# Patient Record
Sex: FEMALE | Race: BLACK OR AFRICAN AMERICAN | NOT HISPANIC OR LATINO | URBAN - METROPOLITAN AREA
[De-identification: names, ages, dates, MRNs, and addresses within clinical notes are randomized per-mention and may not be internally consistent; named-entity substitution may affect disease eponyms.]

---

## 2017-01-05 ENCOUNTER — COMMUNICATION - HEALTHEAST (OUTPATIENT)
Dept: FAMILY MEDICINE | Facility: CLINIC | Age: 67
End: 2017-01-05

## 2017-01-06 ENCOUNTER — RECORDS - HEALTHEAST (OUTPATIENT)
Dept: ADMINISTRATIVE | Facility: OTHER | Age: 67
End: 2017-01-06

## 2017-01-16 ENCOUNTER — AMBULATORY - HEALTHEAST (OUTPATIENT)
Dept: NURSING | Facility: CLINIC | Age: 67
End: 2017-01-16

## 2017-01-16 DIAGNOSIS — I63.9 CVA (CEREBROVASCULAR ACCIDENT) (H): ICD-10-CM

## 2017-01-19 ENCOUNTER — COMMUNICATION - HEALTHEAST (OUTPATIENT)
Dept: FAMILY MEDICINE | Facility: CLINIC | Age: 67
End: 2017-01-19

## 2017-01-19 DIAGNOSIS — K59.00 CONSTIPATION: ICD-10-CM

## 2017-01-25 ENCOUNTER — OFFICE VISIT - HEALTHEAST (OUTPATIENT)
Dept: FAMILY MEDICINE | Facility: CLINIC | Age: 67
End: 2017-01-25

## 2017-01-25 ENCOUNTER — AMBULATORY - HEALTHEAST (OUTPATIENT)
Dept: EDUCATION SERVICES | Facility: CLINIC | Age: 67
End: 2017-01-25

## 2017-01-25 ENCOUNTER — RECORDS - HEALTHEAST (OUTPATIENT)
Dept: MAMMOGRAPHY | Facility: CLINIC | Age: 67
End: 2017-01-25

## 2017-01-25 ENCOUNTER — COMMUNICATION - HEALTHEAST (OUTPATIENT)
Dept: FAMILY MEDICINE | Facility: CLINIC | Age: 67
End: 2017-01-25

## 2017-01-25 DIAGNOSIS — E83.42 HYPOMAGNESEMIA: ICD-10-CM

## 2017-01-25 DIAGNOSIS — E08.65 DIABETES MELLITUS DUE TO UNDERLYING CONDITION WITH HYPERGLYCEMIA (H): ICD-10-CM

## 2017-01-25 DIAGNOSIS — I48.0 PAF (PAROXYSMAL ATRIAL FIBRILLATION) (H): ICD-10-CM

## 2017-01-25 DIAGNOSIS — Z12.31 ENCOUNTER FOR SCREENING MAMMOGRAM FOR MALIGNANT NEOPLASM OF BREAST: ICD-10-CM

## 2017-01-25 DIAGNOSIS — R13.10 DYSPHAGIA: ICD-10-CM

## 2017-01-25 DIAGNOSIS — R05.9 COUGH: ICD-10-CM

## 2017-01-25 DIAGNOSIS — E11.59 TYPE 2 DIABETES MELLITUS WITH OTHER CIRCULATORY COMPLICATION, UNSPECIFIED LONG TERM INSULIN USE STATUS: ICD-10-CM

## 2017-01-25 DIAGNOSIS — L60.2 ONYCHOGRYPOSIS: ICD-10-CM

## 2017-01-25 DIAGNOSIS — Z00.00 HEALTHCARE MAINTENANCE: ICD-10-CM

## 2017-01-25 DIAGNOSIS — I67.2 CEREBRAL ATHEROSCLEROSIS: ICD-10-CM

## 2017-01-25 DIAGNOSIS — M54.32 SCIATICA OF LEFT SIDE: ICD-10-CM

## 2017-01-25 ASSESSMENT — MIFFLIN-ST. JEOR: SCORE: 1289.72

## 2017-02-02 ENCOUNTER — COMMUNICATION - HEALTHEAST (OUTPATIENT)
Dept: FAMILY MEDICINE | Facility: CLINIC | Age: 67
End: 2017-02-02

## 2017-02-02 DIAGNOSIS — I63.9 CVA (CEREBROVASCULAR ACCIDENT) (H): ICD-10-CM

## 2017-02-08 ENCOUNTER — COMMUNICATION - HEALTHEAST (OUTPATIENT)
Dept: FAMILY MEDICINE | Facility: CLINIC | Age: 67
End: 2017-02-08

## 2017-02-09 ENCOUNTER — COMMUNICATION - HEALTHEAST (OUTPATIENT)
Dept: FAMILY MEDICINE | Facility: CLINIC | Age: 67
End: 2017-02-09

## 2017-02-16 ENCOUNTER — COMMUNICATION - HEALTHEAST (OUTPATIENT)
Dept: FAMILY MEDICINE | Facility: CLINIC | Age: 67
End: 2017-02-16

## 2017-02-16 DIAGNOSIS — E11.59 TYPE 2 DIABETES MELLITUS WITH CIRCULATORY DISORDER (H): ICD-10-CM

## 2017-02-16 DIAGNOSIS — E11.59 TYPE 2 DIABETES MELLITUS WITH OTHER CIRCULATORY COMPLICATION, UNSPECIFIED LONG TERM INSULIN USE STATUS: ICD-10-CM

## 2017-02-23 ENCOUNTER — COMMUNICATION - HEALTHEAST (OUTPATIENT)
Dept: FAMILY MEDICINE | Facility: CLINIC | Age: 67
End: 2017-02-23

## 2017-02-27 ENCOUNTER — COMMUNICATION - HEALTHEAST (OUTPATIENT)
Dept: FAMILY MEDICINE | Facility: CLINIC | Age: 67
End: 2017-02-27

## 2017-03-02 ENCOUNTER — COMMUNICATION - HEALTHEAST (OUTPATIENT)
Dept: FAMILY MEDICINE | Facility: CLINIC | Age: 67
End: 2017-03-02

## 2017-03-02 DIAGNOSIS — K59.00 CONSTIPATION: ICD-10-CM

## 2017-03-09 ENCOUNTER — RECORDS - HEALTHEAST (OUTPATIENT)
Dept: ADMINISTRATIVE | Facility: OTHER | Age: 67
End: 2017-03-09

## 2017-03-15 ENCOUNTER — OFFICE VISIT - HEALTHEAST (OUTPATIENT)
Dept: NURSING | Facility: CLINIC | Age: 67
End: 2017-03-15

## 2017-03-15 DIAGNOSIS — I48.0 PAF (PAROXYSMAL ATRIAL FIBRILLATION) (H): ICD-10-CM

## 2017-03-15 DIAGNOSIS — I10 ESSENTIAL HYPERTENSION: ICD-10-CM

## 2017-03-15 DIAGNOSIS — Z00.00 HEALTH CARE MAINTENANCE: ICD-10-CM

## 2017-03-16 ENCOUNTER — COMMUNICATION - HEALTHEAST (OUTPATIENT)
Dept: FAMILY MEDICINE | Facility: CLINIC | Age: 67
End: 2017-03-16

## 2017-03-16 DIAGNOSIS — I63.9 CVA (CEREBROVASCULAR ACCIDENT) (H): ICD-10-CM

## 2017-03-22 ENCOUNTER — COMMUNICATION - HEALTHEAST (OUTPATIENT)
Dept: SCHEDULING | Facility: CLINIC | Age: 67
End: 2017-03-22

## 2017-03-23 ENCOUNTER — RECORDS - HEALTHEAST (OUTPATIENT)
Dept: ADMINISTRATIVE | Facility: OTHER | Age: 67
End: 2017-03-23

## 2017-03-24 ENCOUNTER — RECORDS - HEALTHEAST (OUTPATIENT)
Dept: ADMINISTRATIVE | Facility: OTHER | Age: 67
End: 2017-03-24

## 2017-03-27 ENCOUNTER — COMMUNICATION - HEALTHEAST (OUTPATIENT)
Dept: FAMILY MEDICINE | Facility: CLINIC | Age: 67
End: 2017-03-27

## 2017-03-27 ENCOUNTER — RECORDS - HEALTHEAST (OUTPATIENT)
Dept: ADMINISTRATIVE | Facility: OTHER | Age: 67
End: 2017-03-27

## 2017-03-27 DIAGNOSIS — I63.9 CVA (CEREBROVASCULAR ACCIDENT) (H): ICD-10-CM

## 2017-03-30 ENCOUNTER — COMMUNICATION - HEALTHEAST (OUTPATIENT)
Dept: FAMILY MEDICINE | Facility: CLINIC | Age: 67
End: 2017-03-30

## 2017-03-31 ENCOUNTER — COMMUNICATION - HEALTHEAST (OUTPATIENT)
Dept: FAMILY MEDICINE | Facility: CLINIC | Age: 67
End: 2017-03-31

## 2017-03-31 DIAGNOSIS — I63.9 CVA (CEREBROVASCULAR ACCIDENT) (H): ICD-10-CM

## 2017-04-03 ENCOUNTER — RECORDS - HEALTHEAST (OUTPATIENT)
Dept: ADMINISTRATIVE | Facility: OTHER | Age: 67
End: 2017-04-03

## 2017-04-06 ENCOUNTER — COMMUNICATION - HEALTHEAST (OUTPATIENT)
Dept: FAMILY MEDICINE | Facility: CLINIC | Age: 67
End: 2017-04-06

## 2017-04-06 DIAGNOSIS — I63.9 CVA (CEREBROVASCULAR ACCIDENT) (H): ICD-10-CM

## 2017-04-10 ENCOUNTER — RECORDS - HEALTHEAST (OUTPATIENT)
Dept: ADMINISTRATIVE | Facility: OTHER | Age: 67
End: 2017-04-10

## 2017-04-12 ENCOUNTER — OFFICE VISIT - HEALTHEAST (OUTPATIENT)
Dept: FAMILY MEDICINE | Facility: CLINIC | Age: 67
End: 2017-04-12

## 2017-04-12 ENCOUNTER — COMMUNICATION - HEALTHEAST (OUTPATIENT)
Dept: FAMILY MEDICINE | Facility: CLINIC | Age: 67
End: 2017-04-12

## 2017-04-12 ENCOUNTER — COMMUNICATION - HEALTHEAST (OUTPATIENT)
Dept: NURSING | Facility: CLINIC | Age: 67
End: 2017-04-12

## 2017-04-12 DIAGNOSIS — I67.2 CEREBRAL ATHEROSCLEROSIS: ICD-10-CM

## 2017-04-12 DIAGNOSIS — I73.9 PERIPHERAL VASCULAR DISEASE, UNSPECIFIED (H): ICD-10-CM

## 2017-04-12 DIAGNOSIS — E11.59 TYPE 2 DIABETES MELLITUS WITH OTHER CIRCULATORY COMPLICATION, UNSPECIFIED LONG TERM INSULIN USE STATUS: ICD-10-CM

## 2017-04-12 DIAGNOSIS — M25.532 WRIST PAIN, LEFT: ICD-10-CM

## 2017-04-12 LAB — HBA1C MFR BLD: 6.4 % (ref 3.5–6)

## 2017-04-12 ASSESSMENT — MIFFLIN-ST. JEOR: SCORE: 1276.12

## 2017-04-13 ENCOUNTER — AMBULATORY - HEALTHEAST (OUTPATIENT)
Dept: FAMILY MEDICINE | Facility: CLINIC | Age: 67
End: 2017-04-13

## 2017-04-13 DIAGNOSIS — M25.539 WRIST PAIN: ICD-10-CM

## 2017-04-20 ENCOUNTER — COMMUNICATION - HEALTHEAST (OUTPATIENT)
Dept: FAMILY MEDICINE | Facility: CLINIC | Age: 67
End: 2017-04-20

## 2017-04-20 ENCOUNTER — RECORDS - HEALTHEAST (OUTPATIENT)
Dept: ADMINISTRATIVE | Facility: OTHER | Age: 67
End: 2017-04-20

## 2017-04-20 ENCOUNTER — COMMUNICATION - HEALTHEAST (OUTPATIENT)
Dept: INTENSIVE CARE | Facility: CLINIC | Age: 67
End: 2017-04-20

## 2017-04-20 DIAGNOSIS — M19.90 OSTEOARTHRITIS: ICD-10-CM

## 2017-04-20 DIAGNOSIS — I63.9 CVA (CEREBROVASCULAR ACCIDENT) (H): ICD-10-CM

## 2017-04-25 ENCOUNTER — RECORDS - HEALTHEAST (OUTPATIENT)
Dept: ADMINISTRATIVE | Facility: OTHER | Age: 67
End: 2017-04-25

## 2017-04-26 ENCOUNTER — RECORDS - HEALTHEAST (OUTPATIENT)
Dept: ADMINISTRATIVE | Facility: OTHER | Age: 67
End: 2017-04-26

## 2017-04-27 ENCOUNTER — COMMUNICATION - HEALTHEAST (OUTPATIENT)
Dept: FAMILY MEDICINE | Facility: CLINIC | Age: 67
End: 2017-04-27

## 2017-04-27 DIAGNOSIS — F32.89 DEPRESSIVE DISORDER, NOT ELSEWHERE CLASSIFIED: ICD-10-CM

## 2017-05-01 ENCOUNTER — COMMUNICATION - HEALTHEAST (OUTPATIENT)
Dept: FAMILY MEDICINE | Facility: CLINIC | Age: 67
End: 2017-05-01

## 2017-05-03 ENCOUNTER — COMMUNICATION - HEALTHEAST (OUTPATIENT)
Dept: FAMILY MEDICINE | Facility: CLINIC | Age: 67
End: 2017-05-03

## 2017-05-04 ENCOUNTER — RECORDS - HEALTHEAST (OUTPATIENT)
Dept: ADMINISTRATIVE | Facility: OTHER | Age: 67
End: 2017-05-04

## 2017-05-04 ENCOUNTER — COMMUNICATION - HEALTHEAST (OUTPATIENT)
Dept: FAMILY MEDICINE | Facility: CLINIC | Age: 67
End: 2017-05-04

## 2017-05-04 DIAGNOSIS — I63.9 CVA (CEREBROVASCULAR ACCIDENT) (H): ICD-10-CM

## 2017-05-17 ENCOUNTER — COMMUNICATION - HEALTHEAST (OUTPATIENT)
Dept: SCHEDULING | Facility: CLINIC | Age: 67
End: 2017-05-17

## 2017-05-17 ENCOUNTER — COMMUNICATION - HEALTHEAST (OUTPATIENT)
Dept: FAMILY MEDICINE | Facility: CLINIC | Age: 67
End: 2017-05-17

## 2017-05-18 ENCOUNTER — COMMUNICATION - HEALTHEAST (OUTPATIENT)
Dept: FAMILY MEDICINE | Facility: CLINIC | Age: 67
End: 2017-05-18

## 2017-05-18 ENCOUNTER — OFFICE VISIT - HEALTHEAST (OUTPATIENT)
Dept: FAMILY MEDICINE | Facility: CLINIC | Age: 67
End: 2017-05-18

## 2017-05-18 ENCOUNTER — RECORDS - HEALTHEAST (OUTPATIENT)
Dept: GENERAL RADIOLOGY | Facility: CLINIC | Age: 67
End: 2017-05-18

## 2017-05-18 DIAGNOSIS — J06.9 VIRAL URI WITH COUGH: ICD-10-CM

## 2017-05-18 DIAGNOSIS — I67.2 CEREBRAL ATHEROSCLEROSIS: ICD-10-CM

## 2017-05-18 DIAGNOSIS — R04.2 HEMOPTYSIS: ICD-10-CM

## 2017-05-18 DIAGNOSIS — I63.9 CVA (CEREBROVASCULAR ACCIDENT) (H): ICD-10-CM

## 2017-05-18 DIAGNOSIS — I95.9 HYPOTENSION: ICD-10-CM

## 2017-05-18 ASSESSMENT — MIFFLIN-ST. JEOR: SCORE: 1280.65

## 2017-05-19 ENCOUNTER — RECORDS - HEALTHEAST (OUTPATIENT)
Dept: ADMINISTRATIVE | Facility: OTHER | Age: 67
End: 2017-05-19

## 2017-05-19 ENCOUNTER — COMMUNICATION - HEALTHEAST (OUTPATIENT)
Dept: FAMILY MEDICINE | Facility: CLINIC | Age: 67
End: 2017-05-19

## 2017-05-24 ENCOUNTER — OFFICE VISIT - HEALTHEAST (OUTPATIENT)
Dept: EDUCATION SERVICES | Facility: CLINIC | Age: 67
End: 2017-05-24

## 2017-05-24 ENCOUNTER — OFFICE VISIT - HEALTHEAST (OUTPATIENT)
Dept: FAMILY MEDICINE | Facility: CLINIC | Age: 67
End: 2017-05-24

## 2017-05-24 DIAGNOSIS — Z71.89 ADVANCED DIRECTIVES, COUNSELING/DISCUSSION: ICD-10-CM

## 2017-05-24 DIAGNOSIS — E08.65 DIABETES MELLITUS DUE TO UNDERLYING CONDITION WITH HYPERGLYCEMIA (H): ICD-10-CM

## 2017-05-24 DIAGNOSIS — G25.81 RLS (RESTLESS LEGS SYNDROME): ICD-10-CM

## 2017-05-24 DIAGNOSIS — L60.0 ONYCHOCRYPTOSIS: ICD-10-CM

## 2017-05-24 DIAGNOSIS — I10 ESSENTIAL HYPERTENSION: ICD-10-CM

## 2017-05-24 ASSESSMENT — MIFFLIN-ST. JEOR: SCORE: 1276.12

## 2017-05-25 ENCOUNTER — RECORDS - HEALTHEAST (OUTPATIENT)
Dept: ADMINISTRATIVE | Facility: OTHER | Age: 67
End: 2017-05-25

## 2017-06-01 ENCOUNTER — RECORDS - HEALTHEAST (OUTPATIENT)
Dept: ADMINISTRATIVE | Facility: OTHER | Age: 67
End: 2017-06-01

## 2017-06-02 ENCOUNTER — COMMUNICATION - HEALTHEAST (OUTPATIENT)
Dept: FAMILY MEDICINE | Facility: CLINIC | Age: 67
End: 2017-06-02

## 2017-06-07 ENCOUNTER — COMMUNICATION - HEALTHEAST (OUTPATIENT)
Dept: FAMILY MEDICINE | Facility: CLINIC | Age: 67
End: 2017-06-07

## 2017-06-07 DIAGNOSIS — E83.42 HYPOMAGNESEMIA: ICD-10-CM

## 2017-06-22 ENCOUNTER — COMMUNICATION - HEALTHEAST (OUTPATIENT)
Dept: FAMILY MEDICINE | Facility: CLINIC | Age: 67
End: 2017-06-22

## 2017-06-28 ENCOUNTER — COMMUNICATION - HEALTHEAST (OUTPATIENT)
Dept: FAMILY MEDICINE | Facility: CLINIC | Age: 67
End: 2017-06-28

## 2017-06-28 DIAGNOSIS — I63.9 CVA (CEREBROVASCULAR ACCIDENT) (H): ICD-10-CM

## 2017-07-10 ENCOUNTER — RECORDS - HEALTHEAST (OUTPATIENT)
Dept: ADMINISTRATIVE | Facility: OTHER | Age: 67
End: 2017-07-10

## 2017-07-11 ENCOUNTER — COMMUNICATION - HEALTHEAST (OUTPATIENT)
Dept: FAMILY MEDICINE | Facility: CLINIC | Age: 67
End: 2017-07-11

## 2017-07-11 DIAGNOSIS — I63.9 CEREBRAL INFARCTION (H): ICD-10-CM

## 2017-07-11 DIAGNOSIS — I63.9 CVA (CEREBROVASCULAR ACCIDENT) (H): ICD-10-CM

## 2017-07-11 DIAGNOSIS — I48.0 PAF (PAROXYSMAL ATRIAL FIBRILLATION) (H): ICD-10-CM

## 2017-08-02 ENCOUNTER — COMMUNICATION - HEALTHEAST (OUTPATIENT)
Dept: FAMILY MEDICINE | Facility: CLINIC | Age: 67
End: 2017-08-02

## 2017-08-02 DIAGNOSIS — I63.9 CVA (CEREBROVASCULAR ACCIDENT) (H): ICD-10-CM

## 2017-08-07 ENCOUNTER — COMMUNICATION - HEALTHEAST (OUTPATIENT)
Dept: FAMILY MEDICINE | Facility: CLINIC | Age: 67
End: 2017-08-07

## 2017-08-09 ENCOUNTER — COMMUNICATION - HEALTHEAST (OUTPATIENT)
Dept: MEDSURG UNIT | Facility: CLINIC | Age: 67
End: 2017-08-09

## 2017-08-09 ENCOUNTER — RECORDS - HEALTHEAST (OUTPATIENT)
Dept: ADMINISTRATIVE | Facility: OTHER | Age: 67
End: 2017-08-09

## 2017-08-09 DIAGNOSIS — Z79.01 ADEQUATE ANTICOAGULATION ON ANTICOAGULANT THERAPY: ICD-10-CM

## 2017-08-21 ENCOUNTER — COMMUNICATION - HEALTHEAST (OUTPATIENT)
Dept: FAMILY MEDICINE | Facility: CLINIC | Age: 67
End: 2017-08-21

## 2017-08-22 ENCOUNTER — COMMUNICATION - HEALTHEAST (OUTPATIENT)
Dept: INTENSIVE CARE | Facility: CLINIC | Age: 67
End: 2017-08-22

## 2017-08-22 DIAGNOSIS — I25.10 CORONARY ATHEROSCLEROSIS: ICD-10-CM

## 2017-08-22 DIAGNOSIS — E78.5 HYPERLIPIDEMIA: ICD-10-CM

## 2017-08-22 DIAGNOSIS — K59.00 CONSTIPATION: ICD-10-CM

## 2017-08-22 DIAGNOSIS — Z00.00 ROUTINE GENERAL MEDICAL EXAMINATION AT A HEALTH CARE FACILITY: ICD-10-CM

## 2017-08-22 DIAGNOSIS — J30.9 ALLERGIC RHINITIS: ICD-10-CM

## 2017-09-06 ENCOUNTER — COMMUNICATION - HEALTHEAST (OUTPATIENT)
Dept: FAMILY MEDICINE | Facility: CLINIC | Age: 67
End: 2017-09-06

## 2017-09-06 DIAGNOSIS — I63.9 CVA (CEREBROVASCULAR ACCIDENT) (H): ICD-10-CM

## 2017-09-14 ENCOUNTER — RECORDS - HEALTHEAST (OUTPATIENT)
Dept: ADMINISTRATIVE | Facility: OTHER | Age: 67
End: 2017-09-14

## 2017-09-20 ENCOUNTER — AMBULATORY - HEALTHEAST (OUTPATIENT)
Dept: EDUCATION SERVICES | Facility: CLINIC | Age: 67
End: 2017-09-20

## 2017-09-20 ENCOUNTER — COMMUNICATION - HEALTHEAST (OUTPATIENT)
Dept: NURSING | Facility: CLINIC | Age: 67
End: 2017-09-20

## 2017-09-20 ENCOUNTER — OFFICE VISIT - HEALTHEAST (OUTPATIENT)
Dept: FAMILY MEDICINE | Facility: CLINIC | Age: 67
End: 2017-09-20

## 2017-09-20 ENCOUNTER — RECORDS - HEALTHEAST (OUTPATIENT)
Dept: GENERAL RADIOLOGY | Facility: CLINIC | Age: 67
End: 2017-09-20

## 2017-09-20 DIAGNOSIS — I20.89 ATYPICAL ANGINA (H): ICD-10-CM

## 2017-09-20 DIAGNOSIS — I10 ESSENTIAL HYPERTENSION: ICD-10-CM

## 2017-09-20 DIAGNOSIS — Z00.00 HEALTH CARE MAINTENANCE: ICD-10-CM

## 2017-09-20 DIAGNOSIS — I20.89 OTHER FORMS OF ANGINA PECTORIS (H): ICD-10-CM

## 2017-09-20 DIAGNOSIS — I63.9 CVA (CEREBROVASCULAR ACCIDENT) (H): ICD-10-CM

## 2017-09-20 DIAGNOSIS — K43.2 INCISIONAL HERNIA: ICD-10-CM

## 2017-09-20 DIAGNOSIS — E11.59 TYPE 2 DIABETES MELLITUS WITH OTHER CIRCULATORY COMPLICATION, UNSPECIFIED LONG TERM INSULIN USE STATUS: ICD-10-CM

## 2017-09-20 DIAGNOSIS — Z71.89 ADVANCED DIRECTIVES, COUNSELING/DISCUSSION: ICD-10-CM

## 2017-09-20 LAB
ALT SERPL W P-5'-P-CCNC: 13 U/L (ref 0–45)
CHOLEST SERPL-MCNC: 165 MG/DL
FASTING STATUS PATIENT QL REPORTED: YES
HBA1C MFR BLD: 6.2 % (ref 3.5–6)
HDLC SERPL-MCNC: 56 MG/DL
LDLC SERPL CALC-MCNC: 85 MG/DL
TRIGL SERPL-MCNC: 121 MG/DL

## 2017-09-20 ASSESSMENT — MIFFLIN-ST. JEOR: SCORE: 1298.8

## 2017-09-22 LAB
ATRIAL RATE - MUSE: 63 BPM
DIASTOLIC BLOOD PRESSURE - MUSE: NORMAL MMHG
INTERPRETATION ECG - MUSE: NORMAL
P AXIS - MUSE: NORMAL DEGREES
PR INTERVAL - MUSE: NORMAL MS
QRS DURATION - MUSE: 90 MS
QT - MUSE: 452 MS
QTC - MUSE: 462 MS
R AXIS - MUSE: -15 DEGREES
SYSTOLIC BLOOD PRESSURE - MUSE: NORMAL MMHG
T AXIS - MUSE: 40 DEGREES
VENTRICULAR RATE- MUSE: 63 BPM

## 2017-09-27 ENCOUNTER — HOSPITAL ENCOUNTER (OUTPATIENT)
Dept: NUCLEAR MEDICINE | Facility: CLINIC | Age: 67
Discharge: HOME | End: 2017-09-27
Attending: FAMILY MEDICINE

## 2017-09-27 ENCOUNTER — HOSPITAL ENCOUNTER (OUTPATIENT)
Dept: CARDIOLOGY | Facility: CLINIC | Age: 67
Discharge: HOME | End: 2017-09-27
Attending: FAMILY MEDICINE

## 2017-09-27 DIAGNOSIS — I20.89 ATYPICAL ANGINA (H): ICD-10-CM

## 2017-09-27 LAB
CV STRESS CURRENT BP HE: NORMAL
CV STRESS CURRENT HR HE: 65
CV STRESS CURRENT HR HE: 67
CV STRESS CURRENT HR HE: 68
CV STRESS CURRENT HR HE: 70
CV STRESS CURRENT HR HE: 72
CV STRESS CURRENT HR HE: 74
CV STRESS CURRENT HR HE: 79
CV STRESS CURRENT HR HE: 82
CV STRESS CURRENT HR HE: 84
CV STRESS CURRENT HR HE: 89
CV STRESS CURRENT HR HE: 93
CV STRESS CURRENT HR HE: 94
CV STRESS CURRENT HR HE: 94
CV STRESS CURRENT HR HE: 95
CV STRESS DEVIATION TIME HE: NORMAL
CV STRESS ECHO PERCENT HR HE: NORMAL
CV STRESS EXERCISE STAGE HE: NORMAL
CV STRESS FINAL RESTING BP HE: NORMAL
CV STRESS FINAL RESTING HR HE: 65
CV STRESS MAX HR HE: 97
CV STRESS MAX TREADMILL GRADE HE: 0
CV STRESS MAX TREADMILL SPEED HE: 0
CV STRESS PEAK DIA BP HE: NORMAL
CV STRESS PEAK SYS BP HE: NORMAL
CV STRESS PHASE HE: NORMAL
CV STRESS PROTOCOL HE: NORMAL
CV STRESS RESTING PT POSITION HE: NORMAL
CV STRESS ST DEVIATION AMOUNT HE: NORMAL
CV STRESS ST DEVIATION ELEVATION HE: NORMAL
CV STRESS ST EVELATION AMOUNT HE: NORMAL
CV STRESS TEST TYPE HE: NORMAL
CV STRESS TOTAL STAGE TIME MIN 1 HE: NORMAL
NUC STRESS EJECTION FRACTION: 59 %
STRESS ECHO BASELINE BP: NORMAL
STRESS ECHO BASELINE HR: 67
STRESS ECHO CALCULATED PERCENT HR: 63 %
STRESS ECHO LAST STRESS BP: NORMAL
STRESS ECHO LAST STRESS HR: 94

## 2017-10-02 ENCOUNTER — RECORDS - HEALTHEAST (OUTPATIENT)
Dept: ADMINISTRATIVE | Facility: OTHER | Age: 67
End: 2017-10-02

## 2017-10-04 ENCOUNTER — COMMUNICATION - HEALTHEAST (OUTPATIENT)
Dept: FAMILY MEDICINE | Facility: CLINIC | Age: 67
End: 2017-10-04

## 2017-10-04 ENCOUNTER — RECORDS - HEALTHEAST (OUTPATIENT)
Dept: ADMINISTRATIVE | Facility: OTHER | Age: 67
End: 2017-10-04

## 2017-10-04 DIAGNOSIS — I63.9 CVA (CEREBROVASCULAR ACCIDENT) (H): ICD-10-CM

## 2017-10-18 ENCOUNTER — COMMUNICATION - HEALTHEAST (OUTPATIENT)
Dept: FAMILY MEDICINE | Facility: CLINIC | Age: 67
End: 2017-10-18

## 2017-10-18 DIAGNOSIS — I63.9 CVA (CEREBROVASCULAR ACCIDENT) (H): ICD-10-CM

## 2017-10-18 DIAGNOSIS — H04.123 DRY EYES, BILATERAL: ICD-10-CM

## 2017-11-01 ENCOUNTER — COMMUNICATION - HEALTHEAST (OUTPATIENT)
Dept: FAMILY MEDICINE | Facility: CLINIC | Age: 67
End: 2017-11-01

## 2017-11-01 DIAGNOSIS — I63.9 CVA (CEREBROVASCULAR ACCIDENT) (H): ICD-10-CM

## 2017-11-03 ENCOUNTER — COMMUNICATION - HEALTHEAST (OUTPATIENT)
Dept: FAMILY MEDICINE | Facility: CLINIC | Age: 67
End: 2017-11-03

## 2017-11-06 ENCOUNTER — RECORDS - HEALTHEAST (OUTPATIENT)
Dept: ADMINISTRATIVE | Facility: OTHER | Age: 67
End: 2017-11-06

## 2017-11-22 ENCOUNTER — COMMUNICATION - HEALTHEAST (OUTPATIENT)
Dept: FAMILY MEDICINE | Facility: CLINIC | Age: 67
End: 2017-11-22

## 2017-11-29 ENCOUNTER — COMMUNICATION - HEALTHEAST (OUTPATIENT)
Dept: FAMILY MEDICINE | Facility: CLINIC | Age: 67
End: 2017-11-29

## 2017-11-29 DIAGNOSIS — I63.9 CVA (CEREBROVASCULAR ACCIDENT) (H): ICD-10-CM

## 2017-12-07 ENCOUNTER — RECORDS - HEALTHEAST (OUTPATIENT)
Dept: ADMINISTRATIVE | Facility: OTHER | Age: 67
End: 2017-12-07

## 2017-12-13 ENCOUNTER — COMMUNICATION - HEALTHEAST (OUTPATIENT)
Dept: FAMILY MEDICINE | Facility: CLINIC | Age: 67
End: 2017-12-13

## 2017-12-13 ENCOUNTER — RECORDS - HEALTHEAST (OUTPATIENT)
Dept: ADMINISTRATIVE | Facility: OTHER | Age: 67
End: 2017-12-13

## 2017-12-13 DIAGNOSIS — I63.9 CVA (CEREBROVASCULAR ACCIDENT) (H): ICD-10-CM

## 2017-12-20 ENCOUNTER — COMMUNICATION - HEALTHEAST (OUTPATIENT)
Dept: INTENSIVE CARE | Facility: CLINIC | Age: 67
End: 2017-12-20

## 2017-12-20 DIAGNOSIS — Z00.00 ROUTINE GENERAL MEDICAL EXAMINATION AT A HEALTH CARE FACILITY: ICD-10-CM

## 2017-12-21 ENCOUNTER — COMMUNICATION - HEALTHEAST (OUTPATIENT)
Dept: INTENSIVE CARE | Facility: CLINIC | Age: 67
End: 2017-12-21

## 2017-12-21 ENCOUNTER — COMMUNICATION - HEALTHEAST (OUTPATIENT)
Dept: FAMILY MEDICINE | Facility: CLINIC | Age: 67
End: 2017-12-21

## 2017-12-21 DIAGNOSIS — I25.10 CORONARY ATHEROSCLEROSIS: ICD-10-CM

## 2017-12-22 ENCOUNTER — RECORDS - HEALTHEAST (OUTPATIENT)
Dept: ADMINISTRATIVE | Facility: OTHER | Age: 67
End: 2017-12-22

## 2017-12-27 ENCOUNTER — COMMUNICATION - HEALTHEAST (OUTPATIENT)
Dept: FAMILY MEDICINE | Facility: CLINIC | Age: 67
End: 2017-12-27

## 2017-12-27 DIAGNOSIS — I63.9 CVA (CEREBROVASCULAR ACCIDENT) (H): ICD-10-CM

## 2018-01-04 ENCOUNTER — COMMUNICATION - HEALTHEAST (OUTPATIENT)
Dept: INTENSIVE CARE | Facility: CLINIC | Age: 68
End: 2018-01-04

## 2018-01-04 DIAGNOSIS — J30.9 ALLERGIC RHINITIS: ICD-10-CM

## 2018-01-05 ENCOUNTER — RECORDS - HEALTHEAST (OUTPATIENT)
Dept: ADMINISTRATIVE | Facility: OTHER | Age: 68
End: 2018-01-05

## 2018-01-10 ENCOUNTER — COMMUNICATION - HEALTHEAST (OUTPATIENT)
Dept: NURSING | Facility: CLINIC | Age: 68
End: 2018-01-10

## 2018-01-10 ENCOUNTER — OFFICE VISIT - HEALTHEAST (OUTPATIENT)
Dept: FAMILY MEDICINE | Facility: CLINIC | Age: 68
End: 2018-01-10

## 2018-01-10 DIAGNOSIS — K21.9 GERD (GASTROESOPHAGEAL REFLUX DISEASE): ICD-10-CM

## 2018-01-10 DIAGNOSIS — E11.59 TYPE 2 DIABETES MELLITUS WITH OTHER CIRCULATORY COMPLICATION, UNSPECIFIED LONG TERM INSULIN USE STATUS: ICD-10-CM

## 2018-01-10 DIAGNOSIS — I73.9 PAD (PERIPHERAL ARTERY DISEASE) (H): ICD-10-CM

## 2018-01-10 DIAGNOSIS — I67.2 CEREBRAL ATHEROSCLEROSIS: ICD-10-CM

## 2018-01-10 DIAGNOSIS — F32.9 MAJOR DEPRESSIVE DISORDER: ICD-10-CM

## 2018-01-10 DIAGNOSIS — Z00.00 HEALTHCARE MAINTENANCE: ICD-10-CM

## 2018-01-10 DIAGNOSIS — E83.42 HYPOMAGNESEMIA: ICD-10-CM

## 2018-01-10 DIAGNOSIS — I25.10 CORONARY ATHEROSCLEROSIS: ICD-10-CM

## 2018-01-10 LAB
CREAT UR-MCNC: 122.7 MG/DL
HBA1C MFR BLD: 6.5 % (ref 3.5–6)
INR PPP: 2.7 (ref 0.9–1.1)
MAGNESIUM SERPL-MCNC: 1.3 MG/DL (ref 1.8–2.6)
MICROALBUMIN UR-MCNC: 1.92 MG/DL (ref 0–1.99)
MICROALBUMIN/CREAT UR: 15.6 MG/G
VIT B12 SERPL-MCNC: 1065 PG/ML (ref 213–816)

## 2018-01-10 ASSESSMENT — MIFFLIN-ST. JEOR: SCORE: 1321.48

## 2018-01-11 ENCOUNTER — RECORDS - HEALTHEAST (OUTPATIENT)
Dept: ADMINISTRATIVE | Facility: OTHER | Age: 68
End: 2018-01-11

## 2018-01-11 LAB — 25(OH)D3 SERPL-MCNC: 48 NG/ML (ref 30–80)

## 2018-01-15 ENCOUNTER — COMMUNICATION - HEALTHEAST (OUTPATIENT)
Dept: FAMILY MEDICINE | Facility: CLINIC | Age: 68
End: 2018-01-15

## 2018-01-17 ENCOUNTER — COMMUNICATION - HEALTHEAST (OUTPATIENT)
Dept: MEDSURG UNIT | Facility: CLINIC | Age: 68
End: 2018-01-17

## 2018-01-17 ENCOUNTER — COMMUNICATION - HEALTHEAST (OUTPATIENT)
Dept: FAMILY MEDICINE | Facility: CLINIC | Age: 68
End: 2018-01-17

## 2018-01-17 ENCOUNTER — COMMUNICATION - HEALTHEAST (OUTPATIENT)
Dept: INTENSIVE CARE | Facility: CLINIC | Age: 68
End: 2018-01-17

## 2018-01-17 DIAGNOSIS — K21.9 GERD (GASTROESOPHAGEAL REFLUX DISEASE): ICD-10-CM

## 2018-01-17 DIAGNOSIS — Z79.01 ADEQUATE ANTICOAGULATION ON ANTICOAGULANT THERAPY: ICD-10-CM

## 2018-01-22 ENCOUNTER — COMMUNICATION - HEALTHEAST (OUTPATIENT)
Dept: FAMILY MEDICINE | Facility: CLINIC | Age: 68
End: 2018-01-22

## 2018-01-22 ENCOUNTER — AMBULATORY - HEALTHEAST (OUTPATIENT)
Dept: FAMILY MEDICINE | Facility: CLINIC | Age: 68
End: 2018-01-22

## 2018-01-22 DIAGNOSIS — E83.42 HYPOMAGNESEMIA: ICD-10-CM

## 2018-01-24 ENCOUNTER — COMMUNICATION - HEALTHEAST (OUTPATIENT)
Dept: FAMILY MEDICINE | Facility: CLINIC | Age: 68
End: 2018-01-24

## 2018-01-24 ENCOUNTER — RECORDS - HEALTHEAST (OUTPATIENT)
Dept: ADMINISTRATIVE | Facility: OTHER | Age: 68
End: 2018-01-24

## 2018-01-24 DIAGNOSIS — E11.9 DIABETES (H): ICD-10-CM

## 2018-01-25 ENCOUNTER — RECORDS - HEALTHEAST (OUTPATIENT)
Dept: ADMINISTRATIVE | Facility: OTHER | Age: 68
End: 2018-01-25

## 2018-01-25 ENCOUNTER — RECORDS - HEALTHEAST (OUTPATIENT)
Dept: BONE DENSITY | Facility: CLINIC | Age: 68
End: 2018-01-25

## 2018-01-25 DIAGNOSIS — Z00.00 ENCOUNTER FOR GENERAL ADULT MEDICAL EXAMINATION WITHOUT ABNORMAL FINDINGS: ICD-10-CM

## 2018-01-31 ENCOUNTER — COMMUNICATION - HEALTHEAST (OUTPATIENT)
Dept: FAMILY MEDICINE | Facility: CLINIC | Age: 68
End: 2018-01-31

## 2018-01-31 DIAGNOSIS — M54.9 BACK PAIN: ICD-10-CM

## 2018-02-02 ENCOUNTER — COMMUNICATION - HEALTHEAST (OUTPATIENT)
Dept: FAMILY MEDICINE | Facility: CLINIC | Age: 68
End: 2018-02-02

## 2018-02-06 ENCOUNTER — COMMUNICATION - HEALTHEAST (OUTPATIENT)
Dept: FAMILY MEDICINE | Facility: CLINIC | Age: 68
End: 2018-02-06

## 2018-02-06 DIAGNOSIS — Z00.00 HEALTH CARE MAINTENANCE: ICD-10-CM

## 2018-02-06 DIAGNOSIS — M19.90 OSTEOARTHRITIS: ICD-10-CM

## 2018-02-07 ENCOUNTER — COMMUNICATION - HEALTHEAST (OUTPATIENT)
Dept: FAMILY MEDICINE | Facility: CLINIC | Age: 68
End: 2018-02-07

## 2018-02-07 DIAGNOSIS — I63.9 CVA (CEREBROVASCULAR ACCIDENT) (H): ICD-10-CM

## 2018-02-07 DIAGNOSIS — Z00.00 HEALTH CARE MAINTENANCE: ICD-10-CM

## 2018-02-07 LAB — INR PPP: 3 (ref 0.9–1.1)

## 2018-02-08 ENCOUNTER — AMBULATORY - HEALTHEAST (OUTPATIENT)
Dept: FAMILY MEDICINE | Facility: CLINIC | Age: 68
End: 2018-02-08

## 2018-02-08 ENCOUNTER — RECORDS - HEALTHEAST (OUTPATIENT)
Dept: ADMINISTRATIVE | Facility: OTHER | Age: 68
End: 2018-02-08

## 2018-02-08 DIAGNOSIS — I63.9 CVA (CEREBROVASCULAR ACCIDENT) (H): ICD-10-CM

## 2018-02-08 DIAGNOSIS — R26.9 GAIT DISTURBANCE, POST-STROKE: ICD-10-CM

## 2018-02-08 DIAGNOSIS — I69.398 GAIT DISTURBANCE, POST-STROKE: ICD-10-CM

## 2018-02-08 DIAGNOSIS — M48.061 SPINAL STENOSIS, LUMBAR REGION, WITHOUT NEUROGENIC CLAUDICATION: ICD-10-CM

## 2018-02-20 ENCOUNTER — RECORDS - HEALTHEAST (OUTPATIENT)
Dept: ADMINISTRATIVE | Facility: OTHER | Age: 68
End: 2018-02-20

## 2018-03-01 ENCOUNTER — COMMUNICATION - HEALTHEAST (OUTPATIENT)
Dept: FAMILY MEDICINE | Facility: CLINIC | Age: 68
End: 2018-03-01

## 2018-03-02 ENCOUNTER — COMMUNICATION - HEALTHEAST (OUTPATIENT)
Dept: NURSING | Facility: CLINIC | Age: 68
End: 2018-03-02

## 2018-03-02 DIAGNOSIS — I67.9 CEREBROVASCULAR DISEASE: ICD-10-CM

## 2018-03-07 ENCOUNTER — COMMUNICATION - HEALTHEAST (OUTPATIENT)
Dept: FAMILY MEDICINE | Facility: CLINIC | Age: 68
End: 2018-03-07

## 2018-03-07 DIAGNOSIS — I67.9 CEREBROVASCULAR DISEASE: ICD-10-CM

## 2018-03-07 LAB — INR PPP: 2.3 (ref 0.9–1.1)

## 2018-03-08 ENCOUNTER — AMBULATORY - HEALTHEAST (OUTPATIENT)
Dept: FAMILY MEDICINE | Facility: CLINIC | Age: 68
End: 2018-03-08

## 2018-03-08 ENCOUNTER — HOSPITAL ENCOUNTER (OUTPATIENT)
Dept: OCCUPATIONAL THERAPY | Age: 68
Setting detail: THERAPIES SERIES
Discharge: STILL ADMITTED OR ADMINISTRATIVE | End: 2018-03-08

## 2018-03-08 DIAGNOSIS — R41.89 COGNITIVE IMPAIRMENT: ICD-10-CM

## 2018-03-08 DIAGNOSIS — R53.1 WEAKNESS: ICD-10-CM

## 2018-03-08 DIAGNOSIS — R27.9 LACK OF COORDINATION: ICD-10-CM

## 2018-03-08 DIAGNOSIS — R26.9 GAIT DISTURBANCE: ICD-10-CM

## 2018-03-12 ENCOUNTER — COMMUNICATION - HEALTHEAST (OUTPATIENT)
Dept: FAMILY MEDICINE | Facility: CLINIC | Age: 68
End: 2018-03-12

## 2018-03-12 DIAGNOSIS — K59.00 CONSTIPATION: ICD-10-CM

## 2018-03-14 ENCOUNTER — COMMUNICATION - HEALTHEAST (OUTPATIENT)
Dept: FAMILY MEDICINE | Facility: CLINIC | Age: 68
End: 2018-03-14

## 2018-03-14 ENCOUNTER — HOSPITAL ENCOUNTER (OUTPATIENT)
Dept: PHYSICAL THERAPY | Age: 68
Setting detail: THERAPIES SERIES
Discharge: STILL ADMITTED OR ADMINISTRATIVE | End: 2018-03-14
Attending: FAMILY MEDICINE

## 2018-03-14 DIAGNOSIS — I67.9 CEREBROVASCULAR DISEASE: ICD-10-CM

## 2018-03-14 DIAGNOSIS — R53.81 PHYSICAL DECONDITIONING: ICD-10-CM

## 2018-03-14 DIAGNOSIS — R06.09 DYSPNEA ON EXERTION: ICD-10-CM

## 2018-03-14 DIAGNOSIS — I10 HTN (HYPERTENSION): ICD-10-CM

## 2018-03-14 LAB — INR PPP: 2.9 (ref 0.9–1.1)

## 2018-03-15 ENCOUNTER — RECORDS - HEALTHEAST (OUTPATIENT)
Dept: ADMINISTRATIVE | Facility: OTHER | Age: 68
End: 2018-03-15

## 2018-03-15 ENCOUNTER — OFFICE VISIT - HEALTHEAST (OUTPATIENT)
Dept: EDUCATION SERVICES | Facility: CLINIC | Age: 68
End: 2018-03-15

## 2018-03-15 ENCOUNTER — OFFICE VISIT - HEALTHEAST (OUTPATIENT)
Dept: FAMILY MEDICINE | Facility: CLINIC | Age: 68
End: 2018-03-15

## 2018-03-15 DIAGNOSIS — E08.65 DIABETES MELLITUS DUE TO UNDERLYING CONDITION WITH HYPERGLYCEMIA (H): ICD-10-CM

## 2018-03-15 DIAGNOSIS — K43.9 VENTRAL HERNIA WITHOUT OBSTRUCTION OR GANGRENE: ICD-10-CM

## 2018-03-15 DIAGNOSIS — G25.81 RLS (RESTLESS LEGS SYNDROME): ICD-10-CM

## 2018-03-15 DIAGNOSIS — Z09 HOSPITAL DISCHARGE FOLLOW-UP: ICD-10-CM

## 2018-03-15 DIAGNOSIS — I67.2 CEREBRAL ATHEROSCLEROSIS: ICD-10-CM

## 2018-03-15 DIAGNOSIS — I25.10 CORONARY ATHEROSCLEROSIS: ICD-10-CM

## 2018-03-15 DIAGNOSIS — I10 ESSENTIAL HYPERTENSION: ICD-10-CM

## 2018-03-21 ENCOUNTER — COMMUNICATION - HEALTHEAST (OUTPATIENT)
Dept: FAMILY MEDICINE | Facility: CLINIC | Age: 68
End: 2018-03-21

## 2018-03-21 DIAGNOSIS — I67.9 CEREBROVASCULAR DISEASE: ICD-10-CM

## 2018-03-21 LAB — INR PPP: 2.6 (ref 0.9–1.1)

## 2018-03-22 ENCOUNTER — HOSPITAL ENCOUNTER (OUTPATIENT)
Dept: CARDIOLOGY | Facility: CLINIC | Age: 68
Discharge: HOME | End: 2018-03-22
Attending: INTERNAL MEDICINE

## 2018-03-22 ENCOUNTER — OFFICE VISIT - HEALTHEAST (OUTPATIENT)
Dept: CARDIOLOGY | Facility: CLINIC | Age: 68
End: 2018-03-22

## 2018-03-22 DIAGNOSIS — I70.1 ATHEROSCLEROSIS OF RENAL ARTERY (H): ICD-10-CM

## 2018-03-22 DIAGNOSIS — I25.10 CORONARY ARTERY DISEASE INVOLVING NATIVE CORONARY ARTERY OF NATIVE HEART WITHOUT ANGINA PECTORIS: ICD-10-CM

## 2018-03-22 DIAGNOSIS — I48.0 PAF (PAROXYSMAL ATRIAL FIBRILLATION) (H): ICD-10-CM

## 2018-03-22 DIAGNOSIS — E78.5 DYSLIPIDEMIA: ICD-10-CM

## 2018-03-22 DIAGNOSIS — I10 ESSENTIAL HYPERTENSION: ICD-10-CM

## 2018-03-22 DIAGNOSIS — I73.9 PAD (PERIPHERAL ARTERY DISEASE) (H): ICD-10-CM

## 2018-03-22 DIAGNOSIS — I67.9 CEREBROVASCULAR DISEASE: ICD-10-CM

## 2018-03-22 LAB
ATRIAL RATE - MUSE: 63 BPM
DIASTOLIC BLOOD PRESSURE - MUSE: NORMAL MMHG
INTERPRETATION ECG - MUSE: NORMAL
P AXIS - MUSE: NORMAL DEGREES
PR INTERVAL - MUSE: 200 MS
QRS DURATION - MUSE: 90 MS
QT - MUSE: 442 MS
QTC - MUSE: 452 MS
R AXIS - MUSE: -17 DEGREES
SYSTOLIC BLOOD PRESSURE - MUSE: NORMAL MMHG
T AXIS - MUSE: 30 DEGREES
VENTRICULAR RATE- MUSE: 63 BPM

## 2018-03-22 ASSESSMENT — MIFFLIN-ST. JEOR: SCORE: 1303.33

## 2018-03-23 ENCOUNTER — COMMUNICATION - HEALTHEAST (OUTPATIENT)
Dept: FAMILY MEDICINE | Facility: CLINIC | Age: 68
End: 2018-03-23

## 2018-03-26 ENCOUNTER — RECORDS - HEALTHEAST (OUTPATIENT)
Dept: ADMINISTRATIVE | Facility: OTHER | Age: 68
End: 2018-03-26

## 2018-03-27 ENCOUNTER — COMMUNICATION - HEALTHEAST (OUTPATIENT)
Dept: MEDSURG UNIT | Facility: CLINIC | Age: 68
End: 2018-03-27

## 2018-03-27 DIAGNOSIS — G25.81 RLS (RESTLESS LEGS SYNDROME): ICD-10-CM

## 2018-03-28 ENCOUNTER — OFFICE VISIT - HEALTHEAST (OUTPATIENT)
Dept: SURGERY | Facility: CLINIC | Age: 68
End: 2018-03-28

## 2018-03-28 ENCOUNTER — RECORDS - HEALTHEAST (OUTPATIENT)
Dept: ADMINISTRATIVE | Facility: OTHER | Age: 68
End: 2018-03-28

## 2018-03-28 ENCOUNTER — COMMUNICATION - HEALTHEAST (OUTPATIENT)
Dept: FAMILY MEDICINE | Facility: CLINIC | Age: 68
End: 2018-03-28

## 2018-03-28 DIAGNOSIS — K43.0 INCISIONAL HERNIA WITH OBSTRUCTION BUT NO GANGRENE: ICD-10-CM

## 2018-03-28 ASSESSMENT — MIFFLIN-ST. JEOR: SCORE: 1317.39

## 2018-04-04 ENCOUNTER — COMMUNICATION - HEALTHEAST (OUTPATIENT)
Dept: FAMILY MEDICINE | Facility: CLINIC | Age: 68
End: 2018-04-04

## 2018-04-04 DIAGNOSIS — I67.9 CEREBROVASCULAR DISEASE: ICD-10-CM

## 2018-04-04 LAB — INR PPP: 3.9 (ref 0.9–1.1)

## 2018-04-09 ENCOUNTER — AMBULATORY - HEALTHEAST (OUTPATIENT)
Dept: CARDIOLOGY | Facility: CLINIC | Age: 68
End: 2018-04-09

## 2018-04-09 ENCOUNTER — COMMUNICATION - HEALTHEAST (OUTPATIENT)
Dept: CARDIOLOGY | Facility: CLINIC | Age: 68
End: 2018-04-09

## 2018-04-09 DIAGNOSIS — I25.10 CAD (CORONARY ARTERY DISEASE): ICD-10-CM

## 2018-04-11 ENCOUNTER — COMMUNICATION - HEALTHEAST (OUTPATIENT)
Dept: FAMILY MEDICINE | Facility: CLINIC | Age: 68
End: 2018-04-11

## 2018-04-11 ENCOUNTER — RECORDS - HEALTHEAST (OUTPATIENT)
Dept: ADMINISTRATIVE | Facility: OTHER | Age: 68
End: 2018-04-11

## 2018-04-11 DIAGNOSIS — Z79.01 ADEQUATE ANTICOAGULATION ON ANTICOAGULANT THERAPY: ICD-10-CM

## 2018-04-11 DIAGNOSIS — I67.9 CEREBROVASCULAR DISEASE: ICD-10-CM

## 2018-04-11 LAB — INR PPP: 2.8 (ref 0.9–1.1)

## 2018-04-12 ENCOUNTER — COMMUNICATION - HEALTHEAST (OUTPATIENT)
Dept: FAMILY MEDICINE | Facility: CLINIC | Age: 68
End: 2018-04-12

## 2018-04-13 ENCOUNTER — COMMUNICATION - HEALTHEAST (OUTPATIENT)
Dept: CARDIOLOGY | Facility: CLINIC | Age: 68
End: 2018-04-13

## 2018-04-18 ENCOUNTER — COMMUNICATION - HEALTHEAST (OUTPATIENT)
Dept: FAMILY MEDICINE | Facility: CLINIC | Age: 68
End: 2018-04-18

## 2018-04-24 ENCOUNTER — HOSPITAL ENCOUNTER (OUTPATIENT)
Dept: NUCLEAR MEDICINE | Facility: CLINIC | Age: 68
Discharge: HOME | End: 2018-04-24
Attending: INTERNAL MEDICINE

## 2018-04-24 ENCOUNTER — HOSPITAL ENCOUNTER (OUTPATIENT)
Dept: CARDIOLOGY | Facility: CLINIC | Age: 68
Discharge: HOME | End: 2018-04-24
Attending: INTERNAL MEDICINE

## 2018-04-24 DIAGNOSIS — I25.10 CAD (CORONARY ARTERY DISEASE): ICD-10-CM

## 2018-04-24 LAB
CV STRESS CURRENT BP HE: NORMAL
CV STRESS CURRENT HR HE: 63
CV STRESS CURRENT HR HE: 66
CV STRESS CURRENT HR HE: 70
CV STRESS CURRENT HR HE: 72
CV STRESS CURRENT HR HE: 74
CV STRESS CURRENT HR HE: 74
CV STRESS CURRENT HR HE: 75
CV STRESS CURRENT HR HE: 75
CV STRESS CURRENT HR HE: 77
CV STRESS CURRENT HR HE: 78
CV STRESS CURRENT HR HE: 79
CV STRESS CURRENT HR HE: 79
CV STRESS CURRENT HR HE: 81
CV STRESS CURRENT HR HE: 81
CV STRESS CURRENT HR HE: 82
CV STRESS DEVIATION TIME HE: NORMAL
CV STRESS ECHO PERCENT HR HE: NORMAL
CV STRESS EXERCISE STAGE HE: NORMAL
CV STRESS FINAL RESTING BP HE: NORMAL
CV STRESS FINAL RESTING HR HE: 77
CV STRESS MAX HR HE: 86
CV STRESS MAX TREADMILL GRADE HE: 0
CV STRESS MAX TREADMILL SPEED HE: 0
CV STRESS PEAK DIA BP HE: NORMAL
CV STRESS PEAK SYS BP HE: NORMAL
CV STRESS PHASE HE: NORMAL
CV STRESS PROTOCOL HE: NORMAL
CV STRESS RESTING PT POSITION HE: NORMAL
CV STRESS ST DEVIATION AMOUNT HE: NORMAL
CV STRESS ST DEVIATION ELEVATION HE: NORMAL
CV STRESS ST EVELATION AMOUNT HE: NORMAL
CV STRESS TEST TYPE HE: NORMAL
CV STRESS TOTAL STAGE TIME MIN 1 HE: NORMAL
NUC STRESS EJECTION FRACTION: 73 %
STRESS ECHO BASELINE BP: NORMAL
STRESS ECHO BASELINE HR: 66
STRESS ECHO CALCULATED PERCENT HR: 56 %
STRESS ECHO LAST STRESS BP: NORMAL
STRESS ECHO LAST STRESS HR: 79
STRESS ECHO POST ESTIMATED WORKLOAD: 1
STRESS ECHO POST EXERCISE DUR MIN: 4
STRESS ECHO POST EXERCISE DUR SEC: 0

## 2018-04-25 ENCOUNTER — RECORDS - HEALTHEAST (OUTPATIENT)
Dept: ADMINISTRATIVE | Facility: OTHER | Age: 68
End: 2018-04-25

## 2018-04-25 ENCOUNTER — COMMUNICATION - HEALTHEAST (OUTPATIENT)
Dept: FAMILY MEDICINE | Facility: CLINIC | Age: 68
End: 2018-04-25

## 2018-04-25 ENCOUNTER — COMMUNICATION - HEALTHEAST (OUTPATIENT)
Dept: CARDIOLOGY | Facility: CLINIC | Age: 68
End: 2018-04-25

## 2018-04-25 DIAGNOSIS — I67.9 CEREBROVASCULAR DISEASE: ICD-10-CM

## 2018-04-25 LAB — INR PPP: 3.8 (ref 0.9–1.1)

## 2018-05-02 ENCOUNTER — RECORDS - HEALTHEAST (OUTPATIENT)
Dept: ADMINISTRATIVE | Facility: OTHER | Age: 68
End: 2018-05-02

## 2018-05-09 ENCOUNTER — COMMUNICATION - HEALTHEAST (OUTPATIENT)
Dept: FAMILY MEDICINE | Facility: CLINIC | Age: 68
End: 2018-05-09

## 2018-05-09 ENCOUNTER — COMMUNICATION - HEALTHEAST (OUTPATIENT)
Dept: SURGERY | Facility: CLINIC | Age: 68
End: 2018-05-09

## 2018-05-09 DIAGNOSIS — I67.9 CEREBROVASCULAR DISEASE: ICD-10-CM

## 2018-05-09 LAB — INR PPP: 2 (ref 0.9–1.1)

## 2018-05-15 ENCOUNTER — COMMUNICATION - HEALTHEAST (OUTPATIENT)
Dept: INTENSIVE CARE | Facility: CLINIC | Age: 68
End: 2018-05-15

## 2018-05-15 DIAGNOSIS — I25.10 CORONARY ATHEROSCLEROSIS: ICD-10-CM

## 2018-05-16 ENCOUNTER — COMMUNICATION - HEALTHEAST (OUTPATIENT)
Dept: FAMILY MEDICINE | Facility: CLINIC | Age: 68
End: 2018-05-16

## 2018-05-16 ENCOUNTER — RECORDS - HEALTHEAST (OUTPATIENT)
Dept: ADMINISTRATIVE | Facility: OTHER | Age: 68
End: 2018-05-16

## 2018-05-16 ENCOUNTER — COMMUNICATION - HEALTHEAST (OUTPATIENT)
Dept: SURGERY | Facility: CLINIC | Age: 68
End: 2018-05-16

## 2018-05-16 DIAGNOSIS — I67.9 CEREBROVASCULAR DISEASE: ICD-10-CM

## 2018-05-16 LAB — INR PPP: 2.1 (ref 0.9–1.1)

## 2018-05-18 ENCOUNTER — COMMUNICATION - HEALTHEAST (OUTPATIENT)
Dept: FAMILY MEDICINE | Facility: CLINIC | Age: 68
End: 2018-05-18

## 2018-05-18 ENCOUNTER — OFFICE VISIT - HEALTHEAST (OUTPATIENT)
Dept: SURGERY | Facility: CLINIC | Age: 68
End: 2018-05-18

## 2018-05-18 DIAGNOSIS — K43.2 INCISIONAL HERNIA, WITHOUT OBSTRUCTION OR GANGRENE: ICD-10-CM

## 2018-05-18 ASSESSMENT — MIFFLIN-ST. JEOR: SCORE: 1315.01

## 2018-05-21 ENCOUNTER — COMMUNICATION - HEALTHEAST (OUTPATIENT)
Dept: ENDOCRINOLOGY | Facility: CLINIC | Age: 68
End: 2018-05-21

## 2018-05-23 ENCOUNTER — COMMUNICATION - HEALTHEAST (OUTPATIENT)
Dept: FAMILY MEDICINE | Facility: CLINIC | Age: 68
End: 2018-05-23

## 2018-05-23 ENCOUNTER — RECORDS - HEALTHEAST (OUTPATIENT)
Dept: ADMINISTRATIVE | Facility: OTHER | Age: 68
End: 2018-05-23

## 2018-05-23 DIAGNOSIS — Z79.01 ADEQUATE ANTICOAGULATION ON ANTICOAGULANT THERAPY: ICD-10-CM

## 2018-05-23 DIAGNOSIS — I67.9 CEREBROVASCULAR DISEASE: ICD-10-CM

## 2018-05-23 LAB — INR PPP: 2.7 (ref 0.9–1.1)

## 2018-05-29 ENCOUNTER — OFFICE VISIT - HEALTHEAST (OUTPATIENT)
Dept: FAMILY MEDICINE | Facility: CLINIC | Age: 68
End: 2018-05-29

## 2018-05-29 ENCOUNTER — COMMUNICATION - HEALTHEAST (OUTPATIENT)
Dept: ANTICOAGULATION | Facility: CLINIC | Age: 68
End: 2018-05-29

## 2018-05-29 ENCOUNTER — RECORDS - HEALTHEAST (OUTPATIENT)
Dept: ADMINISTRATIVE | Facility: OTHER | Age: 68
End: 2018-05-29

## 2018-05-29 DIAGNOSIS — Z01.810 PREOP CARDIOVASCULAR EXAM: ICD-10-CM

## 2018-05-29 DIAGNOSIS — I67.9 CEREBROVASCULAR DISEASE: ICD-10-CM

## 2018-05-29 LAB
ANION GAP SERPL CALCULATED.3IONS-SCNC: 13 MMOL/L (ref 5–18)
BUN SERPL-MCNC: 15 MG/DL (ref 8–22)
CALCIUM SERPL-MCNC: 10.1 MG/DL (ref 8.5–10.5)
CHLORIDE BLD-SCNC: 102 MMOL/L (ref 98–107)
CO2 SERPL-SCNC: 24 MMOL/L (ref 22–31)
CREAT SERPL-MCNC: 0.74 MG/DL (ref 0.6–1.1)
ERYTHROCYTE [DISTWIDTH] IN BLOOD BY AUTOMATED COUNT: 15.4 % (ref 11–14.5)
GFR SERPL CREATININE-BSD FRML MDRD: >60 ML/MIN/1.73M2
GLUCOSE BLD-MCNC: 116 MG/DL (ref 70–125)
HCT VFR BLD AUTO: 35.8 % (ref 35–47)
HGB BLD-MCNC: 11.1 G/DL (ref 12–16)
INR PPP: 2.4 (ref 0.9–1.1)
MAGNESIUM SERPL-MCNC: 1.7 MG/DL (ref 1.8–2.6)
MCH RBC QN AUTO: 25.6 PG (ref 27–34)
MCHC RBC AUTO-ENTMCNC: 30.9 G/DL (ref 32–36)
MCV RBC AUTO: 83 FL (ref 80–100)
PLATELET # BLD AUTO: 193 THOU/UL (ref 140–440)
PMV BLD AUTO: 9.3 FL (ref 7–10)
POTASSIUM BLD-SCNC: 4.3 MMOL/L (ref 3.5–5)
RBC # BLD AUTO: 4.32 MILL/UL (ref 3.8–5.4)
SODIUM SERPL-SCNC: 139 MMOL/L (ref 136–145)
WBC: 5 THOU/UL (ref 4–11)

## 2018-05-30 ENCOUNTER — COMMUNICATION - HEALTHEAST (OUTPATIENT)
Dept: FAMILY MEDICINE | Facility: CLINIC | Age: 68
End: 2018-05-30

## 2018-05-31 ENCOUNTER — RECORDS - HEALTHEAST (OUTPATIENT)
Dept: ADMINISTRATIVE | Facility: OTHER | Age: 68
End: 2018-05-31

## 2018-06-06 ENCOUNTER — COMMUNICATION - HEALTHEAST (OUTPATIENT)
Dept: ANTICOAGULATION | Facility: CLINIC | Age: 68
End: 2018-06-06

## 2018-06-06 DIAGNOSIS — I63.9 STROKE (H): ICD-10-CM

## 2018-06-06 DIAGNOSIS — I48.91 ATRIAL FIBRILLATION (H): ICD-10-CM

## 2018-06-13 ENCOUNTER — COMMUNICATION - HEALTHEAST (OUTPATIENT)
Dept: FAMILY MEDICINE | Facility: CLINIC | Age: 68
End: 2018-06-13

## 2018-06-13 ENCOUNTER — COMMUNICATION - HEALTHEAST (OUTPATIENT)
Dept: INTENSIVE CARE | Facility: CLINIC | Age: 68
End: 2018-06-13

## 2018-06-13 DIAGNOSIS — E78.5 HYPERLIPIDEMIA: ICD-10-CM

## 2018-06-13 DIAGNOSIS — I67.9 CEREBROVASCULAR DISEASE: ICD-10-CM

## 2018-06-13 LAB — INR PPP: 2.9 (ref 0.9–1.1)

## 2018-06-14 ENCOUNTER — RECORDS - HEALTHEAST (OUTPATIENT)
Dept: ADMINISTRATIVE | Facility: OTHER | Age: 68
End: 2018-06-14

## 2018-06-20 ENCOUNTER — AMBULATORY - HEALTHEAST (OUTPATIENT)
Dept: SURGERY | Facility: CLINIC | Age: 68
End: 2018-06-20

## 2018-06-21 ENCOUNTER — ANESTHESIA - HEALTHEAST (OUTPATIENT)
Dept: SURGERY | Facility: HOSPITAL | Age: 68
End: 2018-06-21

## 2018-06-21 ASSESSMENT — MIFFLIN-ST. JEOR
SCORE: 1316.94
SCORE: 1316.94

## 2018-06-22 ENCOUNTER — SURGERY - HEALTHEAST (OUTPATIENT)
Dept: SURGERY | Facility: HOSPITAL | Age: 68
End: 2018-06-22

## 2018-06-22 ASSESSMENT — MIFFLIN-ST. JEOR
SCORE: 1351.41
SCORE: 1351.41

## 2018-06-24 ASSESSMENT — MIFFLIN-ST. JEOR
SCORE: 1366.84
SCORE: 1366.84

## 2018-06-25 ASSESSMENT — MIFFLIN-ST. JEOR
SCORE: 1406.3
SCORE: 1406.3

## 2018-06-26 ENCOUNTER — RECORDS - HEALTHEAST (OUTPATIENT)
Dept: ADMINISTRATIVE | Facility: OTHER | Age: 68
End: 2018-06-26

## 2018-06-26 ASSESSMENT — MIFFLIN-ST. JEOR
SCORE: 1417.64
SCORE: 1417.64

## 2018-06-27 ASSESSMENT — MIFFLIN-ST. JEOR
SCORE: 1417.64
SCORE: 1417.64

## 2018-06-28 ENCOUNTER — RECORDS - HEALTHEAST (OUTPATIENT)
Dept: ADMINISTRATIVE | Facility: OTHER | Age: 68
End: 2018-06-28

## 2018-06-28 ASSESSMENT — MIFFLIN-ST. JEOR
SCORE: 1375.91
SCORE: 1375.91

## 2018-06-29 ENCOUNTER — ANESTHESIA - HEALTHEAST (OUTPATIENT)
Dept: SURGERY | Facility: HOSPITAL | Age: 68
End: 2018-06-29

## 2018-06-29 ENCOUNTER — SURGERY - HEALTHEAST (OUTPATIENT)
Dept: SURGERY | Facility: HOSPITAL | Age: 68
End: 2018-06-29

## 2018-06-29 ASSESSMENT — MIFFLIN-ST. JEOR
SCORE: 1355.95
SCORE: 1355.95

## 2018-06-30 ASSESSMENT — MIFFLIN-ST. JEOR
SCORE: 1371.37
SCORE: 1371.37

## 2018-07-01 ASSESSMENT — MIFFLIN-ST. JEOR
SCORE: 1375.91
SCORE: 1375.91

## 2018-07-02 ASSESSMENT — MIFFLIN-ST. JEOR
SCORE: 1381.8
SCORE: 1381.8

## 2018-07-03 ASSESSMENT — MIFFLIN-ST. JEOR
SCORE: 1414.92
SCORE: 1414.92

## 2018-07-05 ENCOUNTER — COMMUNICATION - HEALTHEAST (OUTPATIENT)
Dept: ANTICOAGULATION | Facility: CLINIC | Age: 68
End: 2018-07-05

## 2018-07-05 ENCOUNTER — OFFICE VISIT - HEALTHEAST (OUTPATIENT)
Dept: GERIATRICS | Facility: CLINIC | Age: 68
End: 2018-07-05

## 2018-07-05 ENCOUNTER — RECORDS - HEALTHEAST (OUTPATIENT)
Dept: LAB | Facility: CLINIC | Age: 68
End: 2018-07-05

## 2018-07-05 DIAGNOSIS — Z87.19 S/P REPAIR OF VENTRAL HERNIA: ICD-10-CM

## 2018-07-05 DIAGNOSIS — I48.0 PAROXYSMAL ATRIAL FIBRILLATION (H): ICD-10-CM

## 2018-07-05 DIAGNOSIS — I67.9 CEREBROVASCULAR DISEASE: ICD-10-CM

## 2018-07-05 DIAGNOSIS — Z98.890 S/P REPAIR OF VENTRAL HERNIA: ICD-10-CM

## 2018-07-05 DIAGNOSIS — I10 ESSENTIAL HYPERTENSION: ICD-10-CM

## 2018-07-05 DIAGNOSIS — I73.9 PAD (PERIPHERAL ARTERY DISEASE) (H): ICD-10-CM

## 2018-07-05 DIAGNOSIS — I25.10 CORONARY ARTERY DISEASE INVOLVING NATIVE CORONARY ARTERY OF NATIVE HEART WITHOUT ANGINA PECTORIS: ICD-10-CM

## 2018-07-05 DIAGNOSIS — D62 ANEMIA DUE TO BLOOD LOSS, ACUTE: ICD-10-CM

## 2018-07-05 DIAGNOSIS — I70.1 ATHEROSCLEROSIS OF RENAL ARTERY (H): ICD-10-CM

## 2018-07-05 DIAGNOSIS — E11.8 TYPE 2 DIABETES MELLITUS WITH COMPLICATION, WITHOUT LONG-TERM CURRENT USE OF INSULIN (H): ICD-10-CM

## 2018-07-05 LAB — INR PPP: 1.25 (ref 0.9–1.1)

## 2018-07-09 ENCOUNTER — OFFICE VISIT - HEALTHEAST (OUTPATIENT)
Dept: GERIATRICS | Facility: CLINIC | Age: 68
End: 2018-07-09

## 2018-07-09 ENCOUNTER — RECORDS - HEALTHEAST (OUTPATIENT)
Dept: LAB | Facility: CLINIC | Age: 68
End: 2018-07-09

## 2018-07-09 DIAGNOSIS — Z87.19 HISTORY OF INCISIONAL HERNIA REPAIR: ICD-10-CM

## 2018-07-09 DIAGNOSIS — I10 ESSENTIAL HYPERTENSION: ICD-10-CM

## 2018-07-09 DIAGNOSIS — D62 ANEMIA DUE TO BLOOD LOSS, ACUTE: ICD-10-CM

## 2018-07-09 DIAGNOSIS — E83.42 HYPOMAGNESEMIA: ICD-10-CM

## 2018-07-09 DIAGNOSIS — E83.39 HYPOPHOSPHATEMIA: ICD-10-CM

## 2018-07-09 DIAGNOSIS — Z98.890 HISTORY OF INCISIONAL HERNIA REPAIR: ICD-10-CM

## 2018-07-09 DIAGNOSIS — E11.59 TYPE 2 DIABETES MELLITUS WITH OTHER CIRCULATORY COMPLICATION, UNSPECIFIED LONG TERM INSULIN USE STATUS: ICD-10-CM

## 2018-07-09 DIAGNOSIS — I48.0 PAROXYSMAL ATRIAL FIBRILLATION (H): ICD-10-CM

## 2018-07-10 LAB — INR PPP: 3.36 (ref 0.9–1.1)

## 2018-07-13 ENCOUNTER — OFFICE VISIT - HEALTHEAST (OUTPATIENT)
Dept: SURGERY | Facility: CLINIC | Age: 68
End: 2018-07-13

## 2018-07-13 DIAGNOSIS — Z48.89 POSTOPERATIVE VISIT: ICD-10-CM

## 2018-07-13 ASSESSMENT — MIFFLIN-ST. JEOR: SCORE: 1412.2

## 2018-07-16 ENCOUNTER — OFFICE VISIT - HEALTHEAST (OUTPATIENT)
Dept: GERIATRICS | Facility: CLINIC | Age: 68
End: 2018-07-16

## 2018-07-16 DIAGNOSIS — I48.19 PERSISTENT ATRIAL FIBRILLATION (H): ICD-10-CM

## 2018-07-16 DIAGNOSIS — D64.9 ANEMIA: ICD-10-CM

## 2018-07-16 DIAGNOSIS — I63.9 CEREBRAL INFARCTION (H): ICD-10-CM

## 2018-07-16 DIAGNOSIS — F32.89 OTHER DEPRESSION: ICD-10-CM

## 2018-07-16 DIAGNOSIS — I25.10 CORONARY ARTERY DISEASE INVOLVING NATIVE CORONARY ARTERY OF NATIVE HEART WITHOUT ANGINA PECTORIS: ICD-10-CM

## 2018-07-16 DIAGNOSIS — I73.9 PAD (PERIPHERAL ARTERY DISEASE) (H): ICD-10-CM

## 2018-07-16 DIAGNOSIS — Z87.19 S/P REPAIR OF RECURRENT VENTRAL HERNIA: ICD-10-CM

## 2018-07-16 DIAGNOSIS — I10 ESSENTIAL HYPERTENSION: ICD-10-CM

## 2018-07-16 DIAGNOSIS — D62 ANEMIA DUE TO BLOOD LOSS, ACUTE: ICD-10-CM

## 2018-07-16 DIAGNOSIS — Z98.890 S/P REPAIR OF RECURRENT VENTRAL HERNIA: ICD-10-CM

## 2018-07-17 ENCOUNTER — RECORDS - HEALTHEAST (OUTPATIENT)
Dept: LAB | Facility: CLINIC | Age: 68
End: 2018-07-17

## 2018-07-17 ENCOUNTER — RECORDS - HEALTHEAST (OUTPATIENT)
Dept: ADMINISTRATIVE | Facility: OTHER | Age: 68
End: 2018-07-17

## 2018-07-17 ENCOUNTER — OFFICE VISIT - HEALTHEAST (OUTPATIENT)
Dept: GERIATRICS | Facility: CLINIC | Age: 68
End: 2018-07-17

## 2018-07-17 DIAGNOSIS — Z51.81 ANTICOAGULATION MANAGEMENT ENCOUNTER: ICD-10-CM

## 2018-07-17 DIAGNOSIS — I48.19 PERSISTENT ATRIAL FIBRILLATION (H): ICD-10-CM

## 2018-07-17 DIAGNOSIS — Z79.01 ANTICOAGULATION MANAGEMENT ENCOUNTER: ICD-10-CM

## 2018-07-17 LAB — INR PPP: 3.81 (ref 0.9–1.1)

## 2018-07-19 ENCOUNTER — AMBULATORY - HEALTHEAST (OUTPATIENT)
Dept: GERIATRICS | Facility: CLINIC | Age: 68
End: 2018-07-19

## 2018-07-19 ENCOUNTER — COMMUNICATION - HEALTHEAST (OUTPATIENT)
Dept: GERIATRICS | Facility: CLINIC | Age: 68
End: 2018-07-19

## 2018-07-19 ENCOUNTER — COMMUNICATION - HEALTHEAST (OUTPATIENT)
Dept: FAMILY MEDICINE | Facility: CLINIC | Age: 68
End: 2018-07-19

## 2018-07-19 DIAGNOSIS — I67.9 CEREBROVASCULAR DISEASE: ICD-10-CM

## 2018-07-19 LAB — INR PPP: 3.81 (ref 0.9–1.1)

## 2018-07-23 ENCOUNTER — RECORDS - HEALTHEAST (OUTPATIENT)
Dept: ADMINISTRATIVE | Facility: OTHER | Age: 68
End: 2018-07-23

## 2018-07-26 ENCOUNTER — RECORDS - HEALTHEAST (OUTPATIENT)
Dept: ADMINISTRATIVE | Facility: OTHER | Age: 68
End: 2018-07-26

## 2018-07-26 ENCOUNTER — COMMUNICATION - HEALTHEAST (OUTPATIENT)
Dept: FAMILY MEDICINE | Facility: CLINIC | Age: 68
End: 2018-07-26

## 2018-07-26 DIAGNOSIS — I67.9 CEREBROVASCULAR DISEASE: ICD-10-CM

## 2018-07-26 LAB — INR PPP: 2.4 (ref 0.9–1.1)

## 2018-07-31 ENCOUNTER — OFFICE VISIT - HEALTHEAST (OUTPATIENT)
Dept: FAMILY MEDICINE | Facility: CLINIC | Age: 68
End: 2018-07-31

## 2018-07-31 ENCOUNTER — COMMUNICATION - HEALTHEAST (OUTPATIENT)
Dept: ANTICOAGULATION | Facility: CLINIC | Age: 68
End: 2018-07-31

## 2018-07-31 ENCOUNTER — RECORDS - HEALTHEAST (OUTPATIENT)
Dept: GENERAL RADIOLOGY | Facility: CLINIC | Age: 68
End: 2018-07-31

## 2018-07-31 DIAGNOSIS — E83.52 HYPERCALCEMIA: ICD-10-CM

## 2018-07-31 DIAGNOSIS — R60.0 BILATERAL LOWER EXTREMITY EDEMA: ICD-10-CM

## 2018-07-31 DIAGNOSIS — R06.02 SOB (SHORTNESS OF BREATH): ICD-10-CM

## 2018-07-31 DIAGNOSIS — Z09 HOSPITAL DISCHARGE FOLLOW-UP: ICD-10-CM

## 2018-07-31 DIAGNOSIS — E11.59 TYPE 2 DIABETES MELLITUS WITH OTHER CIRCULATORY COMPLICATION, UNSPECIFIED LONG TERM INSULIN USE STATUS: ICD-10-CM

## 2018-07-31 DIAGNOSIS — K43.9 VENTRAL HERNIA WITHOUT OBSTRUCTION OR GANGRENE: ICD-10-CM

## 2018-07-31 DIAGNOSIS — I10 ESSENTIAL HYPERTENSION: ICD-10-CM

## 2018-07-31 DIAGNOSIS — Z87.19 HISTORY OF SMALL BOWEL OBSTRUCTION: ICD-10-CM

## 2018-07-31 DIAGNOSIS — I67.9 CEREBROVASCULAR DISEASE: ICD-10-CM

## 2018-07-31 DIAGNOSIS — R06.02 SHORTNESS OF BREATH: ICD-10-CM

## 2018-07-31 DIAGNOSIS — E83.42 HYPOMAGNESEMIA: ICD-10-CM

## 2018-07-31 DIAGNOSIS — I48.0 PAROXYSMAL ATRIAL FIBRILLATION (H): ICD-10-CM

## 2018-07-31 DIAGNOSIS — I50.31 ACUTE DIASTOLIC CONGESTIVE HEART FAILURE (H): ICD-10-CM

## 2018-07-31 LAB
ANION GAP SERPL CALCULATED.3IONS-SCNC: 8 MMOL/L (ref 5–18)
ATRIAL RATE - MUSE: 67 BPM
BUN SERPL-MCNC: 11 MG/DL (ref 8–22)
CALCIUM SERPL-MCNC: 9.6 MG/DL (ref 8.5–10.5)
CHLORIDE BLD-SCNC: 105 MMOL/L (ref 98–107)
CO2 SERPL-SCNC: 27 MMOL/L (ref 22–31)
CREAT SERPL-MCNC: 0.8 MG/DL (ref 0.6–1.1)
DIASTOLIC BLOOD PRESSURE - MUSE: NORMAL MMHG
ERYTHROCYTE [DISTWIDTH] IN BLOOD BY AUTOMATED COUNT: 17.9 % (ref 11–14.5)
GFR SERPL CREATININE-BSD FRML MDRD: >60 ML/MIN/1.73M2
GLUCOSE BLD-MCNC: 115 MG/DL (ref 70–125)
HBA1C MFR BLD: 5.7 % (ref 3.5–6)
HCT VFR BLD AUTO: 27.6 % (ref 35–47)
HGB BLD-MCNC: 8.6 G/DL (ref 12–16)
INR PPP: 2 (ref 0.9–1.1)
INTERPRETATION ECG - MUSE: NORMAL
MAGNESIUM SERPL-MCNC: 1.6 MG/DL (ref 1.8–2.6)
MCH RBC QN AUTO: 25.5 PG (ref 27–34)
MCHC RBC AUTO-ENTMCNC: 31.3 G/DL (ref 32–36)
MCV RBC AUTO: 82 FL (ref 80–100)
P AXIS - MUSE: 2 DEGREES
PLATELET # BLD AUTO: 268 THOU/UL (ref 140–440)
PMV BLD AUTO: 8.7 FL (ref 7–10)
POTASSIUM BLD-SCNC: 4 MMOL/L (ref 3.5–5)
PR INTERVAL - MUSE: 164 MS
QRS DURATION - MUSE: 80 MS
QT - MUSE: 458 MS
QTC - MUSE: 483 MS
R AXIS - MUSE: -31 DEGREES
RBC # BLD AUTO: 3.39 MILL/UL (ref 3.8–5.4)
SODIUM SERPL-SCNC: 140 MMOL/L (ref 136–145)
SYSTOLIC BLOOD PRESSURE - MUSE: NORMAL MMHG
T AXIS - MUSE: 26 DEGREES
VENTRICULAR RATE- MUSE: 67 BPM
WBC: 5.2 THOU/UL (ref 4–11)

## 2018-08-01 ENCOUNTER — COMMUNICATION - HEALTHEAST (OUTPATIENT)
Dept: FAMILY MEDICINE | Facility: CLINIC | Age: 68
End: 2018-08-01

## 2018-08-01 ENCOUNTER — RECORDS - HEALTHEAST (OUTPATIENT)
Dept: ADMINISTRATIVE | Facility: OTHER | Age: 68
End: 2018-08-01

## 2018-08-01 DIAGNOSIS — K43.2 INCISIONAL HERNIA, WITHOUT OBSTRUCTION OR GANGRENE: ICD-10-CM

## 2018-08-01 LAB — BNP SERPL-MCNC: 772 PG/ML (ref 0–114)

## 2018-08-03 ENCOUNTER — COMMUNICATION - HEALTHEAST (OUTPATIENT)
Dept: FAMILY MEDICINE | Facility: CLINIC | Age: 68
End: 2018-08-03

## 2018-08-03 ENCOUNTER — HOSPITAL ENCOUNTER (OUTPATIENT)
Dept: CARDIOLOGY | Facility: CLINIC | Age: 68
Discharge: HOME | End: 2018-08-03
Attending: FAMILY MEDICINE

## 2018-08-03 DIAGNOSIS — R06.02 SOB (SHORTNESS OF BREATH): ICD-10-CM

## 2018-08-03 LAB
AORTIC ROOT: 3.5 CM
AR DECEL SLOPE: 1550 MM/S2
AR PEAK VELOCITY: 429 CM/S
AV REGURGITANT PEAK GRADIENT: 73.6 MMHG
AV REGURGITATION PRESSURE HALF TIME: 812 MS
BSA FOR ECHO PROCEDURE: 1.91 M2
CV ECHO HEIGHT: 62 IN
CV ECHO WEIGHT: 185 LBS
DOP CALC LVOT AREA: 3.8 CM2
DOP CALC LVOT DIAMETER: 2.2 CM
DOP CALC LVOT PEAK VEL: 67.2 CM/S
DOP CALC LVOT STROKE VOLUME: 43.7 CM3
DOP CALCLVOT PEAK VEL VTI: 11.5 CM
ECHO EJECTION FRACTION ESTIMATED: 55 %
FRACTIONAL SHORTENING: 10.4 % (ref 28–44)
INTERVENTRICULAR SEPTUM IN END DIASTOLE: 1.63 CM (ref 0.6–0.9)
IVS/PW RATIO: 1.5
LA AREA 1: 27.1 CM2
LA AREA 2: 26.1 CM2
LEFT ATRIUM LENGTH: 6.6 CM
LEFT ATRIUM SIZE: 3.7 CM
LEFT ATRIUM TO AORTIC ROOT RATIO: 1.06 NO UNITS
LEFT ATRIUM VOLUME INDEX: 47.7 ML/M2
LEFT ATRIUM VOLUME: 91.1 ML
LEFT VENTRICLE CARDIAC INDEX: 3 L/MIN/M2
LEFT VENTRICLE CARDIAC OUTPUT: 5.7 L/MIN
LEFT VENTRICLE HEART RATE: 130 BPM
LEFT VENTRICLE MASS INDEX: 111.7 G/M2
LEFT VENTRICULAR INTERNAL DIMENSION IN DIASTOLE: 4.15 CM (ref 3.8–5.2)
LEFT VENTRICULAR INTERNAL DIMENSION IN SYSTOLE: 3.72 CM (ref 2.2–3.5)
LEFT VENTRICULAR MASS: 213.3 G
LEFT VENTRICULAR OUTFLOW TRACT MEAN GRADIENT: 1 MMHG
LEFT VENTRICULAR OUTFLOW TRACT MEAN VELOCITY: 37.7 CM/S
LEFT VENTRICULAR OUTFLOW TRACT PEAK GRADIENT: 2 MMHG
LEFT VENTRICULAR POSTERIOR WALL IN END DIASTOLE: 1.11 CM (ref 0.6–0.9)
LV STROKE VOLUME INDEX: 22.9 ML/M2
MITRAL VALVE E/A RATIO: 1.9
MV AVERAGE E/E' RATIO: 20.7 CM/S
MV DECELERATION TIME: 169 MS
MV E'TISSUE VEL-LAT: 8.92 CM/S
MV E'TISSUE VEL-MED: 6.42 CM/S
MV LATERAL E/E' RATIO: 17.8
MV MEDIAL E/E' RATIO: 24.8
MV PEAK A VELOCITY: 83.2 CM/S
MV PEAK E VELOCITY: 159 CM/S
NUC REST DIASTOLIC VOLUME INDEX: 2960 LBS
NUC REST SYSTOLIC VOLUME INDEX: 62 IN
TRICUSPID REGURGITATION PEAK PRESSURE GRADIENT: 53 MMHG
TRICUSPID VALVE ANULAR PLANE SYSTOLIC EXCURSION: 1.5 CM
TRICUSPID VALVE PEAK REGURGITANT VELOCITY: 364 CM/S

## 2018-08-03 ASSESSMENT — MIFFLIN-ST. JEOR: SCORE: 1312.4

## 2018-08-07 ENCOUNTER — COMMUNICATION - HEALTHEAST (OUTPATIENT)
Dept: ANTICOAGULATION | Facility: CLINIC | Age: 68
End: 2018-08-07

## 2018-08-07 ENCOUNTER — OFFICE VISIT - HEALTHEAST (OUTPATIENT)
Dept: FAMILY MEDICINE | Facility: CLINIC | Age: 68
End: 2018-08-07

## 2018-08-07 ENCOUNTER — RECORDS - HEALTHEAST (OUTPATIENT)
Dept: ADMINISTRATIVE | Facility: OTHER | Age: 68
End: 2018-08-07

## 2018-08-07 DIAGNOSIS — D62 ANEMIA DUE TO BLOOD LOSS, ACUTE: ICD-10-CM

## 2018-08-07 DIAGNOSIS — I48.19 PERSISTENT ATRIAL FIBRILLATION (H): ICD-10-CM

## 2018-08-07 DIAGNOSIS — I50.31 ACUTE DIASTOLIC CONGESTIVE HEART FAILURE (H): ICD-10-CM

## 2018-08-07 DIAGNOSIS — R60.0 BILATERAL LOWER EXTREMITY EDEMA: ICD-10-CM

## 2018-08-07 DIAGNOSIS — R60.0 LOWER EXTREMITY EDEMA: ICD-10-CM

## 2018-08-07 DIAGNOSIS — I10 ESSENTIAL HYPERTENSION: ICD-10-CM

## 2018-08-07 DIAGNOSIS — I67.9 CEREBROVASCULAR DISEASE: ICD-10-CM

## 2018-08-07 LAB
ANION GAP SERPL CALCULATED.3IONS-SCNC: 8 MMOL/L (ref 5–18)
BUN SERPL-MCNC: 9 MG/DL (ref 8–22)
CALCIUM SERPL-MCNC: 10 MG/DL (ref 8.5–10.5)
CHLORIDE BLD-SCNC: 102 MMOL/L (ref 98–107)
CO2 SERPL-SCNC: 31 MMOL/L (ref 22–31)
CREAT SERPL-MCNC: 0.72 MG/DL (ref 0.6–1.1)
GFR SERPL CREATININE-BSD FRML MDRD: >60 ML/MIN/1.73M2
GLUCOSE BLD-MCNC: 99 MG/DL (ref 70–125)
INR PPP: 2.1 (ref 0.9–1.1)
MAGNESIUM SERPL-MCNC: 1.7 MG/DL (ref 1.8–2.6)
POTASSIUM BLD-SCNC: 3.7 MMOL/L (ref 3.5–5)
SODIUM SERPL-SCNC: 141 MMOL/L (ref 136–145)

## 2018-08-10 ENCOUNTER — OFFICE VISIT - HEALTHEAST (OUTPATIENT)
Dept: SURGERY | Facility: CLINIC | Age: 68
End: 2018-08-10

## 2018-08-10 ENCOUNTER — COMMUNICATION - HEALTHEAST (OUTPATIENT)
Dept: FAMILY MEDICINE | Facility: CLINIC | Age: 68
End: 2018-08-10

## 2018-08-10 DIAGNOSIS — Z48.89 POSTOPERATIVE VISIT: ICD-10-CM

## 2018-08-10 ASSESSMENT — MIFFLIN-ST. JEOR: SCORE: 1326.01

## 2018-08-13 ENCOUNTER — COMMUNICATION - HEALTHEAST (OUTPATIENT)
Dept: SCHEDULING | Facility: CLINIC | Age: 68
End: 2018-08-13

## 2018-08-14 ENCOUNTER — COMMUNICATION - HEALTHEAST (OUTPATIENT)
Dept: SCHEDULING | Facility: CLINIC | Age: 68
End: 2018-08-14

## 2018-08-14 ENCOUNTER — RECORDS - HEALTHEAST (OUTPATIENT)
Dept: ADMINISTRATIVE | Facility: OTHER | Age: 68
End: 2018-08-14

## 2018-08-15 ENCOUNTER — OFFICE VISIT - HEALTHEAST (OUTPATIENT)
Dept: CARDIOLOGY | Facility: CLINIC | Age: 68
End: 2018-08-15

## 2018-08-15 DIAGNOSIS — I73.9 PAD (PERIPHERAL ARTERY DISEASE) (H): ICD-10-CM

## 2018-08-15 DIAGNOSIS — I48.19 PERSISTENT ATRIAL FIBRILLATION (H): ICD-10-CM

## 2018-08-15 DIAGNOSIS — I70.1 ATHEROSCLEROSIS OF RENAL ARTERY (H): ICD-10-CM

## 2018-08-15 DIAGNOSIS — I25.10 CORONARY ARTERY DISEASE INVOLVING NATIVE CORONARY ARTERY OF NATIVE HEART WITHOUT ANGINA PECTORIS: ICD-10-CM

## 2018-08-15 DIAGNOSIS — I10 ESSENTIAL HYPERTENSION: ICD-10-CM

## 2018-08-15 DIAGNOSIS — E78.5 DYSLIPIDEMIA: ICD-10-CM

## 2018-08-15 LAB
ANION GAP SERPL CALCULATED.3IONS-SCNC: 9 MMOL/L (ref 5–18)
BNP SERPL-MCNC: 544 PG/ML (ref 0–114)
BUN SERPL-MCNC: 15 MG/DL (ref 8–22)
CALCIUM SERPL-MCNC: 10.1 MG/DL (ref 8.5–10.5)
CHLORIDE BLD-SCNC: 102 MMOL/L (ref 98–107)
CO2 SERPL-SCNC: 28 MMOL/L (ref 22–31)
CREAT SERPL-MCNC: 0.8 MG/DL (ref 0.6–1.1)
ERYTHROCYTE [DISTWIDTH] IN BLOOD BY AUTOMATED COUNT: 19.4 % (ref 11–14.5)
GFR SERPL CREATININE-BSD FRML MDRD: >60 ML/MIN/1.73M2
GLUCOSE BLD-MCNC: 118 MG/DL (ref 70–125)
HCT VFR BLD AUTO: 32.9 % (ref 35–47)
HGB BLD-MCNC: 10 G/DL (ref 12–16)
MCH RBC QN AUTO: 26.6 PG (ref 27–34)
MCHC RBC AUTO-ENTMCNC: 30.4 G/DL (ref 32–36)
MCV RBC AUTO: 88 FL (ref 80–100)
PLATELET # BLD AUTO: 231 THOU/UL (ref 140–440)
PMV BLD AUTO: 12 FL (ref 8.5–12.5)
POTASSIUM BLD-SCNC: 3.5 MMOL/L (ref 3.5–5)
RBC # BLD AUTO: 3.76 MILL/UL (ref 3.8–5.4)
SODIUM SERPL-SCNC: 139 MMOL/L (ref 136–145)
WBC: 7.4 THOU/UL (ref 4–11)

## 2018-08-15 ASSESSMENT — MIFFLIN-ST. JEOR: SCORE: 1330.55

## 2018-08-20 ENCOUNTER — COMMUNICATION - HEALTHEAST (OUTPATIENT)
Dept: ANTICOAGULATION | Facility: CLINIC | Age: 68
End: 2018-08-20

## 2018-08-20 DIAGNOSIS — I67.9 CEREBROVASCULAR DISEASE: ICD-10-CM

## 2018-08-21 ENCOUNTER — OFFICE VISIT - HEALTHEAST (OUTPATIENT)
Dept: FAMILY MEDICINE | Facility: CLINIC | Age: 68
End: 2018-08-21

## 2018-08-21 DIAGNOSIS — F41.9 ANXIETY: ICD-10-CM

## 2018-08-21 DIAGNOSIS — I50.31 ACUTE DIASTOLIC CONGESTIVE HEART FAILURE (H): ICD-10-CM

## 2018-08-21 DIAGNOSIS — G25.81 RLS (RESTLESS LEGS SYNDROME): ICD-10-CM

## 2018-08-21 DIAGNOSIS — I48.19 PERSISTENT ATRIAL FIBRILLATION (H): ICD-10-CM

## 2018-08-21 DIAGNOSIS — R10.84 ABDOMINAL PAIN, GENERALIZED: ICD-10-CM

## 2018-08-22 ENCOUNTER — COMMUNICATION - HEALTHEAST (OUTPATIENT)
Dept: FAMILY MEDICINE | Facility: CLINIC | Age: 68
End: 2018-08-22

## 2018-08-22 DIAGNOSIS — I50.9 CHF (CONGESTIVE HEART FAILURE) (H): ICD-10-CM

## 2018-08-24 ENCOUNTER — OFFICE VISIT - HEALTHEAST (OUTPATIENT)
Dept: CARDIOLOGY | Facility: CLINIC | Age: 68
End: 2018-08-24

## 2018-08-24 ENCOUNTER — AMBULATORY - HEALTHEAST (OUTPATIENT)
Dept: CARDIOLOGY | Facility: CLINIC | Age: 68
End: 2018-08-24

## 2018-08-24 DIAGNOSIS — R06.02 SOB (SHORTNESS OF BREATH): ICD-10-CM

## 2018-08-24 DIAGNOSIS — R60.0 LOWER EXTREMITY EDEMA: ICD-10-CM

## 2018-08-24 DIAGNOSIS — I50.31 ACUTE DIASTOLIC CONGESTIVE HEART FAILURE (H): ICD-10-CM

## 2018-08-24 DIAGNOSIS — I48.19 PERSISTENT ATRIAL FIBRILLATION (H): ICD-10-CM

## 2018-08-24 DIAGNOSIS — E87.6 HYPOKALEMIA: ICD-10-CM

## 2018-08-24 LAB
ANION GAP SERPL CALCULATED.3IONS-SCNC: 11 MMOL/L (ref 5–18)
BUN SERPL-MCNC: 11 MG/DL (ref 8–22)
CALCIUM SERPL-MCNC: 10.2 MG/DL (ref 8.5–10.5)
CHLORIDE BLD-SCNC: 101 MMOL/L (ref 98–107)
CO2 SERPL-SCNC: 29 MMOL/L (ref 22–31)
CREAT SERPL-MCNC: 0.75 MG/DL (ref 0.6–1.1)
GFR SERPL CREATININE-BSD FRML MDRD: >60 ML/MIN/1.73M2
GLUCOSE BLD-MCNC: 124 MG/DL (ref 70–125)
POTASSIUM BLD-SCNC: 3.3 MMOL/L (ref 3.5–5)
SODIUM SERPL-SCNC: 141 MMOL/L (ref 136–145)

## 2018-08-24 ASSESSMENT — MIFFLIN-ST. JEOR: SCORE: 1330.55

## 2018-08-25 ENCOUNTER — RECORDS - HEALTHEAST (OUTPATIENT)
Dept: ADMINISTRATIVE | Facility: OTHER | Age: 68
End: 2018-08-25

## 2018-08-29 ENCOUNTER — COMMUNICATION - HEALTHEAST (OUTPATIENT)
Dept: FAMILY MEDICINE | Facility: CLINIC | Age: 68
End: 2018-08-29

## 2018-08-31 ENCOUNTER — RECORDS - HEALTHEAST (OUTPATIENT)
Dept: ADMINISTRATIVE | Facility: OTHER | Age: 68
End: 2018-08-31

## 2018-09-04 ENCOUNTER — RECORDS - HEALTHEAST (OUTPATIENT)
Dept: ADMINISTRATIVE | Facility: OTHER | Age: 68
End: 2018-09-04

## 2018-09-05 ENCOUNTER — OFFICE VISIT - HEALTHEAST (OUTPATIENT)
Dept: FAMILY MEDICINE | Facility: CLINIC | Age: 68
End: 2018-09-05

## 2018-09-05 ENCOUNTER — RECORDS - HEALTHEAST (OUTPATIENT)
Dept: ADMINISTRATIVE | Facility: OTHER | Age: 68
End: 2018-09-05

## 2018-09-05 ENCOUNTER — COMMUNICATION - HEALTHEAST (OUTPATIENT)
Dept: FAMILY MEDICINE | Facility: CLINIC | Age: 68
End: 2018-09-05

## 2018-09-05 ENCOUNTER — AMBULATORY - HEALTHEAST (OUTPATIENT)
Dept: EDUCATION SERVICES | Facility: CLINIC | Age: 68
End: 2018-09-05

## 2018-09-05 ENCOUNTER — COMMUNICATION - HEALTHEAST (OUTPATIENT)
Dept: ANTICOAGULATION | Facility: CLINIC | Age: 68
End: 2018-09-05

## 2018-09-05 DIAGNOSIS — E08.65 DIABETES MELLITUS DUE TO UNDERLYING CONDITION WITH HYPERGLYCEMIA (H): ICD-10-CM

## 2018-09-05 DIAGNOSIS — I48.19 PERSISTENT ATRIAL FIBRILLATION (H): ICD-10-CM

## 2018-09-05 DIAGNOSIS — G25.81 RLS (RESTLESS LEGS SYNDROME): ICD-10-CM

## 2018-09-05 DIAGNOSIS — J06.9 URI WITH COUGH AND CONGESTION: ICD-10-CM

## 2018-09-05 DIAGNOSIS — I67.9 CEREBROVASCULAR DISEASE: ICD-10-CM

## 2018-09-05 DIAGNOSIS — I50.31 ACUTE DIASTOLIC CONGESTIVE HEART FAILURE (H): ICD-10-CM

## 2018-09-05 DIAGNOSIS — D62 ANEMIA DUE TO BLOOD LOSS, ACUTE: ICD-10-CM

## 2018-09-05 DIAGNOSIS — K21.9 GERD (GASTROESOPHAGEAL REFLUX DISEASE): ICD-10-CM

## 2018-09-05 DIAGNOSIS — Z00.00 ROUTINE GENERAL MEDICAL EXAMINATION AT A HEALTH CARE FACILITY: ICD-10-CM

## 2018-09-05 LAB
ANION GAP SERPL CALCULATED.3IONS-SCNC: 12 MMOL/L (ref 5–18)
BNP SERPL-MCNC: 736 PG/ML (ref 0–114)
BUN SERPL-MCNC: 7 MG/DL (ref 8–22)
CALCIUM SERPL-MCNC: 10.6 MG/DL (ref 8.5–10.5)
CHLORIDE BLD-SCNC: 104 MMOL/L (ref 98–107)
CO2 SERPL-SCNC: 24 MMOL/L (ref 22–31)
CREAT SERPL-MCNC: 0.75 MG/DL (ref 0.6–1.1)
GFR SERPL CREATININE-BSD FRML MDRD: >60 ML/MIN/1.73M2
GLUCOSE BLD-MCNC: 97 MG/DL (ref 70–125)
HGB BLD-MCNC: 12 G/DL (ref 12–16)
INR PPP: 2.3 (ref 0.9–1.1)
POTASSIUM BLD-SCNC: 4.6 MMOL/L (ref 3.5–5)
SODIUM SERPL-SCNC: 140 MMOL/L (ref 136–145)

## 2018-09-06 ENCOUNTER — COMMUNICATION - HEALTHEAST (OUTPATIENT)
Dept: FAMILY MEDICINE | Facility: CLINIC | Age: 68
End: 2018-09-06

## 2018-09-07 ENCOUNTER — COMMUNICATION - HEALTHEAST (OUTPATIENT)
Dept: FAMILY MEDICINE | Facility: CLINIC | Age: 68
End: 2018-09-07

## 2018-09-07 ENCOUNTER — HOSPITAL ENCOUNTER (OUTPATIENT)
Dept: CARDIOLOGY | Facility: CLINIC | Age: 68
Discharge: HOME | End: 2018-09-07

## 2018-09-07 DIAGNOSIS — I67.9 CEREBROVASCULAR DISEASE: ICD-10-CM

## 2018-09-07 DIAGNOSIS — I48.19 PERSISTENT ATRIAL FIBRILLATION (H): ICD-10-CM

## 2018-09-10 ENCOUNTER — COMMUNICATION - HEALTHEAST (OUTPATIENT)
Dept: FAMILY MEDICINE | Facility: CLINIC | Age: 68
End: 2018-09-10

## 2018-09-11 ENCOUNTER — RECORDS - HEALTHEAST (OUTPATIENT)
Dept: ADMINISTRATIVE | Facility: OTHER | Age: 68
End: 2018-09-11

## 2018-09-12 ENCOUNTER — COMMUNICATION - HEALTHEAST (OUTPATIENT)
Dept: FAMILY MEDICINE | Facility: CLINIC | Age: 68
End: 2018-09-12

## 2018-09-12 ENCOUNTER — RECORDS - HEALTHEAST (OUTPATIENT)
Dept: ADMINISTRATIVE | Facility: OTHER | Age: 68
End: 2018-09-12

## 2018-09-12 ENCOUNTER — AMBULATORY - HEALTHEAST (OUTPATIENT)
Dept: FAMILY MEDICINE | Facility: CLINIC | Age: 68
End: 2018-09-12

## 2018-09-12 DIAGNOSIS — I67.9 CEREBROVASCULAR DISEASE: ICD-10-CM

## 2018-09-12 LAB — INR PPP: 2.2 (ref 0.9–1.1)

## 2018-09-18 ENCOUNTER — OFFICE VISIT - HEALTHEAST (OUTPATIENT)
Dept: CARDIOLOGY | Facility: CLINIC | Age: 68
End: 2018-09-18

## 2018-09-18 DIAGNOSIS — I48.0 PAROXYSMAL ATRIAL FIBRILLATION (H): ICD-10-CM

## 2018-09-18 DIAGNOSIS — I50.30 (HFPEF) HEART FAILURE WITH PRESERVED EJECTION FRACTION (H): ICD-10-CM

## 2018-09-18 LAB
ANION GAP SERPL CALCULATED.3IONS-SCNC: 7 MMOL/L (ref 5–18)
BUN SERPL-MCNC: 14 MG/DL (ref 8–22)
CALCIUM SERPL-MCNC: 10.9 MG/DL (ref 8.5–10.5)
CHLORIDE BLD-SCNC: 104 MMOL/L (ref 98–107)
CO2 SERPL-SCNC: 28 MMOL/L (ref 22–31)
CREAT SERPL-MCNC: 0.72 MG/DL (ref 0.6–1.1)
GFR SERPL CREATININE-BSD FRML MDRD: >60 ML/MIN/1.73M2
GLUCOSE BLD-MCNC: 103 MG/DL (ref 70–125)
POTASSIUM BLD-SCNC: 4 MMOL/L (ref 3.5–5)
SODIUM SERPL-SCNC: 139 MMOL/L (ref 136–145)

## 2018-09-18 ASSESSMENT — MIFFLIN-ST. JEOR: SCORE: 1263.87

## 2018-09-19 ENCOUNTER — COMMUNICATION - HEALTHEAST (OUTPATIENT)
Dept: FAMILY MEDICINE | Facility: CLINIC | Age: 68
End: 2018-09-19

## 2018-09-19 DIAGNOSIS — I67.9 CEREBROVASCULAR DISEASE: ICD-10-CM

## 2018-09-19 LAB — INR PPP: 2 (ref 0.9–1.1)

## 2018-09-20 ENCOUNTER — OFFICE VISIT - HEALTHEAST (OUTPATIENT)
Dept: FAMILY MEDICINE | Facility: CLINIC | Age: 68
End: 2018-09-20

## 2018-09-20 DIAGNOSIS — I48.0 PAROXYSMAL ATRIAL FIBRILLATION (H): ICD-10-CM

## 2018-09-20 DIAGNOSIS — Z00.00 HEALTHCARE MAINTENANCE: ICD-10-CM

## 2018-09-20 DIAGNOSIS — I50.30 (HFPEF) HEART FAILURE WITH PRESERVED EJECTION FRACTION (H): ICD-10-CM

## 2018-09-20 DIAGNOSIS — G25.81 RLS (RESTLESS LEGS SYNDROME): ICD-10-CM

## 2018-09-21 ENCOUNTER — RECORDS - HEALTHEAST (OUTPATIENT)
Dept: ADMINISTRATIVE | Facility: OTHER | Age: 68
End: 2018-09-21

## 2018-09-26 ENCOUNTER — RECORDS - HEALTHEAST (OUTPATIENT)
Dept: ADMINISTRATIVE | Facility: OTHER | Age: 68
End: 2018-09-26

## 2018-10-02 ENCOUNTER — RECORDS - HEALTHEAST (OUTPATIENT)
Dept: ADMINISTRATIVE | Facility: OTHER | Age: 68
End: 2018-10-02

## 2018-10-03 ENCOUNTER — HOSPITAL ENCOUNTER (INPATIENT)
Dept: CARDIOLOGY | Facility: CLINIC | Age: 68
Discharge: NURSING, REHAB OR LONG-TERM CARE | End: 2018-10-25
Attending: INTERNAL MEDICINE | Admitting: HOSPITALIST
Payer: COMMERCIAL

## 2018-10-03 DIAGNOSIS — E83.52 HYPERCALCEMIA: ICD-10-CM

## 2018-10-03 DIAGNOSIS — E87.5 HYPERKALEMIA: ICD-10-CM

## 2018-10-03 DIAGNOSIS — I67.9 CEREBROVASCULAR DISEASE: ICD-10-CM

## 2018-10-03 DIAGNOSIS — K59.01 SLOW TRANSIT CONSTIPATION: ICD-10-CM

## 2018-10-03 DIAGNOSIS — I50.30 (HFPEF) HEART FAILURE WITH PRESERVED EJECTION FRACTION (H): ICD-10-CM

## 2018-10-03 DIAGNOSIS — R10.13 ABDOMINAL PAIN, EPIGASTRIC: ICD-10-CM

## 2018-10-03 DIAGNOSIS — K83.1 BILIARY OBSTRUCTION (H): ICD-10-CM

## 2018-10-03 DIAGNOSIS — Z79.01 ANTICOAGULATED ON COUMADIN: ICD-10-CM

## 2018-10-03 DIAGNOSIS — K81.9 CHOLECYSTITIS: ICD-10-CM

## 2018-10-03 DIAGNOSIS — E78.5 DYSLIPIDEMIA: ICD-10-CM

## 2018-10-03 DIAGNOSIS — I48.92 ATRIAL FLUTTER (H): ICD-10-CM

## 2018-10-03 DIAGNOSIS — R00.0 TACHYCARDIA: ICD-10-CM

## 2018-10-03 LAB
ALBUMIN SERPL-MCNC: 3.3 G/DL (ref 3.5–5)
ALP SERPL-CCNC: 113 U/L (ref 45–120)
ALT SERPL W P-5'-P-CCNC: 75 U/L (ref 0–45)
ANION GAP SERPL CALCULATED.3IONS-SCNC: 10 MMOL/L (ref 5–18)
AST SERPL W P-5'-P-CCNC: 179 U/L (ref 0–40)
ATRIAL RATE - MUSE: 250 BPM
BASOPHILS # BLD AUTO: 0 THOU/UL (ref 0–0.2)
BASOPHILS NFR BLD AUTO: 1 % (ref 0–2)
BILIRUB SERPL-MCNC: 0.6 MG/DL (ref 0–1)
BNP SERPL-MCNC: 546 PG/ML (ref 0–114)
BUN SERPL-MCNC: 22 MG/DL (ref 8–22)
CALCIUM SERPL-MCNC: 11.1 MG/DL (ref 8.5–10.5)
CHLORIDE BLD-SCNC: 104 MMOL/L (ref 98–107)
CO2 SERPL-SCNC: 22 MMOL/L (ref 22–31)
CREAT SERPL-MCNC: 0.89 MG/DL (ref 0.6–1.1)
DIASTOLIC BLOOD PRESSURE - MUSE: 83 MMHG
EOSINOPHIL # BLD AUTO: 0.1 THOU/UL (ref 0–0.4)
EOSINOPHIL NFR BLD AUTO: 1 % (ref 0–6)
ERYTHROCYTE [DISTWIDTH] IN BLOOD BY AUTOMATED COUNT: 17.6 % (ref 11–14.5)
GFR SERPL CREATININE-BSD FRML MDRD: >60 ML/MIN/1.73M2
GLUCOSE BLD-MCNC: 115 MG/DL (ref 70–125)
GLUCOSE BLDC GLUCOMTR-MCNC: 303 MG/DL
HCT VFR BLD AUTO: 39.9 % (ref 35–47)
HGB BLD-MCNC: 12.5 G/DL (ref 12–16)
INR PPP: 2.09 (ref 0.9–1.1)
INTERPRETATION ECG - MUSE: NORMAL
LACTATE SERPL-SCNC: 1.4 MMOL/L (ref 0.5–2.2)
LYMPHOCYTES # BLD AUTO: 1.9 THOU/UL (ref 0.8–4.4)
LYMPHOCYTES NFR BLD AUTO: 29 % (ref 20–40)
MAGNESIUM SERPL-MCNC: 2.2 MG/DL (ref 1.8–2.6)
MCH RBC QN AUTO: 27.1 PG (ref 27–34)
MCHC RBC AUTO-ENTMCNC: 31.3 G/DL (ref 32–36)
MCV RBC AUTO: 87 FL (ref 80–100)
MONOCYTES # BLD AUTO: 0.8 THOU/UL (ref 0–0.9)
MONOCYTES NFR BLD AUTO: 12 % (ref 2–10)
NEUTROPHILS # BLD AUTO: 3.6 THOU/UL (ref 2–7.7)
NEUTROPHILS NFR BLD AUTO: 57 % (ref 50–70)
P AXIS - MUSE: 199 DEGREES
PLATELET # BLD AUTO: 126 THOU/UL (ref 140–440)
PMV BLD AUTO: 12.3 FL (ref 8.5–12.5)
POTASSIUM BLD-SCNC: 5.5 MMOL/L (ref 3.5–5)
PR INTERVAL - MUSE: NORMAL MS
PROT SERPL-MCNC: 7.7 G/DL (ref 6–8)
QRS DURATION - MUSE: 86 MS
QT - MUSE: 284 MS
QTC - MUSE: 411 MS
R AXIS - MUSE: -37 DEGREES
RBC # BLD AUTO: 4.61 MILL/UL (ref 3.8–5.4)
SODIUM SERPL-SCNC: 136 MMOL/L (ref 136–145)
SYSTOLIC BLOOD PRESSURE - MUSE: 117 MMHG
T AXIS - MUSE: 111 DEGREES
TROPONIN I SERPL-MCNC: 0.01 NG/ML (ref 0–0.29)
VENTRICULAR RATE- MUSE: 126 BPM
WBC: 6.4 THOU/UL (ref 4–11)

## 2018-10-03 ASSESSMENT — MIFFLIN-ST. JEOR
SCORE: 1285.64
SCORE: 1284.29
SCORE: 1285.64
SCORE: 1284.29
SCORE: 1285.64
SCORE: 1284.29

## 2018-10-04 LAB
ALBUMIN SERPL-MCNC: 2.9 G/DL (ref 3.5–5)
ALP SERPL-CCNC: 96 U/L (ref 45–120)
ALT SERPL W P-5'-P-CCNC: 93 U/L (ref 0–45)
ANION GAP SERPL CALCULATED.3IONS-SCNC: 8 MMOL/L (ref 5–18)
ANION GAP SERPL CALCULATED.3IONS-SCNC: 9 MMOL/L (ref 5–18)
AST SERPL W P-5'-P-CCNC: 195 U/L (ref 0–40)
BILIRUB SERPL-MCNC: 0.5 MG/DL (ref 0–1)
BUN SERPL-MCNC: 17 MG/DL (ref 8–22)
BUN SERPL-MCNC: 17 MG/DL (ref 8–22)
C REACTIVE PROTEIN LHE: 3.7 MG/DL (ref 0–0.8)
CALCIUM SERPL-MCNC: 10.6 MG/DL (ref 8.5–10.5)
CALCIUM SERPL-MCNC: 10.7 MG/DL (ref 8.5–10.5)
CHLORIDE BLD-SCNC: 106 MMOL/L (ref 98–107)
CHLORIDE BLD-SCNC: 106 MMOL/L (ref 98–107)
CO2 SERPL-SCNC: 23 MMOL/L (ref 22–31)
CO2 SERPL-SCNC: 23 MMOL/L (ref 22–31)
CREAT SERPL-MCNC: 0.75 MG/DL (ref 0.6–1.1)
CREAT SERPL-MCNC: 0.75 MG/DL (ref 0.6–1.1)
ERYTHROCYTE [DISTWIDTH] IN BLOOD BY AUTOMATED COUNT: 17.6 % (ref 11–14.5)
GFR SERPL CREATININE-BSD FRML MDRD: >60 ML/MIN/1.73M2
GFR SERPL CREATININE-BSD FRML MDRD: >60 ML/MIN/1.73M2
GLUCOSE BLD-MCNC: 70 MG/DL (ref 70–125)
GLUCOSE BLD-MCNC: 71 MG/DL (ref 70–125)
GLUCOSE BLDC GLUCOMTR-MCNC: 68 MG/DL
GLUCOSE BLDC GLUCOMTR-MCNC: 84 MG/DL
HCT VFR BLD AUTO: 37.7 % (ref 35–47)
HGB BLD-MCNC: 11.5 G/DL (ref 12–16)
INR PPP: 2.2 (ref 0.9–1.1)
MCH RBC QN AUTO: 27.3 PG (ref 27–34)
MCHC RBC AUTO-ENTMCNC: 30.5 G/DL (ref 32–36)
MCV RBC AUTO: 90 FL (ref 80–100)
PLATELET # BLD AUTO: 94 THOU/UL (ref 140–440)
POTASSIUM BLD-SCNC: 4 MMOL/L (ref 3.5–5)
POTASSIUM BLD-SCNC: 4 MMOL/L (ref 3.5–5)
PROT SERPL-MCNC: 6.3 G/DL (ref 6–8)
RBC # BLD AUTO: 4.21 MILL/UL (ref 3.8–5.4)
SODIUM SERPL-SCNC: 137 MMOL/L (ref 136–145)
SODIUM SERPL-SCNC: 138 MMOL/L (ref 136–145)
WBC: 6 THOU/UL (ref 4–11)

## 2018-10-04 ASSESSMENT — MIFFLIN-ST. JEOR
SCORE: 1282.92

## 2018-10-05 ENCOUNTER — SURGERY - HEALTHEAST (OUTPATIENT)
Dept: SURGERY | Facility: CLINIC | Age: 68
End: 2018-10-05

## 2018-10-05 ENCOUNTER — ANESTHESIA - HEALTHEAST (OUTPATIENT)
Dept: SURGERY | Facility: CLINIC | Age: 68
End: 2018-10-05

## 2018-10-05 LAB
ABO/RH(D): NORMAL
ALBUMIN SERPL-MCNC: 3 G/DL (ref 3.5–5)
ALP SERPL-CCNC: 111 U/L (ref 45–120)
ALT SERPL W P-5'-P-CCNC: 115 U/L (ref 0–45)
ANION GAP SERPL CALCULATED.3IONS-SCNC: 9 MMOL/L (ref 5–18)
AST SERPL W P-5'-P-CCNC: 193 U/L (ref 0–40)
BASOPHILS # BLD AUTO: 0 THOU/UL (ref 0–0.2)
BASOPHILS NFR BLD AUTO: 0 % (ref 0–2)
BILIRUB DIRECT SERPL-MCNC: 0.4 MG/DL
BILIRUB SERPL-MCNC: 0.7 MG/DL (ref 0–1)
BUN SERPL-MCNC: 18 MG/DL (ref 8–22)
C REACTIVE PROTEIN LHE: 6.8 MG/DL (ref 0–0.8)
CALCIUM SERPL-MCNC: 10.5 MG/DL (ref 8.5–10.5)
CHLORIDE BLD-SCNC: 100 MMOL/L (ref 98–107)
CO2 SERPL-SCNC: 26 MMOL/L (ref 22–31)
COMPONENT (HISTORICAL CONVERSION): NORMAL
CREAT SERPL-MCNC: 1.39 MG/DL (ref 0.6–1.1)
EOSINOPHIL # BLD AUTO: 0.1 THOU/UL (ref 0–0.4)
EOSINOPHIL NFR BLD AUTO: 1 % (ref 0–6)
ERYTHROCYTE [DISTWIDTH] IN BLOOD BY AUTOMATED COUNT: 17.8 % (ref 11–14.5)
GFR SERPL CREATININE-BSD FRML MDRD: 38 ML/MIN/1.73M2
GLUCOSE BLD-MCNC: 144 MG/DL (ref 70–125)
GLUCOSE BLDC GLUCOMTR-MCNC: 157 MG/DL
HCT VFR BLD AUTO: 39.5 % (ref 35–47)
HGB BLD-MCNC: 12.1 G/DL (ref 12–16)
INR PPP: 2.84 (ref 0.9–1.1)
LYMPHOCYTES # BLD AUTO: 1.3 THOU/UL (ref 0.8–4.4)
LYMPHOCYTES NFR BLD AUTO: 17 % (ref 20–40)
MAGNESIUM SERPL-MCNC: 1.6 MG/DL (ref 1.8–2.6)
MCH RBC QN AUTO: 26.9 PG (ref 27–34)
MCHC RBC AUTO-ENTMCNC: 30.6 G/DL (ref 32–36)
MCV RBC AUTO: 88 FL (ref 80–100)
MONOCYTES # BLD AUTO: 1 THOU/UL (ref 0–0.9)
MONOCYTES NFR BLD AUTO: 14 % (ref 2–10)
NEUTROPHILS # BLD AUTO: 5.2 THOU/UL (ref 2–7.7)
NEUTROPHILS NFR BLD AUTO: 69 % (ref 50–70)
PHOSPHATE SERPL-MCNC: 4.2 MG/DL (ref 2.5–4.5)
PLATELET # BLD AUTO: 85 THOU/UL (ref 140–440)
PMV BLD AUTO: ABNORMAL FL (ref 8.5–12.5)
POTASSIUM BLD-SCNC: 3.8 MMOL/L (ref 3.5–5)
PROT SERPL-MCNC: 6.9 G/DL (ref 6–8)
RBC # BLD AUTO: 4.49 MILL/UL (ref 3.8–5.4)
SODIUM SERPL-SCNC: 135 MMOL/L (ref 136–145)
STATUS (HISTORICAL CONVERSION): NORMAL
WBC: 7.6 THOU/UL (ref 4–11)

## 2018-10-05 ASSESSMENT — MIFFLIN-ST. JEOR
SCORE: 1278.84

## 2018-10-06 LAB
ALBUMIN SERPL-MCNC: 3.1 G/DL (ref 3.5–5)
ALP SERPL-CCNC: 197 U/L (ref 45–120)
ALT SERPL W P-5'-P-CCNC: 139 U/L (ref 0–45)
AST SERPL W P-5'-P-CCNC: 307 U/L (ref 0–40)
BILIRUB DIRECT SERPL-MCNC: 1.3 MG/DL
BILIRUB SERPL-MCNC: 2 MG/DL (ref 0–1)
BLD PROD TYP BPU: NORMAL
BLOOD EXPIRATION DATE: NORMAL
BLOOD TYPE: 7300
CODING SYSTEM: NORMAL
COMPONENT (HISTORICAL CONVERSION): NORMAL
CREAT SERPL-MCNC: 1.12 MG/DL (ref 0.6–1.1)
ERYTHROCYTE [DISTWIDTH] IN BLOOD BY AUTOMATED COUNT: 17.5 % (ref 11–14.5)
GFR SERPL CREATININE-BSD FRML MDRD: 48 ML/MIN/1.73M2
HCT VFR BLD AUTO: 36.1 % (ref 35–47)
HGB BLD-MCNC: 11.3 G/DL (ref 12–16)
INR PPP: 1.42 (ref 0.9–1.1)
ISSUE DATE AND TIME: NORMAL
MAGNESIUM SERPL-MCNC: 1.3 MG/DL (ref 1.8–2.6)
MCH RBC QN AUTO: 27.5 PG (ref 27–34)
MCHC RBC AUTO-ENTMCNC: 31.3 G/DL (ref 32–36)
MCV RBC AUTO: 88 FL (ref 80–100)
PHOSPHATE SERPL-MCNC: 3 MG/DL (ref 2.5–4.5)
PLATELET # BLD AUTO: 74 THOU/UL (ref 140–440)
POTASSIUM BLD-SCNC: 3 MMOL/L (ref 3.5–5)
PROT SERPL-MCNC: 6.8 G/DL (ref 6–8)
RBC # BLD AUTO: 4.11 MILL/UL (ref 3.8–5.4)
STATUS (HISTORICAL CONVERSION): NORMAL
UNIT ABO/RH (HISTORICAL CONVERSION): NORMAL
UNIT NUMBER: NORMAL
WBC: 6 THOU/UL (ref 4–11)

## 2018-10-06 ASSESSMENT — MIFFLIN-ST. JEOR
SCORE: 1282.92

## 2018-10-07 ENCOUNTER — SURGERY - HEALTHEAST (OUTPATIENT)
Dept: SURGERY | Facility: CLINIC | Age: 68
End: 2018-10-07

## 2018-10-07 ENCOUNTER — ANESTHESIA - HEALTHEAST (OUTPATIENT)
Dept: SURGERY | Facility: CLINIC | Age: 68
End: 2018-10-07

## 2018-10-07 LAB
ALBUMIN SERPL-MCNC: 3.1 G/DL (ref 3.5–5)
ALP SERPL-CCNC: 295 U/L (ref 45–120)
ALT SERPL W P-5'-P-CCNC: 115 U/L (ref 0–45)
ANION GAP SERPL CALCULATED.3IONS-SCNC: 14 MMOL/L (ref 5–18)
AST SERPL W P-5'-P-CCNC: 190 U/L (ref 0–40)
BASOPHILS # BLD AUTO: 0 THOU/UL (ref 0–0.2)
BASOPHILS NFR BLD AUTO: 0 % (ref 0–2)
BILIRUB DIRECT SERPL-MCNC: 1 MG/DL
BILIRUB SERPL-MCNC: 1.7 MG/DL (ref 0–1)
BUN SERPL-MCNC: 11 MG/DL (ref 8–22)
CALCIUM SERPL-MCNC: 10.7 MG/DL (ref 8.5–10.5)
CHLORIDE BLD-SCNC: 101 MMOL/L (ref 98–107)
CO2 SERPL-SCNC: 27 MMOL/L (ref 22–31)
CREAT SERPL-MCNC: 0.78 MG/DL (ref 0.6–1.1)
EOSINOPHIL # BLD AUTO: 0.2 THOU/UL (ref 0–0.4)
EOSINOPHIL NFR BLD AUTO: 4 % (ref 0–6)
ERYTHROCYTE [DISTWIDTH] IN BLOOD BY AUTOMATED COUNT: 17.5 % (ref 11–14.5)
ERYTHROCYTE [DISTWIDTH] IN BLOOD BY AUTOMATED COUNT: 17.6 % (ref 11–14.5)
GFR SERPL CREATININE-BSD FRML MDRD: >60 ML/MIN/1.73M2
GLUCOSE BLD-MCNC: 97 MG/DL (ref 70–125)
GLUCOSE BLDC GLUCOMTR-MCNC: 127 MG/DL
GLUCOSE BLDC GLUCOMTR-MCNC: 162 MG/DL
GLUCOSE BLDC GLUCOMTR-MCNC: 251 MG/DL
GLUCOSE BLDC GLUCOMTR-MCNC: 56 MG/DL
GLUCOSE BLDC GLUCOMTR-MCNC: 73 MG/DL
HCT VFR BLD AUTO: 39.1 % (ref 35–47)
HCT VFR BLD AUTO: 39.2 % (ref 35–47)
HGB BLD-MCNC: 12 G/DL (ref 12–16)
HGB BLD-MCNC: 12 G/DL (ref 12–16)
INR PPP: 1.44 (ref 0.9–1.1)
LYMPHOCYTES # BLD AUTO: 1 THOU/UL (ref 0.8–4.4)
LYMPHOCYTES NFR BLD AUTO: 16 % (ref 20–40)
MAGNESIUM SERPL-MCNC: 1.4 MG/DL (ref 1.8–2.6)
MAGNESIUM SERPL-MCNC: 1.5 MG/DL (ref 1.8–2.6)
MCH RBC QN AUTO: 26.9 PG (ref 27–34)
MCH RBC QN AUTO: 27 PG (ref 27–34)
MCHC RBC AUTO-ENTMCNC: 30.6 G/DL (ref 32–36)
MCHC RBC AUTO-ENTMCNC: 30.7 G/DL (ref 32–36)
MCV RBC AUTO: 88 FL (ref 80–100)
MCV RBC AUTO: 88 FL (ref 80–100)
MONOCYTES # BLD AUTO: 0.6 THOU/UL (ref 0–0.9)
MONOCYTES NFR BLD AUTO: 11 % (ref 2–10)
NEUTROPHILS # BLD AUTO: 4.1 THOU/UL (ref 2–7.7)
NEUTROPHILS NFR BLD AUTO: 70 % (ref 50–70)
PLATELET # BLD AUTO: 85 THOU/UL (ref 140–440)
PLATELET # BLD AUTO: 87 THOU/UL (ref 140–440)
PMV BLD AUTO: 12.1 FL (ref 8.5–12.5)
POTASSIUM BLD-SCNC: 2.9 MMOL/L (ref 3.5–5)
POTASSIUM BLD-SCNC: 3.2 MMOL/L (ref 3.5–5)
PROT SERPL-MCNC: 7.4 G/DL (ref 6–8)
RBC # BLD AUTO: 4.45 MILL/UL (ref 3.8–5.4)
RBC # BLD AUTO: 4.46 MILL/UL (ref 3.8–5.4)
SODIUM SERPL-SCNC: 142 MMOL/L (ref 136–145)
WBC: 5.9 THOU/UL (ref 4–11)
WBC: 6.3 THOU/UL (ref 4–11)

## 2018-10-07 ASSESSMENT — MIFFLIN-ST. JEOR
SCORE: 1276.57

## 2018-10-08 LAB
ALBUMIN SERPL-MCNC: 3 G/DL (ref 3.5–5)
ALP SERPL-CCNC: 267 U/L (ref 45–120)
ALT SERPL W P-5'-P-CCNC: 93 U/L (ref 0–45)
ANION GAP SERPL CALCULATED.3IONS-SCNC: 9 MMOL/L (ref 5–18)
AST SERPL W P-5'-P-CCNC: 118 U/L (ref 0–40)
BILIRUB DIRECT SERPL-MCNC: 0.8 MG/DL
BILIRUB SERPL-MCNC: 1.3 MG/DL (ref 0–1)
BUN SERPL-MCNC: 16 MG/DL (ref 8–22)
CALCIUM SERPL-MCNC: 10.9 MG/DL (ref 8.5–10.5)
CHLORIDE BLD-SCNC: 102 MMOL/L (ref 98–107)
CO2 SERPL-SCNC: 27 MMOL/L (ref 22–31)
CREAT SERPL-MCNC: 1.19 MG/DL (ref 0.6–1.1)
GFR SERPL CREATININE-BSD FRML MDRD: 45 ML/MIN/1.73M2
GLUCOSE BLD-MCNC: 228 MG/DL (ref 70–125)
GLUCOSE BLDC GLUCOMTR-MCNC: 128 MG/DL
GLUCOSE BLDC GLUCOMTR-MCNC: 195 MG/DL
GLUCOSE BLDC GLUCOMTR-MCNC: 259 MG/DL
MAGNESIUM SERPL-MCNC: 2.2 MG/DL (ref 1.8–2.6)
POTASSIUM BLD-SCNC: 4.9 MMOL/L (ref 3.5–5)
PROT SERPL-MCNC: 7.5 G/DL (ref 6–8)
SODIUM SERPL-SCNC: 138 MMOL/L (ref 136–145)

## 2018-10-08 ASSESSMENT — MIFFLIN-ST. JEOR
SCORE: 1271.13

## 2018-10-09 ENCOUNTER — RECORDS - HEALTHEAST (OUTPATIENT)
Dept: ADMINISTRATIVE | Facility: OTHER | Age: 68
End: 2018-10-09

## 2018-10-09 LAB
ALBUMIN SERPL-MCNC: 2.9 G/DL (ref 3.5–5)
ALP SERPL-CCNC: 223 U/L (ref 45–120)
ALT SERPL W P-5'-P-CCNC: 74 U/L (ref 0–45)
ANION GAP SERPL CALCULATED.3IONS-SCNC: 8 MMOL/L (ref 5–18)
AST SERPL W P-5'-P-CCNC: 85 U/L (ref 0–40)
BASOPHILS # BLD AUTO: 0 THOU/UL (ref 0–0.2)
BASOPHILS NFR BLD AUTO: 1 % (ref 0–2)
BILIRUB SERPL-MCNC: 1 MG/DL (ref 0–1)
BUN SERPL-MCNC: 18 MG/DL (ref 8–22)
CALCIUM SERPL-MCNC: 11 MG/DL (ref 8.5–10.5)
CHLORIDE BLD-SCNC: 97 MMOL/L (ref 98–107)
CO2 SERPL-SCNC: 31 MMOL/L (ref 22–31)
CREAT SERPL-MCNC: 1.03 MG/DL (ref 0.6–1.1)
EOSINOPHIL # BLD AUTO: 0.1 THOU/UL (ref 0–0.4)
EOSINOPHIL NFR BLD AUTO: 1 % (ref 0–6)
ERYTHROCYTE [DISTWIDTH] IN BLOOD BY AUTOMATED COUNT: 17.2 % (ref 11–14.5)
GFR SERPL CREATININE-BSD FRML MDRD: 53 ML/MIN/1.73M2
GLUCOSE BLD-MCNC: 146 MG/DL (ref 70–125)
GLUCOSE BLDC GLUCOMTR-MCNC: 129 MG/DL
GLUCOSE BLDC GLUCOMTR-MCNC: 155 MG/DL
GLUCOSE BLDC GLUCOMTR-MCNC: 160 MG/DL
GLUCOSE BLDC GLUCOMTR-MCNC: 190 MG/DL
HCT VFR BLD AUTO: 37.4 % (ref 35–47)
HGB BLD-MCNC: 11.8 G/DL (ref 12–16)
INR PPP: 1.43 (ref 0.9–1.1)
LAB AP CHARGES (HE HISTORICAL CONVERSION): NORMAL
LYMPHOCYTES # BLD AUTO: 1.4 THOU/UL (ref 0.8–4.4)
LYMPHOCYTES NFR BLD AUTO: 21 % (ref 20–40)
MAGNESIUM SERPL-MCNC: 2 MG/DL (ref 1.8–2.6)
MCH RBC QN AUTO: 27.3 PG (ref 27–34)
MCHC RBC AUTO-ENTMCNC: 31.6 G/DL (ref 32–36)
MCV RBC AUTO: 86 FL (ref 80–100)
MONOCYTES # BLD AUTO: 0.8 THOU/UL (ref 0–0.9)
MONOCYTES NFR BLD AUTO: 12 % (ref 2–10)
NEUTROPHILS # BLD AUTO: 4.3 THOU/UL (ref 2–7.7)
NEUTROPHILS NFR BLD AUTO: 65 % (ref 50–70)
PATH REPORT.COMMENTS IMP SPEC: NORMAL
PATH REPORT.FINAL DX SPEC: NORMAL
PATH REPORT.GROSS SPEC: NORMAL
PATH REPORT.MICROSCOPIC SPEC OTHER STN: NORMAL
PATH REPORT.RELEVANT HX SPEC: NORMAL
PLATELET # BLD AUTO: 98 THOU/UL (ref 140–440)
PMV BLD AUTO: ABNORMAL FL (ref 8.5–12.5)
POTASSIUM BLD-SCNC: 4.4 MMOL/L (ref 3.5–5)
PROT SERPL-MCNC: 7.3 G/DL (ref 6–8)
RBC # BLD AUTO: 4.33 MILL/UL (ref 3.8–5.4)
RESULT FLAG (HE HISTORICAL CONVERSION): NORMAL
SODIUM SERPL-SCNC: 136 MMOL/L (ref 136–145)
WBC: 6.6 THOU/UL (ref 4–11)

## 2018-10-09 ASSESSMENT — MIFFLIN-ST. JEOR
SCORE: 1291.09

## 2018-10-10 LAB
GLUCOSE BLDC GLUCOMTR-MCNC: 162 MG/DL
GLUCOSE BLDC GLUCOMTR-MCNC: 93 MG/DL
INR PPP: 1.29 (ref 0.9–1.1)
MAGNESIUM SERPL-MCNC: 1.5 MG/DL (ref 1.8–2.6)
POTASSIUM BLD-SCNC: 3.7 MMOL/L (ref 3.5–5)

## 2018-10-10 ASSESSMENT — MIFFLIN-ST. JEOR
SCORE: 1274.7

## 2018-10-11 LAB
GLUCOSE BLDC GLUCOMTR-MCNC: 146 MG/DL
GLUCOSE BLDC GLUCOMTR-MCNC: 99 MG/DL
INR PPP: 1.31 (ref 0.9–1.1)
MAGNESIUM SERPL-MCNC: 1.8 MG/DL (ref 1.8–2.6)
MAGNESIUM SERPL-MCNC: 2.1 MG/DL (ref 1.8–2.6)
POTASSIUM BLD-SCNC: 4.1 MMOL/L (ref 3.5–5)

## 2018-10-11 ASSESSMENT — MIFFLIN-ST. JEOR
SCORE: 1267.05

## 2018-10-12 LAB
ATRIAL RATE - MUSE: 159 BPM
DIASTOLIC BLOOD PRESSURE - MUSE: NORMAL MMHG
GLUCOSE BLDC GLUCOMTR-MCNC: 164 MG/DL
INR PPP: 1.44 (ref 0.9–1.1)
INTERPRETATION ECG - MUSE: NORMAL
MAGNESIUM SERPL-MCNC: 1.7 MG/DL (ref 1.8–2.6)
P AXIS - MUSE: NORMAL DEGREES
POTASSIUM BLD-SCNC: 4.3 MMOL/L (ref 3.5–5)
PR INTERVAL - MUSE: NORMAL MS
QRS DURATION - MUSE: 94 MS
QT - MUSE: 308 MS
QTC - MUSE: 489 MS
R AXIS - MUSE: -41 DEGREES
SYSTOLIC BLOOD PRESSURE - MUSE: NORMAL MMHG
T AXIS - MUSE: 129 DEGREES
VENTRICULAR RATE- MUSE: 152 BPM

## 2018-10-13 LAB
CREAT SERPL-MCNC: 0.89 MG/DL (ref 0.6–1.1)
GFR SERPL CREATININE-BSD FRML MDRD: >60 ML/MIN/1.73M2
INR PPP: 1.64 (ref 0.9–1.1)
POTASSIUM BLD-SCNC: 4.2 MMOL/L (ref 3.5–5)

## 2018-10-13 ASSESSMENT — MIFFLIN-ST. JEOR
SCORE: 1247.54

## 2018-10-14 LAB
ALBUMIN SERPL-MCNC: 2.7 G/DL (ref 3.5–5)
ALP SERPL-CCNC: 187 U/L (ref 45–120)
ALT SERPL W P-5'-P-CCNC: 34 U/L (ref 0–45)
ANION GAP SERPL CALCULATED.3IONS-SCNC: 9 MMOL/L (ref 5–18)
AST SERPL W P-5'-P-CCNC: 47 U/L (ref 0–40)
BASOPHILS # BLD AUTO: 0.1 THOU/UL (ref 0–0.2)
BASOPHILS NFR BLD AUTO: 1 % (ref 0–2)
BILIRUB SERPL-MCNC: 0.7 MG/DL (ref 0–1)
BNP SERPL-MCNC: 1140 PG/ML (ref 0–114)
BUN SERPL-MCNC: 23 MG/DL (ref 8–22)
CALCIUM SERPL-MCNC: 11.4 MG/DL (ref 8.5–10.5)
CHLORIDE BLD-SCNC: 98 MMOL/L (ref 98–107)
CO2 SERPL-SCNC: 30 MMOL/L (ref 22–31)
CREAT SERPL-MCNC: 0.94 MG/DL (ref 0.6–1.1)
EOSINOPHIL # BLD AUTO: 0.4 THOU/UL (ref 0–0.4)
EOSINOPHIL NFR BLD AUTO: 4 % (ref 0–6)
ERYTHROCYTE [DISTWIDTH] IN BLOOD BY AUTOMATED COUNT: 17.9 % (ref 11–14.5)
GFR SERPL CREATININE-BSD FRML MDRD: 59 ML/MIN/1.73M2
GLUCOSE BLD-MCNC: 125 MG/DL (ref 70–125)
GLUCOSE BLDC GLUCOMTR-MCNC: 118 MG/DL
HCT VFR BLD AUTO: 43.7 % (ref 35–47)
HGB BLD-MCNC: 13.7 G/DL (ref 12–16)
INR PPP: 1.95 (ref 0.9–1.1)
LYMPHOCYTES # BLD AUTO: 2.7 THOU/UL (ref 0.8–4.4)
LYMPHOCYTES NFR BLD AUTO: 30 % (ref 20–40)
MAGNESIUM SERPL-MCNC: 1.9 MG/DL (ref 1.8–2.6)
MCH RBC QN AUTO: 26.9 PG (ref 27–34)
MCHC RBC AUTO-ENTMCNC: 31.4 G/DL (ref 32–36)
MCV RBC AUTO: 86 FL (ref 80–100)
MONOCYTES # BLD AUTO: 1 THOU/UL (ref 0–0.9)
MONOCYTES NFR BLD AUTO: 10 % (ref 2–10)
NEUTROPHILS # BLD AUTO: 5.1 THOU/UL (ref 2–7.7)
NEUTROPHILS NFR BLD AUTO: 55 % (ref 50–70)
PLATELET # BLD AUTO: 193 THOU/UL (ref 140–440)
PMV BLD AUTO: 12.6 FL (ref 8.5–12.5)
POTASSIUM BLD-SCNC: 3.5 MMOL/L (ref 3.5–5)
POTASSIUM BLD-SCNC: 3.6 MMOL/L (ref 3.5–5)
POTASSIUM BLD-SCNC: 5.2 MMOL/L (ref 3.5–5)
PROT SERPL-MCNC: 6.8 G/DL (ref 6–8)
RBC # BLD AUTO: 5.1 MILL/UL (ref 3.8–5.4)
SODIUM SERPL-SCNC: 137 MMOL/L (ref 136–145)
WBC: 9.2 THOU/UL (ref 4–11)

## 2018-10-14 ASSESSMENT — MIFFLIN-ST. JEOR
SCORE: 1237.57

## 2018-10-15 LAB
ALBUMIN SERPL-MCNC: 2.8 G/DL (ref 3.5–5)
ALP SERPL-CCNC: 174 U/L (ref 45–120)
ALT SERPL W P-5'-P-CCNC: 39 U/L (ref 0–45)
ANION GAP SERPL CALCULATED.3IONS-SCNC: 10 MMOL/L (ref 5–18)
AST SERPL W P-5'-P-CCNC: 59 U/L (ref 0–40)
BILIRUB DIRECT SERPL-MCNC: 0.3 MG/DL
BILIRUB SERPL-MCNC: 0.7 MG/DL (ref 0–1)
BUN SERPL-MCNC: 24 MG/DL (ref 8–22)
CALCIUM SERPL-MCNC: 11.3 MG/DL (ref 8.5–10.5)
CHLORIDE BLD-SCNC: 98 MMOL/L (ref 98–107)
CO2 SERPL-SCNC: 28 MMOL/L (ref 22–31)
CREAT SERPL-MCNC: 0.97 MG/DL (ref 0.6–1.1)
GFR SERPL CREATININE-BSD FRML MDRD: 57 ML/MIN/1.73M2
GLUCOSE BLD-MCNC: 110 MG/DL (ref 70–125)
INR PPP: 2.34 (ref 0.9–1.1)
MAGNESIUM SERPL-MCNC: 1.8 MG/DL (ref 1.8–2.6)
POTASSIUM BLD-SCNC: 4.3 MMOL/L (ref 3.5–5)
POTASSIUM BLD-SCNC: 4.3 MMOL/L (ref 3.5–5)
PROT SERPL-MCNC: 7 G/DL (ref 6–8)
SODIUM SERPL-SCNC: 136 MMOL/L (ref 136–145)

## 2018-10-15 ASSESSMENT — MIFFLIN-ST. JEOR
SCORE: 1235.3

## 2018-10-16 LAB
ALBUMIN SERPL-MCNC: 2.9 G/DL (ref 3.5–5)
ALP SERPL-CCNC: 190 U/L (ref 45–120)
ALT SERPL W P-5'-P-CCNC: 39 U/L (ref 0–45)
AR DECEL SLOPE: 2040 MM/S2
AR PEAK VELOCITY: 380 CM/S
AST SERPL W P-5'-P-CCNC: 57 U/L (ref 0–40)
ATRIAL RATE - MUSE: 123 BPM
AV REGURGITANT PEAK GRADIENT: 57.8 MMHG
AV REGURGITATION PRESSURE HALF TIME: 555 MS
BILIRUB DIRECT SERPL-MCNC: 0.4 MG/DL
BILIRUB SERPL-MCNC: 0.7 MG/DL (ref 0–1)
BSA FOR ECHO PROCEDURE: 1.88 M2
CV BLOOD PRESSURE: NORMAL MMHG
CV ECHO HEIGHT: 63 IN
CV ECHO WEIGHT: 166 LBS
DIASTOLIC BLOOD PRESSURE - MUSE: NORMAL MMHG
EJECTION FRACTION: 50 % (ref 55–75)
INR PPP: 2.39 (ref 0.9–1.1)
INTERPRETATION ECG - MUSE: NORMAL
LEFT VENTRICLE DIASTOLIC VOLUME INDEX: 25.5 CM3/M2 (ref 34–74)
LEFT VENTRICLE DIASTOLIC VOLUME: 48 CM3 (ref 46–106)
LEFT VENTRICLE HEART RATE: 128 BPM
LEFT VENTRICLE SYSTOLIC VOLUME INDEX: 12.8 CM3/M2 (ref 11–31)
LEFT VENTRICLE SYSTOLIC VOLUME: 24 CM3 (ref 14–42)
MAGNESIUM SERPL-MCNC: 2.1 MG/DL (ref 1.8–2.6)
MITRAL REGURGITANT VELOCITY TIME INTEGRAL: 88.5 CM
MR FLOW: 20 CM3
MR MEAN GRADIENT: 38 MMHG
MR MEAN VELOCITY: 280 CM/S
MR PEAK GRADIENT: 67.9 MMHG
MR PISA EROA: 0.2 CM2
MR PISA RADIUS: 0.7 CM
MR PISA VN NYQUIST: 30.8 CM/S
MV REGURGITANT VOLUME: 20.4 CC
NUC REST DIASTOLIC VOLUME INDEX: 2657.6 LBS
NUC REST SYSTOLIC VOLUME INDEX: 63 IN
P AXIS - MUSE: NORMAL DEGREES
PISA MR PEAK VEL: 412 CM/S
POTASSIUM BLD-SCNC: 4.6 MMOL/L (ref 3.5–5)
PR INTERVAL - MUSE: 168 MS
PROT SERPL-MCNC: 7.6 G/DL (ref 6–8)
QRS DURATION - MUSE: 90 MS
QT - MUSE: 338 MS
QTC - MUSE: 483 MS
R AXIS - MUSE: -38 DEGREES
SYSTOLIC BLOOD PRESSURE - MUSE: NORMAL MMHG
T AXIS - MUSE: 124 DEGREES
TRICUSPID REGURGITATION PEAK PRESSURE GRADIENT: 32.7 MMHG
TRICUSPID VALVE ANULAR PLANE SYSTOLIC EXCURSION: 0.8 CM
TRICUSPID VALVE PEAK REGURGITANT VELOCITY: 286 CM/S
VENTRICULAR RATE- MUSE: 123 BPM

## 2018-10-16 ASSESSMENT — MIFFLIN-ST. JEOR
SCORE: 1242.55

## 2018-10-17 LAB
INR PPP: 2.65 (ref 0.9–1.1)
POTASSIUM BLD-SCNC: 4.6 MMOL/L (ref 3.5–5)

## 2018-10-17 ASSESSMENT — MIFFLIN-ST. JEOR
SCORE: 1239.38

## 2018-10-18 ENCOUNTER — ANESTHESIA - HEALTHEAST (OUTPATIENT)
Dept: CARDIOLOGY | Facility: CLINIC | Age: 68
End: 2018-10-18

## 2018-10-18 LAB
ALBUMIN SERPL-MCNC: 3 G/DL (ref 3.5–5)
ALP SERPL-CCNC: 303 U/L (ref 45–120)
ALT SERPL W P-5'-P-CCNC: 270 U/L (ref 0–45)
ANION GAP SERPL CALCULATED.3IONS-SCNC: 11 MMOL/L (ref 5–18)
ASCENDING AORTA: 2.7 CM
AST SERPL W P-5'-P-CCNC: 773 U/L (ref 0–40)
ATRIAL RATE - MUSE: 115 BPM
ATRIAL RATE - MUSE: 119 BPM
BILIRUB SERPL-MCNC: 0.9 MG/DL (ref 0–1)
BSA FOR ECHO PROCEDURE: 1.88 M2
BUN SERPL-MCNC: 41 MG/DL (ref 8–22)
CALCIUM SERPL-MCNC: 12.3 MG/DL (ref 8.5–10.5)
CALCIUM, IONIZED MEASURED: 1.52 MMOL/L (ref 1.11–1.3)
CHLORIDE BLD-SCNC: 99 MMOL/L (ref 98–107)
CO2 SERPL-SCNC: 21 MMOL/L (ref 22–31)
CREAT SERPL-MCNC: 1.42 MG/DL (ref 0.6–1.1)
CV BLOOD PRESSURE: NORMAL MMHG
CV ECHO HEIGHT: 63 IN
CV ECHO WEIGHT: 154 LBS
DIASTOLIC BLOOD PRESSURE - MUSE: NORMAL MMHG
DIASTOLIC BLOOD PRESSURE - MUSE: NORMAL MMHG
ECHO EJECTION FRACTION ESTIMATED: 55 %
ERYTHROCYTE [DISTWIDTH] IN BLOOD BY AUTOMATED COUNT: 17.7 % (ref 11–14.5)
GFR SERPL CREATININE-BSD FRML MDRD: 37 ML/MIN/1.73M2
GLUCOSE BLD-MCNC: 121 MG/DL (ref 70–125)
GLUCOSE BLDC GLUCOMTR-MCNC: 117 MG/DL
GLUCOSE BLDC GLUCOMTR-MCNC: 176 MG/DL
HCT VFR BLD AUTO: 42.9 % (ref 35–47)
HGB BLD-MCNC: 13.2 G/DL (ref 12–16)
INR PPP: 3.88 (ref 0.9–1.1)
INTERPRETATION ECG - MUSE: NORMAL
INTERPRETATION ECG - MUSE: NORMAL
ION CA PH 7.4: 1.48 MMOL/L (ref 1.11–1.3)
LEFT VENTRICLE HEART RATE: 118 BPM
MCH RBC QN AUTO: 26.6 PG (ref 27–34)
MCHC RBC AUTO-ENTMCNC: 30.8 G/DL (ref 32–36)
MCV RBC AUTO: 87 FL (ref 80–100)
MITRAL REGURGITANT VELOCITY TIME INTEGRAL: 128 CM
MR FLOW: 17 CM3
MR MEAN GRADIENT: 51 MMHG
MR MEAN VELOCITY: 333 CM/S
MR PEAK GRADIENT: 80.3 MMHG
MR PISA EROA: 0.1 CM2
MR PISA RADIUS: 0.5 CM
MR PISA VN NYQUIST: 38.5 CM/S
MV REGURGITANT VOLUME: 17.3 CC
NUC REST DIASTOLIC VOLUME INDEX: 2470.4 LBS
NUC REST SYSTOLIC VOLUME INDEX: 63 IN
P AXIS - MUSE: NORMAL DEGREES
P AXIS - MUSE: NORMAL DEGREES
PH: 7.33 (ref 7.35–7.45)
PISA MR PEAK VEL: 448 CM/S
PLATELET # BLD AUTO: 213 THOU/UL (ref 140–440)
PMV BLD AUTO: 12.1 FL (ref 8.5–12.5)
POTASSIUM BLD-SCNC: 5.3 MMOL/L (ref 3.5–5)
PR INTERVAL - MUSE: NORMAL MS
PR INTERVAL - MUSE: NORMAL MS
PROT SERPL-MCNC: 8.1 G/DL (ref 6–8)
PTH-INTACT SERPL-MCNC: 190 PG/ML (ref 10–86)
QRS DURATION - MUSE: 96 MS
QRS DURATION - MUSE: 96 MS
QT - MUSE: 344 MS
QT - MUSE: 366 MS
QTC - MUSE: 474 MS
QTC - MUSE: 499 MS
R AXIS - MUSE: -18 DEGREES
R AXIS - MUSE: -20 DEGREES
RBC # BLD AUTO: 4.96 MILL/UL (ref 3.8–5.4)
SODIUM SERPL-SCNC: 131 MMOL/L (ref 136–145)
SYSTOLIC BLOOD PRESSURE - MUSE: NORMAL MMHG
SYSTOLIC BLOOD PRESSURE - MUSE: NORMAL MMHG
T AXIS - MUSE: 134 DEGREES
T AXIS - MUSE: 142 DEGREES
T4 FREE SERPL-MCNC: 1.1 NG/DL (ref 0.7–1.8)
TRICUSPID REGURGITATION PEAK PRESSURE GRADIENT: 18 MMHG
TRICUSPID VALVE PEAK REGURGITANT VELOCITY: 212 CM/S
TSH SERPL DL<=0.005 MIU/L-ACNC: 7.57 UIU/ML (ref 0.3–5)
VENTRICULAR RATE- MUSE: 112 BPM
VENTRICULAR RATE- MUSE: 114 BPM
WBC: 11.6 THOU/UL (ref 4–11)

## 2018-10-18 ASSESSMENT — MIFFLIN-ST. JEOR
SCORE: 1189.48

## 2018-10-19 LAB
ALBUMIN SERPL-MCNC: 2.8 G/DL (ref 3.5–5)
ALP SERPL-CCNC: 298 U/L (ref 45–120)
ALT SERPL W P-5'-P-CCNC: 317 U/L (ref 0–45)
ANION GAP SERPL CALCULATED.3IONS-SCNC: 11 MMOL/L (ref 5–18)
AST SERPL W P-5'-P-CCNC: 749 U/L (ref 0–40)
ATRIAL RATE - MUSE: 75 BPM
BILIRUB SERPL-MCNC: 1 MG/DL (ref 0–1)
BUN SERPL-MCNC: 40 MG/DL (ref 8–22)
CALCIUM SERPL-MCNC: 11.8 MG/DL (ref 8.5–10.5)
CHLORIDE BLD-SCNC: 103 MMOL/L (ref 98–107)
CO2 SERPL-SCNC: 22 MMOL/L (ref 22–31)
CREAT SERPL-MCNC: 1.14 MG/DL (ref 0.6–1.1)
DIASTOLIC BLOOD PRESSURE - MUSE: NORMAL MMHG
GFR SERPL CREATININE-BSD FRML MDRD: 47 ML/MIN/1.73M2
GLUCOSE BLD-MCNC: 84 MG/DL (ref 70–125)
INR PPP: 3.76 (ref 0.9–1.1)
INTERPRETATION ECG - MUSE: NORMAL
P AXIS - MUSE: 84 DEGREES
POTASSIUM BLD-SCNC: 4.9 MMOL/L (ref 3.5–5)
PR INTERVAL - MUSE: 190 MS
PROT SERPL-MCNC: 7.2 G/DL (ref 6–8)
QRS DURATION - MUSE: 90 MS
QT - MUSE: 398 MS
QTC - MUSE: 444 MS
R AXIS - MUSE: -26 DEGREES
SODIUM SERPL-SCNC: 136 MMOL/L (ref 136–145)
SYSTOLIC BLOOD PRESSURE - MUSE: NORMAL MMHG
T AXIS - MUSE: 113 DEGREES
VENTRICULAR RATE- MUSE: 75 BPM

## 2018-10-19 ASSESSMENT — MIFFLIN-ST. JEOR
SCORE: 1247.09

## 2018-10-20 LAB
ALBUMIN SERPL-MCNC: 2.8 G/DL (ref 3.5–5)
ALBUMIN SERPL-MCNC: 2.8 G/DL (ref 3.5–5)
ALP SERPL-CCNC: 342 U/L (ref 45–120)
ALT SERPL W P-5'-P-CCNC: 334 U/L (ref 0–45)
ANION GAP SERPL CALCULATED.3IONS-SCNC: 10 MMOL/L (ref 5–18)
ANION GAP SERPL CALCULATED.3IONS-SCNC: 10 MMOL/L (ref 5–18)
APAP SERPL-MCNC: <3 UG/ML (ref 10–20)
AST SERPL W P-5'-P-CCNC: 614 U/L (ref 0–40)
BILIRUB SERPL-MCNC: 1.2 MG/DL (ref 0–1)
BUN SERPL-MCNC: 38 MG/DL (ref 8–22)
BUN SERPL-MCNC: 38 MG/DL (ref 8–22)
CALCIUM SERPL-MCNC: 11.7 MG/DL (ref 8.5–10.5)
CALCIUM SERPL-MCNC: 11.7 MG/DL (ref 8.5–10.5)
CHLORIDE BLD-SCNC: 103 MMOL/L (ref 98–107)
CHLORIDE BLD-SCNC: 103 MMOL/L (ref 98–107)
CO2 SERPL-SCNC: 22 MMOL/L (ref 22–31)
CO2 SERPL-SCNC: 22 MMOL/L (ref 22–31)
CREAT SERPL-MCNC: 1.17 MG/DL (ref 0.6–1.1)
CREAT SERPL-MCNC: 1.17 MG/DL (ref 0.6–1.1)
GFR SERPL CREATININE-BSD FRML MDRD: 46 ML/MIN/1.73M2
GFR SERPL CREATININE-BSD FRML MDRD: 46 ML/MIN/1.73M2
GLUCOSE BLD-MCNC: 96 MG/DL (ref 70–125)
GLUCOSE BLD-MCNC: 96 MG/DL (ref 70–125)
INR PPP: 5.08 (ref 0.9–1.1)
PHOSPHATE SERPL-MCNC: 2.5 MG/DL (ref 2.5–4.5)
POTASSIUM BLD-SCNC: 4.2 MMOL/L (ref 3.5–5)
POTASSIUM BLD-SCNC: 4.2 MMOL/L (ref 3.5–5)
PROT SERPL-MCNC: 7.2 G/DL (ref 6–8)
SODIUM SERPL-SCNC: 135 MMOL/L (ref 136–145)
SODIUM SERPL-SCNC: 135 MMOL/L (ref 136–145)

## 2018-10-20 ASSESSMENT — MIFFLIN-ST. JEOR
SCORE: 1244.82
SCORE: 1244.82
SCORE: 1262.51
SCORE: 1244.82
SCORE: 1262.51
SCORE: 1262.51

## 2018-10-21 LAB
ALBUMIN SERPL-MCNC: 2.8 G/DL (ref 3.5–5)
ALBUMIN SERPL-MCNC: 3 G/DL (ref 3.5–5)
ALP SERPL-CCNC: 368 U/L (ref 45–120)
ALP SERPL-CCNC: 404 U/L (ref 45–120)
ALT SERPL W P-5'-P-CCNC: 364 U/L (ref 0–45)
ALT SERPL W P-5'-P-CCNC: 416 U/L (ref 0–45)
ANION GAP SERPL CALCULATED.3IONS-SCNC: 11 MMOL/L (ref 5–18)
ANION GAP SERPL CALCULATED.3IONS-SCNC: 21 MMOL/L (ref 5–18)
AST SERPL W P-5'-P-CCNC: 615 U/L (ref 0–40)
AST SERPL W P-5'-P-CCNC: 706 U/L (ref 0–40)
BILIRUB SERPL-MCNC: 1.2 MG/DL (ref 0–1)
BILIRUB SERPL-MCNC: 1.7 MG/DL (ref 0–1)
BUN SERPL-MCNC: 39 MG/DL (ref 8–22)
BUN SERPL-MCNC: 44 MG/DL (ref 8–22)
CALCIUM SERPL-MCNC: 12 MG/DL (ref 8.5–10.5)
CALCIUM SERPL-MCNC: 12.6 MG/DL (ref 8.5–10.5)
CHLORIDE BLD-SCNC: 100 MMOL/L (ref 98–107)
CHLORIDE BLD-SCNC: 101 MMOL/L (ref 98–107)
CO2 SERPL-SCNC: 13 MMOL/L (ref 22–31)
CO2 SERPL-SCNC: 20 MMOL/L (ref 22–31)
CREAT SERPL-MCNC: 1.15 MG/DL (ref 0.6–1.1)
CREAT SERPL-MCNC: 1.6 MG/DL (ref 0.6–1.1)
GFR SERPL CREATININE-BSD FRML MDRD: 32 ML/MIN/1.73M2
GFR SERPL CREATININE-BSD FRML MDRD: 47 ML/MIN/1.73M2
GLUCOSE BLD-MCNC: 108 MG/DL (ref 70–125)
GLUCOSE BLD-MCNC: 123 MG/DL (ref 70–125)
GLUCOSE BLDC GLUCOMTR-MCNC: 135 MG/DL
GLUCOSE BLDC GLUCOMTR-MCNC: 166 MG/DL
INR PPP: 4.45 (ref 0.9–1.1)
INR PPP: 6.16 (ref 0.9–1.1)
MAGNESIUM SERPL-MCNC: 2.2 MG/DL (ref 1.8–2.6)
POTASSIUM BLD-SCNC: 4 MMOL/L (ref 3.5–5)
POTASSIUM BLD-SCNC: 5.1 MMOL/L (ref 3.5–5)
PROT SERPL-MCNC: 7.1 G/DL (ref 6–8)
PROT SERPL-MCNC: 7.8 G/DL (ref 6–8)
SODIUM SERPL-SCNC: 132 MMOL/L (ref 136–145)
SODIUM SERPL-SCNC: 134 MMOL/L (ref 136–145)

## 2018-10-21 ASSESSMENT — MIFFLIN-ST. JEOR
SCORE: 1261.61

## 2018-10-22 LAB
ALBUMIN SERPL-MCNC: 2.7 G/DL (ref 3.5–5)
ALBUMIN SERPL-MCNC: 2.9 G/DL (ref 3.5–5)
ALP SERPL-CCNC: 340 U/L (ref 45–120)
ALP SERPL-CCNC: 355 U/L (ref 45–120)
ALT SERPL W P-5'-P-CCNC: 778 U/L (ref 0–45)
ALT SERPL W P-5'-P-CCNC: 783 U/L (ref 0–45)
ANION GAP SERPL CALCULATED.3IONS-SCNC: 12 MMOL/L (ref 5–18)
ANION GAP SERPL CALCULATED.3IONS-SCNC: 12 MMOL/L (ref 5–18)
AORTIC ROOT: 3.3 CM
AORTIC VALVE MEAN VELOCITY: 82.4 CM/S
AR DECEL SLOPE: 2080 MM/S2
AR PEAK VELOCITY: 404 CM/S
ASCENDING AORTA: 2.9 CM
AST SERPL W P-5'-P-CCNC: 2138 U/L (ref 0–40)
AST SERPL W P-5'-P-CCNC: 2443 U/L (ref 0–40)
ATRIAL RATE - MUSE: 43 BPM
ATRIAL RATE - MUSE: 46 BPM
AV DIMENSIONLESS INDEX VTI: 0.4
AV MEAN GRADIENT: 3 MMHG
AV PEAK GRADIENT: 7.5 MMHG
AV REGURGITANT PEAK GRADIENT: 65.3 MMHG
AV REGURGITATION PRESSURE HALF TIME: 568 MS
AV VALVE AREA: 1.4 CM2
AV VELOCITY RATIO: 0.4
BILIRUB SERPL-MCNC: 1.8 MG/DL (ref 0–1)
BILIRUB SERPL-MCNC: 2.3 MG/DL (ref 0–1)
BNP SERPL-MCNC: 2613 PG/ML (ref 0–114)
BSA FOR ECHO PROCEDURE: 1.88 M2
BUN SERPL-MCNC: 47 MG/DL (ref 8–22)
BUN SERPL-MCNC: 49 MG/DL (ref 8–22)
CALCIUM SERPL-MCNC: 11.7 MG/DL (ref 8.5–10.5)
CALCIUM SERPL-MCNC: 11.9 MG/DL (ref 8.5–10.5)
CHLORIDE BLD-SCNC: 98 MMOL/L (ref 98–107)
CHLORIDE BLD-SCNC: 98 MMOL/L (ref 98–107)
CO2 SERPL-SCNC: 23 MMOL/L (ref 22–31)
CO2 SERPL-SCNC: 26 MMOL/L (ref 22–31)
CREAT SERPL-MCNC: 1.37 MG/DL (ref 0.6–1.1)
CREAT SERPL-MCNC: 1.62 MG/DL (ref 0.6–1.1)
CV BLOOD PRESSURE: NORMAL MMHG
CV ECHO HEIGHT: 63 IN
CV ECHO WEIGHT: 171 LBS
D DIMER PPP FEU-MCNC: 11.71 FEU UG/ML
DIASTOLIC BLOOD PRESSURE - MUSE: NORMAL MMHG
DIASTOLIC BLOOD PRESSURE - MUSE: NORMAL MMHG
DOP CALC AO PEAK VEL: 137 CM/S
DOP CALC AO VTI: 27.8 CM
DOP CALC LVOT AREA: 3.8 CM2
DOP CALC LVOT DIAMETER: 2.2 CM
DOP CALC LVOT PEAK VEL: 56.2 CM/S
DOP CALC LVOT STROKE VOLUME: 38.8 CM3
DOP CALCLVOT PEAK VEL VTI: 10.2 CM
EJECTION FRACTION: 62 % (ref 55–75)
ERYTHROCYTE [DISTWIDTH] IN BLOOD BY AUTOMATED COUNT: 18 % (ref 11–14.5)
FRACTIONAL SHORTENING: 28.7 % (ref 28–44)
GFR SERPL CREATININE-BSD FRML MDRD: 32 ML/MIN/1.73M2
GFR SERPL CREATININE-BSD FRML MDRD: 38 ML/MIN/1.73M2
GLUCOSE BLD-MCNC: 108 MG/DL (ref 70–125)
GLUCOSE BLD-MCNC: 133 MG/DL (ref 70–125)
HCT VFR BLD AUTO: 36 % (ref 35–47)
HGB BLD-MCNC: 11.4 G/DL (ref 12–16)
INR PPP: 2.51 (ref 0.9–1.1)
INR PPP: 3.31 (ref 0.9–1.1)
INTERPRETATION ECG - MUSE: NORMAL
INTERPRETATION ECG - MUSE: NORMAL
INTERVENTRICULAR SEPTUM IN END DIASTOLE: 1.01 CM (ref 0.6–0.9)
IVS/PW RATIO: 1
LA AREA 1: 25.2 CM2
LA AREA 2: 26 CM2
LACTATE SERPL-SCNC: 2 MMOL/L (ref 0.5–2.2)
LEFT ATRIUM LENGTH: 6.8 CM
LEFT ATRIUM SIZE: 3.9 CM
LEFT ATRIUM VOLUME INDEX: 43.6 ML/M2
LEFT ATRIUM VOLUME: 81.9 ML
LEFT VENTRICLE CARDIAC INDEX: 1.2 L/MIN/M2
LEFT VENTRICLE CARDIAC OUTPUT: 2.3 L/MIN
LEFT VENTRICLE DIASTOLIC VOLUME INDEX: 42.6 CM3/M2 (ref 34–74)
LEFT VENTRICLE DIASTOLIC VOLUME: 80.1 CM3 (ref 46–106)
LEFT VENTRICLE HEART RATE: 60 BPM
LEFT VENTRICLE MASS INDEX: 94 G/M2
LEFT VENTRICLE SYSTOLIC VOLUME INDEX: 16.1 CM3/M2 (ref 11–31)
LEFT VENTRICLE SYSTOLIC VOLUME: 30.2 CM3 (ref 14–42)
LEFT VENTRICULAR INTERNAL DIMENSION IN DIASTOLE: 4.85 CM (ref 3.8–5.2)
LEFT VENTRICULAR INTERNAL DIMENSION IN SYSTOLE: 3.46 CM (ref 2.2–3.5)
LEFT VENTRICULAR MASS: 176.6 G
LEFT VENTRICULAR OUTFLOW TRACT MEAN GRADIENT: 1 MMHG
LEFT VENTRICULAR OUTFLOW TRACT MEAN VELOCITY: 34.9 CM/S
LEFT VENTRICULAR OUTFLOW TRACT PEAK GRADIENT: 1 MMHG
LEFT VENTRICULAR POSTERIOR WALL IN END DIASTOLE: 1.02 CM (ref 0.6–0.9)
LV STROKE VOLUME INDEX: 20.6 ML/M2
MCH RBC QN AUTO: 26.9 PG (ref 27–34)
MCHC RBC AUTO-ENTMCNC: 31.7 G/DL (ref 32–36)
MCV RBC AUTO: 85 FL (ref 80–100)
MITRAL REGURGITANT VELOCITY TIME INTEGRAL: 126 CM
MITRAL VALVE E/A RATIO: 2
MR FLOW: 27 CM3
MR MEAN GRADIENT: 41 MMHG
MR MEAN VELOCITY: 288 CM/S
MR PEAK GRADIENT: 75.7 MMHG
MR PISA EROA: 0.2 CM2
MR PISA RADIUS: 0.7 CM
MR PISA VN NYQUIST: 30.8 CM/S
MV DECELERATION TIME: 229 MS
MV PEAK A VELOCITY: 56.3 CM/S
MV PEAK E VELOCITY: 114 CM/S
MV REGURGITANT VOLUME: 27.5 CC
NUC REST DIASTOLIC VOLUME INDEX: 2734.4 LBS
NUC REST SYSTOLIC VOLUME INDEX: 63 IN
P AXIS - MUSE: 62 DEGREES
P AXIS - MUSE: 87 DEGREES
PATH ICD:: NORMAL
PISA MR PEAK VEL: 435 CM/S
PLATELET # BLD AUTO: 104 THOU/UL (ref 140–440)
POTASSIUM BLD-SCNC: 3.7 MMOL/L (ref 3.5–5)
POTASSIUM BLD-SCNC: 4.1 MMOL/L (ref 3.5–5)
PR INTERVAL - MUSE: 198 MS
PR INTERVAL - MUSE: 206 MS
PR MAX PG: 8 MMHG
PR PEAK VELOCITY: 144 CM/S
PROT PATTERN SERPL ELPH-IMP: NORMAL
PROT SERPL-MCNC: 6.9 G/DL (ref 6–8)
PROT SERPL-MCNC: 7.2 G/DL (ref 6–8)
QRS DURATION - MUSE: 94 MS
QRS DURATION - MUSE: 94 MS
QT - MUSE: 484 MS
QT - MUSE: 488 MS
QTC - MUSE: 412 MS
QTC - MUSE: 423 MS
R AXIS - MUSE: -19 DEGREES
R AXIS - MUSE: -30 DEGREES
RBC # BLD AUTO: 4.24 MILL/UL (ref 3.8–5.4)
REVIEWING PATHOLOGIST: NORMAL
SODIUM SERPL-SCNC: 133 MMOL/L (ref 136–145)
SODIUM SERPL-SCNC: 136 MMOL/L (ref 136–145)
SYSTOLIC BLOOD PRESSURE - MUSE: NORMAL MMHG
SYSTOLIC BLOOD PRESSURE - MUSE: NORMAL MMHG
T AXIS - MUSE: 102 DEGREES
T AXIS - MUSE: 97 DEGREES
TOTAL PROTEIN RANDOM URINE MG/DL: 62 MG/DL
TRICUSPID REGURGITATION PEAK PRESSURE GRADIENT: 50.7 MMHG
TRICUSPID VALVE ANULAR PLANE SYSTOLIC EXCURSION: 2.1 CM
TRICUSPID VALVE PEAK REGURGITANT VELOCITY: 356 CM/S
TSH SERPL DL<=0.005 MIU/L-ACNC: 8.31 UIU/ML (ref 0.3–5)
VENTRICULAR RATE- MUSE: 43 BPM
VENTRICULAR RATE- MUSE: 46 BPM
WBC: 11.7 THOU/UL (ref 4–11)

## 2018-10-22 ASSESSMENT — MIFFLIN-ST. JEOR
SCORE: 1264.33

## 2018-10-23 LAB
ALBUMIN PERCENT: 45.1 % (ref 51–67)
ALBUMIN SERPL ELPH-MCNC: 2.9 G/DL (ref 3.2–4.7)
ALBUMIN SERPL-MCNC: 2.6 G/DL (ref 3.5–5)
ALBUMIN SERPL-MCNC: 2.6 G/DL (ref 3.5–5)
ALP SERPL-CCNC: 333 U/L (ref 45–120)
ALPHA 1 PERCENT: 4.7 % (ref 2–4)
ALPHA 2 PERCENT: 12 % (ref 5–13)
ALPHA1 GLOB SERPL ELPH-MCNC: 0.3 G/DL (ref 0.1–0.3)
ALPHA2 GLOB SERPL ELPH-MCNC: 0.8 G/DL (ref 0.4–0.9)
ALT SERPL W P-5'-P-CCNC: 646 U/L (ref 0–45)
ANION GAP SERPL CALCULATED.3IONS-SCNC: 10 MMOL/L (ref 5–18)
ANION GAP SERPL CALCULATED.3IONS-SCNC: 10 MMOL/L (ref 5–18)
AST SERPL W P-5'-P-CCNC: 1404 U/L (ref 0–40)
B-GLOBULIN SERPL ELPH-MCNC: 0.9 G/DL (ref 0.7–1.2)
BETA PERCENT: 13.8 % (ref 10–17)
BILIRUB SERPL-MCNC: 1.9 MG/DL (ref 0–1)
BNP SERPL-MCNC: 2481 PG/ML (ref 0–114)
BUN SERPL-MCNC: 46 MG/DL (ref 8–22)
BUN SERPL-MCNC: 46 MG/DL (ref 8–22)
CALCIUM SERPL-MCNC: 11.7 MG/DL (ref 8.5–10.5)
CALCIUM SERPL-MCNC: 11.7 MG/DL (ref 8.5–10.5)
CALCIUM UR-MCNC: <2 MG/DL
CALCIUM/CREAT.RATIO - HISTORICAL: NORMAL
CHLORIDE BLD-SCNC: 99 MMOL/L (ref 98–107)
CHLORIDE BLD-SCNC: 99 MMOL/L (ref 98–107)
CO2 SERPL-SCNC: 29 MMOL/L (ref 22–31)
CO2 SERPL-SCNC: 29 MMOL/L (ref 22–31)
CREAT SERPL-MCNC: 1.19 MG/DL (ref 0.6–1.1)
CREAT SERPL-MCNC: 1.19 MG/DL (ref 0.6–1.1)
CREAT UR-MCNC: 64.7 MG/DL
ERYTHROCYTE [DISTWIDTH] IN BLOOD BY AUTOMATED COUNT: 18 % (ref 11–14.5)
GAMMA GLOB SERPL ELPH-MCNC: 1.6 G/DL (ref 0.6–1.4)
GAMMA GLOBULIN PERCENT: 24.4 % (ref 9–20)
GFR SERPL CREATININE-BSD FRML MDRD: 45 ML/MIN/1.73M2
GFR SERPL CREATININE-BSD FRML MDRD: 45 ML/MIN/1.73M2
GLUCOSE BLD-MCNC: 109 MG/DL (ref 70–125)
GLUCOSE BLD-MCNC: 109 MG/DL (ref 70–125)
HBA1C MFR BLD: 7.3 % (ref 4.2–6.1)
HCT VFR BLD AUTO: 34.1 % (ref 35–47)
HGB BLD-MCNC: 10.6 G/DL (ref 12–16)
INR PPP: 2.1 (ref 0.9–1.1)
LACTATE SERPL-SCNC: 1.5 MMOL/L (ref 0.5–2.2)
MAGNESIUM SERPL-MCNC: 2.1 MG/DL (ref 1.8–2.6)
MCH RBC QN AUTO: 26.6 PG (ref 27–34)
MCHC RBC AUTO-ENTMCNC: 31.1 G/DL (ref 32–36)
MCV RBC AUTO: 86 FL (ref 80–100)
PATH ICD:: ABNORMAL
PHOSPHATE SERPL-MCNC: 3.1 MG/DL (ref 2.5–4.5)
PLATELET # BLD AUTO: 91 THOU/UL (ref 140–440)
POTASSIUM BLD-SCNC: 3.6 MMOL/L (ref 3.5–5)
POTASSIUM BLD-SCNC: 3.6 MMOL/L (ref 3.5–5)
PROT PATTERN SERPL ELPH-IMP: ABNORMAL
PROT SERPL-MCNC: 6.5 G/DL (ref 6–8)
PROT SERPL-MCNC: 6.7 G/DL (ref 6–8)
RBC # BLD AUTO: 3.99 MILL/UL (ref 3.8–5.4)
REVIEWING PATHOLOGIST: ABNORMAL
SODIUM SERPL-SCNC: 138 MMOL/L (ref 136–145)
SODIUM SERPL-SCNC: 138 MMOL/L (ref 136–145)
WBC: 9.6 THOU/UL (ref 4–11)

## 2018-10-23 ASSESSMENT — MIFFLIN-ST. JEOR
SCORE: 1277.48

## 2018-10-24 LAB
1,25(OH)2D SERPL-MCNC: 39.3 PG/ML (ref 19.9–79.3)
ALBUMIN SERPL-MCNC: 2.7 G/DL (ref 3.5–5)
ALBUMIN SERPL-MCNC: 2.7 G/DL (ref 3.5–5)
ALP SERPL-CCNC: 371 U/L (ref 45–120)
ALT SERPL W P-5'-P-CCNC: 525 U/L (ref 0–45)
ANION GAP SERPL CALCULATED.3IONS-SCNC: 10 MMOL/L (ref 5–18)
ANION GAP SERPL CALCULATED.3IONS-SCNC: 10 MMOL/L (ref 5–18)
AST SERPL W P-5'-P-CCNC: 763 U/L (ref 0–40)
BILIRUB SERPL-MCNC: 2.3 MG/DL (ref 0–1)
BUN SERPL-MCNC: 40 MG/DL (ref 8–22)
BUN SERPL-MCNC: 40 MG/DL (ref 8–22)
CALCIUM SERPL-MCNC: 12.2 MG/DL (ref 8.5–10.5)
CALCIUM SERPL-MCNC: 12.2 MG/DL (ref 8.5–10.5)
CHLORIDE BLD-SCNC: 101 MMOL/L (ref 98–107)
CHLORIDE BLD-SCNC: 101 MMOL/L (ref 98–107)
CO2 SERPL-SCNC: 28 MMOL/L (ref 22–31)
CO2 SERPL-SCNC: 28 MMOL/L (ref 22–31)
CREAT SERPL-MCNC: 0.91 MG/DL (ref 0.6–1.1)
CREAT SERPL-MCNC: 0.91 MG/DL (ref 0.6–1.1)
ERYTHROCYTE [DISTWIDTH] IN BLOOD BY AUTOMATED COUNT: 18.3 % (ref 11–14.5)
GFR SERPL CREATININE-BSD FRML MDRD: >60 ML/MIN/1.73M2
GFR SERPL CREATININE-BSD FRML MDRD: >60 ML/MIN/1.73M2
GLUCOSE BLD-MCNC: 77 MG/DL (ref 70–125)
GLUCOSE BLD-MCNC: 77 MG/DL (ref 70–125)
HCT VFR BLD AUTO: 35.5 % (ref 35–47)
HGB BLD-MCNC: 11.1 G/DL (ref 12–16)
INR PPP: 1.77 (ref 0.9–1.1)
MAGNESIUM SERPL-MCNC: 1.9 MG/DL (ref 1.8–2.6)
MCH RBC QN AUTO: 26.9 PG (ref 27–34)
MCHC RBC AUTO-ENTMCNC: 31.3 G/DL (ref 32–36)
MCV RBC AUTO: 86 FL (ref 80–100)
PATH ICD:: NORMAL
PHOSPHATE SERPL-MCNC: 2.7 MG/DL (ref 2.5–4.5)
PLATELET # BLD AUTO: 95 THOU/UL (ref 140–440)
POTASSIUM BLD-SCNC: 3.7 MMOL/L (ref 3.5–5)
POTASSIUM BLD-SCNC: 3.7 MMOL/L (ref 3.5–5)
PROT PATTERN SERPL IFE-IMP: NORMAL
PROT SERPL-MCNC: 6.9 G/DL (ref 6–8)
RBC # BLD AUTO: 4.12 MILL/UL (ref 3.8–5.4)
REVIEWING PATHOLOGIST: NORMAL
SODIUM SERPL-SCNC: 139 MMOL/L (ref 136–145)
SODIUM SERPL-SCNC: 139 MMOL/L (ref 136–145)
WBC: 8.9 THOU/UL (ref 4–11)

## 2018-10-24 ASSESSMENT — MIFFLIN-ST. JEOR
SCORE: 1261.15

## 2018-10-25 ENCOUNTER — COMMUNICATION - HEALTHEAST (OUTPATIENT)
Dept: ADMINISTRATIVE | Facility: CLINIC | Age: 68
End: 2018-10-25

## 2018-10-25 LAB
ALBUMIN SERPL-MCNC: 2.7 G/DL (ref 3.5–5)
ALBUMIN SERPL-MCNC: 2.7 G/DL (ref 3.5–5)
ALP SERPL-CCNC: 382 U/L (ref 45–120)
ALT SERPL W P-5'-P-CCNC: 400 U/L (ref 0–45)
ANION GAP SERPL CALCULATED.3IONS-SCNC: 10 MMOL/L (ref 5–18)
ANION GAP SERPL CALCULATED.3IONS-SCNC: 10 MMOL/L (ref 5–18)
AST SERPL W P-5'-P-CCNC: 443 U/L (ref 0–40)
BILIRUB SERPL-MCNC: 2.4 MG/DL (ref 0–1)
BUN SERPL-MCNC: 33 MG/DL (ref 8–22)
BUN SERPL-MCNC: 33 MG/DL (ref 8–22)
CALCIUM SERPL-MCNC: 12.3 MG/DL (ref 8.5–10.5)
CALCIUM SERPL-MCNC: 12.3 MG/DL (ref 8.5–10.5)
CHLORIDE BLD-SCNC: 101 MMOL/L (ref 98–107)
CHLORIDE BLD-SCNC: 101 MMOL/L (ref 98–107)
CO2 SERPL-SCNC: 25 MMOL/L (ref 22–31)
CO2 SERPL-SCNC: 25 MMOL/L (ref 22–31)
CREAT SERPL-MCNC: 0.92 MG/DL (ref 0.6–1.1)
CREAT SERPL-MCNC: 0.92 MG/DL (ref 0.6–1.1)
GFR SERPL CREATININE-BSD FRML MDRD: >60 ML/MIN/1.73M2
GFR SERPL CREATININE-BSD FRML MDRD: >60 ML/MIN/1.73M2
GLUCOSE BLD-MCNC: 104 MG/DL (ref 70–125)
GLUCOSE BLD-MCNC: 104 MG/DL (ref 70–125)
GLUCOSE BLDC GLUCOMTR-MCNC: 113 MG/DL
INR PPP: 2.02 (ref 0.9–1.1)
MAGNESIUM SERPL-MCNC: 1.8 MG/DL (ref 1.8–2.6)
PHOSPHATE SERPL-MCNC: 2.1 MG/DL (ref 2.5–4.5)
POTASSIUM BLD-SCNC: 3.8 MMOL/L (ref 3.5–5)
POTASSIUM BLD-SCNC: 3.8 MMOL/L (ref 3.5–5)
PROT SERPL-MCNC: 7 G/DL (ref 6–8)
SODIUM SERPL-SCNC: 136 MMOL/L (ref 136–145)
SODIUM SERPL-SCNC: 136 MMOL/L (ref 136–145)

## 2018-10-25 ASSESSMENT — MIFFLIN-ST. JEOR
SCORE: 1265.69

## 2018-10-26 ENCOUNTER — COMMUNICATION - HEALTHEAST (OUTPATIENT)
Dept: ANTICOAGULATION | Facility: CLINIC | Age: 68
End: 2018-10-26

## 2018-10-26 ENCOUNTER — RECORDS - HEALTHEAST (OUTPATIENT)
Dept: LAB | Facility: CLINIC | Age: 68
End: 2018-10-26

## 2018-10-26 DIAGNOSIS — I67.9 CEREBROVASCULAR DISEASE: ICD-10-CM

## 2018-10-26 LAB — INR PPP: 2.13 (ref 0.9–1.1)

## 2018-10-29 ENCOUNTER — RECORDS - HEALTHEAST (OUTPATIENT)
Dept: LAB | Facility: CLINIC | Age: 68
End: 2018-10-29

## 2018-10-29 ENCOUNTER — OFFICE VISIT - HEALTHEAST (OUTPATIENT)
Dept: GERIATRICS | Facility: CLINIC | Age: 68
End: 2018-10-29

## 2018-10-29 DIAGNOSIS — I10 ESSENTIAL HYPERTENSION: ICD-10-CM

## 2018-10-29 DIAGNOSIS — I48.0 PAROXYSMAL A-FIB (H): ICD-10-CM

## 2018-10-29 DIAGNOSIS — R60.0 LOWER EXTREMITY EDEMA: ICD-10-CM

## 2018-10-29 DIAGNOSIS — Z90.49 S/P LAPAROSCOPIC CHOLECYSTECTOMY: ICD-10-CM

## 2018-10-29 DIAGNOSIS — K81.9 ACALCULOUS CHOLECYSTITIS: ICD-10-CM

## 2018-10-29 DIAGNOSIS — I50.30 (HFPEF) HEART FAILURE WITH PRESERVED EJECTION FRACTION (H): ICD-10-CM

## 2018-10-29 DIAGNOSIS — R74.01 TRANSAMINITIS: ICD-10-CM

## 2018-10-29 DIAGNOSIS — I25.10 CORONARY ARTERY DISEASE INVOLVING NATIVE CORONARY ARTERY OF NATIVE HEART WITHOUT ANGINA PECTORIS: ICD-10-CM

## 2018-10-29 DIAGNOSIS — F32.A DEPRESSION: ICD-10-CM

## 2018-10-29 DIAGNOSIS — N17.9 ACUTE RENAL FAILURE (ARF) (H): ICD-10-CM

## 2018-10-29 LAB
ALBUMIN SERPL-MCNC: 2.4 G/DL (ref 3.5–5)
ALP SERPL-CCNC: 275 U/L (ref 45–120)
ALT SERPL W P-5'-P-CCNC: 151 U/L (ref 0–45)
ANION GAP SERPL CALCULATED.3IONS-SCNC: 9 MMOL/L (ref 5–18)
AST SERPL W P-5'-P-CCNC: 63 U/L (ref 0–40)
BILIRUB SERPL-MCNC: 1.9 MG/DL (ref 0–1)
BUN SERPL-MCNC: 14 MG/DL (ref 8–22)
CALCIUM SERPL-MCNC: 11 MG/DL (ref 8.5–10.5)
CHLORIDE BLD-SCNC: 104 MMOL/L (ref 98–107)
CO2 SERPL-SCNC: 25 MMOL/L (ref 22–31)
CREAT SERPL-MCNC: 0.73 MG/DL (ref 0.6–1.1)
GFR SERPL CREATININE-BSD FRML MDRD: >60 ML/MIN/1.73M2
GLUCOSE BLD-MCNC: 80 MG/DL (ref 70–125)
INR PPP: 2.88 (ref 0.9–1.1)
POTASSIUM BLD-SCNC: 4.5 MMOL/L (ref 3.5–5)
PROT SERPL-MCNC: 6.6 G/DL (ref 6–8)
SODIUM SERPL-SCNC: 138 MMOL/L (ref 136–145)

## 2018-11-01 ENCOUNTER — OFFICE VISIT - HEALTHEAST (OUTPATIENT)
Dept: GERIATRICS | Facility: CLINIC | Age: 68
End: 2018-11-01

## 2018-11-01 DIAGNOSIS — R10.84 ABDOMINAL PAIN, GENERALIZED: ICD-10-CM

## 2018-11-01 DIAGNOSIS — R53.81 PHYSICAL DECONDITIONING: ICD-10-CM

## 2018-11-01 DIAGNOSIS — Z90.49 S/P CHOLECYSTECTOMY: ICD-10-CM

## 2018-11-02 ENCOUNTER — RECORDS - HEALTHEAST (OUTPATIENT)
Dept: LAB | Facility: CLINIC | Age: 68
End: 2018-11-02

## 2018-11-02 LAB — MAGNESIUM SERPL-MCNC: 1.5 MG/DL (ref 1.8–2.6)

## 2018-11-05 ENCOUNTER — OFFICE VISIT - HEALTHEAST (OUTPATIENT)
Dept: GERIATRICS | Facility: CLINIC | Age: 68
End: 2018-11-05

## 2018-11-05 DIAGNOSIS — Z90.49 S/P CHOLECYSTECTOMY: ICD-10-CM

## 2018-11-05 DIAGNOSIS — I50.30 (HFPEF) HEART FAILURE WITH PRESERVED EJECTION FRACTION (H): ICD-10-CM

## 2018-11-05 DIAGNOSIS — R60.0 LOWER EXTREMITY EDEMA: ICD-10-CM

## 2018-11-05 DIAGNOSIS — F32.A DEPRESSION: ICD-10-CM

## 2018-11-05 DIAGNOSIS — I48.19 PERSISTENT ATRIAL FIBRILLATION (H): ICD-10-CM

## 2018-11-05 DIAGNOSIS — I10 ESSENTIAL HYPERTENSION: ICD-10-CM

## 2018-11-05 DIAGNOSIS — R74.01 TRANSAMINITIS: ICD-10-CM

## 2018-11-05 DIAGNOSIS — I25.10 CORONARY ARTERY DISEASE INVOLVING NATIVE CORONARY ARTERY OF NATIVE HEART WITHOUT ANGINA PECTORIS: ICD-10-CM

## 2018-11-05 DIAGNOSIS — K81.9 CHOLECYSTITIS: ICD-10-CM

## 2018-11-05 LAB
ANION GAP SERPL CALCULATED.3IONS-SCNC: 8 MMOL/L (ref 5–18)
BUN SERPL-MCNC: 9 MG/DL (ref 8–22)
CALCIUM SERPL-MCNC: 10.7 MG/DL (ref 8.5–10.5)
CHLORIDE BLD-SCNC: 104 MMOL/L (ref 98–107)
CO2 SERPL-SCNC: 23 MMOL/L (ref 22–31)
CREAT SERPL-MCNC: 0.65 MG/DL (ref 0.6–1.1)
ERYTHROCYTE [DISTWIDTH] IN BLOOD BY AUTOMATED COUNT: 20.2 % (ref 11–14.5)
GFR SERPL CREATININE-BSD FRML MDRD: >60 ML/MIN/1.73M2
GLUCOSE BLD-MCNC: 125 MG/DL (ref 70–125)
HCT VFR BLD AUTO: 34.7 % (ref 35–47)
HGB BLD-MCNC: 11 G/DL (ref 12–16)
INR PPP: 3.32 (ref 0.9–1.1)
MCH RBC QN AUTO: 26.9 PG (ref 27–34)
MCHC RBC AUTO-ENTMCNC: 31.7 G/DL (ref 32–36)
MCV RBC AUTO: 85 FL (ref 80–100)
PLATELET # BLD AUTO: 122 THOU/UL (ref 140–440)
POTASSIUM BLD-SCNC: 4.1 MMOL/L (ref 3.5–5)
RBC # BLD AUTO: 4.09 MILL/UL (ref 3.8–5.4)
SODIUM SERPL-SCNC: 135 MMOL/L (ref 136–145)
WBC: 8.7 THOU/UL (ref 4–11)

## 2018-11-08 ENCOUNTER — OFFICE VISIT - HEALTHEAST (OUTPATIENT)
Dept: GERIATRICS | Facility: CLINIC | Age: 68
End: 2018-11-08

## 2018-11-08 DIAGNOSIS — R53.81 PHYSICAL DECONDITIONING: ICD-10-CM

## 2018-11-08 DIAGNOSIS — Z90.49 S/P CHOLECYSTECTOMY: ICD-10-CM

## 2018-11-08 DIAGNOSIS — J18.9 PNEUMONIA OF LOWER LOBE DUE TO INFECTIOUS ORGANISM, UNSPECIFIED LATERALITY: ICD-10-CM

## 2018-11-09 ENCOUNTER — RECORDS - HEALTHEAST (OUTPATIENT)
Dept: LAB | Facility: CLINIC | Age: 68
End: 2018-11-09

## 2018-11-09 LAB — INR PPP: 1.83 (ref 0.9–1.1)

## 2018-11-12 ENCOUNTER — OFFICE VISIT - HEALTHEAST (OUTPATIENT)
Dept: GERIATRICS | Facility: CLINIC | Age: 68
End: 2018-11-12

## 2018-11-12 ENCOUNTER — RECORDS - HEALTHEAST (OUTPATIENT)
Dept: LAB | Facility: CLINIC | Age: 68
End: 2018-11-12

## 2018-11-12 DIAGNOSIS — F32.0 CURRENT MILD EPISODE OF MAJOR DEPRESSIVE DISORDER, UNSPECIFIED WHETHER RECURRENT (H): ICD-10-CM

## 2018-11-12 DIAGNOSIS — I48.19 PERSISTENT ATRIAL FIBRILLATION (H): ICD-10-CM

## 2018-11-12 DIAGNOSIS — I25.10 CORONARY ARTERY DISEASE INVOLVING NATIVE CORONARY ARTERY OF NATIVE HEART WITHOUT ANGINA PECTORIS: ICD-10-CM

## 2018-11-12 DIAGNOSIS — R74.01 TRANSAMINITIS: ICD-10-CM

## 2018-11-12 DIAGNOSIS — I10 ESSENTIAL HYPERTENSION: ICD-10-CM

## 2018-11-12 DIAGNOSIS — R60.0 LOWER EXTREMITY EDEMA: ICD-10-CM

## 2018-11-12 DIAGNOSIS — K81.9 ACALCULOUS CHOLECYSTITIS: ICD-10-CM

## 2018-11-12 DIAGNOSIS — Z90.49 S/P CHOLECYSTECTOMY: ICD-10-CM

## 2018-11-13 ENCOUNTER — OFFICE VISIT - HEALTHEAST (OUTPATIENT)
Dept: GERIATRICS | Facility: CLINIC | Age: 68
End: 2018-11-13

## 2018-11-13 DIAGNOSIS — D17.1 LIPOMA OF BACK: ICD-10-CM

## 2018-11-13 LAB — INR PPP: 1.54 (ref 0.9–1.1)

## 2018-11-15 ENCOUNTER — RECORDS - HEALTHEAST (OUTPATIENT)
Dept: LAB | Facility: CLINIC | Age: 68
End: 2018-11-15

## 2018-11-15 ENCOUNTER — RECORDS - HEALTHEAST (OUTPATIENT)
Dept: ADMINISTRATIVE | Facility: OTHER | Age: 68
End: 2018-11-15

## 2018-11-15 ENCOUNTER — OFFICE VISIT - HEALTHEAST (OUTPATIENT)
Dept: GERIATRICS | Facility: CLINIC | Age: 68
End: 2018-11-15

## 2018-11-15 DIAGNOSIS — Z79.01 ANTICOAGULATION MANAGEMENT ENCOUNTER: ICD-10-CM

## 2018-11-15 DIAGNOSIS — Z90.49 S/P CHOLECYSTECTOMY: ICD-10-CM

## 2018-11-15 DIAGNOSIS — Z79.01 ANTICOAGULANT LONG-TERM USE: ICD-10-CM

## 2018-11-15 DIAGNOSIS — I48.19 PERSISTENT ATRIAL FIBRILLATION (H): ICD-10-CM

## 2018-11-15 DIAGNOSIS — R53.81 PHYSICAL DECONDITIONING: ICD-10-CM

## 2018-11-15 DIAGNOSIS — K81.9 CHOLECYSTITIS: ICD-10-CM

## 2018-11-15 DIAGNOSIS — Z51.81 ANTICOAGULATION MANAGEMENT ENCOUNTER: ICD-10-CM

## 2018-11-15 LAB — INR PPP: 1.55 (ref 0.9–1.1)

## 2018-11-19 ENCOUNTER — RECORDS - HEALTHEAST (OUTPATIENT)
Dept: LAB | Facility: CLINIC | Age: 68
End: 2018-11-19

## 2018-11-19 ENCOUNTER — OFFICE VISIT - HEALTHEAST (OUTPATIENT)
Dept: GERIATRICS | Facility: CLINIC | Age: 68
End: 2018-11-19

## 2018-11-19 DIAGNOSIS — R60.0 LOWER EXTREMITY EDEMA: ICD-10-CM

## 2018-11-19 DIAGNOSIS — K81.9 ACALCULOUS CHOLECYSTITIS: ICD-10-CM

## 2018-11-19 DIAGNOSIS — I48.19 PERSISTENT ATRIAL FIBRILLATION (H): ICD-10-CM

## 2018-11-19 DIAGNOSIS — F32.A DEPRESSION, UNSPECIFIED DEPRESSION TYPE: ICD-10-CM

## 2018-11-19 DIAGNOSIS — I50.30 (HFPEF) HEART FAILURE WITH PRESERVED EJECTION FRACTION (H): ICD-10-CM

## 2018-11-19 DIAGNOSIS — E11.59 TYPE 2 DIABETES MELLITUS WITH OTHER CIRCULATORY COMPLICATION, UNSPECIFIED WHETHER LONG TERM INSULIN USE (H): ICD-10-CM

## 2018-11-19 DIAGNOSIS — I10 ESSENTIAL HYPERTENSION: ICD-10-CM

## 2018-11-20 ENCOUNTER — RECORDS - HEALTHEAST (OUTPATIENT)
Dept: ADMINISTRATIVE | Facility: OTHER | Age: 68
End: 2018-11-20

## 2018-11-20 LAB — INR PPP: 1.59 (ref 0.9–1.1)

## 2018-11-23 ENCOUNTER — RECORDS - HEALTHEAST (OUTPATIENT)
Dept: LAB | Facility: CLINIC | Age: 68
End: 2018-11-23

## 2018-11-23 ENCOUNTER — OFFICE VISIT - HEALTHEAST (OUTPATIENT)
Dept: GERIATRICS | Facility: CLINIC | Age: 68
End: 2018-11-23

## 2018-11-23 DIAGNOSIS — R53.81 PHYSICAL DECONDITIONING: ICD-10-CM

## 2018-11-23 DIAGNOSIS — I50.30 (HFPEF) HEART FAILURE WITH PRESERVED EJECTION FRACTION (H): ICD-10-CM

## 2018-11-23 DIAGNOSIS — I48.0 PAROXYSMAL A-FIB (H): ICD-10-CM

## 2018-11-23 DIAGNOSIS — Z90.49 S/P CHOLECYSTECTOMY: ICD-10-CM

## 2018-11-23 DIAGNOSIS — I10 ESSENTIAL HYPERTENSION: ICD-10-CM

## 2018-11-23 LAB — INR PPP: 1.38 (ref 0.9–1.1)

## 2018-11-26 ENCOUNTER — OFFICE VISIT - HEALTHEAST (OUTPATIENT)
Dept: GERIATRICS | Facility: CLINIC | Age: 68
End: 2018-11-26

## 2018-11-26 ENCOUNTER — RECORDS - HEALTHEAST (OUTPATIENT)
Dept: LAB | Facility: CLINIC | Age: 68
End: 2018-11-26

## 2018-11-26 DIAGNOSIS — I48.19 PERSISTENT ATRIAL FIBRILLATION (H): ICD-10-CM

## 2018-11-26 DIAGNOSIS — R60.0 LOWER EXTREMITY EDEMA: ICD-10-CM

## 2018-11-26 DIAGNOSIS — K81.9 ACALCULOUS CHOLECYSTITIS: ICD-10-CM

## 2018-11-26 DIAGNOSIS — I50.30 (HFPEF) HEART FAILURE WITH PRESERVED EJECTION FRACTION (H): ICD-10-CM

## 2018-11-26 DIAGNOSIS — I10 ESSENTIAL HYPERTENSION: ICD-10-CM

## 2018-11-26 DIAGNOSIS — F32.A DEPRESSION, UNSPECIFIED DEPRESSION TYPE: ICD-10-CM

## 2018-11-26 LAB — INR PPP: 1.5 (ref 0.9–1.1)

## 2018-11-30 ENCOUNTER — RECORDS - HEALTHEAST (OUTPATIENT)
Dept: LAB | Facility: CLINIC | Age: 68
End: 2018-11-30

## 2018-12-03 ENCOUNTER — COMMUNICATION - HEALTHEAST (OUTPATIENT)
Dept: GERIATRICS | Facility: CLINIC | Age: 68
End: 2018-12-03

## 2018-12-03 ENCOUNTER — OFFICE VISIT - HEALTHEAST (OUTPATIENT)
Dept: GERIATRICS | Facility: CLINIC | Age: 68
End: 2018-12-03

## 2018-12-03 DIAGNOSIS — Z90.49 HX LAPAROSCOPIC CHOLECYSTECTOMY: ICD-10-CM

## 2018-12-03 DIAGNOSIS — I10 ESSENTIAL HYPERTENSION: ICD-10-CM

## 2018-12-03 DIAGNOSIS — R60.0 LOWER EXTREMITY EDEMA: ICD-10-CM

## 2018-12-03 DIAGNOSIS — F32.89 OTHER DEPRESSION: ICD-10-CM

## 2018-12-03 DIAGNOSIS — I48.0 PAROXYSMAL A-FIB (H): ICD-10-CM

## 2018-12-03 DIAGNOSIS — I25.10 CORONARY ARTERY DISEASE INVOLVING NATIVE CORONARY ARTERY OF NATIVE HEART WITHOUT ANGINA PECTORIS: ICD-10-CM

## 2018-12-03 DIAGNOSIS — I50.30 (HFPEF) HEART FAILURE WITH PRESERVED EJECTION FRACTION (H): ICD-10-CM

## 2018-12-03 LAB — INR PPP: 3.45 (ref 0.9–1.1)

## 2018-12-05 ENCOUNTER — RECORDS - HEALTHEAST (OUTPATIENT)
Dept: LAB | Facility: CLINIC | Age: 68
End: 2018-12-05

## 2018-12-06 LAB — INR PPP: 2.48 (ref 0.9–1.1)

## 2018-12-11 ENCOUNTER — RECORDS - HEALTHEAST (OUTPATIENT)
Dept: LAB | Facility: CLINIC | Age: 68
End: 2018-12-11

## 2018-12-11 LAB — INR PPP: 3.33 (ref 0.9–1.1)

## 2018-12-17 ENCOUNTER — RECORDS - HEALTHEAST (OUTPATIENT)
Dept: LAB | Facility: CLINIC | Age: 68
End: 2018-12-17

## 2018-12-18 ENCOUNTER — OFFICE VISIT - HEALTHEAST (OUTPATIENT)
Dept: GERIATRICS | Facility: CLINIC | Age: 68
End: 2018-12-18

## 2018-12-18 DIAGNOSIS — Z79.01 ANTICOAGULATION MANAGEMENT ENCOUNTER: ICD-10-CM

## 2018-12-18 DIAGNOSIS — Z51.81 ANTICOAGULATION MANAGEMENT ENCOUNTER: ICD-10-CM

## 2018-12-18 DIAGNOSIS — J06.9 VIRAL UPPER RESPIRATORY TRACT INFECTION: ICD-10-CM

## 2018-12-18 LAB — INR PPP: 2.29 (ref 0.9–1.1)

## 2018-12-27 ENCOUNTER — OFFICE VISIT - HEALTHEAST (OUTPATIENT)
Dept: GERIATRICS | Facility: CLINIC | Age: 68
End: 2018-12-27

## 2018-12-27 DIAGNOSIS — Z79.01 ANTICOAGULATION MANAGEMENT ENCOUNTER: ICD-10-CM

## 2018-12-27 DIAGNOSIS — K56.609 SBO (SMALL BOWEL OBSTRUCTION) (H): ICD-10-CM

## 2018-12-27 DIAGNOSIS — Z51.81 ANTICOAGULATION MANAGEMENT ENCOUNTER: ICD-10-CM

## 2018-12-27 DIAGNOSIS — R53.81 PHYSICAL DECONDITIONING: ICD-10-CM

## 2018-12-27 DIAGNOSIS — I10 ESSENTIAL HYPERTENSION: ICD-10-CM

## 2018-12-27 DIAGNOSIS — I50.30 (HFPEF) HEART FAILURE WITH PRESERVED EJECTION FRACTION (H): ICD-10-CM

## 2018-12-27 DIAGNOSIS — Z90.49 S/P CHOLECYSTECTOMY: ICD-10-CM

## 2018-12-31 ENCOUNTER — AMBULATORY - HEALTHEAST (OUTPATIENT)
Dept: GERIATRICS | Facility: CLINIC | Age: 68
End: 2018-12-31

## 2019-01-01 ENCOUNTER — AMBULATORY - HEALTHEAST (OUTPATIENT)
Dept: OTHER | Facility: CLINIC | Age: 69
End: 2019-01-01

## 2019-01-01 ENCOUNTER — RECORDS - HEALTHEAST (OUTPATIENT)
Dept: ADMINISTRATIVE | Facility: OTHER | Age: 69
End: 2019-01-01

## 2019-01-01 ENCOUNTER — COMMUNICATION - HEALTHEAST (OUTPATIENT)
Dept: FAMILY MEDICINE | Facility: CLINIC | Age: 69
End: 2019-01-01

## 2019-01-01 ENCOUNTER — AMBULATORY - HEALTHEAST (OUTPATIENT)
Dept: FAMILY MEDICINE | Facility: CLINIC | Age: 69
End: 2019-01-01

## 2019-01-01 ENCOUNTER — RECORDS - HEALTHEAST (OUTPATIENT)
Dept: LAB | Facility: CLINIC | Age: 69
End: 2019-01-01

## 2019-01-01 ENCOUNTER — OFFICE VISIT - HEALTHEAST (OUTPATIENT)
Dept: FAMILY MEDICINE | Facility: CLINIC | Age: 69
End: 2019-01-01

## 2019-01-01 ENCOUNTER — COMMUNICATION - HEALTHEAST (OUTPATIENT)
Dept: ANTICOAGULATION | Facility: CLINIC | Age: 69
End: 2019-01-01

## 2019-01-01 ENCOUNTER — COMMUNICATION - HEALTHEAST (OUTPATIENT)
Dept: SCHEDULING | Facility: CLINIC | Age: 69
End: 2019-01-01

## 2019-01-01 ENCOUNTER — DOCUMENTATION ONLY (OUTPATIENT)
Dept: OTHER | Facility: CLINIC | Age: 69
End: 2019-01-01

## 2019-01-01 DIAGNOSIS — I67.9 CEREBROVASCULAR DISEASE: ICD-10-CM

## 2019-01-01 DIAGNOSIS — I48.19 PERSISTENT ATRIAL FIBRILLATION (H): ICD-10-CM

## 2019-01-01 DIAGNOSIS — Z00.00 HEALTHCARE MAINTENANCE: ICD-10-CM

## 2019-01-01 DIAGNOSIS — I25.10 CORONARY ARTERY DISEASE INVOLVING NATIVE CORONARY ARTERY OF NATIVE HEART WITHOUT ANGINA PECTORIS: ICD-10-CM

## 2019-01-01 DIAGNOSIS — H04.123 DRY EYES: ICD-10-CM

## 2019-01-01 DIAGNOSIS — I50.32 CHRONIC HEART FAILURE WITH PRESERVED EJECTION FRACTION (H): ICD-10-CM

## 2019-01-01 DIAGNOSIS — R63.4 WEIGHT LOSS, NON-INTENTIONAL: ICD-10-CM

## 2019-01-01 DIAGNOSIS — E83.52 HYPERCALCEMIA: ICD-10-CM

## 2019-01-01 DIAGNOSIS — N30.00 ACUTE CYSTITIS WITHOUT HEMATURIA: ICD-10-CM

## 2019-01-01 DIAGNOSIS — I73.9 PAD (PERIPHERAL ARTERY DISEASE) (H): ICD-10-CM

## 2019-01-01 DIAGNOSIS — Z71.89 ADVANCED DIRECTIVES, COUNSELING/DISCUSSION: ICD-10-CM

## 2019-01-01 DIAGNOSIS — E11.59 TYPE 2 DIABETES MELLITUS WITH OTHER CIRCULATORY COMPLICATION, UNSPECIFIED WHETHER LONG TERM INSULIN USE (H): ICD-10-CM

## 2019-01-01 DIAGNOSIS — E21.3 HYPERPARATHYROIDISM (H): ICD-10-CM

## 2019-01-01 LAB
25(OH)D3 SERPL-MCNC: 52.9 NG/ML (ref 30–80)
ALBUMIN SERPL-MCNC: 3.8 G/DL (ref 3.5–5)
ALBUMIN UR-MCNC: NEGATIVE MG/DL
ALP SERPL-CCNC: 75 U/L (ref 45–120)
ALT SERPL W P-5'-P-CCNC: 13 U/L (ref 0–45)
ANION GAP SERPL CALCULATED.3IONS-SCNC: 9 MMOL/L (ref 5–18)
APPEARANCE UR: CLEAR
AST SERPL W P-5'-P-CCNC: 23 U/L (ref 0–40)
BACTERIA #/AREA URNS HPF: ABNORMAL HPF
BACTERIA SPEC CULT: ABNORMAL
BILIRUB SERPL-MCNC: 0.5 MG/DL (ref 0–1)
BILIRUB UR QL STRIP: NEGATIVE
BUN SERPL-MCNC: 23 MG/DL (ref 8–22)
CALCIUM SERPL-MCNC: 10.9 MG/DL (ref 8.5–10.5)
CHLORIDE BLD-SCNC: 102 MMOL/L (ref 98–107)
CO2 SERPL-SCNC: 28 MMOL/L (ref 22–31)
COLOR UR AUTO: YELLOW
CREAT SERPL-MCNC: 0.81 MG/DL (ref 0.6–1.1)
GFR SERPL CREATININE-BSD FRML MDRD: >60 ML/MIN/1.73M2
GLUCOSE BLD-MCNC: 108 MG/DL (ref 70–125)
GLUCOSE UR STRIP-MCNC: NEGATIVE MG/DL
HBA1C MFR BLD: 5.5 % (ref 3.5–6)
HGB UR QL STRIP: NEGATIVE
INR PPP: 1.8 (ref 0.9–1.1)
INR PPP: 1.84 (ref 0.9–1.1)
INR PPP: 1.84 (ref 0.9–1.1)
INR PPP: 1.88 (ref 0.9–1.1)
INR PPP: 1.98 (ref 0.9–1.1)
INR PPP: 2.04 (ref 0.9–1.1)
INR PPP: 2.1 (ref 0.9–1.1)
INR PPP: 2.12 (ref 0.9–1.1)
INR PPP: 2.21 (ref 0.9–1.1)
INR PPP: 2.35 (ref 0.9–1.1)
INR PPP: 2.44 (ref 0.9–1.1)
INR PPP: 2.62 (ref 0.9–1.1)
KETONES UR STRIP-MCNC: NEGATIVE MG/DL
LEUKOCYTE ESTERASE UR QL STRIP: ABNORMAL
NITRATE UR QL: NEGATIVE
PH UR STRIP: 7 [PH] (ref 5–8)
POTASSIUM BLD-SCNC: 5 MMOL/L (ref 3.5–5)
PROT SERPL-MCNC: 7.7 G/DL (ref 6–8)
RBC #/AREA URNS AUTO: ABNORMAL HPF
SODIUM SERPL-SCNC: 139 MMOL/L (ref 136–145)
SP GR UR STRIP: 1.01 (ref 1–1.03)
SQUAMOUS #/AREA URNS AUTO: ABNORMAL LPF
UROBILINOGEN UR STRIP-ACNC: ABNORMAL
WBC #/AREA URNS AUTO: ABNORMAL HPF

## 2019-01-01 ASSESSMENT — MIFFLIN-ST. JEOR: SCORE: 1180.86

## 2019-01-02 ENCOUNTER — COMMUNICATION - HEALTHEAST (OUTPATIENT)
Dept: FAMILY MEDICINE | Facility: CLINIC | Age: 69
End: 2019-01-02

## 2019-01-04 ENCOUNTER — COMMUNICATION - HEALTHEAST (OUTPATIENT)
Dept: ADMINISTRATIVE | Facility: CLINIC | Age: 69
End: 2019-01-04

## 2019-01-08 ENCOUNTER — RECORDS - HEALTHEAST (OUTPATIENT)
Dept: ADMINISTRATIVE | Facility: OTHER | Age: 69
End: 2019-01-08

## 2019-01-09 ENCOUNTER — COMMUNICATION - HEALTHEAST (OUTPATIENT)
Dept: FAMILY MEDICINE | Facility: CLINIC | Age: 69
End: 2019-01-09

## 2019-01-09 DIAGNOSIS — I67.9 CEREBROVASCULAR DISEASE: ICD-10-CM

## 2019-01-09 LAB — INR PPP: 2.5 (ref 0.9–1.1)

## 2019-01-10 ENCOUNTER — COMMUNICATION - HEALTHEAST (OUTPATIENT)
Dept: FAMILY MEDICINE | Facility: CLINIC | Age: 69
End: 2019-01-10

## 2019-01-10 DIAGNOSIS — I48.19 PERSISTENT ATRIAL FIBRILLATION (H): ICD-10-CM

## 2019-01-11 ENCOUNTER — OFFICE VISIT - HEALTHEAST (OUTPATIENT)
Dept: FAMILY MEDICINE | Facility: CLINIC | Age: 69
End: 2019-01-11

## 2019-01-11 ENCOUNTER — COMMUNICATION - HEALTHEAST (OUTPATIENT)
Dept: ANTICOAGULATION | Facility: CLINIC | Age: 69
End: 2019-01-11

## 2019-01-11 ENCOUNTER — RECORDS - HEALTHEAST (OUTPATIENT)
Dept: ADMINISTRATIVE | Facility: OTHER | Age: 69
End: 2019-01-11

## 2019-01-11 DIAGNOSIS — G25.81 RLS (RESTLESS LEGS SYNDROME): ICD-10-CM

## 2019-01-11 DIAGNOSIS — K81.9 CHOLECYSTITIS: ICD-10-CM

## 2019-01-11 DIAGNOSIS — K21.9 GERD (GASTROESOPHAGEAL REFLUX DISEASE): ICD-10-CM

## 2019-01-11 DIAGNOSIS — Z09 HOSPITAL DISCHARGE FOLLOW-UP: ICD-10-CM

## 2019-01-11 DIAGNOSIS — R60.0 LOWER EXTREMITY EDEMA: ICD-10-CM

## 2019-01-11 DIAGNOSIS — R10.84 ABDOMINAL PAIN, GENERALIZED: ICD-10-CM

## 2019-01-11 DIAGNOSIS — L60.2 ONYCHOGRYPHOSIS: ICD-10-CM

## 2019-01-11 DIAGNOSIS — I48.0 PAROXYSMAL A-FIB (H): ICD-10-CM

## 2019-01-11 DIAGNOSIS — I67.9 CEREBROVASCULAR DISEASE: ICD-10-CM

## 2019-01-11 DIAGNOSIS — E83.52 HYPERCALCEMIA: ICD-10-CM

## 2019-01-11 DIAGNOSIS — E11.59 TYPE 2 DIABETES MELLITUS WITH OTHER CIRCULATORY COMPLICATION, UNSPECIFIED WHETHER LONG TERM INSULIN USE (H): ICD-10-CM

## 2019-01-11 DIAGNOSIS — Z90.49 S/P CHOLECYSTECTOMY: ICD-10-CM

## 2019-01-11 DIAGNOSIS — I50.30 (HFPEF) HEART FAILURE WITH PRESERVED EJECTION FRACTION (H): ICD-10-CM

## 2019-01-11 DIAGNOSIS — I25.10 CORONARY ARTERY DISEASE INVOLVING NATIVE CORONARY ARTERY OF NATIVE HEART WITHOUT ANGINA PECTORIS: ICD-10-CM

## 2019-01-11 DIAGNOSIS — E87.6 HYPOKALEMIA: ICD-10-CM

## 2019-01-11 DIAGNOSIS — K59.01 SLOW TRANSIT CONSTIPATION: ICD-10-CM

## 2019-01-11 DIAGNOSIS — F32.89 OTHER DEPRESSION: ICD-10-CM

## 2019-01-11 LAB
ALBUMIN SERPL-MCNC: 3.4 G/DL (ref 3.5–5)
ALP SERPL-CCNC: 68 U/L (ref 45–120)
ALT SERPL W P-5'-P-CCNC: 9 U/L (ref 0–45)
ANION GAP SERPL CALCULATED.3IONS-SCNC: 11 MMOL/L (ref 5–18)
AST SERPL W P-5'-P-CCNC: 18 U/L (ref 0–40)
BILIRUB SERPL-MCNC: 0.5 MG/DL (ref 0–1)
BUN SERPL-MCNC: 14 MG/DL (ref 8–22)
CALCIUM SERPL-MCNC: 10.4 MG/DL (ref 8.5–10.5)
CALCIUM, IONIZED MEASURED: 1.28 MMOL/L (ref 1.11–1.3)
CHLORIDE BLD-SCNC: 104 MMOL/L (ref 98–107)
CO2 SERPL-SCNC: 22 MMOL/L (ref 22–31)
CREAT SERPL-MCNC: 0.83 MG/DL (ref 0.6–1.1)
ERYTHROCYTE [DISTWIDTH] IN BLOOD BY AUTOMATED COUNT: 16.8 % (ref 11–14.5)
GFR SERPL CREATININE-BSD FRML MDRD: >60 ML/MIN/1.73M2
GLUCOSE BLD-MCNC: 108 MG/DL (ref 70–125)
HBA1C MFR BLD: 5.8 % (ref 3.5–6)
HCT VFR BLD AUTO: 37.1 % (ref 35–47)
HGB BLD-MCNC: 11.7 G/DL (ref 12–16)
INR PPP: 2.4 (ref 0.9–1.1)
ION CA PH 7.4: 1.26 MMOL/L (ref 1.11–1.3)
MCH RBC QN AUTO: 27.9 PG (ref 27–34)
MCHC RBC AUTO-ENTMCNC: 31.5 G/DL (ref 32–36)
MCV RBC AUTO: 88 FL (ref 80–100)
PH: 7.37 (ref 7.35–7.45)
PLATELET # BLD AUTO: 178 THOU/UL (ref 140–440)
PMV BLD AUTO: 9.7 FL (ref 7–10)
POTASSIUM BLD-SCNC: 4.3 MMOL/L (ref 3.5–5)
PROT SERPL-MCNC: 7.1 G/DL (ref 6–8)
PTH-INTACT SERPL-MCNC: 112 PG/ML (ref 10–86)
RBC # BLD AUTO: 4.2 MILL/UL (ref 3.8–5.4)
SODIUM SERPL-SCNC: 137 MMOL/L (ref 136–145)
WBC: 5.5 THOU/UL (ref 4–11)

## 2019-01-12 ENCOUNTER — COMMUNICATION - HEALTHEAST (OUTPATIENT)
Dept: SCHEDULING | Facility: CLINIC | Age: 69
End: 2019-01-12

## 2019-01-15 ENCOUNTER — COMMUNICATION - HEALTHEAST (OUTPATIENT)
Dept: FAMILY MEDICINE | Facility: CLINIC | Age: 69
End: 2019-01-15

## 2019-01-15 DIAGNOSIS — E21.3 HYPERPARATHYROIDISM (H): ICD-10-CM

## 2019-01-16 ENCOUNTER — COMMUNICATION - HEALTHEAST (OUTPATIENT)
Dept: FAMILY MEDICINE | Facility: CLINIC | Age: 69
End: 2019-01-16

## 2019-01-16 ENCOUNTER — RECORDS - HEALTHEAST (OUTPATIENT)
Dept: ADMINISTRATIVE | Facility: OTHER | Age: 69
End: 2019-01-16

## 2019-01-16 DIAGNOSIS — I67.9 CEREBROVASCULAR DISEASE: ICD-10-CM

## 2019-01-25 ENCOUNTER — COMMUNICATION - HEALTHEAST (OUTPATIENT)
Dept: ANTICOAGULATION | Facility: CLINIC | Age: 69
End: 2019-01-25

## 2019-01-25 ENCOUNTER — OFFICE VISIT - HEALTHEAST (OUTPATIENT)
Dept: FAMILY MEDICINE | Facility: CLINIC | Age: 69
End: 2019-01-25

## 2019-01-25 ENCOUNTER — AMBULATORY - HEALTHEAST (OUTPATIENT)
Dept: FAMILY MEDICINE | Facility: CLINIC | Age: 69
End: 2019-01-25

## 2019-01-25 DIAGNOSIS — I48.0 PAROXYSMAL A-FIB (H): ICD-10-CM

## 2019-01-25 DIAGNOSIS — R60.0 LOWER EXTREMITY EDEMA: ICD-10-CM

## 2019-01-25 DIAGNOSIS — K81.9 CHOLECYSTITIS: ICD-10-CM

## 2019-01-25 DIAGNOSIS — I48.20 CHRONIC ATRIAL FIBRILLATION (H): ICD-10-CM

## 2019-01-25 DIAGNOSIS — I50.30 (HFPEF) HEART FAILURE WITH PRESERVED EJECTION FRACTION (H): ICD-10-CM

## 2019-01-25 DIAGNOSIS — I48.19 PERSISTENT ATRIAL FIBRILLATION (H): ICD-10-CM

## 2019-01-25 DIAGNOSIS — I67.9 CEREBROVASCULAR DISEASE: ICD-10-CM

## 2019-01-25 DIAGNOSIS — E11.59 TYPE 2 DIABETES MELLITUS WITH OTHER CIRCULATORY COMPLICATION, UNSPECIFIED WHETHER LONG TERM INSULIN USE (H): ICD-10-CM

## 2019-01-25 LAB
DIGOXIN LEVEL LHE- HISTORICAL: 1.3 NG/ML (ref 0.5–2)
INR PPP: 1.7 (ref 0.9–1.1)

## 2019-01-29 ENCOUNTER — COMMUNICATION - HEALTHEAST (OUTPATIENT)
Dept: FAMILY MEDICINE | Facility: CLINIC | Age: 69
End: 2019-01-29

## 2019-01-30 ENCOUNTER — COMMUNICATION - HEALTHEAST (OUTPATIENT)
Dept: FAMILY MEDICINE | Facility: CLINIC | Age: 69
End: 2019-01-30

## 2019-01-30 ENCOUNTER — AMBULATORY - HEALTHEAST (OUTPATIENT)
Dept: FAMILY MEDICINE | Facility: CLINIC | Age: 69
End: 2019-01-30

## 2019-01-31 ENCOUNTER — COMMUNICATION - HEALTHEAST (OUTPATIENT)
Dept: FAMILY MEDICINE | Facility: CLINIC | Age: 69
End: 2019-01-31

## 2019-02-08 ENCOUNTER — COMMUNICATION - HEALTHEAST (OUTPATIENT)
Dept: FAMILY MEDICINE | Facility: CLINIC | Age: 69
End: 2019-02-08

## 2019-02-08 ENCOUNTER — OFFICE VISIT - HEALTHEAST (OUTPATIENT)
Dept: EDUCATION SERVICES | Facility: CLINIC | Age: 69
End: 2019-02-08

## 2019-02-08 ENCOUNTER — OFFICE VISIT - HEALTHEAST (OUTPATIENT)
Dept: FAMILY MEDICINE | Facility: CLINIC | Age: 69
End: 2019-02-08

## 2019-02-08 ENCOUNTER — COMMUNICATION - HEALTHEAST (OUTPATIENT)
Dept: ANTICOAGULATION | Facility: CLINIC | Age: 69
End: 2019-02-08

## 2019-02-08 ENCOUNTER — RECORDS - HEALTHEAST (OUTPATIENT)
Dept: ANTICOAGULATION | Facility: CLINIC | Age: 69
End: 2019-02-08

## 2019-02-08 ENCOUNTER — COMMUNICATION - HEALTHEAST (OUTPATIENT)
Dept: EDUCATION SERVICES | Facility: CLINIC | Age: 69
End: 2019-02-08

## 2019-02-08 ENCOUNTER — RECORDS - HEALTHEAST (OUTPATIENT)
Dept: ADMINISTRATIVE | Facility: OTHER | Age: 69
End: 2019-02-08

## 2019-02-08 DIAGNOSIS — I67.9 CEREBROVASCULAR DISEASE: ICD-10-CM

## 2019-02-08 DIAGNOSIS — H53.8 FLASHING LIGHTS SEEN: ICD-10-CM

## 2019-02-08 DIAGNOSIS — E11.59 TYPE 2 DIABETES MELLITUS WITH OTHER CIRCULATORY COMPLICATION, UNSPECIFIED WHETHER LONG TERM INSULIN USE (H): ICD-10-CM

## 2019-02-08 DIAGNOSIS — I50.30 (HFPEF) HEART FAILURE WITH PRESERVED EJECTION FRACTION (H): ICD-10-CM

## 2019-02-08 DIAGNOSIS — I48.0 PAROXYSMAL A-FIB (H): ICD-10-CM

## 2019-02-08 DIAGNOSIS — K81.9 CHOLECYSTITIS: ICD-10-CM

## 2019-02-08 DIAGNOSIS — E11.59 TYPE 2 DIABETES MELLITUS WITH CIRCULATORY DISORDER (H): ICD-10-CM

## 2019-02-08 LAB
CREAT UR-MCNC: 279.1 MG/DL
INR PPP: 1.9 (ref 0.9–1.1)
MICROALBUMIN UR-MCNC: 13.5 MG/DL (ref 0–1.99)
MICROALBUMIN/CREAT UR: 48.4 MG/G

## 2019-02-09 ENCOUNTER — RECORDS - HEALTHEAST (OUTPATIENT)
Dept: ADMINISTRATIVE | Facility: OTHER | Age: 69
End: 2019-02-09

## 2019-02-11 ENCOUNTER — RECORDS - HEALTHEAST (OUTPATIENT)
Dept: ADMINISTRATIVE | Facility: OTHER | Age: 69
End: 2019-02-11

## 2019-02-12 ENCOUNTER — AMBULATORY - HEALTHEAST (OUTPATIENT)
Dept: OTHER | Facility: CLINIC | Age: 69
End: 2019-02-12

## 2019-02-12 ENCOUNTER — COMMUNICATION - HEALTHEAST (OUTPATIENT)
Dept: FAMILY MEDICINE | Facility: CLINIC | Age: 69
End: 2019-02-12

## 2019-02-13 ENCOUNTER — COMMUNICATION - HEALTHEAST (OUTPATIENT)
Dept: FAMILY MEDICINE | Facility: CLINIC | Age: 69
End: 2019-02-13

## 2019-02-13 ENCOUNTER — AMBULATORY - HEALTHEAST (OUTPATIENT)
Dept: FAMILY MEDICINE | Facility: CLINIC | Age: 69
End: 2019-02-13

## 2019-02-13 DIAGNOSIS — I67.9 CEREBROVASCULAR DISEASE: ICD-10-CM

## 2019-02-13 LAB — INR PPP: 2.3 (ref 0.9–1.1)

## 2019-02-14 ENCOUNTER — COMMUNICATION - HEALTHEAST (OUTPATIENT)
Dept: FAMILY MEDICINE | Facility: CLINIC | Age: 69
End: 2019-02-14

## 2019-02-15 ENCOUNTER — RECORDS - HEALTHEAST (OUTPATIENT)
Dept: ADMINISTRATIVE | Facility: OTHER | Age: 69
End: 2019-02-15

## 2019-02-27 ENCOUNTER — COMMUNICATION - HEALTHEAST (OUTPATIENT)
Dept: FAMILY MEDICINE | Facility: CLINIC | Age: 69
End: 2019-02-27

## 2019-02-27 DIAGNOSIS — I67.9 CEREBROVASCULAR DISEASE: ICD-10-CM

## 2019-02-27 LAB — INR PPP: 4.1 (ref 0.9–1.1)

## 2019-03-05 ENCOUNTER — RECORDS - HEALTHEAST (OUTPATIENT)
Dept: ADMINISTRATIVE | Facility: OTHER | Age: 69
End: 2019-03-05

## 2019-03-06 ENCOUNTER — RECORDS - HEALTHEAST (OUTPATIENT)
Dept: ADMINISTRATIVE | Facility: OTHER | Age: 69
End: 2019-03-06

## 2019-03-06 ENCOUNTER — COMMUNICATION - HEALTHEAST (OUTPATIENT)
Dept: FAMILY MEDICINE | Facility: CLINIC | Age: 69
End: 2019-03-06

## 2019-03-06 DIAGNOSIS — I67.9 CEREBROVASCULAR DISEASE: ICD-10-CM

## 2019-03-06 DIAGNOSIS — K81.9 CHOLECYSTITIS: ICD-10-CM

## 2019-03-06 LAB — INR PPP: 3.6 (ref 0.9–1.1)

## 2019-03-13 ENCOUNTER — COMMUNICATION - HEALTHEAST (OUTPATIENT)
Dept: FAMILY MEDICINE | Facility: CLINIC | Age: 69
End: 2019-03-13

## 2019-03-13 DIAGNOSIS — K81.9 CHOLECYSTITIS: ICD-10-CM

## 2019-03-13 DIAGNOSIS — I67.9 CEREBROVASCULAR DISEASE: ICD-10-CM

## 2019-03-13 LAB — INR PPP: 3 (ref 0.9–1.1)

## 2019-03-19 ENCOUNTER — RECORDS - HEALTHEAST (OUTPATIENT)
Dept: ADMINISTRATIVE | Facility: OTHER | Age: 69
End: 2019-03-19

## 2019-03-20 ENCOUNTER — COMMUNICATION - HEALTHEAST (OUTPATIENT)
Dept: FAMILY MEDICINE | Facility: CLINIC | Age: 69
End: 2019-03-20

## 2019-03-20 DIAGNOSIS — I67.9 CEREBROVASCULAR DISEASE: ICD-10-CM

## 2019-03-20 DIAGNOSIS — K81.9 CHOLECYSTITIS: ICD-10-CM

## 2019-03-20 DIAGNOSIS — E83.42 HYPOMAGNESEMIA: ICD-10-CM

## 2019-03-20 LAB — INR PPP: 3.5 (ref 0.9–1.1)

## 2019-03-26 ENCOUNTER — OFFICE VISIT - HEALTHEAST (OUTPATIENT)
Dept: CARDIOLOGY | Facility: CLINIC | Age: 69
End: 2019-03-26

## 2019-03-26 ENCOUNTER — RECORDS - HEALTHEAST (OUTPATIENT)
Dept: ADMINISTRATIVE | Facility: OTHER | Age: 69
End: 2019-03-26

## 2019-03-26 DIAGNOSIS — I25.10 CORONARY ARTERY DISEASE INVOLVING NATIVE CORONARY ARTERY OF NATIVE HEART WITHOUT ANGINA PECTORIS: ICD-10-CM

## 2019-03-26 DIAGNOSIS — E87.6 HYPOKALEMIA: ICD-10-CM

## 2019-03-26 DIAGNOSIS — I73.9 PAD (PERIPHERAL ARTERY DISEASE) (H): ICD-10-CM

## 2019-03-26 DIAGNOSIS — I48.19 PERSISTENT ATRIAL FIBRILLATION (H): ICD-10-CM

## 2019-03-26 DIAGNOSIS — I67.9 CEREBROVASCULAR DISEASE: ICD-10-CM

## 2019-03-26 DIAGNOSIS — I70.1 ATHEROSCLEROSIS OF RENAL ARTERY (H): ICD-10-CM

## 2019-03-26 DIAGNOSIS — I50.30 (HFPEF) HEART FAILURE WITH PRESERVED EJECTION FRACTION (H): ICD-10-CM

## 2019-03-26 DIAGNOSIS — I10 ESSENTIAL HYPERTENSION: ICD-10-CM

## 2019-03-26 DIAGNOSIS — E78.5 DYSLIPIDEMIA: ICD-10-CM

## 2019-03-26 LAB
ALBUMIN SERPL-MCNC: 3.3 G/DL (ref 3.5–5)
ALP SERPL-CCNC: 127 U/L (ref 45–120)
ALT SERPL W P-5'-P-CCNC: 21 U/L (ref 0–45)
ANION GAP SERPL CALCULATED.3IONS-SCNC: 11 MMOL/L (ref 5–18)
AST SERPL W P-5'-P-CCNC: 38 U/L (ref 0–40)
BILIRUB SERPL-MCNC: 1.1 MG/DL (ref 0–1)
BNP SERPL-MCNC: 624 PG/ML (ref 0–114)
BUN SERPL-MCNC: 11 MG/DL (ref 8–22)
CALCIUM SERPL-MCNC: 9.9 MG/DL (ref 8.5–10.5)
CHLORIDE BLD-SCNC: 106 MMOL/L (ref 98–107)
CHOLEST SERPL-MCNC: 86 MG/DL
CO2 SERPL-SCNC: 24 MMOL/L (ref 22–31)
CREAT SERPL-MCNC: 0.72 MG/DL (ref 0.6–1.1)
FASTING STATUS PATIENT QL REPORTED: ABNORMAL
GFR SERPL CREATININE-BSD FRML MDRD: >60 ML/MIN/1.73M2
GLUCOSE BLD-MCNC: 99 MG/DL (ref 70–125)
HDLC SERPL-MCNC: 35 MG/DL
LDLC SERPL CALC-MCNC: 32 MG/DL
POTASSIUM BLD-SCNC: 4.1 MMOL/L (ref 3.5–5)
PROT SERPL-MCNC: 7.5 G/DL (ref 6–8)
SODIUM SERPL-SCNC: 141 MMOL/L (ref 136–145)
TRIGL SERPL-MCNC: 93 MG/DL

## 2019-03-26 ASSESSMENT — MIFFLIN-ST. JEOR: SCORE: 1228.49

## 2019-03-27 ENCOUNTER — COMMUNICATION - HEALTHEAST (OUTPATIENT)
Dept: FAMILY MEDICINE | Facility: CLINIC | Age: 69
End: 2019-03-27

## 2019-03-27 ENCOUNTER — AMBULATORY - HEALTHEAST (OUTPATIENT)
Dept: CARDIOLOGY | Facility: CLINIC | Age: 69
End: 2019-03-27

## 2019-03-27 DIAGNOSIS — K81.9 CHOLECYSTITIS: ICD-10-CM

## 2019-03-27 DIAGNOSIS — I67.9 CEREBROVASCULAR DISEASE: ICD-10-CM

## 2019-03-27 DIAGNOSIS — R06.02 SOB (SHORTNESS OF BREATH): ICD-10-CM

## 2019-03-27 LAB
ATRIAL RATE - MUSE: NORMAL BPM
DIASTOLIC BLOOD PRESSURE - MUSE: NORMAL MMHG
INR PPP: 2.4 (ref 0.9–1.1)
INTERPRETATION ECG - MUSE: NORMAL
P AXIS - MUSE: NORMAL DEGREES
PR INTERVAL - MUSE: NORMAL MS
QRS DURATION - MUSE: 84 MS
QT - MUSE: 376 MS
QTC - MUSE: 384 MS
R AXIS - MUSE: -39 DEGREES
SYSTOLIC BLOOD PRESSURE - MUSE: NORMAL MMHG
T AXIS - MUSE: 175 DEGREES
VENTRICULAR RATE- MUSE: 63 BPM

## 2019-03-29 ENCOUNTER — RECORDS - HEALTHEAST (OUTPATIENT)
Dept: ADMINISTRATIVE | Facility: OTHER | Age: 69
End: 2019-03-29

## 2019-04-02 ENCOUNTER — HOSPITAL ENCOUNTER (OUTPATIENT)
Dept: CARDIOLOGY | Facility: CLINIC | Age: 69
Discharge: HOME | End: 2019-04-02
Attending: INTERNAL MEDICINE

## 2019-04-02 DIAGNOSIS — I48.19 PERSISTENT ATRIAL FIBRILLATION (H): ICD-10-CM

## 2019-04-03 ENCOUNTER — COMMUNICATION - HEALTHEAST (OUTPATIENT)
Dept: CARDIOLOGY | Facility: CLINIC | Age: 69
End: 2019-04-03

## 2019-04-03 ENCOUNTER — RECORDS - HEALTHEAST (OUTPATIENT)
Dept: ADMINISTRATIVE | Facility: OTHER | Age: 69
End: 2019-04-03

## 2019-04-03 ENCOUNTER — COMMUNICATION - HEALTHEAST (OUTPATIENT)
Dept: FAMILY MEDICINE | Facility: CLINIC | Age: 69
End: 2019-04-03

## 2019-04-03 DIAGNOSIS — I67.9 CEREBROVASCULAR DISEASE: ICD-10-CM

## 2019-04-03 DIAGNOSIS — K81.9 CHOLECYSTITIS: ICD-10-CM

## 2019-04-03 LAB — INR PPP: 3.2 (ref 0.9–1.1)

## 2019-04-10 ENCOUNTER — RECORDS - HEALTHEAST (OUTPATIENT)
Dept: ADMINISTRATIVE | Facility: OTHER | Age: 69
End: 2019-04-10

## 2019-04-10 ENCOUNTER — COMMUNICATION - HEALTHEAST (OUTPATIENT)
Dept: FAMILY MEDICINE | Facility: CLINIC | Age: 69
End: 2019-04-10

## 2019-04-10 DIAGNOSIS — I67.9 CEREBROVASCULAR DISEASE: ICD-10-CM

## 2019-04-10 LAB — INR PPP: 1.9 (ref 0.9–1.1)

## 2019-04-11 ENCOUNTER — RECORDS - HEALTHEAST (OUTPATIENT)
Dept: ADMINISTRATIVE | Facility: OTHER | Age: 69
End: 2019-04-11

## 2019-04-17 ENCOUNTER — COMMUNICATION - HEALTHEAST (OUTPATIENT)
Dept: FAMILY MEDICINE | Facility: CLINIC | Age: 69
End: 2019-04-17

## 2019-04-17 DIAGNOSIS — I67.9 CEREBROVASCULAR DISEASE: ICD-10-CM

## 2019-04-17 LAB — INR PPP: 1.7 (ref 0.9–1.1)

## 2019-04-23 ENCOUNTER — OFFICE VISIT - HEALTHEAST (OUTPATIENT)
Dept: FAMILY MEDICINE | Facility: CLINIC | Age: 69
End: 2019-04-23

## 2019-04-23 ENCOUNTER — COMMUNICATION - HEALTHEAST (OUTPATIENT)
Dept: ANTICOAGULATION | Facility: CLINIC | Age: 69
End: 2019-04-23

## 2019-04-23 ENCOUNTER — COMMUNICATION - HEALTHEAST (OUTPATIENT)
Dept: FAMILY MEDICINE | Facility: CLINIC | Age: 69
End: 2019-04-23

## 2019-04-23 DIAGNOSIS — K81.9 CHOLECYSTITIS: ICD-10-CM

## 2019-04-23 DIAGNOSIS — G25.81 RLS (RESTLESS LEGS SYNDROME): ICD-10-CM

## 2019-04-23 DIAGNOSIS — I48.19 PERSISTENT ATRIAL FIBRILLATION (H): ICD-10-CM

## 2019-04-23 DIAGNOSIS — I67.9 CEREBROVASCULAR DISEASE: ICD-10-CM

## 2019-04-23 DIAGNOSIS — I50.30 (HFPEF) HEART FAILURE WITH PRESERVED EJECTION FRACTION (H): ICD-10-CM

## 2019-04-23 DIAGNOSIS — E11.59 TYPE 2 DIABETES MELLITUS WITH OTHER CIRCULATORY COMPLICATION, UNSPECIFIED WHETHER LONG TERM INSULIN USE (H): ICD-10-CM

## 2019-04-23 LAB
ANION GAP SERPL CALCULATED.3IONS-SCNC: 11 MMOL/L (ref 5–18)
BUN SERPL-MCNC: 9 MG/DL (ref 8–22)
CALCIUM SERPL-MCNC: 10.1 MG/DL (ref 8.5–10.5)
CHLORIDE BLD-SCNC: 106 MMOL/L (ref 98–107)
CO2 SERPL-SCNC: 23 MMOL/L (ref 22–31)
CREAT SERPL-MCNC: 0.73 MG/DL (ref 0.6–1.1)
GFR SERPL CREATININE-BSD FRML MDRD: >60 ML/MIN/1.73M2
GLUCOSE BLD-MCNC: 100 MG/DL (ref 70–125)
INR PPP: 2.3 (ref 0.9–1.1)
POTASSIUM BLD-SCNC: 4.9 MMOL/L (ref 3.5–5)
SODIUM SERPL-SCNC: 140 MMOL/L (ref 136–145)

## 2019-05-01 ENCOUNTER — COMMUNICATION - HEALTHEAST (OUTPATIENT)
Dept: FAMILY MEDICINE | Facility: CLINIC | Age: 69
End: 2019-05-01

## 2019-05-01 ENCOUNTER — RECORDS - HEALTHEAST (OUTPATIENT)
Dept: ADMINISTRATIVE | Facility: OTHER | Age: 69
End: 2019-05-01

## 2019-05-01 DIAGNOSIS — I67.9 CEREBROVASCULAR DISEASE: ICD-10-CM

## 2019-05-01 LAB — INR PPP: 4.2 (ref 0.9–1.1)

## 2019-05-07 ENCOUNTER — RECORDS - HEALTHEAST (OUTPATIENT)
Dept: ADMINISTRATIVE | Facility: OTHER | Age: 69
End: 2019-05-07

## 2019-05-08 ENCOUNTER — COMMUNICATION - HEALTHEAST (OUTPATIENT)
Dept: FAMILY MEDICINE | Facility: CLINIC | Age: 69
End: 2019-05-08

## 2019-05-08 ENCOUNTER — COMMUNICATION - HEALTHEAST (OUTPATIENT)
Dept: ANTICOAGULATION | Facility: CLINIC | Age: 69
End: 2019-05-08

## 2019-05-08 ENCOUNTER — RECORDS - HEALTHEAST (OUTPATIENT)
Dept: ADMINISTRATIVE | Facility: OTHER | Age: 69
End: 2019-05-08

## 2019-05-08 DIAGNOSIS — I63.9 STROKE (H): ICD-10-CM

## 2019-05-08 DIAGNOSIS — K81.9 CHOLECYSTITIS: ICD-10-CM

## 2019-05-08 DIAGNOSIS — I48.91 ATRIAL FIBRILLATION (H): ICD-10-CM

## 2019-05-08 DIAGNOSIS — I67.9 CEREBROVASCULAR DISEASE: ICD-10-CM

## 2019-05-08 LAB — INR PPP: 2.9 (ref 0.9–1.1)

## 2019-05-15 ENCOUNTER — COMMUNICATION - HEALTHEAST (OUTPATIENT)
Dept: FAMILY MEDICINE | Facility: CLINIC | Age: 69
End: 2019-05-15

## 2019-05-15 DIAGNOSIS — K81.9 CHOLECYSTITIS: ICD-10-CM

## 2019-05-15 DIAGNOSIS — I63.9 CEREBRAL INFARCTION, UNSPECIFIED MECHANISM (H): ICD-10-CM

## 2019-05-15 DIAGNOSIS — I67.9 CEREBROVASCULAR DISEASE: ICD-10-CM

## 2019-05-15 LAB — INR PPP: 3.9 (ref 0.9–1.1)

## 2019-05-22 ENCOUNTER — COMMUNICATION - HEALTHEAST (OUTPATIENT)
Dept: FAMILY MEDICINE | Facility: CLINIC | Age: 69
End: 2019-05-22

## 2019-05-22 DIAGNOSIS — I67.9 CEREBROVASCULAR DISEASE: ICD-10-CM

## 2019-05-22 LAB — INR PPP: 2.7 (ref 0.9–1.1)

## 2019-05-23 ENCOUNTER — RECORDS - HEALTHEAST (OUTPATIENT)
Dept: ADMINISTRATIVE | Facility: OTHER | Age: 69
End: 2019-05-23

## 2019-05-29 ENCOUNTER — COMMUNICATION - HEALTHEAST (OUTPATIENT)
Dept: FAMILY MEDICINE | Facility: CLINIC | Age: 69
End: 2019-05-29

## 2019-05-29 ENCOUNTER — RECORDS - HEALTHEAST (OUTPATIENT)
Dept: ADMINISTRATIVE | Facility: OTHER | Age: 69
End: 2019-05-29

## 2019-05-29 DIAGNOSIS — I67.9 CEREBROVASCULAR DISEASE: ICD-10-CM

## 2019-05-29 LAB — INR PPP: 3.1 (ref 0.9–1.1)

## 2019-06-05 ENCOUNTER — COMMUNICATION - HEALTHEAST (OUTPATIENT)
Dept: FAMILY MEDICINE | Facility: CLINIC | Age: 69
End: 2019-06-05

## 2019-06-05 DIAGNOSIS — I63.9 CEREBRAL INFARCTION, UNSPECIFIED MECHANISM (H): ICD-10-CM

## 2019-06-05 DIAGNOSIS — I67.9 CEREBROVASCULAR DISEASE: ICD-10-CM

## 2019-06-05 LAB — INR PPP: 3.3 (ref 0.9–1.1)

## 2019-06-07 ENCOUNTER — RECORDS - HEALTHEAST (OUTPATIENT)
Dept: ADMINISTRATIVE | Facility: OTHER | Age: 69
End: 2019-06-07

## 2019-06-12 ENCOUNTER — COMMUNICATION - HEALTHEAST (OUTPATIENT)
Dept: ANTICOAGULATION | Facility: CLINIC | Age: 69
End: 2019-06-12

## 2019-06-12 ENCOUNTER — COMMUNICATION - HEALTHEAST (OUTPATIENT)
Dept: FAMILY MEDICINE | Facility: CLINIC | Age: 69
End: 2019-06-12

## 2019-06-12 DIAGNOSIS — I63.9 CEREBRAL INFARCTION, UNSPECIFIED MECHANISM (H): ICD-10-CM

## 2019-06-12 DIAGNOSIS — I67.9 CEREBROVASCULAR DISEASE: ICD-10-CM

## 2019-06-12 LAB — INR PPP: 2.3 (ref 0.9–1.1)

## 2019-06-13 ENCOUNTER — RECORDS - HEALTHEAST (OUTPATIENT)
Dept: ADMINISTRATIVE | Facility: OTHER | Age: 69
End: 2019-06-13

## 2019-06-14 ENCOUNTER — RECORDS - HEALTHEAST (OUTPATIENT)
Dept: GENERAL RADIOLOGY | Facility: CLINIC | Age: 69
End: 2019-06-14

## 2019-06-14 ENCOUNTER — OFFICE VISIT - HEALTHEAST (OUTPATIENT)
Dept: FAMILY MEDICINE | Facility: CLINIC | Age: 69
End: 2019-06-14

## 2019-06-14 DIAGNOSIS — M62.81 MUSCLE WEAKNESS (GENERALIZED): ICD-10-CM

## 2019-06-14 DIAGNOSIS — R63.4 ABNORMAL WEIGHT LOSS: ICD-10-CM

## 2019-06-14 DIAGNOSIS — R63.4 WEIGHT LOSS: ICD-10-CM

## 2019-06-14 DIAGNOSIS — R19.7 DIARRHEA, UNSPECIFIED TYPE: ICD-10-CM

## 2019-06-14 LAB
ALBUMIN SERPL-MCNC: 3.2 G/DL (ref 3.5–5)
ALBUMIN UR-MCNC: ABNORMAL MG/DL
ALP SERPL-CCNC: 190 U/L (ref 45–120)
ALT SERPL W P-5'-P-CCNC: 31 U/L (ref 0–45)
ANION GAP SERPL CALCULATED.3IONS-SCNC: 12 MMOL/L (ref 5–18)
APPEARANCE UR: CLEAR
AST SERPL W P-5'-P-CCNC: 61 U/L (ref 0–40)
ATRIAL RATE - MUSE: 241 BPM
BACTERIA #/AREA URNS HPF: ABNORMAL HPF
BASOPHILS # BLD AUTO: 0.1 THOU/UL (ref 0–0.2)
BASOPHILS NFR BLD AUTO: 1 % (ref 0–2)
BILIRUB SERPL-MCNC: 1.7 MG/DL (ref 0–1)
BILIRUB UR QL STRIP: ABNORMAL
BUN SERPL-MCNC: 12 MG/DL (ref 8–22)
CALCIUM SERPL-MCNC: 10.2 MG/DL (ref 8.5–10.5)
CAOX CRY #/AREA URNS HPF: PRESENT /[HPF]
CHLORIDE BLD-SCNC: 107 MMOL/L (ref 98–107)
CO2 SERPL-SCNC: 24 MMOL/L (ref 22–31)
COLOR UR AUTO: ABNORMAL
CREAT SERPL-MCNC: 0.71 MG/DL (ref 0.6–1.1)
DIASTOLIC BLOOD PRESSURE - MUSE: NORMAL MMHG
DIGOXIN LEVEL LHE- HISTORICAL: 1.3 NG/ML (ref 0.5–2)
EOSINOPHIL # BLD AUTO: 0.2 THOU/UL (ref 0–0.4)
EOSINOPHIL NFR BLD AUTO: 3 % (ref 0–6)
ERYTHROCYTE [DISTWIDTH] IN BLOOD BY AUTOMATED COUNT: 21.4 % (ref 11–14.5)
ERYTHROCYTE [SEDIMENTATION RATE] IN BLOOD BY WESTERGREN METHOD: 4 MM/HR (ref 0–20)
GFR SERPL CREATININE-BSD FRML MDRD: >60 ML/MIN/1.73M2
GLUCOSE BLD-MCNC: 89 MG/DL (ref 70–125)
GLUCOSE UR STRIP-MCNC: NEGATIVE MG/DL
HCT VFR BLD AUTO: 43.6 % (ref 35–47)
HGB BLD-MCNC: 13.5 G/DL (ref 12–16)
HGB UR QL STRIP: ABNORMAL
INTERPRETATION ECG - MUSE: NORMAL
KETONES UR STRIP-MCNC: NEGATIVE MG/DL
LEUKOCYTE ESTERASE UR QL STRIP: NEGATIVE
LYMPHOCYTES # BLD AUTO: 1.1 THOU/UL (ref 0.8–4.4)
LYMPHOCYTES NFR BLD AUTO: 23 % (ref 20–40)
MCH RBC QN AUTO: 27.6 PG (ref 27–34)
MCHC RBC AUTO-ENTMCNC: 31 G/DL (ref 32–36)
MCV RBC AUTO: 89 FL (ref 80–100)
MONOCYTES # BLD AUTO: 0.6 THOU/UL (ref 0–0.9)
MONOCYTES NFR BLD AUTO: 13 % (ref 2–10)
NEUTROPHILS # BLD AUTO: 2.8 THOU/UL (ref 2–7.7)
NEUTROPHILS NFR BLD AUTO: 59 % (ref 50–70)
NITRATE UR QL: NEGATIVE
OVALOCYTES: ABNORMAL
P AXIS - MUSE: NORMAL DEGREES
PH UR STRIP: 5.5 [PH] (ref 5–8)
PLAT MORPH BLD: ABNORMAL
PLATELET # BLD AUTO: 102 THOU/UL (ref 140–440)
PMV BLD AUTO: ABNORMAL FL (ref 8.5–12.5)
POTASSIUM BLD-SCNC: 4.9 MMOL/L (ref 3.5–5)
PR INTERVAL - MUSE: NORMAL MS
PROT SERPL-MCNC: 7 G/DL (ref 6–8)
QRS DURATION - MUSE: 80 MS
QT - MUSE: 380 MS
QTC - MUSE: 388 MS
R AXIS - MUSE: -36 DEGREES
RBC # BLD AUTO: 4.89 MILL/UL (ref 3.8–5.4)
RBC #/AREA URNS AUTO: ABNORMAL HPF
SODIUM SERPL-SCNC: 143 MMOL/L (ref 136–145)
SP GR UR STRIP: >=1.03 (ref 1–1.03)
SQUAMOUS #/AREA URNS AUTO: ABNORMAL LPF
SYSTOLIC BLOOD PRESSURE - MUSE: NORMAL MMHG
T AXIS - MUSE: 161 DEGREES
TARGETS BLD QL SMEAR: ABNORMAL
UROBILINOGEN UR STRIP-ACNC: ABNORMAL
VENTRICULAR RATE- MUSE: 63 BPM
WBC #/AREA URNS AUTO: ABNORMAL HPF
WBC: 4.8 THOU/UL (ref 4–11)

## 2019-06-18 ENCOUNTER — COMMUNICATION - HEALTHEAST (OUTPATIENT)
Dept: FAMILY MEDICINE | Facility: CLINIC | Age: 69
End: 2019-06-18

## 2019-06-18 ENCOUNTER — COMMUNICATION - HEALTHEAST (OUTPATIENT)
Dept: ANTICOAGULATION | Facility: CLINIC | Age: 69
End: 2019-06-18

## 2019-06-18 DIAGNOSIS — I67.9 CEREBROVASCULAR DISEASE: ICD-10-CM

## 2019-06-19 ENCOUNTER — COMMUNICATION - HEALTHEAST (OUTPATIENT)
Dept: CARE COORDINATION | Facility: CLINIC | Age: 69
End: 2019-06-19

## 2019-06-19 ENCOUNTER — RECORDS - HEALTHEAST (OUTPATIENT)
Dept: ADMINISTRATIVE | Facility: OTHER | Age: 69
End: 2019-06-19

## 2019-06-19 ENCOUNTER — COMMUNICATION - HEALTHEAST (OUTPATIENT)
Dept: FAMILY MEDICINE | Facility: CLINIC | Age: 69
End: 2019-06-19

## 2019-06-20 ENCOUNTER — COMMUNICATION - HEALTHEAST (OUTPATIENT)
Dept: PHARMACY | Facility: CLINIC | Age: 69
End: 2019-06-20

## 2019-06-21 ENCOUNTER — COMMUNICATION - HEALTHEAST (OUTPATIENT)
Dept: FAMILY MEDICINE | Facility: CLINIC | Age: 69
End: 2019-06-21

## 2019-06-26 ENCOUNTER — RECORDS - HEALTHEAST (OUTPATIENT)
Dept: ADMINISTRATIVE | Facility: OTHER | Age: 69
End: 2019-06-26

## 2019-06-26 ENCOUNTER — COMMUNICATION - HEALTHEAST (OUTPATIENT)
Dept: FAMILY MEDICINE | Facility: CLINIC | Age: 69
End: 2019-06-26

## 2019-06-26 DIAGNOSIS — I63.9 CEREBRAL INFARCTION, UNSPECIFIED MECHANISM (H): ICD-10-CM

## 2019-06-26 DIAGNOSIS — I67.9 CEREBROVASCULAR DISEASE: ICD-10-CM

## 2019-06-26 LAB — INR PPP: 1.7 (ref 0.9–1.1)

## 2019-07-02 ENCOUNTER — RECORDS - HEALTHEAST (OUTPATIENT)
Dept: ADMINISTRATIVE | Facility: OTHER | Age: 69
End: 2019-07-02

## 2019-07-03 ENCOUNTER — RECORDS - HEALTHEAST (OUTPATIENT)
Dept: ADMINISTRATIVE | Facility: OTHER | Age: 69
End: 2019-07-03

## 2019-07-05 ENCOUNTER — OFFICE VISIT - HEALTHEAST (OUTPATIENT)
Dept: GERIATRICS | Facility: CLINIC | Age: 69
End: 2019-07-05

## 2019-07-05 ENCOUNTER — RECORDS - HEALTHEAST (OUTPATIENT)
Dept: LAB | Facility: CLINIC | Age: 69
End: 2019-07-05

## 2019-07-05 ENCOUNTER — COMMUNICATION - HEALTHEAST (OUTPATIENT)
Dept: GERIATRICS | Facility: CLINIC | Age: 69
End: 2019-07-05

## 2019-07-05 ENCOUNTER — COMMUNICATION - HEALTHEAST (OUTPATIENT)
Dept: ANTICOAGULATION | Facility: CLINIC | Age: 69
End: 2019-07-05

## 2019-07-05 DIAGNOSIS — I67.9 CEREBROVASCULAR DISEASE: ICD-10-CM

## 2019-07-05 DIAGNOSIS — I48.19 PERSISTENT ATRIAL FIBRILLATION (H): ICD-10-CM

## 2019-07-05 DIAGNOSIS — R10.84 ABDOMINAL PAIN, GENERALIZED: ICD-10-CM

## 2019-07-05 DIAGNOSIS — I10 ESSENTIAL HYPERTENSION: ICD-10-CM

## 2019-07-05 DIAGNOSIS — R53.81 PHYSICAL DECONDITIONING: ICD-10-CM

## 2019-07-05 LAB — INR PPP: 1.36 (ref 0.9–1.1)

## 2019-07-08 ENCOUNTER — OFFICE VISIT - HEALTHEAST (OUTPATIENT)
Dept: GERIATRICS | Facility: CLINIC | Age: 69
End: 2019-07-08

## 2019-07-08 ENCOUNTER — RECORDS - HEALTHEAST (OUTPATIENT)
Dept: LAB | Facility: CLINIC | Age: 69
End: 2019-07-08

## 2019-07-08 DIAGNOSIS — G89.29 CHRONIC ABDOMINAL PAIN: ICD-10-CM

## 2019-07-08 DIAGNOSIS — R10.9 CHRONIC ABDOMINAL PAIN: ICD-10-CM

## 2019-07-08 DIAGNOSIS — R63.4 WEIGHT LOSS: ICD-10-CM

## 2019-07-08 DIAGNOSIS — I50.32 CHRONIC HEART FAILURE WITH PRESERVED EJECTION FRACTION (H): ICD-10-CM

## 2019-07-08 DIAGNOSIS — E83.52 HYPERCALCEMIA: ICD-10-CM

## 2019-07-08 DIAGNOSIS — R74.01 TRANSAMINITIS: ICD-10-CM

## 2019-07-08 DIAGNOSIS — I10 ESSENTIAL HYPERTENSION: ICD-10-CM

## 2019-07-08 DIAGNOSIS — E11.59 TYPE 2 DIABETES MELLITUS WITH OTHER CIRCULATORY COMPLICATION, UNSPECIFIED WHETHER LONG TERM INSULIN USE (H): ICD-10-CM

## 2019-07-08 DIAGNOSIS — K55.1 INTESTINAL ANGINA (H): ICD-10-CM

## 2019-07-08 DIAGNOSIS — I25.10 CORONARY ARTERY DISEASE INVOLVING NATIVE CORONARY ARTERY OF NATIVE HEART WITHOUT ANGINA PECTORIS: ICD-10-CM

## 2019-07-08 DIAGNOSIS — I67.9 CEREBROVASCULAR DISEASE: ICD-10-CM

## 2019-07-08 DIAGNOSIS — F32.89 OTHER DEPRESSION: ICD-10-CM

## 2019-07-08 LAB — INR PPP: 1.33 (ref 0.9–1.1)

## 2019-07-11 ENCOUNTER — RECORDS - HEALTHEAST (OUTPATIENT)
Dept: LAB | Facility: CLINIC | Age: 69
End: 2019-07-11

## 2019-07-12 ENCOUNTER — OFFICE VISIT - HEALTHEAST (OUTPATIENT)
Dept: GERIATRICS | Facility: CLINIC | Age: 69
End: 2019-07-12

## 2019-07-12 DIAGNOSIS — G89.29 CHRONIC ABDOMINAL PAIN: ICD-10-CM

## 2019-07-12 DIAGNOSIS — I10 ESSENTIAL HYPERTENSION: ICD-10-CM

## 2019-07-12 DIAGNOSIS — I95.9 HYPOTENSION: ICD-10-CM

## 2019-07-12 DIAGNOSIS — I50.32 CHRONIC HEART FAILURE WITH PRESERVED EJECTION FRACTION (H): ICD-10-CM

## 2019-07-12 DIAGNOSIS — I48.19 PERSISTENT ATRIAL FIBRILLATION (H): ICD-10-CM

## 2019-07-12 DIAGNOSIS — R10.9 CHRONIC ABDOMINAL PAIN: ICD-10-CM

## 2019-07-12 DIAGNOSIS — R53.81 PHYSICAL DECONDITIONING: ICD-10-CM

## 2019-07-12 LAB — INR PPP: 2.33 (ref 0.9–1.1)

## 2019-07-15 ENCOUNTER — RECORDS - HEALTHEAST (OUTPATIENT)
Dept: LAB | Facility: CLINIC | Age: 69
End: 2019-07-15

## 2019-07-15 ENCOUNTER — OFFICE VISIT - HEALTHEAST (OUTPATIENT)
Dept: GERIATRICS | Facility: CLINIC | Age: 69
End: 2019-07-15

## 2019-07-15 ENCOUNTER — RECORDS - HEALTHEAST (OUTPATIENT)
Dept: ANTICOAGULATION | Facility: CLINIC | Age: 69
End: 2019-07-15

## 2019-07-15 DIAGNOSIS — E21.3 HYPERPARATHYROIDISM (H): ICD-10-CM

## 2019-07-15 DIAGNOSIS — I67.9 CEREBROVASCULAR DISEASE: ICD-10-CM

## 2019-07-15 DIAGNOSIS — R10.9 CHRONIC ABDOMINAL PAIN: ICD-10-CM

## 2019-07-15 DIAGNOSIS — I50.32 CHRONIC HEART FAILURE WITH PRESERVED EJECTION FRACTION (H): ICD-10-CM

## 2019-07-15 DIAGNOSIS — K55.1 INTESTINAL ANGINA (H): ICD-10-CM

## 2019-07-15 DIAGNOSIS — G89.29 CHRONIC ABDOMINAL PAIN: ICD-10-CM

## 2019-07-15 DIAGNOSIS — I10 ESSENTIAL HYPERTENSION: ICD-10-CM

## 2019-07-15 DIAGNOSIS — I48.19 PERSISTENT ATRIAL FIBRILLATION (H): ICD-10-CM

## 2019-07-15 DIAGNOSIS — I73.9 PAD (PERIPHERAL ARTERY DISEASE) (H): ICD-10-CM

## 2019-07-15 DIAGNOSIS — F32.89 OTHER DEPRESSION: ICD-10-CM

## 2019-07-15 DIAGNOSIS — I25.10 CORONARY ARTERY DISEASE INVOLVING NATIVE CORONARY ARTERY OF NATIVE HEART WITHOUT ANGINA PECTORIS: ICD-10-CM

## 2019-07-15 LAB — INR PPP: 2.86 (ref 0.9–1.1)

## 2019-07-17 ENCOUNTER — OFFICE VISIT - HEALTHEAST (OUTPATIENT)
Dept: GERIATRICS | Facility: CLINIC | Age: 69
End: 2019-07-17

## 2019-07-17 DIAGNOSIS — R10.9 CHRONIC ABDOMINAL PAIN: ICD-10-CM

## 2019-07-17 DIAGNOSIS — I50.32 CHRONIC HEART FAILURE WITH PRESERVED EJECTION FRACTION (H): ICD-10-CM

## 2019-07-17 DIAGNOSIS — I10 ESSENTIAL HYPERTENSION: ICD-10-CM

## 2019-07-17 DIAGNOSIS — I50.30 (HFPEF) HEART FAILURE WITH PRESERVED EJECTION FRACTION (H): ICD-10-CM

## 2019-07-17 DIAGNOSIS — R53.81 PHYSICAL DECONDITIONING: ICD-10-CM

## 2019-07-17 DIAGNOSIS — G89.29 CHRONIC ABDOMINAL PAIN: ICD-10-CM

## 2019-07-17 DIAGNOSIS — I48.19 PERSISTENT ATRIAL FIBRILLATION (H): ICD-10-CM

## 2019-07-18 ENCOUNTER — COMMUNICATION - HEALTHEAST (OUTPATIENT)
Dept: FAMILY MEDICINE | Facility: CLINIC | Age: 69
End: 2019-07-18

## 2019-07-22 ENCOUNTER — COMMUNICATION - HEALTHEAST (OUTPATIENT)
Dept: GERIATRICS | Facility: CLINIC | Age: 69
End: 2019-07-22

## 2019-07-22 ENCOUNTER — AMBULATORY - HEALTHEAST (OUTPATIENT)
Dept: GERIATRICS | Facility: CLINIC | Age: 69
End: 2019-07-22

## 2019-07-22 ENCOUNTER — COMMUNICATION - HEALTHEAST (OUTPATIENT)
Dept: FAMILY MEDICINE | Facility: CLINIC | Age: 69
End: 2019-07-22

## 2019-07-22 DIAGNOSIS — E43 SEVERE PROTEIN-CALORIE MALNUTRITION (H): ICD-10-CM

## 2019-07-22 DIAGNOSIS — R63.4 WEIGHT LOSS, NON-INTENTIONAL: ICD-10-CM

## 2019-07-24 ENCOUNTER — COMMUNICATION - HEALTHEAST (OUTPATIENT)
Dept: FAMILY MEDICINE | Facility: CLINIC | Age: 69
End: 2019-07-24

## 2019-07-24 DIAGNOSIS — I67.9 CEREBROVASCULAR DISEASE: ICD-10-CM

## 2019-07-24 LAB — INR PPP: 4.9 (ref 0.9–1.1)

## 2019-07-26 ENCOUNTER — RECORDS - HEALTHEAST (OUTPATIENT)
Dept: ADMINISTRATIVE | Facility: OTHER | Age: 69
End: 2019-07-26

## 2019-07-30 ENCOUNTER — COMMUNICATION - HEALTHEAST (OUTPATIENT)
Dept: FAMILY MEDICINE | Facility: CLINIC | Age: 69
End: 2019-07-30

## 2019-07-30 ENCOUNTER — OFFICE VISIT - HEALTHEAST (OUTPATIENT)
Dept: FAMILY MEDICINE | Facility: CLINIC | Age: 69
End: 2019-07-30

## 2019-07-30 ENCOUNTER — RECORDS - HEALTHEAST (OUTPATIENT)
Dept: ADMINISTRATIVE | Facility: OTHER | Age: 69
End: 2019-07-30

## 2019-07-30 ENCOUNTER — COMMUNICATION - HEALTHEAST (OUTPATIENT)
Dept: ANTICOAGULATION | Facility: CLINIC | Age: 69
End: 2019-07-30

## 2019-07-30 DIAGNOSIS — I50.32 CHRONIC DIASTOLIC CONGESTIVE HEART FAILURE (H): ICD-10-CM

## 2019-07-30 DIAGNOSIS — R10.9 CHRONIC ABDOMINAL PAIN: ICD-10-CM

## 2019-07-30 DIAGNOSIS — E43 SEVERE PROTEIN-CALORIE MALNUTRITION (H): ICD-10-CM

## 2019-07-30 DIAGNOSIS — I10 ESSENTIAL HYPERTENSION: ICD-10-CM

## 2019-07-30 DIAGNOSIS — I25.10 CORONARY ARTERY DISEASE INVOLVING NATIVE CORONARY ARTERY OF NATIVE HEART WITHOUT ANGINA PECTORIS: ICD-10-CM

## 2019-07-30 DIAGNOSIS — I67.9 CEREBROVASCULAR DISEASE: ICD-10-CM

## 2019-07-30 DIAGNOSIS — I48.19 PERSISTENT ATRIAL FIBRILLATION (H): ICD-10-CM

## 2019-07-30 DIAGNOSIS — I70.1 ATHEROSCLEROSIS OF RENAL ARTERY (H): ICD-10-CM

## 2019-07-30 DIAGNOSIS — Z09 HOSPITAL DISCHARGE FOLLOW-UP: ICD-10-CM

## 2019-07-30 DIAGNOSIS — G89.29 CHRONIC ABDOMINAL PAIN: ICD-10-CM

## 2019-07-30 DIAGNOSIS — L60.2 HYPERTROPHY OF NAIL: ICD-10-CM

## 2019-07-30 DIAGNOSIS — I50.32 CHRONIC HEART FAILURE WITH PRESERVED EJECTION FRACTION (H): ICD-10-CM

## 2019-07-30 LAB
ALBUMIN SERPL-MCNC: 4 G/DL (ref 3.5–5)
ALP SERPL-CCNC: 145 U/L (ref 45–120)
ALT SERPL W P-5'-P-CCNC: 20 U/L (ref 0–45)
ANION GAP SERPL CALCULATED.3IONS-SCNC: 17 MMOL/L (ref 5–18)
AST SERPL W P-5'-P-CCNC: ABNORMAL U/L (ref 0–40)
BILIRUB SERPL-MCNC: 1.2 MG/DL (ref 0–1)
BUN SERPL-MCNC: 24 MG/DL (ref 8–22)
CALCIUM SERPL-MCNC: 12 MG/DL (ref 8.5–10.5)
CHLORIDE BLD-SCNC: 101 MMOL/L (ref 98–107)
CO2 SERPL-SCNC: 18 MMOL/L (ref 22–31)
CREAT SERPL-MCNC: 0.89 MG/DL (ref 0.6–1.1)
ERYTHROCYTE [DISTWIDTH] IN BLOOD BY AUTOMATED COUNT: 18 % (ref 11–14.5)
GFR SERPL CREATININE-BSD FRML MDRD: >60 ML/MIN/1.73M2
GLUCOSE BLD-MCNC: 100 MG/DL (ref 70–125)
HCT VFR BLD AUTO: 49 % (ref 35–47)
HGB BLD-MCNC: 16 G/DL (ref 12–16)
INR PPP: 1.3 (ref 0.9–1.1)
MCH RBC QN AUTO: 29.3 PG (ref 27–34)
MCHC RBC AUTO-ENTMCNC: 32.6 G/DL (ref 32–36)
MCV RBC AUTO: 90 FL (ref 80–100)
PLATELET # BLD AUTO: 170 THOU/UL (ref 140–440)
PMV BLD AUTO: 10.4 FL (ref 7–10)
POTASSIUM BLD-SCNC: ABNORMAL MMOL/L (ref 3.5–5)
PREALB SERPL-MCNC: 25.6 MG/DL (ref 19–38)
PROT SERPL-MCNC: ABNORMAL G/DL (ref 6–8)
RBC # BLD AUTO: 5.45 MILL/UL (ref 3.8–5.4)
SODIUM SERPL-SCNC: 136 MMOL/L (ref 136–145)
WBC: 7.3 THOU/UL (ref 4–11)

## 2019-07-31 ENCOUNTER — COMMUNICATION - HEALTHEAST (OUTPATIENT)
Dept: FAMILY MEDICINE | Facility: CLINIC | Age: 69
End: 2019-07-31

## 2019-07-31 ENCOUNTER — RECORDS - HEALTHEAST (OUTPATIENT)
Dept: ADMINISTRATIVE | Facility: OTHER | Age: 69
End: 2019-07-31

## 2019-07-31 DIAGNOSIS — I67.9 CEREBROVASCULAR DISEASE: ICD-10-CM

## 2019-07-31 DIAGNOSIS — I25.10 CORONARY ARTERY DISEASE INVOLVING NATIVE CORONARY ARTERY OF NATIVE HEART WITHOUT ANGINA PECTORIS: ICD-10-CM

## 2019-07-31 DIAGNOSIS — E55.9 VITAMIN D DEFICIENCY: ICD-10-CM

## 2019-07-31 LAB — INR PPP: 1.3 (ref 0.9–1.1)

## 2019-08-01 ENCOUNTER — RECORDS - HEALTHEAST (OUTPATIENT)
Dept: ADMINISTRATIVE | Facility: OTHER | Age: 69
End: 2019-08-01

## 2019-08-01 ENCOUNTER — COMMUNICATION - HEALTHEAST (OUTPATIENT)
Dept: ANTICOAGULATION | Facility: CLINIC | Age: 69
End: 2019-08-01

## 2019-08-01 ENCOUNTER — AMBULATORY - HEALTHEAST (OUTPATIENT)
Dept: FAMILY MEDICINE | Facility: CLINIC | Age: 69
End: 2019-08-01

## 2019-08-01 DIAGNOSIS — E83.52 HYPERCALCEMIA: ICD-10-CM

## 2019-08-01 DIAGNOSIS — E21.3 HYPERPARATHYROIDISM (H): ICD-10-CM

## 2019-08-02 ENCOUNTER — COMMUNICATION - HEALTHEAST (OUTPATIENT)
Dept: FAMILY MEDICINE | Facility: CLINIC | Age: 69
End: 2019-08-02

## 2019-08-06 ENCOUNTER — AMBULATORY - HEALTHEAST (OUTPATIENT)
Dept: LAB | Facility: CLINIC | Age: 69
End: 2019-08-06

## 2019-08-06 ENCOUNTER — COMMUNICATION - HEALTHEAST (OUTPATIENT)
Dept: SCHEDULING | Facility: CLINIC | Age: 69
End: 2019-08-06

## 2019-08-06 DIAGNOSIS — E21.3 HYPERPARATHYROIDISM (H): ICD-10-CM

## 2019-08-06 DIAGNOSIS — E83.52 HYPERCALCEMIA: ICD-10-CM

## 2019-08-06 LAB
ANION GAP SERPL CALCULATED.3IONS-SCNC: 13 MMOL/L (ref 5–18)
BUN SERPL-MCNC: 19 MG/DL (ref 8–22)
CALCIUM SERPL-MCNC: 12.1 MG/DL (ref 8.5–10.5)
CALCIUM, IONIZED MEASURED: 1.47 MMOL/L (ref 1.11–1.3)
CHLORIDE BLD-SCNC: 102 MMOL/L (ref 98–107)
CO2 SERPL-SCNC: 23 MMOL/L (ref 22–31)
CREAT SERPL-MCNC: 0.68 MG/DL (ref 0.6–1.1)
GFR SERPL CREATININE-BSD FRML MDRD: >60 ML/MIN/1.73M2
GLUCOSE BLD-MCNC: 104 MG/DL (ref 70–125)
ION CA PH 7.4: 1.39 MMOL/L (ref 1.11–1.3)
PH: 7.26 (ref 7.35–7.45)
POTASSIUM BLD-SCNC: 5.5 MMOL/L (ref 3.5–5)
SODIUM SERPL-SCNC: 138 MMOL/L (ref 136–145)

## 2019-08-07 ENCOUNTER — COMMUNICATION - HEALTHEAST (OUTPATIENT)
Dept: FAMILY MEDICINE | Facility: CLINIC | Age: 69
End: 2019-08-07

## 2019-08-07 ENCOUNTER — TELEPHONE (OUTPATIENT)
Dept: ENDOCRINOLOGY | Facility: CLINIC | Age: 69
End: 2019-08-07

## 2019-08-07 ENCOUNTER — RECORDS - HEALTHEAST (OUTPATIENT)
Dept: ADMINISTRATIVE | Facility: OTHER | Age: 69
End: 2019-08-07

## 2019-08-07 ENCOUNTER — AMBULATORY - HEALTHEAST (OUTPATIENT)
Dept: FAMILY MEDICINE | Facility: CLINIC | Age: 69
End: 2019-08-07

## 2019-08-07 DIAGNOSIS — I25.10 CORONARY ARTERY DISEASE INVOLVING NATIVE CORONARY ARTERY OF NATIVE HEART WITHOUT ANGINA PECTORIS: ICD-10-CM

## 2019-08-07 DIAGNOSIS — E83.52 HYPERCALCEMIA: ICD-10-CM

## 2019-08-07 DIAGNOSIS — E21.3 HYPERPARATHYROIDISM (H): ICD-10-CM

## 2019-08-07 DIAGNOSIS — I67.9 CEREBROVASCULAR DISEASE: ICD-10-CM

## 2019-08-07 NOTE — TELEPHONE ENCOUNTER
To schedulers :Agree with Dr Rowland-  please schedule with consult service (or open GATO) within this week.    Michelle Peñaloza MD  Endocrine triage

## 2019-08-07 NOTE — TELEPHONE ENCOUNTER
----- Message from Elizabet Stevens MD sent at 8/7/2019 12:56 PM CDT -----  Regarding: Appointment for hypercalcemia  Hi,  I got a page from Dr.Thomas Hernandez about this patient for hypercalcemia.    Based on his information, (couldn't access her labs),  (Ref 10-86), Ca 12.0(Ref 8.5-10.5) on 6/22/19, it appears she has primary hyperparathyroidism. No other information at this time except this is ongoing for 4 months with one hospitalization few months ago.    I discussed with . Please schedule an appointment for her with consult service.    The family doctor  name is Charlette # [phone], their general  office phone # [phone]

## 2019-08-07 NOTE — TELEPHONE ENCOUNTER
Health Call Center    Phone Message    May a detailed message be left on voicemail: yes    Reason for Call: Other: Charlette \states she just faxed to the clinic fax the labs and referral. The referring MD wants the pt to be seen with in 10-14 days. Please call Charlette after making an appt with the pt. Thanks. THIS NEEDS A NURSE REVIEW    Action Taken: Message routed to:  [facility]  med diab

## 2019-08-08 ENCOUNTER — TELEPHONE (OUTPATIENT)
Dept: ENDOCRINOLOGY | Facility: CLINIC | Age: 69
End: 2019-08-08

## 2019-08-08 ENCOUNTER — COMMUNICATION - HEALTHEAST (OUTPATIENT)
Dept: FAMILY MEDICINE | Facility: CLINIC | Age: 69
End: 2019-08-08

## 2019-08-08 NOTE — TELEPHONE ENCOUNTER
[facility]    Phone Message    May a detailed message be left on voicemail: yes    Reason for Call: Other: Charlette from [facility] from [facility] calling to schedule pt for hyperparathyroidism. Unable to reach someone to help manually schedule. Please call pt back to help schedule. And Please follow up with Charlette if there are any issues.       Action Taken: Message routed to:  [facility] afshin

## 2019-08-13 ENCOUNTER — AMBULATORY - HEALTHEAST (OUTPATIENT)
Dept: FAMILY MEDICINE | Facility: CLINIC | Age: 69
End: 2019-08-13

## 2019-08-13 ENCOUNTER — COMMUNICATION - HEALTHEAST (OUTPATIENT)
Dept: FAMILY MEDICINE | Facility: CLINIC | Age: 69
End: 2019-08-13

## 2019-08-14 ENCOUNTER — COMMUNICATION - HEALTHEAST (OUTPATIENT)
Dept: FAMILY MEDICINE | Facility: CLINIC | Age: 69
End: 2019-08-14

## 2019-08-14 ENCOUNTER — RECORDS - HEALTHEAST (OUTPATIENT)
Dept: ADMINISTRATIVE | Facility: OTHER | Age: 69
End: 2019-08-14

## 2019-08-14 DIAGNOSIS — R06.02 SOB (SHORTNESS OF BREATH): ICD-10-CM

## 2019-08-14 DIAGNOSIS — F32.89 OTHER DEPRESSION: ICD-10-CM

## 2019-08-14 DIAGNOSIS — I67.9 CEREBROVASCULAR DISEASE: ICD-10-CM

## 2019-08-14 LAB — INR PPP: 1.3 (ref 0.9–1.1)

## 2019-08-15 ENCOUNTER — RECORDS - HEALTHEAST (OUTPATIENT)
Dept: ADMINISTRATIVE | Facility: OTHER | Age: 69
End: 2019-08-15

## 2019-08-16 ENCOUNTER — OFFICE VISIT (OUTPATIENT)
Dept: ENDOCRINOLOGY | Facility: CLINIC | Age: 69
End: 2019-08-16
Payer: COMMERCIAL

## 2019-08-16 VITALS
SYSTOLIC BLOOD PRESSURE: 126 MMHG | DIASTOLIC BLOOD PRESSURE: 68 MMHG | BODY MASS INDEX: 24.81 KG/M2 | WEIGHT: 131.4 LBS | HEART RATE: 61 BPM | HEIGHT: 61 IN

## 2019-08-16 DIAGNOSIS — E21.3 HYPERPARATHYROIDISM (H): ICD-10-CM

## 2019-08-16 DIAGNOSIS — E83.52 HYPERCALCEMIA: Primary | ICD-10-CM

## 2019-08-16 PROBLEM — G89.29 CHRONIC ABDOMINAL PAIN: Status: ACTIVE | Noted: 2019-06-27

## 2019-08-16 PROBLEM — F41.9 ANXIETY: Status: ACTIVE | Noted: 2018-08-21

## 2019-08-16 PROBLEM — F32.A DEPRESSION: Status: ACTIVE | Noted: 2019-08-16

## 2019-08-16 PROBLEM — I50.30 (HFPEF) HEART FAILURE WITH PRESERVED EJECTION FRACTION (H): Status: ACTIVE | Noted: 2018-09-18

## 2019-08-16 PROBLEM — R10.9 CHRONIC ABDOMINAL PAIN: Status: ACTIVE | Noted: 2019-06-27

## 2019-08-16 PROBLEM — Z79.01 ANTICOAGULANT LONG-TERM USE: Status: ACTIVE | Noted: 2019-08-16

## 2019-08-16 PROBLEM — K81.9 ACALCULOUS CHOLECYSTITIS: Status: ACTIVE | Noted: 2018-10-03

## 2019-08-16 PROBLEM — M48.00 SPINAL STENOSIS: Status: ACTIVE | Noted: 2019-08-16

## 2019-08-16 PROBLEM — I73.9 PAD (PERIPHERAL ARTERY DISEASE) (H): Status: ACTIVE | Noted: 2019-08-16

## 2019-08-16 PROBLEM — I25.10 CORONARY ARTERY DISEASE INVOLVING NATIVE CORONARY ARTERY OF NATIVE HEART WITHOUT ANGINA PECTORIS: Status: ACTIVE | Noted: 2018-02-26

## 2019-08-16 PROBLEM — I67.9 CEREBROVASCULAR DISEASE: Status: ACTIVE | Noted: 2017-01-16

## 2019-08-16 PROBLEM — E43 SEVERE PROTEIN-CALORIE MALNUTRITION (H): Status: ACTIVE | Noted: 2019-07-03

## 2019-08-16 PROBLEM — K43.2 INCISIONAL HERNIA, WITHOUT OBSTRUCTION OR GANGRENE: Status: ACTIVE | Noted: 2017-09-20

## 2019-08-16 PROBLEM — K43.9 VENTRAL HERNIA WITHOUT OBSTRUCTION OR GANGRENE: Status: ACTIVE | Noted: 2018-03-15

## 2019-08-16 PROBLEM — I48.19 PERSISTENT ATRIAL FIBRILLATION (H): Status: ACTIVE | Noted: 2019-08-16

## 2019-08-16 PROBLEM — R63.4 WEIGHT LOSS, NON-INTENTIONAL: Status: ACTIVE | Noted: 2019-07-01

## 2019-08-16 PROBLEM — E11.59 TYPE 2 DIABETES MELLITUS WITH CIRCULATORY DISORDER (H): Status: ACTIVE | Noted: 2019-08-16

## 2019-08-16 RX ORDER — SERTRALINE HYDROCHLORIDE 100 MG/1
TABLET, FILM COATED ORAL
COMMUNITY
Start: 2019-08-14

## 2019-08-16 RX ORDER — POTASSIUM CHLORIDE 1500 MG/1
20 TABLET, EXTENDED RELEASE ORAL
COMMUNITY
Start: 2019-06-17

## 2019-08-16 RX ORDER — NITROGLYCERIN 0.4 MG/1
0.4 TABLET SUBLINGUAL
COMMUNITY
Start: 2016-11-17

## 2019-08-16 RX ORDER — ASPIRIN 81 MG/1
81 TABLET ORAL DAILY
COMMUNITY

## 2019-08-16 RX ORDER — LISINOPRIL 10 MG/1
TABLET ORAL
COMMUNITY
Start: 2019-01-10

## 2019-08-16 RX ORDER — ATORVASTATIN CALCIUM 80 MG/1
80 TABLET, FILM COATED ORAL DAILY
COMMUNITY

## 2019-08-16 RX ORDER — TRAZODONE HYDROCHLORIDE 50 MG/1
50 TABLET, FILM COATED ORAL
COMMUNITY
Start: 2019-01-11

## 2019-08-16 RX ORDER — WARFARIN SODIUM 4 MG/1
TABLET ORAL
COMMUNITY
Start: 2019-08-14

## 2019-08-16 RX ORDER — SPIRONOLACTONE 25 MG/1
TABLET ORAL
COMMUNITY
Start: 2019-08-14

## 2019-08-16 RX ORDER — FERROUS SULFATE 325(65) MG
TABLET ORAL
COMMUNITY
Start: 2019-01-23

## 2019-08-16 RX ORDER — OXYCODONE HYDROCHLORIDE 10 MG/1
10 TABLET ORAL
COMMUNITY
Start: 2019-07-03

## 2019-08-16 RX ORDER — METOPROLOL TARTRATE 25 MG/1
TABLET, FILM COATED ORAL
COMMUNITY
Start: 2019-08-07

## 2019-08-16 RX ORDER — DIGOXIN 125 UG/1
TABLET ORAL
COMMUNITY
Start: 2019-01-11 | End: 2019-08-16

## 2019-08-16 RX ORDER — CHOLECALCIFEROL (VITAMIN D3) 50 MCG
TABLET ORAL
COMMUNITY
Start: 2019-08-02 | End: 2019-09-18

## 2019-08-16 RX ORDER — DIPHENOXYLATE HYDROCHLORIDE AND ATROPINE SULFATE 2.5; .025 MG/1; MG/1
1 TABLET ORAL
COMMUNITY
Start: 2018-02-07

## 2019-08-16 RX ORDER — FUROSEMIDE 20 MG
20 TABLET ORAL
COMMUNITY

## 2019-08-16 RX ORDER — GUAIFENESIN 400 MG/1
400 TABLET ORAL
COMMUNITY

## 2019-08-16 RX ORDER — HYDROCHLOROTHIAZIDE 25 MG/1
TABLET ORAL
COMMUNITY
Start: 2019-01-10

## 2019-08-16 RX ORDER — LORATADINE 10 MG/1
10 TABLET ORAL
COMMUNITY

## 2019-08-16 SDOH — HEALTH STABILITY: MENTAL HEALTH: HOW OFTEN DO YOU HAVE A DRINK CONTAINING ALCOHOL?: MONTHLY OR LESS

## 2019-08-16 ASSESSMENT — MIFFLIN-ST. JEOR: SCORE: 1065.02

## 2019-08-16 NOTE — LETTER
Date:August 20, 2019      Patient was self referred, no letter generated. Do not send.        [facility] Health Information       Initial Anesthesia Post-op Note    Patient: Allen De Jesus  Procedure(s) Performed: RIGHT ANKLE ARTHROSCOPY WITH EXTENSIVE SYNOVECTOMY; PARTIAL EXCISION TIBIA; MEDIAL ANKLE ARTHROTOMY WITH LOOSE BODY EXCISION; BROSTRUM ANKLE STABILIZATION WITH INTERNAL BRACE; SYNDESMOTIC STABILIZATION  Anesthesia type: General    Vital Last Value   Temperature 36.7 °C (98 °F) (01/18/17 1333)   Pulse 76 (01/18/17 1333)   Respiratory Rate 14 (01/18/17 1333)   Non-Invasive   Blood Pressure 131/62 (01/18/17 1333)   Arterial  Blood Pressure     Pulse Oximetry 95 % (01/18/17 1333)     Last 24 I/O:   Intake/Output Summary (Last 24 hours) at 01/18/17 1334  Last data filed at 01/18/17 1319   Gross per 24 hour   Intake             1700 ml   Output               20 ml   Net             1680 ml       PATIENT LOCATION: PACU Phase 1  POST-OP VITAL SIGNS: stable  LEVEL OF CONSCIOUSNESS: lethargic  RESPIRATORY STATUS: spontaneous ventilation, oral airway and face mask  CARDIOVASCULAR: blood pressure returned to baseline  HYDRATION: euvolemic    PAIN MANAGEMENT: well controlled  NAUSEA: None  AIRWAY PATENCY: patent  POST-OP ASSESSMENT: no complications and patient tolerated procedure well with no complications  COMPLICATIONS: none  HANDOFF:  Handoff to receiving nurse was performed and questions were answered

## 2019-08-16 NOTE — PATIENT INSTRUCTIONS
- Decide a day to collect 24 hour urine test. Once a day is chosen, stop taking water pill - lasix and hydrochlorothiazide for 3 days prior to the chosen day.  - On the chosen day - Discard the first urine sample of the morning, then start collecting urine in the urine container from next time onwards until next day morning for total of 24 hours.  - Give the urine container to nearby [facility] and have blood drawn that same time.    - Resume taking you water pill as you take normally - lasix and hydrochlorothiazide    - Stop taking vitamin D 2000 units.    - Return to clinic in 1-2 months

## 2019-08-16 NOTE — LETTER
"8/16/2019       RE: Gardenia Muller  [address]  [place] [address]     Dear Colleague,    Thank you for referring your patient, Gardenia Muller, to the [facility] ENDOCRINOLOGY at [facility]. Please see a copy of my visit note below.      Endocrinology Clinic New Consult      Gardenia Muller MRN:[id number] YOB: 1950  Primary care provider: No primary care provider on file.   Fellow: Guru Hilda MD  Attending Physician: MD Jessica    Reason for Endocrine consult:  \"Hypercalcemia referred by PCP Jerson Hernandez MD\"         Assessment and Plan:     Gardenia Muller is a 69 year old female with PMHx of DM/ CHF/ hypercalcemia for 3 years/atrial fibrillation on coumadin/abdominal surgeries in past/ventral hernia is here for hypercalcemia follow-up.      1. Hypercalcemia:    It appears she has high normal calcium from dec 2009 labs, in 2012 and on 10/18/2018 - has calcium 12.3 with inappropriately elevated . This lab is concerning for primary hyperparathyroidism. She had random urine creatinine ratio but she also takes hydrochlorothiazide and lasix that can interfere with this study. Though, familial hypocalciuric hypercalcemia is less likely cause, we will rule that out. She had DEXA 1/2018 didn't show osteoporosis. Otherwise, she has no renal disease/indication and age > 50 years.  She meets one criteria for now for surgery but she is not interested in having surgery considering her prolonged recovery with abdominal surgeries and complications.    Plan:    -Maintain moderate vitamin D intake (400 to 800 international units daily) and maintain a moderate calcium intake (1000mg/day), otherwise their deficiency will stimulate PTH production. Encourage diet rich foods in calcium and vitamin D.  - Check Calcium, phosphorus, albumin, creatinine, PTH and 24 hour urine for creatinine and calcium level.  - Sestamibi scan for parathyroid gland " assessment.  - Stop taking vitamin D 2000 units, encouraged dietary measures. Based on lab results, will decide to start or not.     Lab instruction is provided to patient as mentioned below:    -- Decide a day to collect 24 hour urine test. Once a day is chosen, stop taking water pill - lasix and hydrochlorothiazide for 3 days prior to the chosen day.  - On the chosen day - Discard the first urine sample of the morning, then start collecting urine in the urine container from next time onwards until next day morning for total of 24 hours.  - Give the urine container to nearby [facility] and have blood drawn that same time.  - Resume taking your water pill as you take normally - lasix and hydrochlorothiazide after the urine and lab test is done    Return to clinic in 1-2 months    Patient is seen and staffed with MD Guru Hilda Lopez MD  PGY 4 - Endocrinology Fellow  Pager: [phone]    Physician Attestation   I, Erin Gonzalez, saw this patient with the fellow and agree with the fellow's findings and plan of care as documented in the resident s note.      I personally reviewed vital signs, medications and labs.    Key findings:   - hypercalemia: long standing but worsening. Likely primary hyperparathyroidism, Now serum calcium > 11.5. Surgery is indicated. Start by collecting 24 hr urine calcium and sestamibi scan. Will discuss results in clinic prior to further recs since patient feels overwhelmed and unable to make a decision today.       Erin Gonzalez  Date of Service (when I saw the patient): Aug 16, 2019      HPI:    Gardenia Muller is a 69 year old female with PMHx of DM/ CHF/ hypercalcemia for 3 years/atrial fibrillation on coumadin is here for hypercalcemia follow-up.    Hypercalcemia ROS:    Symptom review  Weakness: Generalized weakness, no proximal muscle weakness, take shower, comb hair, get up from chair  Thirst: all the time  Polyuria: urinate all the time but takes lasix and  HCTZ  Bone pain: denies specific bony pain, years ago had back pain, not now.  Concentration: didn't change over last 5 years  BM: Regular BM, soft - diarrhea, no constipation  GI symptoms?: Nausea 1 month ago, not now but don't know why it got better. Vomiting - 4 months ago, had cholecystectomy 2 months ago but still has nausea  Hydration status: Thirsty drinks diet pop 32 ounces, 20 ounce drink tea, Glucerna twice a day, water 20 ounces a day.    Calcium supplements/calcium carbonate for GI symptoms?: Denies tums  Takes Vitamin D 3 - 2000units for atleast 2 years, occ sun exposure  Doesn't OTC calcium medications.  Doesn't drink Milk except occ, doesn't eat broccoli, some cheese, some fish. Takes hydrochlorothiazide for 2 years atleast.    Prior hx of hypercalcemia?: She knows about it 3 years, not seen endocrinology before  Prior treatment?: nothing she can remember, hospitalized for UTI, told about calcium, got IVF    Hx renal stones?: Denies h/o, no kidney procedures, however has urine analysis show calcium crystals.  Hx malignancy?: Denies cancer h/o  Family hx?: Cancer in grand mom  bone cancer, Uncle had throat cancer, denies sarcoidosis, niece had kidney stones. Brother and sister has DM    H/o falls but no recent fractures, lost 1 cm height, left finger fracture from fall last year.    Outside Labs:  8/6/19 - Ionized calcium 1.39, Calcium 12.1 no albumin, GFR > 60  7/30/19 - Calcium 12.0,  Albumin 4.0   7/1/19-calcium 10.4, albumin 2.7  6/27/19 - Calcium 12.0, Albumin 3.4,   (10-86), vit D 73.7(30 - 80 ng/mL)  1/11/2019 - Calcium 10.4, albumin 3.4,    10/18/2018 - Calcium 12.3, ionized calcium 7.33, albumin 3.0, , TSH 7.57, Free T4 1.1, Oct 19 2018 - 1,25 dihydroxy vitamin D 39.3 (19.9 - 79.3 pg/mL)  3/4/2016 - Calcium 10.6, Albumin 3.8  4/15/2015 - Calcium 10.6 (8.5 - 10.5 mg/dL),  Albumin 3.8  6/13/2012 - Calcium 10.9 (8.5 - 10.5 mg/dL),  no albumin  12/16/2009 - Calcium 10.4 (8.5 -  "10.5 mg/dL), no albumin    10/22/18  Protein Urine Random 62 mg/dL   ELP Comment Unremarkable protein electrophoresis.        SPEP: 10/23/2018    A polyclonal increase in immunoglobulins suggests chronic inflammation. A small monoclonal component may be masked, so immunofixation is suggested if clinically indicated.    The albumin fraction is decreased, and this may represent any combination of protein-calorie malnutrition, bulk protein loss, or accelerated protein breakdown.    Component Name Value Ref Range   Immunofixation Electrophoresis, Serum Unremarkable immunofixation electrophoresis.         Calcium/Creatinine Ratio, UrineResulted: 10/23/2018 4:03 PM  [facility]  Component Name Value Ref Range   Calcium, Urine <2.0 mg/dL   Creatinine, Urine 64.7 mg/dL   Calcium/Creat.Ratio     Comment: \"Unable to calculate: Calcium and/or Creatinine value below detectable level\"     Specimen Collected on   Urine 10/23/2018 3:00 PM         Endocrine review of system:    -Denies any known thyroid issues  -Denies menstrual irregularities - Menopause 50years, occ hot flashes  - weight changes - Loss 179 pounds 4 months ago, mzs773.4 lbs, thinks water loss, prior edema in legs  -Occasional muscle cramps, denies constipation    ROS:  All 12 systems were reviewed and negative except as mentioned in HPI    Relevant recent PMH:    Reviewed her PCP notes from 7/30/2019 visit as below:  \"Patient is here for follow-up from 2 hospitalizations at [facility]. First 6/14/2019 through 6/17/2019, the second 6/27 through 7/3/2019. The latter hospitalization was for chronic abdominal pain, felt to be multifactorial, consideration given to bowel ischemia, chronic congestive heart failure, diastolic, extensive vascular disease, social isolation with tenuous independent living status, chronic atrial fibrillation on warfarin, severe protein calorie malnutrition.  Patient also has type 2 diabetes, hypercalcemia, anxiety, " "transaminitis.  Review of her labs on day of admission showed calcium to be elevated at 10.9, potassium 4.7, sodium 136, BUN 11, creatinine 0.73. Platelets were slightly low at 109,000,  CT of abdomen and pelvis show mild to moderate atherosclerosis of superior mesenteric artery, no evidence of severe SMA narrowing. Severe narrowing of the origin of the left main renal artery due to plaque, widely patent right renal artery stent was noted. She had H. pylori antigen done in which was in process    In the end, no reversible process was found for weight loss and failure to thrive and abdominal pain. Visceral angiogram had been considered but after the review of the CT scan this was felt to be not indicated.  Nutrition saw patient and recommended boost glucose control with breakfast and dinner plus thiamine    , No changes in medication except timing of furosemide 40 daily  Patient was to continue taking warfarin.    Reviewed previous hospitalization, this was from the clinic for failure to thrive. Felt to be due to diastolic heart failure exacerbation\"      Past Medical/Surgical History:  Past Medical History:   Diagnosis Date     (HFpEF) heart failure with preserved ejection fraction (H) 9/18/2018 2/8/19, instructed patient to take furosemide when morning weight 167 or above  Last Assessment & Plan:  Formatting of this note might be different from the original. Diastolic Congestive Heart Failure is not well compensated,-patient is probably somewhat over diuresed-lightheaded with standing, weight down, Wt Readings from Last 3 Encounters:  07/30/19 132 lb (59.9 kg)  07/17/19 140 lb 9.6 oz (63     Acalculous cholecystitis 10/3/2018     Chronic abdominal pain 6/27/2019    Probably multifactorial.  Consider bowel ischemia but no tight lesion seen on mesenteric system via CTA, July 2019 so formal angiogram not done     Coronary artery disease involving native coronary artery of native heart without angina pectoris " 2/26/2018     Depression 8/16/2019    Created by [employer] OTR   Last Assessment & Plan:  Gurgling following hospitalization, sertraline 100, trazodone.  Encouraged patient to get back in  where she has a social outlet and is challenged.  Reluctant to do so but agreeable.     Hypercalcemia 8/16/2019    Created by [employer] Annotation: Jun 5 2013  7:58Jerson Turk: most likely due  to hydrochlorothiazide Normal parathyroid hormone 9/2008  Last Assessment & Plan:  Recently normal parathyroid hormone, possibly related to secondary hyperparathyroidism/chronic kidney disease, check ionized calcium and PTH     Hyperparathyroidism (H) 1/15/2019    Primary-based on the fact that it is occurring with normal renal function Pt meets no criteria for consideration of parathyroidectomy which are 1) symptoms caused by hypercalcemia 2) low bone mass 3) nephrolithiasis 4) age younger than 50 5) on feasibility of long-term follow-up  Plan fallow calcium and creat     Incisional hernia, without obstruction or gangrene 9/20/2017     PAD (peripheral artery disease) (H) 8/16/2019    Created by [employer] OTR   Last Assessment & Plan:  No current symptoms See cerebrovascular disease for discussion of secondary prevention Unable to walk for fitness     Persistent atrial fibrillation (H) 8/16/2019    Last Assessment & Plan:  Rate controlled, anticoagulation on warfarin.  Check INR     Severe protein-calorie malnutrition (H) 7/3/2019    Malnutrition: Patient meets diagnostic criteria for severe protein calorie malnutrition in the context of chronic illness as evidenced by  unintentional weight loss and poor nutrient intake     Spinal stenosis 8/16/2019    Created by [employer] Annotation: Aug 20 2010 10:36Jerson Turk: see MRI 5/12/10.   Severe l4-5 stenosis and severe L5-S1  l foraminal stenosis- due, in paryt,  to lipomatosis     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) 8/16/2019     Created by Conversion   Last Assessment & Plan:  Formatting of this note might be different from the original. Complications of Diabetes: nephropathy, cardiovascular disease, cerebrovascular disease and peripheral vascular disease Assessment of blood sugar control: Based on blood sugars, looks excellent, no need for A1c Lab Results  Component Value Date   HGBA1C 5.8 01/11/2019   Diabetes medicatio     Ventral hernia without obstruction or gangrene 3/15/2018    Last Assessment & Plan:  Recent repair complicated by small bowel obstruction     Weight loss, non-intentional 7/1/2019     Past Surgical History:   Procedure Laterality Date     CHOLECYSTECTOMY      2019       Allergies:  Allergies   Allergen Reactions     Penicillins Swelling       PTA Meds:  Prior to Admission medications    Not on File        Current Medications:   Current Outpatient Medications   Medication     aspirin 81 MG EC tablet     atorvastatin (LIPITOR) 80 MG tablet     ferrous sulfate (FEROSUL) 325 (65 Fe) MG tablet     furosemide (LASIX) 20 MG tablet     guaiFENesin 400 MG TABS     hydrochlorothiazide (HYDRODIURIL) 25 MG tablet     lisinopril (PRINIVIL/ZESTRIL) 10 MG tablet     loratadine (CLARITIN) 10 MG tablet     MAGNESIUM PO     metoprolol tartrate (LOPRESSOR) 25 MG tablet     Multiple Vitamin (MULTI-VITAMINS) TABS     nitroGLYcerin (NITROSTAT) 0.4 MG sublingual tablet     omeprazole (PRILOSEC) 20 MG DR capsule     oxyCODONE IR (ROXICODONE) 10 MG tablet     potassium chloride ER (K-DUR/KLOR-CON M) 20 MEQ CR tablet     sertraline (ZOLOFT) 100 MG tablet     spironolactone (ALDACTONE) 25 MG tablet     traZODone (DESYREL) 50 MG tablet     vitamin D3 (CHOLECALCIFEROL) 2000 units (50 mcg) tablet     warfarin (COUMADIN) 4 MG tablet     No current facility-administered medications for this visit.        Family History:  No family history on file.    Social History:  Social History     Tobacco Use     Smoking status: Never Smoker     Smokeless  "tobacco: Current User   Substance Use Topics     Alcohol use: Yes     Frequency: Monthly or less         Physical Examination:  Vital signs:      BP: 126/68 Pulse: 61           Height: 156 cm (5' 1.42\") Weight: 59.6 kg (131 lb 6.4 oz)  Estimated body mass index is 24.49 kg/m  as calculated from the following:    Height as of this encounter: 1.56 m (5' 1.42\").    Weight as of this encounter: 59.6 kg (131 lb 6.4 oz).      Previous Weights:   Wt Readings from Last 2 Encounters:   08/16/19 59.6 kg (131 lb 6.4 oz)                    BMI:  Body mass index is 24.49 kg/m .  Gen: No acute distress, comfortable appearing, conversant  HEENT: No noted icterus, no palor, mucosa moist    Eyes - EOMI, no erythema, no discharge, no lid lag, no stare  Neck/thyroid: No visible enlargement,  not tender to touch, on palpation  no thyroid swelling  CV: S1S2 present, No thrill or heave  Pulm: Bilateral air entry is present, No wheeze or rhonchi.  ABDOMEN: Positive bowel sounds, soft, non-tender, no skin purple stretch marks, surgical scar marks present.  Ext: No LE edema  Skin: No  hyperpigmentation in elbows, mucosa, palate, palmar creases  Neuro: awake, alert, moves arms and legs, muscle bulk appears thin, DTRs 2/4, no tremor of the outstretched hand  Psych: Mood and affect congruent, rarely irritable      CBC,CMP,INR,LIPIDS:    No results found for this or any previous visit.    7/2/2019 CT soft tissue neck with contrast  Result Impression   CONCLUSION:   1.  Beam hardening and streak artifacts related to dense intravenous contrast degrade evaluation of the thoracic inlet, particularly on the right. There is a subcentimeter nodular structure just posterior to the right thyroid lobe is favored to represent   a small lymph node, but is smaller than the finding on recent ultrasound. The findings described on the previous ultrasound may not be well visualized on the current CT due to artifact. Parathyroid scintigraphy could be considered " as an additional means   to assess for parathyroid adenoma.  2.  Otherwise, no neck mass or pathologic cervical lymph nodes.  3.  Calcified atherosclerotic plaque at the right greater than left carotid bifurcations. Suggestion of a high-grade stenosis measuring greater than 70% by NASCET criteria involving the proximal right ICA segment. This could be confirmed with carotid   ultrasound.     Result Impression   CONCLUSION:   1.  Beam hardening and streak artifacts related to dense intravenous contrast degrade evaluation of the thoracic inlet, particularly on the right. There is a subcentimeter nodular structure just posterior to the right thyroid lobe is favored to represent   a small lymph node, but is smaller than the finding on recent ultrasound. The findings described on the previous ultrasound may not be well visualized on the current CT due to artifact. Parathyroid scintigraphy could be considered as an additional means   to assess for parathyroid adenoma.  2.  Otherwise, no neck mass or pathologic cervical lymph nodes.  3.  Calcified atherosclerotic plaque at the right greater than left carotid bifurcations. Suggestion of a high-grade stenosis measuring greater than 70% by NASCET criteria involving the proximal right ICA segment. This could be confirmed with carotid   ultrasound.     Result Narrative   EXAM: US PARATHYROID  LOCATION: [facility]  DATE/TIME: 7/1/2019 10:21 AM    INDICATION: elevated Calcium  COMPARISON: None.    TECHNIQUE: Routine.   Other Result Information   Interface, Rad Results In - 07/01/2019 10:45 AM CDT  EXAM: US PARATHYROID  LOCATION: [facility]  DATE/TIME: 7/1/2019 10:21 AM    INDICATION: elevated Calcium  COMPARISON: None.    TECHNIQUE: Routine.    IMPRESSION:   FINDINGS AND CONCLUSION: There is a 1.8 x 0.8 x 0.8 cm hypoechoic focus in the right neck just caudal to the right thyroid lobe. This has a slightly hyperechoic central region and internal vascularity.  This is favored to represent a lymph node though a   parathyroid adenoma is possible. Additional imaging may be of benefit.    NOTE: ABNORMAL REPORT    THE DICTATION ABOVE DESCRIBES AN ABNORMALITY FOR WHICH FOLLOW-UP IS NEEDED.       RE: Gardenia Muller  YOB: 1950        Dear Jerson Hernandez,    Patient Profile:  67 y.o. female, postmenopausal, is here for the first bone density test.   History of fractures - None. Family history of osteoporosis - None.    Family history of hip fracture: None. Smoking history - Past. Osteoporosis   treatment past -  No. Osteoporosis treatment current - No.  Chronic   medical problems - Diabetes Mellitus and Height loss. High risk   medications -  Blood thinner (Coumadin or Heparin);  Yes, Currently.    Assessment:    1. The spine bone density is best assessed using L1-L2 with T-score of   2.6.  2. Femoral bone densities show normal bone density with a left total hip   T- score of -0.3.  And right total hip T-score of 0.1.  3.  Bone density also was checked in the wrist as an alternate site since   the spine measurement was influenced by multilevel degenerative change.    In the left 33% radius, normal bone density is seen with T score -0.3.    67 y.o. female with NORMAL BONE DENSITY and LOW predicted fracture risk   for age based on measured bone density.      Recommendations:  Ensure adequate calcium, vitamin D intake, and regular weightbearing   exercise with a recheck in approximately 5 years to monitor for ongoing   stability.      Again, thank you for allowing me to participate in the care of your patient.      Sincerely,    Erin Gonzalez MD

## 2019-08-20 ENCOUNTER — OFFICE VISIT - HEALTHEAST (OUTPATIENT)
Dept: FAMILY MEDICINE | Facility: CLINIC | Age: 69
End: 2019-08-20

## 2019-08-20 DIAGNOSIS — E21.3 HYPERPARATHYROIDISM (H): ICD-10-CM

## 2019-08-20 DIAGNOSIS — I73.9 PAD (PERIPHERAL ARTERY DISEASE) (H): ICD-10-CM

## 2019-08-20 DIAGNOSIS — R30.0 DYSURIA: ICD-10-CM

## 2019-08-20 DIAGNOSIS — I50.32 CHRONIC HEART FAILURE WITH PRESERVED EJECTION FRACTION (H): ICD-10-CM

## 2019-08-20 DIAGNOSIS — L60.2 ONYCHOGRYPHOSIS: ICD-10-CM

## 2019-08-20 DIAGNOSIS — F32.89 OTHER DEPRESSION: ICD-10-CM

## 2019-08-20 DIAGNOSIS — R63.4 WEIGHT LOSS, NON-INTENTIONAL: ICD-10-CM

## 2019-08-20 LAB
ALBUMIN UR-MCNC: ABNORMAL MG/DL
APPEARANCE UR: ABNORMAL
BACTERIA #/AREA URNS HPF: ABNORMAL HPF
BILIRUB UR QL STRIP: NEGATIVE
COLOR UR AUTO: YELLOW
GLUCOSE UR STRIP-MCNC: NEGATIVE MG/DL
HGB UR QL STRIP: ABNORMAL
KETONES UR STRIP-MCNC: ABNORMAL MG/DL
LEUKOCYTE ESTERASE UR QL STRIP: ABNORMAL
MUCOUS THREADS #/AREA URNS LPF: ABNORMAL LPF
NITRATE UR QL: NEGATIVE
PH UR STRIP: 5.5 [PH] (ref 5–8)
RBC #/AREA URNS AUTO: ABNORMAL HPF
SP GR UR STRIP: 1.02 (ref 1–1.03)
SQUAMOUS #/AREA URNS AUTO: ABNORMAL LPF
UROBILINOGEN UR STRIP-ACNC: ABNORMAL
WBC #/AREA URNS AUTO: ABNORMAL HPF

## 2019-08-21 ENCOUNTER — RECORDS - HEALTHEAST (OUTPATIENT)
Dept: ADMINISTRATIVE | Facility: OTHER | Age: 69
End: 2019-08-21

## 2019-08-21 ENCOUNTER — AMBULATORY - HEALTHEAST (OUTPATIENT)
Dept: FAMILY MEDICINE | Facility: CLINIC | Age: 69
End: 2019-08-21

## 2019-08-21 ENCOUNTER — COMMUNICATION - HEALTHEAST (OUTPATIENT)
Dept: FAMILY MEDICINE | Facility: CLINIC | Age: 69
End: 2019-08-21

## 2019-08-21 DIAGNOSIS — N30.90 CYSTITIS: ICD-10-CM

## 2019-08-21 DIAGNOSIS — I67.9 CEREBROVASCULAR DISEASE: ICD-10-CM

## 2019-08-21 LAB — INR PPP: 1.4 (ref 0.9–1.1)

## 2019-08-23 DIAGNOSIS — E21.3 HYPERPARATHYROIDISM (H): ICD-10-CM

## 2019-08-23 DIAGNOSIS — E83.52 HYPERCALCEMIA: ICD-10-CM

## 2019-08-23 LAB
ALBUMIN SERPL-MCNC: 3.6 G/DL (ref 3.4–5)
BACTERIA SPEC CULT: ABNORMAL
BACTERIA SPEC CULT: ABNORMAL
CALCIUM 24H UR-MRATE: 0.04 G/24 H (ref 0.1–0.3)
CALCIUM SERPL-MCNC: 10.2 MG/DL (ref 8.5–10.1)
CALCIUM UR-MCNC: 14.5 MG/DL
CALCIUM/CREAT UR: 0.17 G/G CR
COLLECT DURATION TIME UR: 24 H
CREAT 24H UR-MRATE: 0.25 G/(24.H) (ref 0.8–1.8)
CREAT SERPL-MCNC: 0.47 MG/DL (ref 0.52–1.04)
CREAT UR-MCNC: 83 MG/DL
GFR SERPL CREATININE-BSD FRML MDRD: >90 ML/MIN/{1.73_M2}
PHOSPHATE SERPL-MCNC: 2.6 MG/DL (ref 2.5–4.5)
PTH-INTACT SERPL-MCNC: 49 PG/ML (ref 18–80)
SPECIMEN VOL UR: 294 ML

## 2019-08-23 PROCEDURE — 83970 ASSAY OF PARATHORMONE: CPT | Performed by: INTERNAL MEDICINE

## 2019-08-28 ENCOUNTER — COMMUNICATION - HEALTHEAST (OUTPATIENT)
Dept: FAMILY MEDICINE | Facility: CLINIC | Age: 69
End: 2019-08-28

## 2019-08-28 DIAGNOSIS — I67.9 CEREBROVASCULAR DISEASE: ICD-10-CM

## 2019-08-28 LAB — INR PPP: 1.4 (ref 0.9–1.1)

## 2019-08-29 ENCOUNTER — RECORDS - HEALTHEAST (OUTPATIENT)
Dept: ADMINISTRATIVE | Facility: OTHER | Age: 69
End: 2019-08-29

## 2019-08-30 ENCOUNTER — RECORDS - HEALTHEAST (OUTPATIENT)
Dept: ADMINISTRATIVE | Facility: OTHER | Age: 69
End: 2019-08-30

## 2019-09-04 ENCOUNTER — COMMUNICATION - HEALTHEAST (OUTPATIENT)
Dept: FAMILY MEDICINE | Facility: CLINIC | Age: 69
End: 2019-09-04

## 2019-09-04 ENCOUNTER — COMMUNICATION - HEALTHEAST (OUTPATIENT)
Dept: INTENSIVE CARE | Facility: CLINIC | Age: 69
End: 2019-09-04

## 2019-09-04 DIAGNOSIS — I25.10 CORONARY ATHEROSCLEROSIS: ICD-10-CM

## 2019-09-04 DIAGNOSIS — I67.9 CEREBROVASCULAR DISEASE: ICD-10-CM

## 2019-09-04 DIAGNOSIS — F32.89 OTHER DEPRESSION: ICD-10-CM

## 2019-09-04 DIAGNOSIS — K21.9 GERD (GASTROESOPHAGEAL REFLUX DISEASE): ICD-10-CM

## 2019-09-04 DIAGNOSIS — J30.9 ALLERGIC RHINITIS: ICD-10-CM

## 2019-09-04 LAB — INR PPP: 2.6 (ref 0.9–1.1)

## 2019-09-10 ENCOUNTER — RECORDS - HEALTHEAST (OUTPATIENT)
Dept: ADMINISTRATIVE | Facility: OTHER | Age: 69
End: 2019-09-10

## 2019-09-11 ENCOUNTER — COMMUNICATION - HEALTHEAST (OUTPATIENT)
Dept: FAMILY MEDICINE | Facility: CLINIC | Age: 69
End: 2019-09-11

## 2019-09-11 DIAGNOSIS — M19.90 OSTEOARTHRITIS: ICD-10-CM

## 2019-09-11 DIAGNOSIS — I67.9 CEREBROVASCULAR DISEASE: ICD-10-CM

## 2019-09-11 DIAGNOSIS — E87.6 HYPOKALEMIA: ICD-10-CM

## 2019-09-11 LAB — INR PPP: 1.9 (ref 0.9–1.1)

## 2019-09-18 ENCOUNTER — OFFICE VISIT (OUTPATIENT)
Dept: ENDOCRINOLOGY | Facility: CLINIC | Age: 69
End: 2019-09-18
Payer: COMMERCIAL

## 2019-09-18 ENCOUNTER — COMMUNICATION - HEALTHEAST (OUTPATIENT)
Dept: FAMILY MEDICINE | Facility: CLINIC | Age: 69
End: 2019-09-18

## 2019-09-18 ENCOUNTER — COMMUNICATION - HEALTHEAST (OUTPATIENT)
Dept: SCHEDULING | Facility: CLINIC | Age: 69
End: 2019-09-18

## 2019-09-18 ENCOUNTER — AMBULATORY - HEALTHEAST (OUTPATIENT)
Dept: FAMILY MEDICINE | Facility: CLINIC | Age: 69
End: 2019-09-18

## 2019-09-18 ENCOUNTER — RECORDS - HEALTHEAST (OUTPATIENT)
Dept: ADMINISTRATIVE | Facility: OTHER | Age: 69
End: 2019-09-18

## 2019-09-18 VITALS
SYSTOLIC BLOOD PRESSURE: 128 MMHG | BODY MASS INDEX: 24.73 KG/M2 | HEART RATE: 59 BPM | WEIGHT: 131 LBS | DIASTOLIC BLOOD PRESSURE: 78 MMHG | HEIGHT: 61 IN

## 2019-09-18 DIAGNOSIS — I10 ESSENTIAL HYPERTENSION: ICD-10-CM

## 2019-09-18 DIAGNOSIS — E83.52 HYPERCALCEMIA: Primary | ICD-10-CM

## 2019-09-18 DIAGNOSIS — E21.3 HYPERPARATHYROIDISM (H): ICD-10-CM

## 2019-09-18 DIAGNOSIS — I67.9 CEREBROVASCULAR DISEASE: ICD-10-CM

## 2019-09-18 LAB — INR PPP: 2.4 (ref 0.9–1.1)

## 2019-09-18 ASSESSMENT — MIFFLIN-ST. JEOR: SCORE: 1063.25

## 2019-09-18 ASSESSMENT — PAIN SCALES - GENERAL: PAINLEVEL: EXTREME PAIN (8)

## 2019-09-18 NOTE — LETTER
9/18/2019       RE: Gardenia Muller  [address]  [place] [address]     Dear Colleague,    Thank you for referring your patient, Gardenia Muller, to the [facility] ENDOCRINOLOGY at [facility]. Please see a copy of my visit note below.    Endocrinology Clinic Visit 9/18/2019    NAME:  Gardenia Muller  PCP:  Jerson Hernandez  MRN:  [id number]  Reason for Consult:  hypercalcemia  Requesting Provider:  Referred Self    Chief Complaint     Follow up on hypercalcemia.     History of Present Illness     Gardenia Muller is a 69 year old female who is seen in clinic for follow up on hypercalcemia.     Briefly, she has had hypercalcemia for the past 7 years per lab review. Serum calcium has fluctuated from 10.4 to as high as 12.1. SPEP negative. Vit D 1,25 has been normal.  Has been on lasix and hydrochlorothiazide. DXA Jan 2018: normal. No history of kidney stones. Normal kidney function. Most recent vitamin D level 73.7 in June 2019. At last visit, we had her stop her vitamin D    07/2019 parathyroid US  There is a 1.8 x 0.8 x 0.8 cm hypoechoic focus in the right neck just caudal to the right thyroid lobe. This has a slightly hyperechoic central region and internal vascularity. This is favored to represent a lymph node though a parathyroid adenoma is possible.    Workup done since last visit:   - 24 hr urine calcium: 40 mg/24 hrs (she thinks she held hydrochlorothiazide and lasix, but she can't be sure). Only 300 mL of urine during the collection.  - serum calcium: 10.2 mg/dL  - PTH 49    No family history of hypercalcemia. Only notes polyuria as it related to lasix use. Has some fatigue, which is not new. No constipation or abdominal pain. Falls occasionally due to dizziness, but no broken bones. 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)     Weight loss, non-intentional     Hyperparathyroidism (H)     Incisional hernia, without obstruction or gangrene     Ventral hernia without obstruction or gangrene        Medications     Current Outpatient Medications   Medication     aspirin 81 MG EC tablet     atorvastatin (LIPITOR) 80 MG tablet     cholecalciferol (VITAMIN D3) 1000 units (25 mcg) capsule     ferrous sulfate (FEROSUL) 325 (65 Fe) MG tablet     furosemide (LASIX) 20 MG tablet     guaiFENesin 400 MG TABS     hydrochlorothiazide (HYDRODIURIL) 25 MG tablet     lisinopril (PRINIVIL/ZESTRIL) 10 MG tablet     loratadine (CLARITIN) 10 MG tablet     MAGNESIUM PO     metoprolol tartrate (LOPRESSOR) 25 MG tablet     Multiple Vitamin (MULTI-VITAMINS) TABS     nitroGLYcerin (NITROSTAT) 0.4 MG sublingual tablet     omeprazole (PRILOSEC) 20 MG DR capsule     oxyCODONE IR (ROXICODONE) 10 MG tablet     potassium chloride ER (K-DUR/KLOR-CON M) 20 MEQ CR tablet     sertraline (ZOLOFT) 100 MG tablet     spironolactone (ALDACTONE) 25 MG tablet     traZODone (DESYREL) 50 MG tablet     warfarin (COUMADIN) 4 MG tablet     No current facility-administered medications for this visit.         Allergies     Allergies   Allergen Reactions     Penicillins Swelling       Medical / Surgical History     Past Medical History:   Diagnosis Date     (HFpEF) heart failure with preserved ejection fraction (H) 9/18/2018 2/8/19, instructed patient to take furosemide when morning weight 167 or above  Last Assessment & Plan:  Formatting of this note might  be different from the original. Diastolic Congestive Heart Failure is not well compensated,-patient is probably somewhat over diuresed-lightheaded with standing, weight down, Wt Readings from Last 3 Encounters:  07/30/19 132 lb (59.9 kg)  07/17/19 140 lb 9.6 oz (63     Acalculous cholecystitis 10/3/2018     Chronic abdominal pain 6/27/2019    Probably multifactorial.  Consider bowel ischemia but no tight lesion seen on mesenteric system via CTA, July 2019 so formal angiogram not done     Coronary artery disease involving native coronary artery of native heart without angina pectoris 2/26/2018     Depression 8/16/2019    Created by Conversion   Last Assessment & Plan:  Gurgling following hospitalization, sertraline 100, trazodone.  Encouraged patient to get back in  where she has a social outlet and is challenged.  Reluctant to do so but agreeable.     Hypercalcemia 8/16/2019    Created by [facility] Annotation: Jun 5 2013  7:58AM - Jerson Hernandez: most likely due  to hydrochlorothiazide Normal parathyroid hormone 9/2008  Last Assessment & Plan:  Recently normal parathyroid hormone, possibly related to secondary hyperparathyroidism/chronic kidney disease, check ionized calcium and PTH     Hyperparathyroidism (H) 1/15/2019    Primary-based on the fact that it is occurring with normal renal function Pt meets no criteria for consideration of parathyroidectomy which are 1) symptoms caused by hypercalcemia 2) low bone mass 3) nephrolithiasis 4) age younger than 50 5) on feasibility of long-term follow-up  Plan fallow calcium and creat     Incisional hernia, without obstruction or gangrene 9/20/2017     PAD (peripheral artery disease) (H) 8/16/2019    Created by Conversion   Last Assessment & Plan:  No current symptoms See cerebrovascular disease for discussion of secondary prevention Unable to walk for fitness     Persistent atrial fibrillation (H) 8/16/2019    Last Assessment & Plan:  Rate controlled,  anticoagulation on warfarin.  Check INR     Severe protein-calorie malnutrition (H) 7/3/2019    Malnutrition: Patient meets diagnostic criteria for severe protein calorie malnutrition in the context of chronic illness as evidenced by  unintentional weight loss and poor nutrient intake     Spinal stenosis 8/16/2019    Created by Conversion [facility] Annotation: Aug 20 2010 10:36ASHLI - Jerson Hernandez: see MRI 5/12/10.   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 Complications of Diabetes: nephropathy, cardiovascular disease, cerebrovascular disease and peripheral vascular disease Assessment of blood sugar control: Based on blood sugars, looks excellent, no need for A1c Lab Results  Component Value Date   HGBA1C 5.8 01/11/2019   Diabetes medicatio     Ventral hernia without obstruction or gangrene 3/15/2018    Last Assessment & Plan:  Recent repair complicated by small bowel obstruction     Weight loss, non-intentional 7/1/2019     Past Surgical History:   Procedure Laterality Date     CHOLECYSTECTOMY      2019       Social History     Social History     Socioeconomic History     Marital status: Single     Spouse name: Not on file     Number of children: Not on file     Years of education: Not on file     Highest education level: Not on file   Occupational History     Not on file   Social Needs     Financial resource strain: Not on file     Food insecurity:     Worry: Not on file     Inability: Not on file     Transportation needs:     Medical: Not on file     Non-medical: Not on file   Tobacco Use     Smoking status: Never Smoker     Smokeless tobacco: Current User   Substance and Sexual Activity     Alcohol use: Yes     Frequency: Monthly or less     Drug use: Never     Sexual activity:  "Not on file   Lifestyle     Physical activity:     Days per week: Not on file     Minutes per session: Not on file     Stress: Not on file   Relationships     Social connections:     Talks on phone: Not on file     Gets together: Not on file     Attends Yazidi service: Not on file     Active member of club or organization: Not on file     Attends meetings of clubs or organizations: Not on file     Relationship status: Not on file     Intimate partner violence:     Fear of current or ex partner: Not on file     Emotionally abused: Not on file     Physically abused: Not on file     Forced sexual activity: Not on file   Other Topics Concern     Not on file   Social History Narrative     Not on file       Family History     See HPI    ROS     12 point ROS negative, unless noted above    Physical Exam   /78   Pulse 59   Ht 1.56 m (5' 1.42\")   Wt 59.4 kg (131 lb)   BMI 24.41 kg/m        Constitutional: healthy, alert, no distress and cooperative  Head: Normocephalic. No masses, lesions, tenderness or abnormalities  Cardiovascular: regular rate and rhythm, no tachycardia  Respiratory: Lungs clear, no increased work of breathing  Gastrointestinal: Abdomen soft, non-tender, non-distended  Musculoskeletal: lumbar paraspinal muscle tenderness, normal muscle tone  Neurologic: Normal speech, oriented  Skin: warm, well-perfused  Extremities: no edema, compression stockings in place  Psychiatric: calm, normal affect      Labs/Imaging     Pertinent Labs were reviewed and updated in [facility] and reviewed.  Radiology Results were  reviewed and updated in EPIC and reviewed.    Creatinine   Date Value Ref Range Status   08/23/2019 0.47 (L) 0.52 - 1.04 mg/dL Final     I personally reviewed the patient's outside records from [facility] EMR. Summary of pertinent findings in HPI.    Impression / Plan     1. Primary hyperparathyroidism  Suspect mildly elevated calcium and normal to elevated PTH are due to primary hyperparathyroidism, but " it is not clearcut. Her 24-hour urine results are not very helpful as she had a small volume and it isn't clear if she was taking her hydrochlorothiazide and lasix at the time. Currently, she has no surgical indications, so will hold-off on parathyroid scan. Suspect that her hydrochlorothiazide and borderline elevated vitamin D level could explain her more significantly elevated calcium levels. If, in the future, she develops a surgical indication and the picture remains unclear, another 24-hour urine collection to help r/o FHH would be reasonable.   - Restart vitamin D at 1000 units daily (50% less than previous dose)  - RTC in 6 months with labs    Test and/or medications prescribed today:  Orders Placed This Encounter   Procedures     Basic metabolic panel     Parathyroid Hormone Intact     Phosphorus     25 Hydroxyvitamin D2 and D3     Patient seen and discussed with Dr. Gonzalez,    Norberto Dennis MD  [id number], Endocrinology Fellow    Physician Attestation   I, Erin Gonzalez MD, saw this patient with the fellow and agree with the fellow's findings and plan of care as documented in the resident s note.      I personally reviewed vital signs, medications, labs and imaging.      Erin Gonzalez  Date of Service (when I saw the patient): Sep 18, 2019        Again, thank you for allowing me to participate in the care of your patient.      Sincerely,    Erin Gonzalez MD

## 2019-09-18 NOTE — LETTER
9/18/2019    RE: Gardenia Muller  [address]  [place] [address]     Endocrinology Clinic Visit 9/18/2019    NAME:  Gardenia Muller  PCP:  Jerson Hernandez  MRN:  [id number]  Reason for Consult:  hypercalcemia  Requesting Provider:  Referred Self    Chief Complaint     Follow up on hypercalcemia.     History of Present Illness     Gardenia Muller is a 69 year old female who is seen in clinic for follow up on hypercalcemia.     Briefly, she has had hypercalcemia for the past 7 years per lab review. Serum calcium has fluctuated from 10.4 to as high as 12.1. SPEP negative. Vit D 1,25 has been normal.  Has been on lasix and hydrochlorothiazide. DXA Jan 2018: normal. No history of kidney stones. Normal kidney function. Most recent vitamin D level 73.7 in June 2019. At last visit, we had her stop her vitamin D    07/2019 parathyroid US  There is a 1.8 x 0.8 x 0.8 cm hypoechoic focus in the right neck just caudal to the right thyroid lobe. This has a slightly hyperechoic central region and internal vascularity. This is favored to represent a lymph node though a parathyroid adenoma is possible.    Workup done since last visit:   - 24 hr urine calcium: 40 mg/24 hrs (she thinks she held hydrochlorothiazide and lasix, but she can't be sure). Only 300 mL of urine during the collection.  - serum calcium: 10.2 mg/dL  - PTH 49    No family history of hypercalcemia. Only notes polyuria as it related to lasix use. Has some fatigue, which is not new. No constipation or abdominal pain. Falls occasionally due to dizziness, but no broken bones. 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)     Weight loss, non-intentional     Hyperparathyroidism (H)     Incisional hernia, without obstruction or gangrene     Ventral hernia without obstruction or gangrene        Medications     Current Outpatient Medications   Medication     aspirin 81 MG EC tablet     atorvastatin (LIPITOR) 80 MG tablet     cholecalciferol (VITAMIN D3) 1000 units (25 mcg) capsule     ferrous sulfate (FEROSUL) 325 (65 Fe) MG tablet     furosemide (LASIX) 20 MG tablet     guaiFENesin 400 MG TABS     hydrochlorothiazide (HYDRODIURIL) 25 MG tablet     lisinopril (PRINIVIL/ZESTRIL) 10 MG tablet     loratadine (CLARITIN) 10 MG tablet     MAGNESIUM PO     metoprolol tartrate (LOPRESSOR) 25 MG tablet     Multiple Vitamin (MULTI-VITAMINS) TABS     nitroGLYcerin (NITROSTAT) 0.4 MG sublingual tablet     omeprazole (PRILOSEC) 20 MG DR capsule     oxyCODONE IR (ROXICODONE) 10 MG tablet     potassium chloride ER (K-DUR/KLOR-CON M) 20 MEQ CR tablet     sertraline (ZOLOFT) 100 MG tablet     spironolactone (ALDACTONE) 25 MG tablet     traZODone (DESYREL) 50 MG tablet     warfarin (COUMADIN) 4 MG tablet     No current facility-administered medications for this visit.         Allergies     Allergies   Allergen Reactions     Penicillins Swelling       Medical / Surgical History     Past Medical History:   Diagnosis Date     (HFpEF) heart failure with preserved ejection fraction (H) 9/18/2018 2/8/19, instructed patient to take furosemide when morning weight 167 or above  Last Assessment & Plan:  Formatting of this note might be different from the original. Diastolic Congestive Heart Failure is not well compensated,-patient is probably somewhat over diuresed-lightheaded with standing, weight down, Wt Readings from Last 3  Encounters:  07/30/19 132 lb (59.9 kg)  07/17/19 140 lb 9.6 oz (63     Acalculous cholecystitis 10/3/2018     Chronic abdominal pain 6/27/2019    Probably multifactorial.  Consider bowel ischemia but no tight lesion seen on mesenteric system via CTA, July 2019 so formal angiogram not done     Coronary artery disease involving native coronary artery of native heart without angina pectoris 2/26/2018     Depression 8/16/2019    Created by Conversion   Last Assessment & Plan:  Gurgling following hospitalization, sertraline 100, trazodone.  Encouraged patient to get back in  where she has a social outlet and is challenged.  Reluctant to do so but agreeable.     Hypercalcemia 8/16/2019    Created by Conversion [facility] Annotation: Jun 5 2013  7:58ASHLI - Jerson Hernandez: most likely due  to hydrochlorothiazide Normal parathyroid hormone 9/2008  Last Assessment & Plan:  Recently normal parathyroid hormone, possibly related to secondary hyperparathyroidism/chronic kidney disease, check ionized calcium and PTH     Hyperparathyroidism (H) 1/15/2019    Primary-based on the fact that it is occurring with normal renal function Pt meets no criteria for consideration of parathyroidectomy which are 1) symptoms caused by hypercalcemia 2) low bone mass 3) nephrolithiasis 4) age younger than 50 5) on feasibility of long-term follow-up  Plan fallow calcium and creat     Incisional hernia, without obstruction or gangrene 9/20/2017     PAD (peripheral artery disease) (H) 8/16/2019    Created by Conversion   Last Assessment & Plan:  No current symptoms See cerebrovascular disease for discussion of secondary prevention Unable to walk for fitness     Persistent atrial fibrillation (H) 8/16/2019    Last Assessment & Plan:  Rate controlled, anticoagulation on warfarin.  Check INR     Severe protein-calorie malnutrition (H) 7/3/2019    Malnutrition: Patient meets diagnostic criteria for severe protein calorie malnutrition in the context  of chronic illness as evidenced by  unintentional weight loss and poor nutrient intake     Spinal stenosis 8/16/2019    Created by Conversion [facility] Annotation: Aug 20 2010 10:36AM - Jerson Hernandez: see MRI 5/12/10.   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 Complications of Diabetes: nephropathy, cardiovascular disease, cerebrovascular disease and peripheral vascular disease Assessment of blood sugar control: Based on blood sugars, looks excellent, no need for A1c Lab Results  Component Value Date   HGBA1C 5.8 01/11/2019   Diabetes medicatio     Ventral hernia without obstruction or gangrene 3/15/2018    Last Assessment & Plan:  Recent repair complicated by small bowel obstruction     Weight loss, non-intentional 7/1/2019     Past Surgical History:   Procedure Laterality Date     CHOLECYSTECTOMY      2019       Social History     Social History     Socioeconomic History     Marital status: Single     Spouse name: Not on file     Number of children: Not on file     Years of education: Not on file     Highest education level: Not on file   Occupational History     Not on file   Social Needs     Financial resource strain: Not on file     Food insecurity:     Worry: Not on file     Inability: Not on file     Transportation needs:     Medical: Not on file     Non-medical: Not on file   Tobacco Use     Smoking status: Never Smoker     Smokeless tobacco: Current User   Substance and Sexual Activity     Alcohol use: Yes     Frequency: Monthly or less     Drug use: Never     Sexual activity: Not on file   Lifestyle     Physical activity:     Days per week: Not on file     Minutes per session: Not on file     Stress: Not on file   Relationships     Social connections:     Talks on phone:  "Not on file     Gets together: Not on file     Attends Restoration service: Not on file     Active member of club or organization: Not on file     Attends meetings of clubs or organizations: Not on file     Relationship status: Not on file     Intimate partner violence:     Fear of current or ex partner: Not on file     Emotionally abused: Not on file     Physically abused: Not on file     Forced sexual activity: Not on file   Other Topics Concern     Not on file   Social History Narrative     Not on file       Family History     See HPI    ROS     12 point ROS negative, unless noted above    Physical Exam   /78   Pulse 59   Ht 1.56 m (5' 1.42\")   Wt 59.4 kg (131 lb)   BMI 24.41 kg/m        Constitutional: healthy, alert, no distress and cooperative  Head: Normocephalic. No masses, lesions, tenderness or abnormalities  Cardiovascular: regular rate and rhythm, no tachycardia  Respiratory: Lungs clear, no increased work of breathing  Gastrointestinal: Abdomen soft, non-tender, non-distended  Musculoskeletal: lumbar paraspinal muscle tenderness, normal muscle tone  Neurologic: Normal speech, oriented  Skin: warm, well-perfused  Extremities: no edema, compression stockings in place  Psychiatric: calm, normal affect      Labs/Imaging     Pertinent Labs were reviewed and updated in [facility] and reviewed.  Radiology Results were  reviewed and updated in EPIC and reviewed.    Creatinine   Date Value Ref Range Status   08/23/2019 0.47 (L) 0.52 - 1.04 mg/dL Final     I personally reviewed the patient's outside records from [facility] EMR. Summary of pertinent findings in HPI.    Impression / Plan     1. Primary hyperparathyroidism  Suspect mildly elevated calcium and normal to elevated PTH are due to primary hyperparathyroidism, but it is not clearcut. Her 24-hour urine results are not very helpful as she had a small volume and it isn't clear if she was taking her hydrochlorothiazide and lasix at the time. Currently, she has no " surgical indications, so will hold-off on parathyroid scan. Suspect that her hydrochlorothiazide and borderline elevated vitamin D level could explain her more significantly elevated calcium levels. If, in the future, she develops a surgical indication and the picture remains unclear, another 24-hour urine collection to help r/o FHH would be reasonable.   - Restart vitamin D at 1000 units daily (50% less than previous dose)  - RTC in 6 months with labs    Test and/or medications prescribed today:  Orders Placed This Encounter   Procedures     Basic metabolic panel     Parathyroid Hormone Intact     Phosphorus     25 Hydroxyvitamin D2 and D3     Patient seen and discussed with Dr. Gonzalez,    Norberto Dennis MD  [id number], Endocrinology Fellow    Physician Attestation   I, Erin Gonzalez MD, saw this patient with the fellow and agree with the fellow's findings and plan of care as documented in the resident s note.      I personally reviewed vital signs, medications, labs and imaging.      Erin Gonzalez  Date of Service (when I saw the patient): Sep 18, 2019

## 2019-09-18 NOTE — PROGRESS NOTES
Endocrinology Clinic Visit 9/18/2019    NAME:  Gardenia Muller  PCP:  Jerson Hernandez  MRN:  [id number]  Reason for Consult:  hypercalcemia  Requesting Provider:  Referred Self    Chief Complaint     Follow up on hypercalcemia.     History of Present Illness     Gardenia Muller is a 69 year old female who is seen in clinic for follow up on hypercalcemia.     Briefly, she has had hypercalcemia for the past 7 years per lab review. Serum calcium has fluctuated from 10.4 to as high as 12.1. SPEP negative. Vit D 1,25 has been normal.  Has been on lasix and hydrochlorothiazide. DXA Jan 2018: normal. No history of kidney stones. Normal kidney function. Most recent vitamin D level 73.7 in June 2019. At last visit, we had her stop her vitamin D    07/2019 parathyroid US  There is a 1.8 x 0.8 x 0.8 cm hypoechoic focus in the right neck just caudal to the right thyroid lobe. This has a slightly hyperechoic central region and internal vascularity. This is favored to represent a lymph node though a parathyroid adenoma is possible.    Workup done since last visit:   - 24 hr urine calcium: 40 mg/24 hrs (she thinks she held hydrochlorothiazide and lasix, but she can't be sure). Only 300 mL of urine during the collection.  - serum calcium: 10.2 mg/dL  - PTH 49    No family history of hypercalcemia. Only notes polyuria as it related to lasix use. Has some fatigue, which is not new. No constipation or abdominal pain. Falls occasionally due to dizziness, but no broken bones. 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)     Weight loss, non-intentional     Hyperparathyroidism (H)     Incisional hernia, without obstruction or gangrene     Ventral hernia without obstruction or gangrene        Medications     Current Outpatient Medications   Medication     aspirin 81 MG EC tablet     atorvastatin (LIPITOR) 80 MG tablet     cholecalciferol (VITAMIN D3) 1000 units (25 mcg) capsule     ferrous sulfate (FEROSUL) 325 (65 Fe) MG tablet     furosemide (LASIX) 20 MG tablet     guaiFENesin 400 MG TABS     hydrochlorothiazide (HYDRODIURIL) 25 MG tablet     lisinopril (PRINIVIL/ZESTRIL) 10 MG tablet     loratadine (CLARITIN) 10 MG tablet     MAGNESIUM PO     metoprolol tartrate (LOPRESSOR) 25 MG tablet     Multiple Vitamin (MULTI-VITAMINS) TABS     nitroGLYcerin (NITROSTAT) 0.4 MG sublingual tablet     omeprazole (PRILOSEC) 20 MG DR capsule     oxyCODONE IR (ROXICODONE) 10 MG tablet     potassium chloride ER (K-DUR/KLOR-CON M) 20 MEQ CR tablet     sertraline (ZOLOFT) 100 MG tablet     spironolactone (ALDACTONE) 25 MG tablet     traZODone (DESYREL) 50 MG tablet     warfarin (COUMADIN) 4 MG tablet     No current facility-administered medications for this visit.         Allergies     Allergies   Allergen Reactions     Penicillins Swelling       Medical / Surgical History     Past Medical History:   Diagnosis Date     (HFpEF) heart failure with preserved ejection fraction (H) 9/18/2018 2/8/19, instructed patient to take furosemide when morning weight 167 or above  Last Assessment & Plan:  Formatting of this note might be different from the original. Diastolic Congestive Heart Failure is not well compensated,-patient is probably somewhat over diuresed-lightheaded with standing, weight down, Wt Readings from Last 3 Encounters:  07/30/19 132 lb (59.9 kg)  07/17/19 140 lb 9.6 oz (63     Acalculous cholecystitis  10/3/2018     Chronic abdominal pain 6/27/2019    Probably multifactorial.  Consider bowel ischemia but no tight lesion seen on mesenteric system via CTA, July 2019 so formal angiogram not done     Coronary artery disease involving native coronary artery of native heart without angina pectoris 2/26/2018     Depression 8/16/2019    Created by Conversion   Last Assessment & Plan:  Gurgling following hospitalization, sertraline 100, trazodone.  Encouraged patient to get back in  where she has a social outlet and is challenged.  Reluctant to do so but agreeable.     Hypercalcemia 8/16/2019    Created by [facility] Annotation: Jun 5 2013  7:58AM - Jerson Hernandez: most likely due  to hydrochlorothiazide Normal parathyroid hormone 9/2008  Last Assessment & Plan:  Recently normal parathyroid hormone, possibly related to secondary hyperparathyroidism/chronic kidney disease, check ionized calcium and PTH     Hyperparathyroidism (H) 1/15/2019    Primary-based on the fact that it is occurring with normal renal function Pt meets no criteria for consideration of parathyroidectomy which are 1) symptoms caused by hypercalcemia 2) low bone mass 3) nephrolithiasis 4) age younger than 50 5) on feasibility of long-term follow-up  Plan fallow calcium and creat     Incisional hernia, without obstruction or gangrene 9/20/2017     PAD (peripheral artery disease) (H) 8/16/2019    Created by Conversion   Last Assessment & Plan:  No current symptoms See cerebrovascular disease for discussion of secondary prevention Unable to walk for fitness     Persistent atrial fibrillation (H) 8/16/2019    Last Assessment & Plan:  Rate controlled, anticoagulation on warfarin.  Check INR     Severe protein-calorie malnutrition (H) 7/3/2019    Malnutrition: Patient meets diagnostic criteria for severe protein calorie malnutrition in the context of chronic illness as evidenced by  unintentional weight loss and poor nutrient intake      Spinal stenosis 8/16/2019    Created by Conversion [facility] Annotation: Aug 20 2010 10:36AM - Jerson Hernandez: see MRI 5/12/10.   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 Complications of Diabetes: nephropathy, cardiovascular disease, cerebrovascular disease and peripheral vascular disease Assessment of blood sugar control: Based on blood sugars, looks excellent, no need for A1c Lab Results  Component Value Date   HGBA1C 5.8 01/11/2019   Diabetes medicatio     Ventral hernia without obstruction or gangrene 3/15/2018    Last Assessment & Plan:  Recent repair complicated by small bowel obstruction     Weight loss, non-intentional 7/1/2019     Past Surgical History:   Procedure Laterality Date     CHOLECYSTECTOMY      2019       Social History     Social History     Socioeconomic History     Marital status: Single     Spouse name: Not on file     Number of children: Not on file     Years of education: Not on file     Highest education level: Not on file   Occupational History     Not on file   Social Needs     Financial resource strain: Not on file     Food insecurity:     Worry: Not on file     Inability: Not on file     Transportation needs:     Medical: Not on file     Non-medical: Not on file   Tobacco Use     Smoking status: Never Smoker     Smokeless tobacco: Current User   Substance and Sexual Activity     Alcohol use: Yes     Frequency: Monthly or less     Drug use: Never     Sexual activity: Not on file   Lifestyle     Physical activity:     Days per week: Not on file     Minutes per session: Not on file     Stress: Not on file   Relationships     Social connections:     Talks on phone: Not on file     Gets together: Not on file     Attends Bahai service: Not on file      "Active member of club or organization: Not on file     Attends meetings of clubs or organizations: Not on file     Relationship status: Not on file     Intimate partner violence:     Fear of current or ex partner: Not on file     Emotionally abused: Not on file     Physically abused: Not on file     Forced sexual activity: Not on file   Other Topics Concern     Not on file   Social History Narrative     Not on file       Family History     See HPI    ROS     12 point ROS negative, unless noted above    Physical Exam   /78   Pulse 59   Ht 1.56 m (5' 1.42\")   Wt 59.4 kg (131 lb)   BMI 24.41 kg/m       Constitutional: healthy, alert, no distress and cooperative  Head: Normocephalic. No masses, lesions, tenderness or abnormalities  Cardiovascular: regular rate and rhythm, no tachycardia  Respiratory: Lungs clear, no increased work of breathing  Gastrointestinal: Abdomen soft, non-tender, non-distended  Musculoskeletal: lumbar paraspinal muscle tenderness, normal muscle tone  Neurologic: Normal speech, oriented  Skin: warm, well-perfused  Extremities: no edema, compression stockings in place  Psychiatric: calm, normal affect      Labs/Imaging     Pertinent Labs were reviewed and updated in [facility] and reviewed.  Radiology Results were  reviewed and updated in EPIC and reviewed.    Creatinine   Date Value Ref Range Status   08/23/2019 0.47 (L) 0.52 - 1.04 mg/dL Final     I personally reviewed the patient's outside records from [facility] EMR. Summary of pertinent findings in HPI.    Impression / Plan     1. Primary hyperparathyroidism  Suspect mildly elevated calcium and normal to elevated PTH are due to primary hyperparathyroidism, but it is not clearcut. Her 24-hour urine results are not very helpful as she had a small volume and it isn't clear if she was taking her hydrochlorothiazide and lasix at the time. Currently, she has no surgical indications, so will hold-off on parathyroid scan. Suspect that her " hydrochlorothiazide and borderline elevated vitamin D level could explain her more significantly elevated calcium levels. If, in the future, she develops a surgical indication and the picture remains unclear, another 24-hour urine collection to help r/o [facility] would be reasonable.   - Restart vitamin D at 1000 units daily (50% less than previous dose)  - RTC in 6 months with labs    Test and/or medications prescribed today:  Orders Placed This Encounter   Procedures     Basic metabolic panel     Parathyroid Hormone Intact     Phosphorus     25 Hydroxyvitamin D2 and D3     Patient seen and discussed with Dr. Gonzalez,    Norberto Dennis MD  [id number], Endocrinology Fellow    Physician Attestation   I, Erin Gonzalez MD, saw this patient with the fellow and agree with the fellow's findings and plan of care as documented in the resident s note.      I personally reviewed vital signs, medications, labs and imaging.      Erin Gonzalez  Date of Service (when I saw the patient): Sep 18, 2019

## 2020-01-01 ENCOUNTER — RECORDS - HEALTHEAST (OUTPATIENT)
Dept: LAB | Facility: CLINIC | Age: 70
End: 2020-01-01

## 2020-01-01 ENCOUNTER — RECORDS - HEALTHEAST (OUTPATIENT)
Dept: ADMINISTRATIVE | Facility: OTHER | Age: 70
End: 2020-01-01

## 2020-01-01 ENCOUNTER — RECORDS - HEALTHEAST (OUTPATIENT)
Dept: MAMMOGRAPHY | Facility: CLINIC | Age: 70
End: 2020-01-01

## 2020-01-01 ENCOUNTER — COMMUNICATION - HEALTHEAST (OUTPATIENT)
Dept: FAMILY MEDICINE | Facility: CLINIC | Age: 70
End: 2020-01-01

## 2020-01-01 ENCOUNTER — COMMUNICATION - HEALTHEAST (OUTPATIENT)
Dept: ANTICOAGULATION | Facility: CLINIC | Age: 70
End: 2020-01-01

## 2020-01-01 ENCOUNTER — AMBULATORY - HEALTHEAST (OUTPATIENT)
Dept: CARDIOLOGY | Facility: CLINIC | Age: 70
End: 2020-01-01

## 2020-01-01 ENCOUNTER — COMMUNICATION - HEALTHEAST (OUTPATIENT)
Dept: SCHEDULING | Facility: CLINIC | Age: 70
End: 2020-01-01

## 2020-01-01 ENCOUNTER — OFFICE VISIT - HEALTHEAST (OUTPATIENT)
Dept: GERIATRICS | Facility: CLINIC | Age: 70
End: 2020-01-01

## 2020-01-01 ENCOUNTER — AMBULATORY - HEALTHEAST (OUTPATIENT)
Dept: GERIATRICS | Facility: CLINIC | Age: 70
End: 2020-01-01

## 2020-01-01 ENCOUNTER — COMMUNICATION - HEALTHEAST (OUTPATIENT)
Dept: GERIATRICS | Facility: CLINIC | Age: 70
End: 2020-01-01

## 2020-01-01 ENCOUNTER — SURGERY - HEALTHEAST (OUTPATIENT)
Dept: CARDIOLOGY | Facility: CLINIC | Age: 70
End: 2020-01-01

## 2020-01-01 ENCOUNTER — HOME CARE/HOSPICE - HEALTHEAST (OUTPATIENT)
Dept: HOME HEALTH SERVICES | Facility: HOME HEALTH | Age: 70
End: 2020-01-01

## 2020-01-01 ENCOUNTER — OFFICE VISIT - HEALTHEAST (OUTPATIENT)
Dept: FAMILY MEDICINE | Facility: CLINIC | Age: 70
End: 2020-01-01

## 2020-01-01 ENCOUNTER — HOSPITAL ENCOUNTER (INPATIENT)
Dept: SURGERY | Facility: CLINIC | Age: 70
End: 2020-09-19
Attending: INTERNAL MEDICINE | Admitting: INTERNAL MEDICINE
Payer: COMMERCIAL

## 2020-01-01 ENCOUNTER — DOCUMENTATION ONLY (OUTPATIENT)
Dept: CARE COORDINATION | Facility: CLINIC | Age: 70
End: 2020-01-01

## 2020-01-01 ENCOUNTER — COMMUNICATION - HEALTHEAST (OUTPATIENT)
Dept: CARDIOLOGY | Facility: CLINIC | Age: 70
End: 2020-01-01

## 2020-01-01 DIAGNOSIS — R10.31 RLQ ABDOMINAL PAIN: ICD-10-CM

## 2020-01-01 DIAGNOSIS — I48.19 PERSISTENT ATRIAL FIBRILLATION (H): ICD-10-CM

## 2020-01-01 DIAGNOSIS — I50.43 ACUTE ON CHRONIC COMBINED SYSTOLIC (CONGESTIVE) AND DIASTOLIC (CONGESTIVE) HEART FAILURE (H): ICD-10-CM

## 2020-01-01 DIAGNOSIS — R94.31 T WAVE INVERSION IN EKG: ICD-10-CM

## 2020-01-01 DIAGNOSIS — R79.1 SUPRATHERAPEUTIC INR: ICD-10-CM

## 2020-01-01 DIAGNOSIS — I67.9 CEREBROVASCULAR DISEASE: ICD-10-CM

## 2020-01-01 DIAGNOSIS — B37.9 CANDIDA INFECTION: ICD-10-CM

## 2020-01-01 DIAGNOSIS — Z79.01 WARFARIN ANTICOAGULATION: ICD-10-CM

## 2020-01-01 DIAGNOSIS — L89.811: ICD-10-CM

## 2020-01-01 DIAGNOSIS — K86.89 PANCREATIC DUCT DILATED: ICD-10-CM

## 2020-01-01 DIAGNOSIS — Z12.31 ENCOUNTER FOR SCREENING MAMMOGRAM FOR MALIGNANT NEOPLASM OF BREAST: ICD-10-CM

## 2020-01-01 DIAGNOSIS — I49.8 BRADYARRHYTHMIA: ICD-10-CM

## 2020-01-01 DIAGNOSIS — E11.59 TYPE 2 DIABETES MELLITUS WITH OTHER CIRCULATORY COMPLICATION, UNSPECIFIED WHETHER LONG TERM INSULIN USE (H): ICD-10-CM

## 2020-01-01 DIAGNOSIS — K57.92 DIVERTICULITIS: ICD-10-CM

## 2020-01-01 DIAGNOSIS — T14.8XXA OPEN WOUND: ICD-10-CM

## 2020-01-01 DIAGNOSIS — F32.A DEPRESSION, UNSPECIFIED DEPRESSION TYPE: ICD-10-CM

## 2020-01-01 DIAGNOSIS — R11.2 NAUSEA AND VOMITING, INTRACTABILITY OF VOMITING NOT SPECIFIED, UNSPECIFIED VOMITING TYPE: ICD-10-CM

## 2020-01-01 DIAGNOSIS — Z00.00 ENCOUNTER FOR GENERAL ADULT MEDICAL EXAMINATION WITHOUT ABNORMAL FINDINGS: ICD-10-CM

## 2020-01-01 DIAGNOSIS — R60.9 EDEMA, UNSPECIFIED TYPE: ICD-10-CM

## 2020-01-01 DIAGNOSIS — I10 ESSENTIAL HYPERTENSION: ICD-10-CM

## 2020-01-01 DIAGNOSIS — Z95.5 S/P CORONARY ARTERY STENT PLACEMENT: ICD-10-CM

## 2020-01-01 DIAGNOSIS — R10.31 ABDOMINAL PAIN, RIGHT LOWER QUADRANT: ICD-10-CM

## 2020-01-01 DIAGNOSIS — R57.9 SHOCK CIRCULATORY (H): ICD-10-CM

## 2020-01-01 DIAGNOSIS — K72.00 ACUTE LIVER FAILURE WITHOUT HEPATIC COMA: ICD-10-CM

## 2020-01-01 DIAGNOSIS — I70.1 ATHEROSCLEROSIS OF RENAL ARTERY (H): ICD-10-CM

## 2020-01-01 DIAGNOSIS — E11.59 TYPE 2 DIABETES MELLITUS WITH OTHER CIRCULATORY COMPLICATION, WITHOUT LONG-TERM CURRENT USE OF INSULIN (H): ICD-10-CM

## 2020-01-01 DIAGNOSIS — T46.0X1A POISONING BY DIGOXIN, ACCIDENTAL OR UNINTENTIONAL, INITIAL ENCOUNTER: ICD-10-CM

## 2020-01-01 DIAGNOSIS — K57.20 DIVERTICULITIS OF LARGE INTESTINE WITH PERFORATION WITHOUT BLEEDING: ICD-10-CM

## 2020-01-01 DIAGNOSIS — Z12.31 VISIT FOR SCREENING MAMMOGRAM: ICD-10-CM

## 2020-01-01 DIAGNOSIS — I50.32 CHRONIC HEART FAILURE WITH PRESERVED EJECTION FRACTION (H): ICD-10-CM

## 2020-01-01 DIAGNOSIS — K63.2 ENTEROCUTANEOUS FISTULA: ICD-10-CM

## 2020-01-01 DIAGNOSIS — I73.9 PAD (PERIPHERAL ARTERY DISEASE) (H): ICD-10-CM

## 2020-01-01 DIAGNOSIS — J96.01 ACUTE RESPIRATORY FAILURE WITH HYPOXIA AND HYPERCAPNIA (H): ICD-10-CM

## 2020-01-01 DIAGNOSIS — E66.811 CLASS 1 OBESITY WITH SERIOUS COMORBIDITY AND BODY MASS INDEX (BMI) OF 33.0 TO 33.9 IN ADULT, UNSPECIFIED OBESITY TYPE: ICD-10-CM

## 2020-01-01 DIAGNOSIS — Z87.19 HX SBO: ICD-10-CM

## 2020-01-01 DIAGNOSIS — M48.061 SPINAL STENOSIS OF LUMBAR REGION, UNSPECIFIED WHETHER NEUROGENIC CLAUDICATION PRESENT: ICD-10-CM

## 2020-01-01 DIAGNOSIS — K57.20 DIVERTICULITIS OF LARGE INTESTINE WITH PERFORATION AND ABSCESS WITHOUT BLEEDING: ICD-10-CM

## 2020-01-01 DIAGNOSIS — R11.0 NAUSEA: ICD-10-CM

## 2020-01-01 DIAGNOSIS — L30.4 INTERTRIGO: ICD-10-CM

## 2020-01-01 DIAGNOSIS — Z00.00 HEALTHCARE MAINTENANCE: ICD-10-CM

## 2020-01-01 DIAGNOSIS — E21.3 HYPERPARATHYROIDISM (H): ICD-10-CM

## 2020-01-01 DIAGNOSIS — E83.52 HYPERCALCEMIA: ICD-10-CM

## 2020-01-01 DIAGNOSIS — I25.10 CORONARY ARTERY DISEASE INVOLVING NATIVE CORONARY ARTERY OF NATIVE HEART WITHOUT ANGINA PECTORIS: ICD-10-CM

## 2020-01-01 DIAGNOSIS — J96.02 ACUTE RESPIRATORY FAILURE WITH HYPOXIA AND HYPERCAPNIA (H): ICD-10-CM

## 2020-01-01 DIAGNOSIS — M65.30 TRIGGER FINGER, ACQUIRED: ICD-10-CM

## 2020-01-01 LAB
25(OH)D3 SERPL-MCNC: 46.5 NG/ML (ref 30–80)
ABO/RH(D): NORMAL
AEROBIC BLOOD CULTURE BOTTLE: NO GROWTH
AEROBIC BLOOD CULTURE BOTTLE: NO GROWTH
ALBUMIN SERPL-MCNC: 1.3 G/DL (ref 3.5–5)
ALBUMIN SERPL-MCNC: 1.3 G/DL (ref 3.5–5)
ALBUMIN SERPL-MCNC: 1.4 G/DL (ref 3.5–5)
ALBUMIN SERPL-MCNC: 1.4 G/DL (ref 3.5–5)
ALBUMIN SERPL-MCNC: 1.6 G/DL (ref 3.5–5)
ALBUMIN SERPL-MCNC: 1.7 G/DL (ref 3.5–5)
ALBUMIN SERPL-MCNC: 1.8 G/DL (ref 3.5–5)
ALBUMIN SERPL-MCNC: 1.8 G/DL (ref 3.5–5)
ALBUMIN SERPL-MCNC: 1.9 G/DL (ref 3.5–5)
ALBUMIN SERPL-MCNC: 1.9 G/DL (ref 3.5–5)
ALBUMIN SERPL-MCNC: 2.7 G/DL (ref 3.5–5)
ALBUMIN SERPL-MCNC: 2.9 G/DL (ref 3.5–5)
ALBUMIN SERPL-MCNC: 3 G/DL (ref 3.5–5)
ALBUMIN SERPL-MCNC: 3 G/DL (ref 3.5–5)
ALBUMIN UR-MCNC: ABNORMAL MG/DL
ALP SERPL-CCNC: 101 U/L (ref 45–120)
ALP SERPL-CCNC: 109 U/L (ref 45–120)
ALP SERPL-CCNC: 130 U/L (ref 45–120)
ALP SERPL-CCNC: 135 U/L (ref 45–120)
ALP SERPL-CCNC: 143 U/L (ref 45–120)
ALP SERPL-CCNC: 144 U/L (ref 45–120)
ALP SERPL-CCNC: 147 U/L (ref 45–120)
ALP SERPL-CCNC: 147 U/L (ref 45–120)
ALP SERPL-CCNC: 152 U/L (ref 45–120)
ALP SERPL-CCNC: 181 U/L (ref 45–120)
ALT SERPL W P-5'-P-CCNC: 101 U/L (ref 0–45)
ALT SERPL W P-5'-P-CCNC: 116 U/L (ref 0–45)
ALT SERPL W P-5'-P-CCNC: 119 U/L (ref 0–45)
ALT SERPL W P-5'-P-CCNC: 166 U/L (ref 0–45)
ALT SERPL W P-5'-P-CCNC: 226 U/L (ref 0–45)
ALT SERPL W P-5'-P-CCNC: 24 U/L (ref 0–45)
ALT SERPL W P-5'-P-CCNC: 27 U/L (ref 0–45)
ALT SERPL W P-5'-P-CCNC: 32 U/L (ref 0–45)
ALT SERPL W P-5'-P-CCNC: 33 U/L (ref 0–45)
ALT SERPL W P-5'-P-CCNC: 53 U/L (ref 0–45)
AMORPH CRY #/AREA URNS HPF: ABNORMAL /[HPF]
ANAEROBIC BLOOD CULTURE BOTTLE: NO GROWTH
ANAEROBIC BLOOD CULTURE BOTTLE: NORMAL
ANION GAP SERPL CALCULATED.3IONS-SCNC: 10 MMOL/L (ref 5–18)
ANION GAP SERPL CALCULATED.3IONS-SCNC: 11 MMOL/L (ref 5–18)
ANION GAP SERPL CALCULATED.3IONS-SCNC: 12 MMOL/L (ref 5–18)
ANION GAP SERPL CALCULATED.3IONS-SCNC: 13 MMOL/L (ref 5–18)
ANION GAP SERPL CALCULATED.3IONS-SCNC: 14 MMOL/L (ref 5–18)
ANION GAP SERPL CALCULATED.3IONS-SCNC: 15 MMOL/L (ref 5–18)
ANION GAP SERPL CALCULATED.3IONS-SCNC: 19 MMOL/L (ref 5–18)
ANION GAP SERPL CALCULATED.3IONS-SCNC: 6 MMOL/L (ref 5–18)
ANION GAP SERPL CALCULATED.3IONS-SCNC: 7 MMOL/L (ref 5–18)
ANION GAP SERPL CALCULATED.3IONS-SCNC: 8 MMOL/L (ref 5–18)
ANION GAP SERPL CALCULATED.3IONS-SCNC: 9 MMOL/L (ref 5–18)
ANTIBODY SCREEN: NEGATIVE
AORTIC ROOT: 3.3 CM
AORTIC VALVE MEAN VELOCITY: 161 CM/S
APPEARANCE UR: ABNORMAL
APPEARANCE UR: CLEAR
AR DECEL SLOPE: 2000 MM/S2
AR PEAK VELOCITY: 367 CM/S
ASCENDING AORTA: 3 CM
AST SERPL W P-5'-P-CCNC: 1413 U/L (ref 0–40)
AST SERPL W P-5'-P-CCNC: 1543 U/L (ref 0–40)
AST SERPL W P-5'-P-CCNC: 247 U/L (ref 0–40)
AST SERPL W P-5'-P-CCNC: 25 U/L (ref 0–40)
AST SERPL W P-5'-P-CCNC: 26 U/L (ref 0–40)
AST SERPL W P-5'-P-CCNC: 2613 U/L (ref 0–40)
AST SERPL W P-5'-P-CCNC: 37 U/L (ref 0–40)
AST SERPL W P-5'-P-CCNC: 39 U/L (ref 0–40)
AST SERPL W P-5'-P-CCNC: 43 U/L (ref 0–40)
AST SERPL W P-5'-P-CCNC: 54 U/L (ref 0–40)
ATRIAL RATE - MUSE: 17 BPM
ATRIAL RATE - MUSE: 214 BPM
ATRIAL RATE - MUSE: 77 BPM
ATRIAL RATE - MUSE: 83 BPM
ATRIAL RATE - MUSE: 84 BPM
AV DIMENSIONLESS INDEX VTI: 0.3
AV MEAN GRADIENT: 13 MMHG
AV PEAK GRADIENT: 27.2 MMHG
AV REGURGITANT PEAK GRADIENT: 53.9 MMHG
AV REGURGITATION PRESSURE HALF TIME: 540 MS
AV VALVE AREA: 1 CM2
AV VELOCITY RATIO: 0.3
BACTERIA #/AREA URNS HPF: ABNORMAL HPF
BACTERIA SPEC CULT: ABNORMAL
BACTERIA SPEC CULT: ABNORMAL
BACTERIA SPEC CULT: NO GROWTH
BACTERIA SPEC CULT: NORMAL
BACTERIA SPEC CULT: NORMAL
BASE EXCESS BLDA CALC-SCNC: -10.7 MMOL/L
BASE EXCESS BLDA CALC-SCNC: -2.4 MMOL/L
BASE EXCESS BLDA CALC-SCNC: -3.2 MMOL/L
BASE EXCESS BLDA CALC-SCNC: -8.7 MMOL/L
BASE EXCESS BLDV CALC-SCNC: -10.8 MMOL/L
BASO STIPL BLD QL SMEAR: ABNORMAL
BASOPHILS # BLD AUTO: 0 THOU/UL (ref 0–0.2)
BASOPHILS # BLD AUTO: 0.1 THOU/UL (ref 0–0.2)
BASOPHILS # BLD AUTO: 0.2 THOU/UL (ref 0–0.2)
BASOPHILS # BLD AUTO: 0.2 THOU/UL (ref 0–0.2)
BASOPHILS NFR BLD AUTO: 0 % (ref 0–2)
BASOPHILS NFR BLD AUTO: 1 % (ref 0–2)
BASOPHILS NFR BLD AUTO: 2 % (ref 0–2)
BASOPHILS NFR BLD AUTO: 2 % (ref 0–2)
BILIRUB DIRECT SERPL-MCNC: 0.8 MG/DL
BILIRUB DIRECT SERPL-MCNC: 0.9 MG/DL
BILIRUB DIRECT SERPL-MCNC: 1.3 MG/DL
BILIRUB DIRECT SERPL-MCNC: 1.3 MG/DL
BILIRUB DIRECT SERPL-MCNC: 1.4 MG/DL
BILIRUB DIRECT SERPL-MCNC: 1.5 MG/DL
BILIRUB SERPL-MCNC: 1.2 MG/DL (ref 0–1)
BILIRUB SERPL-MCNC: 1.2 MG/DL (ref 0–1)
BILIRUB SERPL-MCNC: 1.3 MG/DL (ref 0–1)
BILIRUB SERPL-MCNC: 1.4 MG/DL (ref 0–1)
BILIRUB SERPL-MCNC: 1.4 MG/DL (ref 0–1)
BILIRUB SERPL-MCNC: 1.9 MG/DL (ref 0–1)
BILIRUB SERPL-MCNC: 1.9 MG/DL (ref 0–1)
BILIRUB SERPL-MCNC: 2 MG/DL (ref 0–1)
BILIRUB SERPL-MCNC: 2 MG/DL (ref 0–1)
BILIRUB SERPL-MCNC: 2.2 MG/DL (ref 0–1)
BILIRUB UR QL STRIP: ABNORMAL
BILIRUB UR QL STRIP: NEGATIVE
BLD PROD TYP BPU: NORMAL
BLOOD TYPE: 7300
BNP SERPL-MCNC: 863 PG/ML (ref 0–120)
BNP SERPL-MCNC: 981 PG/ML (ref 0–120)
BSA FOR ECHO PROCEDURE: 2.04 M2
BUN SERPL-MCNC: 10 MG/DL (ref 8–28)
BUN SERPL-MCNC: 10 MG/DL (ref 8–28)
BUN SERPL-MCNC: 13 MG/DL (ref 8–28)
BUN SERPL-MCNC: 14 MG/DL (ref 8–28)
BUN SERPL-MCNC: 14 MG/DL (ref 8–28)
BUN SERPL-MCNC: 15 MG/DL (ref 8–28)
BUN SERPL-MCNC: 16 MG/DL (ref 8–28)
BUN SERPL-MCNC: 17 MG/DL (ref 8–28)
BUN SERPL-MCNC: 17 MG/DL (ref 8–28)
BUN SERPL-MCNC: 18 MG/DL (ref 8–28)
BUN SERPL-MCNC: 18 MG/DL (ref 8–28)
BUN SERPL-MCNC: 20 MG/DL (ref 8–28)
BUN SERPL-MCNC: 20 MG/DL (ref 8–28)
BUN SERPL-MCNC: 22 MG/DL (ref 8–28)
BUN SERPL-MCNC: 26 MG/DL (ref 8–28)
BUN SERPL-MCNC: 26 MG/DL (ref 8–28)
BUN SERPL-MCNC: 27 MG/DL (ref 8–28)
BUN SERPL-MCNC: 31 MG/DL (ref 8–28)
BUN SERPL-MCNC: 31 MG/DL (ref 8–28)
BUN SERPL-MCNC: 33 MG/DL (ref 8–28)
BUN SERPL-MCNC: 34 MG/DL (ref 8–28)
BUN SERPL-MCNC: 35 MG/DL (ref 8–28)
BUN SERPL-MCNC: 39 MG/DL (ref 8–28)
BUN SERPL-MCNC: 40 MG/DL (ref 8–28)
BUN SERPL-MCNC: 42 MG/DL (ref 8–28)
BUN SERPL-MCNC: 43 MG/DL (ref 8–28)
BUN SERPL-MCNC: 47 MG/DL (ref 8–28)
BUN SERPL-MCNC: 48 MG/DL (ref 8–28)
BUN SERPL-MCNC: 52 MG/DL (ref 8–28)
BUN SERPL-MCNC: 53 MG/DL (ref 8–28)
BUN SERPL-MCNC: 54 MG/DL (ref 8–28)
BUN SERPL-MCNC: 8 MG/DL (ref 8–28)
BUN SERPL-MCNC: 9 MG/DL (ref 8–28)
BUN SERPL-MCNC: 9 MG/DL (ref 8–28)
C DIFF TOX B STL QL: NEGATIVE
CALCIUM SERPL-MCNC: 10 MG/DL (ref 8.5–10.5)
CALCIUM SERPL-MCNC: 10.2 MG/DL (ref 8.5–10.5)
CALCIUM SERPL-MCNC: 10.3 MG/DL (ref 8.5–10.5)
CALCIUM SERPL-MCNC: 10.4 MG/DL (ref 8.5–10.5)
CALCIUM SERPL-MCNC: 10.4 MG/DL (ref 8.5–10.5)
CALCIUM SERPL-MCNC: 10.6 MG/DL (ref 8.5–10.5)
CALCIUM SERPL-MCNC: 10.6 MG/DL (ref 8.5–10.5)
CALCIUM SERPL-MCNC: 10.8 MG/DL (ref 8.5–10.5)
CALCIUM SERPL-MCNC: 10.9 MG/DL (ref 8.5–10.5)
CALCIUM SERPL-MCNC: 6.8 MG/DL (ref 8.5–10.5)
CALCIUM SERPL-MCNC: 6.9 MG/DL (ref 8.5–10.5)
CALCIUM SERPL-MCNC: 7.1 MG/DL (ref 8.5–10.5)
CALCIUM SERPL-MCNC: 7.1 MG/DL (ref 8.5–10.5)
CALCIUM SERPL-MCNC: 7.2 MG/DL (ref 8.5–10.5)
CALCIUM SERPL-MCNC: 7.2 MG/DL (ref 8.5–10.5)
CALCIUM SERPL-MCNC: 7.4 MG/DL (ref 8.5–10.5)
CALCIUM SERPL-MCNC: 7.4 MG/DL (ref 8.5–10.5)
CALCIUM SERPL-MCNC: 7.6 MG/DL (ref 8.5–10.5)
CALCIUM SERPL-MCNC: 7.9 MG/DL (ref 8.5–10.5)
CALCIUM SERPL-MCNC: 8.1 MG/DL (ref 8.5–10.5)
CALCIUM SERPL-MCNC: 8.4 MG/DL (ref 8.5–10.5)
CALCIUM SERPL-MCNC: 8.5 MG/DL (ref 8.5–10.5)
CALCIUM SERPL-MCNC: 8.6 MG/DL (ref 8.5–10.5)
CALCIUM SERPL-MCNC: 8.7 MG/DL (ref 8.5–10.5)
CALCIUM SERPL-MCNC: 8.8 MG/DL (ref 8.5–10.5)
CALCIUM SERPL-MCNC: 8.8 MG/DL (ref 8.5–10.5)
CALCIUM SERPL-MCNC: 8.9 MG/DL (ref 8.5–10.5)
CALCIUM SERPL-MCNC: 9 MG/DL (ref 8.5–10.5)
CALCIUM SERPL-MCNC: 9.1 MG/DL (ref 8.5–10.5)
CALCIUM SERPL-MCNC: 9.1 MG/DL (ref 8.5–10.5)
CALCIUM SERPL-MCNC: 9.2 MG/DL (ref 8.5–10.5)
CALCIUM SERPL-MCNC: 9.3 MG/DL (ref 8.5–10.5)
CALCIUM SERPL-MCNC: 9.7 MG/DL (ref 8.5–10.5)
CALCIUM SERPL-MCNC: 9.9 MG/DL (ref 8.5–10.5)
CALCIUM, IONIZED MEASURED: 1.03 MMOL/L (ref 1.11–1.3)
CHLORIDE BLD-SCNC: 100 MMOL/L (ref 98–107)
CHLORIDE BLD-SCNC: 101 MMOL/L (ref 98–107)
CHLORIDE BLD-SCNC: 102 MMOL/L (ref 98–107)
CHLORIDE BLD-SCNC: 102 MMOL/L (ref 98–107)
CHLORIDE BLD-SCNC: 103 MMOL/L (ref 98–107)
CHLORIDE BLD-SCNC: 104 MMOL/L (ref 98–107)
CHLORIDE BLD-SCNC: 105 MMOL/L (ref 98–107)
CHLORIDE BLD-SCNC: 106 MMOL/L (ref 98–107)
CHLORIDE BLD-SCNC: 107 MMOL/L (ref 98–107)
CHLORIDE BLD-SCNC: 108 MMOL/L (ref 98–107)
CHLORIDE BLD-SCNC: 109 MMOL/L (ref 98–107)
CHLORIDE BLD-SCNC: 110 MMOL/L (ref 98–107)
CK SERPL-CCNC: 163 U/L (ref 30–190)
CK SERPL-CCNC: 238 U/L (ref 30–190)
CK SERPL-CCNC: 268 U/L (ref 30–190)
CK SERPL-CCNC: 27 U/L (ref 30–190)
CK SERPL-CCNC: 469 U/L (ref 30–190)
CK SERPL-CCNC: 608 U/L (ref 30–190)
CO2 SERPL-SCNC: 10 MMOL/L (ref 22–31)
CO2 SERPL-SCNC: 15 MMOL/L (ref 22–31)
CO2 SERPL-SCNC: 15 MMOL/L (ref 22–31)
CO2 SERPL-SCNC: 16 MMOL/L (ref 22–31)
CO2 SERPL-SCNC: 17 MMOL/L (ref 22–31)
CO2 SERPL-SCNC: 19 MMOL/L (ref 22–31)
CO2 SERPL-SCNC: 20 MMOL/L (ref 22–31)
CO2 SERPL-SCNC: 21 MMOL/L (ref 22–31)
CO2 SERPL-SCNC: 22 MMOL/L (ref 22–31)
CO2 SERPL-SCNC: 23 MMOL/L (ref 22–31)
CO2 SERPL-SCNC: 25 MMOL/L (ref 22–31)
CO2 SERPL-SCNC: 25 MMOL/L (ref 22–31)
CO2 SERPL-SCNC: 26 MMOL/L (ref 22–31)
CO2 SERPL-SCNC: 26 MMOL/L (ref 22–31)
CO2 SERPL-SCNC: 27 MMOL/L (ref 22–31)
CO2 SERPL-SCNC: 27 MMOL/L (ref 22–31)
CO2 SERPL-SCNC: 28 MMOL/L (ref 22–31)
CO2 SERPL-SCNC: 28 MMOL/L (ref 22–31)
CO2 SERPL-SCNC: 29 MMOL/L (ref 22–31)
CO2 SERPL-SCNC: 30 MMOL/L (ref 22–31)
CO2 SERPL-SCNC: 30 MMOL/L (ref 22–31)
CODING SYSTEM: NORMAL
COHGB MFR BLD: 100 % (ref 95–96)
COHGB MFR BLD: 92 % (ref 95–96)
COHGB MFR BLD: 95.6 % (ref 95–96)
COHGB MFR BLD: 98.5 % (ref 95–96)
COLOR UR AUTO: ABNORMAL
COLOR UR AUTO: YELLOW
COLOR UR AUTO: YELLOW
COMPONENT (HISTORICAL CONVERSION): NORMAL
CREAT SERPL-MCNC: 0.51 MG/DL (ref 0.6–1.1)
CREAT SERPL-MCNC: 0.52 MG/DL (ref 0.6–1.1)
CREAT SERPL-MCNC: 0.53 MG/DL (ref 0.6–1.1)
CREAT SERPL-MCNC: 0.53 MG/DL (ref 0.6–1.1)
CREAT SERPL-MCNC: 0.54 MG/DL (ref 0.6–1.1)
CREAT SERPL-MCNC: 0.54 MG/DL (ref 0.6–1.1)
CREAT SERPL-MCNC: 0.56 MG/DL (ref 0.6–1.1)
CREAT SERPL-MCNC: 0.58 MG/DL (ref 0.6–1.1)
CREAT SERPL-MCNC: 0.58 MG/DL (ref 0.6–1.1)
CREAT SERPL-MCNC: 0.6 MG/DL (ref 0.6–1.1)
CREAT SERPL-MCNC: 0.61 MG/DL (ref 0.6–1.1)
CREAT SERPL-MCNC: 0.62 MG/DL (ref 0.6–1.1)
CREAT SERPL-MCNC: 0.67 MG/DL (ref 0.6–1.1)
CREAT SERPL-MCNC: 0.81 MG/DL (ref 0.6–1.1)
CREAT SERPL-MCNC: 0.82 MG/DL (ref 0.6–1.1)
CREAT SERPL-MCNC: 0.84 MG/DL (ref 0.6–1.1)
CREAT SERPL-MCNC: 0.86 MG/DL (ref 0.6–1.1)
CREAT SERPL-MCNC: 0.94 MG/DL (ref 0.6–1.1)
CREAT SERPL-MCNC: 0.95 MG/DL (ref 0.6–1.1)
CREAT SERPL-MCNC: 1.08 MG/DL (ref 0.6–1.1)
CREAT SERPL-MCNC: 1.14 MG/DL (ref 0.6–1.1)
CREAT SERPL-MCNC: 1.33 MG/DL (ref 0.6–1.1)
CREAT SERPL-MCNC: 1.41 MG/DL (ref 0.6–1.1)
CREAT SERPL-MCNC: 1.93 MG/DL (ref 0.6–1.1)
CREAT SERPL-MCNC: 2.48 MG/DL (ref 0.6–1.1)
CREAT SERPL-MCNC: 2.49 MG/DL (ref 0.6–1.1)
CREAT SERPL-MCNC: 2.6 MG/DL (ref 0.6–1.1)
CREAT SERPL-MCNC: 2.65 MG/DL (ref 0.6–1.1)
CREAT SERPL-MCNC: 2.86 MG/DL (ref 0.6–1.1)
CREAT SERPL-MCNC: 2.88 MG/DL (ref 0.6–1.1)
CREAT SERPL-MCNC: 2.89 MG/DL (ref 0.6–1.1)
CREAT SERPL-MCNC: 2.92 MG/DL (ref 0.6–1.1)
CREAT SERPL-MCNC: 3.03 MG/DL (ref 0.6–1.1)
CREAT SERPL-MCNC: 3.08 MG/DL (ref 0.6–1.1)
CREAT SERPL-MCNC: 3.13 MG/DL (ref 0.6–1.1)
CREAT SERPL-MCNC: 3.61 MG/DL (ref 0.6–1.1)
CREAT SERPL-MCNC: 4.1 MG/DL (ref 0.6–1.1)
CREAT SERPL-MCNC: 4.49 MG/DL (ref 0.6–1.1)
CREAT SERPL-MCNC: 4.52 MG/DL (ref 0.6–1.1)
CREAT UR-MCNC: 131.8 MG/DL
CREAT UR-MCNC: 157.9 MG/DL
CROSSMATCH: NORMAL
CV BLOOD PRESSURE: ABNORMAL MMHG
CV ECHO HEIGHT: 62 IN
CV ECHO WEIGHT: 212 LBS
DIASTOLIC BLOOD PRESSURE - MUSE: NORMAL
DIGOXIN LEVEL LHE- HISTORICAL: 10.7 NG/ML (ref 0.5–2)
DIGOXIN LEVEL LHE- HISTORICAL: 2.5 NG/ML (ref 0.5–2)
DIGOXIN LEVEL LHE- HISTORICAL: 6.9 NG/ML (ref 0.5–2)
DOP CALC AO PEAK VEL: 261 CM/S
DOP CALC AO VTI: 42.2 CM
DOP CALC LVOT AREA: 3.8 CM2
DOP CALC LVOT DIAMETER: 2.2 CM
DOP CALC LVOT PEAK VEL: 72.7 CM/S
DOP CALC LVOT STROKE VOLUME: 42.9 CM3
DOP CALC MV VTI: 34.2 CM
DOP CALCLVOT PEAK VEL VTI: 11.3 CM
ECHO EJECTION FRACTION ESTIMATED: 45 %
EJECTION FRACTION: 52 % (ref 55–75)
EOSINOPHIL # BLD AUTO: 0.1 THOU/UL (ref 0–0.4)
EOSINOPHIL # BLD AUTO: 0.1 THOU/UL (ref 0–0.4)
EOSINOPHIL # BLD AUTO: 0.3 THOU/UL (ref 0–0.4)
EOSINOPHIL # BLD AUTO: 0.4 THOU/UL (ref 0–0.4)
EOSINOPHIL # BLD AUTO: 0.5 THOU/UL (ref 0–0.4)
EOSINOPHIL # BLD AUTO: 0.7 THOU/UL (ref 0–0.4)
EOSINOPHIL COUNT (ABSOLUTE): 0 THOU/UL (ref 0–0.4)
EOSINOPHIL COUNT (ABSOLUTE): 0.1 THOU/UL (ref 0–0.4)
EOSINOPHIL COUNT (ABSOLUTE): 0.2 THOU/UL (ref 0–0.4)
EOSINOPHIL COUNT (ABSOLUTE): 0.2 THOU/UL (ref 0–0.4)
EOSINOPHIL COUNT (ABSOLUTE): 0.3 THOU/UL (ref 0–0.4)
EOSINOPHIL COUNT (ABSOLUTE): 0.4 THOU/UL (ref 0–0.4)
EOSINOPHIL COUNT (ABSOLUTE): 0.5 THOU/UL (ref 0–0.4)
EOSINOPHIL COUNT (ABSOLUTE): 0.7 THOU/UL (ref 0–0.4)
EOSINOPHIL COUNT (ABSOLUTE): 0.7 THOU/UL (ref 0–0.4)
EOSINOPHIL COUNT (ABSOLUTE): 0.9 THOU/UL (ref 0–0.4)
EOSINOPHIL COUNT (ABSOLUTE): 1.1 THOU/UL (ref 0–0.4)
EOSINOPHIL NFR BLD AUTO: 0 % (ref 0–6)
EOSINOPHIL NFR BLD AUTO: 1 % (ref 0–6)
EOSINOPHIL NFR BLD AUTO: 2 % (ref 0–6)
EOSINOPHIL NFR BLD AUTO: 3 % (ref 0–6)
EOSINOPHIL NFR BLD AUTO: 3 % (ref 0–6)
EOSINOPHIL NFR BLD AUTO: 4 % (ref 0–6)
EOSINOPHIL NFR BLD AUTO: 5 % (ref 0–6)
EOSINOPHIL NFR BLD AUTO: 5 % (ref 0–6)
EOSINOPHIL NFR BLD AUTO: 6 % (ref 0–6)
EOSINOPHIL NFR BLD AUTO: 7 % (ref 0–6)
EOSINOPHIL NFR BLD AUTO: 8 % (ref 0–6)
EOSINOPHIL NFR BLD AUTO: 9 % (ref 0–6)
ERYTHROCYTE [DISTWIDTH] IN BLOOD BY AUTOMATED COUNT: 16.8 % (ref 11–14.5)
ERYTHROCYTE [DISTWIDTH] IN BLOOD BY AUTOMATED COUNT: 16.8 % (ref 11–14.5)
ERYTHROCYTE [DISTWIDTH] IN BLOOD BY AUTOMATED COUNT: 16.9 % (ref 11–14.5)
ERYTHROCYTE [DISTWIDTH] IN BLOOD BY AUTOMATED COUNT: 16.9 % (ref 11–14.5)
ERYTHROCYTE [DISTWIDTH] IN BLOOD BY AUTOMATED COUNT: 17.1 % (ref 11–14.5)
ERYTHROCYTE [DISTWIDTH] IN BLOOD BY AUTOMATED COUNT: 17.1 % (ref 11–14.5)
ERYTHROCYTE [DISTWIDTH] IN BLOOD BY AUTOMATED COUNT: 17.2 % (ref 11–14.5)
ERYTHROCYTE [DISTWIDTH] IN BLOOD BY AUTOMATED COUNT: 17.3 % (ref 11–14.5)
ERYTHROCYTE [DISTWIDTH] IN BLOOD BY AUTOMATED COUNT: 17.3 % (ref 11–14.5)
ERYTHROCYTE [DISTWIDTH] IN BLOOD BY AUTOMATED COUNT: 17.4 % (ref 11–14.5)
ERYTHROCYTE [DISTWIDTH] IN BLOOD BY AUTOMATED COUNT: 17.7 % (ref 11–14.5)
ERYTHROCYTE [DISTWIDTH] IN BLOOD BY AUTOMATED COUNT: 18 % (ref 11–14.5)
ERYTHROCYTE [DISTWIDTH] IN BLOOD BY AUTOMATED COUNT: 18.1 % (ref 11–14.5)
ERYTHROCYTE [DISTWIDTH] IN BLOOD BY AUTOMATED COUNT: 18.5 % (ref 11–14.5)
ERYTHROCYTE [DISTWIDTH] IN BLOOD BY AUTOMATED COUNT: 19.1 % (ref 11–14.5)
ERYTHROCYTE [DISTWIDTH] IN BLOOD BY AUTOMATED COUNT: 19.2 % (ref 11–14.5)
ERYTHROCYTE [DISTWIDTH] IN BLOOD BY AUTOMATED COUNT: 19.4 % (ref 11–14.5)
ERYTHROCYTE [DISTWIDTH] IN BLOOD BY AUTOMATED COUNT: 20.5 % (ref 11–14.5)
ERYTHROCYTE [DISTWIDTH] IN BLOOD BY AUTOMATED COUNT: 20.6 % (ref 11–14.5)
ERYTHROCYTE [DISTWIDTH] IN BLOOD BY AUTOMATED COUNT: 20.8 % (ref 11–14.5)
ERYTHROCYTE [DISTWIDTH] IN BLOOD BY AUTOMATED COUNT: 22.7 % (ref 11–14.5)
ERYTHROCYTE [DISTWIDTH] IN BLOOD BY AUTOMATED COUNT: 24 % (ref 11–14.5)
ERYTHROCYTE [DISTWIDTH] IN BLOOD BY AUTOMATED COUNT: 24.1 % (ref 11–14.5)
ERYTHROCYTE [DISTWIDTH] IN BLOOD BY AUTOMATED COUNT: 24.3 % (ref 11–14.5)
ERYTHROCYTE [DISTWIDTH] IN BLOOD BY AUTOMATED COUNT: 24.5 % (ref 11–14.5)
ERYTHROCYTE [DISTWIDTH] IN BLOOD BY AUTOMATED COUNT: 24.5 % (ref 11–14.5)
ERYTHROCYTE [DISTWIDTH] IN BLOOD BY AUTOMATED COUNT: 25.2 % (ref 11–14.5)
ERYTHROCYTE [DISTWIDTH] IN BLOOD BY AUTOMATED COUNT: 25.2 % (ref 11–14.5)
ERYTHROCYTE [DISTWIDTH] IN BLOOD BY AUTOMATED COUNT: 25.8 % (ref 11–14.5)
ERYTHROCYTE [DISTWIDTH] IN BLOOD BY AUTOMATED COUNT: 25.8 % (ref 11–14.5)
ERYTHROCYTE [DISTWIDTH] IN BLOOD BY AUTOMATED COUNT: 25.9 % (ref 11–14.5)
ERYTHROCYTE [DISTWIDTH] IN BLOOD BY AUTOMATED COUNT: 26.2 % (ref 11–14.5)
ERYTHROCYTE [DISTWIDTH] IN BLOOD BY AUTOMATED COUNT: 26.3 % (ref 11–14.5)
FASTING STATUS PATIENT QL REPORTED: NO
FASTING STATUS PATIENT QL REPORTED: YES
FLOW: 6 LPM
FLOW: 8 LPM
FRACTIONAL SHORTENING: 24.4 % (ref 28–44)
GFR SERPL CREATININE-BSD FRML MDRD: 10 ML/MIN/1.73M2
GFR SERPL CREATININE-BSD FRML MDRD: 10 ML/MIN/1.73M2
GFR SERPL CREATININE-BSD FRML MDRD: 11 ML/MIN/1.73M2
GFR SERPL CREATININE-BSD FRML MDRD: 12 ML/MIN/1.73M2
GFR SERPL CREATININE-BSD FRML MDRD: 15 ML/MIN/1.73M2
GFR SERPL CREATININE-BSD FRML MDRD: 16 ML/MIN/1.73M2
GFR SERPL CREATININE-BSD FRML MDRD: 18 ML/MIN/1.73M2
GFR SERPL CREATININE-BSD FRML MDRD: 18 ML/MIN/1.73M2
GFR SERPL CREATININE-BSD FRML MDRD: 19 ML/MIN/1.73M2
GFR SERPL CREATININE-BSD FRML MDRD: 19 ML/MIN/1.73M2
GFR SERPL CREATININE-BSD FRML MDRD: 26 ML/MIN/1.73M2
GFR SERPL CREATININE-BSD FRML MDRD: 37 ML/MIN/1.73M2
GFR SERPL CREATININE-BSD FRML MDRD: 39 ML/MIN/1.73M2
GFR SERPL CREATININE-BSD FRML MDRD: 47 ML/MIN/1.73M2
GFR SERPL CREATININE-BSD FRML MDRD: 50 ML/MIN/1.73M2
GFR SERPL CREATININE-BSD FRML MDRD: 58 ML/MIN/1.73M2
GFR SERPL CREATININE-BSD FRML MDRD: 59 ML/MIN/1.73M2
GFR SERPL CREATININE-BSD FRML MDRD: >60 ML/MIN/1.73M2
GLUCOSE BLD-MCNC: 104 MG/DL (ref 70–125)
GLUCOSE BLD-MCNC: 106 MG/DL (ref 70–125)
GLUCOSE BLD-MCNC: 107 MG/DL (ref 70–125)
GLUCOSE BLD-MCNC: 107 MG/DL (ref 70–125)
GLUCOSE BLD-MCNC: 110 MG/DL (ref 70–125)
GLUCOSE BLD-MCNC: 111 MG/DL (ref 70–125)
GLUCOSE BLD-MCNC: 111 MG/DL (ref 70–125)
GLUCOSE BLD-MCNC: 113 MG/DL (ref 70–125)
GLUCOSE BLD-MCNC: 120 MG/DL (ref 70–125)
GLUCOSE BLD-MCNC: 121 MG/DL (ref 70–125)
GLUCOSE BLD-MCNC: 121 MG/DL (ref 70–125)
GLUCOSE BLD-MCNC: 124 MG/DL (ref 70–125)
GLUCOSE BLD-MCNC: 124 MG/DL (ref 70–125)
GLUCOSE BLD-MCNC: 128 MG/DL (ref 70–125)
GLUCOSE BLD-MCNC: 130 MG/DL (ref 70–125)
GLUCOSE BLD-MCNC: 135 MG/DL (ref 70–125)
GLUCOSE BLD-MCNC: 140 MG/DL (ref 70–125)
GLUCOSE BLD-MCNC: 142 MG/DL (ref 70–125)
GLUCOSE BLD-MCNC: 148 MG/DL (ref 70–125)
GLUCOSE BLD-MCNC: 149 MG/DL (ref 70–125)
GLUCOSE BLD-MCNC: 164 MG/DL (ref 70–125)
GLUCOSE BLD-MCNC: 166 MG/DL (ref 70–125)
GLUCOSE BLD-MCNC: 166 MG/DL (ref 70–125)
GLUCOSE BLD-MCNC: 170 MG/DL (ref 70–125)
GLUCOSE BLD-MCNC: 181 MG/DL (ref 70–125)
GLUCOSE BLD-MCNC: 202 MG/DL (ref 70–125)
GLUCOSE BLD-MCNC: 207 MG/DL (ref 70–125)
GLUCOSE BLD-MCNC: 233 MG/DL (ref 70–125)
GLUCOSE BLD-MCNC: 63 MG/DL (ref 70–125)
GLUCOSE BLD-MCNC: 75 MG/DL (ref 70–125)
GLUCOSE BLD-MCNC: 75 MG/DL (ref 70–125)
GLUCOSE BLD-MCNC: 77 MG/DL (ref 70–125)
GLUCOSE BLD-MCNC: 80 MG/DL (ref 70–125)
GLUCOSE BLD-MCNC: 81 MG/DL (ref 70–125)
GLUCOSE BLD-MCNC: 82 MG/DL (ref 70–125)
GLUCOSE BLD-MCNC: 93 MG/DL (ref 70–125)
GLUCOSE BLD-MCNC: 94 MG/DL (ref 70–125)
GLUCOSE BLD-MCNC: 95 MG/DL (ref 70–125)
GLUCOSE BLD-MCNC: 99 MG/DL (ref 70–125)
GLUCOSE BLDC GLUCOMTR-MCNC: 100 MG/DL (ref 70–139)
GLUCOSE BLDC GLUCOMTR-MCNC: 101 MG/DL (ref 70–139)
GLUCOSE BLDC GLUCOMTR-MCNC: 103 MG/DL (ref 70–139)
GLUCOSE BLDC GLUCOMTR-MCNC: 104 MG/DL (ref 70–139)
GLUCOSE BLDC GLUCOMTR-MCNC: 104 MG/DL (ref 70–139)
GLUCOSE BLDC GLUCOMTR-MCNC: 106 MG/DL (ref 70–139)
GLUCOSE BLDC GLUCOMTR-MCNC: 106 MG/DL (ref 70–139)
GLUCOSE BLDC GLUCOMTR-MCNC: 107 MG/DL (ref 70–139)
GLUCOSE BLDC GLUCOMTR-MCNC: 109 MG/DL (ref 70–139)
GLUCOSE BLDC GLUCOMTR-MCNC: 109 MG/DL (ref 70–139)
GLUCOSE BLDC GLUCOMTR-MCNC: 110 MG/DL (ref 70–139)
GLUCOSE BLDC GLUCOMTR-MCNC: 111 MG/DL (ref 70–139)
GLUCOSE BLDC GLUCOMTR-MCNC: 113 MG/DL (ref 70–139)
GLUCOSE BLDC GLUCOMTR-MCNC: 114 MG/DL (ref 70–139)
GLUCOSE BLDC GLUCOMTR-MCNC: 115 MG/DL (ref 70–139)
GLUCOSE BLDC GLUCOMTR-MCNC: 116 MG/DL (ref 70–139)
GLUCOSE BLDC GLUCOMTR-MCNC: 117 MG/DL (ref 70–139)
GLUCOSE BLDC GLUCOMTR-MCNC: 120 MG/DL (ref 70–139)
GLUCOSE BLDC GLUCOMTR-MCNC: 121 MG/DL (ref 70–139)
GLUCOSE BLDC GLUCOMTR-MCNC: 123 MG/DL (ref 70–139)
GLUCOSE BLDC GLUCOMTR-MCNC: 124 MG/DL (ref 70–139)
GLUCOSE BLDC GLUCOMTR-MCNC: 125 MG/DL (ref 70–139)
GLUCOSE BLDC GLUCOMTR-MCNC: 125 MG/DL (ref 70–139)
GLUCOSE BLDC GLUCOMTR-MCNC: 126 MG/DL (ref 70–139)
GLUCOSE BLDC GLUCOMTR-MCNC: 126 MG/DL (ref 70–139)
GLUCOSE BLDC GLUCOMTR-MCNC: 127 MG/DL (ref 70–139)
GLUCOSE BLDC GLUCOMTR-MCNC: 129 MG/DL (ref 70–139)
GLUCOSE BLDC GLUCOMTR-MCNC: 130 MG/DL (ref 70–139)
GLUCOSE BLDC GLUCOMTR-MCNC: 131 MG/DL (ref 70–139)
GLUCOSE BLDC GLUCOMTR-MCNC: 132 MG/DL (ref 70–139)
GLUCOSE BLDC GLUCOMTR-MCNC: 134 MG/DL (ref 70–139)
GLUCOSE BLDC GLUCOMTR-MCNC: 136 MG/DL (ref 70–139)
GLUCOSE BLDC GLUCOMTR-MCNC: 137 MG/DL (ref 70–139)
GLUCOSE BLDC GLUCOMTR-MCNC: 137 MG/DL (ref 70–139)
GLUCOSE BLDC GLUCOMTR-MCNC: 138 MG/DL (ref 70–139)
GLUCOSE BLDC GLUCOMTR-MCNC: 139 MG/DL (ref 70–139)
GLUCOSE BLDC GLUCOMTR-MCNC: 139 MG/DL (ref 70–139)
GLUCOSE BLDC GLUCOMTR-MCNC: 140 MG/DL (ref 70–139)
GLUCOSE BLDC GLUCOMTR-MCNC: 141 MG/DL (ref 70–139)
GLUCOSE BLDC GLUCOMTR-MCNC: 141 MG/DL (ref 70–139)
GLUCOSE BLDC GLUCOMTR-MCNC: 142 MG/DL (ref 70–139)
GLUCOSE BLDC GLUCOMTR-MCNC: 142 MG/DL (ref 70–139)
GLUCOSE BLDC GLUCOMTR-MCNC: 143 MG/DL (ref 70–139)
GLUCOSE BLDC GLUCOMTR-MCNC: 143 MG/DL (ref 70–139)
GLUCOSE BLDC GLUCOMTR-MCNC: 144 MG/DL (ref 70–139)
GLUCOSE BLDC GLUCOMTR-MCNC: 144 MG/DL (ref 70–139)
GLUCOSE BLDC GLUCOMTR-MCNC: 145 MG/DL (ref 70–139)
GLUCOSE BLDC GLUCOMTR-MCNC: 146 MG/DL (ref 70–139)
GLUCOSE BLDC GLUCOMTR-MCNC: 146 MG/DL (ref 70–139)
GLUCOSE BLDC GLUCOMTR-MCNC: 147 MG/DL (ref 70–139)
GLUCOSE BLDC GLUCOMTR-MCNC: 147 MG/DL (ref 70–139)
GLUCOSE BLDC GLUCOMTR-MCNC: 148 MG/DL (ref 70–139)
GLUCOSE BLDC GLUCOMTR-MCNC: 148 MG/DL (ref 70–139)
GLUCOSE BLDC GLUCOMTR-MCNC: 149 MG/DL (ref 70–139)
GLUCOSE BLDC GLUCOMTR-MCNC: 150 MG/DL (ref 70–139)
GLUCOSE BLDC GLUCOMTR-MCNC: 151 MG/DL (ref 70–139)
GLUCOSE BLDC GLUCOMTR-MCNC: 154 MG/DL (ref 70–139)
GLUCOSE BLDC GLUCOMTR-MCNC: 155 MG/DL (ref 70–139)
GLUCOSE BLDC GLUCOMTR-MCNC: 157 MG/DL (ref 70–139)
GLUCOSE BLDC GLUCOMTR-MCNC: 158 MG/DL (ref 70–139)
GLUCOSE BLDC GLUCOMTR-MCNC: 170 MG/DL (ref 70–139)
GLUCOSE BLDC GLUCOMTR-MCNC: 176 MG/DL (ref 70–139)
GLUCOSE BLDC GLUCOMTR-MCNC: 180 MG/DL (ref 70–139)
GLUCOSE BLDC GLUCOMTR-MCNC: 185 MG/DL (ref 70–139)
GLUCOSE BLDC GLUCOMTR-MCNC: 186 MG/DL (ref 70–139)
GLUCOSE BLDC GLUCOMTR-MCNC: 188 MG/DL (ref 70–139)
GLUCOSE BLDC GLUCOMTR-MCNC: 191 MG/DL (ref 70–139)
GLUCOSE BLDC GLUCOMTR-MCNC: 198 MG/DL (ref 70–139)
GLUCOSE BLDC GLUCOMTR-MCNC: 199 MG/DL (ref 70–139)
GLUCOSE BLDC GLUCOMTR-MCNC: 271 MG/DL (ref 70–139)
GLUCOSE BLDC GLUCOMTR-MCNC: 64 MG/DL (ref 70–139)
GLUCOSE BLDC GLUCOMTR-MCNC: 67 MG/DL (ref 70–139)
GLUCOSE BLDC GLUCOMTR-MCNC: 67 MG/DL (ref 70–139)
GLUCOSE BLDC GLUCOMTR-MCNC: 72 MG/DL (ref 70–139)
GLUCOSE BLDC GLUCOMTR-MCNC: 72 MG/DL (ref 70–139)
GLUCOSE BLDC GLUCOMTR-MCNC: 74 MG/DL (ref 70–139)
GLUCOSE BLDC GLUCOMTR-MCNC: 75 MG/DL (ref 70–139)
GLUCOSE BLDC GLUCOMTR-MCNC: 75 MG/DL (ref 70–139)
GLUCOSE BLDC GLUCOMTR-MCNC: 77 MG/DL (ref 70–139)
GLUCOSE BLDC GLUCOMTR-MCNC: 78 MG/DL (ref 70–139)
GLUCOSE BLDC GLUCOMTR-MCNC: 81 MG/DL (ref 70–139)
GLUCOSE BLDC GLUCOMTR-MCNC: 83 MG/DL (ref 70–139)
GLUCOSE BLDC GLUCOMTR-MCNC: 85 MG/DL (ref 70–139)
GLUCOSE BLDC GLUCOMTR-MCNC: 85 MG/DL (ref 70–139)
GLUCOSE BLDC GLUCOMTR-MCNC: 87 MG/DL (ref 70–139)
GLUCOSE BLDC GLUCOMTR-MCNC: 87 MG/DL (ref 70–139)
GLUCOSE BLDC GLUCOMTR-MCNC: 90 MG/DL (ref 70–139)
GLUCOSE BLDC GLUCOMTR-MCNC: 91 MG/DL (ref 70–139)
GLUCOSE BLDC GLUCOMTR-MCNC: 91 MG/DL (ref 70–139)
GLUCOSE BLDC GLUCOMTR-MCNC: 92 MG/DL (ref 70–139)
GLUCOSE BLDC GLUCOMTR-MCNC: 93 MG/DL (ref 70–139)
GLUCOSE BLDC GLUCOMTR-MCNC: 94 MG/DL (ref 70–139)
GLUCOSE BLDC GLUCOMTR-MCNC: 95 MG/DL (ref 70–139)
GLUCOSE BLDC GLUCOMTR-MCNC: 96 MG/DL (ref 70–139)
GLUCOSE BLDC GLUCOMTR-MCNC: 97 MG/DL (ref 70–139)
GLUCOSE BLDC GLUCOMTR-MCNC: 98 MG/DL (ref 70–139)
GLUCOSE BLDC GLUCOMTR-MCNC: 99 MG/DL (ref 70–139)
GLUCOSE BLDC GLUCOMTR-MCNC: 99 MG/DL (ref 70–139)
GLUCOSE UR STRIP-MCNC: NEGATIVE MG/DL
GRAM STAIN RESULT: ABNORMAL
GRAM STAIN RESULT: ABNORMAL
GRAM STAIN RESULT: NORMAL
GRAM STAIN RESULT: NORMAL
HCO3, ARTERIAL CALC - HISTORICAL: 16.3 MMOL/L (ref 23–29)
HCO3, ARTERIAL CALC - HISTORICAL: 17.8 MMOL/L (ref 23–29)
HCO3, ARTERIAL CALC - HISTORICAL: 21.9 MMOL/L (ref 23–29)
HCO3, ARTERIAL CALC - HISTORICAL: 22.8 MMOL/L (ref 23–29)
HCO3, VENOUS, CALC - HISTORICAL: 15.7 MMOL/L (ref 24–30)
HCT VFR BLD AUTO: 20.9 % (ref 35–47)
HCT VFR BLD AUTO: 23.7 % (ref 35–47)
HCT VFR BLD AUTO: 24.5 % (ref 35–47)
HCT VFR BLD AUTO: 24.5 % (ref 35–47)
HCT VFR BLD AUTO: 24.6 % (ref 35–47)
HCT VFR BLD AUTO: 24.7 % (ref 35–47)
HCT VFR BLD AUTO: 24.8 % (ref 35–47)
HCT VFR BLD AUTO: 24.9 % (ref 35–47)
HCT VFR BLD AUTO: 25 % (ref 35–47)
HCT VFR BLD AUTO: 25.2 % (ref 35–47)
HCT VFR BLD AUTO: 25.2 % (ref 35–47)
HCT VFR BLD AUTO: 25.4 % (ref 35–47)
HCT VFR BLD AUTO: 25.4 % (ref 35–47)
HCT VFR BLD AUTO: 25.5 % (ref 35–47)
HCT VFR BLD AUTO: 25.5 % (ref 35–47)
HCT VFR BLD AUTO: 25.7 % (ref 35–47)
HCT VFR BLD AUTO: 25.9 % (ref 35–47)
HCT VFR BLD AUTO: 26 % (ref 35–47)
HCT VFR BLD AUTO: 26.1 % (ref 35–47)
HCT VFR BLD AUTO: 27 % (ref 35–47)
HCT VFR BLD AUTO: 27 % (ref 35–47)
HCT VFR BLD AUTO: 27.1 % (ref 35–47)
HCT VFR BLD AUTO: 28.3 % (ref 35–47)
HCT VFR BLD AUTO: 28.5 % (ref 35–47)
HCT VFR BLD AUTO: 29.7 % (ref 35–47)
HCT VFR BLD AUTO: 30.4 % (ref 35–47)
HCT VFR BLD AUTO: 33.3 % (ref 35–47)
HCT VFR BLD AUTO: 36.5 % (ref 35–47)
HCT VFR BLD AUTO: 37.3 % (ref 35–47)
HCT VFR BLD AUTO: 42.4 % (ref 35–47)
HGB BLD-MCNC: 11.1 G/DL (ref 12–16)
HGB BLD-MCNC: 11.2 G/DL (ref 12–16)
HGB BLD-MCNC: 12.6 G/DL (ref 12–16)
HGB BLD-MCNC: 6.1 G/DL (ref 12–16)
HGB BLD-MCNC: 6.6 G/DL (ref 12–16)
HGB BLD-MCNC: 7.4 G/DL (ref 12–16)
HGB BLD-MCNC: 7.4 G/DL (ref 12–16)
HGB BLD-MCNC: 7.5 G/DL (ref 12–16)
HGB BLD-MCNC: 7.5 G/DL (ref 12–16)
HGB BLD-MCNC: 7.6 G/DL (ref 12–16)
HGB BLD-MCNC: 7.6 G/DL (ref 12–16)
HGB BLD-MCNC: 7.7 G/DL (ref 12–16)
HGB BLD-MCNC: 7.8 G/DL (ref 12–16)
HGB BLD-MCNC: 7.8 G/DL (ref 12–16)
HGB BLD-MCNC: 7.9 G/DL (ref 12–16)
HGB BLD-MCNC: 7.9 G/DL (ref 12–16)
HGB BLD-MCNC: 8.1 G/DL (ref 12–16)
HGB BLD-MCNC: 8.1 G/DL (ref 12–16)
HGB BLD-MCNC: 8.2 G/DL (ref 12–16)
HGB BLD-MCNC: 8.2 G/DL (ref 12–16)
HGB BLD-MCNC: 8.3 G/DL (ref 12–16)
HGB BLD-MCNC: 8.3 G/DL (ref 12–16)
HGB BLD-MCNC: 8.4 G/DL (ref 12–16)
HGB BLD-MCNC: 8.6 G/DL (ref 12–16)
HGB BLD-MCNC: 8.7 G/DL (ref 12–16)
HGB BLD-MCNC: 8.9 G/DL (ref 12–16)
HGB BLD-MCNC: 9 G/DL (ref 12–16)
HGB BLD-MCNC: 9.1 G/DL (ref 12–16)
HGB BLD-MCNC: 9.1 G/DL (ref 12–16)
HGB BLD-MCNC: 9.3 G/DL (ref 12–16)
HGB BLD-MCNC: 9.8 G/DL (ref 12–16)
HGB UR QL STRIP: ABNORMAL
HGB UR QL STRIP: NEGATIVE
HYALINE CASTS #/AREA URNS LPF: ABNORMAL LPF
HYALINE CASTS #/AREA URNS LPF: ABNORMAL LPF
IMM GRANULOCYTES # BLD: 0.2 THOU/UL
IMM GRANULOCYTES # BLD: 0.2 THOU/UL
IMM GRANULOCYTES # BLD: 0.3 THOU/UL
IMM GRANULOCYTES # BLD: 0.3 THOU/UL
IMM GRANULOCYTES NFR BLD: 1 %
IMM GRANULOCYTES NFR BLD: 1 %
IMM GRANULOCYTES NFR BLD: 2 %
IMM GRANULOCYTES NFR BLD: 3 %
IMMATURE GRANULOCYTE % - MAN (DIFF): 0 %
IMMATURE GRANULOCYTE % - MAN (DIFF): 1 %
IMMATURE GRANULOCYTE % - MAN (DIFF): 2 %
IMMATURE GRANULOCYTE % - MAN (DIFF): 2 %
IMMATURE GRANULOCYTE ABSOLUTE - MAN (DIFF): 0 THOU/UL
IMMATURE GRANULOCYTE ABSOLUTE - MAN (DIFF): 0.1 THOU/UL
IMMATURE GRANULOCYTE ABSOLUTE - MAN (DIFF): 0.1 THOU/UL
IMMATURE GRANULOCYTE ABSOLUTE - MAN (DIFF): 0.2 THOU/UL
INR PPP: 1.59 (ref 0.9–1.1)
INR PPP: 1.75 (ref 0.9–1.1)
INR PPP: 1.79 (ref 0.9–1.1)
INR PPP: 1.86 (ref 0.9–1.1)
INR PPP: 1.89 (ref 0.9–1.1)
INR PPP: 1.9 (ref 0.9–1.1)
INR PPP: 1.92 (ref 0.9–1.1)
INR PPP: 1.94 (ref 0.9–1.1)
INR PPP: 1.97 (ref 0.9–1.1)
INR PPP: 2.07 (ref 0.9–1.1)
INR PPP: 2.09 (ref 0.9–1.1)
INR PPP: 2.14 (ref 0.9–1.1)
INR PPP: 2.14 (ref 0.9–1.1)
INR PPP: 2.16 (ref 0.9–1.1)
INR PPP: 2.17 (ref 0.9–1.1)
INR PPP: 2.21 (ref 0.9–1.1)
INR PPP: 2.29 (ref 0.9–1.1)
INR PPP: 2.3 (ref 0.9–1.1)
INR PPP: 2.36 (ref 0.9–1.1)
INR PPP: 2.4 (ref 0.9–1.1)
INR PPP: 2.45 (ref 0.9–1.1)
INR PPP: 2.49 (ref 0.9–1.1)
INR PPP: 2.5 (ref 0.9–1.1)
INR PPP: 2.52 (ref 0.9–1.1)
INR PPP: 2.57 (ref 0.9–1.1)
INR PPP: 2.65 (ref 0.9–1.1)
INR PPP: 2.7 (ref 0.9–1.1)
INR PPP: 2.7 (ref 0.9–1.1)
INR PPP: 2.71 (ref 0.9–1.1)
INR PPP: 2.83 (ref 0.9–1.1)
INR PPP: 2.85 (ref 0.9–1.1)
INR PPP: 2.86 (ref 0.9–1.1)
INR PPP: 3.01 (ref 0.9–1.1)
INR PPP: 3.07 (ref 0.9–1.1)
INR PPP: 3.62 (ref 0.9–1.1)
INR PPP: 4.23 (ref 0.9–1.1)
INR PPP: 4.81 (ref 0.9–1.1)
INR PPP: 4.97 (ref 0.9–1.1)
INR PPP: 5.06 (ref 0.9–1.1)
INR PPP: 5.21 (ref 0.9–1.1)
INR PPP: 5.25 (ref 0.9–1.1)
INR PPP: 5.59 (ref 0.9–1.1)
INR PPP: >13.5 (ref 0.9–1.1)
INTERPRETATION ECG - MUSE: NORMAL
INTERVENTRICULAR SEPTUM IN END DIASTOLE: 0.88 CM (ref 0.6–0.9)
ION CA PH 7.4: 1.03 MMOL/L (ref 1.11–1.3)
ISSUE DATE AND TIME: NORMAL
IVS/PW RATIO: 0.8
KETONES UR STRIP-MCNC: ABNORMAL MG/DL
LA AREA 1: 27.8 CM2
LA AREA 2: 22.8 CM2
LAB AP CHARGES (HE HISTORICAL CONVERSION): NORMAL
LACTATE SERPL-SCNC: 0.8 MMOL/L (ref 0.7–2)
LACTATE SERPL-SCNC: 1.6 MMOL/L (ref 0.7–2)
LACTATE SERPL-SCNC: 1.6 MMOL/L (ref 0.7–2)
LACTATE SERPL-SCNC: 1.7 MMOL/L (ref 0.7–2)
LACTATE SERPL-SCNC: 1.7 MMOL/L (ref 0.7–2)
LACTATE SERPL-SCNC: 1.9 MMOL/L (ref 0.7–2)
LACTATE SERPL-SCNC: 1.9 MMOL/L (ref 0.7–2)
LACTATE SERPL-SCNC: 2 MMOL/L (ref 0.7–2)
LACTATE SERPL-SCNC: 2 MMOL/L (ref 0.7–2)
LACTATE SERPL-SCNC: 2.1 MMOL/L (ref 0.7–2)
LACTATE SERPL-SCNC: 2.4 MMOL/L (ref 0.7–2)
LACTATE SERPL-SCNC: 2.5 MMOL/L (ref 0.7–2)
LACTATE SERPL-SCNC: 2.6 MMOL/L (ref 0.7–2)
LACTATE SERPL-SCNC: 2.6 MMOL/L (ref 0.7–2)
LACTATE SERPL-SCNC: 2.7 MMOL/L (ref 0.7–2)
LACTATE SERPL-SCNC: 2.7 MMOL/L (ref 0.7–2)
LACTATE SERPL-SCNC: 3.6 MMOL/L (ref 0.7–2)
LACTATE SERPL-SCNC: 4.3 MMOL/L (ref 0.7–2)
LDH SERPL L TO P-CCNC: 1915 U/L (ref 125–220)
LEFT ATRIUM LENGTH: 6.3 CM
LEFT ATRIUM SIZE: 4.4 CM
LEFT ATRIUM TO AORTIC ROOT RATIO: 1.33 NO UNITS
LEFT ATRIUM VOLUME INDEX: 41.9 ML/M2
LEFT ATRIUM VOLUME: 85.5 ML
LEFT VENTRICLE CARDIAC INDEX: 1.4 L/MIN/M2
LEFT VENTRICLE CARDIAC OUTPUT: 2.9 L/MIN
LEFT VENTRICLE DIASTOLIC VOLUME INDEX: 50 CM3/M2 (ref 29–61)
LEFT VENTRICLE DIASTOLIC VOLUME: 102 CM3 (ref 46–106)
LEFT VENTRICLE HEART RATE: 67 BPM
LEFT VENTRICLE MASS INDEX: 68.1 G/M2
LEFT VENTRICLE SYSTOLIC VOLUME INDEX: 24.1 CM3/M2 (ref 8–24)
LEFT VENTRICLE SYSTOLIC VOLUME: 49.1 CM3 (ref 14–42)
LEFT VENTRICULAR INTERNAL DIMENSION IN DIASTOLE: 4.34 CM (ref 3.8–5.2)
LEFT VENTRICULAR INTERNAL DIMENSION IN SYSTOLE: 3.28 CM (ref 2.2–3.5)
LEFT VENTRICULAR MASS: 138.9 G
LEFT VENTRICULAR OUTFLOW TRACT MEAN GRADIENT: 1 MMHG
LEFT VENTRICULAR OUTFLOW TRACT MEAN VELOCITY: 49.6 CM/S
LEFT VENTRICULAR OUTFLOW TRACT PEAK GRADIENT: 2 MMHG
LEFT VENTRICULAR POSTERIOR WALL IN END DIASTOLE: 1.06 CM (ref 0.6–0.9)
LEUKOCYTE ESTERASE UR QL STRIP: ABNORMAL
LEUKOCYTE ESTERASE UR QL STRIP: NEGATIVE
LIPASE SERPL-CCNC: 26 U/L (ref 0–52)
LV STROKE VOLUME INDEX: 21 ML/M2
LYMPHOCYTES # BLD AUTO: 0.4 THOU/UL (ref 0.8–4.4)
LYMPHOCYTES # BLD AUTO: 0.6 THOU/UL (ref 0.8–4.4)
LYMPHOCYTES # BLD AUTO: 0.7 THOU/UL (ref 0.8–4.4)
LYMPHOCYTES # BLD AUTO: 0.8 THOU/UL (ref 0.8–4.4)
LYMPHOCYTES # BLD AUTO: 0.8 THOU/UL (ref 0.8–4.4)
LYMPHOCYTES # BLD AUTO: 0.9 THOU/UL (ref 0.8–4.4)
LYMPHOCYTES # BLD AUTO: 1.2 THOU/UL (ref 0.8–4.4)
LYMPHOCYTES # BLD AUTO: 1.3 THOU/UL (ref 0.8–4.4)
LYMPHOCYTES # BLD AUTO: 1.4 THOU/UL (ref 0.8–4.4)
LYMPHOCYTES # BLD AUTO: 1.5 THOU/UL (ref 0.8–4.4)
LYMPHOCYTES # BLD AUTO: 1.6 THOU/UL (ref 0.8–4.4)
LYMPHOCYTES # BLD AUTO: 1.7 THOU/UL (ref 0.8–4.4)
LYMPHOCYTES # BLD AUTO: 1.7 THOU/UL (ref 0.8–4.4)
LYMPHOCYTES # BLD AUTO: 1.8 THOU/UL (ref 0.8–4.4)
LYMPHOCYTES # BLD AUTO: 2.2 THOU/UL (ref 0.8–4.4)
LYMPHOCYTES NFR BLD AUTO: 10 % (ref 20–40)
LYMPHOCYTES NFR BLD AUTO: 12 % (ref 20–40)
LYMPHOCYTES NFR BLD AUTO: 13 % (ref 20–40)
LYMPHOCYTES NFR BLD AUTO: 13 % (ref 20–40)
LYMPHOCYTES NFR BLD AUTO: 14 % (ref 20–40)
LYMPHOCYTES NFR BLD AUTO: 15 % (ref 20–40)
LYMPHOCYTES NFR BLD AUTO: 15 % (ref 20–40)
LYMPHOCYTES NFR BLD AUTO: 16 % (ref 20–40)
LYMPHOCYTES NFR BLD AUTO: 21 % (ref 20–40)
LYMPHOCYTES NFR BLD AUTO: 3 % (ref 20–40)
LYMPHOCYTES NFR BLD AUTO: 5 % (ref 20–40)
LYMPHOCYTES NFR BLD AUTO: 6 % (ref 20–40)
LYMPHOCYTES NFR BLD AUTO: 8 % (ref 20–40)
LYMPHOCYTES NFR BLD AUTO: 8 % (ref 20–40)
LYMPHOCYTES NFR BLD AUTO: 9 % (ref 20–40)
MAGNESIUM SERPL-MCNC: 1.3 MG/DL (ref 1.8–2.6)
MAGNESIUM SERPL-MCNC: 1.3 MG/DL (ref 1.8–2.6)
MAGNESIUM SERPL-MCNC: 1.5 MG/DL (ref 1.8–2.6)
MAGNESIUM SERPL-MCNC: 1.6 MG/DL (ref 1.8–2.6)
MAGNESIUM SERPL-MCNC: 1.7 MG/DL (ref 1.8–2.6)
MAGNESIUM SERPL-MCNC: 1.8 MG/DL (ref 1.8–2.6)
MAGNESIUM SERPL-MCNC: 1.8 MG/DL (ref 1.8–2.6)
MAGNESIUM SERPL-MCNC: 1.9 MG/DL (ref 1.8–2.6)
MAGNESIUM SERPL-MCNC: 2 MG/DL (ref 1.8–2.6)
MAGNESIUM SERPL-MCNC: 2 MG/DL (ref 1.8–2.6)
MAGNESIUM SERPL-MCNC: 2.3 MG/DL (ref 1.8–2.6)
MAGNESIUM SERPL-MCNC: 2.3 MG/DL (ref 1.8–2.6)
MAGNESIUM SERPL-MCNC: 2.4 MG/DL (ref 1.8–2.6)
MAGNESIUM SERPL-MCNC: 2.5 MG/DL (ref 1.8–2.6)
MAGNESIUM SERPL-MCNC: <0.8 MG/DL (ref 1.8–2.6)
MANUAL NRBC PER 100 CELLS: 14
MANUAL NRBC PER 100 CELLS: 15
MANUAL NRBC PER 100 CELLS: 19
MANUAL NRBC PER 100 CELLS: 2
MANUAL NRBC PER 100 CELLS: 3
MANUAL NRBC PER 100 CELLS: 3
MANUAL NRBC PER 100 CELLS: 5
MANUAL NRBC PER 100 CELLS: 6
MANUAL NRBC PER 100 CELLS: 6
MANUAL NRBC PER 100 CELLS: 7
MANUAL NRBC PER 100 CELLS: 7
MANUAL NRBC PER 100 CELLS: 8
MANUAL NRBC PER 100 CELLS: 8
MANUAL NRBC PER 100 CELLS: 9
MCH RBC QN AUTO: 28.7 PG (ref 27–34)
MCH RBC QN AUTO: 29.2 PG (ref 27–34)
MCH RBC QN AUTO: 29.3 PG (ref 27–34)
MCH RBC QN AUTO: 29.4 PG (ref 27–34)
MCH RBC QN AUTO: 29.5 PG (ref 27–34)
MCH RBC QN AUTO: 29.5 PG (ref 27–34)
MCH RBC QN AUTO: 29.7 PG (ref 27–34)
MCH RBC QN AUTO: 29.8 PG (ref 27–34)
MCH RBC QN AUTO: 29.9 PG (ref 27–34)
MCH RBC QN AUTO: 30 PG (ref 27–34)
MCH RBC QN AUTO: 30 PG (ref 27–34)
MCH RBC QN AUTO: 30.1 PG (ref 27–34)
MCH RBC QN AUTO: 30.2 PG (ref 27–34)
MCH RBC QN AUTO: 30.2 PG (ref 27–34)
MCH RBC QN AUTO: 30.3 PG (ref 27–34)
MCH RBC QN AUTO: 30.4 PG (ref 27–34)
MCH RBC QN AUTO: 30.5 PG (ref 27–34)
MCH RBC QN AUTO: 30.6 PG (ref 27–34)
MCH RBC QN AUTO: 30.6 PG (ref 27–34)
MCH RBC QN AUTO: 30.7 PG (ref 27–34)
MCH RBC QN AUTO: 30.8 PG (ref 27–34)
MCH RBC QN AUTO: 30.8 PG (ref 27–34)
MCH RBC QN AUTO: 31.4 PG (ref 27–34)
MCH RBC QN AUTO: 31.5 PG (ref 27–34)
MCH RBC QN AUTO: 31.8 PG (ref 27–34)
MCH RBC QN AUTO: 31.9 PG (ref 27–34)
MCHC RBC AUTO-ENTMCNC: 29.4 G/DL (ref 32–36)
MCHC RBC AUTO-ENTMCNC: 29.6 G/DL (ref 32–36)
MCHC RBC AUTO-ENTMCNC: 29.7 G/DL (ref 32–36)
MCHC RBC AUTO-ENTMCNC: 29.7 G/DL (ref 32–36)
MCHC RBC AUTO-ENTMCNC: 29.8 G/DL (ref 32–36)
MCHC RBC AUTO-ENTMCNC: 29.8 G/DL (ref 32–36)
MCHC RBC AUTO-ENTMCNC: 29.9 G/DL (ref 32–36)
MCHC RBC AUTO-ENTMCNC: 29.9 G/DL (ref 32–36)
MCHC RBC AUTO-ENTMCNC: 30 G/DL (ref 32–36)
MCHC RBC AUTO-ENTMCNC: 30.2 G/DL (ref 32–36)
MCHC RBC AUTO-ENTMCNC: 30.4 G/DL (ref 32–36)
MCHC RBC AUTO-ENTMCNC: 30.5 G/DL (ref 32–36)
MCHC RBC AUTO-ENTMCNC: 30.5 G/DL (ref 32–36)
MCHC RBC AUTO-ENTMCNC: 30.6 G/DL (ref 32–36)
MCHC RBC AUTO-ENTMCNC: 30.7 G/DL (ref 32–36)
MCHC RBC AUTO-ENTMCNC: 31 G/DL (ref 32–36)
MCHC RBC AUTO-ENTMCNC: 31.1 G/DL (ref 32–36)
MCHC RBC AUTO-ENTMCNC: 31.3 G/DL (ref 32–36)
MCHC RBC AUTO-ENTMCNC: 31.4 G/DL (ref 32–36)
MCHC RBC AUTO-ENTMCNC: 31.6 G/DL (ref 32–36)
MCHC RBC AUTO-ENTMCNC: 31.9 G/DL (ref 32–36)
MCHC RBC AUTO-ENTMCNC: 31.9 G/DL (ref 32–36)
MCHC RBC AUTO-ENTMCNC: 32 G/DL (ref 32–36)
MCHC RBC AUTO-ENTMCNC: 32.1 G/DL (ref 32–36)
MCHC RBC AUTO-ENTMCNC: 32.2 G/DL (ref 32–36)
MCHC RBC AUTO-ENTMCNC: 32.7 G/DL (ref 32–36)
MCHC RBC AUTO-ENTMCNC: 33 G/DL (ref 32–36)
MCHC RBC AUTO-ENTMCNC: 33 G/DL (ref 32–36)
MCHC RBC AUTO-ENTMCNC: 33.1 G/DL (ref 32–36)
MCHC RBC AUTO-ENTMCNC: 33.2 G/DL (ref 32–36)
MCHC RBC AUTO-ENTMCNC: 33.6 G/DL (ref 32–36)
MCV RBC AUTO: 100 FL (ref 80–100)
MCV RBC AUTO: 102 FL (ref 80–100)
MCV RBC AUTO: 104 FL (ref 80–100)
MCV RBC AUTO: 105 FL (ref 80–100)
MCV RBC AUTO: 105 FL (ref 80–100)
MCV RBC AUTO: 107 FL (ref 80–100)
MCV RBC AUTO: 90 FL (ref 80–100)
MCV RBC AUTO: 91 FL (ref 80–100)
MCV RBC AUTO: 92 FL (ref 80–100)
MCV RBC AUTO: 92 FL (ref 80–100)
MCV RBC AUTO: 93 FL (ref 80–100)
MCV RBC AUTO: 94 FL (ref 80–100)
MCV RBC AUTO: 95 FL (ref 80–100)
MCV RBC AUTO: 96 FL (ref 80–100)
MCV RBC AUTO: 97 FL (ref 80–100)
MCV RBC AUTO: 98 FL (ref 80–100)
MCV RBC AUTO: 98 FL (ref 80–100)
MCV RBC AUTO: 99 FL (ref 80–100)
METAMYELOCYTES (ABSOLUTE): 0.1 THOU/UL
METAMYELOCYTES (ABSOLUTE): 0.2 THOU/UL
METAMYELOCYTES NFR BLD MANUAL: 1 %
MICROALBUMIN UR-MCNC: 4.09 MG/DL (ref 0–1.99)
MICROALBUMIN/CREAT UR: 31 MG/G
MISCELLANEOUS TEST DEPT. - HE HISTORICAL: NORMAL
MISCELLANEOUS TEST DEPT. - HE HISTORICAL: NORMAL
MITRAL VALVE DECELERATION SLOPE: 5890 MM/S2
MITRAL VALVE MEAN INFLOW VELOCITY: 70 CM/S
MITRAL VALVE PEAK VELOCITY: 171 CM/S
MITRAL VALVE PRESSURE HALF-TIME: 64 MS
MONOCYTES # BLD AUTO: 0.2 THOU/UL (ref 0–0.9)
MONOCYTES # BLD AUTO: 0.4 THOU/UL (ref 0–0.9)
MONOCYTES # BLD AUTO: 0.4 THOU/UL (ref 0–0.9)
MONOCYTES # BLD AUTO: 0.7 THOU/UL (ref 0–0.9)
MONOCYTES # BLD AUTO: 0.8 THOU/UL (ref 0–0.9)
MONOCYTES # BLD AUTO: 0.8 THOU/UL (ref 0–0.9)
MONOCYTES # BLD AUTO: 0.9 THOU/UL (ref 0–0.9)
MONOCYTES # BLD AUTO: 1 THOU/UL (ref 0–0.9)
MONOCYTES # BLD AUTO: 1.1 THOU/UL (ref 0–0.9)
MONOCYTES # BLD AUTO: 1.2 THOU/UL (ref 0–0.9)
MONOCYTES # BLD AUTO: 1.2 THOU/UL (ref 0–0.9)
MONOCYTES # BLD AUTO: 1.3 THOU/UL (ref 0–0.9)
MONOCYTES # BLD AUTO: 1.7 THOU/UL (ref 0–0.9)
MONOCYTES # BLD AUTO: 1.9 THOU/UL (ref 0–0.9)
MONOCYTES NFR BLD AUTO: 10 % (ref 2–10)
MONOCYTES NFR BLD AUTO: 10 % (ref 2–10)
MONOCYTES NFR BLD AUTO: 12 % (ref 2–10)
MONOCYTES NFR BLD AUTO: 13 % (ref 2–10)
MONOCYTES NFR BLD AUTO: 13 % (ref 2–10)
MONOCYTES NFR BLD AUTO: 2 % (ref 2–10)
MONOCYTES NFR BLD AUTO: 4 % (ref 2–10)
MONOCYTES NFR BLD AUTO: 5 % (ref 2–10)
MONOCYTES NFR BLD AUTO: 6 % (ref 2–10)
MONOCYTES NFR BLD AUTO: 6 % (ref 2–10)
MONOCYTES NFR BLD AUTO: 7 % (ref 2–10)
MONOCYTES NFR BLD AUTO: 8 % (ref 2–10)
MONOCYTES NFR BLD AUTO: 9 % (ref 2–10)
MONOCYTES NFR BLD AUTO: 9 % (ref 2–10)
MUCOUS THREADS #/AREA URNS LPF: ABNORMAL LPF
MV AREA VTI: 1.26 CM2
MV AVERAGE E/E' RATIO: 18.1 CM/S
MV DECELERATION TIME: 218 MS
MV E'TISSUE VEL-LAT: 8.76 CM/S
MV E'TISSUE VEL-MED: 5.52 CM/S
MV LATERAL E/E' RATIO: 14.7
MV MEAN GRADIENT: 3 MMHG
MV MEDIAL E/E' RATIO: 23.4
MV PEAK E VELOCITY: 129 CM/S
MV PEAK GRADIENT: 11.7 MMHG
MV VALVE AREA BY CONTINUITY EQUATION: 1.3 CM2
MV VALVE AREA PRESSURE 1/2 METHOD: 3.4 CM2
MYELOCYTES (ABSOLUTE): 0.1 THOU/UL
MYELOCYTES (ABSOLUTE): 0.2 THOU/UL
MYELOCYTES NFR BLD MANUAL: 1 %
MYELOCYTES NFR BLD MANUAL: 2 %
NEUTROPHILS # BLD AUTO: 10.7 THOU/UL (ref 2–7.7)
NEUTROPHILS # BLD AUTO: 10.7 THOU/UL (ref 2–7.7)
NEUTROPHILS # BLD AUTO: 7.4 THOU/UL (ref 2–7.7)
NEUTROPHILS # BLD AUTO: 8.3 THOU/UL (ref 2–7.7)
NEUTROPHILS # BLD AUTO: 8.3 THOU/UL (ref 2–7.7)
NEUTROPHILS # BLD AUTO: 8.6 THOU/UL (ref 2–7.7)
NEUTROPHILS NFR BLD AUTO: 68 % (ref 50–70)
NEUTROPHILS NFR BLD AUTO: 69 % (ref 50–70)
NEUTROPHILS NFR BLD AUTO: 73 % (ref 50–70)
NEUTROPHILS NFR BLD AUTO: 74 % (ref 50–70)
NEUTROPHILS NFR BLD AUTO: 74 % (ref 50–70)
NEUTROPHILS NFR BLD AUTO: 79 % (ref 50–70)
NITRATE UR QL: NEGATIVE
NUC REST DIASTOLIC VOLUME INDEX: 3392 LBS
NUC REST SYSTOLIC VOLUME INDEX: 62 IN
O2/TOTAL GAS SETTING VFR VENT: 100 %
O2/TOTAL GAS SETTING VFR VENT: 40 %
OVALOCYTES: ABNORMAL
OXYHEMOGLOBIN (VBG) - HISTORICAL: 46.5 % (ref 70–75)
OXYHEMOGLOBIN - HISTORICAL: 90.7 % (ref 95–96)
OXYHEMOGLOBIN - HISTORICAL: 94.4 % (ref 95–96)
OXYHEMOGLOBIN - HISTORICAL: 97.6 % (ref 95–96)
OXYHEMOGLOBIN - HISTORICAL: 98.3 % (ref 95–96)
OXYHGB MFR BLDV: 47.3 % (ref 70–75)
P AXIS - MUSE: 3 DEGREES
P AXIS - MUSE: NORMAL
PATH REPORT.COMMENTS IMP SPEC: NORMAL
PATH REPORT.COMMENTS IMP SPEC: NORMAL
PATH REPORT.FINAL DX SPEC: NORMAL
PATH REPORT.MICROSCOPIC SPEC OTHER STN: ABNORMAL
PATH REPORT.MICROSCOPIC SPEC OTHER STN: NORMAL
PATH REPORT.RELEVANT HX SPEC: NORMAL
PCO2 BLD: 33 MM HG (ref 35–45)
PCO2 BLD: 38 MM HG (ref 35–45)
PCO2 BLD: 41 MM HG (ref 35–45)
PCO2 BLD: 42 MM HG (ref 35–45)
PCO2 BLDV: 42 MM HG (ref 35–50)
PEEP: 5 CM H2O
PEEP: 6 CM H2O
PERFORMING LAB: NORMAL
PERFORMING LAB: NORMAL
PH BLD: 7.22 [PH] (ref 7.37–7.44)
PH BLD: 7.26 [PH] (ref 7.37–7.44)
PH BLD: 7.37 [PH] (ref 7.37–7.44)
PH BLD: 7.43 [PH] (ref 7.37–7.44)
PH BLDV: 7.22 [PH] (ref 7.35–7.45)
PH UR STRIP: 5 [PH] (ref 4.5–8)
PH UR STRIP: 5.5 [PH] (ref 4.5–8)
PH: 7.41 (ref 7.35–7.45)
PHOSPHATE SERPL-MCNC: 1.7 MG/DL (ref 2.5–4.5)
PHOSPHATE SERPL-MCNC: 1.8 MG/DL (ref 2.5–4.5)
PHOSPHATE SERPL-MCNC: 1.9 MG/DL (ref 2.5–4.5)
PHOSPHATE SERPL-MCNC: 2.1 MG/DL (ref 2.5–4.5)
PHOSPHATE SERPL-MCNC: 2.1 MG/DL (ref 2.5–4.5)
PHOSPHATE SERPL-MCNC: 2.2 MG/DL (ref 2.5–4.5)
PHOSPHATE SERPL-MCNC: 2.4 MG/DL (ref 2.5–4.5)
PHOSPHATE SERPL-MCNC: 2.5 MG/DL (ref 2.5–4.5)
PHOSPHATE SERPL-MCNC: 2.6 MG/DL (ref 2.5–4.5)
PHOSPHATE SERPL-MCNC: 2.7 MG/DL (ref 2.5–4.5)
PHOSPHATE SERPL-MCNC: 2.7 MG/DL (ref 2.5–4.5)
PHOSPHATE SERPL-MCNC: 2.8 MG/DL (ref 2.5–4.5)
PHOSPHATE SERPL-MCNC: 2.8 MG/DL (ref 2.5–4.5)
PHOSPHATE SERPL-MCNC: 3.1 MG/DL (ref 2.5–4.5)
PHOSPHATE SERPL-MCNC: 3.2 MG/DL (ref 2.5–4.5)
PHOSPHATE SERPL-MCNC: 3.2 MG/DL (ref 2.5–4.5)
PHOSPHATE SERPL-MCNC: 4.2 MG/DL (ref 2.5–4.5)
PHOSPHATE SERPL-MCNC: 5.3 MG/DL (ref 2.5–4.5)
PLAT MORPH BLD: ABNORMAL
PLAT MORPH BLD: NORMAL
PLATELET # BLD AUTO: 103 THOU/UL (ref 140–440)
PLATELET # BLD AUTO: 106 THOU/UL (ref 140–440)
PLATELET # BLD AUTO: 109 THOU/UL (ref 140–440)
PLATELET # BLD AUTO: 111 THOU/UL (ref 140–440)
PLATELET # BLD AUTO: 112 THOU/UL (ref 140–440)
PLATELET # BLD AUTO: 115 THOU/UL (ref 140–440)
PLATELET # BLD AUTO: 116 THOU/UL (ref 140–440)
PLATELET # BLD AUTO: 116 THOU/UL (ref 140–440)
PLATELET # BLD AUTO: 122 THOU/UL (ref 140–440)
PLATELET # BLD AUTO: 124 THOU/UL (ref 140–440)
PLATELET # BLD AUTO: 128 THOU/UL (ref 140–440)
PLATELET # BLD AUTO: 128 THOU/UL (ref 140–440)
PLATELET # BLD AUTO: 131 THOU/UL (ref 140–440)
PLATELET # BLD AUTO: 131 THOU/UL (ref 140–440)
PLATELET # BLD AUTO: 135 THOU/UL (ref 140–440)
PLATELET # BLD AUTO: 135 THOU/UL (ref 140–440)
PLATELET # BLD AUTO: 136 THOU/UL (ref 140–440)
PLATELET # BLD AUTO: 136 THOU/UL (ref 140–440)
PLATELET # BLD AUTO: 137 THOU/UL (ref 140–440)
PLATELET # BLD AUTO: 137 THOU/UL (ref 140–440)
PLATELET # BLD AUTO: 145 THOU/UL (ref 140–440)
PLATELET # BLD AUTO: 154 THOU/UL (ref 140–440)
PLATELET # BLD AUTO: 155 THOU/UL (ref 140–440)
PLATELET # BLD AUTO: 155 THOU/UL (ref 140–440)
PLATELET # BLD AUTO: 158 THOU/UL (ref 140–440)
PLATELET # BLD AUTO: 159 THOU/UL (ref 140–440)
PLATELET # BLD AUTO: 161 THOU/UL (ref 140–440)
PLATELET # BLD AUTO: 168 THOU/UL (ref 140–440)
PLATELET # BLD AUTO: 193 THOU/UL (ref 140–440)
PLATELET # BLD AUTO: 81 THOU/UL (ref 140–440)
PLATELET # BLD AUTO: 95 THOU/UL (ref 140–440)
PMV BLD AUTO: 11.2 FL (ref 8.5–12.5)
PMV BLD AUTO: 11.4 FL (ref 8.5–12.5)
PMV BLD AUTO: 11.9 FL (ref 8.5–12.5)
PMV BLD AUTO: 12.1 FL (ref 8.5–12.5)
PMV BLD AUTO: 12.3 FL (ref 8.5–12.5)
PMV BLD AUTO: 12.4 FL (ref 8.5–12.5)
PMV BLD AUTO: 12.5 FL (ref 8.5–12.5)
PMV BLD AUTO: 12.5 FL (ref 8.5–12.5)
PMV BLD AUTO: 12.6 FL (ref 8.5–12.5)
PMV BLD AUTO: 12.7 FL (ref 8.5–12.5)
PMV BLD AUTO: 12.9 FL (ref 8.5–12.5)
PMV BLD AUTO: 12.9 FL (ref 8.5–12.5)
PMV BLD AUTO: 13.1 FL (ref 8.5–12.5)
PMV BLD AUTO: 13.1 FL (ref 8.5–12.5)
PMV BLD AUTO: 13.2 FL (ref 8.5–12.5)
PMV BLD AUTO: 13.4 FL (ref 8.5–12.5)
PMV BLD AUTO: 13.7 FL (ref 8.5–12.5)
PMV BLD AUTO: 13.8 FL (ref 8.5–12.5)
PMV BLD AUTO: 13.9 FL (ref 8.5–12.5)
PMV BLD AUTO: 13.9 FL (ref 8.5–12.5)
PO2 BLD: 134 MM HG (ref 75–85)
PO2 BLD: 326 MM HG (ref 75–85)
PO2 BLD: 76 MM HG (ref 75–85)
PO2 BLD: 78 MM HG (ref 75–85)
PO2 BLDV: 33 MM HG (ref 25–47)
POLYCHROMASIA BLD QL SMEAR: ABNORMAL
POTASSIUM BLD-SCNC: 2.8 MMOL/L (ref 3.5–5)
POTASSIUM BLD-SCNC: 3 MMOL/L (ref 3.5–5)
POTASSIUM BLD-SCNC: 3.2 MMOL/L (ref 3.5–5)
POTASSIUM BLD-SCNC: 3.3 MMOL/L (ref 3.5–5)
POTASSIUM BLD-SCNC: 3.4 MMOL/L (ref 3.5–5)
POTASSIUM BLD-SCNC: 3.5 MMOL/L (ref 3.5–5)
POTASSIUM BLD-SCNC: 3.6 MMOL/L (ref 3.5–5)
POTASSIUM BLD-SCNC: 3.7 MMOL/L (ref 3.5–5)
POTASSIUM BLD-SCNC: 3.8 MMOL/L (ref 3.5–5)
POTASSIUM BLD-SCNC: 3.9 MMOL/L (ref 3.5–5)
POTASSIUM BLD-SCNC: 4 MMOL/L (ref 3.5–5)
POTASSIUM BLD-SCNC: 4.1 MMOL/L (ref 3.5–5)
POTASSIUM BLD-SCNC: 4.2 MMOL/L (ref 3.5–5)
POTASSIUM BLD-SCNC: 4.2 MMOL/L (ref 3.5–5)
POTASSIUM BLD-SCNC: 4.3 MMOL/L (ref 3.5–5)
POTASSIUM BLD-SCNC: 4.4 MMOL/L (ref 3.5–5)
POTASSIUM BLD-SCNC: 4.5 MMOL/L (ref 3.5–5)
POTASSIUM BLD-SCNC: 4.6 MMOL/L (ref 3.5–5)
POTASSIUM BLD-SCNC: 4.6 MMOL/L (ref 3.5–5)
POTASSIUM BLD-SCNC: 4.7 MMOL/L (ref 3.5–5)
POTASSIUM BLD-SCNC: 5.2 MMOL/L (ref 3.5–5)
POTASSIUM BLD-SCNC: 5.3 MMOL/L (ref 3.5–5)
POTASSIUM BLD-SCNC: 5.4 MMOL/L (ref 3.5–5)
POTASSIUM BLD-SCNC: 5.4 MMOL/L (ref 3.5–5)
POTASSIUM BLD-SCNC: 5.8 MMOL/L (ref 3.5–5)
PR INTERVAL - MUSE: 266 MS
PR INTERVAL - MUSE: NORMAL
PROT SERPL-MCNC: 5.6 G/DL (ref 6–8)
PROT SERPL-MCNC: 6 G/DL (ref 6–8)
PROT SERPL-MCNC: 6.3 G/DL (ref 6–8)
PROT SERPL-MCNC: 6.3 G/DL (ref 6–8)
PROT SERPL-MCNC: 6.6 G/DL (ref 6–8)
PROT SERPL-MCNC: 6.6 G/DL (ref 6–8)
PROT SERPL-MCNC: 7.3 G/DL (ref 6–8)
PROT SERPL-MCNC: 7.5 G/DL (ref 6–8)
PROT SERPL-MCNC: 7.9 G/DL (ref 6–8)
PROT SERPL-MCNC: 8.2 G/DL (ref 6–8)
PROTEIN, RANDOM URINE - HISTORICAL: 209 MG/DL
PROTEIN/CREAT RATIO, RANDOM UR: 1.32
PSV (LAB BLOOD GAS): 14 CM H2O
PTH-INTACT SERPL-MCNC: 266 PG/ML (ref 10–86)
QRS DURATION - MUSE: 100 MS
QRS DURATION - MUSE: 76 MS
QRS DURATION - MUSE: 90 MS
QT - MUSE: 324 MS
QT - MUSE: 350 MS
QT - MUSE: 354 MS
QT - MUSE: 402 MS
QT - MUSE: 424 MS
QTC - MUSE: 323 MS
QTC - MUSE: 342 MS
QTC - MUSE: 382 MS
QTC - MUSE: 384 MS
QTC - MUSE: 413 MS
R AXIS - MUSE: -22 DEGREES
R AXIS - MUSE: -26 DEGREES
R AXIS - MUSE: -27 DEGREES
R AXIS - MUSE: -29 DEGREES
R AXIS - MUSE: 7 DEGREES
RATE: 14 RR/MIN
RATE: 20 RR/MIN
RBC # BLD AUTO: 2.3 MILL/UL (ref 3.8–5.4)
RBC # BLD AUTO: 2.35 MILL/UL (ref 3.8–5.4)
RBC # BLD AUTO: 2.4 MILL/UL (ref 3.8–5.4)
RBC # BLD AUTO: 2.4 MILL/UL (ref 3.8–5.4)
RBC # BLD AUTO: 2.45 MILL/UL (ref 3.8–5.4)
RBC # BLD AUTO: 2.45 MILL/UL (ref 3.8–5.4)
RBC # BLD AUTO: 2.46 MILL/UL (ref 3.8–5.4)
RBC # BLD AUTO: 2.48 MILL/UL (ref 3.8–5.4)
RBC # BLD AUTO: 2.48 MILL/UL (ref 3.8–5.4)
RBC # BLD AUTO: 2.53 MILL/UL (ref 3.8–5.4)
RBC # BLD AUTO: 2.53 MILL/UL (ref 3.8–5.4)
RBC # BLD AUTO: 2.54 MILL/UL (ref 3.8–5.4)
RBC # BLD AUTO: 2.61 MILL/UL (ref 3.8–5.4)
RBC # BLD AUTO: 2.63 MILL/UL (ref 3.8–5.4)
RBC # BLD AUTO: 2.65 MILL/UL (ref 3.8–5.4)
RBC # BLD AUTO: 2.66 MILL/UL (ref 3.8–5.4)
RBC # BLD AUTO: 2.67 MILL/UL (ref 3.8–5.4)
RBC # BLD AUTO: 2.71 MILL/UL (ref 3.8–5.4)
RBC # BLD AUTO: 2.74 MILL/UL (ref 3.8–5.4)
RBC # BLD AUTO: 2.74 MILL/UL (ref 3.8–5.4)
RBC # BLD AUTO: 2.76 MILL/UL (ref 3.8–5.4)
RBC # BLD AUTO: 2.85 MILL/UL (ref 3.8–5.4)
RBC # BLD AUTO: 2.88 MILL/UL (ref 3.8–5.4)
RBC # BLD AUTO: 2.95 MILL/UL (ref 3.8–5.4)
RBC # BLD AUTO: 2.98 MILL/UL (ref 3.8–5.4)
RBC # BLD AUTO: 2.98 MILL/UL (ref 3.8–5.4)
RBC # BLD AUTO: 3.03 MILL/UL (ref 3.8–5.4)
RBC # BLD AUTO: 3.09 MILL/UL (ref 3.8–5.4)
RBC # BLD AUTO: 3.12 MILL/UL (ref 3.8–5.4)
RBC # BLD AUTO: 3.33 MILL/UL (ref 3.8–5.4)
RBC # BLD AUTO: 3.7 MILL/UL (ref 3.8–5.4)
RBC # BLD AUTO: 3.77 MILL/UL (ref 3.8–5.4)
RBC # BLD AUTO: 4.27 MILL/UL (ref 3.8–5.4)
RBC #/AREA URNS AUTO: ABNORMAL HPF
RIBOTYPE 027/NAP1/BI: NORMAL
SARS-COV-2 PCR COMMENT: NORMAL
SARS-COV-2 RNA SPEC QL NAA+PROBE: NEGATIVE
SARS-COV-2 VIRUS SPECIMEN SOURCE: NORMAL
SCHISTOCYTES: ABNORMAL
SCHISTOCYTES: ABNORMAL
SODIUM SERPL-SCNC: 133 MMOL/L (ref 136–145)
SODIUM SERPL-SCNC: 135 MMOL/L (ref 136–145)
SODIUM SERPL-SCNC: 136 MMOL/L (ref 136–145)
SODIUM SERPL-SCNC: 136 MMOL/L (ref 136–145)
SODIUM SERPL-SCNC: 137 MMOL/L (ref 136–145)
SODIUM SERPL-SCNC: 138 MMOL/L (ref 136–145)
SODIUM SERPL-SCNC: 139 MMOL/L (ref 136–145)
SODIUM SERPL-SCNC: 140 MMOL/L (ref 136–145)
SODIUM SERPL-SCNC: 142 MMOL/L (ref 136–145)
SODIUM SERPL-SCNC: 142 MMOL/L (ref 136–145)
SODIUM SERPL-SCNC: 143 MMOL/L (ref 136–145)
SODIUM SERPL-SCNC: 144 MMOL/L (ref 136–145)
SODIUM SERPL-SCNC: 144 MMOL/L (ref 136–145)
SODIUM SERPL-SCNC: 145 MMOL/L (ref 136–145)
SODIUM SERPL-SCNC: 145 MMOL/L (ref 136–145)
SODIUM SERPL-SCNC: 146 MMOL/L (ref 136–145)
SP GR UR STRIP: 1.01 (ref 1–1.03)
SP GR UR STRIP: 1.02 (ref 1–1.03)
SP GR UR STRIP: 1.03 (ref 1–1.03)
SPECIMEN STATUS: NORMAL
SPECIMEN STATUS: NORMAL
SQUAMOUS #/AREA URNS AUTO: ABNORMAL LPF
STATUS (HISTORICAL CONVERSION): NORMAL
SYSTOLIC BLOOD PRESSURE - MUSE: NORMAL
T AXIS - MUSE: 132 DEGREES
T AXIS - MUSE: 141 DEGREES
T AXIS - MUSE: 143 DEGREES
T AXIS - MUSE: 150 DEGREES
T AXIS - MUSE: 204 DEGREES
TARGETS BLD QL SMEAR: ABNORMAL
TEAR DROP: ABNORMAL
TEMPERATURE: 37 DEGREES C
TEST NAME: NORMAL
TEST NAME: NORMAL
TOTAL NEUTROPHILS-ABS(DIFF): 10.1 THOU/UL (ref 2–7.7)
TOTAL NEUTROPHILS-ABS(DIFF): 10.8 THOU/UL (ref 2–7.7)
TOTAL NEUTROPHILS-ABS(DIFF): 11 THOU/UL (ref 2–7.7)
TOTAL NEUTROPHILS-ABS(DIFF): 12.4 THOU/UL (ref 2–7.7)
TOTAL NEUTROPHILS-ABS(DIFF): 12.8 THOU/UL (ref 2–7.7)
TOTAL NEUTROPHILS-ABS(DIFF): 14.5 THOU/UL (ref 2–7.7)
TOTAL NEUTROPHILS-ABS(DIFF): 15.3 THOU/UL (ref 2–7.7)
TOTAL NEUTROPHILS-ABS(DIFF): 16.1 THOU/UL (ref 2–7.7)
TOTAL NEUTROPHILS-ABS(DIFF): 6.7 THOU/UL (ref 2–7.7)
TOTAL NEUTROPHILS-ABS(DIFF): 6.8 THOU/UL (ref 2–7.7)
TOTAL NEUTROPHILS-ABS(DIFF): 7.2 THOU/UL (ref 2–7.7)
TOTAL NEUTROPHILS-ABS(DIFF): 9.3 THOU/UL (ref 2–7.7)
TOTAL NEUTROPHILS-ABS(DIFF): 9.3 THOU/UL (ref 2–7.7)
TOTAL NEUTROPHILS-ABS(DIFF): 9.4 THOU/UL (ref 2–7.7)
TOTAL NEUTROPHILS-ABS(DIFF): 9.9 THOU/UL (ref 2–7.7)
TOTAL NEUTROPHILS-REL(DIFF): 64 % (ref 50–70)
TOTAL NEUTROPHILS-REL(DIFF): 65 % (ref 50–70)
TOTAL NEUTROPHILS-REL(DIFF): 71 % (ref 50–70)
TOTAL NEUTROPHILS-REL(DIFF): 74 % (ref 50–70)
TOTAL NEUTROPHILS-REL(DIFF): 76 % (ref 50–70)
TOTAL NEUTROPHILS-REL(DIFF): 79 % (ref 50–70)
TOTAL NEUTROPHILS-REL(DIFF): 81 % (ref 50–70)
TOTAL NEUTROPHILS-REL(DIFF): 82 % (ref 50–70)
TOTAL NEUTROPHILS-REL(DIFF): 84 % (ref 50–70)
TOTAL NEUTROPHILS-REL(DIFF): 84 % (ref 50–70)
TOTAL NEUTROPHILS-REL(DIFF): 87 % (ref 50–70)
TOTAL NEUTROPHILS-REL(DIFF): 88 % (ref 50–70)
TOTAL NEUTROPHILS-REL(DIFF): 90 % (ref 50–70)
TRANS CELLS #/AREA URNS HPF: ABNORMAL LPF
TRICUSPID REGURGITATION PEAK PRESSURE GRADIENT: 33.2 MMHG
TRICUSPID VALVE ANULAR PLANE SYSTOLIC EXCURSION: 1.7 CM
TRICUSPID VALVE PEAK REGURGITANT VELOCITY: 288 CM/S
TRIGL SERPL-MCNC: 140 MG/DL
TRIGL SERPL-MCNC: 164 MG/DL
TROPONIN I SERPL-MCNC: 0.07 NG/ML (ref 0–0.29)
TROPONIN I SERPL-MCNC: 0.4 NG/ML (ref 0–0.29)
UNIT ABO/RH (HISTORICAL CONVERSION): NORMAL
UNIT NUMBER: NORMAL
URATE SERPL-MCNC: 10.9 MG/DL (ref 2–7.5)
UROBILINOGEN UR STRIP-ACNC: ABNORMAL
VANCOMYCIN SERPL-MCNC: 25.6 UG/ML
VANCOMYCIN SERPL-MCNC: 27 UG/ML
VANCOMYCIN SERPL-MCNC: 30.6 UG/ML
VANCOMYCIN SERPL-MCNC: 38.6 UG/ML
VANCOMYCIN SERPL-MCNC: 43.1 UG/ML
VENTILATION MODE: ABNORMAL
VENTILATOR TIDAL VOLUME: 475 ML
VENTRICULAR RATE- MUSE: 35 BPM
VENTRICULAR RATE- MUSE: 55 BPM
VENTRICULAR RATE- MUSE: 67 BPM
VENTRICULAR RATE- MUSE: 70 BPM
VENTRICULAR RATE- MUSE: 84 BPM
WBC #/AREA URNS AUTO: ABNORMAL HPF
WBC: 10.2 THOU/UL (ref 4–11)
WBC: 10.5 THOU/UL (ref 4–11)
WBC: 10.5 THOU/UL (ref 4–11)
WBC: 10.7 THOU/UL (ref 4–11)
WBC: 11.4 THOU/UL (ref 4–11)
WBC: 11.4 THOU/UL (ref 4–11)
WBC: 11.6 THOU/UL (ref 4–11)
WBC: 11.9 THOU/UL (ref 4–11)
WBC: 12.2 THOU/UL (ref 4–11)
WBC: 12.2 THOU/UL (ref 4–11)
WBC: 12.4 THOU/UL (ref 4–11)
WBC: 12.5 THOU/UL (ref 4–11)
WBC: 12.8 THOU/UL (ref 4–11)
WBC: 12.8 THOU/UL (ref 4–11)
WBC: 13 THOU/UL (ref 4–11)
WBC: 13.1 THOU/UL (ref 4–11)
WBC: 13.1 THOU/UL (ref 4–11)
WBC: 13.3 THOU/UL (ref 4–11)
WBC: 13.4 THOU/UL (ref 4–11)
WBC: 13.8 THOU/UL (ref 4–11)
WBC: 13.8 THOU/UL (ref 4–11)
WBC: 14.1 THOU/UL (ref 4–11)
WBC: 14.5 THOU/UL (ref 4–11)
WBC: 14.6 THOU/UL (ref 4–11)
WBC: 15.6 THOU/UL (ref 4–11)
WBC: 15.9 THOU/UL (ref 4–11)
WBC: 17 THOU/UL (ref 4–11)
WBC: 17.1 THOU/UL (ref 4–11)
WBC: 17.4 THOU/UL (ref 4–11)
WBC: 17.4 THOU/UL (ref 4–11)
WBC: 18.3 THOU/UL (ref 4–11)
WBC: 19.3 THOU/UL (ref 4–11)
WBC: 8.2 THOU/UL (ref 4–11)
YEAST #/AREA URNS HPF: ABNORMAL HPF

## 2020-01-01 ASSESSMENT — MIFFLIN-ST. JEOR
SCORE: 1393.6
SCORE: 1363.66
SCORE: 1305.15
SCORE: 1434.88
SCORE: 1443.49
SCORE: 1358.9
SCORE: 1407.21
SCORE: 1328.73
SCORE: 1413.56
SCORE: 1371.37
SCORE: 1303.79
SCORE: 1428.53
SCORE: 1472.98
SCORE: 1304.69
SCORE: 1474.79
SCORE: 1349.25
SCORE: 1360.94
SCORE: 1362.75
SCORE: 1378.18
SCORE: 1292.25
SCORE: 1340.53
SCORE: 1374.55
SCORE: 1384.53
SCORE: 1364.11
SCORE: 1335.54
SCORE: 1348.25
SCORE: 1305.15
SCORE: 1402.67
SCORE: 1390.25
SCORE: 1397.23
SCORE: 1360.94
SCORE: 1392.24
SCORE: 1446.22
SCORE: 1384.53
SCORE: 1316.49
SCORE: 1378.18
SCORE: 1369.56
SCORE: 1413.56
SCORE: 1427.62
SCORE: 1367.29

## 2020-01-01 ASSESSMENT — PATIENT HEALTH QUESTIONNAIRE - PHQ9: SUM OF ALL RESPONSES TO PHQ QUESTIONS 1-9: 11

## 2020-09-21 ENCOUNTER — AMBULATORY - HEALTHEAST (OUTPATIENT)
Dept: ANTICOAGULATION | Facility: CLINIC | Age: 70
End: 2020-09-21

## 2020-09-21 DIAGNOSIS — I67.9 CEREBROVASCULAR DISEASE: ICD-10-CM

## 2020-09-24 ENCOUNTER — DOCUMENTATION ONLY (OUTPATIENT)
Dept: OTHER | Facility: CLINIC | Age: 70
End: 2020-09-24

## 2021-05-26 ASSESSMENT — PATIENT HEALTH QUESTIONNAIRE - PHQ9: SUM OF ALL RESPONSES TO PHQ QUESTIONS 1-9: 11

## 2021-05-26 NOTE — TELEPHONE ENCOUNTER
RN cannot approve Refill Request    RN can NOT refill this medication med is not covered by policy/route to provider. Last office visit: 2/8/2019 Jerson Hernandez MD Last Physical: 5/29/2018 Last MTM visit: Visit date not found Last visit same specialty: 2/8/2019 Jerson Hernandez MD.  Next visit within 3 mo: Visit date not found  Next physical within 3 mo: Visit date not found      Joan Patel, [facility] Triage/Med Refill 3/22/2019    Requested Prescriptions   Pending Prescriptions Disp Refills     magnesium gluconate (MAGONATE) 27 mg magnesium (500 mg) Tab tablet [Pharmacy Med Name: MAGNESIUM GLUCONATE 500MG TABS] 90 tablet 11     Sig: TAKE 1 TABLET BY MOUTH THREE TIMES DAILY **NON-COVERED MED** *1 TOTAL FILL*    There is no refill protocol information for this order

## 2021-05-27 NOTE — TELEPHONE ENCOUNTER
INR result is 1.9  INR   Date Value Ref Range Status   04/03/2019 3.20 (!) 0.9 - 1.1 Final       Will the patient be seen, or did they already see, MD or CNP today? No    Most Recent Warfarin dose day/week  Sunday Monday Tuesday Wednesday Thursday Friday Saturday   2 2 2 2 2 2 2     Sunday Monday Tuesday Wednesday Thursday Friday Saturday                Has the patient missed any doses of Coumadin, Warfarin, Jantoven in the past 7 days? No    Has the patients medications changed since the last visit? No    Has the patient experienced any bleeding recently? No    Has the patient experienced any injuries or illness recently? No    Has the patient experienced any 'new' shortness of breath, severe headaches, or changes in vision recently? No    Has the patient had any changes in their diet, or alcohol consumption? No    Is the patient here today to prepare for any type of upcoming surgery, procedure, or for a cardioversion procedure? No    What phone number can we reach the patient at today? home phone listed in demographics.

## 2021-05-27 NOTE — TELEPHONE ENCOUNTER
ANTICOAGULATION  MANAGEMENT- Home Care/Care Facility Result    Assessment     Today's INR result of 1.9 is Subtherapeutic (goal INR of 2.0-3.0)        Warfarin taken as previously instructed    No new diet changes affecting INR    Has not started Torsemide yet. Under review as patient is currently on several diuretics     Continues to tolerate warfarin with no reported s/s of bleeding or thromboembolism     Previous INR was Supratherapeutic    Plan:     Spoke with Alice discussed INR result and instructed:     Warfarin Dosing Instructions: Change warfarin dose to 3 mg daily on Wed; and 2 mg daily rest of week  (7 % change)    Next INR to be drawn: one week    Education provided: target INR goal and significance of current INR result    Alice verbalizes understanding and agrees to warfarin dosing plan.   ?   Bia Crane RN    Subjective/Objective:      Gardenia Muller, a 68 y.o. female is established on warfarin.     Home care/care facility RN's report of [place] INR, recent warfarin dosing, diet changes, medication changes, and symptoms is documented below.    Additional findings: none    Anticoagulation Episode Summary     Current INR goal:   2.0-3.0   TTR:   68.0 % (2.1 y)   Next INR check:   4/17/2019   INR from last check:   1.90! (4/10/2019)   Weekly max warfarin dose:      Target end date:   Indefinite   INR check location:      Preferred lab:      Send INR reminders to:   ANTICOAGULATION POOL A (WBY,WBE,MID,RSC)    Indications    Cerebrovascular disease [I67.9]           Comments:            Anticoagulation Care Providers     Provider Role Specialty Phone number    Jerson Hernandez MD Referring Family Medicine [phone]

## 2021-05-27 NOTE — TELEPHONE ENCOUNTER
Discussed with EMG. Given current medication list from Homecare nurse. EMG will review and make medication recommendations. BENITORn

## 2021-05-27 NOTE — TELEPHONE ENCOUNTER
INR result is 3.2  INR   Date Value Ref Range Status   03/27/2019 2.40 (!) 0.9 - 1.1 Final       Will the patient be seen, or did they already see, MD or CNP today? No    Most Recent Warfarin dose day/week  Sunday Monday Tuesday Wednesday Thursday Friday Saturday      2 mg 2 mg 2 mg 2 mg     Sunday Monday Tuesday Wednesday Thursday Friday Saturday   4 mg 2 mg 2 mg           Has the patient missed any doses of Coumadin, Warfarin, Jantoven in the past 7 days? No    Has the patients medications changed since the last visit? No    Has the patient experienced any bleeding recently? No    Has the patient experienced any injuries or illness recently? No    Has the patient experienced any 'new' shortness of breath, severe headaches, or changes in vision recently? No    Has the patient had any changes in their diet, or alcohol consumption? No    Is the patient here today to prepare for any type of upcoming surgery, procedure, or for a cardioversion procedure? No    What phone number can we reach the patient at today? [phone] Alice.

## 2021-05-27 NOTE — PROGRESS NOTES
----- Message -----  From: Gisel Choe MD  Sent: 3/26/2019   2:40 PM  To: Erwin Jacome RN    Labs look better. Let's start her on spironolactone 25mg daily to see if that helps with her nighttime shortness of breath in bed. F/U as planned. EG

## 2021-05-27 NOTE — TELEPHONE ENCOUNTER
Called and spoke with new nurse in charge of [facility]. Recommendations reviewed. Given writer information if any questions or concerns to call. Medication list from HC agency reviewed. Completed entry into Epic system. Will have scanned into media. Shanda OLIVER

## 2021-05-27 NOTE — TELEPHONE ENCOUNTER
ANTICOAGULATION  MANAGEMENT- Home Care/Care Facility Result    Assessment     Today's INR result of 3.2 is Supratherapeutic (goal INR of 2.0-3.0)        Warfarin taken as previously instructed    No new diet changes affecting INR    Potential interaction between Torsemide (has not started yet) and warfarin which may affect subsequent INRs    Continues to tolerate warfarin with no reported s/s of bleeding or thromboembolism     Previous INR was Therapeutic    Plan:     Spoke with Alice discussed INR result and instructed:     Warfarin Dosing Instructions: Change warfarin dose to 2 mg daily  (12 % change due to supra-therapeutic INR and patient will be starting Torsemide)    Next INR to be drawn: one week    Education provided: potential interaction between warfarin and Torsemide    Alice verbalizes understanding and agrees to warfarin dosing plan.   ?   Bia Crane RN    Subjective/Objective:      Gardenia Muller, a 68 y.o. female is established on warfarin.     Home care/care facility RN's report of [place] INR, recent warfarin dosing, diet changes, medication changes, and symptoms is documented below.    Additional findings: none    Anticoagulation Episode Summary     Current INR goal:   2.0-3.0   TTR:   67.9 % (2.1 y)   Next INR check:   4/10/2019   INR from last check:   3.20! (4/3/2019)   Weekly max warfarin dose:      Target end date:   Indefinite   INR check location:      Preferred lab:      Send INR reminders to:   ANTICOAGULATION POOL A (WBY,WBE,MID,RSC)    Indications    Cerebrovascular disease [I67.9]           Comments:            Anticoagulation Care Providers     Provider Role Specialty Phone number    Jerson Hernandez MD Referring Family Medicine [phone]

## 2021-05-27 NOTE — TELEPHONE ENCOUNTER
Called and spoke with [facility] nurse for the patient whom reports that she fills her medication box every Wednesday.    Medication discrepancies discovered through conversation. Nurse reports that she received a new RX for Aldactone and questioned where it came from. Discussion of medications with addition of this medication pt would be on 3 different diuretics.    Nurse informs writer that patient is on furosemide (per Dr. Lugo) with PRN order for 40 mg once a day if weight >163lbs. Informed HC nurse that this was not on her current medication list in Epic. Actually, in OV with EMG pt reported that torsemide was the medication.     HC nurse questions medication changes as of recent. Medication changes have not been implemented yet.    Requested nurse send a current medication list as current medications vs what patient reported in OV with EMG is incongruent. HC nurse reports that the patient is a poor historian.    Plan in place for HC nurse to fax current medication list as it was prior to OV with EMG for clarity.   Informed nurse that EMG out of office til 4/8, and will get medications clarified, and approach EMG upon return. BENITO,RN

## 2021-05-27 NOTE — TELEPHONE ENCOUNTER
ANTICOAGULATION  MANAGEMENT- Home Care/Care Facility Result    Assessment     Today's INR result of 1.7 is Subtherapeutic (goal INR of 2.0-3.0)        Warfarin taken as previously instructed    No new diet changes affecting INR    Interaction between Aldactone and warfarin may be affecting INR . Aldactone started last week. Patient will not be starting Torsemide.     Continues to tolerate warfarin with no reported s/s of bleeding or thromboembolism     Previous INR was Subtherapeutic    Plan:     Spoke with Alice discussed INR result and instructed:     Warfarin Dosing Instructions: Change warfarin dose to 3 mg daily on Wed/Sat; and 2 mg daily rest of week  (7 % change)    Next INR to be drawn: one week    Education provided: potential interaction between warfarin and Aldactone    Alice verbalizes understanding and agrees to warfarin dosing plan.   ?   Bia Crane RN    Subjective/Objective:      Gardenia Muller, a 68 y.o. female is established on warfarin.     Home care/care facility RN's report of [place] INR, recent warfarin dosing, diet changes, medication changes, and symptoms is documented below.    Additional findings: none    Anticoagulation Episode Summary     Current INR goal:   2.0-3.0   TTR:   67.4 % (2.1 y)   Next INR check:   4/24/2019   INR from last check:   1.70! (4/17/2019)   Weekly max warfarin dose:      Target end date:   Indefinite   INR check location:      Preferred lab:      Send INR reminders to:   ANTICOAGULATION POOL A (WBY,WBE,MID,RSC)    Indications    Cerebrovascular disease [I67.9]           Comments:            Anticoagulation Care Providers     Provider Role Specialty Phone number    Jerson Hernandez MD Referring Family Medicine [phone]

## 2021-05-27 NOTE — TELEPHONE ENCOUNTER
INR result is 1.7  INR   Date Value Ref Range Status   04/10/2019 1.90 (!) 0.9 - 1.1 Final       Will the patient be seen, or did they already see, MD or CNP today? No    Most Recent Warfarin dose day/week  Sunday Monday Tuesday Wednesday Thursday Friday Saturday      3 2 2 2     Sunday Monday Tuesday Wednesday Thursday Friday Saturday   2 2 2           Has the patient missed any doses of Coumadin, Warfarin, Jantoven in the past 7 days? No    Has the patients medications changed since the last visit? Yes spironolactone (ALDACTONE) 25 MG tablet    Has the patient experienced any bleeding recently? No    Has the patient experienced any injuries or illness recently? No    Has the patient experienced any 'new' shortness of breath, severe headaches, or changes in vision recently? No    Has the patient had any changes in their diet, or alcohol consumption? No    Is the patient here today to prepare for any type of upcoming surgery, procedure, or for a cardioversion procedure? No    What phone number can we reach the patient at today? [facility] [phone].

## 2021-05-27 NOTE — PROGRESS NOTES
Thank you for asking the [facility] team to see Gardenia Muller.      Assessment/Plan:   HFpEF - Euvolemic, check labs, only on metoprolol.     Atrial fibrillation - on metoprolol 25mg two times a day, diltiazem 180mg, and coumadin. Not a NOAC candidate due to her diffuse valvular disease ?  EKG today and holter monitor to look for rate control at home.    CAD - rosuvastatin, check LDL and LFTs    Moderate MR and TR, mild AI.    F/U 6 months. Will call with results    ADDENDUM:  Gardenia is not on torsemide or diltiazem at home. She is taking furosemide 40mg PO qAM, as well as aspirin 81mg, coumadin, Toprol XL 12.5mg two times a day, digoxin 0.125mg daily, and lipitor 80mg. Will start aldactone 25mg daily as planned.      Current History:   Gardenia Muller is a 68 y.o. with diffuse vascular disease, obesity, diabetes, hypertension, hyperlipidemia and renal insufficiency. Her last coronary angiogram in 2006 showed coronary calcification with non-obstructive left coronary disease and a chronically occluded right coronary that was well collateralized. She saw Dr. August in 2012 for evaluation for a cryptogenic stroke and is on coumadin for atrial fibrillation. Gardenia uses a walker due to gait instability from her strokes. She notes that she will get heart burn and need to throw up when walking too much. This comes and goes depending on the state of her hernia. Gardenia was admitted October 2018 with cholecystitis. During that complex hospitalization she had atrial fibrillation with RVR and acute HFpEF. Gardenia was on amiodarone and underwent NIVIA/cardioversion and had acute liver injury with INR 6.2. She was switched to sotalol and metoprolol and had severe bradycardia.     Gardenia returns for f/u today. She is feeling well and hasn't been hospitalized since the fall. She does note some shortness of breath laying in bed at night. Her medication list is quite different from the one in the computer. She is taking  torsemide 40mg daily as needed for weights over 170 lbs. She is also on diltiazem 180mg daily and toprol XL 25mg two times a day (tartrate listed in Epic). There is no chest pain, dizziness, or exertional dyspnea. She is fatigued but attributes that to her relative inactivity.    Past Medical History:     Past Medical History:   Diagnosis Date     Acute diastolic heart failure (H) 11/2015     Atrial fibrillation (H)      Cerebral vascular accident (H)      Coronary artery disease 2006    100% RCA with collateral filling per Dr. Elizabeth's angio report     Diabetes mellitus type 2 in obese (H)      Fibrocystic breast      GERD (gastroesophageal reflux disease)      History of anesthesia complications     slow to wake     Hypertension      Peripheral vascular disease (H)      PONV (postoperative nausea and vomiting)      Stroke (H)      Urinary incontinence        Past Surgical History:     Past Surgical History:   Procedure Laterality Date     CARDIAC CATHETERIZATION       CARDIOVERSION  10/18/2018     CORONARY STENT PLACEMENT  1995    stent     ERCP N/A 10/5/2018    Procedure: ENDOSCOPIC RETROGRADE CHOLANGIOPANCREATOGRAPHY, SPHINCTEROTOMY;  Surgeon: Anson Stokes MD;  Location: [facility];  Service:      EXPLORATORY LAPAROTOMY N/A 6/29/2018    Procedure: LAPAROTOMY, CLOSURE OF PERITONEAL RENT;  Surgeon: Jones Harding DO;  Location: [facility];  Service:      LAPAROSCOPIC CHOLECYSTECTOMY N/A 10/7/2018    Procedure: CHOLECYSTECTOMY, LAPAROSCOPIC;  Surgeon: Felicia So MD;  Location: [facility];  Service:      [facility]  6/29/2018          [facility]  10/21/2018          VENTRAL HERNIA REPAIR N/A 11/12/2015    Procedure: STRANGULATED VENTRAL HERNIA REPAIR ;  Surgeon: Ernesto Bailey MD;  Location: [facility];  Service:        Family History:     Family History   Problem Relation Age of Onset     Cancer Paternal Grandmother      Cancer Paternal Uncle      No Medical Problems Mother       No Medical Problems Father      Heart attack Sister      Chronic Kidney Disease Sister      No Medical Problems Daughter      No Medical Problems Maternal Grandmother      No Medical Problems Maternal Grandfather      No Medical Problems Paternal Grandfather      No Medical Problems Maternal Aunt      No Medical Problems Paternal Aunt      Diabetes Brother      BRCA 1/2 Neg Hx      Breast cancer Neg Hx      Colon cancer Neg Hx      Endometrial cancer Neg Hx      Ovarian cancer Neg Hx        Social History:    reports that she has quit smoking. She smoked 0.25 packs per day. She has quit using smokeless tobacco. She reports that she does not drink alcohol or use drugs.    Meds:     Current Outpatient Medications   Medication Sig     aspirin 81 MG EC tablet Take 81 mg by mouth daily.      blood glucose meter (GLUCOMETER) Please Dispense kit per pharmacy discretion and patient's insurance Test blood sugar.     blood glucose test strips Use 1 each As Directed as needed. Dispense brand per patient's insurance at pharmacy discretion.     carboxymethylcellulose (REFRESH PLUS) 0.5 % Dpet ophthalmic dropperette Administer 2 drops to both eyes every hour as needed (dry eyes).      lancets 33 gauge Misc Test twice daily Dispense brand per patient's insurance at pharmacy discretion.     loratadine (CLARITIN) 10 mg tablet Take 10 mg by mouth daily with lunch. Noon     magnesium gluconate (MAGONATE) 27 mg magnesium (500 mg) Tab tablet TAKE 1 TABLET BY MOUTH THREE TIMES DAILY **NON-COVERED MED** *1 TOTAL FILL*     magnesium oxide (MAG-OX) 400 mg tablet Take 1,200 mg by mouth daily.      metoprolol tartrate (LOPRESSOR) 25 MG tablet TAKE 1 TABLET BY MOUTH TWICE DAILY (8AM & 8PM)     multivitamin (TAB-A-JULIET) per tablet Take 1 tablet by mouth daily. (Patient taking differently: Take 1 tablet by mouth daily. )     omeprazole (PRILOSEC) 20 MG capsule TAKE 1 CAPSULE BY MOUTH TWICE DAILY (8AM & 8PM)     potassium chloride  "(K-DUR,KLOR-CON) 20 MEQ tablet Take 1 tablet (20 mEq total) by mouth daily.     pramipexole (MIRAPEX) 0.25 MG tablet Take 1 tablet (0.25 mg total) by mouth at bedtime.     rosuvastatin (CRESTOR) 20 MG tablet Take 2 tablets (40 mg total) by mouth at bedtime.     senna-docusate (PERICOLACE) 8.6-50 mg tablet Take 1 tablet by mouth 2 (two) times a day.     sertraline (ZOLOFT) 100 MG tablet Take 1 tablet (100 mg total) by mouth daily.     traZODone (DESYREL) 50 MG tablet Take 1 tablet (50 mg total) by mouth at bedtime.     VITAMIN D3 2,000 unit capsule TAKE 1 CAPSULE BY MOUTH ONCE DAILY AT 8AM (DO NOT BRAND CHANGE - PATIENT ONLY WANT CAPSULES)     warfarin (COUMADIN/JANTOVEN) 2 MG tablet Use as directed     warfarin (COUMADIN/JANTOVEN) 4 MG tablet Take 1 tablet by mouth on Sun/Tues/Thurs     atorvastatin (LIPITOR) 80 MG tablet Take 1 tablet (80 mg total) by mouth at bedtime.     azithromycin (ZITHROMAX Z-NÉSTOR) 250 MG tablet 2 tabs by mouth day 1 followed by 1 by mouth daily until finished.     digoxin (LANOXIN) 125 mcg tablet Take 1 tablet (125 mcg total) by mouth daily.     nitroglycerin (NITROSTAT) 0.4 MG SL tablet Place 1 tablet (0.4 mg total) under the tongue every 5 (five) minutes as needed for chest pain (for up to 3 doses and call 911 if persists). (Patient taking differently: Place 0.4 mg under the tongue every 5 (five) minutes as needed for chest pain (for up to 3 doses and call 911 if persists). )     polyethylene glycol (GLYCOLAX) 17 gram/dose powder Take 17 g by mouth daily as needed.      warfarin (COUMADIN/JANTOVEN) 2 MG tablet Take 2-4mg daily as directed       Allergies:   Penicillins    Review of Systems:   Review of Systems:                                               Objective:      Physical Exam  @LASTENCWT:3@  5' 3\" (1.6 m)  @BMI:3@  /72 (Patient Site: Left Arm, Patient Position: Sitting, Cuff Size: Adult Regular)   Pulse 66   Resp 16   Ht 5' 3\" (1.6 m)   Wt 163 lb (73.9 kg)   LMP " 01/25/1999   BMI 28.87 kg/m      General Appearance:   Alert, cooperative and in no acute distress.   HEENT:  No scleral icterus; the mucous membranes were pink and moist.   Neck: JVP flat. No thyromegaly. No HJR   Chest: The spine was straight. The chest was symmetric.   Lungs:   Respirations unlabored; the lungs are clear to auscultation.   Cardiovascular:   S1 and S2 normal and with systolic and diastolic murmurs. No clicks or rubs. No carotid bruits noted. Right DP, PT, and radial pulses 2+. Left DP, PT, and radial pulses 2+.   Abdomen:  No organomegaly, masses, bruits, or tenderness. Bowels sounds are present   Extremities: No cyanosis, clubbing, or edema.   Skin: No xanthelasma.   Neurologic: Mood and affect are appropriate.         Lab Review   Lab Results   Component Value Date     01/11/2019     (L) 11/05/2018     10/29/2018    K 4.3 01/11/2019    K 4.1 11/05/2018    K 4.5 10/29/2018     01/11/2019     11/05/2018     10/29/2018    CO2 22 01/11/2019    CO2 23 11/05/2018    CO2 25 10/29/2018    BUN 14 01/11/2019    BUN 9 11/05/2018    BUN 14 10/29/2018    CREATININE 0.83 01/11/2019    CREATININE 0.65 11/05/2018    CREATININE 0.73 10/29/2018    CALCIUM 10.4 01/11/2019    CALCIUM 10.7 (H) 11/05/2018    CALCIUM 11.0 (H) 10/29/2018     Lab Results   Component Value Date    WBC 5.5 01/11/2019    WBC 8.7 11/05/2018    WBC 8.9 10/24/2018    HGB 11.7 (L) 01/11/2019    HGB 11.0 (L) 11/05/2018    HGB 11.1 (L) 10/24/2018    HCT 37.1 01/11/2019    HCT 34.7 (L) 11/05/2018    HCT 35.5 10/24/2018    MCV 88 01/11/2019    MCV 85 11/05/2018    MCV 86 10/24/2018     01/11/2019     (L) 11/05/2018    PLT 95 (L) 10/24/2018     Lab Results   Component Value Date    CHOL 165 09/20/2017    CHOL 151 06/11/2014    CHOL 166 06/05/2013    TRIG 116 06/30/2018    TRIG 121 09/20/2017    TRIG 117 06/11/2014    HDL 56 09/20/2017    HDL 54 06/11/2014    HDL 51 06/05/2013    LDLDIRECT 94  04/15/2015     Lab Results   Component Value Date    BNP 2,481 (H) 10/23/2018    BNP 2,613 (H) 10/22/2018    BNP 1,140 (H) 10/14/2018         Gisel Choe M.D.

## 2021-05-27 NOTE — PATIENT INSTRUCTIONS - HE
- Check EKG and blood today    - Wear monitor at home to see what your heart rates are doing    - See me in 4-6 months

## 2021-05-27 NOTE — TELEPHONE ENCOUNTER
"  ----- Message -----  From: Gisel Choe MD  Sent: 4/10/2019   1:22 PM  To: Erwin Jacome RN    She should stay on the meds that she is on. Let's still plan on adding aldactone. It appears that she is only on furosemide as a diuretic currently so I am not sure where the \"three diuretics\" came from.     I think I updated her med list but please verify.     EG    "

## 2021-05-28 NOTE — TELEPHONE ENCOUNTER
ANTICOAGULATION  MANAGEMENT- Home Care/Care Facility Result    Assessment     Today's INR result of 4.2 is Supratherapeutic (goal INR of 2.0-3.0)        Warfarin taken as previously instructed    No new diet changes affecting INR    No new medication/supplements affecting INR    Continues to tolerate warfarin with no reported s/s of bleeding or thromboembolism     Previous INR was Therapeutic    Plan:     Spoke with home care nurse Alice discussed INR result and instructed:     Warfarin Dosing Instructions: Hold today then change warfarin dose to 2 mg daily (12.5 % change)    (ACN called this order to [facility] pharmacy)    Next INR to be drawn: 1 week.     Education provided: importance of taking warfarin as instructed    Alice verbalizes understanding and agrees to warfarin dosing plan.   ?   Williams Hawthorne RN    Subjective/Objective:      Gardenia Muller, a 68 y.o. female is established on warfarin.     Home care/care facility RN's report of Gardenia INR, recent warfarin dosing, diet changes, medication changes, and symptoms is documented below.    Additional findings: verbally confirmed home dose with Alice and updated on anticoagulation calendar    Anticoagulation Episode Summary     Current INR goal:   2.0-3.0   TTR:   67.0 % (2.2 y)   Next INR check:   5/8/2019   INR from last check:   4.20! (5/1/2019)   Weekly max warfarin dose:      Target end date:   Indefinite   INR check location:      Preferred lab:      Send INR reminders to:   ANTICOAGULATION POOL A (WBY,WBE,MID,RSC)    Indications    Cerebrovascular disease [I67.9]           Comments:            Anticoagulation Care Providers     Provider Role Specialty Phone number    Jerson Hernandez MD Referring Family Medicine [phone]

## 2021-05-28 NOTE — TELEPHONE ENCOUNTER
INR result is 2.9  INR   Date Value Ref Range Status   05/01/2019 4.20 (!) 0.9 - 1.1 Final       Will the patient be seen, or did they already see, MD or CNP today? No    Most Recent Warfarin dose day/week  Sunday Monday Tuesday Wednesday Thursday Friday Saturday       2 2 2     Sunday Monday Tuesday Wednesday Thursday Friday Saturday   2 2 2           Has the patient missed any doses of Coumadin, Warfarin, Jantoven in the past 7 days? No    Has the patients medications changed since the last visit? No    Has the patient experienced any bleeding recently? No    Has the patient experienced any injuries or illness recently? No    Has the patient experienced any 'new' shortness of breath, severe headaches, or changes in vision recently? No    Has the patient had any changes in their diet, or alcohol consumption? No    Is the patient here today to prepare for any type of upcoming surgery, procedure, or for a cardioversion procedure? No    What phone number can we reach the patient at today? [phone].

## 2021-05-28 NOTE — TELEPHONE ENCOUNTER
ANTICOAGULATION  MANAGEMENT- Home Care/Care Facility Result    Assessment     Today's INR result of 2.9 is Therapeutic (goal INR of 2.0-3.0)        Warfarin taken as previously instructed    No new diet changes affecting INR    No new medication/supplements affecting INR    Continues to tolerate warfarin with no reported s/s of bleeding or thromboembolism     Previous INR was Supratherapeutic    Plan:     Spoke with Julian discussed INR result and instructed:     Warfarin Dosing Instructions: Continue current warfarin dose 2 mg daily  Next INR to be drawn: one week    Education provided: importance of therapeutic range    Julian verbalizes understanding and agrees to warfarin dosing plan.   ?   Bia Crane RN    Subjective/Objective:      Gardenia Muller, a 68 y.o. female is established on warfarin.     Home care/care facility RN's report of Gardenia INR, recent warfarin dosing, diet changes, medication changes, and symptoms is documented below.    Additional findings: template incorrect; verbally confirmed home dose with julian (patient held last week Wednesday as instructed and updated on anticoagulation calendar    Anticoagulation Episode Summary     Current INR goal:   2.0-3.0   TTR:   66.4 % (2.2 y)   Next INR check:   5/15/2019   INR from last check:   2.90 (5/8/2019)   Weekly max warfarin dose:      Target end date:   Indefinite   INR check location:      Preferred lab:      Send INR reminders to:   ANTICOAGULATION POOL A (WBY,WBE,MID,RSC)    Indications    Cerebrovascular disease [I67.9]           Comments:            Anticoagulation Care Providers     Provider Role Specialty Phone number    Jerson Hernandez MD Referring Family Medicine [phone]

## 2021-05-28 NOTE — PROGRESS NOTES
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  No, not currently because of hypotension in the last months  Low dose ASA? (indicated for most men> 50, most women > 60 if any other card RF yes  Up to date with yearly dilated retina eval?  No but patient indicates this is coming up soon, she states that she has not had any more flashes of light    Plan: No change  Follow-up: 3 months           (HFpEF) heart failure with preserved ejection fraction (H)     Diastolic Congestive Heart Failure iswell compensated  Wt Readings from Last 3 Encounters:   04/23/19 167 lb (75.8 kg)   03/26/19 163 lb (73.9 kg)   02/08/19 169 lb (76.7 kg)     BP Readings from Last 3 Encounters:   04/23/19 132/68   03/26/19 120/72   02/08/19 128/52       Meds:  Antihypertensives: Metoprolol XL 12.5,  Diuretic?  Yes/spironolactone 25 mg just started by cardiology, furosemide 40 mg p.o. daily  Nitrites/other? No  Patient also takes digoxin 0.125, the purpose of this was to slow her heart rate/atrial fibrillation without it lowering her blood pressure  Changes/Recommendations: Given new addition of spironolactone, will check basic metabolic profile  Follow-up: 3 months           Relevant Orders    Basic Metabolic Panel    Persistent atrial fibrillation (H)     Rate controlled, on warfarin, check INR         Relevant Orders    INR  (Completed)       Medium    RLS (restless legs syndrome)     Major problem today, patient started the visit by indicating that she did not want to take her pramipexole anymore    Then stated that she has not been sleeping for the last 2 nights, she states she needs to stand up and move around because of her legs all the time    She was on Mirapex previously, this was not effective.  Of note, both medications were at low dose    Discussed options including Lyrica.  Patient really does not want to take anything else at this point.  Asked think should probably be Lyrica should problems persist             Subjective  CC:  Chief Complaint   Patient presents with     Follow-up     stomache ache     HPI:  Abdominal Pain  Narrative has pain in surgical site- when sitting down      Afib 0/125 dig + metoprolol 25 bid  2/8/19    D CHF- my assessment  Antihypertensives: Apparently off of lisinopril 10 mg, metoprolol 25 twice daily  Diuretic?  Some confusion about this.  Last visit, was on 40 mg.  Now, per home nurse, not taking furosemide.  Patient thinks she might be taking something like that.  Nitrites/other? No     Changes/Recommendations: I recommend that she stop her lisinopril officially as her blood pressures are somewhat low and her microalbumin's have been normal and she has had normal systolic function.  We are going to recheck her microalbumin again today.  Furosemide should be dosed based on weight.  She has been well above 170 in the past when she has had exacerbation of CHF.  Recent home weights have been consistently 164 and 165.  Will write that she should take her furosemide when her weight is above 170 and not take it on other days.  40 mg daily dose  Follow-up: 2 months  Be seeing cardiology around this time as well    Reviewed phone note from Dr. Hinojosa, started spironolactone to help with shortness of breath  DMII  Diabetes complications:  nephropathy, cardiovascular disease, cerebrovascular disease  and peripheral vascular disease  Lab Results   Component Value Date    HGBA1C 5.8 2019     Diabetes medications reviewed- compliance: Diet controlled only    Additional diabetes objectives reviewed   Statin medication (>40 or ^CVD Risk) Yes: Atorvastatin 80  Blood pressure goal (JNC 8 <140/90 all ages)-Yes  BP Readings from Last 3 Encounters:   19 120/72   19 128/52   19 106/54     Lab Results   Component Value Date    MICROALBUR 13.50 (H) 2019     Ace/ARB if indicated-No, recently stopped lisinopril  Low dose ASA? (indicated for most men> 50, most women > 60 if any other card RF Yes  Yearly dilated retina evaluation -No: The patient indicates it is coming soon    Blood Sugar Control  Testing blood sugars/ frequency? twice daily    AM   115  Noon    PM  120  HS    Hypoglycemia?  No      Wt Readings from Last 3 Encounters:   19 163 lb (73.9 kg)   19 169 lb (76.7 kg)   19 170 lb (77.1 kg)       Insomnia  Narrative:  Cant fall asleep at night  ------------------  Problem falling asleep or waking up after having fallen asleep? Falling asleep  Duration of problem? 1 month  's point in the discussion it became clear that it was restless legs that were likely the problem.  Patient states that she cannot sleep because she needs to stand up and walk a little bit over and over throughout the night.  Denies any big increase in depression or anxiety although family member, her niece just  at 50 shortly after she got   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)     Advanced directives, counseling/discussion     SBO (small bowel obstruction) (H)     Cystitis     Hypomagnesemia     Healthcare maintenance      Essential hypertension     Dysarthria     Cerebral infarction (H)     Anemia     Cerebrovascular disease     Benign adenomatous polyp of large intestine     RLS (restless legs syndrome)     Incisional hernia, without obstruction or gangrene     Abdominal pain, generalized     Diverticular disease of large intestine     Dysphagia     Coronary artery disease involving native coronary artery of native heart without angina pectoris     Dyslipidemia     Ventral hernia without obstruction or gangrene     Anticoagulation management encounter     S/P cholecystectomy     SOB (shortness of breath)     Lower extremity edema     Anxiety     Hypokalemia     (HFpEF) heart failure with preserved ejection fraction (H)     Tachycardia     Anticoagulant long-term use     Biliary obstruction     Abdominal pain, right upper quadrant     Persistent atrial fibrillation (H)     Abdominal pain, left upper quadrant     Sinus bradycardia     MAY (acute kidney injury) (H)     Transaminitis     Physical deconditioning     Pneumonia     Lipoma of back     Acalculous cholecystitis     Upper respiratory infection     Onychogryphosis     Hyperparathyroidism (H)     Flashing lights seen     Microalbuminuria     Current medications reviewed as follows:  Current Outpatient Medications on File Prior to Visit   Medication Sig     aspirin 81 MG EC tablet Take 81 mg by mouth daily.      atorvastatin (LIPITOR) 80 MG tablet Take 80 mg by mouth at bedtime.     blood glucose meter (GLUCOMETER) Please Dispense kit per pharmacy discretion and patient's insurance Test blood sugar.     blood glucose test strips Use 1 each As Directed as needed. Dispense brand per patient's insurance at pharmacy discretion.     carboxymethylcellulose (REFRESH PLUS) 0.5 % Dpet ophthalmic dropperette Administer 2 drops to both eyes every hour as needed (dry eyes).      digoxin (LANOXIN) 125 mcg tablet Take 125 mcg by mouth daily.     furosemide (LASIX) 40 MG tablet Take 1 tablet  (40 mg total) by mouth daily.     lancets 33 gauge Misc Test twice daily Dispense brand per patient's insurance at pharmacy discretion.     loratadine (CLARITIN) 10 mg tablet Take 10 mg by mouth daily with lunch. Noon     metoprolol succinate (TOPROL-XL) 25 MG Take 0.5 tablets (12.5 mg total) by mouth 2 (two) times a day.     multivitamin (TAB-A-JULIET) per tablet Take 1 tablet by mouth daily. (Patient taking differently: Take 1 tablet by mouth daily. )     omeprazole (PRILOSEC) 20 MG capsule TAKE 1 CAPSULE BY MOUTH TWICE DAILY (8AM & 8PM)     polyethylene glycol (GLYCOLAX) 17 gram/dose powder Take 17 g by mouth daily as needed.      potassium chloride (K-DUR,KLOR-CON) 20 MEQ tablet Take 1 tablet (20 mEq total) by mouth daily.     senna-docusate (PERICOLACE) 8.6-50 mg tablet Take 1 tablet by mouth 2 (two) times a day.     sertraline (ZOLOFT) 100 MG tablet Take 1 tablet (100 mg total) by mouth daily.     sodium chloride (OCEAN) 0.65 % nasal spray Apply 1 spray into each nostril as needed for congestion.     spironolactone (ALDACTONE) 25 MG tablet Take 1 tablet (25 mg total) by mouth daily.     traZODone (DESYREL) 50 MG tablet Take 1 tablet (50 mg total) by mouth at bedtime.     VITAMIN D3 2,000 unit capsule TAKE 1 CAPSULE BY MOUTH ONCE DAILY AT 8AM (DO NOT BRAND CHANGE - PATIENT ONLY WANT CAPSULES)     warfarin (COUMADIN/JANTOVEN) 2 MG tablet Use as directed     warfarin (COUMADIN/JANTOVEN) 2 MG tablet Take 2-4mg daily as directed     warfarin (COUMADIN/JANTOVEN) 4 MG tablet Take 1 tablet by mouth on Sun/Tues/Thurs     guaiFENesin (HUMIBID 3) 400 mg Tab Take 400 mg by mouth every 4 (four) hours as needed (chest congestion, every 4 hours while awake PRN).     magnesium gluconate (MAGONATE) 27.5 mg magne- sium (500 mg) tablet Take 500 mg by mouth daily.     nitroglycerin (NITROSTAT) 0.4 MG SL tablet Place 1 tablet (0.4 mg total) under the tongue every 5 (five) minutes as needed for chest pain (for up to 3 doses and call  911 if persists). (Patient taking differently: Place 0.4 mg under the tongue every 5 (five) minutes as needed for chest pain (for up to 3 doses and call 911 if persists). )     [DISCONTINUED] pramipexole (MIRAPEX) 0.25 MG tablet Take 1 tablet (0.25 mg total) by mouth at bedtime.     No current facility-administered medications on file prior to visit.      Social History     Tobacco Use   Smoking Status Former Smoker     Packs/day: 0.25   Smokeless Tobacco Former User   Tobacco Comment    e-cig a few times/day     Social History     Social History Narrative     Not on file     Patient Care Team:  Jerson Hernandez MD as PCP - General (Family Medicine)  Jones Harding DO as Physician (General Surgery)  [facility]  [place]  Full 10 system review including constitutional, respiratory, cardiac, gi, urinary, rheumatologic, neurologic, reproductive, dermatologic psychiatric is  performed (via questionnaire) and is negative except chills, weight change, sleep difficulties, shortness of breath, swelling of hands and feet        Objective  Physical Exam  Vitals:    04/23/19 1048   BP: 132/68   Patient Site: Right Arm   Patient Position: Sitting   Cuff Size: Adult Regular   Pulse: 66   Resp: 16   Weight: 167 lb (75.8 kg)     Body mass index is 29.58 kg/m .  Gen- alert, oriented x3  No acute distress  Chest- Normal inspiration and expiration.    Clear to ascultation.    No chest wall deformity or scar.  CV- Heart regular rate and rhythm  normal tones   no murmurs   No gallops or rubs.  Ext- warm and dry  Teds hose, no pitting edema  Skin-  no visualized rash    Diagnostics:   Results for orders placed or performed in visit on 04/23/19   INR   Result Value Ref Range    INR 2.30 (H) 0.90 - 1.10           Please note: Voice recognition software was used in this dictation.  It may therefore contain typographical errors.

## 2021-05-28 NOTE — TELEPHONE ENCOUNTER
INR result is 4.2  INR   Date Value Ref Range Status   04/23/2019 2.30 (H) 0.90 - 1.10 Final       Will the patient be seen, or did they already see, MD or CNP today? No    Most Recent Warfarin dose day/week  Sunday Monday Tuesday Wednesday Thursday Friday Saturday      3 2 2 3     Sunday Monday Tuesday Wednesday Thursday Friday Saturday   2 2 2           Has the patient missed any doses of Coumadin, Warfarin, Jantoven in the past 7 days? No    Has the patients medications changed since the last visit? No    Has the patient experienced any bleeding recently? No    Has the patient experienced any injuries or illness recently? No    Has the patient experienced any 'new' shortness of breath, severe headaches, or changes in vision recently? No    Has the patient had any changes in their diet, or alcohol consumption? No    Is the patient here today to prepare for any type of upcoming surgery, procedure, or for a cardioversion procedure? No    What phone number can we reach the patient at today? [phone] Home care nurse Alice .

## 2021-05-28 NOTE — TELEPHONE ENCOUNTER
ANTICOAGULATION  MANAGEMENT    Assessment     Today's INR result of 2.3 is Therapeutic (goal INR of 2.0-3.0)        Warfarin taken as previously instructed    No new diet changes affecting INR    No new medication/supplements affecting INR    Continues to tolerate warfarin with no reported s/s of bleeding or thromboembolism     Previous INR was Subtherapeutic    Plan:     Spoke with Gardenia regarding INR result and instructed:   (and left detailed message for home care nurse)    Warfarin Dosing Instructions:  Continue current warfarin dose 3 mg daily on Wed/Sat; and 2 mg daily rest of week  (0 % change)    Instructed patient to follow up no later than: 5/1    Education provided: importance of therapeutic range    Gardenia verbalizes understanding and agrees to warfarin dosing plan.    Instructed to call the [facility] for any changes, questions or concerns. (#[phone])   ?   Bia Crane RN    Subjective/Objective:      Gardenia Muller, a 68 y.o. female is on warfarin.     Gardenia reports:     Home warfarin dose: as updated on anticoagulation calendar per template     Missed doses: No     Medication changes:  No     S/S of bleeding or thromboembolism:  No     New Injury or illness:  No     Changes in diet or alcohol consumption:  No     Upcoming surgery, procedure or cardioversion:  No    Anticoagulation Episode Summary     Current INR goal:   2.0-3.0   TTR:   67.3 % (2.1 y)   Next INR check:   5/1/2019   INR from last check:   2.30 (4/23/2019)   Weekly max warfarin dose:      Target end date:   Indefinite   INR check location:      Preferred lab:      Send INR reminders to:   ANTICOAGULATION POOL A (WBY,WBE,MID,RSC)    Indications    Cerebrovascular disease [I67.9]           Comments:            Anticoagulation Care Providers     Provider Role Specialty Phone number    Jerson Hernandez MD Referring Family Medicine [phone]

## 2021-05-28 NOTE — TELEPHONE ENCOUNTER
INR result is 3.9 today finger stick  INR   Date Value Ref Range Status   05/08/2019 2.90 (!) 0.9 - 1.1 Final       Will the patient be seen, or did they already see, MD or CNP today? No    Most Recent Warfarin dose day/week  Sunday Monday Tuesday Wednesday Thursday Friday Saturday   2 2 2 2 2 2 2     Sunday Monday Tuesday Wednesday Thursday Friday Saturday    2 2           Has the patient missed any doses of Coumadin, Warfarin, Jantoven in the past 7 days? No    Has the patients medications changed since the last visit? No    Has the patient experienced any bleeding recently? No    Has the patient experienced any injuries or illness recently? No    Has the patient experienced any 'new' shortness of breath, severe headaches, or changes in vision recently? No    Has the patient had any changes in their diet, or alcohol consumption? No    Is the patient here today to prepare for any type of upcoming surgery, procedure, or for a cardioversion procedure? No    What phone number can we reach the patient at today? Transferring over

## 2021-05-28 NOTE — TELEPHONE ENCOUNTER
Provider Review: Anticoagulation Annual Referral Renewal    ACM Renewal Decision:  Renew ACM warfarin management      INR Range:   Change INR goal range to 2.0-3.0   Anticipated Duration of Therapy (from today):  Long-term anticoagulation      Jerson Hernandez MD  12:38 PM

## 2021-05-28 NOTE — TELEPHONE ENCOUNTER
Patient Returning Call  Reason for call:  Calling ACN back   Information relayed to patient:  no  Patient has additional questions:  Yes  If YES, what are your questions/concerns:  Dosing. Alice will not be available until 3:30PM as she will be with another client  Okay to leave a detailed message?: Yes

## 2021-05-28 NOTE — TELEPHONE ENCOUNTER
"Anticoagulation Annual Referral Renewal Review    Gardenia Muller's chart reviewed for annual renewal of referral to anticoagulation monitoring.        Criteria for anticoagulation nurse and/or pharmacist renewal met   Warfarin indication: Atrial Fibrillation and Stroke Yes, per indication   Current with INR monitoring/compliant Yes Yes   Date of last office visit 4/23/19 Yes, had office visit within last year   Time in Therapeutic Range (TTR) 43.65 % No, TTR < 60 %       Gardenia Muller did NOT meet all criteria for anticoagulation management program initiated renewal and requires provider review. Using dot phrase, \".acmrenewalprovider\", please advise if Gardenia's anticoagulation management referral should be renewed or if patient should be seen in office to review anticoagulation therapy      Bia Crane RN  11:34 AM      "

## 2021-05-28 NOTE — TELEPHONE ENCOUNTER
Who is calling:  Alonzo from [facility]  Reason for Call:  Adalberto is calling for clarification on refill request received for warfarin 2 mg  Date of last appointment with primary care: 4-23-19  Okay to leave a detailed message: No

## 2021-05-28 NOTE — TELEPHONE ENCOUNTER
ANTICOAGULATION  MANAGEMENT- Home Care/Care Facility Result    Assessment     Today's INR result of 3.9 is Supratherapeutic (goal INR of 2.0-3.0)        Warfarin taken as previously instructed    No new diet changes affecting INR- pt confirmed she will not eat more greens than what she's been eating.     No new medication/supplements affecting INR    Continues to tolerate warfarin with no reported s/s of bleeding or thromboembolism     Previous INR was Therapeutic    Plan:     Spoke with home care nurse Alice discussed INR result and instructed:     Warfarin Dosing Instructions: Change warfarin dose to 1 mg daily on Wed; and 2 mg daily rest of week  (7 % change)    Next INR to be drawn: 1 week.     Education provided: importance of taking warfarin as instructed    Alice verbalizes understanding and agrees to warfarin dosing plan.   ?   Williams Hawthorne RN    Subjective/Objective:      Gardenia Muller, a 68 y.o. female is established on warfarin.     Home care/care facility RN's report of Gardenia INR, recent warfarin dosing, diet changes, medication changes, and symptoms is documented below.    Additional findings: verbally confirmed home dose with Alice and updated on anticoagulation calendar    Anticoagulation Episode Summary     Current INR goal:   2.0-3.0   TTR:   66.0 % (2.2 y)   Next INR check:   5/22/2019   INR from last check:   3.90! (5/15/2019)   Weekly max warfarin dose:      Target end date:   Indefinite   INR check location:      Preferred lab:      Send INR reminders to:   ANTICOAGULATION POOL A (WBY,WBE,MID,RSC)    Indications    Cerebrovascular disease [I67.9]           Comments:            Anticoagulation Care Providers     Provider Role Specialty Phone number    Jerson Hernandez MD Referring Family Medicine [phone]

## 2021-05-29 ENCOUNTER — RECORDS - HEALTHEAST (OUTPATIENT)
Dept: ADMINISTRATIVE | Facility: CLINIC | Age: 71
End: 2021-05-29

## 2021-05-29 NOTE — TELEPHONE ENCOUNTER
Home care nurse reporting the following symptoms for patient:  Gardenia has lost 5lbs in the last two weeks. Complaints of decreased appetite and increased fatigue. Gardenia is not taking her water pill due to weight loss. Also reports she has had diarrhea x 1 week. Denies dizziness. Routing to PCP. Please contact patient with any further questions or advice.

## 2021-05-29 NOTE — TELEPHONE ENCOUNTER
ANTICOAGULATION  MANAGEMENT- Home Care/Care Facility Result    Assessment     Today's INR result of 3.1 is Supratherapeutic (goal INR of 2.0-3.0)        Warfarin taken as previously instructed    No new diet changes affecting INR    No new medication/supplements affecting INR    Continues to tolerate warfarin with no reported s/s of bleeding or thromboembolism     Previous INR was Therapeutic    Plan:     Spoke with Alice discussed INR result and instructed:     Warfarin Dosing Instructions: Continue current warfarin dose 1 mg daily on Wed; and 2 mg daily rest of week  (0 % change)    Next INR to be drawn: one week    Education provided: importance of therapeutic range    Alice verbalizes understanding and agrees to warfarin dosing plan.   ?   Bia Crane RN    Subjective/Objective:      Gardenia Muller, a 68 y.o. female is established on warfarin.     Home care/care facility RN's report of Gardenia INR, recent warfarin dosing, diet changes, medication changes, and symptoms is documented below.    Additional findings: none    Anticoagulation Episode Summary     Current INR goal:   2.0-3.0   TTR:   65.7 % (2.2 y)   Next INR check:   6/5/2019   INR from last check:   3.10! (5/29/2019)   Weekly max warfarin dose:      Target end date:   Indefinite   INR check location:      Preferred lab:      Send INR reminders to:   [facility]    Indications    Cerebrovascular disease [I67.9]           Comments:            Anticoagulation Care Providers     Provider Role Specialty Phone number    Jerson Hernandez MD Referring Family Medicine [phone]

## 2021-05-29 NOTE — TELEPHONE ENCOUNTER
INR result is 2.3  INR   Date Value Ref Range Status   06/05/2019 3.30 (!) 0.9 - 1.1 Final       Will the patient be seen, or did they already see, MD or CNP today? No    Most Recent Warfarin dose day/week  Sunday Monday Tuesday Wednesday Thursday Friday Saturday   2 2 2         Sunday Monday Tuesday Wednesday Thursday Friday Saturday      1 2 2 1       Has the patient missed any doses of Coumadin, Warfarin, Jantoven in the past 7 days? No    Has the patients medications changed since the last visit? No    Has the patient experienced any bleeding recently? No    Has the patient experienced any injuries or illness recently? No    Has the patient experienced any 'new' shortness of breath, severe headaches, or changes in vision recently? No    Has the patient had any changes in their diet, or alcohol consumption? Yes, patient is losing weight rapidly and has not taken water pill recently     Is the patient here today to prepare for any type of upcoming surgery, procedure, or for a cardioversion procedure? No    What phone number can we reach the patient at today? home phone listed in demographics.

## 2021-05-29 NOTE — TELEPHONE ENCOUNTER
Who is calling:  Patient  Reason for Call: Patient reports that when she was in hospital 6/14/19 the MD advised her to start drinking Ensure or Boost daily, PLEASE send  RX to [facility] pharmacy today  Date of last appointment with primary care:    Has the patient been recently seen:  Yes  Okay to leave a detailed message: Yes

## 2021-05-29 NOTE — TELEPHONE ENCOUNTER
Called and got the reach of Alice the Nurse. Inform her of message. Per Nurse she will talk with the on call nurse this week about patient calling for an update on the INR check. Per Alice she is going out of town and she reschedule her to Next Wednesday 6/26/2019 with another nurse. The raúl Mcdonough, is already aware of the appointment set up.

## 2021-05-29 NOTE — PROGRESS NOTES
Assessment/ Plan  1. Muscle weakness (generalized)  2. Weight loss  20 pounds over less than 2 months   3. Diarrhea, unspecified type  3 above problems in patient with history of MAY, heart failure on digoxin  Urine with some suggestions of hemolytic process, though normal hemoglobin  Vital signs stable      Patient lives independently    I think she should be hospitalized for further evaluation of this problem given multiple medical problems -there is a good chance she has worsening renal insufficiency, I am concerned about dig toxicity-though electrocardiogram is unchanged    Body mass index is 26.04 kg/m .    Subjective  CC:  Chief Complaint   Patient presents with     Fatigue     Weight Loss     HPI:  68-year-old -American female with complex medical history that includes coronary artery disease, cerebrovascular disease and renal artery stenosis.  Chronic kidney disease, diastolic congestive heart failure and diet-controlled diabetes as well as neck atrial fibrillation.  She lives alone  And presents to our clinic with a 1 to 2-month history of weight loss when she is about 20 pounds) has been associated with diarrhea for the last 2 weeks.    She comes into clinic by [facility] today, no sudden change recently.  She feels weak but has no other acute symptoms.  He has a history of chronic shortness of breath which is unchanged.    Severe general weakness  Severe diarrhea for a few weeks  Diarrhea  Duration/ Onset: 2 weeks  Frequency: 2 times  Description:Volume of stools/ Stool consistency: small about  Blood or mucous in stool? no  Abdominal pain/ cramps? achu  Constitutional symptoms? chilly  Recent travel? no  Recent antibiotic use? no      Abnormal Weight loss  Wt Readings from Last 3 Encounters:   06/14/19 147 lb (66.7 kg)   04/23/19 167 lb (75.8 kg)   03/26/19 163 lb (73.9 kg)       Narrative: gradual , began 2 weeks ago  Duration: 1-2   Degree of weight loss? 20+ lbs  Impression that food intake  is adequate? yes does not have much of an appetite  If no is there a reason? na  appetite decreased ? yes  taste and smell? no  dry mouth? no  difficulty chewing or swallowing? no  medications that may be playing a role? no  Drugs/ alcohol/ tobacco?  No  Access to enough food?  Yes      Diarrhea or cause/vomiting? yes to diarrhea, twice a day, no vomiting abdominal pain? yes  Night sweats?  No    Patient Active Problem List   Diagnosis     Spinal stenosis     PAD (peripheral artery disease) (H)     Dermatosis Papulosa Nigra     Depression     Hypercalcemia     Right Renal Artery Stenosis     Osteoarthritis     Abnormality Of Walk     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)     Advanced directives, counseling/discussion     SBO (small bowel obstruction) (H)     Cystitis     Hypomagnesemia     Healthcare maintenance     Essential hypertension     Dysarthria     Cerebral infarction (H)     Anemia     Cerebrovascular disease     Benign adenomatous polyp of large intestine     RLS (restless legs syndrome)     Incisional hernia, without obstruction or gangrene     Abdominal pain, generalized     Diverticular disease of large intestine     Dysphagia     Coronary artery disease involving native coronary artery of native heart without angina pectoris     Dyslipidemia     Ventral hernia without obstruction or gangrene     Anticoagulation management encounter     S/P cholecystectomy     SOB (shortness of breath)     Lower extremity edema     Anxiety     Hypokalemia     (HFpEF) heart failure with preserved ejection fraction (H)     Tachycardia     Anticoagulant long-term use     Biliary obstruction     Abdominal pain, right upper quadrant     Persistent atrial fibrillation (H)     Abdominal pain, left upper quadrant     Sinus bradycardia     MAY (acute kidney injury) (H)     Transaminitis     Physical deconditioning     Pneumonia     Lipoma of back     Acalculous cholecystitis     Upper respiratory infection      Onychogryphosis     Hyperparathyroidism (H)     Flashing lights seen     Microalbuminuria     Current medications reviewed as follows:  Current Outpatient Medications on File Prior to Visit   Medication Sig     aspirin 81 MG EC tablet Take 81 mg by mouth daily.      atorvastatin (LIPITOR) 80 MG tablet Take 80 mg by mouth at bedtime.     blood glucose meter (GLUCOMETER) Please Dispense kit per pharmacy discretion and patient's insurance Test blood sugar.     blood glucose test strips Use 1 each As Directed as needed. Dispense brand per patient's insurance at pharmacy discretion.     carboxymethylcellulose (REFRESH PLUS) 0.5 % Dpet ophthalmic dropperette Administer 2 drops to both eyes every hour as needed (dry eyes).      digoxin (LANOXIN) 125 mcg tablet Take 125 mcg by mouth daily.     furosemide (LASIX) 40 MG tablet Take 1 tablet (40 mg total) by mouth daily.     guaiFENesin (HUMIBID 3) 400 mg Tab Take 400 mg by mouth every 4 (four) hours as needed (chest congestion, every 4 hours while awake PRN).     lancets 33 gauge Misc Test twice daily Dispense brand per patient's insurance at pharmacy discretion.     loratadine (CLARITIN) 10 mg tablet Take 10 mg by mouth daily with lunch. Noon     magnesium gluconate (MAGONATE) 27.5 mg magne- sium (500 mg) tablet Take 500 mg by mouth daily.     metoprolol succinate (TOPROL-XL) 25 MG Take 0.5 tablets (12.5 mg total) by mouth 2 (two) times a day.     multivitamin (TAB-A-JULIET) per tablet Take 1 tablet by mouth daily. (Patient taking differently: Take 1 tablet by mouth daily. )     nitroglycerin (NITROSTAT) 0.4 MG SL tablet Place 1 tablet (0.4 mg total) under the tongue every 5 (five) minutes as needed for chest pain (for up to 3 doses and call 911 if persists). (Patient taking differently: Place 0.4 mg under the tongue every 5 (five) minutes as needed for chest pain (for up to 3 doses and call 911 if persists). )     omeprazole (PRILOSEC) 20 MG capsule TAKE 1 CAPSULE BY MOUTH  TWICE DAILY (8AM & 8PM)     polyethylene glycol (GLYCOLAX) 17 gram/dose powder Take 17 g by mouth daily as needed.      potassium chloride (K-DUR,KLOR-CON) 20 MEQ tablet Take 1 tablet (20 mEq total) by mouth daily.     senna-docusate (PERICOLACE) 8.6-50 mg tablet Take 1 tablet by mouth 2 (two) times a day.     sertraline (ZOLOFT) 100 MG tablet Take 1 tablet (100 mg total) by mouth daily.     sodium chloride (OCEAN) 0.65 % nasal spray Apply 1 spray into each nostril as needed for congestion.     spironolactone (ALDACTONE) 25 MG tablet Take 1 tablet (25 mg total) by mouth daily.     traZODone (DESYREL) 50 MG tablet Take 1 tablet (50 mg total) by mouth at bedtime.     VITAMIN D3 2,000 unit capsule TAKE 1 CAPSULE BY MOUTH ONCE DAILY AT 8AM (DO NOT BRAND CHANGE - PATIENT ONLY WANT CAPSULES)     warfarin (COUMADIN/JANTOVEN) 2 MG tablet Take 1 mg on Wed/SAt; 2 mg all other days of the week.     warfarin (COUMADIN/JANTOVEN) 4 MG tablet Take 1 tablet by mouth on Sun/Tues/Thurs     No current facility-administered medications on file prior to visit.      Social History     Tobacco Use   Smoking Status Former Smoker     Packs/day: 0.25   Smokeless Tobacco Former User   Tobacco Comment    e-cig a few times/day     Social History     Social History Narrative     Not on file     Patient Care Team:  Jerson Hernandez MD as PCP - General (Family Medicine)  Jones Harding DO as Physician (General Surgery)  [facility]  Full 10 system review including constitutional, respiratory, cardiac, gi, urinary, rheumatologic, neurologic, reproductive, dermatologic psychiatric is  performed (via questionnaire) and is negative except weight loss, sleep difficulties, ringing in ears, difficulty swallowing, shortness of breath, cough, wheezing, weakness, mood swings        Objective  Physical Exam  Vitals:    06/14/19 1022   BP: 136/80   Patient Site: Right Arm   Patient Position: Sitting   Cuff Size: Adult  Large   Pulse: 63   Resp: 18   SpO2: 98%   Weight: 147 lb (66.7 kg)     Gen- alert, oriented, cognition unchanged  No acute distress  HEENT- normal cephalic, atraumatic.   Chest- Normal inspiration and expiration.    Clear to ascultation.    No chest wall deformity or scar.  No reproducible chest wall tenderness  CV- Heart regular rate and rhythm  normal tones, no murmurs   No gallops or rubs.  Abdomen is slightly tender diffusely on palpation, very minimal rebound tenderness present  Ext-no cyanosis   No edema  Skin- warm and dry,   no visualized rash    Diagnostics  Results for orders placed or performed in visit on 06/14/19   Urinalysis-UC if Indicated   Result Value Ref Range    Color, UA [place] (!) Colorless, Yellow, Straw, Light Yellow    Clarity, UA Clear Clear    Glucose, UA Negative Negative    Bilirubin, UA Moderate (!) Negative    Ketones, UA Negative Negative    Specific Gravity, UA >=1.030 1.005 - 1.030    Blood, UA Moderate (!) Negative    pH, UA 5.5 5.0 - 8.0    Protein, UA >=300 mg/dL (!) Negative mg/dL    Urobilinogen, UA >=8.0 E.U./dL (!) 0.2 E.U./dL, 1.0 E.U./dL    Nitrite, UA Negative Negative    Leukocytes, UA Negative Negative    Bacteria, UA Few (!) None Seen hpf    RBC, UA 3-5 (!) None Seen, 0-2 hpf    WBC, UA 0-5 None Seen, 0-5 hpf    Squam Epithel, UA 25-50 (!) None Seen, 0-5 lpf    Ca Oxalate Anastasiya, UA Present (!) None Seen   Electrocardiogram Perform - Clinic   Result Value Ref Range    SYSTOLIC BLOOD PRESSURE  mmHg    DIASTOLIC BLOOD PRESSURE  mmHg    VENTRICULAR RATE 63 BPM    ATRIAL RATE 241 BPM    P-R INTERVAL  ms    QRS DURATION 80 ms    Q-T INTERVAL 380 ms    QTC CALCULATION (BEZET) 388 ms    P Axis  degrees    R AXIS -36 degrees    T AXIS 161 degrees    MUSE DIAGNOSIS       Junctional rhythm  Left axis deviation  Septal infarct (cited on or before 26-MAR-2019)  Abnormal ECG  When compared with ECG of 26-MAR-2019 11:35,  Junctional rhythm has replaced Atrial fibrillation        Reviewed EKG as above, this appears to be atrial fib/flutter rather than junctional rhythm though I otherwise agree  X-ray personally reviewed and shows cardiomegaly but is grossly unchanged compared to previous x-ray    Conference of metabolic profile, dig level pending  Please note: Voice recognition software was used in this dictation.  It may therefore contain typographical errors.    This serves as a face-to-face visit for home health nursing  A portion of today's face to face visit was dedicated assessing pts need.  Medical diagnoses that justify above service-congestive heart failure, vascular disease, atrial fibrillation type 2 diabetes  Brief narrative explaining to justify placing order: This is a face-to-face for the home health nursing that followed admission that followed this hospitalization.  Patient has complex medical problems, generalized weakness, limited understanding.  Has lost weight due to cardiac cachexia.

## 2021-05-29 NOTE — TELEPHONE ENCOUNTER
INR result is 2.7   INR   Date Value Ref Range Status   05/15/2019 3.90 (!) 0.9 - 1.1 Final       Will the patient be seen, or did they already see, MD or CNP today? No    Most Recent Warfarin dose day/week  Sunday Monday Tuesday Wednesday Thursday Friday Saturday      1 2 2 2     Sunday Monday Tuesday Wednesday Thursday Friday Saturday   2 2 2           Has the patient missed any doses of Coumadin, Warfarin, Jantoven in the past 7 days? No    Has the patients medications changed since the last visit? No    Has the patient experienced any bleeding recently? No    Has the patient experienced any injuries or illness recently? No    Has the patient experienced any 'new' shortness of breath, severe headaches, or changes in vision recently? No    Has the patient had any changes in their diet, or alcohol consumption? No    Is the patient here today to prepare for any type of upcoming surgery, procedure, or for a cardioversion procedure? No    What phone number can we reach the patient at today? home phone listed in demographics.

## 2021-05-29 NOTE — TELEPHONE ENCOUNTER
Who is calling:  Rozina  Reason for Call:  Rozina states she is returning a phone call regarding patient's INR. Rozina says she does know where the patient is at this time because the patient's niece contacted her. Rozina states due to patient being in the hospital the nurse did not go to her home for her INR draw but does have a nurse coming out Wednesday 6-26-19 for her INR draw. Rozina states the hospital should have done the INR draw while she was there.  Date of last appointment with primary care: 6-14-19  Okay to leave a detailed message: Yes

## 2021-05-29 NOTE — TELEPHONE ENCOUNTER
Who is calling:  Alice  Reason for Call:  Not able to get INR today -- see below  Date of last appointment with primary care:   Okay to leave a detailed message: Yes    Alice is calling to notify that an INR was not done today because patient could not be reached by phone or knocking on the door.

## 2021-05-29 NOTE — TELEPHONE ENCOUNTER
Patient Returning Call  Reason for call: Pharmacy called back.  Information relayed to patient:  Informed of message listed below and that the provider is out of the office until this Wednesday.  Pharmacy states she will fax over another order for Boost for provider to sign and fax back.  Patient has additional questions:  Yes  If YES, what are your questions/concerns:  As above.  Okay to leave a detailed message?: Yes

## 2021-05-29 NOTE — TELEPHONE ENCOUNTER
Who is calling:  Patient  Reason for Call: Needs her INR drawn - ASAP , Pt was supposed to have this drawn 6/19/19, no body came to her house.  Patient  Recently discharged from hospital & is currently staying at her nieces house until Sunday 6/23/19.     [address]        Tanika(niece) #  [phone]  [facility]  [address]    Date of last appointment with primary care:    Has the patient been recently seen:  Yes  Okay to leave a detailed message -   [phone]

## 2021-05-29 NOTE — TELEPHONE ENCOUNTER
ANTICOAGULATION  MANAGEMENT- Home Care/Care Facility Result    Assessment     Today's INR result of 2.3 is Therapeutic (goal INR of 2.0-3.0)        Warfarin taken as previously instructed    Decreased appetite and 1 week of diarrhea (sending message to PCP)    No new medication/supplements affecting INR    Continues to tolerate warfarin with no reported s/s of bleeding or thromboembolism     Previous INR was Supratherapeutic    Plan:     Spoke with Alice discussed INR result and instructed:     Warfarin Dosing Instructions: Continue current warfarin dose 1 mg daily on Wed/Sat; and 2 mg daily rest of week  (0 % change)    Next INR to be drawn: one week    Education provided: importance of therapeutic range    Alice verbalizes understanding and agrees to warfarin dosing plan.   ?   Bia Crane RN    Subjective/Objective:      Gardenia Muller, a 68 y.o. female is established on warfarin.     Home care/care facility RN's report of [place] INR, recent warfarin dosing, diet changes, medication changes, and symptoms is documented below.    Additional findings: none    Anticoagulation Episode Summary     Current INR goal:   2.0-3.0   TTR:   65.2 % (2.3 y)   Next INR check:   6/19/2019   INR from last check:   2.30 (6/12/2019)   Weekly max warfarin dose:      Target end date:   Indefinite   INR check location:      Preferred lab:      Send INR reminders to:   [facility]    Indications    Cerebrovascular disease [I67.9]           Comments:            Anticoagulation Care Providers     Provider Role Specialty Phone number    Jerson Hernandez MD Referring Family Medicine [phone]

## 2021-05-29 NOTE — TELEPHONE ENCOUNTER
ANTICOAGULATION  MANAGEMENT- Home Care/Care Facility Result    Assessment     Today's INR result of 3.3 is Supratherapeutic (goal INR of 2.0-3.0)        Warfarin taken as previously instructed    No new diet changes affecting INR    No new medication/supplements affecting INR    Continues to tolerate warfarin with no reported s/s of bleeding or thromboembolism     Previous INR was Supratherapeutic    Plan:     Spoke with Nurse Rozina discussed INR result and instructed:     Warfarin Dosing Instructions: Change warfarin dose to 1 mg daily on Wed/Sat; and 2 mg daily rest of week  (7.7 % change)    Next INR to be drawn: one week    Education provided: importance of therapeutic range    Rozina verbalizes understanding and agrees to warfarin dosing plan.   ?   Bia Crane RN    Subjective/Objective:      Gardenia Muller, a 68 y.o. female is established on warfarin.     Home care/care facility RN's report of [place] INR, recent warfarin dosing, diet changes, medication changes, and symptoms is documented below.    Additional findings: none    Anticoagulation Episode Summary     Current INR goal:   2.0-3.0   TTR:   65.1 % (2.3 y)   Next INR check:   6/12/2019   INR from last check:   3.30! (6/5/2019)   Weekly max warfarin dose:      Target end date:   Indefinite   INR check location:      Preferred lab:      Send INR reminders to:   [facility]    Indications    Cerebrovascular disease [I67.9]           Comments:            Anticoagulation Care Providers     Provider Role Specialty Phone number    Jerson Hernandez MD Referring Family Medicine [phone]

## 2021-05-29 NOTE — TELEPHONE ENCOUNTER
ANTICOAGULATION  MANAGEMENT- Home Care/Care Facility Result    Assessment     Today's INR result of 2.7 is Therapeutic (goal INR of 2.0-3.0)        Warfarin taken as previously instructed    No new diet changes affecting INR    No new medication/supplements affecting INR    Continues to tolerate warfarin with no reported s/s of bleeding or thromboembolism     Previous INR was Supratherapeutic    Plan:     Spoke with Alice FRIEDMAN discussed INR result and instructed:     Warfarin Dosing Instructions: Continue current warfarin dose 1 mg daily on Wed; and 2 mg daily rest of week      Next INR to be drawn: 1 week    Education provided: target INR goal and significance of current INR result    Alice FRIEDMAN verbalizes understanding and agrees to warfarin dosing plan.   ?   Janny Cox RN    Subjective/Objective:      Gardenia Muller, a 68 y.o. female is established on warfarin.     Home care/care facility RN's report of [place] INR, recent warfarin dosing, diet changes, medication changes, and symptoms is documented below.    Additional findings: none    Anticoagulation Episode Summary     Current INR goal:   2.0-3.0   TTR:   65.6 % (2.2 y)   Next INR check:   5/29/2019   INR from last check:   2.70 (5/22/2019)   Weekly max warfarin dose:      Target end date:   Indefinite   INR check location:      Preferred lab:      Send INR reminders to:   ANTICOAGULATION POOL A (WBY,WBE,MID,RSC)    Indications    Cerebrovascular disease [I67.9]           Comments:            Anticoagulation Care Providers     Provider Role Specialty Phone number    Jerson Hernandez MD Referring Family Medicine [phone]

## 2021-05-29 NOTE — TELEPHONE ENCOUNTER
INR result is 3.1  INR   Date Value Ref Range Status   05/22/2019 2.70 (!) 0.9 - 1.1 Final       Will the patient be seen, or did they already see, MD or CNP today? No    Most Recent Warfarin dose day/week  Sunday Monday Tuesday Wednesday Thursday Friday Saturday      1 2 2 2     Sunday Monday Tuesday Wednesday Thursday Friday Saturday   2 2            Has the patient missed any doses of Coumadin, Warfarin, Jantoven in the past 7 days? No    Has the patients medications changed since the last visit? No    Has the patient experienced any bleeding recently? No    Has the patient experienced any injuries or illness recently? No    Has the patient experienced any 'new' shortness of breath, severe headaches, or changes in vision recently? No    Has the patient had any changes in their diet, or alcohol consumption? No    Is the patient here today to prepare for any type of upcoming surgery, procedure, or for a cardioversion procedure? No    What phone number can we reach the patient at today? Alice [phone]

## 2021-05-29 NOTE — TELEPHONE ENCOUNTER
Orders being requested: Vanilla, Strawberry, and Chocolate Boost  Reason service is needed/diagnosis: Weight Loss  When are orders needed by: As Able  Where to send Orders: Please submit orders to [facility].  Okay to leave detailed message?  No

## 2021-05-29 NOTE — TELEPHONE ENCOUNTER
Received [facility]TM referral from transition     Patient was not reachable after several attempts, will route to MTM Pharmacist/Provider as an FYI. Left [facility]TM scheduling information on patients voicemail.    Thank you for the referral,    Marry Stapleton, MTM Coordinator

## 2021-05-29 NOTE — PROGRESS NOTES
Hospital discharge follow up call to pt, no answer.  Left VM message reminding pt of appt on 7/2. RN will attempt call back at another time.

## 2021-05-29 NOTE — TELEPHONE ENCOUNTER
INR result is 3.3  INR   Date Value Ref Range Status   05/29/2019 3.10 (!) 0.9 - 1.1 Final       Will the patient be seen, or did they already see, MD or CNP today? No    Most Recent Warfarin dose day/week  Sunday Monday Tuesday Wednesday Thursday Friday Saturday      1 2 2 2     Sunday Monday Tuesday Wednesday Thursday Friday Saturday   2 2 2           Has the patient missed any doses of Coumadin, Warfarin, Jantoven in the past 7 days? No    Has the patients medications changed since the last visit? No    Has the patient experienced any bleeding recently? No    Has the patient experienced any injuries or illness recently? No    Has the patient experienced any 'new' shortness of breath, severe headaches, or changes in vision recently? No    Has the patient had any changes in their diet, or alcohol consumption? No    Is the patient here today to prepare for any type of upcoming surgery, procedure, or for a cardioversion procedure? No    What phone number can we reach the patient at today? [phone] [facility] nurse .

## 2021-05-29 NOTE — TELEPHONE ENCOUNTER
Can we check, was this recommended by somebody like a nutritionist?  Or is this the patient's request.    We need to be careful about her congestive heart failure history of his diabetes.

## 2021-05-29 NOTE — TELEPHONE ENCOUNTER
Called # listed for home care RN Alice below, there is no identifying outgoing message. Left a detailed VM that if this is the correct number for Alice to please contact PCP for resumption of care orders post hospital including INR checks.    Attempted to reach patient/Tanika at number below. The person who answered the phone states this is the wrong number, no Tanika there.    FYI on status.

## 2021-05-29 NOTE — TELEPHONE ENCOUNTER
Who is calling:  Patient  Reason for Call: Needs her INR drawn - ASAP   Patient is at her [place] until Sunday 6/23/19 -   [facility]  [address]       Tanika([facility]) #  [phone]  [facility]  [address]  Date of last appointment with primary care:    Has the patient been recently seen:  Yes  Okay to leave a detailed message: Yes [phone]

## 2021-05-29 NOTE — TELEPHONE ENCOUNTER
ANTICOAGULATION  MANAGEMENT: Discharge Continuity of Care Review    Hospital admission on  6/14-6/17 for weakness, general decline, acute on chronic heart failure, abdominal pain.    Discharge disposition: Home with home care, hospital recommended TCU or placement but patient refused    INR Results:       Recent labs: (last 7 days)     06/12/19 06/14/19  1846 06/15/19  0701 06/16/19  0732 06/17/19  0654   INR 2.30* 2.33* 2.31* 2.16* 1.78*       Warfarin inpatient management: More warfarin administered than maintenance regimen    Warfarin discharge instructions: increase dose to: 2mg daily      Medication Changes Affecting Anticoagulation: No    Additional Factors Affecting Anticoagulation: Yes: patient was recommended to take a nutritional supplement such as Boost or Ensure    Plan     Agree with discharge plan for follow up on within 1 week     Anticoagulation calendar updated    Janny Cox RN

## 2021-05-30 ENCOUNTER — RECORDS - HEALTHEAST (OUTPATIENT)
Dept: ADMINISTRATIVE | Facility: CLINIC | Age: 71
End: 2021-05-30

## 2021-05-30 VITALS — WEIGHT: 181 LBS | BODY MASS INDEX: 33.11 KG/M2

## 2021-05-30 VITALS — WEIGHT: 178 LBS | BODY MASS INDEX: 32.76 KG/M2 | HEIGHT: 62 IN

## 2021-05-30 VITALS — WEIGHT: 177 LBS | HEIGHT: 62 IN | BODY MASS INDEX: 32.57 KG/M2

## 2021-05-30 VITALS — WEIGHT: 180 LBS | HEIGHT: 62 IN | BODY MASS INDEX: 33.13 KG/M2

## 2021-05-30 VITALS — BODY MASS INDEX: 32.57 KG/M2 | HEIGHT: 62 IN | WEIGHT: 177 LBS

## 2021-05-30 VITALS — WEIGHT: 177 LBS | BODY MASS INDEX: 32.37 KG/M2

## 2021-05-30 NOTE — PATIENT INSTRUCTIONS - HE
Current home wt is 130#  Taking furosemide 40 mg twice- at 8 am and 4 pm    2 things to change  1) when taking pm dose, take at 1 pm rather than 4  2) take only am dose (daily) when wt is 132 or less.

## 2021-05-30 NOTE — TELEPHONE ENCOUNTER
INR result is 4.9  INR   Date Value Ref Range Status   07/15/2019 2.86 (H) 0.90 - 1.10 Final       Will the patient be seen, or did they already see, MD or CNP today? No    Most Recent Warfarin dose day/week  Sunday Monday Tuesday Wednesday Thursday Friday Saturday   4.5 4.5 4.5 5 5 5 5   Days not specified in notes  Sunday Monday Tuesday Wednesday Thursday Friday Saturday                Has the patient missed any doses of Coumadin, Warfarin, Jantoven in the past 7 days? No    Has the patients medications changed since the last visit? No    Has the patient experienced any bleeding recently? No    Has the patient experienced any injuries or illness recently? No    Has the patient experienced any 'new' shortness of breath, severe headaches, or changes in vision recently? No    Has the patient had any changes in their diet, or alcohol consumption? No    Is the patient here today to prepare for any type of upcoming surgery, procedure, or for a cardioversion procedure? No    What phone number can we reach the patient at today? home phone listed in demographics.

## 2021-05-30 NOTE — TELEPHONE ENCOUNTER
[facility] Nurse Triage Telephone Note      Provider: Monik Cruz MD  Facility: [facility]    Facility Type: TCU    Caller: Jimbo(dietician)  Call Back Number:  [phone]    Allergies: Penicillins    Reason for call: Dietician calling to clarify patient's diet.  Currently on a CHAUNCEY, diabetic diet.  Facility doesn't do a CHAUNCEY diet.  They can either do a 2g sodium diet or a regular diet.       Verbal Order/Direction given by Provider: Change to diabetic, 2g Na diet.      Provider giving order: Monik Cruz MD    Verbal order given to: Clarissa Russell RN

## 2021-05-30 NOTE — TELEPHONE ENCOUNTER
RN cannot approve Refill Request    RN can NOT refill this medication overdue for office visits and/or labs.    Jimbo Cox, [facility] Triage/Med Refill 6/27/2019    Requested Prescriptions   Pending Prescriptions Disp Refills     warfarin (COUMADIN/JANTOVEN) 1 MG tablet [Pharmacy Med Name: WARFARIN SODIUM 1MG TABS] 8 tablet 5     Sig: TAKE 1 TABLET BY MOUTH ONCE DAILY ON WEDNESDAY & SATURDAY **OR AS DIRECTED BY INR CLINIC*       Warfarin Refill Protocol  Failed - 6/26/2019  1:37 PM        Failed -  Route to appropriate pool/provider     Last Anticoagulation Summary:   Anticoagulation Episode Summary     Current INR goal:   2.0-3.0   TTR:   65.2 % (2.3 y)   Next INR check:   7/3/2019   INR from last check:   1.70! (6/26/2019)   Weekly max warfarin dose:      Target end date:   Indefinite   INR check location:      Preferred lab:      Send INR reminders to:   [facility]    Indications    Cerebrovascular disease [I67.9]           Comments:            Anticoagulation Care Providers     Provider Role Specialty Phone number    Jerson Hernandez MD Referring Family Medicine [phone]                Passed - Provider visit in last year     Last office visit with prescriber/PCP: 6/14/2019 Jerson Hernandez MD OR same dept: 6/14/2019 Jerson Hernandez MD OR same specialty: 6/14/2019 Jerson Hernandez MD  Last physical: 5/29/2018 Last MTM visit: Visit date not found    Next appt within 3 mo: Visit date not found Next physical within 3 mo: Visit date not found  Prescriber OR PCP: Jerson Hernandez MD  Last diagnosis associated with med order: 1. Cerebral infarction, unspecified mechanism (H)  - warfarin (COUMADIN/JANTOVEN) 2 MG tablet [Pharmacy Med Name: WARFARIN SODIUM 2MG TABS]; TAKE 1 TABLET BY MOUTH ONCE DAILY ON MONDAY, TUESDAY, THURSDAY, FRIDAY & SUNDAY **OR AS DIRECTED BY INR CLINIC*  Dispense: 22 tablet; Refill: 5    If protocol passes may refill for 6 months if within 3  months of last provider visit (or a total of 9 months).          warfarin (COUMADIN/JANTOVEN) 2 MG tablet [Pharmacy Med Name: WARFARIN SODIUM 2MG TABS] 22 tablet 5     Sig: TAKE 1 TABLET BY MOUTH ONCE DAILY ON MONDAY, TUESDAY, THURSDAY, FRIDAY & SUNDAY **OR AS DIRECTED BY INR CLINIC*       Warfarin Refill Protocol  Failed - 6/26/2019  1:37 PM        Failed -  Route to appropriate pool/provider     Last Anticoagulation Summary:   Anticoagulation Episode Summary     Current INR goal:   2.0-3.0   TTR:   65.2 % (2.3 y)   Next INR check:   7/3/2019   INR from last check:   1.70! (6/26/2019)   Weekly max warfarin dose:      Target end date:   Indefinite   INR check location:      Preferred lab:      Send INR reminders to:   [facility]    Indications    Cerebrovascular disease [I67.9]           Comments:            Anticoagulation Care Providers     Provider Role Specialty Phone number    Jerson Hernandez MD Referring Family Medicine [phone]                Passed - Provider visit in last year     Last office visit with prescriber/PCP: 6/14/2019 Jerson Hernandez MD OR same dept: 6/14/2019 Jerson Hernandez MD OR same specialty: 6/14/2019 Jerson Hernandez MD  Last physical: 5/29/2018 Last MTM visit: Visit date not found    Next appt within 3 mo: Visit date not found Next physical within 3 mo: Visit date not found  Prescriber OR PCP: Jerson Hernandez MD  Last diagnosis associated with med order: 1. Cerebral infarction, unspecified mechanism (H)  - warfarin (COUMADIN/JANTOVEN) 2 MG tablet [Pharmacy Med Name: WARFARIN SODIUM 2MG TABS]; TAKE 1 TABLET BY MOUTH ONCE DAILY ON MONDAY, TUESDAY, THURSDAY, FRIDAY & SUNDAY **OR AS DIRECTED BY INR CLINIC*  Dispense: 22 tablet; Refill: 5    If protocol passes may refill for 6 months if within 3 months of last provider visit (or a total of 9 months).

## 2021-05-30 NOTE — PROGRESS NOTES
[facility]      Facility:    [facility] [[id number]]    Code Status: FULL CODE   [facility] 10/3  through 10/25/18  [facility]   6/14 through 617/2019  [facility] 6/27 through 7/3/2019      Chief Complaint/Reason for Visit:  Chief Complaint   Patient presents with     Review Of Multiple Medical Conditions     fall with superior pubic ramus fracture       HPI:   Gardenia is a 69 y.o. female with MI, peripheral arterial disease, right renal artery stenosis, diabetes type 2, essential hypertension, past stroke, HFpEF, atrial flutter, atrial fibrillation (converted), cholecystitis status post laparoscopic cholecystectomy, ventral hernia with incisional hernia repair, previous transaminitis..      She had a recent hospitalization in June 2019 for CHF, CT of the abdomen at that time was stable.    Gardeina  had a few months of chronic abdominal pain and weight loss.  He states she chronically has 1-2 loose stools a day.  Couple days prior to admission, she was having vomiting, and a worsening of her right and mid abdominal pain.  Also complained it was hard to take care of herself at home.    She came to the emergency department: Urinalysis looked suspicious, urine culture was no growth.      Ultrasound of the abdomen was unremarkable  CT showed Mitchell, stable common bile duct dilatation without stones, pancreatic atrophy.  MRCP shows Mitchell, stable CBD, mild hepatic congestion.  HBV   And  HBC studies were negative.    Gastroenterology ( Dr Mueller) was consulted: as in the past they felt her chronic abdominal pain might be mild abdominal ischemia.  She also felt her transaminitis was likely due to right-sided heart failure with hepatic congestion.  MRCP showed hepatic congestion.    Dr. Norberto Elias recommended angiogram of the pelvis:   IMPRESSION:CTA abd/pelvis (7/3/19)  CONCLUSION:  1.  Mild-to-moderate atherosclerotic narrowing of the SMA secondary to calcified  "and noncalcified plaque. No evidence of severe SMA narrowing.  2.  Severe narrowing at the origin of left main renal artery due to calcified plaque. A widely patent right renal artery stent is noted.  3.  Additional chronic findings as described above.      [facility] and help with placement concerns.    UPDATE:  Nurse on duty states her weight is up: did not know if pt symptomatic, did not listen to breath sounds for crackles    Over the past few days, she has had occasional heart rates in the 110s, we had decreased her metoprolol from 12.5 mg twice daily down to 6.25 mg twice daily order written on 7/ 7, that was because some of her heart rates were too low.  Blood pressures have held steady.  The hospital stopped digoxin therapy      PT weekly update: She is moderately independent in bed, transfers, ambulation.  Her tug =18 seconds  She is disappointed that she does not To stay at [facility] until she moves to new assisted living apartment.  Probable discharge in a few days time    There was a care conference with her ,  will be assisting with looking for different living situation, although again she gets quite a bit of support in her current location.  She just says the tendons in the building have become \"rough\" since it ceased being exclusively a senior apartment.    Past Medical History:  Past Medical History:   Diagnosis Date     Acute diastolic heart failure (H) 11/2015     Acute on chronic diastolic heart failure (H) 6/15/2019     Advanced directives, counseling/discussion 8/5/2015    Patient completed 2011.  Discussed today, 5/24/17, and wants to change healthcare agent from niece to daughter.  Discussed that she will need to complete new form to indicate this.     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  Recommend 5 " "year follow-up.  Single tubular adenoma     Biliary obstruction 10/3/2018    Added automatically from request for surgery [id number]     Cerebral vascular accident (H)      Coronary artery disease 2006    100% RCA with collateral filling per Dr. Elizabeth's angio report     Diabetes mellitus type 2 in obese (H)      Fibrocystic breast      GERD (gastroesophageal reflux disease)      History of anesthesia complications     slow to wake     Hypertension      Peripheral vascular disease (H)      Pneumonia 11/11/2018     PONV (postoperative nausea and vomiting)      S/P cholecystectomy 6/22/2018     SBO (small bowel obstruction) (H) 11/12/2015     Stroke (H)      Transaminitis 10/22/2018     Upper respiratory infection 12/20/2018     Urinary incontinence            Surgical History:  Past Surgical History:   Procedure Laterality Date     CARDIAC CATHETERIZATION       CARDIOVERSION  10/18/2018     CORONARY STENT PLACEMENT  1995    stent     ERCP N/A 10/5/2018    Procedure: ENDOSCOPIC RETROGRADE CHOLANGIOPANCREATOGRAPHY, SPHINCTEROTOMY;  Surgeon: Anson Stokes MD;  Location: [facility];  Service:      EXPLORATORY LAPAROTOMY N/A 6/29/2018    Procedure: LAPAROTOMY, CLOSURE OF PERITONEAL RENT;  Surgeon: Jones Harding DO;  Location: [facility] OR;  Service:      LAPAROSCOPIC CHOLECYSTECTOMY N/A 10/7/2018    Procedure: CHOLECYSTECTOMY, LAPAROSCOPIC;  Surgeon: Felicia So MD;  Location: [facility] OR;  Service:      VENTRAL HERNIA REPAIR N/A 11/12/2015    Procedure: STRANGULATED VENTRAL HERNIA REPAIR ;  Surgeon: Ernesto Bailey MD;  Location: [facility] OR;  Service:             Review of Systems   She denies any change in breath sounds  She is worried about occasional heart rates into the 100 teens this past couple days (she \"thought\" she could feel them)    Blood pressure 113/71, pulse (!) 113, temperature 96.6  F (35.9  C), resp. rate 16, SpO2 94 %, not currently breastfeeding.          Physical " Exam     Constitutional:  No distress. Alert overweight Black-American female .   Cardiovascular:Mosty regular rhythm and normal heart sounds.   No murmur heard.  Near holosystolic murmur at the mitral/apex area   Pulmonary/Chest: Breath sounds clear ,no rales.   Abdominal: Soft. Bowel sounds are normal,no tenderness.   Surgical wound well-healed (midline from umbilical hernia repair)   Musculoskeletal: Normal range of motion. She exhibits no edema or tenderness.   1+ pretibial edema (unchanged)   Neurological: She is alert and oriented to person, place, and time. Coordination normal.   Skin: Skin is warm and dry. No erythema.   Psychiatric: She has a normal mood .Demonstrates passivity    Allergies   Allergen Reactions     Penicillins Swelling       Medication List:  Current Outpatient Medications   Medication Sig     ARTHRITIS PAIN RELIEF, ACETAM, 650 mg CR tablet Take 650 mg by mouth 2 (two) times a day.            aspirin 81 MG EC tablet Take 81 mg by mouth daily.      atorvastatin (LIPITOR) 80 MG tablet Take 80 mg by mouth at bedtime.     blood glucose meter (GLUCOMETER) Please Dispense kit per pharmacy discretion and patient's insurance Test blood sugar.     blood glucose test strips Use 1 each As Directed as needed. Dispense brand per patient's insurance at pharmacy discretion.     carboxymethylcellulose (REFRESH PLUS) 0.5 % Dpet ophthalmic dropperette Administer 2 drops to both eyes every hour as needed (dry eyes).      food supplemt, lactose-reduced (ENSURE ACTIVE CLEAR) Liqd Take twice daily or as directed for calorie supplement     furosemide (LASIX) 40 MG tablet Take 1 tablet (40 mg total) by mouth daily.     guaiFENesin (HUMIBID 3) 400 mg Tab Take 400 mg by mouth every 4 (four) hours as needed (chest congestion, every 4 hours while awake PRN).     lancets 33 gauge Misc Test twice daily Dispense brand per patient's insurance at pharmacy discretion.     loratadine (CLARITIN) 10 mg tablet Take 10 mg by  mouth daily with lunch. Noon     magnesium gluconate (MAGONATE) 27.5 mg magne- sium (500 mg) tablet Take 500 mg by mouth 3 (three) times a day.            metoprolol tartrate (LOPRESSOR) 25 MG tablet Take 6.25 mg by mouth 2 (two) times a day.            multivitamin (TAB-A-JULIET) per tablet Take 1 tablet by mouth daily. (Patient taking differently: Take 1 tablet by mouth daily. )     nitroglycerin (NITROSTAT) 0.4 MG SL tablet Place 1 tablet (0.4 mg total) under the tongue every 5 (five) minutes as needed for chest pain (for up to 3 doses and call 911 if persists). (Patient taking differently: Place 0.4 mg under the tongue every 5 (five) minutes as needed for chest pain (for up to 3 doses and call 911 if persists). )     omeprazole (PRILOSEC) 20 MG capsule TAKE 1 CAPSULE BY MOUTH TWICE DAILY (8AM & 8PM)     oxyCODONE (ROXICODONE) 10 mg immediate release tablet Take 1 tablet (10 mg total) by mouth every 6 (six) hours as needed.     polyethylene glycol (GLYCOLAX) 17 gram/dose powder Take 17 g by mouth daily as needed.      potassium chloride (K-DUR,KLOR-CON) 20 MEQ tablet Take 1 tablet (20 mEq total) by mouth 2 (two) times a day.     senna-docusate (PERICOLACE) 8.6-50 mg tablet Take 1 tablet by mouth 2 (two) times a day.     sertraline (ZOLOFT) 100 MG tablet Take 1 tablet (100 mg total) by mouth daily.     sodium chloride (OCEAN) 0.65 % nasal spray Apply 1 spray into each nostril as needed for congestion.     spironolactone (ALDACTONE) 25 MG tablet Take 1 tablet (25 mg total) by mouth daily.     traZODone (DESYREL) 50 MG tablet Take 1 tablet (50 mg total) by mouth at bedtime.     VITAMIN D3 2,000 unit capsule TAKE 1 CAPSULE BY MOUTH ONCE DAILY AT 8AM (DO NOT BRAND CHANGE - PATIENT ONLY WANT CAPSULES)     warfarin (COUMADIN/JANTOVEN) 2 MG tablet Take 2 mg by mouth daily.       Labs:    Ref Range & Units 7/15/19 1035    INR 0.90 - 1.10 2.86High           Lab Units 06/28/19  0734 06/27/19  1342   LN-WHITE BLOOD CELL COUNT  thou/uL 5.3 8.0   LN-HEMOGLOBIN g/dL 13.8 15.2   LN-HEMATOCRIT % 44.3 47.6*   LN-PLATELET COUNT thou/uL 139* 165         Lab Units 06/28/19  0734 06/27/19  1342   LN-SODIUM mmol/L 139 139   LN-POTASSIUM mmol/L 4.1 5.5*   LN-CHLORIDE mmol/L 108* 104   LN-CO2 mmol/L 27 27   LN-BLOOD UREA NITROGEN mg/dL 13 17   LN-CREATININE mg/dL 0.63 0.73   LN-CALCIUM mg/dL 10.5 12.0*   LN-ALBUMIN g/dL 2.7* 3.4*   LN-PROTEIN TOTAL g/dL 6.6 8.2*   LN-BILIRUBIN TOTAL mg/dL 1.2* 1.5*   LN-ALKALINE PHOSPHATASE U/L 140* 182*   LN-ALT (SGPT) U/L 70* 89*   LN-AST (SGOT) U/L 133* 200*            Lipase   Date Value Ref Range Status   06/27/2019 14 0 - 52 U/L Final   06/29/2016 17 0 - 52 U/L Final   03/04/2016 29 0 - 52 U/L Final     Hepatitis C antibody = negative    IMAGING:  EXAM: MR MRCP W WO CONTRAST  LOCATION: [facility]  DATE/TIME: 6/27/2019 8:42 PM     CONCLUSION:  1.  Moderate dilatation of extrahepatic bile ducts, especially the common hepatic duct which measures 13 mm. No intraductal stone or obstructing mass evident. Patient does have a small duodenal diverticulum at the level the ampulla which could be playing   a role.  2.  There is minimal dilatation of the main pancreatic duct.  3.  Trace volume ascites. Mild cardiomegaly.        EXAM: CT ABDOMEN PELVIS WO ORAL W IV CONTRAST  DATE/TIME: 6/27/2019     INDICATION: Abdominal pain.  COMPARISON: Ultrasound earlier today, CT 06/14/2019.   CONCLUSION:   1.  Hepatic steatosis.  2.  Enlarged heart with fibroatelectasis and possible interstitial edema. Correlate with chest x-ray.  3.  Diverticulosis without diverticulitis.  4.  Prominent common hepatic duct unchanged.  5.  Presumed uterine fibroids.  6.  Diffuse thickening of urinary bladder accentuated from incomplete distention.  7.  Remainder stable.    EXAM: US ABDOMEN LIMITED  LOCATION: [facility]  DATE/TIME: 6/27/2019 4:50 PM  CONCLUSION:  1.  Prominent common hepatic duct however this is unchanged from  recent CT. Differential would include reservoir effect however given abnormal liver function tests distal obstructing lesion cannot be excluded.  2.  Minimally heterogeneous hepatic steatosis.        ICD-10-CM    1. Chronic abdominal pain  R10.9     G89.29    2. 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 Persistent atrial fibrillation (H) I48.1  rate less controlled, will resume metoprolol 12.5 mg twice daily.  If she has low pressures, low pulse rates,  may have to add back digoxin   4. Chronic heart failure with preserved ejection fraction (H) I50.32  weight up, suspect is not accurate his exam has not changed, there are no symptoms   5. PAD (peripheral artery disease) (H) I73.9  body wide arterial disease   6. Cerebrovascular disease I67.9    7. Essential hypertension I10  follow blood pressures with increase in metoprolol   8. Coronary artery disease involving native coronary artery of native heart without angina pectoris I25.10  No complaints of cardiac angina   9. Other depression F32.89    10. Hyperparathyroidism (H) E21.3                    Electronically signed by: Monik Cruz MD

## 2021-05-30 NOTE — TELEPHONE ENCOUNTER
"Called and left Alsion a message to call the clinic back#1. Dr. Hernandez needs to know what form he needs to complete. Please have Giovanna fax forms to [phone] so Dr. Hernandez can complete them for her Assisted living.  \"Okay to relay message\"  "

## 2021-05-30 NOTE — PROGRESS NOTES
"[facility]    Facility:   [facility] [[id number]]   Code Status: FULL CODE  PCP: Jerson Hernandez MD   Phone: [phone]   Fax: [phone]      CHIEF COMPLAINT/REASON FOR VISIT:  Chief Complaint   Patient presents with     Discharge Summary     TCU stay following two recent hospitilizations for chronic abdominal pain/ malnutrition/ FTT       HISTORY COURSE:  Gardenia is a 69 y.o. 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     Gardenia has had two recent hospital stays at [facility] due to failure to thrive and chronic abdominal pain. One was from 6/14/-6/17/19 where she was admitted from clinic for concerns of weakness and weight loss, although was found to be in acute on chronic diastolic heart failure requiring IV diuretics. Abdominal CT at this time was stable, and consideration for diagnosis of \"intestinal angina\" was given due to her history of vascular disease and extensive history of work-up for abdominal pain without evidential cause. Placed on oxycodone for abdominal pain control. Pt declined recommended discharge to TCU following this " "hospital stay and was subsequently discharged home alone. Pt seems to be socially isolated. Closest family (daughter) lives in [place]. Pt reports she has neighbors and friends that stop by to check on her.     Most recent hospitalization was from 6/27/19-7/3/19 and was admitted for abdominal pain. Cause of abdominal pain not clear and is multifactorial. Hospital course was uncomplicated but management of patient's care plan is overall challenging with factors including weight loss, malnutrition, comorbidities including HF, persistent a fib, and fluctuations in blood pressure, DMII, and depression with anxiety, and social isolation. Pt was then [facility].      Today pt is being evaluated for appropriateness to discharge [facility] to home. Overall, Gardenia reports feeling anxious regarding discharge home but states that she is looking forward to being in her own environment. States that she is looking into assisted living options, but it is not feasible to move directly into one now after TCU stay. She states her daughter lives in [place] and she has some family in [place] which makes it hard for her to organize a move into a facility. Pt reports that she has been in contact with neighbors and friends who will be stopping by to bring her meals and check-in on her while she is home. She will also have home care. Pt reports having emesis x2 last night about an hour after taking her pills. Pt reports it was a very small amount and was yellow in color. Pt is wanting a nutritional supplement since she is eating less food. She does have a physician order for Ensure. Discussed where she could purchase these after she discharges. Pt also reports feeling like her heart is \"pounding.\" Denies chest pain/ pressure, palpitations, shortness of breath, diaphoresis, dizziness, vision changes or confusion. Pt's metoprolol was increased this week by Dr. Torrez from 6.25 mg two times a day to 12.5mg two times a " day. Unclear if pt's emesis has caused her to not receive pills proper. The pt does not think so. Nursing staff reports Gardenia has been vocalizing her anxiety about returning home but otherwise no concerns from nursing staff.          Past Medical History:   Diagnosis Date     Acute diastolic heart failure (H) 11/2015     Acute on chronic diastolic heart failure (H) 6/15/2019     Advanced directives, counseling/discussion 8/5/2015     Patient completed 2011.  Discussed today, 5/24/17, and wants to change healthcare agent from niece to daughter.  Discussed that she will need to complete new form to indicate this.     MAY (acute kidney injury) (H) 10/22/2018     Atrial fibrillation (H)       Benign adenomatous polyp of large intestine 3/30/2017     Colonoscopy March 2017.  Recommend 5 year follow-up.  Single tubular adenoma     Biliary obstruction 10/3/2018     Added automatically from request for surgery [id number]     Cerebral vascular accident (H)       Coronary artery disease 2006     100% RCA with collateral filling per Dr. Elizabeth's angio report     Diabetes mellitus type 2 in obese (H)       Fibrocystic breast       GERD (gastroesophageal reflux disease)       History of anesthesia complications       slow to wake     Hypertension       Peripheral vascular disease (H)       Pneumonia 11/11/2018     PONV (postoperative nausea and vomiting)       S/P cholecystectomy 6/22/2018     SBO (small bowel obstruction) (H) 11/12/2015     Stroke (H)       Transaminitis 10/22/2018     Upper respiratory infection 12/20/2018     Urinary incontinence                     Family History   Problem Relation Age of Onset     Cancer Paternal Grandmother       Cancer Paternal Uncle       No Medical Problems Mother       No Medical Problems Father       Heart attack Sister       Chronic Kidney Disease Sister       No Medical Problems Daughter       No Medical Problems Maternal Grandmother       No Medical Problems Maternal Grandfather    "    No Medical Problems Paternal Grandfather       No Medical Problems Maternal Aunt       No Medical Problems Paternal Aunt       Diabetes Brother       BRCA 1/2 Neg Hx       Breast cancer Neg Hx       Colon cancer Neg Hx       Endometrial cancer Neg Hx       Ovarian cancer Neg Hx        Social History            Socioeconomic History     Marital status: Legally        Spouse name: None     Number of children: 1     Years of education: None     Highest education level: None   Occupational History     None   Social Needs     Financial resource strain: None     Food insecurity:       Worry: None       Inability: None     Transportation needs:       Medical: None       Non-medical: None   Tobacco Use     Smoking status: Former Smoker       Packs/day: 0.25     Smokeless tobacco: Former User     Tobacco comment: e-cig a few times/day   Substance and Sexual Activity     Alcohol use: No     Drug use: No     Sexual activity: None   Lifestyle     Physical activity:       Days per week: None       Minutes per session: None     Stress: None   Relationships     Social connections:       Talks on phone: None       Gets together: None       Attends Mu-ism service: None       Active member of club or organization: None       Attends meetings of clubs or organizations: None       Relationship status: None     Intimate partner violence:       Fear of current or ex partner: None       Emotionally abused: None       Physically abused: None       Forced sexual activity: None   Other Topics Concern     None   Social History Narrative     Single/, lives alone; daughter in [place];     Gets help from neighbors and extended family     Former smoker.no alcohol problems      Review of Systems   PER HPI  Constitutional: denies fever, chills   HEENT: denies vision changes, cold-like symptoms  Respiratory: denies cough, shortness of breath   Cardiovascular: reports a \"pounding heart\" denies chest pain/pressure, " palpitations, feels like edema is well controlled- states that she is able to apply compression stockings on own   GI: denies changes in bowel habits, does have a hx of fluctuating between diarrhea and constipation, reports emesis x2 last night, denies nausea currently but reports intermittent nausea, reports poor appetite, chronic abdominal pain     : denies dysuria, increased frequency   MSK: reports feeling fatigued/ generalized weakness but denies acute changes   Skin: denies concerns for suspicious lesions or sounds   Neuro: denies headache, gait disturbance   Psych: reports hx anxiety and depression, acutely worsened with anticipation with discharge     Physical Exam   Vitals:    07/17/19 1037   BP: 101/73   Pulse: (!) 126   Resp: 20   Temp: 97.4  F (36.3  C)   SpO2: 99%   General appearance: alert, appears stated age and cooperative  HEENT: Head is normocephalic with normal hair distribution. No evidence of trauma. Ears: Without lesions or deformity. No acute purulent discharge. Eyes: Conjunctivae pink with no scleral icterus or erythema. Nose: Normal mucosa and septum. Oropharnyx: mmm, no lesions present.  Lungs: clear to auscultation bilaterally, respirations without effort  Heart: tachycardic, apical pulse >100, regular rhythm, S1, S2 normal, no murmur, click, rub or gallop  Abdomen: soft, diffusely mildly tender; bowel sounds normal; no masses,  no organomegaly  Extremities: lower extremities covered with compression wraps, UTV skin underneath, edema is lower extremities difficult to assess since covered, appears mild, non-pitting   Pulses: 2+ and symmetric  Skin: Skin color, texture, turgor normal. No rashes or lesions  Neurologic: Grossly normal, A&Ox4, long- and short-term memory grossly intact   Psych: conversational, appears anxious but consolable     MEDICATION LIST:  Current Outpatient Medications   Medication Sig     furosemide (LASIX) 20 MG tablet Take 20 mg by mouth daily.     ARTHRITIS PAIN  RELIEF, ACETAM, 650 mg CR tablet Take 650 mg by mouth 2 (two) times a day.            aspirin 81 MG EC tablet Take 81 mg by mouth daily.      atorvastatin (LIPITOR) 80 MG tablet Take 80 mg by mouth at bedtime.     blood glucose meter (GLUCOMETER) Please Dispense kit per pharmacy discretion and patient's insurance Test blood sugar.     blood glucose test strips Use 1 each As Directed as needed. Dispense brand per patient's insurance at pharmacy discretion.     carboxymethylcellulose (REFRESH PLUS) 0.5 % Dpet ophthalmic dropperette Administer 2 drops to both eyes every hour as needed (dry eyes).      food supplemt, lactose-reduced (ENSURE ACTIVE CLEAR) Liqd Take twice daily or as directed for calorie supplement     guaiFENesin (HUMIBID 3) 400 mg Tab Take 400 mg by mouth every 4 (four) hours as needed (chest congestion, every 4 hours while awake PRN).     lancets 33 gauge Misc Test twice daily Dispense brand per patient's insurance at pharmacy discretion.     loratadine (CLARITIN) 10 mg tablet Take 10 mg by mouth daily with lunch. Noon     magnesium gluconate (MAGONATE) 27.5 mg magne- sium (500 mg) tablet Take 500 mg by mouth 3 (three) times a day.            metoprolol tartrate (LOPRESSOR) 25 MG tablet Take 25 mg by mouth 2 (two) times a day.           multivitamin (TAB-A-JULIET) per tablet Take 1 tablet by mouth daily. (Patient taking differently: Take 1 tablet by mouth daily. )     nitroglycerin (NITROSTAT) 0.4 MG SL tablet Place 1 tablet (0.4 mg total) under the tongue every 5 (five) minutes as needed for chest pain (for up to 3 doses and call 911 if persists). (Patient taking differently: Place 0.4 mg under the tongue every 5 (five) minutes as needed for chest pain (for up to 3 doses and call 911 if persists). )     omeprazole (PRILOSEC) 20 MG capsule TAKE 1 CAPSULE BY MOUTH TWICE DAILY (8AM & 8PM)     oxyCODONE (ROXICODONE) 10 mg immediate release tablet Take 1 tablet (10 mg total) by mouth every 6 (six) hours as  needed.     polyethylene glycol (GLYCOLAX) 17 gram/dose powder Take 17 g by mouth daily as needed.      potassium chloride (K-DUR,KLOR-CON) 20 MEQ tablet Take 1 tablet (20 mEq total) by mouth 2 (two) times a day.     senna-docusate (PERICOLACE) 8.6-50 mg tablet Take 1 tablet by mouth 2 (two) times a day.     sertraline (ZOLOFT) 100 MG tablet Take 1 tablet (100 mg total) by mouth daily.     sodium chloride (OCEAN) 0.65 % nasal spray Apply 1 spray into each nostril as needed for congestion.     spironolactone (ALDACTONE) 25 MG tablet Take 1 tablet (25 mg total) by mouth daily.     traZODone (DESYREL) 50 MG tablet Take 1 tablet (50 mg total) by mouth at bedtime.     VITAMIN D3 2,000 unit capsule TAKE 1 CAPSULE BY MOUTH ONCE DAILY AT 8AM (DO NOT BRAND CHANGE - PATIENT ONLY WANT CAPSULES)     warfarin (COUMADIN/JANTOVEN) 2 MG tablet Take 7 mg by mouth daily.             DISCHARGE DIAGNOSIS:    ICD-10-CM    1. Chronic abdominal pain R10.9     G89.29    2. Chronic heart failure with preserved ejection fraction (H) I50.32    3. Physical deconditioning R53.81    4. Persistent atrial fibrillation (H) I48.1    5. Essential hypertension I10    6. (HFpEF) heart failure with preserved ejection fraction (H) I50.30      PLAN:    Physical Deconditioning  -Continue PT/OT and other therapies as per care plan.  -Encouraged good nutrition and movement habits.   -Discussed care plan and expected course of stay.   -Continue to follow-up per routine schedule or sooner if needed.      Abdominal Pain  -Low sodium diet.   -Continue to work with GI as needed.   -Oxycodone 10 mg by mouth every 6 hours as need for pain.  -Omeprazole 20 mg by mouth daily.      Hypertension/Atrial Fibrillation  -ASA 81 mg by mouth daily.   -Metoprolol 25 mg by mouth two times a day.   -Spironolactone 25 mg by mouth daily.   -Atorvastatin 80 mg by mouth daily.  -Coumadin 7 mg by mouth daily.   -Encouraged cardiac diet, low sodium diet, exercise and stress reduction  techniques.   -Emphasized importance of blood pressure control.   -Continue current medications as prescribed.   -Follow up per routine schedule or sooner with any complaints or concerns.     HFpEF  --mild pitting edema in lower extremities stable for patient  --lasix 20mg daily due to hypotension  --metoprolol 25mg two times a day  --spironolactone 25mg daily    Nausea/Vomiting  -Resolved.      Otherwise continue current care plan for all other chronic medical conditions, as they are stable. Encouraged patient to engage in healthy lifestyle behaviors such as engaging in social activities, exercising (PT/OT), eating well, and following care plan. Follow up for routine check-up, or sooner if needed. Will continue to monitor patient and work with nursing staff collaboratively to work toward positive patient outcomes.    MEDICAL EQUIPMENT NEEDS:  None     DISCHARGE PLAN/FACE TO FACE:  I certify that services are/were furnished while this patient was under the care of a physician and that a physician or an allowed non-physician practitioner (NPP), had a face-to-face encounter that meets the physician face-to-face encounter requirements. The encounter was in whole, or in part, related to the primary reason for home health. The patient is confined to his/her home and needs intermittent skilled nursing, physical therapy, speech-language pathology, or the continued need for occupational therapy. A plan of care has been established by a physician and is periodically reviewed by a physician.  Date of Face-to-Face Encounter: 7/17/19    I certify that, based on my findings, the following services are medically necessary home health services: lab draws, RN nurse visits     My clinical findings support the need for the above skilled services because: pt is a socially and physically complex patient that requires frequent monitoring for safe ongoing management of care      This patient is homebound because: she is a frail elderly  gentleman with multiple comorbidities and recent hospitalizations making it unsafe for her to go out into the community for services.    The patient is, or has been, under my care and I have initiated the establishment of the plan of care. This patient will be followed by a physician who will periodically review the plan of care. Schedule follow up visit with primary care provider within 7 days to reestablish care.    Total unit/floor time of 35 minutes time consisted of the following: time spent with patient, examination of patient, reviewing the record including pertinent labs and completing documentation. More than 50% of this time was spent in coordination of care time with nursing staff and other healthcare providers, this time was spent on discussion/counseling regarding discharge planning, discharge follow-up and discharge cares.    Electronically signed by: Arleth Barton CNP

## 2021-05-30 NOTE — TELEPHONE ENCOUNTER
Who is calling:  [facility] RN  Reason for Call:  Nurse asking if Dr. Hernandez will follow patient for home care orders via phone or fax.  Date of last appointment with primary care: none  Okay to leave a detailed message: Yes

## 2021-05-30 NOTE — PROGRESS NOTES
Assessment/ Plan  Problem List Items Addressed This Visit        High    Persistent atrial fibrillation (H) (Chronic)     Rate controlled, anticoagulation on warfarin.  Check INR         Relevant Orders    INR (Completed)    Right Renal Artery Stenosis     Stented side looks good, significant stenosis now seen on the left side         Essential hypertension     Off of antihypertensives except diuretic         (HFpEF) heart failure with preserved ejection fraction (H)     Diastolic Congestive Heart Failure is not well compensated,-patient is probably somewhat over diuresed-lightheaded with standing, weight down,  Wt Readings from Last 3 Encounters:   07/30/19 132 lb (59.9 kg)   07/17/19 140 lb 9.6 oz (63.8 kg)   07/12/19 138 lb 3.2 oz (62.7 kg)     BP Readings from Last 3 Encounters:   07/30/19 118/54   07/17/19 101/73   07/15/19 113/71       Meds:  Antihypertensives: None currently  Diuretic?  Furosemide/40 twice daily, takes at 8 AM and 4 PM, complains of urinating a lot overnight  Nitrites/other? No    Changes/Recommendations: Trial of backing off on diuretic to daily.  Patient monitor his weight daily.  Today's weight is 130 on home scale.    Recommended taking 1 dose of furosemide in the morning, 40 mg, when weight is 132 and less  2 doses, one at 8 AM, 1 at 1 PM when weight is 132 or above  Check BMP  Follow-up 1 month                Unprioritized    Coronary artery disease involving native coronary artery of native heart without angina pectoris (Chronic)    Chronic abdominal pain (Chronic)    Severe protein-calorie malnutrition (H) (Chronic)    Relevant Medications    nut.tx.gluc.intol,lac-free,soy (GLUCERNA SHAKE) Liqd    Other Relevant Orders    Comprehensive Metabolic Panel    Prealbumin    RESOLVED: Chronic diastolic congestive heart failure (H) (Chronic)    Relevant Orders    Comprehensive Metabolic Panel    HM2(CBC w/o Differential) (Completed)    Hypertrophy of nail    Relevant Orders    Ambulatory  referral to Podiatry      Other Visit Diagnoses     Hospital discharge follow-up    -  Primary        Patient Instructions   Current home wt is 130#  Taking furosemide 40 mg twice- at 8 am and 4 pm    2 things to change  1) when taking pm dose, take at 1 pm rather than 4  2) take only am dose (daily) when wt is 132 or less.      Subjective  CC:  Chief Complaint   Patient presents with     Hospital Visit Follow Up     HPI:  Patient is here for follow-up from 2 hospitalizations at [facility].  First 6/14/2019 through 6/17/2019, the second 6/27 through 7/3/2019.  The latter hospitalization was for chronic abdominal pain, felt to be multifactorial, consideration given to bowel ischemia, chronic congestive heart failure, diastolic, extensive vascular disease, social isolation with tenuous independent living status, chronic atrial fibrillation on warfarin, severe protein calorie malnutrition.  Patient also has type 2 diabetes, hypercalcemia, anxiety, transaminitis.  Review of her labs on day of admission showed calcium to be elevated at 10.9, potassium 4.7, sodium 136, BUN 11, creatinine 0.73.  Platelets were slightly low at 109,000,  CT of abdomen and pelvis show mild to moderate atherosclerosis of superior mesenteric artery, no evidence of severe SMA narrowing.  Severe narrowing of the origin of the left main renal artery due to plaque, widely patent right renal artery stent was noted.  She had H. pylori antigen done in which was in process    In the end, no reversible process was found for weight loss and failure to thrive and abdominal pain.  Visceral angiogram had been considered but after the review of the CT scan this was felt to be not indicated.  Nutrition saw patient and recommended boost glucose control with breakfast and dinner plus thiamine      , No changes in medication except timing of furosemide 40 daily  Patient was to continue taking warfarin.    Reviewed previous hospitalization, this was from the  clinic for failure to thrive.  Felt to be due to diastolic heart failure exacerbation.    Last echo 10/21/2018    Summary     1. Normal left ventricular size and systolic performance with a visually estimated ejection fraction of 55-60%.   2. There is abnormal septal motion c/w right ventricular overload; there is diastolic flattening of the septum with a more normal configuration at end systole.  3. There is mild aortic insufficiency.   4. There is moderate mitral insufficiency.   5. There is moderate tricuspid insufficiency.   6. There is mild right ventricular enlargement with mildly reduced right ventricular systolic performance.  7. There is moderate biatrial enlargement.  8. Right ventricular systolic pressure relative to right atrial pressure is moderately increased.  The pulmonary artery pressure is estimated to be 50-55 mmHg plus right atrial pressure. In addition, the IVC appears dilated with decreased phasic variation in caval diameter consistent with elevated right atrial pressure.      When compared to the prior real-time surface echocardiogram dated 16 October 2018 the degree of mitral insufficiency appears perhaps slightly less on the current examination.  Otherwise, the findings are fairly similar on both studies., there has been little appreciable interval change.      Transitional care was recommended at time of discharge but patient refused.    Wt Readings from Last 3 Encounters:   07/30/19 132 lb (59.9 kg)   07/17/19 140 lb 9.6 oz (63.8 kg)   07/12/19 138 lb 3.2 oz (62.7 kg)       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)     Cystitis     Essential hypertension     Cerebral infarction (H)     Anemia     Cerebrovascular disease     RLS  (restless legs syndrome)     Incisional hernia, without obstruction or gangrene     Diverticular disease of large intestine     Coronary artery disease involving native coronary artery of native heart without angina pectoris     Dyslipidemia     Ventral hernia without obstruction or gangrene     Anxiety     (HFpEF) heart failure with preserved ejection fraction (H)     Anticoagulant long-term use     Persistent atrial fibrillation (H)     Transaminitis     Physical deconditioning     Lipoma of back     Acalculous cholecystitis     Onychogryphosis     Hyperparathyroidism (H)     Microalbuminuria     Intestinal angina (H)     Chronic abdominal pain     Weight loss, non-intentional     Warfarin anticoagulation     Severe protein-calorie malnutrition (H)     Hypertrophy of nail     Current medications reviewed as follows:  Current Outpatient Medications on File Prior to Visit   Medication Sig     ARTHRITIS PAIN RELIEF, ACETAM, 650 mg CR tablet Take 650 mg by mouth 2 (two) times a day.            aspirin 81 MG EC tablet Take 81 mg by mouth daily.      atorvastatin (LIPITOR) 80 MG tablet Take 80 mg by mouth at bedtime.     blood glucose meter (GLUCOMETER) Please Dispense kit per pharmacy discretion and patient's insurance Test blood sugar.     blood glucose test strips Use 1 each As Directed as needed. Dispense brand per patient's insurance at pharmacy discretion.     carboxymethylcellulose (REFRESH PLUS) 0.5 % Dpet ophthalmic dropperette Administer 2 drops to both eyes every hour as needed (dry eyes).      food supplemt, lactose-reduced 0.06 gram- 1.5 kcal/mL Liqd Take 1 Can by mouth 2 (two) times a day.     furosemide (LASIX) 20 MG tablet Take 20 mg by mouth daily.     guaiFENesin (HUMIBID 3) 400 mg Tab Take 400 mg by mouth every 4 (four) hours as needed (chest congestion, every 4 hours while awake PRN).     lancets 33 gauge Misc Test twice daily Dispense brand per patient's insurance at pharmacy discretion.      loratadine (CLARITIN) 10 mg tablet Take 10 mg by mouth daily with lunch. Noon     magnesium gluconate (MAGONATE) 27.5 mg magne- sium (500 mg) tablet Take 500 mg by mouth 3 (three) times a day.            metoprolol tartrate (LOPRESSOR) 25 MG tablet Take 25 mg by mouth 2 (two) times a day.           multivitamin (TAB-A-JULIET) per tablet Take 1 tablet by mouth daily. (Patient taking differently: Take 1 tablet by mouth daily. )     nitroglycerin (NITROSTAT) 0.4 MG SL tablet Place 1 tablet (0.4 mg total) under the tongue every 5 (five) minutes as needed for chest pain (for up to 3 doses and call 911 if persists). (Patient taking differently: Place 0.4 mg under the tongue every 5 (five) minutes as needed for chest pain (for up to 3 doses and call 911 if persists). )     omeprazole (PRILOSEC) 20 MG capsule TAKE 1 CAPSULE BY MOUTH TWICE DAILY (8AM & 8PM)     oxyCODONE (ROXICODONE) 10 mg immediate release tablet Take 1 tablet (10 mg total) by mouth every 6 (six) hours as needed.     polyethylene glycol (GLYCOLAX) 17 gram/dose powder Take 17 g by mouth daily as needed.      potassium chloride (K-DUR,KLOR-CON) 20 MEQ tablet Take 1 tablet (20 mEq total) by mouth 2 (two) times a day.     senna-docusate (PERICOLACE) 8.6-50 mg tablet Take 1 tablet by mouth 2 (two) times a day.     sertraline (ZOLOFT) 100 MG tablet Take 1 tablet (100 mg total) by mouth daily.     sodium chloride (OCEAN) 0.65 % nasal spray Apply 1 spray into each nostril as needed for congestion.     spironolactone (ALDACTONE) 25 MG tablet Take 1 tablet (25 mg total) by mouth daily.     traZODone (DESYREL) 50 MG tablet Take 1 tablet (50 mg total) by mouth at bedtime.     VITAMIN D3 2,000 unit capsule TAKE 1 CAPSULE BY MOUTH ONCE DAILY AT 8AM (DO NOT BRAND CHANGE - PATIENT ONLY WANT CAPSULES)     warfarin (COUMADIN/JANTOVEN) 2 MG tablet Take 4.5 mg by mouth daily.            No current facility-administered medications on file prior to visit.      Social History      Tobacco Use   Smoking Status Former Smoker     Packs/day: 0.25   Smokeless Tobacco Former User   Tobacco Comment    e-cig a few times/day     Social History     Social History Narrative    Single/, lives alone; daughter in [place];    Gets help from neighbors and extended family    Former smoker.no alcohol problems     Patient Care Team:  Jerson Hernandez MD as PCP - General (Family Medicine)  Jones Harding DO as Physician (General Surgery)  [facility]  [place]  Full 10 system review including constitutional, respiratory, cardiac, gi, urinary, rheumatologic, neurologic, reproductive, dermatologic psychiatric is  performed  and is otherwise negative         Objective  Physical Exam  Vitals:    07/30/19 1108   BP: 118/54   Patient Site: Left Arm   Patient Position: Sitting   Cuff Size: Adult Large   Pulse: 70   Resp: 16   Weight: 132 lb (59.9 kg)     Body mass index is 23.38 kg/m .  Lost a great deal of weight, sunken cheekbones, moist mucous membranes.  Chest is clear, cardiovascular exam normal, trace lower extremity edema    Diagnostics:   Results for orders placed or performed in visit on 07/30/19   HM2(CBC w/o Differential)   Result Value Ref Range    WBC 7.3 4.0 - 11.0 thou/uL    RBC 5.45 (H) 3.80 - 5.40 mill/uL    Hemoglobin 16.0 12.0 - 16.0 g/dL    Hematocrit 49.0 (H) 35.0 - 47.0 %    MCV 90 80 - 100 fL    MCH 29.3 27.0 - 34.0 pg    MCHC 32.6 32.0 - 36.0 g/dL    RDW 18.0 (H) 11.0 - 14.5 %    Platelets 170 140 - 440 thou/uL    MPV 10.4 (H) 7.0 - 10.0 fL   INR   Result Value Ref Range    INR 1.30 (H) 0.90 - 1.10           Please note: Voice recognition software was used in this dictation.  It may therefore contain typographical errors.

## 2021-05-30 NOTE — TELEPHONE ENCOUNTER
Called and spoke to pt letting her know her order for boost was successfully faxed to her pharmacy. Fax to [phone]. Pt has no further questions at this time.

## 2021-05-30 NOTE — TELEPHONE ENCOUNTER
INR result is 1.7  INR   Date Value Ref Range Status   06/17/2019 1.78 (H) 0.90 - 1.10 Final       Will the patient be seen, or did they already see, MD or CNP today? No    Most Recent Warfarin dose day/week  Sunday Monday Tuesday Wednesday Thursday Friday Saturday    2 2 1 2 2 1     Sunday Monday Tuesday Wednesday Thursday Friday Saturday   2 2 2           Has the patient missed any doses of Coumadin, Warfarin, Jantoven in the past 7 days? No    Has the patients medications changed since the last visit? Yes, Lasix starting on 6/26/19 40 mg twice a day and potassium on 6/17 increased to 20mg twice a day.    Has the patient experienced any bleeding recently? No    Has the patient experienced any injuries or illness recently? No    Has the patient experienced any 'new' shortness of breath, severe headaches, or changes in vision recently? No    Has the patient had any changes in their diet, or alcohol consumption? No    Is the patient here today to prepare for any type of upcoming surgery, procedure, or for a cardioversion procedure? No    What phone number can we reach the patient at today? [place] [facility] care nurse [phone].

## 2021-05-30 NOTE — PROGRESS NOTES
[facility]    Facility:   [facility] [[id number]]   Code Status: FULL CODE and POLST AVAILABLE      CHIEF COMPLAINT/REASON FOR VISIT:  Chief Complaint   Patient presents with     Review Of Multiple Medical Conditions     abdominal pain, HTN, Hypotension, tobacco cessation       HISTORY:      HPI: Gardenia is a 69 y.o. 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 She also has no past medical history of Family history of malignant hyperthermia, History of transfusion, Malignant hyperthermia due to anesthesia, Sleep apnea, or Sleep apnea, obstructive. Gardenia was recently admitted to the hospital for abdominal discomfort. She was admitted to [facility] from 6/27/2019 to 7/3/2019. The discharging provider summarized the hospitalization in previous notes.     Patient patient is being evaluated for a review of multiple medical conditions. She is overall feeling well and feels like she is making good progress with her rehabilitation. She does think a lot about where she is going to end up after being discharged from TCU. She hopes that she ends up in a nice assisted living  "facility. She is eating well but would like to try a different nutritional supplement. She does note some dizziness with position changes recently. Blood pressure have been noted to be low. She denies fever, chills, chest pain, shortness of breath, cough, nausea, diarrhea, constipation, urinary frequency, dysuria, headache. She had also requested from nursing staff that she be prescribed nicorette gum. She no longer wants this when asked. We did discuss smoking cessation at length:     Counseling given: Not Answered  Comment: e-cig a few times/day  This patient continues to smoke e-cig \"a few times\" per day after several failed attempts at quitting. Approximately 5 minutes were spent counseling the patient in cessation techniques. Discussed alternatives for smoking cessation including nicorette gum, nicoderm, zyban and chantix. She understands continuing to smoke could lead to stroke and death. The benefits of stopping were also presented to her. The patient has verbalized his desire to quit. She has set his own goal to quit smoking.     Past Medical History:   Diagnosis Date     Acute diastolic heart failure (H) 11/2015     Acute on chronic diastolic heart failure (H) 6/15/2019     Advanced directives, counseling/discussion 8/5/2015    Patient completed 2011.  Discussed today, 5/24/17, and wants to change healthcare agent from niece to daughter.  Discussed that she will need to complete new form to indicate this.     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  Recommend 5 year follow-up.  Single tubular adenoma     Biliary obstruction 10/3/2018    Added automatically from request for surgery [id number]     Cerebral vascular accident (H)      Coronary artery disease 2006    100% RCA with collateral filling per Dr. Elizabeth's angio report     Diabetes mellitus type 2 in obese (H)      Fibrocystic breast      " GERD (gastroesophageal reflux disease)      History of anesthesia complications     slow to wake     Hypertension      Peripheral vascular disease (H)      Pneumonia 11/11/2018     PONV (postoperative nausea and vomiting)      S/P cholecystectomy 6/22/2018     SBO (small bowel obstruction) (H) 11/12/2015     Stroke (H)      Transaminitis 10/22/2018     Upper respiratory infection 12/20/2018     Urinary incontinence              Family History   Problem Relation Age of Onset     Cancer Paternal Grandmother      Cancer Paternal Uncle      No Medical Problems Mother      No Medical Problems Father      Heart attack Sister      Chronic Kidney Disease Sister      No Medical Problems Daughter      No Medical Problems Maternal Grandmother      No Medical Problems Maternal Grandfather      No Medical Problems Paternal Grandfather      No Medical Problems Maternal Aunt      No Medical Problems Paternal Aunt      Diabetes Brother      BRCA 1/2 Neg Hx      Breast cancer Neg Hx      Colon cancer Neg Hx      Endometrial cancer Neg Hx      Ovarian cancer Neg Hx      Social History     Socioeconomic History     Marital status: Legally      Spouse name: None     Number of children: 1     Years of education: None     Highest education level: None   Occupational History     None   Social Needs     Financial resource strain: None     Food insecurity:     Worry: None     Inability: None     Transportation needs:     Medical: None     Non-medical: None   Tobacco Use     Smoking status: Former Smoker     Packs/day: 0.25     Smokeless tobacco: Former User     Tobacco comment: e-cig a few times/day   Substance and Sexual Activity     Alcohol use: No     Drug use: No     Sexual activity: None   Lifestyle     Physical activity:     Days per week: None     Minutes per session: None     Stress: None   Relationships     Social connections:     Talks on phone: None     Gets together: None     Attends Sabianism service: None     Active  member of club or organization: None     Attends meetings of clubs or organizations: None     Relationship status: None     Intimate partner violence:     Fear of current or ex partner: None     Emotionally abused: None     Physically abused: None     Forced sexual activity: None   Other Topics Concern     None   Social History Narrative    Single/, lives alone; daughter in [place];    Gets help from neighbors and extended family    Former smoker.no alcohol problems       REVIEW OF SYSTEM:  Pertinent items are noted in HPI.    PHYSICAL EXAM:   BP (!) 86/66 (Patient Position: Standing)   Pulse 83   Wt 138 lb 3.2 oz (62.7 kg)   LMP 01/25/1999   BMI 24.48 kg/m    General appearance: alert, appears stated age and cooperative  HEENT: Head is normocephalic with normal hair distribution. No evidence of trauma. Ears: Without lesions or deformity. No acute purulent discharge. Eyes: Conjunctivae pink with no scleral icterus or erythema. Nose: Normal mucosa and septum. Oropharnyx: mmm, no lesions present.  Lungs: clear to auscultation bilaterally, respirations without effort  Heart: regular rate and irregular rhythm, S1, S2 normal, no murmur, click, rub or gallop  Abdomen: soft, non-tender; bowel sounds normal; no masses,  no organomegaly  Extremities: extremities normal, atraumatic, no cyanosis, +1 edema in lower extremities  Pulses: 2+ and symmetric  Skin: Skin color, texture, turgor normal. No rashes or lesions  Neurologic: Grossly normal   Psych: interacts well with caregivers, exhibits logical thought processes and connections, pleasant      LABS:   None today.     ASSESSMENT:      ICD-10-CM    1. Chronic abdominal pain R10.9     G89.29    2. Hypotension I95.9    3. Chronic heart failure with preserved ejection fraction (H) I50.32    4. Physical deconditioning R53.81    5. Persistent atrial fibrillation (H) I48.1    6. Essential hypertension I10        PLAN:    Physical Deconditioning  -Continue PT/OT and  other therapies as per care plan.  -Encouraged good nutrition and movement habits.   -Discussed care plan and expected course of stay.   -Continue to follow-up per routine schedule or sooner if needed.     Abdominal Pain  -Low sodium diet.   -Continue to work with GI as needed.   -Oxycodone 10 mg by mouth every 6 hours as need for pain.  -Omeprazole 20 mg by mouth daily.     Hypertension/Atrial Fibrillation  -ASA 81 mg by mouth daily.   -Metoprolol 6.25 mg by mouth two times a day.   -Spironolactone 25 mg by mouth daily.   -Atorvastatin 80 mg by mouth daily.  -Coumadin 7 mg by mouth daily.   -Encouraged cardiac diet, low sodium diet, exercise and stress reduction techniques.   -Emphasized importance of blood pressure control.   -Continue current medications as prescribed.   -Follow up per routine schedule or sooner with any complaints or concerns.    HFpEF  --mild pitting edema in lower extremities stable for patient  --decreasing lasix dose from 40mg daily to 20mg daily due to hypotension  --metoprolol 6.25mg two times a day  --spironolactone 25mg daily    Hypotension  --Patient encouraged fluid intake  --metoprolol dosing has already been decreased  --lasix dose will be reduced to 20mg daily  --encouraged changing positions slowly     Otherwise continue current care plan for all other chronic medical conditions, as they are stable. Encouraged patient to engage in healthy lifestyle behaviors such as engaging in social activities, exercising (PT/OT), eating well, and following care plan. Follow up for routine check-up, or sooner if needed. Will continue to monitor patient and work with nursing staff collaboratively to work toward positive patient outcomes.    Electronically signed by: Arleth Barton CNP

## 2021-05-30 NOTE — TELEPHONE ENCOUNTER
ANTICOAGULATION  MANAGEMENT: Discharge Continuity of Care Review    Hospital admission on  6/27-7/3 for abdominal pain/N/V/malnutrition.    Discharge disposition: TCU    INR Results:       Recent labs: (last 7 days)     06/29/19  0749 06/30/19  0803 07/01/19  0733 07/02/19  0649 07/03/19  0752 07/05/19  0809   INR 1.73* 1.58* 1.59* 1.87* 1.78* 1.36*       Warfarin inpatient management: More warfarin administered than maintenance regimen    Warfarin discharge instructions: home regimen continued     Medication Changes Affecting Anticoagulation: No    Additional Factors Affecting Anticoagulation: Yes: nutritional status/poor PO intake    Plan     No adjustment to anticoagulation plan needed    ACM will resume monitoring upon discharge from TCU. Unclear at writer this time if patient will be returning to HE or transferring to long term care    Anticoagulation calendar updated    Janny Cox RN

## 2021-05-30 NOTE — TELEPHONE ENCOUNTER
ANTICOAGULATION  MANAGEMENT- Home Care/Care Facility Result    Assessment     Today's INR result of 1.7 is Subtherapeutic (goal INR of 2.0-3.0)        Less warfarin taken than instructed which may be affecting INR. Patient did not increase dose as instructed from hospital discharge.    No new diet changes affecting INR    Interaction between Potassium and Lasix and warfarin may be affecting INR    Continues to tolerate warfarin with no reported s/s of bleeding or thromboembolism     Previous INR was Subtherapeutic    Plan:     Spoke with Geneva discussed INR result and instructed:     Warfarin Dosing Instructions: Change warfarin dose to previously instructed dose of 2 mg daily   Next INR to be drawn: one week    Education provided: target INR goal and significance of current INR result    Geneva verbalizes understanding and agrees to warfarin dosing plan.   ?   Bia Crane RN    Subjective/Objective:      Gardenia Muller, a 68 y.o. female is established on warfarin.     Home care/care facility RN's report of Gardenia INR, recent warfarin dosing, diet changes, medication changes, and symptoms is documented below.    Additional findings: verbally confirmed home dose with Geneva and updated on anticoagulation calendar    Anticoagulation Episode Summary     Current INR goal:   2.0-3.0   TTR:   65.2 % (2.3 y)   Next INR check:   7/3/2019   INR from last check:   1.70! (6/26/2019)   Weekly max warfarin dose:      Target end date:   Indefinite   INR check location:      Preferred lab:      Send INR reminders to:   [facility]    Indications    Cerebrovascular disease [I67.9]           Comments:            Anticoagulation Care Providers     Provider Role Specialty Phone number    Jerson Hernandez MD Referring Family Medicine [phone]

## 2021-05-30 NOTE — PROGRESS NOTES
"[facility]    Facility:   [facility] [[id number]]   Code Status: FULL CODE and POLST AVAILABLE      CHIEF COMPLAINT/REASON FOR VISIT:  Chief Complaint   Patient presents with     Review Of Multiple Medical Conditions     physical deconditioning, abdominal pain, HTN, atrial fibrillation       HISTORY:      HPI: Gardenia is a 68 y.o. 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 She also has no past medical history of Family history of malignant hyperthermia, History of transfusion, Malignant hyperthermia due to anesthesia, Sleep apnea, or Sleep apnea, obstructive. Gardenia was recently admitted to the hospital for abdominal discomfort. She was admitted to [facility] from 6/27/2019 to 7/3/2019. The discharging provider summarized the hospitalization as follow:     \"See H&P and consultant notes. End stage vascular type problems with weight loss and failure to thrive and nutrition issues. Chronic abdominal pain multifactorial no evident reversible cause.  She agrees to SNF/TCU and should do better in snf type of setting especially in regard to nutrition  meds at discharge " "essentially the same  Visceral angiogram had been considered but after review of CT this was not felt indicated.\"    Today Gardenia is being evaluated for an intake into the TCU. She states she is doing much better since     Advanced Care Planning  Spoke with Gardenia regarding code status and advanced care planning. Discussed that she would like to have full resuscitation efforts. she would also like to have treatment if she were to fall ill. she would like full medical treatment for all medical issues and family would decide for her if she was unable to decide.       Past Medical History:   Diagnosis Date     Acute diastolic heart failure (H) 11/2015     Acute on chronic diastolic heart failure (H) 6/15/2019     Advanced directives, counseling/discussion 8/5/2015    Patient completed 2011.  Discussed today, 5/24/17, and wants to change healthcare agent from niece to daughter.  Discussed that she will need to complete new form to indicate this.     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  Recommend 5 year follow-up.  Single tubular adenoma     Biliary obstruction 10/3/2018    Added automatically from request for surgery [id number]     Cerebral vascular accident (H)      Coronary artery disease 2006    100% RCA with collateral filling per Dr. Elizabeth's angio report     Diabetes mellitus type 2 in obese (H)      Fibrocystic breast      GERD (gastroesophageal reflux disease)      History of anesthesia complications     slow to wake     Hypertension      Peripheral vascular disease (H)      Pneumonia 11/11/2018     PONV (postoperative nausea and vomiting)      S/P cholecystectomy 6/22/2018     SBO (small bowel obstruction) (H) 11/12/2015     Stroke (H)      Transaminitis 10/22/2018     Upper respiratory infection 12/20/2018     Urinary incontinence              Family History   Problem Relation Age of Onset     " Cancer Paternal Grandmother      Cancer Paternal Uncle      No Medical Problems Mother      No Medical Problems Father      Heart attack Sister      Chronic Kidney Disease Sister      No Medical Problems Daughter      No Medical Problems Maternal Grandmother      No Medical Problems Maternal Grandfather      No Medical Problems Paternal Grandfather      No Medical Problems Maternal Aunt      No Medical Problems Paternal Aunt      Diabetes Brother      BRCA 1/2 Neg Hx      Breast cancer Neg Hx      Colon cancer Neg Hx      Endometrial cancer Neg Hx      Ovarian cancer Neg Hx      Social History     Socioeconomic History     Marital status: Legally      Spouse name: None     Number of children: 1     Years of education: None     Highest education level: None   Occupational History     None   Social Needs     Financial resource strain: None     Food insecurity:     Worry: None     Inability: None     Transportation needs:     Medical: None     Non-medical: None   Tobacco Use     Smoking status: Former Smoker     Packs/day: 0.25     Smokeless tobacco: Former User     Tobacco comment: e-cig a few times/day   Substance and Sexual Activity     Alcohol use: No     Drug use: No     Sexual activity: None   Lifestyle     Physical activity:     Days per week: None     Minutes per session: None     Stress: None   Relationships     Social connections:     Talks on phone: None     Gets together: None     Attends Tenriism service: None     Active member of club or organization: None     Attends meetings of clubs or organizations: None     Relationship status: None     Intimate partner violence:     Fear of current or ex partner: None     Emotionally abused: None     Physically abused: None     Forced sexual activity: None   Other Topics Concern     None   Social History Narrative    Single/, lives alone; daughter in [place];    Gets help from neighbors and extended family    Former smoker.no alcohol problems        REVIEW OF SYSTEM:  Pertinent items are noted in HPI.    PHYSICAL EXAM:   /49   Pulse 82   Temp 97  F (36.1  C)   Resp 16   Wt 137 lb 12.8 oz (62.5 kg)   LMP 01/25/1999   SpO2 96%   BMI 24.41 kg/m    General appearance: alert, appears stated age and cooperative  HEENT: Head is normocephalic with normal hair distribution. No evidence of trauma. Ears: Without lesions or deformity. No acute purulent discharge. Eyes: Conjunctivae pink with no scleral icterus or erythema. Nose: Normal mucosa and septum. Oropharnyx: mmm, no lesions present.  Lungs: clear to auscultation bilaterally, respirations without effort  Heart: regular rate and irregular rhythm, S1, S2 normal, no murmur, click, rub or gallop  Abdomen: soft, non-tender; bowel sounds normal; no masses,  no organomegaly  Extremities: extremities normal, atraumatic, no cyanosis or edema  Pulses: 2+ and symmetric  Skin: Skin color, texture, turgor normal. No rashes or lesions  Neurologic: Grossly normal   Psych: interacts well with caregivers, exhibits logical thought processes and connections, pleasant      LABS:   None today.     ASSESSMENT:      ICD-10-CM    1. Physical deconditioning R53.81    2. Abdominal pain, generalized R10.84    3. Essential hypertension I10    4. Persistent atrial fibrillation (H) I48.1        PLAN:    Physical Deconditioning  -Continue PT/OT and other therapies as per care plan.  -Encouraged good nutrition and movement habits.   -Discussed care plan and expected course of stay.   -Continue to follow-up per routine schedule or sooner if needed.     Abdominal Pain  -Low sodium diet.   -Continue to work with GI as needed.   -Oxycodone 10 mg by mouth every 6 hours as need for pain.  -Omeprazole 20 mg by mouth daily.     Hypertension/Atrial Fibrillation  -ASA 81 mg by mouth daily.   -Metoprolol 6.25 mg by mouth two times a day.   -Spironolactone 25 mg by mouth daily.   -Atorvastatin 80 mg by mouth daily.  -Coumadin 7 mg by mouth  daily.   -Encouraged cardiac diet, low sodium diet, exercise and stress reduction techniques.   -Emphasized importance of blood pressure control.   -Continue current medications as prescribed.   -Follow up per routine schedule or sooner with any complaints or concerns.    Admission history and physical per MD in the next 30 days. At this time continue current care plan for all chronic medical conditions, as they are stable. Encouraged patient to engage in PT/OT for strengthening and conditioning. Encouraged patient to work closely with nursing staff to ensure any medical complaints are quickly addressed. Follow up this week or sooner if needed. Will continue to monitor patient and work with nursing staff collaboratively to work toward positive patient outcomes.    Total unit/floor time of 35 minutes time consisted of the following: time spent with patient, examination of patient, reviewing the record including pertinent labs and completing documentation. More than 50% of this time was spent in coordination of care time with nursing staff and other healthcare providers, this time was spent on discussion/counseling on current care plan including medical management of chronic health problems and acute health problems, education pertaining to plan, and discussion of the goals of care pertaining to the current outlined plan with nursing staff and patient. An additional 16 minutes of time was spent discussing code status, wishes for end of life care and reviewing POLST from 1300 to 1316. POLST signed and left with nursing staff.     Electronically signed by: Arleth Barton CNP

## 2021-05-30 NOTE — TELEPHONE ENCOUNTER
Orders being requested: Boost  Plus   1 case per month - vanilla,  Black cherry 2     Reason service is needed/diagnosis:   Pt received Ensure- she does not like it,(too much sugar) requesting Boost plus, please send asap an advise patient  When are orders needed by:  Today   Where to send Orders: [facility] [employer] - [place], [place] - [address]  [phone]  Okay to leave detailed message?  Yes

## 2021-05-30 NOTE — PROGRESS NOTES
[facility] Patient Outreach:     Discharge Date::  7/18/19      Reason for TCU stay (discharge diagnosis)::  Abdominal pain, failure to thrive      Are you feeling better, the same or worse since your discharge?:  Patient is feeling the same (fell once since being home.  said she didn't hit her head.  Patient is looking at going to [facility] assisted [facility].  )          As part of your discharge plan, did they discuss home care with you?: Yes        Have your seen them yet, or are they scheduled to visit?: Yes                Do you have any follow up visits scheduled with your PCP or Specialist?:  No          I'm glad to hear you're doing well and we want you to continue to do well. Your PCP would like to see you for a follow-up visit. Can we help set that up for your today?: Yes            (RN) Patient transferred to [facility]? **If immediate concers (e.g. patient is feeling worse and/or not taking new medictations), send in basket message to PCP with quick summary of concern.: Yes

## 2021-05-30 NOTE — PROGRESS NOTES
[facility]      Facility:    [facility] [[id number]]    Code Status: FULL CODE   [facility] 10/3  through 10/25/18  [facility]   6/14 through 617/2019  [facility] 6/27 through 7/3/2019      Chief Complaint/Reason for Visit:  Chief Complaint   Patient presents with     H & P     chronic upper abdominal pain       HPI:   Gardneia is a 68 y.o. female with MI, peripheral arterial disease, right renal artery stenosis, diabetes type 2, essential hypertension, past stroke, HFpEF, atrial flutter, atrial fibrillation (converted), cholecystitis status post laparoscopic cholecystectomy, ventral hernia with incisional hernia repair, previous transaminitis..      She had a recent hospitalization in June 2019 for CHF, CT of the abdomen at that time was stable.    Gardenia  had a few months of chronic abdominal pain and weight loss.  He states she chronically has 1-2 loose stools a day.  Couple days prior to admission, she was having vomiting, and a worsening of her right and mid abdominal pain.  Also complained it was hard to take care of herself at home.    She came to the emergency department: Urinalysis looked suspicious, urine culture was no growth.      Ultrasound of the abdomen was unremarkable  CT showed Mitchell, stable common bile duct dilatation without stones, pancreatic atrophy.  MRCP shows Mitchell, stable CBD, mild hepatic congestion.  HBV   And  HBC studies were negative.    Gastroenterology ( Dr Mueller) was consulted: as in the past they felt her chronic abdominal pain might be mild abdominal ischemia.  She also felt her transaminitis was likely due to right-sided heart failure with hepatic congestion.  MRCP showed hepatic congestion.    Dr. Norberto Elias recommended angiogram of the pelvis:   IMPRESSION:CTA abd/pelvis (7/3/19)  CONCLUSION:  1.  Mild-to-moderate atherosclerotic narrowing of the SMA secondary to calcified and noncalcified plaque. No evidence of  severe SMA narrowing.  2.  Severe narrowing at the origin of left main renal artery due to calcified plaque. A widely patent right renal artery stent is noted.  3.  Additional chronic findings as described above.      She was discharged to [facility] for therapy and help with placement concerns      Past Medical History:  Past Medical History:   Diagnosis Date     Acute diastolic heart failure (H) 11/2015     Acute on chronic diastolic heart failure (H) 6/15/2019     Advanced directives, counseling/discussion 8/5/2015    Patient completed 2011.  Discussed today, 5/24/17, and wants to change healthcare agent from niece to daughter.  Discussed that she will need to complete new form to indicate this.     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  Recommend 5 year follow-up.  Single tubular adenoma     Biliary obstruction 10/3/2018    Added automatically from request for surgery [id number]     Cerebral vascular accident (H)      Coronary artery disease 2006    100% RCA with collateral filling per Dr. Elizabeth's angio report     Diabetes mellitus type 2 in obese (H)      Fibrocystic breast      GERD (gastroesophageal reflux disease)      History of anesthesia complications     slow to wake     Hypertension      Peripheral vascular disease (H)      Pneumonia 11/11/2018     PONV (postoperative nausea and vomiting)      S/P cholecystectomy 6/22/2018     SBO (small bowel obstruction) (H) 11/12/2015     Stroke (H)      Transaminitis 10/22/2018     Upper respiratory infection 12/20/2018     Urinary incontinence            Surgical History:  Past Surgical History:   Procedure Laterality Date     CARDIAC CATHETERIZATION       CARDIOVERSION  10/18/2018     CORONARY STENT PLACEMENT  1995    stent     ERCP N/A 10/5/2018    Procedure: ENDOSCOPIC RETROGRADE CHOLANGIOPANCREATOGRAPHY, SPHINCTEROTOMY;  Surgeon: Anson  FILIPE Stokes MD;  Location: [facility] Main OR;  Service:      EXPLORATORY LAPAROTOMY N/A 6/29/2018    Procedure: LAPAROTOMY, CLOSURE OF PERITONEAL RENT;  Surgeon: Jones Harding DO;  Location: [facility] Main OR;  Service:      LAPAROSCOPIC CHOLECYSTECTOMY N/A 10/7/2018    Procedure: CHOLECYSTECTOMY, LAPAROSCOPIC;  Surgeon: Felicia So MD;  Location: [facility] OR;  Service:      [facility]  6/29/2018          [facility]  10/21/2018          VENTRAL HERNIA REPAIR N/A 11/12/2015    Procedure: STRANGULATED VENTRAL HERNIA REPAIR ;  Surgeon: Ernesto Bailey MD;  Location: [facility];  Service:        Family History:   Family History   Problem Relation Age of Onset     Cancer Paternal Grandmother      Cancer Paternal Uncle      No Medical Problems Mother      No Medical Problems Father      Heart attack Sister      Chronic Kidney Disease Sister      No Medical Problems Daughter      No Medical Problems Maternal Grandmother      No Medical Problems Maternal Grandfather      No Medical Problems Paternal Grandfather      No Medical Problems Maternal Aunt      No Medical Problems Paternal Aunt      Diabetes Brother      BRCA 1/2 Neg Hx      Breast cancer Neg Hx      Colon cancer Neg Hx      Endometrial cancer Neg Hx      Ovarian cancer Neg Hx        Social History:    Social History     Socioeconomic History     Marital status: Legally      Spouse name: Not on file     Number of children: 1     Years of education: Not on file     Highest education level: Not on file   Occupational History     Not on file   Social Needs     Financial resource strain: Not on file     Food insecurity:     Worry: Not on file     Inability: Not on file     Transportation needs:     Medical: Not on file     Non-medical: Not on file   Tobacco Use     Smoking status: Former Smoker     Packs/day: 0.25     Smokeless tobacco: Former User     Tobacco comment: e-cig a few times/day   Substance and Sexual Activity     Alcohol use: No      Drug use: No     Sexual activity: Not on file   Lifestyle     Physical activity:     Days per week: Not on file     Minutes per session: Not on file     Stress: Not on file   Relationships     Social connections:     Talks on phone: Not on file     Gets together: Not on file     Attends [employer] service: Not on file     Active member of club or organization: Not on file     Attends meetings of clubs or organizations: Not on file     Relationship status: Not on file     Intimate partner violence:     Fear of current or ex partner: Not on file     Emotionally abused: Not on file     Physically abused: Not on file     Forced sexual activity: Not on file   Other Topics Concern     Not on file   Social History Narrative    Single/, lives alone; daughter in [place];    Gets help from neighbors and extended family    Former smoker.no alcohol problems          Review of Systems   She lives in a senior building and has many services  She has a , gets laundry and cleaning services, weekly visit by nurse, shower given weekly, meals are provided, gets a daily check from the staff.  She insists she needs an intermediate for someone is a available around the clock  She states over a year ago she was 175 pounds, currently about 128 pounds.  She likes Boost, does not like Glucerna  She continues to rely on a niece who lives here, her daughter lives in [place], her son lives in [place]  Remainder of the comprehensive review of systems is negative    Blood pressure 104/84, pulse 100, temperature 97.9  F (36.6  C), resp. rate 18,  SpO2 96 %        Physical Exam   Constitutional: She is oriented to person, place, and time. No distress.   Alert overweight Black-American female   HENT:   Nose: Nose normal.   Mouth/Throat: Oropharynx is clear and moist.   Missing some of her teeth, so she has partial dentures that she does not like because they do not fit well   Eyes: Conjunctivae and EOM are normal.   Cardiovascular: Regular  rhythm and normal heart sounds.   No murmur heard.  Near holosystolic murmur at the mitral/apex area   Pulmonary/Chest: Breath sounds normal. She has no wheezes. She has no rales.   Abdominal: Soft. Bowel sounds are normal. She exhibits no distension. There is no tenderness. There is no rebound.   Surgical wound well-healed (midline from umbilical hernia repair)   Musculoskeletal: Normal range of motion. She exhibits no edema or tenderness.   1+ pretibial edema  Symmetric extremity moves     Lymphadenopathy:     She has no cervical adenopathy.   Neurological: She is alert and oriented to person, place, and time. Coordination normal.   Skin: Skin is warm and dry. No erythema.   Psychiatric: She has a normal mood and affect. Her behavior is normal.       Medication List:  Current Outpatient Medications   Medication Sig     ARTHRITIS PAIN RELIEF, ACETAM, 650 mg CR tablet Take 650 mg by mouth 2 (two) times a day.            aspirin 81 MG EC tablet Take 81 mg by mouth daily.      atorvastatin (LIPITOR) 80 MG tablet Take 80 mg by mouth at bedtime.     blood glucose meter (GLUCOMETER) Please Dispense kit per pharmacy discretion and patient's insurance Test blood sugar.     blood glucose test strips Use 1 each As Directed as needed. Dispense brand per patient's insurance at pharmacy discretion.     carboxymethylcellulose (REFRESH PLUS) 0.5 % Dpet ophthalmic dropperette Administer 2 drops to both eyes every hour as needed (dry eyes).      food supplemt, lactose-reduced (ENSURE ACTIVE CLEAR) Liqd Take twice daily or as directed for calorie supplement     furosemide (LASIX) 40 MG tablet Take 1 tablet (40 mg total) by mouth daily.     guaiFENesin (HUMIBID 3) 400 mg Tab Take 400 mg by mouth every 4 (four) hours as needed (chest congestion, every 4 hours while awake PRN).     lancets 33 gauge Misc Test twice daily Dispense brand per patient's insurance at pharmacy discretion.     loratadine (CLARITIN) 10 mg tablet Take 10 mg by  mouth daily with lunch. Noon     magnesium gluconate (MAGONATE) 27.5 mg magne- sium (500 mg) tablet Take 500 mg by mouth 3 (three) times a day.            metoprolol tartrate (LOPRESSOR) 25 MG tablet Take 12.5 mg by mouth 2 (two) times a day.     multivitamin (TAB-A-JULIET) per tablet Take 1 tablet by mouth daily. (Patient taking differently: Take 1 tablet by mouth daily. )     nitroglycerin (NITROSTAT) 0.4 MG SL tablet Place 1 tablet (0.4 mg total) under the tongue every 5 (five) minutes as needed for chest pain (for up to 3 doses and call 911 if persists). (Patient taking differently: Place 0.4 mg under the tongue every 5 (five) minutes as needed for chest pain (for up to 3 doses and call 911 if persists). )     omeprazole (PRILOSEC) 20 MG capsule TAKE 1 CAPSULE BY MOUTH TWICE DAILY (8AM & 8PM)     oxyCODONE (ROXICODONE) 10 mg immediate release tablet Take 1 tablet (10 mg total) by mouth every 6 (six) hours as needed.     polyethylene glycol (GLYCOLAX) 17 gram/dose powder Take 17 g by mouth daily as needed.      potassium chloride (K-DUR,KLOR-CON) 20 MEQ tablet Take 1 tablet (20 mEq total) by mouth 2 (two) times a day.     senna-docusate (PERICOLACE) 8.6-50 mg tablet Take 1 tablet by mouth 2 (two) times a day.     sertraline (ZOLOFT) 100 MG tablet Take 1 tablet (100 mg total) by mouth daily.     sodium chloride (OCEAN) 0.65 % nasal spray Apply 1 spray into each nostril as needed for congestion.     spironolactone (ALDACTONE) 25 MG tablet Take 1 tablet (25 mg total) by mouth daily.     traZODone (DESYREL) 50 MG tablet Take 1 tablet (50 mg total) by mouth at bedtime.     VITAMIN D3 2,000 unit capsule TAKE 1 CAPSULE BY MOUTH ONCE DAILY AT 8AM (DO NOT BRAND CHANGE - PATIENT ONLY WANT CAPSULES)     warfarin (COUMADIN/JANTOVEN) 2 MG tablet Take 2 mg by mouth daily.       Labs:  Lab Units 06/28/19  0734 06/27/19  1342   LN-WHITE BLOOD CELL COUNT thou/uL 5.3 8.0   LN-HEMOGLOBIN g/dL 13.8 15.2   LN-HEMATOCRIT % 44.3 47.6*    LN-PLATELET COUNT thou/uL 139* 165         Lab Units 06/28/19  0734 06/27/19  1342   LN-SODIUM mmol/L 139 139   LN-POTASSIUM mmol/L 4.1 5.5*   LN-CHLORIDE mmol/L 108* 104   LN-CO2 mmol/L 27 27   LN-BLOOD UREA NITROGEN mg/dL 13 17   LN-CREATININE mg/dL 0.63 0.73   LN-CALCIUM mg/dL 10.5 12.0*   LN-ALBUMIN g/dL 2.7* 3.4*   LN-PROTEIN TOTAL g/dL 6.6 8.2*   LN-BILIRUBIN TOTAL mg/dL 1.2* 1.5*   LN-ALKALINE PHOSPHATASE U/L 140* 182*   LN-ALT (SGPT) U/L 70* 89*   LN-AST (SGOT) U/L 133* 200*            Lipase   Date Value Ref Range Status   06/27/2019 14 0 - 52 U/L Final   06/29/2016 17 0 - 52 U/L Final   03/04/2016 29 0 - 52 U/L Final     Hepatitis C antibody = negative    IMAGING:  EXAM: MR MRCP W WO CONTRAST  LOCATION: [facility]  DATE/TIME: 6/27/2019 8:42 PM     CONCLUSION:  1.  Moderate dilatation of extrahepatic bile ducts, especially the common hepatic duct which measures 13 mm. No intraductal stone or obstructing mass evident. Patient does have a small duodenal diverticulum at the level the ampulla which could be playing   a role.  2.  There is minimal dilatation of the main pancreatic duct.  3.  Trace volume ascites. Mild cardiomegaly.        EXAM: CT ABDOMEN PELVIS WO ORAL W IV CONTRAST  DATE/TIME: 6/27/2019     INDICATION: Abdominal pain.  COMPARISON: Ultrasound earlier today, CT 06/14/2019.   CONCLUSION:   1.  Hepatic steatosis.  2.  Enlarged heart with fibroatelectasis and possible interstitial edema. Correlate with chest x-ray.  3.  Diverticulosis without diverticulitis.  4.  Prominent common hepatic duct unchanged.  5.  Presumed uterine fibroids.  6.  Diffuse thickening of urinary bladder accentuated from incomplete distention.  7.  Remainder stable.    EXAM: US ABDOMEN LIMITED  LOCATION: [facility]  DATE/TIME: 6/27/2019 4:50 PM  CONCLUSION:  1.  Prominent common hepatic duct however this is unchanged from recent CT. Differential would include reservoir effect however given abnormal  liver function tests distal obstructing lesion cannot be excluded.  2.  Minimally heterogeneous hepatic steatosis.      ASSESSMENT / PLAN:      ICD-10-CM    1. Chronic abdominal pain R10.9  I explained to her that the thought is she has obstructed vessels, like she had to her kidney, to her heart arteries, to her brain.  She has a seems type of obstruction to blood flow and will have mild chronic pain.    G89.29    2. Weight loss R63.4  recommend small frequent snacks and meals in addition to supplement   3. 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 Cerebrovascular disease I67.9    5. Type 2 diabetes mellitus with other circulatory complication, unspecified whether long term insulin use (H) E11.59 Has lost a lot of weight; not on medication   6. Chronic heart failure with preserved ejection fraction (H) I50.32  spironolactone, Lasix, Toprol   7. Coronary artery disease involving native coronary artery of native heart without angina pectoris I25.10 No angina but PRN nitro available.on beta-blocker, statin, ASA   8. Essential hypertension I10    9. Transaminitis R74.0 Possibly chronic hepatic congeston   10. Hypercalcemia E83.52    11. Other depression F32.89  sertraline, trazodone         Electronically signed by: Monik Cruz MD

## 2021-05-30 NOTE — TELEPHONE ENCOUNTER
Anticoagulation Management    Unable to reach Gardenia today.    Today's INR result of 1.3 is Subtherapeutic (goal INR of 2.0-3.0). Done at an OV, patient's home care should have orders to check INR tomorrow when they see her    Follow up required to confirm home dose    Left message to take 4mg warfarin tonight, ACN will follow up with patient and/or home care tomorrow.    OV note from today indicates that patient may be starting a Boost/Ensure supplement    ACN to follow up    Janny Cox RN

## 2021-05-30 NOTE — TELEPHONE ENCOUNTER
ANTICOAGULATION  MANAGEMENT- Home Care/Care Facility Result    Assessment     Today's INR result of 4.9 is Supratherapeutic (goal INR of 2.0-3.0)        Warfarin taken as previously instructed by TCU which is a significant amount more that what she was taking prior to being hospitalized    Pt was discharged home on 7/18    No new diet changes affecting INR    No new medication/supplements affecting INR    Continues to tolerate warfarin with no reported s/s of bleeding or thromboembolism     Previous INR was Therapeutic    Plan:     Spoke with julian home care nurse, discussed INR result and instructed:     Warfarin Dosing Instructions: Hold warfarin today and tomorrow then continue current warfarin dose 2 mg daily     Next INR to be drawn: 1 week    Education provided: target INR goal and significance of current INR result, importance of following up for INR monitoring at instructed interval and importance of taking warfarin as instructed    Julian verbalizes understanding and agrees to warfarin dosing plan.   ?   Flower Cee RN    Subjective/Objective:      Gardenia Muller, a 69 y.o. female is established on warfarin.     Home care/care facility RN's report of Gardenia INR, recent warfarin dosing, diet changes, medication changes, and symptoms is documented below.    Additional findings: verbally confirmed home dose with Julian and updated on anticoagulation calendar    Anticoagulation Episode Summary     Current INR goal:   2.0-3.0   TTR:   64.2 % (2.3 y)   Next INR check:   7/31/2019   INR from last check:   4.90! (7/24/2019)   Weekly max warfarin dose:      Target end date:   Indefinite   INR check location:      Preferred lab:      Send INR reminders to:   [facility]    Indications    Cerebrovascular disease [I67.9]           Comments:            Anticoagulation Care Providers     Provider Role Specialty Phone number    Jerson Hernandez MD Referring Family Medicine [phone]

## 2021-05-31 ENCOUNTER — RECORDS - HEALTHEAST (OUTPATIENT)
Dept: ADMINISTRATIVE | Facility: CLINIC | Age: 71
End: 2021-05-31

## 2021-05-31 VITALS — HEIGHT: 62 IN | WEIGHT: 182 LBS | BODY MASS INDEX: 33.49 KG/M2

## 2021-05-31 VITALS — WEIGHT: 187 LBS | HEIGHT: 62 IN | BODY MASS INDEX: 34.41 KG/M2

## 2021-05-31 VITALS — WEIGHT: 186 LBS | BODY MASS INDEX: 34.02 KG/M2

## 2021-05-31 NOTE — TELEPHONE ENCOUNTER
Refill Approved    Rx renewed per Medication Renewal Policy. Medication was last renewed on 1/11/19.3/27/19    Rosangela Herron, [facility] Triage/Med Refill 8/14/2019     Requested Prescriptions   Pending Prescriptions Disp Refills     spironolactone (ALDACTONE) 25 MG tablet [Pharmacy Med Name: SPIRONOLACTONE 25MG TABS] 30 tablet 11     Sig: TAKE 1 TABLET BY MOUTH ONCE DAILY. *1 TOTAL FILL*       Diuretics/Combination Diuretics Refill Protocol  Passed - 8/14/2019 12:04 PM        Passed - Visit with PCP or prescribing provider visit in past 12 months     Last office visit with prescriber/PCP: 7/30/2019 Jerson Hernandez MD OR same dept: 7/30/2019 Jerson Hernandez MD OR same specialty: 7/30/2019 Jerson Hernandez MD  Last physical: 5/29/2018 Last MTM visit: Visit date not found   Next visit within 3 mo: Visit date not found  Next physical within 3 mo: Visit date not found  Prescriber OR PCP: Jerson Hernandez MD  Last diagnosis associated with med order: 1. SOB (shortness of breath)  - spironolactone (ALDACTONE) 25 MG tablet [Pharmacy Med Name: SPIRONOLACTONE 25MG TABS]; TAKE 1 TABLET BY MOUTH ONCE DAILY. *1 TOTAL FILL*  Dispense: 30 tablet; Refill: 11    If protocol passes may refill for 12 months if within 3 months of last provider visit (or a total of 15 months).             Passed - Serum Potassium in past 12 months      Lab Results   Component Value Date    Potassium 5.5 (H) 08/06/2019             Passed - Serum Sodium in past 12 months      Lab Results   Component Value Date    Sodium 138 08/06/2019             Passed - Blood pressure on file in past 12 months     BP Readings from Last 1 Encounters:   07/30/19 118/54             Passed - Serum Creatinine in past 12 months      Creatinine   Date Value Ref Range Status   08/06/2019 0.68 0.60 - 1.10 mg/dL Final             sertraline (ZOLOFT) 100 MG tablet [Pharmacy Med Name: SERTRALINE HCL 100MG TABS] 30 tablet 11     Sig: TAKE 1 TABLET BY MOUTH  ONCE DAILY. *1 TOTAL FILL*       SSRI Refill Protocol  Passed - 8/14/2019 12:04 PM        Passed - PCP or prescribing provider visit in last year     Last office visit with prescriber/PCP: 7/30/2019 Jerson Hernandez MD OR same dept: 7/30/2019 Jerson Hernandez MD OR same specialty: 7/30/2019 Jerson Hernandez MD  Last physical: 5/29/2018 Last MTM visit: Visit date not found   Next visit within 3 mo: Visit date not found  Next physical within 3 mo: Visit date not found  Prescriber OR PCP: Jerson Hernandez MD  Last diagnosis associated with med order: 1. SOB (shortness of breath)  - spironolactone (ALDACTONE) 25 MG tablet [Pharmacy Med Name: SPIRONOLACTONE 25MG TABS]; TAKE 1 TABLET BY MOUTH ONCE DAILY. *1 TOTAL FILL*  Dispense: 30 tablet; Refill: 11    If protocol passes may refill for 12 months if within 3 months of last provider visit (or a total of 15 months).

## 2021-05-31 NOTE — TELEPHONE ENCOUNTER
INR result is 1.4  INR   Date Value Ref Range Status   08/14/2019 1.30 (!) 0.9 - 1.1 Final       Will the patient be seen, or did they already see, MD or CNP today? No    Most Recent Warfarin dose day/week  Sunday Monday Tuesday Wednesday Thursday Friday Saturday      4 mg 4 mg 2 mg 4 mg     Sunday Monday Tuesday Wednesday Thursday Friday Saturday   4 mg 2 mg 4 mg           Has the patient missed any doses of Coumadin, Warfarin, Jantoven in the past 7 days? No    Has the patients medications changed since the last visit? Yes Furosemide 20 mg 1/X a day instead of 2/X a day reduced mg strength start 8/16    Has the patient experienced any bleeding recently? No    Has the patient experienced any injuries or illness recently? No    Has the patient experienced any 'new' shortness of breath, severe headaches, or changes in vision recently? No    Has the patient had any changes in their diet, or alcohol consumption? No    Is the patient here today to prepare for any type of upcoming surgery, procedure, or for a cardioversion procedure? No    What phone number can we reach the patient at today? [phone] julian.

## 2021-05-31 NOTE — TELEPHONE ENCOUNTER
Called and spoke to Antonia letting her know I faxed over a letter for Gardenia to fax number provider below.

## 2021-05-31 NOTE — TELEPHONE ENCOUNTER
Who is calling:  Home Care Nurse Fernando Jenkins      Reason for Call:  Patient was placed on Medication that nurse states will effect patients INR mirtazapine (REMERON) 15 MG tablet  Was recently prescribed -  Does PCP want her INR above 1.4 and then start this med or keep as prescribed?  Please advise , OKTLM     Date of last appointment with primary care:    08/20/19    Okay to leave a detailed message: Yes

## 2021-05-31 NOTE — TELEPHONE ENCOUNTER
ANTICOAGULATION  MANAGEMENT- Home Care/Care Facility Result    Assessment     Today's INR result of 1.4 is Subtherapeutic (goal INR of 2.0-3.0)        Warfarin taken as previously instructed    No new diet changes affecting INR     Per chart review: new prescription : mirtazapine -per nurse the patient did not want to start taking the medication.    Potential interaction between mirtazapine and warfarin which may affect subsequent INRs    Continues to tolerate warfarin with no reported s/s of bleeding or thromboembolism     Previous INR was Subtherapeutic dose increased by 20% on 8/14/19    Plan:     Spoke with Alice with [facility] discussed INR result and instructed:     Warfarin Dosing Instructions: have the patient take 6 mg booster dose today then continue current warfarin dose    2 mg every Mon, Fri; 4 mg all other days      (0 % change)    Next INR to be drawn: one week.    Education provided: importance of therapeutic range, target INR goal and significance of current INR result and potential interaction between warfarin and mirtazapine    Alice verbalizes understanding and agrees to warfarin dosing plan.   ?   Martha Oakley RN    Subjective/Objective:      Gardenia Muller, a 69 y.o. female is established on warfarin.     Home care/care facility RN's report of Gardenia INR, recent warfarin dosing, diet changes, medication changes, and symptoms is documented below.    Additional findings: see above    Anticoagulation Episode Summary     Current INR goal:   2.0-3.0   TTR:   62.4 % (2.4 y)   Next INR check:   8/28/2019   INR from last check:   1.40! (8/21/2019)   Weekly max warfarin dose:      Target end date:   Indefinite   INR check location:      Preferred lab:      Send INR reminders to:   [facility]    Indications    Cerebrovascular disease [I67.9]           Comments:            Anticoagulation Care Providers     Provider Role Specialty Phone number    Jerson Hernandez MD  [facility] Family Medicine [phone]

## 2021-05-31 NOTE — TELEPHONE ENCOUNTER
Patient called back. Information given that her calcium was high on her lab work today. She will wait for a call back from Dr PETERSON Hernandez tomorrow for instructions.   She requested an earlier appt. (scheduled on 8/23). Call transferred to .    Erlinda Hu RN Care Connection Triage Nurse

## 2021-05-31 NOTE — TELEPHONE ENCOUNTER
"Called Rozina at [phone] EX:2014 and left a message to call clinic #2 Rozina's voicemail stated she will be out of office until August 8th. Will try again once she is back in office.  \"Okay to relay message\"    "

## 2021-05-31 NOTE — TELEPHONE ENCOUNTER
ANTICOAGULATION  MANAGEMENT- Home Care/Care Facility Result    Assessment     Today's INR result of 1.3 is Subtherapeutic (goal INR of 2.0-3.0)        Warfarin taken as previously instructed    No new diet changes affecting INR    No new medication/supplements affecting INR    Continues to tolerate warfarin with no reported s/s of bleeding or thromboembolism     Previous INR was Subtherapeutic    Plan:     Spoke with Alice (home care nurse) discussed INR result and instructed:     Warfarin Dosing Instructions: Change warfarin dose to 2 mg daily on Mon/Fri; and 4 mg daily rest of week  (20 % change)    Next INR to be drawn: one week    Education provided: importance of therapeutic range and target INR goal and significance of current INR result    Alice verbalizes understanding and agrees to warfarin dosing plan.   ?   Bia Crane RN    Subjective/Objective:      Gardenia Muller, a 69 y.o. female is established on warfarin.     Home care/care facility RN's report of Garden[place] INR, recent warfarin dosing, diet changes, medication changes, and symptoms is documented below.    Additional findings: No biopsy on 8/16. It will be a regular office visit per home care nurse    Anticoagulation Episode Summary     Current INR goal:   2.0-3.0   TTR:   62.9 % (2.4 y)   Next INR check:   8/21/2019   INR from last check:   1.30! (8/14/2019)   Weekly max warfarin dose:      Target end date:   Indefinite   INR check location:      Preferred lab:      Send INR reminders to:   [facility]    Indications    Cerebrovascular disease [I67.9]           Comments:            Anticoagulation Care Providers     Provider Role Specialty Phone number    Jerson Hernandez MD Referring Family Medicine [phone]

## 2021-05-31 NOTE — TELEPHONE ENCOUNTER
I do not really understand how this question is being asked.  My drug program does not list a major interaction between these 2 medications.  I think it is okay to use them together, but we should make sure she gets an INR about 1 week after she starts the mirtazapine.  I am not sure what the 1.4  Means-but I am not sure I need to understand that

## 2021-05-31 NOTE — TELEPHONE ENCOUNTER
Who is calling:  Azra  Reason for Call:  Returning a call to Meño or Jp. States she left a voice message stating that Azra had to fill out a assisted living form?  Azra is not sure what Meño mean't .  Date of last appointment with primary care: 07/30/2019  Okay to leave a detailed message: Yes. Please call her back with more information regarding assisted living form.

## 2021-05-31 NOTE — TELEPHONE ENCOUNTER
Medication Question or Clarification  Who is calling: Other: Alice from [facility]  What medication are you calling about? (include dose and sig)    Disp Refills Start End   metoprolol tartrate (LOPRESSOR) 25 MG tablet 60 tablet 11 8/2/2019    Sig: TAKE 1 TABLET BY MOUTH TWICE DAILY. *1 TOTAL FILL*     Who prescribed the medication?: Estrella Agarwal MD  What is your question/concern?: Alice is asking if patient can remain on 12.5 mg of Metoprolol instead of increasing to 25 mg. Last week it was increased but after patient's medications were set-up for 2 weeks with 12.5 mg. Patient's current blood pressures on 12.5 mg  106-119 systolic and 56-67 diastolic in am and 105-132 systolic and 54-71 diastolic in the evening. If ok to keep at 12.5 mg, please send a new prescription to the pharmacy.  Pharmacy: [facility]  Okay to leave a detailed message?: Yes [phone]  Site CMT - Please call the pharmacy to obtain any additional needed information.    Order Providers   Prescribing Provider Encounter Provider   Estrella Agarwal MD Kleven, Thomas Lowell, MD   Outpatient Medication Detail    Disp Refills Start End    metoprolol tartrate (LOPRESSOR) 25 MG tablet 60 tablet 11 8/2/2019     Sig: TAKE 1 TABLET BY MOUTH TWICE DAILY. *1 TOTAL FILL*    Sent to pharmacy as: metoprolol tartrate (LOPRESSOR) 25 MG tablet    Notes to Pharmacy: URGENT! OUT OF REFILLS    E-Prescribing Status: Receipt confirmed by pharmacy (8/2/2019  2:47 PM CDT)    Associated Diagnoses   Coronary artery disease involving native coronary artery of native heart without angina pectoris  - Primary       Pharmacy   [facility] - [place], [place] - [address]

## 2021-05-31 NOTE — TELEPHONE ENCOUNTER
INR result is 1.3  INR   Date Value Ref Range Status   07/30/2019 1.30 (H) 0.90 - 1.10 Final       Will the patient be seen, or did they already see, MD or CNP today? No    Most Recent Warfarin dose day/week  Sunday Monday Tuesday Wednesday Thursday Friday Saturday      hold hold 2 2     Sunday Monday Tuesday Wednesday Thursday Friday Saturday   2 2 2           Has the patient missed any doses of Coumadin, Warfarin, Jantoven in the past 7 days? No    Has the patients medications changed since the last visit? No    Has the patient experienced any bleeding recently? No    Has the patient experienced any injuries or illness recently? No    Has the patient experienced any 'new' shortness of breath, severe headaches, or changes in vision recently? No    Has the patient had any changes in their diet, or alcohol consumption? No    Is the patient here today to prepare for any type of upcoming surgery, procedure, or for a cardioversion procedure? No    What phone number can we reach the patient at today? Alice [phone]

## 2021-05-31 NOTE — TELEPHONE ENCOUNTER
Looked in the lab and there was no urine done. Patient stated she has been having burning when urinating for 1 week. She left a sample yesterday. She express that she doesn't want to keep on coming back in clinic. She has an appointment 8/16/2019 with Dr. Hernandez.     Please advise.

## 2021-05-31 NOTE — TELEPHONE ENCOUNTER
Called and spoke to the pt. Pt understood. I let the pt know that no urine test was done. Pt states she will wait until Friday because she does not want to come back into the clinic.

## 2021-05-31 NOTE — TELEPHONE ENCOUNTER
Test Results  Who is calling?:  Patient   Who ordered the test:  Unknown/writer is unable to see an order  Type of test: Other: ua/uc  Date of test:  8/6/19  Where was the test performed:  [facility]  What are your questions/concerns?:  Patient requesting results, please advise, patient reports she informed the lab yesterday she left a urine sample  Okay to leave a detailed message?:  Yes

## 2021-05-31 NOTE — TELEPHONE ENCOUNTER
I tried to call Gardenia to see if she could come in but got no answer.    I think this should be done on her before Wednesday, when she gets her next INR.    If they could really do it that would be great.  If not, I guess we need to arrange to have her come in.  Either way, I put in future orders for what I want.

## 2021-05-31 NOTE — TELEPHONE ENCOUNTER
Called and spoke to Azra. She states the only form she would need filled out is a update on her address which was completed by here and sent to her . No further action needed at this time. Azra will touch base with Gardenia

## 2021-05-31 NOTE — TELEPHONE ENCOUNTER
INR result is 1.3  INR   Date Value Ref Range Status   07/31/2019 1.30 (!) 0.9 - 1.1 Final       Will the patient be seen, or did they already see, MD or CNP today? No    Most Recent Warfarin dose day/week  Sunday Monday Tuesday Wednesday Thursday Friday Saturday    4 2 4 2 2 4     Sunday Monday Tuesday Wednesday Thursday Friday Saturday   2             Has the patient missed any doses of Coumadin, Warfarin, Jantoven in the past 7 days? No    Has the patients medications changed since the last visit? No    Has the patient experienced any bleeding recently? No    Has the patient experienced any injuries or illness recently? No    Has the patient experienced any 'new' shortness of breath, severe headaches, or changes in vision recently? No    Has the patient had any changes in their diet, or alcohol consumption? No    Is the patient here today to prepare for any type of upcoming surgery, procedure, or for a cardioversion procedure? No, but does have a neck biopsy on 8/16/19    What phone number can we reach the patient at today? home phone listed in demographics.

## 2021-05-31 NOTE — TELEPHONE ENCOUNTER
Please let her know that I am trying to talk with a specialist about what to do with her elevated calcium before I make a plan.  To her about this today

## 2021-05-31 NOTE — TELEPHONE ENCOUNTER
Reviewed test results      This patient needs strategy for management of her hyperparathyroidism/hypercalcemia    Needs to see endocrinology.  I spoke with the [facility] on-call endocrinology fellow who felt that she probably does not need urgent hospitalization and management of her chronic hypercalcemia, that she should be seen and in a reasonable time.  He thought they could get her in within 10 to 14 days and help to get her a more urgent appointment.    I spoke with the patient who is in agreement with this.  I spoke with her  to make sure this did not follow through the cracks.    Discussed the question as to whether this could be leading to her abdominal pain and failure to thrive.  Endocrine fellow did not have a strong opinion on this.

## 2021-05-31 NOTE — TELEPHONE ENCOUNTER
ANTICOAGULATION  MANAGEMENT- Home Care/Care Facility Result    Assessment     Today's INR result of 1.3 is Subtherapeutic (goal INR of 2.0-3.0)        Warfarin taken as previously instructed    No new diet changes affecting INR    No new medication/supplements affecting INR    Continues to tolerate warfarin with no reported s/s of bleeding or thromboembolism     Previous INR was Subtherapeutic    Plan:     Spoke with nurse Alice discussed INR result and instructed:     Warfarin Dosing Instructions: Change warfarin dose to 4 mg daily on mon/wed/sat; and 2 mg daily rest of week  (11 % change)    Next INR to be drawn: one week    Alice verbalizes understanding and agrees to warfarin dosing plan.   ?   Joe Vasquez RN    Subjective/Objective:      Gardenia Muller, a 69 y.o. female is established on warfarin.     Home care/care facility RN's report of Gardenia INR, recent warfarin dosing, diet changes, medication changes, and symptoms is documented below.    Additional findings: none    Anticoagulation Episode Summary     Current INR goal:   2.0-3.0   TTR:   63.9 % (2.4 y)   Next INR check:   8/14/2019   INR from last check:      Weekly max warfarin dose:      Target end date:   Indefinite   INR check location:      Preferred lab:      Send INR reminders to:   [facility]    Indications    Cerebrovascular disease [I67.9]           Comments:            Anticoagulation Care Providers     Provider Role Specialty Phone number    Jerson Hernandez MD Referring Family Medicine [phone]

## 2021-05-31 NOTE — TELEPHONE ENCOUNTER
Who is calling:  Antonia Isaacs - patient's   Reason for Call:  Caller stated she was contacted by the patient yesterday and was told that Jerson Hernandez MD will be faxing over a copy of a prescription.  Caller stated she has not yet received anything and stated the patient was not very clear on what exactly would be sent.    Caller also stated she received a voicemail from the clinic stating they will be faxing a form to her but the callers name was not left and there was no contact information was left other than the main clinic number.    Caller stated she can be reached by phone at [phone]  Extension: [phone]    Fax: [phone]  Date of last appointment with primary care: 7/30/2019  Okay to leave a detailed message: Yes

## 2021-05-31 NOTE — TELEPHONE ENCOUNTER
Called and spoke to pt regarding results. Pt understood and has no questions at this time.    ----- Message from Jerson Hernandez MD sent at 8/21/2019 10:38 AM CDT -----  Please let Gardenia know that I have called in a 7-day prescription for a bladder infection.

## 2021-05-31 NOTE — PROGRESS NOTES
"Assessment/ Plan    Problem List Items Addressed This Visit        High    (HFpEF) heart failure with preserved ejection fraction (H)     Seems somewhat dehydrated, cut back on furosemide to 20 mg daily, regardless of weight.         Relevant Medications    furosemide (LASIX) 20 MG tablet    atorvastatin (LIPITOR) 80 MG tablet    Hyperparathyroidism (H)       Medium    PAD (peripheral artery disease) (H)     Reduce dose of atorvastatin from 80-40 based on patient's recent weight loss and attempt to reduce medications overall with         Relevant Medications    atorvastatin (LIPITOR) 80 MG tablet    Depression     Self-reported depression worsening related to underlying physical worsening of condition, weight loss, frailty.    Poor sleep at night, poor appetite.  Add mirtazapine to sertraline.  Follow-up 1 month         Relevant Medications    mirtazapine (REMERON) 15 MG tablet       Unprioritized    Weight loss, non-intentional (Chronic)     Etiology uncertain  -Has had reasonable work-up for metastatic cancer with imaging of chest abdomen and head CT  Fairly extensive testing of serology  -This could be related to \"cardiac cachexia-likely CHF  -Possible hypercalcemia though low level increase in calcium would make this less likely.  Of asked her to see the [facility] for assessment of this possibility.  -Depression clearly a problem but I do not think the primary  of her weight loss-mirtazapine added today  Patient is taking a Glucerna shake on a daily basis.         Onychogryphosis     Refer to podiatry for toenail         Relevant Orders    Ambulatory referral to Podiatry    Dysuria - Primary     Last 2 or 3 days, patient concerned about UTI.  Will check UA         Relevant Orders    Urinalysis-UC if Indicated (Completed)    Culture, Urine (Completed)          Subjective  CC:  Chief Complaint   Patient presents with     Paperwork     HPI:      Gardenia is here for follow-up.  Continuing to do " poorly.  Poor appetite  Asking to cut back on diuretic from 40-20 since she feels dried out.  Recently was seen at the [facility] endocrinology clinic for hypercalcemia and hyperparathyroidism.  I reviewed their note today.    She is working on moving into an assisted living facility.  She states she has fallen a couple of times.  Feels very weak.    Denies much in the way of pain.      We reviewed her medications today one by one, cut back on her atorvastatin and Lasix, we were unable to discontinue any other medications but cleaned up her medication list    She states that she has an appointment to have a home health nurse draw her INR tomorrow.  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)     Cystitis     Essential hypertension     Cerebral infarction (H)     Anemia     Cerebrovascular disease     RLS (restless legs syndrome)     Incisional hernia, without obstruction or gangrene     Diverticular disease of large intestine     Coronary artery disease involving native coronary artery of native heart without angina pectoris     Dyslipidemia     Ventral hernia without obstruction or gangrene     Anxiety     (HFpEF) heart failure with preserved ejection fraction (H)     Anticoagulant long-term use     Persistent atrial fibrillation (H)     Transaminitis     Physical deconditioning     Lipoma of back     Acalculous cholecystitis     Onychogryphosis     Hyperparathyroidism (H)     Microalbuminuria     Intestinal angina (H)     Chronic abdominal pain     Weight loss, non-intentional     Warfarin anticoagulation     Severe protein-calorie malnutrition (H)     Hypertrophy of nail     Dysuria     Current medications reviewed as follows:  Current Outpatient  Medications on File Prior to Visit   Medication Sig     ARTHRITIS PAIN RELIEF, ACETAM, 650 mg CR tablet Take 650 mg by mouth 2 (two) times a day.            aspirin 81 MG EC tablet Take 81 mg by mouth daily.      blood glucose meter (GLUCOMETER) Please Dispense kit per pharmacy discretion and patient's insurance Test blood sugar.     blood glucose test strips Use 1 each As Directed as needed. Dispense brand per patient's insurance at pharmacy discretion.     carboxymethylcellulose (REFRESH PLUS) 0.5 % Dpet ophthalmic dropperette Administer 2 drops to both eyes every hour as needed (dry eyes).      cholecalciferol, vitamin D3, 2,000 unit Tab TAKE 1 TABLET BY MOUTH ONCE DAILY. *1 TOTAL FILL*     food supplemt, lactose-reduced 0.06 gram- 1.5 kcal/mL Liqd Take 1 Can by mouth 2 (two) times a day.     lancets 33 gauge Misc Test twice daily Dispense brand per patient's insurance at pharmacy discretion.     loratadine (CLARITIN) 10 mg tablet Take 10 mg by mouth daily with lunch. Noon     metoprolol tartrate (LOPRESSOR) 25 MG tablet TAKE 1/2 TABLET BY MOUTH TWICE DAILY.     multivitamin (TAB-A-JULIET) per tablet Take 1 tablet by mouth daily. (Patient taking differently: Take 1 tablet by mouth daily. )     nitroglycerin (NITROSTAT) 0.4 MG SL tablet Place 1 tablet (0.4 mg total) under the tongue every 5 (five) minutes as needed for chest pain (for up to 3 doses and call 911 if persists). (Patient taking differently: Place 0.4 mg under the tongue every 5 (five) minutes as needed for chest pain (for up to 3 doses and call 911 if persists). )     nut.tx.gluc.intol,lac-free,soy (GLUCERNA SHAKE) Liqd Vanilla 180 kcal 1 bottle two times a day     polyethylene glycol (GLYCOLAX) 17 gram/dose powder Take 17 g by mouth daily as needed.      potassium chloride (K-DUR,KLOR-CON) 20 MEQ tablet Take 1 tablet (20 mEq total) by mouth 2 (two) times a day.     senna-docusate (PERICOLACE) 8.6-50 mg tablet Take 1 tablet by mouth 2 (two) times a day.      sertraline (ZOLOFT) 100 MG tablet TAKE 1 TABLET BY MOUTH ONCE DAILY. *1 TOTAL FILL*     spironolactone (ALDACTONE) 25 MG tablet TAKE 1 TABLET BY MOUTH ONCE DAILY. *1 TOTAL FILL*     traZODone (DESYREL) 50 MG tablet Take 1 tablet (50 mg total) by mouth at bedtime.     warfarin (COUMADIN/JANTOVEN) 2 MG tablet Take 1-2 tablets (2-4mg) daily by mouth as instructed     [DISCONTINUED] atorvastatin (LIPITOR) 80 MG tablet Take 80 mg by mouth at bedtime.     [DISCONTINUED] furosemide (LASIX) 20 MG tablet Take 20 mg by mouth daily.     [DISCONTINUED] guaiFENesin (HUMIBID 3) 400 mg Tab Take 400 mg by mouth every 4 (four) hours as needed (chest congestion, every 4 hours while awake PRN).     [DISCONTINUED] magnesium gluconate (MAGONATE) 27.5 mg magne- sium (500 mg) tablet Take 500 mg by mouth 3 (three) times a day.            [DISCONTINUED] omeprazole (PRILOSEC) 20 MG capsule TAKE 1 CAPSULE BY MOUTH TWICE DAILY (8AM & 8PM)     [DISCONTINUED] oxyCODONE (ROXICODONE) 10 mg immediate release tablet Take 1 tablet (10 mg total) by mouth every 6 (six) hours as needed.     [DISCONTINUED] sodium chloride (OCEAN) 0.65 % nasal spray Apply 1 spray into each nostril as needed for congestion.     [DISCONTINUED] VITAMIN D3 2,000 unit capsule TAKE 1 CAPSULE BY MOUTH ONCE DAILY AT 8AM (DO NOT BRAND CHANGE - PATIENT ONLY WANT CAPSULES)     No current facility-administered medications on file prior to visit.      Social History     Tobacco Use   Smoking Status Former Smoker     Packs/day: 0.25   Smokeless Tobacco Former User   Tobacco Comment    e-cig a few times/day     Social History     Social History Narrative    Single/, lives alone; daughter in [place];    Gets help from neighbors and extended family    Former smoker.no alcohol problems     Patient Care Team:  Jerson Hernandez MD as PCP - General (Family Medicine)  Jones Harding DO as Physician (General Surgery)  [facility]  Darts  Coni  ROS  Full 10  system review including constitutional, respiratory, cardiac, gi, urinary, rheumatologic, neurologic, reproductive, dermatologic psychiatric is  performed  and is otherwise negative         Objective  Physical Exam  Vitals:    08/20/19 1112   BP: 115/60   Patient Site: Left Arm   Patient Position: Sitting   Cuff Size: Adult Large   Pulse: 84   Resp: 16   Weight: 130 lb (59 kg)     Body mass index is 23.03 kg/m .  Gen- alert, oriented/ appropriately responsive, flat affect  HEENT- normal cephalic, atraumatic.   Chest- Normal inspiration and expiration.    Clear to ascultation.    No chest wall deformity or scar.  CV- Heart regular rate and rhythm  normal tones, no murmurs   No gallops or rubs.  Ext- appear well perfused, no edema  Skin- warm and dry,   no visualized rash  Wt Readings from Last 3 Encounters:   08/20/19 130 lb (59 kg)   07/30/19 132 lb (59.9 kg)   07/17/19 140 lb 9.6 oz (63.8 kg)       Diagnostics:   None      Please note: Voice recognition software was used in this dictation.  It may therefore contain typographical errors.

## 2021-05-31 NOTE — TELEPHONE ENCOUNTER
ANTICOAGULATION  MANAGEMENT- Home Care/Care Facility Result    Assessment     Today's INR result of 1.4 is Subtherapeutic (goal INR of 2.0-3.0)        Warfarin taken as previously instructed    No new diet changes affecting INR    No new medication/supplements affecting INR    Continues to tolerate warfarin with no reported s/s of bleeding or thromboembolism     Previous INR was Subtherapeutic     [place] [facility] [phone]    Plan:     Spoke with nurse Alice discussed INR result and instructed:     Warfarin Dosing Instructions: 6 mg tonight to boost then change warfarin dose to 4 mg daily   (16 % change)    Next INR to be drawn: one week with home care      Alice verbalizes understanding and agrees to warfarin dosing plan.   ?   Joe Vasquez RN    Subjective/Objective:      Gardenia Muller, a 69 y.o. female is established on warfarin.     Home care/care facility RN's report of [place] INR, recent warfarin dosing, diet changes, medication changes, and symptoms is documented below.    Additional findings: none    Anticoagulation Episode Summary     Current INR goal:   2.0-3.0   TTR:   61.9 % (2.4 y)   Next INR check:   8/28/2019   INR from last check:   1.40! (8/28/2019)   Weekly max warfarin dose:      Target end date:   Indefinite   INR check location:      Preferred lab:      Send INR reminders to:   [facility]    Indications    Cerebrovascular disease [I67.9]           Comments:            Anticoagulation Care Providers     Provider Role Specialty Phone number    Jerson Hernandez MD Referring Family Medicine [phone]

## 2021-05-31 NOTE — TELEPHONE ENCOUNTER
Verbal orders received from PCP: patient to have an ionized calcium done with her next INR on 8/7 (or sooner).     Associated Diagnoses     Hyperparathyroidism (H) [E21.3]  - Primary       Hypercalcemia [E83.52]         Called and spoke to [facility] Care RN Alice ([phone]). She states that to her knowledge home care does not provide phlebotomy services, only fingerstick INR.    Called and left a message for Saima at the main office of [facility] ([phone]). Requested a call back to PCP to verify if they are able to do this lab.

## 2021-05-31 NOTE — TELEPHONE ENCOUNTER
RN Triage:     calling with critical value.  Calcium:  12.1  Triage nurse called patient.  Unable to reach at 2 phone numbers.  On call physician paged.  Dr. Sanchez returned page.  No action needs to be taken at this time per physician.    Bia Jackson, RN [facility]

## 2021-05-31 NOTE — TELEPHONE ENCOUNTER
ANTICOAGULATION  MANAGEMENT- Home Care/Care Facility Result    Assessment     Today's INR result of 1.3 is Subtherapeutic (goal INR of 2.0-3.0)        Warfarin recently held as instructed which may be affecting INR     Patient back home from [facility] last week, was discharged on a higher dose than her previous maintenance which may have led to the high INR last week    No new diet changes affecting INR    No new medication/supplements affecting INR    Continues to tolerate warfarin with no reported s/s of bleeding or thromboembolism     Previous INR was Subtherapeutic yesterday at [facility].    Plan:     Spoke with IDALIA Jenkins discussed INR result and instructed:     Warfarin Dosing Instructions: Boost dose of warfarin today Wed 7/31 of 4mg then change warfarin dose to 4 mg daily on Sat; and 2 mg daily rest of week. This is closer to patient's maintenance dose prior to her hospitalization    Next INR to be drawn: 1 week, 8/7    Education provided: target INR goal and significance of current INR result    Alice verbalizes understanding and agrees to warfarin dosing plan.     New orders phoned to [facility] pharmacist  ?   Janny Cox RN    Subjective/Objective:      Gardenia Muller, a 69 y.o. female is established on warfarin.     Home care/care facility RN's report of [place] INR, recent warfarin dosing, diet changes, medication changes, and symptoms is documented below.    Additional findings: none    Anticoagulation Episode Summary     Current INR goal:   2.0-3.0   TTR:   63.9 % (2.4 y)   Next INR check:   8/7/2019   INR from last check:   1.30! (7/31/2019)   Weekly max warfarin dose:      Target end date:   Indefinite   INR check location:      Preferred lab:      Send INR reminders to:   [facility]    Indications    Cerebrovascular disease [I67.9]           Comments:            Anticoagulation Care Providers     Provider Role Specialty Phone number    Jerson Hernandez MD Referring Family Medicine  [phone]

## 2021-05-31 NOTE — TELEPHONE ENCOUNTER
Duplicate message. Called and spoke to Giovanna. Giovanna states  Gardenia is looking for a form to update her address. Giovanna has already addressed this with Gardenia and sent the update to the proper facilities. No further action is needed.

## 2021-05-31 NOTE — TELEPHONE ENCOUNTER
Name of form/paperwork: Other:  Special diet order for patient's facility.  Have you been seen for this request: N/A  Do we have the form: needs to be drafted  When is form needed by: ASAP  How would you like the form returned: Fax  Fax Number: Attn: Nicky Patient only has ph [phone].  Patient Notified form requests are processed in 3-5 business days: No  (If patient needs form sooner, please note that in this message.)  Okay to leave a detailed message? No

## 2021-05-31 NOTE — TELEPHONE ENCOUNTER
RN cannot approve Refill Request    RN can NOT refill this medication Protocol failed and NO refill given and historical medication requested.     Rosangela Herron, [facility] Triage/Med Refill 7/31/2019    Requested Prescriptions   Pending Prescriptions Disp Refills     metoprolol tartrate (LOPRESSOR) 25 MG tablet [Pharmacy Med Name: METOPROLOL TARTRATE 25MG TABS] 60 tablet 11     Sig: TAKE 1 TABLET BY MOUTH TWICE DAILY. *1 TOTAL FILL*       Beta-Blockers Refill Protocol Passed - 7/31/2019 12:07 PM        Passed - PCP or prescribing provider visit in past 12 months or next 3 months     Last office visit with prescriber/PCP: 7/30/2019 Jerson Hernandez MD OR same dept: 7/30/2019 Jerson Hernandez MD OR same specialty: 7/30/2019 Jerson Hernandez MD  Last physical: 5/29/2018 Last MTM visit: Visit date not found   Next visit within 3 mo: Visit date not found  Next physical within 3 mo: Visit date not found  Prescriber OR PCP: Jerson Hernandez MD  Last diagnosis associated with med order: There are no diagnoses linked to this encounter.  If protocol passes may refill for 12 months if within 3 months of last provider visit (or a total of 15 months).             Passed - Blood pressure filed in past 12 months     BP Readings from Last 1 Encounters:   07/30/19 118/54             cholecalciferol, vitamin D3, 2,000 unit Tab [Pharmacy Med Name: VITAMIN D3 2000IU TABS] 30 each 11     Sig: TAKE 1 TABLET BY MOUTH ONCE DAILY. *1 TOTAL FILL*       There is no refill protocol information for this order

## 2021-05-31 NOTE — TELEPHONE ENCOUNTER
INR result is 1.2  INR   Date Value Ref Range Status   07/31/2019 1.30 (!) 0.9 - 1.1 Final       Will the patient be seen, or did they already see, MD or CNP today? No    Most Recent Warfarin dose day/week  Sunday Monday Tuesday Wednesday Thursday Friday Saturday      4 2 2 4     Sunday Monday Tuesday Wednesday Thursday Friday Saturday   2 2 2           Has the patient missed any doses of Coumadin, Warfarin, Jantoven in the past 7 days? No    Has the patients medications changed since the last visit? No    Has the patient experienced any bleeding recently? No    Has the patient experienced any injuries or illness recently? No    Has the patient experienced any 'new' shortness of breath, severe headaches, or changes in vision recently? No    Has the patient had any changes in their diet, or alcohol consumption? No    Is the patient here today to prepare for any type of upcoming surgery, procedure, or for a cardioversion procedure? No    What phone number can we reach the patient at today? Alice [phone]

## 2021-05-31 NOTE — TELEPHONE ENCOUNTER
INR result is 1.4  INR   Date Value Ref Range Status   08/21/2019 1.40 (!) 0.9 - 1.1 Final       Will the patient be seen, or did they already see, MD or CNP today? No    Most Recent Warfarin dose day/week  Sunday Monday Tuesday Wednesday Thursday Friday Saturday      6 4 2 4     Sunday Monday Tuesday Wednesday Thursday Friday Saturday   4 2 4           Has the patient missed any doses of Coumadin, Warfarin, Jantoven in the past 7 days? No    Has the patients medications changed since the last visit? Yes started an antibiotic for UTI    Has the patient experienced any bleeding recently? No    Has the patient experienced any injuries or illness recently? No    Has the patient experienced any 'new' shortness of breath, severe headaches, or changes in vision recently? No    Has the patient had any changes in their diet, or alcohol consumption? No    Is the patient here today to prepare for any type of upcoming surgery, procedure, or for a cardioversion procedure? No    What phone number can we reach the patient at today? Please call Adele back with dosing.

## 2021-05-31 NOTE — TELEPHONE ENCOUNTER
What is the special diet being requested?  Since I don't know this patient, I'm unclear what the issue is.

## 2021-06-01 ENCOUNTER — RECORDS - HEALTHEAST (OUTPATIENT)
Dept: ADMINISTRATIVE | Facility: CLINIC | Age: 71
End: 2021-06-01

## 2021-06-01 VITALS — HEIGHT: 62 IN | BODY MASS INDEX: 34.6 KG/M2 | WEIGHT: 188 LBS

## 2021-06-01 VITALS — WEIGHT: 189 LBS | BODY MASS INDEX: 34.78 KG/M2 | HEIGHT: 62 IN

## 2021-06-01 VITALS — BODY MASS INDEX: 38.09 KG/M2 | WEIGHT: 207 LBS | HEIGHT: 62 IN

## 2021-06-01 VITALS — WEIGHT: 185.5 LBS | BODY MASS INDEX: 33.93 KG/M2

## 2021-06-01 VITALS — WEIGHT: 172.25 LBS | BODY MASS INDEX: 31.5 KG/M2

## 2021-06-01 VITALS — BODY MASS INDEX: 34.24 KG/M2 | HEIGHT: 62 IN | WEIGHT: 186.1 LBS

## 2021-06-01 VITALS
BODY MASS INDEX: 38.2 KG/M2 | HEIGHT: 62 IN | BODY MASS INDEX: 38.2 KG/M2 | HEIGHT: 62 IN | WEIGHT: 207.6 LBS | WEIGHT: 207.6 LBS

## 2021-06-01 VITALS — WEIGHT: 184.7 LBS | HEIGHT: 62 IN | BODY MASS INDEX: 33.99 KG/M2

## 2021-06-01 VITALS — BODY MASS INDEX: 34.78 KG/M2 | WEIGHT: 189 LBS | HEIGHT: 62 IN

## 2021-06-01 VITALS — HEIGHT: 62 IN | BODY MASS INDEX: 33.68 KG/M2 | WEIGHT: 183 LBS

## 2021-06-01 VITALS — WEIGHT: 185 LBS | BODY MASS INDEX: 33.84 KG/M2

## 2021-06-01 VITALS — WEIGHT: 174.3 LBS | HEIGHT: 62 IN | BODY MASS INDEX: 32.07 KG/M2

## 2021-06-01 VITALS — WEIGHT: 188.75 LBS | BODY MASS INDEX: 34.52 KG/M2

## 2021-06-01 VITALS — HEIGHT: 62 IN | WEIGHT: 185 LBS | BODY MASS INDEX: 34.04 KG/M2

## 2021-06-01 VITALS — BODY MASS INDEX: 35.89 KG/M2 | WEIGHT: 196.2 LBS

## 2021-06-01 VITALS — BODY MASS INDEX: 33.75 KG/M2 | WEIGHT: 186 LBS

## 2021-06-01 VITALS — WEIGHT: 189.75 LBS | BODY MASS INDEX: 34.71 KG/M2

## 2021-06-01 NOTE — TELEPHONE ENCOUNTER
ANTICOAGULATION  MANAGEMENT- Home Care/Care Facility Result    Assessment     Today's INR result of 1.9 is Subtherapeutic (goal INR of 2.0-3.0)        Warfarin taken as previously instructed    No new diet changes affecting INR    No new medication/supplements affecting INR    Continues to tolerate warfarin with no reported s/s of bleeding or thromboembolism     Previous INR was Therapeutic    Plan:     Spoke with Alice, home care nurse discussed INR result and instructed:     Warfarin Dosing Instructions: Change warfarin dose to 6 mg daily on Wed; and 4 mg daily rest of week  (7.1 % change)    Next INR to be drawn: 1 week    Education provided: target INR goal and significance of current INR result    Alice verbalizes understanding and agrees to warfarin dosing plan.   ?   Flower Cee RN    Subjective/Objective:      Gardenia Muller, a 69 y.o. female is established on warfarin.     Home care/care facility RN's report of [place] INR, recent warfarin dosing, diet changes, medication changes, and symptoms is documented below.    Additional findings: none    Anticoagulation Episode Summary     Current INR goal:   2.0-3.0   TTR:   41.8 %   Next INR check:   9/18/2019   INR from last check:   1.90! (9/11/2019)   Weekly max warfarin dose:      Target end date:   Indefinite   INR check location:      Preferred lab:      Send INR reminders to:   [facility]    Indications    Cerebrovascular disease [I67.9]           Comments:            Anticoagulation Care Providers     Provider Role Specialty Phone number    Jerson Hernandez MD Referring Family Medicine [phone]

## 2021-06-01 NOTE — TELEPHONE ENCOUNTER
ANTICOAGULATION  MANAGEMENT- Home Care/Care Facility Result    Assessment     Today's INR result of 2.6 is Therapeutic (goal INR of 2.0-3.0)        Warfarin taken as previously instructed    No new diet changes affecting INR    No new medication/supplements affecting INR    Continues to tolerate warfarin with no reported s/s of bleeding or thromboembolism     Previous INR was Subtherapeutic    Plan:     Spoke with Alice nurse discussed INR result and instructed: Order faxed to [facility].     Warfarin Dosing Instructions: Continue current warfarin dose 4 mg daily. (0 % change)    Next INR to be drawn: 1-2 weeks.     Education provided: importance of therapeutic range, importance of following up for INR monitoring at instructed interval and importance of taking warfarin as instructed    Alice verbalizes understanding and agrees to warfarin dosing plan.   ?   Danna Chew RN    Subjective/Objective:      G[place]ia Muller, a 69 y.o. female is established on warfarin.     Home care/care facility RN's report of [place] INR, recent warfarin dosing, diet changes, medication changes, and symptoms is documented below.    Additional findings: verbally confirmed home dose with Alice and updated on anticoagulation calendar    Anticoagulation Episode Summary     Current INR goal:   2.0-3.0   TTR:   42.1 %   Next INR check:   9/18/2019   INR from last check:   2.60 (9/4/2019)   Weekly max warfarin dose:      Target end date:   Indefinite   INR check location:      Preferred lab:      Send INR reminders to:   [facility]    Indications    Cerebrovascular disease [I67.9]           Comments:            Anticoagulation Care Providers     Provider Role Specialty Phone number    Jerson Hernandez MD Referring Family Medicine [phone]

## 2021-06-01 NOTE — TELEPHONE ENCOUNTER
ANTICOAGULATION  MANAGEMENT- Home Care/Care Facility Result    Assessment     Today's INR result of 2.4 is Therapeutic (goal INR of 2.0-3.0)        Warfarin taken as previously instructed    No new diet changes affecting INR    No new medication/supplements affecting INR    Continues to tolerate warfarin with no reported s/s of bleeding or thromboembolism     Previous INR was Subtherapeutic    Plan:     Spoke with Alice discussed INR result and instructed:     Warfarin Dosing Instructions: Continue current warfarin dose 6 mg daily on Wed; and 4 mg daily rest of week  (0 % change)    Next INR to be drawn: one week (next week will be home care's last day. Patient moving into assisted living on 9/30)    Education provided: importance of therapeutic range    Alice verbalizes understanding and agrees to warfarin dosing plan.   ?   Bia Crane RN    Subjective/Objective:      G[place]ia Muller, a 69 y.o. female is established on warfarin.     Home care/care facility RN's report of [place] INR, recent warfarin dosing, diet changes, medication changes, and symptoms is documented below.    Additional findings: none    Anticoagulation Episode Summary     Current INR goal:   2.0-3.0   TTR:   41.4 %   Next INR check:   9/25/2019   INR from last check:   2.40 (9/18/2019)   Weekly max warfarin dose:      Target end date:   Indefinite   INR check location:      Preferred lab:      Send INR reminders to:   [facility]    Indications    Cerebrovascular disease [I67.9]           Comments:            Anticoagulation Care Providers     Provider Role Specialty Phone number    Jerson Hernandez MD Referring Family Medicine [phone]

## 2021-06-01 NOTE — TELEPHONE ENCOUNTER
INR result is 2.4  INR   Date Value Ref Range Status   09/11/2019 1.90 (!) 0.9 - 1.1 Final       Will the patient be seen, or did they already see, MD or CNP today? No    Most Recent Warfarin dose day/week  Sunday Monday Tuesday Wednesday Thursday Friday Saturday      6 4 4 4     Sunday Monday Tuesday Wednesday Thursday Friday Saturday   4 4 4           Has the patient missed any doses of Coumadin, Warfarin, Jantoven in the past 7 days? No    Has the patients medications changed since the last visit? No    Has the patient experienced any bleeding recently? No    Has the patient experienced any injuries or illness recently? No    Has the patient experienced any 'new' shortness of breath, severe headaches, or changes in vision recently? No    Has the patient had any changes in their diet, or alcohol consumption? No    Is the patient here today to prepare for any type of upcoming surgery, procedure, or for a cardioversion procedure? No    What phone number can we reach the patient at today? Please contact IDALIA Jenkins with [facility] at [phone]

## 2021-06-01 NOTE — TELEPHONE ENCOUNTER
RN cannot approve Refill Request    RN can NOT refill this medication med is not covered by policy/route to provider       . Last office visit: 8/20/2019 Jerson Hernandez MD Last Physical: 5/29/2018 Last MTM visit: Visit date not found Last visit same specialty: Visit date not found.  Next visit within 3 mo: Visit date not found  Next physical within 3 mo: Visit date not found      Rosangela Herron, [facility] [facility] Triage/Med Refill 9/4/2019    Requested Prescriptions   Pending Prescriptions Disp Refills     aspirin 81 MG EC tablet [Pharmacy Med Name: ASPIRIN LOW TAB 81MG EC] 30 tablet 11     Sig: TAKE 1 TABLET BY MOUTH ONCE DAILY. *1 TOTAL FILL*       There is no refill protocol information for this order        traZODone (DESYREL) 50 MG tablet [Pharmacy Med Name: TRAZODONE 50MG TABS] 30 tablet 11     Sig: TAKE 1 TABLET BY MOUTH AT BEDTIME. *1 TOTAL FILL*       There is no refill protocol information for this order        loratadine (CLARITIN) 10 mg tablet [Pharmacy Med Name: LORATADINE 10MG TAB] 30 tablet 11     Sig: TAKE 1 TABLET BY MOUTH ONCE DAILY. *1 TOTAL FILL*       There is no refill protocol information for this order        omeprazole (PRILOSEC) 20 MG capsule [Pharmacy Med Name: OMEPRAZOLE 20MG DR CAPS] 60 capsule 11     Sig: TAKE 1 CAPSULE BY MOUTH TWICE DAILY. *1 TOTAL FILL*       There is no refill protocol information for this order

## 2021-06-01 NOTE — TELEPHONE ENCOUNTER
INR result is 2.6  INR   Date Value Ref Range Status   08/28/2019 1.40 (!) 0.9 - 1.1 Final       Will the patient be seen, or did they already see, MD or CNP today? No    Most Recent Warfarin dose day/week  Sunday Monday Tuesday Wednesday Thursday Friday Saturday      6 4 4 4     Sunday Monday Tuesday Wednesday Thursday Friday Saturday   4 4 4           Has the patient missed any doses of Coumadin, Warfarin, Jantoven in the past 7 days? No    Has the patients medications changed since the last visit? No    Has the patient experienced any bleeding recently? No    Has the patient experienced any injuries or illness recently? No    Has the patient experienced any 'new' shortness of breath, severe headaches, or changes in vision recently? No    Has the patient had any changes in their diet, or alcohol consumption? No    Is the patient here today to prepare for any type of upcoming surgery, procedure, or for a cardioversion procedure? No    What phone number can we reach the patient at today? Please call Alice back with dosing.

## 2021-06-01 NOTE — TELEPHONE ENCOUNTER
RN cannot approve Refill Request    RN can NOT refill this medication med is not covered by policy/route to provider. Last office visit: 8/20/2019 Jerson Hernandez MD Last Physical: 5/29/2018 Last MTM visit: Visit date not found Last visit same specialty: 8/20/2019 Jerson Hernandez MD.  Next visit within 3 mo: Visit date not found  Next physical within 3 mo: Visit date not found      Cony Cid, [facility] Triage/Med Refill 9/12/2019    Requested Prescriptions   Pending Prescriptions Disp Refills     potassium chloride (K-DUR,KLOR-CON) 20 MEQ tablet [Pharmacy Med Name: POTASSIUM Cl 20MEQ MICRO ER TB] 60 tablet 11     Sig: TAKE 1 TABLET BY MOUTH TWICE DAILY. *1 TOTAL FILL*       Potassium Supplements Refill Protocol Passed - 9/11/2019 12:34 PM        Passed - PCP or prescribing provider visit in past 12 months       Last office visit with prescriber/PCP: 8/20/2019 Jerson Hernandez MD OR same dept: 8/20/2019 Jerson Hernandez MD OR same specialty: 8/20/2019 Jerson Hernandez MD  Last physical: 5/29/2018 Last MTM visit: Visit date not found   Next visit within 3 mo: Visit date not found  Next physical within 3 mo: Visit date not found  Prescriber OR PCP: Jerson Hernandez MD  Last diagnosis associated with med order: 1. Hypokalemia  - potassium chloride (K-DUR,KLOR-CON) 20 MEQ tablet [Pharmacy Med Name: POTASSIUM Cl 20MEQ MICRO ER TB]; TAKE 1 TABLET BY MOUTH TWICE DAILY. *1 TOTAL FILL*  Dispense: 60 tablet; Refill: 11    2. Osteoarthritis  - ARTHRITIS PAIN RELIEF, ACETAM, 650 mg CR tablet [Pharmacy Med Name: ACETAMINOPHEN 650MG XR TAB]; TAKE 1 TABLET BY MOUTH TWICE DAILY (8AM & 8PM) DO NOT EXCEED 3000MG IN 24 HOURS.  *1 TOTAL FILL*  Dispense: 60 tablet; Refill: 11    If protocol passes may refill for 12 months if within 3 months of last provider visit (or a total of 15 months).             Passed - Potassium level in last 12 months     Lab Results   Component Value Date    Potassium  5.5 (H) 08/06/2019             ARTHRITIS PAIN RELIEF, ACETAM, 650 mg CR tablet [Pharmacy Med Name: ACETAMINOPHEN 650MG XR TAB] 60 tablet 11     Sig: TAKE 1 TABLET BY MOUTH TWICE DAILY (8AM & 8PM) DO NOT EXCEED 3000MG IN 24 HOURS.  *1 TOTAL FILL*       There is no refill protocol information for this order

## 2021-06-01 NOTE — TELEPHONE ENCOUNTER
ANTICOAGULATION  MANAGEMENT- Home Care/Care Facility Result    Assessment     Today's INR result of 2.1 is Therapeutic (goal INR of 2.0-3.0)        Warfarin taken as previously instructed    No new diet changes affecting INR    No new medication/supplements affecting INR    Continues to tolerate warfarin with no reported s/s of bleeding or thromboembolism     Previous INR was Therapeutic    Plan:     Spoke with home care nurse Alice discussed INR result and instructed:     Warfarin Dosing Instructions: Continue current warfarin dose 6 mg daily on Wed; and 4 mg daily rest of week  (0 % change)    Next INR to be drawn: 1 week (pt is moving to Assisted Living on 9/30)    Education provided: importance of taking warfarin as instructed    Alice verbalizes understanding and agrees to warfarin dosing plan.   ?   Williams Hawthorne RN    Subjective/Objective:      G[place]ia Muller, a 69 y.o. female is established on warfarin.     Home care/care facility RN's report of [place] INR, recent warfarin dosing, diet changes, medication changes, and symptoms is documented below.    Additional findings: verbally confirmed home dose with Alice and updated on anticoagulation calendar    Anticoagulation Episode Summary     Current INR goal:   2.0-3.0   TTR:   41.4 %   Next INR check:   10/2/2019   INR from last check:   2.10 (9/25/2019)   Weekly max warfarin dose:      Target end date:   Indefinite   INR check location:      Preferred lab:      Send INR reminders to:   [facility]    Indications    Cerebrovascular disease [I67.9]           Comments:            Anticoagulation Care Providers     Provider Role Specialty Phone number    Jerson Hernandez MD Referring Family Medicine [phone]

## 2021-06-01 NOTE — TELEPHONE ENCOUNTER
Medication Request  Medication name: Magnesium   Pharmacy Name and Location: [facility]  Reason for request: Out of medication  When did you use medication last?:  Yesterday at noon  Patient offered appointment:  n/a  Okay to leave a detailed message: yes  [phone]    Caller stated that the patient is taking magnesium 500 mg sig 2 tablets daily.

## 2021-06-01 NOTE — TELEPHONE ENCOUNTER
INR result is  2.1 today  INR   Date Value Ref Range Status   09/18/2019 2.40 (!) 0.9 - 1.1 Final       Will the patient be seen, or did they already see, MD or CNP today? No    Most Recent Warfarin dose day/week  Sunday Monday Tuesday Wednesday Thursday Friday Saturday   4 4 4 6 4 4 4     Sunday Monday Tuesday Wednesday Thursday Friday Saturday   4 4 4           Has the patient missed any doses of Coumadin, Warfarin, Jantoven in the past 7 days? No    Has the patients medications changed since the last visit? No    Has the patient experienced any bleeding recently? No    Has the patient experienced any injuries or illness recently? No    Has the patient experienced any 'new' shortness of breath, severe headaches, or changes in vision recently? No    Has the patient had any changes in their diet, or alcohol consumption? No    Is the patient here today to prepare for any type of upcoming surgery, procedure, or for a cardioversion procedure? No    What phone number can we reach the patient at today? Transferring over

## 2021-06-01 NOTE — TELEPHONE ENCOUNTER
Refill Approved    Rx renewed per Medication Renewal Policy. Medication was last renewed on 8/20/19.    Rosangela Herron, [facility] Triage/Med Refill 9/26/2019     Requested Prescriptions   Pending Prescriptions Disp Refills     atorvastatin (LIPITOR) 40 MG tablet [Pharmacy Med Name: ATORVASTATIN 40MG TABS] 30 tablet 10     Sig: TAKE 1 TABLET BY MOUTH AT BEDTIME *1 TOTAL FILL*       Statins Refill Protocol (Hmg CoA Reductase Inhibitors) Passed - 9/25/2019 12:30 PM        Passed - PCP or prescribing provider visit in past 12 months      Last office visit with prescriber/PCP: 8/20/2019 Jerson Hernandez MD OR same dept: 8/20/2019 Jerson Hernandez MD OR same specialty: 8/20/2019 Jerson Hernandez MD  Last physical: 5/29/2018 Last MTM visit: Visit date not found   Next visit within 3 mo: Visit date not found  Next physical within 3 mo: Visit date not found  Prescriber OR PCP: Jerson Hernandez MD  Last diagnosis associated with med order: 1. PAD (peripheral artery disease) (H)  - atorvastatin (LIPITOR) 40 MG tablet [Pharmacy Med Name: ATORVASTATIN 40MG TABS]; TAKE 1 TABLET BY MOUTH AT BEDTIME *1 TOTAL FILL*  Dispense: 30 tablet; Refill: 10    If protocol passes may refill for 12 months if within 3 months of last provider visit (or a total of 15 months).             DAILY VITAMIN FORMULA per tablet [Pharmacy Med Name: DAILY VITAMIN TABS] 30 tablet 10     Sig: TAKE 1 TABLET BY MOUTH ONCE DAILY. *1 TOTAL FILL*       There is no refill protocol information for this order

## 2021-06-01 NOTE — TELEPHONE ENCOUNTER
INR result is  1.9  INR   Date Value Ref Range Status   09/04/2019 2.60 (!) 0.9 - 1.1 Final       Will the patient be seen, or did they already see, MD or CNP today? No    Most Recent Warfarin dose day/week  Sunday Monday Tuesday Wednesday Thursday Friday Saturday      4 4 4 4     Sunday Monday Tuesday Wednesday Thursday Friday Saturday   4 4 4           Has the patient missed any doses of Coumadin, Warfarin, Jantoven in the past 7 days? No    Has the patients medications changed since the last visit? No    Has the patient experienced any bleeding recently? No    Has the patient experienced any injuries or illness recently? No    Has the patient experienced any 'new' shortness of breath, severe headaches, or changes in vision recently? No    Has the patient had any changes in their diet, or alcohol consumption? No    Is the patient here today to prepare for any type of upcoming surgery, procedure, or for a cardioversion procedure? No    What phone number can we reach the patient at today? RN, [phone]

## 2021-06-01 NOTE — TELEPHONE ENCOUNTER
Name of form/paperwork: Other:  [facility] is saying they have a patient that arrived on 9/27/19 with no medication or lab orders.  Writer did share it takes 3-5 business days to get forms back.  The issue is the patiennt is due for INR yesterday and the have no orders. The nurse is going to fax a new request now to fax # [phone] as it is more up to date then the request sent on 9/27/19.  Please advise.   Have you been seen for this request: N/A  Do we have the form: Please check fax  When is form needed by: STAT  How would you like the form returned: fax  Fax Number: [phone]  Patient Notified form requests are processed in 3-5 business days: Yes  (If patient needs form sooner, please note that in this message.)  Okay to leave a detailed message? Yes  Chloé [phone]

## 2021-06-01 NOTE — TELEPHONE ENCOUNTER
FYI - Status Update  Who is Calling: Hyacinth From [facility]   Update: Caller states they are not able to draw INT today for patient they are going to draw INR tomorrow .   Okay to leave a detailed message?:  No

## 2021-06-02 ENCOUNTER — RECORDS - HEALTHEAST (OUTPATIENT)
Dept: ADMINISTRATIVE | Facility: CLINIC | Age: 71
End: 2021-06-02

## 2021-06-02 VITALS — WEIGHT: 169 LBS | BODY MASS INDEX: 29.94 KG/M2

## 2021-06-02 VITALS — WEIGHT: 171.2 LBS | BODY MASS INDEX: 30.33 KG/M2

## 2021-06-02 VITALS — WEIGHT: 163.8 LBS | BODY MASS INDEX: 29.02 KG/M2

## 2021-06-02 VITALS — WEIGHT: 168.6 LBS | BODY MASS INDEX: 29.87 KG/M2

## 2021-06-02 VITALS
BODY MASS INDEX: 30.33 KG/M2 | WEIGHT: 171.2 LBS | HEIGHT: 63 IN | HEIGHT: 63 IN | WEIGHT: 171.2 LBS | HEIGHT: 63 IN | BODY MASS INDEX: 30.33 KG/M2 | BODY MASS INDEX: 30.33 KG/M2 | WEIGHT: 171.2 LBS

## 2021-06-02 VITALS — WEIGHT: 154.6 LBS | BODY MASS INDEX: 27.39 KG/M2

## 2021-06-02 VITALS — WEIGHT: 163 LBS | BODY MASS INDEX: 28.88 KG/M2 | HEIGHT: 63 IN

## 2021-06-02 VITALS — BODY MASS INDEX: 28.73 KG/M2 | WEIGHT: 162.2 LBS

## 2021-06-02 VITALS — BODY MASS INDEX: 27.49 KG/M2 | WEIGHT: 155.2 LBS

## 2021-06-02 VITALS — BODY MASS INDEX: 27.42 KG/M2 | WEIGHT: 154.8 LBS

## 2021-06-02 VITALS — BODY MASS INDEX: 29.85 KG/M2 | WEIGHT: 168.5 LBS

## 2021-06-02 VITALS — WEIGHT: 170 LBS | BODY MASS INDEX: 30.11 KG/M2

## 2021-06-02 VITALS — BODY MASS INDEX: 29.58 KG/M2 | WEIGHT: 167 LBS

## 2021-06-02 NOTE — TELEPHONE ENCOUNTER
FYI - Status Update  Who is Calling: [facility]   Update: Patient moved to new Assisted Living [place] patient is refusing Physical Therapy and Occupational Therapy .  Okay to leave a detailed message?:  No

## 2021-06-02 NOTE — TELEPHONE ENCOUNTER
Patient Returning Call  Reason for call:  Hyacinth returning call to check on the status of this request.  Information relayed to patient:  Pending providers review and approval.  Patient has additional questions:  No  If YES, what are your questions/concerns:  none  Okay to leave a detailed message?: Yes

## 2021-06-02 NOTE — TELEPHONE ENCOUNTER
"Called and left message for pt care manager Ila to called clinic back#1 Ila number is [phone]. Please let Ila know we need to know the quantity in order to send orders for depends and pads.  \"Okay to relay message\"  "

## 2021-06-02 NOTE — TELEPHONE ENCOUNTER
Called and spoke to Hyacinth. Patient is now in assisted living, they administer all meds and check INR.     Next INR has been ordered for Mon 10/7/19.    Verbal orders given for patient to continue current warfarin dose of    6 mg every Wed; 4 mg all other days     until next INR.     Orders faxed to facility.    Janny Cox RN per [facility] program protocol for Jerson Hernandez MD

## 2021-06-02 NOTE — TELEPHONE ENCOUNTER
ANTICOAGULATION  MANAGEMENT- Home Care/Care Facility Result    Assessment     Today's INR result of 2.21 is Therapeutic (goal INR of 2.0-3.0)        Warfarin taken as previously instructed    No new diet changes affecting INR    No new medication/supplements affecting INR    Continues to tolerate warfarin with no reported s/s of bleeding or thromboembolism     Previous INR was Subtherapeutic    Plan:     Spoke with IDALIA Rai discussed INR result and instructed:     Warfarin Dosing Instructions: Continue current warfarin dose 6 mg daily on Wed; and 4 mg daily rest of week      Next INR to be drawn: 2 weeks, 11/12/19    Education provided: target INR goal and significance of current INR result    Fredi verbalizes understanding and agrees to warfarin dosing plan.     Encounter/orders faxed to facility  ?   Janny Cox RN per [facility] protocol for Jerson Hernandez MD     Subjective/Objective:      Gardenia Muller, a 69 y.o. female is established on warfarin.     Home care/care facility RN's report of Gardenia INR, recent warfarin dosing, diet changes, medication changes, and symptoms is documented below.    Additional findings: none    Anticoagulation Episode Summary     Current INR goal:   2.0-3.0   TTR:   43.2 %   Next INR check:   11/12/2019   INR from last check:   2.21 (10/29/2019)   Weekly max warfarin dose:      Target end date:   Indefinite   INR check location:      Preferred lab:      Send INR reminders to:   [facility]    Indications    Cerebrovascular disease [I67.9]           Comments:            Anticoagulation Care Providers     Provider Role Specialty Phone number    Jerson Hernandez MD Referring Family Medicine [phone]

## 2021-06-02 NOTE — TELEPHONE ENCOUNTER
FYI - Status Update  Who is Calling: Ila  from [facility]  Update: Caller stated to dis-regard this order. Caller stated that she was able to order without an order.   Okay to leave a detailed message?:  Yes   [phone] ext. [phone]

## 2021-06-02 NOTE — TELEPHONE ENCOUNTER
ANTICOAGULATION  MANAGEMENT    Assessment   10/22/19 INR result of 1.84 is Subtherapeutic (goal INR of 2.0-3.0)   INR result was not called in to [facility] on the day it was drawn.  Aarti stated that she called [facility] first today and the call was transferred to [facility].         Warfarin taken as previously instructed    No new diet changes affecting INR    No new medication/supplements affecting INR    Continues to tolerate warfarin with no reported s/s of bleeding or thromboembolism     Previous INR was Therapeutic x3    Discussed the importance of calling [facility] @ [phone] to report INR on the day it is done for dosing.     Faxed encounter to [facility] at     Plan:     Spoke with Aarti EDWARD at [facility] regarding INR result and instructed:     Warfarin Dosing Instructions:  Continue current warfarin dose    6 mg every Wed; 4 mg all other days     (0 % change)    Instructed patient to follow up no later than: 10/29/19    Education provided: importance of therapeutic range and Importance of calling [facility] at  to report INR result for dosing on the same day it was done    Aarti verbalizes understanding and agrees to warfarin dosing plan.    Instructed to call the [facility] for any changes, questions or concerns. (#[phone])   ?   Martha Oakley RN    Subjective/Objective:      Gardenia Muller, a 69 y.o. female is on warfarin.     Aarti Fangise reports:     Home warfarin dose: verbally confirmed home dose with Aarti and updated on anticoagulation calendar     Missed doses: No     Medication changes:  No     S/S of bleeding or thromboembolism:  No     New Injury or illness:  No     Changes in diet or alcohol consumption:  No     Upcoming surgery, procedure or cardioversion:  No    Anticoagulation Episode Summary     Current INR goal:   2.0-3.0   TTR:   43.3 %   Next INR check:   10/29/2019   INR from last check:   1.84! (10/22/2019)    Weekly max warfarin dose:      Target end date:   Indefinite   INR check location:      Preferred lab:      Send INR reminders to:   [facility]    Indications    Cerebrovascular disease [I67.9]           Comments:            Anticoagulation Care Providers     Provider Role Specialty Phone number    Jerson Hernandez MD Referring Family Medicine [phone]

## 2021-06-02 NOTE — TELEPHONE ENCOUNTER
"Attempted # 2 phone would ring and go to a beeping sound. Will try again later.   \" Okay to relay message\"    Unable to locate Ila's phone number.   "

## 2021-06-02 NOTE — TELEPHONE ENCOUNTER
Orders being requested are to revise medication list below. See message below and please advise. Clinical assistant can not discontinue medications.

## 2021-06-02 NOTE — TELEPHONE ENCOUNTER
"Pended order Need to know the qty in order to send orders. Attempted to call patient received a busy jossue tone.  \" Okay to relay message\"  Please ask patient how many times does she have to change the depends and pads.   "

## 2021-06-02 NOTE — TELEPHONE ENCOUNTER
Orders being requested: Ann-Marie Plus Abserbent Depends Size Large   Reason service is needed/diagnosis: Incontinence   When are orders needed by: As soon as possible .  Where to send Orders: Fax: [phone] [facility]    Okay to leave detailed message?  No            Orders being requested: Pads for bed   Reason service is needed/diagnosis: Incontinence   When are orders needed by: As soon as possible .    Where to send Orders: Fax: [phone] [facility]   Okay to leave detailed message?  No

## 2021-06-02 NOTE — TELEPHONE ENCOUNTER
Who is calling:  Hyacinth  Reason for Call:  They need to know the dosing for this patient. There are 2 messages that were sent to primary care provider and there has been no response. Please see the notes from those calls, as this is what Hyacinth is calling back in regards to.     Date of last appointment with primary care: n/a  Okay to leave a detailed message: Yes- phone will be answered by someone

## 2021-06-02 NOTE — TELEPHONE ENCOUNTER
Orders being requested: Revised medication list  Reason service is needed/diagnosis:     Patient's current medication list has duplicate aspirin 81 mg, duplicate Tylenol 650 mg CR, please remove the duplicate historical medications.     There are also duplicate nutritional supplement shakes. Patient takes Glucerna please remove the below supplement.     Please add Refresh Plus eye drops and Glycolax to active medication list as PRN medications.    In addition please have Dr. Hernandez sign each page of patient's revised medication list and fax to [phone]  When are orders needed by: Today, as soon as possible  Where to send Orders: Fax: [phone]  Okay to leave detailed message?  Yes, Tracie from [facility],[phone].    Order Providers   Prescribing Provider Encounter Provider   Tierra Vasquez DO Kleven, Thomas Lowell, MD   Outpatient Medication Detail    Disp Refills Start End    food supplemt, lactose-reduced 0.06 gram- 1.5 kcal/mL Liqd 30 Bottle 12 7/22/2019     Sig - Route: Take 1 Can by mouth 2 (two) times a day. - Oral    Sent to pharmacy as: food supplemt, lactose-reduced 0.06 gram- 1.5 kcal/mL Liqd    E-Prescribing Status: Receipt confirmed by pharmacy (7/22/2019  4:36 PM CDT)    Associated Diagnoses   Weight loss, non-intentional  - Primary       Severe protein-calorie malnutrition (H)       Pharmacy   [facility] - [place], [place] - [address]

## 2021-06-02 NOTE — TELEPHONE ENCOUNTER
INR result is 2.21  INR   Date Value Ref Range Status   10/29/2019 2.21 (H) 0.90 - 1.10 Final       Will the patient be seen, or did they already see, MD or CNP today? No    Most Recent Warfarin dose day/week  Sunday Monday Tuesday Wednesday Thursday Friday Saturday     4 mg 6 mg 4 mg 4 mg 4 mg     Sunday Monday Tuesday Wednesday Thursday Friday Saturday   4 mg 4 mg            Has the patient missed any doses of Coumadin, Warfarin, Jantoven in the past 7 days? No    Has the patients medications changed since the last visit? No    Has the patient experienced any bleeding recently? No    Has the patient experienced any injuries or illness recently? No    Has the patient experienced any 'new' shortness of breath, severe headaches, or changes in vision recently? No    Has the patient had any changes in their diet, or alcohol consumption? No    Is the patient here today to prepare for any type of upcoming surgery, procedure, or for a cardioversion procedure? No    What phone number can we reach the patient at today? [phone] Fredi.

## 2021-06-02 NOTE — TELEPHONE ENCOUNTER
ANTICOAGULATION  MANAGEMENT-facility result    Assessment     Today's INR result of 2.35 is Therapeutic (goal INR of 2.0-3.0)        Warfarin taken as previously instructed    No new diet changes affecting INR    No new medication/supplements affecting INR    Continues to tolerate warfarin with no reported s/s of bleeding or thromboembolism     Previous INR was Therapeutic     Patient now at assisted living as of early October    Plan:     Spoke with Steph at [facility] nursing office regarding INR result and instructed:     Warfarin Dosing Instructions:  Continue current warfarin dose 6 mg daily on Wed; and 4 mg daily rest of week      Instructed patient to follow up no later than: 2 weeks, 10/22/19    Education provided: target INR goal and significance of current INR result    Steph verbalizes understanding and agrees to warfarin dosing plan.    Instructed to call the [facility] for any changes, questions or concerns. (#[phone])     Orders/encounter faxed to assisted living  ?   Janny Cox RN per [facility] protocol for Jerson Hernandez MD     Subjective/Objective:      Gardenia Muller, a 69 y.o. female is on warfarin.     Gadrenia reports:     Home warfarin dose: verbally confirmed home dose with Steph and updated on anticoagulation calendar     Missed doses: No     Medication changes:  No     S/S of bleeding or thromboembolism:  No     New Injury or illness:  No     Changes in diet or alcohol consumption:  No     Upcoming surgery, procedure or cardioversion:  No    Anticoagulation Episode Summary     Current INR goal:   2.0-3.0   TTR:   42.1 %   Next INR check:   10/22/2019   INR from last check:   2.35 (10/7/2019)   Weekly max warfarin dose:      Target end date:   Indefinite   INR check location:      Preferred lab:      Send INR reminders to:   [facility]    Indications    Cerebrovascular disease [I67.9]           Comments:            Anticoagulation Care Providers     Provider  Role Specialty Phone number    Jerson Hernandez MD Referring Family Medicine [phone]

## 2021-06-02 NOTE — TELEPHONE ENCOUNTER
Orders being requested: Walker with 4 wheels, a seat, brakes and carrying bag under seat. Preferably red in color.   Reason service is needed/diagnosis: The one she has has broken wheels.  When are orders needed by: soon  Where to send Orders: Fax: [facility]  Antonia Isaacs at [phone]  Okay to leave detailed message?  Yes

## 2021-06-03 ENCOUNTER — RECORDS - HEALTHEAST (OUTPATIENT)
Dept: ADMINISTRATIVE | Facility: CLINIC | Age: 71
End: 2021-06-03

## 2021-06-03 VITALS — BODY MASS INDEX: 24.48 KG/M2 | WEIGHT: 138.2 LBS

## 2021-06-03 VITALS — BODY MASS INDEX: 23.38 KG/M2 | WEIGHT: 132 LBS

## 2021-06-03 VITALS — BODY MASS INDEX: 24.91 KG/M2 | WEIGHT: 140.6 LBS

## 2021-06-03 VITALS
RESPIRATION RATE: 18 BRPM | HEIGHT: 62 IN | BODY MASS INDEX: 28.71 KG/M2 | DIASTOLIC BLOOD PRESSURE: 54 MMHG | WEIGHT: 156 LBS | TEMPERATURE: 97.2 F | SYSTOLIC BLOOD PRESSURE: 114 MMHG | HEART RATE: 92 BPM

## 2021-06-03 VITALS — WEIGHT: 130 LBS | BODY MASS INDEX: 24.23 KG/M2

## 2021-06-03 VITALS — BODY MASS INDEX: 26.04 KG/M2 | WEIGHT: 147 LBS

## 2021-06-03 VITALS — BODY MASS INDEX: 24.41 KG/M2 | WEIGHT: 137.8 LBS

## 2021-06-03 NOTE — TELEPHONE ENCOUNTER
Who is calling:  Assisted living RN  Reason for Call:  Calling for dosing instructions for INR drawn 11/26/19 - Please see encounter from 11/27/19 today.  Date of last appointment with primary care: na  Okay to leave a detailed message: Yes

## 2021-06-03 NOTE — TELEPHONE ENCOUNTER
INR result today 1.84  Coumadin is ordered 4 mg every day except Wed. 6 mg every Wed.    Routing to [facility] INR nurse  Paige Mcclelland RN  [facility] Triage Nurse  11:34 AM  11/12/2019

## 2021-06-03 NOTE — TELEPHONE ENCOUNTER
ANTICOAGULATION  MANAGEMENT    Assessment     Today's INR result of 1.98 is Subtherapeutic (goal INR of 2.0-3.0)        Warfarin taken as previously instructed    No new diet changes affecting INR    No new medication/supplements affecting INR    Continues to tolerate warfarin with no reported s/s of bleeding or thromboembolism     Previous INR was Subtherapeutic    Plan:     Spoke with Driss  (nurse) regarding INR result and instructed:     Warfarin Dosing Instructions:  Continue current warfarin dose 6 mg daily on Tues/Fri; and 4 mg daily rest of week  (0 % change)    Instructed patient to follow up no later than: two weeks    Education provided: importance of therapeutic range    Driss verbalizes understanding and agrees to warfarin dosing plan.  Faxing orders to [phone].  Instructed to call the [facility] for any changes, questions or concerns. (#[phone])   ?   Bia Crane RN    Subjective/Objective:      Gardenia Muller, a 69 y.o. female is on warfarin.     Gardenia reports:     Home warfarin dose: as updated on anticoagulation calendar per template     Missed doses: No     Medication changes:  No     S/S of bleeding or thromboembolism:  No     New Injury or illness:  No     Changes in diet or alcohol consumption:  No     Upcoming surgery, procedure or cardioversion:  No    Anticoagulation Episode Summary     Current INR goal:   2.0-3.0   TTR:   44.1 % (11.4 mo)   Next INR check:   12/10/2019   INR from last check:   1.98! (11/26/2019)   Weekly max warfarin dose:      Target end date:   Indefinite   INR check location:      Preferred lab:      Send INR reminders to:   [facility]    Indications    Cerebrovascular disease [I67.9]           Comments:            Anticoagulation Care Providers     Provider Role Specialty Phone number    Jerson Hernandez MD Referring Family Medicine [phone]

## 2021-06-03 NOTE — TELEPHONE ENCOUNTER
ANTICOAGULATION  MANAGEMENT- Home Care    Assessment     Today's INR result of 1.84 is Subtherapeutic (goal INR of 2.0-3.0)        Warfarin taken as previously instructed    No new diet changes affecting INR    No new medication/supplements affecting INR    Continues to tolerate warfarin with no reported s/s of bleeding or thromboembolism     Previous INR was Therapeutic     Faxed this encounter to [facility] [phone]    Plan:     Spoke with nurse Hyacinth regarding INR result and instructed:     Warfarin Dosing Instructions:  Change warfarin dose to 6 mg daily on Tues, Fri; and 4 mg daily rest of week  (6.7 % change)    Instructed patient to follow up no later than: 2 weeks.     Education provided: importance of taking warfarin as instructed    Hyacinth verbalizes understanding and agrees to warfarin dosing plan.    Instructed to call the [facility] for any changes, questions or concerns. (#[phone])   ?   Williams Hawthorne RN    Subjective/Objective:      Gardenia Muller, a 69 y.o. female is on warfarin.     Gardenia reports:     Home warfarin dose: verbally confirmed home dose with Hyacinth and updated on anticoagulation calendar     Missed doses: No     Medication changes:  No     S/S of bleeding or thromboembolism:  No     New Injury or illness:  No     Changes in diet or alcohol consumption:  No     Upcoming surgery, procedure or cardioversion:  No    Anticoagulation Episode Summary     Current INR goal:   2.0-3.0   TTR:   44.0 %   Next INR check:   11/26/2019   INR from last check:   1.84! (11/12/2019)   Weekly max warfarin dose:      Target end date:   Indefinite   INR check location:      Preferred lab:      Send INR reminders to:   [facility]    Indications    Cerebrovascular disease [I67.9]           Comments:            Anticoagulation Care Providers     Provider Role Specialty Phone number    Jerson Hernandez MD Referring Family Medicine [phone]

## 2021-06-03 NOTE — TELEPHONE ENCOUNTER
Received a faxed INR result for Gardenia Muller     From [facility] [place] [facility] [place]    INR result dated 11/26/2019 is 1.98

## 2021-06-04 VITALS
DIASTOLIC BLOOD PRESSURE: 50 MMHG | BODY MASS INDEX: 32.79 KG/M2 | SYSTOLIC BLOOD PRESSURE: 132 MMHG | RESPIRATION RATE: 18 BRPM | TEMPERATURE: 98.6 F | WEIGHT: 179.3 LBS | OXYGEN SATURATION: 94 % | HEART RATE: 64 BPM

## 2021-06-04 VITALS — HEIGHT: 62 IN | WEIGHT: 205.9 LBS | BODY MASS INDEX: 37.89 KG/M2

## 2021-06-04 VITALS
DIASTOLIC BLOOD PRESSURE: 58 MMHG | SYSTOLIC BLOOD PRESSURE: 135 MMHG | HEART RATE: 73 BPM | TEMPERATURE: 97 F | OXYGEN SATURATION: 96 % | BODY MASS INDEX: 32.74 KG/M2 | WEIGHT: 179 LBS

## 2021-06-04 VITALS — HEIGHT: 62 IN | BODY MASS INDEX: 33.75 KG/M2 | WEIGHT: 183.4 LBS

## 2021-06-04 VITALS
WEIGHT: 198 LBS | HEIGHT: 62 IN | BODY MASS INDEX: 36.44 KG/M2 | DIASTOLIC BLOOD PRESSURE: 77 MMHG | SYSTOLIC BLOOD PRESSURE: 128 MMHG | HEART RATE: 70 BPM | RESPIRATION RATE: 18 BRPM | TEMPERATURE: 96.7 F

## 2021-06-04 VITALS
RESPIRATION RATE: 20 BRPM | DIASTOLIC BLOOD PRESSURE: 58 MMHG | HEART RATE: 66 BPM | WEIGHT: 180.5 LBS | SYSTOLIC BLOOD PRESSURE: 110 MMHG | BODY MASS INDEX: 33.01 KG/M2

## 2021-06-04 VITALS
TEMPERATURE: 98.1 F | HEART RATE: 70 BPM | SYSTOLIC BLOOD PRESSURE: 137 MMHG | OXYGEN SATURATION: 90 % | DIASTOLIC BLOOD PRESSURE: 49 MMHG | WEIGHT: 179 LBS | BODY MASS INDEX: 32.74 KG/M2

## 2021-06-04 NOTE — TELEPHONE ENCOUNTER
Called and left message for pt to return call.# 1  Duplicate task completing      ----- Message from Jerson Hernandez MD sent at 12/6/2019  3:37 PM CST -----  I changed my mind and decided to do 3 days possible interaction with Coumadin

## 2021-06-04 NOTE — TELEPHONE ENCOUNTER
Yes. Patient is on warfarin.  We are aware.  Anticoagulation team to monitor INR while on antibiotic.

## 2021-06-04 NOTE — TELEPHONE ENCOUNTER
ANTICOAGULATION  MANAGEMENT    Assessment     Today's INR result of 2.12 is Therapeutic (goal INR of 2.0-3.0)        More warfarin taken than instructed which may be affecting INR. Patient was given 6mg yesterday instead of 4mg.    INR accidentally drawn today instead of 12/12    No new diet changes affecting INR    Interaction between Bactrim and warfarin may be affecting INR. Started 12/8 (to take for one week)    Continues to tolerate warfarin with no reported s/s of bleeding or thromboembolism     Previous INR was Subtherapeutic    Plan:     Spoke with Hyacinth (nurse) regarding INR result and instructed:     Warfarin Dosing Instructions:  Continue current warfarin dose 6 mg daily on Mon/Wed/Fri; and 4 mg daily rest of week  (0 % change)    Instructed patient to follow up no later than: 12/13 due to higher dose given and on antibiotic    Education provided: potential interaction between warfarin and Septra DS    Hyacinth verbalizes understanding and agrees to warfarin dosing plan.    Instructed to call the [facility] for any changes, questions or concerns. (#[phone])   ?   Bia Crane RN    Subjective/Objective:      Gardeniajennifer Muller, a 69 y.o. female is on warfarin.     Gardenia reports:     Home warfarin dose: verbally confirmed home dose with Hyacinth and updated on anticoagulation calendar     Missed doses: No     Medication changes:  Yes: Septra DS x 1 week     S/S of bleeding or thromboembolism:  No     New Injury or illness:  No     Changes in diet or alcohol consumption:  No     Upcoming surgery, procedure or cardioversion:  No    Anticoagulation Episode Summary     Current INR goal:   2.0-3.0   TTR:   41.9 % (11.4 mo)   Next INR check:   12/13/2019   INR from last check:   2.12 (12/11/2019)   Weekly max warfarin dose:      Target end date:   Indefinite   INR check location:      Preferred lab:      Send INR reminders to:   [facility]    Indications    Cerebrovascular disease [I67.9]            Comments:            Anticoagulation Care Providers     Provider Role Specialty Phone number    Jerson Hernandez MD Referring Family Medicine [phone]

## 2021-06-04 NOTE — TELEPHONE ENCOUNTER
ANTICOAGULATION  MANAGEMENT    Assessment     12/12's INR result of 2.62 is Therapeutic (goal INR of 2.0-3.0)        Warfarin taken as previously instructed    No new diet changes affecting INR    Interaction between Bactrim and warfarin may be affecting INR- took last dose this morning.    Continues to tolerate warfarin with no reported s/s of bleeding or thromboembolism     Previous INR was Therapeutic     Faxed this encounter to [facility] [phone].    Plan:     Spoke with nurse Nuvia regarding INR result and instructed:     Warfarin Dosing Instructions:  Continue current warfarin dose    6 mg every Mon, Wed, Fri; 4 mg all other days         Instructed patient to follow up no later than: Mon 12/16.     Education provided: importance of taking warfarin as instructed    Nuvia verbalizes understanding and agrees to warfarin dosing plan.    Instructed to call the [facility] for any changes, questions or concerns. (#[phone])   ?   Williams Hawthorne RN    Subjective/Objective:      Gardenia Muller, a 69 y.o. female is on warfarin.     Gardenia reports:     Home warfarin dose: verbally confirmed home dose with Nuvia and updated on anticoagulation calendar     Missed doses: No     Medication changes:  No     S/S of bleeding or thromboembolism:  No     New Injury or illness:  No     Changes in diet or alcohol consumption:  No     Upcoming surgery, procedure or cardioversion:  No    Anticoagulation Episode Summary     Current INR goal:   2.0-3.0   TTR:   42.1 % (11.4 mo)   Next INR check:   12/16/2019   INR from last check:   2.62 (12/12/2019)   Weekly max warfarin dose:      Target end date:   Indefinite   INR check location:      Preferred lab:      Send INR reminders to:   [facility]    Indications    Cerebrovascular disease [I67.9]           Comments:            Anticoagulation Care Providers     Provider Role Specialty Phone number    Jerson Hernandez MD Referring Family Medicine  [phone]

## 2021-06-04 NOTE — TELEPHONE ENCOUNTER
Medication Question or Clarification  Who is calling: Pharmacy     What medication are you calling about? (include dose and sig)     1.sulfamethoxazole-trimethoprim (SEPTRA DS) 800-160 mg per tablet- Has an interaction w/ warfarin (COUMADIN/JANTOVEN) 2 MG tablet. Is patient still taking warfarin ? Please call Pharmacy w/ Authorization to release.   Thank You     Who prescribed the medication?:  MD Ilir.    What is your question/concern?:   Drug interaction concerns     Pharmacy:   [facility] - [place], [place] - [address]    Okay to leave a detailed message?: Yes      Site CMT - Please call the pharmacy to obtain any additional needed information.

## 2021-06-04 NOTE — TELEPHONE ENCOUNTER
Please call the patient to tell her that she DOES have a urinary tract infection.  Jerson Hernandez M.D. had sent in 3 days worth of antibiotics for her.  I would like her to take an additional 4 more days.  She should be taking the antibiotics for a total of 7 days.  I already sent the prescription for her.

## 2021-06-04 NOTE — TELEPHONE ENCOUNTER
VIRI   Called and spoke with Gardenia Muller , Message was given, Gardenia Muller  Mention that she is on coumadin she thought there was an interaction between the two is it safe to take another 4 days?

## 2021-06-04 NOTE — TELEPHONE ENCOUNTER
Yes it is okay to take the antibiotics still.  We just need to keep a closer eye on your INR which looks like they are drawing it tomorrow.

## 2021-06-04 NOTE — TELEPHONE ENCOUNTER
Called and spoke with Gardenia Muller , Message was given, Gardenia Muller  understood, no further questions.  She already  rx and taking it.

## 2021-06-04 NOTE — TELEPHONE ENCOUNTER
"Called and left message for pt to return call.# 1  \" Okay to relay message\"       Per Dr. Hernandez- Please let Gardenia know that I am in a call in an antibiotic.  I called in a 3 day antibiotic. There could be possible interaction with Coumadin.    "

## 2021-06-04 NOTE — TELEPHONE ENCOUNTER
Who is calling:  Nuvia  Reason for Call:  Patient on antibiotics, takes coumadin, they abril blood on the 10th and also yesterday and wondering if they need to draw it today as the person is there at the home to draw the blood now if needed  Date of last appointment with primary care: 12-06-19  Okay to leave a detailed message: Yes

## 2021-06-04 NOTE — TELEPHONE ENCOUNTER
ANTICOAGULATION  MANAGEMENT- Home Care/Care Facility Result    Assessment     Today's INR result of 2.04 is Therapeutic (goal INR of 2.0-3.0)        Warfarin taken as previously instructed    No new diet changes affecting INR    No new medication/supplements affecting INR    Continues to tolerate warfarin with no reported s/s of bleeding or thromboembolism     Previous INR was Therapeutic     Faxed this encounter to [facility] [phone].    Plan:     Spoke with nurse Steph discussed INR result and instructed:     Warfarin Dosing Instructions: Continue current warfarin dose    6 mg every Mon, Wed, Fri; 4 mg all other days         Next INR to be drawn: 2 weeks (01/02/20).     Education provided: importance of taking warfarin as instructed    Steph verbalizes understanding and agrees to warfarin dosing plan.   ?   Williams Hawthorne RN    Subjective/Objective:      Gardenia Muller, a 69 y.o. female is established on warfarin.     Home care/care facility RN's report of Gardenia INR, recent warfarin dosing, diet changes, medication changes, and symptoms is documented below.    Additional findings: verbally confirmed home dose with Steph and updated on anticoagulation calendar    Anticoagulation Episode Summary     Current INR goal:   2.0-3.0   TTR:   42.5 % (11.4 mo)   Next INR check:   1/2/2020   INR from last check:   2.04 (12/18/2019)   Weekly max warfarin dose:      Target end date:   Indefinite   INR check location:      Preferred lab:      Send INR reminders to:   [facility]    Indications    Cerebrovascular disease [I67.9]           Comments:            Anticoagulation Care Providers     Provider Role Specialty Phone number    Jerson Hernandez MD Referring Family Medicine [phone]

## 2021-06-04 NOTE — TELEPHONE ENCOUNTER
Called and spoke with Gardenia Muller , Message was given, Gardenia Muller  understood, no further questions.

## 2021-06-04 NOTE — TELEPHONE ENCOUNTER
ANTICOAGULATION  MANAGEMENT    Assessment     Today's INR result of 1.94 is Subtherapeutic (goal INR of 2.0-3.0)        Warfarin taken as previously instructed    No new diet changes affecting INR    No new medication/supplements affecting INR    Continues to tolerate warfarin with no reported s/s of bleeding or thromboembolism     Previous INR was Therapeutic    Plan:     Spoke with Efraín (nurse at the [facility]) regarding INR result and instructed:     Warfarin Dosing Instructions:  one time booster dose of 6mg tonight then continue current warfarin dose 6 mg daily on Mon/WEd/fri; and 4 mg daily rest of week  (0 % change)    Instructed patient to follow up no later than: two weeks    Education provided: importance of therapeutic range    Efraín verbalizes understanding and agrees to warfarin dosing plan.    Instructed to call the [facility] for any changes, questions or concerns. (#[phone])   ?   Bia Crane RN    Subjective/Objective:      Gardenia Muller, a 69 y.o. female is on warfarin.     Gardenia reports:     Home warfarin dose: verbally confirmed home dose with Efraín and updated on anticoagulation calendar     Missed doses: No     Medication changes:  No     S/S of bleeding or thromboembolism:  No     New Injury or illness:  No     Changes in diet or alcohol consumption:  No     Upcoming surgery, procedure or cardioversion:  No      Anticoagulation Episode Summary     Current INR goal:   2.0-3.0   TTR:   39.9 % (11.4 mo)   Next INR check:   1/16/2020   INR from last check:   1.94! (1/2/2020)   Weekly max warfarin dose:      Target end date:   Indefinite   INR check location:      Preferred lab:      Send INR reminders to:   [facility]    Indications    Cerebrovascular disease [I67.9]           Comments:            Anticoagulation Care Providers     Provider Role Specialty Phone number    Jerson Hernandez MD Referring Family Medicine [phone]

## 2021-06-04 NOTE — TELEPHONE ENCOUNTER
INR result is      2.04  INR   Date Value Ref Range Status   12/18/2019 2.04 (H) 0.90 - 1.10 Final       Will the patient be seen, or did they already see, MD or CNP today? No    Most Recent Warfarin dose day/week  Sunday Monday Tuesday Wednesday Thursday Friday Saturday      6 4 6 4     Sunday Monday Tuesday Wednesday Thursday Friday Saturday   4 6 4           Has the patient missed any doses of Coumadin, Warfarin, Jantoven in the past 7 days? No    Has the patients medications changed since the last visit? No    Has the patient experienced any bleeding recently? No    Has the patient experienced any injuries or illness recently? No    Has the patient experienced any 'new' shortness of breath, severe headaches, or changes in vision recently? No    Has the patient had any changes in their diet, or alcohol consumption? No    Is the patient here today to prepare for any type of upcoming surgery, procedure, or for a cardioversion procedure? No    What phone number can we reach the patient at today? home phone listed in demographics.

## 2021-06-04 NOTE — TELEPHONE ENCOUNTER
Received a faxed INR result for Gardenia Muller     From [facility] - for [facility]    INR result dated 12/12/2019 is 2.62

## 2021-06-04 NOTE — TELEPHONE ENCOUNTER
ANTICOAGULATION  MANAGEMENT    Assessment     Today's INR result of 1.88 is Subtherapeutic (goal INR of 2.0-3.0)        Warfarin taken as previously instructed    No new diet changes affecting INR    Potential interaction between Bactrim and warfarin which may affect subsequent INRs. Bactrim dates clarified, patient did not start until Sun 12/8 (2 days ago). ACN was previously under the impression that Bactrim was started on Fri 12/6, that's why INR was ordered today.     Continues to tolerate warfarin with no reported s/s of bleeding or thromboembolism     Previous INR was Subtherapeutic    Plan:     Spoke with Steph EDWARD at facility regarding INR result and instructed:     Warfarin Dosing Instructions:  Continue current warfarin dose    6 mg every Mon, Wed, Fri; 4 mg all other days         Instructed patient to follow up no later than: Thur 12/12 d/t potential interaction with Bactrim. Patient will be on day #5 on Thur    Education provided: importance of therapeutic range, target INR goal and significance of current INR result and potential interaction between warfarin and Bactrim    Steph verbalizes understanding and agrees to warfarin dosing plan.    Instructed to call the [facility] for any changes, questions or concerns. (#[phone])     Orders/encounter faxed to facility  ?   Janny Cox RN per [facility] protocol for Jerson Hernandez MD     Subjective/Objective:      Gardenia Muller, a 69 y.o. female is on warfarin.     Gardenia reports:     Home warfarin dose: verbally confirmed home dose with Steph and updated on anticoagulation calendar     Missed doses: No     Medication changes:  Yes: currently on bactrim for a total of 7 days, 12/8-12/15     S/S of bleeding or thromboembolism:  No     New Injury or illness:  Yes: UTI     Changes in diet or alcohol consumption:  No     Upcoming surgery, procedure or cardioversion:  No    Anticoagulation Episode Summary     Current INR goal:   2.0-3.0    TTR:   41.5 % (11.4 mo)   Next INR check:   12/12/2019   INR from last check:   1.88! (12/10/2019)   Weekly max warfarin dose:      Target end date:   Indefinite   INR check location:      Preferred lab:      Send INR reminders to:   [facility]    Indications    Cerebrovascular disease [I67.9]           Comments:            Anticoagulation Care Providers     Provider Role Specialty Phone number    Jerson Hernandez MD Referring Family Medicine [phone]

## 2021-06-04 NOTE — TELEPHONE ENCOUNTER
ANTICOAGULATION  MANAGEMENT    Assessment     Today's INR result of 1.8 is Subtherapeutic (goal INR of 2.0-3.0)       Warfarin taken as previously instructed    No new diet changes affecting INR    No new medication/supplements affecting INR    Continues to tolerate warfarin with no reported s/s of bleeding or thromboembolism     Previous INR was very slightly Subtherapeutic. Today's INR was done as POC and usually facility does a venous draw    Plan:     Spoke with Tiara FRIEDMAN at patient's facility regarding INR result and instructed:     Warfarin Dosing Instructions:  Change warfarin dose to    6 mg every Mon, Wed, Fri; 4 mg all other days       (6.3 % change)    Instructed patient to follow up no later than: Wed 12/18/19    Education provided: target INR goal and significance of current INR result    Tiara verbalizes understanding and agrees to warfarin dosing plan.    Instructed to call the [facility] for any changes, questions or concerns. (#[phone])     Orders/encounter faxed to facility  ?   Janny Cox RN per [facility] protocol for Jerson Hernandez MD     Subjective/Objective:      Gardenia Muller, a 69 y.o. female is on warfarin.     Gardenia reports:     Home warfarin dose: verbally confirmed home dose with Tiara and updated on anticoagulation calendar     Missed doses: No     Medication changes:  No     S/S of bleeding or thromboembolism:  No     New Injury or illness:  No     Changes in diet or alcohol consumption:  No     Upcoming surgery, procedure or cardioversion:  No    Anticoagulation Episode Summary     Current INR goal:   2.0-3.0   TTR:   42.4 % (11.4 mo)   Next INR check:   12/18/2019   INR from last check:   1.80! (12/6/2019)   Weekly max warfarin dose:      Target end date:   Indefinite   INR check location:      Preferred lab:      Send INR reminders to:   [facility]    Indications    Cerebrovascular disease [I67.9]           Comments:            Anticoagulation Care  Providers     Provider Role Specialty Phone number    Jerson Hernandez MD Referring Family Medicine [phone]

## 2021-06-04 NOTE — TELEPHONE ENCOUNTER
ANTICOAGULATION  MANAGEMENT - BPA Interacting Medication Review    Interacting medication(s): Sulfamethoxazole-trimethoprim (Bactrim) with warfarin.    Duration: 7 days, 12/7 to 12/14    New medication?:  Yes, interaction per [employer], may increase INR and risk of bleeding.    Plan:    Spoke with Tiara FRIEDMAN at [facility] regarding potential interaction.     Warfarin instructions: continue current warfarin dose    Follow up INR recommended in:  tomorrow          Janny Cox RN

## 2021-06-05 NOTE — TELEPHONE ENCOUNTER
"Called and left message for pt to return call.# 1 Please ask pt is she is having a trouble with hearing loss per Dr. Hernandez. We got a request for Audiology.  \" Okay to relay message\"      "

## 2021-06-05 NOTE — TELEPHONE ENCOUNTER
Received a faxed INR result for Gardenia Muller     From [facility] lab - for [place] at [place]    INR result dated 1/15/2020 is 2.21

## 2021-06-05 NOTE — TELEPHONE ENCOUNTER
ANTICOAGULATION  MANAGEMENT    Assessment     Today's INR result of 2.16 is Therapeutic (goal INR of 2.0-3.0)        Warfarin taken as previously instructed    No new diet changes affecting INR    No new medication/supplements affecting INR    Continues to tolerate warfarin with no reported s/s of bleeding or thromboembolism     Previous INR was Therapeutic    Plan:     Spoke with Steph EDWARD regarding INR result and instructed:     Warfarin Dosing Instructions:  Continue current warfarin dose    6 mg every Mon, Wed, Fri; 4 mg all other days      (0 % change)    Instructed patient to follow up no later than: 2 weeks    Education provided: importance of therapeutic range and target INR goal and significance of current INR result    Steph EDWARD verbalizes understanding and agrees to warfarin dosing plan.    Instructed to call the [facility] for any changes, questions or concerns. (#[phone])   ?   Martha Oakley RN    Subjective/Objective:      Gardenia Muller, a 69 y.o. female is on warfarin.     Gardenia reports:     Home warfarin dose: verbally confirmed home dose with Steph EDWARD and updated on anticoagulation calendar     Missed doses: No     Medication changes:  No     S/S of bleeding or thromboembolism:  No     New Injury or illness:  No     Changes in diet or alcohol consumption:  No     Upcoming surgery, procedure or cardioversion:  No    Anticoagulation Episode Summary     Current INR goal:   2.0-3.0   TTR:   41.8 % (11.4 mo)   Next INR check:   2/12/2020   INR from last check:   2.16 (1/29/2020)   Weekly max warfarin dose:      Target end date:   Indefinite   INR check location:      Preferred lab:      Send INR reminders to:   [facility]    Indications    Cerebrovascular disease [I67.9]           Comments:            Anticoagulation Care Providers     Provider Role Specialty Phone number    Jerson Hernandez MD Referring Family Medicine [phone]

## 2021-06-05 NOTE — TELEPHONE ENCOUNTER
Received a faxed INR result for Gardenia GATITO Muller  From [facility]  INR result dated 1/29/2020 is 2.16

## 2021-06-05 NOTE — TELEPHONE ENCOUNTER
ANTICOAGULATION  MANAGEMENT    Assessment     Today's INR result of 2.21 is Therapeutic (goal INR of 2.0-3.0)        Warfarin taken as previously instructed    No new diet changes affecting INR    No new medication/supplements affecting INR    Continues to tolerate warfarin with no reported s/s of bleeding or thromboembolism     Previous INR was Therapeutic    Plan:     Spoke with Hyacinth FRIEDMAN at the [facility] regarding INR result and instructed:     Warfarin Dosing Instructions:  Continue current warfarin dose    6 mg every Mon, Wed, Fri; 4 mg all other days     (0 % change)    Instructed patient to follow up no later than: 2 weeks.    Education provided: importance of therapeutic range and target INR goal and significance of current INR result    Hyacinth FRIEDMAN verbalizes understanding and agrees to warfarin dosing plan.    Instructed to call the [facility] for any changes, questions or concerns. (#[phone])   ?   Martha Oakley RN    Subjective/Objective:      Gardenia Muller, a 69 y.o. female is on warfarin.     Hyacinth FRIEDMAN for Gardenia reports:     Home warfarin dose: verbally confirmed home dose with Hyacinth FRIEDMAN and updated on anticoagulation calendar     Missed doses: No     Medication changes:  No     S/S of bleeding or thromboembolism:  No     New Injury or illness:  No     Changes in diet or alcohol consumption:  No     Upcoming surgery, procedure or cardioversion:  No    Anticoagulation Episode Summary     Current INR goal:   2.0-3.0   TTR:   39.0 % (11.4 mo)   Next INR check:   1/29/2020   INR from last check:   2.21 (1/15/2020)   Weekly max warfarin dose:      Target end date:   Indefinite   INR check location:      Preferred lab:      Send INR reminders to:   [facility]    Indications    Cerebrovascular disease [I67.9]           Comments:            Anticoagulation Care Providers     Provider Role Specialty Phone number    Jerson Hernandez MD Referring Family Medicine [phone]

## 2021-06-05 NOTE — TELEPHONE ENCOUNTER
Patient Returning Call  Reason for call:  Returning call  Information relayed to patient:    Relayed below information to patient.  Patient has additional questions:  Yes  If YES, what are your questions/concerns:    Patient states she does not have any hearing loss that she knows of.  She is wanting to have a hearing test to make sure she doesn't have hearing loss.  Okay to leave a detailed message?: Yes      Patient has appointment with Provider  On 2/7/2020.

## 2021-06-06 NOTE — TELEPHONE ENCOUNTER
ANTICOAGULATION  MANAGEMENT- Home Care/Care Facility Result    Assessment     Today's INR result of 2.29 is Therapeutic (goal INR of 2.0-3.0)        Warfarin taken as previously instructed    No new diet changes affecting INR    No new medication/supplements affecting INR    Continues to tolerate warfarin with no reported s/s of bleeding or thromboembolism     Previous INR was Subtherapeutic    Plan:     Spoke with  Driss RN at the [facility] discussed INR result and instructed:     Warfarin Dosing Instructions: Continue current warfarin dose    6 mg every Mon, Wed, Fri; 4 mg all other days      (0 % change)    Next INR to be drawn: 2 weeks    Education provided: importance of therapeutic range and target INR goal and significance of current INR result    Driss verbalizes understanding and agrees to warfarin dosing plan.   ?   Martha Oakley RN    Subjective/Objective:      Gardenia Muller, a 69 y.o. female is established on warfarin.     Home care/care facility RN's report of Gardenia INR, recent warfarin dosing, diet changes, medication changes, and symptoms is documented below.    Additional findings: none    Anticoagulation Episode Summary     Current INR goal:   2.0-3.0   TTR:   45.2 % (11.4 mo)   Next INR check:   3/11/2020   INR from last check:   2.29 (2/26/2020)   Weekly max warfarin dose:      Target end date:   Indefinite   INR check location:      Preferred lab:      Send INR reminders to:   [facility]    Indications    Cerebrovascular disease [I67.9]           Comments:            Anticoagulation Care Providers     Provider Role Specialty Phone number    Jerson Hernandez MD Referring Family Medicine [phone]

## 2021-06-06 NOTE — TELEPHONE ENCOUNTER
INR result is  2.45  INR   Date Value Ref Range Status   03/11/2020 2.45 (H) 0.90 - 1.10 Final       Will the patient be seen, or did they already see, MD or CNP today? No    Most Recent Warfarin dose day/week  Sunday Monday Tuesday Wednesday Thursday Friday Saturday     4 6 4 6 4     Sunday Monday Tuesday Wednesday Thursday Friday Saturday   4 6            Has the patient missed any doses of Coumadin, Warfarin, Jantoven in the past 7 days? No    Has the patients medications changed since the last visit? No    Has the patient experienced any bleeding recently? No    Has the patient experienced any injuries or illness recently? No    Has the patient experienced any 'new' shortness of breath, severe headaches, or changes in vision recently? No    Has the patient had any changes in their diet, or alcohol consumption? No    Is the patient here today to prepare for any type of upcoming surgery, procedure, or for a cardioversion procedure? No    What phone number can we reach the patient at today? Jo-Ann FRIEDMAN [facility] [phone]

## 2021-06-06 NOTE — TELEPHONE ENCOUNTER
ANTICOAGULATION  MANAGEMENT    Assessment     Today's INR result of 1.92 is Subtherapeutic (goal INR of 2.0-3.0)        Warfarin taken as previously instructed    No new diet changes affecting INR    Interaction between stopping mirtazapine on 2/8 and warfarin may be affecting INR    Continues to tolerate warfarin with no reported s/s of bleeding or thromboembolism     Previous INR was Therapeutic x 2    Plan:     Spoke with Jo-Ann FRIEDMAN at the [facility] regarding INR result and instructed:     Warfarin Dosing Instructions:  Continue current warfarin dose    6 mg every Mon, Wed, Fri; 4 mg all other days        (0 % change)    Instructed patient to follow up no later than: 2 weeks      Education provided: importance of therapeutic range and target INR goal and significance of current INR result    Jo-Ann RN verbalizes understanding and agrees to warfarin dosing plan.    Instructed to call the [facility] for any changes, questions or concerns. (#[phone])   ?   Per [facility] Protocol Martha Oakley RN/ Jerson Hernandez MD      Subjective/Objective:      Gardenia Muller, a 69 y.o. female is on warfarin.     Jo-Ann dos santos Gardenia reports:     Home warfarin dose: verbally confirmed home dose with Jo-Ann and updated on anticoagulation calendar     Missed doses: No     Medication changes:  2/8 mirtazapine d/verona     S/S of bleeding or thromboembolism:  No     New Injury or illness:  No     Changes in diet or alcohol consumption:  No     Upcoming surgery, procedure or cardioversion:  No    Anticoagulation Episode Summary     Current INR goal:   2.0-3.0   TTR:   43.8 % (11.4 mo)   Next INR check:   2/26/2020   INR from last check:   1.92! (2/12/2020)   Weekly max warfarin dose:      Target end date:   Indefinite   INR check location:      Preferred lab:      Send INR reminders to:   [facility]    Indications    Cerebrovascular disease [I67.9]           Comments:            Anticoagulation Care Providers      Provider Role Specialty Phone number    Jerson Hernandez MD Referring Family Medicine [phone]

## 2021-06-06 NOTE — TELEPHONE ENCOUNTER
Received  faxed INR result for Gardenia Muller  From [facility]  INR result dated 2/12/2020 is 1.92

## 2021-06-06 NOTE — TELEPHONE ENCOUNTER
INR result is     2.29  INR   Date Value Ref Range Status   02/26/2020 2.29 (H) 0.90 - 1.10 Final       Will the patient be seen, or did they already see, MD or CNP today? No    Most Recent Warfarin dose day/week  Sunday Monday Tuesday Wednesday Thursday Friday Saturday      6 4 6 4     Sunday Monday Tuesday Wednesday Thursday Friday Saturday   4 6 4           Has the patient missed any doses of Coumadin, Warfarin, Jantoven in the past 7 days? No    Has the patients medications changed since the last visit? No    Has the patient experienced any bleeding recently? No    Has the patient experienced any injuries or illness recently? No    Has the patient experienced any 'new' shortness of breath, severe headaches, or changes in vision recently? No    Has the patient had any changes in their diet, or alcohol consumption? No    Is the patient here today to prepare for any type of upcoming surgery, procedure, or for a cardioversion procedure? No    What phone number can we reach the patient at today? home phone listed in demographics.

## 2021-06-07 NOTE — TELEPHONE ENCOUNTER
"Anticoagulation Annual Referral Renewal Review    Gardenia Muller's chart reviewed for annual renewal of referral to anticoagulation monitoring.        Criteria for anticoagulation nurse and/or pharmacist renewal met   Warfarin indication: Atrial Fibrillation and cerebral infarct Yes, per indication   Current with INR monitoring/compliant Yes Yes   Date of last office visit 2/7/20 Yes, had office visit within last year   Time in Therapeutic Range (TTR) 53 % No, TTR < 60 %       Gardenia Muller did NOT meet all criteria for anticoagulation management program initiated renewal and requires provider review. Using dot phrase, \".acmrenewalprovider\", please advise if Gardenia's anticoagulation management referral should be renewed or if patient should be seen in office to review anticoagulation therapy      Janny Cox RN  12:45 PM      "

## 2021-06-07 NOTE — TELEPHONE ENCOUNTER
INR result is 2.36  INR   Date Value Ref Range Status   04/08/2020 2.36 (H) 0.90 - 1.10 Final       Will the patient be seen, or did they already see, MD or CNP today? No    Most Recent Warfarin dose day/week  Sunday Monday Tuesday Wednesday Thursday Friday Saturday      6 4 6 4     Sunday Monday Tuesday Wednesday Thursday Friday Saturday   4 6 4           Has the patient missed any doses of Coumadin, Warfarin, Jantoven in the past 7 days? No    Has the patients medications changed since the last visit? No    Has the patient experienced any bleeding recently? No    Has the patient experienced any injuries or illness recently? No    Has the patient experienced any 'new' shortness of breath, severe headaches, or changes in vision recently? No    Has the patient had any changes in their diet, or alcohol consumption? No    Is the patient here today to prepare for any type of upcoming surgery, procedure, or for a cardioversion procedure? No    What phone number can we reach the patient at today? Driss [phone]

## 2021-06-07 NOTE — TELEPHONE ENCOUNTER
I do not know this patient, she has multiple diagnoses.    She should make a virtual visit to discuss this.    Please contact the ILS worker and let her know that Dr. Hernandez will be back on Tuesday if she wants to wait till then otherwise patient will need to virtual visit to review her medical problems.  Looks like she has peripheral artery disease also has had some heart failure issues etc.    Additionally Dr. Hernandez would need to know what the compression (how many millimeters of mercury etc.) is that they want etc.    Above the knee, below the knee?

## 2021-06-07 NOTE — TELEPHONE ENCOUNTER
ANTICOAGULATION  MANAGEMENT- Home Care/Care Facility Result    Assessment     Today's INR result of 2.36 is Therapeutic (goal INR of 2.0-3.0)        Warfarin taken as previously instructed    No new diet changes affecting INR    No new medication/supplements affecting INR    Continues to tolerate warfarin with no reported s/s of bleeding or thromboembolism     Previous INR was Therapeutic    Plan:     Spoke with Driss nurse at the [facility] discussed INR result and instructed:     Warfarin Dosing Instructions: Continue current warfarin dose 6 mg daily on Mondays, Wednesdays and Fridays; and 4 mg daily rest of week  (0 % change)    Next INR to be drawn: one month.    Education provided: importance of therapeutic range, importance of following up for INR monitoring at instructed interval and importance of taking warfarin as instructed    Driss verbalizes understanding and agrees to warfarin dosing plan.   ?   Danna Chew RN    Subjective/Objective:      Gardenia Muller, a 69 y.o. female is established on warfarin.     Home care/care facility RN's report of Gardenia INR, recent warfarin dosing, diet changes, medication changes, and symptoms is documented below.    Additional findings: verbally confirmed home dose with Driss and updated on anticoagulation calendar    Anticoagulation Episode Summary     Current INR goal:   2.0-3.0   TTR:   53.3 % (11.4 mo)   Next INR check:   5/6/2020   INR from last check:   2.36 (4/8/2020)   Weekly max warfarin dose:      Target end date:   Indefinite   INR check location:      Preferred lab:      Send INR reminders to:   [facility]    Indications    Cerebrovascular disease [I67.9]           Comments:            Anticoagulation Care Providers     Provider Role Specialty Phone number    Jerson Hernandez MD Referring Family Medicine [phone]

## 2021-06-07 NOTE — TELEPHONE ENCOUNTER
My strategy on unexpected wild animals(and the like) is to picture the worst case scenario that has any reasonable probability: In this case, that you will continue to be surprised by a furry animal crossing the sidewalk when you dont expect it...I will rest well and not worry about you :)

## 2021-06-07 NOTE — TELEPHONE ENCOUNTER
INR result is 2.57  INR   Date Value Ref Range Status   03/25/2020 2.57 (H) 0.90 - 1.10 Final       Will the patient be seen, or did they already see, MD or CNP today? No    Most Recent Warfarin dose day/week  Sunday Monday Tuesday Wednesday Thursday Friday Saturday      6 mg 4 mg 6 mg 4 mg     Sunday Monday Tuesday Wednesday Thursday Friday Saturday   4 mg 6 mg 4 mg           Has the patient missed any doses of Coumadin, Warfarin, Jantoven in the past 7 days? No    Has the patients medications changed since the last visit? No    Has the patient experienced any bleeding recently? No    Has the patient experienced any injuries or illness recently? No    Has the patient experienced any 'new' shortness of breath, severe headaches, or changes in vision recently? No    Has the patient had any changes in their diet, or alcohol consumption? No    Is the patient here today to prepare for any type of upcoming surgery, procedure, or for a cardioversion procedure? No    What phone number can we reach the patient at today? [phone] Bruce

## 2021-06-07 NOTE — TELEPHONE ENCOUNTER
Medication Request  Medication name: compression stockings   [facility]ed [facility]  Reason for request: to help with circulation   When did you use medication last?:  Today - stocking are not tight  Patient offered appointment:  N/A - electronic request  Okay to leave a detailed message: yes

## 2021-06-07 NOTE — TELEPHONE ENCOUNTER
FYI - Status Update  Who is Calling: CAMILO [employer]ub[employer] worker  Update: Caller stated to fax the compression stockings Rx to the patient's [facility] Worker, Rozina Isaacs at [phone].    Caller stated the [facility] will be calling with the measurements to the clinic soon. Caller stated she spoke to them today.  Okay to leave a detailed message?:  No

## 2021-06-07 NOTE — TELEPHONE ENCOUNTER
Patient Returning Call  Reason for call:  Compression socks   Information relayed to patient:  See NaziaRN's message  Patient has additional questions:  Yes  If YES, what are your questions/concerns:  Caller states the patient currently has compression stockings. There is not a size on the stocking.The current pair the patient has is from the patient's hospital stay in 2018.   The nurses will measure the patient's legs for the knee high compression stockings. The caller will call back with the measurements of the patient's legs. Caller is requesting 2 pairs, if possible.    Okay to leave a detailed message?: Yes

## 2021-06-07 NOTE — TELEPHONE ENCOUNTER
Who is calling:  Patient  Reason for Call:  Patient stated she would like Jerson Hernandez MD to call her to make sure she is going everything correctly to prevent herself from getting the Corona Virus. Patient stated she is covering her mouth, is washing her hands, and is staying indoors.    Patient stated she is otherwise doing well.    Date of last appointment with primary care: 2/7/20  Okay to leave a detailed message: Yes [phone]

## 2021-06-07 NOTE — TELEPHONE ENCOUNTER
Provider Review: Anticoagulation Annual Referral Renewal     ACM Renewal Decision:  Renew ACM warfarin management     INR Range:   Continue management at current INR goal   Anticipated Duration of Therapy (from today):  Long-term anticoagulation         Jerson Hernandez MD   2:01 PM

## 2021-06-07 NOTE — TELEPHONE ENCOUNTER
Returned call with the phone number provided and received voicemail of a person with another name. Did leave generic voicemail to give a call back. If original caller returns phone call please ask if patient currently is using compression stockings and if yes, if they know what strength of compression. Also if knee high or thigh high. Thank you.  Shanti Motta

## 2021-06-08 NOTE — PROGRESS NOTES
[facility] Anticoagulation Management Program    Anticoagulation referral placed for warfarin management and standing INR entered.    Indication:  Multiple CVA  INR goal:   2.0-3.0  Duration:  indefinite/long-term      Bia Crane RN

## 2021-06-08 NOTE — TELEPHONE ENCOUNTER
"Called and left a message for the [facility] at [phone] to call clinic back#2 We still have not received measurements for stockings yet.  Please get measurements verbally or have them faxed to [phone].  \" Okay to relay message\"      "

## 2021-06-08 NOTE — TELEPHONE ENCOUNTER
"Called and left Marlena message letting her know we have not received any measurements from the [facility]. Called and left message for the [facility] to call back. Please have nurse send measurements to fax number [phone].  \" Okay to relay message\"        "

## 2021-06-08 NOTE — TELEPHONE ENCOUNTER
Approved.  I put in an official PT/OT order in [facility], I'm not sure if that was needed or not.

## 2021-06-08 NOTE — TELEPHONE ENCOUNTER
Called and left a detail message for Marlena informing her we have been trying to contact [facility] and they are not responding to our calls. We will be on the look out still, but we will not contact them anymore since we have attempted 3 times.     CARINA WONG

## 2021-06-08 NOTE — PROGRESS NOTES
Assessment: Follow up with Gardenia today, comes in with log book, bg noted and all WNL, fasting and PC.    Fasting  mg/dl  -160 mg/dl    No longer receiving mow, prefers to shop for herself and make meals. Reports eating less potato chips and sweets, was not eating large amounts but has noticed she is eating less overall.  Wt down to 180.1# today, last visit 186# in 10.05777. States she is eating but clearly not as much as in the past.  Breakfast, cereal and toast or on occasion eggs, turkey sausage with toast. Lunch might be a sandwich or cheese/peanut butter with crackers and small piece of fruit.  Dinner is nothing special per Gardenia most nights a sandwich. Encouraged purchase of healthy choice frozen meals and canned soup to provide more variety at meals. Continues to eat fruit as desired with meals or between.    Stays active, has some kind of activity Monday - Friday, weekends she tends to stay in appt and watch movies or will go out for the weekend to stay with her nieces. Applauded Gardenia on her wt loss, she is very happy about wt loss, has been working hard on this.    Overall doing well with current dm mgmt.     Plan: Follow  Up 5.2017    Subjective and Objective:      Gardenia Muller is referred by  for Diabetes Education.     Lab Results   Component Value Date    HGBA1C 6.3 (H) 10/26/2016         Current diabetes medications:  none    Goals       monitoring            1. Maintain A1c < 7.0.  3.16.16  MET            Follow up:   CDE (certified diabetic educator)      Education:     Monitoring   Meter (per above goals): Competent  Monitoring: Competent  BG goals: Competent    Nutrition Management  Nutrition Management: Assessed, Discussed and Competent  Weight: Assessed and Discussed  Portions/Balance: Discussed and Competent  Carb ID/Count: Competent  Heart Healthy Fats: Discussed and Competent  Menu Planning: Assessed and Discussed  Dining Out: Not addressed  Physical Activity:  Assessed and Discussed  Diabetes Disease Process: Competent    Acute Complications: Prevent, Detect, Treat:  Hypoglycemia: Assessed  Hyperglycemia: Assessed and Discussed  Sick Days: Competent    Chronic Complications  Foot Care:Discussed  Skin Care: Discussed  Eye: Assessed, Discussed and Competent  ABC: Assessed and Discussed  Teeth:Discussed and Competent  Goal Setting and Problem Solving: Assessed  Barriers: Assessed  Psychosocial Adjustments: Assessed and Discussed      Time spent with the patient: 45 min for diabetes education and counseling.   Previous Education: yes  Visit Type:MNT  Hours Remaining: DSMT 2 and MNT 1.25      Lolis Young  1/25/2017

## 2021-06-08 NOTE — TELEPHONE ENCOUNTER
ANTICOAGULATION  MANAGEMENT- Home Care/Care Facility Result    Assessment     Today's INR result of 2.71 is Therapeutic (goal INR of 2.0-3.0)        Warfarin taken as previously instructed    No new diet changes affecting INR    No new medication/supplements affecting INR    Continues to tolerate warfarin with no reported s/s of bleeding or thromboembolism     Previous INR was Therapeutic    Plan:     Spoke with Chloé FRIEDMAN with [facility] discussed INR result and instructed:     Warfarin Dosing Instructions: Continue current warfarin dose    6 mg every Mon, Wed, Fri; 4 mg all other days      (0 % change)    Next INR to be drawn: 4 weeks.    Education provided: none    Chloé verbalizes understanding and agrees to warfarin dosing plan.   ?   Martha Oakley RN    Subjective/Objective:      Gardenia Muller, a 69 y.o. female is established on warfarin.     Home care/care facility RN's report of Gardenia INR, recent warfarin dosing, diet changes, medication changes, and symptoms is documented below.    Additional findings: none    Anticoagulation Episode Summary     Current INR goal:   2.0-3.0   TTR:   65.3 % (11.4 mo)   Next INR check:   7/1/2020   INR from last check:   2.71 (6/3/2020)   Weekly max warfarin dose:      Target end date:   Indefinite   INR check location:      Preferred lab:      Send INR reminders to:   [facility]    Indications    Cerebrovascular disease [I67.9]           Comments:            Anticoagulation Care Providers     Provider Role Specialty Phone number    Jerson Hernandez MD Referring Family Medicine [phone]

## 2021-06-08 NOTE — TELEPHONE ENCOUNTER
Received a faxed INR result for Gardenia Muller     From [facility] [place] @ [place]    INR result dated 6/3/2020 is 2.71

## 2021-06-08 NOTE — TELEPHONE ENCOUNTER
INR result is 2.71  INR   Date Value Ref Range Status   06/03/2020 2.71 (H) 0.90 - 1.10 Final       Will the patient be seen, or did they already see, MD or CNP today? No    Most Recent Warfarin dose day/week  Sunday Monday Tuesday Wednesday Thursday Friday Saturday      6 4 6 4     Sunday Monday Tuesday Wednesday Thursday Friday Saturday   4 6 4           Has the patient missed any doses of Coumadin, Warfarin, Jantoven in the past 7 days? No    Has the patients medications changed since the last visit? No    Has the patient experienced any bleeding recently? No    Has the patient experienced any injuries or illness recently? No    Has the patient experienced any 'new' shortness of breath, severe headaches, or changes in vision recently? No    Has the patient had any changes in their diet, or alcohol consumption? Unknown    Is the patient here today to prepare for any type of upcoming surgery, procedure, or for a cardioversion procedure? No    What phone number can we reach the patient at today? home phone listed in demographics.

## 2021-06-08 NOTE — TELEPHONE ENCOUNTER
ANTICOAGULATION  MANAGEMENT- Home Care/Care Facility Result    Assessment     Today's INR result of 2.7 is Therapeutic (goal INR of 2.0-3.0)        Warfarin taken as previously instructed    No new diet changes affecting INR    No new medication/supplements affecting INR    Continues to tolerate warfarin with no reported s/s of bleeding or thromboembolism     Previous INR was Therapeutic    Plan:     Left a detailed message for nursing staff at [facility] discussed INR result and instructed:     Warfarin Dosing Instructions: Continue current warfarin dose 6 mg daily on Mondays, Wednesdays and Fridays; and 4 mg daily rest of week  (0 % change)    Next INR to be drawn: one month 6/3/20.    Education provided:   ?   Danna Chew RN    Subjective/Objective:      Gardenia Muller, a 69 y.o. female is established on warfarin.     Home care/care facility RN's report of Gardenia INR, recent warfarin dosing, diet changes, medication changes, and symptoms is documented below.    Additional findings: none    Anticoagulation Episode Summary     Current INR goal:   2.0-3.0   TTR:   59.6 % (11.4 mo)   Next INR check:   6/3/2020   INR from last check:   2.70 (5/6/2020)   Weekly max warfarin dose:      Target end date:   Indefinite   INR check location:      Preferred lab:      Send INR reminders to:   [facility]    Indications    Cerebrovascular disease [I67.9]           Comments:            Anticoagulation Care Providers     Provider Role Specialty Phone number    Jerson Hernandez MD Referring Family Medicine [phone]

## 2021-06-08 NOTE — TELEPHONE ENCOUNTER
Who is calling:  [facility]   Reason for Call:  Caller is checking on the status of stocking. Caller is wondering if Jerson Hernandez MD received the measurements yet.   Date of last appointment with primary care:   Okay to leave a detailed message: Yes  [phone]

## 2021-06-08 NOTE — TELEPHONE ENCOUNTER
Orders being requested:   PT/OT- [facility]    Reason service is needed/diagnosis:   Fitting for compression socks  Diag: Edema    When are orders needed by: ASAP    Where to send Orders: [facility]    Okay to leave detailed message?  Yes    Please send orders for treatment to [place].

## 2021-06-08 NOTE — PROGRESS NOTES
Assessment/ Plan  1. Dysphagia  Intermittent, solids only, associated with long-term heartburn symptoms.  Patient reports previous endoscopy.  I don't recall this and none on able to find it on old records.  She's been on omeprazole 20 mg twice a day for about 1 year  She should have an upper endoscopy.  - Ambulatory referral for Upper GI Endoscopy  - Basic Metabolic Panel    2. Healthcare maintenance  Will need to hold Coumadin for below test  - Ambulatory referral for Colonoscopy    3. Cerebral atherosclerosis  Patient was started on warfarin mostly for recurrent stroke, breakthrough on Plavix/aspirin.  Given high vascular risk, previous multiple strokes, will bridge.  4. PAF (paroxysmal atrial fibrillation)  This of course would be indication for warfarin as well but, by my recollection, this was very limited and remote    5. Cough  Postviral, mild, improving, follow    6. Sciatica of left side  Intermittent, mild, without red flags.  Recurrence of previous problem that spontaneously resolved.  Will monitor    7. Hypomagnesemia  August, low magnesium, recheck  - Magnesium    8. Type 2 diabetes mellitus with other circulatory complication, unspecified long term insulin use status  Not really discussed today, see note from diabetes educator    9. Onychogryposis  I declined to cut her nails today.  She will ask her home health nurse but I offered her an appointment in the future if they will not do this for her  10.  Abdominal wall hernia/ incisional hernia.  Near site for surgery for incarcerated umbilical hernia.  Somewhat painful.  Will refer to surgeon for their opinion  Body mass index is 32.92 kg/(m^2).    Subjective  CC:  Chief Complaint   Patient presents with     Nail Problem     both toe nail needs triming, little pain      HPI:  Problem: dysphagia- food not going down well + bad indigestion  Narrative-  Taking 20 mg omep  Twice daily  ___________________________  Notes  Duration/ Timing/ context-about  year.  Happens at most meals, not with first swallow.  Sometimes needs to gag herself to induce vomiting  Location- chest    Anything make it better?-belching makes it beter- or needs to vomit  Anything make it worse?-only happens with things that might get stuck- bread, toast, crackers  Taking omeprazole bid    Cough    -----------------------------------------  Duration:  1 week  Associated URI symptoms?  Some sneezing, no rhinitis, sore throat  Productive?  no  SOB?  no  Severity   Mild- mod  Context, Other associated symptoms:   paroxysmal    Pain in left leg    --------------------  Duration: 1 month, had similar symptoms before  Onset: gradually  Severity: moderate- previously severe , not as bad this time  Quality: feels like going to sleep  Location/ Distrobution: outside but, post thigh, lat calf  Factors that worsen: no  Things that help :topical pain reliever      hypomagnesemia  .Problem: bulge in abdomen  Narrative-  History of incerated umbilical hernia surg 2015.  Now new abdominal mass  ___________________________  Notes  Duration/ Timing/ context-began a few months after last surgery, not  Location- RU periumbilical      PFSH:  Current medications reviewed as follows:  Current Outpatient Prescriptions on File Prior to Visit   Medication Sig     ASCORBIC ACID WITH MARICRUZ HIPS 500 MG tablet TAKE 2 TABLETS (1000MG) BY MOUTH ONCE DAILY.     aspirin 81 MG EC tablet TAKE 1 TABLET BY MOUTH ONCE DAILY     blood glucose test (ONETOUCH ULTRA TEST) strips TEST TWICE DAILY     blood-glucose meter kit 2 (two) times a day. Test blood sugar. OneTouch Ultra 2 w/Device Kit     CALCIUM 600 600 mg (1,500 mg) tablet TAKE 2 TABLETS (1,200MG) BY MOUTH ONCE DAILY     cholecalciferol, vitamin D3, 2,000 unit Tab TAKE 1 TABLET BY MOUTH ONCE DAILY. (Patient taking differently: TAKE 1 TABLET BY MOUTH TWICE DAILY.)     CRESTOR 40 mg tablet TAKE 1 TABLET BY MOUTH AT BEDTIME      mg capsule TAKE 1 CAPSULE BY MOUTH TWICE  DAILY AT 6AM AND 4PM.     FIBER, CALCIUM POLYCARBOPHIL, 625 mg tablet TAKE 2 TABLETS (1250MG) BY MOUTH ONCE DAILY.     gabapentin (NEURONTIN) 100 MG capsule TAKE 1 CAPSULE BY MOUTH AT BEDTIME     hydrochlorothiazide (HYDRODIURIL) 25 MG tablet TAKE 1 TABLET BY MOUTH ONCE DAILY.     lancets 33 gauge Misc Use 1 each As Directed 2 (two) times a day. OneTouch Lancets. Test twice daily.     lidocaine (LMX5) 5 % Crea Apply 1 application topically every 12 (twelve) hours as needed (pain).     lisinopril (PRINIVIL,ZESTRIL) 10 MG tablet Take 1 tablet (10 mg total) by mouth daily.     loratadine (CLARITIN) 10 mg tablet Take 1 tablet (10 mg total) by mouth daily.     magnesium oxide (MAG-OX) 400 mg tablet TAKE 1 TABLET BY MOUTH TWICE DAILY.     MAPAP ARTHRITIS PAIN 650 mg CR tablet TAKE 1 TABLET BY MOUTH THREE TIMES DAILY     metoprolol tartrate (LOPRESSOR) 25 MG tablet TAKE 1 TABLET BY MOUTH TWICE DAILY.     multivitamin (TAB-A-JULIET) per tablet Take 1 tablet by mouth daily.     nitroglycerin (NITROSTAT) 0.4 MG SL tablet Place 1 tablet (0.4 mg total) under the tongue every 5 (five) minutes as needed for chest pain (for up to 3 doses and call 911 if persists).     omeprazole (PRILOSEC) 20 MG capsule 20 mg 2 (two) times a day.      omeprazole (PRILOSEC) 20 MG capsule TAKE 1 CAPSULE BY MOUTH TWICE DAILY.     ONETOUCH ULTRA TEST strips TEST TWICE DAILY.     polyethylene glycol 3350 Powd Take 17 g by mouth daily as needed (constipation).      polyvinyl alcohol (ARTIFICIAL TEARS, POLYVIN ALC,) 1.4 % ophthalmic solution Administer 1 drop to both eyes 4 (four) times a day. As directed.     REFRESH OPTIVE 0.5-0.9 % Drop INSTILL TWO DROPS IN BOTH EYES TWICE DAILY AS NEEDED     sertraline (ZOLOFT) 100 MG tablet TAKE 1 TABLET BY MOUTH ONCE DAILY.     traZODone (DESYREL) 50 MG tablet TAKE 1 TABLET BY MOUTH AT BEDTIME.     warfarin (COUMADIN) 2 MG tablet TAKE 2 TABLETS (4MG) BY MOUTH ONCE DAILY, Hold coumadin today 6/30 and resume from  "tomorrow 7/1/16, check INR in 2-3 days     No current facility-administered medications on file prior to visit.      Patient Active Problem List   Diagnosis     Lumbar Canal Stenosis     Peripheral Vascular Disease     Patent Foramen Ovale     Dermatosis Papulosa Nigra     Acute myocardial infarction     Coronary Arteriosclerosis     Depression     Hypercalcemia     Hyperlipidemia     Cerebral atherosclerosis     Right Renal Artery Stenosis     Osteoarthritis     Abnormality Of Walk     Type 2 diabetes mellitus with circulatory disorder     Advanced directives, counseling/discussion     SBO (small bowel obstruction)     PAF (paroxysmal atrial fibrillation)     CHF (congestive heart failure)     Cystitis     Hypomagnesemia     Health care maintenance     Tachycardia     Hypertension     Dysarthria     Cerebral infarction     Anemia     CVA (cerebrovascular accident)     History   Smoking Status     Former Smoker   Smokeless Tobacco     Never Used     Social History     Social History Narrative     Patient Care Team:  Jerson Hernandez MD as PCP - General (Family Medicine)  ROS  Full 10 system review including constitutional, respiratory, cardiac, gi, urinary, rheumatologic, neurologic, reproductive, dermatologic psychiatric is  performed (via questionnaire) and is negative except ear pain, ringing in ears, cough, back pain, joint pain, muscle cramps, leg      Objective  Physical Exam  Vitals:    01/25/17 1040   BP: 120/52   Patient Site: Right Arm   Patient Position: Sitting   Cuff Size: Adult Large   Pulse: 64   Resp: 16   Temp: 97.1  F (36.2  C)   TempSrc: Oral   Weight: 180 lb (81.6 kg)   Height: 5' 2\" (1.575 m)     Head- normocephalic atraumatic.  Normal conjunctiva, pupils equal.  No lid abnormalities apparent.  Normal auricles.  Ear canals appear normal.  TMs well visualized and both are normal.  Oral cavity without abnormality, no ulcers.  Acceptable dentition.  Pharynx is normal in appearance without " erythema or exudate.  Neck examined and is normal without lymphadenopathy.    Chest is clear to auscultation.  Abdominal exam with an 8 x 3 cm bulge along the left side of her midline abdominal scar, left upper quadrant.  Straight leg raise is negative.  Some discomfort along upper buttock  Toenails are thickened and curled especially great toe  Diagnostics  Results for orders placed or performed in visit on 01/16/17   INR   Result Value Ref Range    INR 2.10 (!) 0.9 - 1.1    INR is not to do yet.      Please note: Voice recognition software was used in this dictation.  It may therefore contain typographical errors.

## 2021-06-08 NOTE — TELEPHONE ENCOUNTER
I looked in Right fax and was not able to locate the fax. Dr. Hernandez and his assistant is not in office today. Will check in with them tomorrow when they return to clinic.

## 2021-06-08 NOTE — TELEPHONE ENCOUNTER
INR result is 2.70  INR   Date Value Ref Range Status   05/06/2020 2.70 (H) 0.90 - 1.10 Final       Will the patient be seen, or did they already see, MD or CNP today? No    Most Recent Warfarin dose day/week  Sunday Monday Tuesday Wednesday Thursday Friday Saturday      6 4 6 4     Sunday Monday Tuesday Wednesday Thursday Friday Saturday   4 6 4           Has the patient missed any doses of Coumadin, Warfarin, Jantoven in the past 7 days? No    Has the patients medications changed since the last visit? No    Has the patient experienced any bleeding recently? No    Has the patient experienced any injuries or illness recently? No    Has the patient experienced any 'new' shortness of breath, severe headaches, or changes in vision recently? No    Has the patient had any changes in their diet, or alcohol consumption? No    Is the patient here today to prepare for any type of upcoming surgery, procedure, or for a cardioversion procedure? No    What phone number can we reach the patient at today? home phone listed in demographics.

## 2021-06-09 ENCOUNTER — RECORDS - HEALTHEAST (OUTPATIENT)
Dept: ADMINISTRATIVE | Facility: CLINIC | Age: 71
End: 2021-06-09

## 2021-06-09 NOTE — TELEPHONE ENCOUNTER
Can you do the work of figuring out what size is needed based on those orders- not sure where to find info- I prefer knee high Teds- I can sign

## 2021-06-09 NOTE — TELEPHONE ENCOUNTER
INR result is 3.07  INR   Date Value Ref Range Status   07/01/2020 3.07 (H) 0.90 - 1.10 Final       Will the patient be seen, or did they already see, MD or CNP today? No    Most Recent Warfarin dose day/week  Sunday Monday Tuesday Wednesday Thursday Friday Saturday      6 mg 4 mg 6 mg 4 mg     Sunday Monday Tuesday Wednesday Thursday Friday Saturday   4 mg 6 mg 4 mg           Has the patient missed any doses of Coumadin, Warfarin, Jantoven in the past 7 days? No    Has the patients medications changed since the last visit? No    Has the patient experienced any bleeding recently? No    Has the patient experienced any injuries or illness recently? No    Has the patient experienced any 'new' shortness of breath, severe headaches, or changes in vision recently? No    Has the patient had any changes in their diet, or alcohol consumption? No    Is the patient here today to prepare for any type of upcoming surgery, procedure, or for a cardioversion procedure? No    What phone number can we reach the patient at today? [phone] Cyndi

## 2021-06-09 NOTE — TELEPHONE ENCOUNTER
Measurements are: Upper Thigh- 23in, Calf width-19in, Full leg to heel-26in, and back of knee to base of heel-16in

## 2021-06-09 NOTE — TELEPHONE ENCOUNTER
"Clinic Action Needed: FYI    Reason for Call: Jo-Ann FRIEDMAN calling from [place] at [facility] reporting critical INR value of 13.5.     States patient was gone to a family gathering over the weekend and had been drinking \"alcohol\". Reports patient had a hangover when she came back to the facility. [facility] patient missed Monday dose.    Jo-Ann FRIEDMAN called 911 for patient due to being worried about patient having bleeding.     Routed to: Jerson Hernandez Care Team [place].    Loy Brown RN  [facility] Nurse Advisors               "

## 2021-06-09 NOTE — TELEPHONE ENCOUNTER
[facility]  Reason for Call:  Patient missed all of her medications on 7/14/20.  She is having her INR drawn tomorrow.    Date of last appointment with primary care: 7/2/20  Okay to leave a detailed message: Yes

## 2021-06-09 NOTE — TELEPHONE ENCOUNTER
Spoke with Jo-Ann, reports patient was out with family missed a dose. Instructed to continue same warfarin dose, follow up one week sooner than previously planned. New INR recheck date 7/22/2020, no changes to warfarin dose.     Mae Heath RN

## 2021-06-09 NOTE — PROGRESS NOTES
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  A.m. blood sugars are roughly ranging from 1 10-1 36, p.m. blood sugars between 111 and 140, blood pressures have been excellent, pulse normal.           Relevant Orders    Microalbumin, Random Urine       Medium    Hypercalcemia - Primary (Chronic)     Elevated parathyroid hormone, due to see endocrinology, referral  Will hold on labs for now           Relevant Orders    Ambulatory referral to Endocrinology       Unprioritized    Trigger finger, acquired     Trigger finger injection  4 Finger   both Hands  Discussed risks and benefits of procedure with patient.  Discussed possibility of infection, damage to nerve, tendon, artery, infection.  Discussed alternative treatment-referral to hand surgeon for management, possible tendon release surgery.  Patient requested injection/agreed to risks.    Skin prepped with antiseptic, alcohol  Using a 27-gauge 1-1/4 needle, injected 1/2 cc of Depo-Medrol 40 and 1 cc of lidocaine without epinephrine into the region, care not to inject tendon directly.  Patient tolerated the procedure well.  There were no complications.    Above procedure first done with right back then left             Relevant Medications    methylPREDNISolone acetate injection 20 mg (DEPO-MEDROL) (Completed)    methylPREDNISolone acetate injection 20 mg (DEPO-MEDROL) (Completed)    lidocaine 10 mg/mL (1 %) injection 1 mL    lidocaine 10 mg/mL (1 %) injection 1 mL (Completed)          Pt presented for:  Chief Complaint   Patient presents with     right finger issues       Patient here for follow-up and complaining of stiffness,  popping and pain in fourth finger of both hands for the last 3 or 4 months.  Gradually getting worse.  She is never had an injection or surgery for this before.  Wondering if there is a brace she can wear.  Brings in numbers for her diabetes, pulse and blood pressure.  These will be scanned in the chart but they look good.    Brings in the menu from her [employer] kitchen.  Indicates that she is eating better.  In the past and pretty much only junk food but now is including vegetables.  Fortunately she is gaining some weight with this but she feels better.    Trying to exercise regularly, having some low back pain I have encouraged her to push through.    Exam today was done, consistent with mild trigger finger both fourth fingers.  Injected as above.

## 2021-06-09 NOTE — TELEPHONE ENCOUNTER
Who is calling:  Jo-Ann  Reason for Call:  Patient was out of the facility since Friday, returned on 7/14. Patient missed her 6mg dose on Monday. She is scheduled for her next INR on 7/29. Nurse would like to know if she needs to adjust her dosing and if she should have her INR drawn sooner, the facility does lab draws on Wednesdays.  Date of last appointment with primary care: 7/2/2020  Okay to leave a detailed message: Yes

## 2021-06-09 NOTE — TELEPHONE ENCOUNTER
Please advise on the following. Patient will be in clinic on 7/2 to see .    Patient's ILS worker would like to know if they would be allowed to accompany the patient to their appointment.

## 2021-06-09 NOTE — TELEPHONE ENCOUNTER
Who is calling:  Nurse at the [facility]  Reason for Call:  Patient had a critical INR- the facility did not get the critical results until late yesterday, but did speak to triage and the patient has now been hospitalized- this is an FYI call and no need to call back unless further questions or concerns  Date of last appointment with primary care: n/a  Okay to leave a detailed message: Yes

## 2021-06-09 NOTE — PROGRESS NOTES
Medication Therapy Management Follow Up Visit       ASSESSMENT AND PLAN    Colonoscopy Bowel Prep: Per [facility]  1. Discontinue fiber supplement 1 week prior to procedure  2. Take bisacodyl 4 mg at noon the day prior to procedure  3. Mix gatorade + Miralax - drink 1 glass every 15 minutes starting at 6 PM the night before procedure. Drink until gone  4. Drink Magnesium liquid 4 hours prior to procedure day of   *No food the day prior to procedure. Clear liquid only*    Anticoagulation Prior to Colonoscopy:   1. Stop taking warfarin 5 days prior to colonoscopy (take last dose on Friday 3/17)  2. Start taking enoxaparin BID. She will start taking this on Saturday 3/18. We discussed how to administer and possible adverse effects  3. On the evening after procedure, start taking warfarin in the same dose as previous PLUS restart enoxaparin injections  4. Recheck INR 2-3 days post-procedure - to be set up by anticoagulation team  5. Restart aspirin the day following procedure    *Patient was given a detailed calendar containing instructions for both bowel and anticoagulation instructions. She was able to verbally teach back the plan with no mistakes*    Hypertension: controlled. Continue current therapy      FOLLOW-UP PLAN  Gardenia was advised to follow up in six months.        SUBJECTIVE AND OBJECTIVE  Gardenia Muller is a 66 y.o. female who was referred by Jerson Hernandez MD for MTM services.  Gardenia's chief concern today is enoxaparin teaching for upcoming procedure.     Patient has a colonoscopy scheduled on 3/23.  She is very concerned and confused about colonoscopy prep instructions and how to manage her anticoagulation during this time.  She has never used enoxaparin before, and does not believe she is ever completed a colonoscopy prep before.  Patient appropriately stopped taking aspirin on Tuesday (10 day prior to procedure).  She is on warfarin for atrial fibrillation and recurrent stroke.   Combination of aspirin and warfarin has been recommended by neurology. Warfarin in managed by the anticoagulation team. INRs have remained stable.     Patient extensively checks her blood pressure every day.  Brought in her record today.  Blood pressures generally between /60-80.      Gardenia misses 0 doses of medications per week. She does use a pill box at home - set up by a nurse that visits her senior independent living facility.    We reviewed Gardenia's medication list with them, discussing reason for use, directions for use, and potential side effects of each medication.  Indication, safety, efficacy, and convenience was assessed for all reviewed medications.    Current Outpatient Prescriptions   Medication Sig Dispense Refill     ASCORBIC ACID WITH MARICRUZ HIPS 500 MG tablet TAKE 2 TABLETS (1000MG) BY MOUTH ONCE DAILY. 200 tablet 3     aspirin 81 MG EC tablet TAKE 1 TABLET BY MOUTH ONCE DAILY 120 tablet 2     blood glucose meter (GLUCOMETER) Please Dispense kit per pharmacy discretion and patient's insurance Test blood sugar. 1 each 0     blood glucose test strips Dispense brand per patient's insurance at pharmacy discretion.  Patient to test twice daily 100 each 5     blood glucose test strips Test twice a day.   Dispense brand per patient's insurance at pharmacy discretion. 100 each 4     blood glucose test strips Use 1 each As Directed as needed. Dispense brand per patient's insurance at pharmacy discretion. 100 each 11     blood-glucose meter Misc Use as directed 1 each 0     blood-glucose meter Misc Use 1 Device As Directed once for 1 dose. 1 each 0     CALCIUM 600 600 mg (1,500 mg) tablet TAKE 2 TABLETS (1,200MG) BY MOUTH ONCE DAILY 60 tablet 10     cholecalciferol, vitamin D3, 2,000 unit Tab TAKE 1 TABLET BY MOUTH ONCE DAILY. (Patient taking differently: TAKE 1 TABLET BY MOUTH TWICE DAILY.) 100 each 3     CRESTOR 40 mg tablet TAKE 1 TABLET BY MOUTH AT BEDTIME 30 tablet 10      mg capsule TAKE 1  CAPSULE BY MOUTH TWICE DAILY AT 6AM AND 4PM. 100 capsule 10     enoxaparin (LOVENOX) 80 mg/0.8 mL syringe Inject 0.8 mL (80 mg total) under the skin every 12 (twelve) hours. 20 Syringe 2     FIBER, CALCIUM POLYCARBOPHIL, 625 mg tablet TAKE 2 TABLETS (1250MG) BY MOUTH ONCE DAILY 180 tablet 3     gabapentin (NEURONTIN) 100 MG capsule TAKE 1 CAPSULE BY MOUTH AT BEDTIME 90 capsule 3     generic lancets Use 1 each As Directed as needed. Dispense brand per patient's insurance at pharmacy discretion.       hydrochlorothiazide (HYDRODIURIL) 25 MG tablet TAKE 1 TABLET BY MOUTH ONCE DAILY. 90 tablet 3     lancets 33 gauge Misc Test twice daily Dispense brand per patient's insurance at pharmacy discretion. 100 each 11     lidocaine (LMX5) 5 % Crea Apply 1 application topically every 12 (twelve) hours as needed (pain).  0     lisinopril (PRINIVIL,ZESTRIL) 10 MG tablet Take 1 tablet (10 mg total) by mouth daily. 30 tablet 11     loratadine (CLARITIN) 10 mg tablet Take 1 tablet (10 mg total) by mouth daily. 30 tablet 11     magnesium oxide (MAG-OX) 400 mg tablet TAKE 1 TABLET BY MOUTH TWICE DAILY. 180 tablet 5     MAPAP ARTHRITIS PAIN 650 mg CR tablet TAKE 1 TABLET BY MOUTH THREE TIMES DAILY 300 tablet 3     metoprolol tartrate (LOPRESSOR) 25 MG tablet TAKE 1 TABLET BY MOUTH TWICE DAILY. 60 tablet 11     multivitamin (TAB-A-JULIET) per tablet Take 1 tablet by mouth daily. 100 tablet 11     nitroglycerin (NITROSTAT) 0.4 MG SL tablet Place 1 tablet (0.4 mg total) under the tongue every 5 (five) minutes as needed for chest pain (for up to 3 doses and call 911 if persists). 90 tablet 0     omeprazole (PRILOSEC) 20 MG capsule 20 mg 2 (two) times a day.        omeprazole (PRILOSEC) 20 MG capsule TAKE 1 CAPSULE BY MOUTH TWICE DAILY. 60 capsule 11     polyethylene glycol 3350 Powd Take 17 g by mouth daily as needed (constipation).        polyvinyl alcohol (ARTIFICIAL TEARS, POLYVIN ALC,) 1.4 % ophthalmic solution Administer 1 drop to both  eyes 4 (four) times a day. As directed.       REFRESH OPTIVE 0.5-0.9 % Drop INSTILL TWO DROPS IN BOTH EYES TWICE DAILY AS NEEDED 15 mL 11     sertraline (ZOLOFT) 100 MG tablet TAKE 1 TABLET BY MOUTH ONCE DAILY. 90 tablet 0     traZODone (DESYREL) 50 MG tablet TAKE 1 TABLET BY MOUTH AT BEDTIME. 30 tablet 11     warfarin (COUMADIN) 2 MG tablet TAKE 2 TABLETS (4MG) BY MOUTH ONCE DAILY, Hold coumadin today 6/30 and resume from tomorrow 7/1/16, check INR in 2-3 days 60 tablet 11     No current facility-administered medications for this visit.        Gardenia was provided with a printed AVS, and this care plan was communicated via EMR with her primary care provider, Jerson Hernandez MD, and is the authorizing prescriber for this visit.  Direct supervision was available by either the patient's PCP or another available physician when needed.    Time spent: 60 minutes    Rylee TempletonD  MTM Pharmacist at [facility]

## 2021-06-09 NOTE — TELEPHONE ENCOUNTER
ANTICOAGULATION  MANAGEMENT- Home Care/Care Facility Result    Assessment     Today's INR result of 3.07 is Therapeutic (goal INR of 2.0-3.0)        Warfarin taken as previously instructed    No new diet changes affecting INR    No new medication/supplements affecting INR    Continues to tolerate warfarin with no reported s/s of bleeding or thromboembolism     Previous INR was Therapeutic     Faxed to [phone] [place]    Plan:     Spoke with nurse Jo-Ann discussed INR result and instructed:     Warfarin Dosing Instructions: Continue current warfarin dose 6 mg daily on mon/wed/fri; and 4 mg daily rest of week  (0 % change)    Next INR to be drawn: 4 weeks, 7/29      Jo-Ann verbalizes understanding and agrees to warfarin dosing plan.   ?   Joe Vasquez RN    Subjective/Objective:      Gardenia Muller, a 69 y.o. female is established on warfarin.     Home care/care facility RN's report of [place] INR, recent warfarin dosing, diet changes, medication changes, and symptoms is documented below.    Additional findings: none    Anticoagulation Episode Summary     Current INR goal:   2.0-3.0   TTR:   66.9 % (1 y)   Next INR check:   7/29/2020   INR from last check:   3.07! (7/1/2020)   Weekly max warfarin dose:      Target end date:   Indefinite   INR check location:      Preferred lab:      Send IN[facility] reminders to:   [facility]    Indications    Cerebrovascular disease [I67.9]           Comments:            Anticoagulation Care Providers     Provider Role Specialty Phone number    Jerson Hernandez MD Referring Family Medicine [phone]

## 2021-06-09 NOTE — TELEPHONE ENCOUNTER
Central PA team  [phone]  Pool: HE PA MED ([id number])          PA has been initiated.       PA form completed and faxed insurance via Cover My Meds     Key:  [id number] - PA Case ID: [id number]     Medication:  Omeprazole 20MG dr capsules    Insurance:  [employer]         Response will be received via fax and may take up to 5-10 business days depending on plan

## 2021-06-09 NOTE — TELEPHONE ENCOUNTER
Orders being requested: Delvin disla. The measurements are scanned in media tab on 6/7/2020.  Reason service is needed/diagnosis: swelling and circulation  When are orders needed by: soon.  Where to send Orders: [facility]  fax # [phone]  Okay to leave detailed message?  Yes

## 2021-06-09 NOTE — TELEPHONE ENCOUNTER
Received a faxed INR result for Gardenia Muller  From [facility] lab - [place] @ [place]  INR result dated 7/1/2020 is 3.07

## 2021-06-09 NOTE — TELEPHONE ENCOUNTER
Called and left detailed message letting Marlena know it is okay she comes to [place] appointment.

## 2021-06-09 NOTE — TELEPHONE ENCOUNTER
Upcoming Appointment Question  When is the appointment: 7/2/20  What is your appointment for?: Right ring finger joint pain  Who is your appointment scheduled with?: PCP only  What is your question/concern?: Patient's ILS worker would like to know if they would be allowed to accompany the patient to their appointment.  Okay to leave a detailed message?: Yes

## 2021-06-10 NOTE — PLAN OF CARE
Problem: Potential for Compromised Skin Integrity  Goal: Nutritional status is improving  8/20/2020 1235 by Lavern Eden RN  Outcome: Not Progressing  No complaints of nausea but patient does not have an appetite. Patient did not eat breakfast . Will continue to monitor.

## 2021-06-10 NOTE — ED PROVIDER NOTES
Emergency Department Encounter   NAME: Gardenia Muller ; AGE: 70 y.o. female ; YOB: 1950 ; MRN: [id number] ; EVALUATION DATE & TIME: 8/11/2020 10:16 AM ; PCP: Jerson Hernandez MD   ED PROVIDER: Ofelia Shook PA-C    Chief Complaint   Patient presents with     Abdominal Pain       Medical Decision Making & Final Diagnosis     1. Diverticulitis of large intestine with perforation without bleeding    2. Abdominal pain, right lower quadrant    3. Nausea    4. T wave inversion in EKG       Gardenia Muller is a 70 y.o. female with a relevant PMH of chronic abdominal pain, hypertension, diabetes type II, obesity, acute kidney failure, and anxiety, who presents to the ED for evaluation of abdominal pain.  The patient presents to the emergency department for 2 weeks of ongoing right-sided abdominal pain along with nausea and new vomiting over the past several days, prompting her return here to the ED.  Patient has a history of hernia repairs x4 along with small bowel obstructions, and states that her symptoms feel similar today.  Here in the ED, Gardenia is resting comfortably in the bed and is in no acute distress.  She is afebrile and vitally stable.  She does have generalized discomfort with palpation of her abdomen which is worse over her right mid and lower abdomen without rebound, guarding, or evidence of peritonitis.  Remainder of exam is unremarkable.  We discussed plan at this time for CT, UA, blood work, and EKG to further assess.  Morphine and Zofran ordered for symptomatic relief.  UA shows no evidence of infection - there is a few bacteria however contaminated with squamous epithelial cells.  Hepatic panel mild elevation in total bilirubin at 1.3 and alk phos to 135, however per chart review this is baseline for patient.  On CT there was evidence of biliary and pancreatic duct dilatation, however no discrete pancreatic masses and lipase within normal limits.  This is likely an incidental  finding, however can consider MRCP or EUS during admission. CXR shows no evidence of consolidation or infiltrate concerning for pneumonia.  EKG shows atrial fibrillation with premature ventricular complexes.  Rate controlled with a heart rate of 67 bpm.  New T wave inversions in lateral leads, V4 through V6.  She is not having any active chest pain, has chronic shortness of breath which has improved since her recent hospital admission for CHF exacerbation.  Troponin obtained and negative.  No concern at this time for ACS, and patient will be continued to monitor during admission.  CT shows acute diverticulitis in the sigmoid colon along with a small contained perforation.  No evidence of abscess.  This is the likely source of her pain and nausea today.  She is afebrile and vitally stable, lactate wnl, and only mild elevation in WBC to 11.4 - she does not meet sepsis criteria and remained hemodynamically stable throughout ED visit. Patient has a penicillin allergy, and hence Ciprofloxacin and Flagyl ordered for coverage of diverticulitis. Discussed the case with Dr. Garrett, general surgery, who reccomends keeping patient NPO, holding Coumadin, and continued monitoring - no planned surgical intervention at this time unless status worsens. Discussed the case with hospitalist, who accepts the patient for med/tele admission. Patient is comfortable with plan for admission and symptoms have improved with Morphine and zofran. Patient is a diabetic and has not had any oral intake today. BS came back at 72 - as she will remain NPO, 1/2 amp of D50 ordered along with 50 cc/hr maintenance fluids. Patient confirmed full code status, and was admitted in stable condition. I have staffed the patient with Dr. Howe, ED MD, who has evaluated the patient and agrees with all aspects of today's care.     ED Course   10:28 AM I met and introduced myself to the patient. I gathered initial history and performed my physical exam. We  discussed plan for initial workup. Due to contagious pathogen concern full protective equipment was utilized through the duration of the interaction including N95  mask, face shield, hair cover, and gloves.   12:30 PM  I staffed the patient with Dr. Norberto Howe MD.  12:32 PM I paged for general surgery as well as the admitting physician.  12:32 PM I rechecked on the patient who informed me that her pain and nausea have improved.  12:39 PM I spoke with Dr. Bach in surgery.   12:41 PM I spoke with the hospitalist, Dr. Pink, about admission of patient.    MEDICATIONS GIVEN IN THE EMERGENCY:   Medications   naloxone injection 0.2-0.4 mg (NARCAN) (has no administration in time range)     Or   naloxone injection 0.2-0.4 mg (NARCAN) (has no administration in time range)   ciprofloxacin IVPB 400 mg (CIPRO) (has no administration in time range)   metroNIDAZOLE IVPB 500 mg (FLAGYL) (has no administration in time range)   morphine injection 4 mg (has no administration in time range)   morphine injection 4 mg (4 mg Intravenous Given 8/11/20 1115)   ondansetron injection 4 mg (ZOFRAN) (4 mg Intravenous Given 8/11/20 1115)   iohexoL 350 mg iodine/mL injection 75 mL (OMNIPAQUE) (75 mL Intravenous Given 8/11/20 1134)      NEW PRESCRIPTIONS STARTED AT TODAY'S ER VISIT:  Current Discharge Medication List      CONTINUE these medications which have NOT CHANGED    Details   atorvastatin (LIPITOR) 80 MG tablet Take 1 tablet (80 mg total) by mouth at bedtime. Start only after your liver function test is normal  Qty: 30 tablet, Refills: 11    Associated Diagnoses: PAD (peripheral artery disease) (H); Depression, unspecified depression type; Type 2 diabetes mellitus with other circulatory complication, unspecified whether long term insulin use (H)      cholecalciferol, vitamin D3, 2,000 unit Tab TAKE 1 TABLET BY MOUTH ONCE DAILY. *1 TOTAL FILL*  Qty: 30 each, Refills: 11    Comments: URGENT! OUT OF REFILLS  Associated Diagnoses:  Vitamin D deficiency      digoxin (LANOXIN) 250 mcg (0.25 mg) tablet Take 1 tablet (250 mcg total) by mouth daily with supper.  Qty: 30 tablet, Refills: 0    Associated Diagnoses: Persistent atrial fibrillation (H)      metoprolol succinate (TOPROL-XL) 25 MG Take 0.5 tablets (12.5 mg total) by mouth at bedtime.  Qty: 30 tablet, Refills: 0    Associated Diagnoses: Essential hypertension      ARTHRITIS PAIN RELIEF, ACETAM, 650 mg CR tablet Take 650 mg by mouth 2 (two) times a day.             aspirin 81 mg chewable tablet Chew 1 tablet (81 mg total) daily. Stop after 3 months  Qty: 90 tablet, Refills: 0    Associated Diagnoses: PAD (peripheral artery disease) (H); Coronary artery disease involving native coronary artery of native heart without angina pectoris      blood glucose meter (GLUCOMETER) Please Dispense kit per pharmacy discretion and patient's insurance Test blood sugar.  Qty: 1 each, Refills: 0      blood glucose test strips Use 1 each As Directed as needed. Dispense brand per patient's insurance at pharmacy discretion.  Qty: 50 strip, Refills: 11    Associated Diagnoses: Diabetes mellitus due to underlying condition with hyperglycemia (H)      carboxymethylcellulose (REFRESH PLUS) 0.5 % Dpet ophthalmic dropperette Administer 2 drops to both eyes every hour as needed (dry eyes).  Refills: 0    Associated Diagnoses: Dry eyes      furosemide (LASIX) 20 MG tablet Take 1 tablet (20 mg total) by mouth daily.  Qty: 30 tablet, Refills: 0    Associated Diagnoses: Chronic heart failure with preserved ejection fraction (H)      lancets 33 gauge Misc Test twice daily Dispense brand per patient's insurance at pharmacy discretion.  Qty: 100 each, Refills: 11    Associated Diagnoses: Type 2 diabetes mellitus with other circulatory complication, unspecified long term insulin use status      lisinopriL (PRINIVIL,ZESTRIL) 5 MG tablet Take 1 tablet (5 mg total) by mouth daily.  Qty: 30 tablet, Refills: 0    Associated  Diagnoses: Essential hypertension      loratadine (CLARITIN) 10 mg tablet TAKE 1 TABLET BY MOUTH ONCE DAILY. *1 TOTAL FILL*  Qty: 30 tablet, Refills: 11    Comments: URGENT!  Associated Diagnoses: Allergic rhinitis      magnesium gluconate (MAGONATE) 27 mg magnesium (500 mg) Tab tablet Take 1 tablet (27 mg total) by mouth 2 (two) times a day.  Qty: 60 tablet, Refills: 11    Associated Diagnoses: Essential hypertension      metFORMIN (GLUCOPHAGE) 500 MG tablet Take 1 tablet (500 mg total) by mouth 2 (two) times a day with meals.  Qty: 60 tablet, Refills: 0    Associated Diagnoses: Type 2 diabetes mellitus with other circulatory complication, unspecified whether long term insulin use (H)      multivitamin therapeutic tablet Take 1 tablet by mouth daily.      nitroglycerin (NITROSTAT) 0.4 MG SL tablet Place 1 tablet (0.4 mg total) under the tongue every 5 (five) minutes as needed for chest pain (for up to 3 doses and call 911 if persists).  Qty: 90 tablet, Refills: 0      omeprazole (PRILOSEC) 20 MG capsule TAKE 1 CAPSULE BY MOUTH TWICE DAILY. *1 TOTAL FILL*  Qty: 60 capsule, Refills: 11    Comments: URGENT  Associated Diagnoses: GERD (gastroesophageal reflux disease)      ondansetron (ZOFRAN-ODT) 4 MG disintegrating tablet Take 4 mg by mouth every 4 (four) hours as needed for nausea.      polyethylene glycol (GLYCOLAX) 17 gram/dose powder Take 17 g by mouth daily as needed.       potassium chloride (K-DUR,KLOR-CON) 20 MEQ tablet TAKE 1 TABLET BY MOUTH TWICE DAILY. *1 TOTAL FILL*  Qty: 60 tablet, Refills: 11    Comments: URGENT PT ONLY HAS A WEEK SUPPLY REMAINING  Associated Diagnoses: Hypokalemia      senna-docusate (PERICOLACE) 8.6-50 mg tablet Take 1 tablet by mouth 2 (two) times a day.  Refills: 0    Associated Diagnoses: Slow transit constipation      sertraline (ZOLOFT) 100 MG tablet TAKE 1 TABLET BY MOUTH ONCE DAILY. *1 TOTAL FILL*  Qty: 30 tablet, Refills: 11    Comments: will be out very soon  Associated  Diagnoses: Other depression      spironolactone (ALDACTONE) 25 MG tablet TAKE 1 TABLET BY MOUTH ONCE DAILY. *1 TOTAL FILL*  Qty: 30 tablet, Refills: 11    Comments: urgent almost out  Associated Diagnoses: SOB (shortness of breath)      ticagrelor (BRILINTA) 90 mg Tab Take 1 tablet (90 mg total) by mouth 2 (two) times a day. Indefinitely but at least 12 months  Qty: 60 tablet, Refills: 11    Associated Diagnoses: PAD (peripheral artery disease) (H); Coronary artery disease involving native coronary artery of native heart without angina pectoris      traZODone (DESYREL) 50 MG tablet TAKE 1 TABLET BY MOUTH AT BEDTIME. *1 TOTAL FILL*  Qty: 30 tablet, Refills: 11    Comments: URGENT!  Associated Diagnoses: Other depression      warfarin sodium (WARFARIN ORAL) Take by mouth. 8/10/20 INR 2.83  Take 1.5mg daily.  Next INR 8/12/20.            =================================================================   History   Patient information was obtained from: Patient   Use of Intrepreter: N/A  Gardenia Muller is a 70 y.o. female with a relevant PMH of chronic abdominal pain, hypertension, diabetes type II, obesity, acute kidney failure, and anxiety, who presents to the ED for evaluation of abdominal pain.     Patient presents with 2 week history of abdominal pain that has been constant since onset. Patient comes from TCU after recent admission to [facility]. She says that during her hospital visit, she began having abdominal pain. Patient says she pain is not getting better or worse and it is exacerbated with coughing which makes her pain an 8/10. Patient has had multiple emesis episodes with associated nausea in the last 2 weeks and most recently had 3 episodes today. Patient's last bowel movement was on 8/9 (2 days ago) which she describes as diarrhea though notes the diarrhea is chronic. Patient endorses chills and a cough that have persisted since onset. She also states that she has mild shortness of breath that has  been getting better throughout the last 2 weeks. Patient has not eaten or drank anything today because she feels as though she would not be able to retain any food or fluid. She took a nausea pill prior to coming to the ED with relief. Denies melena, dark stool, fever, or any additional symptoms at this time.  ______________________________________________________________________  Past Medical History:   Diagnosis Date     Acute diastolic heart failure (H) 11/2015     Acute on chronic diastolic heart failure (H) 6/15/2019     Advanced directives, counseling/discussion 8/5/2015     MAY (acute kidney injury) (H) 10/22/2018     Atrial fibrillation (H)      Benign adenomatous polyp of large intestine 3/30/2017     Biliary obstruction 10/3/2018     Cerebral vascular accident (H)      Coronary artery disease 2006     Diabetes mellitus type 2 in obese (H)      Fibrocystic breast      GERD (gastroesophageal reflux disease)      History of anesthesia complications      Hypertension      Peripheral vascular disease (H)      Pneumonia 11/11/2018     PONV (postoperative nausea and vomiting)      S/P cholecystectomy 6/22/2018     SBO (small bowel obstruction) (H) 11/12/2015     Stroke (H)      Transaminitis 10/22/2018     Upper respiratory infection 12/20/2018     Urinary incontinence        Past Surgical History:   Procedure Laterality Date     CARDIAC CATHETERIZATION       CARDIOVERSION  10/18/2018     CORONARY STENT PLACEMENT  1995    stent     CV CORONARY ANGIOGRAM N/A 7/27/2020    Procedure: Coronary Angiogram;  Surgeon: Luis Monge MD;  Location: [facility] Cath Lab;  Service: Cardiology     CV RIGHT HEART CATH SHUNT RUN  7/27/2020    Procedure: Right Heart Catheterization Shunt Run;  Surgeon: Luis Monge MD;  Location: [facility] Cath Lab;  Service: Cardiology     ERCP N/A 10/5/2018    Procedure: ENDOSCOPIC RETROGRADE CHOLANGIOPANCREATOGRAPHY, SPHINCTEROTOMY;  Surgeon: Anson Stokes MD;  Location: [facility] Main OR;  Service:      EXPLORATORY LAPAROTOMY N/A 6/29/2018    Procedure: LAPAROTOMY, CLOSURE OF PERITONEAL RENT;  Surgeon: Jones Harding DO;  Location: [facility] Main OR;  Service:      LAPAROSCOPIC CHOLECYSTECTOMY N/A 10/7/2018    Procedure: CHOLECYSTECTOMY, LAPAROSCOPIC;  Surgeon: Felicia So MD;  Location: [facility] Main OR;  Service:      PICC  6/29/2018          PIC  10/21/2018          VENTRAL HERNIA REPAIR N/A 11/12/2015    Procedure: STRANGULATED VENTRAL HERNIA REPAIR ;  Surgeon: Ernesto Bailey MD;  Location: [facility] Main OR;  Service:        Family History   Problem Relation Age of Onset     Cancer Paternal Grandmother      Cancer Paternal Uncle      No Medical Problems Mother      No Medical Problems Father      Heart attack Sister      Chronic Kidney Disease Sister      No Medical Problems Daughter      No Medical Problems Maternal Grandmother      No Medical Problems Maternal Grandfather      No Medical Problems Paternal Grandfather      No Medical Problems Maternal Aunt      No Medical Problems Paternal Aunt      Diabetes Brother      BRCA 1/2 Neg Hx      Breast cancer Neg Hx      Colon cancer Neg Hx      Endometrial cancer Neg Hx      Ovarian cancer Neg Hx        Social History     Tobacco Use     Smoking status: Former Smoker     Packs/day: 0.25     Smokeless tobacco: Former User     Tobacco comment: e-cig a few times/day   Substance Use Topics     Alcohol use: No     Drug use: No       REVIEW OF SYSTEMS:    Review of Systems   Constitutional: Positive for chills. Negative for fever.   Respiratory: Positive for cough and shortness of breath (Mild).    Cardiovascular: Negative for chest pain and leg swelling.   Gastrointestinal: Positive for abdominal pain, diarrhea, nausea and vomiting. Negative for blood in stool.   Genitourinary: Negative.  Negative for dysuria, flank pain and hematuria.   Musculoskeletal: Negative for back pain.   Skin: Negative for rash.   Neurological:  Negative for dizziness.   All other systems reviewed and are negative.        Physical Exam   BP 99/51   Pulse (!) 59   Temp 97.6  F (36.4  C) (Oral)   Resp 18   LMP 01/25/1999   SpO2 99%     Physical Exam   Constitutional: She appears well-developed and well-nourished. No distress.   HENT:   Head: Normocephalic.   Wearing mask.   Eyes: Pupils are equal, round, and reactive to light. Conjunctivae are normal.   Cardiovascular: Normal rate, regular rhythm, normal heart sounds and intact distal pulses.   No murmur heard.  Pulmonary/Chest: Effort normal and breath sounds normal. No respiratory distress. She has no wheezes. She has no rales.   Abdominal: Soft. Normal appearance and bowel sounds are normal. She exhibits no distension. There is abdominal tenderness (generalized, worse over right mid abdomen). There is CVA tenderness (right sided). There is no rigidity, no rebound and no guarding.   Small umbilical hernia that is soft and easily reducible.   Neurological: She is alert.   Skin: Skin is warm and dry. She is not diaphoretic.   Nursing note and vitals reviewed.       Lab Work (Reviewed and Interpreted):   Labs Reviewed   URINALYSIS,MACRO REFLEX MICRO, UC IF INDICATED - Abnormal; Notable for the following components:       Result Value    Ketones, UA Trace (*)     Protein, UA Trace (*)     Bacteria, UA Few (*)     Squam Epithel, UA 5-10 (*)     Mucus, UA Few (*)     Hyaline Casts, UA 5-10 (*)     All other components within normal limits    Narrative:     UC not indicated   BASIC METABOLIC PANEL - Abnormal; Notable for the following components:    Sodium 135 (*)     GFR MDRD Non Af Amer 58 (*)     All other components within normal limits    Narrative:     Fasting Glucose reference range is 70-99 mg/dL per  American Diabetes Association (ADA) guidelines.   HEPATIC PROFILE - Abnormal; Notable for the following components:    Bilirubin, Total 1.3 (*)     Bilirubin, Direct 0.8 (*)     Protein, Total 8.2 (*)      Albumin 3.0 (*)     Alkaline Phosphatase 135 (*)     All other components within normal limits   HM1 (CBC WITH DIFF) - Abnormal; Notable for the following components:    WBC 11.4 (*)     MCHC 29.7 (*)     RDW 16.9 (*)     Neutrophils % 73 (*)     Lymphocytes % 15 (*)     Neutrophils Absolute 8.3 (*)     Monocytes Absolute 1.1 (*)     All other components within normal limits   INR - Abnormal; Notable for the following components:    INR 2.65 (*)     All other components within normal limits    Narrative:     INR Therapeutic Ranges:  Mech. Valve 2.5-3.5  Post Surg.  2.0-3.0  DVT/PE      2.0-3.0   LIPASE - Normal   TROPONIN I - Normal   LACTIC ACID - Normal   POCT GLUCOSE   HM1(CBC WITH DIFFERENTIAL)    Narrative:     The following orders were created for panel order HM1(CBC and Differential).  Procedure                               Abnormality         Status                     ---------                               -----------         ------                     HM1 (CBC with Diff)[[id number]]          Abnormal            Final result                 Please view results for these tests on the individual orders.   RAINBOW DRAW    Narrative:     The following orders were created for panel order [place] Draw.  Procedure                               Abnormality         Status                     ---------                               -----------         ------                     Light Blue Top[[id number]]                                   In process                 Light Green Top[[id number]]                                  In process                 Red Top[[id number]]                                          In process                 Lavender Top[[id number]]                                     In process                   Please view results for these tests on the individual orders.   LIGHT BLUE TOP(CITRATE 2.7ML)   GREEN TOP(LI HEP 4ML)   RED TOP(PLAIN 4ML)   LAVENDER TOP(EDTA 4ML)       Imaging (Reviewed and  Interpreted):   Xr Chest 2 Views    Result Date: 8/11/2020  EXAM: XR CHEST 2 VIEWS LOCATION: [facility] DATE/TIME: 8/11/2020 11:50 AM INDICATION: Cough. COMPARISON: None.     Negative chest. Lungs are clear. Coronary stenting.     Ct Abdomen Pelvis Without Oral With Iv Contrast    Result Date: 8/11/2020  EXAM: CT ABDOMEN PELVIS WO ORAL W IV CONTRAST LOCATION: [facility] DATE/TIME: 8/11/2020 11:41 AM INDICATION: right sided abdominal pain, nausea and vomiting COMPARISON: 7/22/2020 TECHNIQUE: CT scan of the abdomen and pelvis was performed following injection of IV contrast. Multiplanar reformats were obtained. Dose reduction techniques were used. CONTRAST: Iohexol (Omni) 75mL FINDINGS: LOWER CHEST: Minimal interstitial edema. The heart is mildly enlarged. There is coronary artery disease. HEPATOBILIARY: Cholecystectomy. There is intrahepatic and extrahepatic bile duct dilatation. There is pancreatic duct dilatation. PANCREAS: No discrete pancreatic masses identified. SPLEEN: Normal. ADRENAL GLANDS: Normal. KIDNEYS/BLADDER: Normal. BOWEL: There is colonic diverticulosis. There is acute diverticulosis involving the distal sigmoid colon with a small contained perforation. No drainable abscess at this point. LYMPH NODES: Normal. VASCULATURE: Atherosclerotic disease. Right renal artery stent. PELVIC ORGANS: Fibroid uterus. MUSCULOSKELETAL: Degenerative changes.     1.  Acute diverticulitis with a small contained perforation. No drainable abscess at this point. 2.  Biliary and pancreatic duct dilatation. No discrete pancreatic masses identified. Consider MRCP, ERCP, EUS.      EKG (Reviewed and Interpreted):   Ventricular Rate: 67 bpm  QRS Duration: 76 ms  QT/QTc: 324/342 ms  Interpretation: Atrial fibrillation with premature ventricular or aberrantly conducted complexes. Non-STEMI    EKG results reviewed and interpreted by Dr. Norberto Howe ED MD.     Luis M VASQUEZ am serving as a scribe to  document services personally performed by Ofelia Shook PA-C, based on my observation and the provider's statements to me. I, Ofelia Shook PA-C attest that Luis M Lyle is acting in a scribe capacity, has observed my performance of the services and has documented them in accordance with my direction.     Ofelia Shook PA-C   Emergency Medicine   [facility] EMERGENCY DEPARTMENT  [address] [place] [id number]  Dept: [phone]  Loc: [phone]        Ofelia Shook PA-C  08/11/20 1342

## 2021-06-10 NOTE — PROGRESS NOTES
Infectious Disease Follow up Note:    Service: Infectious Disease   Note: Follow Up Note  Date: 8/27/2020    Chief Complaint: Follow up for acute diverticulitis with perforation    ASSESSMENT:     Gardenia Muller is a 70 y.o. old female with acute diverticulitis with perforation.   C. dubliniensis and gram positive cocci in chaing -abdominal infection-- active issue    Fever--active issue- curve improving    Digoxin levels elevated-- active issue. Transferred to ICU on 8/26/2020    History of CVA, obesity, chronic abdominal pain.  Acute diverticulitis with perforation admitted on 8/11/2020.  On IV Cipro and Flagyl.  Cultures from abscess drainage on 8/17/2020 with Candida albicans.  CT scan on 8/20/2020 with near complete resolution of the abscess.    Abscessogram reveals no residual abscess pocket. Fistula to the colon. Switched drain to gravity drainage bag from SHAMIKA suction bulb. No flushes are needed. Follow-up abscessogram in one week.       New leukocytosis.  No clear source.  C. difficile negative on 8/21/2020.  Feeling short of breath.  Chest x-ray ordered. HAP is a possibility.  Leukocytosis could be from untreated Candida infection.  Fluconazole added on 8/21/2020.  WBC better, at 17,000 on 8/22/2020.  Abdomen is better.  New bilateral interstitial infiltrate.  COVID-19 PCR negative on 8/22/2020.  Most likely pulmonary edema.  Penicillin allergy, reaction swelling.     Recommendations:     Follow culture results  Continue Meropenem and fluconazole  Repeat blood culture  Stopped Vancomycin and started Daptomycin on 8/26  Monitor white blood cell count..  Monitor respiratory status and abdomen.  Hold statin  Cardiology and ICU team following    Drain management per IR and Surgery  Discussed with Pharm D  Monitor CBC,CMP  Discussed with patient's nurse       Please see previous note by Dr. Christopher Vela MD  [facility]  [phone]          ______________________________________________________________________  Notes / labs / vitals reviewed.    SUBJECTIVE / INTERVAL HISTORY:   Wakes up, slightly better mentation    Sleeping, wakes up easily  Previous note:    Intermittent abdominal pain  Tolerating abx  Needs help with sitting up in bed      : Abdomen is better.  Continue to have respiratory distress.  Now on a lot of oxygen.  COVID-19 negative.    ROS: A complete 12 point ROS is negative except as listed above.    PMHx/FHx/SHx: Reviewed. Interval changes noted.    OBJECTIVE:  Vitals:    20 2300   BP:    Pulse: 75   Resp: (!) 36   Temp:    SpO2: 96%       Temp (24hrs), Av.8  F (36.6  C), Min:97  F (36.1  C), Max:98.5  F (36.9  C)    Exam on 2020   GEN: Sleepy  EYES:  Anicteric, RAYMUNDO, EOM intact.  HEAD, EARS, NOSE, MOUTH, AND THROAT:  Oropharynx without thrush or ulcers  NECK:  Supple.   RESPIRATORY:  Normal breathing pattern. Supplemental oxygen  CARDIOVASCULAR:  Arm swelling  Previous exam: S1 S2 No murmur, click, gallop or rub. No dependent edema. No excess JVD.  ABDOMEN:  Soft, tenderness, SHAMIKA drain with serous drainage  MUSCULOSKELETAL:  Joints without effusion or acute inflammation. No muscle atrophy.Dedconditioned  LYMPHATIC:  No cervical or supraclavicular adenopathy  SKIN/HAIR/NAILS:  No rashes. No stigmata of infective endocarditis.  NEUROLOGIC:  Gross neurological exam non-focal.  PSYCHIATRIC:  A&Ox3, appropriate, mentation normal.    Antibiotics:  IV vancomycin --> Daptomycin  IV meropenem  IV fluconazole    Pertinent labs:    Results from last 7 days   Lab Units 20  0406 20  0526 20  0555  20  1839 20  0542   LN-WHITE BLOOD CELL COUNT thou/uL 18.3* 15.6* 15.9*   < > 17.0* 17.4*   LN-HEMOGLOBIN g/dL 9.1* 7.6* 7.9*   < > 8.1* 8.3*   LN-HEMATOCRIT % 28.3* 23.7* 24.5*   < > 24.8* 24.7*   LN-PLATELET COUNT thou/uL 111* 106* 109*   < > 115* 115*   LN-MONOCYTES -  REL (DIFF) % 6  --   --   --  4 8    < > = values in this interval not displayed.       Results from last 7 days   Lab Units 08/27/20 2011 08/27/20  1719 08/27/20  1207 08/27/20  0753 08/27/20  0405  08/26/20  1805  08/24/20  1638   LN-SODIUM mmol/L  --   --  139 139 140   < > 138   < >  --    LN-POTASSIUM mmol/L 3.7 3.8 3.8 4.2 4.6   < > 5.8*   < >  --    LN-CHLORIDE mmol/L  --   --  107 108* 108*   < > 109*   < >  --    LN-CO2 mmol/L  --   --  20* 20* 17*   < > 10*   < >  --    LN-BLOOD UREA NITROGEN mg/dL  --   --  43* 40* 39*   < > 33*   < >  --    LN-CREATININE mg/dL  --   --  2.92* 2.88* 2.86*   < > 2.60*   < > 1.08   LN-CALCIUM mg/dL  --   --  8.7 8.6 9.0   < > 9.7   < >  --    LN-ALBUMIN g/dL  --   --   --   --  1.9*  --  1.9*  --  1.8*   LN-PROTEIN TOTAL g/dL  --   --   --   --  6.6  --  6.6  --  5.6*   LN-BILIRUBIN TOTAL mg/dL  --   --   --   --  2.0*  --  1.9*  --  1.2*   LN-ALKALINE PHOSPHATASE U/L  --   --   --   --  147*  --  143*  --  109   LN-ALT (SGPT) U/L  --   --   --   --  166*  --  116*  --  32   LN-AST (SGOT) U/L  --   --   --   --  2,613*  --  1,543*  --  25    < > = values in this interval not displayed.       MICROBIOLOGY DATA:    Cultures reviewed  Culture:  C. dubliniensis     IMAGING/RADIOLOGY:  Reviewed  XR Chest 1 View Portable (Order [id number])   Imaging         Study Result     EXAM: XR CHEST 1 VIEW PORTABLE  LOCATION: [facility]  DATE/TIME: 8/21/2020 7:34 PM     INDICATION: r/o pna- shortness of breath  COMPARISON: 08/11/2020     IMPRESSION:   New bilateral interstitial infiltrates could represent edema or pneumonia including COVID. Enlarged cardiac silhouette. No pleural effusion. No definite pulmonary vascular congestion.     Abscessogram reveals no residual abscess pocket. Fistula to the colon. Switched drain to gravity drainage bag from SHAMIKA suction bulb. No flushes are needed. Follow-up abscessogram in one week.

## 2021-06-10 NOTE — CONSULTS
Consultation - Nela Camacho PA-C    Gardenia Muller,  1950, MRN [id number]    [facility]  Abdominal pain, right lower quadrant [R10.31]  Nausea [R11.0]  Pancreatic duct dilated [K86.89]  T wave inversion in EKG [R94.31]  Diverticulitis of large intestine with perforation without bleeding [K57.20]    PCP: Jerson Hernandez MD, [phone]   Code status:  Full Code       Extended Emergency Contact Information  Primary Emergency Contact: Errol Mullerelle  Address: [address] [place]  Home Phone: [phone]  Relation: Child  Secondary Emergency Contact: Tanika Aguilar  Address: [address], [place] [id number] [place] [address] of [place]  Mobile Phone: [phone]  Relation: Grandchild       Assessment and Plan   Principal Problem:    Diverticulitis    Acute perforated diverticulitis with small abscess-IV antibiotics.  Patient has significant medical history which puts her at very high risk for surgery.  Keep n.p.o. but okay for oral medications with sip of water and ice chips.  We will continue to follow.  Thank you for consulting us.     Chief Complaint Diverticulitis       HPI   We have been requested by Dr. Shook to evaluate Gardenia Muller, who is a 70 y.o. year old female, for diverticulitis with abscess.  Patient is a 70-year-old female with significant history of atrial fibrillation on Coumadin, chronic diastolic congestive heart failure, type 2 diabetes with vascular disease, history of CVA, chronic abdominal pain with atherosclerotic narrowing of SMA, recent hospitalization for therapeutic INR of 13 findings of combination of liver failure and warfarin ultimately diagnosed with liver failure from congestive heart failure and underwent right and left heart cath on 2020, surgical strangulated ventral hernia repair in 2015, robotic tar in 2018 and cholecystectomy in 2018 presented to the emergency room with chief  complaint of right lower quadrant pain for the past 2 weeks.  Pain has been constant.  Is associated nausea and vomiting.  Has had a low appetite.  Pain worsened when she try to take something for a stool softener.  She is feeling chilled without fevers.  Had some loose stools her last bowel movement was Sunday 3 days ago.  Denies ever having diverticulitis in the past.  She states that she has had colonoscopies in the past and believes she is due when she is 75.       Medical History  Active Ambulatory (Non-Hospital) Problems    Diagnosis     Acute kidney failure, unspecified (H)     Acute liver failure without hepatic coma     Hematochezia     Hyperkalemia     Acute on chronic combined systolic (congestive) and diastolic (congestive) heart failure (H)     Ischemic cardiomyopathy     Trigger finger, acquired     Class 1 obesity with serious comorbidity and body mass index (BMI) of 33.0 to 33.9 in adult, unspecified obesity type     Dysuria     Hypertrophy of nail     Severe protein-calorie malnutrition (H)     Weight loss, non-intentional     Warfarin anticoagulation     Chronic abdominal pain     Intestinal angina (H)     Microalbuminuria     Hyperparathyroidism (H)     Onychogryphosis     Lipoma of back     Physical deconditioning     MAY (acute kidney injury) (H)     Transaminitis     Persistent atrial fibrillation (H)     Anticoagulant long-term use     Acalculous cholecystitis     (HFpEF) heart failure with preserved ejection fraction (H)     Anxiety     Ventral hernia without obstruction or gangrene     Coronary artery disease involving native coronary artery of native heart without angina pectoris     Dyslipidemia     Incisional hernia, without obstruction or gangrene     RLS (restless legs syndrome)     Diverticular disease of large intestine     Cerebrovascular disease     Healthcare maintenance     Cystitis     Acute diastolic heart failure (H)     Advanced directives, counseling/discussion     Type 2  diabetes mellitus with circulatory disorder (H)     Spinal stenosis     PAD (peripheral artery disease) (H)     Dermatosis Papulosa Nigra     Depression     Hypercalcemia     Right Renal Artery Stenosis     Osteoarthritis     Abnormality Of Walk     Essential hypertension     Cerebral infarction (H)     Anemia     Past Medical History:   Diagnosis Date     Acute diastolic heart failure (H) 11/2015     Acute on chronic diastolic heart failure (H) 6/15/2019     Advanced directives, counseling/discussion 8/5/2015     MAY (acute kidney injury) (H) 10/22/2018     Atrial fibrillation (H)      Benign adenomatous polyp of large intestine 3/30/2017     Biliary obstruction 10/3/2018     Cerebral vascular accident (H)      Coronary artery disease 2006     Diabetes mellitus type 2 in obese (H)      Fibrocystic breast      GERD (gastroesophageal reflux disease)      History of anesthesia complications      Hypertension      Peripheral vascular disease (H)      Pneumonia 11/11/2018     PONV (postoperative nausea and vomiting)      S/P cholecystectomy 6/22/2018     SBO (small bowel obstruction) (H) 11/12/2015     Stroke (H)      Transaminitis 10/22/2018     Upper respiratory infection 12/20/2018     Urinary incontinence     Surgical History  She  has a past surgical history that includes Ventral hernia repair (N/A, 11/12/2015); Coronary stent placement (1995); Cardiac catheterization; PICC (6/29/2018); Exploratory laparotomy (N/A, 6/29/2018); ERCP (N/A, 10/5/2018); Laparoscopic cholecystectomy (N/A, 10/7/2018); Cardioversion (10/18/2018); PICC (10/21/2018); Coronary Angiogram (N/A, 7/27/2020); and Right Heart Catheterization [place] (7/27/2020).   Social History  Reviewed, and she  reports that she has quit smoking. She smoked 0.25 packs per day. She has quit using smokeless tobacco. She reports that she does not drink alcohol or use drugs.   Allergies  Allergies   Allergen Reactions     Penicillins Swelling    Family  History  Reviewed, and family history includes Cancer in her paternal grandmother and paternal uncle; Chronic Kidney Disease in her sister; Diabetes in her brother; Heart attack in her sister; No Medical Problems in her daughter, father, maternal aunt, maternal grandfather, maternal grandmother, mother, paternal aunt, and paternal grandfather.   Psychosocial Needs  Social History     Social History Narrative    Single/, lives alone; daughter in [place];    Gets help from neighbors and extended family    Former smoker.no alcohol problems     Additional psychosocial needs reviewed per nursing assessment.     Prior to Admission Medications   Medications Prior to Admission   Medication Sig Dispense Refill Last Dose     ARTHRITIS PAIN RELIEF, ACETAM, 650 mg CR tablet Take 650 mg by mouth 2 (two) times a day.          8/10/2020 at 2000     aspirin 81 mg chewable tablet Chew 1 tablet (81 mg total) daily. Stop after 3 months 90 tablet 0 8/10/2020 at 0800     atorvastatin (LIPITOR) 80 MG tablet Take 1 tablet (80 mg total) by mouth at bedtime. Start only after your liver function test is normal 30 tablet 11 8/10/2020 at 2000     carboxymethylcellulose (REFRESH PLUS) 0.5 % Dpet ophthalmic dropperette Administer 2 drops to both eyes every hour as needed (dry eyes).  0      cholecalciferol, vitamin D3, 2,000 unit Tab TAKE 1 TABLET BY MOUTH ONCE DAILY. *1 TOTAL FILL* 30 each 11 8/10/2020 at 0800     digoxin (LANOXIN) 250 mcg (0.25 mg) tablet Take 1 tablet (250 mcg total) by mouth daily with supper. 30 tablet 0 8/10/2020 at 1700     furosemide (LASIX) 20 MG tablet Take 1 tablet (20 mg total) by mouth daily. 30 tablet 0 8/10/2020 at 0800     lisinopriL (PRINIVIL,ZESTRIL) 5 MG tablet Take 1 tablet (5 mg total) by mouth daily. 30 tablet 0 8/10/2020 at 0800     loratadine (CLARITIN) 10 mg tablet TAKE 1 TABLET BY MOUTH ONCE DAILY. *1 TOTAL FILL* 30 tablet 11 8/10/2020 at 0800     magnesium gluconate (MAGONATE) 27 mg magnesium  (500 mg) Tab tablet Take 1 tablet (27 mg total) by mouth 2 (two) times a day. 60 tablet 11 8/10/2020 at 2000     metFORMIN (GLUCOPHAGE) 500 MG tablet Take 1 tablet (500 mg total) by mouth 2 (two) times a day with meals. 60 tablet 0 8/10/2020 at am     metoprolol succinate (TOPROL-XL) 25 MG Take 0.5 tablets (12.5 mg total) by mouth at bedtime. 30 tablet 0 8/10/2020 at 2000     multivitamin therapeutic tablet Take 1 tablet by mouth daily.   8/10/2020 at 0800     nitroglycerin (NITROSTAT) 0.4 MG SL tablet Place 1 tablet (0.4 mg total) under the tongue every 5 (five) minutes as needed for chest pain (for up to 3 doses and call 911 if persists). (Patient taking differently: Place 0.4 mg under the tongue every 5 (five) minutes as needed for chest pain (for up to 3 doses and call 911 if persists). ) 90 tablet 0      omeprazole (PRILOSEC) 20 MG capsule TAKE 1 CAPSULE BY MOUTH TWICE DAILY. *1 TOTAL FILL* 60 capsule 11 8/10/2020 at 2000     ondansetron (ZOFRAN-ODT) 4 MG disintegrating tablet Take 4 mg by mouth every 4 (four) hours as needed for nausea.   8/11/2020 at 0330     polyethylene glycol (GLYCOLAX) 17 gram/dose powder Take 17 g by mouth daily as needed.         senna-docusate (PERICOLACE) 8.6-50 mg tablet Take 1 tablet by mouth 2 (two) times a day.  0 8/10/2020 at 2000     sertraline (ZOLOFT) 100 MG tablet TAKE 1 TABLET BY MOUTH ONCE DAILY. *1 TOTAL FILL* 30 tablet 11 8/10/2020 at 0800     spironolactone (ALDACTONE) 25 MG tablet TAKE 1 TABLET BY MOUTH ONCE DAILY. *1 TOTAL FILL* 30 tablet 11 8/10/2020 at 0800     ticagrelor (BRILINTA) 90 mg Tab Take 1 tablet (90 mg total) by mouth 2 (two) times a day. Indefinitely but at least 12 months 60 tablet 11 8/10/2020 at 2000     traZODone (DESYREL) 50 MG tablet TAKE 1 TABLET BY MOUTH AT BEDTIME. *1 TOTAL FILL* 30 tablet 11 8/10/2020 at 2000     warfarin sodium (WARFARIN ORAL) Take by mouth See Admin Instructions. 8/1-2: 4 mg daily; 8/3-5: 3 mg daily; 8/6-9: held; 8/10-11: 1.5  "mg daily; INR recheck 8/12   8/10/2020 at 2000 (1.5 mg)     blood glucose meter (GLUCOMETER) Please Dispense kit per pharmacy discretion and patient's insurance Test blood sugar. 1 each 0      blood glucose test strips Use 1 each As Directed as needed. Dispense brand per patient's insurance at pharmacy discretion. 50 strip 11      lancets 33 gauge Misc Test twice daily Dispense brand per patient's insurance at pharmacy discretion. 100 each 11           Review of Systems:  A 12 point comprehensive review of systems was negative except as noted. Physical Exam:  Temp:  [97.6  F (36.4  C)-98.6  F (37  C)] 98.1  F (36.7  C)  Heart Rate:  [59-68] 66  Resp:  [18-20] 20  BP: ()/(50-60) 112/53    /53 (Patient Position: Lying)   Pulse 66   Temp 98.1  F (36.7  C) (Oral)   Resp 20   Ht 5' 2\" (1.575 m)   Wt 195 lb 4 oz (88.6 kg)   LMP 01/25/1999   SpO2 98%   BMI 35.71 kg/m    General appearance: alert, appears stated age and cooperative  Neck: no adenopathy, no carotid bruit, no JVD, supple, symmetrical, trachea midline and thyroid not enlarged, symmetric, no tenderness/mass/nodules  Lungs: clear to auscultation bilaterally  Heart: regular rate and rhythm, S1, S2 normal, no murmur, click, rub or gallop  Abdomen: Soft tenderness right lower and left lower quadrant.  Guarding and rebound localized peritoneal signs present.  Extremities: extremities normal, atraumatic, no cyanosis or edema  Pulses: 2+ and symmetric  Skin: Skin color, texture, turgor normal. No rashes or lesions  Lymph nodes: Cervical, supraclavicular, and axillary nodes normal.  Neurologic: Grossly normal       Pertinent Labs  Lab Results: personally reviewed.   Lab Results   Component Value Date     (L) 08/11/2020    K 4.6 08/11/2020     08/11/2020    CO2 22 08/11/2020    BUN 22 08/11/2020    CREATININE 0.95 08/11/2020    CALCIUM 10.3 08/11/2020     Lab Results   Component Value Date    WBC 11.4 (H) 08/11/2020    HGB 12.6 08/11/2020 "    HCT 42.4 08/11/2020    MCV 99 08/11/2020     08/11/2020        Pertinent Radiology  Radiology Results: personally reviewed images and report  Xr Chest 2 Views    Result Date: 8/11/2020  EXAM: XR CHEST 2 VIEWS LOCATION: [facility] DATE/TIME: 8/11/2020 11:50 AM INDICATION: Cough. COMPARISON: None.     Negative chest. Lungs are clear. Coronary stenting.     Ct Abdomen Pelvis Without Oral With Iv Contrast    Result Date: 8/11/2020  EXAM: CT ABDOMEN PELVIS WO ORAL W IV CONTRAST LOCATION: [facility] DATE/TIME: 8/11/2020 11:41 AM INDICATION: right sided abdominal pain, nausea and vomiting COMPARISON: 7/22/2020 TECHNIQUE: CT scan of the abdomen and pelvis was performed following injection of IV contrast. Multiplanar reformats were obtained. Dose reduction techniques were used. CONTRAST: Iohexol (Omni) 75mL FINDINGS: LOWER CHEST: Minimal interstitial edema. The heart is mildly enlarged. There is coronary artery disease. HEPATOBILIARY: Cholecystectomy. There is intrahepatic and extrahepatic bile duct dilatation. There is pancreatic duct dilatation. PANCREAS: No discrete pancreatic masses identified. SPLEEN: Normal. ADRENAL GLANDS: Normal. KIDNEYS/BLADDER: Normal. BOWEL: There is colonic diverticulosis. There is acute diverticulosis involving the distal sigmoid colon with a small contained perforation. No drainable abscess at this point. LYMPH NODES: Normal. VASCULATURE: Atherosclerotic disease. Right renal artery stent. PELVIC ORGANS: Fibroid uterus. MUSCULOSKELETAL: Degenerative changes.     1.  Acute diverticulitis with a small contained perforation. No drainable abscess at this point. 2.  Biliary and pancreatic duct dilatation. No discrete pancreatic masses identified. Consider MRCP, ERCP, EUS.      EKG Results: personally reviewed.        Nela Camacho PA-C  [facility]  [address] [id number] [phone]  pager [phone]

## 2021-06-10 NOTE — TELEPHONE ENCOUNTER
----- Message from Rula Jorgensen CNP sent at 8/13/2020  9:15 AM CDT -----  Regarding: RE: pt in hospital  Yes, please let  to know to reschedule.    Thanks   ----- Message -----  From: Rosangela Farias RN  Sent: 8/13/2020   9:00 AM CDT  To: Rula Jorgensen CNP  Subject: pt in hospital                                   FYI your Friday 8:30 am appointment is in the hospital. Cancel?

## 2021-06-10 NOTE — PLAN OF CARE
Problem: Pain  Goal: Patient's pain/discomfort is manageable  8/27/2020 0106 by Nayla Ponce RN  Outcome: Progressing  8/27/2020 0105 by Nayla Ponce RN  Outcome: Progressing  Note: Not complaining of any pain     Problem: Safety  Goal: Patient will be injury free during hospitalization  8/27/2020 0106 by Nayla Ponce RN  Outcome: Progressing  8/27/2020 0105 by Nayla Ponce RN  Outcome: Progressing     Problem: Daily Care  Goal: Daily care needs are met  8/27/2020 0106 by Nayla Ponce RN  Outcome: Progressing  8/27/2020 0105 by Nayla Ponce RN  Outcome: Progressing     Problem: Psychosocial Needs  Goal: Demonstrates ability to cope with hospitalization/illness  8/27/2020 0106 by Nayla Ponce RN  Outcome: Progressing  8/27/2020 0105 by Nayla Ponce RN  Outcome: Progressing  Goal: Collaborate with patient/family/caregiver to identify patient specific goals for this hospitalization  8/27/2020 0106 by Nayla Ponce RN  Outcome: Progressing  8/27/2020 0105 by Nayla Ponce RN  Outcome: Progressing     Problem: Discharge Barriers  Goal: Patient's discharge needs are met  8/27/2020 0106 by Nayla Ponce RN  Outcome: Progressing  8/27/2020 0105 by Nayla Ponce RN  Outcome: Progressing     Problem: Knowledge Deficit  Goal: Patient/family/caregiver demonstrates understanding of disease process, treatment plan, medications, and discharge instructions  8/27/2020 0106 by Nayla Ponce RN  Outcome: Progressing  8/27/2020 0105 by Nayla Ponce RN  Outcome: Progressing     Problem: Potential for Falls  Goal: Patient will remain free of falls  8/27/2020 0106 by Nayla Ponce RN  Outcome: Progressing  8/27/2020 0105 by Nayla Ponce RN  Outcome: Progressing     Problem: Potential for Compromised Skin Integrity  Goal: Skin integrity is maintained or improved  8/27/2020 0106 by Nayla Ponce RN  Outcome: Progressing  8/27/2020 0105 by Nayla Ponce  RN  Outcome: Progressing  Goal: Nutritional status is improving  8/27/2020 0106 by Nayla Ponce RN  Outcome: Progressing  8/27/2020 0105 by Nayla Ponce RN  Outcome: Progressing     Problem: Urinary Incontinence  Goal: Perineal skin integrity is maintained or improved  8/27/2020 0106 by Nayla Ponce RN  Outcome: Progressing  8/27/2020 0105 by Nayla Ponce RN  Outcome: Progressing     Problem: Glucose Imbalance  Goal: Achieve optimal glucose control  8/27/2020 0106 by Nayla Ponce RN  Outcome: Progressing  8/27/2020 0105 by Nayla Ponce RN  Outcome: Progressing     Problem: Potential for transmission of organism by Contact, Enteric, Droplet and/or Airborne routes  Goal: Prevent transmission of organisms  8/27/2020 0106 by Nayla Ponce RN  Outcome: Progressing  8/27/2020 0105 by Nayla Ponce RN  Outcome: Progressing     Problem: Potential for hemodynamic instability  Goal: Cardiac output is adequate  Outcome: Not Progressing  Note: Heart rate bradycardic. On dopamine, titrate as able

## 2021-06-10 NOTE — PROGRESS NOTES
[facility] Daily Progress Note    Assessment/Plan:  Gardenia Muller is a 70 year  old female with history of HTN, T2DM, chronic atrial fibrillation on warfarin, diastolic CHF, coronary artery disease status post stenting (7/27/2020), peripheral artery disease, calculus cholecystitis status post cholecystectomy, depression, who came to [facility] on 8/11/2020 for abdominal pain and vomiting. CT scan revealed acute diverticulitis with perforation. on 8/14 showed increased size of abscess from 2x2cm to 4x3cm, though pt reports clinical improvement of her symptoms.. General surgery consulted - no current plans for operating room so interventional radiology was consulted for possible drainage which was done on 8/17/2020 by placing CT scan guided drain placement in the left pelvic diverticular abscess repeat CTAP      In the ED, she was hypoglycemic also and she was started on D5 normal saline. Hospitalization complicated by MAY, Nephrology consulted - Cr improving; no plans for dialysis at this time.      Assessment:  Acute perforated diverticulitis with diverticular abscess  Secondary peritonitis  On ciprofloxacin and metronidazole IV  WBC count not improving    Candida bacteremia  In a patient with diabetes mellitus  We will consult infectious diseases regarding management    Acute kidney injury  Creatinine has improved    Acute anemia  Because of the blood loss is not known  Patient will be transfused with 1 unit of blood with hemoglobin of 6.1    Atrial fibrillation  Patient has tachycardia with hypotension -received 250 mL of normal saline earlier with with blood pressure improved  Patient was restarted on digoxin and her home medications metoprolol 12.5 mg daily  Cardiology consult was appreciated    Type 2 diabetes mellitus  On sliding scale insulin    Supratherapeutic INR  Is now better with INR decreasing to 1.5  We will discuss warfarin protocol with pharmacy as INR to be therapeutic with the drain is  in    Coronary artery disease status post stent placement 7/27/2020     Congestive heart failure with preserved LV function    Right foot pain    Acute hypokalemia    Acute hypomagnesemia    Acute anemia    GERD   Depression          Plan:  Restart home medications including digoxin  Transfuse 1 unit of blood  Check CBC in the morning  Consult infectious diseases regarding candidemia      Code status:Full Code        Subjective:  Patient states that she has  generalized weakness -denies any bleeding anywhere  She is unable to tell me the color of her stool    Review of Systems:   Denies any significant abdominal pain    Current Medications Reviewed via EHR List    Objective:  Vital signs in last 24 hours:  Temp:  [98.2  F (36.8  C)-99.1  F (37.3  C)] 98.4  F (36.9  C)  Heart Rate:  [] 69  Resp:  [16-18] 18  BP: ()/(33-75) 113/52  SpO2:  [90 %-95 %] 95 %    Intake/Output last 3 shifts:  I/O last 3 completed shifts:  In: -   Out: 665 [Urine:600; Drains:65]      Physical Exam:  EYES: EOM+    NECK: no JVD  HEART : S1 S2 irregular    LUNGS: Clear to auscultation without Crepitations or Wheezing   ABDOMEN:  Soft, No tenderness, Bowel sounds are positive  CNS  Alert and Oriented x 3 moving all 4 limbs            Imaging:  Results for orders placed or performed during the hospital encounter of 08/11/20   XR Chest 2 Views    Impression    Negative chest. Lungs are clear. Coronary stenting.       CT Abdomen Pelvis Without Oral With IV Contrast    Impression    1.  Acute diverticulitis with a small contained perforation. No drainable abscess at this point.  2.  Biliary and pancreatic duct dilatation. No discrete pancreatic masses identified. Consider MRCP, ERCP, EUS.   CT Abdomen Pelvis Without Oral Without IV Contrast    Impression    1.  Again seen is sigmoid colon diverticulitis with a small contained perforation. A small fluid collection adjacent to the sigmoid colon has increased in size to 4 cm x 3 cm  (previously 2 cm x 2 cm). Percutaneous drainage would be difficult though may   be possible.  2.  Biliary and pancreatic ductal dilatation is not as well-seen as on the comparison study. See prior study for details.   CT Abscess Drain Pelvic    Impression    1. Stable diverticular abscess.  2. Successful CT-guided drain placement into left pelvic diverticular abscess.    Reference CPT Codes: 87800, 45464   XR Foot Right 2 VWS    Impression    Bones are demineralized. No definitive evidence of an acute fracture. No cortical destructive changes to suggest osteomyelitis. Scattered degenerative changes.   CT Abdomen Pelvis With Oral With IV Contrast    Impression    1.  Interval percutaneous drain placement into the diverticular abscess with near complete collapse of the fluid cavity. Drain abuts the adjacent sigmoid colon. No evidence of post drain placement complication.  2.  No new intra-abdominal abscess or other findings to correlate with ongoing leukocytosis.  3.  Stable appearance of the pancreas with previously seen main pancreatic ductal dilatation not well visualized on this study.     *Note: Due to a large number of results and/or encounters for the requested time period, some results have not been displayed. A complete set of results can be found in Results Review.         Lab Results:  Personally Reviewed.   Fingerstick Blood Glucose:   Recent Labs     08/18/20  1807 08/18/20  2112 08/19/20  0841 08/19/20  1154 08/19/20  1657 08/19/20  2035 08/20/20  0857 08/20/20  1245   POCGLUFGR 131 101 85 125 126 147* 140* 132       Recent Results (from the past 24 hour(s))   POCT Glucose    Collection Time: 08/19/20  4:57 PM    Specimen: Blood   Result Value Ref Range    Glucose 126 70 - 139 mg/dL   Urinalysis    Collection Time: 08/19/20  8:08 PM   Result Value Ref Range    Color, UA [place] (!) Colorless, Yellow, Straw, Light Yellow    Clarity, UA Cloudy (!) Clear    Glucose, UA Negative Negative    Bilirubin, UA  Negative Negative    Ketones, UA Trace (!) Negative    Specific Gravity, UA 1.015 1.001 - 1.030    Blood, UA Negative Negative    pH, UA 5.5 4.5 - 8.0    Protein, UA 30 mg/dL (!) Negative mg/dL    Urobilinogen, UA <2.0 E.U./dL <2.0 E.U./dL, 2.0 E.U./dL    Nitrite, UA Negative Negative    Leukocytes, UA Large (!) Negative    Bacteria, UA Moderate (!) None Seen hpf    RBC, UA 0-2 None Seen, 0-2 hpf    WBC, UA 10-25 (!) None Seen, 0-5 hpf    Squam Epithel, UA 25-50 (!) None Seen, 0-5 lpf    Yeast, UA Moderate (!) None Seen hpf   POCT Glucose    Collection Time: 08/19/20  8:35 PM    Specimen: Blood   Result Value Ref Range    Glucose 147 (H) 70 - 139 mg/dL   HM2(CBC W/O DIFF)    Collection Time: 08/20/20  5:44 AM   Result Value Ref Range    WBC 17.1 (H) 4.0 - 11.0 thou/uL    RBC 2.30 (L) 3.80 - 5.40 mill/uL    Hemoglobin 6.6 (LL) 12.0 - 16.0 g/dL    Hematocrit 20.9 (L) 35.0 - 47.0 %    MCV 91 80 - 100 fL    MCH 28.7 27.0 - 34.0 pg    MCHC 31.6 (L) 32.0 - 36.0 g/dL    RDW 17.7 (H) 11.0 - 14.5 %    Platelets 131 (L) 140 - 440 thou/uL    MPV 12.1 8.5 - 12.5 fL   Phosphorus Level > 2.4 no replacement required    Collection Time: 08/20/20  5:44 AM   Result Value Ref Range    Phosphorus 1.7 (L) 2.5 - 4.5 mg/dL   Magnesium    Collection Time: 08/20/20  5:44 AM   Result Value Ref Range    Magnesium 1.8 1.8 - 2.6 mg/dL   Basic Metabolic Panel    Collection Time: 08/20/20  5:44 AM   Result Value Ref Range    Sodium 133 (L) 136 - 145 mmol/L    Potassium 3.3 (L) 3.5 - 5.0 mmol/L    Chloride 102 98 - 107 mmol/L    CO2 21 (L) 22 - 31 mmol/L    Anion Gap, Calculation 10 5 - 18 mmol/L    Glucose 166 (H) 70 - 125 mg/dL    Calcium 6.8 (L) 8.5 - 10.5 mg/dL    BUN 9 8 - 28 mg/dL    Creatinine 1.14 (H) 0.60 - 1.10 mg/dL    GFR MDRD Af Amer 57 (L) >60 mL/min/1.73m2    GFR MDRD Non Af Amer 47 (L) >60 mL/min/1.73m2   POCT Glucose    Collection Time: 08/20/20  8:57 AM    Specimen: Blood   Result Value Ref Range    Glucose 140 (H) 70 - 139 mg/dL    Type and Screen    Collection Time: 08/20/20  9:56 AM   Result Value Ref Range    ABORh B POS     Antibody Screen Negative Negative   Hemoglobin    Collection Time: 08/20/20  9:56 AM   Result Value Ref Range    Hemoglobin 6.1 (LL) 12.0 - 16.0 g/dL   Crossmatch    Collection Time: 08/20/20 12:33 PM   Result Value Ref Range    Crossmatch Compatible     Unit Type B Pos     Unit Number [id number]     Status Issued     Component Red Blood Cells     PRODUCT CODE [id number]     Issue Date and Time [id number]     Blood Type 7300     CODING SYSTEM [id number]    POCT Glucose    Collection Time: 08/20/20 12:45 PM    Specimen: Blood   Result Value Ref Range    Glucose 132 70 - 139 mg/dL           Advanced Care Planning  Discharge plan: Unknown at this time      Michael Abreu  Date: 8/20/2020  Time: 4:09 PM  Hospitalist Service  Part of this chart was created using a dictation software. Typographic errors, word substitutions, and Grammatical errors may unintentionally occur.

## 2021-06-10 NOTE — PROGRESS NOTES
[id number]    Anxious and can be quite demanding of staff. Tolerating fluids. Output to dias amount WDL still looking darker savita. Dressings intact, Assisted with repositioning.

## 2021-06-10 NOTE — PLAN OF CARE
Problem: Potential for hemodynamic instability  Goal: Cardiac output is adequate  Outcome: Progressing   Dopamine gtt continued between 6-8.  Goal SBP greater than 100.  Urine output minimal, dias placed for accurate monitoring.

## 2021-06-10 NOTE — PLAN OF CARE
Problem: Pain  Goal: Patient's pain/discomfort is manageable  Outcome: Progressing   Patient complained of abdominal pain this morning, IV Dialudid given. Patient has not had any further complaints of pain, will continue to monitor.    Problem: Daily Care  Goal: Daily care needs are met  Outcome: Progressing   Patient was seen by Nephrology today, no dialysis indicated at this time. Will start clear liquids. No complaints of nausea. Patient worked with therapy and had been in chair most of the afternoon. Vitals are stable.

## 2021-06-10 NOTE — PROGRESS NOTES
Pt had episode of coughing tonight where she produced moderate amounts of thick, pink sputum.  Pt did recall eating red jello earlier in evening. Her lungs are diminished with some crackles in bases.  O2 sats 90-92% RA.  She does have shortness of breath on exertion.  Dr. Pink made pt NPO for tonight, in case pt aspirating the clear liquids.  Note left for Hospitalist to assess in am.

## 2021-06-10 NOTE — PLAN OF CARE
Care Plan  Care Management      Care Management Goals of the Day: Progression of care    Care Progression Reviewed With: Charge RN, MD, HUC, RNCM, CMSW    Barriers to Discharge: IV meds, ID following    Family Care Conference: na    Discharge Disposition: pending - need new referral sent to [facility] TCU    Expected Discharge Date: tbd    Transportation: [facility]      Care Coordination Narrative: Bed hold at [facility] ends on 8/29. CM to f/u with family/pt regarding private pay coverage or releasing the bed. New referral will be needed if bed hold expires. Covid neg on 8/11, 8/22 and 8/25.    2:05 PM - Confirmed via palliative that family has given up bed hold. Called [facility] to check-in. [facility] was updated that family had given up bed yesterday and came to collect belongings. CM to send a new referral once pt is closer to d/c.    CM following    Rachele, MSW  8/28/2020                Abbreviation  Code:  [facility] Wheelchair ([facility] WC), [facility] Stretcher ([facility] STR), Patient (pt), Transitional Care Unit (TCU), Skilled Nursing Facility (SNF), Physical Therapy (PT), Occupational Therapy (OT), Speech Therapy (ST [place]), Respiratory Therapy (RT)

## 2021-06-10 NOTE — PROGRESS NOTES
Progress Note    Brief summary:   70 year  old female with history of HTN, T2DM, chronic atrial fibrillation on warfarin, diastolic CHF, coronary artery disease status post stenting (7/27/2020), peripheral artery disease, calculus cholecystitis status post cholecystectomy, depression, who came to [facility] on 8/11/2020 for abdominal pain and vomiting. CT scan revealed acute diverticulitis with perforation. on 8/14 showed increased size of abscess from 2x2cm to 4x3cm, though pt reports clinical improvement of her symptoms.. General surgery consulted - no current plans for operating room so interventional radiology was consulted for possible drainage which was done on 8/17/2020 by placing CT scan guided drain placement in the left pelvic diverticular abscess, repeat CTAP (8/20) - near complete collapse of fluid cavity.   Patient developed new leukocytosis.  Chest x-ray showed possible hospital-acquired pneumonia.  Due to concern of leukocytosis being from untreated Candida infection (at least improved Ashley dubliniensis), so she was started on  Fluconazole. WBC slowly improving.    Assessment/Plan  Principal Problem:    Diverticulitis  Active Problems:    Diverticulitis of large intestine with perforation and abscess without bleeding    Atrial fibrillation with rapid ventricular response (H)    Obstructive sleep apnea syndrome      1. Acute perforated diverticulitis with pericolonic abscess: Initially started on Cipro and Flagyl.  Status post CT-guided drain placement on 8/17, culture growing Candida.  CT abdomen on 8/20/2020 with near complete resolution of the abscess.  Patient is now on meropenem, vancomycin and fluconazole.  ID following  2. Hospital-acquired pneumonia: On meropenem and vancomycin.  Covid negative on 8/22/2020.  Given bilateral interstitial infiltrates, will recheck again today.  Continue isolation precautions  3. Leukocytosis: Likely due to above, slowly improving  4. Acute on  chronic diastolic CHF: weight is up by 25 lbs since admission. Given slightly low BP, will do lasix iv 40 daily for now. Daily weights , intake/outputs  5. Acute hypoxic resp failure: Likely from heart failure as well as pneumonia.  Wean off as able  6. Acute anemia: Hemoglobin dropped to 6.1 on 8/20.  Status post 1 unit.  Hemoglobin slowly trending down.  Will transfuse if hemoglobin goes below 7  7. MAY: Resolved  8. Atrial fibrillation: Had RVR on 819.  Resolved with resumption of digoxin and metoprolol. Warfarin held for supratherpeutic INR. Will resume and monitor  9. Type 2 diabetes mellitus:  10. CAD: Status post coronary angiogram and PCI to LAD on 7/27/2020    Subjective  Patient seen and examined  Complained of shortness of breath   No chest pain      Objective    Vital signs in last 24 hours  Temp:  [97.3  F (36.3  C)-98.4  F (36.9  C)] 98.4  F (36.9  C)  Heart Rate:  [50-72] 50  Resp:  [16-18] 18  BP: ()/(37-59) 129/49  Weight:   220 lb 12.8 oz (100.2 kg)    Intake/Output last 3 shifts  I/O last 3 completed shifts:  In: 980 [P.O.:480; I.V.:500]  Out: 5 [Drains:5]  Intake/Output this shift:  No intake/output data recorded.    Physical Exam   General: awake, alert, not in distress  Eyes: no pallor  Chest: Clear to auscultation bilaterally  Heart: RRR, normal S1 and S2  Abdomen: soft, non tender  Neuro: grossly normal      Meds    aspirin  81 mg Oral DAILY     digoxin  250 mcg Oral Daily with supper     fluconazole (DIFLUCAN) IV  400 mg Intravenous Q24H     furosemide  20 mg Oral DAILY     insulin aspart (NovoLOG) injection   Subcutaneous TID with meals     lisinopriL  5 mg Oral DAILY     magnesium chloride  128 mg Oral BID     magnesium gluconate  27 mg Oral BID     meropenem  1 g Intravenous Q8H     metoprolol succinate  12.5 mg Oral DAILY     omeprazole  20 mg Oral BID AC     sertraline  100 mg Oral DAILY     sodium chloride bacteriostatic  1-5 mL Intradermal Once     sodium chloride  10 mL  Intravenous Q8H FIXED TIMES     sodium chloride  10-30 mL Intravenous Q8H FIXED TIMES     spironolactone  25 mg Oral DAILY     ticagrelor  90 mg Oral BID     vancomycin intermittent dosing   Other Med Consult or Protocol       Pertinent Labs   Lab Results: personally reviewed.   not applicable    Results from last 7 days   Lab Units 08/24/20  1638 08/24/20  0555 08/23/20  0754 08/22/20  1839   LN-SODIUM mmol/L  --  136 139 138   LN-POTASSIUM mmol/L  --  3.6 3.7 3.0*   LN-CHLORIDE mmol/L  --  107 108* 109*   LN-CO2 mmol/L  --  21* 20* 21*   LN-BLOOD UREA NITROGEN mg/dL  --  14 10 9   LN-CREATININE mg/dL 1.08 0.94 0.86 0.84   LN-CALCIUM mg/dL  --  8.1* 7.9* 7.6*         Results from last 7 days   Lab Units 08/25/20  0526 08/24/20  0555 08/23/20  0754   LN-WHITE BLOOD CELL COUNT thou/uL 15.6* 15.9* 17.4*   LN-HEMOGLOBIN g/dL 7.6* 7.9* 8.3*   LN-HEMATOCRIT % 23.7* 24.5* 25.9*   LN-PLATELET COUNT thou/uL 106* 109* 112*         Pertinent Radiology   Radiology Results: Personally reviewed impression/s    Ct Abdomen Pelvis Without Oral Without Iv Contrast    Result Date: 8/14/2020  EXAM: CT ABDOMEN PELVIS WO ORAL WO IV CONTRAST LOCATION: [facility] DATE/TIME: 8/14/2020 10:26 AM INDICATION: Abdominal infection suspected perforated diverticulitis, assess for abscess COMPARISON: 8/11/2020 TECHNIQUE: CT scan of the abdomen and pelvis was performed without oral or IV contrast. Multiplanar reformats were obtained. Dose reduction techniques were used. CONTRAST: None. FINDINGS: LOWER CHEST: Trace pleural fluid. Minimal scarring or edema at the lung bases. The heart is enlarged. There is coronary artery calcification. HEPATOBILIARY: Cholecystectomy. There is extrahepatic bile duct dilatation. PANCREAS: Pancreatic duct dilatation is not as well-seen on this study as on the comparison study. SPLEEN: Normal. ADRENAL GLANDS: Normal. KIDNEY/BLADDER: Normal kidneys and ureters. There is wall thickening of the nondistended  urinary bladder. A Bardales catheter is present. BOWEL: There is colonic diverticulosis. There is stranding adjacent to the sigmoid colon in the region of diverticulitis. There is a complex 4 cm x 3 cm collection to the left of the sigmoid colon in this region (previously this was 2 cm x 2 cm). Minimal  extraluminal air is noted. LYMPH NODES: Normal. VASCULATURE: Atherosclerotic disease. PELVIC ORGANS: Fibroid uterus. MUSCULOSKELETAL: Normal.     1.  Again seen is sigmoid colon diverticulitis with a small contained perforation. A small fluid collection adjacent to the sigmoid colon has increased in size to 4 cm x 3 cm (previously 2 cm x 2 cm). Percutaneous drainage would be difficult though may be possible. 2.  Biliary and pancreatic ductal dilatation is not as well-seen as on the comparison study. See prior study for details.    Xr Chest 1 View Portable    Result Date: 8/21/2020  EXAM: XR CHEST 1 VIEW PORTABLE LOCATION: [facility] DATE/TIME: 8/21/2020 7:34 PM INDICATION: r/o pna- shortness of breath COMPARISON: 08/11/2020     New bilateral interstitial infiltrates could represent edema or pneumonia including COVID. Enlarged cardiac silhouette. No pleural effusion. No definite pulmonary vascular congestion.    Xr Chest 2 Views    Result Date: 8/11/2020  EXAM: XR CHEST 2 VIEWS LOCATION: [facility] DATE/TIME: 8/11/2020 11:50 AM INDICATION: Cough. COMPARISON: None.     Negative chest. Lungs are clear. Coronary stenting.     Xr Foot Right 2 Vws    Result Date: 8/16/2020  EXAM: XR FOOT RIGHT 2 VWS LOCATION: [facility] DATE/TIME: 8/16/2020 12:51 PM INDICATION: Acute right foot pain COMPARISON: None.     Bones are demineralized. No definitive evidence of an acute fracture. No cortical destructive changes to suggest osteomyelitis. Scattered degenerative changes.    Xr Chest 1 View For Picc Placement Portable    Result Date: 8/23/2020  EXAM: XR CHEST 1 VIEW FOR PICC LINE PLACEMENT PORTABLE  LOCATION: [facility] DATE/TIME: 8/23/2020 6:28 PM INDICATION: verify catheter placement COMPARISON: 08/21/2020     Right upper extremity PICC line terminates approximately 3 cm above the expected cavoatrial junction. No change in the chest otherwise. Extensive abnormal opacity throughout both lungs persists. Mild cardiomegaly. No pneumothorax or large pleural effusion.    Ct Abdomen Pelvis Without Oral With Iv Contrast    Result Date: 8/11/2020  EXAM: CT ABDOMEN PELVIS WO ORAL W IV CONTRAST LOCATION: [facility] DATE/TIME: 8/11/2020 11:41 AM INDICATION: right sided abdominal pain, nausea and vomiting COMPARISON: 7/22/2020 TECHNIQUE: CT scan of the abdomen and pelvis was performed following injection of IV contrast. Multiplanar reformats were obtained. Dose reduction techniques were used. CONTRAST: Iohexol (Omni) 75mL FINDINGS: LOWER CHEST: Minimal interstitial edema. The heart is mildly enlarged. There is coronary artery disease. HEPATOBILIARY: Cholecystectomy. There is intrahepatic and extrahepatic bile duct dilatation. There is pancreatic duct dilatation. PANCREAS: No discrete pancreatic masses identified. SPLEEN: Normal. ADRENAL GLANDS: Normal. KIDNEYS/BLADDER: Normal. BOWEL: There is colonic diverticulosis. There is acute diverticulosis involving the distal sigmoid colon with a small contained perforation. No drainable abscess at this point. LYMPH NODES: Normal. VASCULATURE: Atherosclerotic disease. Right renal artery stent. PELVIC ORGANS: Fibroid uterus. MUSCULOSKELETAL: Degenerative changes.     1.  Acute diverticulitis with a small contained perforation. No drainable abscess at this point. 2.  Biliary and pancreatic duct dilatation. No discrete pancreatic masses identified. Consider MRCP, ERCP, EUS.    Ct Abdomen Pelvis With Oral With Iv Contrast    Result Date: 8/20/2020  EXAM: CT ABDOMEN PELVIS W ORAL W IV CONTRAST LOCATION: [facility] DATE/TIME: 8/20/2020 3:43 PM INDICATION:  Abdominal infection suspected ongoing pain and increasing leukocytosis COMPARISON: CT from 8/14/2020 TECHNIQUE: CT scan of the abdomen and pelvis was performed following injection of IV contrast. Multiplanar reformats were obtained. Dose reduction techniques were used. CONTRAST: Iohexol (Omni) 75mL FINDINGS: LOWER CHEST: Dependent atelectasis and interstitial edema within the lung bases without consolidation. Cardiomegaly and mitral annular calcifications. HEPATOBILIARY: Gallbladder surgically absent. Mild ectasia of the extrahepatic biliary tree. PANCREAS: Unchanged appearance. SPLEEN: Normal. ADRENAL GLANDS: Normal. KIDNEYS/BLADDER: Normal. BOWEL: Interval placement of a left anterior abdominal approach percutaneous drain into the perisigmoid abscess. The drain is well positioned with near complete collapse of the previously seen abscess collection. There is persistent mild adjacent stranding. The bowel is nonobstructed. No new intra-abdominal abscess identified. No pneumoperitoneum. LYMPH NODES: Normal. VASCULATURE: Atherosclerotic calcification throughout the arterial tree. No evidence of vascular complication following drain placement. PELVIC ORGANS: Fibroid uterus. No ascites. Bardales catheter is within a decompressed urinary bladder. Subcutaneous scattered calcifications in the subcutaneous flank regions compatible with prior injection sites. MUSCULOSKELETAL: No new or acute osseous findings. Degenerative changes at the hips and lumbar spine are stable.     1.  Interval percutaneous drain placement into the diverticular abscess with near complete collapse of the fluid cavity. Drain abuts the adjacent sigmoid colon. No evidence of post drain placement complication. 2.  No new intra-abdominal abscess or other findings to correlate with ongoing leukocytosis. 3.  Stable appearance of the pancreas with previously seen main pancreatic ductal dilatation not well visualized on this study.    Poc Us 3cg Picc Placement  "Guidance    Result Date: 8/23/2020  Exam was performed as guidance for PICC line insertion.  Click \"PACS images\" hyperlink below to view any stored images.  For specific procedure details, view procedure note authored by PICC/Vascular Access Nurse.    Poc Us 3cg Picc Placement Guidance    Result Date: 8/19/2020  Exam was performed as guidance for PICC line insertion.  Click \"PACS images\" hyperlink below to view any stored images.  For specific procedure details, view procedure note authored by PICC/Vascular Access Nurse.    Ct Abscess Drain Pelvic    Result Date: 8/17/2020  EXAM: 1. CT OF ABDOMEN AND PELVIS WITHOUT CONTRAST 2. PERCUTANEOUS DRAIN PLACEMENT LEFT PELVIS LOCATION: [facility] DATE/TIME: 8/17/2020 9:59 AM INDICATION: diverticulitis TECHNIQUE: Dose reduction techniques were used. FINDINGS: The limited visualized portions of the lung bases are clear. Evaluation of the solid visceral organs is suboptimal given the lack of intravenous contrast. Within these limitations no focal hepatic lesion is seen. The patient is post cholecystectomy. There is no intra or extrahepatic biliary ductal dilatation. The stomach and duodenum are normal. The spleen size is normal. The kidneys enhance symmetrically and there is no renal collecting system dilatation. Note again is made of a diverticular abscess, small in size. This is unchanged from the prior examination. Fibroid uterus is noted. PROCEDURE: Informed consent obtained. Site marked. Prior images reviewed. Required items made available. Patient identity confirmed verbally and with arm band. Patient reevaluated immediately before administering sedation. Universal protocol was followed. Time out performed. The site was prepped and draped in sterile fashion. 10 mL of 1% lidocaine was infused into the local soft tissues. Using standard technique and under direct CT guidance, a 10 [place] drainage catheter was inserted into the fluid collection.  SPECIMEN: 10 mL " of purulent fluid was aspirated and sent to lab for cultures and Gram stain. BLOOD LOSS: Minimal. The patient tolerated the procedure well. No immediate complications. RADIOLOGIC SUPERVISION AND INTERPRETATION: CT GUIDANCE: Images demonstrate the needle and subsequent catheter to be in good position. SEDATION: Versed 1 mg. Fentanyl 50 mcg. The procedure was performed with administration intravenous conscious sedation with appropriate preoperative, intraoperative, and postoperative evaluation. 15 minutes of supervised face to face conscious sedation time was provided by a radiology nurse under my direct supervision.     1. Stable diverticular abscess. 2. Successful CT-guided drain placement into left pelvic diverticular abscess. Reference CPT Codes: 96278, 14422          Advanced Care Planning:  Discharge Planning discussed with patient  Anticipated LOS: multiple days  Barrier to discharge:iv abx  Disposition:TBD  Discussed care with patient for >35 minutes with greater than 50% of time spent in counseling and coordination of care.      Rekha Cesar MD  Hospitalist  [phone]    This note was dictated using voice recognition software. Any grammatical or context distortions are unintentional and inherent to the software.

## 2021-06-10 NOTE — PROGRESS NOTES
"Pharmacy Consult: Vancomycin Dosing    Pharmacist consulted to dose vancomycin for Gardenia Muller, a 70 y.o. female.    Ordering provider: Tyler Lan MD     Indication for vancomycin therapy: Hospital Acquired Pneumonia    Goal Trough Range:  15-20 mcg/mL based on indication    Other current antimicrobials              vancomycin 1,500 mg in sodium chloride 0.9% 500 mL (VANCOCIN)  Every 12 hours          meropenem 1 g in NaCl 0.9 % 50 mL (MINI-BAG Plus) (MERREM)  Every 8 hours                   Subjective/Objective:    Patient was admitted for Diverticulitis on 8/11/2020    Height: 5' 2\" (1.575 m)    Actual Body Weight (ABW): 89.1 kg (196 lb 6.4 oz)    Ideal body weight: 50.1 kg (110 lb 7.2 oz)  Adjusted ideal body weight: 65.7 kg (144 lb 13.3 oz)    BMI: Body mass index is 35.92 kg/m .    Allergies   Allergen Reactions     Penicillins Swelling       Patient Active Problem List   Diagnosis     Spinal stenosis     PAD (peripheral artery disease) (H)     Dermatosis Papulosa Nigra     Depression     Hypercalcemia     Right Renal Artery Stenosis     Osteoarthritis     Abnormality Of Walk     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)     Advanced directives, counseling/discussion     Cystitis     Healthcare maintenance     Essential hypertension     Cerebral infarction (H)     Anemia     Cerebrovascular disease     RLS (restless legs syndrome)     Incisional hernia, without obstruction or gangrene     Diverticular disease of large intestine     Coronary artery disease involving native coronary artery of native heart without angina pectoris     Dyslipidemia     Ventral hernia without obstruction or gangrene     Anxiety     (HFpEF) heart failure with preserved ejection fraction (H)     Anticoagulant long-term use     Persistent atrial fibrillation (H)     MAY (acute kidney injury) (H)     Transaminitis     Physical deconditioning     Lipoma of back     Acalculous cholecystitis     " Onychogryphosis     Hyperparathyroidism (H)     Microalbuminuria     Intestinal angina (H)     Chronic abdominal pain     Weight loss, non-intentional     Warfarin anticoagulation     Severe protein-calorie malnutrition (H)     Hypertrophy of nail     Dysuria     Class 1 obesity with serious comorbidity and body mass index (BMI) of 33.0 to 33.9 in adult, unspecified obesity type     Trigger finger, acquired     Acute liver failure without hepatic coma     Hematochezia     Hyperkalemia     Acute on chronic combined systolic (congestive) and diastolic (congestive) heart failure (H)     Ischemic cardiomyopathy     Acute kidney failure, unspecified (H)     Acute diastolic heart failure (H)     Diverticulitis     Supratherapeutic INR     Diverticulitis of large intestine with perforation and abscess without bleeding     Atrial fibrillation with rapid ventricular response (H)     Obstructive sleep apnea syndrome    Past Medical History:   Diagnosis Date     Acute diastolic heart failure (H) 11/2015     Acute on chronic diastolic heart failure (H) 6/15/2019     Advanced directives, counseling/discussion 8/5/2015    Patient completed 2011.  Discussed today, 5/24/17, and wants to change healthcare agent from niece to daughter.  Discussed that she will need to complete new form to indicate this.     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  Recommend 5 year follow-up.  Single tubular adenoma     Biliary obstruction 10/3/2018    Added automatically from request for surgery [id number]     Cerebral vascular accident (H)      Coronary artery disease 2006    100% RCA with collateral filling per Dr. Elizabeth's angio report     Diabetes mellitus type 2 in obese (H)      Fibrocystic breast      GERD (gastroesophageal reflux disease)      History of anesthesia complications     slow to wake     Hypertension      Obstructive  sleep apnea syndrome      Peripheral vascular disease (H)      Pneumonia 11/11/2018     PONV (postoperative nausea and vomiting)      S/P cholecystectomy 6/22/2018     SBO (small bowel obstruction) (H) 11/12/2015     Stroke (H)      Transaminitis 10/22/2018     Upper respiratory infection 12/20/2018     Urinary incontinence         Temp Readings from Current Encounter:     08/23/20 1546 08/23/20 2120 08/24/20 0015   Temp: 98.2  F (36.8  C) 97.7  F (36.5  C) 97.6  F (36.4  C)     Net Intake/Output (last 24 hours):  I/O last 3 completed shifts:  In: 2421 [P.O.:1000; IV Piggyback:1416]  Out: 130 [Urine:100; Drains:30]    Recent Labs     08/21/20  0447 08/22/20  0542 08/22/20  1839 08/23/20  0103 08/23/20  0754 08/23/20  1154   WBC 19.3* 17.4* 17.0*  --  17.4*  --    LACTICACID  --   --   --  2.1*  --  2.0   BUN 8  --  9  --  10  --    CREATININE 0.95  --  0.84  --  0.86  --      Estimated Creatinine Clearance: 63.1 mL/min (by C-G formula based on SCr of 0.86 mg/dL).    Recent Labs     08/21/20  1637   CULTURE No Growth       Results for orders placed or performed during the hospital encounter of 08/11/20   Culture, Urine    Collection Time: 08/21/20  4:37 PM    Specimen: Urine, Catheter   Result Value Status    Culture No Growth Final   Urine culture    Collection Time: 08/19/20  8:08 PM    Specimen: Urine, Clean Catch   Result Value Status    Culture No Growth Final   Aerobic Culture with Gram Stain    Collection Time: 08/17/20 10:02 AM    Specimen: Fluid, Pelvic; Body Fluid   Result Value Status    Culture 2+ Candida dubliniensis (!) Final   Fungal culture    Collection Time: 08/17/20 10:02 AM    Specimen: Pelvis, Left; Body Fluid   Result Value Status    Culture 1+ Candida albicans (!) Preliminary   Anaerobic culture    Collection Time: 08/17/20 10:02 AM    Specimen: Pelvis, Left; Tissue   Result Value Status    Culture No anaerobic organisms isolated Final       Recent labs: (last 7 days)     08/23/20  4675  08/24/20  0145   VANCOMYCIN 43.1* 38.6*       Vancomycin administrations: (last 120 hours)     Date/Time Action Medication Dose Rate    08/23/20 1351 New Bag    vancomycin 1,500 mg in sodium chloride 0.9% 500 mL (VANCOCIN) 1,500 mg 176.7 mL/hr    08/23/20 0204 New Bag    vancomycin 1,500 mg in sodium chloride 0.9% 500 mL (VANCOCIN) 1,500 mg 176.7 mL/hr    08/22/20 1400 New Bag    vancomycin 1,500 mg in sodium chloride 0.9% 500 mL (VANCOCIN) 1,500 mg 176.7 mL/hr    08/22/20 0218 New Bag    vancomycin 1,750 mg in sodium chloride 0.9% 500 mL (VANCOCIN) 1,750 mg 178.3 mL/hr          Assessment/Plan:    Pharmacist consulted to dose vancomycin for Hospital Acquired Pneumonia, goal trough range 15-20 mcg/mL.  1. Hold vancomycin therapy due to high trough level result obtained.  2. Vancomycin trough level of 38.6 mcg/mL was above the goal trough range. This trough level was drawn at steady state.  3. Pharmacist will plan to re-check a vancomycin trough level today 08/24/2020 at 1330 to decide further dosing adjustments as needed..  4. Pharmacist will continue to follow.    Thank you for the consult.  Chucho Corral, Gilles 8/24/2020 3:21 AM

## 2021-06-10 NOTE — PROGRESS NOTES
Infectious Disease Follow up Note:    Service: Infectious Disease   Note: Follow Up Note  Date: 8/26/2020    Chief Complaint: Follow up for acute diverticulitis with perforation    ASSESSMENT:     Gardenia Muller is a 70 y.o. old female with acute diverticulitis with perforation.   C. dubliniensis -abdominal infection-- active issue    Fever--active issue    History of CVA, obesity, chronic abdominal pain.  Acute diverticulitis with perforation admitted on 8/11/2020.  On IV Cipro and Flagyl.  Cultures from abscess drainage on 8/17/2020 with Candida albicans.  CT scan on 8/20/2020 with near complete resolution of the abscess.    Abscessogram reveals no residual abscess pocket. Fistula to the colon. Switched drain to gravity drainage bag from SHAMIKA suction bulb. No flushes are needed. Follow-up abscessogram in one week.       New leukocytosis.  No clear source.  C. difficile negative on 8/21/2020.  Feeling short of breath.  Chest x-ray ordered. HAP is a possibility.  Leukocytosis could be from untreated Candida infection.  Fluconazole added on 8/21/2020.  WBC better, at 17,000 on 8/22/2020.  Abdomen is better.  New bilateral interstitial infiltrate.  COVID-19 PCR negative on 8/22/2020.  Most likely pulmonary edema.  Penicillin allergy, reaction swelling.     Recommendations:       Continue Meropenem and fluconazole  Repeat blood culture  Stop Vancomycin and start Daptomycin  Monitor white blood cell count..  Monitor respiratory status and abdomen.  Hold statin  Drain management per IR and Surgery  Discussed with Pharm D  Monitor CBC,CMP    Patient new to me. Please see previous note by Dr. Christopher Vela MD  [facility]  [phone]    Total Time Spent 36 minutes with >50% of the time spent in evaluation and management, and discussed with Pharm D.       ______________________________________________________________________  Notes / labs / vitals reviewed.    SUBJECTIVE / INTERVAL  HISTORY:     Sleeping, wakes up easily  Previous note:    Intermittent abdominal pain  Tolerating abx  Needs help with sitting up in bed      : Abdomen is better.  Continue to have respiratory distress.  Now on a lot of oxygen.  COVID-19 negative.    ROS: A complete 12 point ROS is negative except as listed above.    PMHx/FHx/SHx: Reviewed. Interval changes noted.    OBJECTIVE:  Vitals:    20 1642   BP:    Pulse: (!) 45   Resp:    Temp:    SpO2:        Temp (24hrs), Av.4  F (36.9  C), Min:97.3  F (36.3  C), Max:100.6  F (38.1  C)    Exam on 2020   GEN: Sleepy  EYES:  Anicteric, RAYMUNDO, EOM intact.  HEAD, EARS, NOSE, MOUTH, AND THROAT:  Oropharynx without thrush or ulcers  NECK:  Supple.   RESPIRATORY:  Normal breathing pattern. Supplemental oxygen  CARDIOVASCULAR:  Arm swelling  Previous exam: S1 S2 No murmur, click, gallop or rub. No dependent edema. No excess JVD.  ABDOMEN:  Soft, tenderness, SHAMIKA drain with serous drainage  MUSCULOSKELETAL:  Joints without effusion or acute inflammation. No muscle atrophy.Dedconditioned  LYMPHATIC:  No cervical or supraclavicular adenopathy  SKIN/HAIR/NAILS:  No rashes. No stigmata of infective endocarditis.  NEUROLOGIC:  Gross neurological exam non-focal.  PSYCHIATRIC:  A&Ox3, appropriate, mentation normal.    Antibiotics:  IV vancomycin --> Daptomycin  IV meropenem  IV fluconazole    Pertinent labs:    Results from last 7 days   Lab Units 20  0526 20  0555 20  0754 20  1839 20  0542   LN-WHITE BLOOD CELL COUNT thou/uL 15.6* 15.9* 17.4* 17.0* 17.4*   LN-HEMOGLOBIN g/dL 7.6* 7.9* 8.3* 8.1* 8.3*   LN-HEMATOCRIT % 23.7* 24.5* 25.9* 24.8* 24.7*   LN-PLATELET COUNT thou/uL 106* 109* 112* 115* 115*   LN-MONOCYTES - REL (DIFF) %  --   --   --  4 8       Results from last 7 days   Lab Units 20  0534 20  1638 20  0555 20  0754   LN-SODIUM mmol/L 137  --  136 139   LN-POTASSIUM mmol/L 5.3*  --  3.6 3.7   LN-CHLORIDE  mmol/L 108*  --  107 108*   LN-CO2 mmol/L 17*  --  21* 20*   LN-BLOOD UREA NITROGEN mg/dL 26  --  14 10   LN-CREATININE mg/dL 1.93* 1.08 0.94 0.86   LN-CALCIUM mg/dL 9.2  --  8.1* 7.9*   LN-ALBUMIN g/dL  --  1.8*  --   --    LN-PROTEIN TOTAL g/dL  --  5.6*  --   --    LN-BILIRUBIN TOTAL mg/dL  --  1.2*  --   --    LN-ALKALINE PHOSPHATASE U/L  --  109  --   --    LN-ALT (SGPT) U/L  --  32  --   --    LN-AST (SGOT) U/L  --  25  --   --        MICROBIOLOGY DATA:    Cultures reviewed  Culture:  C. dubliniensis     IMAGING/RADIOLOGY:  Reviewed  XR Chest 1 View Portable (Order [id number])   Imaging         Study Result     EXAM: XR CHEST 1 VIEW PORTABLE  LOCATION: [facility]  DATE/TIME: 8/21/2020 7:34 PM     INDICATION: r/o pna- shortness of breath  COMPARISON: 08/11/2020     IMPRESSION:   New bilateral interstitial infiltrates could represent edema or pneumonia including COVID. Enlarged cardiac silhouette. No pleural effusion. No definite pulmonary vascular congestion.     Abscessogram reveals no residual abscess pocket. Fistula to the colon. Switched drain to gravity drainage bag from SHAMIKA suction bulb. No flushes are needed. Follow-up abscessogram in one week.

## 2021-06-10 NOTE — PROGRESS NOTES
General Surgery Progress Note    Subjective:   Pt states her pain has almost completely resolved. Denies fever, chills, nausea, emesis. Feels thirsty.    Vitals:    08/15/20 2027 08/15/20 2348 08/16/20 0336 08/16/20 0722   BP:  132/60 132/54 120/57   Patient Position:  Sitting Sitting Semi-nicole   Pulse:  97 92 91   Resp:  18 18 16   Temp:  99.2  F (37.3  C) 99  F (37.2  C) 98.7  F (37.1  C)   TempSrc:  Oral Oral Oral   SpO2: 97% 93% 93% 95%   Weight:       Height:           08/15 0701 - 08/16 0700  In: 940 [P.O.:340; I.V.:600]  Out: 475 [Urine:475]    Physical Exam:  General: NAD, pleasant  ABD: obese, soft, well healed midline scar, mild tenderness to deep palpation LLQ no rebound or guarding    Results from last 7 days   Lab Units 08/16/20  0602   LN-WHITE BLOOD CELL COUNT thou/uL 13.8*   LN-HEMOGLOBIN g/dL 8.4*   LN-HEMATOCRIT % 27.0*   LN-PLATELET COUNT thou/uL 124*       Results from last 7 days   Lab Units 08/16/20  0602  08/11/20  1026   LN-SODIUM mmol/L 136   < > 135*   LN-POTASSIUM mmol/L 3.6   < > 4.6   LN-CHLORIDE mmol/L 104   < > 103   LN-CO2 mmol/L 22   < > 22   LN-BLOOD UREA NITROGEN mg/dL 34*   < > 22   LN-CREATININE mg/dL 4.10*   < > 0.95   LN-CALCIUM mg/dL 7.1*   < > 10.3   LN-ALBUMIN g/dL  --   --  3.0*   LN-PROTEIN TOTAL g/dL  --   --  8.2*   LN-BILIRUBIN TOTAL mg/dL  --   --  1.3*   LN-ALKALINE PHOSPHATASE U/L  --   --  135*   LN-ALT (SGPT) U/L  --   --  33   LN-AST (SGOT) U/L  --   --  37    < > = values in this interval not displayed.       Assessment:   70y F with perforated diverticulitis with abscess.    Plan:   -Okay for clear liquid diet, NPO at midnight  -IR planning for percutaneous drainage tomorrow  -Continue IV abx    Miya Arambula MD  General Surgery  [facility]  [phone]

## 2021-06-10 NOTE — PROGRESS NOTES
Speech Language/Pathology  Bedside Swallow Evaluation    Problem:  Patient Active Problem List   Diagnosis     Spinal stenosis     PAD (peripheral artery disease) (H)     Dermatosis Papulosa Nigra     Depression     Hypercalcemia     Right Renal Artery Stenosis     Osteoarthritis     Abnormality Of Walk     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)     Advanced directives, counseling/discussion     Cystitis     Healthcare maintenance     Essential hypertension     Cerebral infarction (H)     Anemia     Cerebrovascular disease     RLS (restless legs syndrome)     Incisional hernia, without obstruction or gangrene     Diverticular disease of large intestine     Coronary artery disease involving native coronary artery of native heart without angina pectoris     Dyslipidemia     Ventral hernia without obstruction or gangrene     Anxiety     (HFpEF) heart failure with preserved ejection fraction (H)     Anticoagulant long-term use     Persistent atrial fibrillation (H)     MAY (acute kidney injury) (H)     Transaminitis     Physical deconditioning     Lipoma of back     Acalculous cholecystitis     Onychogryphosis     Hyperparathyroidism (H)     Microalbuminuria     Intestinal angina (H)     Chronic abdominal pain     Weight loss, non-intentional     Warfarin anticoagulation     Severe protein-calorie malnutrition (H)     Hypertrophy of nail     Dysuria     Class 1 obesity with serious comorbidity and body mass index (BMI) of 33.0 to 33.9 in adult, unspecified obesity type     Trigger finger, acquired     Acute liver failure without hepatic coma     Hematochezia     Hyperkalemia     Acute on chronic combined systolic (congestive) and diastolic (congestive) heart failure (H)     Ischemic cardiomyopathy     Acute kidney failure, unspecified (H)     Acute diastolic heart failure (H)     Diverticulitis     Supratherapeutic INR     Diverticulitis of large intestine with perforation and abscess without bleeding  "      Onset date: 8/11/20  Reason for evaluation: To assess for risk of aspiration and determine safest/least restrictive diet recommendations.  Pertinent History: As listed above.  Patient with a medical history of \"HTN, T2DM, chronic atrial fibrillation, diastolic CHF, coronary artery disease status post stenting (7/2020), peripheral artery disease, malnutrition chronic abdominal pain, calculus cholecystitis status post cholecystectomy, depression\" admitted with diverticulitis with perforation.  Per EMR, patient noted to cough up thick pink sputum after eating jello on 8/15/20 in the PM, accompanied by crackles in the lung bases.  Per RN, no crackles in lungs this AM.  Clear lungs noted per chest x-ray dated 8/11/20.  Patient denied difficulty with swallowing; however, noted at times she will become short of breath when eating or walking.  Current Diet: NPO  Baseline Diet: Regular/Thin    Assessment:    Patient presents with mild signs and symptoms of aspiration with solid.  Patient again observed to cough up thick pinkish phlegm post intake (I.e., cracker and large consecutive drinks of water).      Patient demonstrated no oral dysphagia.    Suspect mild pharyngeal dysphagia due to presence of clinical signs and symptoms of aspiration and/or prior history of dysphagia.    A Video Swallow Study is recommended at this time due to management of s/s aspiration with implementation of safe swallowing strategies.    Rehab potential is good based on prior level of function, evaluation results, recent progress and motivation and cooperation.    Recommendations/Plan:    Recommended diet of clear liquids, no straws to reduce bolus size.  Clear liquids recommended per surgery, with NPO at midnight for drain placement in IR.    Recommend patient to take oral medications via small single sips of liquids.    Strategies: Fully upright for all intake, small single sips, eat only when alert/awake    Nursing to d/c PO with further " s/s aspiration and notify ST.    Recommend oral motor exercises? no    Speech therapy 5 times per week to monitor diet tolerance and advancement as appropriate per medical care team.    Referrals: N/A    Subjective:    Patient presents as cooperative during this session.  Patient mildly confused intermittently (e.g., insisting that windows could be opened).  An  was not applicable    Patient was given puree, thin and regular solid. Patient is able to self feed/drink.    Objective:    Dentition/Oral hygiene: Patient is missing several teeth, primarily upper teeth.  Patient noted she will eat anything despite missing dentition.    Oral motor function was grossly intact.     Bolus prep and oral control was not impaired. Mastication was slow but functional and the patient used rotary chewing.    Anterior-Posterior transit was not impaired.    Oral stasis did not occur with all textures trialed.    Puree: Patient trialed 2 ounces and presented with no s/s of aspiration. Initiation of swallow appeared timely.    Thin:Patient trialed 6 ounces by straw and cup and initially presented with no s/s aspiration with single or consecutive drinks.  No s/s aspiration with thin liquids/pill administered per RN.  Patient noted to have mild cough after solid intake and coughed up thick pink phlegm with a large consecutive drink post cracker (suspect pink r/t jello from previous night still noted red coloring on tongue).  Patient with no further s/s aspiration with single sips of liquids by cup.  Initiation of swallow appeared timely.     Regular Solid Patient trialed 1 saltine cracker and presented with delayed cough.  Patient noted she was coughing as she becomes short of breath when eating/coordintaing respiration. Initiation of swallow appeared timely.    Hyolaryngeal movement observed via palpation at bedside.    20 dysphagia minutes     Sole Lorenz MS, CCC-SLP

## 2021-06-10 NOTE — ED TRIAGE NOTES
Pt arrived via EMS with complaint of abdominal pain x 2 weeks.  Pt came from TCU after recent admission to [facility]  LB Sunday 8/9 described as diarrhea.  Pt vomited x 2 today.  No blood noted.

## 2021-06-10 NOTE — PLAN OF CARE
Problem: Discharge Barriers  Goal: Patient's discharge needs are met  Outcome: Progressing   Hgb 6.1 today, will transfuse 1 unit PRBC. WBC elevated, abdominal CT ordered. Patient received 500 cc NS bolus per surgery prior to CT and will receive an additional bolus after CT Scan. Patient very lethargic today, has been afebrile. BP soft, pulse is wnl today.

## 2021-06-10 NOTE — PROGRESS NOTES
Pharmacy Consult: Warfarin Management    Pharmacy consulted to dose warfarin for Gardenia Muller, a 70 y.o. female    Ordering provider: Dr. Cesar    Reason for warfarin therapy: Atrial Fibrillation  Goal INR Range: 2-3    Subjective  Medication lists (home and hospital) were reviewed.    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)     Advanced directives, counseling/discussion     Cystitis     Healthcare maintenance     Essential hypertension     Cerebral infarction (H)     Anemia     Cerebrovascular disease     RLS (restless legs syndrome)     Incisional hernia, without obstruction or gangrene     Diverticular disease of large intestine     Coronary artery disease involving native coronary artery of native heart without angina pectoris     Dyslipidemia     Ventral hernia without obstruction or gangrene     Anxiety     (HFpEF) heart failure with preserved ejection fraction (H)     Anticoagulant long-term use     Persistent atrial fibrillation (H)     MAY (acute kidney injury) (H)     Transaminitis     Physical deconditioning     Lipoma of back     Acalculous cholecystitis     Onychogryphosis     Hyperparathyroidism (H)     Microalbuminuria     Intestinal angina (H)     Chronic abdominal pain     Weight loss, non-intentional     Warfarin anticoagulation     Severe protein-calorie malnutrition (H)     Hypertrophy of nail     Dysuria     Class  1 obesity with serious comorbidity and body mass index (BMI) of 33.0 to 33.9 in adult, unspecified obesity type     Trigger finger, acquired     Acute liver failure without hepatic coma     Hematochezia     Hyperkalemia     Acute on chronic combined systolic (congestive) and diastolic (congestive) heart failure (H)     Ischemic cardiomyopathy     Acute kidney failure, unspecified (H)     Acute diastolic heart failure (H)     Diverticulitis     Supratherapeutic INR     Diverticulitis of large intestine with perforation and abscess without bleeding     Atrial fibrillation with rapid ventricular response (H)     Obstructive sleep apnea syndrome    Past Medical History:   Diagnosis Date     Acute diastolic heart failure (H) 11/2015     Acute on chronic diastolic heart failure (H) 6/15/2019     Advanced directives, counseling/discussion 8/5/2015    Patient completed 2011.  Discussed today, 5/24/17, and wants to change healthcare agent from niece to daughter.  Discussed that she will need to complete new form to indicate this.     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  Recommend 5 year follow-up.  Single tubular adenoma     Biliary obstruction 10/3/2018    Added automatically from request for surgery [id number]     Cerebral vascular accident (H)      Coronary artery disease 2006    100% RCA with collateral filling per Dr. Elizabeth's angio report     Diabetes mellitus type 2 in obese (H)      Fibrocystic breast      GERD (gastroesophageal reflux disease)      History of anesthesia complications     slow to wake     Hypertension      Obstructive sleep apnea syndrome      Peripheral vascular disease (H)      Pneumonia 11/11/2018     PONV (postoperative nausea and vomiting)      S/P cholecystectomy 6/22/2018     SBO (small bowel obstruction) (H) 11/12/2015     Stroke (H)      Transaminitis 10/22/2018     Upper  respiratory infection 12/20/2018     Urinary incontinence         Social History     Tobacco Use     Smoking status: Former Smoker     Packs/day: 0.25     Smokeless tobacco: Former User     Tobacco comment: e-cig a few times/day   Substance Use Topics     Alcohol use: No     Drug use: No       Objective   Labs:  Last 3 days:    Recent Labs     08/24/20  0555 08/24/20  1638 08/25/20  0526 08/26/20  0534   CREATININE 0.94 1.08  --  1.93*   HGB 7.9*  --  7.6*  --    HCT 24.5*  --  23.7*  --    *  --  106*  --    BILITOT  --  1.2*  --   --    AST  --  25  --   --    ALT  --  32  --   --    ALKPHOS  --  109  --   --      Last 7 days:   Recent labs: (last 7 days)     08/24/20  1937 08/25/20  1507 08/26/20  0534   INR 2.14* 2.30* 2.86*       Warfarin Dosing History:    Date INR Warfarin Dose Comment   7/31/20 1.77 3mg Facility dosing   8/1/20 1.75 3 mg Facility dosing   8/3/20 2.7 3 mg Facility dosing   8/6/20 5.59 hold Facility dosing   8/7/20 5.25 Hold x 3 days Facility dosing   8/10/20  1.5 mg Facility dosing    8/14/20 4.97 hold Vit K 5 mg IV x 1   8/24/20 2.14 hold    8/25/20 2.3 0.5 mg Pharmacy consulted to dose    8/26/20 2.86 hold            Assessment  The patient is continuing warfarin for the indication of Atrial Fibrillation with a goal INR of 2-3. INR today is therapeutic. Large increase in INR today despite low dose given yesterday & previous doses being held. Patient's low albumin and exacerbation of heart failure is likely contributing to recent increased responsiveness to warfarin and elevated INR. Plan to hold today's dose of warfarin.    Plan  1. Hold warfarin today.  2. Check INR daily or as appropriate.  3. Continue to follow the patient's INR, PLT, and HGB as available.  4. Monitor for potential drug/disease interactions.    Thank you for the consult,  Karlos Guzman, PharmD 8/26/2020 1:23 PM

## 2021-06-10 NOTE — PROGRESS NOTES
General Surgery Progress Note    HD 1   Subjective:   Pt is feeling ongoing nausea.  She has no appetite.  Pain is about the same.  She is not vomiting but just feels like she wants to.  She is afebrile and her blood pressures have been soft.  No tachycardia.  White count not done today.  Last INR was 2.65.  Lactic acid was 0.8.      Vitals:    08/12/20 0000 08/12/20 0454 08/12/20 0715 08/12/20 1100   BP: (!) 72/41 (!) 84/33 94/52 (!) 85/47   Patient Position: Lying Semi-nicole Semi-nicole Semi-nicole   Pulse: 60 65 73 67   Resp: 18 16 16 16   Temp: 98.1  F (36.7  C) 98.7  F (37.1  C) 98.2  F (36.8  C) 98.4  F (36.9  C)   TempSrc: Oral Oral Oral Oral   SpO2: 91% 97% 95%    Weight:       Height:           Physical Exam:  Lungs:  CTA  CV:       RRR  Ab:       Soft, + BS, tenderness right lower and left lower quadrant with some guarding and localized peritoneal signs present.  Similar to yesterday.    Results from last 7 days   Lab Units 08/11/20  1026   LN-WHITE BLOOD CELL COUNT thou/uL 11.4*   LN-HEMOGLOBIN g/dL 12.6   LN-HEMATOCRIT % 42.4   LN-PLATELET COUNT thou/uL 193       Results from last 7 days   Lab Units 08/11/20  1026   LN-SODIUM mmol/L 135*   LN-POTASSIUM mmol/L 4.6   LN-CHLORIDE mmol/L 103   LN-CO2 mmol/L 22   LN-BLOOD UREA NITROGEN mg/dL 22   LN-CREATININE mg/dL 0.95   LN-CALCIUM mg/dL 10.3   LN-ALBUMIN g/dL 3.0*   LN-PROTEIN TOTAL g/dL 8.2*   LN-BILIRUBIN TOTAL mg/dL 1.3*   LN-ALKALINE PHOSPHATASE U/L 135*   LN-ALT (SGPT) U/L 33   LN-AST (SGOT) U/L 37       Assessment: 70-year-old female with perforated diverticulitis with contained small abscess with multiple medical issues and high risk for for stent thrombosis as she had a stent placed on 7/27/2020 and currently back on baby aspirin.  History of atrial fibrillation and Coumadin currently on hold    Plan: We will keep n.p.o. at this time as she still has ongoing nausea.  Okay ice chips and sips of water with medications.  When she feels a little  better with less nausea than she can start a clear liquid diet.  Continue IV antibiotic treatment.  I would continue to hold the Coumadin.  Discussed with Dr. Salomon about Brilinta.  Okay with baby aspirin.  Currently no plans for surgery.  We will continue to follow patient.  I did add on blood work today.  As I like to recheck her CBC and will go ahead and recheck her INR and BMP at the same time.  Patient extremely high risk for surgery and should avoid.  If we need to do surgery will need cardiology input.  Discussed with Dr. Salomon and okay to restart Brilinta and will hold off on Coumadin for now.  Nela Camacho PA-C [phone]    [facility]  [address] [place] [id number]

## 2021-06-10 NOTE — PROGRESS NOTES
[facility] Daily Progress Note    Assessment/Plan:  Gardenia Muller is a 70 year  old female with history of HTN, T2DM, chronic atrial fibrillation on warfarin, diastolic CHF, coronary artery disease status post stenting (7/27/2020), peripheral artery disease, calculus cholecystitis status post cholecystectomy, depression, who came to [facility] on 8/11/2020 for abdominal pain and vomiting. CT scan revealed acute diverticulitis with perforation. on 8/14 showed increased size of abscess from 2x2cm to 4x3cm, though pt reports clinical improvement of her symptoms.. General surgery consulted - no current plans for operating room so interventional radiology was consulted for possible drainage which was done on 8/17/2020 by placing CT scan guided drain placement in the left pelvic diverticular abscess repeat CTAP.  Patient grew Candida albicans in the blood and was started on fluconazole on 8/21/2020     In the ED, she was hypoglycemic also and she was started on D5 normal saline. Hospitalization complicated by MAY, Nephrology consulted - Cr improving; no plans for dialysis at this time.      Assessment:  Acute perforated diverticulitis with diverticular abscess  Secondary peritonitis  On ciprofloxacin and metronidazole IV initially which was changed to Meropenem and Vancomycin 8/22/2020  WBC count is improving     Probable aspiration pneumonia/HCAP  With significant Hypoxia requiring 8 liters   Started the patient on meropenem and vancomycin 8/22/2020    Candida bacteremia  In a patient with diabetes mellitus  On IV Fluconazole that was started on 8/21/2020    Acute kidney injury  Creatinine has improved    Acute anemia  Because of the blood loss is not known  Patient will be transfused with 1 unit of blood with hemoglobin of 6.1    Atrial fibrillation  Patient has tachycardia with hypotension -received 250 mL of normal saline earlier with with blood pressure improved  Patient was restarted on digoxin and her home  medications metoprolol 12.5 mg daily  Cardiology consult was appreciated    Type 2 diabetes mellitus  On sliding scale insulin    Atrial Fibrillation   Supratherapeutic INR  Is now better with INR decreasing to 1.5  Will discuss anticoagulation with cardiology on Brillianta and Aspirin        Coronary artery disease status post stent placement 7/27/2020     Congestive heart failure with preserved LV function    Right foot pain    Acute hypokalemia    Acute hypomagnesemia    Acute anemia    GERD   Depression          Plan:  Oxygen requirements if anything is better           Code status:Full Code        Subjective:  Cough is better nevertheless still present she has no chest pain     Review of Systems:   Denies any significant abdominal pain nausea or Vomiting     Current Medications Reviewed via EHR List    Objective:  Vital signs in last 24 hours:  Temp:  [97.3  F (36.3  C)-99.4  F (37.4  C)] 97.5  F (36.4  C)  Heart Rate:  [60-72] 72  Resp:  [15-24] 18  BP: ()/(37-61) 98/43  SpO2:  [93 %-100 %] 93 %    Intake/Output last 3 shifts:  I/O last 3 completed shifts:  In: 1094 [P.O.:380; IV Piggyback:714]  Out: 227.5 [Urine:200; Drains:27.5]      Physical Exam:  EYES: EOM+    NECK: no JVD  HEART : S1 S2 irregular    LUNGS: Clear to auscultation without Crepitations or Wheezing   ABDOMEN:  Soft, No tenderness, Bowel sounds are positive  CNS  Alert and Oriented x 3 moving all 4 limbs            Imaging:  Results for orders placed or performed during the hospital encounter of 08/11/20   XR Chest 2 Views    Impression    Negative chest. Lungs are clear. Coronary stenting.       CT Abdomen Pelvis Without Oral With IV Contrast    Impression    1.  Acute diverticulitis with a small contained perforation. No drainable abscess at this point.  2.  Biliary and pancreatic duct dilatation. No discrete pancreatic masses identified. Consider MRCP, ERCP, EUS.   CT Abdomen Pelvis Without Oral Without IV Contrast    Impression    1.   Again seen is sigmoid colon diverticulitis with a small contained perforation. A small fluid collection adjacent to the sigmoid colon has increased in size to 4 cm x 3 cm (previously 2 cm x 2 cm). Percutaneous drainage would be difficult though may   be possible.  2.  Biliary and pancreatic ductal dilatation is not as well-seen as on the comparison study. See prior study for details.   CT Abscess Drain Pelvic    Impression    1. Stable diverticular abscess.  2. Successful CT-guided drain placement into left pelvic diverticular abscess.    Reference CPT Codes: 48968, 45115   XR Foot Right 2 VWS    Impression    Bones are demineralized. No definitive evidence of an acute fracture. No cortical destructive changes to suggest osteomyelitis. Scattered degenerative changes.   CT Abdomen Pelvis With Oral With IV Contrast    Impression    1.  Interval percutaneous drain placement into the diverticular abscess with near complete collapse of the fluid cavity. Drain abuts the adjacent sigmoid colon. No evidence of post drain placement complication.  2.  No new intra-abdominal abscess or other findings to correlate with ongoing leukocytosis.  3.  Stable appearance of the pancreas with previously seen main pancreatic ductal dilatation not well visualized on this study.   XR Chest 1 View Portable    Impression    New bilateral interstitial infiltrates could represent edema or pneumonia including COVID. Enlarged cardiac silhouette. No pleural effusion. No definite pulmonary vascular congestion.   XR Chest 1 View for PICC Placement Portable    Impression    Right upper extremity PICC line terminates approximately 3 cm above the expected cavoatrial junction. No change in the chest otherwise. Extensive abnormal opacity throughout both lungs persists. Mild cardiomegaly. No pneumothorax or large   pleural effusion.     *Note: Due to a large number of results and/or encounters for the requested time period, some results have not been  displayed. A complete set of results can be found in Results Review.     Chest x-ray done on 8/21/2020 showed:  New bilateral interstitial infiltrates could represent edema or pneumonia including COVID. Enlarged cardiac silhouette. No pleural effusion. No definite pulmonary vascular congestion.      Lab Results:  Personally Reviewed.   Fingerstick Blood Glucose:   Recent Labs     08/22/20 2028 08/23/20  0759 08/23/20  1158 08/23/20  1644 08/23/20  2134 08/24/20  0833 08/24/20  1209 08/24/20  1733   POCGLUFGR 137 143* 138 157* 91 120 148* 141*       Recent Results (from the past 24 hour(s))   POCT Glucose    Collection Time: 08/23/20  9:34 PM    Specimen: Blood   Result Value Ref Range    Glucose 91 70 - 139 mg/dL   Vancomycin (Vancocin )    Collection Time: 08/24/20  1:45 AM   Result Value Ref Range    Vancomycin 38.6 (HH) <=25.0 ug/mL   Phosphorus Level > 2.4 no replacement required    Collection Time: 08/24/20  5:55 AM   Result Value Ref Range    Phosphorus 3.2 2.5 - 4.5 mg/dL   Basic Metabolic Panel    Collection Time: 08/24/20  5:55 AM   Result Value Ref Range    Sodium 136 136 - 145 mmol/L    Potassium 3.6 3.5 - 5.0 mmol/L    Chloride 107 98 - 107 mmol/L    CO2 21 (L) 22 - 31 mmol/L    Anion Gap, Calculation 8 5 - 18 mmol/L    Glucose 111 70 - 125 mg/dL    Calcium 8.1 (L) 8.5 - 10.5 mg/dL    BUN 14 8 - 28 mg/dL    Creatinine 0.94 0.60 - 1.10 mg/dL    GFR MDRD Af Amer >60 >60 mL/min/1.73m2    GFR MDRD Non Af Amer 59 (L) >60 mL/min/1.73m2   HM2(CBC W/O DIFF)    Collection Time: 08/24/20  5:55 AM   Result Value Ref Range    WBC 15.9 (H) 4.0 - 11.0 thou/uL    RBC 2.61 (L) 3.80 - 5.40 mill/uL    Hemoglobin 7.9 (L) 12.0 - 16.0 g/dL    Hematocrit 24.5 (L) 35.0 - 47.0 %    MCV 94 80 - 100 fL    MCH 30.3 27.0 - 34.0 pg    MCHC 32.2 32.0 - 36.0 g/dL    RDW 19.2 (H) 11.0 - 14.5 %    Platelets 109 (L) 140 - 440 thou/uL    MPV 12.4 8.5 - 12.5 fL   POCT Glucose    Collection Time: 08/24/20  8:33 AM    Specimen: Blood    Result Value Ref Range    Glucose 120 70 - 139 mg/dL   POCT Glucose    Collection Time: 08/24/20 12:09 PM    Specimen: Blood   Result Value Ref Range    Glucose 148 (H) 70 - 139 mg/dL   Vancomycin (Vancocin )    Collection Time: 08/24/20  4:38 PM   Result Value Ref Range    Vancomycin 30.6 (HH) <=25.0 ug/mL   Lactic Acid    Collection Time: 08/24/20  4:38 PM   Result Value Ref Range    Lactic Acid 1.9 0.7 - 2.0 mmol/L   Hepatic Profile    Collection Time: 08/24/20  4:38 PM   Result Value Ref Range    Bilirubin, Total 1.2 (H) 0.0 - 1.0 mg/dL    Bilirubin, Direct 0.9 (H) <=0.5 mg/dL    Protein, Total 5.6 (L) 6.0 - 8.0 g/dL    Albumin 1.8 (L) 3.5 - 5.0 g/dL    Alkaline Phosphatase 109 45 - 120 U/L    AST 25 0 - 40 U/L    ALT 32 0 - 45 U/L   Creatinine    Collection Time: 08/24/20  4:38 PM   Result Value Ref Range    Creatinine 1.08 0.60 - 1.10 mg/dL    GFR MDRD Af Amer >60 >60 mL/min/1.73m2    GFR MDRD Non Af Amer 50 (L) >60 mL/min/1.73m2   POCT Glucose    Collection Time: 08/24/20  5:33 PM    Specimen: Blood   Result Value Ref Range    Glucose 141 (H) 70 - 139 mg/dL           Advanced Care Planning  Discharge plan: Unknown at this time      Michael Abreu  Date: 8/24/2020  Time: 4:09 PM  Hospitalist Service  Part of this chart was created using a dictation software. Typographic errors, word substitutions, and Grammatical errors may unintentionally occur.

## 2021-06-10 NOTE — PROGRESS NOTES
[facility]    Facility:  [facility] [[id number]]    Code Status:  FULL CODE    Chief Complaint   Patient presents with     H & P   :                    Patient Active Problem List   Diagnosis     Spinal stenosis     PAD (peripheral artery disease) (H)     Dermatosis Papulosa Nigra     Depression     Hypercalcemia     Right Renal Artery Stenosis     Osteoarthritis     Abnormality Of Walk     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)     Advanced directives, counseling/discussion     Cystitis     Healthcare maintenance     Essential hypertension     Cerebral infarction (H)     Anemia     Cerebrovascular disease     RLS (restless legs syndrome)     Incisional hernia, without obstruction or gangrene     Diverticular disease of large intestine     Coronary artery disease involving native coronary artery of native heart without angina pectoris     Dyslipidemia     Ventral hernia without obstruction or gangrene     Anxiety     (HFpEF) heart failure with preserved ejection fraction (H)     Anticoagulant long-term use     Persistent atrial fibrillation (H)     MAY (acute kidney injury) (H)     Transaminitis     Physical deconditioning     Lipoma of back     Acalculous cholecystitis     Onychogryphosis     Hyperparathyroidism (H)     Microalbuminuria     Intestinal angina (H)     Chronic abdominal pain     Weight loss, non-intentional     Warfarin anticoagulation     Severe protein-calorie malnutrition (H)     Hypertrophy of nail     Dysuria     Class 1 obesity with serious comorbidity and body mass index (BMI) of 33.0 to 33.9 in adult, unspecified obesity type     Trigger finger, acquired     Acute liver failure without hepatic coma     Hematochezia     Hyperkalemia     Acute on chronic combined systolic (congestive) and diastolic (congestive) heart failure (H)     Ischemic cardiomyopathy     Acute kidney failure, unspecified (H)       History:  Gardenia Muller  is a  70 year old female with history of CVA, hyperlipidemia, coronary artery disease (now status post 3 ZURDO to LAD this admission July 2020), cardiomyopathy/chronic systolic heart failure, depression, renal artery stenosis right-sided with hypertension, chronic abdominal pain, atrial fibrillation seen for admission to TCU on 8/5/2020    Hospital Course: Patient was admitted from July 22 through July 31 presenting to the emergency room from her assisted living facility who found her routine INR check to be elevated at 13.    Patient was found to be in hepatic failure/elevated LFTs although the cause was not immediately clear.  Her hypercoagulability was reversed with vitamin K and fresh frozen plasma.  An echocardiogram revealed severe right ventricular dysfunction with high IVC pressure severe tricuspid regurgitation, moderate mitral vegetation and reduced LV function with EF 40%.  It was hypothesized that her liver failure was secondary to heart failure.  That appears to be monitored by her improvement with diuresis treatment.  She did have negative hepatitis serologies as well.    Heart failure was treated with Lasix drip and chlorthalidone however the chlorthalidone had to be discontinued due to hypercalcemia.  Investigation of the heart failure included left and right heart cath which revealed severe native coronary artery disease.  She initially had 2 drug-eluting stents placed to the LAD but there was some dissection requiring an additional ZURDO to the ostial LAD. Results show severe native CAD with RCA  and mLAD that supplies L-R collaterals. She received PCI to LAD with ZURDO x2 with subsequent ZURDO x1 to ostial LAD secondary to coronary dissection.  Cardiology recommended the following anticoagulation plan for the patient:  Per cardiology recommendation: Patient should be on ASA 81mg daily indefinitely, ticagrelor 90mg twice daily for at least 12 months. If has to be on warfarin, would do triple therapy for at  "least 3 months, then can stop ASA. After 1 year, would switch ticagrelor for clopidogrel and continue clopidogrel/warfarin indefinitely.   ....    Her situation is complicated by onset of atrial fibrillation with RVR which was treated with digoxin.  She also had hyperkalemia which improved with Kayexalate and Lasix treatment.  Again no difficulty electrolyte continuation of the chlorthalidone.    Subjective/ROS:    -augmented by discussion with facility staff involved in direct care      Patient reports that she is doing well considering.  She says that she is \"just tired\".  She admits to some generalized weakness.  She has no chest pain orthopnea PND and reports that her peripheral edema is much better than it was.  She has a long history of wearing compression stockings.  She reports no headaches change in vision speaking swallowing or hearing.  She says that her speech has been slow from previous CVA.  She has chronic abdominal pain but says that is not bothering her right now.  She has no nausea or vomiting melena bright red blood per rectum.  She does tend to loose stools chronically she does tend toward constipation and has MiraLAX in place.  She says she is feeling optimistic and not depressed.  She says that her sleep has been greatly helped by trazodone and she is thankful for that.  She says her appetite is still not where it should be.  She says she is got good control of her bladder.  Remainder 13 system ROS negative    Past Medical History:   Diagnosis Date     Acute diastolic heart failure (H) 11/2015     Acute on chronic diastolic heart failure (H) 6/15/2019     Advanced directives, counseling/discussion 8/5/2015    Patient completed 2011.  Discussed today, 5/24/17, and wants to change healthcare agent from niece to daughter.  Discussed that she will need to complete new form to indicate this.     MAY (acute kidney injury) (H) 10/22/2018     Atrial fibrillation (H)      Benign adenomatous polyp of " large intestine 3/30/2017    Colonoscopy March 2017.  Recommend 5 year follow-up.  Single tubular adenoma     Biliary obstruction 10/3/2018    Added automatically from request for surgery [id number]     Cerebral vascular accident (H)      Coronary artery disease 2006    100% RCA with collateral filling per Dr. Elizabeth's angio report     Diabetes mellitus type 2 in obese (H)      Fibrocystic breast      GERD (gastroesophageal reflux disease)      History of anesthesia complications     slow to wake     Hypertension      Peripheral vascular disease (H)      Pneumonia 11/11/2018     PONV (postoperative nausea and vomiting)      S/P cholecystectomy 6/22/2018     SBO (small bowel obstruction) (H) 11/12/2015     Stroke (H)      Transaminitis 10/22/2018     Upper respiratory infection 12/20/2018     Urinary incontinence      Past Surgical History:   Procedure Laterality Date     CARDIAC CATHETERIZATION       CARDIOVERSION  10/18/2018     CORONARY STENT PLACEMENT  1995    stent     CV CORONARY ANGIOGRAM N/A 7/27/2020    Procedure: Coronary Angiogram;  Surgeon: Luis Monge MD;  Location: [facility] Cath Lab;  Service: Cardiology     CV RIGHT HEART CATH SHUNT RUN  7/27/2020    Procedure: Right Heart Catheterization Shunt Run;  Surgeon: Luis Monge MD;  Location: [facility] Cath Lab;  Service: Cardiology     ERCP N/A 10/5/2018    Procedure: ENDOSCOPIC RETROGRADE CHOLANGIOPANCREATOGRAPHY, SPHINCTEROTOMY;  Surgeon: Anson Stokes MD;  Location: [facility] OR;  Service:      EXPLORATORY LAPAROTOMY N/A 6/29/2018    Procedure: LAPAROTOMY, CLOSURE OF PERITONEAL RENT;  Surgeon: Jones Harding DO;  Location: [facility] OR;  Service:      LAPAROSCOPIC CHOLECYSTECTOMY N/A 10/7/2018    Procedure: CHOLECYSTECTOMY, LAPAROSCOPIC;  Surgeon: Felicia So MD;  Location: [facility] OR;  Service:      PICC  6/29/2018          PICC  10/21/2018          VENTRAL HERNIA REPAIR N/A 11/12/2015    Procedure:  STRANGULATED VENTRAL HERNIA REPAIR ;  Surgeon: Ernesto Bailey MD;  Location: [facility];  Service:           Family History   Problem Relation Age of Onset     Cancer Paternal Grandmother      Cancer Paternal Uncle      No Medical Problems Mother      No Medical Problems Father      Heart attack Sister      Chronic Kidney Disease Sister      No Medical Problems Daughter      No Medical Problems Maternal Grandmother      No Medical Problems Maternal Grandfather      No Medical Problems Paternal Grandfather      No Medical Problems Maternal Aunt      No Medical Problems Paternal Aunt      Diabetes Brother      BRCA 1/2 Neg Hx      Breast cancer Neg Hx      Colon cancer Neg Hx      Endometrial cancer Neg Hx      Ovarian cancer Neg Hx    :       Social History     Socioeconomic History     Marital status: Legally      Spouse name: Not on file     Number of children: 1     Years of education: Not on file     Highest education level: Not on file   Occupational History     Not on file   Social Needs     Financial resource strain: Not on file     Food insecurity     Worry: Not on file     Inability: Not on file     Transportation needs     Medical: Not on file     Non-medical: Not on file   Tobacco Use     Smoking status: Former Smoker     Packs/day: 0.25     Smokeless tobacco: Former User     Tobacco comment: e-cig a few times/day   Substance and Sexual Activity     Alcohol use: No     Drug use: No     Sexual activity: Not on file   Lifestyle     Physical activity     Days per week: Not on file     Minutes per session: Not on file     Stress: Not on file   Relationships     Social connections     Talks on phone: Not on file     Gets together: Not on file     Attends [employer] service: Not on file     Active member of club or organization: Not on file     Attends meetings of clubs or organizations: Not on file     Relationship status: Not on file     Intimate partner violence     Fear of current or ex  partner: Not on file     Emotionally abused: Not on file     Physically abused: Not on file     Forced sexual activity: Not on file   Other Topics Concern     Not on file   Social History Narrative    Single/, lives alone; daughter in [place];    Gets help from neighbors and extended family    Former smoker.no alcohol problems   :    Social history update: Patient reports that she lives in a senior apartment in [place].  She says she has some cleaning help and that someone comes in to set up her medications and she gets meals 3 times a day 7 days a week.  She also gets help with bathing.  She asks me to call her niece Liz Goldman at [phone] and that test was accomplished today.  She also says that her nephew Bridger is very helpful to her.  Her daughter lives in [place].    Current Outpatient Medications on File Prior to Visit   Medication Sig Dispense Refill     ARTHRITIS PAIN RELIEF, ACETAM, 650 mg CR tablet Take 650 mg by mouth 2 (two) times a day.              aspirin 81 mg chewable tablet Chew 1 tablet (81 mg total) daily. Stop after 3 months 90 tablet 0     atorvastatin (LIPITOR) 80 MG tablet Take 1 tablet (80 mg total) by mouth at bedtime. Start only after your liver function test is normal 30 tablet 11     blood glucose meter (GLUCOMETER) Please Dispense kit per pharmacy discretion and patient's insurance Test blood sugar. 1 each 0     blood glucose test strips Use 1 each As Directed as needed. Dispense brand per patient's insurance at pharmacy discretion. 50 strip 11     carboxymethylcellulose (REFRESH PLUS) 0.5 % Dpet ophthalmic dropperette Administer 2 drops to both eyes every hour as needed (dry eyes).  0     cholecalciferol, vitamin D3, 2,000 unit Tab TAKE 1 TABLET BY MOUTH ONCE DAILY. *1 TOTAL FILL* 30 each 11     digoxin (LANOXIN) 250 mcg (0.25 mg) tablet Take 1 tablet (250 mcg total) by mouth daily with supper. 30 tablet 0     furosemide (LASIX) 20 MG tablet Take 1 tablet (20 mg total)  by mouth daily. 30 tablet 0     lancets 33 gauge Misc Test twice daily Dispense brand per patient's insurance at pharmacy discretion. 100 each 11     lisinopriL (PRINIVIL,ZESTRIL) 5 MG tablet Take 1 tablet (5 mg total) by mouth daily. 30 tablet 0     loratadine (CLARITIN) 10 mg tablet TAKE 1 TABLET BY MOUTH ONCE DAILY. *1 TOTAL FILL* 30 tablet 11     magnesium gluconate (MAGONATE) 27 mg magnesium (500 mg) Tab tablet Take 1 tablet (27 mg total) by mouth 2 (two) times a day. 60 tablet 11     metFORMIN (GLUCOPHAGE) 500 MG tablet Take 1 tablet (500 mg total) by mouth 2 (two) times a day with meals. 60 tablet 0     metoprolol succinate (TOPROL-XL) 25 MG Take 0.5 tablets (12.5 mg total) by mouth at bedtime. 30 tablet 0     multivitamin therapeutic tablet Take 1 tablet by mouth daily.       nitroglycerin (NITROSTAT) 0.4 MG SL tablet Place 1 tablet (0.4 mg total) under the tongue every 5 (five) minutes as needed for chest pain (for up to 3 doses and call 911 if persists). (Patient taking differently: Place 0.4 mg under the tongue every 5 (five) minutes as needed for chest pain (for up to 3 doses and call 911 if persists). ) 90 tablet 0     omeprazole (PRILOSEC) 20 MG capsule TAKE 1 CAPSULE BY MOUTH TWICE DAILY. *1 TOTAL FILL* 60 capsule 11     polyethylene glycol (GLYCOLAX) 17 gram/dose powder Take 17 g by mouth daily as needed.        potassium chloride (K-DUR,KLOR-CON) 20 MEQ tablet TAKE 1 TABLET BY MOUTH TWICE DAILY. *1 TOTAL FILL* 60 tablet 11     senna-docusate (PERICOLACE) 8.6-50 mg tablet Take 1 tablet by mouth 2 (two) times a day. (Patient taking differently: Take 1 tablet by mouth 2 (two) times a day as needed for constipation. )  0     sertraline (ZOLOFT) 100 MG tablet TAKE 1 TABLET BY MOUTH ONCE DAILY. *1 TOTAL FILL* 30 tablet 11     spironolactone (ALDACTONE) 25 MG tablet TAKE 1 TABLET BY MOUTH ONCE DAILY. *1 TOTAL FILL* 30 tablet 11     ticagrelor (BRILINTA) 90 mg Tab Take 1 tablet (90 mg total) by mouth 2  (two) times a day. Indefinitely but at least 12 months 60 tablet 11     traZODone (DESYREL) 50 MG tablet TAKE 1 TABLET BY MOUTH AT BEDTIME. *1 TOTAL FILL* 30 tablet 11     warfarin ANTICOAGULANT (COUMADIN/JANTOVEN) 2 MG tablet Take 1.5 tablets (3 mg total) by mouth daily. Adjust dose per INR results. 30 tablet 0     No current facility-administered medications on file prior to visit.    :      ALLERGIES:  Penicillins    Vitals:  LMP 01/25/1999  except as noted below    Vital signs: Reviewed per facility EMR vitals including as follows:                Current Vitals     BP: 120/44 mmHg  8/5/2020 17:02  Temp:96.3  F  8/5/2020 17:02    Pulse: 50 bpm  8/5/2020 17:02  Weight: 179.2 Lbs  8/5/2020 12:01  Resp: 16 Breaths/min  8/5/2020 17:02  BS: 139 mg/dL  8/5/2020 20:28  O2: 98 %  8/5/2020 17:02  Pain: 4  8/5/2020 22:05  There is no height or weight on file to calculate BMI.    Physical exam:    General:  Alert  oriented x3 appears pleasant, conversant with some speech hesitancy of slowness at times which she says is not new.    HEENT:  NC/AT, sclera clear, EOMI , gaze is conjugate  Neck:  no mass, adenopathy, thyromegaly  Chest:  khyphosis:   tenderness/deformity:     Heart:  rhythm: Irregularly irregular rate: 80s m/g/r: Soft systolic murmur right sternal border Pmi:  palpable in precordium   Lungs: She is moving air easily without rales rhonchi wheezing.  She does decreased breath sounds bases bilaterally  Abd:  nontender, nondistended, bowel sounds audible and wnls, no mass, no organomegaly   Ext:  perfusion/pulses/capillary refill           Edema just trace ankle edema at this time  Skin: Multiple bruises from IVs and blood pokes etc. on her arms            Warm, dry, pink, intact   Neuro:  as above                 Due to the 2020 Covid 19 pandemic, except as noted above, the patient was visually observed at a 6 foot plus distance.  An observational exam was performed in an effort to keep patient safe from Covid 19  and other communicable diseases.   Labs:  Lab Results   Component Value Date    WBC 8.2 08/04/2020    HGB 11.2 (L) 08/04/2020    HCT 36.5 08/04/2020    MCV 99 08/04/2020     08/04/2020     Results for orders placed or performed in visit on 08/04/20   Basic Metabolic Panel   Result Value Ref Range    Sodium 137 136 - 145 mmol/L    Potassium 5.2 (H) 3.5 - 5.0 mmol/L    Chloride 108 (H) 98 - 107 mmol/L    CO2 22 22 - 31 mmol/L    Anion Gap, Calculation 7 5 - 18 mmol/L    Glucose 77 70 - 125 mg/dL    Calcium 10.6 (H) 8.5 - 10.5 mg/dL    BUN 33 (H) 8 - 28 mg/dL    Creatinine 0.95 0.60 - 1.10 mg/dL    GFR MDRD Af Amer >60 >60 mL/min/1.73m2    GFR MDRD Non Af Amer 58 (L) >60 mL/min/1.73m2         Lab Results   Component Value Date    TSH 8.31 (H) 10/22/2018     Lab Results   Component Value Date    HGBA1C 6.5 (H) 07/23/2020     [unfilled]  Lab Results   Component Value Date    KXWKLVLO03 1,065 (H) 01/10/2018     Lab Results   Component Value Date     (H) 07/22/2020     [unfilled]  Most Recent EKG     Units 07/29/20  0459   VENTRATE BPM 69   ATRIALRATE    QRSDURATION ms 92   QTINTERVAL ms 350   QTCCALC ms 375   P [place] degrees 76   RAXIS degrees -16   TAXIS degrees -19   MUSEDX  Atrial flutter with 4:1 A-V conduction  Septal infarct (cited on or before 26-MAR-2019)  Abnormal ECG  When compared with ECG of 28-JUL-2020 08:04,  Atrial flutter has replaced Atrial fibrillation  Nonspecific T wave abnormality no longer evident in Lateral leads  QT has shortened  Confirmed by CRISTIANO PAGAN, GUS LOC: ([id number]) on 7/29/2020 2:09:36 PM             CT abdomen and pelvis:     FINDINGS:   LOWER CHEST: moderate right atrial enlargement.    HEPATOBILIARY: Prior cholecystectomy. Mild hepatomegaly. Mildly heterogeneous enhancement suggesting passive congestion.   PANCREAS: Normal.   SPLEEN: Normal.   ADRENAL GLANDS: Normal.   KIDNEYS/BLADDER: Mild circumferential urinary bladder wall thickening. No calcification. No  hydronephrosis or hydroureter. No renal mass or stone.   BOWEL: Moderate distal colonic diverticulosis. No evidence for acute diverticulitis. Small volume ascites. No free air. No evidence for bowel obstruction.   LYMPH NODES: Normal.   VASCULATURE: Unremarkable.   PELVIC ORGANS: Multiple partially calcified uterine masses compatible with uterine fibroids.   MUSCULOSKELETAL: Severe multilevel degenerative change.     IMPRESSION:   1.  Mild cystitis suggested please correlate clinically.  2.  Otherwise no acute abnormality in the abdomen or pelvis. No evidence for bowel obstruction.  3.  Enlarged heterogeneous liver suggesting passive hepatic congestion. Given right atrial enlargement, IVC and hepatic vein distention, correlate clinically for right heart failure.     Echocardiogram:    When compared to the previous study dated 7/23/2020, no significant change. Limited study done and tricuspid regurgitation not well assessed on this study.    Left ventricle ejection fraction is normal. The calculated left ventricular ejection fraction is 56%.    Normal left ventricular size and systolic function.    Left atrial volume is moderately increased.    Aortic Valve: The aortic valve is sclerotic without reduced excursion. Mild aortic regurgitation.    Mitral Valve: There is severe valve leaflet thickening. Mild mitral regurgitation.    Assessment/Plan:      ICD-10-CM    1. Acute liver failure without hepatic coma  K72.00    2. Type 2 diabetes mellitus with other circulatory complication, without long-term current use of insulin (H)  E11.59    3. Coronary artery disease involving native coronary artery of native heart without angina pectoris  I25.10    4. Persistent atrial fibrillation (H)  I48.19    5. PAD (peripheral artery disease) (H)  I73.9    6. Right Renal Artery Stenosis  I70.1    7. Chronic heart failure with preserved ejection fraction (H)  I50.32    8. Spinal stenosis of lumbar region, unspecified whether  neurogenic claudication present  M48.061        Hepatic failure due to CHF/passive congestion   Improving now.  AST 43  albumin 2.9 bilirubin 1.4 INR 2.7.  Likely recheck LFTs in a month.  Follow INR closely    Coronary artery disease  Ischemic cardiomyopathy   Anticoagulation plan outlined by cardiology  -Per cardiology recommendation: Patient should be on ASA 81mg daily indefinitely, ticagrelor 90mg twice daily for at least 12 months. If has to be on warfarin, would do triple therapy for at least 3 months, then can stop ASA. After 1 year, would switch ticagrelor for clopidogrel and continue clopidogrel/warfarin indefinitely.   -Atorvastatin LDL goal less than 70  -Blood pressure control  - Cardiology follow-up  :    Atrial fibrillation with RVR   Digoxin and metoprolol as current.  Warfarin for anticoagulation, INR 2.7 on August 3.  Cardiology follow-up.    Hypertension  History of right renal stenosis  Peripheral artery disease   Spironolactone, lisinopril, metoprolol.  She is also on Lasix.  Secondary prevention for PAD will be same as for coronary artery disease.    Chronic systolic heart failure  Ischemic cardiomyopathy EF 40%   Acute component appears resolved.  Continue Lasix 20 mg daily.  Continue lisinopril and spinal lactone and metoprolol  -Follow-up with cardiology    DM 2   Metformin 500 twice daily    Lab Results   Component Value Date    HGBA1C 6.5 (H) 07/23/2020         Hypokalemia   35.2 on August 4.  She is on both lisinopril and spironolactone.  Ms. Torres has made arrangements to recheck her potassium this Friday.  It would be nice to continue these medications for the above medical problems, but she may not be able to tolerate current doses for both at once?  Time will tell.  I added a full BMP for Friday to make sure her creatinine is still good.    CVA, history of  Generalized weakness   Patient reports some speech slowness.  Unclear how much weakness might be chronic?  PT, OT, social  services involved    Multiple other chronic medical problems reviewed without change in therapy today             Case discussed with:  Primary CNP   Facility staff   Family yuki Goldman      Time:  62 minutes with > 50% in counseling and coordination of care as noted above         Abhishek Pearson MD

## 2021-06-10 NOTE — PLAN OF CARE
Problem: Speech Therapy  Goal: SLP Goals  Description: Initial Goals entered on 8/16/2020 by Sole Lorenz, BI and to be met by 8/23/20  Frequency: 5x per week  Patient Stated Goal(s): To be well taken care of.  1. Patient will tolerate diet of clear liquids, no straws, without s/s aspiration or coughing up thick phlegm per SLP audit.- MET  2. Once cleared by medical care team for advanced solids, patient will consume snack portion of regular solids with timely and efficient mastication and oral clearance and without s/s aspiration prior to solid upgrade.- met    Outcome: Completed    Discharge: Patient was seen for swallowing. See daily notes for details. Current diet: Full liquids. Okay to advance to regular textures per MD discretion. NO ST at discharge.

## 2021-06-10 NOTE — PLAN OF CARE
Pt received prn oxycodone for abdominal pain with effective result. Pt refused getting out of bed and also refused eating dinner and insulin held. Pt turn and reposition and received IV antibiotic and drain patent.

## 2021-06-10 NOTE — PLAN OF CARE
HR 70-80 for most of the evening. SR with 1degree AVB and frequent PVC. Bigeminy, trigeminy, quadrigeminy noted. A few episodes of non-sustained vtach 7-10 beat runs. SBP goal >100 maintained with dopamine infusion, needed to increase overnight. Afebrile.   New open skin area noted on sacrum, foam dressing to site. Turning Q2H  Remained on 3L NC overnight, noting tachypnea RR 30-40s for most of the evening.  Still NPO, BG elevated and no SS insulin ordered.   Monitoring K level. Replacement overnight per MD.   INR remains elevated. Awaiting free digoxin level.   Minimal UOP overnight. Creatinine continues to rise.  Patient had minimal pain complaint overnight. Only slight discomfort at drain insertion site. Warm blanket to site.     Problem: Pain  Goal: Patient's pain/discomfort is manageable  Outcome: Progressing     Problem: Safety  Goal: Patient will be injury free during hospitalization  Outcome: Progressing     Problem: Daily Care  Goal: Daily care needs are met  Outcome: Progressing     Problem: Potential for Falls  Goal: Patient will remain free of falls  Outcome: Progressing     Problem: Potential for Compromised Skin Integrity  Goal: Skin integrity is maintained or improved  Outcome: Progressing  Goal: Nutritional status is improving  Outcome: Progressing     Problem: Glucose Imbalance  Goal: Achieve optimal glucose control  Outcome: Progressing

## 2021-06-10 NOTE — SIGNIFICANT EVENT
Pt lethargic this am, refused medications at this time. Denies pain or nausea- dry frequent cough- CXR showed bilateral infiltrates- Tabby PAC from surgery wanted Covid retested as symptomatic- Updated Charge  RN and supervisor fro transfer to PUI floor. Family updated per Care management.

## 2021-06-10 NOTE — PLAN OF CARE
Problem: Pain  Goal: Patient's pain/discomfort is manageable  Outcome: Progressing   No c/o pain overnight.  Problem: Potential for Compromised Skin Integrity  Goal: Skin integrity is maintained or improved  Outcome: Progressing   No new skin issues noted.  Problem: Urinary Incontinence  Goal: Perineal skin integrity is maintained or improved  Outcome: Not Progressing   Pt started on NS 75/h, little to no urine output noted. Bladder scan volume at 0500 was 120mL  NS stopped at 0630.    Pt NPO at midnight except for ice chips.    Po 3.1; protocol D/c'd    Large weight discrepancy noted using the bed scale.  Attempted to obtain a standing weight but Pt refused.

## 2021-06-10 NOTE — PROGRESS NOTES
Atrial fibrillation rate controlled with resumption of home regimen.  Still with fatigue, daytime sleepiness, snoring.  Strongly suspect untreated obstructive sleep apnea.  She is now more agreeable to outpatient sleep medicine evaluation.  Would schedule at hospital discharge.  Please call if we can be of further assistance.  Zafar Torres MD

## 2021-06-10 NOTE — PROGRESS NOTES
Speech Language/Pathology  Speech Therapy Daily Progress Note    Patient presents as lethargic and cooperative during this session.  An  was not applicable.    Objective  Dysphagia: Dysphagia targeted in session as it relates to swallow function and patient tolerating oral diet.  Patient has been NPO this AM for procedure; however, cleared to resume clear liquids this afternoon.    PO Trials:  -Thin: Patient consumed 2oz thin liquids with use of small single sip given initial verbal cue.  No s/s aspiration or coughing up phlegm noted with intake on this date.  Patient declined further trials at this time.    Assessment  Patient appears to be tolerating clear/thin liquids, no straws per limited trials at bedside.    Plan/Recommendations  Speech therapy to follow up x1 more time when cleared for solid textures to monitor diet advancement, due to s/s aspiration noted with thin liquids post solid intake on BSS.    The ST Care Plan has been reviewed and current plan remains appropriate.    15 dysphagia minutes     Sole Lorenz MS, CCC-SLP

## 2021-06-10 NOTE — PLAN OF CARE
Problem: Pain  Goal: Patient's pain/discomfort is manageable  Outcome: Progressing     Problem: Safety  Goal: Patient will be injury free during hospitalization  Outcome: Progressing     Problem: Daily Care  Goal: Daily care needs are met  Outcome: Progressing  Pt was alert and oriented x2 most of the time. Denies pain. Had loose stool. Incontinent bladder and bowel. Pt needs help for feeding. T&rep q2hr. Bladder scanned 93 cc. SHAMIKA drain was patent with zero output. No flushes are  needed per IR note. Creatinine level was 1.93 and potassium was 5.3. notified Rekha. No new order was obtained.  On 3 L of O2 sating 92%. Red lumina was blocked. Administered alteplase. Now both lumina are flushed without difficulties.  Pt weight 220.4  Lb today . shortness of breath with activities.    vancomycin  trough will be check on 08/28.

## 2021-06-10 NOTE — PROGRESS NOTES
Over 25 minutes spent greater than 50% of this counseling regarding following issues:  1. Essential hypertension  Well-controlled today.  Continue Toprol XL 25 and hydrochlorothiazide alone    2. Onychocryptosis  Trimmed toenails, discussed option of treatment of fungus but only one toenail that appears to be infected, her great toe, I believe is on the left side.    3. RLS (restless legs syndrome)  Declined treatment at this point.  Offered for future consideration.  See below    Patient Instructions   Your blood pressure is good- I do not want it any lower.  Keep watching it- if the 5 minute rechecks in the evening are averaging above 140, let me know, we might consider restarting it.    Stop calcium  Stop vitamin C.  Continue multivitamin.  Stop gabapentin    There is medication available for restless legs- they can sometimes have some side effects and I want you on as few of medications as possible.  However, if that is a big problem, we should try the medication.  You let me know.        Gardenia is here for follow-up from last visit.  She was seen 5/18 with fatigue and hypertension.  Blood pressure was in the 80s systolic.  At that time, we cut back on her metoprolol from 25 mg twice daily to Toprol-XL 25 daily and stop lisinopril.  She has been checking blood pressures diligently since then.  These are somewhat higher in the evening then in the morning.  When she first checks in the evening after she has been up and about, they are around 150 systolic but after resting for less than 5 minutes, go down into the 120s and 30s.  Blood pressures are reviewed today and are, without exception, excellent.    Her other concern is that she can feel her heart pounding when she is walking around during the day.  She denies any chest pain or increased shortness of breath.  She denies any irregularity in the pulse.  Does not think she has atrial fibrillation when this happens that she has had this before.    Sounds like  mostly she is nervous that she stopped taking a number of medications and feels like she should have something at nighttime to help.    Is on quite a number of pills and reviewed these with her today.  I think she can stop her calcium.  Is an adequate supply of milk and eats a balanced diet.  Vitamin C can be stopped as well.  I think she takes this as an antioxidant.    Patient has been on low-dose gabapentin due to some discomfort in her feet and the sensation of discomfort when she lays down at night which is relieved by moving around.  Discussed other options for treatment of restless leg syndrome and she does not wish to pursue this currently for fear of polypharmacy.    Questions about living will today.  She confused her will beneficiaries with a living well.  Turns out however that she does want to switch her living will her agent from her niece to her daughter.    Finally, she has thickened curled toenails.  Possible an onychomycosis in the left great toenail.  These were all trimmed down today.  Discussed option of treating and again decided against    I recommend that she be seen back in 3 months.  Discussed

## 2021-06-10 NOTE — PROGRESS NOTES
"Pharmacy Consult: Vancomycin Dosing    Pharmacist consulted to dose vancomycin for Gardenia Muller, a 70 y.o. female.    Ordering provider: Dr. Lan    Indication for vancomycin therapy: Hospital Acquired Pneumonia    Goal Trough Range:  15-20 mcg/mL based on indication    Other current antimicrobials              meropenem 1 g in NaCl 0.9 % 50 mL (MINI-BAG Plus) (MERREM)  Every 8 hours                   Subjective/Objective:    Patient was admitted for Diverticulitis on 8/11/2020    Height: 5' 2\" (1.575 m)    Actual Body Weight (ABW): 100 kg (220 lb 6.4 oz)    Ideal body weight: 50.1 kg (110 lb 7.2 oz)  Adjusted ideal body weight: 70 kg (154 lb 6.9 oz)    BMI: Body mass index is 40.31 kg/m .    Allergies   Allergen Reactions     Penicillins Swelling       Patient Active Problem List   Diagnosis     Spinal stenosis     PAD (peripheral artery disease) (H)     Dermatosis Papulosa Nigra     Depression     Hypercalcemia     Right Renal Artery Stenosis     Osteoarthritis     Abnormality Of Walk     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)     Advanced directives, counseling/discussion     Cystitis     Healthcare maintenance     Essential hypertension     Cerebral infarction (H)     Anemia     Cerebrovascular disease     RLS (restless legs syndrome)     Incisional hernia, without obstruction or gangrene     Diverticular disease of large intestine     Coronary artery disease involving native coronary artery of native heart without angina pectoris     Dyslipidemia     Ventral hernia without obstruction or gangrene     Anxiety     (HFpEF) heart failure with preserved ejection fraction (H)     Anticoagulant long-term use     Persistent atrial fibrillation (H)     MAY (acute kidney injury) (H)     Transaminitis     Physical deconditioning     Lipoma of back     Acalculous cholecystitis     Onychogryphosis     Hyperparathyroidism (H)     Microalbuminuria     Intestinal angina (H)     Chronic abdominal pain "     Weight loss, non-intentional     Warfarin anticoagulation     Severe protein-calorie malnutrition (H)     Hypertrophy of nail     Dysuria     Class 1 obesity with serious comorbidity and body mass index (BMI) of 33.0 to 33.9 in adult, unspecified obesity type     Trigger finger, acquired     Acute liver failure without hepatic coma     Hematochezia     Hyperkalemia     Acute on chronic combined systolic (congestive) and diastolic (congestive) heart failure (H)     Ischemic cardiomyopathy     Acute kidney failure, unspecified (H)     Acute diastolic heart failure (H)     Diverticulitis     Supratherapeutic INR     Diverticulitis of large intestine with perforation and abscess without bleeding     Atrial fibrillation with rapid ventricular response (H)     Obstructive sleep apnea syndrome    Past Medical History:   Diagnosis Date     Acute diastolic heart failure (H) 11/2015     Acute on chronic diastolic heart failure (H) 6/15/2019     Advanced directives, counseling/discussion 8/5/2015    Patient completed 2011.  Discussed today, 5/24/17, and wants to change healthcare agent from niece to daughter.  Discussed that she will need to complete new form to indicate this.     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  Recommend 5 year follow-up.  Single tubular adenoma     Biliary obstruction 10/3/2018    Added automatically from request for surgery [id number]     Cerebral vascular accident (H)      Coronary artery disease 2006    100% RCA with collateral filling per Dr. Elizabeth's angio report     Diabetes mellitus type 2 in obese (H)      Fibrocystic breast      GERD (gastroesophageal reflux disease)      History of anesthesia complications     slow to wake     Hypertension      Obstructive sleep apnea syndrome      Peripheral vascular disease (H)      Pneumonia 11/11/2018     PONV (postoperative nausea and  vomiting)      S/P cholecystectomy 6/22/2018     SBO (small bowel obstruction) (H) 11/12/2015     Stroke (H)      Transaminitis 10/22/2018     Upper respiratory infection 12/20/2018     Urinary incontinence         Temp Readings from Current Encounter:     08/25/20 1908 08/26/20 0004 08/26/20 0927   Temp: 97.3  F (36.3  C) 98.1  F (36.7  C) (!) 100.6  F (38.1  C)     Net Intake/Output (last 24 hours):  I/O last 3 completed shifts:  In: 30 [I.V.:30]  Out: 0     Recent Labs     08/24/20  0555 08/24/20  1638 08/25/20  0526 08/26/20  0534   WBC 15.9*  --  15.6*  --    LACTICACID  --  1.9  --   --    BUN 14  --   --  26   CREATININE 0.94 1.08  --  1.93*     Estimated Creatinine Clearance: 30 mL/min (A) (by C-G formula based on SCr of 1.93 mg/dL (H)).    No results for input(s): CULTURE in the last 72 hours.    Results for orders placed or performed during the hospital encounter of 08/11/20   Culture, Urine    Collection Time: 08/21/20  4:37 PM    Specimen: Urine, Catheter   Result Value Status    Culture No Growth Final   Urine culture    Collection Time: 08/19/20  8:08 PM    Specimen: Urine, Clean Catch   Result Value Status    Culture No Growth Final   Aerobic Culture with Gram Stain    Collection Time: 08/17/20 10:02 AM    Specimen: Fluid, Pelvic; Body Fluid   Result Value Status    Culture 2+ Candida dubliniensis (!) Final   Fungal culture    Collection Time: 08/17/20 10:02 AM    Specimen: Pelvis, Left; Body Fluid   Result Value Status    Culture 1+ Candida albicans (!) Preliminary   Anaerobic culture    Collection Time: 08/17/20 10:02 AM    Specimen: Pelvis, Left; Tissue   Result Value Status    Culture No anaerobic organisms isolated Final       Recent labs: (last 7 days)     08/23/20  0754 08/24/20  0145 08/24/20  1638 08/25/20  0526 08/26/20  0534   VANCOMYCIN 43.1* 38.6* 30.6* 27.0* 25.6*       Vancomycin administrations: (last 120 hours)     Date/Time Action Medication Dose Rate    08/23/20 9773 New Bag     vancomycin 1,500 mg in sodium chloride 0.9% 500 mL (VANCOCIN) 1,500 mg 176.7 mL/hr    08/23/20 0204 New Bag    vancomycin 1,500 mg in sodium chloride 0.9% 500 mL (VANCOCIN) 1,500 mg 176.7 mL/hr    08/22/20 1400 New Bag    vancomycin 1,500 mg in sodium chloride 0.9% 500 mL (VANCOCIN) 1,500 mg 176.7 mL/hr    08/22/20 0218 New Bag    vancomycin 1,750 mg in sodium chloride 0.9% 500 mL (VANCOCIN) 1,750 mg 178.3 mL/hr          Assessment/Plan:    Pharmacist consulted to dose vancomycin for Hospital Acquired Pneumonia, goal trough range 15-20 mcg/mL. Patients vancomycin level remains high despite not receiving any doses of vancomycin since 8/23 ~1400. Vanco level has only come down ~2 points since yesterdays AM draw, will wait to draw labs until Friday now given trend in Scr and vanco levels.  1. Continue vancomycin intermittent dosing.   2. Vancomycin trough level of 25.6 mcg/mL was above the goal trough range. This trough level was drawn at steady state.  3. Pharmacist will plan to re-check a vancomycin trough level 8/28 with AM labs.  4. Pharmacist will continue to follow.    Thank you for the consult.  Celine Katz, PharmD 8/26/2020 1:49 PM

## 2021-06-10 NOTE — PLAN OF CARE
Problem: Pain  Goal: Patient's pain/discomfort is manageable  Outcome: Progressing   Pt denies having any pain during shift.     Problem: Daily Care  Goal: Daily care needs are met  Outcome: Progressing   Pt is alert, but orientation fluctuates. Pt is on a clear liquid diet. Pt tele is afib. Pt dias had minimal dark urine output. Pt has NS running at 125 mL/hr. Pt received PRN zofran for nausea. Pt received IV cipro/flagyl this shift. Pt BS were 103 & 114. Pt had a large bowel movement during shift. Pt VSS.

## 2021-06-10 NOTE — PROGRESS NOTES
General Surgery Progress Note      Subjective:   Pt is feeling bit more pain at site of new drain. Pain rated 7. No nausea.Afebrile. Tachycardia. WBC stable. IR percutaneous drain placed yesterday. Kidney function improving. Drain total 20 cc. Has had bm.     Vitals:    08/18/20 0033 08/18/20 0409 08/18/20 0711 08/18/20 1201   BP: 95/44 98/47 101/51 119/53   Patient Position: Lying Lying Lying Semi-nicole   Pulse: (!) 101 92 71 (!) 109   Resp: 18 18 18 18   Temp: 98.2  F (36.8  C) 98.2  F (36.8  C) 98.7  F (37.1  C) 97.9  F (36.6  C)   TempSrc: Oral Oral Oral Oral   SpO2:  98% 100% 97%   Weight:       Height:           Physical Exam:  Lungs:  CTA  CV:       RRR  Ab:       Soft, + BS, tender llq. No rebound. SHAMIKA turbid serosanguineous     Results from last 7 days   Lab Units 08/18/20  0659   LN-WHITE BLOOD CELL COUNT thou/uL 13.8*   LN-HEMOGLOBIN g/dL 7.7*   LN-HEMATOCRIT % 24.5*   LN-PLATELET COUNT thou/uL 135*       Results from last 7 days   Lab Units 08/18/20  0659 08/17/20  0520   LN-SODIUM mmol/L 139 139   LN-POTASSIUM mmol/L 3.3* 3.3*   LN-CHLORIDE mmol/L 106 107   LN-CO2 mmol/L 23 22   LN-BLOOD UREA NITROGEN mg/dL 18 31*   LN-CREATININE mg/dL 1.33* 3.03*   LN-CALCIUM mg/dL 7.2* 6.9*   LN-ALBUMIN g/dL  --  1.8*       Assessment:  Perforated diverticulitis with abscess POD 1 percutaneous drain placement with IR.     Plan: Continue IV abx           Diet adv as tolerated to low fiber diet.            Continue supportive cares               Nela Camacho PA-C [phone]  e  [facility]  [address] [address]

## 2021-06-10 NOTE — PROGRESS NOTES
[facility] Daily Progress Note    Assessment/Plan:  Gardenia Muller is a 70 year  old female with history of HTN, T2DM, chronic atrial fibrillation on warfarin, diastolic CHF, coronary artery disease status post stenting (7/27/2020), peripheral artery disease, calculus cholecystitis status post cholecystectomy, depression, who came to [facility] on 8/11/2020 for abdominal pain and vomiting. CT scan revealed acute diverticulitis with perforation. on 8/14 showed increased size of abscess from 2x2cm to 4x3cm, though pt reports clinical improvement of her symptoms.. General surgery consulted - no current plans for operating room so interventional radiology was consulted for possible drainage which was done on 8/17/2020 by placing CT scan guided drain placement in the left pelvic diverticular abscess repeat CTAP      In the ED, she was hypoglycemic also and she was started on D5 normal saline. Hospitalization complicated by MAY, Nephrology consulted - Cr improving; no plans for dialysis at this time.      Assessment:  Acute perforated diverticulitis with diverticular abscess  Secondary peritonitis  On ciprofloxacin and metronidazole IV  WBC count not improving    Candida bacteremia  In a patient with diabetes mellitus  We will consult infectious diseases regarding management    Acute kidney injury  Creatinine has improved    Acute anemia  Because of the blood loss is not known  Patient will be transfused with 1 unit of blood with hemoglobin of 6.1    Atrial fibrillation  Patient has tachycardia with hypotension -received 250 mL of normal saline earlier with with blood pressure improved  Patient was restarted on digoxin and her home medications metoprolol 12.5 mg daily  Cardiology consult was appreciated    Type 2 diabetes mellitus  On sliding scale insulin    Supratherapeutic INR  Is now better with INR decreasing to 1.5  We will discuss warfarin protocol with pharmacy as INR to be therapeutic with the drain is  in    Coronary artery disease status post stent placement 7/27/2020     Congestive heart failure with preserved LV function    Right foot pain    Acute hypokalemia    Acute hypomagnesemia    Acute anemia    GERD   Depression          Plan:  Econazole was added by infectious diseases -consult is appreciated    Monitor chest x-ray results if infiltrate is present vancomycin and meropenem in place of Cipro plus Flagyl will be started as per ID recommendations      Code status:Full Code        Subjective:  States that she is somewhat better as compared to yesterday  Denies shortness of breath but looks breathless    Review of Systems:   Denies any significant abdominal pain    Current Medications Reviewed via EHR List    Objective:  Vital signs in last 24 hours:  Temp:  [97.7  F (36.5  C)-98.4  F (36.9  C)] 97.7  F (36.5  C)  Heart Rate:  [67-74] 67  Resp:  [16-20] 18  BP: ()/(42-64) 113/42  SpO2:  [93 %-96 %] 96 %    Intake/Output last 3 shifts:  I/O last 3 completed shifts:  In: -   Out: 625 [Urine:600; Drains:25]      Physical Exam:  EYES: EOM+    NECK: no JVD  HEART : S1 S2 irregular    LUNGS: Clear to auscultation without Crepitations or Wheezing   ABDOMEN:  Soft, No tenderness, Bowel sounds are positive  CNS  Alert and Oriented x 3 moving all 4 limbs            Imaging:  Results for orders placed or performed during the hospital encounter of 08/11/20   XR Chest 2 Views    Impression    Negative chest. Lungs are clear. Coronary stenting.       CT Abdomen Pelvis Without Oral With IV Contrast    Impression    1.  Acute diverticulitis with a small contained perforation. No drainable abscess at this point.  2.  Biliary and pancreatic duct dilatation. No discrete pancreatic masses identified. Consider MRCP, ERCP, EUS.   CT Abdomen Pelvis Without Oral Without IV Contrast    Impression    1.  Again seen is sigmoid colon diverticulitis with a small contained perforation. A small fluid collection adjacent to the  sigmoid colon has increased in size to 4 cm x 3 cm (previously 2 cm x 2 cm). Percutaneous drainage would be difficult though may   be possible.  2.  Biliary and pancreatic ductal dilatation is not as well-seen as on the comparison study. See prior study for details.   CT Abscess Drain Pelvic    Impression    1. Stable diverticular abscess.  2. Successful CT-guided drain placement into left pelvic diverticular abscess.    Reference CPT Codes: 14629, 63477   XR Foot Right 2 VWS    Impression    Bones are demineralized. No definitive evidence of an acute fracture. No cortical destructive changes to suggest osteomyelitis. Scattered degenerative changes.   CT Abdomen Pelvis With Oral With IV Contrast    Impression    1.  Interval percutaneous drain placement into the diverticular abscess with near complete collapse of the fluid cavity. Drain abuts the adjacent sigmoid colon. No evidence of post drain placement complication.  2.  No new intra-abdominal abscess or other findings to correlate with ongoing leukocytosis.  3.  Stable appearance of the pancreas with previously seen main pancreatic ductal dilatation not well visualized on this study.     *Note: Due to a large number of results and/or encounters for the requested time period, some results have not been displayed. A complete set of results can be found in Results Review.         Lab Results:  Personally Reviewed.   Fingerstick Blood Glucose:   Recent Labs     08/19/20  1657 08/19/20  2035 08/20/20  0857 08/20/20  1245 08/20/20  1732 08/20/20  2051 08/21/20  0850   POCGLUFGR 126 147* 140* 132 115 137 110       Recent Results (from the past 24 hour(s))   POCT Glucose    Collection Time: 08/20/20  5:32 PM    Specimen: Blood   Result Value Ref Range    Glucose 115 70 - 139 mg/dL   POCT Glucose    Collection Time: 08/20/20  8:51 PM    Specimen: Blood   Result Value Ref Range    Glucose 137 70 - 139 mg/dL   Crossmatch    Collection Time: 08/21/20 12:06 AM   Result Value  Ref Range    Crossmatch Compatible     Unit Type B Pos     Unit Number [id number]     Status Transfused     Component Red Blood Cells     PRODUCT CODE [id number]     Issue Date and Time [id number]     Blood Type 7300     CODING SYSTEM [id number]    Magnesium    Collection Time: 08/21/20  4:47 AM   Result Value Ref Range    Magnesium 1.5 (L) 1.8 - 2.6 mg/dL   Basic Metabolic Panel    Collection Time: 08/21/20  4:47 AM   Result Value Ref Range    Sodium 137 136 - 145 mmol/L    Potassium 3.4 (L) 3.5 - 5.0 mmol/L    Chloride 105 98 - 107 mmol/L    CO2 21 (L) 22 - 31 mmol/L    Anion Gap, Calculation 11 5 - 18 mmol/L    Glucose 113 70 - 125 mg/dL    Calcium 7.4 (L) 8.5 - 10.5 mg/dL    BUN 8 8 - 28 mg/dL    Creatinine 0.95 0.60 - 1.10 mg/dL    GFR MDRD Af Amer >60 >60 mL/min/1.73m2    GFR MDRD Non Af Amer 58 (L) >60 mL/min/1.73m2   HM2(CBC W/O DIFF)    Collection Time: 08/21/20  4:47 AM   Result Value Ref Range    WBC 19.3 (H) 4.0 - 11.0 thou/uL    RBC 2.85 (L) 3.80 - 5.40 mill/uL    Hemoglobin 8.6 (L) 12.0 - 16.0 g/dL    Hematocrit 26.1 (L) 35.0 - 47.0 %    MCV 92 80 - 100 fL    MCH 30.2 27.0 - 34.0 pg    MCHC 33.0 32.0 - 36.0 g/dL    RDW 17.3 (H) 11.0 - 14.5 %    Platelets 137 (L) 140 - 440 thou/uL    MPV 12.1 8.5 - 12.5 fL   Phosphorus    Collection Time: 08/21/20  4:47 AM   Result Value Ref Range    Phosphorus 2.5 2.5 - 4.5 mg/dL   POCT Glucose    Collection Time: 08/21/20  8:50 AM    Specimen: Blood   Result Value Ref Range    Glucose 110 70 - 139 mg/dL   C. Diff Toxin By PCR    Collection Time: 08/21/20 11:12 AM    Specimen: Stool   Result Value Ref Range    C.Difficile Toxigenic by PCR Negative Negative    Ribotype 027/NAP1/B1 Presumptive Negative Presumptive Negative           Advanced Care Planning  Discharge plan: Unknown at this time      Michael Abreu  Date: 8/21/2020  Time: 4:09 PM  Hospitalist Service  Part of this chart was created using a dictation software. Typographic errors, word substitutions, and  Grammatical errors may unintentionally occur.

## 2021-06-10 NOTE — PLAN OF CARE
Pt is going to have her abscess drain re-evaluated tomorrow in IR with a goal of possibly removing it, assuming the abscess has dissolved sufficiently.  Pt did participate in some therapy activity today. Pure-wick in use.

## 2021-06-10 NOTE — PLAN OF CARE
Problem: Pain  Goal: Patient's pain/discomfort is manageable  Outcome: Progressing     Problem: Potential for Compromised Skin Integrity  Goal: Skin integrity is maintained or improved  Outcome: Progressing     Problem: Safety  Goal: Patient will be injury free during hospitalization  Outcome: Progressing     Patient alert and oriented: able to make needs known. PRN zofran administred for c/o nausea: no vomiting noted as PRN was effective. Denies abdominal pain. PureWick in place for incontinence with effective results: no skin concerns with use of purewick. Continues on telemetry monitoring- noted A-FIB. PIV line maintained with D5% with NS 0.9% at 50mL/hour. No concerns. Will continue to monitor.

## 2021-06-10 NOTE — PLAN OF CARE
"Care Plan  Care Management         Care Management Goals of the Day: Progression of care and discharge planning     Care Progression Reviewed With: Charge Nurse, Medical Doctor, Health Unit Coordinator, Nurse Care Manager      Barriers to Discharge: GI status, cultures pending     Discharge Disposition: TCU     Expected Discharge Date: 8/21/20 pending     Transportation: [facility]        Care Coordination Narrative:  Surgery following for acute perforated diverticulitis with small abscess, 8/17 percutaneous drain placed, currently on IV Flagyl and Cipro. Nephrology following. This is readmit from hospitalization 7/22/20-7/31/20.     Assessment History:  Patient has been at [facility] since last hospitalization. Prior to that she has been living at [facility] [phone]. She received medication assist and ADL assist. She uses a rolling walker and has a scooter. Daughter-Aria is main contact. She lives in [place]. Nephew-Bridger lives in [facility].   The [place] who states per DON patient can pay her rent when she returns and \"not to worry.\"      Met with patient today 8/19. Patient states if she can walk she prefers to go home; if not, she will return to [facility] where she has a 18 day bed hold.   "

## 2021-06-10 NOTE — PROGRESS NOTES
[place] Daily Progress Note      Date of Service: 8/17/2020     Brief History:   Ms Muller is a 70 y.o. old female with history of HTN, T2DM, chronic atrial fibrillation on warfarin, diastolic CHF, coronary artery disease status post stenting (7/27/2020), peripheral artery disease, calculus cholecystitis status post cholecystectomy, depression, who was on 8/11 for abdominal pain and vomiting. CT scan revealed acute diverticulitis with perforation. General surgery consulted - no current plans for OR; IR consulted for possible drainage, planning on 8/17. Repeat CTAP on 8/14 showed increased size of abscess from 2x2cm to 4x3cm, though pt reports clinical improvement of her symptoms.    In the ED, she was hypoglycemic also and she was started on D5 normal saline. Hospitalization complicated by MAY, Nephrology consulted - Cr improving; no plans for dialysis at this time.    Assessment & Plan:  #Acute perforated diverticulitis with a small abscess: Repeat CT on 8/14 showed increase in size of abscess from 2x2cm to 4x3cm. Pt is currently on IV Cipro and Flagyl. Surgery consulted - no plans for OR; planning for percutaneous drainage today. Pt had a fever of 100.4 on 8/14 am and is having low-grade fevers in ~99. Fevers could be due to abscess (?progression)  -Plan: Surgery recs appreciated. Repeat CTAP this morning and planning for percutaneous drainage on 8/17 by IR. Continue IV abx.    #Abdominal pain due to diverticulitis and perforation/peritonitis, improving: prn hydromorphone. Monitor for any respiratory depression; avoid if sedated.    #Acute kidney injury, improving: Suspect multifactorial from sepsis, contrast from CT scans, n/v, hypotension.  -Plan: Nephrology recommendations appreciated. Avoid nephrotoxins, monitor I/O's.    #History of atrial fibrillation: Rate-controlled. Prn metoprolol. Holding digoxin, metoprolol, and warfarin.     #Type 2 diabetes mellitus: A1c 6.5 from 7/23/2020. LDSSI. Holding home  "metformin.    #Supratherapeutic INR, resolved: INR was 4, now s/p 5mg IV vitK. INR <2 now with possible procedure.   -Plan: Holding home warfarin. May restart after drainage as per IR.    #History of coronary artery disease s/p recent angiogram and stent placement on 7/27/2020: ASA 81, statin. Ticagrelor on hold due to procedure - will need to restart afterwards. Holding metoprolol, Lasix, lisinopril, and spironolactone due borderline blood pressure and MAY.    #HFrEF: Daily weights, I/O's. Holding home ACEi, spironolactone, furosemide.    #Right foot pain: this has been going for several days, but appeared to be getting worse yesterday. XR does not show acute fracture. Uric acid elevated, but this could be in the setting of her MAY. Pt denies history of neuropathy.  -Plan: Pain control. Given active infection, holding off on steroids/NSAIDs/colchicine for possible gout which pt is in agreement with. Continue to monitor.    Chronic conditions:  #GERD: PPI.  #History of depression: sertraline.    Subjective:     Interval History: Pt laying in bed. Denies chest pain, shortness of breath, or arm pain. Reports improvement of her abdominal pain and reports regular bowel movements. She reports right foot pain that is tender to palpation and is asking that it is wrapped. No numnbess, paresthesias.    Chart reviewed, events noted. Pt seen and examined.     Objective     Vital signs in last 24 hours:  Temp:  [98.7  F (37.1  C)-99.8  F (37.7  C)] 99.2  F (37.3  C)  Heart Rate:  [82-96] 87  Resp:  [16-18] 18  BP: (107-132)/(55-60) 107/55  Weight:   196 lb 4.8 oz (89 kg)  Weight change:   Body mass index is 35.9 kg/m .    Intake/Output last 3 shifts:  I/O last 3 completed shifts:  In: 365 [P.O.:240; IV Piggyback:125]  Out: 1100 [Urine:1100]  Intake/Output this shift:  No intake/output data recorded.      Physical Exam:  /55 (Patient Position: Lying)   Pulse 87   Temp 99.2  F (37.3  C) (Oral)   Resp 18   Ht 5' 2\" " (1.575 m)   Wt 196 lb 4.8 oz (89 kg)   LMP 01/25/1999   SpO2 100%   BMI 35.90 kg/m        O2 Device: None (Room air) O2 Flow Rate (L/min): 1 L/min    General: Alert, awake. NAD.  HENT: Normocephalic, oral mucosa moist.  Eyes: Tracking. Subconjunctival hemorrhage present.  Respiratory: Not in distress not using accessory muscles of respiration, no wheezing. On room air.  Heart: Irregularly irregular. Peripheral pulses intact.  Abdomen: Soft, nontender.  Extremities: LE edema. Tenderness to palpation of right foot.  Neurology: Alert awake, moving extremities grossly    Imaging: Reviewed   Ct Abdomen Pelvis Without Oral Without Iv Contrast    Result Date: 8/14/2020  EXAM: CT ABDOMEN PELVIS WO ORAL WO IV CONTRAST LOCATION: [facility] DATE/TIME: 8/14/2020 10:26 AM INDICATION: Abdominal infection suspected perforated diverticulitis, assess for abscess COMPARISON: 8/11/2020 TECHNIQUE: CT scan of the abdomen and pelvis was performed without oral or IV contrast. Multiplanar reformats were obtained. Dose reduction techniques were used. CONTRAST: None. FINDINGS: LOWER CHEST: Trace pleural fluid. Minimal scarring or edema at the lung bases. The heart is enlarged. There is coronary artery calcification. HEPATOBILIARY: Cholecystectomy. There is extrahepatic bile duct dilatation. PANCREAS: Pancreatic duct dilatation is not as well-seen on this study as on the comparison study. SPLEEN: Normal. ADRENAL GLANDS: Normal. KIDNEY/BLADDER: Normal kidneys and ureters. There is wall thickening of the nondistended urinary bladder. A Bardales catheter is present. BOWEL: There is colonic diverticulosis. There is stranding adjacent to the sigmoid colon in the region of diverticulitis. There is a complex 4 cm x 3 cm collection to the left of the sigmoid colon in this region (previously this was 2 cm x 2 cm). Minimal  extraluminal air is noted. LYMPH NODES: Normal. VASCULATURE: Atherosclerotic disease. PELVIC ORGANS: Fibroid uterus.  MUSCULOSKELETAL: Normal.     1.  Again seen is sigmoid colon diverticulitis with a small contained perforation. A small fluid collection adjacent to the sigmoid colon has increased in size to 4 cm x 3 cm (previously 2 cm x 2 cm). Percutaneous drainage would be difficult though may be possible. 2.  Biliary and pancreatic ductal dilatation is not as well-seen as on the comparison study. See prior study for details.    Xr Chest 1 View Portable    Result Date: 7/22/2020  EXAM: XR CHEST 1 VIEW PORTABLE LOCATION: [facility] DATE/TIME: 7/22/2020 9:56 PM INDICATION: Increasing shortness of breath and orthopnea and patient with history of heart failure COMPARISON: CT 07/02/2019     Mild, irregular. No evidence for CHF or pneumonia. No pleural effusion or pneumothorax.    Xr Chest 2 Views    Result Date: 8/11/2020  EXAM: XR CHEST 2 VIEWS LOCATION: [facility] DATE/TIME: 8/11/2020 11:50 AM INDICATION: Cough. COMPARISON: None.     Negative chest. Lungs are clear. Coronary stenting.     Xr Foot Right 2 Vws    Result Date: 8/16/2020  EXAM: XR FOOT RIGHT 2 VWS LOCATION: [facility] DATE/TIME: 8/16/2020 12:51 PM INDICATION: Acute right foot pain COMPARISON: None.     Bones are demineralized. No definitive evidence of an acute fracture. No cortical destructive changes to suggest osteomyelitis. Scattered degenerative changes.    Ct Abdomen Pelvis Without Oral With Iv Contrast    Result Date: 8/11/2020  EXAM: CT ABDOMEN PELVIS WO ORAL W IV CONTRAST LOCATION: [facility] DATE/TIME: 8/11/2020 11:41 AM INDICATION: right sided abdominal pain, nausea and vomiting COMPARISON: 7/22/2020 TECHNIQUE: CT scan of the abdomen and pelvis was performed following injection of IV contrast. Multiplanar reformats were obtained. Dose reduction techniques were used. CONTRAST: Iohexol (Omni) 75mL FINDINGS: LOWER CHEST: Minimal interstitial edema. The heart is mildly enlarged. There is coronary artery disease.  HEPATOBILIARY: Cholecystectomy. There is intrahepatic and extrahepatic bile duct dilatation. There is pancreatic duct dilatation. PANCREAS: No discrete pancreatic masses identified. SPLEEN: Normal. ADRENAL GLANDS: Normal. KIDNEYS/BLADDER: Normal. BOWEL: There is colonic diverticulosis. There is acute diverticulosis involving the distal sigmoid colon with a small contained perforation. No drainable abscess at this point. LYMPH NODES: Normal. VASCULATURE: Atherosclerotic disease. Right renal artery stent. PELVIC ORGANS: Fibroid uterus. MUSCULOSKELETAL: Degenerative changes.     1.  Acute diverticulitis with a small contained perforation. No drainable abscess at this point. 2.  Biliary and pancreatic duct dilatation. No discrete pancreatic masses identified. Consider MRCP, ERCP, EUS.    Ct Abdomen Pelvis Without Oral With Iv Contrast    Result Date: 7/23/2020  EXAM: CT ABDOMEN PELVIS WO ORAL W IV CONTRAST LOCATION: [facility] DATE/TIME: 7/22/2020 11:58 PM INDICATION: Bowel obstruction high-grade suspected Nausea, anorexia, abdominal distention in elderly patient. COMPARISON: 06/26/2019. TECHNIQUE: CT scan of the abdomen and pelvis was performed following injection of IV contrast. Multiplanar reformats were obtained. Dose reduction techniques were used. CONTRAST: Iohexol (Omni) 75mL FINDINGS: LOWER CHEST: moderate right atrial enlargement. HEPATOBILIARY: Prior cholecystectomy. Mild hepatomegaly. Mildly heterogeneous enhancement suggesting passive congestion. PANCREAS: Normal. SPLEEN: Normal. ADRENAL GLANDS: Normal. KIDNEYS/BLADDER: Mild circumferential urinary bladder wall thickening. No calcification. No hydronephrosis or hydroureter. No renal mass or stone. BOWEL: Moderate distal colonic diverticulosis. No evidence for acute diverticulitis. Small volume ascites. No free air. No evidence for bowel obstruction. LYMPH NODES: Normal. VASCULATURE: Unremarkable. PELVIC ORGANS: Multiple partially calcified uterine  masses compatible with uterine fibroids. MUSCULOSKELETAL: Severe multilevel degenerative change.     1.  Mild cystitis suggested please correlate clinically. 2.  Otherwise no acute abnormality in the abdomen or pelvis. No evidence for bowel obstruction. 3.  Enlarged heterogeneous liver suggesting passive hepatic congestion. Given right atrial enlargement, IVC and hepatic vein distention, correlate clinically for right heart failure.       Lab Results:  personally reviewed.     Recent Results (from the past 24 hour(s))   POCT Glucose    Collection Time: 08/16/20 11:56 AM    Specimen: Blood   Result Value Ref Range    Glucose 148 (H) 70 - 139 mg/dL   POCT Glucose    Collection Time: 08/16/20  4:24 PM    Specimen: Blood   Result Value Ref Range    Glucose 145 (H) 70 - 139 mg/dL   POCT Glucose    Collection Time: 08/16/20  8:23 PM    Specimen: Blood   Result Value Ref Range    Glucose 103 70 - 139 mg/dL   HM2(CBC W/O DIFF)    Collection Time: 08/17/20  5:20 AM   Result Value Ref Range    WBC 12.8 (H) 4.0 - 11.0 thou/uL    RBC 2.66 (L) 3.80 - 5.40 mill/uL    Hemoglobin 7.9 (L) 12.0 - 16.0 g/dL    Hematocrit 24.8 (L) 35.0 - 47.0 %    MCV 93 80 - 100 fL    MCH 29.7 27.0 - 34.0 pg    MCHC 31.9 (L) 32.0 - 36.0 g/dL    RDW 17.3 (H) 11.0 - 14.5 %    Platelets 122 (L) 140 - 440 thou/uL    MPV 12.6 (H) 8.5 - 12.5 fL   Renal Function Profile    Collection Time: 08/17/20  5:20 AM   Result Value Ref Range    Albumin 1.8 (L) 3.5 - 5.0 g/dL    Calcium 6.9 (L) 8.5 - 10.5 mg/dL    Phosphorus 1.7 (L) 2.5 - 4.5 mg/dL    Glucose 75 70 - 125 mg/dL    BUN 31 (H) 8 - 28 mg/dL    Creatinine 3.03 (H) 0.60 - 1.10 mg/dL    Sodium 139 136 - 145 mmol/L    Potassium 3.3 (L) 3.5 - 5.0 mmol/L    Chloride 107 98 - 107 mmol/L    CO2 22 22 - 31 mmol/L    Anion Gap, Calculation 10 5 - 18 mmol/L    GFR MDRD Af Amer 18 (L) >60 mL/min/1.73m2    GFR MDRD Non Af Amer 15 (L) >60 mL/min/1.73m2   INR    Collection Time: 08/17/20  5:20 AM   Result Value Ref Range     INR 1.59 (H) 0.90 - 1.10   POCT Glucose    Collection Time: 08/17/20  8:42 AM    Specimen: Blood   Result Value Ref Range    Glucose 91 70 - 139 mg/dL       Advance Care Planning:   Barriers to discharge: Acute diverticulitis with perforation, MAY   Discharge day: To be decided  Disposition: TBD

## 2021-06-10 NOTE — PLAN OF CARE
Problem: Pain  Goal: Patient's pain/discomfort is manageable  Outcome: Progressing   Oxy given x1 for pain  Problem: Daily Care  Goal: Daily care needs are met  Outcome: Progressing     Problem: Psychosocial Needs  Goal: Demonstrates ability to cope with hospitalization/illness  Outcome: Progressing  Goal: Collaborate with patient/family/caregiver to identify patient specific goals for this hospitalization  Outcome: Progressing     Problem: Discharge Barriers  Goal: Patient's discharge needs are met  Outcome: Progressing     Problem: Knowledge Deficit  Goal: Patient/family/caregiver demonstrates understanding of disease process, treatment plan, medications, and discharge instructions  Outcome: Progressing     Problem: Potential for Falls  Goal: Patient will remain free of falls  Outcome: Progressing   No falls or attempts at getting up without assistance  Problem: Urinary Incontinence  Goal: Perineal skin integrity is maintained or improved  Outcome: Progressing   Purewick in place  Problem: Glucose Imbalance  Goal: Achieve optimal glucose control  Outcome: Progressing       Pt. A&O x3, calm and cooperative. Drowsy most of shift-expressed she feels very tired. + coughing, cough syrup given with some effect. Incontinent of urine, x1 BM in bedpan. Needs encouragement to ambulate/reposition.

## 2021-06-10 NOTE — H&P
Admission History and Physical   Gardenia GATITO Muller, 1950, [id number]      [facility]  No admission diagnoses are documented for this encounter.    PCP: Jerson Hernandez MD, [unfilled], [phone]          Extended Emergency Contact Information  Primary Emergency Contact: Aria Muller  Address: [address] [facility] [place]  Home Phone: [phone]  Relation: Child  Secondary Emergency Contact: Tanika Aguilar  Address: [address] [place], [place] [id number] [place] [address] of [facility]  Mobile Phone: [phone]  Relation: Grandchild       Assessment and Plan   Active Problems:  #Acute diverticulitis with perforation: ED physician has contacted surgery recommended holding Coumadin and keeping n.p.o. we will continue IV antibiotics at this time    #History of atrial fibrillation patient: Patient on digoxin metoprolol will change to the IV as she is n.p.o.  Coumadin on hold      #History of alcohol abuse    #History of chronic diastolic congestive heart failure: Not in exacerbation    #Type 2 diabetes mellitus with vascular disease: Currently on metformin which we will hold continue sliding scale insulin blood sugar running low will rather use D5 normal saline for now    #hypoglycemia : Probably from infection and not eating well will use D5 normal saline we will check blood sugar every 2 hourly for 3 times if blood sugar stable will change the frequency of blood sugar check every 6    #History of chronic abdominal pain atherosclerotic narrowing of SMA    #History of depression    #History of CVA      #History of coronary artery disease: Currently n.p.o. per surgery recommendation patient has diverticulitis and perforation will get a cardiology recommendation as she is also complaining of shortness of breath at this time, resume aspirin Brilinta once okay from surgery   History of peripheral arterial disease    DVT prophylaxis : SCDS for now patient was  on Coumadin patient may need a surgical intervention at this time surgery recommending n.p.o.     Low threshold to transfer to ICU    Chief Complaint: Abdominal pain      HPI:    Gardenia Muller is a 70 y.o. old female  Patient has history of chronic atrial fibrillation, diastolic CHF, hypertension, coronary artery disease status post stenting in 1995, CVA, peripheral artery disease, type 2 diabetes, malnutrition, chronic abdominal pain from intestinal angina, acalculous cholecystitis status post laparoscopic cholecystectomy in 2018, depression.  Patient presented to ED via EMS, patient vomited x2 today and had a diarrhea last Sunday    Patient has been having right-sided abdominal pain for last 2 weeks she was also having some shortness of breath no cough runny nose fever no chest pain no lightheaded dizziness passing out spell normal urinary habit    In the ED patient was having low blood sugar she was given D50 neurosurgery recommended keeping n.p.o.           Medical History  Active Ambulatory (Non-Hospital) Problems    Diagnosis     Acute kidney failure, unspecified (H)     Acute liver failure without hepatic coma     Hematochezia     Hyperkalemia     Acute on chronic combined systolic (congestive) and diastolic (congestive) heart failure (H)     Ischemic cardiomyopathy     Trigger finger, acquired     Class 1 obesity with serious comorbidity and body mass index (BMI) of 33.0 to 33.9 in adult, unspecified obesity type     Dysuria     Hypertrophy of nail     Severe protein-calorie malnutrition (H)     Weight loss, non-intentional     Warfarin anticoagulation     Chronic abdominal pain     Intestinal angina (H)     Microalbuminuria     Hyperparathyroidism (H)     Onychogryphosis     Lipoma of back     Physical deconditioning     MAY (acute kidney injury) (H)     Transaminitis     Persistent atrial fibrillation (H)     Anticoagulant long-term use     Acalculous cholecystitis     (HFpEF) heart failure with  preserved ejection fraction (H)     Anxiety     Ventral hernia without obstruction or gangrene     Coronary artery disease involving native coronary artery of native heart without angina pectoris     Dyslipidemia     Incisional hernia, without obstruction or gangrene     RLS (restless legs syndrome)     Diverticular disease of large intestine     Cerebrovascular disease     Healthcare maintenance     Cystitis     Acute diastolic heart failure (H)     Advanced directives, counseling/discussion     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)     Spinal stenosis     PAD (peripheral artery disease) (H)     Dermatosis Papulosa Nigra     Depression     Hypercalcemia     Right Renal Artery Stenosis     Osteoarthritis     Abnormality Of Walk     Essential hypertension     Cerebral infarction (H)     Anemia     Past Medical History:   Diagnosis Date     Acute diastolic heart failure (H) 11/2015     Acute on chronic diastolic heart failure (H) 6/15/2019     Advanced directives, counseling/discussion 8/5/2015     MAY (acute kidney injury) (H) 10/22/2018     Atrial fibrillation (H)      Benign adenomatous polyp of large intestine 3/30/2017     Biliary obstruction 10/3/2018     Cerebral vascular accident (H)      Coronary artery disease 2006     Diabetes mellitus type 2 in obese (H)      Fibrocystic breast      GERD (gastroesophageal reflux disease)      History of anesthesia complications      Hypertension      Peripheral vascular disease (H)      Pneumonia 11/11/2018     PONV (postoperative nausea and vomiting)      S/P cholecystectomy 6/22/2018     SBO (small bowel obstruction) (H) 11/12/2015     Stroke (H)      Transaminitis 10/22/2018     Upper respiratory infection 12/20/2018     Urinary incontinence         Surgical History    She  has a past surgical history that includes Ventral hernia repair (N/A, 11/12/2015); Coronary stent placement (1995); Cardiac catheterization; PICC (6/29/2018); Exploratory laparotomy  (N/A, 6/29/2018); ERCP (N/A, 10/5/2018); Laparoscopic cholecystectomy (N/A, 10/7/2018); Cardioversion (10/18/2018); PICC (10/21/2018); Coronary Angiogram (N/A, 7/27/2020); and Right Heart Catheterization [place] (7/27/2020).     Social History   .  she  reports that she has quit smoking. She smoked 0.25 packs per day. She has quit using smokeless tobacco. She reports that she does not drink alcohol or use drugs.       Allergies  Allergies   Allergen Reactions     Penicillins Swelling    Family History  family history includes Cancer in her paternal grandmother and paternal uncle; Chronic Kidney Disease in her sister; Diabetes in her brother; Heart attack in her sister; No Medical Problems in her daughter, father, maternal aunt, maternal grandfather, maternal grandmother, mother, paternal aunt, and paternal grandfather.          Prior to Admission Medications   (Not in a hospital admission)         Review of Systems:  A 12 point comprehensive review of systems was negative except as noted in HPI. Physical Exam:  Temp:  [97.6  F (36.4  C)-98.6  F (37  C)] 97.6  F (36.4  C)  Heart Rate:  [59-68] 59  Resp:  [18] 18  BP: ()/(50-60) 99/51    General: awake , discomfort present   HEENT: Oral Mucosa moist, no pallor, no cyanosis   Cardiac: Normal S1,S2 no tachycardia ,   Lungs: Bilateral breath sounds present, no wheeze , no rhonchi   Abdomen: Soft, diffuse tenderness present  MSK: No obvious joint swellings , moving grossly   Extremities:  pedal edema trace , no cyanosis   Neuro: Alert and oriented ×3, no focal deficits.  Skin : no rash , no abrasion      Pertinent Labs  Lab Results: Personally reviewed.    Results from last 7 days   Lab Units 08/11/20  1026 08/07/20  0600   LN-SODIUM mmol/L 135* 137   LN-POTASSIUM mmol/L 4.6 4.2   LN-CHLORIDE mmol/L 103 108*   LN-CO2 mmol/L 22 21*   LN-BLOOD UREA NITROGEN mg/dL 22 17   LN-CREATININE mg/dL 0.95 0.67   LN-CALCIUM mg/dL 10.3 10.6*   LN-ALBUMIN g/dL 3.0* 2.7*    LN-PROTEIN TOTAL g/dL 8.2* 7.3   LN-BILIRUBIN TOTAL mg/dL 1.3* 1.2*   LN-ALKALINE PHOSPHATASE U/L 135* 130*   LN-ALT (SGPT) U/L 33 53*   LN-AST (SGOT) U/L 37 26       Results from last 7 days   Lab Units 08/11/20  1026   LN-WHITE BLOOD CELL COUNT thou/uL 11.4*   LN-HEMOGLOBIN g/dL 12.6   LN-HEMATOCRIT % 42.4   LN-PLATELET COUNT thou/uL 193        Results from last 7 days   Lab Units 08/11/20  1026 08/10/20  0615 08/07/20  0615   LN-INR  2.65* 2.83* 5.25*                 Results from last 7 days   Lab Units 08/11/20  1026   LN-TROPONIN I ng/mL 0.07           Xr Chest 1 View Portable    Result Date: 7/22/2020  EXAM: XR CHEST 1 VIEW PORTABLE LOCATION: [facility] DATE/TIME: 7/22/2020 9:56 PM INDICATION: Increasing shortness of breath and orthopnea and patient with history of heart failure COMPARISON: CT 07/02/2019     Mild, irregular. No evidence for CHF or pneumonia. No pleural effusion or pneumothorax.    Xr Chest 2 Views    Result Date: 8/11/2020  EXAM: XR CHEST 2 VIEWS LOCATION: [facility] DATE/TIME: 8/11/2020 11:50 AM INDICATION: Cough. COMPARISON: None.     Negative chest. Lungs are clear. Coronary stenting.     Ct Abdomen Pelvis Without Oral With Iv Contrast    Result Date: 8/11/2020  EXAM: CT ABDOMEN PELVIS WO ORAL W IV CONTRAST LOCATION: [facility] DATE/TIME: 8/11/2020 11:41 AM INDICATION: right sided abdominal pain, nausea and vomiting COMPARISON: 7/22/2020 TECHNIQUE: CT scan of the abdomen and pelvis was performed following injection of IV contrast. Multiplanar reformats were obtained. Dose reduction techniques were used. CONTRAST: Iohexol (Omni) 75mL FINDINGS: LOWER CHEST: Minimal interstitial edema. The heart is mildly enlarged. There is coronary artery disease. HEPATOBILIARY: Cholecystectomy. There is intrahepatic and extrahepatic bile duct dilatation. There is pancreatic duct dilatation. PANCREAS: No discrete pancreatic masses identified. SPLEEN: Normal. ADRENAL GLANDS:  Normal. KIDNEYS/BLADDER: Normal. BOWEL: There is colonic diverticulosis. There is acute diverticulosis involving the distal sigmoid colon with a small contained perforation. No drainable abscess at this point. LYMPH NODES: Normal. VASCULATURE: Atherosclerotic disease. Right renal artery stent. PELVIC ORGANS: Fibroid uterus. MUSCULOSKELETAL: Degenerative changes.     1.  Acute diverticulitis with a small contained perforation. No drainable abscess at this point. 2.  Biliary and pancreatic duct dilatation. No discrete pancreatic masses identified. Consider MRCP, ERCP, EUS.    Ct Abdomen Pelvis Without Oral With Iv Contrast    Result Date: 7/23/2020  EXAM: CT ABDOMEN PELVIS WO ORAL W IV CONTRAST LOCATION: [facility] DATE/TIME: 7/22/2020 11:58 PM INDICATION: Bowel obstruction high-grade suspected Nausea, anorexia, abdominal distention in elderly patient. COMPARISON: 06/26/2019. TECHNIQUE: CT scan of the abdomen and pelvis was performed following injection of IV contrast. Multiplanar reformats were obtained. Dose reduction techniques were used. CONTRAST: Iohexol (Omni) 75mL FINDINGS: LOWER CHEST: moderate right atrial enlargement. HEPATOBILIARY: Prior cholecystectomy. Mild hepatomegaly. Mildly heterogeneous enhancement suggesting passive congestion. PANCREAS: Normal. SPLEEN: Normal. ADRENAL GLANDS: Normal. KIDNEYS/BLADDER: Mild circumferential urinary bladder wall thickening. No calcification. No hydronephrosis or hydroureter. No renal mass or stone. BOWEL: Moderate distal colonic diverticulosis. No evidence for acute diverticulitis. Small volume ascites. No free air. No evidence for bowel obstruction. LYMPH NODES: Normal. VASCULATURE: Unremarkable. PELVIC ORGANS: Multiple partially calcified uterine masses compatible with uterine fibroids. MUSCULOSKELETAL: Severe multilevel degenerative change.     1.  Mild cystitis suggested please correlate clinically. 2.  Otherwise no acute abnormality in the abdomen or  pelvis. No evidence for bowel obstruction. 3.  Enlarged heterogeneous liver suggesting passive hepatic congestion. Given right atrial enlargement, IVC and hepatic vein distention, correlate clinically for right heart failure.        Pertinent Radiology  Radiology Results: Impressions reviewed.  EKG Results:                 This note was dictated using voice recognition software. Any grammatical or context distortions are unintentional and inherent to the software.

## 2021-06-10 NOTE — PROGRESS NOTES
"Pharmacy Consult: Vancomycin Dosing    Pharmacist consulted to dose vancomycin for Gardenia Muller, a 70 y.o. female.    Ordering provider: Tyler Lan MD     Indication for vancomycin therapy: Hospital Acquired Pneumonia    Goal Trough Range:  15-20 mcg/mL based on indication    Other current antimicrobials              vancomycin 1,500 mg in sodium chloride 0.9% 500 mL (VANCOCIN)  Every 12 hours          vancomycin 1,750 mg in sodium chloride 0.9% 500 mL (VANCOCIN)  Once          meropenem 1 g in NaCl 0.9 % 50 mL (MINI-BAG Plus) (MERREM)  Every 8 hours                   Subjective/Objective:    Patient was admitted for Diverticulitis on 8/11/2020    Height: 5' 2\" (1.575 m)    Actual Body Weight (ABW): 88.8 kg (195 lb 11.2 oz)    Ideal body weight: 50.1 kg (110 lb 7.2 oz)  Adjusted ideal body weight: 65.6 kg (144 lb 8.8 oz)    BMI: Body mass index is 35.79 kg/m .    Allergies   Allergen Reactions     Penicillins Swelling       Patient Active Problem List   Diagnosis     Spinal stenosis     PAD (peripheral artery disease) (H)     Dermatosis Papulosa Nigra     Depression     Hypercalcemia     Right Renal Artery Stenosis     Osteoarthritis     Abnormality Of Walk     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)     Advanced directives, counseling/discussion     Cystitis     Healthcare maintenance     Essential hypertension     Cerebral infarction (H)     Anemia     Cerebrovascular disease     RLS (restless legs syndrome)     Incisional hernia, without obstruction or gangrene     Diverticular disease of large intestine     Coronary artery disease involving native coronary artery of native heart without angina pectoris     Dyslipidemia     Ventral hernia without obstruction or gangrene     Anxiety     (HFpEF) heart failure with preserved ejection fraction (H)     Anticoagulant long-term use     Persistent atrial fibrillation (H)     MAY (acute kidney injury) (H)     Transaminitis     Physical " deconditioning     Lipoma of back     Acalculous cholecystitis     Onychogryphosis     Hyperparathyroidism (H)     Microalbuminuria     Intestinal angina (H)     Chronic abdominal pain     Weight loss, non-intentional     Warfarin anticoagulation     Severe protein-calorie malnutrition (H)     Hypertrophy of nail     Dysuria     Class 1 obesity with serious comorbidity and body mass index (BMI) of 33.0 to 33.9 in adult, unspecified obesity type     Trigger finger, acquired     Acute liver failure without hepatic coma     Hematochezia     Hyperkalemia     Acute on chronic combined systolic (congestive) and diastolic (congestive) heart failure (H)     Ischemic cardiomyopathy     Acute kidney failure, unspecified (H)     Acute diastolic heart failure (H)     Diverticulitis     Supratherapeutic INR     Diverticulitis of large intestine with perforation and abscess without bleeding     Atrial fibrillation with rapid ventricular response (H)     Obstructive sleep apnea syndrome    Past Medical History:   Diagnosis Date     Acute diastolic heart failure (H) 11/2015     Acute on chronic diastolic heart failure (H) 6/15/2019     Advanced directives, counseling/discussion 8/5/2015    Patient completed 2011.  Discussed today, 5/24/17, and wants to change healthcare agent from niece to daughter.  Discussed that she will need to complete new form to indicate this.     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  Recommend 5 year follow-up.  Single tubular adenoma     Biliary obstruction 10/3/2018    Added automatically from request for surgery [id number]     Cerebral vascular accident (H)      Coronary artery disease 2006    100% RCA with collateral filling per Dr. Elizabeth's angio report     Diabetes mellitus type 2 in obese (H)      Fibrocystic breast      GERD (gastroesophageal reflux disease)      History of anesthesia  complications     slow to wake     Hypertension      Obstructive sleep apnea syndrome      Peripheral vascular disease (H)      Pneumonia 11/11/2018     PONV (postoperative nausea and vomiting)      S/P cholecystectomy 6/22/2018     SBO (small bowel obstruction) (H) 11/12/2015     Stroke (H)      Transaminitis 10/22/2018     Upper respiratory infection 12/20/2018     Urinary incontinence         Temp Readings from Current Encounter:     08/21/20 0306 08/21/20 0720 08/21/20 1515   Temp: 98.1  F (36.7  C) 97.7  F (36.5  C) 98.2  F (36.8  C)     Net Intake/Output (last 24 hours):  I/O last 3 completed shifts:  In: -   Out: 51 [Urine:50; Drains:1]    Recent Labs     08/19/20  0054 08/19/20  0720 08/20/20  0544 08/21/20  0447   WBC  --  14.5* 17.1* 19.3*   NEUTROABS  --  10.7*  --   --    LACTICACID 1.6  --   --   --    BUN  --  10 9 8   CREATININE  --  0.82 1.14* 0.95     Estimated Creatinine Clearance: 57.1 mL/min (by C-G formula based on SCr of 0.95 mg/dL).    Recent Labs     08/19/20  2008   CULTURE No Growth       Results for orders placed or performed during the hospital encounter of 08/11/20   Urine culture    Collection Time: 08/19/20  8:08 PM    Specimen: Urine, Clean Catch   Result Value Status    Culture No Growth Final   Aerobic Culture with Gram Stain    Collection Time: 08/17/20 10:02 AM    Specimen: Fluid, Pelvic; Body Fluid   Result Value Status    Culture 1+ Yeast (!) Preliminary   Fungal culture    Collection Time: 08/17/20 10:02 AM    Specimen: Pelvis, Left; Body Fluid   Result Value Status    Culture 1+ Candida albicans (!) Preliminary   Anaerobic culture    Collection Time: 08/17/20 10:02 AM    Specimen: Pelvis, Left; Tissue   Result Value Status    Culture No anaerobic organisms isolated Final       No results for input(s): VANCOMYCIN in the last 168 hours.    Vancomycin administrations: (last 120 hours)     None          Assessment/Plan:    Pharmacist consulted to dose vancomycin for Hospital  Acquired Pneumonia, goal trough range 15-20 mcg/mL.  1. Initiate vancomycin 1750 mg IV once, followed by vancomycin 1500 mg IV every 12 hours (16.9 - 19.7 mg/kg actual body weight).  2. No vancomycin level available for assessment.  3. Pharmacist will plan to check a vancomycin trough level when clinically appropriate..  4. Pharmacist will continue to follow.    Thank you for the consult.  Chucho Corral, PharmD 8/21/2020 11:55 PM

## 2021-06-10 NOTE — PRE-PROCEDURE
Procedure Name: ARTERIAL LINE INSERTION  Date/Time: 8/26/2020 10:43 PM    Verbal consent obtained?: Yes  Written consent obtained?: No  Emergent situation  Risks and benefits: Risks, benefits and alternatives were discussed  Consent given by: patient  ASA Class: Class 4- Severe systemic disease, acute unstable problems  Mallampati: Grade 4- soft palate obscured by base of tongue  Patient states understanding of procedure being performed: Yes  Patient's understanding of procedure matches consent: Yes  Procedure consent matches procedure scheduled: Yes  Appropriately NPO: yes  Lungs: other (comment)  Lung exam comment: DIMINISHED BREATH SOUNDS BILATERAL LUNG COLEMAN  Heart: other (comment)  Heart exam comment: BRADYCARDIC  History & Physical reviewed: History and physical reviewed and no updates needed  Statement of review: I have reviewed the lab findings, diagnostic data, medications, and the plan for sedation

## 2021-06-10 NOTE — PROGRESS NOTES
[facility] Daily Progress Note    Assessment/Plan:  Gardenia Muller is a 70 year  old female with history of HTN, T2DM, chronic atrial fibrillation on warfarin, diastolic CHF, coronary artery disease status post stenting (7/27/2020), peripheral artery disease, calculus cholecystitis status post cholecystectomy, depression, who came to [facility] on 8/11/2020 for abdominal pain and vomiting. CT scan revealed acute diverticulitis with perforation. on 8/14 showed increased size of abscess from 2x2cm to 4x3cm, though pt reports clinical improvement of her symptoms.. General surgery consulted - no current plans for operating room so interventional radiology was consulted for possible drainage which was done on 8/17/2020 by placing CT scan guided drain placement in the left pelvic diverticular abscess repeat CTAP.  Patient grew Candida albicans in the blood and was started on fluconazole on 8/21/2020     In the ED, she was hypoglycemic also and she was started on D5 normal saline. Hospitalization complicated by MAY, Nephrology consulted - Cr improving; no plans for dialysis at this time.      Assessment:  Acute perforated diverticulitis with diverticular abscess  Secondary peritonitis  On ciprofloxacin and metronidazole IV  WBC count not improving    Probable aspiration pneumonia/HCAP  Started the patient on meropenem and vancomycin 8/22/2020    Candida bacteremia  In a patient with diabetes mellitus  We will consult infectious diseases regarding management    Acute kidney injury  Creatinine has improved    Acute anemia  Because of the blood loss is not known  Patient will be transfused with 1 unit of blood with hemoglobin of 6.1    Atrial fibrillation  Patient has tachycardia with hypotension -received 250 mL of normal saline earlier with with blood pressure improved  Patient was restarted on digoxin and her home medications metoprolol 12.5 mg daily  Cardiology consult was appreciated    Type 2 diabetes mellitus  On  sliding scale insulin    Supratherapeutic INR  Is now better with INR decreasing to 1.5  We will discuss warfarin protocol with pharmacy as INR to be therapeutic with the drain is in    Coronary artery disease status post stent placement 7/27/2020     Congestive heart failure with preserved LV function    Right foot pain    Acute hypokalemia    Acute hypomagnesemia    Acute anemia    GERD   Depression          Plan:  Meropenem and vancomycin was started after infiltrates were seen which were new on chest x-ray last night  COVID test is ordered  Patient is being transferred to 3500 floor for PUI evaluation          Code status:Full Code        Subjective:  Cough is better - earlier she wanted cough syrup to decrease coughing     Review of Systems:   Denies any significant abdominal pain    Current Medications Reviewed via EHR List    Objective:  Vital signs in last 24 hours:  Temp:  [97.5  F (36.4  C)-98.3  F (36.8  C)] 97.5  F (36.4  C)  Heart Rate:  [63-78] 65  Resp:  [18] 18  BP: (113-131)/(42-82) 131/82  SpO2:  [96 %-100 %] 100 %    Intake/Output last 3 shifts:  I/O last 3 completed shifts:  In: -   Out: 51 [Urine:50; Drains:1]      Physical Exam:  EYES: EOM+    NECK: no JVD  HEART : S1 S2 irregular    LUNGS: Clear to auscultation without Crepitations or Wheezing   ABDOMEN:  Soft, No tenderness, Bowel sounds are positive  CNS  Alert and Oriented x 3 moving all 4 limbs            Imaging:  Results for orders placed or performed during the hospital encounter of 08/11/20   XR Chest 2 Views    Impression    Negative chest. Lungs are clear. Coronary stenting.       CT Abdomen Pelvis Without Oral With IV Contrast    Impression    1.  Acute diverticulitis with a small contained perforation. No drainable abscess at this point.  2.  Biliary and pancreatic duct dilatation. No discrete pancreatic masses identified. Consider MRCP, ERCP, EUS.   CT Abdomen Pelvis Without Oral Without IV Contrast    Impression    1.  Again seen  is sigmoid colon diverticulitis with a small contained perforation. A small fluid collection adjacent to the sigmoid colon has increased in size to 4 cm x 3 cm (previously 2 cm x 2 cm). Percutaneous drainage would be difficult though may   be possible.  2.  Biliary and pancreatic ductal dilatation is not as well-seen as on the comparison study. See prior study for details.   CT Abscess Drain Pelvic    Impression    1. Stable diverticular abscess.  2. Successful CT-guided drain placement into left pelvic diverticular abscess.    Reference CPT Codes: 67662, 89474   XR Foot Right 2 VWS    Impression    Bones are demineralized. No definitive evidence of an acute fracture. No cortical destructive changes to suggest osteomyelitis. Scattered degenerative changes.   CT Abdomen Pelvis With Oral With IV Contrast    Impression    1.  Interval percutaneous drain placement into the diverticular abscess with near complete collapse of the fluid cavity. Drain abuts the adjacent sigmoid colon. No evidence of post drain placement complication.  2.  No new intra-abdominal abscess or other findings to correlate with ongoing leukocytosis.  3.  Stable appearance of the pancreas with previously seen main pancreatic ductal dilatation not well visualized on this study.   XR Chest 1 View Portable    Impression    New bilateral interstitial infiltrates could represent edema or pneumonia including COVID. Enlarged cardiac silhouette. No pleural effusion. No definite pulmonary vascular congestion.     *Note: Due to a large number of results and/or encounters for the requested time period, some results have not been displayed. A complete set of results can be found in Results Review.     Chest x-ray done on 8/21/2020 showed:  New bilateral interstitial infiltrates could represent edema or pneumonia including COVID. Enlarged cardiac silhouette. No pleural effusion. No definite pulmonary vascular congestion.      Lab Results:  Personally Reviewed.    Fingerstick Blood Glucose:   Recent Labs     08/20/20  1245 08/20/20  1732 08/20/20  2051 08/21/20  0850 08/21/20  1221 08/21/20  1728 08/21/20  2049 08/22/20  0758   POCGLUFGR 132 115 137 110 151* 142* 138 100       Recent Results (from the past 24 hour(s))   C. Diff Toxin By PCR    Collection Time: 08/21/20 11:12 AM    Specimen: Stool   Result Value Ref Range    C.Difficile Toxigenic by PCR Negative Negative    Ribotype 027/NAP1/B1 Presumptive Negative Presumptive Negative   POCT Glucose    Collection Time: 08/21/20 12:21 PM    Specimen: Blood   Result Value Ref Range    Glucose 151 (H) 70 - 139 mg/dL   Urinalysis-UC if Indicated    Collection Time: 08/21/20  4:37 PM   Result Value Ref Range    Color, UA Yellow Colorless, Yellow, Straw, Light Yellow    Clarity, UA Cloudy (!) Clear    Glucose, UA Negative Negative    Bilirubin, UA Negative Negative    Ketones, UA Trace (!) Negative    Specific Gravity, UA 1.024 1.001 - 1.030    Blood, UA Negative Negative    pH, UA 5.5 4.5 - 8.0    Protein, UA 50 mg/dL (!) Negative mg/dL    Urobilinogen, UA <2.0 E.U./dL <2.0 E.U./dL, 2.0 E.U./dL    Nitrite, UA Negative Negative    Leukocytes, UA Large (!) Negative    Bacteria, UA Few (!) None Seen hpf    RBC, UA 0-2 None Seen, 0-2 hpf    WBC, UA 25-50 (!) None Seen, 0-5 hpf    Squam Epithel, UA  (!) None Seen, 0-5 lpf    Trans Epithel, UA 5-10 (!) None Seen lpf   POCT Glucose    Collection Time: 08/21/20  5:28 PM    Specimen: Blood   Result Value Ref Range    Glucose 142 (H) 70 - 139 mg/dL   POCT Glucose    Collection Time: 08/21/20  8:49 PM    Specimen: Blood   Result Value Ref Range    Glucose 138 70 - 139 mg/dL   Phosphorus Level > 2.4 no replacement required    Collection Time: 08/22/20  5:42 AM   Result Value Ref Range    Phosphorus 1.9 (L) 2.5 - 4.5 mg/dL   HM1 (CBC with Diff)    Collection Time: 08/22/20  5:42 AM   Result Value Ref Range    WBC 17.4 (H) 4.0 - 11.0 thou/uL    RBC 2.74 (L) 3.80 - 5.40 mill/uL     Hemoglobin 8.3 (L) 12.0 - 16.0 g/dL    Hematocrit 24.7 (L) 35.0 - 47.0 %    MCV 90 80 - 100 fL    MCH 30.3 27.0 - 34.0 pg    MCHC 33.6 32.0 - 36.0 g/dL    RDW 18.1 (H) 11.0 - 14.5 %    Platelets 115 (L) 140 - 440 thou/uL    MPV 11.4 8.5 - 12.5 fL   Manual Differential    Collection Time: 08/22/20  5:42 AM   Result Value Ref Range    Total Neutrophils % 84 (H) 50 - 70 %    Lymphocytes % 8 (L) 20 - 40 %    Monocytes % 8 2 - 10 %    Eosinophils %  1 0 - 6 %    Basophils % 0 0 - 2 %    Immature Granulocyte % - Manual 0 <=0 %    Total Neutrophils Absolute 14.5 (H) 2.0 - 7.7 thou/ul    Lymphocytes Absolute 1.4 0.8 - 4.4 thou/uL    Monocytes Absolute 1.3 (H) 0.0 - 0.9 thou/uL    Eosinophils Absolute 0.2 0.0 - 0.4 thou/uL    Basophils Absolute 0.0 0.0 - 0.2 thou/uL    Immature Granulocyte Absolute - Manual 0.0 <=0.0 thou/uL    Manual nRBC per 100 Cells 3 (H) 0-<1    Platelet Estimate Decreased (!) Normal    Polychromasia 1+ (!) Negative    Target Cells 1+ (!) Negative   POCT Glucose    Collection Time: 08/22/20  7:58 AM    Specimen: Blood   Result Value Ref Range    Glucose 100 70 - 139 mg/dL           Advanced Care Planning  Discharge plan: Unknown at this time      Michael Abreu  Date: 8/22/2020  Time: 4:09 PM  Hospitalist Service  Part of this chart was created using a dictation software. Typographic errors, word substitutions, and Grammatical errors may unintentionally occur.

## 2021-06-10 NOTE — PLAN OF CARE
Problem: Pain  Goal: Patient's pain/discomfort is manageable  Outcome: Progressing     Problem: Safety  Goal: Patient will be injury free during hospitalization  Outcome: Progressing     Problem: Daily Care  Goal: Daily care needs are met  Outcome: Progressing     Pt is alert/orientated x 4. Low Bps at the start of shift along with feeling lightheaded but have now come back up after 1 dose of Albumin. Last BP check = 109/55. Will continue to monitor. >90% on RA. Tele = Afib. Pt c/o feeling anxious, gave PRN Vistaril with effectiveness. Pt has been sleeping throughout the night. Bardales catheter in place, patent and draining dark savita colored urine. SHAMIKA flushed and draining serosanguinous drainage.  IV ATBs infused. Pt had a critical hgb = 6.6 and became nauseas this AM. Dr. Thor peraza. Waiting for response. PRN Zofran given. Will continue to monitor.

## 2021-06-10 NOTE — PROGRESS NOTES
"Pharmacy Consult: Vancomycin Dosing    Pharmacist consulted to dose vancomycin for Gardenia Muller, a 70 y.o. female.    Ordering provider: Dr. Lan    Indication for vancomycin therapy: Hospital Acquired Pneumonia    Goal Trough Range:  15-20 mcg/mL based on indication    Other current antimicrobials              meropenem 1 g in NaCl 0.9 % 50 mL (MINI-BAG Plus) (MERREM)  Every 8 hours                   Subjective/Objective:    Patient was admitted for Diverticulitis on 8/11/2020    Height: 5' 2\" (1.575 m)    Actual Body Weight (ABW): 100.2 kg (220 lb 12.8 oz)    Ideal body weight: 50.1 kg (110 lb 7.2 oz)  Adjusted ideal body weight: 70.1 kg (154 lb 9.4 oz)    BMI: Body mass index is 40.38 kg/m .    Allergies   Allergen Reactions     Penicillins Swelling       Patient Active Problem List   Diagnosis     Spinal stenosis     PAD (peripheral artery disease) (H)     Dermatosis Papulosa Nigra     Depression     Hypercalcemia     Right Renal Artery Stenosis     Osteoarthritis     Abnormality Of Walk     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)     Advanced directives, counseling/discussion     Cystitis     Healthcare maintenance     Essential hypertension     Cerebral infarction (H)     Anemia     Cerebrovascular disease     RLS (restless legs syndrome)     Incisional hernia, without obstruction or gangrene     Diverticular disease of large intestine     Coronary artery disease involving native coronary artery of native heart without angina pectoris     Dyslipidemia     Ventral hernia without obstruction or gangrene     Anxiety     (HFpEF) heart failure with preserved ejection fraction (H)     Anticoagulant long-term use     Persistent atrial fibrillation (H)     MAY (acute kidney injury) (H)     Transaminitis     Physical deconditioning     Lipoma of back     Acalculous cholecystitis     Onychogryphosis     Hyperparathyroidism (H)     Microalbuminuria     Intestinal angina (H)     Chronic abdominal " pain     Weight loss, non-intentional     Warfarin anticoagulation     Severe protein-calorie malnutrition (H)     Hypertrophy of nail     Dysuria     Class 1 obesity with serious comorbidity and body mass index (BMI) of 33.0 to 33.9 in adult, unspecified obesity type     Trigger finger, acquired     Acute liver failure without hepatic coma     Hematochezia     Hyperkalemia     Acute on chronic combined systolic (congestive) and diastolic (congestive) heart failure (H)     Ischemic cardiomyopathy     Acute kidney failure, unspecified (H)     Acute diastolic heart failure (H)     Diverticulitis     Supratherapeutic INR     Diverticulitis of large intestine with perforation and abscess without bleeding     Atrial fibrillation with rapid ventricular response (H)     Obstructive sleep apnea syndrome    Past Medical History:   Diagnosis Date     Acute diastolic heart failure (H) 11/2015     Acute on chronic diastolic heart failure (H) 6/15/2019     Advanced directives, counseling/discussion 8/5/2015    Patient completed 2011.  Discussed today, 5/24/17, and wants to change healthcare agent from niece to daughter.  Discussed that she will need to complete new form to indicate this.     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  Recommend 5 year follow-up.  Single tubular adenoma     Biliary obstruction 10/3/2018    Added automatically from request for surgery [id number]     Cerebral vascular accident (H)      Coronary artery disease 2006    100% RCA with collateral filling per Dr. Elizabeth's angio report     Diabetes mellitus type 2 in obese (H)      Fibrocystic breast      GERD (gastroesophageal reflux disease)      History of anesthesia complications     slow to wake     Hypertension      Obstructive sleep apnea syndrome      Peripheral vascular disease (H)      Pneumonia 11/11/2018     PONV (postoperative nausea  and vomiting)      S/P cholecystectomy 6/22/2018     SBO (small bowel obstruction) (H) 11/12/2015     Stroke (H)      Transaminitis 10/22/2018     Upper respiratory infection 12/20/2018     Urinary incontinence         Temp Readings from Current Encounter:     08/24/20 1710 08/24/20 1911 08/25/20 0000   Temp: 97.5  F (36.4  C) 97.8  F (36.6  C) 97.7  F (36.5  C)     Net Intake/Output (last 24 hours):  I/O last 3 completed shifts:  In: 980 [P.O.:480; I.V.:500]  Out: 5 [Drains:5]    Recent Labs     08/22/20  1839 08/23/20  0103 08/23/20  0754 08/23/20  1154 08/24/20  0555 08/24/20  1638 08/25/20  0526   WBC 17.0*  --  17.4*  --  15.9*  --  15.6*   LACTICACID  --  2.1*  --  2.0  --  1.9  --    BUN 9  --  10  --  14  --   --    CREATININE 0.84  --  0.86  --  0.94 1.08  --      Estimated Creatinine Clearance: 53.6 mL/min (by C-G formula based on SCr of 1.08 mg/dL).    No results for input(s): CULTURE in the last 72 hours.    Results for orders placed or performed during the hospital encounter of 08/11/20   Culture, Urine    Collection Time: 08/21/20  4:37 PM    Specimen: Urine, Catheter   Result Value Status    Culture No Growth Final   Urine culture    Collection Time: 08/19/20  8:08 PM    Specimen: Urine, Clean Catch   Result Value Status    Culture No Growth Final   Aerobic Culture with Gram Stain    Collection Time: 08/17/20 10:02 AM    Specimen: Fluid, Pelvic; Body Fluid   Result Value Status    Culture 2+ Candida dubliniensis (!) Final   Fungal culture    Collection Time: 08/17/20 10:02 AM    Specimen: Pelvis, Left; Body Fluid   Result Value Status    Culture 1+ Candida albicans (!) Preliminary   Anaerobic culture    Collection Time: 08/17/20 10:02 AM    Specimen: Pelvis, Left; Tissue   Result Value Status    Culture No anaerobic organisms isolated Final       Recent labs: (last 7 days)     08/23/20  0754 08/24/20  0145 08/24/20  1638 08/25/20  0526   VANCOMYCIN 43.1* 38.6* 30.6* 27.0*       Vancomycin  administrations: (last 120 hours)     Date/Time Action Medication Dose Rate    08/23/20 1351 New Bag    vancomycin 1,500 mg in sodium chloride 0.9% 500 mL (VANCOCIN) 1,500 mg 176.7 mL/hr    08/23/20 0204 New Bag    vancomycin 1,500 mg in sodium chloride 0.9% 500 mL (VANCOCIN) 1,500 mg 176.7 mL/hr    08/22/20 1400 New Bag    vancomycin 1,500 mg in sodium chloride 0.9% 500 mL (VANCOCIN) 1,500 mg 176.7 mL/hr    08/22/20 0218 New Bag    vancomycin 1,750 mg in sodium chloride 0.9% 500 mL (VANCOCIN) 1,750 mg 178.3 mL/hr          Assessment/Plan:    Pharmacist consulted to dose vancomycin for Hospital Acquired Pneumonia, goal trough range 15-20 mcg/mL. Patients vancomycin level remains high despite not receiving any doses of vancomycin since 8/23 ~1400.   1. Continue vancomycin intermittent dosing.   2. Vancomycin trough level of 27 mcg/mL was above the goal trough range. This trough level was drawn at steady state.  3. Pharmacist will plan to re-check a vancomycin trough level 8/26 with AM labs.  4. Pharmacist will continue to follow.    Thank you for the consult.  Celine Katz, PharmD 8/25/2020 7:25 AM

## 2021-06-10 NOTE — TELEPHONE ENCOUNTER
Incoming fax from [facility]    INR of 2.7 today  (Unsure if [facility] is managing warfarin at this time)

## 2021-06-10 NOTE — PLAN OF CARE
"Care Plan  Care Management         Care Management Goals of the Day: Progression of care and discharge planning     Care Progression Reviewed With: Charge Nurse, Medical Doctor, Health Unit Coordinator, Nurse Care Manager      Barriers to Discharge: GI status, cultures pending     Discharge Disposition: TCU     Expected Discharge Date: 8/24/20 pending     Transportation: [employer]        Care Coordination Narrative:  Surgery following for acute perforated diverticulitis with small abscess, 8/17 percutaneous drain placed, currently on IV Flagyl and Cipro. Nephrology following. This is readmit from hospitalization 7/22/20-7/31/20. WBC elevated, Hgb drop. Planning CT scan per surgery note 8/20.      Assessment History:  Patient has been at [facility] since last hospitalization. Prior to that she has been living at [facility] [phone]. She received medication assist and ADL assist. She uses a rolling walker and has a scooter. Daughter-Aria is main contact. She lives in [place]. Nephew-Bridger lives in [facility].   [facility] [place] who states per DON patient can pay her rent when she returns and \"not to worry.\"     Spoke with daughter-Aria. Provided update. Continued plan is for patient to discharge back to [facility].        "

## 2021-06-10 NOTE — PROGRESS NOTES
General Surgery Progress Note  HD11    Subjective:   Pt is feeling tired.  She does not have any energy and has not been mobile and has had poor appetite and not eating much.  Patient continued to have increase in leukocytosis and was worked up negative C. difficile.  Chest x-ray shows bilateral infiltrates.  Last COVID testing done on the 10th which was negative.  No repeat testing done.  She did develop a cough and tells me her cough seems a little better today.  CT of the abdomen on the 20th showed no other abscesses and drain in place with collapsed abscess wall.  Vital signs are stable.  Today white count is trending downward.        Vitals:    08/21/20 1658 08/22/20 0049 08/22/20 0623 08/22/20 0727   BP:  129/53  131/82   Patient Position:  Semi-nicole  Lying   Pulse: 78 63  65   Resp:  18  18   Temp:  98.3  F (36.8  C)  97.5  F (36.4  C)   TempSrc:  Oral  Oral   SpO2:  96%  100%   Weight:   196 lb 6.4 oz (89.1 kg)    Height:           Physical Exam:  Patient resting in bed alert and looks the same and actively coughing  Lungs: Bilateral lower rhonchi  CV:       RRR  Ab:       Soft, + BS, tenderness left lower quadrant without rebound or peritoneal signs present.  SHAMIKA scant output clear dark brown appearing.    Results from last 7 days   Lab Units 08/22/20  0542   LN-WHITE BLOOD CELL COUNT thou/uL 17.4*   LN-HEMOGLOBIN g/dL 8.3*   LN-HEMATOCRIT % 24.7*   LN-PLATELET COUNT thou/uL 115*       Results from last 7 days   Lab Units 08/21/20  0447  08/17/20  0520   LN-SODIUM mmol/L 137   < > 139   LN-POTASSIUM mmol/L 3.4*   < > 3.3*   LN-CHLORIDE mmol/L 105   < > 107   LN-CO2 mmol/L 21*   < > 22   LN-BLOOD UREA NITROGEN mg/dL 8   < > 31*   LN-CREATININE mg/dL 0.95   < > 3.03*   LN-CALCIUM mg/dL 7.4*   < > 6.9*   LN-ALBUMIN g/dL  --   --  1.8*    < > = values in this interval not displayed.       Assessment: 70-year-old female Acute diverticulitis with perforation and abscess status post IR percutaneous drain  placement                        MAY resolved                        bilateral pneumonia                        Multiple comorbidities including atrial fibrillation, type 2 diabetes, CAD status post stent placement to 7/27/2020, CHF and morbid obesity                      Leukocytosis improving    Plan: Appreciate ID input on antibiotics and antifungals.           SHAMIKA to remain and plan for abscessogram on Monday.           Continue supportive cares.           I believe needs of repeat COVID testing and she is symptomatic.  I went ahead and ordered this test today.    Nela Camacho PA-C [phone]    [facility]  [address] [place] [phone]

## 2021-06-10 NOTE — PROGRESS NOTES
Clinical Nutrition Therapy Follow Up Note      Current Nutrition Prescription:   Diet: diabetic 2gm Na, low fiber.  Will be NPO after MN   Diet Supplements: vanilla Boost GC with lunch  IV dextrose or Fluids:     S: spoke with pt via the phone.  She is a little difficult to understand and may not be completely clear.  After asking several times she said she did like the Boost GC but didn't want it more than once a day.  She mentioned she's ordering ginger ale.  When I asked about her appetite or if she had any nausea she didn't answer intelligibly.     Current Nutrition Intake:  Poor intake with meals recorded since I saw her last on . Took none of breakfast on .  Did eat 75% of breakfast  but refused dinner.  Only took ginger ale for breakfast and lunch on .     Intake not meeting needs based on available data.    Anthropometrics:  Admission weight: 195#  Weight: 203 lb 11.2 oz (92.4 kg) no source--wt jumped up > 7# in 2 days.   Edema: nonpitting BUE and BLE  Low UO recorded with low oral fluids.    GI Status/Output:   Last BM: large loose stool  per nurse  SHAMIKA drain output over last 24 hr: 37.5 ml    Skin/Wound:  Noted abscess drain per nursing    Medications:  Medications reviewed.    Labs:  Labs reviewed  WBC 15.9  FSB, 148    Malnutrition: Patient meets diagnostic criteria for moderate protein calorie malnutrition in the context of acute illness as evidenced by  poor nutrient intake and fluid accumulation    Nutrition Risk Level: stable-high risk with prolonged poor intake.    Nutrition DX: inadequate intake r/t acute illness evidenced by NPO/CL x 6 days  New dx: Moderate malnutrition in the context of acute illness evidenced by intake meeting < 75% of estimated needs for > 7 days and mild edema noted.     Goals:    Meet nutrition needs-not progressing  Tolerate diet advancement-not progressing  Electrolytes WNL--met except Ca low but likely due to low albumin     Plan:    Although  pt said she didn't want Boost GC more than once I think she does need it and may still drink the second one.  I will increase it to 2x/day.    Monitoring/Evaluation:  Per goals, check intake of Boost GC, follow results of testing tomorrow.

## 2021-06-10 NOTE — PROCEDURES
"Midline Insertion Procedure Note  Pt. Name: Gardenia Muller  MRN:        [id number]    Procedure: Insertion of a  dual Lumen  4 fr  BARD PROVENA (non-valved) Power MIDLINE catheter.  Lot number [id number].     Indications: Vascular access    Procedure Details   Patient identified with 2 identifiers and \"Time Out\" conducted.  Contraindications reviewed: yes.     Central line insertion bundle followed: hand hygeine performed prior to procedure, site cleansed with cholraprep, hat, mask, sterile gloves,sterile gown worn, patient draped with maximum barrier head to toe drape, sterile field maintained.    1 ml 1% Lidocaine administered sq to the insertion site. A 4 Fr catheter was inserted into the basilic vein of the left arm with ultrasound guidance. 1 attempt(s) required to access vein.   Catheter threaded without difficulty. Good blood return noted.    Modified Seldinger Technique used for insertion.    Catheter secured with Statlock, biopatch and Tegaderm dressing applied.    Findings:  Total catheter length  15 cm, with 0 cm exposed. Mid upper arm circumference is 40 cm. Catheter was flushed with 20 cc NS. Patient  tolerated procedure well.    Tip placement verified by ultrasound placement and good blood return.  Tip placement approximately in the axillary region.    Patient's primary RN notified catheter is a MIDLINE catheter and is ready for use.    Comments:      A midline catheter is a form of peripheral venous access. Not recommended for the infusion of vesicants (Vancomycin, Vasopressors, TPN, etc.)    Parul Nance RN,BSN,[facility]  [phone]      "

## 2021-06-10 NOTE — PROGRESS NOTES
Infectious Disease Follow up Note:    Service: Infectious Disease   Note: Follow Up Note  Date: 8/24/2020    Chief Complaint: Follow up for acute diverticulitis with perforation    ASSESSMENT:  Gardenia Muller is a 70 y.o. old female with acute diverticulitis with perforation.   C. dubliniensis -abdominal infection-- active issue    History of CVA, obesity, chronic abdominal pain.  Acute diverticulitis with perforation admitted on 8/11/2020.  On IV Cipro and Flagyl.  Cultures from abscess drainage on 8/17/2020 with Candida albicans.  CT scan on 8/20/2020 with near complete resolution of the abscess.  New leukocytosis.  No clear source.  C. difficile negative on 8/21/2020.  Feeling short of breath.  Chest x-ray ordered. HAP is a possibility.  Leukocytosis could be from untreated Candida infection.  Fluconazole added on 8/21/2020.  WBC better, at 17,000 on 8/22/2020.  Abdomen is better.  New bilateral interstitial infiltrate.  COVID-19 PCR negative on 8/22/2020.  Most likely pulmonary edema.  Penicillin allergy, reaction swelling.     Recommendations:   Continue Meropenem and IV Vanco and fluconazole   Increase Fluconazole dose  Monitor white blood cell count..  Monitor respiratory status and abdomen.  Hold statin for now    Patient new to me. Please see previous note by Dr. White    Discussed with the patient, nursing staff.    ID will follow.    Carrol Vela MD  [facility]  [phone]    Total Time Spent >35 minutes with >50% of the time spent in chart review, evaluation. Patient new to me    ______________________________________________________________________  Notes / labs / vitals reviewed.    SUBJECTIVE / INTERVAL HISTORY:   Intermittent abdominal pain  Tolerating abx  Needs help with sitting up in bed      8/23: Abdomen is better.  Continue to have respiratory distress.  Now on a lot of oxygen.  COVID-19 negative.    ROS: A complete 12 point ROS is negative except as listed  above.    PMHx/FHx/SHx: Reviewed. Interval changes noted.    OBJECTIVE:  Vitals:    20 1638   BP: 100/51   Pulse: 70   Resp: 16   Temp: 97.3  F (36.3  C)   SpO2: 94%       Temp (24hrs), Av.8  F (36.6  C), Min:97.3  F (36.3  C), Max:99.4  F (37.4  C)    GEN: NAD  EYES:  Anicteric, RAYMUNDO, EOM intact.  HEAD, EARS, NOSE, MOUTH, AND THROAT:  Oropharynx without thrush or ulcers  NECK:  Supple.   RESPIRATORY:  Normal breathing pattern. Supplemental oxygen  CARDIOVASCULAR:  Arm swelling  Previous exam: S1 S2 No murmur, click, gallop or rub. No dependent edema. No excess JVD.  ABDOMEN:  Soft, tenderness, SHAMIKA drain with serous drainage  MUSCULOSKELETAL:  Joints without effusion or acute inflammation. No muscle atrophy.Dedconditioned  LYMPHATIC:  No cervical or supraclavicular adenopathy  SKIN/HAIR/NAILS:  No rashes. No stigmata of infective endocarditis.  NEUROLOGIC:  Gross neurological exam non-focal.  PSYCHIATRIC:  A&Ox3, appropriate, mentation normal.    Antibiotics:  IV vancomycin  IV meropenem  IV fluconazole    Pertinent labs:    Results from last 7 days   Lab Units 20  0555 20  0754 20  1839 20  0542  20  0720   LN-WHITE BLOOD CELL COUNT thou/uL 15.9* 17.4* 17.0* 17.4*   < > 14.5*   LN-HEMOGLOBIN g/dL 7.9* 8.3* 8.1* 8.3*   < > 8.2*   LN-HEMATOCRIT % 24.5* 25.9* 24.8* 24.7*   < > 25.5*   LN-PLATELET COUNT thou/uL 109* 112* 115* 115*   < > 136*   LN-NEUTROPHILS RELATIVE PERCENT %  --   --   --   --   --  74*   LN-MONOCYTES RELATIVE PERCENT %  --   --   --   --   --  13*   LN-MONOCYTES - REL (DIFF) %  --   --  4 8  --   --     < > = values in this interval not displayed.       Results from last 7 days   Lab Units 20  0555 20  0754 20  1839   LN-SODIUM mmol/L 136 139 138   LN-POTASSIUM mmol/L 3.6 3.7 3.0*   LN-CHLORIDE mmol/L 107 108* 109*   LN-CO2 mmol/L 21* 20* 21*   LN-BLOOD UREA NITROGEN mg/dL 14 10 9   LN-CREATININE mg/dL 0.94 0.86 0.84   LN-CALCIUM mg/dL 8.1*  7.9* 7.6*       MICROBIOLOGY DATA:    Cultures reviewed  Culture:  C. dubliniensis     IMAGING/RADIOLOGY:  Reviewed  XR Chest 1 View Portable (Order [id number])   Imaging         Study Result     EXAM: XR CHEST 1 VIEW PORTABLE  LOCATION: [facility]  DATE/TIME: 8/21/2020 7:34 PM     INDICATION: r/o pna- shortness of breath  COMPARISON: 08/11/2020     IMPRESSION:   New bilateral interstitial infiltrates could represent edema or pneumonia including COVID. Enlarged cardiac silhouette. No pleural effusion. No definite pulmonary vascular congestion.

## 2021-06-10 NOTE — PROGRESS NOTES
Progress Note    Brief summary:   70 year  old female with history of HTN, T2DM, chronic atrial fibrillation on warfarin, diastolic CHF, coronary artery disease status post stenting (7/27/2020), peripheral artery disease, calculus cholecystitis status post cholecystectomy, depression, who came to [facility] on 8/11/2020 for abdominal pain and vomiting. CT scan revealed acute diverticulitis with perforation. on 8/14 showed increased size of abscess from 2x2cm to 4x3cm, though pt reports clinical improvement of her symptoms.. General surgery consulted - no current plans for operating room so interventional radiology was consulted for possible drainage which was done on 8/17/2020 by placing CT scan guided drain placement in the left pelvic diverticular abscess, repeat CTAP (8/20) - near complete collapse of fluid cavity.   Patient developed new leukocytosis.  Chest x-ray showed possible hospital-acquired pneumonia.  Due to concern of leukocytosis being from untreated Candida infection (at least improved Ashley dubliniensis), so she was started on  Fluconazole. WBC slowly improving.  On 8/25, patient was noted to be shortness of breath and given 25 lbs weight gain since admission, started on iv lasix.   On 8/26, developed acute kidney injury. Started having episodes of bradycardia. Digoxin level elevated. Started on digifab and transferred to ICU. Required Atropine in ICU for HR as low as 20-40s. Started on dopamine.    Assessment/Plan  Principal Problem:    Diverticulitis  Active Problems:    Bradyarrhythmia    Acute kidney injury (H)    Diverticulitis of large intestine with perforation and abscess without bleeding    Atrial fibrillation with rapid ventricular response (H)    Obstructive sleep apnea syndrome    Poisoning by digoxin, accidental or unintentional, initial encounter    Acute respiratory failure with hypoxia and hypercapnia (H)    Acute metabolic encephalopathy    Shock circulatory (H)     Metabolic acidosis      1. Digoxin toxicity: bradyarrhythmia, renal failure, hyperkalemia. EKG showed junctional rhythm. Digoxin level of 6. S/p Digifab. Repeat digoxin level pending. Cardiology on board.  2. MAY:  With metabolic acidosis and hyperkalemia. due to digoxin. Digoxin stopped. Holding diuretics, lisinopril. On bicarb drip. Appreciate nephrology input  3. Acute perforated diverticulitis with pericolonic abscess: Initially started on Cipro and Flagyl.  Status post CT-guided drain placement on 8/17, culture growing Candida.  CT abdomen on 8/20/2020 with near complete resolution of the abscess. 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  Patient is now on meropenem, daptomycin and fluconazole.  vanco switched to dapto yesterday  4. Hospital-acquired pneumonia: On meropenem and vancomycin.  Covid negative X 2.   5. Fever : spiked fever of 100.6 yesterday. Treating for pneumonia. Drug fever?  6. Leukocytosis: Likely due to above  7. Acute on chronic diastolic CHF: weight is up by 25 lbs since admission. Holding lasix due to MAY  8. Acute hypoxic resp failure: Likely from heart failure as well as pneumonia.  Was on BIPAP last night , now on NC  9. Transaminitis: likely from hepatic congestion with heart failure. Similar events during last admission and in 2018.  10. Acute anemia: Hemoglobin dropped to 6.1 on 8/20.  Status post 1 unit.  Hemoglobin slowly trending down.  Will transfuse if hemoglobin goes below 7  11. Supratherapeutic INR: on presentation, improved with Vit K. INR went up again with liver failure. Worsened today likely from reaction of INR reagent with daptomycin  12. Atrial fibrillation: Had RVR on 8/19.  Digoxin held due to toxicity. Metoprolol held due to bradycardia. Warfarin held due to supratherapeutic INR.  13. Type 2 diabetes mellitus: was on SSI but BG consistently low, will stop SSI for now and monitor  14. CAD: Status  post coronary angiogram and PCI to LAD on 7/27/2020    Subjective  Patient seen and examined  Overnight events noted.  Found to have digoxin toxicity and started on digifab.   Patient is lethargic and asking for water.      Objective    Vital signs in last 24 hours  Temp:  [97.4  F (36.3  C)-98.7  F (37.1  C)] 97.5  F (36.4  C)  Heart Rate:  [26-86] 66  Resp:  [22-47] 32  BP: ()/(35-58) 126/43  Arterial Line BP: ()/(30-48) 120/43  FiO2 (%):  [30 %-40 %] 30 %  Weight:   210 lb 9.6 oz (95.5 kg)    Intake/Output last 3 shifts  I/O last 3 completed shifts:  In: 866 [P.O.:220; I.V.:546; IV Piggyback:100]  Out: 0   Intake/Output this shift:  I/O this shift:  In: 449 [I.V.:399; IV Piggyback:50]  Out: 30 [Urine:30]    Physical Exam   General: drowsy but arousable  Eyes: no pallor  Chest: Clear to auscultation bilaterally  Heart: RRR, normal S1 and S2, mild pitting edema  Abdomen: soft, non tender  Neuro: moving all extremities      Meds    aspirin  81 mg Oral DAILY     daptomycin (CUBICIN) IV  6 mg/kg Intravenous Q48H     fluconazole (DIFLUCAN) IV  200 mg Intravenous Q24H     [Held by provider] furosemide  40 mg Intravenous DAILY     [Held by provider] lisinopriL  5 mg Oral DAILY     magnesium chloride  128 mg Oral BID     magnesium gluconate  27 mg Oral BID     meropenem  1 g Intravenous Q12H     [Held by provider] metoprolol succinate  12.5 mg Oral DAILY     omeprazole  20 mg Oral BID AC     [Held by provider] sertraline  100 mg Oral DAILY     sodium chloride bacteriostatic  1-5 mL Intradermal Once     sodium chloride  10 mL Intravenous Q8H FIXED TIMES     sodium chloride  10-30 mL Intravenous Q8H FIXED TIMES     [Held by provider] spironolactone  25 mg Oral DAILY     ticagrelor  90 mg Oral BID     warfarin - daily dose required   Other Med Consult or Protocol       Pertinent Labs   Lab Results: personally reviewed.   not applicable    Results from last 7 days   Lab Units 08/27/20  0750 08/27/20  0225  08/26/20  2346 08/26/20  2221   LN-SODIUM mmol/L 139 140  --  138   LN-POTASSIUM mmol/L 4.2 4.6 5.4* 5.4*   LN-CHLORIDE mmol/L 108* 108*  --  109*   LN-CO2 mmol/L 20* 17*  --  15*   LN-BLOOD UREA NITROGEN mg/dL 40* 39*  --  35*   LN-CREATININE mg/dL 2.88* 2.86*  --  2.65*   LN-CALCIUM mg/dL 8.6 9.0  --  9.1         Results from last 7 days   Lab Units 08/27/20  0406 08/25/20  0526 08/24/20  0555   LN-WHITE BLOOD CELL COUNT thou/uL 18.3* 15.6* 15.9*   LN-HEMOGLOBIN g/dL 9.1* 7.6* 7.9*   LN-HEMATOCRIT % 28.3* 23.7* 24.5*   LN-PLATELET COUNT thou/uL 111* 106* 109*         Pertinent Radiology   Radiology Results: Personally reviewed impression/s    Ct Abdomen Pelvis Without Oral Without Iv Contrast    Result Date: 8/14/2020  EXAM: CT ABDOMEN PELVIS WO ORAL WO IV CONTRAST LOCATION: [facility] DATE/TIME: 8/14/2020 10:26 AM INDICATION: Abdominal infection suspected perforated diverticulitis, assess for abscess COMPARISON: 8/11/2020 TECHNIQUE: CT scan of the abdomen and pelvis was performed without oral or IV contrast. Multiplanar reformats were obtained. Dose reduction techniques were used. CONTRAST: None. FINDINGS: LOWER CHEST: Trace pleural fluid. Minimal scarring or edema at the lung bases. The heart is enlarged. There is coronary artery calcification. HEPATOBILIARY: Cholecystectomy. There is extrahepatic bile duct dilatation. PANCREAS: Pancreatic duct dilatation is not as well-seen on this study as on the comparison study. SPLEEN: Normal. ADRENAL GLANDS: Normal. KIDNEY/BLADDER: Normal kidneys and ureters. There is wall thickening of the nondistended urinary bladder. A Bardales catheter is present. BOWEL: There is colonic diverticulosis. There is stranding adjacent to the sigmoid colon in the region of diverticulitis. There is a complex 4 cm x 3 cm collection to the left of the sigmoid colon in this region (previously this was 2 cm x 2 cm). Minimal  extraluminal air is noted. LYMPH NODES: Normal. VASCULATURE:  Atherosclerotic disease. PELVIC ORGANS: Fibroid uterus. MUSCULOSKELETAL: Normal.     1.  Again seen is sigmoid colon diverticulitis with a small contained perforation. A small fluid collection adjacent to the sigmoid colon has increased in size to 4 cm x 3 cm (previously 2 cm x 2 cm). Percutaneous drainage would be difficult though may be possible. 2.  Biliary and pancreatic ductal dilatation is not as well-seen as on the comparison study. See prior study for details.    Xr Chest 1 View Portable    Result Date: 8/27/2020  EXAM: XR CHEST 1 VIEW PORTABLE LOCATION: [facility] DATE/TIME: 8/27/2020 5:53 AM INDICATION: Respiratory failure with progressive hypoxia. COMPARISON: 08/26/2020     Slight interval improvement in reticular interstitial infiltrates in alveolar infiltrates since prior study. There still remains extensive infiltrates throughout both lungs. Stable cardiac enlargement. Mild pulmonary vascular congestion improved. Atherosclerotic vascular calcification of the aorta. Right PICC with tip in good position low SVC. Degenerative changes in the spine and shoulders    Xr Chest 1 View Portable    Result Date: 8/26/2020  EXAM: XR CHEST 1 VIEW PORTABLE LOCATION: [facility] DATE/TIME: 8/26/2020 4:56 PM INDICATION: sob COMPARISON: 08/23/2020     Right PICC tip projects over the mid SVC. Unchanged bilateral mixed reticular and alveolar opacities. These could represent cardiogenic or noncardiogenic pulmonary edema, pneumonia, and drug reaction. No pleural effusion.    Xr Chest 1 View Portable    Result Date: 8/21/2020  EXAM: XR CHEST 1 VIEW PORTABLE LOCATION: [facility] DATE/TIME: 8/21/2020 7:34 PM INDICATION: r/o pna- shortness of breath COMPARISON: 08/11/2020     New bilateral interstitial infiltrates could represent edema or pneumonia including COVID. Enlarged cardiac silhouette. No pleural effusion. No definite pulmonary vascular congestion.    Xr Chest 2 Views    Result Date:  8/11/2020  EXAM: XR CHEST 2 VIEWS LOCATION: [facility] DATE/TIME: 8/11/2020 11:50 AM INDICATION: Cough. COMPARISON: None.     Negative chest. Lungs are clear. Coronary stenting.     Xr Foot Right 2 Vws    Result Date: 8/16/2020  EXAM: XR FOOT RIGHT 2 VWS LOCATION: [facility] DATE/TIME: 8/16/2020 12:51 PM INDICATION: Acute right foot pain COMPARISON: None.     Bones are demineralized. No definitive evidence of an acute fracture. No cortical destructive changes to suggest osteomyelitis. Scattered degenerative changes.    Xr Chest 1 View For Picc Placement Portable    Result Date: 8/23/2020  EXAM: XR CHEST 1 VIEW FOR PICC LINE PLACEMENT PORTABLE LOCATION: [facility] DATE/TIME: 8/23/2020 6:28 PM INDICATION: verify catheter placement COMPARISON: 08/21/2020     Right upper extremity PICC line terminates approximately 3 cm above the expected cavoatrial junction. No change in the chest otherwise. Extensive abnormal opacity throughout both lungs persists. Mild cardiomegaly. No pneumothorax or large pleural effusion.    Ct Abdomen Pelvis Without Oral With Iv Contrast    Result Date: 8/11/2020  EXAM: CT ABDOMEN PELVIS WO ORAL W IV CONTRAST LOCATION: [facility] DATE/TIME: 8/11/2020 11:41 AM INDICATION: right sided abdominal pain, nausea and vomiting COMPARISON: 7/22/2020 TECHNIQUE: CT scan of the abdomen and pelvis was performed following injection of IV contrast. Multiplanar reformats were obtained. Dose reduction techniques were used. CONTRAST: Iohexol (Omni) 75mL FINDINGS: LOWER CHEST: Minimal interstitial edema. The heart is mildly enlarged. There is coronary artery disease. HEPATOBILIARY: Cholecystectomy. There is intrahepatic and extrahepatic bile duct dilatation. There is pancreatic duct dilatation. PANCREAS: No discrete pancreatic masses identified. SPLEEN: Normal. ADRENAL GLANDS: Normal. KIDNEYS/BLADDER: Normal. BOWEL: There is colonic diverticulosis. There is acute  diverticulosis involving the distal sigmoid colon with a small contained perforation. No drainable abscess at this point. LYMPH NODES: Normal. VASCULATURE: Atherosclerotic disease. Right renal artery stent. PELVIC ORGANS: Fibroid uterus. MUSCULOSKELETAL: Degenerative changes.     1.  Acute diverticulitis with a small contained perforation. No drainable abscess at this point. 2.  Biliary and pancreatic duct dilatation. No discrete pancreatic masses identified. Consider MRCP, ERCP, EUS.    Ct Abdomen Pelvis With Oral With Iv Contrast    Result Date: 8/20/2020  EXAM: CT ABDOMEN PELVIS W ORAL W IV CONTRAST LOCATION: [facility] DATE/TIME: 8/20/2020 3:43 PM INDICATION: Abdominal infection suspected ongoing pain and increasing leukocytosis COMPARISON: CT from 8/14/2020 TECHNIQUE: CT scan of the abdomen and pelvis was performed following injection of IV contrast. Multiplanar reformats were obtained. Dose reduction techniques were used. CONTRAST: Iohexol (Omni) 75mL FINDINGS: LOWER CHEST: Dependent atelectasis and interstitial edema within the lung bases without consolidation. Cardiomegaly and mitral annular calcifications. HEPATOBILIARY: Gallbladder surgically absent. Mild ectasia of the extrahepatic biliary tree. PANCREAS: Unchanged appearance. SPLEEN: Normal. ADRENAL GLANDS: Normal. KIDNEYS/BLADDER: Normal. BOWEL: Interval placement of a left anterior abdominal approach percutaneous drain into the perisigmoid abscess. The drain is well positioned with near complete collapse of the previously seen abscess collection. There is persistent mild adjacent stranding. The bowel is nonobstructed. No new intra-abdominal abscess identified. No pneumoperitoneum. LYMPH NODES: Normal. VASCULATURE: Atherosclerotic calcification throughout the arterial tree. No evidence of vascular complication following drain placement. PELVIC ORGANS: Fibroid uterus. No ascites. Bardales catheter is within a decompressed urinary bladder.  "Subcutaneous scattered calcifications in the subcutaneous flank regions compatible with prior injection sites. MUSCULOSKELETAL: No new or acute osseous findings. Degenerative changes at the hips and lumbar spine are stable.     1.  Interval percutaneous drain placement into the diverticular abscess with near complete collapse of the fluid cavity. Drain abuts the adjacent sigmoid colon. No evidence of post drain placement complication. 2.  No new intra-abdominal abscess or other findings to correlate with ongoing leukocytosis. 3.  Stable appearance of the pancreas with previously seen main pancreatic ductal dilatation not well visualized on this study.    Poc Us 3cg Picc Placement Guidance    Result Date: 8/23/2020  Exam was performed as guidance for PICC line insertion.  Click \"PACS images\" hyperlink below to view any stored images.  For specific procedure details, view procedure note authored by PICC/Vascular Access Nurse.    Poc Us 3cg Picc Placement Guidance    Result Date: 8/19/2020  Exam was performed as guidance for PICC line insertion.  Click \"PACS images\" hyperlink below to view any stored images.  For specific procedure details, view procedure note authored by PICC/Vascular Access Nurse.    Ct Abscess Drain Pelvic    Result Date: 8/17/2020  EXAM: 1. CT OF ABDOMEN AND PELVIS WITHOUT CONTRAST 2. PERCUTANEOUS DRAIN PLACEMENT LEFT PELVIS LOCATION: [facility] DATE/TIME: 8/17/2020 9:59 AM INDICATION: diverticulitis TECHNIQUE: Dose reduction techniques were used. FINDINGS: The limited visualized portions of the lung bases are clear. Evaluation of the solid visceral organs is suboptimal given the lack of intravenous contrast. Within these limitations no focal hepatic lesion is seen. The patient is post cholecystectomy. There is no intra or extrahepatic biliary ductal dilatation. The stomach and duodenum are normal. The spleen size is normal. The kidneys enhance symmetrically and there is no renal " collecting system dilatation. Note again is made of a diverticular abscess, small in size. This is unchanged from the prior examination. Fibroid uterus is noted. PROCEDURE: Informed consent obtained. Site marked. Prior images reviewed. Required items made available. Patient identity confirmed verbally and with arm band. Patient reevaluated immediately before administering sedation. Universal protocol was followed. Time out performed. The site was prepped and draped in sterile fashion. 10 mL of 1% lidocaine was infused into the local soft tissues. Using standard technique and under direct CT guidance, a 10 [place] drainage catheter was inserted into the fluid collection.  SPECIMEN: 10 mL of purulent fluid was aspirated and sent to lab for cultures and Gram stain. BLOOD LOSS: Minimal. The patient tolerated the procedure well. No immediate complications. RADIOLOGIC SUPERVISION AND INTERPRETATION: CT GUIDANCE: Images demonstrate the needle and subsequent catheter to be in good position. SEDATION: Versed 1 mg. Fentanyl 50 mcg. The procedure was performed with administration intravenous conscious sedation with appropriate preoperative, intraoperative, and postoperative evaluation. 15 minutes of supervised face to face conscious sedation time was provided by a radiology nurse under my direct supervision.     1. Stable diverticular abscess. 2. Successful CT-guided drain placement into left pelvic diverticular abscess. Reference CPT Codes: 41331, 74563    Ir Abscessogram Tube Check    Result Date: 8/25/2020  [place] [facility] LOCATION: [facility] DATE: 8/25/2020 PROCEDURE: ABSCESSOGRAM INTERVENTIONAL RADIOLOGIST: Altaf Bateman MD. INDICATION: Diverticular abscess with indwelling drain here for follow-up. FLUOROSCOPIC TIME: 0.8 minutes NUMBER OF IMAGES: 2 CONTRAST: 5 cc of Omni COMPLICATIONS: No immediate complications. PROCEDURE: Contrast injection through the existing drain demonstrates no residual abscess pocket.  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     Abscessogram reveals no residual abscess pocket. Fistula to the colon. Switched drain to gravity drainage bag from SHAMIKA suction bulb. No flushes are needed. Follow-up abscessogram in one week. CPT codes for physician use only: 89238/20942          Advanced Care Planning:  Discharge Planning discussed with patient  Anticipated LOS: multiple days  Barrier to discharge:aute issues  Disposition:TBD  Discussed care with patient for >35 minutes with greater than 50% of time spent in counseling and coordination of care.      Rekha Cesar MD  Hospitalist  [phone]    This note was dictated using voice recognition software. Any grammatical or context distortions are unintentional and inherent to the software.

## 2021-06-10 NOTE — PROGRESS NOTES
General Surgery Progress Note    Subjective:   Pt is feeling good, denies pain, tolerated clears yesterday, passing flatus and bm, no n/v.      Vitals:    08/16/20 2351 08/17/20 0350 08/17/20 0530 08/17/20 0710   BP: 132/56 112/56  107/55   Patient Position: Lying Lying  Lying   Pulse: 89 84  87   Resp: 16 18  18   Temp: 99.7  F (37.6  C) 99  F (37.2  C)  99.2  F (37.3  C)   TempSrc: Oral Oral  Oral   SpO2: 94% 100%  100%   Weight:   196 lb 4.8 oz (89 kg)    Height:           Physical Exam:  Gen: laying in bed, nad  Ab:       Soft, + BS, nondistended, nontender    Results from last 7 days   Lab Units 08/17/20  0520   LN-WHITE BLOOD CELL COUNT thou/uL 12.8*   LN-HEMOGLOBIN g/dL 7.9*   LN-HEMATOCRIT % 24.8*   LN-PLATELET COUNT thou/uL 122*       Results from last 7 days   Lab Units 08/17/20  0520  08/11/20  1026   LN-SODIUM mmol/L 139   < > 135*   LN-POTASSIUM mmol/L 3.3*   < > 4.6   LN-CHLORIDE mmol/L 107   < > 103   LN-CO2 mmol/L 22   < > 22   LN-BLOOD UREA NITROGEN mg/dL 31*   < > 22   LN-CREATININE mg/dL 3.03*   < > 0.95   LN-CALCIUM mg/dL 6.9*   < > 10.3   LN-ALBUMIN g/dL 1.8*  --  3.0*   LN-PROTEIN TOTAL g/dL  --   --  8.2*   LN-BILIRUBIN TOTAL mg/dL  --   --  1.3*   LN-ALKALINE PHOSPHATASE U/L  --   --  135*   LN-ALT (SGPT) U/L  --   --  33   LN-AST (SGOT) U/L  --   --  37    < > = values in this interval not displayed.       Assessment: acute diverticulitis with perforation    Plan:   Possible IR drain today  Continue abx  Once the procedure is done, could start back on clears and ADAT from surgery standpoint since she has tolerated clears so well in the past    Alvin Thomas PA-C  Pager - [phone] [facility] - [phone]   General Surgery

## 2021-06-10 NOTE — TREATMENT PLAN
Call by covering provider regarding elevated digoxin level.   Digoxin leve is life threatening high at >5.   Patient has bradycardiac.  12 lead ECG concerning for junctional rhythm.  K:5.3 today (repeat will be higher).   Acute MAY likely from acute drop in cardiac output.  Recommend: emergent in infusion of Digxon (Magaly) antibody (up to 400 mg over 30 minutes).  Total dig magaly needed likely 5.9 vials at 40 mg each (total:240 mg).  Transfer to ICU.  Do not administer IV calcium for hyperkalemia. Avoid over treatment of severe hyperkalemia given rapid correction of hyperkalemia with Digoxin MAGALY.  Recommendations discussed with covering provider.  Check Magneseium level, continue telemetry, continuous pulse ox, check glucose levels.     Correction: Total Digoxin Magaly needed to correct is likely around 240-280 mg.  90 minutes post infusion if ongoing hyperkalemia and bradyarrhythmia would given an addition 160 mg for a total of 400 mg of DigFab.       Discuss case with Dr. Parikh.  Patient has severe hyperkalemia with is a predictor if high mortality.  240 mg of Dig Magaly has infused.  After 30 minutes post infusion still noting bradyrhythmia.  Patient has metabolic acidosis with tachypnea.  Patient has not require pacing at this time.  Agree with IV atropine.  Will continue to follow closely.

## 2021-06-10 NOTE — PLAN OF CARE
Problem: Pain  Goal: Patient's pain/discomfort is manageable  Outcome: Progressing   Patient rates pain 8-9, prn Oxycodone given which was effective.    Problem: Discharge Barriers  Goal: Patient's discharge needs are met  Outcome: Not Progressing   Patient HR was elevated this morning in th 140's, prn IV Metoprolol given, HR now low 100's. Upon vital recheck BP was 69/41, MD updated and ordered 250cc fluid bolus. Bolus currently infusing, will recheck. Sepsis protocol triggered this morning, Lactic was wnl. WBC 14.5 today, patient has been afebrile, will continue to monitor.

## 2021-06-10 NOTE — PLAN OF CARE
"Care Plan  Care Management         Care Management Goals of the Day: Progression of care and discharge planning     Care Progression Reviewed With: Charge Nurse, Medical Doctor, Health Unit Coordinator, Nurse Care Manager      Barriers to Discharge: GI status     Discharge Disposition: TCU     Expected Discharge Date: 8/19/20 pending     Transportation: [facility]        Care Coordination Narrative:  Surgery following for acute perforated diverticulitis with small abscess, 8/17 percutaneous drain placed, currently on IV Flagyl and Cipro. Nephrology following. This is readmit from hospitalization 7/22/20-7/31/20.     Assessment History:  Patient has been at [facility] since last hospitalization. Prior to that she has been living at [facility] [phone]. She received medication assist and ADL assist. She uses a rolling walker and has a scooter. Daughter-Aria is main contact. She lives in [place]. Nephew-Bridger lives in [facility].   The [facility] [employer] who states per DON patient can pay her rent when she returns and \"not to worry.\"      Patient to return to [facility] where she has a 18 day bed hold.   "

## 2021-06-10 NOTE — PROGRESS NOTES
Nutrition Therapy Brief Note    Diet: diabetic  Supplements: Boost glucose Control with breakfast and dinner.    Oral intake:  Continues poor with not much documentation.  RN also thinks pt is having a hard time swallowing both with food and her pills.  Her last SLP eval was 8/18 and she was allowed regular diet with thin but pt may be weaker now as she hasn't eating well since admission (15 days).      She didn't eat much all day but then had a large green emesis this afternoon.      She is having small loose stools.     Weights--weights over past few days are obviously off as she jumped 17# between 8/24 and 8/25.  Wt is up about 8# since admission if 8/24 weight used and these are all bed weights so are subject to possible error.     Edema: nonpitting BUE, +1 BLE    Noted that staff was concerned about low UO.  She doesn't have a dias and is incontinent so difficult to  true UO.  However her oral fluid intake is poor.    Labs:  BUN and Cr and K+ climbing.    Albumin only 1.8 (3rd spacing?)      Malnutrition: Noted previously--moderate    Nutrition Risk Level: high risk with prolonged poor intake.     Nutrition DX: inadequate intake r/t acute illness evidenced by NPO/CL x 6 days  New dx: Moderate malnutrition in the context of acute illness evidenced by intake meeting < 75% of estimated needs for > 7 days and mild edema noted.      Goals:    Meet nutrition needs-not progressing  Tolerate diet advancement-not progressing with emesis?  Electrolytes WNL--not met with elevated K+     Plan:    Discussed above concerns with RN and she plans to textpage MD.      I will continue the Boost Glucose Control 2x/d for now despite higher K+ as I don't think she is really taking in that much K+ with her poor overall intake.       Monitoring/Evaluation:  Per goals, check intake of Boost GC, follow for possible SLP re-eval.

## 2021-06-10 NOTE — PROGRESS NOTES
Brief IR Note:    CT scan reviewed. Diverticular abscess increased and size and may be amenable to CT guided drain placement although challenging. However, patient's INR on 8/12 was 4.3. Would recommend INR to be below 2.0 before attempting drainage.    Jose Raul Torres M.D.   Vascular and Interventional Radiology   Pager: [phone]   After Hours / Scheduling: [phone]

## 2021-06-10 NOTE — PLAN OF CARE
Problem: Psychosocial Needs  Goal: Demonstrates ability to cope with hospitalization/illness  Outcome: Progressing   Alert, oriented, and pleasant. Speech is sometimes run-together and difficult to understand, but able to make needs known. Anxious at times over whether or not dialysis will be needed but calms easily with explanation and reassurance.   Problem: Potential for Compromised Skin Integrity  Goal: Skin integrity is maintained or improved  Outcome: Progressing   Able to respond meaningfully to pressure-related discomfort. Temp slightly elevated at 99.3. Tachy at times and states her heart feels fast. No shortness of breath. MD updated at very beginning of shift that pt has CHF and kidney failure and IVF running at 125 with only 80 mL output overnight. Writer stopped IVF until MD responded. MD decreased IVF to 75 mL/hr and said to see what the nephrologist thinks when rounding. Nephrology did not round this shift. Will report off to following shift to f/u when able.

## 2021-06-10 NOTE — PLAN OF CARE
Problem: Pain  Goal: Patient's pain/discomfort is manageable  8/15/2020 0517 by Lavern Mireles, RN  Outcome: Progressing    Pt given IV Dilaudid PRN for abdominal discomfort, was able to sleep after this. Will continue to monitor.     Pt continues on IV abx and Sodium Bicarb IVF. NPO incase of procedure today. VSS, will continue to monitor.

## 2021-06-10 NOTE — PROGRESS NOTES
General Surgery Progress Note    Subjective:   Pt is feeling good, had bm, passing flatus, tolerating clears, minimal pain, no n/v.      Vitals:    08/13/20 2100 08/13/20 2353 08/14/20 0434 08/14/20 0648   BP: 129/62 92/53 101/64    Patient Position: Lying Semi-nicole Semi-nicole    Pulse: 100 86 81    Resp: 20 18 18    Temp: 98.7  F (37.1  C) 99.2  F (37.3  C) 100.4  F (38  C)    TempSrc: Oral Oral Oral    SpO2: 94% 96% 96%    Weight:    190 lb 1.6 oz (86.2 kg)   Height:           Physical Exam:  Gen: laying in bed, nad  Ab:       Soft, + BS, nondistended, minimal ttp    Results from last 7 days   Lab Units 08/13/20  0724   LN-WHITE BLOOD CELL COUNT thou/uL 12.8*   LN-HEMOGLOBIN g/dL 9.8*   LN-HEMATOCRIT % 33.3*   LN-PLATELET COUNT thou/uL 135*       Results from last 7 days   Lab Units 08/14/20  0436  08/11/20  1026   LN-SODIUM mmol/L 135*   < > 135*   LN-POTASSIUM mmol/L 4.4   < > 4.6   LN-CHLORIDE mmol/L 110*   < > 103   LN-CO2 mmol/L 15*   < > 22   LN-BLOOD UREA NITROGEN mg/dL 31*   < > 22   LN-CREATININE mg/dL 4.49*   < > 0.95   LN-CALCIUM mg/dL 7.4*   < > 10.3   LN-ALBUMIN g/dL  --   --  3.0*   LN-PROTEIN TOTAL g/dL  --   --  8.2*   LN-BILIRUBIN TOTAL mg/dL  --   --  1.3*   LN-ALKALINE PHOSPHATASE U/L  --   --  135*   LN-ALT (SGPT) U/L  --   --  33   LN-AST (SGOT) U/L  --   --  37    < > = values in this interval not displayed.       Assessment: acute diverticulitis with perforation, progressing    Plan:   Fever and WBC slightly up, will get updated CT today to see if there is anything that needs drainage  Pain control  Continue abx, likely could transition to PO abx soon      Alvin Thomas PA-C  Pager - [phone]  Phone - [phone]   General Surgery

## 2021-06-10 NOTE — PROGRESS NOTES
General Surgery Progress Note    Subjective:   Pt rest, wakes and nods yes or no to questions, denies much abdominal pain.      Vitals:    08/28/20 0800 08/28/20 0801 08/28/20 0815 08/28/20 0830   BP:    112/51   Patient Position:       Pulse: 77  81 85   Resp: (!) 44 24 (!) 50 (!) 33   Temp:       TempSrc:       SpO2: 94% 94% 93% 93%   Weight:       Height:           Physical Exam:  Gen: laying in bed, nad  Ab:       Soft, nondistended, minimal ttp over drain site, drain with scant greenish output    Results from last 7 days   Lab Units 08/28/20  0431   LN-WHITE BLOOD CELL COUNT thou/uL 14.6*   LN-HEMOGLOBIN g/dL 8.9*   LN-HEMATOCRIT % 27.0*   LN-PLATELET COUNT thou/uL 115*       Results from last 7 days   Lab Units 08/28/20  0431   LN-SODIUM mmol/L 138   LN-POTASSIUM mmol/L 3.5  3.5   LN-CHLORIDE mmol/L 106   LN-CO2 mmol/L 20*   LN-BLOOD UREA NITROGEN mg/dL 48*   LN-CREATININE mg/dL 3.13*   LN-CALCIUM mg/dL 8.4*   LN-ALBUMIN g/dL 1.7*   LN-PROTEIN TOTAL g/dL 6.3   LN-BILIRUBIN TOTAL mg/dL 1.9*   LN-ALKALINE PHOSPHATASE U/L 147*   LN-ALT (SGPT) U/L 119*   LN-AST (SGOT) U/L 1,413*       Assessment: acute diverticulitis with abscess  WBC trending down    Plan:   Continue current cares  Continue drain  She is a poor surgical, so hopefully things will continue to slowly resolve with conservative management    Alvin Thomas PA-C  Pager - [phone]  Phone - [phone]   General Surgery

## 2021-06-10 NOTE — PROGRESS NOTES
Pharmacy Note - Admission Medication History   ______________________________________________________________________    Prior To Admission (PTA) med list completed and updated in EMR.       PTA Med List   Medication Sig Last Dose     ARTHRITIS PAIN RELIEF, ACETAM, 650 mg CR tablet Take 650 mg by mouth 2 (two) times a day.        8/10/2020 at 2000     aspirin 81 mg chewable tablet Chew 1 tablet (81 mg total) daily. Stop after 3 months 8/10/2020 at 0800     atorvastatin (LIPITOR) 80 MG tablet Take 1 tablet (80 mg total) by mouth at bedtime. Start only after your liver function test is normal 8/10/2020 at 2000     carboxymethylcellulose (REFRESH PLUS) 0.5 % Dpet ophthalmic dropperette Administer 2 drops to both eyes every hour as needed (dry eyes).      cholecalciferol, vitamin D3, 2,000 unit Tab TAKE 1 TABLET BY MOUTH ONCE DAILY. *1 TOTAL FILL* 8/10/2020 at 0800     digoxin (LANOXIN) 250 mcg (0.25 mg) tablet Take 1 tablet (250 mcg total) by mouth daily with supper. 8/10/2020 at 1700     furosemide (LASIX) 20 MG tablet Take 1 tablet (20 mg total) by mouth daily. 8/10/2020 at 0800     lisinopriL (PRINIVIL,ZESTRIL) 5 MG tablet Take 1 tablet (5 mg total) by mouth daily. 8/10/2020 at 0800     loratadine (CLARITIN) 10 mg tablet TAKE 1 TABLET BY MOUTH ONCE DAILY. *1 TOTAL FILL* 8/10/2020 at 0800     magnesium gluconate (MAGONATE) 27 mg magnesium (500 mg) Tab tablet Take 1 tablet (27 mg total) by mouth 2 (two) times a day. 8/10/2020 at 2000     metFORMIN (GLUCOPHAGE) 500 MG tablet Take 1 tablet (500 mg total) by mouth 2 (two) times a day with meals. 8/10/2020 at am     metoprolol succinate (TOPROL-XL) 25 MG Take 0.5 tablets (12.5 mg total) by mouth at bedtime. 8/10/2020 at 2000     multivitamin therapeutic tablet Take 1 tablet by mouth daily. 8/10/2020 at 0800     nitroglycerin (NITROSTAT) 0.4 MG SL tablet Place 1 tablet (0.4 mg total) under the tongue every 5 (five) minutes as needed for chest pain (for up to 3 doses and  call 911 if persists). (Patient taking differently: Place 0.4 mg under the tongue every 5 (five) minutes as needed for chest pain (for up to 3 doses and call 911 if persists). )      omeprazole (PRILOSEC) 20 MG capsule TAKE 1 CAPSULE BY MOUTH TWICE DAILY. *1 TOTAL FILL* 8/10/2020 at 2000     ondansetron (ZOFRAN-ODT) 4 MG disintegrating tablet Take 4 mg by mouth every 4 (four) hours as needed for nausea. 8/11/2020 at 0330     polyethylene glycol (GLYCOLAX) 17 gram/dose powder Take 17 g by mouth daily as needed.       senna-docusate (PERICOLACE) 8.6-50 mg tablet Take 1 tablet by mouth 2 (two) times a day. 8/10/2020 at 2000     sertraline (ZOLOFT) 100 MG tablet TAKE 1 TABLET BY MOUTH ONCE DAILY. *1 TOTAL FILL* 8/10/2020 at 0800     spironolactone (ALDACTONE) 25 MG tablet TAKE 1 TABLET BY MOUTH ONCE DAILY. *1 TOTAL FILL* 8/10/2020 at 0800     ticagrelor (BRILINTA) 90 mg Tab Take 1 tablet (90 mg total) by mouth 2 (two) times a day. Indefinitely but at least 12 months 8/10/2020 at 2000     traZODone (DESYREL) 50 MG tablet TAKE 1 TABLET BY MOUTH AT BEDTIME. *1 TOTAL FILL* 8/10/2020 at 2000     warfarin sodium (WARFARIN ORAL) Take by mouth See Admin Instructions. 8/1-2: 4 mg daily; 8/3-5: 3 mg daily; 8/6-9: held; 8/10-11: 1.5 mg daily; INR recheck 8/12 8/10/2020 at 2000 (1.5 mg)       Information source(s): Facility (TCU/[facility]/) medication list/MAR    Summary of Changes to PTA Med List  New: --  Discontinued: KCl  Changed: warfarin    Patient was asked about OTC/herbal products specifically.  PTA med list reflects this.    Based on the pharmacist s assessment, the PTA med list information appears reliable    Patient appears adherent: Yes    Allergies were reviewed, assessed, and updated with the patient.      Patient did not bring any medications to the hospital and can't retrieve from home. No multi-dose medications are available for use during hospital stay.     Thank you for the opportunity to participate in the care of  this patient.    Jose Raul Han, PharmD, BCPS  8/11/2020 12:51 PM

## 2021-06-10 NOTE — PLAN OF CARE
Problem: Pain  Goal: Patient's pain/discomfort is manageable  Outcome: Progressing   Pt reports 8/10 cramping abd pain. PRN oxy given, sleeping upon reassessment. Will continue to monitor.  Problem: Daily Care  Goal: Daily care needs are met  Outcome: Progressing   Pt denies nausea, chest pain or shortness of breath. Abscess drain intact, no output at this time. PRN vistaril and trazodone for sleep and anxiety. Bardales patent with savita output. VSS. Will continue to monitor.

## 2021-06-10 NOTE — TELEPHONE ENCOUNTER
Spoke with Monik nursing staff at [facility]. Reports provider Dr. Pearson will be managing patients warfarin therapy while she is at the TCU. Unable to determine length of stay. Updated changes from hospitalization in calendar.      Mae Heath RN

## 2021-06-10 NOTE — PLAN OF CARE
Problem: Pain  Goal: Patient's pain/discomfort is manageable  Outcome: Progressing     Problem: Daily Care  Goal: Daily care needs are met  Outcome: Progressing     Problem: Urinary Incontinence  Goal: Perineal skin integrity is maintained or improved  Outcome: Progressing     Pt had 4-5 loose stools this shift, scheduled miralax held for the am.  Pt reports minimal abdominal pain, denies nausea.  Scant output from abscess drain.  Pt has intermittent cough, per pt she is coughing up phlegm.  Discussed increasing mobility.  Tele afib, rate 70-80bpm.

## 2021-06-10 NOTE — PROGRESS NOTES
General Surgery Progress Note    Subjective:   Pt continues to complain of left lower abdominal pain around the drain site and now some suprapubic pain. Also has some burning with urination. She is tolerating a diet without nausea or emesis but does not have a good appetite. Remains afebrile. Had multiple loose stools yesterday but no melena or BRBPR.     Vitals:    08/21/20 0027 08/21/20 0306 08/21/20 0500 08/21/20 0720   BP:  98/48  120/64   Patient Position:  Semi-nicole  Lying   Pulse:  73  74   Resp:  18  18   Temp:  98.1  F (36.7  C)  97.7  F (36.5  C)   TempSrc:  Oral  Oral   SpO2: 95% 96%  95%   Weight:   195 lb 11.2 oz (88.8 kg)    Height:           08/20 0701 - 08/21 0700  In: -   Out: 625 [Urine:600; Drains:25]    Physical Exam:  General: NAD, sitting up in bed, interactive  ABD: obese, soft, nondistended, mildly tender to palpation LLQ at drain site and suprapubic area, no rebound or guarding, drain with serosanguinous output    Results from last 7 days   Lab Units 08/21/20  0447   LN-WHITE BLOOD CELL COUNT thou/uL 19.3*   LN-HEMOGLOBIN g/dL 8.6*   LN-HEMATOCRIT % 26.1*   LN-PLATELET COUNT thou/uL 137*       Results from last 7 days   Lab Units 08/21/20  0447  08/17/20  0520   LN-SODIUM mmol/L 137   < > 139   LN-POTASSIUM mmol/L 3.4*   < > 3.3*   LN-CHLORIDE mmol/L 105   < > 107   LN-CO2 mmol/L 21*   < > 22   LN-BLOOD UREA NITROGEN mg/dL 8   < > 31*   LN-CREATININE mg/dL 0.95   < > 3.03*   LN-CALCIUM mg/dL 7.4*   < > 6.9*   LN-ALBUMIN g/dL  --   --  1.8*    < > = values in this interval not displayed.       Assessment:   70y F with multiple medical comorbidities and acute sigmoid diverticulitis with abscess s/p perc drain placement 8/17. Her leukocytosis continues to uptrend but her CT scan yesterday showed almost complete collapse of the previously seen abscess and no new abscesses.    Plan:   -UA, CXR and Cdiff ordered to workup persistent leukocytosis  -IR drain to stay in place, continue IV  abx  -Okay for diet from surgery standpoint, no plans for surgery for the diverticulitis at this time    Miya Arambula MD  General Surgery  [facility]  [phone]

## 2021-06-10 NOTE — PROGRESS NOTES
General Surgery Progress Note    Subjective:   Pt is feeling a little better today, crampy pain across lower abdomen improving, passing flatus.      Vitals:    08/13/20 0058 08/13/20 0435 08/13/20 0621 08/13/20 0756   BP:  121/58  120/56   Patient Position:  Semi-nicole  Semi-nicole   Pulse:  72     Resp:  18  18   Temp:  98.2  F (36.8  C)  98.8  F (37.1  C)   TempSrc:  Oral  Oral   SpO2: 96% 96%  96%   Weight:   196 lb 1.6 oz (89 kg)    Height:           Physical Exam:  Gen: laying in bed  Ab:       Soft, nondistended, mild ttp across lower abdoment    Results from last 7 days   Lab Units 08/13/20  0724   LN-WHITE BLOOD CELL COUNT thou/uL 12.8*   LN-HEMOGLOBIN g/dL 9.8*   LN-HEMATOCRIT % 33.3*   LN-PLATELET COUNT thou/uL 135*       Results from last 7 days   Lab Units 08/13/20  0941 08/12/20  1321 08/11/20  1026   LN-SODIUM mmol/L  --  135* 135*   LN-POTASSIUM mmol/L  --  4.5 4.6   LN-CHLORIDE mmol/L  --  107 103   LN-CO2 mmol/L  --  17* 22   LN-BLOOD UREA NITROGEN mg/dL  --  27 22   LN-CREATININE mg/dL 3.61* 2.49* 0.95   LN-CALCIUM mg/dL  --  8.8 10.3   LN-ALBUMIN g/dL  --   --  3.0*   LN-PROTEIN TOTAL g/dL  --   --  8.2*   LN-BILIRUBIN TOTAL mg/dL  --   --  1.3*   LN-ALKALINE PHOSPHATASE U/L  --   --  135*   LN-ALT (SGPT) U/L  --   --  33   LN-AST (SGOT) U/L  --   --  37       Assessment: perforated diverticulitis with small abscess formation, stable    Plan:   WBC slightly improved today and symptoms improving, could start her on a clear liquids and see how she does  Recheck WBC tomorrow  Continue abx  Will hold of on repeat CT for now since things are slowly improving    Alvin Thomas PA-C  Pager - [phone]  Phone - [phone]   General Surgery

## 2021-06-10 NOTE — PROCEDURES
LEFT RADIAL ARTERIAL LINE INSERTION WITH ULTRASOUND GUIDANCE    Date/Time: 8/26/2020 11:15 PM  Performed by: Sharron Parikh MD  Authorized by: Sharron Parikh MD       Universal Protocol    Site marked: Yes    Prior images obtained and reviewed: Yes    Required items: required blood products, implants, devices, and special equipment available    Patient identity confirmed: verbally with patient and provided demographic data    Reevaluation: Patient was reevaluated immediately before administering moderate or deep sedation or anesthesia    Confirmation checklist: patient's identity using two indicators, relevant allergies, procedure was appropriate and matched the consent or emergent situation and correct equipment/implants were available    Time out: Immediately prior to procedure a time out was called to verify the correct patient, procedure, equipment, support staff and site/side marked as required    Universal Protocol: [facility] Universal Protocol was followed    Preparation: Patient was prepped and draped in the usual sterile fashion    Post-procedure   Length of time physician present for 1:1 monitoring during sedation: 15    ARTERIAL LINE INSERTION PROCEDURE NOTE  (NON-OR)    Procedure Date:  8/26/2020   Performing Physician:  Sharron Parikh    Pre-Procedure Diagnosis:     CIRCULATORY SHOCK and RESPIRATORY FAILURE  Post-Procedure Diagnosis:  Same as Pre-Procedure Diagnosis    Procedure:  Arterial line insertion  Left Radial  Indications:  HYPOTENSION, RESPIRATORY FAILURE and HEMODYNAMIC SHOCK    Estimated Blood Loss: Minimal   Complications: none    Procedure Details:   The risks, benefits, complications, treatment options, and expected outcomes were discussed with the patient. The risks and potential complications of their problem and purposed procedure include but are not limited to infection, bleeding, pain,  the need for additional procedures, and nerve and vessel injury. The  patient/alternate (see above) concurred with the proposed plan, giving informed consent.  The site of the procedure was properly noted/marked. The patient was identified as Gardenia Muller with Date of Birth 1950 and the procedure verified as Arterial Line Insertion.  A Time Out was held and the above information confirmed.    An Cash test was done before the procedure.  The Cash test results were normal.  In sterile fashion, the line site was prepped with Chlorhexidine.  Strict sterile conditions were maintained,  Cap, mask, and sterile gloves were worn by all participants.  5 ml of Lidocaine 1% anesthetic were infiltrated into the skin.  The arterial line was placed in the Left Radial artery USING ULTRASOUND GUIDANCE, without difficulty.  The line was sutured in place and an occlusive sterile dressing was applied.  The total number of needle stick attempts was 1.      Condition: stable      Sharron Parikh MD, MPH  Pulmonary & Critical Care Medicine  Pager: [phone]  8/26/2020  11:36 PM

## 2021-06-10 NOTE — PROGRESS NOTES
[facility] Daily Progress Note    Assessment/Plan:  Gardenia Muller is a 70 year  old female with history of HTN, T2DM, chronic atrial fibrillation on warfarin, diastolic CHF, coronary artery disease status post stenting (7/27/2020), peripheral artery disease, calculus cholecystitis status post cholecystectomy, depression, who came to [facility] on 8/11/2020 for abdominal pain and vomiting. CT scan revealed acute diverticulitis with perforation. on 8/14 showed increased size of abscess from 2x2cm to 4x3cm, though pt reports clinical improvement of her symptoms.. General surgery consulted - no current plans for operating room so interventional radiology was consulted for possible drainage which was done on 8/17/2020 by placing CT scan guided drain placement in the left pelvic diverticular abscess repeat CTAP.  Patient grew Candida albicans in the blood and was started on fluconazole on 8/21/2020     In the ED, she was hypoglycemic also and she was started on D5 normal saline. Hospitalization complicated by MAY, Nephrology consulted - Cr improving; no plans for dialysis at this time.      Assessment:  Acute perforated diverticulitis with diverticular abscess  Secondary peritonitis  On ciprofloxacin and metronidazole IV  WBC count not improving    Probable aspiration pneumonia/HCAP  With significant Hypoxia requiring 8 liters   Started the patient on meropenem and vancomycin 8/22/2020    Candida bacteremia  In a patient with diabetes mellitus  On IV Fluconazole     Acute kidney injury  Creatinine has improved    Acute anemia  Because of the blood loss is not known  Patient will be transfused with 1 unit of blood with hemoglobin of 6.1    Atrial fibrillation  Patient has tachycardia with hypotension -received 250 mL of normal saline earlier with with blood pressure improved  Patient was restarted on digoxin and her home medications metoprolol 12.5 mg daily  Cardiology consult was appreciated    Type 2 diabetes  mellitus  On sliding scale insulin    Supratherapeutic INR  Is now better with INR decreasing to 1.5  We will discuss warfarin protocol with pharmacy as INR to be therapeutic with the drain is in    Coronary artery disease status post stent placement 7/27/2020     Congestive heart failure with preserved LV function    Right foot pain    Acute hypokalemia    Acute hypomagnesemia    Acute anemia    GERD   Depression          Plan:  covid test is negative   ABG was done and shows normal Ph  Consult Pulmonology with Aspiration Pneumonia/HCAP          Code status:Full Code        Subjective:  Cough is better -No chest pain Review of Systems:   Denies any significant abdominal pain nausea or Vomiting     Current Medications Reviewed via EHR List    Objective:  Vital signs in last 24 hours:  Temp:  [97.3  F (36.3  C)-98.4  F (36.9  C)] 97.7  F (36.5  C)  Heart Rate:  [61-79] 67  Resp:  [18-22] 18  BP: ()/(45-59) 108/49  SpO2:  [92 %-100 %] 97 %    Intake/Output last 3 shifts:  I/O last 3 completed shifts:  In: 706 [IV Piggyback:706]  Out: -       Physical Exam:  EYES: EOM+    NECK: no JVD  HEART : S1 S2 irregular    LUNGS: Clear to auscultation without Crepitations or Wheezing   ABDOMEN:  Soft, No tenderness, Bowel sounds are positive  CNS  Alert and Oriented x 3 moving all 4 limbs            Imaging:  Results for orders placed or performed during the hospital encounter of 08/11/20   XR Chest 2 Views    Impression    Negative chest. Lungs are clear. Coronary stenting.       CT Abdomen Pelvis Without Oral With IV Contrast    Impression    1.  Acute diverticulitis with a small contained perforation. No drainable abscess at this point.  2.  Biliary and pancreatic duct dilatation. No discrete pancreatic masses identified. Consider MRCP, ERCP, EUS.   CT Abdomen Pelvis Without Oral Without IV Contrast    Impression    1.  Again seen is sigmoid colon diverticulitis with a small contained perforation. A small fluid  collection adjacent to the sigmoid colon has increased in size to 4 cm x 3 cm (previously 2 cm x 2 cm). Percutaneous drainage would be difficult though may   be possible.  2.  Biliary and pancreatic ductal dilatation is not as well-seen as on the comparison study. See prior study for details.   CT Abscess Drain Pelvic    Impression    1. Stable diverticular abscess.  2. Successful CT-guided drain placement into left pelvic diverticular abscess.    Reference CPT Codes: 24994, 39539   XR Foot Right 2 VWS    Impression    Bones are demineralized. No definitive evidence of an acute fracture. No cortical destructive changes to suggest osteomyelitis. Scattered degenerative changes.   CT Abdomen Pelvis With Oral With IV Contrast    Impression    1.  Interval percutaneous drain placement into the diverticular abscess with near complete collapse of the fluid cavity. Drain abuts the adjacent sigmoid colon. No evidence of post drain placement complication.  2.  No new intra-abdominal abscess or other findings to correlate with ongoing leukocytosis.  3.  Stable appearance of the pancreas with previously seen main pancreatic ductal dilatation not well visualized on this study.   XR Chest 1 View Portable    Impression    New bilateral interstitial infiltrates could represent edema or pneumonia including COVID. Enlarged cardiac silhouette. No pleural effusion. No definite pulmonary vascular congestion.     *Note: Due to a large number of results and/or encounters for the requested time period, some results have not been displayed. A complete set of results can be found in Results Review.     Chest x-ray done on 8/21/2020 showed:  New bilateral interstitial infiltrates could represent edema or pneumonia including COVID. Enlarged cardiac silhouette. No pleural effusion. No definite pulmonary vascular congestion.      Lab Results:  Personally Reviewed.   Fingerstick Blood Glucose:   Recent Labs     08/21/20  1221 08/21/20  1728  08/21/20 2049 08/22/20  0758 08/22/20  1136 08/22/20  1643 08/22/20 2028 08/23/20  0759   POCGLUFGR 151* 142* 138 100 121 146* 137 143*       Recent Results (from the past 24 hour(s))   COVID-19 Virus PCR MRF    Collection Time: 08/22/20 10:47 AM    Specimen: Nasopharyngeal Swab; Respiratory   Result Value Ref Range    COVID-19 VIRUS SPECIMEN SOURCE Nasopharyngeal     2019-nCOV       Test received-See reflex to IDDL test SARS CoV2 (COVID-19) Virus RT-PCR   SARS-CoV-2 (COVID-19) RT-PCR-IDDL    Collection Time: 08/22/20 10:47 AM    Specimen: Nasopharyngeal Swab; Respiratory   Result Value Ref Range    SARS-CoV-2 Virus Specimen Source Nasopharyngeal     SARS-CoV-2 PCR Result NEGATIVE     SARS-COV-2 PCR COMMENT       Testing was performed using the [employer] COVID-19 Direct Assay on the [employer]   POCT Glucose    Collection Time: 08/22/20 11:36 AM    Specimen: Blood   Result Value Ref Range    Glucose 121 70 - 139 mg/dL   POCT Glucose    Collection Time: 08/22/20  4:43 PM    Specimen: Blood   Result Value Ref Range    Glucose 146 (H) 70 - 139 mg/dL   Basic Metabolic Panel    Collection Time: 08/22/20  6:39 PM   Result Value Ref Range    Sodium 138 136 - 145 mmol/L    Potassium 3.0 (L) 3.5 - 5.0 mmol/L    Chloride 109 (H) 98 - 107 mmol/L    CO2 21 (L) 22 - 31 mmol/L    Anion Gap, Calculation 8 5 - 18 mmol/L    Glucose 130 (H) 70 - 125 mg/dL    Calcium 7.6 (L) 8.5 - 10.5 mg/dL    BUN 9 8 - 28 mg/dL    Creatinine 0.84 0.60 - 1.10 mg/dL    GFR MDRD Af Amer >60 >60 mL/min/1.73m2    GFR MDRD Non Af Amer >60 >60 mL/min/1.73m2   HM1 (CBC with Diff)    Collection Time: 08/22/20  6:39 PM   Result Value Ref Range    WBC 17.0 (H) 4.0 - 11.0 thou/uL    RBC 2.71 (L) 3.80 - 5.40 mill/uL    Hemoglobin 8.1 (L) 12.0 - 16.0 g/dL    Hematocrit 24.8 (L) 35.0 - 47.0 %    MCV 92 80 - 100 fL    MCH 29.9 27.0 - 34.0 pg    MCHC 32.7 32.0 - 36.0 g/dL    RDW 18.5 (H) 11.0 - 14.5 %    Platelets 115 (L) 140 - 440 thou/uL    MPV 11.9 8.5 - 12.5 fL    Manual Differential    Collection Time: 08/22/20  6:39 PM   Result Value Ref Range    Total Neutrophils % 90 (H) 50 - 70 %    Lymphocytes % 5 (L) 20 - 40 %    Monocytes % 4 2 - 10 %    Eosinophils %  1 0 - 6 %    Basophils % 0 0 - 2 %    Immature Granulocyte % - Manual 0 <=0 %    Total Neutrophils Absolute 15.3 (H) 2.0 - 7.7 thou/ul    Lymphocytes Absolute 0.9 0.8 - 4.4 thou/uL    Monocytes Absolute 0.7 0.0 - 0.9 thou/uL    Eosinophils Absolute 0.2 0.0 - 0.4 thou/uL    Basophils Absolute 0.0 0.0 - 0.2 thou/uL    Immature Granulocyte Absolute - Manual 0.0 <=0.0 thou/uL    Platelet Estimate Decreased (!) Normal    Ovalocytes 1+ (!) Negative    Polychromasia 1+ (!) Negative    Target Cells 1+ (!) Negative   POCT Glucose    Collection Time: 08/22/20  8:28 PM    Specimen: Blood   Result Value Ref Range    Glucose 137 70 - 139 mg/dL   Lactic Acid    Collection Time: 08/23/20  1:03 AM   Result Value Ref Range    Lactic Acid 2.1 (H) 0.7 - 2.0 mmol/L   Phosphorus    Collection Time: 08/23/20  7:54 AM   Result Value Ref Range    Phosphorus 3.1 2.5 - 4.5 mg/dL   POCT Glucose    Collection Time: 08/23/20  7:59 AM    Specimen: Blood   Result Value Ref Range    Glucose 143 (H) 70 - 139 mg/dL           Advanced Care Planning  Discharge plan: Unknown at this time      Michael Abreu  Date: 8/23/2020  Time: 4:09 PM  Hospitalist Service  Part of this chart was created using a dictation software. Typographic errors, word substitutions, and Grammatical errors may unintentionally occur.

## 2021-06-10 NOTE — PLAN OF CARE
Problem: Discharge Barriers  Goal: Patient s discharge needs will be met  Outcome: Progressing  Care Plan-Care Management    Care Management Goals of the Day:   Follow progression of care.     Care Progression to be reviewed with MD and RN CM: Updates to be provided to Charge RN, CMSW, HUC,therapy and other disciplines as indicated.    Barriers to Discharge and plan:  T max 100.6, IV Fluconazole, IV Meropenem, IV Vancomycin. ID following. IV Lasix daily. Maintenance of abscess drain (to straight drainage, no flushes at this time).     Family Care Conference: NA at this time. Daughter Aria is primary family contact.     Discharge Disposition:   TCU    Expected Discharge Date:  To be determined.     Transportation:  Medical.    Care Management/Coordination Narrative:   Patient is a resident of the TCU at [facility] where her bed is on an 18 day bed hold. On 8/25/2020, writer spoke with admissions at [facility] who note that patient is on day 14 of the 18 day bed hold. If the bed hold expires, family would have the option of holding the bed. If they do not hold the bed, a new referral can be made.     Covid negative 8/11, 8/22/2020. Repeat pending (8/25).       Abbreviation  Code:  [facility] Patient (pt), Transitional Care Unit (TCU), Skilled Nursing Facility (SNF), Long Term Care (LTC), Assisted Living (prison or AL), Care Management (CM), Physical Therapy (PT), Occupational Therapy (OT), Speech Therapy (ST), Respiratory Therapy (RT).

## 2021-06-10 NOTE — PLAN OF CARE
Problem: Pain  Goal: Patient's pain/discomfort is manageable  Outcome: Progressing   Pt reports 8/10 right foot pain, PRN oxy x2, pt sleeping upon reassessment. Will continue to monitor.  Problem: Daily Care  Goal: Daily care needs are met  Outcome: Progressing   Pt denies nausea, chest pain or shortness of breath. On RA satting mid 90's. Denies chest pain or shortness of breath. Pt anxious for possible IR drain placement today. PRN vistaril given x2 for this. Pt slept a small amount this shift. Vitally stable. Will continue to monitor.

## 2021-06-10 NOTE — CONSULTS
Palliative Care New Consult  NAME: Gardenia Muller, : 1950,   MRN: [id number] Date: 2020    Palliative Medicine Consult Service:  Consulted by Dr. Robles to assist with symptom management, clarification of goals of care, and development of plan of care.     Palliative Care Assessment and Plan:  Gardenia Muller, 70 y.o., female with a medical history of chronic A Fib (on digoxin and warfarin), diastolic HF, HTN, CAD, CVA, PAD, DM II, chronic malnutrition, chronic abdominal pain, and depression admitted on 2020 with abdominal pain, vomiting, and diarrhea and was found to have acute perforated diverticulitis w/small abscess formation (drain placement ). Unfortunately has had a complicated hospital course and transferred to ICU on  for digoxin toxicity causing hemodynamic instability and bradyarrhythmias.      Physical Symptoms:   Weakness: initially from acute perforated diverticulitis w/small abscess formation requiring drain placement.  Hospital course complicated by worsening renal failure, digoxin toxicity, and acute respiratory failure. Also with worsening underlying co-morbidities contributing as well.   - ICU primary   - Cardiology following   - Digitoxin toxicity management per their recs   - Nephrology following, recs appreciated   - ID following, assisting with abx management   - Surgery following peripherally   - Poor surgical candidate at this time   - Continue non-operative management w/drain for abscess   - Reposition for comfort as able     Shortness of breath: acute respiratory failure with hypoxia and mild hypercapnia due to possible HCAP, also w/concern of pulmonary edema given bradyarrhythmias and oliguric renal failure.     Pain: chronic abdominal pain, worsened by recent surgical intervention  - No acetaminophen at this time due to rising LFT's   - Oxycodone 5 mg q4h PRN pain    Psychosocial Issues: Mood stable at present.      Spiritual Issues: None  "presently    Decision making capacity: Decisional, but would ensure daughterAria, is available for any major medical decision making needs.    - Advance directive on file: Yes      Estimated length of life: defer to primary team for definitive prognosis     Goals of Care: Restorative   - Pt/Family ok with dialysis if necessary      Understanding: Fair - due to fluctuating mental status unsure just how much patient is able to grasp of her current complex situation     Code Status: No CPR- Pre-arrest Intubation OK - discussed at length with Aria (daughter and health care agent)    Consult done with Dr. Salcido   ==============================================================================  HPI  Gardenia Muller, 70 y.o., female with a medical history as noted above admitted on 8/11/2020 for abdominal pain. Currently is quite tired and having some abdominal pain, more so on the left lower side. Speech is quite difficult to understand, so a majority of the history is obtained from extensive chart review, discussion with family and care team.      Goals of care/Advance Directives (discussion): Introduced palliative care to patient and later to daughterAria, over the phone, our role in pt's care, current medical status, and proposed treatment plans. Patient well known to PC service from previous admissions, which at that time was pretty clear that she would not dialysis or prolonged life support if it came down to it. Long discussion, which was difficult due to pt's level of lethargy and mumbled speech, in regards to overall medical status. Patient again expressed wishes against long term dialysis but would be ok with short term dialysis \"if it will save my life\". Also discussed CODE status. Aria is in agreement with her mother's wishes of going on dialysis if necessary. Also in regards to CODE status, Aria is in agreement for DNR, but she would want her mother placed on a ventilator if she went " into respiratory distress.     Psychosocial/Spiritual Aspects of Care: Spiritual   Marital status: not discussed  Children: Yes  Occupation: retired    Current PPS% (100% normal, 0% death): 40%  Baseline PPS% (2 weeks prior to admit): 60%    Current Palliative Medications (If any):   See MAR    Palliative Care ROS:   Pain - Y  Dyspnea - Y   Constipation - N   Anorexia - N  Nausea/Vomiting - N   Diarrhea - N  Insomnia - N  Depression - N    -----------------------------------------------------------------------------------------------------------------  I have reviewed and supplemented the documentation in this patient's medical record listed below regarding past medical history, social history, active medical problems, allergies and medications.     Current Problem List:   Principal Problem:    Diverticulitis  Active Problems:    Bradyarrhythmia    Acute kidney injury (H)    Diverticulitis of large intestine with perforation and abscess without bleeding    Atrial fibrillation with rapid ventricular response (H)    Obstructive sleep apnea syndrome    Poisoning by digoxin, accidental or unintentional, initial encounter    Acute respiratory failure with hypoxia and hypercapnia (H)    Acute metabolic encephalopathy    Shock circulatory (H)    Metabolic acidosis      Medical/Surgical History :  Past Medical History:   Diagnosis Date     Acute diastolic heart failure (H) 11/2015     Acute on chronic diastolic heart failure (H) 6/15/2019     Advanced directives, counseling/discussion 8/5/2015    Patient completed 2011.  Discussed today, 5/24/17, and wants to change healthcare agent from niece to daughter.  Discussed that she will need to complete new form to indicate this.     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  Recommend 5 year follow-up.  Single tubular adenoma     Biliary obstruction 10/3/2018    Added  automatically from request for surgery [id number]     Cerebral vascular accident (H)      Coronary artery disease 2006    100% RCA with collateral filling per Dr. Elizabeth's angio report     Diabetes mellitus type 2 in obese (H)      Fibrocystic breast      GERD (gastroesophageal reflux disease)      History of anesthesia complications     slow to wake     Hypertension      Obstructive sleep apnea syndrome      Peripheral vascular disease (H)      Pneumonia 11/11/2018     PONV (postoperative nausea and vomiting)      S/P cholecystectomy 6/22/2018     SBO (small bowel obstruction) (H) 11/12/2015     Stroke (H)      Transaminitis 10/22/2018     Upper respiratory infection 12/20/2018     Urinary incontinence      Past Surgical History:   Procedure Laterality Date     CARDIAC CATHETERIZATION       CARDIOVERSION  10/18/2018     CORONARY STENT PLACEMENT  1995    stent     CV CORONARY ANGIOGRAM N/A 7/27/2020    Procedure: Coronary Angiogram;  Surgeon: Luis Monge MD;  Location: [facility] Cath Lab;  Service: Cardiology     CV RIGHT HEART CATH SHUNT RUN  7/27/2020    Procedure: Right Heart Catheterization Shunt Run;  Surgeon: Luis Monge MD;  Location: [facility] Cath Lab;  Service: Cardiology     ERCP N/A 10/5/2018    Procedure: ENDOSCOPIC RETROGRADE CHOLANGIOPANCREATOGRAPHY, SPHINCTEROTOMY;  Surgeon: Anson Stokes MD;  Location: [facility] OR;  Service:      EXPLORATORY LAPAROTOMY N/A 6/29/2018    Procedure: LAPAROTOMY, CLOSURE OF PERITONEAL RENT;  Surgeon: Jones Harding DO;  Location: [facility] OR;  Service:      LAPAROSCOPIC CHOLECYSTECTOMY N/A 10/7/2018    Procedure: CHOLECYSTECTOMY, LAPAROSCOPIC;  Surgeon: Felicia So MD;  Location: [facility] OR;  Service:      MIDLINE INSERTION - DOUBLE LUMEN  8/13/2020          MIDLINE INSERTION - DOUBLE LUMEN  8/19/2020          PICC  6/29/2018          PICC  10/21/2018          PICC INSERTION - DOUBLE LUMEN  8/23/2020          VENTRAL HERNIA  REPAIR N/A 2015    Procedure: STRANGULATED VENTRAL HERNIA REPAIR ;  Surgeon: Ernesto Bailey MD;  Location: [facility];  Service:      Relevant Family History  Family History   Problem Relation Age of Onset     Cancer Paternal Grandmother      Cancer Paternal Uncle      No Medical Problems Mother      No Medical Problems Father      Heart attack Sister      Chronic Kidney Disease Sister      No Medical Problems Daughter      No Medical Problems Maternal Grandmother      No Medical Problems Maternal Grandfather      No Medical Problems Paternal Grandfather      No Medical Problems Maternal Aunt      No Medical Problems Paternal Aunt      Diabetes Brother      BRCA 1/2 Neg Hx      Breast cancer Neg Hx      Colon cancer Neg Hx      Endometrial cancer Neg Hx      Ovarian cancer Neg Hx      Family Status   Relation Name Status     PGM bone      Pat Uncle throat      Mother       Father       Sister Terra      Daughter  (Not Specified)     MGM  (Not Specified)     MGF  (Not Specified)     PGF  (Not Specified)     Mat Aunt  (Not Specified)     Pat Aunt  (Not Specified)     Brother Steven      Brother Claude      Neg Hx  (Not Specified)     Social History:    she  reports that she has quit smoking. She smoked 0.25 packs per day. She has quit using smokeless tobacco. She reports that she does not drink alcohol or use drugs.  Medication History:  Medications Prior to Admission   Medication Sig Dispense Refill Last Dose     ARTHRITIS PAIN RELIEF, ACETAM, 650 mg CR tablet Take 650 mg by mouth 2 (two) times a day.          8/10/2020 at 2000     aspirin 81 mg chewable tablet Chew 1 tablet (81 mg total) daily. Stop after 3 months 90 tablet 0 8/10/2020 at 0800     atorvastatin (LIPITOR) 80 MG tablet Take 1 tablet (80 mg total) by mouth at bedtime. Start only after your liver function test is normal 30 tablet 11 8/10/2020 at 2000     carboxymethylcellulose  (REFRESH PLUS) 0.5 % Dpet ophthalmic dropperette Administer 2 drops to both eyes every hour as needed (dry eyes).  0      cholecalciferol, vitamin D3, 2,000 unit Tab TAKE 1 TABLET BY MOUTH ONCE DAILY. *1 TOTAL FILL* 30 each 11 8/10/2020 at 0800     digoxin (LANOXIN) 250 mcg (0.25 mg) tablet Take 1 tablet (250 mcg total) by mouth daily with supper. 30 tablet 0 8/10/2020 at 1700     furosemide (LASIX) 20 MG tablet Take 1 tablet (20 mg total) by mouth daily. 30 tablet 0 8/10/2020 at 0800     lisinopriL (PRINIVIL,ZESTRIL) 5 MG tablet Take 1 tablet (5 mg total) by mouth daily. 30 tablet 0 8/10/2020 at 0800     loratadine (CLARITIN) 10 mg tablet TAKE 1 TABLET BY MOUTH ONCE DAILY. *1 TOTAL FILL* 30 tablet 11 8/10/2020 at 0800     magnesium gluconate (MAGONATE) 27 mg magnesium (500 mg) Tab tablet Take 1 tablet (27 mg total) by mouth 2 (two) times a day. 60 tablet 11 8/10/2020 at 2000     metFORMIN (GLUCOPHAGE) 500 MG tablet Take 1 tablet (500 mg total) by mouth 2 (two) times a day with meals. 60 tablet 0 8/10/2020 at am     metoprolol succinate (TOPROL-XL) 25 MG Take 0.5 tablets (12.5 mg total) by mouth at bedtime. 30 tablet 0 8/10/2020 at 2000     multivitamin therapeutic tablet Take 1 tablet by mouth daily.   8/10/2020 at 0800     nitroglycerin (NITROSTAT) 0.4 MG SL tablet Place 1 tablet (0.4 mg total) under the tongue every 5 (five) minutes as needed for chest pain (for up to 3 doses and call 911 if persists). (Patient taking differently: Place 0.4 mg under the tongue every 5 (five) minutes as needed for chest pain (for up to 3 doses and call 911 if persists). ) 90 tablet 0      omeprazole (PRILOSEC) 20 MG capsule TAKE 1 CAPSULE BY MOUTH TWICE DAILY. *1 TOTAL FILL* 60 capsule 11 8/10/2020 at 2000     ondansetron (ZOFRAN-ODT) 4 MG disintegrating tablet Take 4 mg by mouth every 4 (four) hours as needed for nausea.   8/11/2020 at 0330     polyethylene glycol (GLYCOLAX) 17 gram/dose powder Take 17 g by mouth daily as needed.  "        senna-docusate (PERICOLACE) 8.6-50 mg tablet Take 1 tablet by mouth 2 (two) times a day.  0 8/10/2020 at 2000     sertraline (ZOLOFT) 100 MG tablet TAKE 1 TABLET BY MOUTH ONCE DAILY. *1 TOTAL FILL* 30 tablet 11 8/10/2020 at 0800     spironolactone (ALDACTONE) 25 MG tablet TAKE 1 TABLET BY MOUTH ONCE DAILY. *1 TOTAL FILL* 30 tablet 11 8/10/2020 at 0800     ticagrelor (BRILINTA) 90 mg Tab Take 1 tablet (90 mg total) by mouth 2 (two) times a day. Indefinitely but at least 12 months 60 tablet 11 8/10/2020 at 2000     traZODone (DESYREL) 50 MG tablet TAKE 1 TABLET BY MOUTH AT BEDTIME. *1 TOTAL FILL* 30 tablet 11 8/10/2020 at 2000     warfarin sodium (WARFARIN ORAL) Take by mouth See Admin Instructions. 8/1-2: 4 mg daily; 8/3-5: 3 mg daily; 8/6-9: held; 8/10-11: 1.5 mg daily; INR recheck 8/12   8/10/2020 at 2000 (1.5 mg)     blood glucose meter (GLUCOMETER) Please Dispense kit per pharmacy discretion and patient's insurance Test blood sugar. 1 each 0      blood glucose test strips Use 1 each As Directed as needed. Dispense brand per patient's insurance at pharmacy discretion. 50 strip 11      lancets 33 gauge Misc Test twice daily Dispense brand per patient's insurance at pharmacy discretion. 100 each 11      Allergies:  Allergies   Allergen Reactions     Penicillins Swelling     Emergency Contact Info (Pulled from chart)  Extended Emergency Contact Information  Primary Emergency Contact: Aria Muller  Address: [address] [place], [place]  Home Phone: [phone]  Relation: Child  Secondary Emergency Contact: Tanika Aguilar  Address: [address] [place]  Mobile Phone: [phone]  Relation: Grandchild    PERTINENT PHYSICAL EXAMINATION:  Vital Signs: Blood pressure 112/51, pulse 85, temperature 98.2  F (36.8  C), temperature source Axillary, resp. rate (!) 33, height 5' 2\" (1.575 m), weight 213 lb 14.4 oz (97 kg), last " menstrual period 01/25/1999, SpO2 93 %, not currently breastfeeding.   GENERAL: fatigued, in no acute distress.   SKIN: Warm and dry and without jaundice, ecchymoses, or petechiae.   HEENT: Normocephalic, anicteric sclera, moist mucous membranes    LUNGS: Good inspiratory effort, CTA x 2.   CARDIAC: RRR, normal s1/s2, w/o m/r/g   ABDOMINAL: BS (+), soft, non distended, non tender  : dias in place   EXTREMITIES: No edema or cyanosis, pulses 2+ and symmetrical  NEUROLOGIC: fatigued, oriented x 3  PSYCH: tired, speech garbled/mumbled, thoughts somewhat tangential     All labs/imaging reviewed in Epic     ====================================================  TT: I have personally spent a total of 80 minutes on the unit in review of medical record, consultation with the medical providers and assessment of patient today, with more than 50% of this time spent in counseling, coordination of care, and discussion with patient and family re:diagnostic results, prognosis, symptom management, risks and benefits of management options, and development of plan of care.  ====================================================    Jose Raul Ramirez MD  [facility]  Palliative Medicine  Office: [phone]

## 2021-06-10 NOTE — PROGRESS NOTES
[facility] Daily Progress Note      Date of Service: 8/14/2020      Brief History:     70 y.o. old female  Patient has history of chronic atrial fibrillation, diastolic CHF, hypertension, coronary artery disease status post stenting, peripheral artery disease type 2 diabetes mellitus malnutrition chronic abdominal pain calculus cholecystitis status post cholecystectomy depression admitted to ED for evaluation abdominal pain and vomiting CT scan revealed acute diverticulitis with perforation general surgery was consulted surgery recommended keeping n.p.o. but okay for oral medication with sip of water and ice chips.  In the ED she was hypoglycemic also and she was started on D5 normal saline.  On 8/12/2020 creatinine elevated IV fluid increased to 125 mils per hour with normal saline subsequently on 8/13/2020 creatinine further up nephrology consult.  Patient was continued on IV fluids repeat creatinine further up today per RN overnight urine output was significantly decreased.  Surgery planning to repeat CT scan today      Assessment & Plan:  #Acute perforated diverticulitis with a small abscess currently on IV Cipro and Flagyl surgery following.  Leukocyte count further up today repeat CT scan of the abdomen today    #Abdominal pain due to diverticulitis and perforation/peritonitis  on IV morphine monitor for any respiratory depression avoid if sedated    #History of atrial fibrillation : We are currently holding digoxin and Coumadin continue metoprolol as needed with parameters, as long as blood pressure permits    #Type 2 diabetes mellitus with vascular disease continue monitoring blood sugar     #Acute kidney injury probably: From sepsis fluid sequestration, she did receive CT abdomen with contrast upon admission and she has been having nausea vomiting probably combination of everything and she was however running low with low blood pressure on 8/12/2020 continue IV fluid she may need dialysis nephrology  "following.  I have discussed her kidney function and possible dialysis also called her daughter and updated about her condition    #Supratherapeutic INR: INR warranted INR below 2 for the procedure will give 5 mg of oral vitamin K.  This time INR is 4.97      #History of coronary artery disease: Status post recent angiogram and stent placement on 7/27/2020 aspirin and brillianta  Resumed.  Metoprolol Lasix, lisinopriland  spironolactone on hold due  borderline blood pressure    #History of depression sertraline resumed      Patient is critically ill care discussed with patient daughter on the phone updated her current situation with diverticulitis perforation worsening creatinine kidney failure      Subjective:     Interval History: Denies any worsening symptom feels much better less nausea less belly pain    Chart reviewed, events noted. Pt seen and examined.     Objective     Vital signs in last 24 hours:  Temp:  [98.3  F (36.8  C)-100.4  F (38  C)] 98.3  F (36.8  C)  Heart Rate:  [] 79  Resp:  [16-20] 18  BP: ()/(51-64) 112/51  Weight:   190 lb 1.6 oz (86.2 kg)  Weight change: -6 lb (-2.722 kg)  Body mass index is 34.77 kg/m .    Intake/Output last 3 shifts:  I/O last 3 completed shifts:  In: 2154.6 [P.O.:340; I.V.:1814.6]  Out: 180 [Urine:180]  Intake/Output this shift:  No intake/output data recorded.      Physical Exam:  /51 (Patient Position: Lying)   Pulse 79   Temp 98.3  F (36.8  C) (Oral)   Resp 18   Ht 5' 2\" (1.575 m)   Wt 190 lb 1.6 oz (86.2 kg)   LMP 01/25/1999   SpO2 97%   BMI 34.77 kg/m        O2 Device: None (Room air)        General: Alert, awake. Distress   HEENT: Normocephalic, oral mucosa moist    Respiratory  : not in distress not using accessory muscles of respiration, no wheezing    Abdomen: Soft, nontender today    Neurology: Alert awake moving extremities grossly      Imaging:   reviewed   Xr Chest 1 View Portable    Result Date: 7/22/2020  EXAM: XR CHEST 1 VIEW " PORTABLE LOCATION: [facility] DATE/TIME: 7/22/2020 9:56 PM INDICATION: Increasing shortness of breath and orthopnea and patient with history of heart failure COMPARISON: CT 07/02/2019     Mild, irregular. No evidence for CHF or pneumonia. No pleural effusion or pneumothorax.    Xr Chest 2 Views    Result Date: 8/11/2020  EXAM: XR CHEST 2 VIEWS LOCATION: [facility] DATE/TIME: 8/11/2020 11:50 AM INDICATION: Cough. COMPARISON: None.     Negative chest. Lungs are clear. Coronary stenting.     Ct Abdomen Pelvis Without Oral With Iv Contrast    Result Date: 8/11/2020  EXAM: CT ABDOMEN PELVIS WO ORAL W IV CONTRAST LOCATION: [facility] DATE/TIME: 8/11/2020 11:41 AM INDICATION: right sided abdominal pain, nausea and vomiting COMPARISON: 7/22/2020 TECHNIQUE: CT scan of the abdomen and pelvis was performed following injection of IV contrast. Multiplanar reformats were obtained. Dose reduction techniques were used. CONTRAST: Iohexol (Omni) 75mL FINDINGS: LOWER CHEST: Minimal interstitial edema. The heart is mildly enlarged. There is coronary artery disease. HEPATOBILIARY: Cholecystectomy. There is intrahepatic and extrahepatic bile duct dilatation. There is pancreatic duct dilatation. PANCREAS: No discrete pancreatic masses identified. SPLEEN: Normal. ADRENAL GLANDS: Normal. KIDNEYS/BLADDER: Normal. BOWEL: There is colonic diverticulosis. There is acute diverticulosis involving the distal sigmoid colon with a small contained perforation. No drainable abscess at this point. LYMPH NODES: Normal. VASCULATURE: Atherosclerotic disease. Right renal artery stent. PELVIC ORGANS: Fibroid uterus. MUSCULOSKELETAL: Degenerative changes.     1.  Acute diverticulitis with a small contained perforation. No drainable abscess at this point. 2.  Biliary and pancreatic duct dilatation. No discrete pancreatic masses identified. Consider MRCP, ERCP, EUS.    Ct Abdomen Pelvis Without Oral With Iv Contrast    Result  Date: 7/23/2020  EXAM: CT ABDOMEN PELVIS WO ORAL W IV CONTRAST LOCATION: [facility] DATE/TIME: 7/22/2020 11:58 PM INDICATION: Bowel obstruction high-grade suspected Nausea, anorexia, abdominal distention in elderly patient. COMPARISON: 06/26/2019. TECHNIQUE: CT scan of the abdomen and pelvis was performed following injection of IV contrast. Multiplanar reformats were obtained. Dose reduction techniques were used. CONTRAST: Iohexol (Omni) 75mL FINDINGS: LOWER CHEST: moderate right atrial enlargement. HEPATOBILIARY: Prior cholecystectomy. Mild hepatomegaly. Mildly heterogeneous enhancement suggesting passive congestion. PANCREAS: Normal. SPLEEN: Normal. ADRENAL GLANDS: Normal. KIDNEYS/BLADDER: Mild circumferential urinary bladder wall thickening. No calcification. No hydronephrosis or hydroureter. No renal mass or stone. BOWEL: Moderate distal colonic diverticulosis. No evidence for acute diverticulitis. Small volume ascites. No free air. No evidence for bowel obstruction. LYMPH NODES: Normal. VASCULATURE: Unremarkable. PELVIC ORGANS: Multiple partially calcified uterine masses compatible with uterine fibroids. MUSCULOSKELETAL: Severe multilevel degenerative change.     1.  Mild cystitis suggested please correlate clinically. 2.  Otherwise no acute abnormality in the abdomen or pelvis. No evidence for bowel obstruction. 3.  Enlarged heterogeneous liver suggesting passive hepatic congestion. Given right atrial enlargement, IVC and hepatic vein distention, correlate clinically for right heart failure.       Lab Results:  personally reviewed.     Recent Results (from the past 24 hour(s))   Creatinine    Collection Time: 08/13/20  9:41 AM   Result Value Ref Range    Creatinine 3.61 (H) 0.60 - 1.10 mg/dL    GFR MDRD Af Amer 15 (L) >60 mL/min/1.73m2    GFR MDRD Non Af Amer 12 (L) >60 mL/min/1.73m2   POCT Glucose    Collection Time: 08/13/20 12:22 PM    Specimen: Blood   Result Value Ref Range    Glucose 87 70 -  139 mg/dL   POCT Glucose    Collection Time: 08/13/20  6:10 PM    Specimen: Blood   Result Value Ref Range    Glucose 75 70 - 139 mg/dL   Urinalysis    Collection Time: 08/13/20  6:48 PM   Result Value Ref Range    Color, UA Brown (!) Colorless, Yellow, Straw, Light Yellow    Clarity, UA Turbid (!) Clear    Glucose, UA Negative Negative    Bilirubin, UA Moderate (!) Negative    Ketones, UA Trace (!) Negative    Specific Gravity, UA 1.020 1.001 - 1.030    Blood, UA Large (!) Negative    pH, UA 5.0 4.5 - 8.0    Protein,  mg/dL (!) Negative mg/dL    Urobilinogen, UA <2.0 E.U./dL <2.0 E.U./dL, 2.0 E.U./dL    Nitrite, UA Negative Negative    Leukocytes, UA Small (!) Negative   Protein/Creatinine Ratio, Urine    Collection Time: 08/13/20  6:48 PM   Result Value Ref Range     Protein, Random Urine 209 mg/dL    Creatinine, Urine 157.9 mg/dL    Protein/Creatinine Ratio, Random Urine 1.32    POCT Glucose    Collection Time: 08/13/20 11:59 PM    Specimen: Blood   Result Value Ref Range    Glucose 103 70 - 139 mg/dL   Basic Metabolic Panel    Collection Time: 08/14/20  4:36 AM   Result Value Ref Range    Sodium 135 (L) 136 - 145 mmol/L    Potassium 4.4 3.5 - 5.0 mmol/L    Chloride 110 (H) 98 - 107 mmol/L    CO2 15 (L) 22 - 31 mmol/L    Anion Gap, Calculation 10 5 - 18 mmol/L    Glucose 110 70 - 125 mg/dL    Calcium 7.4 (L) 8.5 - 10.5 mg/dL    BUN 31 (H) 8 - 28 mg/dL    Creatinine 4.49 (H) 0.60 - 1.10 mg/dL    GFR MDRD Af Amer 12 (L) >60 mL/min/1.73m2    GFR MDRD Non Af Amer 10 (L) >60 mL/min/1.73m2   POCT Glucose    Collection Time: 08/14/20  4:39 AM    Specimen: Blood   Result Value Ref Range    Glucose 114 70 - 139 mg/dL   HM1 (CBC with Diff)    Collection Time: 08/14/20  8:00 AM   Result Value Ref Range    WBC 13.4 (H) 4.0 - 11.0 thou/uL    RBC 3.09 (L) 3.80 - 5.40 mill/uL    Hemoglobin 9.1 (L) 12.0 - 16.0 g/dL    Hematocrit 30.4 (L) 35.0 - 47.0 %    MCV 98 80 - 100 fL    MCH 29.4 27.0 - 34.0 pg    MCHC 29.9 (L) 32.0 -  36.0 g/dL    RDW 17.1 (H) 11.0 - 14.5 %    Platelets 136 (L) 140 - 440 thou/uL    MPV 12.6 (H) 8.5 - 12.5 fL    Neutrophils % 79 (H) 50 - 70 %    Lymphocytes % 6 (L) 20 - 40 %    Monocytes % 13 (H) 2 - 10 %    Eosinophils % 1 0 - 6 %    Basophils % 1 0 - 2 %    Neutrophils Absolute 10.7 (H) 2.0 - 7.7 thou/uL    Lymphocytes Absolute 0.8 0.8 - 4.4 thou/uL    Monocytes Absolute 1.7 (H) 0.0 - 0.9 thou/uL    Eosinophils Absolute 0.1 0.0 - 0.4 thou/uL    Basophils Absolute 0.1 0.0 - 0.2 thou/uL       Advance Care Planning:   Barriers to discharge: Acute diverticulitis with perforation MAY Anticipated     discharge day: To be decided  Disposition:   Michael Pink MD  Hospitalist  [phone]

## 2021-06-10 NOTE — PLAN OF CARE
VSS, on 3L O2 via nasal cannula. Able to express needs. PRN Oxycodone for pain. PRN Trazodone. Another COVID test is pending. Drain to remain in place another week. Will continue to monitor.      Problem: Pain  Goal: Patient's pain/discomfort is manageable  Outcome: Progressing     Problem: Daily Care  Goal: Daily care needs are met  Outcome: Progressing     Problem: Psychosocial Needs  Goal: Demonstrates ability to cope with hospitalization/illness  Outcome: Progressing

## 2021-06-10 NOTE — PLAN OF CARE
"Care Plan  Care Management         Care Management Goals of the Day: Progression of care and discharge planning     Care Progression Reviewed With: Charge Nurse, Medical Doctor, Health Unit Coordinator, Nurse Care Manager      Barriers to Discharge: GI status, cultures pending     Discharge Disposition: TCU     Expected Discharge Date: 8/26/20 pending     Transportation: [employer]        Care Coordination Narrative:  Surgery following for acute perforated diverticulitis with small abscess, 8/17 percutaneous drain placed, currently on IV Flagyl and Cipro. Nephrology following. This is readmit from hospitalization 7/22/20-7/31/20. WBC elevated, Hgb drop. COVID retest today 8/22. Transferring to unit 3500.     Assessment History:  Patient has been at [facility] since last hospitalization. Prior to that she has been living at [facility] [phone]. She received medication assist and ADL assist. She uses a rolling walker and has a scooter. Daughter-Aria is main contact. She lives in [place]. Nephew-Bridger lives in [facility].   The [place] who states per DON patient can pay her rent when she returns and \"not to worry.\"      Updated daughter-Aria on status and transfer to unit 3500.     Continued plan is for patient to discharge back to [facility].   "

## 2021-06-10 NOTE — CONSULTS
Consultation - INFECTIOUS DISEASE CONSULTATION  Gardenia Muller,  1950, MRN [id number]      Abdominal pain, right lower quadrant [R10.31]  Nausea [R11.0]  Pancreatic duct dilated [K86.89]  T wave inversion in EKG [R94.31]  Diverticulitis of large intestine with perforation without bleeding [K57.20]    PCP: Jerson Hernandez MD, [phone]   Code status:  Full Code               Chief Complaint:  Acute diverticulitis with perforation     Assessment:  Gardenia Muller is a 70 y.o. old female with acute diverticulitis with perforation.    History of CVA, obesity, chronic abdominal pain.  Acute diverticulitis with perforation admitted on 2020.  On IV Cipro and Flagyl.  Cultures from abscess drainage on 2020 with Candida albicans.  CT scan on 2020 with near complete resolution of the abscess.  New leukocytosis.  No clear source.  C. difficile negative on 2020.  Feeling short of breath.  Chest x-ray ordered. HAP is a possibility.  Leukocytosis could be from untreated Candida infection  Penicillin allergy, reaction swelling.    Recommendations:   Continue Cipro and Flagyl.  Add IV fluconazole 200 mg daily.  I agree with chest x-ray.  If has new infiltrate, IV vancomycin and meropenem will be started in place of Cipro plus Flagyl.  Repeat CBC tomorrow.  Ordered.  Monitor respiratory status and abdomen.  Hold statin for now    Discussed with the patient, nursing staff.    Thank you for letting us be part of the patient care team. We will follow.    DEBBI White MD  [facility]  [phone] [facility]  [phone] answering service on call  Fax [phone]  [facility] paging    HPI:    Gardenia Muller is a 70 y.o. old female. History is provded by the patient, and chart review.    ID is asked to see this patient for leukocytosis.  The patient has a history of CVA, obesity, chronic abdominal pain.  Was admitted on 2020 with 2 weeks of abdominal pain.  CT scan  on admission confirmed a diagnosis of acute diverticulitis with contained perforation.  Has been on IV Cipro and Flagyl.  Status post abscess drainage on 8/17/2020.  Cultures are now positive with Candida albicans.  Today the patient is noted to have a new leukocytosis with WBC 19,000.  Seen today, the patient reports feeling overall better.  Abdominal pain is under control.  Feels short of breath and is having some cough.  Cough is nonproductive.  She is nauseous.  Reports some diarrhea.  C. difficile is negative on 8/21/2020.      Medical History  Active Ambulatory (Non-Hospital) Problems    Diagnosis     Supratherapeutic INR     Acute kidney failure, unspecified (H)     Acute liver failure without hepatic coma     Hematochezia     Hyperkalemia     Acute on chronic combined systolic (congestive) and diastolic (congestive) heart failure (H)     Ischemic cardiomyopathy     Trigger finger, acquired     Class 1 obesity with serious comorbidity and body mass index (BMI) of 33.0 to 33.9 in adult, unspecified obesity type     Dysuria     Hypertrophy of nail     Severe protein-calorie malnutrition (H)     Weight loss, non-intentional     Warfarin anticoagulation     Chronic abdominal pain     Intestinal angina (H)     Microalbuminuria     Hyperparathyroidism (H)     Onychogryphosis     Lipoma of back     Physical deconditioning     MAY (acute kidney injury) (H)     Transaminitis     Persistent atrial fibrillation (H)     Anticoagulant long-term use     Acalculous cholecystitis     (HFpEF) heart failure with preserved ejection fraction (H)     Anxiety     Ventral hernia without obstruction or gangrene     Coronary artery disease involving native coronary artery of native heart without angina pectoris     Dyslipidemia     Incisional hernia, without obstruction or gangrene     RLS (restless legs syndrome)     Diverticular disease of large intestine     Cerebrovascular disease     Healthcare maintenance     Cystitis     Acute  diastolic heart failure (H)     Advanced directives, counseling/discussion     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)     Spinal stenosis     PAD (peripheral artery disease) (H)     Dermatosis Papulosa Nigra     Depression     Hypercalcemia     Right Renal Artery Stenosis     Osteoarthritis     Abnormality Of Walk     Essential hypertension     Cerebral infarction (H)     Anemia     Past Medical History:   Diagnosis Date     Acute diastolic heart failure (H) 11/2015     Acute on chronic diastolic heart failure (H) 6/15/2019     Advanced directives, counseling/discussion 8/5/2015     MAY (acute kidney injury) (H) 10/22/2018     Atrial fibrillation (H)      Benign adenomatous polyp of large intestine 3/30/2017     Biliary obstruction 10/3/2018     Cerebral vascular accident (H)      Coronary artery disease 2006     Diabetes mellitus type 2 in obese (H)      Fibrocystic breast      GERD (gastroesophageal reflux disease)      History of anesthesia complications      Hypertension      Obstructive sleep apnea syndrome      Peripheral vascular disease (H)      Pneumonia 11/11/2018     PONV (postoperative nausea and vomiting)      S/P cholecystectomy 6/22/2018     SBO (small bowel obstruction) (H) 11/12/2015     Stroke (H)      Transaminitis 10/22/2018     Upper respiratory infection 12/20/2018     Urinary incontinence         Surgical History  She  has a past surgical history that includes Ventral hernia repair (N/A, 11/12/2015); Coronary stent placement (1995); Cardiac catheterization; PICC (6/29/2018); Exploratory laparotomy (N/A, 6/29/2018); ERCP (N/A, 10/5/2018); Laparoscopic cholecystectomy (N/A, 10/7/2018); Cardioversion (10/18/2018); PICC (10/21/2018); Coronary Angiogram (N/A, 7/27/2020); Right Heart Catheterization [place] (7/27/2020); PICC Midline Insertion (8/13/2020); and PICC Midline Insertion (8/19/2020).       Social History  Reviewed, and she  reports that she has quit smoking. She smoked  0.25 packs per day. She has quit using smokeless tobacco. She reports that she does not drink alcohol or use drugs.        Family History  Reviewed, and family history includes Cancer in her paternal grandmother and paternal uncle; Chronic Kidney Disease in her sister; Diabetes in her brother; Heart attack in her sister; No Medical Problems in her daughter, father, maternal aunt, maternal grandfather, maternal grandmother, mother, paternal aunt, and paternal grandfather.   Psychosocial Needs  Social History     Social History Narrative    Single/, lives alone; daughter in [place];    Gets help from neighbors and extended family    Former smoker.no alcohol problems     Additional psychosocial needs reviewed per nursing assessment.       Allergies   Allergen Reactions     Penicillins Swelling      Medications Prior to Admission   Medication Sig Dispense Refill Last Dose     ARTHRITIS PAIN RELIEF, ACETAM, 650 mg CR tablet Take 650 mg by mouth 2 (two) times a day.          8/10/2020 at 2000     aspirin 81 mg chewable tablet Chew 1 tablet (81 mg total) daily. Stop after 3 months 90 tablet 0 8/10/2020 at 0800     atorvastatin (LIPITOR) 80 MG tablet Take 1 tablet (80 mg total) by mouth at bedtime. Start only after your liver function test is normal 30 tablet 11 8/10/2020 at 2000     carboxymethylcellulose (REFRESH PLUS) 0.5 % Dpet ophthalmic dropperette Administer 2 drops to both eyes every hour as needed (dry eyes).  0      cholecalciferol, vitamin D3, 2,000 unit Tab TAKE 1 TABLET BY MOUTH ONCE DAILY. *1 TOTAL FILL* 30 each 11 8/10/2020 at 0800     digoxin (LANOXIN) 250 mcg (0.25 mg) tablet Take 1 tablet (250 mcg total) by mouth daily with supper. 30 tablet 0 8/10/2020 at 1700     furosemide (LASIX) 20 MG tablet Take 1 tablet (20 mg total) by mouth daily. 30 tablet 0 8/10/2020 at 0800     lisinopriL (PRINIVIL,ZESTRIL) 5 MG tablet Take 1 tablet (5 mg total) by mouth daily. 30 tablet 0 8/10/2020 at 0800      loratadine (CLARITIN) 10 mg tablet TAKE 1 TABLET BY MOUTH ONCE DAILY. *1 TOTAL FILL* 30 tablet 11 8/10/2020 at 0800     magnesium gluconate (MAGONATE) 27 mg magnesium (500 mg) Tab tablet Take 1 tablet (27 mg total) by mouth 2 (two) times a day. 60 tablet 11 8/10/2020 at 2000     metFORMIN (GLUCOPHAGE) 500 MG tablet Take 1 tablet (500 mg total) by mouth 2 (two) times a day with meals. 60 tablet 0 8/10/2020 at am     metoprolol succinate (TOPROL-XL) 25 MG Take 0.5 tablets (12.5 mg total) by mouth at bedtime. 30 tablet 0 8/10/2020 at 2000     multivitamin therapeutic tablet Take 1 tablet by mouth daily.   8/10/2020 at 0800     nitroglycerin (NITROSTAT) 0.4 MG SL tablet Place 1 tablet (0.4 mg total) under the tongue every 5 (five) minutes as needed for chest pain (for up to 3 doses and call 911 if persists). (Patient taking differently: Place 0.4 mg under the tongue every 5 (five) minutes as needed for chest pain (for up to 3 doses and call 911 if persists). ) 90 tablet 0      omeprazole (PRILOSEC) 20 MG capsule TAKE 1 CAPSULE BY MOUTH TWICE DAILY. *1 TOTAL FILL* 60 capsule 11 8/10/2020 at 2000     ondansetron (ZOFRAN-ODT) 4 MG disintegrating tablet Take 4 mg by mouth every 4 (four) hours as needed for nausea.   8/11/2020 at 0330     polyethylene glycol (GLYCOLAX) 17 gram/dose powder Take 17 g by mouth daily as needed.         senna-docusate (PERICOLACE) 8.6-50 mg tablet Take 1 tablet by mouth 2 (two) times a day.  0 8/10/2020 at 2000     sertraline (ZOLOFT) 100 MG tablet TAKE 1 TABLET BY MOUTH ONCE DAILY. *1 TOTAL FILL* 30 tablet 11 8/10/2020 at 0800     spironolactone (ALDACTONE) 25 MG tablet TAKE 1 TABLET BY MOUTH ONCE DAILY. *1 TOTAL FILL* 30 tablet 11 8/10/2020 at 0800     ticagrelor (BRILINTA) 90 mg Tab Take 1 tablet (90 mg total) by mouth 2 (two) times a day. Indefinitely but at least 12 months 60 tablet 11 8/10/2020 at 2000     traZODone (DESYREL) 50 MG tablet TAKE 1 TABLET BY MOUTH AT BEDTIME. *1 TOTAL FILL* 30  tablet 11 8/10/2020 at 2000     warfarin sodium (WARFARIN ORAL) Take by mouth See Admin Instructions. 8/1-2: 4 mg daily; 8/3-5: 3 mg daily; 8/6-9: held; 8/10-11: 1.5 mg daily; INR recheck 8/12   8/10/2020 at 2000 (1.5 mg)     blood glucose meter (GLUCOMETER) Please Dispense kit per pharmacy discretion and patient's insurance Test blood sugar. 1 each 0      blood glucose test strips Use 1 each As Directed as needed. Dispense brand per patient's insurance at pharmacy discretion. 50 strip 11      lancets 33 gauge Misc Test twice daily Dispense brand per patient's insurance at pharmacy discretion. 100 each 11         Review of Systems:  A 12 point comprehensive review of systems was negative except as noted. Physical Exam:  Temp:  [97.7  F (36.5  C)-98.4  F (36.9  C)] 97.7  F (36.5  C)  Heart Rate:  [67-74] 67  Resp:  [18-20] 18  BP: ()/(42-64) 113/42    General appearance: alert, appears stated age, cooperative and mild distress  Head: Normocephalic, without obvious abnormality, atraumatic  Throat: lips, mucosa, and tongue normal; teeth and gums normal  Lungs: clear to auscultation bilaterally  Heart: regular rate and rhythm, S1, S2 normal, no murmur, click, rub or gallop  Abdomen: soft, non-tender; bowel sounds normal; no masses,  no organomegaly  Extremities: extremities normal, atraumatic, no cyanosis or edema  Skin: Skin color, texture, turgor normal. No rashes or lesions  Neurologic: Grossly normal             Pertinent Labs  personally reviewed.   Results from last 7 days   Lab Units 08/21/20  0447 08/20/20  0956 08/20/20  0544 08/19/20  0720   LN-WHITE BLOOD CELL COUNT thou/uL 19.3*  --  17.1* 14.5*   LN-HEMOGLOBIN g/dL 8.6* 6.1* 6.6* 8.2*   LN-HEMATOCRIT % 26.1*  --  20.9* 25.5*   LN-PLATELET COUNT thou/uL 137*  --  131* 136*   LN-NEUTROPHILS RELATIVE PERCENT %  --   --   --  74*   LN-MONOCYTES RELATIVE PERCENT %  --   --   --  13*       Results from last 7 days   Lab Units 08/21/20  0447 08/20/20  0544  08/19/20  0720  08/17/20  0520   LN-SODIUM mmol/L 137 133* 135*   < > 139   LN-POTASSIUM mmol/L 3.4* 3.3* 3.3*   < > 3.3*   LN-CHLORIDE mmol/L 105 102 103   < > 107   LN-CO2 mmol/L 21* 21* 21*   < > 22   LN-BLOOD UREA NITROGEN mg/dL 8 9 10   < > 31*   LN-CREATININE mg/dL 0.95 1.14* 0.82   < > 3.03*   LN-CALCIUM mg/dL 7.4* 6.8* 7.2*   < > 6.9*   LN-ALBUMIN g/dL  --   --   --   --  1.8*    < > = values in this interval not displayed.       MICROBIOLOGY DATA:  Aerobic Culture with Gram Stain   Order: [id number]   Status:  Preliminary result   Visible to patient:  No (not released) Next appt:  Today at 03:00 PM in Physical Therapy (Grazyna Arambula, PT)   Specimen Information:  Fluid, Pelvic; Body Fluid          Culture  1+ YeastAbnormal                 Gram Stain Result   4+ Polymorphonuclear leukocytes       2+ Gram positive cocci in chains             Resulting Agency: [facility]          Specimen Collected: 08/17/20 10:02  Last Resulted: 08/19/20 10:01           Contains abnormal data  Fungal culture   Order: [id number]   Status:  Preliminary result   Visible to patient:  No (not released) Next appt:  Today at 03:00 PM in Physical Therapy (Grazyna Arambula, PT)   Specimen Information:  Pelvis, Left; Body Fluid          Culture  1+ Candida albicansAbnormal            Resulting Agency: [facility]                Pertinent Radiology  personally reviewed.     CT Abdomen Pelvis With Oral With IV Contrast (Order [id number])   Imaging   Date: 8/20/2020  Department: [facility] [address] MS/Bariatric/Renal  Released By/Authorizing: Nela Camacho PA-C (auto-released)    Study Result     EXAM: CT ABDOMEN PELVIS W ORAL W IV CONTRAST  LOCATION: [facility]  DATE/TIME: 8/20/2020 3:43 PM     INDICATION: Abdominal infection suspected ongoing pain and increasing leukocytosis  COMPARISON: CT from 8/14/2020  TECHNIQUE: CT scan of the abdomen and pelvis was performed following injection of IV contrast. Multiplanar reformats were obtained. Dose  reduction techniques were used.  CONTRAST: Iohexol (Omni) 75mL     FINDINGS:   LOWER CHEST: Dependent atelectasis and interstitial edema within the lung bases without consolidation. Cardiomegaly and mitral annular calcifications.     HEPATOBILIARY: Gallbladder surgically absent. Mild ectasia of the extrahepatic biliary tree.     PANCREAS: Unchanged appearance.     SPLEEN: Normal.     ADRENAL GLANDS: Normal.     KIDNEYS/BLADDER: Normal.     BOWEL: Interval placement of a left anterior abdominal approach percutaneous drain into the perisigmoid abscess. The drain is well positioned with near complete collapse of the previously seen abscess collection. There is persistent mild adjacent   stranding. The bowel is nonobstructed. No new intra-abdominal abscess identified. No pneumoperitoneum.     LYMPH NODES: Normal.     VASCULATURE: Atherosclerotic calcification throughout the arterial tree. No evidence of vascular complication following drain placement.     PELVIC ORGANS: Fibroid uterus. No ascites. Bardales catheter is within a decompressed urinary bladder. Subcutaneous scattered calcifications in the subcutaneous flank regions compatible with prior injection sites.     MUSCULOSKELETAL: No new or acute osseous findings. Degenerative changes at the hips and lumbar spine are stable.     IMPRESSION:   1.  Interval percutaneous drain placement into the diverticular abscess with near complete collapse of the fluid cavity. Drain abuts the adjacent sigmoid colon. No evidence of post drain placement complication.  2.  No new intra-abdominal abscess or other findings to correlate with ongoing leukocytosis.  3.  Stable appearance of the pancreas with previously seen main pancreatic ductal dilatation not well visualized on this study.     CT Abscess Drain Pelvic (Order [id number])   Imaging   Date: 8/14/2020  Department: [facility] [address] MS/Bariatric/Renal  Released By/Authorizing: Hiren Salomon MD (auto-released)     Study Result     EXAM:  1. CT OF ABDOMEN AND PELVIS WITHOUT CONTRAST  2. PERCUTANEOUS DRAIN PLACEMENT LEFT PELVIS        LOCATION: [facility]  DATE/TIME: 8/17/2020 9:59 AM     INDICATION: diverticulitis  TECHNIQUE: Dose reduction techniques were used.     FINDINGS: The limited visualized portions of the lung bases are clear. Evaluation of the solid visceral organs is suboptimal given the lack of intravenous contrast. Within these limitations no focal hepatic lesion is seen. The patient is post   cholecystectomy. There is no intra or extrahepatic biliary ductal dilatation. The stomach and duodenum are normal. The spleen size is normal. The kidneys enhance symmetrically and there is no renal collecting system dilatation.     Note again is made of a diverticular abscess, small in size. This is unchanged from the prior examination. Fibroid uterus is noted.     PROCEDURE: Informed consent obtained. Site marked. Prior images reviewed. Required items made available. Patient identity confirmed verbally and with arm band. Patient reevaluated immediately before administering sedation. Universal protocol was   followed. Time out performed. The site was prepped and draped in sterile fashion. 10 mL of 1% lidocaine was infused into the local soft tissues. Using standard technique and under direct CT guidance, a 10 Slovenian drainage catheter was inserted into the   fluid collection.       SPECIMEN: 10 mL of purulent fluid was aspirated and sent to lab for cultures and Gram stain.     BLOOD LOSS: Minimal.     The patient tolerated the procedure well. No immediate complications.     RADIOLOGIC SUPERVISION AND INTERPRETATION:   CT GUIDANCE: Images demonstrate the needle and subsequent catheter to be in good position.     SEDATION: Versed 1 mg. Fentanyl 50 mcg. The procedure was performed with administration intravenous conscious sedation with appropriate preoperative, intraoperative, and postoperative evaluation.     15  minutes of supervised face to face conscious sedation time was provided by a radiology nurse under my direct supervision.     IMPRESSION:   1. Stable diverticular abscess.  2. Successful CT-guided drain placement into left pelvic diverticular abscess.        CT Abdomen Pelvis Without Oral With IV Contrast (Order [id number])   Imaging   Date: 8/11/2020  Department: [facility] [address] MS/Bariatric/Renal  Released By/Authorizing: Ofelia Shook PA-C (auto-released)    Study Result     EXAM: CT ABDOMEN PELVIS WO ORAL W IV CONTRAST  LOCATION: [facility]  DATE/TIME: 8/11/2020 11:41 AM     INDICATION: right sided abdominal pain, nausea and vomiting  COMPARISON: 7/22/2020   TECHNIQUE: CT scan of the abdomen and pelvis was performed following injection of IV contrast. Multiplanar reformats were obtained. Dose reduction techniques were used.  CONTRAST: Iohexol (Omni) 75mL     FINDINGS:   LOWER CHEST: Minimal interstitial edema. The heart is mildly enlarged. There is coronary artery disease.      HEPATOBILIARY: Cholecystectomy. There is intrahepatic and extrahepatic bile duct dilatation. There is pancreatic duct dilatation.      PANCREAS: No discrete pancreatic masses identified.      SPLEEN: Normal.     ADRENAL GLANDS: Normal.     KIDNEYS/BLADDER: Normal.     BOWEL: There is colonic diverticulosis. There is acute diverticulosis involving the distal sigmoid colon with a small contained perforation. No drainable abscess at this point.      LYMPH NODES: Normal.     VASCULATURE: Atherosclerotic disease. Right renal artery stent.     PELVIC ORGANS: Fibroid uterus.      MUSCULOSKELETAL: Degenerative changes.     IMPRESSION:   1.  Acute diverticulitis with a small contained perforation. No drainable abscess at this point.  2.  Biliary and pancreatic duct dilatation. No discrete pancreatic masses identified. Consider MRCP, ERCP, EUS.

## 2021-06-10 NOTE — PROGRESS NOTES
"Pharmacy Consult: Vancomycin Dosing    Pharmacist consulted to dose vancomycin for Gardenia Muller, a 70 y.o. female.    Ordering provider: Dr. Lan    Indication for vancomycin therapy: Hospital Acquired Pneumonia    Goal Trough Range:  15-20 mcg/mL based on indication    Other current antimicrobials              vancomycin 1,500 mg in sodium chloride 0.9% 500 mL (VANCOCIN)  Every 12 hours          meropenem 1 g in NaCl 0.9 % 50 mL (MINI-BAG Plus) (MERREM)  Every 8 hours                   Subjective/Objective:    Patient was admitted for Diverticulitis on 8/11/2020    Height: 5' 2\" (1.575 m)    Actual Body Weight (ABW): 92.4 kg (203 lb 11.2 oz)    Ideal body weight: 50.1 kg (110 lb 7.2 oz)  Adjusted ideal body weight: 67 kg (147 lb 12 oz)    BMI: Body mass index is 37.26 kg/m .    Allergies   Allergen Reactions     Penicillins Swelling       Patient Active Problem List   Diagnosis     Spinal stenosis     PAD (peripheral artery disease) (H)     Dermatosis Papulosa Nigra     Depression     Hypercalcemia     Right Renal Artery Stenosis     Osteoarthritis     Abnormality Of Walk     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)     Advanced directives, counseling/discussion     Cystitis     Healthcare maintenance     Essential hypertension     Cerebral infarction (H)     Anemia     Cerebrovascular disease     RLS (restless legs syndrome)     Incisional hernia, without obstruction or gangrene     Diverticular disease of large intestine     Coronary artery disease involving native coronary artery of native heart without angina pectoris     Dyslipidemia     Ventral hernia without obstruction or gangrene     Anxiety     (HFpEF) heart failure with preserved ejection fraction (H)     Anticoagulant long-term use     Persistent atrial fibrillation (H)     MAY (acute kidney injury) (H)     Transaminitis     Physical deconditioning     Lipoma of back     Acalculous cholecystitis     Onychogryphosis     " Hyperparathyroidism (H)     Microalbuminuria     Intestinal angina (H)     Chronic abdominal pain     Weight loss, non-intentional     Warfarin anticoagulation     Severe protein-calorie malnutrition (H)     Hypertrophy of nail     Dysuria     Class 1 obesity with serious comorbidity and body mass index (BMI) of 33.0 to 33.9 in adult, unspecified obesity type     Trigger finger, acquired     Acute liver failure without hepatic coma     Hematochezia     Hyperkalemia     Acute on chronic combined systolic (congestive) and diastolic (congestive) heart failure (H)     Ischemic cardiomyopathy     Acute kidney failure, unspecified (H)     Acute diastolic heart failure (H)     Diverticulitis     Supratherapeutic INR     Diverticulitis of large intestine with perforation and abscess without bleeding     Atrial fibrillation with rapid ventricular response (H)     Obstructive sleep apnea syndrome    Past Medical History:   Diagnosis Date     Acute diastolic heart failure (H) 11/2015     Acute on chronic diastolic heart failure (H) 6/15/2019     Advanced directives, counseling/discussion 8/5/2015    Patient completed 2011.  Discussed today, 5/24/17, and wants to change healthcare agent from niece to daughter.  Discussed that she will need to complete new form to indicate this.     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  Recommend 5 year follow-up.  Single tubular adenoma     Biliary obstruction 10/3/2018    Added automatically from request for surgery [id number]     Cerebral vascular accident (H)      Coronary artery disease 2006    100% RCA with collateral filling per Dr. Elizabeth's angio report     Diabetes mellitus type 2 in obese (H)      Fibrocystic breast      GERD (gastroesophageal reflux disease)      History of anesthesia complications     slow to wake     Hypertension      Obstructive sleep apnea syndrome       Peripheral vascular disease (H)      Pneumonia 11/11/2018     PONV (postoperative nausea and vomiting)      S/P cholecystectomy 6/22/2018     SBO (small bowel obstruction) (H) 11/12/2015     Stroke (H)      Transaminitis 10/22/2018     Upper respiratory infection 12/20/2018     Urinary incontinence         Temp Readings from Current Encounter:     08/24/20 1638 08/24/20 1710 08/24/20 1911   Temp: 97.3  F (36.3  C) 97.5  F (36.4  C) 97.8  F (36.6  C)     Net Intake/Output (last 24 hours):  I/O last 3 completed shifts:  In: 719 [P.O.:660; IV Piggyback:59]  Out: 112.5 [Urine:100; Drains:12.5]    Recent Labs     08/22/20  0542 08/22/20  1839 08/23/20  0103 08/23/20  0754 08/23/20  1154 08/24/20  0555 08/24/20  1638   WBC 17.4* 17.0*  --  17.4*  --  15.9*  --    LACTICACID  --   --  2.1*  --  2.0  --  1.9   BUN  --  9  --  10  --  14  --    CREATININE  --  0.84  --  0.86  --  0.94 1.08     Estimated Creatinine Clearance: 51.3 mL/min (by C-G formula based on SCr of 1.08 mg/dL).    No results for input(s): CULTURE in the last 72 hours.    Results for orders placed or performed during the hospital encounter of 08/11/20   Culture, Urine    Collection Time: 08/21/20  4:37 PM    Specimen: Urine, Catheter   Result Value Status    Culture No Growth Final   Urine culture    Collection Time: 08/19/20  8:08 PM    Specimen: Urine, Clean Catch   Result Value Status    Culture No Growth Final   Aerobic Culture with Gram Stain    Collection Time: 08/17/20 10:02 AM    Specimen: Fluid, Pelvic; Body Fluid   Result Value Status    Culture 2+ Candida dubliniensis (!) Final   Fungal culture    Collection Time: 08/17/20 10:02 AM    Specimen: Pelvis, Left; Body Fluid   Result Value Status    Culture 1+ Candida albicans (!) Preliminary   Anaerobic culture    Collection Time: 08/17/20 10:02 AM    Specimen: Pelvis, Left; Tissue   Result Value Status    Culture No anaerobic organisms isolated Final       Recent labs: (last 7 days)      08/23/20  0754 08/24/20  0145 08/24/20  1638   VANCOMYCIN 43.1* 38.6* 30.6*       Vancomycin administrations: (last 120 hours)     Date/Time Action Medication Dose Rate    08/23/20 1351 New Bag    vancomycin 1,500 mg in sodium chloride 0.9% 500 mL (VANCOCIN) 1,500 mg 176.7 mL/hr    08/23/20 0204 New Bag    vancomycin 1,500 mg in sodium chloride 0.9% 500 mL (VANCOCIN) 1,500 mg 176.7 mL/hr    08/22/20 1400 New Bag    vancomycin 1,500 mg in sodium chloride 0.9% 500 mL (VANCOCIN) 1,500 mg 176.7 mL/hr    08/22/20 0218 New Bag    vancomycin 1,750 mg in sodium chloride 0.9% 500 mL (VANCOCIN) 1,750 mg 178.3 mL/hr          Assessment/Plan:    Pharmacist consulted to dose vancomycin for Hospital Acquired Pneumonia, goal trough range 15-20 mcg/mL.  1. Change to vancomycin intermittent dosing.   2. Vancomycin trough level of 30.6 mcg/mL was above the goal trough range. This trough level was drawn at steady state.  3. Pharmacist will plan to re-check a vancomycin trough level 8/25 at 0600.  4. Pharmacist will continue to follow.    Thank you for the consult.  Nadya Craft, PharmD 8/24/2020 11:04 PM

## 2021-06-10 NOTE — PROGRESS NOTES
Progress Note    Brief summary:   70 year  old female with history of HTN, T2DM, chronic atrial fibrillation on warfarin, diastolic CHF, coronary artery disease status post stenting (7/27/2020), peripheral artery disease, calculus cholecystitis status post cholecystectomy, depression, who came to [facility] on 8/11/2020 for abdominal pain and vomiting. CT scan revealed acute diverticulitis with perforation. on 8/14 showed increased size of abscess from 2x2cm to 4x3cm, though pt reports clinical improvement of her symptoms.. General surgery consulted - no current plans for operating room so interventional radiology was consulted for possible drainage which was done on 8/17/2020 by placing CT scan guided drain placement in the left pelvic diverticular abscess, repeat CTAP (8/20) - near complete collapse of fluid cavity.   Patient developed new leukocytosis.  Chest x-ray showed possible hospital-acquired pneumonia.  Due to concern of leukocytosis being from untreated Candida infection (at least improved Ashley dubliniensis), so she was started on  Fluconazole. WBC slowly improving.    Assessment/Plan  Principal Problem:    Diverticulitis  Active Problems:    Diverticulitis of large intestine with perforation and abscess without bleeding    Atrial fibrillation with rapid ventricular response (H)    Obstructive sleep apnea syndrome      1. Acute perforated diverticulitis with pericolonic abscess: Initially started on Cipro and Flagyl.  Status post CT-guided drain placement on 8/17, culture growing Candida.  CT abdomen on 8/20/2020 with near complete resolution of the abscess. 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  Patient is now on meropenem, vancomycin and fluconazole.  ID following  2. Hospital-acquired pneumonia: On meropenem and vancomycin.  Covid negative X 2. Isolation precaution removed today  3. Fever : spiked fever of 100.6  today. Will check UA. Treating for pneumonia. Drug fever?  4. Leukocytosis: Likely due to above, slowly improving  5. Acute on chronic diastolic CHF: weight is up by 25 lbs since admission. Continue iv lasix. Daily weights , intake/outputs  6. Acute hypoxic resp failure: Likely from heart failure as well as pneumonia.  Wean off as able  7. Acute anemia: Hemoglobin dropped to 6.1 on 8/20.  Status post 1 unit.  Hemoglobin slowly trending down.  Will transfuse if hemoglobin goes below 7  8. MAY: likely cardiorenal. Monitor  9. Supratherapeutic INR: presented with supratherpeutic INR. Received Vit K. After holding warfarin for 15 days, INR was above 2 . Will check hepatic panel.  10. Atrial fibrillation: Had RVR on 819.  Resolved with resumption of digoxin and metoprolol.   11. Type 2 diabetes mellitus: was on SSI but BG consistently low, will stop SSI for now and monitor  12. CAD: Status post coronary angiogram and PCI to LAD on 7/27/2020    Subjective  Patient seen and examined  Complained of shortness of breath and cough      Objective    Vital signs in last 24 hours  Temp:  [97.3  F (36.3  C)-100.6  F (38.1  C)] 100.6  F (38.1  C)  Heart Rate:  [48-68] 62  Resp:  [17-22] 17  BP: (108-123)/(50-57) 123/56  Weight:   220 lb 6.4 oz (100 kg)    Intake/Output last 3 shifts  I/O last 3 completed shifts:  In: 30 [I.V.:30]  Out: 0   Intake/Output this shift:  No intake/output data recorded.    Physical Exam   General: awake, alert, not in distress  Eyes: no pallor  Chest: Clear to auscultation bilaterally  Heart: RRR, normal S1 and S2, mild pitting edema  Abdomen: soft, non tender  Neuro: grossly normal      Meds    aspirin  81 mg Oral DAILY     digoxin  250 mcg Oral Daily with supper     fluconazole (DIFLUCAN) IV  400 mg Intravenous Q24H     furosemide  40 mg Intravenous DAILY     [Held by provider] lisinopriL  5 mg Oral DAILY     magnesium chloride  128 mg Oral BID     magnesium gluconate  27 mg Oral BID     meropenem  1 g  Intravenous Q8H     metoprolol succinate  12.5 mg Oral DAILY     omeprazole  20 mg Oral BID AC     sertraline  100 mg Oral DAILY     sodium chloride bacteriostatic  1-5 mL Intradermal Once     sodium chloride  10 mL Intravenous Q8H FIXED TIMES     sodium chloride  10-30 mL Intravenous Q8H FIXED TIMES     spironolactone  25 mg Oral DAILY     ticagrelor  90 mg Oral BID     vancomycin intermittent dosing   Other Med Consult or Protocol     warfarin - daily dose required   Other Med Consult or Protocol     warfarin - NO DOSE TODAY   Other No Dose Today       Pertinent Labs   Lab Results: personally reviewed.   not applicable    Results from last 7 days   Lab Units 08/26/20  0534 08/24/20  1638 08/24/20  0555 08/23/20  0754   LN-SODIUM mmol/L 137  --  136 139   LN-POTASSIUM mmol/L 5.3*  --  3.6 3.7   LN-CHLORIDE mmol/L 108*  --  107 108*   LN-CO2 mmol/L 17*  --  21* 20*   LN-BLOOD UREA NITROGEN mg/dL 26  --  14 10   LN-CREATININE mg/dL 1.93* 1.08 0.94 0.86   LN-CALCIUM mg/dL 9.2  --  8.1* 7.9*         Results from last 7 days   Lab Units 08/25/20  0526 08/24/20  0555 08/23/20  0754   LN-WHITE BLOOD CELL COUNT thou/uL 15.6* 15.9* 17.4*   LN-HEMOGLOBIN g/dL 7.6* 7.9* 8.3*   LN-HEMATOCRIT % 23.7* 24.5* 25.9*   LN-PLATELET COUNT thou/uL 106* 109* 112*         Pertinent Radiology   Radiology Results: Personally reviewed impression/s    Ct Abdomen Pelvis Without Oral Without Iv Contrast    Result Date: 8/14/2020  EXAM: CT ABDOMEN PELVIS WO ORAL WO IV CONTRAST LOCATION: [facility] DATE/TIME: 8/14/2020 10:26 AM INDICATION: Abdominal infection suspected perforated diverticulitis, assess for abscess COMPARISON: 8/11/2020 TECHNIQUE: CT scan of the abdomen and pelvis was performed without oral or IV contrast. Multiplanar reformats were obtained. Dose reduction techniques were used. CONTRAST: None. FINDINGS: LOWER CHEST: Trace pleural fluid. Minimal scarring or edema at the lung bases. The heart is enlarged. There is  coronary artery calcification. HEPATOBILIARY: Cholecystectomy. There is extrahepatic bile duct dilatation. PANCREAS: Pancreatic duct dilatation is not as well-seen on this study as on the comparison study. SPLEEN: Normal. ADRENAL GLANDS: Normal. KIDNEY/BLADDER: Normal kidneys and ureters. There is wall thickening of the nondistended urinary bladder. A Bardales catheter is present. BOWEL: There is colonic diverticulosis. There is stranding adjacent to the sigmoid colon in the region of diverticulitis. There is a complex 4 cm x 3 cm collection to the left of the sigmoid colon in this region (previously this was 2 cm x 2 cm). Minimal  extraluminal air is noted. LYMPH NODES: Normal. VASCULATURE: Atherosclerotic disease. PELVIC ORGANS: Fibroid uterus. MUSCULOSKELETAL: Normal.     1.  Again seen is sigmoid colon diverticulitis with a small contained perforation. A small fluid collection adjacent to the sigmoid colon has increased in size to 4 cm x 3 cm (previously 2 cm x 2 cm). Percutaneous drainage would be difficult though may be possible. 2.  Biliary and pancreatic ductal dilatation is not as well-seen as on the comparison study. See prior study for details.    Xr Chest 1 View Portable    Result Date: 8/21/2020  EXAM: XR CHEST 1 VIEW PORTABLE LOCATION: [facility] DATE/TIME: 8/21/2020 7:34 PM INDICATION: r/o pna- shortness of breath COMPARISON: 08/11/2020     New bilateral interstitial infiltrates could represent edema or pneumonia including COVID. Enlarged cardiac silhouette. No pleural effusion. No definite pulmonary vascular congestion.    Xr Chest 2 Views    Result Date: 8/11/2020  EXAM: XR CHEST 2 VIEWS LOCATION: [facility] DATE/TIME: 8/11/2020 11:50 AM INDICATION: Cough. COMPARISON: None.     Negative chest. Lungs are clear. Coronary stenting.     Xr Foot Right 2 Vws    Result Date: 8/16/2020  EXAM: XR FOOT RIGHT 2 VWS LOCATION: [facility] DATE/TIME: 8/16/2020 12:51 PM INDICATION:  Acute right foot pain COMPARISON: None.     Bones are demineralized. No definitive evidence of an acute fracture. No cortical destructive changes to suggest osteomyelitis. Scattered degenerative changes.    Xr Chest 1 View For Picc Placement Portable    Result Date: 8/23/2020  EXAM: XR CHEST 1 VIEW FOR PICC LINE PLACEMENT PORTABLE LOCATION: [facility] DATE/TIME: 8/23/2020 6:28 PM INDICATION: verify catheter placement COMPARISON: 08/21/2020     Right upper extremity PICC line terminates approximately 3 cm above the expected cavoatrial junction. No change in the chest otherwise. Extensive abnormal opacity throughout both lungs persists. Mild cardiomegaly. No pneumothorax or large pleural effusion.    Ct Abdomen Pelvis Without Oral With Iv Contrast    Result Date: 8/11/2020  EXAM: CT ABDOMEN PELVIS WO ORAL W IV CONTRAST LOCATION: [facility] DATE/TIME: 8/11/2020 11:41 AM INDICATION: right sided abdominal pain, nausea and vomiting COMPARISON: 7/22/2020 TECHNIQUE: CT scan of the abdomen and pelvis was performed following injection of IV contrast. Multiplanar reformats were obtained. Dose reduction techniques were used. CONTRAST: Iohexol (Omni) 75mL FINDINGS: LOWER CHEST: Minimal interstitial edema. The heart is mildly enlarged. There is coronary artery disease. HEPATOBILIARY: Cholecystectomy. There is intrahepatic and extrahepatic bile duct dilatation. There is pancreatic duct dilatation. PANCREAS: No discrete pancreatic masses identified. SPLEEN: Normal. ADRENAL GLANDS: Normal. KIDNEYS/BLADDER: Normal. BOWEL: There is colonic diverticulosis. There is acute diverticulosis involving the distal sigmoid colon with a small contained perforation. No drainable abscess at this point. LYMPH NODES: Normal. VASCULATURE: Atherosclerotic disease. Right renal artery stent. PELVIC ORGANS: Fibroid uterus. MUSCULOSKELETAL: Degenerative changes.     1.  Acute diverticulitis with a small contained perforation. No  drainable abscess at this point. 2.  Biliary and pancreatic duct dilatation. No discrete pancreatic masses identified. Consider MRCP, ERCP, EUS.    Ct Abdomen Pelvis With Oral With Iv Contrast    Result Date: 8/20/2020  EXAM: CT ABDOMEN PELVIS W ORAL W IV CONTRAST LOCATION: [facility] DATE/TIME: 8/20/2020 3:43 PM INDICATION: Abdominal infection suspected ongoing pain and increasing leukocytosis COMPARISON: CT from 8/14/2020 TECHNIQUE: CT scan of the abdomen and pelvis was performed following injection of IV contrast. Multiplanar reformats were obtained. Dose reduction techniques were used. CONTRAST: Iohexol (Omni) 75mL FINDINGS: LOWER CHEST: Dependent atelectasis and interstitial edema within the lung bases without consolidation. Cardiomegaly and mitral annular calcifications. HEPATOBILIARY: Gallbladder surgically absent. Mild ectasia of the extrahepatic biliary tree. PANCREAS: Unchanged appearance. SPLEEN: Normal. ADRENAL GLANDS: Normal. KIDNEYS/BLADDER: Normal. BOWEL: Interval placement of a left anterior abdominal approach percutaneous drain into the perisigmoid abscess. The drain is well positioned with near complete collapse of the previously seen abscess collection. There is persistent mild adjacent stranding. The bowel is nonobstructed. No new intra-abdominal abscess identified. No pneumoperitoneum. LYMPH NODES: Normal. VASCULATURE: Atherosclerotic calcification throughout the arterial tree. No evidence of vascular complication following drain placement. PELVIC ORGANS: Fibroid uterus. No ascites. Bardales catheter is within a decompressed urinary bladder. Subcutaneous scattered calcifications in the subcutaneous flank regions compatible with prior injection sites. MUSCULOSKELETAL: No new or acute osseous findings. Degenerative changes at the hips and lumbar spine are stable.     1.  Interval percutaneous drain placement into the diverticular abscess with near complete collapse of the fluid cavity.  "Drain abuts the adjacent sigmoid colon. No evidence of post drain placement complication. 2.  No new intra-abdominal abscess or other findings to correlate with ongoing leukocytosis. 3.  Stable appearance of the pancreas with previously seen main pancreatic ductal dilatation not well visualized on this study.    Poc Us 3cg Picc Placement Guidance    Result Date: 8/23/2020  Exam was performed as guidance for PICC line insertion.  Click \"PACS images\" hyperlink below to view any stored images.  For specific procedure details, view procedure note authored by PICC/Vascular Access Nurse.    Poc Us 3cg Picc Placement Guidance    Result Date: 8/19/2020  Exam was performed as guidance for PICC line insertion.  Click \"PACS images\" hyperlink below to view any stored images.  For specific procedure details, view procedure note authored by PICC/Vascular Access Nurse.    Ct Abscess Drain Pelvic    Result Date: 8/17/2020  EXAM: 1. CT OF ABDOMEN AND PELVIS WITHOUT CONTRAST 2. PERCUTANEOUS DRAIN PLACEMENT LEFT PELVIS LOCATION: [facility] DATE/TIME: 8/17/2020 9:59 AM INDICATION: diverticulitis TECHNIQUE: Dose reduction techniques were used. FINDINGS: The limited visualized portions of the lung bases are clear. Evaluation of the solid visceral organs is suboptimal given the lack of intravenous contrast. Within these limitations no focal hepatic lesion is seen. The patient is post cholecystectomy. There is no intra or extrahepatic biliary ductal dilatation. The stomach and duodenum are normal. The spleen size is normal. The kidneys enhance symmetrically and there is no renal collecting system dilatation. Note again is made of a diverticular abscess, small in size. This is unchanged from the prior examination. Fibroid uterus is noted. PROCEDURE: Informed consent obtained. Site marked. Prior images reviewed. Required items made available. Patient identity confirmed verbally and with arm band. Patient reevaluated immediately " before administering sedation. Universal protocol was followed. Time out performed. The site was prepped and draped in sterile fashion. 10 mL of 1% lidocaine was infused into the local soft tissues. Using standard technique and under direct CT guidance, a 10 Tajik drainage catheter was inserted into the fluid collection.  SPECIMEN: 10 mL of purulent fluid was aspirated and sent to lab for cultures and Gram stain. BLOOD LOSS: Minimal. The patient tolerated the procedure well. No immediate complications. RADIOLOGIC SUPERVISION AND INTERPRETATION: CT GUIDANCE: Images demonstrate the needle and subsequent catheter to be in good position. SEDATION: Versed 1 mg. Fentanyl 50 mcg. The procedure was performed with administration intravenous conscious sedation with appropriate preoperative, intraoperative, and postoperative evaluation. 15 minutes of supervised face to face conscious sedation time was provided by a radiology nurse under my direct supervision.     1. Stable diverticular abscess. 2. Successful CT-guided drain placement into left pelvic diverticular abscess. Reference CPT Codes: 77962, 22838    Ir Abscessogram Tube Check    Result Date: 8/25/2020  [place] [facility] LOCATION: [facility] DATE: 8/25/2020 PROCEDURE: ABSCESSOGRAM INTERVENTIONAL RADIOLOGIST: Altaf Bateman MD. INDICATION: Diverticular abscess with indwelling drain here for follow-up. FLUOROSCOPIC TIME: 0.8 minutes NUMBER OF IMAGES: 2 CONTRAST: 5 cc of Omni COMPLICATIONS: No immediate complications. PROCEDURE: Contrast injection through the existing drain demonstrates no residual abscess pocket. 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     Abscessogram reveals no residual abscess pocket. Fistula to the colon. Switched drain to gravity drainage bag from SHAMIKA suction bulb. No flushes are needed. Follow-up abscessogram in one week. CPT codes for physician use only: 49264/54456          Advanced Care Planning:  Discharge Planning discussed with  patient  Anticipated LOS: multiple days  Barrier to discharge:iv abx  Disposition:TBD  Discussed care with patient for >35 minutes with greater than 50% of time spent in counseling and coordination of care.      Rekha Cesar MD  Hospitalist  [phone]    This note was dictated using voice recognition software. Any grammatical or context distortions are unintentional and inherent to the software.

## 2021-06-10 NOTE — SIGNIFICANT EVENT
Sepsis Evaluation Progress Note    I was called to see Gardenia Muller due to abnormal vital signs triggering the Sepsis SIRS screening alert. She is known to have an infection.     Physical Exam  Vital Signs:  Temp: 97.3  F (36.3  C) Temp Source: Oral BP: 103/45 Heart Rate: 63 Resp: 22 SpO2: 96 % O2 Device: Nasal cannula O2 Flow Rate (L/min): 8 L/min    Lab:  Lactic Acid   Date Value Ref Range Status   08/23/2020 2.1 (H) 0.7 - 2.0 mmol/L Final     Comment:     Best practice for following serial lactic acid analysis is use of a consistent sample type.       The patient is at baseline mental status.    The rest of their physical exam is significant for wnl. Capillary refill intact.      Assessment and Plan  Gardenia Muller meets SIRS criteria but does NOT have a lactate >2 or other evidence of acute organ damage.  These vital sign, lab and physical exam findings are consistent with SEPSIS.     Sepsis Time-Zero (Time sepsis diagnosis confirmed): 0126  08/23/20        Current antibiotic coverage is appropriate for source of infection.    Disposition: The patient will remain on the current unit. We will continue to monitor this patient closely.    Tyler Lan MD

## 2021-06-10 NOTE — ED PROVIDER NOTES
Emergency Department Staff Physician Note     I had a face to face encounter with this patient seen by the Advanced Practice Provider (ANGELINA).  I have seen, examined, and discussed the patient with the ANGELINA and agree with their assessment and plan of management.    I, Myrtle Vera, am serving as a scribe to document services personally performed by Norberto Howe DO, based on my observations and the provider's statements to me.     Relevant HPI:     Gardenia Muller is a 70 y.o. female with a history of HTN, diastolic heart failure, CAD s/p catheterization and stent placement, atrial fibrillation, type 2 diabetes, GERD, chronic abdominal pain, s/p cholecystectomy who presents to the Emergency Department by EMS for evaluation of abdominal pain.     Patient was recently admitted here 7/22/2020-7/31/2020 (~11 days ago) and reports constant abdominal pain with associated nausea and vomiting since.     I, Norberto Howe DO, attest that Essence magana was acting in a scribe capacity, has observed my performance of the services and has documented them in accordance with my direction.    ED Course:   I received the patient report from the ANGELINA. I agree with  assessment and plan of management, and I will see the patient myself.   I met with the patient to introduce myself, gather additional history, perform my initial exam, and discuss the plan.   Exam:  BP 99/51   Pulse (!) 59   Temp 97.6  F (36.4  C) (Oral)   Resp 18   LMP 01/25/1999   SpO2 99%      Physical Exam  Vitals signs and nursing note reviewed.   Constitutional:       General: She is not in acute distress.     Appearance: She is well-developed. She is not diaphoretic.   HENT:      Head: Normocephalic and atraumatic.      Mouth/Throat:      Pharynx: No oropharyngeal exudate.   Eyes:      Conjunctiva/sclera: Conjunctivae normal.      Pupils: Pupils are equal, round, and reactive to light.   Neck:      Musculoskeletal: Normal range of motion and neck supple.       Trachea: No tracheal deviation.   Cardiovascular:      Rate and Rhythm: Normal rate and regular rhythm.      Heart sounds: Normal heart sounds. No murmur.   Pulmonary:      Effort: Pulmonary effort is normal. No respiratory distress.      Breath sounds: Normal breath sounds. No stridor. No wheezing or rales.   Chest:      Chest wall: No tenderness.   Abdominal:      General: Bowel sounds are normal. There is no distension.      Palpations: Abdomen is soft.      Tenderness: There is abdominal tenderness in the left lower quadrant. There is no guarding or rebound.   Musculoskeletal: Normal range of motion.         General: No tenderness or deformity.   Skin:     General: Skin is warm and dry.      Findings: No erythema or rash.   Neurological:      Mental Status: She is alert and oriented to person, place, and time.      Cranial Nerves: No cranial nerve deficit.      Motor: No abnormal muscle tone.      Coordination: Coordination normal.      Deep Tendon Reflexes: Reflexes normal.   Psychiatric:         Behavior: Behavior normal.              Impression / ED Plan:  Gardenia Muller is a 70 y.o. female presents to the ED for evaluation of abdominal pain.  She is evidence of diverticulitis with perforation.  Patient started on antibiotics will be admitted to the hospital..     Please refer to the Advanced Practice Provider's note for further details and ED course. Agree with history, plan and disposition.     1. Diverticulitis of large intestine with perforation without bleeding    2. Abdominal pain, right lower quadrant    3. Nausea    4. T wave inversion in EKG                I reviewed all general radiology procedures.  I was present for the key portions of this procedure: none    Norberto Howe DO  Staff Physician  [facility]     Norberto Howe DO  08/11/20 2167

## 2021-06-10 NOTE — PLAN OF CARE
Problem: Discharge Barriers  Goal: Patient's discharge needs are met  Outcome: Not Progressing     Problem: Potential for Compromised Skin Integrity  Goal: Nutritional status is improving  Outcome: Not Progressing   Pt continues on IV antibiotics. ID to see regarding increasing WBCs. Chest xray, c-diff, and ua/uc ordered. C diff negative. Pt reports poor appetite. Encouraged intake and supplement as tolerated.

## 2021-06-10 NOTE — PROGRESS NOTES
Pt returned from IR. SHAMIKA drain will be remain in place another week per IR note. C repeat  Covid test  Was collected and sent this shift.

## 2021-06-10 NOTE — PLAN OF CARE
Problem: Pain  Goal: Patient's pain/discomfort is manageable  8/23/2020 2210 by Lavern Moyer RN  Outcome: Progressing  8/23/2020 1939 by Lavern Moyer RN  Outcome: Progressing     Problem: Safety  Goal: Patient will be injury free during hospitalization  Outcome: Progressing     Problem: Daily Care  Goal: Daily care needs are met  Outcome: Progressing     Problem: Psychosocial Needs  Goal: Demonstrates ability to cope with hospitalization/illness  8/23/2020 2210 by Lavern Moyer RN  Outcome: Progressing  8/23/2020 1939 by Lavern Moyer RN  Outcome: Progressing     Problem: Knowledge Deficit  Goal: Patient/family/caregiver demonstrates understanding of disease process, treatment plan, medications, and discharge instructions  8/23/2020 2210 by Lavern Moyer RN  Outcome: Progressing  8/23/2020 1939 by Lavern Moyer RN  Outcome: Progressing     Problem: Glucose Imbalance  Goal: Achieve optimal glucose control  Outcome: Progressing     Problem: Urinary Incontinence  Goal: Perineal skin integrity is maintained or improved  Outcome: Progressing   Gardenia had a good shift. Comfortable for most of it. Still not eating much. Low fluid intake too so that was encouraged. Purwick in place and functioning. Has a new picc for IV abx  which is working well. Turned and repositioned every 2 hours to protect skin, no concerns

## 2021-06-10 NOTE — PLAN OF CARE
Pt cooperated with care and treatment and no behavior concern during the evening shift. Urine output low and pt continue with  ml/hr and pt assessed by nephrology for low output (review note}. Pt alert and oriented x4 but forgetful and able to make needs known.pt denies nausea and vomiting and tolerated clear liquid diet and also denies pain.

## 2021-06-10 NOTE — PLAN OF CARE
Call rec'd from admissions at [facility] with update that the 18 day bed hold will end this Saturday. This CM returned call and ended up leaving a VM saying pt will likely not be ready for DC in two days and for facility to inform CM when they know if family plans to privately pay for bed hold or not.

## 2021-06-10 NOTE — PLAN OF CARE
Patient given PRN oxycodone for abdominal pain.  Abdomen rounded, soft.  Denies nausea this shift.  Abcess drain to bulb suction.  Green drainage.    LS coarse; sats mid-90s 7LPM O2 NC.  Respirations somewhat shallow, non-labored at rest but dyspneic when moving in bed.      Patient alert/oriented, makes needs known, uses call light appropriately.  Applicable interventions for patient safety remain in place.  Will continue to monitor.

## 2021-06-10 NOTE — PLAN OF CARE
Problem: Pain  Goal: Patient's pain/discomfort is manageable  Outcome: Progressing     Problem: Daily Care  Goal: Daily care needs are met  Outcome: Progressing     Problem: Psychosocial Needs  Goal: Demonstrates ability to cope with hospitalization/illness  Outcome: Progressing   Pt., c/o surgical pain 7/10 and 8/10 respectively. Medicated with Oxy X 2 with relief. Pt has a Pure Wick in place with no out put. Bladder scan for 250 ml. MD text paged. Still waiting for a response. Will continue to monitor.

## 2021-06-10 NOTE — PROGRESS NOTES
[place] Daily Progress Note      Date of Service: 8/15/2020     Brief History:     Ms Muller is a 70 y.o. old female with history of HTN, T2DM, chronic atrial fibrillation, diastolic CHF, coronary artery disease status post stenting (7/2020), peripheral artery disease, malnutrition chronic abdominal pain, calculus cholecystitis status post cholecystectomy, depression, admitted to ED for evaluation abdominal pain and vomiting. CT scan revealed acute diverticulitis with perforation. General surgery was consulted, currently following. No plans for OR; IR consulted for possible drainage. Repeat CTAP on 8/14 showed increased size of abscess from 2x2cm to 4x3cm.    In the ED, she was hypoglycemic also and she was started on D5 normal saline. Hospitalization complicated by 8/12/2020, Nephrology consulted.    Assessment & Plan:  #Acute perforated diverticulitis with a small abscess: Repeat CT on 8/14 showed increase in size of abscess from 2x2cm to 4x3cm. Pt is currently on IV Cipro and Flagyl. Spoke with Surgery - no plans for OR, continue supportive care and abx. Spoke with IR - will plan for possible intervention on Monday, will repeat CT scan then and monitor progression of abscess. Pt had a fever of 100.4 on 8/14 am.  -Plan: Tentative plan for possible drainage on Monday by IR. Surgery recs appreciated. Will repeat CTAP for Monday morning.    #Abdominal pain due to diverticulitis and perforation/peritonitis: prn hydromorphone. Monitor for any respiratory depression avoid if sedated.    #Acute kidney injury: Suspect multifactorial from sepsis, contrast from CT scans, n/v, hypotension. Cr is stabilizing in the mid-4's. Spoke with Nephrology today - holding off on dialysis for now.  -Plan: Nephrology recommendations appreciated. Avoid nephrotoxins, monitor I/O's.    #History of atrial fibrillation: Prn metoprolol. Holding digoxin, metoprolol, and warfarin.     #Type 2 diabetes mellitus: with vascular disease, continue  "monitoring blood sugar     #Supratherapeutic INR, resolved: INR was 4, now s/p 5mg IV vitK. INR <2 now with possible procedure.    #History of coronary artery disease s/p recent angiogram and stent placement on 7/27/2020: ASA 81, statin. Holding metoprolol, Lasix, lisinopril, and spironolactone due borderline blood pressure.    Chronic conditions:  #T2DM: hold home metformin.  #GERD: PPI.  #History of depression: sertraline.  #HFrEF: holding home ACEi, spironolactone, furosemide.    Subjective:     Interval History: Pt sitting in chair. She states that she was a little dyspneic with ambulation, but otherwise denies chest pain. Additionally, she reports improvement of her abdominal pain and reports that she is moving her bowels. She reports mild LE edema.    Chart reviewed, events noted. Pt seen and examined.     Objective     Vital signs in last 24 hours:  Temp:  [98.5  F (36.9  C)-98.9  F (37.2  C)] 98.5  F (36.9  C)  Heart Rate:  [78-94] 85  Resp:  [16-20] 18  BP: (104-143)/(52-65) 121/65  Weight:   190 lb 1.6 oz (86.2 kg)  Weight change:   Body mass index is 34.77 kg/m .    Intake/Output last 3 shifts:  I/O last 3 completed shifts:  In: 60 [P.O.:60]  Out: 125 [Urine:125]  Intake/Output this shift:  No intake/output data recorded.      Physical Exam:  /65 (Patient Position: Lying)   Pulse 85   Temp 98.5  F (36.9  C) (Oral)   Resp 18   Ht 5' 2\" (1.575 m)   Wt 190 lb 1.6 oz (86.2 kg)   LMP 01/25/1999   SpO2 98%   BMI 34.77 kg/m        O2 Device: None (Room air)        General: Alert, awake. NAD.  HEENT: Normocephalic, oral mucosa moist.  Eyes: Tracking.  Respiratory: Not in distress not using accessory muscles of respiration, no wheezing. On room air.  Heart: Irregularly irregular.  Abdomen: Soft, nontender today.  Extremities: LE edema.  Neurology: Alert awake moving extremities grossly    Imaging: Reviewed   Ct Abdomen Pelvis Without Oral Without Iv Contrast    Result Date: 8/14/2020  EXAM: CT " ABDOMEN PELVIS WO ORAL WO IV CONTRAST LOCATION: [facility] DATE/TIME: 8/14/2020 10:26 AM INDICATION: Abdominal infection suspected perforated diverticulitis, assess for abscess COMPARISON: 8/11/2020 TECHNIQUE: CT scan of the abdomen and pelvis was performed without oral or IV contrast. Multiplanar reformats were obtained. Dose reduction techniques were used. CONTRAST: None. FINDINGS: LOWER CHEST: Trace pleural fluid. Minimal scarring or edema at the lung bases. The heart is enlarged. There is coronary artery calcification. HEPATOBILIARY: Cholecystectomy. There is extrahepatic bile duct dilatation. PANCREAS: Pancreatic duct dilatation is not as well-seen on this study as on the comparison study. SPLEEN: Normal. ADRENAL GLANDS: Normal. KIDNEY/BLADDER: Normal kidneys and ureters. There is wall thickening of the nondistended urinary bladder. A Bardales catheter is present. BOWEL: There is colonic diverticulosis. There is stranding adjacent to the sigmoid colon in the region of diverticulitis. There is a complex 4 cm x 3 cm collection to the left of the sigmoid colon in this region (previously this was 2 cm x 2 cm). Minimal  extraluminal air is noted. LYMPH NODES: Normal. VASCULATURE: Atherosclerotic disease. PELVIC ORGANS: Fibroid uterus. MUSCULOSKELETAL: Normal.     1.  Again seen is sigmoid colon diverticulitis with a small contained perforation. A small fluid collection adjacent to the sigmoid colon has increased in size to 4 cm x 3 cm (previously 2 cm x 2 cm). Percutaneous drainage would be difficult though may be possible. 2.  Biliary and pancreatic ductal dilatation is not as well-seen as on the comparison study. See prior study for details.    Xr Chest 1 View Portable    Result Date: 7/22/2020  EXAM: XR CHEST 1 VIEW PORTABLE LOCATION: [facility] DATE/TIME: 7/22/2020 9:56 PM INDICATION: Increasing shortness of breath and orthopnea and patient with history of heart failure COMPARISON: CT  07/02/2019     Mild, irregular. No evidence for CHF or pneumonia. No pleural effusion or pneumothorax.    Xr Chest 2 Views    Result Date: 8/11/2020  EXAM: XR CHEST 2 VIEWS LOCATION: [facility] DATE/TIME: 8/11/2020 11:50 AM INDICATION: Cough. COMPARISON: None.     Negative chest. Lungs are clear. Coronary stenting.     Ct Abdomen Pelvis Without Oral With Iv Contrast    Result Date: 8/11/2020  EXAM: CT ABDOMEN PELVIS WO ORAL W IV CONTRAST LOCATION: [facility] DATE/TIME: 8/11/2020 11:41 AM INDICATION: right sided abdominal pain, nausea and vomiting COMPARISON: 7/22/2020 TECHNIQUE: CT scan of the abdomen and pelvis was performed following injection of IV contrast. Multiplanar reformats were obtained. Dose reduction techniques were used. CONTRAST: Iohexol (Omni) 75mL FINDINGS: LOWER CHEST: Minimal interstitial edema. The heart is mildly enlarged. There is coronary artery disease. HEPATOBILIARY: Cholecystectomy. There is intrahepatic and extrahepatic bile duct dilatation. There is pancreatic duct dilatation. PANCREAS: No discrete pancreatic masses identified. SPLEEN: Normal. ADRENAL GLANDS: Normal. KIDNEYS/BLADDER: Normal. BOWEL: There is colonic diverticulosis. There is acute diverticulosis involving the distal sigmoid colon with a small contained perforation. No drainable abscess at this point. LYMPH NODES: Normal. VASCULATURE: Atherosclerotic disease. Right renal artery stent. PELVIC ORGANS: Fibroid uterus. MUSCULOSKELETAL: Degenerative changes.     1.  Acute diverticulitis with a small contained perforation. No drainable abscess at this point. 2.  Biliary and pancreatic duct dilatation. No discrete pancreatic masses identified. Consider MRCP, ERCP, EUS.    Ct Abdomen Pelvis Without Oral With Iv Contrast    Result Date: 7/23/2020  EXAM: CT ABDOMEN PELVIS WO ORAL W IV CONTRAST LOCATION: [facility] DATE/TIME: 7/22/2020 11:58 PM INDICATION: Bowel obstruction high-grade suspected Nausea,  anorexia, abdominal distention in elderly patient. COMPARISON: 06/26/2019. TECHNIQUE: CT scan of the abdomen and pelvis was performed following injection of IV contrast. Multiplanar reformats were obtained. Dose reduction techniques were used. CONTRAST: Iohexol (Omni) 75mL FINDINGS: LOWER CHEST: moderate right atrial enlargement. HEPATOBILIARY: Prior cholecystectomy. Mild hepatomegaly. Mildly heterogeneous enhancement suggesting passive congestion. PANCREAS: Normal. SPLEEN: Normal. ADRENAL GLANDS: Normal. KIDNEYS/BLADDER: Mild circumferential urinary bladder wall thickening. No calcification. No hydronephrosis or hydroureter. No renal mass or stone. BOWEL: Moderate distal colonic diverticulosis. No evidence for acute diverticulitis. Small volume ascites. No free air. No evidence for bowel obstruction. LYMPH NODES: Normal. VASCULATURE: Unremarkable. PELVIC ORGANS: Multiple partially calcified uterine masses compatible with uterine fibroids. MUSCULOSKELETAL: Severe multilevel degenerative change.     1.  Mild cystitis suggested please correlate clinically. 2.  Otherwise no acute abnormality in the abdomen or pelvis. No evidence for bowel obstruction. 3.  Enlarged heterogeneous liver suggesting passive hepatic congestion. Given right atrial enlargement, IVC and hepatic vein distention, correlate clinically for right heart failure.       Lab Results:  personally reviewed.     Recent Results (from the past 24 hour(s))   POCT Glucose    Collection Time: 08/14/20 12:10 PM    Specimen: Blood   Result Value Ref Range    Glucose 96 70 - 139 mg/dL   INR    Collection Time: 08/14/20  1:59 PM   Result Value Ref Range    INR 4.97 (H) 0.90 - 1.10   POCT Glucose    Collection Time: 08/14/20  5:55 PM    Specimen: Blood   Result Value Ref Range    Glucose 107 70 - 139 mg/dL   POCT Glucose    Collection Time: 08/15/20  6:01 AM    Specimen: Blood   Result Value Ref Range    Glucose 109 70 - 139 mg/dL   Basic Metabolic Panel     Collection Time: 08/15/20  6:34 AM   Result Value Ref Range    Sodium 135 (L) 136 - 145 mmol/L    Potassium 4.1 3.5 - 5.0 mmol/L    Chloride 110 (H) 98 - 107 mmol/L    CO2 16 (L) 22 - 31 mmol/L    Anion Gap, Calculation 9 5 - 18 mmol/L    Glucose 106 70 - 125 mg/dL    Calcium 7.1 (L) 8.5 - 10.5 mg/dL    BUN 33 (H) 8 - 28 mg/dL    Creatinine 4.52 (H) 0.60 - 1.10 mg/dL    GFR MDRD Af Amer 12 (L) >60 mL/min/1.73m2    GFR MDRD Non Af Amer 10 (L) >60 mL/min/1.73m2   HM2(CBC W/O DIFF)    Collection Time: 08/15/20  6:34 AM   Result Value Ref Range    WBC 13.1 (H) 4.0 - 11.0 thou/uL    RBC 2.95 (L) 3.80 - 5.40 mill/uL    Hemoglobin 8.7 (L) 12.0 - 16.0 g/dL    Hematocrit 28.5 (L) 35.0 - 47.0 %    MCV 97 80 - 100 fL    MCH 29.5 27.0 - 34.0 pg    MCHC 30.5 (L) 32.0 - 36.0 g/dL    RDW 17.2 (H) 11.0 - 14.5 %    Platelets 131 (L) 140 - 440 thou/uL    MPV 12.1 8.5 - 12.5 fL   INR    Collection Time: 08/15/20  6:34 AM   Result Value Ref Range    INR 1.97 (H) 0.90 - 1.10       Advance Care Planning:   Barriers to discharge: Acute diverticulitis with perforation, MAY   Discharge day: To be decided  Disposition: TBD    I spent >35 minutes reviewing the patient's chart (this is my initial encounter with the patient), examining the patient, speaking with the patient, and ancillary medical personnel regarding patient care, and completing documentation. >50% of total encounter was spent face-to-face counseling and coordination of care.

## 2021-06-10 NOTE — PROGRESS NOTES
General Surgery Progress Note        Subjective:   Pt currently complaining about nausea.  No vomiting.  Has developed incontinent diarrhea.  She is not on any stool softeners.  Overnight had an episode of hypotension which has happened in the past.  Septic protocol was initiated and lactic acid was 2.1.  Patient does not complain about increasing abdominal pain.  She is coughing less but continues to have issues with oxygen desaturation and currently is on 8 L nasal cannula.  COVID testing still pending      Vitals:    08/22/20 2332 08/23/20 0025 08/23/20 0056 08/23/20 0801   BP: 112/53 91/46 103/45 108/49   Patient Position: Semi-nicole  Lying Semi-nicole   Pulse: 79 64 63 67   Resp: 22 20 22 18   Temp: 98.1  F (36.7  C) 97.8  F (36.6  C) 97.3  F (36.3  C) 97.7  F (36.5  C)   TempSrc: Oral Oral Oral Oral   SpO2: 92% 96% 96% 97%   Weight:       Height:           Physical Exam:  Patient in bed and as always looks fatigued her respiratory rate is up and breathing a little bit shorter and faster today.  Ab:       Soft, + BS, increasing tenderness right lower and suprapubic as well as left lower quadrant.  No rebound but has some guarding.  SHAMIKA 10 cc past 24 hours however has turned succus and a green bile liquid stool    Results from last 7 days   Lab Units 08/22/20  1839   LN-WHITE BLOOD CELL COUNT thou/uL 17.0*   LN-HEMOGLOBIN g/dL 8.1*   LN-HEMATOCRIT % 24.8*   LN-PLATELET COUNT thou/uL 115*       Results from last 7 days   Lab Units 08/22/20  1839  08/17/20  0520   LN-SODIUM mmol/L 138   < > 139   LN-POTASSIUM mmol/L 3.0*   < > 3.3*   LN-CHLORIDE mmol/L 109*   < > 107   LN-CO2 mmol/L 21*   < > 22   LN-BLOOD UREA NITROGEN mg/dL 9   < > 31*   LN-CREATININE mg/dL 0.84   < > 3.03*   LN-CALCIUM mg/dL 7.6*   < > 6.9*   LN-ALBUMIN g/dL  --   --  1.8*    < > = values in this interval not displayed.       Assessment:  70-year-old female Acute diverticulitis with perforation and abscess status post IR percutaneous drain  placement-now with new succus and SHAMIKA                        MAY resolved                        bilateral pneumonia-COVID been ruled out                        Multiple comorbidities including atrial fibrillation, type 2 diabetes, CAD status post stent placement to 7/27/2020, CHF and morbid obesity                      Leukocytosis      Plan:  Will discuss case with Dr. So.  Discussed with patient that my concerns that she has a leak and ongoing perforation.  Bowel contents look more like you would see from the small bowel. Best thing is to continue drain to contain perforation and continue conservative tx. Patient does not want surgery and she is at very high risk and surgery would not be in her best interest.  Appreciate ID input on antibiotics and antifungals.           SHAMIKA to remain SHAMIKA is at least containing perforation at this time.           Continue supportive cares.           And to recheck lactic acid this morning as well as CBC and BMP    Reviewed with Dr So- Lactic acid normal. Wbc same. Continue Supportive cares.       Nela Camacho PA-C [phone]    [facility]  [address] [place] [id number]

## 2021-06-10 NOTE — PROGRESS NOTES
Infectious Disease Follow up Note:    Service: Infectious Disease   Note: Follow Up Note  Date: 8/25/2020    Chief Complaint: Follow up for acute diverticulitis with perforation    ASSESSMENT: Chart reviewed  Patient at IR    Gardenia Muller is a 70 y.o. old female with acute diverticulitis with perforation.   C. dubliniensis -abdominal infection-- active issue    History of CVA, obesity, chronic abdominal pain.  Acute diverticulitis with perforation admitted on 8/11/2020.  On IV Cipro and Flagyl.  Cultures from abscess drainage on 8/17/2020 with Candida albicans.  CT scan on 8/20/2020 with near complete resolution of the abscess.  New leukocytosis.  No clear source.  C. difficile negative on 8/21/2020.  Feeling short of breath.  Chest x-ray ordered. HAP is a possibility.  Leukocytosis could be from untreated Candida infection.  Fluconazole added on 8/21/2020.  WBC better, at 17,000 on 8/22/2020.  Abdomen is better.  New bilateral interstitial infiltrate.  COVID-19 PCR negative on 8/22/2020.  Most likely pulmonary edema.  Penicillin allergy, reaction swelling.     Recommendations:       Continue Meropenem and IV Vanco and fluconazole   Increase Fluconazole dose  Monitor white blood cell count..  Monitor respiratory status and abdomen.  Hold statin  Drain management per IR and Surgery    Patient new to me. Please see previous note by Dr. Christopher Vela MD  [facility]  [phone]    Total Time Spent >35 minutes with >50% of the time spent in chart review, evaluation. Patient new to me    ______________________________________________________________________  Notes / labs / vitals reviewed.    SUBJECTIVE / INTERVAL HISTORY:     Patient at IR  Previous note:    Intermittent abdominal pain  Tolerating abx  Needs help with sitting up in bed      8/23: Abdomen is better.  Continue to have respiratory distress.  Now on a lot of oxygen.  COVID-19 negative.    ROS: A complete 12 point  ROS is negative except as listed above.    PMHx/FHx/SHx: Reviewed. Interval changes noted.    OBJECTIVE:  Vitals:    20 0902   BP:    Pulse: (!) 50   Resp:    Temp:    SpO2:        Temp (24hrs), Av  F (36.7  C), Min:97.7  F (36.5  C), Max:98.4  F (36.9  C)    GEN: NAD  EYES:  Anicteric, RAYMUNDO, EOM intact.  HEAD, EARS, NOSE, MOUTH, AND THROAT:  Oropharynx without thrush or ulcers  NECK:  Supple.   RESPIRATORY:  Normal breathing pattern. Supplemental oxygen  CARDIOVASCULAR:  Arm swelling  Previous exam: S1 S2 No murmur, click, gallop or rub. No dependent edema. No excess JVD.  ABDOMEN:  Soft, tenderness, SHAMIKA drain with serous drainage  MUSCULOSKELETAL:  Joints without effusion or acute inflammation. No muscle atrophy.Dedconditioned  LYMPHATIC:  No cervical or supraclavicular adenopathy  SKIN/HAIR/NAILS:  No rashes. No stigmata of infective endocarditis.  NEUROLOGIC:  Gross neurological exam non-focal.  PSYCHIATRIC:  A&Ox3, appropriate, mentation normal.    Antibiotics:  IV vancomycin  IV meropenem  IV fluconazole    Pertinent labs:    Results from last 7 days   Lab Units 20  0526 20  0555 20  0754 20  1839 20  0542  20  0720   LN-WHITE BLOOD CELL COUNT thou/uL 15.6* 15.9* 17.4* 17.0* 17.4*   < > 14.5*   LN-HEMOGLOBIN g/dL 7.6* 7.9* 8.3* 8.1* 8.3*   < > 8.2*   LN-HEMATOCRIT % 23.7* 24.5* 25.9* 24.8* 24.7*   < > 25.5*   LN-PLATELET COUNT thou/uL 106* 109* 112* 115* 115*   < > 136*   LN-NEUTROPHILS RELATIVE PERCENT %  --   --   --   --   --   --  74*   LN-MONOCYTES RELATIVE PERCENT %  --   --   --   --   --   --  13*   LN-MONOCYTES - REL (DIFF) %  --   --   --  4 8  --   --     < > = values in this interval not displayed.       Results from last 7 days   Lab Units 20  1638 20  0555 20  0754 20  1839   LN-SODIUM mmol/L  --  136 139 138   LN-POTASSIUM mmol/L  --  3.6 3.7 3.0*   LN-CHLORIDE mmol/L  --  107 108* 109*   LN-CO2 mmol/L  --  21* 20* 21*    LN-BLOOD UREA NITROGEN mg/dL  --  14 10 9   LN-CREATININE mg/dL 1.08 0.94 0.86 0.84   LN-CALCIUM mg/dL  --  8.1* 7.9* 7.6*   LN-ALBUMIN g/dL 1.8*  --   --   --    LN-PROTEIN TOTAL g/dL 5.6*  --   --   --    LN-BILIRUBIN TOTAL mg/dL 1.2*  --   --   --    LN-ALKALINE PHOSPHATASE U/L 109  --   --   --    LN-ALT (SGPT) U/L 32  --   --   --    LN-AST (SGOT) U/L 25  --   --   --        MICROBIOLOGY DATA:    Cultures reviewed  Culture:  C. dubliniensis     IMAGING/RADIOLOGY:  Reviewed  XR Chest 1 View Portable (Order [id number])   Imaging         Study Result     EXAM: XR CHEST 1 VIEW PORTABLE  LOCATION: [facility]  DATE/TIME: 8/21/2020 7:34 PM     INDICATION: r/o pna- shortness of breath  COMPARISON: 08/11/2020     IMPRESSION:   New bilateral interstitial infiltrates could represent edema or pneumonia including COVID. Enlarged cardiac silhouette. No pleural effusion. No definite pulmonary vascular congestion.

## 2021-06-10 NOTE — PROGRESS NOTES
General Surgery Progress Note    HD 9    Subjective:   Pt is feeling some nausea which has improved with Zofran.  Her pain is about the same.  She is frustrated with not being able to move.  She states that she cannot move due to the bed.  Overnight became hypotensive at 48/31 she did have some nausea and anxiety which has improved.  Morning vitals she still has some hypotension of 91/43 she is no longer tachycardic.  She is afebrile.  She states that she had a formed regular bowel movement this morning without any melena.  White count has jumped up to 17.1 and her hemoglobin is 6.6.  Urine output low at 550  Drain 50 cc / 24 hours    Vitals:    08/20/20 0228 08/20/20 0441 08/20/20 0612 08/20/20 0724   BP: 105/50 97/46 109/55 91/43   Patient Position:  Lying Semi-nicole Semi-nicole   Pulse: 94 79 80 78   Resp: 18 18  18   Temp:  98.5  F (36.9  C)  98.3  F (36.8  C)   TempSrc:  Oral  Oral   SpO2: 94% 95%  95%   Weight:       Height:           Physical Exam:  Lungs:  CTA  CV:       RRR  Ab:       Soft, + BS, generalized tenderness all 4 quadrants without rebound peritoneal signs present.  SHAMIKA is serous.   -output of urine and urine looks dark and savita    Results from last 7 days   Lab Units 08/20/20  0544   LN-WHITE BLOOD CELL COUNT thou/uL 17.1*   LN-HEMOGLOBIN g/dL 6.6*   LN-HEMATOCRIT % 20.9*   LN-PLATELET COUNT thou/uL 131*       Results from last 7 days   Lab Units 08/20/20  0544  08/17/20  0520   LN-SODIUM mmol/L 133*   < > 139   LN-POTASSIUM mmol/L 3.3*   < > 3.3*   LN-CHLORIDE mmol/L 102   < > 107   LN-CO2 mmol/L 21*   < > 22   LN-BLOOD UREA NITROGEN mg/dL 9   < > 31*   LN-CREATININE mg/dL 1.14*   < > 3.03*   LN-CALCIUM mg/dL 6.8*   < > 6.9*   LN-ALBUMIN g/dL  --   --  1.8*    < > = values in this interval not displayed.       Assessment: Acute diverticulitis with perforation and abscess status post IR percutaneous drain placement                  VS MAY resolved with a creatinine today of 1.14                    Multiple cold morbidities with A. fib, type 2 diabetes, CAD status post stent placement 7/27/2020, congestive heart failure and morbid obesity  Plan: Increasing leukocytosis and essentially no improvement of abdominal pain is concerning.  Would like to get a CT with IV and oral contrast.  Reviewed with Dr. Arambula.  We will do a bolus of 500 cc prior to contrast and after.  Nephrology has signed off but I would like input from medicine to make sure that they are aware that we would like to try contrast as this will give us better imaging.               Again there is no surgical plans as she is extremely high risk.  We hope to continue with conservative treatment of IV antibiotics.  CT to look for other abscess formation or worsening conditions that we need to be aware of.              Is also a drop in her hemoglobin which at this point clinically she does not appear to have active bleeding but this is also concerned.  We will recheck her hemoglobin later today.    Nela Camacho PA-C [phone]  [facility]  [address] [place] [id number]

## 2021-06-10 NOTE — PROGRESS NOTES
"  Clinical Nutrition Therapy Note        Subjective:  Pt now in ICU, weak.  Currently NPO.    Pt with poor po since admission per RD notes.  Chart reviewed.        Nutrition Prescription:   Diet: CL  IV dextrose or Fluids:DOPamine, Last Rate: 10 mcg/kg/min (08/28/20 0801)  norepinephrine, Last Rate: Stopped (08/28/20 0900)  sodium bicarbonate IV infusion in D5W, Last Rate: 50 mL/hr at 08/28/20 0403      Nutrition Intake:  Pt NPO/CL for >6 days and inadequate intake when on po diet.    Nutrition needs not met    Anthropometrics:  Height: 5' 2\" (157.5 cm)  Admission weight: 195#  Weight: 213 lb 14.4 oz (97 kg)    Physical Findings:  Edema nonpitting to +1.       GI Status/Output:   GI symptoms per nursing: rounded abdomen  Last BM recorded: 8/26 per chart  Emesis 8/26.    Skin/Wound:   James Scale Score: 14    No wound noted.    Medications:  Medications reviewed.    Labs:  Lab Results   Component Value Date/Time    PREALBUMIN 25.6 07/30/2019 12:17 PM    ALBUMIN 1.7 (L) 08/28/2020 04:31 AM     08/28/2020 04:31 AM    K 3.5 08/28/2020 04:31 AM    K 3.5 08/28/2020 04:31 AM    BUN 48 (H) 08/28/2020 04:31 AM    CREATININE 3.13 (H) 08/28/2020 04:31 AM     (H) 08/28/2020 04:31 AM    PHOS 3.2 08/25/2020 05:26 AM    MG 2.4 08/27/2020 08:11 PM   Labs reviewed    Estimated Nutrition Needs:  Using adjusted weight of 59 kg.    Energy Needs: 9693-2989 kcals daily per 25-30 kcal/kg   Protein Needs: 71-89 g daily, 1.2-1.5 g/kg.  Fluid Needs: 1770 mls daily, 30 mls/kg    Malnutrition: Noted previously    Nutrition Risk Level: high risk    Nutrition DX: inadequate intake r/t acute illness evidenced by NPO/CL x 6 days    Goals:  Meet nutrition needs-not met  Tolerate diet advancement-n/a  Electrolytes WNL-progressing    Plan:  Staff requesting swallow eval as pt did not pass nursing eval.  Pt may benefit from nutrition support.      Monitor:   Diet start, per goals.    See Care Plan for Problems, Goals, and " Interventions.

## 2021-06-10 NOTE — PROGRESS NOTES
Spiritual Care Note    Spiritual Assessment:   made an in person visit with patient this morning. Patient has bi-pap in place but is aware of  presence. No family present.  asked patient not to talk in order to not tire her.     Care Provided:  When asked if she would like prayer for support patient motions she would like prayer. Prayer and words of support offered.     Plan of Care:  Spiritual care will continue to follow as part of patient's care team.    ALPHONSE Valdez, BCC

## 2021-06-10 NOTE — PROGRESS NOTES
Occupational Therapy     08/15/20 1515   Visit Specifics   Eval Type Inital eval   Inital OT Consult  08/15/20   Bed/Tabs/Pad Alarm Applied Not applicable   Treatment Time   ADL Training 10   General   Onset date 08/11/20   Additional Pertinent History diverticulitis   Chart Reviewed Yes   PT/OT Patient/Caregiver Stated Goals none stated   Family/Caregiver Present No   Precautions   Weight Bearing Status wbat   Home Living   ADL Devices Dressing Stick;Reacher;Sock aid   Bathroom Shower/Tub Walk-in shower   Bathroom Toilet Raised   Bathroom Equipment Shower chair;Grab bars in shower;Grab bars around toilet   Mobility Equipment Walker-4 wheeled;Electric scooter   Additional Comments see PT   Prior Status   Independent With Dressing;Toileting   Needs Assistance With Bathing;Medication set up;Transfers;Finances;Cooking;Cleaning   Comments see PT   Activity Tolerance   Endurance Fair   Balance   Standing Balance CGA;Min assist   Bed Mobility   Sit to Supine Min assist   Functional Transfers   Functional Transfers Bed Transfers;Chair Transfers   Bed Transfers Assist of 2;Min assist  (1st attempt with A of 1)   Transfer Cues Safety;Correct hand placement;Step by step instructions   Transfer Deficits Fatigue;Pain;Increased effort;Increased time;Decreased balance;Decreased flexibility;Weakness   Comments 2 attempts needed to t/f from chair to bed   Ambulation   Location To bed;In room   Assistance CGA;Min Assist;Assist of 2   Device Rolling Walker   Verbal Cues LE advancement;Safety;Sequencing   Cognition   Overall Cognitive Status Further cognitive assessment recommended   Behavior    Behavior During Session Cooperative   Assessment   Assessment Decreased ADL status;Decreased funtional mobility   Prognosis Good   Treatment/Interventions ADL training;Functional transfer training   OT Frequency Daily   Goal Formulation Patient   Recommendations   OT Discharge Recommendation TCU   Treatment Suggestions for Next Session  dressing with DME, G/H in sitting, T/F, cof assessment   OT Care Plan REVIEWED DAILY Yes, goals remain appropriate

## 2021-06-10 NOTE — PLAN OF CARE
Problem: Pain  Goal: Patient's pain/discomfort is manageable  8/19/2020 0539 by Lynn Quiroz, RN  Outcome: Progressing  Pt reports 6-8/10 abdominal pain, PRN oxy given with relief. Will continue to monitor.    Problem: Daily Care  Goal: Daily care needs are met  8/19/2020 0539 by Lynn Quiroz, RN  Outcome: Progressing  Pt denies nausea, chest pain or shortness of breath. Abscess drain flushed, small amount of serosang output. BS present. Passing gas. IV abx. [place] patent with savita output. Pt did sleep on and off this shift.VSS, afebrile. Will continue to monitor.

## 2021-06-10 NOTE — PROGRESS NOTES
[facility]    Facility:   [facility] [[id number]]   Code Status: FULL CODE and POLST AVAILABLE      CHIEF COMPLAINT/REASON FOR VISIT:  Chief Complaint   Patient presents with     Review Of Multiple Medical Conditions     abdominal pain, HTN, Hypotension, tobacco cessation       HISTORY:      HPI: Gardenia is a 69 y.o. 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    Gardenia is seen at [facility] for new admission after recent hospitalization from July 22 to July 31.  She was also hospitalized in June from June 27 to July 3 and had been at another TCU prior to her recent hospitalization.  She presented with severely elevated INR and LFTs with an INR of 13 on admit.  Possibly secondary to top of acute liver failure secondary to CHF.  This was reversed with vitamin K and fresh frozen plasma.  Her work-up was negative for hepatitis and a TTE showed right vent dysfunction with an EF of 40%.  During her hospitalization she was diuresed with Lasix and chlorothiazide and also underwent right and left heart cath.  See previous hospital notes for full details.  She developed A. fib  with RVR that was controlled with digoxin.  She had hyperkalemia that improved with diuresis and Kayexalate.  Also hypercalcemia and the chlorthalidone was discontinued.  She will continue on aspirin 81 daily, ticagrelor, and warfarin.    Acute diastolic heart failure and hypertension: Metoprolol supinate 12.5, spironolactone, potassium chloride 20 mEq daily, lisinopril, magnesium gluconate, furosemide 20 mg daily, digoxin.    A. Fib: On warfarin, INR 1.75 today.  Has been on 4 and 3 mg of Coumadin.    Diabetes type 2: On metformin 500 twice daily, would like her blood sugar checked twice daily as well.    CAD with recent stent placement: Continue Brilinta, aspirin 81, anticoagulated.  Atorvastatin on hold until LFTs return to normal.  -Check LFTs Friday.    Today she is seen sitting up in her wheelchair in the nursing home.  She has not seen therapy yet.  She has a brief understanding of her complicated hospital course and is able to tell me that it was caused by high INR.  Her main concern today is making sure that we get her blood sugars checked as the facility has not been doing so.   She is alert and oriented at what seems to be her baseline.  She is denying any concerns with shortness of breath, chest pain, nausea, vomiting, constipation or diarrhea, urinary symptoms.  [facility] at an [facility].       Past Medical History:   Diagnosis Date     Acute diastolic heart failure (H) 11/2015     Acute on chronic diastolic heart failure (H) 6/15/2019     Advanced directives, counseling/discussion 8/5/2015    Patient completed 2011.  Discussed today, 5/24/17, and wants to change healthcare agent from niece to daughter.  Discussed that she will need to complete new form to indicate this.     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  Recommend 5 year follow-up.  Single tubular adenoma     Biliary  obstruction 10/3/2018    Added automatically from request for surgery [id number]     Cerebral vascular accident (H)      Coronary artery disease 2006    100% RCA with collateral filling per Dr. Elizabeth's angio report     Diabetes mellitus type 2 in obese (H)      Fibrocystic breast      GERD (gastroesophageal reflux disease)      History of anesthesia complications     slow to wake     Hypertension      Peripheral vascular disease (H)      Pneumonia 11/11/2018     PONV (postoperative nausea and vomiting)      S/P cholecystectomy 6/22/2018     SBO (small bowel obstruction) (H) 11/12/2015     Stroke (H)      Transaminitis 10/22/2018     Upper respiratory infection 12/20/2018     Urinary incontinence              Family History   Problem Relation Age of Onset     Cancer Paternal Grandmother      Cancer Paternal Uncle      No Medical Problems Mother      No Medical Problems Father      Heart attack Sister      Chronic Kidney Disease Sister      No Medical Problems Daughter      No Medical Problems Maternal Grandmother      No Medical Problems Maternal Grandfather      No Medical Problems Paternal Grandfather      No Medical Problems Maternal Aunt      No Medical Problems Paternal Aunt      Diabetes Brother      BRCA 1/2 Neg Hx      Breast cancer Neg Hx      Colon cancer Neg Hx      Endometrial cancer Neg Hx      Ovarian cancer Neg Hx      Social History     Socioeconomic History     Marital status: Legally      Spouse name: None     Number of children: 1     Years of education: None     Highest education level: None   Occupational History     None   Social Needs     Financial resource strain: None     Food insecurity:     Worry: None     Inability: None     Transportation needs:     Medical: None     Non-medical: None   Tobacco Use     Smoking status: Former Smoker     Packs/day: 0.25     Smokeless tobacco: Former User     Tobacco comment: e-cig a few times/day   Substance and Sexual Activity     Alcohol use: No      Drug use: No     Sexual activity: None   Lifestyle     Physical activity:     Days per week: None     Minutes per session: None     Stress: None   Relationships     Social connections:     Talks on phone: None     Gets together: None     Attends Restorationist service: None     Active member of club or organization: None     Attends meetings of clubs or organizations: None     Relationship status: None     Intimate partner violence:     Fear of current or ex partner: None     Emotionally abused: None     Physically abused: None     Forced sexual activity: None   Other Topics Concern     None   Social History Narrative    Single/, lives alone; daughter in [place];    Gets help from neighbors and extended family    Former smoker.no alcohol problems       REVIEW OF SYSTEM:  Pertinent items are noted in HPI.    PHYSICAL EXAM:   BP (!) 86/66 (Patient Position: Standing)   Pulse 83   Wt 138 lb 3.2 oz (62.7 kg)   LMP 01/25/1999   BMI 24.48 kg/m    General appearance: alert, appears stated age and cooperative  HEENT: Head is normocephalic with normal hair distribution. No evidence of trauma. Ears: Without lesions or deformity. No acute purulent discharge. Eyes: Conjunctivae pink with no scleral icterus or erythema.   Lungs: clear to auscultation bilaterally, respirations without effort  Heart: regular rate and irregular rhythm, S1, S2 normal, no murmur, click, rub or gallop  Abdomen: soft, non-tender; bowel sounds normal; no masses,  no organomegaly  Extremities: extremities normal, atraumatic, no cyanosis, +1 edema in lower extremities  Pulses: 2+ and symmetric  Skin: Skin color, texture, turgor normal. No rashes or lesions  Neurologic: Grossly normal   Psych: interacts well with caregivers, exhibits logical thought processes and connections, pleasant      LABS:   None today.     ASSESSMENT:    1. Acute liver failure without hepatic coma     2. Acute on chronic combined systolic (congestive) and diastolic  (congestive) heart failure (H)     3. Essential hypertension     4. Type 2 diabetes mellitus with other circulatory complication, without long-term current use of insulin (H)         PLAN:    Acute diastolic heart failure and hypertension: Metoprolol supinate 12.5, spironolactone, potassium chloride 20 mEq daily, lisinopril, magnesium gluconate, furosemide 20 mg daily, digoxin.    A. Fib: On warfarin, INR 1.75 today.  Has been on 4 and 3 mg of Coumadin.    Diabetes type 2: On metformin 500 twice daily, would like her blood sugar checked twice daily as well.    CAD with recent stent placement: Continue Brilinta, aspirin 81, anticoagulated.  Atorvastatin on hold until LFTs return to normal.  -Check LFTs Friday.    Advance care planning: Patient elects remain full code.    Greater than 35 minutes with 20 minutes spent face-to-face with patient and nursing spent with obtaining history, education regarding anticoagulation, diabetes monitoring and diet, and plans for TCU as well as laboratory monitoring as well as discharge planning for eventual return to [facility].    Electronically signed by: Arleth Barton CNP

## 2021-06-10 NOTE — PROGRESS NOTES
[facility] Daily Progress Note      Date of Service: 8/13/2020      Brief History:     70 y.o. old female  Patient has history of chronic atrial fibrillation, diastolic CHF, hypertension, coronary artery disease status post stenting, peripheral artery disease type 2 diabetes mellitus malnutrition chronic abdominal pain calculus cholecystitis status post cholecystectomy depression admitted to ED for evaluation abdominal pain and vomiting CT scan revealed acute diverticulitis with perforation general surgery was consulted surgery recommended keeping n.p.o. but okay for oral medication with sip of water and ice chips.  In the ED she was hypoglycemic also and she was started on D5 normal saline.  On 8/12/2020 creatinine elevated IV fluid increased to 125 mils per hour with normal saline subsequently on 8/13/2020 creatinine further up nephrology consult      Assessment & Plan:  #Acute perforated diverticulitis with a small abscess currently on IV Cipro and Flagyl surgery following.  At this time surgery okay to use aspirin and Brilinta    #Abdominal pain due to diverticulitis and perforation/peritonitis IV started on IV morphine monitor for any respiratory depression avoid if sedated    #History of atrial fibrillation patient IV metoprolol at this time will resume oral digoxin and metoprolol-no surgical intervention planned.  Coumadin on hold    #Type 2 diabetes mellitus with vascular disease continue monitoring blood sugar and blood sugar low will change IV fluid with dextrose containing fluid    Acute kidney injury probably: From sepsis fluid sequestration IV fluid today changed to normal saline at the rate 125 mill per hour we will also consult nephrology      #History of coronary artery disease: Status post recent angiogram and stent placement on 7/27/2020 aspirin resumed if no surgical intervention planned will need to resume Brilinta once okay from surgery    #History of depression we will resume home  "medication          Subjective:     Interval History: feels  okay still some nausea and belly pain present    Chart reviewed, events noted. Pt seen and examined.     Objective     Vital signs in last 24 hours:  Temp:  [98.2  F (36.8  C)-98.8  F (37.1  C)] 98.8  F (37.1  C)  Heart Rate:  [63-72] 72  Resp:  [16-20] 18  BP: ()/(48-58) 120/56  Weight:   196 lb 1.6 oz (89 kg)  Weight change: 13.6 oz (0.386 kg)  Body mass index is 35.87 kg/m .    Intake/Output last 3 shifts:  I/O last 3 completed shifts:  In: 1461.7 [I.V.:1461.7]  Out: 5 [Urine:5]  Intake/Output this shift:  No intake/output data recorded.      Physical Exam:  /56 (Patient Position: Semi-nicole)   Pulse 72   Temp 98.8  F (37.1  C) (Oral)   Resp 18   Ht 5' 2\" (1.575 m)   Wt 196 lb 1.6 oz (89 kg)   LMP 01/25/1999   SpO2 96%   BMI 35.87 kg/m        O2 Device: None (Room air)        General: Alert, awake. Distress present  HEENT: Normocephalic, oral mucosa moist    Respiratory  : not in distress not using accessory muscles of respiration     Abdomen: Soft, tenderness better than yesterday but is still diffuse    Neurology: Alert awake moving extremities grossly      Imaging:   reviewed   Xr Chest 1 View Portable    Result Date: 7/22/2020  EXAM: XR CHEST 1 VIEW PORTABLE LOCATION: [facility] DATE/TIME: 7/22/2020 9:56 PM INDICATION: Increasing shortness of breath and orthopnea and patient with history of heart failure COMPARISON: CT 07/02/2019     Mild, irregular. No evidence for CHF or pneumonia. No pleural effusion or pneumothorax.    Xr Chest 2 Views    Result Date: 8/11/2020  EXAM: XR CHEST 2 VIEWS LOCATION: [facility] DATE/TIME: 8/11/2020 11:50 AM INDICATION: Cough. COMPARISON: None.     Negative chest. Lungs are clear. Coronary stenting.     Ct Abdomen Pelvis Without Oral With Iv Contrast    Result Date: 8/11/2020  EXAM: CT ABDOMEN PELVIS WO ORAL W IV CONTRAST LOCATION: [facility] DATE/TIME: 8/11/2020 " 11:41 AM INDICATION: right sided abdominal pain, nausea and vomiting COMPARISON: 7/22/2020 TECHNIQUE: CT scan of the abdomen and pelvis was performed following injection of IV contrast. Multiplanar reformats were obtained. Dose reduction techniques were used. CONTRAST: Iohexol (Omni) 75mL FINDINGS: LOWER CHEST: Minimal interstitial edema. The heart is mildly enlarged. There is coronary artery disease. HEPATOBILIARY: Cholecystectomy. There is intrahepatic and extrahepatic bile duct dilatation. There is pancreatic duct dilatation. PANCREAS: No discrete pancreatic masses identified. SPLEEN: Normal. ADRENAL GLANDS: Normal. KIDNEYS/BLADDER: Normal. BOWEL: There is colonic diverticulosis. There is acute diverticulosis involving the distal sigmoid colon with a small contained perforation. No drainable abscess at this point. LYMPH NODES: Normal. VASCULATURE: Atherosclerotic disease. Right renal artery stent. PELVIC ORGANS: Fibroid uterus. MUSCULOSKELETAL: Degenerative changes.     1.  Acute diverticulitis with a small contained perforation. No drainable abscess at this point. 2.  Biliary and pancreatic duct dilatation. No discrete pancreatic masses identified. Consider MRCP, ERCP, EUS.    Ct Abdomen Pelvis Without Oral With Iv Contrast    Result Date: 7/23/2020  EXAM: CT ABDOMEN PELVIS WO ORAL W IV CONTRAST LOCATION: [facility] DATE/TIME: 7/22/2020 11:58 PM INDICATION: Bowel obstruction high-grade suspected Nausea, anorexia, abdominal distention in elderly patient. COMPARISON: 06/26/2019. TECHNIQUE: CT scan of the abdomen and pelvis was performed following injection of IV contrast. Multiplanar reformats were obtained. Dose reduction techniques were used. CONTRAST: Iohexol (Omni) 75mL FINDINGS: LOWER CHEST: moderate right atrial enlargement. HEPATOBILIARY: Prior cholecystectomy. Mild hepatomegaly. Mildly heterogeneous enhancement suggesting passive congestion. PANCREAS: Normal. SPLEEN: Normal. ADRENAL GLANDS:  Normal. KIDNEYS/BLADDER: Mild circumferential urinary bladder wall thickening. No calcification. No hydronephrosis or hydroureter. No renal mass or stone. BOWEL: Moderate distal colonic diverticulosis. No evidence for acute diverticulitis. Small volume ascites. No free air. No evidence for bowel obstruction. LYMPH NODES: Normal. VASCULATURE: Unremarkable. PELVIC ORGANS: Multiple partially calcified uterine masses compatible with uterine fibroids. MUSCULOSKELETAL: Severe multilevel degenerative change.     1.  Mild cystitis suggested please correlate clinically. 2.  Otherwise no acute abnormality in the abdomen or pelvis. No evidence for bowel obstruction. 3.  Enlarged heterogeneous liver suggesting passive hepatic congestion. Given right atrial enlargement, IVC and hepatic vein distention, correlate clinically for right heart failure.       Lab Results:  personally reviewed.     Recent Results (from the past 24 hour(s))   HM2(CBC W/O DIFF)    Collection Time: 08/12/20  1:21 PM   Result Value Ref Range    WBC 13.0 (H) 4.0 - 11.0 thou/uL    RBC 3.77 (L) 3.80 - 5.40 mill/uL    Hemoglobin 11.1 (L) 12.0 - 16.0 g/dL    Hematocrit 37.3 35.0 - 47.0 %    MCV 99 80 - 100 fL    MCH 29.4 27.0 - 34.0 pg    MCHC 29.8 (L) 32.0 - 36.0 g/dL    RDW 16.8 (H) 11.0 - 14.5 %    Platelets 159 140 - 440 thou/uL    MPV 12.7 (H) 8.5 - 12.5 fL   INR    Collection Time: 08/12/20  1:21 PM   Result Value Ref Range    INR 4.23 (H) 0.90 - 1.10   Basic Metabolic Panel    Collection Time: 08/12/20  1:21 PM   Result Value Ref Range    Sodium 135 (L) 136 - 145 mmol/L    Potassium 4.5 3.5 - 5.0 mmol/L    Chloride 107 98 - 107 mmol/L    CO2 17 (L) 22 - 31 mmol/L    Anion Gap, Calculation 11 5 - 18 mmol/L    Glucose 121 70 - 125 mg/dL    Calcium 8.8 8.5 - 10.5 mg/dL    BUN 27 8 - 28 mg/dL    Creatinine 2.49 (H) 0.60 - 1.10 mg/dL    GFR MDRD Af Amer 23 (L) >60 mL/min/1.73m2    GFR MDRD Non Af Amer 19 (L) >60 mL/min/1.73m2   HM2(CBC W/O DIFF)    Collection  Time: 08/13/20  7:24 AM   Result Value Ref Range    WBC 12.8 (H) 4.0 - 11.0 thou/uL    RBC 3.33 (L) 3.80 - 5.40 mill/uL    Hemoglobin 9.8 (L) 12.0 - 16.0 g/dL    Hematocrit 33.3 (L) 35.0 - 47.0 %     80 - 100 fL    MCH 29.4 27.0 - 34.0 pg    MCHC 29.4 (L) 32.0 - 36.0 g/dL    RDW 16.8 (H) 11.0 - 14.5 %    Platelets 135 (L) 140 - 440 thou/uL    MPV 12.4 8.5 - 12.5 fL   Creatinine    Collection Time: 08/13/20  9:41 AM   Result Value Ref Range    Creatinine 3.61 (H) 0.60 - 1.10 mg/dL    GFR MDRD Af Amer 15 (L) >60 mL/min/1.73m2    GFR MDRD Non Af Amer 12 (L) >60 mL/min/1.73m2       Advance Care Planning:   Barriers to discharge: Acute diverticulitis with perforation  Anticipated discharge day: To be decided  Disposition:   Michael Pink MD  Hospitalist  [phone]

## 2021-06-10 NOTE — ED NOTES
[facility] ED Handoff Report    ED Chief Complaint:    Chief Complaint   Patient presents with     Abdominal Pain       ED Diagnosis:    1. Diverticulitis of large intestine with perforation without bleeding     2. Abdominal pain, right lower quadrant     3. Nausea     4. T wave inversion in EKG          PMH:    Past Medical History:   Diagnosis Date     Acute diastolic heart failure (H) 11/2015     Acute on chronic diastolic heart failure (H) 6/15/2019     Advanced directives, counseling/discussion 8/5/2015    Patient completed 2011.  Discussed today, 5/24/17, and wants to change healthcare agent from niece to daughter.  Discussed that she will need to complete new form to indicate this.     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  Recommend 5 year follow-up.  Single tubular adenoma     Biliary obstruction 10/3/2018    Added automatically from request for surgery [id number]     Cerebral vascular accident (H)      Coronary artery disease 2006    100% RCA with collateral filling per Dr. Elizabeth's angio report     Diabetes mellitus type 2 in obese (H)      Fibrocystic breast      GERD (gastroesophageal reflux disease)      History of anesthesia complications     slow to wake     Hypertension      Peripheral vascular disease (H)      Pneumonia 11/11/2018     PONV (postoperative nausea and vomiting)      S/P cholecystectomy 6/22/2018     SBO (small bowel obstruction) (H) 11/12/2015     Stroke (H)      Transaminitis 10/22/2018     Upper respiratory infection 12/20/2018     Urinary incontinence         Code Status:  Prior     Current Living Situation/Residence: Apartment  Self    Elimination Status:  continent    Activity Level:  Modified independent with Assist of 1    Patients Preferred Language:  English     Needed: Yes    Infection: Diverticulitis      Vital Signs:    Vitals:    08/11/20  1246   BP: 120/57   Pulse:    Resp:    Temp:    SpO2:         Cardiac Rhythm:     Pain Score:  0/10    Is the Patient Confused:  No    Last Food or Drink: yesterday    Focused Assessment:  Pt pain is better with Morphine 4mg she received.     Tests Performed:  Imaging    Abnormal Results:    Abnormal Labs Reviewed   URINALYSIS,MACRO REFLEX MICRO, UC IF INDICATED - Abnormal; Notable for the following components:       Result Value    Ketones, UA Trace (*)     Protein, UA Trace (*)     Bacteria, UA Few (*)     Squam Epithel, UA 5-10 (*)     Mucus, UA Few (*)     Hyaline Casts, UA 5-10 (*)     All other components within normal limits    Narrative:     UC not indicated   BASIC METABOLIC PANEL - Abnormal; Notable for the following components:    Sodium 135 (*)     GFR MDRD Non Af Amer 58 (*)     All other components within normal limits    Narrative:     Fasting Glucose reference range is 70-99 mg/dL per  American Diabetes Association (ADA) guidelines.   HEPATIC PROFILE - Abnormal; Notable for the following components:    Bilirubin, Total 1.3 (*)     Bilirubin, Direct 0.8 (*)     Protein, Total 8.2 (*)     Albumin 3.0 (*)     Alkaline Phosphatase 135 (*)     All other components within normal limits   HM1 (CBC WITH DIFF) - Abnormal; Notable for the following components:    WBC 11.4 (*)     MCHC 29.7 (*)     RDW 16.9 (*)     Neutrophils % 73 (*)     Lymphocytes % 15 (*)     Neutrophils Absolute 8.3 (*)     Monocytes Absolute 1.1 (*)     All other components within normal limits   INR - Abnormal; Notable for the following components:    INR 2.65 (*)     All other components within normal limits    Narrative:     INR Therapeutic Ranges:  Mech. Valve 2.5-3.5  Post Surg.  2.0-3.0  DVT/PE      2.0-3.0       XR Chest 2 Views   Final Result   Negative chest. Lungs are clear. Coronary stenting.            CT Abdomen Pelvis Without Oral With IV Contrast   Final Result   1.  Acute diverticulitis with a small contained perforation.  No drainable abscess at this point.   2.  Biliary and pancreatic duct dilatation. No discrete pancreatic masses identified. Consider MRCP, ERCP, EUS.           Treatments Provided:  Sinus dorcas    Family Dynamics/Concerns: No    ED Medications:    Medications   naloxone injection 0.2-0.4 mg (NARCAN) (has no administration in time range)     Or   naloxone injection 0.2-0.4 mg (NARCAN) (has no administration in time range)   morphine injection 4 mg (has no administration in time range)   sodium chloride 0.9% (has no administration in time range)   morphine injection 4 mg (4 mg Intravenous Given 8/11/20 1115)   ondansetron injection 4 mg (ZOFRAN) (4 mg Intravenous Given 8/11/20 1115)   iohexoL 350 mg iodine/mL injection 75 mL (OMNIPAQUE) (75 mL Intravenous Given 8/11/20 1134)   ciprofloxacin IVPB 400 mg (CIPRO) ( Intravenous Restarted 8/11/20 1331)   metroNIDAZOLE IVPB 500 mg (FLAGYL) ( Intravenous Restarted 8/11/20 1331)   dextrose 50 % (D50W) syringe 25 mL (25 mL Intravenous Given 8/11/20 1332)        Additional Information: Pt lives in assisted living with some assistance with ADLs.  Pt has hx of SBO.  Last BM was yesterday described as diarrhea x1.  Emesis today x3.  BS 72 & 75. See MAR for dextrose 25mL. Pt is pleasant and cooperative.  Came to us from TCU for rehab after a recent hospitalization here at [facility].  18g in [facility]      Martha Barker RN 8/11/2020 1:44 PM

## 2021-06-10 NOTE — TELEPHONE ENCOUNTER
Received a faxed INR result for Gardenia Muller  From [facility]  INR result dated 8/1/2020 is 1.75

## 2021-06-10 NOTE — PLAN OF CARE
Problem: Pain  Goal: Patient's pain/discomfort is manageable  Outcome: Progressing   Patient complained of right foot pain, was unable to bear weight on foot. Patient does have some type of callous on bottom of foot, Mepilex placed to cushion. X-ray done , negative for fracture.    Problem: Daily Care  Goal: Daily care needs are met  Outcome: Progressing   No complaints of abdominal pain or nausea, tolerating clears, no coughing observed with liquids. Patient continues on IV antibiotics, WBC elevated, patient has been afebrile.

## 2021-06-10 NOTE — PROCEDURES
[facility]    Procedure: IR Procedure Note    Date/Time: 8/17/2020 10:27 AM  Performed by: Kolton Mejía MD  Authorized by: Kolton Mejía MD       Universal Protocol    Site marked: Yes    Prior images obtained and reviewed: Yes    Required items: required blood products, implants, devices, and special equipment available    Patient identity confirmed: verbally with patient    Reevaluation: Patient was reevaluated immediately before administering moderate or deep sedation or anesthesia    Confirmation checklist: patient's identity using two indicators, relevant allergies, procedure was appropriate and matched the consent or emergent situation and correct equipment/implants were available    Time out: Immediately prior to procedure a time out was called to verify the correct patient, procedure, equipment, support staff and site/side marked as required    Universal Protocol: Joint Commission Universal Protocol was followed    Preparation: Patient was prepped and draped in the usual sterile fashion    Anesthesia    Local anesthesia used?: Yes    Sedation    Patient sedation: Yes    Vital signs: Vital signs monitored during sedation  Specimens: fluid and/or tissue for gram stain and culture  Complications: None    Post-procedure    Patient tolerance: Patient tolerated the procedure well with no immediate complications   Length of time physician present for 1:1 monitoring during sedation: 15

## 2021-06-10 NOTE — PROCEDURES
IR Procedure Note    Physician: Altaf Bateman MD    Procedure:  Abscessogram    Findings/Plan:  Abscessogram shows no residual abscess. 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  Drain will be to gravity drainage bag.  No flushes are needed.  Follow up in 1 week with abscessogram.

## 2021-06-10 NOTE — PLAN OF CARE
Problem: Pain  Goal: Patient's pain/discomfort is manageable  Outcome: Progressing  Pt had complaints of abdominal pain but her BP was too low to medicate with narcotics. Hot pack given to abdomen. Will continue to monitor and intervene as needed.     Problem: Safety  Goal: Patient will be injury free during hospitalization  Outcome: Progressing  Pt is being turned and repositioned as needed. Low BP's this shift. MD notified and given a dose of albumin. BP's still soft but coming up. Will continue to monitor.

## 2021-06-10 NOTE — PLAN OF CARE
Problem: Pain  Goal: Patient's pain/discomfort is manageable  Outcome: Progressing  Pt c/o left side abdominal pain 7 out of 10. Taking Oxycodone 5mg every 4 hours PRN. Pain med effective. Will continue to monitor.      Problem: Discharge Barriers  Goal: Patient's discharge needs are met  Outcome: Progressing  Diet advanced to full liquids. Pt tolerating. No nausea or vomiting. Pt did not want to get out of bed this shift. Encouraged multiple times. Pt stated she feels weak and will get up tomorrow. Pt needs much encouragement. Small output out of drain.

## 2021-06-10 NOTE — PROGRESS NOTES
"Progress Note    Brief summary:  Per previous provider summary is\"   70 year  old female with history of HTN, T2DM, chronic atrial fibrillation on warfarin, diastolic CHF, coronary artery disease status post stenting (7/27/2020), peripheral artery disease, calculus cholecystitis status post cholecystectomy, depression, who came to [facility] on 8/11/2020 for abdominal pain and vomiting. CT scan revealed acute diverticulitis with perforation. on 8/14 showed increased size of abscess from 2x2cm to 4x3cm, though pt reports clinical improvement of her symptoms.. General surgery consulted - no current plans for operating room so interventional radiology was consulted for possible drainage which was done on 8/17/2020 by placing CT scan guided drain placement in the left pelvic diverticular abscess, repeat CTAP (8/20) - near complete collapse of fluid cavity.   Patient developed new leukocytosis.  Chest x-ray showed possible hospital-acquired pneumonia.  Due to concern of leukocytosis being from untreated Candida infection (at least improved Ashley dubliniensis), so she was started on  Fluconazole. WBC slowly improving.  On 8/25, patient was noted to be shortness of breath and given 25 lbs weight gain since admission, started on iv lasix.   On 8/26, developed acute kidney injury. Started having episodes of bradycardia. Digoxin level elevated. Started on digifab and transferred to ICU. Required Atropine in ICU for HR as low as 20-40s. Started on dopamine\"    Assessment/Plan  Principal Problem:    Diverticulitis  Active Problems:    Bradyarrhythmia    Acute kidney injury (H)    Diverticulitis of large intestine with perforation and abscess without bleeding    Atrial fibrillation with rapid ventricular response (H)    Obstructive sleep apnea syndrome    Poisoning by digoxin, accidental or unintentional, initial encounter    Acute respiratory failure with hypoxia and hypercapnia (H)    Acute metabolic " encephalopathy    Shock circulatory (H)    Metabolic acidosis      1. Digoxin toxicity: bradyarrhythmia, renal failure, hyperkalemia. EKG showed junctional rhythm. Digoxin level of 6. S/p Digifab.  Now in sinus rhythm.  Continue dopamine drip waiting for resolution of dig toxicity.  Low threshold for TV pacing repeat digoxin level pending. Cardiology on board.  2. MAY: Likely due to contrast, dig toxicity, lisinopril with metabolic acidosis and hyperkalemia. due to digoxin. Digoxin stopped. Holding diuretics, lisinopril.  Not making enough urine.  Hemodialysis today.    3. Acute perforated diverticulitis with pericolonic abscess: Initially started on Cipro and Flagyl.  Status post CT-guided drain placement on 8/17, culture growing Candida.  CT abdomen on 8/20/2020 with near complete resolution of the abscess. 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  Patient is now on meropenem, daptomycin and fluconazole.  vanco switched to dapto on 8/26.    4. Hospital-acquired pneumonia: On meropenem and daptomycin.  Covid negative X 2.   5. Fever : No fevers in last 24 hours . Treating for pneumonia. Drug fever?  6. Leukocytosis: Likely due to above  7. Acute on chronic diastolic CHF: weight is up by 25 lbs since admission. Holding lasix due to MAY, hemodialysis today.  8. Acute hypoxic resp failure: Likely from heart failure as well as pneumonia.  3 L nasal cannula saturating well   9. transaminitis: likely from hepatic congestion with heart failure. Similar events during last admission and in 2018.  10. Acute anemia: Hemoglobin dropped to 6.1 on 8/20.  Status post 1 unit.  Hemoglobin slowly trending down.  Will transfuse if hemoglobin goes below 7  11. Supratherapeutic INR: on presentation, improved with Vit K. INR went up again with liver failure. Worsened today likely from reaction of INR reagent with daptomycin  12. Atrial fibrillation: Had RVR on 8/19.   Digoxin held due to toxicity. Metoprolol held due to bradycardia. Warfarin held due to supratherapeutic INR.  13. Type 2 diabetes mellitus: was on SSI but BG consistently low, will stop SSI for now and monitor  14. CAD: Status post coronary angiogram and PCI to LAD on 7/27/2020    Subjective  Patient seen and examined  Overnight events noted.  Patient is alert awake reported feeling weak and tired.      Objective    Vital signs in last 24 hours  Temp:  [97  F (36.1  C)-98.6  F (37  C)] 97.7  F (36.5  C)  Heart Rate:  [54-94] 80  Resp:  [24-53] 44  BP: (104-146)/(31-61) 120/58  Arterial Line BP: ()/(27-47) 115/35  Weight:   213 lb 14.4 oz (97 kg)    Intake/Output last 3 shifts  I/O last 3 completed shifts:  In: 2153.1 [I.V.:1796.1; IV Piggyback:357]  Out: 77 [Urine:77]  Intake/Output this shift:  I/O this shift:  In: 275 [I.V.:225; IV Piggyback:50]  Out: 25 [Urine:25]    Physical Exam   General: drowsy but arousable  Eyes: no pallor  Chest: Clear to auscultation bilaterally  Heart: RRR, normal S1 and S2, mild pitting edema  Abdomen: soft, non tender drain in pelvis.  Neuro: moving all extremities  Bardales catheter      Meds    aspirin  81 mg Oral DAILY     daptomycin (CUBICIN) IV  6 mg/kg Intravenous Q48H     fluconazole (DIFLUCAN) IV  200 mg Intravenous Q24H     [Held by provider] furosemide  40 mg Intravenous DAILY     [Held by provider] lisinopriL  5 mg Oral DAILY     meropenem  1 g Intravenous Q12H     [Held by provider] metoprolol succinate  12.5 mg Oral DAILY     norepinephrine         [Held by provider] omeprazole  20 mg Oral BID AC     pantoprazole  40 mg Intravenous Q12H     [Held by provider] sertraline  100 mg Oral DAILY     sodium chloride bacteriostatic  1-5 mL Intradermal Once     sodium chloride  10 mL Intravenous Q8H FIXED TIMES     sodium chloride  10-30 mL Intravenous Q8H FIXED TIMES     [Held by provider] spironolactone  25 mg Oral DAILY     ticagrelor  90 mg Oral BID       Pertinent Labs   Lab  Results: personally reviewed.   not applicable    Results from last 7 days   Lab Units 08/28/20  0431 08/27/20  2351 08/27/20 2011 08/27/20  1207 08/27/20  0753   LN-SODIUM mmol/L 138  --   --   --  139 139   LN-POTASSIUM mmol/L 3.5  3.5 3.5 3.7   < > 3.8 4.2   LN-CHLORIDE mmol/L 106  --   --   --  107 108*   LN-CO2 mmol/L 20*  --   --   --  20* 20*   LN-BLOOD UREA NITROGEN mg/dL 48*  --   --   --  43* 40*   LN-CREATININE mg/dL 3.13*  --   --   --  2.92* 2.88*   LN-CALCIUM mg/dL 8.4*  --   --   --  8.7 8.6    < > = values in this interval not displayed.         Results from last 7 days   Lab Units 08/28/20  0923 08/28/20 0431 08/27/20  0406   LN-WHITE BLOOD CELL COUNT thou/uL 14.1* 14.6* 18.3*   LN-HEMOGLOBIN g/dL 9.0* 8.9* 9.1*   LN-HEMATOCRIT % 27.1* 27.0* 28.3*   LN-PLATELET COUNT thou/uL 116* 115* 111*         Pertinent Radiology   Radiology Results: Personally reviewed impression/s    Ct Abdomen Pelvis Without Oral Without Iv Contrast    Result Date: 8/14/2020  EXAM: CT ABDOMEN PELVIS WO ORAL WO IV CONTRAST LOCATION: [facility] DATE/TIME: 8/14/2020 10:26 AM INDICATION: Abdominal infection suspected perforated diverticulitis, assess for abscess COMPARISON: 8/11/2020 TECHNIQUE: CT scan of the abdomen and pelvis was performed without oral or IV contrast. Multiplanar reformats were obtained. Dose reduction techniques were used. CONTRAST: None. FINDINGS: LOWER CHEST: Trace pleural fluid. Minimal scarring or edema at the lung bases. The heart is enlarged. There is coronary artery calcification. HEPATOBILIARY: Cholecystectomy. There is extrahepatic bile duct dilatation. PANCREAS: Pancreatic duct dilatation is not as well-seen on this study as on the comparison study. SPLEEN: Normal. ADRENAL GLANDS: Normal. KIDNEY/BLADDER: Normal kidneys and ureters. There is wall thickening of the nondistended urinary bladder. A Bardales catheter is present. BOWEL: There is colonic diverticulosis. There is stranding  adjacent to the sigmoid colon in the region of diverticulitis. There is a complex 4 cm x 3 cm collection to the left of the sigmoid colon in this region (previously this was 2 cm x 2 cm). Minimal  extraluminal air is noted. LYMPH NODES: Normal. VASCULATURE: Atherosclerotic disease. PELVIC ORGANS: Fibroid uterus. MUSCULOSKELETAL: Normal.     1.  Again seen is sigmoid colon diverticulitis with a small contained perforation. A small fluid collection adjacent to the sigmoid colon has increased in size to 4 cm x 3 cm (previously 2 cm x 2 cm). Percutaneous drainage would be difficult though may be possible. 2.  Biliary and pancreatic ductal dilatation is not as well-seen as on the comparison study. See prior study for details.    Xr Chest 1 View Portable    Result Date: 8/27/2020  EXAM: XR CHEST 1 VIEW PORTABLE LOCATION: [facility] DATE/TIME: 8/27/2020 5:53 AM INDICATION: Respiratory failure with progressive hypoxia. COMPARISON: 08/26/2020     Slight interval improvement in reticular interstitial infiltrates in alveolar infiltrates since prior study. There still remains extensive infiltrates throughout both lungs. Stable cardiac enlargement. Mild pulmonary vascular congestion improved. Atherosclerotic vascular calcification of the aorta. Right PICC with tip in good position low SVC. Degenerative changes in the spine and shoulders    Xr Chest 1 View Portable    Result Date: 8/26/2020  EXAM: XR CHEST 1 VIEW PORTABLE LOCATION: [facility] DATE/TIME: 8/26/2020 4:56 PM INDICATION: sob COMPARISON: 08/23/2020     Right PICC tip projects over the mid SVC. Unchanged bilateral mixed reticular and alveolar opacities. These could represent cardiogenic or noncardiogenic pulmonary edema, pneumonia, and drug reaction. No pleural effusion.    Xr Chest 1 View Portable    Result Date: 8/21/2020  EXAM: XR CHEST 1 VIEW PORTABLE LOCATION: [facility] DATE/TIME: 8/21/2020 7:34 PM INDICATION: r/o pna- shortness of  breath COMPARISON: 08/11/2020     New bilateral interstitial infiltrates could represent edema or pneumonia including COVID. Enlarged cardiac silhouette. No pleural effusion. No definite pulmonary vascular congestion.    Xr Chest 2 Views    Result Date: 8/11/2020  EXAM: XR CHEST 2 VIEWS LOCATION: [facility] DATE/TIME: 8/11/2020 11:50 AM INDICATION: Cough. COMPARISON: None.     Negative chest. Lungs are clear. Coronary stenting.     Xr Foot Right 2 Vws    Result Date: 8/16/2020  EXAM: XR FOOT RIGHT 2 VWS LOCATION: [facility] DATE/TIME: 8/16/2020 12:51 PM INDICATION: Acute right foot pain COMPARISON: None.     Bones are demineralized. No definitive evidence of an acute fracture. No cortical destructive changes to suggest osteomyelitis. Scattered degenerative changes.    Xr Chest 1 View For Picc Placement Portable    Result Date: 8/23/2020  EXAM: XR CHEST 1 VIEW FOR PICC LINE PLACEMENT PORTABLE LOCATION: [facility] DATE/TIME: 8/23/2020 6:28 PM INDICATION: verify catheter placement COMPARISON: 08/21/2020     Right upper extremity PICC line terminates approximately 3 cm above the expected cavoatrial junction. No change in the chest otherwise. Extensive abnormal opacity throughout both lungs persists. Mild cardiomegaly. No pneumothorax or large pleural effusion.    Ct Abdomen Pelvis Without Oral With Iv Contrast    Result Date: 8/11/2020  EXAM: CT ABDOMEN PELVIS WO ORAL W IV CONTRAST LOCATION: [facility] DATE/TIME: 8/11/2020 11:41 AM INDICATION: right sided abdominal pain, nausea and vomiting COMPARISON: 7/22/2020 TECHNIQUE: CT scan of the abdomen and pelvis was performed following injection of IV contrast. Multiplanar reformats were obtained. Dose reduction techniques were used. CONTRAST: Iohexol (Omni) 75mL FINDINGS: LOWER CHEST: Minimal interstitial edema. The heart is mildly enlarged. There is coronary artery disease. HEPATOBILIARY: Cholecystectomy. There is intrahepatic and  extrahepatic bile duct dilatation. There is pancreatic duct dilatation. PANCREAS: No discrete pancreatic masses identified. SPLEEN: Normal. ADRENAL GLANDS: Normal. KIDNEYS/BLADDER: Normal. BOWEL: There is colonic diverticulosis. There is acute diverticulosis involving the distal sigmoid colon with a small contained perforation. No drainable abscess at this point. LYMPH NODES: Normal. VASCULATURE: Atherosclerotic disease. Right renal artery stent. PELVIC ORGANS: Fibroid uterus. MUSCULOSKELETAL: Degenerative changes.     1.  Acute diverticulitis with a small contained perforation. No drainable abscess at this point. 2.  Biliary and pancreatic duct dilatation. No discrete pancreatic masses identified. Consider MRCP, ERCP, EUS.    Ct Abdomen Pelvis With Oral With Iv Contrast    Result Date: 8/20/2020  EXAM: CT ABDOMEN PELVIS W ORAL W IV CONTRAST LOCATION: [facility] DATE/TIME: 8/20/2020 3:43 PM INDICATION: Abdominal infection suspected ongoing pain and increasing leukocytosis COMPARISON: CT from 8/14/2020 TECHNIQUE: CT scan of the abdomen and pelvis was performed following injection of IV contrast. Multiplanar reformats were obtained. Dose reduction techniques were used. CONTRAST: Iohexol (Omni) 75mL FINDINGS: LOWER CHEST: Dependent atelectasis and interstitial edema within the lung bases without consolidation. Cardiomegaly and mitral annular calcifications. HEPATOBILIARY: Gallbladder surgically absent. Mild ectasia of the extrahepatic biliary tree. PANCREAS: Unchanged appearance. SPLEEN: Normal. ADRENAL GLANDS: Normal. KIDNEYS/BLADDER: Normal. BOWEL: Interval placement of a left anterior abdominal approach percutaneous drain into the perisigmoid abscess. The drain is well positioned with near complete collapse of the previously seen abscess collection. There is persistent mild adjacent stranding. The bowel is nonobstructed. No new intra-abdominal abscess identified. No pneumoperitoneum. LYMPH NODES: Normal.  "VASCULATURE: Atherosclerotic calcification throughout the arterial tree. No evidence of vascular complication following drain placement. PELVIC ORGANS: Fibroid uterus. No ascites. Bardales catheter is within a decompressed urinary bladder. Subcutaneous scattered calcifications in the subcutaneous flank regions compatible with prior injection sites. MUSCULOSKELETAL: No new or acute osseous findings. Degenerative changes at the hips and lumbar spine are stable.     1.  Interval percutaneous drain placement into the diverticular abscess with near complete collapse of the fluid cavity. Drain abuts the adjacent sigmoid colon. No evidence of post drain placement complication. 2.  No new intra-abdominal abscess or other findings to correlate with ongoing leukocytosis. 3.  Stable appearance of the pancreas with previously seen main pancreatic ductal dilatation not well visualized on this study.    Poc Us 3cg Picc Placement Guidance    Result Date: 8/23/2020  Exam was performed as guidance for PICC line insertion.  Click \"PACS images\" hyperlink below to view any stored images.  For specific procedure details, view procedure note authored by PICC/Vascular Access Nurse.    Poc Us 3cg Picc Placement Guidance    Result Date: 8/19/2020  Exam was performed as guidance for PICC line insertion.  Click \"PACS images\" hyperlink below to view any stored images.  For specific procedure details, view procedure note authored by PICC/Vascular Access Nurse.    Ct Abscess Drain Pelvic    Result Date: 8/17/2020  EXAM: 1. CT OF ABDOMEN AND PELVIS WITHOUT CONTRAST 2. PERCUTANEOUS DRAIN PLACEMENT LEFT PELVIS LOCATION: [facility] DATE/TIME: 8/17/2020 9:59 AM INDICATION: diverticulitis TECHNIQUE: Dose reduction techniques were used. FINDINGS: The limited visualized portions of the lung bases are clear. Evaluation of the solid visceral organs is suboptimal given the lack of intravenous contrast. Within these limitations no focal hepatic " lesion is seen. The patient is post cholecystectomy. There is no intra or extrahepatic biliary ductal dilatation. The stomach and duodenum are normal. The spleen size is normal. The kidneys enhance symmetrically and there is no renal collecting system dilatation. Note again is made of a diverticular abscess, small in size. This is unchanged from the prior examination. Fibroid uterus is noted. PROCEDURE: Informed consent obtained. Site marked. Prior images reviewed. Required items made available. Patient identity confirmed verbally and with arm band. Patient reevaluated immediately before administering sedation. Universal protocol was followed. Time out performed. The site was prepped and draped in sterile fashion. 10 mL of 1% lidocaine was infused into the local soft tissues. Using standard technique and under direct CT guidance, a 10 [place] drainage catheter was inserted into the fluid collection.  SPECIMEN: 10 mL of purulent fluid was aspirated and sent to lab for cultures and Gram stain. BLOOD LOSS: Minimal. The patient tolerated the procedure well. No immediate complications. RADIOLOGIC SUPERVISION AND INTERPRETATION: CT GUIDANCE: Images demonstrate the needle and subsequent catheter to be in good position. SEDATION: Versed 1 mg. Fentanyl 50 mcg. The procedure was performed with administration intravenous conscious sedation with appropriate preoperative, intraoperative, and postoperative evaluation. 15 minutes of supervised face to face conscious sedation time was provided by a radiology nurse under my direct supervision.     1. Stable diverticular abscess. 2. Successful CT-guided drain placement into left pelvic diverticular abscess. Reference CPT Codes: 27716, 57942    Ir Abscessogram Tube Check    Result Date: 8/25/2020  [place] [facility] LOCATION: [facility] DATE: 8/25/2020 PROCEDURE: ABSCESSOGRAM INTERVENTIONAL RADIOLOGIST: Altaf Bateman MD. INDICATION: Diverticular abscess with indwelling drain  here for follow-up. FLUOROSCOPIC TIME: 0.8 minutes NUMBER OF IMAGES: 2 CONTRAST: 5 cc of Omni COMPLICATIONS: No immediate complications. PROCEDURE: Contrast injection through the existing drain demonstrates no residual abscess pocket. 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     Abscessogram reveals no residual abscess pocket. Fistula to the colon. Switched drain to gravity drainage bag from SHAMIKA suction bulb. No flushes are needed. Follow-up abscessogram in one week. CPT codes for physician use only: 69178/08114          Advanced Care Planning:  Discharge Planning discussed with patient  Anticipated LOS: multiple days  Barrier to discharge:aute issues  Disposition:TBD  Discussed care with patient for >35 minutes with greater than 50% of time spent in counseling and coordination of care.      MD Joseline  Hospitalist      This note was dictated using voice recognition software. Any grammatical or context distortions are unintentional and inherent to the software.

## 2021-06-10 NOTE — PLAN OF CARE
Problem: Pain  Goal: Patient's pain/discomfort is manageable  Outcome: Progressing   Pt c/o pain 8/10 to abdomen at 0053, given PRN oxycodone along with PRN cough syrup. Resting quietly with eyes closed upon reassessment. Abcess drain patent. Sepsis protocol triggered at start of shift. Lactic acid 2.1 and reported to MD, see his note. O2 at 8 LPM/NC, pt does desaturate to the 80-85% range with lots of activity like turning.     Phosphorous protocol lab check was due at 0250 and was missed as order not put in. Lab check ordered as soon as error noted, pending lab draw at this time. K+ yesterday was 3.0, writer left sticky note to MD to request a follow up lab.     Incontinent of BM x2 this shift thus far, incontinent of urine x3. Turned q 2 hours.     Sonali Ospina RN

## 2021-06-10 NOTE — PROGRESS NOTES
Infectious Disease Follow up Note:    Service: Infectious Disease   Note: Follow Up Note  Date: 8/22/2020    Chief Complaint: Follow up for acute diverticulitis with perforation    ASSESSMENT:  Gardenia Muller is a 70 y.o. old female with acute diverticulitis with perforation.     History of CVA, obesity, chronic abdominal pain.  Acute diverticulitis with perforation admitted on 8/11/2020.  On IV Cipro and Flagyl.  Cultures from abscess drainage on 8/17/2020 with Candida albicans.  CT scan on 8/20/2020 with near complete resolution of the abscess.  New leukocytosis.  No clear source.  C. difficile negative on 8/21/2020.  Feeling short of breath.  Chest x-ray ordered. HAP is a possibility.  Leukocytosis could be from untreated Candida infection.  Fluconazole added on 8/21/2020.  WBC better, at 17,000 on 8/22/2020.  Abdomen is better.  New bilateral interstitial infiltrate.  COVID-19 PCR in process.  Penicillin allergy, reaction swelling.     Recommendations:   Continue Cipro and Flagyl and IV fluconazole 200 mg daily.  Follow-up pending COVID-19 PCR from 8/22/2020.  Monitor white blood cell count..  Monitor respiratory status and abdomen.  Hold statin for now  Continue enhance airborne isolation for the time being.    Discussed with the patient, nursing staff.    ID will follow.  DEBBI White MD  [facility]   Office Telephone [phone].  Fax [phone]  Direct messaging:[employer] paging  ______________________________________________________________________  Notes / labs / vitals reviewed.    SUBJECTIVE / INTERVAL HISTORY: Feels better today.  Abdomen is less tender.  Chest x-ray yesterday shows bilateral interstitial infiltrates.  Now on 3500 on isolation.  On minimal oxygenation with saturations in the upper 90s.    ROS: A complete 12 point ROS is negative except as listed above.    PMHx/FHx/SHx: Reviewed. Interval changes noted.    OBJECTIVE:  Vitals:    08/22/20 1534   BP:  116/55   Pulse: 61   Resp: 20   Temp: 98.4  F (36.9  C)   SpO2: 100%       Temp (24hrs), Av.9  F (36.6  C), Min:97.5  F (36.4  C), Max:98.4  F (36.9  C)    GEN: NAD  EYES:  Anicteric, RAYMUNDO, EOM intact.  HEAD, EARS, NOSE, MOUTH, AND THROAT:  Oropharynx without thrush or ulcers  NECK:  Supple.   RESPIRATORY:  Normal breathing pattern. Clear to auscultation  CARDIOVASCULAR:  S1 S2 No murmur, click, gallop or rub. No dependent edema. No excess JVD.  ABDOMEN:  Soft, normal bowel sounds, non-tender, no masses, no organomegaly.  MUSCULOSKELETAL:  Joints without effusion or acute inflammation. No muscle atrophy.  LYMPHATIC:  No cervical or supraclavicular adenopathy  SKIN/HAIR/NAILS:  No rashes. No stigmata of infective endocarditis.  NEUROLOGIC:  Gross neurological exam non-focal.  PSYCHIATRIC:  A&Ox3, appropriate, mentation normal.    Antibiotics:  IV Cipro/Flagyl/fluconazole    Pertinent labs:    Results from last 7 days   Lab Units 20  0542 20  0956 20  0544 20  0720   LN-WHITE BLOOD CELL COUNT thou/uL 17.4* 19.3*  --  17.1* 14.5*   LN-HEMOGLOBIN g/dL 8.3* 8.6* 6.1* 6.6* 8.2*   LN-HEMATOCRIT % 24.7* 26.1*  --  20.9* 25.5*   LN-PLATELET COUNT thou/uL 115* 137*  --  131* 136*   LN-NEUTROPHILS RELATIVE PERCENT %  --   --   --   --  74*   LN-MONOCYTES RELATIVE PERCENT %  --   --   --   --  13*   LN-MONOCYTES - REL (DIFF) % 8  --   --   --   --        Results from last 7 days   Lab Units 20  0544 20  0720  20  0520   LN-SODIUM mmol/L 137 133* 135*   < > 139   LN-POTASSIUM mmol/L 3.4* 3.3* 3.3*   < > 3.3*   LN-CHLORIDE mmol/L 105 102 103   < > 107   LN-CO2 mmol/L 21* 21* 21*   < > 22   LN-BLOOD UREA NITROGEN mg/dL 8 9 10   < > 31*   LN-CREATININE mg/dL 0.95 1.14* 0.82   < > 3.03*   LN-CALCIUM mg/dL 7.4* 6.8* 7.2*   < > 6.9*   LN-ALBUMIN g/dL  --   --   --   --  1.8*    < > = values in this interval not displayed.       MICROBIOLOGY DATA:    Cultures  reviewed      IMAGING/RADIOLOGY:  Reviewed  XR Chest 1 View Portable (Order [id number])   Imaging   Date: 8/21/2020  Department: [facility] 3500 Ortho/Spine/MS  Released By: Grazyna Hurtado RN (auto-released)  Authorizing: Michael Abreu MD    Study Result     EXAM: XR CHEST 1 VIEW PORTABLE  LOCATION: [facility]  DATE/TIME: 8/21/2020 7:34 PM     INDICATION: r/o pna- shortness of breath  COMPARISON: 08/11/2020     IMPRESSION:   New bilateral interstitial infiltrates could represent edema or pneumonia including COVID. Enlarged cardiac silhouette. No pleural effusion. No definite pulmonary vascular congestion.

## 2021-06-10 NOTE — PROGRESS NOTES
08/27/20 0000   Monitoring   Resp Rate Observed 31   Tidal (Observed) 495 mL   Minute Ventilation (L/min) 16.2 L/min   PIP Observed (cm H2O) 16 cm H2O   Ti/Ttot 27   Pt Leak 0     Loki Celaya LRT

## 2021-06-10 NOTE — PROGRESS NOTES
General Surgery Progress Note      Subjective:   Pt is feeling good, pain improving, thirsty, passing flatus and bm. IR drain placement at some point      Vitals:    08/14/20 1913 08/14/20 2355 08/15/20 0423 08/15/20 0719   BP: 124/54 123/57 143/59 121/65   Patient Position: Semi-nicole Lying Lying Lying   Pulse: 78 81 91 85   Resp: 16 18 20 18   Temp: 98.9  F (37.2  C) 98.7  F (37.1  C) 98.5  F (36.9  C) 98.5  F (36.9  C)   TempSrc: Oral Oral Oral Oral   SpO2: 93% 97% 97% 98%   Weight:       Height:           Physical Exam:  General: NAD  Ab:       Soft, + BS, not distended, minimal ttp;     Results from last 7 days   Lab Units 08/15/20  0634   LN-WHITE BLOOD CELL COUNT thou/uL 13.1*   LN-HEMOGLOBIN g/dL 8.7*   LN-HEMATOCRIT % 28.5*   LN-PLATELET COUNT thou/uL 131*       Results from last 7 days   Lab Units 08/15/20  0634  08/11/20  1026   LN-SODIUM mmol/L 135*   < > 135*   LN-POTASSIUM mmol/L 4.1   < > 4.6   LN-CHLORIDE mmol/L 110*   < > 103   LN-CO2 mmol/L 16*   < > 22   LN-BLOOD UREA NITROGEN mg/dL 33*   < > 22   LN-CREATININE mg/dL 4.52*   < > 0.95   LN-CALCIUM mg/dL 7.1*   < > 10.3   LN-ALBUMIN g/dL  --   --  3.0*   LN-PROTEIN TOTAL g/dL  --   --  8.2*   LN-BILIRUBIN TOTAL mg/dL  --   --  1.3*   LN-ALKALINE PHOSPHATASE U/L  --   --  135*   LN-ALT (SGPT) U/L  --   --  33   LN-AST (SGOT) U/L  --   --  37    < > = values in this interval not displayed.       Assessment: acute diverticulitis with perforation, progressing    Plan:   IR drain until INR under 2, INR 1.97 today  WBC stable  She is improving clinically, but drain still may be helpful given the increase in size on CT, is in a difficult spot, so may not be amenable  If no procedures planned today, could be on clear liquids from surgery standpoint. Could be made NPO at midnight depending on status of potential drain placement.    Alvin Thomas PA-C  Pager - [phone]  Phone - [phone]   General Surgery

## 2021-06-10 NOTE — SIGNIFICANT EVENT
Digoxin toxicity    I was notified by nursing that the patient's digoxin level checked in the evening ~24 hours after last dose was 6.9ng/ml. patient was seen and provides no new complaints. Appears to be at her baseline mental status. On exam patient is noted to have irregular rhythm with variable heart rate. Lungs are clear. Abdomen soft non-tender. Extremities are warm. On tele patient is noted to have bradycardia at times as low as 36bpm. ekg with undetermined rhythm possibly junctional at 70bpm. Blood pressure stable. Labs significant for nila and hyperkalemia on morning labs.     A&P  Case was discussed with cardiology (Dr De La Garza) and poison control ([phone]). The patient's bradycardia, hyperkalemia and nila are all consistent with digitalis toxicity and reversal with digifab was recommended. Transfer to the ICU for close monitoring was recommended, case was discussed with Dr Parikh who accepts the patient. Will monitor bmp and ekg q4. Will monitor for arrythmias on tele. Will monitor continous pulse ox and blood glucose levels. Digifab ordered. Will avoid correcting hyperkalemia as digifab will cause decrease in serum potassium. BMP and magnesium ordered and in process.      Critical care time: 60 minutes

## 2021-06-10 NOTE — PLAN OF CARE
"Problem: Discharge Barriers  Goal: Patient s discharge needs will be met  Outcome: Progressing  Care Plan-Care Management    Care Management Goals of the Day:   Progression of Care     Care Progression to be reviewed with MD and RN CM: Updates to be provided to Charge RN, CMSW, HUC,therapy and other disciplines as indicated.    Barriers to Discharge and plan:  Medical Management, Nephrology Clearance-monitor creatinine, surgical clearance-possible drain placement, IV antibiotics, IV fluids     Family Care Conference: Not at this time     Discharge Disposition:   TCU    Expected Discharge Date:  8/18/2020 or 8/19/2020 pending     Transportation:  [facility]     Care Management/Coordination Narrative:   Patient admitted from [facility]; has 18 day bed hold, plan to return once medically stable. Prior to last hospitalization patient was living at [facility] #[phone]. Received assistance with medication and ADLs. Uses a rolling walker and a scooter. Daughter Aria is main contact for discharge planning, she lives in [place]. Nephew Bridger lives in [facility]. Per previous care manager note Danna at the [facility] stated per their DON patient can pay her rent when she returns and \"not to worry.\" Care manager to follow.    Filomena SUBRAMANIAN  08/15/20     Abbreviation  Code:  [facility] [place] Wheelchair ([facility] WC), [facility] ([facility] STR), Patient (pt), Transitional Care Unit (TCU), Skilled Nursing Facility (SNF), Long Term Care (LTC), Assisted Living ([facility] or AL), Care Management (CM), Physical Therapy (PT), Occupational Therapy (OT), Speech Therapy (ST), Respiratory Therapy (RT).           "

## 2021-06-10 NOTE — PROCEDURES
"Midline Insertion Procedure Note  Pt. Name: Gardenia Muller  MRN:        [id number]    Procedure: Insertion of a  Duel Lumen  4 fr  BARD PROVENA (non-valved) Power MIDLINE catheter.  Lot number [id number].     Indications: IV meds    Procedure Details   Patient identified with 2 identifiers and \"Time Out\" conducted.  Contraindications reviewed: none right arm swollen from IV and Left basilic vessel had leaking so used left brachial  Vessel and PICC not threading past shoulder so has midline nurse to make Dr aware of.     Central line insertion bundle followed: hand hygeine performed prior to procedure, site cleansed with cholraprep, hat, mask, sterile gloves,sterile gown worn, patient draped with maximum barrier head to toe drape, sterile field maintained.    1 ml 1% Lidocaine administered sq to the insertion site. A 4 Fr catheter was inserted into the brachial vein of the left arm with ultrasound guidance. 1 attempt(s) required to access vein.   Catheter threaded without difficulty. Good blood return noted.    Modified Seldinger Technique used for insertion.    Catheter secured with Statlock, biopatch and Tegaderm dressing applied.    Findings:  Total catheter length  20 cm, with 1 cm exposed. Mid upper arm circumference is 44 cm. Catheter was flushed with 20 cc NS. Patient  tolerated procedure well.    Tip placement verified by ultrasound placement and good blood return.  Tip placement approximately in the axillary region.    Patient's primary RN notified catheter is a MIDLINE catheter and is ready for use.    Comments:  thanks    A midline catheter is a form of peripheral venous access. Not recommended for the infusion of vesicants (Vancomycin, Vasopressors, TPN, etc.)    Sonali Adan RN    [facility]  [phone]        "

## 2021-06-10 NOTE — PLAN OF CARE
"Care Plan  Care Management         Care Management Goals of the Day: Progression of care and discharge planning     Care Progression Reviewed With: Charge Nurse, Medical Doctor, Health Unit Coordinator, Nurse Care Manager      Barriers to Discharge: GI status     Discharge Disposition: TCU     Expected Discharge Date: 8/15/20 pending     Transportation: [facility]        Care Coordination Narrative:  Surgery following for acute perforated diverticulitis with small abscess. NPO, currently on IV Cipro and Flagyl.  This is readmit from hospitalization 7/22/20-7/31/20.     Assessment History:  Patient has been at [facility] since last hospitalization. Prior to that she has been living at [facility] [phone]. She received medication assist and ADL assist. She uses a rolling walker and has a scooter. Daughter-Aria is main contact. She lives in [place]. Nephew-Bridger lives in [facility].   [facility] [place] [facility] who states per DON patient can pay her rent when she returns and \"not to worry.\" Updated Aria 8/12.    Patient to return to [facility] TCU where she has a 18 day bed hold.   "

## 2021-06-10 NOTE — PROGRESS NOTES
Assessment/ Plan  1. Wrist pain, left  Negative x-rays, still somewhat concerning pattern of pain with small amount of snuffbox pain, more pain proximal/dorsal wrist following fall on 4/3.  Repeat x-ray, 9 days later, is negative again today.  Removed custom fabricated splint, replaced with Velcro release thumb spica splint.  Follow-up if not improving in 1-2 weeks.  Consider MRI of wrist to rule out scaphoid fracture at that point.  - XR Wrist Left 3 or More VWS    2. Type 2 diabetes mellitus with other circulatory complication, unspecified long term insulin use status  Diet controlled, strategy following glucoses which have been normal and A1c's.  Excellent A1c today.  Reassured  - Glycosylated Hemoglobin A1c    3. Peripheral Vascular Disease  Hx: Her vascular disease, peripheral vascular disease and coronary artery disease with multiple strokes  Secondary Prevention:   Statin: Yes, Crestor 40  Blood pressure Controlled?  Yes, blood pressure good today  Smoking?  No longer  Antithrombitic/antiplatelet?  On warfarin      4. Cerebral atherosclerosis  On warfarin, per anticoagulation nurses  - INR    Body mass index is 32.37 kg/(m^2).    Subjective  CC:  Chief Complaint   Patient presents with     Follow-up     Emergency room, left wrist pain. Patient fell     HPI:  Patient here for follow-up from emergency room.  Was seen 4/3/17 after she fell out of her wheelchair and landed on her left wrist.  She sustained an injury in the left wrist, had immediate severe pain with some associated swelling.  She was placed in a fiberglass splint which is been on ever since.  She denies any significant pain when the splint is on.  Would like to have the splint removed if possible.  Reviewed x-rays from this emergency room visit on 4/3 and they were negative.    She has long-standing history of type 2 diabetes.  Blood sugars by her report have been normal.  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  Is a history of cerebral atherosclerosis, remote history of coronary artery disease, history of paroxysmal atrial fibrillation and of peripheral vascular disease.  She had a relapse in smoking last year but now reports that she is no longer smoking except an occasional e-cigarette.      PFSH:  Current medications reviewed as follows:  Current Outpatient Prescriptions on File Prior to Visit   Medication Sig     ASCORBIC ACID WITH MARICRUZ HIPS 500 MG tablet TAKE 2 TABLETS (1000MG) BY MOUTH ONCE DAILY.     aspirin 81 MG EC tablet TAKE 1 TABLET BY MOUTH ONCE DAILY     blood glucose meter (GLUCOMETER) Please Dispense kit per pharmacy discretion and patient's insurance Test blood sugar.     blood glucose test strips Dispense brand per patient's insurance at pharmacy discretion.  Patient to test twice daily     blood glucose test strips Test twice a day.   Dispense brand per patient's insurance at pharmacy discretion.     blood glucose test strips Use 1 each As Directed as needed. Dispense brand per patient's insurance at pharmacy discretion.     blood-glucose meter Misc Use as directed     CALCIUM 600 600 mg (1,500 mg) tablet TAKE 2 TABLETS (1,200MG) BY MOUTH ONCE DAILY     cholecalciferol, vitamin D3, 2,000 unit Tab TAKE 1 TABLET BY MOUTH ONCE DAILY. (Patient taking differently: TAKE 1 TABLET BY MOUTH TWICE DAILY.)     CRESTOR 40 mg tablet TAKE 1 TABLET BY MOUTH AT BEDTIME      mg capsule TAKE 1 CAPSULE BY MOUTH TWICE DAILY AT 6AM AND 4PM.     enoxaparin (LOVENOX) 80 mg/0.8 mL syringe Inject 0.8 mL (80 mg total) under the skin every 12 (twelve) hours.     FIBER, CALCIUM POLYCARBOPHIL, 625 mg tablet TAKE 2 TABLETS (1250MG) BY MOUTH ONCE DAILY     gabapentin (NEURONTIN) 100 MG capsule TAKE 1 CAPSULE BY MOUTH AT BEDTIME     generic lancets Use 1 each As Directed as needed. Dispense brand per patient's insurance at pharmacy discretion.     hydrochlorothiazide (HYDRODIURIL) 25 MG tablet TAKE 1 TABLET  BY MOUTH ONCE DAILY.     lancets 33 gauge Misc Test twice daily Dispense brand per patient's insurance at pharmacy discretion.     lidocaine (LMX5) 5 % Crea Apply 1 application topically every 12 (twelve) hours as needed (pain).     lisinopril (PRINIVIL,ZESTRIL) 10 MG tablet Take 1 tablet (10 mg total) by mouth daily.     loratadine (CLARITIN) 10 mg tablet Take 1 tablet (10 mg total) by mouth daily.     magnesium oxide (MAG-OX) 400 mg tablet TAKE 1 TABLET BY MOUTH TWICE DAILY.     MAPAP ARTHRITIS PAIN 650 mg CR tablet TAKE 1 TABLET BY MOUTH THREE TIMES DAILY     metoprolol tartrate (LOPRESSOR) 25 MG tablet TAKE 1 TABLET BY MOUTH TWICE DAILY.     multivitamin (TAB-A-JULIET) per tablet Take 1 tablet by mouth daily.     nitroglycerin (NITROSTAT) 0.4 MG SL tablet Place 1 tablet (0.4 mg total) under the tongue every 5 (five) minutes as needed for chest pain (for up to 3 doses and call 911 if persists).     omeprazole (PRILOSEC) 20 MG capsule 20 mg 2 (two) times a day.      omeprazole (PRILOSEC) 20 MG capsule TAKE 1 CAPSULE BY MOUTH TWICE DAILY.     polyethylene glycol 3350 Powd Take 17 g by mouth daily as needed (constipation).      polyvinyl alcohol (ARTIFICIAL TEARS, POLYVIN ALC,) 1.4 % ophthalmic solution Administer 1 drop to both eyes 4 (four) times a day. As directed.     REFRESH OPTIVE 0.5-0.9 % Drop INSTILL TWO DROPS IN BOTH EYES TWICE DAILY AS NEEDED     sertraline (ZOLOFT) 100 MG tablet TAKE 1 TABLET BY MOUTH ONCE DAILY.     traZODone (DESYREL) 50 MG tablet TAKE 1 TABLET BY MOUTH AT BEDTIME.     warfarin (COUMADIN) 2 MG tablet TAKE 2 TABLETS (4MG) BY MOUTH ONCE DAILY, Hold coumadin today 6/30 and resume from tomorrow 7/1/16, check INR in 2-3 days     blood-glucose meter Misc Use 1 Device As Directed once for 1 dose.     No current facility-administered medications on file prior to visit.      Patient Active Problem List    Diagnosis Date Noted     PAF (paroxysmal atrial fibrillation) 11/12/2015     Priority: High      Overview Note:     Post-op emergent hernia repair 11/2015       CHF (congestive heart failure) 11/12/2015     Priority: High     Overview Note:     Diastolic  Dx with volume overload post-op emergent hernia repair 11/2015-   Echo 2011- hyperdynamic lv- EF= 65%, mild AI,TI,MI and aortic sclerosis without stenosis         Type 2 diabetes mellitus with circulatory disorder      Priority: High     Overview Note:     Created by Conversion         Lumbar Canal Stenosis      Priority: High     Overview Note:     Created by [facility]  [facility] Annotation: Aug 20 2010 10:36AM Jerson Esparza: see MRI 5/12/10.    Severe l4-5 stenosis and severe L5-S1  l foraminal stenosis- due, in paryt,   to lipomatosis         Peripheral Vascular Disease      Priority: High     Overview Note:     Created by Conversion         Patent Foramen Ovale      Priority: High     Overview Note:     Created by [email or web address] [facility] Annotation: Sep  4 2008  4:30PM - Jerson Hernandez: probable- ECHO   7/5/2006no shunt seen on NIVIA following CVA 8/11- report scanned         Acute myocardial infarction      Priority: High     Overview Note:     Created by [email or web address] [facility] Annotation: Oct  8 2007  4:47PM - Jerson Hernandez: Stent RCA    Replacement Utility updated for latest IMO load       Coronary Arteriosclerosis      Priority: High     Overview Note:     Created by [email or web address] [facility] Annotation: Sep 28 2007 11:22AM Jerson Esparza: PCI 1995 -   Details unknownneg adnosine caridolite 2/16/2006- EF 65%Angiogram 2006 showed   chronic RCA occlusion, minimal disease in LAD, LCx, Normal LVEF    Replacement Utility updated for latest IMO load       Hyperlipidemia      Priority: High     Overview Note:     Created by Conversion         Cerebral atherosclerosis      Priority: High     Overview Note:     Created by Conversion         Right Renal Artery Stenosis      Priority: High     Overview Note:     Created by [email or web address] [facility]  Annotation: Oct  8 2007  4:47PM Desean Mary Jerson: stented 1999         Hypertension 07/02/2010     Priority: High     Dysarthria 07/02/2010     Priority: High     Hypomagnesemia      Priority: Medium     Dermatosis Papulosa Nigra      Priority: Medium     Overview Note:     Created by Conversion         Depression      Priority: Medium     Overview Note:     Created by Conversion         Hypercalcemia      Priority: Medium     Overview Note:     Created by Conversion  [facility] Annotation: Jun 5 2013  7:58AM Jerson Esparza: most likely due   to hydrochlorothiazide  Normal parathyroid hormone 9/2008       Osteoarthritis      Priority: Medium     Overview Note:     Created by Conversion    Replacement Utility updated for latest IMO load       Tachycardia 07/02/2010     Priority: Medium     Benign adenomatous polyp of large intestine 03/30/2017     Overview Note:     Colonoscopy March 2017.  Recommend 5 year follow-up.  Single tubular adenoma       CVA (cerebrovascular accident) 01/16/2017     Health care maintenance 03/07/2016     Cystitis 03/05/2016     SBO (small bowel obstruction) 11/12/2015     Advanced directives, counseling/discussion 08/05/2015     Abnormality Of Walk      Overview Note:     Created by Conversion         Cerebral infarction 07/02/2010     Anemia 07/02/2010     History   Smoking Status     Former Smoker   Smokeless Tobacco     Never Used     Social History     Social History Narrative     Patient Care Team:  Jerson Hernandez MD as PCP - General (Family Medicine)  ROS  Full 10 system review including constitutional, respiratory, cardiac, gi, urinary, rheumatologic, neurologic, reproductive, dermatologic psychiatric is  performed (via questionnaire) and is negative except chills, sleeping difficulties, weakness      Objective  Physical Exam  Vitals:    04/12/17 1107   BP: 114/58   Patient Site: Left Arm   Patient Position: Sitting   Cuff Size: Adult Regular   Pulse: 72   Resp: 20   Temp:  "97.6  F (36.4  C)   TempSrc: Oral   Weight: 177 lb (80.3 kg)   Height: 5' 2\" (1.575 m)     Left wrist is examined.  It is somewhat swollen especially in the dorsum.  There is tenderness to the snuffbox but then also across the joint line dorsally.  She is good range of motion with this although some tenderness particularly with extension.  Chest is clear, cardiovascular exam normal.  Personally reviewed x-ray of her wrist today which shows osteoarthritis but no acute fractures.  Diagnostics  Results for orders placed or performed in visit on 04/12/17   Glycosylated Hemoglobin A1c   Result Value Ref Range    Hemoglobin A1c 6.4 (H) 3.5 - 6.0 %   INR   Result Value Ref Range    INR 1.90 (H) 0.90 - 1.10         Please note: Voice recognition software was used in this dictation.  It may therefore contain typographical errors.    "

## 2021-06-10 NOTE — PLAN OF CARE
Problem: Pain  Goal: Patient's pain/discomfort is manageable  Outcome: Progressing   Pt reports 8/10 BLE pain more so in RLE. PRN IV dilaudid given x1 with effect.  Problem: Daily Care  Goal: Daily care needs are met  Outcome: Progressing   Pt NPO dt concern for aspiration, pt very unhappy about this. Gave swabs for mouth. Pt continues to have anxiety over the possible abscess drain placement in IR Monday, pt keeps asking if someone, like a RN, will be there with her. Pt assured that IR staff will be present. Continues on sodium bicarb infusion. Bardales patent. No s/s of bleeding noted. VSS. Will continue to monitor.

## 2021-06-10 NOTE — PROGRESS NOTES
Physical Therapy     08/15/20 1105   Visit Specifics   Eval Type Initial eval   Inital PT Consult 08/15/20   Bed/Tabs/Pad Alarm Applied Yes   General   Onset date 08/11/20   Additional Pertinent History Diverticulitis   Chart Reviewed Yes   PT/OT Patient/Caregiver Stated Goals none stated   Family/Caregiver Present No   Treatment Time   Gait Training 10   Precautions   General Precautions Bed alarm/Tab alarm   Home Living   Type of Home Assisted living   Home Layout Multi-level;Elevator   Mobility Equipment Walker-4 wheeled;Electric scooter   Prior Status   Independent With Functional mobility;Transfers;With device;Toileting   Needs Assistance With Bathing;Dressing;Cooking;Cleaning;Laundry;Shopping;Driving   Prior Device Use None of the above   Indoor Mobility Needed some help   Lives With Alone   Receives Help From Other (Comment)  ([facility] staff)   Device Used Yes   Leisure Interests Card games;Crafts/Arts;Social activities  (bowling)   Cognition   Orientation Level Oriented X4   Following Commands Follows all commands and directions   Behaviors During Session Cooperative   RLE Assessment   RLE Assessment   (general weakness)   LLE Assessment   LLE Assessment   (general weakness)   Bed Mobility   Supine to Sit Min assist;HOB elevated;With rail;Verbal cues   Bed Mobility Comments pt took some time to get to EOB, needing assist at her hips to scoot forward   Transfer    Sit To Stand Mod assist;Min assist;Verbal cues;Tactile cues   Stand To Sit CGA;Verbal cues;Tactile cues   Transfer Comments inc time and effort to perform, attempted 2-3 times to stand, was mostly fearful but with encouragement able to stand and use device for support   Ambulation    Weight Bearing Status WBAT   Distance (ft)  15'   Assistance Minimum assistance;CGA;Minimal verbal cues   Assistive Device Rolling walker   Verbal Cues LE advancement;Device advancement;Sequencing;Safety;Posture;Attention to task   Quality of Gait/Comment slow gait, mild  shakiness though no LOB   Pattern Decreased pace;Step to;Decreased step length;R decreased foot clearance;L decreased foot clearance;R decreased heel strike;L decreased heel strike   Endurance Deficit   Endurance Deficit Yes   Endurance Deficit Description deconditioned   Balance   Sitting Balance Standby   Standing Balance CGA;Supported   Fall Risk   Fall Risk Medium   Other Comments   Comments pt sometimes speaks gibberish/hard to understand, and made sense other times.   Plan   Treatment/Interventions Functional transfer training;Gait/stair training;Strengthening/ROM;Neuromuscular re-ed   PT Frequency Daily   Assessment   Prognosis Good   Problem List Decreased mobility;Decreased strength;Decreased endurance;Impaired balance   Barriers to Discharge Decreased caregiver support   Recommendation   PT Discharge Recommendation TCU   PT Equipment Recommendation Rolling walker / FWW   Treatment Suggestions for Next Session Progress with bed mobility, safe transfers, gait as able   PT Care Plan REVIEWED DAILY Yes, goals remain appropriate

## 2021-06-10 NOTE — PROGRESS NOTES
Speech Language/Pathology  Speech Therapy Daily Progress Note    Patient presents as alert and cooperative during this session.  An  was not applicable.    Objective    Per RN report pt is advanced to full liquids and is okay for minimal trial of regular texture with ST.    PO trials:  -regular: patient had x2 bites and presented with no s/s aspiration, fatigue, or oral stasis.  -thin: pt had x6 ounces and presented with no s/s aspiration.     Assessment    Per RN report patient has been tolerating clear liquid diet with no s/s aspiration. She demonstrated s/s aspiration, oral dysphagia, or fatigue with regular texture and is appropriate for upgrade to regular textures from a swallow standpoint.    Plan/Recommendations  Advance diet to regular/thin per MD discretion.     The ST Care Plan has been reviewed and recommend discontinue ST.    20 dysphagia minutes     Genny Gurrola MA, CCC-SLP

## 2021-06-10 NOTE — PROGRESS NOTES
Pt had one episode of large greenish emeses. Bladder scanned 73 cc. Hr is going back and froth from 43 to 66 bpm. RR 24, 30. Notified Dr Da Silva. Obtained new order of tele, one dose of lasix IV and chest XR. Pt denies pain. Held Digoxin this shift.

## 2021-06-10 NOTE — PLAN OF CARE
Problem: Pain  Goal: Patient's pain/discomfort is manageable  Outcome: Progressing  Pt denies any pain. Will continue to monitor.       Problem: Discharge Barriers  Goal: Patient's discharge needs are met  Outcome: Progressing  Pt had abscess drain placed today in IR. Drain has small tan output. Has critical low Magnesium 0.8, being replaced IV. Taking only small sips of clear liquids. No nausea or vomiting. Pt did not sleep much during NOC shift last night d/t being anxious about drain placement. Pt slept most of shift after drain was placed. Bardales has savita output. Will continue to monitor.

## 2021-06-10 NOTE — PLAN OF CARE
Problem: Daily Care  Goal: Daily care needs are met  8/20/2020 1742 by Lavern Eden RN    Problem: Daily Care  Goal: Daily care needs are met  8/20/2020 1742 by Lavern Eden RN  Outcome: Progressing  Patient had 1 unit PRBC this afternoon. Patient states she feels a bit better. CT done this evening, results pending. Patient had a 500cc bolus prior to CT scan and after per surgery request. BP has been stable. Patient is afebrile, WBC increasing, continues on antibiotics.   Patient has not been eating well, no nausea but decreased appetite, encouraging po intake and supplement.

## 2021-06-10 NOTE — PROCEDURES
"PICC Line Insertion Procedure Note  Pt. Name: Gardenia Muller  MRN:        [id number]    Procedure: Insertion of a  dual Lumen  5 fr  Bard SOLO (valved) Power PICC, Lot number [id number]    Indications: antibiotics    Contraindications : none, pt does have midline in the left arm which is leaking    Procedure Details   Patient identified with 2 identifiers and \"Time Out\" conducted.  .     Central line insertion bundle followed: hand hygeine performed prior to procedure, site cleansed with cholraprep, hat, mask, sterile gloves,sterile gown worn, patient draped with maximum barrier head to toe drape, sterile field maintained.    The vein was assessed and found to be compressible and of adequate size. 2 ml 1% Lidocaine administered sq to the insertion site. A 5 Fr PICC was inserted into the basilic vein of the right arm with ultrasound guidance. 1 attempt(s) required to access vein.   Catheter threaded with and without difficulty. Good blood return noted.    Modified Seldinger Technique used for insertion.    The 8 sharps that are included in the PICC insertion kit were accounted for and disposed of in the sharps container prior to breakdown of the sterile field.    Catheter secured with Statlock, biopatch and Tegaderm dressing applied.    Findings:  Total catheter length  42 cm, with 0 cm exposed. Mid upper arm circumference is 44.5 cm. Catheter was flushed with 30 cc NS. Patient  tolerated procedure well.    Tip placement verified by xray. Xray read by Dr. Sahu . Tip placement in the SVC.    CLABSI prevention brochure left at bedside.    Patient's primary RN notified PICC is ready for use.    Comments:          Flor Eden RN    [facility]  [phone]      "

## 2021-06-10 NOTE — PROGRESS NOTES
[facility] Daily Progress Note      Date of Service: 8/12/2020      Brief History:     70 y.o. old female  Patient has history of chronic atrial fibrillation, diastolic CHF, hypertension, coronary artery disease status post stenting, peripheral artery disease type 2 diabetes mellitus malnutrition chronic abdominal pain calculus cholecystitis status post cholecystectomy depression admitted to ED for evaluation abdominal pain and vomiting CT scan revealed acute diverticulitis with perforation general surgery was consulted surgery recommended keeping n.p.o. but okay for oral medication with sip of water and ice chips.  In the ED she was hypoglycemic also and she was started on D5 normal saline      Assessment & Plan:  #Acute perforated diverticulitis with a small abscess currently on IV Cipro and Flagyl surgery following.  If surgery decided against any surgical intervention and if surgery okay to resume her Brilinta we will resume as recently she had a stent placed on 7/27/2020 is a risk of stent thrombosis baby aspirin has been resumed    #Abdominal pain due to diverticulitis and perforation/peritonitis IV started on IV morphine monitor for any respiratory depression avoid if sedated    #History of atrial fibrillation patient IV metoprolol at this time will resume oral digoxin and metoprolol-no surgical intervention planned.  Coumadin on hold    #Type 2 diabetes mellitus with vascular disease due to low blood sugar she was started on D5 normal saline.  Which I will continue    #Hypoglycemia related with diverticulitis perforation and low oral intake continue D5 normal saline    #History of coronary artery disease: Status post recent angiogram and stent placement on 7/27/2020 aspirin resumed if no surgical intervention planned will need to resume Brilinta once okay from surgery    #History of depression we will resume home medication    Confirmed CODE STATUS with patient she wants full code      Subjective:  "    Interval History: Patient is complaining of abdominal pain and nausea    Chart reviewed, events noted. Pt seen and examined.     Objective     Vital signs in last 24 hours:  Temp:  [97.6  F (36.4  C)-98.7  F (37.1  C)] 98.2  F (36.8  C)  Heart Rate:  [56-73] 73  Resp:  [16-20] 16  BP: ()/(33-60) 94/52  Weight:   195 lb 4 oz (88.6 kg)  Weight change:   Body mass index is 35.71 kg/m .    Intake/Output last 3 shifts:  I/O last 3 completed shifts:  In: -   Out: 140 [Urine:140]  Intake/Output this shift:  No intake/output data recorded.      Physical Exam:  BP 94/52 (Patient Position: Semi-nicole)   Pulse 73   Temp 98.2  F (36.8  C) (Oral)   Resp 16   Ht 5' 2\" (1.575 m)   Wt 195 lb 4 oz (88.6 kg)   LMP 01/25/1999   SpO2 95%   BMI 35.71 kg/m        O2 Device: None (Room air)        General: Alert, awake. Distress present  HEENT: Normocephalic, oral mucosa moist    Respiratory  : not in distress not using accessory muscles of respiration     Abdomen: Soft, diffuse tenderness present    Neurology: Alert awake moving extremities grossly      Imaging:   reviewed   Xr Chest 1 View Portable    Result Date: 7/22/2020  EXAM: XR CHEST 1 VIEW PORTABLE LOCATION: [facility] DATE/TIME: 7/22/2020 9:56 PM INDICATION: Increasing shortness of breath and orthopnea and patient with history of heart failure COMPARISON: CT 07/02/2019     Mild, irregular. No evidence for CHF or pneumonia. No pleural effusion or pneumothorax.    Xr Chest 2 Views    Result Date: 8/11/2020  EXAM: XR CHEST 2 VIEWS LOCATION: [facility] DATE/TIME: 8/11/2020 11:50 AM INDICATION: Cough. COMPARISON: None.     Negative chest. Lungs are clear. Coronary stenting.     Ct Abdomen Pelvis Without Oral With Iv Contrast    Result Date: 8/11/2020  EXAM: CT ABDOMEN PELVIS WO ORAL W IV CONTRAST LOCATION: [facility] DATE/TIME: 8/11/2020 11:41 AM INDICATION: right sided abdominal pain, nausea and vomiting COMPARISON: 7/22/2020 " TECHNIQUE: CT scan of the abdomen and pelvis was performed following injection of IV contrast. Multiplanar reformats were obtained. Dose reduction techniques were used. CONTRAST: Iohexol (Omni) 75mL FINDINGS: LOWER CHEST: Minimal interstitial edema. The heart is mildly enlarged. There is coronary artery disease. HEPATOBILIARY: Cholecystectomy. There is intrahepatic and extrahepatic bile duct dilatation. There is pancreatic duct dilatation. PANCREAS: No discrete pancreatic masses identified. SPLEEN: Normal. ADRENAL GLANDS: Normal. KIDNEYS/BLADDER: Normal. BOWEL: There is colonic diverticulosis. There is acute diverticulosis involving the distal sigmoid colon with a small contained perforation. No drainable abscess at this point. LYMPH NODES: Normal. VASCULATURE: Atherosclerotic disease. Right renal artery stent. PELVIC ORGANS: Fibroid uterus. MUSCULOSKELETAL: Degenerative changes.     1.  Acute diverticulitis with a small contained perforation. No drainable abscess at this point. 2.  Biliary and pancreatic duct dilatation. No discrete pancreatic masses identified. Consider MRCP, ERCP, EUS.    Ct Abdomen Pelvis Without Oral With Iv Contrast    Result Date: 7/23/2020  EXAM: CT ABDOMEN PELVIS WO ORAL W IV CONTRAST LOCATION: [facility] DATE/TIME: 7/22/2020 11:58 PM INDICATION: Bowel obstruction high-grade suspected Nausea, anorexia, abdominal distention in elderly patient. COMPARISON: 06/26/2019. TECHNIQUE: CT scan of the abdomen and pelvis was performed following injection of IV contrast. Multiplanar reformats were obtained. Dose reduction techniques were used. CONTRAST: Iohexol (Omni) 75mL FINDINGS: LOWER CHEST: moderate right atrial enlargement. HEPATOBILIARY: Prior cholecystectomy. Mild hepatomegaly. Mildly heterogeneous enhancement suggesting passive congestion. PANCREAS: Normal. SPLEEN: Normal. ADRENAL GLANDS: Normal. KIDNEYS/BLADDER: Mild circumferential urinary bladder wall thickening. No  calcification. No hydronephrosis or hydroureter. No renal mass or stone. BOWEL: Moderate distal colonic diverticulosis. No evidence for acute diverticulitis. Small volume ascites. No free air. No evidence for bowel obstruction. LYMPH NODES: Normal. VASCULATURE: Unremarkable. PELVIC ORGANS: Multiple partially calcified uterine masses compatible with uterine fibroids. MUSCULOSKELETAL: Severe multilevel degenerative change.     1.  Mild cystitis suggested please correlate clinically. 2.  Otherwise no acute abnormality in the abdomen or pelvis. No evidence for bowel obstruction. 3.  Enlarged heterogeneous liver suggesting passive hepatic congestion. Given right atrial enlargement, IVC and hepatic vein distention, correlate clinically for right heart failure.       Lab Results:  personally reviewed.     Recent Results (from the past 24 hour(s))   Basic Metabolic Panel    Collection Time: 08/11/20 10:26 AM   Result Value Ref Range    Sodium 135 (L) 136 - 145 mmol/L    Potassium 4.6 3.5 - 5.0 mmol/L    Chloride 103 98 - 107 mmol/L    CO2 22 22 - 31 mmol/L    Anion Gap, Calculation 10 5 - 18 mmol/L    Glucose 94 70 - 125 mg/dL    Calcium 10.3 8.5 - 10.5 mg/dL    BUN 22 8 - 28 mg/dL    Creatinine 0.95 0.60 - 1.10 mg/dL    GFR MDRD Af Amer >60 >60 mL/min/1.73m2    GFR MDRD Non Af Amer 58 (L) >60 mL/min/1.73m2   Hepatic Profile    Collection Time: 08/11/20 10:26 AM   Result Value Ref Range    Bilirubin, Total 1.3 (H) 0.0 - 1.0 mg/dL    Bilirubin, Direct 0.8 (H) <=0.5 mg/dL    Protein, Total 8.2 (H) 6.0 - 8.0 g/dL    Albumin 3.0 (L) 3.5 - 5.0 g/dL    Alkaline Phosphatase 135 (H) 45 - 120 U/L    AST 37 0 - 40 U/L    ALT 33 0 - 45 U/L   Lipase    Collection Time: 08/11/20 10:26 AM   Result Value Ref Range    Lipase 26 0 - 52 U/L   Troponin I    Collection Time: 08/11/20 10:26 AM   Result Value Ref Range    Troponin I 0.07 0.00 - 0.29 ng/mL   HM1 (CBC with Diff)    Collection Time: 08/11/20 10:26 AM   Result Value Ref Range    WBC  11.4 (H) 4.0 - 11.0 thou/uL    RBC 4.27 3.80 - 5.40 mill/uL    Hemoglobin 12.6 12.0 - 16.0 g/dL    Hematocrit 42.4 35.0 - 47.0 %    MCV 99 80 - 100 fL    MCH 29.5 27.0 - 34.0 pg    MCHC 29.7 (L) 32.0 - 36.0 g/dL    RDW 16.9 (H) 11.0 - 14.5 %    Platelets 193 140 - 440 thou/uL    MPV 12.5 8.5 - 12.5 fL    Neutrophils % 73 (H) 50 - 70 %    Lymphocytes % 15 (L) 20 - 40 %    Monocytes % 10 2 - 10 %    Eosinophils % 1 0 - 6 %    Basophils % 1 0 - 2 %    Neutrophils Absolute 8.3 (H) 2.0 - 7.7 thou/uL    Lymphocytes Absolute 1.7 0.8 - 4.4 thou/uL    Monocytes Absolute 1.1 (H) 0.0 - 0.9 thou/uL    Eosinophils Absolute 0.1 0.0 - 0.4 thou/uL    Basophils Absolute 0.1 0.0 - 0.2 thou/uL   INR    Collection Time: 08/11/20 10:26 AM   Result Value Ref Range    INR 2.65 (H) 0.90 - 1.10   ECG 12 lead nursing unit performed    Collection Time: 08/11/20 11:16 AM   Result Value Ref Range    SYSTOLIC BLOOD PRESSURE      DIASTOLIC BLOOD PRESSURE      VENTRICULAR RATE 67 BPM    ATRIAL RATE 77 BPM    P-R INTERVAL      QRS DURATION 76 ms    Q-T INTERVAL 324 ms    QTC CALCULATION (BEZET) 342 ms    P Axis      R AXIS 7 degrees    T AXIS 204 degrees    MUSE DIAGNOSIS       Atrial fibrillation with premature ventricular or aberrantly conducted complexes  ST & T wave abnormality, consider inferior ischemia  ST & T wave abnormality, consider anterolateral ischemia  Abnormal ECG  When compared with ECG of 29-JUL-2020 04:59,  Atrial fibrillation has replaced Atrial flutter  Criteria for Septal infarct are no longer Present  ST now depressed in Inferior leads  ST now depressed in Anterior leads  T wave inversion now evident in Lateral leads     Lactic Acid    Collection Time: 08/11/20 12:07 PM   Result Value Ref Range    Lactic Acid 0.8 0.7 - 2.0 mmol/L   Urinalysis-UC if Indicated    Collection Time: 08/11/20 12:26 PM   Result Value Ref Range    Color, UA Yellow Colorless, Yellow, Straw, Light Yellow    Clarity, UA Clear Clear    Glucose, UA  Negative Negative    Bilirubin, UA Negative Negative    Ketones, UA Trace (!) Negative    Specific Gravity, UA 1.029 1.001 - 1.030    Blood, UA Negative Negative    pH, UA 5.5 4.5 - 8.0    Protein, UA Trace (!) Negative mg/dL    Urobilinogen, UA 2.0 E.U./dL <2.0 E.U./dL, 2.0 E.U./dL    Nitrite, UA Negative Negative    Leukocytes, UA Negative Negative    Bacteria, UA Few (!) None Seen hpf    RBC, UA 0-2 None Seen, 0-2 hpf    WBC, UA 0-5 None Seen, 0-5 hpf    Squam Epithel, UA 5-10 (!) None Seen, 0-5 lpf    Mucus, UA Few (!) None Seen lpf    Hyaline Casts, UA 5-10 (!) 0-5, None Seen lpf   POCT Glucose    Collection Time: 08/11/20 12:54 PM    Specimen: Blood   Result Value Ref Range    Glucose 72 70 - 139 mg/dL   COVID-19 Virus PCR MRF    Collection Time: 08/11/20  1:01 PM    Specimen: Nasopharyngeal Swab; Respiratory   Result Value Ref Range    COVID-19 VIRUS SPECIMEN SOURCE Nasopharyngeal     2019-nCOV       Test received-See reflex to IDDL test SARS CoV2 (COVID-19) Virus RT-PCR   SARS-CoV-2 (COVID-19) RT-PCR-IDDL    Collection Time: 08/11/20  1:01 PM    Specimen: Nasopharyngeal Swab; Respiratory   Result Value Ref Range    SARS-CoV-2 Virus Specimen Source Nasopharyngeal     SARS-CoV-2 PCR Result NEGATIVE     SARS-COV-2 PCR COMMENT       Testing was performed using the Xpert Xpress SARS-CoV-2 Assay on the [employer]   POCT Glucose    Collection Time: 08/11/20  1:26 PM    Specimen: Blood   Result Value Ref Range    Glucose 75 70 - 139 mg/dL   POCT Glucose    Collection Time: 08/11/20  2:06 PM    Specimen: Blood   Result Value Ref Range    Glucose 150 (H) 70 - 139 mg/dL   POCT Glucose    Collection Time: 08/11/20  4:22 PM    Specimen: Capillary; Blood   Result Value Ref Range    Glucose 104 70 - 139 mg/dL   POCT Glucose    Collection Time: 08/11/20  6:04 PM    Specimen: Capillary; Blood   Result Value Ref Range    Glucose 85 70 - 139 mg/dL   POCT Glucose    Collection Time: 08/11/20  8:03 PM    Specimen: Capillary; Blood    Result Value Ref Range    Glucose 99 70 - 139 mg/dL       Advance Care Planning:   Barriers to discharge: Acute diverticulitis with perforation  Anticipated discharge day: To be decided  Disposition:   Michael Pink MD  Hospitalist  [phone]

## 2021-06-10 NOTE — PROGRESS NOTES
Assessment/ Plan  1. Hemoptysis  Low volume, normal chest x-ray, stable hemoglobin.  Hypotension but I believe this is unrelated to hemorrhage and more likely related to current overtreatment of hypertension since symptoms and low blood pressures preceded hemoptysis.  I feel the hemoptysis is due to viral bronchitis and cough.  Is now improving.  Will follow.  Warned patient to seek medical attention should things get worse in terms of bleeding, cough or shortness of breath.  Patient is on warfarin but INR in therapeutic range.    - HM2(CBC w/o Differential)  - Basic Metabolic Panel  - XR Chest PA and Lateral; Future  - INR    2. Viral URI with cough  Improving, no treatment.  Discussed sugar-free cough drops.    3. Hypotension  Patient Instructions   Stop lisinopril   Stop metoprolol 25 mg twice daily, start metoprolol xl 25 mg once daily  Get me your blood pressures in 1 week.    Patient has had a full month at least of blood pressures in the 100-110 systolic range.  At times in the upper 80s and 90s.  Will continue to back off on medication.  Did not do well in the past when we stopped hydrochlorothiazide.  Does not wish to stop this.  Has had an MI in the past.  Will continue beta-blocker.  Only other option is lisinopril.  Reviewed echocardiogram which showed normal systolic function, reviewed most recent microalbumin and creatinine which were both normal.    4. Cerebral atherosclerosis  On warfarin and blood pressure control integral to treatment.    Body mass index is 32.56 kg/(m^2).    Subjective  CC:  Chief Complaint   Patient presents with     Cough     bloody , going away     Dizziness     HPI:  Cough  Narrative   -----------------------------------------  Duration: uri since sat, cough bad since tues night- ate something that got stuck- chicken holger- vomited it up.  Later brought up phlegm from chest and it was all blood.  About 1/2 tsp.   - up to 10 times last night.  Now less blood mixed with  saliva.   Associated URI symptoms?  Getting better- ST, runny nose  Productive?  Yellow sputum, then blood, now clear a little bit of blood  SOB?  no  Severity   mild  Context, Other associated symptoms:   Some dizziness now      Dizziness  Narrative: believes that blood pressures are too low when she goes to group  -----------------  Is this presyncope? yes*  Vertigo? Yes-   Duration/ frequency? 2 wks- mirna  Trigger? Non- taking morning med.   Associated symptoms? no  Anything that relieves?sitting down for safety  Comments: cut back on lisinopril  Blood pressure is always      Taking 1/2 of 10 mg lisinopr  PFSH:  Current medications reviewed as follows:  Current Outpatient Prescriptions on File Prior to Visit   Medication Sig     ASCORBIC ACID WITH MARICRUZ HIPS 500 MG tablet TAKE 2 TABLETS (1000MG) BY MOUTH ONCE DAILY.     aspirin 81 MG EC tablet TAKE 1 TABLET BY MOUTH ONCE DAILY     blood glucose meter (GLUCOMETER) Please Dispense kit per pharmacy discretion and patient's insurance Test blood sugar.     blood glucose test strips Dispense brand per patient's insurance at pharmacy discretion.  Patient to test twice daily     blood glucose test strips Test twice a day.   Dispense brand per patient's insurance at pharmacy discretion.     blood glucose test strips Use 1 each As Directed as needed. Dispense brand per patient's insurance at pharmacy discretion.     blood-glucose meter Misc Use as directed     CALCIUM 600 600 mg (1,500 mg) tablet TAKE 2 TABLETS (1,200MG) BY MOUTH ONCE DAILY     cholecalciferol, vitamin D3, 2,000 unit Tab TAKE 1 TABLET BY MOUTH ONCE DAILY. (Patient taking differently: TAKE 1 TABLET BY MOUTH TWICE DAILY.)     CRESTOR 40 mg tablet TAKE 1 TABLET BY MOUTH AT BEDTIME      mg capsule TAKE 1 CAPSULE BY MOUTH TWICE DAILY AT 6AM AND 4PM.     enoxaparin (LOVENOX) 80 mg/0.8 mL syringe Inject 0.8 mL (80 mg total) under the skin every 12 (twelve) hours.     FIBER, CALCIUM POLYCARBOPHIL, 625 mg  tablet TAKE 2 TABLETS (1250MG) BY MOUTH ONCE DAILY     gabapentin (NEURONTIN) 100 MG capsule TAKE 1 CAPSULE BY MOUTH AT BEDTIME     generic lancets Use 1 each As Directed as needed. Dispense brand per patient's insurance at pharmacy discretion.     hydrochlorothiazide (HYDRODIURIL) 25 MG tablet TAKE 1 TABLET BY MOUTH ONCE DAILY.     lancets 33 gauge Misc Test twice daily Dispense brand per patient's insurance at pharmacy discretion.     lidocaine (LMX5) 5 % Crea Apply 1 application topically every 12 (twelve) hours as needed (pain).     loratadine (CLARITIN) 10 mg tablet Take 1 tablet (10 mg total) by mouth daily.     magnesium oxide (MAG-OX) 400 mg tablet TAKE 1 TABLET BY MOUTH TWICE DAILY.     MAPAP ARTHRITIS PAIN 650 mg CR tablet TAKE 1 TABLET BY MOUTH THREE TIMES DAILY.     metoprolol tartrate (LOPRESSOR) 25 MG tablet TAKE 1 TABLET BY MOUTH TWICE DAILY.     multivitamin (TAB-A-JULIET) per tablet Take 1 tablet by mouth daily.     nitroglycerin (NITROSTAT) 0.4 MG SL tablet Place 1 tablet (0.4 mg total) under the tongue every 5 (five) minutes as needed for chest pain (for up to 3 doses and call 911 if persists).     omeprazole (PRILOSEC) 20 MG capsule 20 mg 2 (two) times a day.      omeprazole (PRILOSEC) 20 MG capsule TAKE 1 CAPSULE BY MOUTH TWICE DAILY.     polyethylene glycol 3350 Powd Take 17 g by mouth daily as needed (constipation).      polyvinyl alcohol (ARTIFICIAL TEARS, POLYVIN ALC,) 1.4 % ophthalmic solution Administer 1 drop to both eyes 4 (four) times a day. As directed.     REFRESH OPTIVE 0.5-0.9 % Drop INSTILL TWO DROPS IN BOTH EYES TWICE DAILY AS NEEDED     sertraline (ZOLOFT) 100 MG tablet TAKE 1 TABLET BY MOUTH ONCE DAILY     traZODone (DESYREL) 50 MG tablet TAKE 1 TABLET BY MOUTH AT BEDTIME.     warfarin (COUMADIN) 2 MG tablet TAKE 2 TABLETS (4MG) BY MOUTH ONCE DAILY, Hold coumadin today 6/30 and resume from tomorrow 7/1/16, check INR in 2-3 days     [DISCONTINUED] lisinopril (PRINIVIL,ZESTRIL) 10  MG tablet Take 1 tablet (10 mg total) by mouth daily.     blood-glucose meter Misc Use 1 Device As Directed once for 1 dose.     No current facility-administered medications on file prior to visit.      Patient Active Problem List    Diagnosis Date Noted     PAF (paroxysmal atrial fibrillation) 11/12/2015     Priority: High     Overview Note:     Post-op emergent hernia repair 11/2015       CHF (congestive heart failure) 11/12/2015     Priority: High     Overview Note:     Diastolic  Dx with volume overload post-op emergent hernia repair 11/2015-   Echo 2011- hyperdynamic lv- EF= 65%, mild AI,TI,MI and aortic sclerosis without stenosis         Type 2 diabetes mellitus with circulatory disorder      Priority: High     Overview Note:     Created by Conversion         Lumbar Canal Stenosis      Priority: High     Overview Note:     Created by [facility]  [employer] display [employer] [facility] Annotation: Aug 20 2010 10:36AM Jerson Esparza: see MRI 5/12/10.    Severe l4-5 stenosis and severe L5-S1  l foraminal stenosis- due, in paryt,   to lipomatosis         Peripheral Vascular Disease      Priority: High     Overview Note:     Created by Conversion         Patent Foramen Ovale      Priority: High     Overview Note:     Created by [facility]  [employer] [facility] Annotation: Sep  4 2008  4:30PM - Jerson Hernandez: probable- ECHO   7/5/2006no shunt seen on NIVIA following CVA 8/11- report scanned         Acute myocardial infarction      Priority: High     Overview Note:     Created by [employer] [facility] Annotation: Oct  8 2007  4:47PM Jerson Esparza: Stent RCA    Replacement Utility updated for latest IMO load       Coronary Arteriosclerosis      Priority: High     Overview Note:     Created by [facility] Annotation: Sep 28 2007 11:22AM Jerson Esparza: PCI 1995 -   Details unknownneg adleilasine caridolite 2/16/2006- EF 65%Angiogram 2006 showed   chronic RCA occlusion, minimal disease in LAD, LCx, Normal LVEF    Replacement Utility  updated for latest IMO load       Hyperlipidemia      Priority: High     Overview Note:     Created by Conversion         Cerebral atherosclerosis      Priority: High     Overview Note:     Created by Conversion         Right Renal Artery Stenosis      Priority: High     Overview Note:     Created by Conversion  [facility] Annotation: Oct  8 2007  4:47PM Desean Hernandez Jerson: stented 1999         Hypertension 07/02/2010     Priority: High     Dysarthria 07/02/2010     Priority: High     Hypomagnesemia      Priority: Medium     Dermatosis Papulosa Nigra      Priority: Medium     Overview Note:     Created by Conversion         Depression      Priority: Medium     Overview Note:     Created by Conversion         Hypercalcemia      Priority: Medium     Overview Note:     Created by Conversion  [facility] Annotation: Jun 5 2013  7:58AM - Jerson Hernandez: most likely due   to hydrochlorothiazide  Normal parathyroid hormone 9/2008       Osteoarthritis      Priority: Medium     Overview Note:     Created by Conversion    Replacement Utility updated for latest IMO load       Tachycardia 07/02/2010     Priority: Medium     Benign adenomatous polyp of large intestine 03/30/2017     Overview Note:     Colonoscopy March 2017.  Recommend 5 year follow-up.  Single tubular adenoma       CVA (cerebrovascular accident) 01/16/2017     Health care maintenance 03/07/2016     Cystitis 03/05/2016     SBO (small bowel obstruction) 11/12/2015     Advanced directives, counseling/discussion 08/05/2015     Abnormality Of Walk      Overview Note:     Created by Conversion         Cerebral infarction 07/02/2010     Anemia 07/02/2010     History   Smoking Status     Former Smoker   Smokeless Tobacco     Never Used     Social History     Social History Narrative     Patient Care Team:  Jerson Hernandez MD as PCP - General (Family Medicine)  ROS  Full 10 system review including constitutional, respiratory, cardiac, gi, urinary, rheumatologic,  "neurologic, reproductive, dermatologic psychiatric is  performed (via questionnaire) and is negative except chills, dizziness, fatigue, ringing in ears, sores in mouth, back pain, muscle cramp      Objective  Physical Exam  Vitals:    05/18/17 0826   BP: (!) 88/42   Patient Site: Left Arm   Patient Position: Sitting   Cuff Size: Adult Regular   Pulse: 64   Resp: 20   Temp: 97.6  F (36.4  C)   TempSrc: Oral   Weight: 178 lb (80.7 kg)   Height: 5' 2\" (1.575 m)     Head- normocephalic atraumatic.  Normal conjunctiva, pupils equal.  No lid abnormalities apparent.  Normal auricles.  Ear canals appear normal.  TMs well visualized and both are normal.  Oral cavity without abnormality, no ulcers.  Acceptable dentition.  Pharynx is normal in appearance without erythema or exudate.  Neck examined and is normal without lymphadenopathy.  Lungs are clear to auscultation.  Normal duration of inspiration and expiration, normal breath sounds.  No wheezing, rales or rhonchi.  No accessory muscle use.    Diagnostics  Chest x-ray personally reviewed and appears normal/unchanged.  Compared with chest x-ray 2006 and current x-ray has much better resolution.  Results for orders placed or performed in visit on 05/18/17   HM2(CBC w/o Differential)   Result Value Ref Range    WBC 5.9 4.0 - 11.0 thou/uL    RBC 4.11 3.80 - 5.40 mill/uL    Hemoglobin 11.8 (L) 12.0 - 16.0 g/dL    Hematocrit 36.8 35.0 - 47.0 %    MCV 89 80 - 100 fL    MCH 28.7 27.0 - 34.0 pg    MCHC 32.1 32.0 - 36.0 g/dL    RDW 12.7 11.0 - 14.5 %    Platelets 214 140 - 440 thou/uL    MPV 8.5 7.0 - 10.0 fL   INR   Result Value Ref Range    INR 2.40 (H) 0.90 - 1.10         Please note: Voice recognition software was used in this dictation.  It may therefore contain typographical errors.    "

## 2021-06-10 NOTE — PROGRESS NOTES
Occupational Therapy  SLUMS  Scoring If High School Educated If Less than High School Educated   Normal 27-30 25-30   Mild Neurocognitive Disorder 21-26 20-24   Dementia 1-20 1-19   The SLUMS is a cognitive screening assessment used to identify the presence of cognitive deficits, and/or to identify a change in cognition over time.      High school education: yes/ Yes or No    Patient Score: 16/30    Orientation: 0/3  Short term memory: 4/5  Language: 2/3  Attention: 7/13  Visuospatial/executive function: 2/6      Score Interpretation: Pt demonstrates deficits in the areas noted above. Recommending pt have assistance with All IADL's, to ensure safety upon discharge. Please note that this examination is used to screen individuals to look for the presence of cognitive deficits, and to identify changes in cognition over time. This is not a diagnosis and the examination should be followed by further cognitive assessments if deficits are observed.         8/16/2020 by Tiara Morales, OT

## 2021-06-10 NOTE — PLAN OF CARE
Problem: Discharge Barriers  Goal: Patient s discharge needs will be met  Outcome: Progressing  Care Plan-Care Management    Care Management Goals of the Day:   Follow progression of care.     Care Progression to be reviewed with MD and RN CM: Updates to be provided to Charge RN, CMSW, HUC,therapy and other disciplines as indicated.    Barriers to Discharge and plan:  Oxygen at 8 liters. IV Fluconazole, IV Meropenem, IV Vancomycin. Plan abscessogram.     Family Care Conference: NA at this time. Daughter Aria is primary family contact.     Discharge Disposition:   TCU    Expected Discharge Date:  To be determined.     Transportation:  Medical.    Care Management/Coordination Narrative:   Patient is a resident of the TCU at [facility] where her bed is on an 18 day bed hold.   10:43 AM:  Spoke with admissions at [facility] who note that patient is on day 14 of the 18 day bed hold. If the bed hold expires, family would have the option of holding the bed. If they do not hold the bed, a new referral can be made.       Abbreviation  Code:  [facility] Wheelchair [facility]), Patient (pt), Transitional Care Unit (TCU), Skilled Nursing Facility (SNF), Long Term Care (LTC), Assisted Living ([facility] or AL), Care Management (CM), Physical Therapy (PT), Occupational Therapy (OT), Speech Therapy (ST), Respiratory Therapy (RT).

## 2021-06-10 NOTE — PLAN OF CARE
Problem: Pain  Goal: Patient's pain/discomfort is manageable  Outcome: Progressing     Problem: Daily Care  Goal: Daily care needs are met  Outcome: Progressing     Problem: Potential for Compromised Skin Integrity  Goal: Skin integrity is maintained or improved  Outcome: Progressing     Pt LS diminished/coarse.  Has occasional cough and reports some shortness of breath.  O2 sat > 92% RA.     Pt reports minimal pain, no prns requested.  Minimal output from abscess drain.

## 2021-06-10 NOTE — TELEPHONE ENCOUNTER
[facility] Nurse Triage Anticoagulation Note      Provider: PORTER Sanchez  Facility: [facility]    Facility Type: TCU    Caller: AM nurse  Call Back Number:  [phone]    Reason for call: INR    Today s INR: 2.83  Previous INR: 8/7 5.25(hold x 3 days), 8/6 5.59(hold), 8/3 2.70(3mg daily), 8/1 1.75(3mg daily), 7/31 1.77(3mg)    Diagnosis/Goal: AFIB  Heparin/Lovenox: No   Currently on ABX: No  Other interacting Medications: Other: EC ASA 81mg daily, Brillinta 90mg BID  Missed or refused doses: No    Verbal Order/Direction given by Provider: Warfarin 1.5mg daily.  Next INR 8/12/20.      Provider giving order: PORTER Sanchez    Verbal order given to: Lila Russell RN

## 2021-06-10 NOTE — PLAN OF CARE
Pt denies nor expressed nausea during the evening shift and had been tolerating oral medications and ice chips. Pt denies abdominal pain and active bowel sound in all quadrants.

## 2021-06-10 NOTE — PROGRESS NOTES
Patient transferred to digoxin toxicity; pt continues to be bradycardic and is placed on BIPAP; current settings: ST 14/6 14 40%. ABG results: 7.26 42 134 17.8 98.5 -8.7. pt is able to take the mask off. RT will monitor.    GENO JosephT

## 2021-06-10 NOTE — PLAN OF CARE
"Care Plan  Care Management         Care Management Goals of the Day: Progression of care and discharge planning     Care Progression Reviewed With: Charge Nurse, Medical Doctor, Health Unit Coordinator, Nurse Care Manager      Barriers to Discharge: GI status     Discharge Disposition: TCU     Expected Discharge Date: 8/17/20 pending     Transportation: [facility]        Care Coordination Narrative:  Surgery following for acute perforated diverticulitis with small abscess. NPO, currently on IV Cipro and Flagyl. Nephrology consulted. This is readmit from hospitalization 7/22/20-7/31/20.     Assessment History:  Patient has been at [facility] TCU since last hospitalization. Prior to that she has been living at [facility] [phone]. She received medication assist and ADL assist. She uses a rolling walker and has a scooter. Daughter-Aria is main contact. She lives in [place]. Nephew-Bridger lives in [place].   [facility] states per DON patient can pay her rent when she returns and \"not to worry.\" Updated Aria 8/12.     Patient to return to [facility] TCU where she has a 18 day bed hold.   "

## 2021-06-10 NOTE — TELEPHONE ENCOUNTER
Patient underwent hospital admission 7/22/2020-7/31/2020 related to supratherapeutic INR (above 13) and elevated LFTs. She received vitamin K orally and Phyyonadione 5mg IVPB, with FFP. It was mentioned INR may be elevated due to liver failure/congestion heart failure with the warfarin therapy. She was placed on aspirin 81 daily, brilinta, and metoprolol. Warfarin discharge dosing 3 mg daily, this is a 38.2% dose change. Discharged to TCU, [facility]. Left a detailed message for nursing to call ACM program back, need to know who is managing this patient while at TCU.     Mae Heath RN

## 2021-06-10 NOTE — PROGRESS NOTES
Infectious Disease Follow up Note:    Service: Infectious Disease   Note: Follow Up Note  Date: 2020    Chief Complaint: Follow up for acute diverticulitis with perforation    ASSESSMENT:  Gardenia Muller is a 70 y.o. old female with acute diverticulitis with perforation.     History of CVA, obesity, chronic abdominal pain.  Acute diverticulitis with perforation admitted on 2020.  On IV Cipro and Flagyl.  Cultures from abscess drainage on 2020 with Candida albicans.  CT scan on 2020 with near complete resolution of the abscess.  New leukocytosis.  No clear source.  C. difficile negative on 2020.  Feeling short of breath.  Chest x-ray ordered. HAP is a possibility.  Leukocytosis could be from untreated Candida infection.  Fluconazole added on 2020.  WBC better, at 17,000 on 2020.  Abdomen is better.  New bilateral interstitial infiltrate.  COVID-19 PCR negative on 2020.  Most likely pulmonary edema.  Penicillin allergy, reaction swelling.     Recommendations:   Continue Meropenem and IV Vanco and fluconazole 200 mg daily.  Monitor white blood cell count..  Monitor respiratory status and abdomen.  Hold statin for now      Discussed with the patient, nursing staff.    ID will follow.    DEBBI White MD  [facility]   Office Telephone [phone].  Fax [phone]  Direct messaging:[employer] paging  ______________________________________________________________________  Notes / labs / vitals reviewed.    SUBJECTIVE / INTERVAL HISTORY: Abdomen is better.  Continue to have respiratory distress.  Now on a lot of oxygen.  COVID-19 negative.    ROS: A complete 12 point ROS is negative except as listed above.    PMHx/FHx/SHx: Reviewed. Interval changes noted.    OBJECTIVE:  Vitals:    20 1659   BP:    Pulse: 61   Resp:    Temp:    SpO2:        Temp (24hrs), Av.7  F (36.5  C), Min:97.3  F (36.3  C), Max:98.2  F (36.8  C)    GEN: NAD  EYES:   Anicteric, RAYMUNDO, EOM intact.  HEAD, EARS, NOSE, MOUTH, AND THROAT:  Oropharynx without thrush or ulcers  NECK:  Supple.   RESPIRATORY:  Normal breathing pattern. Clear to auscultation  CARDIOVASCULAR:  S1 S2 No murmur, click, gallop or rub. No dependent edema. No excess JVD.  ABDOMEN:  Soft, normal bowel sounds, non-tender, no masses, no organomegaly.  MUSCULOSKELETAL:  Joints without effusion or acute inflammation. No muscle atrophy.  LYMPHATIC:  No cervical or supraclavicular adenopathy  SKIN/HAIR/NAILS:  No rashes. No stigmata of infective endocarditis.  NEUROLOGIC:  Gross neurological exam non-focal.  PSYCHIATRIC:  A&Ox3, appropriate, mentation normal.    Antibiotics:  IV vancomycin  IV meropenem  IV fluconazole    Pertinent labs:    Results from last 7 days   Lab Units 08/23/20  0754 08/22/20  1839 08/22/20  0542  08/19/20  0720   LN-WHITE BLOOD CELL COUNT thou/uL 17.4* 17.0* 17.4*   < > 14.5*   LN-HEMOGLOBIN g/dL 8.3* 8.1* 8.3*   < > 8.2*   LN-HEMATOCRIT % 25.9* 24.8* 24.7*   < > 25.5*   LN-PLATELET COUNT thou/uL 112* 115* 115*   < > 136*   LN-NEUTROPHILS RELATIVE PERCENT %  --   --   --   --  74*   LN-MONOCYTES RELATIVE PERCENT %  --   --   --   --  13*   LN-MONOCYTES - REL (DIFF) %  --  4 8  --   --     < > = values in this interval not displayed.       Results from last 7 days   Lab Units 08/23/20  0754 08/22/20  1839 08/21/20  0447  08/17/20  0520   LN-SODIUM mmol/L 139 138 137   < > 139   LN-POTASSIUM mmol/L 3.7 3.0* 3.4*   < > 3.3*   LN-CHLORIDE mmol/L 108* 109* 105   < > 107   LN-CO2 mmol/L 20* 21* 21*   < > 22   LN-BLOOD UREA NITROGEN mg/dL 10 9 8   < > 31*   LN-CREATININE mg/dL 0.86 0.84 0.95   < > 3.03*   LN-CALCIUM mg/dL 7.9* 7.6* 7.4*   < > 6.9*   LN-ALBUMIN g/dL  --   --   --   --  1.8*    < > = values in this interval not displayed.       MICROBIOLOGY DATA:    Cultures reviewed      IMAGING/RADIOLOGY:  Reviewed  XR Chest 1 View Portable (Order [id number])   Imaging   Date: 8/21/2020  Department:  [facility] [address] Ortho/Spine/MS  Released By: Grazyna Hurtado RN (auto-released)  Authorizing: Michael Abreu MD    Study Result     EXAM: XR CHEST 1 VIEW PORTABLE  LOCATION: [facility]  DATE/TIME: 8/21/2020 7:34 PM     INDICATION: r/o pna- shortness of breath  COMPARISON: 08/11/2020     IMPRESSION:   New bilateral interstitial infiltrates could represent edema or pneumonia including COVID. Enlarged cardiac silhouette. No pleural effusion. No definite pulmonary vascular congestion.

## 2021-06-10 NOTE — PROGRESS NOTES
"  Clinical Nutrition Therapy Assessment Note      Reason for Assessment:   Gardenia Muller is a 70 y.o. female assessed by the RD for NPO/CL >= 3 days.    Principal Problem:    Diverticulitis  Active Problems:    Diverticulitis of large intestine with perforation and abscess without bleeding  Hx DM, CHF    Subjective:  Pt reports she is feeling better.  Currently on clear liquids.  Pt asking to speak with someone about discharging to home when ready.  Chart reviewed.      Nutrition History:  Information from patient and chart.  Diet prior to admission:  Pt on diabetic diet at TCU.  Recent food/fluid intake: good PTA.   Food allergies or intolerances: nkfa    Nutrition Prescription:   Diet: CL  IV dextrose or Fluids:     Nutrition Intake:  NPO/CL x 6 days.  Limited intake on current diet.    Anthropometrics:  Height: 5' 2\" (157.5 cm)  Admission weight: 195#  Weight: 190 lb 1.6 oz (86.2 kg)  BMI (Calculated): 35.7  BMI indication: 35-39.9 obesity (class 2)  Ideal body weight 110#+-10%  Weight History:    Wt Readings from Last 5 Encounters:   08/14/20 190 lb 1.6 oz (86.2 kg)   08/11/20 179 lb 4.8 oz (81.3 kg)   08/12/20 179 lb (81.2 kg)   08/10/20 179 lb (81.2 kg)   07/31/20 183 lb 6.4 oz (83.2 kg)   156# 12/6/19, 130# 8/20/19    Physical Findings:  Nonpitting edema noted.  No wasting noted       GI Status/Output:   GI symptoms per nursing: rounded abdome  Last BM recorded: 8/15 per chart    Skin/Wound:   James Scale Score: 16    No wound noted.    Medications:  Medications reviewed.    Labs:  Lab Results   Component Value Date/Time    PREALBUMIN 25.6 07/30/2019 12:17 PM    ALBUMIN 3.0 (L) 08/11/2020 10:26 AM     08/16/2020 06:02 AM    K 3.6 08/16/2020 06:02 AM    BUN 34 (H) 08/16/2020 06:02 AM    CREATININE 4.10 (H) 08/16/2020 06:02 AM     (H) 08/16/2020 06:02 AM    PHOS 1.9 (L) 07/28/2020 12:57 PM    MG 1.6 (L) 07/31/2020 06:16 AM   Labs reviewed    Estimated Nutrition Needs:  Using adjusted weight of " 59 kg.    Energy Needs: 2973-6327 kcals daily per 25-30 kcal/kg   Protein Needs: 71-89 g daily, 1.2-1.5 g/kg.  Fluid Needs: 1770 mls daily, 30 mls/kg    Malnutrition: Not noted    Nutrition Risk Level: moderate risk    Nutrition DX: inadequate intake r/t acute illness evidenced by NPO/CL x 6 days    Goals:  Meet nutrition needs  Tolerate diet advancement  Electrolytes WNL    Plan:  Monitor for diet advancement.  Contacted Care Mgr to see pt.      Monitor:   Per goals    See Care Plan for Problems, Goals, and Interventions.

## 2021-06-10 NOTE — PROGRESS NOTES
"Clinical Nutrition Therapy Follow Up Note    S: Gardenia feels she's tolerating her diet but wants something to \"crunch on.\"  I stated I'd advance her to solid foods for dinner but that she would continue with a low fiber intake.     Current Nutrition Prescription:   Diet: full liquid cardiac, ADAT since 8/18  Diet Supplements:   IV dextrose or Fluids:     Current Nutrition Intake:  No intake documented but Gardenia states she was tolerating it since yesterday.     Anthropometrics:  Admission weight: 195#  Weight: 197 lb 9.6 oz (89.6 kg)  Edema: BLE nonpitting    GI Status/Output:   GI symptoms include: Abdominal pain per nurse--3/10 intermittent  Last BM: 8/19 large loose per nurse  Drains: 35 ml over past 24 hr    Skin/Wound:  No wound was noted.    Medications:  Medications reviewed.  Cipro, novolog, slow Mag, flagyl, miralax q day (hold for > 1 BM in 24 hr)    Labs:  Labs reviewed  Na 135  K+ 3.3  Mg 1.5  Phos 95  Hgb 8.2  FSBG: well controlled.    Malnutrition: Not noted    Nutrition Risk Level: moderate risk    NNutrition DX: inadequate intake r/t acute illness evidenced by NPO/CL x 6 days     Goals:    Meet nutrition needs-progressing slowly  Tolerate diet advancement-progressing  Electrolytes WNL--progressing with replacement orders     Plan:    Advance diet to diabetic, low sodium, low fiber.  Trial vanilla Boost GC with lunch.      Monitor:   Per goals  "

## 2021-06-10 NOTE — PROGRESS NOTES
[facility]    Facility:   [facility] [[id number]]   Code Status: FULL CODE and POLST AVAILABLE      CHIEF COMPLAINT/REASON FOR VISIT:  Chief Complaint   Patient presents with     Review Of Multiple Medical Conditions     abdominal pain, HTN, Hypotension, tobacco cessation       HISTORY:      HPI: Gardenia is a 69 y.o. 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    Gardenia is seen at [facility] for new admission after recent hospitalization from July 22 to July 31.  She was also hospitalized in June from June 27 to July 3 and had been at another TCU prior to her recent hospitalization.  She presented with severely elevated INR and LFTs with an INR of 13 on admit.  Possibly secondary to top of acute liver failure secondary to CHF.  This was reversed with vitamin K and fresh frozen plasma.  Her work-up was negative for hepatitis and a TTE showed right vent dysfunction with an EF of 40%.  During her hospitalization she was diuresed with Lasix and chlorothiazide and also underwent right and left heart cath.  See previous hospital notes for full details.  She developed A. fib  with RVR that was controlled with digoxin.  She had hyperkalemia that improved with diuresis and Kayexalate.  Also hypercalcemia and the chlorthalidone was discontinued.  She will continue on aspirin 81 daily, ticagrelor, and warfarin.    Acute diastolic heart failure and hypertension: Metoprolol supinate 12.5, spironolactone, potassium chloride 20 mEq daily, lisinopril, magnesium gluconate, furosemide 20 mg daily, digoxin.    A. Fib: On warfarin, INR 5.9 today.  The significant increase from Wednesday when she was 1.75.  She was on 3 mg of Coumadin for 2 days in a row which is actually a decrease from her hospital dose.  Liver will be checked on Friday as well, however it has been downtrending since she left the hospital.    Diabetes type 2: On metformin 500 twice daily, would like her blood sugar checked twice daily as well.    CAD with recent stent placement: Continue Brilinta, aspirin 81, anticoagulated.  Atorvastatin on hold until LFTs return to normal.  -Check LFTs Friday.    Today she is seen sitting up in her wheelchair in the nursing home.  She has not seen therapy yet.  She has a brief understanding of her complicated hospital course and is able to tell me that it was caused by high INR.  Her main concern today is making sure that we get her blood sugars checked as the facility has not been doing so.   She is alert and oriented at what seems to be her baseline.  She is denying any concerns with shortness of breath, chest pain, nausea, vomiting, constipation or diarrhea, urinary symptoms.  [facility] resides at an [facility].       Past Medical History:   Diagnosis Date     Acute diastolic heart failure (H) 11/2015     Acute on chronic diastolic heart failure (H) 6/15/2019     Advanced directives, counseling/discussion 8/5/2015    Patient completed 2011.  Discussed today, 5/24/17, and wants to change healthcare agent from niece to daughter.  Discussed that she will need to complete new form to indicate this.      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  Recommend 5 year follow-up.  Single tubular adenoma     Biliary obstruction 10/3/2018    Added automatically from request for surgery [id number]     Cerebral vascular accident (H)      Coronary artery disease 2006    100% RCA with collateral filling per Dr. Elizabeth's angio report     Diabetes mellitus type 2 in obese (H)      Fibrocystic breast      GERD (gastroesophageal reflux disease)      History of anesthesia complications     slow to wake     Hypertension      Peripheral vascular disease (H)      Pneumonia 11/11/2018     PONV (postoperative nausea and vomiting)      S/P cholecystectomy 6/22/2018     SBO (small bowel obstruction) (H) 11/12/2015     Stroke (H)      Transaminitis 10/22/2018     Upper respiratory infection 12/20/2018     Urinary incontinence              Family History   Problem Relation Age of Onset     Cancer Paternal Grandmother      Cancer Paternal Uncle      No Medical Problems Mother      No Medical Problems Father      Heart attack Sister      Chronic Kidney Disease Sister      No Medical Problems Daughter      No Medical Problems Maternal Grandmother      No Medical Problems Maternal Grandfather      No Medical Problems Paternal Grandfather      No Medical Problems Maternal Aunt      No Medical Problems Paternal Aunt      Diabetes Brother      BRCA 1/2 Neg Hx      Breast cancer Neg Hx      Colon cancer Neg Hx      Endometrial cancer Neg Hx      Ovarian cancer Neg Hx      Social History     Socioeconomic History     Marital status: Legally      Spouse name: None     Number of children: 1     Years of education: None     Highest education level: None   Occupational History     None   Social Needs     Financial resource strain: None     Food insecurity:     Worry: None     Inability: None     Transportation needs:     Medical:  None     Non-medical: None   Tobacco Use     Smoking status: Former Smoker     Packs/day: 0.25     Smokeless tobacco: Former User     Tobacco comment: e-cig a few times/day   Substance and Sexual Activity     Alcohol use: No     Drug use: No     Sexual activity: None   Lifestyle     Physical activity:     Days per week: None     Minutes per session: None     Stress: None   Relationships     Social connections:     Talks on phone: None     Gets together: None     Attends [employer] service: None     Active member of club or organization: None     Attends meetings of clubs or organizations: None     Relationship status: None     Intimate partner violence:     Fear of current or ex partner: None     Emotionally abused: None     Physically abused: None     Forced sexual activity: None   Other Topics Concern     None   Social History Narrative    Single/, lives alone; daughter in [place];    Gets help from neighbors and extended family    Former smoker.no alcohol problems       REVIEW OF SYSTEM:  Pertinent items are noted in HPI.    PHYSICAL EXAM:   BP (!) 86/66 (Patient Position: Standing)   Pulse 83   Wt 138 lb 3.2 oz (62.7 kg)   LMP 01/25/1999   BMI 24.48 kg/m    General appearance: alert, appears stated age and cooperative  HEENT: Head is normocephalic with normal hair distribution. No evidence of trauma. Ears: Without lesions or deformity. No acute purulent discharge. Eyes: Conjunctivae pink with no scleral icterus or erythema.   Lungs: clear to auscultation bilaterally, respirations without effort  Heart: regular rate and irregular rhythm, S1, S2 normal, no murmur, click, rub or gallop  Abdomen: soft, non-tender; bowel sounds normal; no masses,  no organomegaly  Extremities: extremities normal, atraumatic, no cyanosis, +1 edema in lower extremities  Pulses: 2+ and symmetric  Skin: Skin color, texture, turgor normal. No rashes or lesions  Neurologic: Grossly normal   Psych: interacts well with  caregivers, exhibits logical thought processes and connections, pleasant      LABS:   None today.     ASSESSMENT:    1. Warfarin anticoagulation     2. Supratherapeutic INR     3. Acute liver failure without hepatic coma         PLAN:    Acute diastolic heart failure and hypertension: Metoprolol supinate 12.5, spironolactone, potassium chloride 20 mEq daily, lisinopril, magnesium gluconate, furosemide 20 mg daily, digoxin.    A. Fib: On warfarin, INR 5.9 today, up from 1.75 on Tuesday. She was given 3 mg on Tuesday and Wednesday.   -Hold Coumadin today.    -Recheck INR on Friday.    Diabetes type 2: On metformin 500 twice daily, would like her blood sugar checked twice daily as well.    CAD with recent stent placement: Continue Brilinta, aspirin 81, anticoagulated.  Atorvastatin on hold until LFTs return to normal.  -Check LFTs Friday.    Advance care planning: Patient elects remain full code.    Electronically signed by: Arleth Barton CNP

## 2021-06-10 NOTE — PLAN OF CARE
Problem: Pain  Goal: Patient's pain/discomfort is manageable  Outcome: Progressing     Problem: Safety  Goal: Patient will be injury free during hospitalization  Outcome: Progressing     Problem: Daily Care  Goal: Daily care needs are met  Outcome: Progressing       Patient alert and oriented x3, VSS, LSC, diminished on RA. Complain of abd pain, PRN morphine given. afib on tele. NPO. Able to make needs known. Call light within reach. Will continue to monitor.

## 2021-06-10 NOTE — PROGRESS NOTES
[facility]    Facility:   [facility] [[id number]]   Code Status: FULL CODE      CHIEF COMPLAINT/REASON FOR VISIT:  Chief Complaint   Patient presents with     Problem Visit     nausea with emesis       HISTORY:      HPI: Gardenia is a 70 y.o. female who has a past medical history of Acute diastolic heart failure (H) (11/2015), Acute on chronic diastolic heart failure (H) (6/15/2019), Advanced directives, counseling/discussion (8/5/2015), MAY (acute kidney injury) (H) (10/22/2018), Atrial fibrillation (H), Benign adenomatous polyp of large intestine (3/30/2017), Biliary obstruction (10/3/2018), Cerebral vascular accident (H), Coronary artery disease (2006), Diabetes mellitus type 2 in obese (H), Fibrocystic breast, GERD (gastroesophageal reflux disease), History of anesthesia complications, Hypertension, Peripheral vascular disease (H), Pneumonia (11/11/2018), PONV (postoperative nausea and vomiting), S/P cholecystectomy (6/22/2018), SBO (small bowel obstruction) (H) (11/12/2015), Stroke (H), Transaminitis (10/22/2018), Upper respiratory infection (12/20/2018), and Urinary incontinence.     Gardenia is seen at [facility] for new admission after recent hospitalization from July 22 to July 31.  She was also hospitalized in June from June 27 to July 3 and had been at another TCU prior to her recent hospitalization.  She presented with severely elevated INR and LFTs with an INR of 13 on admit.  Possibly secondary to top of acute liver failure secondary to CHF.  This was reversed with vitamin K and fresh frozen plasma.  Her work-up was negative for hepatitis and a TTE showed right vent dysfunction with an EF of 40%.  During her hospitalization she was diuresed with Lasix and chlorothiazide and also underwent right and left heart cath.  See previous hospital notes for full details.  She developed A. fib with RVR that was controlled with digoxin.  She had hyperkalemia that  improved with diuresis and Kayexalate.  Also hypercalcemia and the chlorthalidone was discontinued.  She will continue on aspirin 81 daily, ticagrelor, and warfarin.     Acute diastolic heart failure and hypertension: Metoprolol supinate 12.5, spironolactone, potassium chloride 20 mEq daily, lisinopril, magnesium gluconate, furosemide 20 mg daily, digoxin.     A. Fib: On warfarin, INR 1.75 today.  Has been on 4 and 3 mg of Coumadin.     Diabetes type 2: On metformin 500 twice daily, would like her blood sugar checked twice daily as well.     CAD with recent stent placement: Continue Brilinta, aspirin 81, anticoagulated.  Atorvastatin on hold until LFTs return to normal.  -Check LFTs Friday.     Today Ms. Muller is being evaluated for nausea with emesis.  She has been having nausea with clear, mucous emesis 2-3 times per day over the past two days.  Gardenia reported that she has had mild nausea since TCU admission last week that has progressively worsened.  She noted that she has only been taking sips of ginger ale and water with inability to tolerate solid foods at this time due to nausea.  Gardenia was started on PRN Zofran yesterday and has utilized a couple doses without any noticeable improvement in her nausea.  She has had three regular bowel movements over the past two days without feeling constipated.  She said that she has a history of bowel obstruction and said that it was a similar presentation.  Gardenia reported RLQ abdominal cramping pain that she said was intermittent and severe that has worsened over the past few days.  The patient denied lightheadedness, dizziness, breathing difficulty, chest pain, palpitations, constipation, urinary symptoms, or numbness or tingling in extremities,. We discussed her options for further evaluation at the facility versus ED transfer and she elected to go to [facility] ED for further evaluation of possible bowel obstruction.  Nursing staff were updated with patient's  wishes and will be transferring her to ED STAT.       Past Medical History:   Diagnosis Date     Acute diastolic heart failure (H) 11/2015     Acute on chronic diastolic heart failure (H) 6/15/2019     Advanced directives, counseling/discussion 8/5/2015    Patient completed 2011.  Discussed today, 5/24/17, and wants to change healthcare agent from niece to daughter.  Discussed that she will need to complete new form to indicate this.     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  Recommend 5 year follow-up.  Single tubular adenoma     Biliary obstruction 10/3/2018    Added automatically from request for surgery [id number]     Cerebral vascular accident (H)      Coronary artery disease 2006    100% RCA with collateral filling per Dr. Elizabeth's angio report     Diabetes mellitus type 2 in obese (H)      Fibrocystic breast      GERD (gastroesophageal reflux disease)      History of anesthesia complications     slow to wake     Hypertension      Peripheral vascular disease (H)      Pneumonia 11/11/2018     PONV (postoperative nausea and vomiting)      S/P cholecystectomy 6/22/2018     SBO (small bowel obstruction) (H) 11/12/2015     Stroke (H)      Transaminitis 10/22/2018     Upper respiratory infection 12/20/2018     Urinary incontinence              Family History   Problem Relation Age of Onset     Cancer Paternal Grandmother      Cancer Paternal Uncle      No Medical Problems Mother      No Medical Problems Father      Heart attack Sister      Chronic Kidney Disease Sister      No Medical Problems Daughter      No Medical Problems Maternal Grandmother      No Medical Problems Maternal Grandfather      No Medical Problems Paternal Grandfather      No Medical Problems Maternal Aunt      No Medical Problems Paternal Aunt      Diabetes Brother      BRCA 1/2 Neg Hx      Breast cancer Neg Hx      Colon cancer Neg  Hx      Endometrial cancer Neg Hx      Ovarian cancer Neg Hx      Social History     Socioeconomic History     Marital status: Legally      Spouse name: Not on file     Number of children: 1     Years of education: Not on file     Highest education level: Not on file   Occupational History     Not on file   Social Needs     Financial resource strain: Not on file     Food insecurity     Worry: Not on file     Inability: Not on file     Transportation needs     Medical: Not on file     Non-medical: Not on file   Tobacco Use     Smoking status: Former Smoker     Packs/day: 0.25     Smokeless tobacco: Former User     Tobacco comment: e-cig a few times/day   Substance and Sexual Activity     Alcohol use: No     Drug use: No     Sexual activity: Not on file   Lifestyle     Physical activity     Days per week: Not on file     Minutes per session: Not on file     Stress: Not on file   Relationships     Social connections     Talks on phone: Not on file     Gets together: Not on file     Attends Adventism service: Not on file     Active member of club or organization: Not on file     Attends meetings of clubs or organizations: Not on file     Relationship status: Not on file     Intimate partner violence     Fear of current or ex partner: Not on file     Emotionally abused: Not on file     Physically abused: Not on file     Forced sexual activity: Not on file   Other Topics Concern     Not on file   Social History Narrative    Single/, lives alone; daughter in [place];    Gets help from neighbors and extended family    Former smoker.no alcohol problems       REVIEW OF SYSTEM:  Pertinent items are noted in HPI.  A 12 point comprehensive review of systems was negative except as noted.    PHYSICAL EXAM:     General Appearance:    Alert, cooperative, no distress, appears stated age   Head:    Normocephalic, without obvious abnormality, atraumatic   Eyes:    PERRL, conjunctiva/corneas clear, both eyes   Ears:     Normal external ear canals, both ears   Nose:   Nares normal, septum midline, mucosa normal, no drainage    or sinus tenderness   Throat:   Lips, mucosa, and tongue normal; teeth and gums normal   Neck:   Supple, symmetrical, trachea midline, no adenopathy;     thyroid:  no enlargement/tenderness/nodules; no carotid    bruit or JVD   Back:     Symmetric, no curvature, ROM normal, no CVA tenderness   Lungs:     Clear to auscultation bilaterally, respirations unlabored   Chest Wall:    No tenderness or deformity    Heart:    Regular rate and rhythm, S1 and S2 normal, no murmur, rub   or gallop   Abdomen:     Soft, distended; tenderness to light palpation in all quadrants; bowel sounds hyperactive active all four quadrants,     no masses, no organomegaly   Extremities:   Extremities normal, atraumatic, no cyanosis or edema   Pulses:   2+ and symmetric all extremities   Skin:   Skin color, texture, turgor normal, no rashes or lesions   Neurologic:   Oriented to person, place, time, and situation; exhibits logical thought processes; generalized weakness; ambulatory with walker           LABS:   Lab Results   Component Value Date    WBC 8.2 08/04/2020    HGB 11.2 (L) 08/04/2020    HCT 36.5 08/04/2020     08/04/2020    CHOL 86 03/26/2019    TRIG 93 03/26/2019    HDL 35 (L) 03/26/2019    LDLDIRECT 94 04/15/2015    ALT 53 (H) 08/07/2020    AST 26 08/07/2020     08/07/2020    K 4.2 08/07/2020     (H) 08/07/2020    CREATININE 0.67 08/07/2020    BUN 17 08/07/2020    CO2 21 (L) 08/07/2020    TSH 8.31 (H) 10/22/2018    INR 2.83 (H) 08/10/2020    HGBA1C 6.5 (H) 07/23/2020    MICROALBUR 4.09 (H) 07/02/2020         ASSESSMENT:      ICD-10-CM    1. RLQ abdominal pain  R10.31    2. Nausea and vomiting, intractability of vomiting not specified, unspecified vomiting type  R11.2    3. Hx SBO  Z87.19    4. Acute liver failure without hepatic coma  K72.00        PLAN:      **Send to ED STAT for nausea with emesis and hx  of bowel obstruction    Otherwise continue current care plan for all other chronic medical conditions, as they are stable. Encouraged patient to engage in healthy lifestyle behaviors such as engaging in social activities, exercising (PT/OT), eating well, and following care plan. Follow up for routine check-up, or sooner if needed. Will continue to monitor patient and work with nursing staff collaboratively to work toward positive patient outcomes.     Electronically signed by: Charlotte Eden CNP

## 2021-06-10 NOTE — PLAN OF CARE
Problem: Pain  Goal: Patient's pain/discomfort is manageable  Outcome: Progressing   Controlled with current regimen.   Problem: Safety  Goal: Patient will be injury free during hospitalization  Outcome: Progressing     Problem: Daily Care  Goal: Daily care needs are met  Outcome: Progressing   Assist of one with cares.   Problem: Psychosocial Needs  Goal: Demonstrates ability to cope with hospitalization/illness  Outcome: Progressing   Turn and reposition. [place] in place.

## 2021-06-10 NOTE — PROGRESS NOTES
Pharmacy Consult: Warfarin Management    Pharmacy consulted to dose warfarin for Gardenia Muller, a 70 y.o. female    Ordering provider: Dr. Cesar    Reason for warfarin therapy: Atrial Fibrillation  Goal INR Range: 2-3    Subjective  Medication lists (home and hospital) were reviewed.    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)     Advanced directives, counseling/discussion     Cystitis     Healthcare maintenance     Essential hypertension     Cerebral infarction (H)     Anemia     Cerebrovascular disease     RLS (restless legs syndrome)     Incisional hernia, without obstruction or gangrene     Diverticular disease of large intestine     Coronary artery disease involving native coronary artery of native heart without angina pectoris     Dyslipidemia     Ventral hernia without obstruction or gangrene     Anxiety     (HFpEF) heart failure with preserved ejection fraction (H)     Anticoagulant long-term use     Persistent atrial fibrillation (H)     MAY (acute kidney injury) (H)     Transaminitis     Physical deconditioning     Lipoma of back     Acalculous cholecystitis     Onychogryphosis     Hyperparathyroidism (H)     Microalbuminuria     Intestinal angina (H)     Chronic abdominal pain     Weight loss, non-intentional     Warfarin anticoagulation     Severe protein-calorie malnutrition (H)     Hypertrophy of nail     Dysuria     Class  1 obesity with serious comorbidity and body mass index (BMI) of 33.0 to 33.9 in adult, unspecified obesity type     Trigger finger, acquired     Acute liver failure without hepatic coma     Hematochezia     Hyperkalemia     Acute on chronic combined systolic (congestive) and diastolic (congestive) heart failure (H)     Ischemic cardiomyopathy     Acute kidney failure, unspecified (H)     Acute diastolic heart failure (H)     Diverticulitis     Supratherapeutic INR     Diverticulitis of large intestine with perforation and abscess without bleeding     Atrial fibrillation with rapid ventricular response (H)     Obstructive sleep apnea syndrome    Past Medical History:   Diagnosis Date     Acute diastolic heart failure (H) 11/2015     Acute on chronic diastolic heart failure (H) 6/15/2019     Advanced directives, counseling/discussion 8/5/2015    Patient completed 2011.  Discussed today, 5/24/17, and wants to change healthcare agent from niece to daughter.  Discussed that she will need to complete new form to indicate this.     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  Recommend 5 year follow-up.  Single tubular adenoma     Biliary obstruction 10/3/2018    Added automatically from request for surgery [id number]     Cerebral vascular accident (H)      Coronary artery disease 2006    100% RCA with collateral filling per Dr. Elizabeth's angio report     Diabetes mellitus type 2 in obese (H)      Fibrocystic breast      GERD (gastroesophageal reflux disease)      History of anesthesia complications     slow to wake     Hypertension      Obstructive sleep apnea syndrome      Peripheral vascular disease (H)      Pneumonia 11/11/2018     PONV (postoperative nausea and vomiting)      S/P cholecystectomy 6/22/2018     SBO (small bowel obstruction) (H) 11/12/2015     Stroke (H)      Transaminitis 10/22/2018     Upper  respiratory infection 12/20/2018     Urinary incontinence         Social History     Tobacco Use     Smoking status: Former Smoker     Packs/day: 0.25     Smokeless tobacco: Former User     Tobacco comment: e-cig a few times/day   Substance Use Topics     Alcohol use: No     Drug use: No       Objective   Labs:  Last 3 days:    Recent Labs     08/22/20  1839 08/23/20  0754 08/24/20  0555 08/24/20  1638 08/25/20  0526   CREATININE 0.84 0.86 0.94 1.08  --    HGB 8.1* 8.3* 7.9*  --  7.6*   HCT 24.8* 25.9* 24.5*  --  23.7*   * 112* 109*  --  106*   BILITOT  --   --   --  1.2*  --    AST  --   --   --  25  --    ALT  --   --   --  32  --    ALKPHOS  --   --   --  109  --      Last 7 days:   Recent labs: (last 7 days)     08/24/20  1937 08/25/20  1507   INR 2.14* 2.30*       Warfarin Dosing History:    Date INR Warfarin Dose Comment   7/31/20 1.77 3mg Facility dosing   8/1/20 1.75 3 mg Facility dosing   8/3/20 2.7 3 mg Facility dosing   8/6/20 5.59 hold Facility dosing   8/7/20 5.25 Hold x 3 days Facility dosing   8/10/20  1.5 mg Facility dosing    8/14/20 4.97 hold Vit K 5 mg IV x 1   8/24/20 2.14 hold    8/25/20 2.3 0.5 mg Pharmacy consulted to dose            Assessment  The patient is continuing warfarin for the indication of Atrial Fibrillation with a goal INR of 2-3. INR today is therapeutic.  Due to labile INRs, will use low-dose warfarin and monitor.      Plan  1. Administer warfarin 0.5 mg PO today.  2. Check INR daily or as appropriate.  3. Continue to follow the patient's INR, PLT, and HGB as available.  4. Monitor for potential drug/disease interactions.    Thank you for the consult,  Nadya Craft, PharmD 8/25/2020 4:17 PM

## 2021-06-10 NOTE — PROGRESS NOTES
General Surgery Progress Note      Subjective:   Pt denies any abdominal pain.  She states that she is uncomfortable her back is more an issue and just needs to sit up.  No nausea or vomiting.  White count is trending downwards.  COVID testing repeated on the 25th which was negative.  Patient did have a fever of 100.6 this morning.  Vital signs otherwise stable.  Drain 0 past 24 hours.  Is having bowel movements.  Abscessogram yesterday shows no residual abscess pocket in a fistula to the colon.      Vitals:    08/25/20 2250 08/26/20 0004 08/26/20 0900 08/26/20 0927   BP: 109/50 108/50  123/56   Patient Position:    Lying   Pulse: 68 (!) 48  62   Resp:  20  17   Temp:  98.1  F (36.7  C)  (!) 100.6  F (38.1  C)   TempSrc:  Oral  Axillary   SpO2: 95% 98%  92%   Weight:   220 lb 6.4 oz (100 kg)    Height:           Physical Exam:    Ab:       Soft, + BS, nontender.  Scant light brown clear drainage in her drain.    Results from last 7 days   Lab Units 08/25/20  0526   LN-WHITE BLOOD CELL COUNT thou/uL 15.6*   LN-HEMOGLOBIN g/dL 7.6*   LN-HEMATOCRIT % 23.7*   LN-PLATELET COUNT thou/uL 106*       Results from last 7 days   Lab Units 08/26/20  0534 08/24/20  1638   LN-SODIUM mmol/L 137  --    LN-POTASSIUM mmol/L 5.3*  --    LN-CHLORIDE mmol/L 108*  --    LN-CO2 mmol/L 17*  --    LN-BLOOD UREA NITROGEN mg/dL 26  --    LN-CREATININE mg/dL 1.93* 1.08   LN-CALCIUM mg/dL 9.2  --    LN-ALBUMIN g/dL  --  1.8*   LN-PROTEIN TOTAL g/dL  --  5.6*   LN-BILIRUBIN TOTAL mg/dL  --  1.2*   LN-ALKALINE PHOSPHATASE U/L  --  109   LN-ALT (SGPT) U/L  --  32   LN-AST (SGOT) U/L  --  25       Assessment: Diverticulitis with abscess 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  Plans to repeat abscessogram in 1 week.            Continue supportive cares.  No plans for surgical intervention.            Appreciate infectious disease antibiotic regiment.            We will continue to follow.    Nela Camacho PA-C  [phone]    [facility]  [address] [address]

## 2021-06-10 NOTE — PROGRESS NOTES
General Surgery Progress Note  Hospital Day # 14    Subjective:   CC:diverticulitis  Status:states she is feeling slightly better, having bm    Vitals:    08/25/20 0000 08/25/20 0627 08/25/20 0831 08/25/20 0902   BP: 119/59  129/49    Patient Position: Lying  Lying    Pulse: 64  61 (!) 50   Resp: 16  18    Temp: 97.7  F (36.5  C)  98.4  F (36.9  C)    TempSrc: Oral  Oral    SpO2: 95%  97%    Weight:  220 lb 12.8 oz (100.2 kg)     Height:           Physical Exam:  General: NAD, pleasant  ABD: soft, hailey drain with scant green material, ttp in llq, no rigidity  EXT:no CCE    Results from last 7 days   Lab Units 08/25/20  0526   LN-WHITE BLOOD CELL COUNT thou/uL 15.6*   LN-HEMOGLOBIN g/dL 7.6*   LN-HEMATOCRIT % 23.7*   LN-PLATELET COUNT thou/uL 106*       Results from last 7 days   Lab Units 08/24/20  1638 08/24/20  0555   LN-SODIUM mmol/L  --  136   LN-POTASSIUM mmol/L  --  3.6   LN-CHLORIDE mmol/L  --  107   LN-CO2 mmol/L  --  21*   LN-BLOOD UREA NITROGEN mg/dL  --  14   LN-CREATININE mg/dL 1.08 0.94   LN-CALCIUM mg/dL  --  8.1*   LN-ALBUMIN g/dL 1.8*  --    LN-PROTEIN TOTAL g/dL 5.6*  --    LN-BILIRUBIN TOTAL mg/dL 1.2*  --    LN-ALKALINE PHOSPHATASE U/L 109  --    LN-ALT (SGPT) U/L 32  --    LN-AST (SGOT) U/L 25  --        Assessment: diverticulitis with abscess    Plan: clinically doing relatively well  Wbc stable but slightly elevated  -clears for now  -abscess check scheduled  -continue abx  -will follow    Howard Harding DO [facility]  [phone]

## 2021-06-10 NOTE — PLAN OF CARE
Problem: Pain  Goal: Patient's pain/discomfort is manageable  Outcome: Progressing     Problem: Daily Care  Goal: Daily care needs are met  Outcome: Progressing     Problem: Potential for Compromised Skin Integrity  Goal: Skin integrity is maintained or improved  Outcome: Progressing     Pt denies pain. Moves self in bed, skin intact. Bed alarm for safety.

## 2021-06-10 NOTE — PRE-PROCEDURE
Procedure Name: Abscess drainage  Date/Time: 8/17/2020 9:00 AM    Verbal consent obtained?: Yes  Written consent obtained?: Yes  Risks and benefits: Risks, benefits and alternatives were discussed  Discharge plan: Appropriate discharge home plan in place for patients who are going home after procedure   Consent given by: patient  Expected level of sedation: moderate  ASA Class: Class 3- Severe systemic disease, definite functional limitations  Mallampati: Grade 2- soft palate, base of uvula, tonsillar pillars, and portion of posterior pharyngeal wall visible  Patient states understanding of procedure being performed: Yes  Patient's understanding of procedure matches consent: Yes  Procedure consent matches procedure scheduled: Yes  Appropriately NPO: yes  Lungs: lungs clear with good breath sounds bilaterally  Heart: normal heart sounds and rate  History & Physical reviewed: History and physical reviewed and no updates needed  Statement of review: I have reviewed the lab findings, diagnostic data, medications, and the plan for sedation

## 2021-06-10 NOTE — PLAN OF CARE
Pt received prn zofran for nausea with effective result and pt also received prn morphine for abdominal pain and pt sleeping after reassessment. Low urine output and pt scanned for 79 ml and on continue IV fluids.

## 2021-06-10 NOTE — PROGRESS NOTES
"General Surgery Progress Note    Subjective:   Pt states her abdominal pain remains the same and is mild. She did not eat much yesterday because she \"didn't feel like chewing\". She denies fever, chills, nausea, emesis. Drain output 35cc/24hr.    Vitals:    08/19/20 0501 08/19/20 0622 08/19/20 0743 08/19/20 0746   BP: 99/56  (!) 89/53 92/54   Patient Position: Lying  Lying    Pulse: (!) 102  (!) 112    Resp: 18 20    Temp: 98.2  F (36.8  C)  98.4  F (36.9  C)    TempSrc: Oral  Oral    SpO2: 97%  96%    Weight:  197 lb 9.6 oz (89.6 kg)     Height:           08/18 0701 - 08/19 0700  In: 415   Out: 2035 [Urine:2000; Drains:35]    Physical Exam:  General: NAD, pleasant, resting in bed  ABD: obese, soft, nondistended, mildly tender around drain site, no rebound or guarding, drain with serous output    Results from last 7 days   Lab Units 08/19/20  0720   LN-WHITE BLOOD CELL COUNT thou/uL 14.5*   LN-HEMOGLOBIN g/dL 8.2*   LN-HEMATOCRIT % 25.5*   LN-PLATELET COUNT thou/uL 136*       Results from last 7 days   Lab Units 08/19/20  0720  08/17/20  0520   LN-SODIUM mmol/L 135*   < > 139   LN-POTASSIUM mmol/L 3.3*   < > 3.3*   LN-CHLORIDE mmol/L 103   < > 107   LN-CO2 mmol/L 21*   < > 22   LN-BLOOD UREA NITROGEN mg/dL 10   < > 31*   LN-CREATININE mg/dL 0.82   < > 3.03*   LN-CALCIUM mg/dL 7.2*   < > 6.9*   LN-ALBUMIN g/dL  --   --  1.8*    < > = values in this interval not displayed.       Assessment:   70y F with multiple medical comorbidities and acute sigmoid diverticulitis with abscess s/p perc drain placement 8/17. She still has leukocytosis and now some persistent tachycardia this morning. However, she has been afebrile, her abdominal exam is benign and she does not report worsening pain.    Plan:   Continue IV abx, drain cultures pending  Okay for diet from surgery standpoint  May need repeat CT scan if leukocytosis continues to uptrend    Miya Arambula MD  General Surgery  [facility]  [phone]    "

## 2021-06-10 NOTE — PROGRESS NOTES
General Surgery Progress Note  Hospital Day # 13    Subjective:   CC: Diverticulitis status post drain placement  Status: Still with left lower quadrant abdominal pain otherwise no new complaints    Vitals:    08/24/20 0143 08/24/20 0650 08/24/20 0827 08/24/20 1202   BP: 105/44  92/42 91/42   Patient Position:   Lying Lying   Pulse:   61 69   Resp:   15 17   Temp:   97.4  F (36.3  C) 99.4  F (37.4  C)   TempSrc:   Oral Axillary   SpO2:   95% 94%   Weight:  203 lb 11.2 oz (92.4 kg)     Height:           Physical Exam:  General: NAD, pleasant  ABD: SHAMIKA drain with scant feculent material, abdomen soft, tender to palpation left lower quadrant  EXT:no CCE    Results from last 7 days   Lab Units 08/24/20  0555   LN-WHITE BLOOD CELL COUNT thou/uL 15.9*   LN-HEMOGLOBIN g/dL 7.9*   LN-HEMATOCRIT % 24.5*   LN-PLATELET COUNT thou/uL 109*       Results from last 7 days   Lab Units 08/24/20  0555   LN-SODIUM mmol/L 136   LN-POTASSIUM mmol/L 3.6   LN-CHLORIDE mmol/L 107   LN-CO2 mmol/L 21*   LN-BLOOD UREA NITROGEN mg/dL 14   LN-CREATININE mg/dL 0.94   LN-CALCIUM mg/dL 8.1*       Assessment: Perforated diverticulitis status post drain placement    Plan: Relatively stable.  -White blood cell count slowly decreasing  -IV antibiotics per infectious disease  -SHAMIKA drain to remain in place for now  -Continue diet as tolerated    Howard Harding [facility]  [phone]

## 2021-06-10 NOTE — PROGRESS NOTES
Events of the last 24 hours noted.  Very tired.  Denies abdominal pain at this time.  T-max 100.8, now afebrile.    Physical exam:  Currently on BiPAP.  Able to nod and shake her head to questions.  Denies abdominal pain  Abdomen is soft.  Seemingly nontender.  Drain is in place with some minimal greenish discharge.    Laboratories:  Lab Results   Component Value Date    WBC 18.3 (H) 08/27/2020    HGB 9.1 (L) 08/27/2020    HCT 28.3 (L) 08/27/2020    MCV 93 08/27/2020     (L) 08/27/2020     Lab Results   Component Value Date    CREATININE 2.86 (H) 08/27/2020    BUN 39 (H) 08/27/2020     08/27/2020    K 4.6 08/27/2020     (H) 08/27/2020    CO2 17 (L) 08/27/2020     Lab Results   Component Value Date     (H) 08/27/2020     Lab Results   Component Value Date     (H) 08/27/2020    AST 2,613 (H) 08/27/2020    ALKPHOS 147 (H) 08/27/2020    BILITOT 2.0 (H) 08/27/2020     Impression: Unfortunate woman with diverticulitis with diverticular abscess.  That has been drained.  Her white count is still somewhat elevated and she had a low-grade temp yesterday.  Unfortunately her other medical problems make her a very poor candidate for surgery.  I do not know that she would survive an operation in her current state.  Her last CT scan actually quite good.  The abscess looked drained.  She does have a fistula but that an of itself should not be causing a problem as long as it is adequately draining.  We will continue to follow peripherally with you.  Overall prognosis is very guarded and hopefully if the abscess will continue to resolve with nonoperative therapy.

## 2021-06-10 NOTE — PROGRESS NOTES
[facility] Daily Progress Note      Date of Service: 8/16/2020     Brief History:   Ms Muller is a 70 y.o. old female with history of HTN, T2DM, chronic atrial fibrillation, diastolic CHF, coronary artery disease status post stenting (7/27/2020), peripheral artery disease, calculus cholecystitis status post cholecystectomy, depression, admitted to ED for evaluation abdominal pain and vomiting. CT scan revealed acute diverticulitis with perforation. General surgery consulted - no current plans for OR; IR consulted for possible drainage, tentative on 8/17. Repeat CTAP on 8/14 showed increased size of abscess from 2x2cm to 4x3cm, though pt reports clinical improvement of her symptoms.    In the ED, she was hypoglycemic also and she was started on D5 normal saline. Hospitalization complicated by MAY, Nephrology consulted - Cr stable, trending down; no plans for dialysis at this time.    Assessment & Plan:  #Acute perforated diverticulitis with a small abscess: Repeat CT on 8/14 showed increase in size of abscess from 2x2cm to 4x3cm. Pt is currently on IV Cipro and Flagyl. Surgery consulted - no plans for OR; planning for percutaneous drainage tomorrow. Pt had a fever of 100.4 on 8/14 am and is having low-grade fevers in ~99. Fevers could be due to abscess (?progression) vs possible aspiration episode yesterday.  -Plan: Surgery recs appreciated. Repeat CTAP for Monday morning and planning for percutaneous drainage on 8/17 by IR. Continue IV abx.    #Abdominal pain due to diverticulitis and perforation/peritonitis: prn hydromorphone. Monitor for any respiratory depression avoid if sedated.    #Acute kidney injury, improving: Suspect multifactorial from sepsis, contrast from CT scans, n/v, hypotension. Cr is trending down.  -Plan: Nephrology recommendations appreciated. Avoid nephrotoxins, monitor I/O's. Will discontinue fluids as pt is reporting shortness of breath and orthopnea.    #History of atrial fibrillation: Prn  "metoprolol. Holding digoxin, metoprolol, and warfarin.     #Type 2 diabetes mellitus: A1c 6.5 from 7/23/2020. LDSSI. Holding home metformin.    #Supratherapeutic INR, resolved: INR was 4, now s/p 5mg IV vitK. INR <2 now with possible procedure.   -Plan: Holding home warfarin. May restart after drainage tomorrow as per IR.    #History of coronary artery disease s/p recent angiogram and stent placement on 7/27/2020: ASA 81, statin. Ticagrelor on hold due to procedure tomorrow - will need to restart afterwards. Holding metoprolol, Lasix, lisinopril, and spironolactone due borderline blood pressure and MAY.    #HFrEF: Daily weights, I/O's. Holding home ACEi, spironolactone, furosemide.    Chronic conditions:  #GERD: PPI.  #History of depression: sertraline.    Subjective:     Interval History: Pt sitting in bed. She states that her abdominal pain is improving. She was noted to have a choking episodes with jello yesterday with concern for possible aspiration. She denies visual changes, focal weakness, chest pain. She does report shortness of breath that has been going on over the last 2-3 weeks, although she cannot give more detail regarding when it started. It seems to also be worse when she is laying flat.    Chart reviewed, events noted. Pt seen and examined.     Objective     Vital signs in last 24 hours:  Temp:  [98.1  F (36.7  C)-99.5  F (37.5  C)] 98.7  F (37.1  C)  Heart Rate:  [70-97] 91  Resp:  [16-18] 16  BP: (108-132)/(54-68) 120/57  Weight:   190 lb 1.6 oz (86.2 kg)  Weight change:   Body mass index is 34.77 kg/m .    Intake/Output last 3 shifts:  I/O last 3 completed shifts:  In: 940 [P.O.:340; I.V.:600]  Out: 475 [Urine:475]  Intake/Output this shift:  No intake/output data recorded.      Physical Exam:  /57 (Patient Position: Semi-nicole)   Pulse 91   Temp 98.7  F (37.1  C) (Oral)   Resp 16   Ht 5' 2\" (1.575 m)   Wt 190 lb 1.6 oz (86.2 kg)   LMP 01/25/1999   SpO2 95%   BMI 34.77 kg/m        " O2 Device: None (Room air)      General: Alert, awake. NAD.  HENT: Normocephalic, oral mucosa moist.  Eyes: Tracking. Subconjunctival hemorrhage present.  Respiratory: Not in distress not using accessory muscles of respiration, no wheezing. On room air.  Heart: Irregularly irregular.  Abdomen: Soft, nontender.  Extremities: LE edema.  Neurology: Alert awake, moving extremities grossly    Imaging: Reviewed   Ct Abdomen Pelvis Without Oral Without Iv Contrast    Result Date: 8/14/2020  EXAM: CT ABDOMEN PELVIS WO ORAL WO IV CONTRAST LOCATION: [facility] DATE/TIME: 8/14/2020 10:26 AM INDICATION: Abdominal infection suspected perforated diverticulitis, assess for abscess COMPARISON: 8/11/2020 TECHNIQUE: CT scan of the abdomen and pelvis was performed without oral or IV contrast. Multiplanar reformats were obtained. Dose reduction techniques were used. CONTRAST: None. FINDINGS: LOWER CHEST: Trace pleural fluid. Minimal scarring or edema at the lung bases. The heart is enlarged. There is coronary artery calcification. HEPATOBILIARY: Cholecystectomy. There is extrahepatic bile duct dilatation. PANCREAS: Pancreatic duct dilatation is not as well-seen on this study as on the comparison study. SPLEEN: Normal. ADRENAL GLANDS: Normal. KIDNEY/BLADDER: Normal kidneys and ureters. There is wall thickening of the nondistended urinary bladder. A Bardales catheter is present. BOWEL: There is colonic diverticulosis. There is stranding adjacent to the sigmoid colon in the region of diverticulitis. There is a complex 4 cm x 3 cm collection to the left of the sigmoid colon in this region (previously this was 2 cm x 2 cm). Minimal  extraluminal air is noted. LYMPH NODES: Normal. VASCULATURE: Atherosclerotic disease. PELVIC ORGANS: Fibroid uterus. MUSCULOSKELETAL: Normal.     1.  Again seen is sigmoid colon diverticulitis with a small contained perforation. A small fluid collection adjacent to the sigmoid colon has increased in size  to 4 cm x 3 cm (previously 2 cm x 2 cm). Percutaneous drainage would be difficult though may be possible. 2.  Biliary and pancreatic ductal dilatation is not as well-seen as on the comparison study. See prior study for details.    Xr Chest 1 View Portable    Result Date: 7/22/2020  EXAM: XR CHEST 1 VIEW PORTABLE LOCATION: [facility] DATE/TIME: 7/22/2020 9:56 PM INDICATION: Increasing shortness of breath and orthopnea and patient with history of heart failure COMPARISON: CT 07/02/2019     Mild, irregular. No evidence for CHF or pneumonia. No pleural effusion or pneumothorax.    Xr Chest 2 Views    Result Date: 8/11/2020  EXAM: XR CHEST 2 VIEWS LOCATION: [facility] DATE/TIME: 8/11/2020 11:50 AM INDICATION: Cough. COMPARISON: None.     Negative chest. Lungs are clear. Coronary stenting.     Ct Abdomen Pelvis Without Oral With Iv Contrast    Result Date: 8/11/2020  EXAM: CT ABDOMEN PELVIS WO ORAL W IV CONTRAST LOCATION: [facility] DATE/TIME: 8/11/2020 11:41 AM INDICATION: right sided abdominal pain, nausea and vomiting COMPARISON: 7/22/2020 TECHNIQUE: CT scan of the abdomen and pelvis was performed following injection of IV contrast. Multiplanar reformats were obtained. Dose reduction techniques were used. CONTRAST: Iohexol (Omni) 75mL FINDINGS: LOWER CHEST: Minimal interstitial edema. The heart is mildly enlarged. There is coronary artery disease. HEPATOBILIARY: Cholecystectomy. There is intrahepatic and extrahepatic bile duct dilatation. There is pancreatic duct dilatation. PANCREAS: No discrete pancreatic masses identified. SPLEEN: Normal. ADRENAL GLANDS: Normal. KIDNEYS/BLADDER: Normal. BOWEL: There is colonic diverticulosis. There is acute diverticulosis involving the distal sigmoid colon with a small contained perforation. No drainable abscess at this point. LYMPH NODES: Normal. VASCULATURE: Atherosclerotic disease. Right renal artery stent. PELVIC ORGANS: Fibroid uterus.  MUSCULOSKELETAL: Degenerative changes.     1.  Acute diverticulitis with a small contained perforation. No drainable abscess at this point. 2.  Biliary and pancreatic duct dilatation. No discrete pancreatic masses identified. Consider MRCP, ERCP, EUS.    Ct Abdomen Pelvis Without Oral With Iv Contrast    Result Date: 7/23/2020  EXAM: CT ABDOMEN PELVIS WO ORAL W IV CONTRAST LOCATION: [facility] DATE/TIME: 7/22/2020 11:58 PM INDICATION: Bowel obstruction high-grade suspected Nausea, anorexia, abdominal distention in elderly patient. COMPARISON: 06/26/2019. TECHNIQUE: CT scan of the abdomen and pelvis was performed following injection of IV contrast. Multiplanar reformats were obtained. Dose reduction techniques were used. CONTRAST: Iohexol (Omni) 75mL FINDINGS: LOWER CHEST: moderate right atrial enlargement. HEPATOBILIARY: Prior cholecystectomy. Mild hepatomegaly. Mildly heterogeneous enhancement suggesting passive congestion. PANCREAS: Normal. SPLEEN: Normal. ADRENAL GLANDS: Normal. KIDNEYS/BLADDER: Mild circumferential urinary bladder wall thickening. No calcification. No hydronephrosis or hydroureter. No renal mass or stone. BOWEL: Moderate distal colonic diverticulosis. No evidence for acute diverticulitis. Small volume ascites. No free air. No evidence for bowel obstruction. LYMPH NODES: Normal. VASCULATURE: Unremarkable. PELVIC ORGANS: Multiple partially calcified uterine masses compatible with uterine fibroids. MUSCULOSKELETAL: Severe multilevel degenerative change.     1.  Mild cystitis suggested please correlate clinically. 2.  Otherwise no acute abnormality in the abdomen or pelvis. No evidence for bowel obstruction. 3.  Enlarged heterogeneous liver suggesting passive hepatic congestion. Given right atrial enlargement, IVC and hepatic vein distention, correlate clinically for right heart failure.       Lab Results:  personally reviewed.     Recent Results (from the past 24 hour(s))   POCT Glucose     Collection Time: 08/15/20 12:16 PM    Specimen: Blood   Result Value Ref Range    Glucose 144 (H) 70 - 139 mg/dL   POCT Glucose    Collection Time: 08/15/20  5:50 PM    Specimen: Blood   Result Value Ref Range    Glucose 271 (H) 70 - 139 mg/dL   POCT Glucose    Collection Time: 08/15/20 11:54 PM    Specimen: Blood   Result Value Ref Range    Glucose 186 (H) 70 - 139 mg/dL   POCT Glucose    Collection Time: 08/16/20  5:50 AM    Specimen: Blood   Result Value Ref Range    Glucose 141 (H) 70 - 139 mg/dL   Basic Metabolic Panel    Collection Time: 08/16/20  6:02 AM   Result Value Ref Range    Sodium 136 136 - 145 mmol/L    Potassium 3.6 3.5 - 5.0 mmol/L    Chloride 104 98 - 107 mmol/L    CO2 22 22 - 31 mmol/L    Anion Gap, Calculation 10 5 - 18 mmol/L    Glucose 148 (H) 70 - 125 mg/dL    Calcium 7.1 (L) 8.5 - 10.5 mg/dL    BUN 34 (H) 8 - 28 mg/dL    Creatinine 4.10 (H) 0.60 - 1.10 mg/dL    GFR MDRD Af Amer 13 (L) >60 mL/min/1.73m2    GFR MDRD Non Af Amer 11 (L) >60 mL/min/1.73m2   HM2(CBC W/O DIFF)    Collection Time: 08/16/20  6:02 AM   Result Value Ref Range    WBC 13.8 (H) 4.0 - 11.0 thou/uL    RBC 2.88 (L) 3.80 - 5.40 mill/uL    Hemoglobin 8.4 (L) 12.0 - 16.0 g/dL    Hematocrit 27.0 (L) 35.0 - 47.0 %    MCV 94 80 - 100 fL    MCH 29.2 27.0 - 34.0 pg    MCHC 31.1 (L) 32.0 - 36.0 g/dL    RDW 17.1 (H) 11.0 - 14.5 %    Platelets 124 (L) 140 - 440 thou/uL    MPV 12.6 (H) 8.5 - 12.5 fL       Advance Care Planning:   Barriers to discharge: Acute diverticulitis with perforation, MAY   Discharge day: To be decided  Disposition: TBD

## 2021-06-10 NOTE — PLAN OF CARE
"Care Plan  Care Management       Care Management Goals of the Day: Assessment and initiate discharge planning    Care Progression Reviewed With: Charge Nurse, Medical Doctor, Health Unit Coordinator, Nurse Care Manager     Barriers to Discharge: GI status    Discharge Disposition: TCU    Expected Discharge Date: 8/15/20 pending    Transportation: [facility]      Care Coordination Narrative:  Surgery following for acute perforated diverticulitis with small abscess. NPO, currently on IV Cipro and Flagyl.  This is readmit from hospitalization 7/22/20-7/31/20.    Assessment History:  Patient has been at [facility] since last hospitalization. Prior to that she has been living at [facility] [place] operated by [facility] [phone]. She received medication assist and ADL assist. She uses a rolling walker and has a scooter. Daughter-Aria is main contact. She lives in [place]. Nephew-Bridger lives in [facility].     Anticipating return to [facility]. Patient agrees. She has 18 day bed hold. Patient was concerned about paying her rent. CM contacted [facility] [place] who states per DON patient can pay her rent when she returns and \"not to worry.\" Updated Aria.        "

## 2021-06-10 NOTE — SIGNIFICANT EVENT
HR 26, patient alert/oriented but tired.  Associated hypotension, again asymptomatic.  Dr. Parikh ordered 0.5mg atropine x1 and is currently assessing the patient.  280mg Digoxin immune jacki is almost done infusing.      Will continue to monitor closely.     Yessica Kenyon RN 8/26/2020 10:10 PM

## 2021-06-10 NOTE — PLAN OF CARE
"  Problem: Pain  Goal: Patient's pain/discomfort is manageable  Outcome: Progressing  Abdominal pain is less severe today compared to yesterday. Morphine 4mg IV given x1 this shift. Will continue to monitor.      Problem: Discharge Barriers  Goal: Patient's discharge needs are met  Outcome: Progressing  Pt advanced to clear liquid diet. Tolerating sips of water as of now. No nausea or vomiting. Continues on IV Cipro and IV Flagyl. Bardales catheter placed to monitor output due to increased Creatinine level. Nephrology consulted. Urine is savita in color. NS IV fluids started at 125ml/hr. Midline picc placed. Activity encouraged, but pt does not want to get out of bed today. She stated she will get up tomorrow because she feels \"too weak\". PT/OT eval and tx order requested from MD. Will continue to monitor.         "

## 2021-06-10 NOTE — PLAN OF CARE
"Care Plan  Care Management         Care Management Goals of the Day: Progression of care and discharge planning     Care Progression Reviewed With: Charge Nurse, Medical Doctor, Health Unit Coordinator, Nurse Care Manager      Barriers to Discharge: GI status     Discharge Disposition: TCU     Expected Discharge Date: 8/19/20 pending     Transportation: [facility]        Care Coordination Narrative:  Surgery following for acute perforated diverticulitis with small abscess. NPO, currently on IV Flagyl and Cipro. Nephrology following. This is readmit from hospitalization 7/22/20-7/31/20. Planning repeat CTAP, percutaneous drain.     Assessment History:  Patient has been at [facility] since last hospitalization. Prior to that she has been living at [facility] [phone]. She received medication assist and ADL assist. She uses a rolling walker and has a scooter. Daughter-Aria is main contact. She lives in [place]. Nephew-Bridger lives in [facility].   [facility] [place] who states per DON patient can pay her rent when she returns and \"not to worry.\"      Patient to return to [facility]CU where she has a 18 day bed hold.   "

## 2021-06-10 NOTE — PLAN OF CARE
Pt remains dyspneic with activity of repositioning in bed but sats remained > 90%. Did not want any food, just ginger ale. Had good discussion with surgical rounder regarding plan of care recommendations.  Pt's nephew was informed he can visit after md lifts the isolation precautions (- covid)  Problem: Pain  Goal: Patient's pain/discomfort is manageable  Outcome: Progressing     Problem: Safety  Goal: Patient will be injury free during hospitalization  Outcome: Progressing     Problem: Daily Care  Goal: Daily care needs are met  Outcome: Progressing     Problem: Psychosocial Needs  Goal: Demonstrates ability to cope with hospitalization/illness  Outcome: Progressing  Goal: Collaborate with patient/family/caregiver to identify patient specific goals for this hospitalization  Outcome: Progressing     Problem: Discharge Barriers  Goal: Patient's discharge needs are met  Outcome: Progressing     Problem: Knowledge Deficit  Goal: Patient/family/caregiver demonstrates understanding of disease process, treatment plan, medications, and discharge instructions  Outcome: Progressing     Problem: Potential for Falls  Goal: Patient will remain free of falls  Outcome: Progressing     Problem: Potential for Compromised Skin Integrity  Goal: Skin integrity is maintained or improved  Outcome: Progressing  Goal: Nutritional status is improving  Outcome: Progressing     Problem: Urinary Incontinence  Goal: Perineal skin integrity is maintained or improved  Outcome: Progressing     Problem: Glucose Imbalance  Goal: Achieve optimal glucose control  Outcome: Progressing     Problem: Potential for transmission of organism by Contact, Enteric, Droplet and/or Airborne routes  Goal: Prevent transmission of organisms  Outcome: Progressing

## 2021-06-10 NOTE — TELEPHONE ENCOUNTER
[facility] Nurse Triage Telephone Note      Provider: PORTER Sanchez  Facility: [facility]    Facility Type: TCU    Caller: Isabel   Call Back Number:  [phone]    Allergies: Penicillins    Reason for call: Pt has had emesis for the last 2 days and if refusing to eat anything thinking that she is going to have another emesis.   Vitals: BP:  124/48  P:: 64   SPO2: 95% R/A Temp.:  96.8   BS+, last BM was 8/9 and the pt states that it was loose and the charting states a formed medium.   No abd pain or distention.         Verbal Order/Direction given by Provider: Zofran 4mg q4h prn.   Push fluids    Provider giving order: PORTER Sanchez    Verbal order given to: Isabel Howell RN

## 2021-06-10 NOTE — PROGRESS NOTES
Respiratory Care Note:    Pt remains on 3 lpm NC, SpO2 94%. HR 66, RR 20-30, and BBS diminished. BiPAP (ST, 14/6, 14, 30%) was on until ~1145. Continue to monitor closely.      Lilia Hunt, LRT

## 2021-06-10 NOTE — PLAN OF CARE
Problem: Pain  Goal: Patient's pain/discomfort is manageable  Outcome: Progressing    Pt currently denies pain or discomfort. Will continue to monitor.     Problem: Daily Care  Goal: Daily care needs are met  Outcome: Progressing    Pt given scheduled Vitamin K this shift, next INR check tomorrow AM. Started on Sodium Bicarb IVF. Continues to be NPO. Surgery and Nephrology following. VSS, will continue to monitor.

## 2021-06-10 NOTE — PLAN OF CARE
"Pt alert, but disoriented to time and forgetful.  Abdomen rounded, soft, and nontender.  Denies abdominal pain or nausea this evening, but did c/o anxiety and back pain.  She asked for \"something to help me relax and sleep.\"  Hospitalist ordered trazodone.  Also gave prn dilaudid for pain, with relief.    "

## 2021-06-10 NOTE — PLAN OF CARE
"Care Plan  Care Management         Care Management Goals of the Day: Progression of care and discharge planning     Care Progression Reviewed With: Charge Nurse, Medical Doctor, Health Unit Coordinator, Nurse Care Manager      Barriers to Discharge: GI status, cultures pending     Discharge Disposition: TCU     Expected Discharge Date: 8/23/20 pending     Transportation: [facility]        Care Coordination Narrative:  Surgery following for acute perforated diverticulitis with small abscess, 8/17 percutaneous drain placed, currently on IV Flagyl and Cipro. Nephrology following. This is readmit from hospitalization 7/22/20-7/31/20. WBC elevated, Hgb drop. Planning CT scan per surgery note 8/20.      Assessment History:  Patient has been at [facility] since last hospitalization. Prior to that she has been living at [facility] [phone]. She received medication assist and ADL assist. She uses a rolling walker and has a scooter. Daughter-Aria is main contact. She lives in [place]. Nephew-Bridger lives in [facility].   The [place] who states per DON patient can pay her rent when she returns and \"not to worry.\"      Check in call and request for return call voice message left with daughterAria today 8/20.  Anticipating patient will need to return to [facility] where she has a 18 day bed hold.  "

## 2021-06-10 NOTE — PROGRESS NOTES
respiratory distress not noted; BIPAP is on standby; RN tried to place pt on BIPAP but pt declined. Pt is currently on 3 lpm oxygen and is sating mid 90s. RT will monitor.    KAYLA Joseph

## 2021-06-10 NOTE — CONSULTS
PULMONARY / CRITICAL CARE CONSULTATION NOTE      Consultation - Pulmonary/Critical Care Medicine  Gardenia Muller,  1950, MRN [id number]  Date / Time of Admission:  2020 10:16 AM    Admitting Dx: Abdominal pain, right lower quadrant [R10.31]  Nausea [R11.0]  Pancreatic duct dilated [K86.89]  T wave inversion in EKG [R94.31]  Diverticulitis of large intestine with perforation without bleeding [K57.20]    PCP: Jerson Hernandez MD, [phone]  Consulting physician:  Rekha Cesar MD   Code status:  Full Code       Extended Emergency Contact Information  Primary Emergency Contact: Aria Muller  Address: [address] [facility] [place]  Home Phone: [phone]  Relation: Child  Secondary Emergency Contact: Jackydanikas Tanika  Address: [address]  Mobile Phone: [phone]  Relation: Grandchild       ID:  Gardenia Muller is a 70 y.o. female with coronary artery disease s/p stenting , chronic atrial fibrillation on digoxin and warfarin, diastolic CHF, hypertension, peripheral arterial disease, chronic abdominal pain from intestinal angina, non-insulin dependent diabetes type 2, depression who was admitted for an acute diverticulitis with perforation, subsequent abscess development s/p drain placement , and now transferred to the ICU  for digoxin toxicity causing hemodynamic instability and bradyarrythmias.     ASSESSMENT   1. Acute metabolic encephalopathy.  Patient with metabolic acidosis related to acute kidney injury.  Mild CO2 retention for level of metabolic acidosis.   2. Acute respiratory failure with hypoxia and mild hypercapnia.  CXR with bilateral opacities - thought possibly HCAP.  However, also may have component of pulmonary edema given bradyarrhythmias and transient hypotension.    3. Digoxin toxicity.  Digoxin level during admission checked , elevated at 6.9.  Unclear what  level was at the time of admissions. Unclear if this there is acute toxicity (in setting of acute kidney injury) or truly chronic digoxin toxicity.  Having symptoms now of bradyarrhythmias, hypotension, and progressive renal failure, and nausea.    4. Circulatory shock.  In setting of arrhythmias, digoxin toxicity.  5. Jhonathan/tachy-arrythmias.  Patient with initial bradyarrhythmias - possibly junctional bradycardia - associated with digoxin toxicity.  However, having transient episodes of ventricular tachycardia (non-sustained) which are likely stimulation from dopamine infusion.  Episodes seem to be mildly reduced over time.  DigiFab administered ~ 3955-6092 hour.    6. Coronary artery disease s/p stenting 1995.    7. Diastolic CHF with mild exacerbation.  BNP on 8/25 elevated at 800s. Now with arrhythmias.  CXR fairly unchanged from 8/23 to 8/26.  However, now with escalating O2 needs.    8. Possible healthcare associated pneumonia.    9. Acute diverticulitis s/p perforation and diverticular abscess s/p LLQ drain 8/17.  Cultures growing Candida albicans and Ashley dubliniensis.    10. Abnormal LFTs.  Unclear if this related to early hepatic shock, hepatic congestion, or muscle breakdown given the significant discrepancy between AST and ALT.  11. Hypoalbuminemia.  Serum protein level normal.    12. Acute kidney injury.  Baseline Cr ~ 0.86.   13. Electrolyte abnormalities.  Hyperkalemia, hyperchloremia.  14. Metabolic acidosis.    15. Warfarin-induced coagulopathy.    16. Leukocytosis, unspecified.   17. Normocytic anemia.   18. Thrombocytopenia.      Advance Directives:  Received     PLAN   Systems to Assess:     Pulmonary: Wean supplemental O2 as tolerated; goal O2 sat > 92%.  HOB > 30 degrees to limit aspiration risk.      NIPPV:  Titrate to TV goal 400 mL (8 mL/kg IBW), EPAP 5, RR 16.      ABG evaluation.  Trial NIPPV again if tolerates.     Cardiovascular: Cardiac monitoring.     SBP goal > 100 mmHg, MAP goal >  55 mmHg, HR > 40.     Arterial line placed - very low diastolic pressure.  Goal SBP > 100 mmHg, will follow serial lactic acid levels.    Dopamine gtt for BP and HR goals.  Increased dose likely causing episodes of transient ventricular tachycardia - titrate down as tolerates.     DigiFab 280 mg IV given (7 vials) - discussed with Cardiology and Pharmacy teams, this should be adequate for management.     Monitor K level Q4H.  Notably, K > 5.5 associated with higher mortality.  Not acutely treating - hopefully the DigiFab will continue to bring level down.    Checked bedside U/S - unable to see good LV images for LVEF determination.  IVC ~ 3 cm => 2 cm w/ respiratory variation.      Continue warfarin, aspirin, ,and ticagelor.     Holding Lasix IV, holding spironolactone.     EKG Q6H.      Cardiology consultation appreciated.  If there is clinical worsening or issues with persistent bradyarrythmias, will discuss transvenous pacemaker placement.     Neurological: Neuro checks per ICU protocol.     Pain control: PRNs.     GI/: Make NPO.      Follow-up lactic acid.     GI prophylaxis:  PPI 2x/day.      Renal: Monitor I/O's.  Electrolyte repletion PRN.  Avoid/limit nephrotoxic agents.     IVFs: Initiate low-dose bicarbonate infusion @ 50 mL/hour.  Monitor for volume overload.      Place hold on Lasix IV given worsening renal failure.     Heme/Coag: Monitor H/H.     Warfarin management per Pharmacy - dose held 8/26.  Monitor liver function.      DVT prophylaxis:  SCDs.  Warfarin.     Infectious disease: General precautions. Infectious disease consultation appreciated.      Continue empiric Abx with fluconazole (start 8/21), daptomycin  (start 8/26), and meropenem.      Follow-up cultures.      Endocrine: FSBG Q6H.  Hypoglycemia protocol.  Insulin not indicated.      Musculoskeletal: Bedrest.     Lines: PIV  RUE PICC line, placed 8/23  L radial arterial line, placed 8/26    Activity: Bed Rest    Code Status:  Full code.      Thank you for this interesting consultation.  Please call if any questions or concerns.    The patient and/or the family were educated about the above plan of care and indicated understanding.   Updated patient's daughter, Aria Muller, about plan of care.  All questions answered    This patient is considered critically ill and requires ICU level of care due to acute respiratory failure requiring NIPPV, digoxin toxicity causing hemodynamic instability and high risk of cardiac arrest, circulatory shock due to digoxin toxicity and dorcas/tachyarrythmias on vasopressors.    Total Critical Care time, not including separate billable procedure time:  90 minutes.    Sharron Parikh MD, MPH  Pulmonary & Critical Care Medicine  Pager: [phone]  8/26/2020  10:16 PM     ICU DAILY CHECKLIST   Can patient transfer out of MICU? no    FAST HUG:    Feeding:  Feeding: No.  Patient is receiving NPO    Bardales:No  Analgesia/Sedation:Not Indicated   Thromboembolic prophylaxis: yes; Mode:  SCDs and Coumadin  HOB>30:  Yes  Stress Ulcer Protocol Active: yes; Mode: PPI  Glycemic Control: Any glucose > 180 no; Mode of Insulin Therapy: Not indicated    INTUBATED:  Can patient have daily waking:  not applicable  Can patient have spontaneous breathing trial:  not applicable    Restraints? no    PHYSICAL THERAPY AND MOBILITY:  Can patient have PT and mobility trial: no  Activity: Bed Rest       Chief Complaint Chief Complaint   Patient presents with     Abdominal Pain          HPI   Gardenia Muller is a 70 y.o. female with coronary artery disease s/p stenting 1995, chronic atrial fibrillation on digoxin and warfarin, diastolic CHF, hypertension, peripheral arterial disease, chronic abdominal pain from intestinal angina, non-insulin dependent diabetes type 2, depression who was admitted for an acute diverticulitis with perforation, subsequent abscess development s/p drain placement 8/17, and now transferred to the ICU 8/26 for  digoxin toxicity causing hemodynamic instability and bradyarrythmias.  History is provided by Dr. Lan (Hospitalist), Dr. De La Garza (Cardiologist), the patient, and review of medical records.     In review, the patient was admitted on 8/11 with abdominal pain, found to have acute diverticulitis with perforation.  Warfarin held, treated medically/conservatively with antibiotics.  Repeat imaging 8/14 showed increase in adjacent fluid collection.  Went for CT guided abscess drain placement on 8/17.  Cultures ultimately grew Candida albicans and Ashley dubliniensis.  IR abscessogram tube check 8/25 showed no residual fluid pocket.  Patient had low-grade temperature, CXR performed 8/26 concerning for healthcare associated pneumonia.  Has been followed by ID team, treated with fluconazole (since 8/21), meropenem.  Vancomycin switched to Daptomycin on 8/26.  Outside of this, patient developed Afib with RVR on 8/19.  This resolved with resumption of metoprolol and digoxin.  Unfortunately, also developed acute kidney injury 8/25.      Early tonight, digoxin level was checked ~ 24 hr after last dose, returned elevated at 6.9.  Acute kidney injury also worsening with hyperkalemia  (K 5.8).  Also found to have new onset bradyarrythmias and EKG questionable for junctional rhythm. Case discussed with Cardiology, DigiFab ordered, recommended to hold hyperkalemia acutely with treatment with DigiFab.   Transferred to the ICU for continued care.    Following ICU arrival, patient had variable HRs in the 20s-40s.  SBPs decreasing to high 80s mmHg.  Atropine 0.5 mg IV x 1 given with good response, HRs increased to the 70s.  Transient increase - began to again drop after 10 minutes.  Initiated on dopamine infusion.  Arterial line placed urgently.      Patient reports doing okay, feeling lightheaded/dizzy.  + dyspnea.  No cough.  No chest pain, palpitations, abdominal pain, diarrhea, or constipation.  Initially without nausea, but  after NIPPV placed, felt nausea.  NIPPV mask removed.       Review of Systems   Pertinent items are noted in HPI.  A 12 point comprehensive review of systems was negative except as noted.     Active Problem List   Patient Active Problem List    Diagnosis Date Noted     Poisoning by digoxin, accidental or unintentional, initial encounter      Atrial fibrillation with rapid ventricular response (H)      Obstructive sleep apnea syndrome      Supratherapeutic INR 08/12/2020     Diverticulitis of large intestine with perforation and abscess without bleeding      Diverticulitis 08/11/2020     Acute kidney failure, unspecified (H) 07/24/2020     Acute liver failure without hepatic coma 07/23/2020     Hematochezia 07/23/2020     Hyperkalemia 07/23/2020     Acute on chronic combined systolic (congestive) and diastolic (congestive) heart failure (H) 07/23/2020     Ischemic cardiomyopathy 07/23/2020     Trigger finger, acquired 07/02/2020     Class 1 obesity with serious comorbidity and body mass index (BMI) of 33.0 to 33.9 in adult, unspecified obesity type 02/07/2020     Dysuria 08/20/2019     Hypertrophy of nail 07/30/2019     Severe protein-calorie malnutrition (H) 07/03/2019     Weight loss, non-intentional 07/01/2019     Warfarin anticoagulation 07/01/2019     Chronic abdominal pain 06/27/2019     Intestinal angina (H) 06/16/2019     Microalbuminuria 02/12/2019     Hyperparathyroidism (H) 01/15/2019     Onychogryphosis 01/11/2019     Lipoma of back 11/15/2018     Physical deconditioning 11/04/2018     MAY (acute kidney injury) (H) 10/22/2018     Transaminitis 10/22/2018     Persistent atrial fibrillation (H)      Anticoagulant long-term use      Acalculous cholecystitis 10/03/2018     (HFpEF) heart failure with preserved ejection fraction (H) 09/18/2018     Anxiety 08/21/2018     Ventral hernia without obstruction or gangrene 03/15/2018     Coronary artery disease involving native coronary artery of native heart without  angina pectoris 02/26/2018     Dyslipidemia 02/26/2018     Incisional hernia, without obstruction or gangrene 09/20/2017     RLS (restless legs syndrome) 05/24/2017     Diverticular disease of large intestine 03/23/2017     Cerebrovascular disease 01/16/2017     Healthcare maintenance 03/07/2016     Cystitis 03/05/2016     Acute diastolic heart failure (H) 11/2015     Advanced directives, counseling/discussion 08/05/2015     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)      Spinal stenosis      PAD (peripheral artery disease) (H)      Dermatosis Papulosa Nigra      Depression      Hypercalcemia      Right Renal Artery Stenosis      Osteoarthritis      Abnormality Of Walk      Essential hypertension 07/02/2010     Cerebral infarction (H) 07/02/2010     Anemia 07/02/2010         Medical/Surgical History   Past Medical History:   Diagnosis Date     Acute diastolic heart failure (H) 11/2015     Acute on chronic diastolic heart failure (H) 6/15/2019     Advanced directives, counseling/discussion 8/5/2015    Patient completed 2011.  Discussed today, 5/24/17, and wants to change healthcare agent from niece to daughter.  Discussed that she will need to complete new form to indicate this.     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  Recommend 5 year follow-up.  Single tubular adenoma     Biliary obstruction 10/3/2018    Added automatically from request for surgery [id number]     Cerebral vascular accident (H)      Coronary artery disease 2006    100% RCA with collateral filling per Dr. Elizabeth's angio report     Diabetes mellitus type 2 in obese (H)      Fibrocystic breast      GERD (gastroesophageal reflux disease)      History of anesthesia complications     slow to wake     Hypertension      Obstructive sleep apnea syndrome      Peripheral vascular disease (H)      Pneumonia 11/11/2018     PONV  (postoperative nausea and vomiting)      S/P cholecystectomy 6/22/2018     SBO (small bowel obstruction) (H) 11/12/2015     Stroke (H)      Transaminitis 10/22/2018     Upper respiratory infection 12/20/2018     Urinary incontinence      Past Surgical History:   Procedure Laterality Date     CARDIAC CATHETERIZATION       CARDIOVERSION  10/18/2018     CORONARY STENT PLACEMENT  1995    stent     CV CORONARY ANGIOGRAM N/A 7/27/2020    Procedure: Coronary Angiogram;  Surgeon: Luis Monge MD;  Location: [facility] Cath Lab;  Service: Cardiology     CV RIGHT HEART CATH SHUNT RUN  7/27/2020    Procedure: Right Heart Catheterization Shunt Run;  Surgeon: Luis Monge MD;  Location: [facility] Cath Lab;  Service: Cardiology     ERCP N/A 10/5/2018    Procedure: ENDOSCOPIC RETROGRADE CHOLANGIOPANCREATOGRAPHY, SPHINCTEROTOMY;  Surgeon: Anson Stokes MD;  Location: [facility] OR;  Service:      EXPLORATORY LAPAROTOMY N/A 6/29/2018    Procedure: LAPAROTOMY, CLOSURE OF PERITONEAL RENT;  Surgeon: Jones Harding DO;  Location: [facility] OR;  Service:      LAPAROSCOPIC CHOLECYSTECTOMY N/A 10/7/2018    Procedure: CHOLECYSTECTOMY, LAPAROSCOPIC;  Surgeon: Felicia So MD;  Location: [facility] OR;  Service:      MIDLINE INSERTION - DOUBLE LUMEN  8/13/2020          MIDLINE INSERTION - DOUBLE LUMEN  8/19/2020          PICC  6/29/2018          PICC  10/21/2018          PICC INSERTION - DOUBLE LUMEN  8/23/2020          VENTRAL HERNIA REPAIR N/A 11/12/2015    Procedure: STRANGULATED VENTRAL HERNIA REPAIR ;  Surgeon: Ernesto Bailey MD;  Location: [facility] OR;  Service:          Allergies   Allergies   Allergen Reactions     Penicillins Swelling         Medications:  OUTpatient medications   Prior to Admission medications    Medication Sig Start Date End Date Taking? Authorizing Provider   ARTHRITIS PAIN RELIEF, ACETAM, 650 mg CR tablet Take 650 mg by mouth 2 (two) times a day.        6/26/19   Yes PROVIDER, HISTORICAL   aspirin 81 mg chewable tablet Chew 1 tablet (81 mg total) daily. Stop after 3 months 7/30/20  Yes Rekha Cesar MD   atorvastatin (LIPITOR) 80 MG tablet Take 1 tablet (80 mg total) by mouth at bedtime. Start only after your liver function test is normal 8/3/20  Yes Rekha Cesar MD   carboxymethylcellulose (REFRESH PLUS) 0.5 % Dpet ophthalmic dropperette Administer 2 drops to both eyes every hour as needed (dry eyes). 10/15/19  Yes Jerson Hernandez MD   cholecalciferol, vitamin D3, 2,000 unit Tab TAKE 1 TABLET BY MOUTH ONCE DAILY. *1 TOTAL FILL* 8/2/19  Yes Estrella Agarwal MD   digoxin (LANOXIN) 250 mcg (0.25 mg) tablet Take 1 tablet (250 mcg total) by mouth daily with supper. 7/30/20  Yes Rekha Cesar MD   furosemide (LASIX) 20 MG tablet Take 1 tablet (20 mg total) by mouth daily. 8/20/19  Yes Jerson Hernandez MD   lisinopriL (PRINIVIL,ZESTRIL) 5 MG tablet Take 1 tablet (5 mg total) by mouth daily. 7/30/20  Yes Rekha Cesar MD   loratadine (CLARITIN) 10 mg tablet TAKE 1 TABLET BY MOUTH ONCE DAILY. *1 TOTAL FILL* 9/5/19  Yes Jerson Hernandez MD   magnesium gluconate (MAGONATE) 27 mg magnesium (500 mg) Tab tablet Take 1 tablet (27 mg total) by mouth 2 (two) times a day. 9/18/19  Yes Jerson Hernandez MD   metFORMIN (GLUCOPHAGE) 500 MG tablet Take 1 tablet (500 mg total) by mouth 2 (two) times a day with meals. 7/30/20  Yes Rekha Cesar MD   metoprolol succinate (TOPROL-XL) 25 MG Take 0.5 tablets (12.5 mg total) by mouth at bedtime. 7/30/20  Yes Rekha Cesar MD   multivitamin therapeutic tablet Take 1 tablet by mouth daily.   Yes PROVIDER, HISTORICAL   nitroglycerin (NITROSTAT) 0.4 MG SL tablet Place 1 tablet (0.4 mg total) under the tongue every 5 (five) minutes as needed for chest pain (for up to 3 doses and call 911 if persists).  Patient taking differently: Place 0.4 mg under the tongue every 5 (five)  minutes as needed for chest pain (for up to 3 doses and call 911 if persists).  11/17/16  Yes Jerson Hernandez MD   omeprazole (PRILOSEC) 20 MG capsule TAKE 1 CAPSULE BY MOUTH TWICE DAILY. *1 TOTAL FILL* 9/5/19  Yes Jerson Hernandez MD   ondansetron (ZOFRAN-ODT) 4 MG disintegrating tablet Take 4 mg by mouth every 4 (four) hours as needed for nausea.   Yes PROVIDER, HISTORICAL   polyethylene glycol (GLYCOLAX) 17 gram/dose powder Take 17 g by mouth daily as needed.  7/3/18  Yes PROVIDER, HISTORICAL   senna-docusate (PERICOLACE) 8.6-50 mg tablet Take 1 tablet by mouth 2 (two) times a day. 1/11/19  Yes Jerson Hernandez MD   sertraline (ZOLOFT) 100 MG tablet TAKE 1 TABLET BY MOUTH ONCE DAILY. *1 TOTAL FILL* 8/14/19  Yes Jerson Hernandez MD   spironolactone (ALDACTONE) 25 MG tablet TAKE 1 TABLET BY MOUTH ONCE DAILY. *1 TOTAL FILL* 8/14/19  Yes Jerson Hernandez MD   ticagrelor (BRILINTA) 90 mg Tab Take 1 tablet (90 mg total) by mouth 2 (two) times a day. Indefinitely but at least 12 months 7/27/20  Yes Luis Monge MD   traZODone (DESYREL) 50 MG tablet TAKE 1 TABLET BY MOUTH AT BEDTIME. *1 TOTAL FILL* 9/5/19  Yes Jerson Hernandez MD   warfarin sodium (WARFARIN ORAL) Take by mouth See Admin Instructions. 8/1-2: 4 mg daily; 8/3-5: 3 mg daily; 8/6-9: held; 8/10-11: 1.5 mg daily; INR recheck 8/12   Yes PROVIDER, HISTORICAL   blood glucose meter (GLUCOMETER) Please Dispense kit per pharmacy discretion and patient's insurance Test blood sugar. 2/16/17   Juliet Hazel MD   blood glucose test strips Use 1 each As Directed as needed. Dispense brand per patient's insurance at pharmacy discretion. 9/5/18   Jerson Hernandez MD   lancets 33 gauge Misc Test twice daily Dispense brand per patient's insurance at pharmacy discretion. 2/16/17   Juliet Hazel MD           Medications:  INpatient medications     aspirin  81 mg Oral DAILY     daptomycin (CUBICIN) IV  6 mg/kg Intravenous  Q48H     digoxin immune jacki (DIGIFAB) IVPB  40 mg Intravenous Once     DOPamine         fluconazole (DIFLUCAN) IV  200 mg Intravenous Q24H     furosemide  40 mg Intravenous DAILY     [Held by provider] lisinopriL  5 mg Oral DAILY     magnesium chloride  128 mg Oral BID     magnesium gluconate  27 mg Oral BID     meropenem  1 g Intravenous Q12H     [Held by provider] metoprolol succinate  12.5 mg Oral DAILY     omeprazole  20 mg Oral BID AC     sertraline  100 mg Oral DAILY     sodium chloride bacteriostatic  1-5 mL Intradermal Once     sodium chloride  10 mL Intravenous Q8H FIXED TIMES     sodium chloride  10-30 mL Intravenous Q8H FIXED TIMES     spironolactone  25 mg Oral DAILY     ticagrelor  90 mg Oral BID     warfarin - daily dose required   Other Med Consult or Protocol     warfarin - NO DOSE TODAY   Other No Dose Today           Family History  Social History   Reviewed, and:  Family History   Problem Relation Age of Onset     Cancer Paternal Grandmother      Cancer Paternal Uncle      No Medical Problems Mother      No Medical Problems Father      Heart attack Sister      Chronic Kidney Disease Sister      No Medical Problems Daughter      No Medical Problems Maternal Grandmother      No Medical Problems Maternal Grandfather      No Medical Problems Paternal Grandfather      No Medical Problems Maternal Aunt      No Medical Problems Paternal Aunt      Diabetes Brother      BRCA 1/2 Neg Hx      Breast cancer Neg Hx      Colon cancer Neg Hx      Endometrial cancer Neg Hx      Ovarian cancer Neg Hx      Reviewed, and:  Social History     Socioeconomic History     Marital status: Legally      Spouse name: Not on file     Number of children: 1     Years of education: Not on file     Highest education level: Not on file   Occupational History     Not on file   Social Needs     Financial resource strain: Not on file     Food insecurity     Worry: Not on file     Inability: Not on file     Transportation  "needs     Medical: Not on file     Non-medical: Not on file   Tobacco Use     Smoking status: Former Smoker     Packs/day: 0.25     Smokeless tobacco: Former User     Tobacco comment: e-cig a few times/day   Substance and Sexual Activity     Alcohol use: No     Drug use: No     Sexual activity: Not on file   Lifestyle     Physical activity     Days per week: Not on file     Minutes per session: Not on file     Stress: Not on file   Relationships     Social connections     Talks on phone: Not on file     Gets together: Not on file     Attends Worship service: Not on file     Active member of club or organization: Not on file     Attends meetings of clubs or organizations: Not on file     Relationship status: Not on file     Intimate partner violence     Fear of current or ex partner: Not on file     Emotionally abused: Not on file     Physically abused: Not on file     Forced sexual activity: Not on file   Other Topics Concern     Not on file   Social History Narrative    Single/, lives alone; daughter in [place];    Gets help from neighbors and extended family    Former smoker.no alcohol problems      Psychosocial Needs  Social History     Social History Narrative    Single/, lives alone; daughter in [place];    Gets help from neighbors and extended family    Former smoker.no alcohol problems     Additional psychosocial needs reviewed per nursing assessment.      Physical Exam   VITALS:  BP 92/41   Pulse (!) 33   Temp 97.7  F (36.5  C) (Oral)   Resp (!) 43   Ht 5' 2\" (1.575 m)   Wt 210 lb 6.4 oz (95.4 kg)   LMP 01/25/1999   SpO2 98%   BMI 38.48 kg/m    Temp:  [97.4  F (36.3  C)-100.6  F (38.1  C)] 97.7  F (36.5  C)  Heart Rate:  [26-71] 33  Resp:  [17-47] 43  BP: ()/(35-56) 92/41  SpO2:  [92 %-99 %] 98 %    PHYSICAL EXAM:   GEN: Fatigued, interactive but groggy  HEENT: Normocephalic, atraumatic.  Extraoccular eye movements intact, anicteric sclera. No sinus tenderness to palpation.  " Moist mucous membranes. Mallampati class IV airway.   NECK: Supple.  Obese.    PULM: Tachypneic.  No use of accessory muscles.  Diminished breath sounds throughout.  CVS: Bradyarrythmias. Normal S1, S2.  No rubs, murmurs, or gallops.    ABDOMEN:  Obese abdomen.  Soft. Hypooactive bowel sounds.  Left lower quadrant drain in position, mild tenderness to palpation.    EXTREMITIES/SKIN:  No clubbing, cyanosis, or edema.    NEURO:  Fatigued, sleepy/groggy but interactive.  Oriented to person, place. Cranial nerves 2-12 grossly intact.  Moving all extremities slowly.      I&O:      Intake/Output Summary (Last 24 hours) at 8/26/2020 2216  Last data filed at 8/26/2020 1700  Gross per 24 hour   Intake 220 ml   Output 0 ml   Net 220 ml        Pertinent Labs   Lab Results: personally reviewed.     Serum Glucose range:No results for input(s): POCGLU in the last 72 hours.  ABG:  ABG:  No results for input(s): PHART, UGR9NCH, PO2ART, OXYHB, BEARTCALC, CARBOXYHGB, METHGB, POCPEEP, TEMP, 56463, POCRATE, POCFLOW, PSV in the last 72 hours.    Invalid input(s): PV45BVHQEQV, 02SAT, VENTTIVOL  CBC:  Results from last 7 days   Lab Units 08/25/20  0526 08/24/20  0555 08/23/20  0754 08/22/20  1839 08/22/20  0542   LN-WHITE BLOOD CELL COUNT thou/uL 15.6* 15.9* 17.4* 17.0* 17.4*   LN-HEMOGLOBIN g/dL 7.6* 7.9* 8.3* 8.1* 8.3*   LN-HEMATOCRIT % 23.7* 24.5* 25.9* 24.8* 24.7*   LN-PLATELET COUNT thou/uL 106* 109* 112* 115* 115*   LN-MONOCYTES - REL (DIFF) %  --   --   --  4 8     Chemistry:  Results from last 7 days   Lab Units 08/26/20  1805 08/26/20  0534 08/24/20  1638 08/24/20  0555  08/21/20  0447 08/20/20  0544   LN-SODIUM mmol/L 138 137  --  136   < > 137 133*   LN-POTASSIUM mmol/L 5.8* 5.3*  --  3.6   < > 3.4* 3.3*   LN-CHLORIDE mmol/L 109* 108*  --  107   < > 105 102   LN-CO2 mmol/L 10* 17*  --  21*   < > 21* 21*   LN-BLOOD UREA NITROGEN mg/dL 33* 26  --  14   < > 8 9   LN-CREATININE mg/dL 2.60* 1.93* 1.08 0.94   < > 0.95 1.14*    LN-CALCIUM mg/dL 9.7 9.2  --  8.1*   < > 7.4* 6.8*   LN-MAGNESIUM mg/dL 2.5  --   --   --   --  1.5* 1.8   LN-ALBUMIN g/dL 1.9*  --  1.8*  --   --   --   --    LN-ALT (SGPT) U/L 116*  --  32  --   --   --   --    LN-AST (SGOT) U/L 1,543*  --  25  --   --   --   --    LN-ALKALINE PHOSPHATASE U/L 143*  --  109  --   --   --   --    LN-BILIRUBIN TOTAL mg/dL 1.9*  --  1.2*  --   --   --   --     < > = values in this interval not displayed.     Coags:  Lab Results   Component Value Date    INR 2.86 (H) 08/26/2020    INR 2.30 (H) 08/25/2020    INR 2.14 (H) 08/24/2020     Cardiac Markers:  Results from last 7 days   Lab Units 08/26/20  1805   LN-CREATINE KINASE TOTAL U/L 268*       Microbiology:    Blood cultures x 2, 8/26:  Collected and pending.     Urine culture, 8/26:  Collected and pending.     Left pelvis body fluid, 8/17:      1+ Candida albicans    2 + Ashley dubliniensis     Cardiac/Radiology Studies   Cardiac:    EKG:   personally reviewed.      TTE, 7/27/20:  Summary.    When compared to the previous study dated 7/23/2020, no significant change. Limited study done and tricuspid regurgitation not well assessed on this study.    Left ventricle ejection fraction is normal. The calculated left ventricular ejection fraction is 56%.    Normal left ventricular size and systolic function.    Left atrial volume is moderately increased.    Aortic Valve: The aortic valve is sclerotic without reduced excursion. Mild aortic regurgitation.    Mitral Valve: There is severe valve leaflet thickening. Mild mitral regurgitation.    Radiology:  Personally reviewed image/s and Personally reviewed impression/s    Chest X-Ray, 8/26: Right PICC tip projects over the mid SVC. Unchanged bilateral mixed reticular and alveolar opacities. These could represent cardiogenic or noncardiogenic pulmonary edema, pneumonia, and drug reaction. No pleural effusion     CT abd/pelvis w IV contrast, 8/20:      IMPRESSION:  1.  Interval percutaneous  drain placement into the diverticular abscess with near complete collapse of the fluid cavity. Drain abuts the adjacent sigmoid colon.  No evidence of post drain placement complication.  2.  No new intra-abdominal abscess or other findings to correlate with ongoing leukocytosis. 3.  Stable appearance of the pancreas with previously seen main pancreatic ductal dilatation not well visualized on this study.    Outside reports reviewed: Historical medical records.

## 2021-06-11 NOTE — PROGRESS NOTES
[place] Daily Progress Note      Date of Service: 9/12/2020     Brief History: Gardenia Muller is 70 y.o. female with history of type 2 diabetes mellitus, chronic atrial fibrillation, diastolic congestive heart failure, CAD s/p PCI in 1995, CVA, peripheral vascular disease, presented to the hospital on 8/11/2020 for abdominal pain, and vomiting. CT abdomen revealed diverticulitis with perforation. General surgery and IR were consulted. Follow up image on 08/14 showed increase in size of abscess, and she underwent CT guided drain in the left pelvic diverticular abscess on 08/17. CT abdomen on 08/20 showed near collapse of fluid cavity. The patient developed hospital acquired pneumonia, and candida growing from the abdominal fluid. ID was consulted, and she was placed on broad spectrum antimicrobials. Patient developed acute respiratory distress, and had 25 lb weight gain since admission, and was started on IV lasix. On 08/26 develop MAY and episodes of bradycardia. Digoxin level was found to be subsequently elevated, and the patient was started on DigiFab. Transferred to ICU for closer monitoring. Required atropine in ICU for low heart rate and was started on dopamine. Patient was endotracheally intubated on 08/28 for volume overload and pneumonia. She has failed spontaneous breathing trial. She underwent abscessogram with tube check on 09/09, and as there were no more fluid collection, the drain was removed.  Patient has failed multiple weaning trials. Family care conference was held on 09/11, and as patient's wishes were not to pursue prolonged ventilator support and tracheostomy, plan will be to withdraw support and transition to comfort cares on Sunday, once all family member will be able to come visit.    Assessment/Plan:     Acute respiratory failure with hypoxia  -Secondary to HCAP, fluid overload  -Spontaneous breathing trial per pulmonary. Failing daily weans  -Does not wish to prolong mechanical  ventilation or tracheostomy  -Plan to withdraw support on Sunday, 09/13    Electrolyte imbalance  -Hypo/hypernatremia-improved. Fluid flush with tube  -Hypomagnesemia, replaced  -Hypophosphatemia, replaced  -Hypokalemia, replaced    Perforated diverticulitis  Pericolonic abscess with sigmoid colon fistula  -S/p CT guided drain placement on 08/17  -Abscessogram tube check on 09/01 and on 09/09. Drain removed as there was no fluid collection, and drain output was zero.  -Treated with meropenem, micafungin. Antibiotics stopped on 09/10  -Tube fluid culture grew candida    Digitalis toxicity  Junctional bradycardia, secondary to digoxin toxicity  -Received digifab. Required dopamine for persistently low heart rate  -Now heart rate has stabilized    Acute kidney injury  -Multifactorial in etiology- contrast induced, digoxin toxicity, medication, sepsis, hypotension  -Consented for dialysis, but never received one. Started to make large volume urine output and renal function has improved  -Renal function in normal range now    Acute on chronic diastolic/systolic congestive heart failure  -LVEF of 45%  -Currently on IV lasix  -Monitor I/Os, electrolytes, and renal function.    Type 2 diabetes mellitus with hyperglycemia  -Not on any insulin regimen  -Blood glucose labile per metabolic panel readings  -Last A1C of 6.5    Anemia, normocytic  -Multifactorial in etiology- acute illness, blood loss  -Monitor closely    Acute encephalopathy, metabolic  -Requiring restraints  -Precedex as needed    Coronary artery disease  -Known  to RCA  -Currently on aspirin and brilinta  -Monitor    Other issues:    Afib with RVR on 08/19. Rate controlled at this time. Currently on warfarin    Transaminitis from hepatic congestion with heart failure    Depression/anxiety on zoloft      Subjective:     Chart reviewed, events noted. Pt seen and examined. Opens eyes to verbal and tactile stimuli. No overnight issues. No nausea or  "vomiting.    Objective     Vital signs in last 24 hours:  Temp:  [98.4  F (36.9  C)-100.4  F (38  C)] 98.8  F (37.1  C)  Heart Rate:  [] 93  Resp:  [8-34] 21  BP: ()/(40-56) 100/50  FiO2 (%):  [25 %-100 %] 30 %  Weight:   199 lb 8 oz (90.5 kg)  Weight change:   Body mass index is 36.49 kg/m .    Intake/Output last 3 shifts:  I/O last 3 completed shifts:  In: 2511.5 [I.V.:405.5; NG/GT:2106]  Out: 1285 [Urine:1285]  Intake/Output this shift:  I/O this shift:  In: 206.8 [I.V.:16.8; NG/GT:190]  Out: -       Physical Exam:  /50   Pulse 93   Temp 98.8  F (37.1  C) (Oral)   Resp 21   Ht 5' 2\" (1.575 m)   Wt 199 lb 8 oz (90.5 kg)   LMP 01/25/1999   SpO2 100%   BMI 36.49 kg/m      FiO2 (%): 30 % O2 Device: Ventilator O2 Flow Rate (L/min): 3 L/min    General Appearance: Opens eyes. Not in distress  HEENT: Normocephalic. No scleral icterus. Mucous membranes moist. Endotracheal intubation  Heart: S1 S2 heard. Irregular rate and rhythm.  Lungs: Decreased breath sounds, coarse sounds  Abdomen: Soft, non tender, bowel sounds present.  Extremity: No deformity. No joint swelling. No edema.  Neurologic: Opens eyes. Moving extremities  Skin: No skin rashes      Imaging:  personally reviewed.    Xr Chest 1 View Portable    Result Date: 9/8/2020  EXAM: XR CHEST 1 VIEW PORTABLE LOCATION: [facility] DATE/TIME: 9/8/2020 12:01 PM INDICATION: Respiratory failure COMPARISON: 09/06/2020 and older studies.     Xr Chest 1 View Portable    Result Date: 9/6/2020  EXAM: XR CHEST 1 VIEW PORTABLE LOCATION: [facility] DATE/TIME: 9/6/2020 11:59 AM INDICATION: f/u intubation COMPARISON: 09/03/2020     Ir Abscessogram Tube Check    Result Date: 9/9/2020  [place] RADIOLOGY LOCATION: [facility] DATE: 9/9/2020 PROCEDURE: ABSCESS TUBE CHECK AND REMOVAL INTERVENTIONAL RADIOLOGIST: Karlos Mckeon MD. INDICATION: 70-year-old female with a left pelvic abscess and known fistula to the sigmoid colon. Drain " outputs have been 0 the past several days. CONSENT: The risks, benefits and alternatives of an abscessogram with possible exchange, manipulation or removal were discussed with the patient  in detail. All questions were answered. Informed consent was given to proceed with the procedure. MODERATE SEDATION: None. CONTRAST: 10 mL Omnipaque into the abscess cavity. ANTIBIOTICS: None. ADDITIONAL MEDICATIONS: None. FLUOROSCOPIC TIME: 0.3 minutes. RADIATION DOSE: Air Kerma: 37 mGy. COMPLICATIONS: No immediate complications. STERILE BARRIER TECHNIQUE: Maximum sterile barrier technique was used. Cutaneous antisepsis was performed at the operative site with application of 2% chlorhexidine and large sterile drape. Prior to the procedure, the  and assistant performed hand hygiene and wore hat, mask, sterile gown, and sterile gloves during the entire procedure. PROCEDURE:  A  image was obtained. The pre-existing drainage catheter was injected with a small amount of contrast and multiple images were obtained. Based on the results of the study the drainage catheter was removed. FINDINGS: The  image shows the existing straight drainage catheter tubing. The tubing tip terminates approximately 3-4 cm lateral to the loop of the catheter on the previous study. An injection of contrast initially shows the drainage catheter is occluded. A more forceful injection exhibits peritubal leakage with no residual abscess. No residual fistula is identified.        Lab Results:  personally reviewed.   Lab Results   Component Value Date     09/12/2020    K 4.1 09/12/2020     09/12/2020    CO2 25 09/12/2020    BUN 13 09/12/2020    CREATININE 0.62 09/12/2020    CALCIUM 10.2 09/12/2020     Lab Results   Component Value Date    WBC 10.5 09/12/2020    HGB 7.8 (L) 09/12/2020    HCT 26.0 (L) 09/12/2020     (H) 09/12/2020     (L) 09/12/2020     Results from last 7 days   Lab Units 09/12/20  0542 09/11/20  0457  09/10/20  0450   LN-MAGNESIUM mg/dL 2.0 1.5* 1.9              Advance Care Planning:   Barriers to discharge: Active issues, planning to transition to comfort measures  Anticipated discharge day: To be determined  Disposition: To be determined      Uzair Burdick MD  [facility]   [facility]

## 2021-06-11 NOTE — PROGRESS NOTES
"Palliative Spiritual Care Note    Spiritual Assessment: Pt awake and talking more. Somewhat difficult to understand. Pt shared that her \"babies\" in [place], [place] and [place] have visited which makes her happy. She realizes that she is sick. Expressed desire to leave hospital and go home. Tearful when discussing death. Yi important.    Care Provided: Pastoral care through conversation, both humorous and serious. Pt tracking thoughts, responds appropriately. Shared prayer.    Plan of Care: Will continue following throughout hospitalization.    HARLEY Dhaliwal Div.    "

## 2021-06-11 NOTE — PROGRESS NOTES
Patient continues on mechanical ventilation, sedated, BS slight congestion upper airway, suctioned with pre-oxygenation and lavage 3 ml NS, small amount clear thin secretions. Weaning attempt unsuccessful yesterday, aborted after 10 minutes due to increased RR and RSBI. Will hold weaning at this time and discuss with medical staff. Duoneb tx. Given, tolerated well.     RESPIRATORY CARE NOTE    Vent Mode: VCV  FiO2 (%):  [30 %] 30 %  S RR:  [16] 16  S VT:  [450 mL] 450 mL  PEEP/CPAP (cm H2O):  [5 cm H2O] 5 cm H2O  Minute Ventilation (L/min):  [7.9 L/min-13 L/min] 8 L/min  PIP:  [34 cm H2O-56 cm H2O] 39 cm H2O  MAP (cm H2O):  [9-14] 10     Plan is to monitor vent, wean when indicated and as tolerated. .     Al Kennedy, LRT           08/31/20 0808   Patient Data   Vt (observed, mL) 509 mL   Vt Exp (mL) 503 mL   Minute Ventilation (L/min) 8 L/min   Total Resp Rate  16 BPM   PIP Observed (cm H2O) 39 cm H2O   MAP (cm H2O) 10   Auto/Intrinsic PEEP Observed (cm H2O) 1 cm H2O   Plateau Pressure (cm H2O) 27 cm H2O   Static Compliance (L/cm H2O) 25   Dynamic Compliance (L/cm H2O) 21 L/cm H2O   Airway Resistance 16

## 2021-06-11 NOTE — PROGRESS NOTES
"  Clinical Nutrition Therapy Nutrition Support Note        Subjective:  Pt remains intubated, sedated.  TF infusing at trophic rate.  Chart reviewed.        Nutrition Prescription:   Diet: Trophic TF started 8/30:  Replete no fiber at 15 ml/hr continuous.    Modulars:    Water flush 60 ml q 6 hrs.  IV dextrose or Fluids:dexmedetomidine infusion orderable (PRECEDEX), Last Rate: 0.3 mcg/kg/hr (09/04/20 1149)  fentaNYL citrate (PF), Last Rate: Stopped (09/04/20 1151)  norepinephrine, Last Rate: Stopped (09/04/20 0639)  propofol, Last Rate: Stopped (09/02/20 1030)  sodium chloride 0.9%, Last Rate: Stopped (09/03/20 1320)  vasopressin, Last Rate: Stopped (09/02/20 1510)      Nutrition Intake:  Pt has OG placed 8/28/20  TF provides:  330 calories, 20 g protein, 41 g carb, 514 ml free water.     Nutrition needs not met    Anthropometrics:  Height: 5' 2\" (157.5 cm)  Admission weight: 195#  Weight: 185 lb 14.4 oz (84.3 kg) Doubt 12# loss in 1 day.    197# 9/3.        GI Status/Output:   GI symptoms per nursing: rounded abdomen  Last BM recorded: 9/2  Gastric residuals:  0  Note pt had elevated gastric residuals to 650 ml on 9/2 with TF rate advancement.      Skin/Wound:   James Scale Score: 11    Per WOC note- gluteal cleft denudement noted.    Medications:  warfarin  K and Mg replacement  Medications reviewed.    Labs:  Lab Results   Component Value Date/Time    PREALBUMIN 25.6 07/30/2019 12:17 PM    ALBUMIN 1.3 (L) 09/04/2020 05:32 AM     09/04/2020 05:32 AM    K 3.8 09/04/2020 05:32 AM    BUN 17 09/04/2020 05:32 AM    CREATININE 0.52 (L) 09/04/2020 05:32 AM    GLU 99 09/04/2020 05:32 AM    PHOS 2.4 (L) 09/04/2020 05:32 AM    MG 1.3 (L) 09/04/2020 05:32 AM   fsbg 74-98  Labs reviewed    Estimated Nutrition Needs:  Using adjusted weight of 59 kg.    Energy Needs: 6865-0685 kcals daily per 25-30 kcal/kg   Protein Needs: 71-89 g daily, 1.2-1.5 g/kg.  Fluid Needs: 2049-3895 mls daily, 25-30 mls/kg    Malnutrition: Noted " previously    Nutrition Risk Level: high risk    Nutrition DX: inadequate intake r/t acute illness evidenced by NPO/CL x 6 days    Goals:  Meet nutrition needs-not met  Electrolytes WNL-progressing    Plan:  Pt tolerating trophic TF at this time.  Add protein modulars to help meet protein needs at this time.  No carb prosource 2 pkts/d.  This to increase kcals to 450/d and protein to 50g/d  Monitor TF tolerance and ability to advance TF rate.  May need motility agent if advancing TF rate.    Monitor:  Per goals.    See Care Plan for Problems, Goals, and Interventions.

## 2021-06-11 NOTE — PROGRESS NOTES
PULMONARY / CRITICAL CARE PROGRESS NOTE    Date / Time of Admission:  8/11/2020 10:16 AM  Assessment   Gardenia Muller is a 70 y.o. female with afib on coumadin, HFpEF, T2DM presented w/ perforated diverticulitis causing abscess and later developed MAY and respiratory failure in the setting of digoxin toxicity; now intubated and requiring multiple pressors.     Principal Problem:    Diverticulitis  Active Problems:    Bradyarrhythmia    Acute kidney injury (H)    Diverticulitis of large intestine with perforation and abscess without bleeding    Atrial fibrillation with rapid ventricular response (H)    Obstructive sleep apnea syndrome    Poisoning by digoxin, accidental or unintentional, initial encounter    Acute respiratory failure with hypoxia and hypercapnia (H)    Acute metabolic encephalopathy    Shock circulatory (H)    Metabolic acidosis        Plan     NEUROLOGIC  Sedation requirements:   - Propofol  - Fentanyl       PULMONARY  Vent Mode: VCV  FiO2 (%):  [30 %] 30 %  S RR:  [16] 16  S VT:  [450 mL] 450 mL  PEEP/CPAP (cm H2O):  [5 cm H2O] 5 cm H2O  Minute Ventilation (L/min):  [8 L/min-11.6 L/min] 8.7 L/min  PIP:  [20 cm H2O-41 cm H2O] 37 cm H2O  MAP (cm H2O):  [9-10] 9  1) Intubated for pneumonia and volume overload: Repeat CXR this morning w/ persistent alveolar opacities and small left pleural effusion. Failed SBT yesterday to high RR.  - daily sedation vacation and SBT  - ET tube 1.4 cm above emily on today's CXR; RT to adjust, then repeat CXR         CV  1) Afib with RVR--resolved.  Echo found stable EF 45%, no significant valve pathology  2) Dig Toxicity: Dig level 2.5 8/31  3) Hypotension related in part to sedation  - Levophed for MAP>60  - Vasopressin added for low diastolic  - Diuresis ongoing; IVF bolus prn  - Brillinta restarted today (INR 2.52)        RENAL/FLUID/LYTES  1) MAY on CKD: Currently large UOP 2/2 post-ATN diuresis  - Continue to hold diuretics while pt actively autodiuresing  -  fluid bolus prn if high UOP or increasing pressor needs  - Appreciate renal assistance, may avoid HD  2) Hypokalemia: Initial hyperkalemia improved w/ tx of digoxin toxicity.   - K replacement protocol prn      GI  1) Perforated diverticulitis with abscess: s/p CT guided 10F left pelvic drain placement 8/17/20. CT 8/20/20 demonstrating near resolution of fluid collection however abscessogram on 8/25 with small fistula to adjacent colon.  Output has been 0 for the past 7 days.    - on trickle feeds; titrating up as tolerated  - IR abscessogram with possible removal/reposition/exchange with IV fentanyl as needed on 9/1/2020  - abx as below        HEMATOLOGIC  - INR 2.52 today. Restart Brillinta   1) Normocytic anemia: stable  - transfuse if Hgb < 7 and symptomatic   2) Reactive neutrophilia: no organisms on sputum, urine, blood cx  - recheck CBC once presumed infection resolved  3) Thrombocytopenia: stable      ID  1) PNA: new bilateral pulmonary infiltrates 8/29.   - On abx for intraabdominal abscess  - Sputum cx pending  - Follow cx results; sputum cx no orgs 9/1  - Appreciate ID input  2) Abdominal abscess: Candida and G+ cocci in chains. S/p drain exchange today.  - drain to gravity  - abx per ID micafungin + meropenem       ENDO/SKIN  - SSI         Dispo: Palliative consulted. Possible care conference later this week pending clinical status.       ICU Daily Checklist            Lines/Tubes Drips Prophylaxis   Bardales:Yes   fentaNYL citrate (PF) 50 mcg/hr (09/01/20 0439)     [Held by provider] furosemide Stopped (08/29/20 2040)     norepinephrine 0.02 mcg/kg/min (09/01/20 0104)     propofol 10 mcg/kg/min (09/01/20 0535)     vasopressin 2.4 Units/hr (09/01/20 0158)      Thromboembolic: SCDs    Stress Ulcer: PPI      Can patient transfer out of MICU? no  Diet: NPO for IR today, then resume trickle feeding; advance per RD  Analgesia/Sedation: Yes fentanyl and propofol  HOB > 30: Yes  Glycemic control:  Any glucose >  180? no    Sliding Scale Insulin       INTUBATED?  Can patient have daily waking? yes  SBT: yes  Restraints? yes      PT AND MOBILITY  OK for PT and mobility trial? no    Activity: Bed Rest  Code: DNR; OK to intubate  POA: Aria Muller (daughter) is power of  at         Patient was staffed with Dr. Torres.        Karen Hilliard MD 9/1/2020, 7:00 AM  PGY1  Pager # [phone]      Faculty Supervision of Residents     I have personally examined and assessed this patient. I have reviewed and discussed plan of care with the resident. The documentation outlined by the resident reflects my direct assessment and plan of care.      The patient is critically ill with impairment in organ system and high risk of life threatening deterioration.      Will Torres MD  9/1/2020          Critical Care Time > 45 Minutes    Subjective   Gardenia Muller is a 70 y.o. female with chronic afib on anticoagulation, HFpEF, CAD, CVA, DMII who admitted 8/11 perforated diverticulitis with abscess, now s/p drain placement and on abx. Digoxin toxicity with worsening renal failure in setting of Afib with RVR; now s/p Digibind with improvement in heart rate and renal function. Initially oliguric and hypervolemic, now autodiuresing with ongoing improvement in Cr and urine output. Intubated 8/28 for worsening work of breathing and hypoxia. CT w/ bibasilar infiltrates concerning for pneumonia. Remains afebrile but hypotensive requiring 2 pressors to maintain goal MAP.     Overnight events/last 24 hours:  9/1: No significant overnight events. Remains on levophed and vasopressin. ETT secretions minimal. NPO for IR today. Sedation vacation and SBT today.     Principal Problem:    Diverticulitis  Active Problems:    Bradyarrhythmia    Acute kidney injury (H)    Diverticulitis of large intestine with perforation and abscess without bleeding    Atrial fibrillation with rapid ventricular response (H)    Obstructive sleep apnea  syndrome    Poisoning by digoxin, accidental or unintentional, initial encounter    Acute respiratory failure with hypoxia and hypercapnia (H)    Acute metabolic encephalopathy    Shock circulatory (H)    Metabolic acidosis      Allergies   Penicillins     MEDS  Scheduled:     amino acids-protein hydrolys  1 packet Enteral Tube BID     aspirin  81 mg Enteral Tube DAILY     chlorhexidine  15 mL Topical Q12H     insulin aspart (NovoLOG) injection   Subcutaneous Q4H FIXED TIMES     ipratropium-albuteroL  3 mL Nebulization Q6H - RT     magnesium sulfate IVPB  4 g Intravenous Once     meropenem  1 g Intravenous Q12H     micafungin (MYCAMINE) IV  100 mg Intravenous Q24H     omeprazole  20 mg Oral QAM AC    Or     omeprazole  20 mg Enteral Tube QAM AC    Or     pantoprazole  40 mg Intravenous QAM AC     potassium chloride  20 mEq Intravenous Q1H     [Held by provider] sertraline  100 mg Oral DAILY     sodium chloride bacteriostatic  1-5 mL Intradermal Once     sodium chloride  10 mL Intravenous Q8H FIXED TIMES     sodium chloride  10-30 mL Intravenous Q8H FIXED TIMES     [Held by provider] ticagrelor  90 mg Enteral Tube BID     Continuous Infusions:     fentaNYL citrate (PF) 50 mcg/hr (09/01/20 0439)     [Held by provider] furosemide Stopped (08/29/20 2040)     norepinephrine 0.02 mcg/kg/min (09/01/20 0104)     propofol 10 mcg/kg/min (09/01/20 0535)     vasopressin 2.4 Units/hr (09/01/20 0158)     PRN: alteplase, atropine, bacitracin, bisacodyL, carboxymethylcellulose, codeine-guaiFENesin, dextrose 50 % (D50W), glucagon (human recombinant), hydrOXYzine pamoate, lidocaine (PF), lipase-protease-amylase **AND** sodium bicarbonate, [Held by provider] metoprolol tartrate, naloxone **OR** naloxone, oxyCODONE, polyethylene glycol, sodium chloride 0.9%, sodium chloride bacteriostatic, sodium chloride, sodium chloride, sodium chloride, traZODone    Objective     VITALS    /54   Pulse 65   Temp 98.3  F (36.8  C)   Resp 16   " Ht 5' 2\" (1.575 m)   Wt 198 lb 11.2 oz (90.1 kg)   LMP 01/25/1999   SpO2 99%   BMI 36.34 kg/m      EXAM    GEN: Sedated on vent. Opens eyes briefly to gentle stimulus. NAD.    HEENT: Normocephalic, atraumatic  NECK: Supple. ETT in place.  PULM: Coarse lung sound bilaterally.   CVS: Irregularly irregular. No murmurs.   ABDOMEN: Diminished bowel sounds in all quadrants. Nontender to palpation.   : Bardales in place.     EXTREMITES:  Generalized edema. Dry skin.   NEURO: Intubated, sedated.     I&O    Intake/Output Summary (Last 24 hours) at 9/1/2020 1000  Last data filed at 9/1/2020 0947  Gross per 24 hour   Intake 1391.05 ml   Output 3415 ml   Net -2023.95 ml       LABS  BMP:   Lab Results   Component Value Date    CO2 29 09/01/2020    BUN 42 (H) 09/01/2020    CREATININE 0.81 09/01/2020    CALCIUM 8.6 09/01/2020     Coagulation:   Lab Results   Component Value Date    INR 2.52 (H) 09/01/2020    INR 1.60 (!) 07/27/2020         IMAGING  CXR 9/1/2020 5:55 AM  Endotracheal tube terminates 1.4 cm above the emily. Nasoenteric tube passes below the left hemidiaphragm and the inferior margin of the radiograph. Right PICC tip at the superior cavoatrial junction. Persistent interstitial coarsening and patchy alveolar opacities diffusely in both lungs. There may be a small left pleural effusion. Stable cardiomegaly. Calcified plaque of the aorta. No pneumothorax.    9/1/2020: Abscess tube change  FINDINGS:    1. Initial abscessogram demonstrates an indwelling 10 [place] APDL drain within a contracted left pelvic abscess cavity. Almost immediate opacification of the adjacent distal sigmoid colon. Short, widely apparent fistulous tract to the sigmoid colon.  2. Completion fluoroscopic image demonstrates a new 10 [place] APDL drain, placed approximately 2 cm lateral to the site of the fistula.     IMPRESSION:   1.  Contracted abscess cavity. 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 Drain exchanged for a tailored 10 [place] " drain placed lateral to the fistula site.      8/25/2020:  IR abscessogram:  PROCEDURE:   Contrast injection through the existing drain demonstrates no residual abscess pocket. 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     IMPRESSION:   Abscessogram reveals no residual abscess pocket. Fistula to the colon. Switched drain to gravity drainage bag from SHAMIKA suction bulb. No flushes are needed. Follow-up abscessogram in one week.       8/20/20: CT Abd/pelvis  IMPRESSION:   1.  Interval percutaneous drain placement into the diverticular abscess with near complete collapse of the fluid cavity. Drain abuts the adjacent sigmoid colon. No evidence of post drain placement complication.  2.  No new intra-abdominal abscess or other findings to correlate with ongoing leukocytosis.  3.  Stable appearance of the pancreas with previously seen main pancreatic ductal dilatation not well visualized on this study      Additional comments:  I reviewed the patient's new clinical lab and imaging test results.         PCP: Jerson Hernandez MD

## 2021-06-11 NOTE — PROGRESS NOTES
"  RESPIRATORY CARE NOTE    BP (!) 86/30   Pulse 69   Temp 98.9  F (37.2  C) (Axillary)   Resp (!) 34   Ht 5' 2\" (1.575 m)   Wt 214 lb 8 oz (97.3 kg)   LMP 01/25/1999   SpO2 99%   BMI 39.23 kg/m        Vent Mode: VCV  FiO2 (%):  [30 %-100 %] 40 %  S RR:  [16-20] 16  S VT:  [450 mL-475 mL] 450 mL  PEEP/CPAP (cm H2O):  [5 cm H2O] 5 cm H2O  Minute Ventilation (L/min):  [7.4 L/min-10.5 L/min] 7.8 L/min  PIP:  [38 cm H2O-44 cm H2O] 38 cm H2O  MAP (cm H2O):  [10-12] 10    Pt remained on the above vent setting for the night. BS were diminished/clear bilaterally, pt has a 7.5 ETT at 20 @teeth and was not weaned for the night. Pt was Suctioned small tan secretion, and tolerated well. Rt will continue to monitor.    GENO MendozaT          "

## 2021-06-11 NOTE — PROGRESS NOTES
RT PROGRESS NOTE    DATA:        CURRENT SETTINGS:  VENT SETTINGS   Vent Mode: VCV  FiO2 (%):  [21 %-100 %] 30 %  S RR:  [16] 16  S VT:  [450 mL] 450 mL  PEEP/CPAP (cm H2O):  [5 cm H2O] 5 cm H2O  Minute Ventilation (L/min):  [9.1 L/min-12.5 L/min] 9.8 L/min  PIP:  [25 cm H2O-43 cm H2O] 40 cm H2O  VT SUP:  [15 cm H20] 15 cm H20  MAP (cm H2O):  [8-12] 9        PATIENT PARAMETERS:    PIP 39  Pplat:  27  Pmean:    SBT:   SECRETIONS:  Small to moderate   02 SATS:  98    ETT SIZE 7.5  Secured at  20 cm at teeth    Securing device changed / tube re-taped date     Respiratory Medications: duo neb four times a day      ABG:     NOTE / PLAN:   No changes this shift.

## 2021-06-11 NOTE — CONSULTS
[facility]- Per notes and discussion with Tiara FRIEDMAN CM, patient will reach end of life at the hospital.  Please let hospice know if we can be of service.  Thank you.

## 2021-06-11 NOTE — PROGRESS NOTES
[facility] Palliative Care Social Work Note:    Attended family meeting with dtr Aria, ICU team and Palliative Care team. Medical team updated Aria on pt's current status and discussed possible decisions that will need to be made in the next week. Aria is pt's HCA and gave copy of HCD. She shared that her mom has never been this sick and would not want to be like this. She confirmed her mom would not want a trach/PEG. Aria said several times that she isn't ready to let her mom go, but that she also doesn't want her to suffer.     Aria will be in [place] through Friday then will return to [place] for a few days. She will be back in [place] Wed. She acknowledged she knows she will need to make the hard decision next week and focus on her mom's comfort if she has not improved. Aria would like her brother from [place] to be with her if they need to make that decision. She hopes that her mom will stay alive until she returns. Reassured her medical team is doing all they can for her mom, but that also that there is always a chance when pt's are critically ill that they can die even with aggressive medical intervention. She expressed understanding. Encouraged her to view all her interactions with her mom as complete and to say anything she needs to. Aria went back into the room to tell her mom she loved her and kissed her goodbye. Offered that  could visit with pt and dtr together, and she agreed tomorrow afternoon would be best. Informed  Kyra of dtr's request.  Provided active/empathetic listening, validation of feelings and emotional support.     Collaborated with Rachele WARD, and she agreed to call [facility] per dtr's request to confirm they know pt's situation.      SW will continue to provide psychosocial support to dtr Aria.     Melina Turner, [facility]  Palliative Care   Office # [phone]

## 2021-06-11 NOTE — PROGRESS NOTES
Pharmacy Consult: Warfarin Management    Pharmacy consulted to dose warfarin for Gardenia Muller, a 70 y.o. female    Ordering provider: Karen Hilliard MD     Reason for warfarin therapy: Atrial Fibrillation  Goal INR Range: 2-3    Subjective  Medication lists (home and hospital) were reviewed.    New medications that may increase bleeding risk/INR: none currently    Restarted home medications that may increase bleeding risk/INR: ASA, Ticagrelor, omeprazole, sertraline     Home medications NOT restarted that may increase bleeding risk/INR:  trazodone     Home Warfarin Dosing: multiple dose reductions during August 2020.  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)     Advanced directives, counseling/discussion     Cystitis     Healthcare maintenance     Essential hypertension     Cerebral infarction (H)     Anemia     Cerebrovascular disease     RLS (restless legs syndrome)     Incisional hernia, without obstruction or gangrene     Diverticular disease of large intestine     Coronary artery disease involving native coronary artery of native heart without angina pectoris     Dyslipidemia     Ventral hernia without obstruction or gangrene     Anxiety     (HFpEF) heart failure with preserved ejection fraction (H)     Anticoagulant long-term use     Persistent atrial fibrillation (H)     Bradyarrhythmia     Acute kidney injury (H)     Transaminitis     Physical deconditioning     Lipoma of back     Acalculous cholecystitis     Onychogryphosis     Hyperparathyroidism (H)     Microalbuminuria     Intestinal  angina (H)     Chronic abdominal pain     Weight loss, non-intentional     Warfarin anticoagulation     Severe protein-calorie malnutrition (H)     Hypertrophy of nail     Dysuria     Class 1 obesity with serious comorbidity and body mass index (BMI) of 33.0 to 33.9 in adult, unspecified obesity type     Trigger finger, acquired     Acute liver failure without hepatic coma     Hematochezia     Hyperkalemia     Acute on chronic combined systolic (congestive) and diastolic (congestive) heart failure (H)     Ischemic cardiomyopathy     Acute kidney failure, unspecified (H)     Acute diastolic heart failure (H)     Diverticulitis     Supratherapeutic INR     Diverticulitis of large intestine with perforation and abscess without bleeding     Atrial fibrillation with rapid ventricular response (H)     Obstructive sleep apnea syndrome     Poisoning by digoxin, accidental or unintentional, initial encounter     Acute respiratory failure with hypoxia and hypercapnia (H)     Acute metabolic encephalopathy     Shock circulatory (H)     Metabolic acidosis     Other hypervolemia    Past Medical History:   Diagnosis Date     Acute diastolic heart failure (H) 11/2015     Acute on chronic diastolic heart failure (H) 6/15/2019     Advanced directives, counseling/discussion 8/5/2015    Patient completed 2011.  Discussed today, 5/24/17, and wants to change healthcare agent from niece to daughter.  Discussed that she will need to complete new form to indicate this.     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  Recommend 5 year follow-up.  Single tubular adenoma     Biliary obstruction 10/3/2018    Added automatically from request for surgery [id number]     Cerebral vascular accident (H)      Coronary artery disease 2006    100% RCA with collateral filling per Dr. Elizabeth's angio report     Diabetes mellitus type 2 in obese (H)       Fibrocystic breast      GERD (gastroesophageal reflux disease)      History of anesthesia complications     slow to wake     Hypertension      Obstructive sleep apnea syndrome      Peripheral vascular disease (H)      Pneumonia 11/11/2018     PONV (postoperative nausea and vomiting)      S/P cholecystectomy 6/22/2018     SBO (small bowel obstruction) (H) 11/12/2015     Stroke (H)      Transaminitis 10/22/2018     Upper respiratory infection 12/20/2018     Urinary incontinence         Social History     Tobacco Use     Smoking status: Former Smoker     Packs/day: 0.25     Smokeless tobacco: Former User     Tobacco comment: e-cig a few times/day   Substance Use Topics     Alcohol use: No     Drug use: No       Objective   Labs:  Last 3 days:    Recent Labs     09/08/20  0403 09/09/20  0526 09/10/20  0450 09/10/20  0451   CREATININE 0.54* 0.54* 0.58*  --    HGB 7.4* 7.4*  --  7.7*   HCT 24.9* 25.0*  --  25.5*    155  --  158   BILITOT  --  1.4*  --   --    AST  --  39  --   --    ALT  --  24  --   --    ALKPHOS  --  101  --   --      Last 7 days:   Recent labs: (last 7 days)     09/04/20  0532 09/05/20  0417 09/06/20  0526 09/07/20  0440 09/08/20  0403 09/09/20  0525 09/10/20  0450   INR 1.89* 1.79* 1.90* 2.09* 2.14* 2.50* 2.40*       Warfarin Dosing History:    Date INR Warfarin Dose Comment   9/2/20 2.07 none    9/3/20 1.86 1.5 mg Pharmacist consulted to dose warfarin   9/4/20 1.89 1.5mg    9/5 1.79 2.5mg     9/6 1.90 2.5 mg    9/7/20 2.09 2.5 mg - SQ heparin discontinued   9/8/20 2.14 2.5 mg    9/9/20 2.50 1.5 mg    9/10/20 2.40 2 mg      Assessment  The patient is resuming warfarin for the indication of Atrial Fibrillation with a goal INR of 2-3. INR today is therapeutic.     Multiple dose reductions during August 2020.  Last dosing regimen just before admission was 1.5mg daily.  Patient was admitted with supratherapeutic INR.  INR was reversed for surgery and then remained elevated most likely due to  liver issues post operatively.  INR increased and now within goal range.     Plan  1. Administer warfarin 2 mg via ET today.  2. Check INR daily or as appropriate.  3. Continue to follow the patient's INR, PLT, and HGB as available.  4. Monitor for potential drug/disease interactions.    Thank you for the consult,  Sheri Hanna, PharmD, BCPS 9/10/2020 3:07 PM

## 2021-06-11 NOTE — PROGRESS NOTES
Wound Ostomy  WOC Assessment - Virtual Visit        Allergies:  Penicillins    Diagnosis:   Patient Active Problem List    Diagnosis Date Noted     Difficult ventilator weaning (H)      Other hypervolemia      Acute respiratory failure with hypoxia and hypercapnia (H)      Acute metabolic encephalopathy      Shock circulatory (H)      Metabolic acidosis      Poisoning by digoxin, accidental or unintentional, initial encounter      Atrial fibrillation with rapid ventricular response (H)      Obstructive sleep apnea syndrome      Supratherapeutic INR 08/12/2020     Diverticulitis of large intestine with perforation and abscess without bleeding      Diverticulitis 08/11/2020     Acute kidney failure, unspecified (H) 07/24/2020     Acute liver failure without hepatic coma 07/23/2020     Hematochezia 07/23/2020     Hyperkalemia 07/23/2020     Acute on chronic combined systolic (congestive) and diastolic (congestive) heart failure (H) 07/23/2020     Ischemic cardiomyopathy 07/23/2020     Trigger finger, acquired 07/02/2020     Class 1 obesity with serious comorbidity and body mass index (BMI) of 33.0 to 33.9 in adult, unspecified obesity type 02/07/2020     Dysuria 08/20/2019     Hypertrophy of nail 07/30/2019     Severe protein-calorie malnutrition (H) 07/03/2019     Weight loss, non-intentional 07/01/2019     Warfarin anticoagulation 07/01/2019     Chronic abdominal pain 06/27/2019     Intestinal angina (H) 06/16/2019     Microalbuminuria 02/12/2019     Hyperparathyroidism (H) 01/15/2019     Onychogryphosis 01/11/2019     Lipoma of back 11/15/2018     Physical deconditioning 11/04/2018     Acute kidney injury (H) 10/22/2018     Transaminitis 10/22/2018     Bradyarrhythmia      Persistent atrial fibrillation (H)      Anticoagulant long-term use      Acalculous cholecystitis 10/03/2018     (HFpEF) heart failure with preserved ejection fraction (H) 09/18/2018     Anxiety 08/21/2018     Ventral hernia without obstruction or  "gangrene 03/15/2018     Coronary artery disease involving native coronary artery of native heart without angina pectoris 02/26/2018     Dyslipidemia 02/26/2018     Incisional hernia, without obstruction or gangrene 09/20/2017     RLS (restless legs syndrome) 05/24/2017     Diverticular disease of large intestine 03/23/2017     Cerebrovascular disease 01/16/2017     Healthcare maintenance 03/07/2016     Cystitis 03/05/2016     Acute diastolic heart failure (H) 11/2015     Advanced directives, counseling/discussion 08/05/2015     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 (ICU only) (STACH)    Wound culture obtained: No    Edema:  Yes:  Generalized      9.15.20 Chart reviewed. Patient noted to be on comfort cares at this time. Can continue with wound cares as ordered for patient's comfort. WOC RN will sign off at this time. See below for last WOC assessment.    Full body skin assessment completed except scalp.  Two areas of light purple discoloration noted to forehead consistent with size and shape of pulse oximeter. Stage 1 pressure injury - new. Approximately 0.3 cm x 0.3 cm each with intact periwound skin. No pulse on forehead at this time.  Anatomic Site/Laterality: Gluteal cleft    Reason for ongoing care:   Wound assessment and plan of care    Encounter Type:  Subsequent Encounter Wound Type:   Denudement:  Foreign body present? No    Tissue Damage:   Breakdown of skin    Related trauma: " Intertrigo    Assessment:    Length: 3 cm    Width: 0.5 cm    Tunneling/Undermining: No    Wound Bed: Pink, viable 100% Agranular    Exudate: No    Periwound Skin: Intact    Treatment Plan: Silicone foam     Anatomic Site/Laterality: Buttocks    Reason for ongoing care:   Wound assessment and plan of care    Encounter Type:  Initial Wound Type:   Denudement:  Foreign body present? No    Tissue Damage:   Breakdown of skin    Related trauma: Appears to be shearing injury given ridging and pattern of tissue injury (more apparent on left buttock than right).    Assessment:    L buttock with total affected area measuring approximately 7 cm x 3 cm.   R buttock with total affected area measuring approximately 2 cm x 3 cm.    Tunneling/Undermining: No    Wound Bed: Pink, viable 100% Agranular    Exudate: No    Periwound Skin: Intact    Treatment Plan: Silicone foam     Nursing care provided was coordinated care with RN and repositioned.    Discussed plan of care with nurse and patient as able.    Outcomes and treatment recommendations are to promote skin integrity and promote wound healing.    Actions taken by WOC RN: 5 minutes of education and WOC Discharge recommendations entered.    Planned Follow Up: Later this week or Early next week.    Plan for next visit: Reassess wound(s) and Reassess skin integrity.

## 2021-06-11 NOTE — PROGRESS NOTES
RESPIRATORY CARE    VENT DAY #: 6      ETT SIZE + PLACEMENT: 7.5 secured 19 cm at the teeth      CURRENT VENT SETTINGS:  Mode: VCV  Rate: 16  Tidal volume: 450  PEEP: +5  FiO2: 30%    PATIENT PARAMETERS:  PIP: 36  Pplateau: 24  Pmean: 10  RR: 21  SpO2: 97%    BREATH SOUNDS: Clear and diminished    SECRETIONS: Minimal thin clear/white via inline    RESPIRATORY MEDS: Duoneb Q6    SBT: PS +5/10 x2. Pt failed SBTx2. Increased RR ~40s, RSBI ~160.     ABG: No recent ABG    NOTE/PLAN: Pt continues to be on full mechanical ventilator support. ETT was pulled back 1cm per MD; now securing at 19 cm at the teeth. SBT attempted twice today; each lasting ~1minute. RR 40s, RSBI ~160. Continue to monitor.    Lilia Hunt, GENOT

## 2021-06-11 NOTE — PLAN OF CARE
Problem: Pain  Goal: Patient's pain/discomfort is manageable  Outcome: Progressing     Problem: Safety  Goal: Patient will be injury free during hospitalization  Outcome: Progressing     Problem: Daily Care  Goal: Daily care needs are met  Outcome: Progressing     Problem: Psychosocial Needs  Goal: Demonstrates ability to cope with hospitalization/illness  Outcome: Progressing  Goal: Collaborate with patient/family/caregiver to identify patient specific goals for this hospitalization  Outcome: Progressing     Problem: Discharge Barriers  Goal: Patient's discharge needs are met  Outcome: Progressing     Problem: Knowledge Deficit  Goal: Patient/family/caregiver demonstrates understanding of disease process, treatment plan, medications, and discharge instructions  Outcome: Progressing     Problem: Potential for Falls  Goal: Patient will remain free of falls  Outcome: Progressing     Problem: Potential for Compromised Skin Integrity  Goal: Skin integrity is maintained or improved  Outcome: Progressing  Goal: Nutritional status is improving  Outcome: Progressing     Problem: Urinary Incontinence  Goal: Perineal skin integrity is maintained or improved  Outcome: Progressing     Problem: Glucose Imbalance  Goal: Achieve optimal glucose control  Outcome: Progressing     Problem: Potential for transmission of organism by Contact, Enteric, Droplet and/or Airborne routes  Goal: Prevent transmission of organisms  Outcome: Progressing     Problem: Potential for hemodynamic instability  Goal: Cardiac output is adequate  Outcome: Progressing     Problem: Risk of Injury Due to Unsafe Behavior  Goal: Patient will remain safe while in restraint; physical/psychological needs will be met  Outcome: Progressing  Goal: Alternatives to restraint will be continually assessed with use of least restrictive device and discontinuation as soon as possible  Outcome: Progressing  Goal: Patient/Family will be able to communicate reason for  restraint and steps for restraint application and removal  Outcome: Progressing     Problem: Impaired Gas Exchange  Goal: Demonstrate improved ventilation and adequate oxygenation of tissues as evidenced by absence of respiratory distress  Description: Patient intubated 8/28 @1430.  ABG's collected s/p.  Sating well.    Outcome: Progressing     Problem: Breathing  Goal: Patient will maintain patent airway  Outcome: Progressing  Note: Plan to transition to comfort cares in am     Problem: Risk for Infection  Goal: Identify and demonstrate techniques, lifestyle changes to prevent/reduce risk of infection and promote safe environment  Outcome: Progressing     Problem: Mechanical Ventilation  Goal: Patient will maintain patent airway  Outcome: Progressing  Note: Plan to transition to comfort cares in am  Goal: Oral health is maintained or improved  Outcome: Progressing  Goal: ET tube will be managed safely  Outcome: Progressing  Goal: Ability to express needs and understand communication  Outcome: Progressing  Goal: Mobility/activity is maintained at optimum level for patient  Outcome: Progressing  Goal: Tracheostomy will be managed safely  Outcome: Progressing  Goal: Respiratory status - ventilation  Outcome: Progressing     Problem: Inadequate Gas Exchange  Goal: Patient will achieve/maintain normal respiratory rate/effort  Outcome: Progressing

## 2021-06-11 NOTE — PROGRESS NOTES
PROVIDER RESTRAINT FOR NON-VIOLENT BEHAVIOR FACE TO FACE EVALUATION    Patient's Immediate Situation:  Patient demonstrated the following behaviors: Pulling/tugging at invasive lines or tubes and does not respond to verbal/non-verbal redirection    Patient's Reaction to the intervention:  Does patient understand the reason for restraint/seclusion? No    Medical Condition:  Is there any evidence of compromise of Skin integrity, Respiratory, Cardiovascular, Musculoskeletal, Hydration? No    Behavioral Condition:  In consultation with the RN, is there a need to continue this restraint or seclusion? Yes    Bilateral soft wrist     See Restraint Flowsheet for complete restraint documentation and assessment.    Titus Rodriguez CNP  Pulmonary and Critical Care Medicine  [facility]  Pager [phone]

## 2021-06-11 NOTE — PLAN OF CARE
Care Plan  Care Management      Care Management Goals of the Day: Progression of care    Care Progression Reviewed With: Charge RN, MD, HUC, RNCM, CMSW    Barriers to Discharge: Intubated - weaning trials, Palliative following for GOC,  ID following, IV lasix, tube feeds, Medical Management    Family Care Conference: na    Discharge Disposition: TBD    Expected Discharge Date: TBD    Transportation: TBD      Care Coordination Narrative: Pt resides at AL facility [facility] in [facility] ([phone] & CM Hilario @ [phone]). Daughter is primary contact/HCA, [place]. Daughter is in [place], with granddaughter, with plan to return to [place] early this week.Hospital staff to reassess pt status next week 9/9/20 to determine if pt should be transitioned to comfort care. Plan tbd as cares progress, possibly eol. CM following.    2:05 PM  FABIO Doe    Abbreviation  Code:  [facility] [place] Wheelchair ([facility] WC), [facility] Stretcher ([facility] STR), Patient (pt), Transitional Care Unit (TCU), Skilled Nursing Facility (SNF), Physical Therapy (PT), Occupational Therapy (OT), Speech Therapy (ST TX), Respiratory Therapy (RT)

## 2021-06-11 NOTE — PROGRESS NOTES
General Surgery Progress Note    Subjective:   Pt sedated and intubated.      Vitals:    09/02/20 0630 09/02/20 0645 09/02/20 0700 09/02/20 0732   BP: 111/53 106/48 98/47    Pulse: 79 74 71 72   Resp: (!) 30 17 16    Temp:       TempSrc:       SpO2: 100% 100% 100% 100%   Weight:       Height:           Physical Exam:  Gen: sedated and intubated  Ab:       Soft, not distended, winces with palpation in LLQ, drain in place with minimal output    Results from last 7 days   Lab Units 09/02/20  0618   LN-WHITE BLOOD CELL COUNT thou/uL 13.1*   LN-HEMOGLOBIN g/dL 9.3*   LN-HEMATOCRIT % 29.7*   LN-PLATELET COUNT thou/uL 81*       Results from last 7 days   Lab Units 09/02/20  0618  08/30/20  0517   LN-SODIUM mmol/L 139   < > 138   LN-POTASSIUM mmol/L 4.3   < > 3.6  3.6   LN-CHLORIDE mmol/L 103   < > 105   LN-CO2 mmol/L 25   < > 22   LN-BLOOD UREA NITROGEN mg/dL 33*   < > 53*   LN-CREATININE mg/dL 0.60   < > 2.48*   LN-CALCIUM mg/dL 9.1   < > 8.9   LN-ALBUMIN g/dL  --   --  1.4*   LN-PROTEIN TOTAL g/dL  --   --  6.0   LN-BILIRUBIN TOTAL mg/dL  --   --  2.0*   LN-ALKALINE PHOSPHATASE U/L  --   --  152*   LN-ALT (SGPT) U/L  --   --  27   LN-AST (SGOT) U/L  --   --  247*    < > = values in this interval not displayed.       Assessment: colonic abscess s/p IR drain placement    Plan:   Continue drain, follow up abscessogram in 1 week  Continue current cares  No surgical intervention planned    Alvin Thomas PA-C  Pager - [phone]  Phone - [phone]   General Surgery

## 2021-06-11 NOTE — PROGRESS NOTES
Patient continues on mechanical ventilation, tolerating well. Sedated but arousable. BS rhonchi upper lobes, suctioned with pre-oxygenation and NS lavage 3 ml, moderated amount normal viscosity tan secretions. BS improved after.     RESPIRATORY CARE NOTE    Vent Mode: VCV  FiO2 (%):  [25 %-100 %] 30 %  S RR:  [16] 16  S VT:  [450 mL] 450 mL  PEEP/CPAP (cm H2O):  [5 cm H2O] 5 cm H2O  Minute Ventilation (L/min):  [7.6 L/min-9.7 L/min] 7.6 L/min  PIP:  [34 cm H2O-40 cm H2O] 36 cm H2O  MAP (cm H2O):  [9-15] 12    Plan is to monitor vent,     Al Kennedy, LRT           09/12/20 1765   Patient Data   Vt (observed, mL) 442 mL   Vt Exp (mL) 381 mL   Minute Ventilation (L/min) 7.6 L/min   Total Resp Rate  17 BPM   PIP Observed (cm H2O) 36 cm H2O   MAP (cm H2O) 12   Auto/Intrinsic PEEP Observed (cm H2O) 4 cm H2O   Plateau Pressure (cm H2O) 26 cm H2O   Static Compliance (L/cm H2O) 22   Dynamic Compliance (L/cm H2O) 19 L/cm H2O   Airway Resistance 18

## 2021-06-11 NOTE — PROGRESS NOTES
[facility]  RT PROGRESS NOTE    DATA:    VENT DAY#  1    CURRENT SETTINGS:  VENT SETTINGS   VCV/16/450/+5/50%           PATIENT PARAMETERS:    PIP 41  Pplat:  33  Pmean:  12  SBT: Just intubated  SECRETIONS:  Small white/yellow thick  02 SATS:  98%    ETT SIZE 7.5  Secured at  21 cm at teeth      Respiratory Medications: none     ABG: Arterial Blood Gas result:  pH 7.43; pCO2 33; pO2 326; HCO3 22.8, %O2 Sat 100%.      NOTE / PLAN:   Pt decompensated quickly  While she was on BiPAP and intubated for airway protection. ETT placement is confirmed initially with BBS and color change and later by CXR. ETT pulled back 2 cm per CXR. Now is 20@the teeth.Pt's RR decreased from 20 to 16 per MD and also Ppla was 28-33 and voluume decreased to 450ml, Ppla now 24cm H2O.  Pt tolerated setting well, RT continue follow progress.    Sylvie Manrique, LRT

## 2021-06-11 NOTE — PROGRESS NOTES
Respiratory Care Note    Vent day 8. Pt on the following settings:    Vent Mode: VCV  FiO2 (%):  [30 %-100 %] 30 %  S RR:  [16] 16  S VT:  [450 mL] 450 mL  PEEP/CPAP (cm H2O):  [5 cm H2O] 5 cm H2O  Minute Ventilation (L/min):  [6.9 L/min-9.1 L/min] 7.2 L/min  PIP:  [6 cm H2O-39 cm H2O] 39 cm H2O  NE SUP:  [5 cm H20-12 cm H20] 12 cm H20  MAP (cm H2O):  [5-10] 10    No weaning this evening. Will try again tomorrow morning. Suctioning small amounts of clear/thick inline. Nebs given as scheduled. RT following.       09/04/20 7511   Patient Data   Auto/Intrinsic PEEP Observed (cm H2O) 1 cm H2O   Plateau Pressure (cm H2O) 24 cm H2O   Static Compliance (L/cm H2O) 19   Dynamic Compliance (L/cm H2O) 28 L/cm H2O   Airway Resistance 22       KAYLA Ventura

## 2021-06-11 NOTE — PROGRESS NOTES
PULMONARY / CRITICAL CARE PROGRESS NOTE    Date / Time of Admission:  8/11/2020 10:16 AM  Assessment   Gardenia Muller is a 70 y.o. 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 MAY improved but pt continues to fail SBTs. Off vasopressin, but still requiring levophed to maintain MAP > 55.       Principal Problem:    Diverticulitis  Active Problems:    Bradyarrhythmia    Acute kidney injury (H)    Diverticulitis of large intestine with perforation and abscess without bleeding    Atrial fibrillation with rapid ventricular response (H)    Obstructive sleep apnea syndrome    Poisoning by digoxin, accidental or unintentional, initial encounter    Acute respiratory failure with hypoxia and hypercapnia (H)    Acute metabolic encephalopathy    Shock circulatory (H)    Metabolic acidosis        Plan       PULMONARY  Vent Mode: VCV  FiO2 (%):  [30 %-100 %] 30 %  S RR:  [16] 16  S VT:  [450 mL] 450 mL  PEEP/CPAP (cm H2O):  [5 cm H2O] 5 cm H2O  Minute Ventilation (L/min):  [6.9 L/min-9.1 L/min] 7.4 L/min  PIP:  [6 cm H2O-36 cm H2O] 33 cm H2O  UT SUP:  [5 cm H20-12 cm H20] 12 cm H20  MAP (cm H2O):  [5-9] 9  1) Intubated for pneumonia and volume overload: Repeat CXR this morning w/ diffuse persistent alveolar opacities and interstitial coarsening and small left pleural effusion. Failed SBT yesterday to high RR.  - daily sedation vacation and SBT  - duonebs four times a day   - weaning trials per RT, pulled endotracheal tube 2 cm yesterday  - Spontaneous breathing trial again today.  -Patient has been intubated for approximately 8 days.  Her family will come from [place] again on Wednesday.  Decision will have to be made regarding tracheostomy which they refused  versus terminal extubation.  Ideally would have to wait until repeat abscessogram which is scheduled next week (9/8).  However, if patient passes spontaneous breathing trial, will go ahead and extubate.    CV  1) Afib with RVR--resolved.  Echo found stable EF 45%, no significant valve pathology. On coumadin.  2) Dig toxicity: Improved. Dig level 2.5 8/31. Will continue to hold digoxin unless further recs from Cardiology.   3) Hypotension: related in part to sedation for which patient has been on norepinephrine intermittently  4) Acute on chronic combined systolic and diastolic heart failure and ischemic cardiomyopathy s/p PCI w/ stent 7/24/20. Required dual pressors; now off vasopressin.   - resume warfarin w/ dosing by PharmD  - ASA 81 mg daily  - tigagrelor 90 mg BID restarted 9/1   - Levophed to keep MAP > 55  -On Lasix      RENAL/FLUID/LYTES  1) MAY on CKD: Currently large UOP 2/2 post-ATN diuresis  - resume diuretics when OK per nephrology  - monitor lytes     - nephrology recommending fluid bolus prn if hypotension  - Appreciate renal assistance, may avoid HD  2) Hypokalemia: Initial hyperkalemia improved w/ tx of digoxin toxicity.   - K replacement protocol prn  - monitor urine output, I&O      GI  1) Perforated diverticulitis with abscess: s/p CT guided 10F left pelvic drain placement 8/17/20, exchanged 9/1/2020 No further surgical interventions planned as pt is poor surgical candidate. moderate malnutrition in the context of acute illnes d/t poor intake > 7 days and edema. Abdominal XR not concerning for obstruction.  - will restart trickle feeds. Added reglan for motility.   - abx as below  - per IR, do not flush drain  - will need follow up abscessogram 9/8 per surgery; earlier if changes in clinical status or output        HEME/COAG  1) Normocytic anemia: stable  2) Reactive neutrophilia: no organisms on sputum, urine, blood cx  3) Thrombocytopenia: Currently on dual antiplatelet therapy.   - on  ticagrelor, ASA  - will start warfarin today   - transfuse if Hgb < 7 and symptomatic   - recheck CBC once presumed infection resolved  - daily INR, Plt, Hgb      ID  1) PNA: new bilateral pulmonary infiltrates 8/29.   - On abx for intraabdominal abscess  - Sputum cx pending  - Follow cx results; sputum cx no orgs 9/3  - Appreciate ID input  2) Abdominal abscess: Candida and G+ cocci in chains. S/p drain exchange with IR.   - drain to gravity  - continue micafungin + meropenem per ID until fistula controlled         ENDO/SKIN  - sacral wound cares per nursing  - FS blood glucose per ICU protocol       NEUROLOGIC  Sedation requirements:   - Propofol  - Fentanyl  - We will change to Precedex (cautious with dosing as pt had bradycardia previously although due to digoxin doxicity)    PALLIATIVE  Family meeting held 9/2.   - Family clear they would not pursue Trach/PEG if pt unable to be weaned from ventilator  - Family open to any/all continued aggressive interventions with a restorative focus  - Copy of HCD placed in chart and emailed to [facility]     ICU Daily Checklist     Can patient transfer out of MICU? no         Lines/Tubes Drips Prophylaxis   Bardales:Yes   dexmedetomidine infusion orderable (PRECEDEX) 0.3 mcg/kg/hr (09/04/20 1149)     fentaNYL citrate (PF) Stopped (09/04/20 1151)     norepinephrine Stopped (09/04/20 0639)     propofol Stopped (09/02/20 1030)     sodium chloride 0.9% Stopped (09/03/20 1320)     vasopressin Stopped (09/02/20 1510)    DVT: ASA, ticagrelor and SCDs  Stress Ulcer: PPI     VAP: HOB > 30 degrees; chlorhexidine rinses   Feeding: TUBE FEEDS    Analgesia/Sedation: Yes fentanyl and propofol  Glycemic control: Sliding Scale Insulin. Any glucose > 180? no.       INTUBATED?  Can patient have daily waking? yes  SBT: yes  Restraints? yes      PT AND MOBILITY  OK for PT and mobility trial? no  Activity: Bed Rest    Code: DNR, OK to intubate  POA: Aria Muller (daughter) [phone]  [id number]     The patient is critically ill with impairment in organ system and high risk of life threatening deterioration.    TTS > 40 minutes in critical care.     Will Torres MD  9/4/2020        Subjective   Gardenia Muller is a 70 y.o. female with chronic afib on anticoagulation, HFpEF, CAD s/p stent 7/2020, CVA, DMII who was admitted 8/11 with perforated diverticulitis with abscess, now s/p drain placement and on abx. Digoxin toxicity with worsening renal failure in setting of Afib with RVR; now s/p Digibind with improvement in heart rate and renal function. Initially oliguric and hypervolemic, now autodiuresing with ongoing improvement in Cr and urine output. Intubated 8/28 for worsening work of breathing and hypoxia. CT w/ bibasilar infiltrates concerning for pneumonia. Remains intubated w/ continued pressor requirements and not tolerating SBTs.    Interval events:  No major events. Failed SBT this am. BG have been good. NE held this am. Remains off vasopressin.    Principal Problem:    Diverticulitis  Active Problems:    Bradyarrhythmia    Acute kidney injury (H)    Diverticulitis of large intestine with perforation and abscess without bleeding    Atrial fibrillation with rapid ventricular response (H)    Obstructive sleep apnea syndrome    Poisoning by digoxin, accidental or unintentional, initial encounter    Acute respiratory failure with hypoxia and hypercapnia (H)    Acute metabolic encephalopathy    Shock circulatory (H)    Metabolic acidosis      Allergies   Penicillins     MEDS  Scheduled:     amino acids-protein hydrolys  1 packet Enteral Tube BID     aspirin  81 mg Enteral Tube DAILY     chlorhexidine  15 mL Topical Q12H     insulin aspart (NovoLOG) injection   Subcutaneous Q4H FIXED TIMES     ipratropium-albuteroL  3 mL Nebulization Q6H - RT     meropenem  1 g Intravenous Q8H     micafungin (MYCAMINE) IV  100 mg Intravenous Q24H     nystatin  5 mL Swish & Swallow QID     pantoprazole  40 mg  "Intravenous BID    Or     omeprazole  20 mg Oral BID    Or     omeprazole  20 mg Enteral Tube BID     [Held by provider] sertraline  100 mg Oral DAILY     sodium chloride bacteriostatic  1-5 mL Intradermal Once     sodium chloride  10 mL Intravenous Q8H FIXED TIMES     sodium chloride  10-30 mL Intravenous Q8H FIXED TIMES     ticagrelor  90 mg Enteral Tube BID     warfarin - daily dose required   Other Med Consult or Protocol     warfarin ANTICOAGULANT  1.5 mg Oral Daily 1700     Continuous Infusions:     dexmedetomidine infusion orderable (PRECEDEX) 0.3 mcg/kg/hr (09/04/20 1149)     fentaNYL citrate (PF) Stopped (09/04/20 1151)     norepinephrine Stopped (09/04/20 0639)     propofol Stopped (09/02/20 1030)     sodium chloride 0.9% Stopped (09/03/20 1320)     vasopressin Stopped (09/02/20 1510)     PRN: alteplase, atropine, bacitracin, bisacodyL, carboxymethylcellulose, codeine-guaiFENesin, dextrose 50 % (D50W), glucagon (human recombinant), hydrOXYzine pamoate, lidocaine (PF), lipase-protease-amylase **AND** sodium bicarbonate, metoclopramide, [Held by provider] metoprolol tartrate, naloxone **OR** naloxone, oxyCODONE, polyethylene glycol, sodium chloride 0.9%, sodium chloride bacteriostatic, sodium chloride, sodium chloride, sodium chloride, traZODone    Objective     VITALS    BP 99/46   Pulse 69   Temp 98.3  F (36.8  C) (Oral)   Resp 17   Ht 5' 2\" (1.575 m)   Wt 185 lb 14.4 oz (84.3 kg)   LMP 01/25/1999   SpO2 97%   BMI 34.00 kg/m      EXAM    GEN: Sedated on vent. Does not open eyes to voice. NAD.    HEENT: Normocephalic, atraumatic  NECK: Supple. ETT in place.  PULM: Coarse lung sound bilaterally.   CVS: Irregularly irregular. Systolic murmur at right upper sternal border  ABDOMEN: Diminished but present bowel sounds in all quadrants. Soft, nondistended. Winces to palpation LLQ and RLQ. Drain in place with minimal output.  : Bardales in place.     EXTREMITES:  Generalized edema. Dry " skin.Warm.   NEURO: Intubated, sedated.     I&O    Intake/Output Summary (Last 24 hours) at 9/4/2020 1407  Last data filed at 9/4/2020 1130  Gross per 24 hour   Intake 1722.4 ml   Output 3925 ml   Net -2202.6 ml       LABS  CBC:   Lab Results   Component Value Date    WBC 11.4 (H) 09/04/2020    RBC 2.54 (L) 09/04/2020     BMP:   Lab Results   Component Value Date    CO2 30 09/04/2020    BUN 17 09/04/2020    CREATININE 0.52 (L) 09/04/2020    CALCIUM 9.9 09/04/2020     Coagulation:   Lab Results   Component Value Date    INR 1.89 (H) 09/04/2020    INR 1.60 (!) 07/27/2020         IMAGING    CXR 9/3/2020 10:28 AM  COMPARISON: 9/1/2020  IMPRESSION:   Endotracheal tube tip 1.2 cm from emily. Right PICC tip projects over the cavoatrial junction. Feeding tube tip below the film. Stable heart size. Stable bilateral diffuse pulmonary infiltrates.        Additional comments:  I reviewed the patient's new clinical lab test results.         PCP: Jerson Hernandez MD

## 2021-06-11 NOTE — PROGRESS NOTES
09/08/20 0224   Patient Data   PIP Observed (cm H2O) 32 cm H2O   MAP (cm H2O) 9   Auto/Intrinsic PEEP Observed (cm H2O) 1 cm H2O   Plateau Pressure (cm H2O) 25 cm H2O   Static Compliance (L/cm H2O) 25   Dynamic Compliance (L/cm H2O) 21 L/cm H2O   Airway Resistance 16   SpO2 98 %   Heart Rate 68     ETT # 7.05 and is secured 20 cm at the teeth. Patient continues to be on the vent/fully supported; current settings: VCV 16 450 28% 5. BS coarse; small/tan secretion are suctioned. Vent changes are not made. RT will monitor.    KAYLA Joseph

## 2021-06-11 NOTE — PROGRESS NOTES
Palliative Care Progress Note  NAME: Gardenia Muller, : 1950,   MRN: [id number] Date: 2020    Problem List:  Active Problems   Principal Problem:    Diverticulitis  Active Problems:    Bradyarrhythmia    Acute kidney injury (H)    Diverticulitis of large intestine with perforation and abscess without bleeding    Atrial fibrillation with rapid ventricular response (H)    Obstructive sleep apnea syndrome    Poisoning by digoxin, accidental or unintentional, initial encounter    Acute respiratory failure with hypoxia and hypercapnia (H)    Acute metabolic encephalopathy    Shock circulatory (H)    Metabolic acidosis      Assessment: Gardenia Muller, 70 y.o., female with a medical history of chronic A Fib (on digoxin and warfarin), diastolic HF, HTN, CAD, CVA, PAD, DM II, chronic malnutrition, chronic abdominal pain, and depression admitted on 2020 with abdominal pain, vomiting, and diarrhea and was found to have acute perforated diverticulitis w/small abscess formation (drain placement ). Unfortunately has had a complicated hospital course and transferred to ICU on  for digoxin toxicity causing hemodynamic instability and bradyarrhythmias. Required intubation on , remains intubated in the ICU.     Recommendations:   Weakness: initially from acute perforated diverticulitis w/small abscess formation requiring drain placement.  Hospital course complicated by worsening renal failure, digoxin toxicity, and acute respiratory failure leading to intubation on . Remains intubated w/continued vasopressor requirement.   - ICU primary   - Cardiology, ID, Nephrology, ID and surgery following    - Reposition for comfort as able      Shortness of breath: acute respiratory failure with hypoxia and mild hypercapnia due to possible HCAP, also w/concern of pulmonary edema given bradyarrhythmias and oliguric renal failure. Intubated on .   - Ventilator management per ICU team   - Weaning trials per  "ICU/RT     Decision making capacity: None, Daughter, Aria, is surrogate decision maker.   - Copy of HCD placed in chart and emailed to honoring choices      Goals of Care: Restorative   - Family clear they would not pursue Trach/PEG if pt unable to be weaned from ventilator  - Family open to any/all continued aggressive interventions with a restorative focus     Code Status: No CPR- Pre-arrest Intubation OK  =========================================================  Current Medical Status:  Remains intubated/sedated. Overnight events discussed with ICU team during multi-disciplinary rounds. Consultant notes reviewed.     Family meeting held with pt's daughter, Aria, ICU team and Palliative care team. Medical updates provided and overall hospital course explained and discussed. Went over current situation and potential outcomes in the coming days including what it could look like if patient can't be liberated from the ventilator (Trach/PEG). Aria very clear that she would not want her mother to undergo undue and prolonged suffering and would not move forward with a trache/PEG if it came down to needing it and would opt to focus on comfort at that point moving forward.     Symptom-Focused ROS:  Unable to assess fully as patient is intubated/sedated     Palliative Care Focused Medications:  Fentanyl gtt  Norepinephrine gtt  Vasopressin gtt      PERTINENT PHYSICAL EXAMINATION:  Vital Signs: Blood pressure 105/52, pulse 72, temperature 98.9  F (37.2  C), temperature source Axillary, resp. rate 17, height 5' 2\" (1.575 m), weight 200 lb 14.4 oz (91.1 kg), last menstrual period 01/25/1999, SpO2 96 %, not currently breastfeeding.   GENERAL: intubated/sedated     SKIN: Warm and dry   HEENT: Normocephalic, anicteric sclera, intubated  LUNGS: ventilated BS   CARDIAC: irregularly irregular, normal s1/s2, w/o m/r/g   ABDOMINAL: BS (+), soft, non distended, non tender, drains in place  : dias in place   EXTREMITIES: " generalized edema   NEUROLOGIC: intubated/sedated   PSYCH: intubated/sedated     I have reviewed labs and imaging in [facility]     ----------------------------------------------------  TT: I have personally spent a total of 35 minutes  today on the unit in review of medical record, consultation with the medical providers and assessment of patient today, with more than 50% of this time spent in counseling, coordination of care, and discussion in a family meeting re:diagnostic results, prognosis, symptom management, risks and benefits of management options, and development of plan of care.    Jose Raul Ramirez MD  [facility]  Palliative Medicine   Office: [phone]

## 2021-06-11 NOTE — PROGRESS NOTES
Pharmacy Consult: Warfarin Management    Pharmacy consulted to dose warfarin for Gardenia Muller, a 70 y.o. female    Ordering provider: Karen Hilliard MD     Reason for warfarin therapy: Atrial Fibrillation  Goal INR Range: 2-3    Subjective  Medication lists (home and hospital) were reviewed.    New medications that may increase bleeding risk/INR: heparin subcut    Restarted home medications that may increase bleeding risk/INR: ASA, Ticagrelor, omeprazole     Home medications NOT restarted that may increase bleeding risk/INR: sertraline, trazadone     Home Warfarin Dosing: multiple dose reductions during August 2020.  Last dosing regimen just before admission was 1.5mg daily     Other Anticoagulants: heparin subcut   Patient being bridged: No    Patient Active Problem List   Diagnosis     Spinal stenosis     PAD (peripheral artery disease) (H)     Dermatosis Papulosa Nigra     Depression     Hypercalcemia     Right Renal Artery Stenosis     Osteoarthritis     Abnormality Of Walk     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)     Advanced directives, counseling/discussion     Cystitis     Healthcare maintenance     Essential hypertension     Cerebral infarction (H)     Anemia     Cerebrovascular disease     RLS (restless legs syndrome)     Incisional hernia, without obstruction or gangrene     Diverticular disease of large intestine     Coronary artery disease involving native coronary artery of native heart without angina pectoris     Dyslipidemia     Ventral hernia without obstruction or gangrene     Anxiety     (HFpEF) heart failure with preserved ejection fraction (H)     Anticoagulant long-term use     Persistent atrial fibrillation (H)     Bradyarrhythmia     Acute kidney injury (H)     Transaminitis     Physical deconditioning     Lipoma of back     Acalculous cholecystitis     Onychogryphosis     Hyperparathyroidism (H)     Microalbuminuria     Intestinal angina (H)     Chronic abdominal pain      Weight loss, non-intentional     Warfarin anticoagulation     Severe protein-calorie malnutrition (H)     Hypertrophy of nail     Dysuria     Class 1 obesity with serious comorbidity and body mass index (BMI) of 33.0 to 33.9 in adult, unspecified obesity type     Trigger finger, acquired     Acute liver failure without hepatic coma     Hematochezia     Hyperkalemia     Acute on chronic combined systolic (congestive) and diastolic (congestive) heart failure (H)     Ischemic cardiomyopathy     Acute kidney failure, unspecified (H)     Acute diastolic heart failure (H)     Diverticulitis     Supratherapeutic INR     Diverticulitis of large intestine with perforation and abscess without bleeding     Atrial fibrillation with rapid ventricular response (H)     Obstructive sleep apnea syndrome     Poisoning by digoxin, accidental or unintentional, initial encounter     Acute respiratory failure with hypoxia and hypercapnia (H)     Acute metabolic encephalopathy     Shock circulatory (H)     Metabolic acidosis     Other hypervolemia    Past Medical History:   Diagnosis Date     Acute diastolic heart failure (H) 11/2015     Acute on chronic diastolic heart failure (H) 6/15/2019     Advanced directives, counseling/discussion 8/5/2015    Patient completed 2011.  Discussed today, 5/24/17, and wants to change healthcare agent from niece to daughter.  Discussed that she will need to complete new form to indicate this.     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  Recommend 5 year follow-up.  Single tubular adenoma     Biliary obstruction 10/3/2018    Added automatically from request for surgery [id number]     Cerebral vascular accident (H)      Coronary artery disease 2006    100% RCA with collateral filling per Dr. Elizabeth's angio report     Diabetes mellitus type 2 in obese (H)      Fibrocystic breast      GERD  (gastroesophageal reflux disease)      History of anesthesia complications     slow to wake     Hypertension      Obstructive sleep apnea syndrome      Peripheral vascular disease (H)      Pneumonia 11/11/2018     PONV (postoperative nausea and vomiting)      S/P cholecystectomy 6/22/2018     SBO (small bowel obstruction) (H) 11/12/2015     Stroke (H)      Transaminitis 10/22/2018     Upper respiratory infection 12/20/2018     Urinary incontinence         Social History     Tobacco Use     Smoking status: Former Smoker     Packs/day: 0.25     Smokeless tobacco: Former User     Tobacco comment: e-cig a few times/day   Substance Use Topics     Alcohol use: No     Drug use: No       Objective   Labs:  Last 3 days:    Recent Labs     09/03/20  0439 09/04/20  0532 09/05/20  0417   CREATININE 0.53* 0.52* 0.58*   HGB 8.1* 7.7* 7.7*   HCT 25.4* 24.8* 25.2*   * 116* 128*     Last 7 days:   Recent labs: (last 7 days)     08/30/20  0517 08/31/20  0501 09/01/20  0357 09/02/20  0618 09/03/20  0439 09/04/20  0532 09/05/20  0417   INR 3.62* 3.01* 2.52* 2.07* 1.86* 1.89* 1.79*       Warfarin Dosing History:    Date INR Warfarin Dose Comment   9/2/20 2.07 none    9/3/20 1.86 1.5 mg Pharmacist consulted to dose warfarin   9/4/20 1.89 1.5mg    9/5 1.79 2.5mg                         Assessment  The patient is resuming warfarin for the indication of Atrial Fibrillation with a goal INR of 2-3. INR today is subtherapeutic. Multiple dose reductions during August 2020.  Last dosing regimen just before admission was 1.5mg daily.  Patient was admitted with supratherapeutic INR.  INR was reversed for surgery and then remained elevated most likely due to liver issues post operatively.    INR decreased will increase dose today.    Plan  1. Administer warfarin 2.5 mg mg PO today.  2. Check INR daily or as appropriate.  3. Continue to follow the patient's INR, PLT, and HGB as available.  4. Monitor for potential drug/disease  interactions.    Thank you for the consult,  Ernestine Zapata, GABBY, BCPS, BCCCP 9/5/2020 12:19 PM

## 2021-06-11 NOTE — PROGRESS NOTES
GENERAL SURGERY PROGRESS NOTE:    Patient family has elected for comfort cares.    Surgery will officially sign off at this time and no longer follow peripherally.    Lynn Herbert, YOVANA  [phone] pager  [facility]

## 2021-06-11 NOTE — PROGRESS NOTES
[facility] Daily Progress Note    Assessment/Plan:  Gardenia Muller is a 70-year-old lady with atrial fibrillation, diastolic congestive heart failure, hypertension, coronary artery disease status post PCI 1995, CVA, peripheral vascular disease, T2DM, malnutrition, intestinal angina, acalculous cholecystitis status post laparoscopic cholecystectomy in 2018, depression who came into [facility] on 8/11/2020 for abdominal pain and vomiting.  CT scan of the abdomen revealed diverticulitis with perforation.  On 814 it showed increased size of abscess from 2x2 cm to 4x3 cm.  Though there was clinical improvement in her symptoms.  General surgery was consulted who recommended interventional radiology consultation for possible drainage of the abdominal abscess which was done on 8/17/2020 by placing CT scan guided drain in the left pelvic diverticular abscess. On 8/20/2020 CT scan of the abdomen showed near collapse of the fluid cavity        .  Subsequently patient developed leukocytosis with possible hospital-acquired pneumonia seen on chest x-ray.  She also had Candida infection for which fluconazole was started with improvement of WBC count.  On 825 patient had shortness of breath with 25 pound weight gain and was started on IV Lasix.  On 826 developed acute kidney injury and had episodes of bradycardia.  Digoxin level was elevated at 6.9 on 8/26/2020 and 10.7 on 8/28/2024 which DigiFab was started and patient transferred to ICU.  Required atropine in ICU for heart rate which went down between 20 and 40.  Started on dopamine.  Developed respiratory failure.  There is still fluid around sigmoid colon and rectum.  Abscessogram done on 8/31/2020 showed contracted abscess cavity with persistent fistula to the sigmoid colon with drain exchanged and tailored 10 Polish drain was placed lateral to the fistula site.  There is a plan to do another abscessogram in 1 week's time.     Assessment:  Digoxin toxicity  Acute kidney  injury  Acute perforated diverticulitis with pericolonic abscess  Sigmoid colon fistula   Hospital-acquired pneumonia -on meropenem  Candida bacteremia -on mucous function  Acute exacerbation on chronic diastolic CHF  Acute anemia  Atrial fibrillation  Type 2 diabetes mellitus      Plan:  Management as per pulmonary critical care, nephrology, ID, surgery    Code status:No CPR- Pre-arrest Intubation OK        Subjective:  Patient is sedated and intubated though she is opening eyes to command    Review of Systems:   As above    Current Medications Reviewed via EHR List    Objective:  Vital signs in last 24 hours:  Temp:  [96.6  F (35.9  C)-99  F (37.2  C)] 98.9  F (37.2  C)  Heart Rate:  [] 67  Resp:  [16-53] 16  BP: ()/(40-76) 105/49  SpO2:  [87 %-100 %] 97 %  ETCO2 (mmHg):  [27 mmHg-96.6 mmHg] 39 mmHg  FiO2 (%):  [21 %-100 %] 30 %    Intake/Output last 3 shifts:  I/O last 3 completed shifts:  In: 1555.9 [I.V.:759.2; NG/GT:150; IV Piggyback:646.7]  Out: 1605 [Urine:1605]      Physical Exam:  EYES: EOM able to Betamide  NECK: no JVD  HEART : S1 S2 RRR    LUNGS:  Clear to auscultation without  Crepitations or Wheezing    ABDOMEN:   Soft, No tenderness ?, Bowel sounds are positive  CNS patient is intubated and sedated               Lab Results:  Personally Reviewed.   Fingerstick Blood Glucose:   Recent Labs     08/31/20  2359 09/01/20  0358 09/01/20  0956 09/01/20  1141 09/01/20  1557 09/01/20  1954 09/02/20  0010 09/02/20  0404 09/02/20  0814 09/02/20  1148   POCGLUFGR 142* 155* 124 149* 143* 129 125 113 139 130       Recent Results (from the past 24 hour(s))   POCT Glucose    Collection Time: 09/01/20  3:57 PM    Specimen: Blood   Result Value Ref Range    Glucose 143 (H) 70 - 139 mg/dL   POCT Glucose    Collection Time: 09/01/20  7:54 PM    Specimen: Blood   Result Value Ref Range    Glucose 129 70 - 139 mg/dL   POCT Glucose    Collection Time: 09/02/20 12:10 AM    Specimen: Blood   Result Value Ref  Range    Glucose 125 70 - 139 mg/dL   Potassium    Collection Time: 09/02/20  1:35 AM   Result Value Ref Range    Potassium 3.8 3.5 - 5.0 mmol/L   POCT Glucose    Collection Time: 09/02/20  4:04 AM    Specimen: Blood   Result Value Ref Range    Glucose 113 70 - 139 mg/dL   INR    Collection Time: 09/02/20  6:18 AM   Result Value Ref Range    INR 2.07 (H) 0.90 - 1.10   Basic Metabolic Panel    Collection Time: 09/02/20  6:18 AM   Result Value Ref Range    Sodium 139 136 - 145 mmol/L    Potassium 4.3 3.5 - 5.0 mmol/L    Chloride 103 98 - 107 mmol/L    CO2 25 22 - 31 mmol/L    Anion Gap, Calculation 11 5 - 18 mmol/L    Glucose 121 70 - 125 mg/dL    Calcium 9.1 8.5 - 10.5 mg/dL    BUN 33 (H) 8 - 28 mg/dL    Creatinine 0.60 0.60 - 1.10 mg/dL    GFR MDRD Af Amer >60 >60 mL/min/1.73m2    GFR MDRD Non Af Amer >60 >60 mL/min/1.73m2   Magnesium    Collection Time: 09/02/20  6:18 AM   Result Value Ref Range    Magnesium 1.6 (L) 1.8 - 2.6 mg/dL   HM1 (CBC with Diff)    Collection Time: 09/02/20  6:18 AM   Result Value Ref Range    WBC 13.1 (H) 4.0 - 11.0 thou/uL    RBC 3.12 (L) 3.80 - 5.40 mill/uL    Hemoglobin 9.3 (L) 12.0 - 16.0 g/dL    Hematocrit 29.7 (L) 35.0 - 47.0 %    MCV 95 80 - 100 fL    MCH 29.8 27.0 - 34.0 pg    MCHC 31.3 (L) 32.0 - 36.0 g/dL    RDW 24.3 (H) 11.0 - 14.5 %    Platelets 81 (L) 140 - 440 thou/uL    MPV 13.2 (H) 8.5 - 12.5 fL   Manual Differential    Collection Time: 09/02/20  6:18 AM   Result Value Ref Range    Total Neutrophils % 84 (H) 50 - 70 %    Lymphocytes % 10 (L) 20 - 40 %    Monocytes % 2 2 - 10 %    Eosinophils %  4 0 - 6 %    Basophils % 0 0 - 2 %    Metamyelocytes % 1 <=1 %    Myelocytes % 1 <=1 %    Immature Granulocyte % - Manual 2 (H) <=0 %    Total Neutrophils Absolute 11.0 (H) 2.0 - 7.7 thou/ul    Lymphocytes Absolute 1.2 0.8 - 4.4 thou/uL    Monocytes Absolute 0.2 0.0 - 0.9 thou/uL    Eosinophils Absolute 0.5 (H) 0.0 - 0.4 thou/uL    Basophils Absolute 0.0 0.0 - 0.2 thou/uL     Metamyelocytes Absolute 0.1 <=0.1 thou/uL    Myelocytes Absolute 0.1 <=0.1 thou/uL    Immature Granulocyte Absolute - Manual 0.2 (H) <=0.0 thou/uL    Manual nRBC per 100 Cells 6 (H) 0-<1    Platelet Estimate Decreased (!) Normal    Ovalocytes 1+ (!) Negative    Polychromasia 1+ (!) Negative    Target Cells 2+ (!) Negative    Tear Drop Cells 1+ (!) Negative   POCT Glucose    Collection Time: 09/02/20  8:14 AM    Specimen: Blood   Result Value Ref Range    Glucose 139 70 - 139 mg/dL   POCT Glucose    Collection Time: 09/02/20 11:48 AM    Specimen: Blood   Result Value Ref Range    Glucose 130 70 - 139 mg/dL           Advanced Care Planning  Discharge plan: Unknown at this time      Michael Abreu  Date: 9/2/2020  Time: 3:41 PM  Hospitalist Service  Part of this chart was created using a dictation software. Typographic errors, word substitutions, and Grammatical errors may unintentionally occur.

## 2021-06-11 NOTE — PROGRESS NOTES
Pharmacy Consult: Warfarin Management    Pharmacy consulted to dose warfarin for Gardenia Muller, a 70 y.o. female    Ordering provider: Karen Hilliard MD     Reason for warfarin therapy: Atrial Fibrillation  Goal INR Range: 2-3    Subjective  Medication lists (home and hospital) were reviewed.    New medications that may increase bleeding risk/INR: none currently    Restarted home medications that may increase bleeding risk/INR: ASA, Ticagrelor, omeprazole, sertraline     Home medications NOT restarted that may increase bleeding risk/INR:  trazodone     Home Warfarin Dosing: multiple dose reductions during August 2020.  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)     Advanced directives, counseling/discussion     Cystitis     Healthcare maintenance     Essential hypertension     Cerebral infarction (H)     Anemia     Cerebrovascular disease     RLS (restless legs syndrome)     Incisional hernia, without obstruction or gangrene     Diverticular disease of large intestine     Coronary artery disease involving native coronary artery of native heart without angina pectoris     Dyslipidemia     Ventral hernia without obstruction or gangrene     Anxiety     (HFpEF) heart failure with preserved ejection fraction (H)     Anticoagulant long-term use     Persistent atrial fibrillation (H)     Bradyarrhythmia     Acute kidney injury (H)     Transaminitis     Physical deconditioning     Lipoma of back     Acalculous cholecystitis     Onychogryphosis     Hyperparathyroidism (H)     Microalbuminuria     Intestinal  angina (H)     Chronic abdominal pain     Weight loss, non-intentional     Warfarin anticoagulation     Severe protein-calorie malnutrition (H)     Hypertrophy of nail     Dysuria     Class 1 obesity with serious comorbidity and body mass index (BMI) of 33.0 to 33.9 in adult, unspecified obesity type     Trigger finger, acquired     Acute liver failure without hepatic coma     Hematochezia     Hyperkalemia     Acute on chronic combined systolic (congestive) and diastolic (congestive) heart failure (H)     Ischemic cardiomyopathy     Acute kidney failure, unspecified (H)     Acute diastolic heart failure (H)     Diverticulitis     Supratherapeutic INR     Diverticulitis of large intestine with perforation and abscess without bleeding     Atrial fibrillation with rapid ventricular response (H)     Obstructive sleep apnea syndrome     Poisoning by digoxin, accidental or unintentional, initial encounter     Acute respiratory failure with hypoxia and hypercapnia (H)     Acute metabolic encephalopathy     Shock circulatory (H)     Metabolic acidosis     Other hypervolemia     Difficult ventilator weaning (H)    Past Medical History:   Diagnosis Date     Acute diastolic heart failure (H) 11/2015     Acute on chronic diastolic heart failure (H) 6/15/2019     Advanced directives, counseling/discussion 8/5/2015    Patient completed 2011.  Discussed today, 5/24/17, and wants to change healthcare agent from niece to daughter.  Discussed that she will need to complete new form to indicate this.     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  Recommend 5 year follow-up.  Single tubular adenoma     Biliary obstruction 10/3/2018    Added automatically from request for surgery [id number]     Cerebral vascular accident (H)      Coronary artery disease 2006    100% RCA with collateral filling per Dr. Elizabeth's angio report      Diabetes mellitus type 2 in obese (H)      Fibrocystic breast      GERD (gastroesophageal reflux disease)      History of anesthesia complications     slow to wake     Hypertension      Obstructive sleep apnea syndrome      Peripheral vascular disease (H)      Pneumonia 11/11/2018     PONV (postoperative nausea and vomiting)      S/P cholecystectomy 6/22/2018     SBO (small bowel obstruction) (H) 11/12/2015     Stroke (H)      Transaminitis 10/22/2018     Upper respiratory infection 12/20/2018     Urinary incontinence         Social History     Tobacco Use     Smoking status: Former Smoker     Packs/day: 0.25     Smokeless tobacco: Former User     Tobacco comment: e-cig a few times/day   Substance Use Topics     Alcohol use: No     Drug use: No       Objective   Labs:  Last 3 days:    Recent Labs     09/09/20  0526 09/10/20  0450 09/10/20  0451 09/11/20  0456 09/11/20  0457   CREATININE 0.54* 0.58*  --  0.56*  --    HGB 7.4*  --  7.7*  --  7.8*   HCT 25.0*  --  25.5*  --  25.7*     --  158  --  155   BILITOT 1.4*  --   --   --   --    AST 39  --   --   --   --    ALT 24  --   --   --   --    ALKPHOS 101  --   --   --   --      Last 7 days:   Recent labs: (last 7 days)     09/05/20  0417 09/06/20  0526 09/07/20  0440 09/08/20  0403 09/09/20  0525 09/10/20  0450 09/11/20  0456   INR 1.79* 1.90* 2.09* 2.14* 2.50* 2.40* 2.85*       Warfarin Dosing History:    Date INR Warfarin Dose Comment   9/2/20 2.07 none    9/3/20 1.86 1.5 mg Pharmacist consulted to dose warfarin   9/4/20 1.89 1.5mg    9/5 1.79 2.5mg     9/6 1.90 2.5 mg    9/7/20 2.09 2.5 mg - SQ heparin discontinued   9/8/20 2.14 2.5 mg    9/9/20 2.50 1.5 mg    9/10/20 2.40 2 mg    9/11/20 2.85 1.5 mg            Assessment  The patient is resuming warfarin for the indication of Atrial Fibrillation with a goal INR of 2-3. INR today is therapeutic.     Multiple dose reductions during August 2020.  Last dosing regimen just before admission was 1.5mg daily.   Patient was admitted with supratherapeutic INR.  INR was reversed for surgery and then remained elevated most likely due to liver issues post operatively.  INR increased and now within goal range.     Plan  1. Administer warfarin 1.5 mg via ET today.  2. Check INR daily or as appropriate.  3. Continue to follow the patient's INR, PLT, and HGB as available.  4. Monitor for potential drug/disease interactions.    Thank you for the consult,  Sheri Hanna, PharmD, BCPS 9/11/2020 9:47 AM

## 2021-06-11 NOTE — PLAN OF CARE
"  Problem: Impaired Gas Exchange  Goal: Demonstrate improved ventilation and adequate oxygenation of tissues as evidenced by absence of respiratory distress  Description: Patient intubated 8/28 @1430.  ABG's collected s/p.  Sating well.    Outcome: Progressing     Problem: Breathing  Goal: Patient will maintain patent airway  Outcome: Progressing     Problem: Mechanical Ventilation  Goal: Patient will maintain patent airway  Outcome: Progressing     Patient vent day 3. ET tube is a 7.5 secured 20 @ teeth.    Vent Mode: VCV  FiO2 (%):  [30 %-40 %] 30 %  S RR:  [16] 16  S VT:  [450 mL] 450 mL  PEEP/CPAP (cm H2O):  [5 cm H2O] 5 cm H2O  Minute Ventilation (L/min):  [10.1 L/min-13 L/min] 13 L/min  PIP:  [34 cm H2O-39 cm H2O] 34 cm H2O  MAP (cm H2O):  [9-10] 9    BP (!) 86/30   Pulse 69   Temp 99.3  F (37.4  C) (Axillary)   Resp (!) 31   Ht 5' 2\" (1.575 m)   Wt 212 lb (96.2 kg)   LMP 01/25/1999   SpO2 92%   BMI 38.78 kg/m      Pk 35, Plat 26    Lungs coarse but secretions thin today.    Weaning failed this am but will try again this afternoon.    RT following,    Moustapha Burton, LRT    "

## 2021-06-11 NOTE — PLAN OF CARE
Problem: Pain  Goal: Patient's pain/discomfort is manageable  Outcome: Progressing    Patient scored zero on the adult non-verbal pain scale this shift.  Problem: Psychosocial Needs  Goal: Collaborate with patient/family/caregiver to identify patient specific goals for this hospitalization  Outcome: Progressing      Family discussed plan with MD. Verbalized understanding.

## 2021-06-11 NOTE — PROGRESS NOTES
Clinical Nutrition Therapy Follow Up Note    RD received verbal consult in rounds to advance TF rate.    Current Nutrition Prescription:   Diet: TF, no tray with Replete NO Fiber at 15 mL/hour over 24 hours with 60 mL water flushes q 6 hours. Two packets No Carb ProSource per day also ordered.  IV dextrose or Fluids:dexmedetomidine infusion orderable (PRECEDEX), Last Rate: Stopped (09/05/20 0957)  norepinephrine, Last Rate: Stopped (09/05/20 2017)  propofol, Last Rate: 15 mcg/kg/min (09/07/20 1059)    Current Nutrition Intake:  Pt has OG placed 8/28/20  TF provides:  330 calories, 20 g protein, 41 g carb, 514 ml free water.   Propofol at 7.9 mL/hour provides 208 kcals per day.    Anthropometrics:  Admission weight: 195#  Weight: 193 lb 2 oz (87.6 kg)-bed    GI Status/Output:   BM 9/7-loose  50 mL residuals this morning and hypoactive bowel sounds per RN in rounds.    Skin/Wound:  Per WOC note- gluteal cleft denudement noted.    Medications:  Lasix, lopressor, warfarin    Labs:  Na 146    Estimated Nutrition Needs:  Using adjusted weight of 59 kg.     Energy Needs: 1569-4942 kcals daily per 25-30 kcal/kg   Protein Needs: 71-89 g daily, 1.2-1.5 g/kg.  Fluid Needs: 8716-1267 mls daily, 25-30 mls/kg    Malnutrition: Noted previously    Nutrition Risk Level: high risk    Nutrition dx:  Inadequate intake r/t acute illness evidenced by NPO/CL x 6 days    Goal Status:  Meet nutrition needs-not met  Electrolytes WNL-progressing    Intervention:  Advance TF by 10 mL q 8 hours as tolerated until goal rate of 65 mL/hour is reached. Run TF over 22 hours due to Warfarin. Water flushes should be 190 mL three times a day. Add two packets Benefiber per day to help with loose stools.   At goal rate, modulars + TF + propofol will provide 1670 kcals, 91 g protein, 168 g CHO, 6 g fiber, 71 g fat, 1201 mL fluid (total 1771 mL between formula and flushes).    Will d/c order for No Carb ProSource since protein needs will be met when TF at  goal.    Will order Benefiber 2 packets per day and adjust enteral feeding orders to reflect above changes.    Monitoring/Evaluation:  Per goals    Pham Chapin RD, LD

## 2021-06-11 NOTE — PROGRESS NOTES
"Vent Mode: VCV  FiO2 (%):  [30 %-40 %] 30 %  S RR:  [16] 16  S VT:  [450 mL] 450 mL  PEEP/CPAP (cm H2O):  [5 cm H2O] 5 cm H2O  Minute Ventilation (L/min):  [7.3 L/min-10.1 L/min] 10.1 L/min  PIP:  [38 cm H2O-39 cm H2O] 39 cm H2O  MAP (cm H2O):  [10] 10    Patient remains intubated and ventilated on above settings. Breath sounds were coarse and patient was lavaged with moderate/large amounts of thick tan secretions being suctioned out.     BP (!) 86/30   Pulse 81   Temp 98.5  F (36.9  C) (Axillary)   Resp (!) 29   Ht 5' 2\" (1.575 m)   Wt 214 lb 8 oz (97.3 kg)   LMP 01/25/1999   SpO2 97%   BMI 39.23 kg/m      "

## 2021-06-11 NOTE — PROGRESS NOTES
RT PROGRESS NOTE    DATA:    VENT DAY#  3    CURRENT SETTINGS:  VENT SETTINGS   Vent Mode: VCV  FiO2 (%):  [30 %-40 %] 30 %  S RR:  [16] 16  S VT:  [450 mL] 450 mL  PEEP/CPAP (cm H2O):  [5 cm H2O] 5 cm H2O  Minute Ventilation (L/min):  [7.3 L/min-10.1 L/min] 10.1 L/min  PIP:  [39 cm H2O] 39 cm H2O  MAP (cm H2O):  [10] 10        PATIENT PARAMETERS:      Pplat:  21  Pmean:    SBT:   SECRETIONS:  Small to moderate yellow   02 SATS:  98    ETT SIZE 7.5  Secured at  20 cm at teeth    Securing device changed / tube re-taped date :     Respiratory Medications: none      ABG: none     NOTE / PLAN:    Surgery consult for Abd drain. Possible transport to IR. No wean at this time .

## 2021-06-11 NOTE — PROGRESS NOTES
Attempted to wean patient this am. Patient lasted 6 minutes. RR up to 44. RSBI at 158.       09/11/20 0736   Vent Information   Vent Mode CPAP/PSV   Vent Settings   FiO2 (%) 25 %   PEEP/CPAP (cm H2O) 5 cm H2O   Pressure Support (cm H2O) 15 cm H20   Patient Data   Vt Exp (mL) 314 mL   Minute Ventilation (L/min) 11.9 L/min   Total Resp Rate  42 BPM   PIP Observed (cm H2O) 23 cm H2O   MAP (cm H2O) 10   SpO2 97 %   Heart Rate 84   Alarms   High Resp Rate 40   Low PEEP 3 cm H2O   Insp Pressure High (cm H2O) 50 cm H2O   Insp Pressure Low (cm H2O) 10 cm H2O   MV High (L/min) 16 L/min   MV Low (L/min) 4 L/min   Vt Low (mL) 200 mL   Weaning Assessment    Wean Start Time  0730   Wean End Time 0736   Vent Wean Time Calculation (min) 6 min   Total RSBI 158   RSBI <105 N   $ Weaning Trial Charge Yes

## 2021-06-11 NOTE — PLAN OF CARE
Problem: Pain  Goal: Patient's pain/discomfort is manageable  Outcome: Progressing     Problem: Safety  Goal: Patient will be injury free during hospitalization  Outcome: Progressing     Problem: Daily Care  Goal: Daily care needs are met  Outcome: Progressing  Note: Plan to transition to comfort care tomorrow     Problem: Psychosocial Needs  Goal: Demonstrates ability to cope with hospitalization/illness  Outcome: Progressing  Goal: Collaborate with patient/family/caregiver to identify patient specific goals for this hospitalization  Outcome: Progressing     Problem: Discharge Barriers  Goal: Patient's discharge needs are met  Outcome: Progressing     Problem: Knowledge Deficit  Goal: Patient/family/caregiver demonstrates understanding of disease process, treatment plan, medications, and discharge instructions  Outcome: Progressing     Problem: Potential for Falls  Goal: Patient will remain free of falls  Outcome: Progressing     Problem: Potential for Compromised Skin Integrity  Goal: Skin integrity is maintained or improved  Outcome: Progressing  Goal: Nutritional status is improving  Outcome: Progressing     Problem: Urinary Incontinence  Goal: Perineal skin integrity is maintained or improved  Outcome: Progressing     Problem: Glucose Imbalance  Goal: Achieve optimal glucose control  Outcome: Progressing     Problem: Potential for transmission of organism by Contact, Enteric, Droplet and/or Airborne routes  Goal: Prevent transmission of organisms  Outcome: Progressing     Problem: Potential for hemodynamic instability  Goal: Cardiac output is adequate  Outcome: Progressing     Problem: Risk of Injury Due to Unsafe Behavior  Goal: Patient will remain safe while in restraint; physical/psychological needs will be met  Outcome: Progressing  Goal: Alternatives to restraint will be continually assessed with use of least restrictive device and discontinuation as soon as possible  Outcome: Progressing  Goal:  Patient/Family will be able to communicate reason for restraint and steps for restraint application and removal  Outcome: Progressing     Problem: Impaired Gas Exchange  Goal: Demonstrate improved ventilation and adequate oxygenation of tissues as evidenced by absence of respiratory distress  Description: Patient intubated 8/28 @1430.  ABG's collected s/p.  Sating well.    Outcome: Progressing     Problem: Breathing  Goal: Patient will maintain patent airway  Outcome: Progressing     Problem: Risk for Infection  Goal: Identify and demonstrate techniques, lifestyle changes to prevent/reduce risk of infection and promote safe environment  Outcome: Progressing     Problem: Mechanical Ventilation  Goal: Patient will maintain patent airway  Outcome: Progressing  Goal: Oral health is maintained or improved  Outcome: Progressing  Goal: ET tube will be managed safely  Outcome: Progressing  Goal: Ability to express needs and understand communication  Outcome: Progressing  Goal: Mobility/activity is maintained at optimum level for patient  Outcome: Progressing  Goal: Tracheostomy will be managed safely  Outcome: Progressing  Goal: Respiratory status - ventilation  Outcome: Progressing     Problem: Inadequate Gas Exchange  Goal: Patient will achieve/maintain normal respiratory rate/effort  Outcome: Progressing

## 2021-06-11 NOTE — PROGRESS NOTES
Mode : ST 14/6, 14, 30%  Pt become tachypneic after refusing BiPAP overnight and remain on 3L NC. Pt agree for BiPAP later this afternoon and placed immediately. Pt's breathing patter more distress and accessory muscle use, but after BiPAP it gradually getting better.    RT/RN continue follow progress.    Sylvie Manrique, LRT

## 2021-06-11 NOTE — PROGRESS NOTES
09/03/20 0207   Patient Data   PIP Observed (cm H2O) 38 cm H2O   MAP (cm H2O) 9   Auto/Intrinsic PEEP Observed (cm H2O) 1 cm H2O   Plateau Pressure (cm H2O) 26 cm H2O   Static Compliance (L/cm H2O) 25   Dynamic Compliance (L/cm H2O) 20 L/cm H2O   Airway Resistance 17   SpO2 99 %   Heart Rate 74     Patient remains on the vent/fully supported; changes are noted made. Current settings: VCV 16 450 30% 5. sats high 90s. BS coarse/diminished; nothing is suctioned. ETT 7.5 and is secured 20 cm at the teeth. RT will monitor.    GENO JosephT

## 2021-06-11 NOTE — PROGRESS NOTES
Clinical Nutrition Therapy Follow Up Note    Per Palliative Care note 9/9, there is to be a conference when pt's daughter gets to town about POC.    Current Nutrition Prescription:   Diet: TF, no tray with Replete NO Fiber at 15 mL/hour with 60 mL water flushes q 6 hours. RN reports putting in a new TF order on 9/9 since the kitchen wouldn't send more TF formula up without a new order; she looked at an old note for order info. These TF instructions are not correct, however. Unsure why kitchen did not contact RD about this, and why they needed a new order.  On 9/7, new instructions were added by RD to increase TF to goal rate of 65 mL/hour over 22 hours due to Warfarin. Water flushes should be 190 mL three times a day. Add two packets Benefiber per day to help with loose stools.   IV dextrose or Fluids:dexmedetomidine infusion orderable (PRECEDEX), Last Rate: 0.2 mcg/kg/hr (09/10/20 0542)  norepinephrine, Last Rate: Stopped (09/05/20 2017)    Current Nutrition Intake:  Pt has OG placed 8/28/20.    Correct TF order, including modular, will provide 1462 kcals, 89 g protein, 168 g CHO, 6 g fiber, 1778 mL fluid total.    Anthropometrics:  Admission weight: 195#  Weight: 202 lb 9.6 oz (91.9 kg)-bed    GI Status/Output:   BM 9/9-loose  140-300 mL residuals 9/8, 300-380 mL 9/9, 200 mL so far today per RN.    Skin/Wound:  Per WOC note- gluteal cleft denudement and breakdown of skin on buttocks noted 9/8.    Medications:  Lasix, lopressor, warfarin    Labs:   (9/9)    Estimated Nutrition Needs:  Using adjusted weight of 59 kg.     Energy Needs: 4456-2783 kcals daily per 25-30 kcal/kg   Protein Needs: 71-89 g daily, 1.2-1.5 g/kg.  Fluid Needs: 9135-7976 mls daily, 25-30 mls/kg    Malnutrition: Noted previously    Nutrition Risk Level: high risk    Nutrition dx:  Inadequate intake r/t acute illness evidenced by NPO/CL x 6 days    Goal Status:  Meet nutrition needs-was progressing, now with TF decreased not  met  Electrolytes WNL-progressing    Intervention:  Will enter correct TF orders. Will have rate increased to 45 mL/hour and increased by 10 mL q 4 hours as tolerated until goal is reached.    Will order two additional packets Benefiber per day to help with loose stools. This addition of fiber to regimen listed above (which was entered 9/7) will provide 1494 kcals, 89 g protein, 176 g CHO, 12 g fiber, 1778 mL fluid total.    Will contact kitchen to see why a new TF order had to be added for formula to be sent up.    Talked to RN about looking at order set for most updated orders vs referring to old notes.    Monitoring/Evaluation:  Per goals    Pham Chapin RD, LD

## 2021-06-11 NOTE — PROGRESS NOTES
RT PROGRESS NOTE    DATA:    VENT DAY#  13    CURRENT SETTINGS:  VENT SETTINGS : Vent Mode: CPAP/PSV  FiO2 (%):  [28 %] 28 %  S RR:  [16] 16  S VT:  [450 mL-480 mL] 450 mL  PEEP/CPAP (cm H2O):  [5 cm H2O] 5 cm H2O  Minute Ventilation (L/min):  [7.2 L/min-9.2 L/min] 9.2 L/min  PIP:  [34 cm H2O-40 cm H2O] 40 cm H2O  KY SUP:  [15 cm H20] 15 cm H20  MAP (cm H2O):  [9-10] 10          ETT SIZE 7.5  Secured at  20 cm at teeth    Securing device changed / tube re-taped date     Respiratory Medications: Duo Q6         NOTE / PLAN:    No changes this shift.

## 2021-06-11 NOTE — PROGRESS NOTES
"Progress Note    Brief summary:  Per previous provider summary is\"   70 year  old female with history of HTN, T2DM, chronic atrial fibrillation on warfarin, diastolic CHF, coronary artery disease status post stenting (7/27/2020), peripheral artery disease, calculus cholecystitis status post cholecystectomy, depression, who came to  on 8/11/2020 for abdominal pain and vomiting. CT scan revealed acute diverticulitis with perforation. on 8/14 showed increased size of abscess from 2x2cm to 4x3cm, though pt reports clinical improvement of her symptoms.. General surgery consulted - no current plans for operating room so interventional radiology was consulted for possible drainage which was done on 8/17/2020 by placing CT scan guided drain placement in the left pelvic diverticular abscess, repeat CTAP (8/20) - near complete collapse of fluid cavity.   Patient developed new leukocytosis.  Chest x-ray showed possible hospital-acquired pneumonia.  Due to concern of leukocytosis being from untreated Candida infection (at least improved Ashley dubliniensis), so she was started on  Fluconazole. WBC slowly improving.  On 8/25, patient was noted to be shortness of breath and given 25 lbs weight gain since admission, started on iv lasix.   On 8/26, developed acute kidney injury. Started having episodes of bradycardia. Digoxin level elevated. Started on digifab and transferred to ICU. Required Atropine in ICU for HR as low as 20-40s. Started on dopamine.  Developed respiratory  Failure, unable to tolerate bipap, now intubated, lasix were held on 8/30 due ot high UOP and renal function improving and blood pressure dropping. Repeat CT scan showed improvement in abscess but surgeons are recommending IR assessment of drain as there is still fluid around signup colon and rectum    Assessment/Plan  Principal Problem:    Diverticulitis  Active Problems:    Bradyarrhythmia    Acute kidney injury (H)    Diverticulitis " of large intestine with perforation and abscess without bleeding    Atrial fibrillation with rapid ventricular response (H)    Obstructive sleep apnea syndrome    Poisoning by digoxin, accidental or unintentional, initial encounter    Acute respiratory failure with hypoxia and hypercapnia (H)    Acute metabolic encephalopathy    Shock circulatory (H)    Metabolic acidosis      1. Digoxin toxicity: bradyarrhythmia, renal failure, hyperkalemia. EKG showed junctional rhythm. Digoxin level of 6. S/p Digifab.  Now in sinus rhythm.  Continue dopamine drip waiting for resolution of dig toxicity.  Low threshold for TV pacing repeat digoxin level pending. Cardiology on board.  2. MAY: Likely due to contrast, dig toxicity, lisinopril with metabolic acidosis and hyperkalemia. due to digoxin. Digoxin stopped. Holding diuretics, lisinopril.  Not making enough urine.  Making urine now, Cr impr    3. Acute perforated diverticulitis with pericolonic abscess: Initially started on Cipro and Flagyl.  Status post CT-guided drain placement on 8/17, culture growing Candida.  CT abdomen on 8/20/2020 with near complete resolution of the abscess. 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  Patient is now on meropenem, daptomycin and fluconazole.  vanco switched to dapto on 8/26.    4. Hospital-acquired pneumonia: On meropenem and daptomycin.  Covid negative X 2.   5. Fever : No fevers in last 24 hours . Treating for pneumonia. Drug fever?  6. Leukocytosis: Likely due to above  7. Acute on chronic diastolic CHF: weight is up by 25 lbs since admission. Holding lasix due to MAY, hemodialysis today.  8. Acute hypoxic resp failure: Likely from heart failure as well as pneumonia.  3 L nasal cannula saturating well   9. transaminitis: likely from hepatic congestion with heart failure. Similar events during last admission and in 2018.  10. Acute anemia: Hemoglobin dropped to 6.1 on  8/20.  Status post 1 unit.  Hemoglobin slowly trending down.  Will transfuse if hemoglobin goes below 7  11. Supratherapeutic INR: on presentation, improved with Vit K. INR went up again with liver failure. Worsened today likely from reaction of INR reagent with daptomycin  12. Atrial fibrillation: Had RVR on 8/19.  Digoxin held due to toxicity. Metoprolol held due to bradycardia. Warfarin held due to supratherapeutic INR.  13. Type 2 diabetes mellitus: was on SSI but BG consistently low, will stop SSI for now and monitor  14. CAD: Status post coronary angiogram and PCI to LAD on 7/27/2020    Subjective  Sedated, intubated      Objective    Vital signs in last 24 hours  Temp:  [97.4  F (36.3  C)-99.8  F (37.7  C)] 99.8  F (37.7  C)  Heart Rate:  [65-80] 70  Resp:  [16-48] 16  Arterial Line BP: ()/() 120/35  FiO2 (%):  [30 %] 30 %  Weight:   207 lb 4.8 oz (94 kg)    Intake/Output last 3 shifts  I/O last 3 completed shifts:  In: 2633.4 [I.V.:2001.4; NG/GT:432; IV Piggyback:200]  Out: 5805 [Urine:5805]  Intake/Output this shift:  I/O this shift:  In: 383.4 [I.V.:133.4; NG/GT:150; IV Piggyback:100]  Out: 600 [Urine:600]    Physical Exam   General: drowsy but arousable  Eyes: no pallor  Chest: Clear to auscultation bilaterally  Heart: RRR, normal S1 and S2, mild pitting edema  Abdomen: soft, non tender drain in pelvis.  Neuro: moving all extremities  Bardales catheter      Meds    amino acids-protein hydrolys  1 packet Enteral Tube BID     aspirin  81 mg Enteral Tube DAILY     chlorhexidine  15 mL Topical Q12H     insulin aspart (NovoLOG) injection   Subcutaneous Q4H FIXED TIMES     ipratropium-albuteroL  3 mL Nebulization Q6H - RT     magnesium sulfate IVPB  2 g Intravenous Once     meropenem  1 g Intravenous Q12H     micafungin (MYCAMINE) IV  100 mg Intravenous Q24H     omeprazole  20 mg Oral QAM AC    Or     omeprazole  20 mg Enteral Tube QAM AC    Or     pantoprazole  40 mg Intravenous QAM AC     potassium  chloride liquid/packet  20 mEq Enteral Tube Q4H     [Held by provider] sertraline  100 mg Oral DAILY     sodium chloride bacteriostatic  1-5 mL Intradermal Once     sodium chloride  10 mL Intravenous Q8H FIXED TIMES     sodium chloride  10-30 mL Intravenous Q8H FIXED TIMES     [Held by provider] ticagrelor  90 mg Enteral Tube BID       Pertinent Labs   Lab Results: personally reviewed.   not applicable    Results from last 7 days   Lab Units 08/31/20  0501 08/30/20  1733 08/30/20  0517 08/29/20  1618   LN-SODIUM mmol/L 139  --  138 139   LN-POTASSIUM mmol/L 3.2*  3.2* 3.7 3.6  3.6 3.7  3.7   LN-CHLORIDE mmol/L 101  --  105 107   LN-CO2 mmol/L 26  --  22 19*   LN-BLOOD UREA NITROGEN mg/dL 47*  --  53* 54*   LN-CREATININE mg/dL 1.41*  --  2.48* 2.89*   LN-CALCIUM mg/dL 8.5  --  8.9 8.8         Results from last 7 days   Lab Units 08/28/20  0923 08/28/20  0431 08/27/20  0406   LN-WHITE BLOOD CELL COUNT thou/uL 14.1* 14.6* 18.3*   LN-HEMOGLOBIN g/dL 9.0* 8.9* 9.1*   LN-HEMATOCRIT % 27.1* 27.0* 28.3*   LN-PLATELET COUNT thou/uL 116* 115* 111*         Pertinent Radiology   Radiology Results: Personally reviewed impression/s    Ct Abdomen Pelvis Without Oral Without Iv Contrast    Result Date: 8/29/2020  EXAM: CT ABDOMEN PELVIS WO ORAL WO IV CONTRAST LOCATION: [facility] DATE/TIME: 8/29/2020 1:41 PM INDICATION: Abdominal pain, acute, nonlocalized. Evaluate abdominal abscess. COMPARISON: 08/20/2020. TECHNIQUE: CT scan of the abdomen and pelvis was performed without oral or IV contrast. Multiplanar reformats were obtained. Dose reduction techniques were used. CONTRAST: None. FINDINGS: LOWER CHEST: Increasing bilateral pulmonary infiltrates. HEPATOBILIARY: Cholecystectomy. PANCREAS: Normal. SPLEEN: Normal. ADRENAL GLANDS: Normal. KIDNEY/BLADDER: Normal. BOWEL: There is no residual fluid around the left lower quadrant drain. Sigmoid colonic diverticulosis without diverticulitis. Scant free fluid in the pelvic  cul-de-sac. New NG tube in the stomach. LYMPH NODES: Normal. VASCULATURE: Diffuse atherosclerotic calcification. PELVIC ORGANS: Multiple uterine fibroids. Bardales catheter in the bladder. MUSCULOSKELETAL: Normal.     1.  No significant residual abscess around the left lower quadrant drainage catheter. No new abscess. 2.  Trace free fluid in the pelvis as well as subcutaneous edema is new from previous. 3.  New bilateral pulmonary infiltrates may represent pulmonary edema or pneumonia.    Ct Abdomen Pelvis Without Oral Without Iv Contrast    Result Date: 8/14/2020  EXAM: CT ABDOMEN PELVIS WO ORAL WO IV CONTRAST LOCATION: [facility] DATE/TIME: 8/14/2020 10:26 AM INDICATION: Abdominal infection suspected perforated diverticulitis, assess for abscess COMPARISON: 8/11/2020 TECHNIQUE: CT scan of the abdomen and pelvis was performed without oral or IV contrast. Multiplanar reformats were obtained. Dose reduction techniques were used. CONTRAST: None. FINDINGS: LOWER CHEST: Trace pleural fluid. Minimal scarring or edema at the lung bases. The heart is enlarged. There is coronary artery calcification. HEPATOBILIARY: Cholecystectomy. There is extrahepatic bile duct dilatation. PANCREAS: Pancreatic duct dilatation is not as well-seen on this study as on the comparison study. SPLEEN: Normal. ADRENAL GLANDS: Normal. KIDNEY/BLADDER: Normal kidneys and ureters. There is wall thickening of the nondistended urinary bladder. A Bardales catheter is present. BOWEL: There is colonic diverticulosis. There is stranding adjacent to the sigmoid colon in the region of diverticulitis. There is a complex 4 cm x 3 cm collection to the left of the sigmoid colon in this region (previously this was 2 cm x 2 cm). Minimal  extraluminal air is noted. LYMPH NODES: Normal. VASCULATURE: Atherosclerotic disease. PELVIC ORGANS: Fibroid uterus. MUSCULOSKELETAL: Normal.     1.  Again seen is sigmoid colon diverticulitis with a small contained  perforation. A small fluid collection adjacent to the sigmoid colon has increased in size to 4 cm x 3 cm (previously 2 cm x 2 cm). Percutaneous drainage would be difficult though may be possible. 2.  Biliary and pancreatic ductal dilatation is not as well-seen as on the comparison study. See prior study for details.    Xr Chest 1 View Portable    Result Date: 8/28/2020  EXAM: XR CHEST 1 VIEW PORTABLE LOCATION: [facility] DATE/TIME: 8/28/2020 3:39 PM INDICATION: Postintubation. COMPARISON: 08/27/2020.     ET tube tip at the level of the emily; recommend 1 to 2 cm retraction. New feeding tube coursing into the stomach and off the field-of-view. Stable right PICC. Similar to marginally improved coarse interstitial appearance and opacities bilaterally. No substantial pleural effusion. No pneumothorax. Stable enlarged cardiac silhouette. ET tube tip findings verbally communicated to patient's nurse, Lavern, at 3:45 PM on 08/28/2020. NOTE: ABNORMAL REPORT THE DICTATION ABOVE DESCRIBES AN ABNORMALITY FOR WHICH FOLLOW-UP IS NEEDED.     Xr Chest 1 View Portable    Result Date: 8/27/2020  EXAM: XR CHEST 1 VIEW PORTABLE LOCATION: [facility] DATE/TIME: 8/27/2020 5:53 AM INDICATION: Respiratory failure with progressive hypoxia. COMPARISON: 08/26/2020     Slight interval improvement in reticular interstitial infiltrates in alveolar infiltrates since prior study. There still remains extensive infiltrates throughout both lungs. Stable cardiac enlargement. Mild pulmonary vascular congestion improved. Atherosclerotic vascular calcification of the aorta. Right PICC with tip in good position low SVC. Degenerative changes in the spine and shoulders    Xr Chest 1 View Portable    Result Date: 8/26/2020  EXAM: XR CHEST 1 VIEW PORTABLE LOCATION: [facility] DATE/TIME: 8/26/2020 4:56 PM INDICATION: sob COMPARISON: 08/23/2020     Right PICC tip projects over the mid SVC. Unchanged bilateral mixed reticular and  alveolar opacities. These could represent cardiogenic or noncardiogenic pulmonary edema, pneumonia, and drug reaction. No pleural effusion.    Xr Chest 1 View Portable    Result Date: 8/21/2020  EXAM: XR CHEST 1 VIEW PORTABLE LOCATION: [facility] DATE/TIME: 8/21/2020 7:34 PM INDICATION: r/o pna- shortness of breath COMPARISON: 08/11/2020     New bilateral interstitial infiltrates could represent edema or pneumonia including COVID. Enlarged cardiac silhouette. No pleural effusion. No definite pulmonary vascular congestion.    Xr Chest 2 Views    Result Date: 8/11/2020  EXAM: XR CHEST 2 VIEWS LOCATION: [facility] DATE/TIME: 8/11/2020 11:50 AM INDICATION: Cough. COMPARISON: None.     Negative chest. Lungs are clear. Coronary stenting.     Xr Foot Right 2 Vws    Result Date: 8/16/2020  EXAM: XR FOOT RIGHT 2 VWS LOCATION: [facility] DATE/TIME: 8/16/2020 12:51 PM INDICATION: Acute right foot pain COMPARISON: None.     Bones are demineralized. No definitive evidence of an acute fracture. No cortical destructive changes to suggest osteomyelitis. Scattered degenerative changes.    Xr Chest 1 View For Picc Placement Portable    Result Date: 8/23/2020  EXAM: XR CHEST 1 VIEW FOR PICC LINE PLACEMENT PORTABLE LOCATION: [facility] DATE/TIME: 8/23/2020 6:28 PM INDICATION: verify catheter placement COMPARISON: 08/21/2020     Right upper extremity PICC line terminates approximately 3 cm above the expected cavoatrial junction. No change in the chest otherwise. Extensive abnormal opacity throughout both lungs persists. Mild cardiomegaly. No pneumothorax or large pleural effusion.    Ct Abdomen Pelvis Without Oral With Iv Contrast    Result Date: 8/11/2020  EXAM: CT ABDOMEN PELVIS WO ORAL W IV CONTRAST LOCATION: [facility] DATE/TIME: 8/11/2020 11:41 AM INDICATION: right sided abdominal pain, nausea and vomiting COMPARISON: 7/22/2020 TECHNIQUE: CT scan of the abdomen and pelvis was  performed following injection of IV contrast. Multiplanar reformats were obtained. Dose reduction techniques were used. CONTRAST: Iohexol (Omni) 75mL FINDINGS: LOWER CHEST: Minimal interstitial edema. The heart is mildly enlarged. There is coronary artery disease. HEPATOBILIARY: Cholecystectomy. There is intrahepatic and extrahepatic bile duct dilatation. There is pancreatic duct dilatation. PANCREAS: No discrete pancreatic masses identified. SPLEEN: Normal. ADRENAL GLANDS: Normal. KIDNEYS/BLADDER: Normal. BOWEL: There is colonic diverticulosis. There is acute diverticulosis involving the distal sigmoid colon with a small contained perforation. No drainable abscess at this point. LYMPH NODES: Normal. VASCULATURE: Atherosclerotic disease. Right renal artery stent. PELVIC ORGANS: Fibroid uterus. MUSCULOSKELETAL: Degenerative changes.     1.  Acute diverticulitis with a small contained perforation. No drainable abscess at this point. 2.  Biliary and pancreatic duct dilatation. No discrete pancreatic masses identified. Consider MRCP, ERCP, EUS.    Ct Abdomen Pelvis With Oral With Iv Contrast    Result Date: 8/20/2020  EXAM: CT ABDOMEN PELVIS W ORAL W IV CONTRAST LOCATION: [facility] DATE/TIME: 8/20/2020 3:43 PM INDICATION: Abdominal infection suspected ongoing pain and increasing leukocytosis COMPARISON: CT from 8/14/2020 TECHNIQUE: CT scan of the abdomen and pelvis was performed following injection of IV contrast. Multiplanar reformats were obtained. Dose reduction techniques were used. CONTRAST: Iohexol (Omni) 75mL FINDINGS: LOWER CHEST: Dependent atelectasis and interstitial edema within the lung bases without consolidation. Cardiomegaly and mitral annular calcifications. HEPATOBILIARY: Gallbladder surgically absent. Mild ectasia of the extrahepatic biliary tree. PANCREAS: Unchanged appearance. SPLEEN: Normal. ADRENAL GLANDS: Normal. KIDNEYS/BLADDER: Normal. BOWEL: Interval placement of a left anterior  "abdominal approach percutaneous drain into the perisigmoid abscess. The drain is well positioned with near complete collapse of the previously seen abscess collection. There is persistent mild adjacent stranding. The bowel is nonobstructed. No new intra-abdominal abscess identified. No pneumoperitoneum. LYMPH NODES: Normal. VASCULATURE: Atherosclerotic calcification throughout the arterial tree. No evidence of vascular complication following drain placement. PELVIC ORGANS: Fibroid uterus. No ascites. Bardales catheter is within a decompressed urinary bladder. Subcutaneous scattered calcifications in the subcutaneous flank regions compatible with prior injection sites. MUSCULOSKELETAL: No new or acute osseous findings. Degenerative changes at the hips and lumbar spine are stable.     1.  Interval percutaneous drain placement into the diverticular abscess with near complete collapse of the fluid cavity. Drain abuts the adjacent sigmoid colon. No evidence of post drain placement complication. 2.  No new intra-abdominal abscess or other findings to correlate with ongoing leukocytosis. 3.  Stable appearance of the pancreas with previously seen main pancreatic ductal dilatation not well visualized on this study.    Poc Us 3cg Picc Placement Guidance    Result Date: 8/23/2020  Exam was performed as guidance for PICC line insertion.  Click \"PACS images\" hyperlink below to view any stored images.  For specific procedure details, view procedure note authored by PICC/Vascular Access Nurse.    Poc Us 3cg Picc Placement Guidance    Result Date: 8/19/2020  Exam was performed as guidance for PICC line insertion.  Click \"PACS images\" hyperlink below to view any stored images.  For specific procedure details, view procedure note authored by PICC/Vascular Access Nurse.    Ct Abscess Drain Pelvic    Result Date: 8/17/2020  EXAM: 1. CT OF ABDOMEN AND PELVIS WITHOUT CONTRAST 2. PERCUTANEOUS DRAIN PLACEMENT LEFT PELVIS [facility]: [facility] DATE/TIME: 8/17/2020 9:59 AM INDICATION: diverticulitis TECHNIQUE: Dose reduction techniques were used. FINDINGS: The limited visualized portions of the lung bases are clear. Evaluation of the solid visceral organs is suboptimal given the lack of intravenous contrast. Within these limitations no focal hepatic lesion is seen. The patient is post cholecystectomy. There is no intra or extrahepatic biliary ductal dilatation. The stomach and duodenum are normal. The spleen size is normal. The kidneys enhance symmetrically and there is no renal collecting system dilatation. Note again is made of a diverticular abscess, small in size. This is unchanged from the prior examination. Fibroid uterus is noted. PROCEDURE: Informed consent obtained. Site marked. Prior images reviewed. Required items made available. Patient identity confirmed verbally and with arm band. Patient reevaluated immediately before administering sedation. Universal protocol was followed. Time out performed. The site was prepped and draped in sterile fashion. 10 mL of 1% lidocaine was infused into the local soft tissues. Using standard technique and under direct CT guidance, a 10 Georgian drainage catheter was inserted into the fluid collection.  SPECIMEN: 10 mL of purulent fluid was aspirated and sent to lab for cultures and Gram stain. BLOOD LOSS: Minimal. The patient tolerated the procedure well. No immediate complications. RADIOLOGIC SUPERVISION AND INTERPRETATION: CT GUIDANCE: Images demonstrate the needle and subsequent catheter to be in good position. SEDATION: Versed 1 mg. Fentanyl 50 mcg. The procedure was performed with administration intravenous conscious sedation with appropriate preoperative, intraoperative, and postoperative evaluation. 15 minutes of supervised face to face conscious sedation time was provided by a radiology nurse under my direct supervision.     1. Stable diverticular abscess. 2. Successful CT-guided drain  placement into left pelvic diverticular abscess. Reference CPT Codes: 47956, 36156    Ir Abscessogram Tube Check    Result Date: 8/25/2020  [place] [facility] LOCATION: [facility] DATE: 8/25/2020 PROCEDURE: ABSCESSOGRAM INTERVENTIONAL RADIOLOGIST: Altaf Bateman MD. INDICATION: Diverticular abscess with indwelling drain here for follow-up. FLUOROSCOPIC TIME: 0.8 minutes NUMBER OF IMAGES: 2 CONTRAST: 5 cc of Omni COMPLICATIONS: No immediate complications. PROCEDURE: Contrast injection through the existing drain demonstrates no residual abscess pocket. 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     Abscessogram reveals no residual abscess pocket. Fistula to the colon. Switched drain to gravity drainage bag from SHAMIKA suction bulb. No flushes are needed. Follow-up abscessogram in one week. CPT codes for physician use only: 31355/70842            MD Joseline  Hospitalist  [phone]    This note was dictated using voice recognition software. Any grammatical or context distortions are unintentional and inherent to the software.

## 2021-06-11 NOTE — PROGRESS NOTES
"  Infectious Disease Follow up Note:    Service: Infectious Disease   Note: Follow Up Note  Date: 2020    Chief Complaint: Follow up for acute diverticulitis with perforation    ASSESSMENT:     Gardenia Muller is a 70 y.o. old female with acute diverticulitis with perforation.   C. dubliniensis and gram positive cocci in chains aug 17th time frame -abdominal infection-- active issue         Intubated, respiratory failure-active issue    Recommendations:     IR consulted by Surgery for abdominal abscess drain repositioning/upsize--Drain management per IR and Surgery- gravity bag    Follow culture results--no orgs aug 29th  Abscessogram  ----->IMPRESSION:   1.  Contracted abscess cavity. 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 Drain exchanged for a tailored 10 Irish drain placed lateral to the fistula site.     Continue Meropenem and  Micafungin probably into next week will need some guidance from surgery if/when they think we have a \"controlled\" fistula.  Certainly aren't there yet.   Abscessogram  with drain removed. WBC seems stable.   Note plans for care conference when family arrives. Palliative following.     Kendra Hudson MD  [facility]  [phone]      ______________________________________________________________________  Notes / labs / vitals reviewed.    SUBJECTIVE / INTERVAL HISTORY: afebrile. No acute events  ROS: intubated    PMHx/FHx/SHx: Reviewed. Interval changes noted.    OBJECTIVE:  Vitals:    20 1544   BP:    Pulse:    Resp:    Temp:    SpO2: 100%       Temp (24hrs), Av.4  F (36.9  C), Min:98  F (36.7  C), Max:98.6  F (37  C)    Exam on 2020   GEN: tracking  EYES:  Anicteric, RAYMUNDO, EOM intact.  HEAD, EARS, NOSE, MOUTH, AND THROAT:  Thrush, ETT  NECK:  Supple.   RESPIRATORY: intubated  CARDIOVASCULAR:  Arm swelling  Previous exam: S1 S2 No murmur, click, gallop or rub. No dependent edema. No excess JVD.  ABDOMEN:  Soft, tenderness, gravity " bag minimal output  MUSCULOSKELETAL:  Joints without effusion or acute inflammation. No muscle atrophy.Dedconditioned  LYMPHATIC:  No cervical or supraclavicular adenopathy  SKIN/HAIR/NAILS:  Warm, ecchymosis  NEUROLOGIC:  Somnolent, opens eyes    Antibiotics:  IV vancomycin --> Daptomycin, stopped on 8/28/2020  IV meropenem  IV fluconazole --> Micafungin 8/29    Pertinent labs:  Reviewed    Results from last 7 days   Lab Units 09/09/20  0526 09/08/20  0403 09/07/20  0440 09/05/20  0417   LN-WHITE BLOOD CELL COUNT thou/uL 12.5* 12.2* 12.2*   < > 10.7   LN-HEMOGLOBIN g/dL 7.4* 7.4* 7.6*   < > 7.7*   LN-HEMATOCRIT % 25.0* 24.9* 25.4*   < > 25.2*   LN-PLATELET COUNT thou/uL 155 168 161   < > 128*   LN-NEUTROPHILS RELATIVE PERCENT %  --  68  --   --  69   LN-MONOCYTES RELATIVE PERCENT %  --  10  --   --  12*   LN-MONOCYTES - REL (DIFF) % 7  --  4   < >  --     < > = values in this interval not displayed.       Results from last 7 days   Lab Units 09/09/20  1245 09/09/20  0526 09/08/20  1635 09/08/20  0403 09/07/20  0440  09/05/20  0417 09/04/20  0532   LN-SODIUM mmol/L  --  144  --  145 146*   < > 143 144   LN-POTASSIUM mmol/L 4.3 3.5 4.1 3.5 3.7   < > 4.0 3.8   LN-CHLORIDE mmol/L  --  107  --  109* 110*   < > 107 107   LN-CO2 mmol/L  --  29  --  29 28   < > 30 30   LN-BLOOD UREA NITROGEN mg/dL  --  14  --  16 18   < > 20 17   LN-CREATININE mg/dL  --  0.54*  --  0.54* 0.53*   < > 0.58* 0.52*   LN-CALCIUM mg/dL  --  10.4  --  10.9* 10.8*   < > 10.2 9.9   LN-ALBUMIN g/dL  --  1.6*  --   --   --   --  1.3* 1.3*   LN-PROTEIN TOTAL g/dL  --  6.3  --   --   --   --   --   --    LN-BILIRUBIN TOTAL mg/dL  --  1.4*  --   --   --   --   --   --    LN-ALKALINE PHOSPHATASE U/L  --  101  --   --   --   --   --   --    LN-ALT (SGPT) U/L  --  24  --   --   --   --   --   --    LN-AST (SGOT) U/L  --  39  --   --   --   --   --   --     < > = values in this interval not displayed.       MICROBIOLOGY DATA:    Cultures reviewed  Culture:   VELIA mendez august 17th    IMAGING/RADIOLOGY:  Reviewed  XR Chest 1 View Portable (Order [id number])   Imaging         Study Result     EXAM: XR CHEST 1 VIEW PORTABLE  LOCATION: [facility]  DATE/TIME: 8/21/2020 7:34 PM     INDICATION: r/o pna- shortness of breath  COMPARISON: 08/11/2020     IMPRESSION:   New bilateral interstitial infiltrates could represent edema or pneumonia including COVID. Enlarged cardiac silhouette. No pleural effusion. No definite pulmonary vascular congestion.     Abscessogram reveals no residual abscess pocket. Fistula to the colon. Switched drain to gravity drainage bag from SHAMIKA suction bulb. No flushes are needed. Follow-up abscessogram in one week.

## 2021-06-11 NOTE — PROGRESS NOTES
PULMONARY / CRITICAL CARE PROGRESS NOTE    Date / Time of Admission:  8/11/2020 10:16 AM    Assessment:   Principal Problem:    Diverticulitis  Active Problems:    Bradyarrhythmia    Acute kidney injury (H)    Diverticulitis of large intestine with perforation and abscess without bleeding    Atrial fibrillation with rapid ventricular response (H)    Obstructive sleep apnea syndrome    Poisoning by digoxin, accidental or unintentional, initial encounter    Acute respiratory failure with hypoxia and hypercapnia (H)    Acute metabolic encephalopathy    Shock circulatory (H)    Metabolic acidosis    70yoF with history of DMII, HFpEF, afib on coumadin presented with perforated diverticulitis causing abscess resulting in acute kidney injury and respiratory failure now intubated, not felt to be a good surgical candidate.     Advance Directives:  Full Code      Plan:   Pulmonary:  1) Intubated for pneumonia and volume overload  -ABG stable  -No vent changes or weaning today given worsening abdominal exam    C/V:  1) Afib with RVR--resolved  2) Dig Toxicity  3) Hypotension related in part to sedation  -Levophed for MAP>60  -Diuresis ongoing  -Holding brillinta in setting of INR of 4 and bleeding    Renal:  1) MAY on CKD  -Lasix drip with diuril, urine output picking up  -Appreciate renal assistance, may avoid HD    GI:  1) perforated diverticulitis  -Hold on tube feeds currently but may begin to trickle if no plans for OR.     Neuro:  1) Sedation requirements  -Propofol  -Fentanyl    ID: perforated diverticulitis  Pneumonia seen on abdominal CT  -Appreciate ID  -Micafungin and meropenem  -sputum culture pending      Updated daughter Aria via phone.     ICU DAILY CHECKLIST                           Can patient transfer out of MICU? no    FAST HUG:    Feeding:  Feeding: No.  Patient is receiving NPO    Bardales:Yes  Analgesia/Sedation:Yes fentanyl and propofol  Thromboembolic prophylaxis: yes; Mode:  Coumadin  HOB>30:   Yes  Stress Ulcer Protocol Active: yes; Mode: PPI  Glycemic Control: Any glucose > 180 no; Mode of Insulin Therapy: Sliding Scale Insulin    INTUBATED:  Can patient have daily waking:  no  Can patient have spontaneous breathing trial:  no    Restraints? yes      Critical Care Time greater than: 45 Minutes-this patient is critically ill on mechanical ventilation with hemodynamic insability.         Subjective:   HPI:  Gardenia Muller is a 70 y.o. female with chronic afib on anticoagulation, HFpEF, CAD, CVA, DMII who presented 8/11 with nausea and vomiting. CT found perforated diverticulitis. Drain placed into abscess and antibiotics initiated given her poor candidacy for surgery. Worsening renal failure in setting of Afib with RVR and became digoxin-toxic. Received digibind with improvement in HR. Level still pending (reported level is spurious). Minimal urine output and hypervolemic, lasix drip started with slow improvement in urine output throughout the day.     Principal Problem:    Diverticulitis  Active Problems:    Bradyarrhythmia    Acute kidney injury (H)    Diverticulitis of large intestine with perforation and abscess without bleeding    Atrial fibrillation with rapid ventricular response (H)    Obstructive sleep apnea syndrome    Poisoning by digoxin, accidental or unintentional, initial encounter    Acute respiratory failure with hypoxia and hypercapnia (H)    Acute metabolic encephalopathy    Shock circulatory (H)    Metabolic acidosis      Allergies: Penicillins     MEDS:  Scheduled Meds:    aspirin  81 mg Enteral Tube DAILY     chlorhexidine  15 mL Topical Q12H     chlorothiazide (DIURIL) IVPB  500 mg Intravenous Q12H     insulin aspart (NovoLOG) injection   Subcutaneous Q4H FIXED TIMES     meropenem  1 g Intravenous Q12H     micafungin (MYCAMINE) IV  100 mg Intravenous Q24H     [Held by provider] omeprazole  20 mg Oral BID AC     omeprazole  20 mg Oral QAM AC    Or     omeprazole  20 mg Enteral Tube  "QAM AC    Or     pantoprazole  40 mg Intravenous QAM AC     [Held by provider] sertraline  100 mg Oral DAILY     sodium chloride bacteriostatic  1-5 mL Intradermal Once     sodium chloride  10 mL Intravenous Q8H FIXED TIMES     sodium chloride  10-30 mL Intravenous Q8H FIXED TIMES     [Held by provider] ticagrelor  90 mg Enteral Tube BID     Continuous Infusions:    fentaNYL citrate (PF) 50 mcg/hr (08/29/20 0722)     furosemide 20 mg/hr (08/29/20 1028)     norepinephrine 0.11 mcg/kg/min (08/29/20 1339)     propofol 5 mcg/kg/min (08/29/20 1400)     PRN Meds:.albuterol, alteplase, atropine, bacitracin, carboxymethylcellulose, codeine-guaiFENesin, dextrose 50 % (D50W), glucagon (human recombinant), hydrOXYzine pamoate, lidocaine (PF), [Held by provider] metoprolol tartrate, naloxone **OR** naloxone, oxyCODONE, sodium chloride 0.9%, sodium chloride bacteriostatic, sodium chloride, sodium chloride, sodium chloride, traZODone      Objective:   VITALS:  BP (!) 86/30   Pulse 72   Temp 98.2  F (36.8  C) (Axillary)   Resp 20   Ht 5' 2\" (1.575 m)   Wt 214 lb 8 oz (97.3 kg)   LMP 01/25/1999   SpO2 98%   BMI 39.23 kg/m    EXAM:  General appearance: sedated but easily arousable  Lungs: rhonchourous on left  Heart: irregularly irregular rhythm  Abdomen: soft, non-tender; bowel sounds normal; no masses,  no organomegaly  Extremities: extremities normal, atraumatic, no cyanosis or edema  Skin: Skin color, texture, turgor normal. No rashes or lesions  Neurologic: follows commands but falls back to sleep.     I&O:      Intake/Output Summary (Last 24 hours) at 8/29/2020 1431  Last data filed at 8/29/2020 1400  Gross per 24 hour   Intake 2163.01 ml   Output 1055 ml   Net 1108.01 ml       Data Review:  CXR personally interpreted  EXAM: XR CHEST 1 VIEW PORTABLE  LOCATION: [facility]  DATE/TIME: 8/28/2020 3:39 PM     INDICATION: Postintubation.  COMPARISON: 08/27/2020.     IMPRESSION:      ET tube tip at the level of the " emily; recommend 1 to 2 cm retraction.     New feeding tube coursing into the stomach and off the field-of-view. Stable right PICC.     Similar to marginally improved coarse interstitial appearance and opacities bilaterally. No substantial pleural effusion. No pneumothorax. Stable enlarged cardiac silhouette.        ET tube tip findings verbally communicated to patient's nurse, Lavern, at 3:45 PM on 08/28/2020.     NOTE: ABNORMAL REPORT     THE DICTATION ABOVE DESCRIBES AN ABNORMALITY FOR WHICH FOLLOW-UP IS NEEDED.     Results for orders placed or performed during the hospital encounter of 08/11/20   Basic Metabolic Panel   Result Value Ref Range    Sodium 137 136 - 145 mmol/L    Potassium 3.6 3.5 - 5.0 mmol/L    Chloride 106 98 - 107 mmol/L    CO2 22 22 - 31 mmol/L    Anion Gap, Calculation 9 5 - 18 mmol/L    Glucose 120 70 - 125 mg/dL    Calcium 8.6 8.5 - 10.5 mg/dL    BUN 52 (H) 8 - 28 mg/dL    Creatinine 3.08 (H) 0.60 - 1.10 mg/dL    GFR MDRD Af Amer 18 (L) >60 mL/min/1.73m2    GFR MDRD Non Af Amer 15 (L) >60 mL/min/1.73m2     Lab Results   Component Value Date    WBC 14.1 (H) 08/28/2020    HGB 9.0 (L) 08/28/2020    HCT 27.1 (L) 08/28/2020    MCV 91 08/28/2020     (L) 08/28/2020     ABG:  Recent Labs     08/26/20  2344  08/27/20  0513 08/28/20  1536   PHART 7.22*  --  7.26* 7.43   OXYHB 90.7*  --  97.6* 98.3*   BEARTCALC -10.7  --  -8.7 -2.4   POCPEEP  --    < > 6 5   TEMP 37.0  --  37.0 37.0   POCRATE  --    < > 14 20   POCFLOW 6.0  --   --   --    PSV  --   --  14  --     < > = values in this interval not displayed.         By:  Sandra Lilly MD, 8/29/2020, 2:31 PM    Primary Care Physician:  Jerson Hernandez MD

## 2021-06-11 NOTE — PROGRESS NOTES
"RESPIRATORY CARE NOTE      Vent Mode: VCV  FiO2 (%):  [28 %-39 %] 28 %  S RR:  [16] 16  S VT:  [450 mL] 450 mL  PEEP/CPAP (cm H2O):  [5 cm H2O] 5 cm H2O  Minute Ventilation (L/min):  [7.2 L/min-9.2 L/min] 7.4 L/min  PIP:  [17 cm H2O-42 cm H2O] 31 cm H2O  TX SUP:  [5 cm H20-20 cm H20] 15 cm H20  MAP (cm H2O):  [8-15] 15    /52   Pulse 76   Temp 98.5  F (36.9  C) (Oral)   Resp 15   Ht 5' 2\" (1.575 m)   Wt 193 lb 2 oz (87.6 kg)   LMP 01/25/1999   SpO2 98%   BMI 35.32 kg/m      Vent day #11 7.5 ETT 20cm at the teeth. BS coarse and diminished that clear with suctioning.  Suction small to moderate white secretions. No events over night.  Awaiting family decisions for continued care.  Will continue to assess SBT and liberation. Rt following.    Jerson Mccormack LRT      "

## 2021-06-11 NOTE — PLAN OF CARE
Pt remained hemodynamically stable throughout shift. Levophed continues to infuse to keep MAP <55 and SBP <100. Pt denies any discomfort. Urine output remains adequate. Will continue to monitor. Tube feeding continues to remain off throughout shift.     Problem: Pain  Goal: Patient's pain/discomfort is manageable  Outcome: Progressing     Problem: Safety  Goal: Patient will be injury free during hospitalization  Outcome: Progressing     Problem: Daily Care  Goal: Daily care needs are met  Outcome: Progressing     Problem: Psychosocial Needs  Goal: Demonstrates ability to cope with hospitalization/illness  Outcome: Progressing  Goal: Collaborate with patient/family/caregiver to identify patient specific goals for this hospitalization  Outcome: Progressing     Problem: Discharge Barriers  Goal: Patient's discharge needs are met  Outcome: Progressing     Problem: Knowledge Deficit  Goal: Patient/family/caregiver demonstrates understanding of disease process, treatment plan, medications, and discharge instructions  Outcome: Progressing     Problem: Potential for Falls  Goal: Patient will remain free of falls  Outcome: Progressing     Problem: Potential for Compromised Skin Integrity  Goal: Skin integrity is maintained or improved  Outcome: Progressing  Goal: Nutritional status is improving  Outcome: Progressing     Problem: Urinary Incontinence  Goal: Perineal skin integrity is maintained or improved  Outcome: Progressing     Problem: Glucose Imbalance  Goal: Achieve optimal glucose control  Outcome: Progressing     Problem: Potential for transmission of organism by Contact, Enteric, Droplet and/or Airborne routes  Goal: Prevent transmission of organisms  Outcome: Progressing     Problem: Potential for hemodynamic instability  Goal: Cardiac output is adequate  Outcome: Progressing     Problem: Risk of Injury Due to Unsafe Behavior  Goal: Patient will remain safe while in restraint; physical/psychological needs will be  met  Outcome: Progressing  Goal: Alternatives to restraint will be continually assessed with use of least restrictive device and discontinuation as soon as possible  Outcome: Progressing  Goal: Patient/Family will be able to communicate reason for restraint and steps for restraint application and removal  Outcome: Progressing     Problem: Impaired Gas Exchange  Goal: Demonstrate improved ventilation and adequate oxygenation of tissues as evidenced by absence of respiratory distress  Description: Patient intubated 8/28 @1430.  ABG's collected s/p.  Sating well.    Outcome: Progressing     Problem: Breathing  Goal: Patient will maintain patent airway  Outcome: Progressing     Problem: Risk for Infection  Goal: Identify and demonstrate techniques, lifestyle changes to prevent/reduce risk of infection and promote safe environment  Outcome: Progressing     Problem: Mechanical Ventilation  Goal: Patient will maintain patent airway  Outcome: Progressing  Goal: Oral health is maintained or improved  Outcome: Progressing  Goal: ET tube will be managed safely  Outcome: Progressing  Goal: Ability to express needs and understand communication  Outcome: Progressing  Goal: Mobility/activity is maintained at optimum level for patient  Outcome: Progressing  Goal: Tracheostomy will be managed safely  Outcome: Progressing  Goal: Respiratory status - ventilation  Outcome: Progressing     Problem: Inadequate Gas Exchange  Goal: Patient will achieve/maintain normal respiratory rate/effort  Outcome: Progressing

## 2021-06-11 NOTE — PROGRESS NOTES
[facility] Daily Progress Note    Assessment/Plan:  Gardenia Muller is a 70-year-old lady with atrial fibrillation, diastolic congestive heart failure, hypertension, coronary artery disease status post PCI 1995, CVA, peripheral vascular disease, T2DM, malnutrition, intestinal angina, acalculous cholecystitis status post laparoscopic cholecystectomy in 2018, depression who came into [facility] on 8/11/2020 for abdominal pain and vomiting.  CT scan of the abdomen revealed diverticulitis with perforation.  On 814 it showed increased size of abscess from 2x2 cm to 4x3 cm.  Though there was clinical improvement in her symptoms.  General surgery was consulted who recommended interventional radiology consultation for possible drainage of the abdominal abscess which was done on 8/17/2020 by placing CT scan guided drain in the left pelvic diverticular abscess. On 8/20/2020 CT scan of the abdomen showed near collapse of the fluid cavity        .  Subsequently patient developed leukocytosis with possible hospital-acquired pneumonia seen on chest x-ray.  She also had Candida infection for which fluconazole was started with improvement of WBC count.  On 825 patient had shortness of breath with 25 pound weight gain and was started on IV Lasix.  On 826 developed acute kidney injury and had episodes of bradycardia.  Digoxin level was elevated at 6.9 on 8/26/2020 and 10.7 on 8/28/2024 which DigiFab was started and patient transferred to ICU.  Required atropine in ICU for heart rate which went down between 20 and 40.  Started on dopamine.  Developed respiratory failure.  There is still fluid around sigmoid colon and rectum.  Abscessogram done on 8/31/2020 showed contracted abscess cavity with persistent fistula to the sigmoid colon with drain exchanged and tailored 10 Czech drain was placed lateral to the fistula site.  There is a plan to do another abscessogram in 1 week's time.     Assessment:  Digoxin toxicity  Acute kidney  injury  Acute perforated diverticulitis with pericolonic abscess  Sigmoid colon fistula   Hospital-acquired pneumonia -on meropenem  Candida bacteremia -on mucous function  Acute exacerbation on chronic diastolic CHF  Acute anemia  Atrial fibrillation  Type 2 diabetes mellitus      Plan:  Management as per pulmonary critical care, nephrology, ID, surgery -nothing to add at this time.     Code status:No CPR- Pre-arrest Intubation OK        Subjective:  Patient is intubated and sedated opening eyes to command    Review of Systems:   As above    Current Medications Reviewed via EHR List    Objective:  Vital signs in last 24 hours:  Temp:  [98.6  F (37  C)-99.1  F (37.3  C)] 99  F (37.2  C)  Heart Rate:  [] 72  Resp:  [16-46] 21  BP: ()/(39-60) 107/51  SpO2:  [94 %-100 %] 98 %  FiO2 (%):  [30 %-100 %] 30 %    Intake/Output last 3 shifts:  I/O last 3 completed shifts:  In: 1914.4 [I.V.:1619.4; NG/GT:90; IV Piggyback:205]  Out: 1560 [Urine:1560]      Physical Exam:  EYES: EOM able to Betamide  NECK: no JVD  HEART : S1 S2 RRR    LUNGS:  Clear to auscultation without  Crepitations or Wheezing    ABDOMEN:   Soft, No tenderness ?, Bowel sounds are positive  CNS patient is intubated and sedated               Lab Results:  Personally Reviewed.   Fingerstick Blood Glucose:   Recent Labs     09/02/20  0404 09/02/20  0814 09/02/20  1148 09/02/20  1619 09/02/20  2042 09/02/20  2341 09/03/20  0344 09/03/20  0806 09/03/20  1140 09/03/20  1550   POCGLUFGR 113 139 130 127 99 94 87 81 74 98       Recent Results (from the past 24 hour(s))   POCT Glucose    Collection Time: 09/02/20  4:19 PM    Specimen: Blood   Result Value Ref Range    Glucose 127 70 - 139 mg/dL   POCT Glucose    Collection Time: 09/02/20  8:42 PM    Specimen: Blood   Result Value Ref Range    Glucose 99 70 - 139 mg/dL   Magnesium    Collection Time: 09/02/20 10:53 PM   Result Value Ref Range    Magnesium 2.3 1.8 - 2.6 mg/dL   POCT Glucose    Collection  Time: 09/02/20 11:41 PM    Specimen: Blood   Result Value Ref Range    Glucose 94 70 - 139 mg/dL   POCT Glucose    Collection Time: 09/03/20  3:44 AM    Specimen: Blood   Result Value Ref Range    Glucose 87 70 - 139 mg/dL   Magnesium    Collection Time: 09/03/20  4:39 AM   Result Value Ref Range    Magnesium 2.0 1.8 - 2.6 mg/dL   INR    Collection Time: 09/03/20  4:39 AM   Result Value Ref Range    INR 1.86 (H) 0.90 - 1.10   Renal Function Profile    Collection Time: 09/03/20  4:39 AM   Result Value Ref Range    Albumin 1.4 (L) 3.5 - 5.0 g/dL    Calcium 9.3 8.5 - 10.5 mg/dL    Phosphorus 2.7 2.5 - 4.5 mg/dL    Glucose 82 70 - 125 mg/dL    BUN 26 8 - 28 mg/dL    Creatinine 0.53 (L) 0.60 - 1.10 mg/dL    Sodium 142 136 - 145 mmol/L    Potassium 3.9 3.5 - 5.0 mmol/L    Chloride 107 98 - 107 mmol/L    CO2 27 22 - 31 mmol/L    Anion Gap, Calculation 8 5 - 18 mmol/L    GFR MDRD Af Amer >60 >60 mL/min/1.73m2    GFR MDRD Non Af Amer >60 >60 mL/min/1.73m2   HM1 (CBC with Diff)    Collection Time: 09/03/20  4:39 AM   Result Value Ref Range    WBC 13.3 (H) 4.0 - 11.0 thou/uL    RBC 2.65 (L) 3.80 - 5.40 mill/uL    Hemoglobin 8.1 (L) 12.0 - 16.0 g/dL    Hematocrit 25.4 (L) 35.0 - 47.0 %    MCV 96 80 - 100 fL    MCH 30.6 27.0 - 34.0 pg    MCHC 31.9 (L) 32.0 - 36.0 g/dL    RDW 24.0 (H) 11.0 - 14.5 %    Platelets 103 (L) 140 - 440 thou/uL    MPV 13.2 (H) 8.5 - 12.5 fL   Manual Differential    Collection Time: 09/03/20  4:39 AM   Result Value Ref Range    Total Neutrophils % 76 (H) 50 - 70 %    Lymphocytes % 9 (L) 20 - 40 %    Monocytes % 7 2 - 10 %    Eosinophils %  8 (H) 0 - 6 %    Basophils % 1 0 - 2 %    Immature Granulocyte % - Manual 0 <=0 %    Total Neutrophils Absolute 10.1 (H) 2.0 - 7.7 thou/ul    Lymphocytes Absolute 1.2 0.8 - 4.4 thou/uL    Monocytes Absolute 0.9 0.0 - 0.9 thou/uL    Eosinophils Absolute 1.1 (H) 0.0 - 0.4 thou/uL    Basophils Absolute 0.1 0.0 - 0.2 thou/uL    Immature Granulocyte Absolute - Manual 0.0  <=0.0 thou/uL    Manual nRBC per 100 Cells 8 (H) 0-<1    Platelet Estimate Decreased (!) Normal    Polychromasia 1+ (!) Negative    Target Cells 2+ (!) Negative   POCT Glucose    Collection Time: 09/03/20  8:06 AM    Specimen: Blood   Result Value Ref Range    Glucose 81 70 - 139 mg/dL   POCT Glucose    Collection Time: 09/03/20 11:40 AM    Specimen: Blood   Result Value Ref Range    Glucose 74 70 - 139 mg/dL   POCT Glucose    Collection Time: 09/03/20  3:50 PM    Specimen: Blood   Result Value Ref Range    Glucose 98 70 - 139 mg/dL           Advanced Care Planning  Discharge plan: Unknown at this time      Michael Abreu  Date: 9/3/2020  Time: 3:41 PM  Hospitalist Service  Part of this chart was created using a dictation software. Typographic errors, word substitutions, and Grammatical errors may unintentionally occur.

## 2021-06-11 NOTE — PROGRESS NOTES
Palliative Spiritual Care Note    Spiritual Assessment: Yi is important to pt's well being. Regular prayer is helpful.    Care Provided: Pt continues to be vented and unable to speak. Pt soundly sleeping. Prayed aloud at bedside.    Plan of Care: Will continue to follow and visit as possible.    HARLEY Barriga.

## 2021-06-11 NOTE — PROGRESS NOTES
Respiratory Care Note    Pt began breathing into the mid 30s, , RSBI greater than 105 while on wean. Pt returned back to full support:    Vent Mode: VCV  FiO2 (%):  [30 %-39 %] 30 %  S RR:  [16] 16  S VT:  [450 mL] 450 mL  PEEP/CPAP (cm H2O):  [5 cm H2O] 5 cm H2O  Minute Ventilation (L/min):  [7.1 L/min-9.3 L/min] 9.2 L/min  PIP:  [17 cm H2O-36 cm H2O] 36 cm H2O  UT SUP:  [5 cm H20-20 cm H20] 15 cm H20  MAP (cm H2O):  [8-16] 11      Plan to let pt rest the remainder of the day. She weaned for a total of 201 minutes. Will re-assess tomorrow morning. Nebs given as scheduled. Suctioning small to moderate amount of clear secretions inline. RT following.    William Barton, LRT

## 2021-06-11 NOTE — PROGRESS NOTES
PULMONARY / CRITICAL CARE PROGRESS NOTE    Date / Time of Admission:  8/11/2020 10:16 AM  Assessment   Gardenia Muller is a 70 y.o. female with afib on coumadin, HFpEF, T2DM presented w/ perforated diverticulitis causing abscess and later developed MAY and respiratory failure in the setting of digoxin toxicity; now intubated and on multiple pressors.     Principal Problem:    Diverticulitis  Active Problems:    Bradyarrhythmia    Acute kidney injury (H)    Diverticulitis of large intestine with perforation and abscess without bleeding    Atrial fibrillation with rapid ventricular response (H)    Obstructive sleep apnea syndrome    Poisoning by digoxin, accidental or unintentional, initial encounter    Acute respiratory failure with hypoxia and hypercapnia (H)    Acute metabolic encephalopathy    Shock circulatory (H)    Metabolic acidosis        Plan     NEUROLOGIC  Sedation requirements:   - Propofol  - Fentanyl     PULMONARY  Vent Mode: VCV  FiO2 (%):  [21 %-100 %] 30 %  S RR:  [16] 16  S VT:  [450 mL] 450 mL  PEEP/CPAP (cm H2O):  [5 cm H2O] 5 cm H2O  Minute Ventilation (L/min):  [9.1 L/min-12.5 L/min] 9.8 L/min  PIP:  [25 cm H2O-43 cm H2O] 40 cm H2O  UT SUP:  [15 cm H20] 15 cm H20  MAP (cm H2O):  [8-12] 9  1) Intubated for pneumonia and volume overload: Repeat CXR this morning w/ persistent alveolar opacities and small left pleural effusion. Failed SBT yesterday to high RR.  - daily sedation vacation and SBT  - duonebs four times a day         CV  1) Afib with RVR--resolved.  Echo found stable EF 45%, no significant valve pathology  2) Dig toxicity: Improved. Dig level 2.5 8/31  3) Hypotension related in part to sedation  4) Acute on chronic combined systolic and diastolic heart failure and ischemic cardiomyopathy s/p PCI w/ stent 7/24/20  - Levophed for MAP>60  - Vasopressin added for low diastolic. Will trial off vasopressin.  - Diuresis ongoing; IVF bolus prn  - ASA 81 mg daily  - Brillinta BID restarted 9/1  (INR currently therapeutic; goal 2.0-3.0)          RENAL/FLUID/LYTES  1) MAY on CKD: Currently large UOP 2/2 post-ATN diuresis  - resume diuretics  - monitor lytes   - nephrology recommending fluid bolus prn if hypotension  - Appreciate renal assistance, may avoid HD  2) Hypokalemia: Initial hyperkalemia improved w/ tx of digoxin toxicity.   - K replacement protocol prn        GI  1) Perforated diverticulitis with abscess: s/p CT guided 10F left pelvic drain placement 8/17/20. IR abscessogram with drain exchanged for tailored 10F to gravity on 9/1/2020 No further surgical interventions planned as pt is poor surgical candidate. moderate malnutrition in the context of acute illnes d/t poor intake > 7 days and edema.  - trickle feeds started 8/30; now on hold d/t high OG output; will add reglan and plan to resume per RD. Will also check Abdominal x-ray.  - abx as below  - follow up abscessogram 9/8 per surgery           HEMATOLOGIC  INR 2.07  today. Restarted Brillinta 9/1  1) Normocytic anemia: stable  - transfuse if Hgb < 7 and symptomatic   2) Reactive neutrophilia: no organisms on sputum, urine, blood cx  - recheck CBC once presumed infection resolved  3) Thrombocytopenia: Currently on dual antiplatelet therapy. Could switch to triple per cardiology 7/24 if INR subtherapeutic and pt needs warfarin.        ID  1) PNA: new bilateral pulmonary infiltrates 8/29.   - On abx for intraabdominal abscess  - Sputum cx pending  - Follow cx results; sputum cx no orgs 9/1  - Appreciate ID input  2) Abdominal abscess: Candida and G+ cocci in chains. S/p drain exchange with IR.   - drain to gravity  - abx per ID micafungin + meropenem                    ENDO/SKIN  - SSI   - sacral wound cares per nursing           Dispo: Palliative consulted.   - plan for care conference before pt's daughter goes back to [place]    ICU Daily Checklist            Lines/Tubes Drips Prophylaxis   Bardales:Yes   fentaNYL citrate (PF) 50 mcg/hr (09/01/20  0439)     [Held by provider] furosemide Stopped (08/29/20 2040)     norepinephrine 0.02 mcg/kg/min (09/02/20 0844)     propofol 10 mcg/kg/min (09/01/20 1841)     vasopressin 2.4 Units/hr (09/02/20 0557)      Thromboembolic: SCDs, ASA, ticagrelor    Stress Ulcer: PPI      Can patient transfer out of MICU? no  Diet: TUBE FEEDS    Analgesia/Sedation: Yes fentanyl and propofol  HOB > 30: Yes  Glycemic control:  Any glucose > 180? no    Sliding Scale Insulin       INTUBATED?  Can patient have daily waking? yes  SBT: yes  Restraints? Yes    PROVIDER RESTRAINT FOR NON-VIOLENT BEHAVIOR FACE TO FACE EVALUATION  Patient's Immediate Situation:  Patient demonstrated the following behaviors: Impulsive behavior, poor safety judgment, with other less restrictive interventions/devices ineffective    Patient's Reaction to the intervention:  Does patient understand the reason for restraint/seclusion? Unable to Express    Medical Condition:  Is there any evidence of compromise of Skin integrity, Respiratory, Cardiovascular, Musculoskeletal, Hydration? No    Behavioral Condition:  In consultation with the RN, is there a need to continue this restraint or seclusion? Yes    See Restraint Flowsheet for complete restraint documentation and assessment.      PT AND MOBILITY  OK for PT and mobility trial? no        Activity: Bed Rest    Code: DNR, OK to intubate  POA: Aria Muller (daughter) [phone]       Patient was staffed with Dr. Torres.        Karen Hilliard MD 9/2/2020, 7:09 AM  PGY1  Pager # [phone]      Faculty Supervision of Residents     I have personally examined and assessed this patient. I have reviewed and discussed plan of care with the resident. The documentation outlined by the resident reflects my direct assessment and plan of care.     The patient is critically ill with impairment in organ system and high risk of life threatening deterioration.    Will Torres MD  9/2/2020    Critical Care Time > 45  Minutes        Subjective   Gardenia GATITO Muller is a 70 y.o. female with chronic afib on anticoagulation, HFpEF, CAD s/p stent 7/2020, CVA, DMII who was admitted 8/11 with perforated diverticulitis with abscess, now s/p drain placement and on abx. Digoxin toxicity with worsening renal failure in setting of Afib with RVR; now s/p Digibind with improvement in heart rate and renal function. Initially oliguric and hypervolemic, now autodiuresing with ongoing improvement in Cr and urine output. Intubated 8/28 for worsening work of breathing and hypoxia. CT w/ bibasilar infiltrates concerning for pneumonia. Remains afebrile but hypotensive requiring 2 pressors to maintain goal MAP and not tolerating SBTs.     Overnight events/last 24 hours:  9/2: Drain exchanged with IR yesterday, still no output. High OG output overnight so TF on hold. SBT terminated at 7 minutes due to tachypnea. Restarted ticagrelor yesterday though no loading dose. Plt 81 today, INR 2.07.     Principal Problem:    Diverticulitis  Active Problems:    Bradyarrhythmia    Acute kidney injury (H)    Diverticulitis of large intestine with perforation and abscess without bleeding    Atrial fibrillation with rapid ventricular response (H)    Obstructive sleep apnea syndrome    Poisoning by digoxin, accidental or unintentional, initial encounter    Acute respiratory failure with hypoxia and hypercapnia (H)    Acute metabolic encephalopathy    Shock circulatory (H)    Metabolic acidosis      Allergies   Penicillins     MEDS  Scheduled:     aspirin  81 mg Enteral Tube DAILY     chlorhexidine  15 mL Topical Q12H     insulin aspart (NovoLOG) injection   Subcutaneous Q4H FIXED TIMES     ipratropium-albuteroL  3 mL Nebulization Q6H - RT     magnesium sulfate IVPB  4 g Intravenous Once     meropenem  1 g Intravenous Q8H     micafungin (MYCAMINE) IV  100 mg Intravenous Q24H     nystatin  5 mL Swish & Swallow QID     omeprazole  20 mg Oral QAM AC    Or     omeprazole  20  "mg Enteral Tube QAM AC    Or     pantoprazole  40 mg Intravenous QAM AC     [Held by provider] sertraline  100 mg Oral DAILY     sodium chloride bacteriostatic  1-5 mL Intradermal Once     sodium chloride  10 mL Intravenous Q8H FIXED TIMES     sodium chloride  10-30 mL Intravenous Q8H FIXED TIMES     ticagrelor  90 mg Enteral Tube BID     Continuous Infusions:     fentaNYL citrate (PF) 50 mcg/hr (09/01/20 0439)     [Held by provider] furosemide Stopped (08/29/20 2040)     norepinephrine 0.02 mcg/kg/min (09/01/20 0104)     propofol 10 mcg/kg/min (09/01/20 1841)     vasopressin 2.4 Units/hr (09/02/20 0557)     PRN: alteplase, atropine, bacitracin, bisacodyL, carboxymethylcellulose, codeine-guaiFENesin, dextrose 50 % (D50W), glucagon (human recombinant), hydrOXYzine pamoate, lidocaine (PF), lipase-protease-amylase **AND** sodium bicarbonate, [Held by provider] metoprolol tartrate, naloxone **OR** naloxone, oxyCODONE, polyethylene glycol, sodium chloride 0.9%, sodium chloride bacteriostatic, sodium chloride, sodium chloride, sodium chloride, traZODone    Objective     VITALS    BP 98/47   Pulse 71   Temp 98.1  F (36.7  C)   Resp 16   Ht 5' 2\" (1.575 m)   Wt 200 lb 14.4 oz (91.1 kg)   LMP 01/25/1999   SpO2 100%   BMI 36.75 kg/m      EXAM    GEN: Sedated on vent. Does not open eyes to voice. NAD.    HEENT: Normocephalic, atraumatic  NECK: Supple. ETT in place.  PULM: Coarse lung sound bilaterally.   CVS: Irregularly irregular. Grade III/VI ejection murmur at right upper sternal border  ABDOMEN: Diminished but present bowel sounds in all quadrants. Nontender to palpation.   : Bardales in place.     EXTREMITES:  Generalized edema. Dry skin.Warm.   NEURO: Intubated, sedated.     I&O    Intake/Output Summary (Last 24 hours) at 9/2/2020 0709  Last data filed at 9/2/2020 0600  Gross per 24 hour   Intake 1355.7 ml   Output 1560 ml   Net -204.3 ml       LABS  Results for MAYFIELD, SUSAN L (MRN [id number]) as of 9/2/2020 " 09:53   9/2/2020 06:18   Sodium 139   Potassium 4.3   Chloride 103   CO2 25   Anion Gap, Calculation 11   BUN 33 (H)   Creatinine 0.60   GFR MDRD Af Amer >60   GFR MDRD Non Af Amer >60   Calcium 9.1   Magnesium 1.6 (L)   Glucose 121   INR 2.07 (H)   WBC 13.1 (H)   RBC 3.12 (L)   Hemoglobin 9.3 (L)   Hematocrit 29.7 (L)   MCV 95   MCH 29.8   MCHC 31.3 (L)   RDW 24.3 (H)   Platelets 81 (L)   MPV 13.2 (H)   Total Neutrophils % 84 (H)   Lymphocytes % 10 (L)   Results for SUSAN MAYFIELD (MRN [id number]) as of 9/2/2020 09:53   9/2/2020 06:18   Platelet Estimate Decreased (!)   Ovalocytes 1+ (!)   Polychromasia 1+ (!)   Tear Drop Cells 1+ (!)   Target Cells 2+ (!)         IMAGING  ABSCESS TUBE CHANGE 9/1/2020  FINDINGS:    1. Initial abscessogram demonstrates an indwelling 10 [place]ian APDL drain within a contracted left pelvic abscess cavity. Almost immediate opacification of the adjacent distal sigmoid colon. Short, widely apparent fistulous tract to the sigmoid colon.     2. Completion fluoroscopic image demonstrates a new 10 [place]ian APDL drain, placed approximately 2 cm lateral to the site of the fistula.     IMPRESSION:   1.  Contracted abscess cavity. 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 Drain exchanged for a tailored 10 Colombian drain placed lateral to the fistula site.     PLAN: Gravity drainage. Abscessogram in one week.          PCP: Jerson Hernandez MD

## 2021-06-11 NOTE — PLAN OF CARE
Problem: Breathing  Goal: Patient will maintain patent airway  Outcome: Progressing     Problem: Mechanical Ventilation  Goal: Patient will maintain patent airway  Outcome: Progressing  Goal: Oral health is maintained or improved  Outcome: Progressing  Goal: ET tube will be managed safely  Outcome: Progressing  Goal: Ability to express needs and understand communication  Outcome: Progressing  Goal: Respiratory status - ventilation  Outcome: Progressing    Weaning on hold at this time. See LRT note

## 2021-06-11 NOTE — PROGRESS NOTES
General Surgery Progress Note      Subjective:   Pt sedated and intubated. On 2 pressors. Does open eyes to palpation of stomach but not sound currently. IR abscessogram done today. Drain exchanged and to gravity. New drain placed 2 cm lateral to site of fistula.       Vitals:    09/01/20 0853 09/01/20 0854 09/01/20 0900 09/01/20 0947   BP:   106/52    Patient Position:       Pulse: 62 63 63    Resp: 17 17 17 (!) 35   Temp:       TempSrc:       SpO2:   99%    Weight:       Height:           Physical Exam:    Ab:       Soft, non distended and hypoactive+ BS, The wound/ dressings are clean and dry. Drain intact and no output in bag.     Results from last 7 days   Lab Units 08/28/20  0923   LN-WHITE BLOOD CELL COUNT thou/uL 14.1*   LN-HEMOGLOBIN g/dL 9.0*   LN-HEMATOCRIT % 27.1*   LN-PLATELET COUNT thou/uL 116*       Results from last 7 days   Lab Units 09/01/20  0357  08/30/20  0517   LN-SODIUM mmol/L 140   < > 138   LN-POTASSIUM mmol/L 3.4*  3.4*   < > 3.6  3.6   LN-CHLORIDE mmol/L 100   < > 105   LN-CO2 mmol/L 29   < > 22   LN-BLOOD UREA NITROGEN mg/dL 42*   < > 53*   LN-CREATININE mg/dL 0.81   < > 2.48*   LN-CALCIUM mg/dL 8.6   < > 8.9   LN-ALBUMIN g/dL  --   --  1.4*   LN-PROTEIN TOTAL g/dL  --   --  6.0   LN-BILIRUBIN TOTAL mg/dL  --   --  2.0*   LN-ALKALINE PHOSPHATASE U/L  --   --  152*   LN-ALT (SGPT) U/L  --   --  27   LN-AST (SGOT) U/L  --   --  247*    < > = values in this interval not displayed.       Assessment:  Colonic abscess with drain in place    Plan: continue supportive cares           No surgical intervention planned           Abscessogram in one week planned.     Nela Camacho PA-C [phone]    [facility]  [address] [place] [id number]

## 2021-06-11 NOTE — PLAN OF CARE
Problem: Discharge Barriers  Goal: Patient s discharge needs will be met  Outcome: Progressing  Care Plan-Care Management    Care Management Goals of the Day: follow progression of care    Care Progression to be reviewed with MD and RN CM: Updates to be provided to Charge RN, CMSW, HUC,therapy and other disciplines as indicated.    Barriers to Discharge: ICU medical care and management, intubated with weaning trials planned, palliative care following     Family Care Conference: offer when daughter arrives from [place], coordinate with Pall[facility]    Discharge Disposition: TBD    Expected Discharge Date: TBD    Transportation: TBD    Care Management/Coordination Narrative: Pt from [facility], bed hold has , if TCU appropriate at [place] send referral to [facility]. Per AM rounds, daughter is on her way from [place] to visit. CM and palliative team to follow with family and assist as needed.     Abbreviation  Code:  [facility]chair ([facility] WC), [facility] ([facility] STR), Patient (pt), Transitional Care Unit (TCU), Skilled Nursing Facility (SNF), Long Term Care (LTC), Assisted Living (penitentiary or AL), Care Management (CM), Physical Therapy (PT), Occupational Therapy (OT), Speech Therapy (ST), Respiratory Therapy (RT) Cardiac Rehab (CR)

## 2021-06-11 NOTE — PROGRESS NOTES
HOSPICE - writer spent time offering support to pt daughter Aria, niece and nephew. At time of writer visit, pt was struggling with breathing and comfort. , apnea and diaphoretic.   Family left pt room and went outside for a break. During family absence, pt . Family very distraught when returning to room, nurse did speak to family prior them entering the room. Writer was able to provided emotional support to family as they expressed their grief and sadness in loss of loved one.   No  home chosen at time of death and family will call nursing station once that has been determined.   Dr Abreu offered condolences to family. Family refused organ donation.   May you have peace on your journey [place].   Valeria [facility] Ref Spec.

## 2021-06-11 NOTE — PROGRESS NOTES
Palliative Spiritual Care Note    Spiritual Assessment: Pt will be extubated later today following family visits.    Care Provided: Visited 1-1 with pt while waiting for family. Pt appears alert. Son and friend arrived, had some private moments alone with pt. They went downstairs and 2 more relatives from [place] came in. Dtr Aria declined to come up for prayer. Writer offered prayers of blessing and Godspeed.    Plan of Care: Writer will hold pt and family in prayer from afar as pt makes her celestial discharge.    HARLEY Barriga Div.

## 2021-06-11 NOTE — PROGRESS NOTES
"Patient remains on full vent support today. Today is vent day 15. ET tube is a 7.5 secured at 21 @ teeth. Nebs with albuterol/atrovent given today every 6 hours per MD order. Lungs slightly coarse that cleared with suction.  PK 36, Plat 28.    Vent Mode: VCV  FiO2 (%):  [25 %] 25 %  S RR:  [16] 16  S VT:  [450 mL] 450 mL  PEEP/CPAP (cm H2O):  [5 cm H2O] 5 cm H2O  Minute Ventilation (L/min):  [7.8 L/min-11.9 L/min] 9.4 L/min  PIP:  [23 cm H2O-48 cm H2O] 48 cm H2O  HI SUP:  [15 cm H20] 15 cm H20  MAP (cm H2O):  [8-13] 10    BP 90/52   Pulse 97   Temp 98.5  F (36.9  C) (Oral)   Resp 21   Ht 5' 2\" (1.575 m)   Wt 199 lb 8 oz (90.5 kg)   LMP 01/25/1999   SpO2 94%   BMI 36.49 kg/m      Weaned times 2 attempts today. Both under 10 minutes long. Both ended because of rapid shallow breathing. RSBI > 150, RR > 40.    RT following,    Moustapha Burton, LRT     "

## 2021-06-11 NOTE — PLAN OF CARE
Problem: Mechanical Ventilation  Goal: Patient will maintain patent airway  Outcome: Not Applicable this Shift     Problem: Mechanical Ventilation  Goal: ET tube will be managed safely  Outcome: Not Applicable this Shift     Loki Celaya, LRT

## 2021-06-11 NOTE — PLAN OF CARE
Problem: Discharge Barriers  Goal: Patient s discharge needs will be met  Outcome: Progressing  Care Plan-Care Management    Care Management Goals of the Day:   Progression of Care     Care Progression to be reviewed with MD and RN CM: Updates to be provided to Charge RN, CMSW, HUC,therapy and other disciplines as indicated.    Barriers to Discharge and plan:  Extubate at noon, transition to comfort cares     Family Care Conference: last held 9/11, Palliative made aware of plan for extubation today 9/14    Discharge Disposition:   TBD    Expected Discharge Date:  TBD    Transportation:  TBD    Care Management/Coordination Narrative:   Plan to extubate today 9/14 at noon and transition to comfort measures; had planned to be done on 9/13. Palliative updated family on plan. Will assist as needed.    Filomena Rojo RN  09/14/20     Abbreviation  Code:  [facility] Wheelchair ([facility] WC), [facility] ([facility] STR), Patient (pt), Transitional Care Unit (TCU), Skilled Nursing Facility (SNF), Long Term Care (LTC), Assisted Living (residential or AL), Care Management (CM), Physical Therapy (PT), Occupational Therapy (OT), Speech Therapy (ST), Respiratory Therapy (RT).

## 2021-06-11 NOTE — PLAN OF CARE
Problem: Potential for Compromised Skin Integrity  Goal: Skin integrity is maintained or improved  Outcome: Progressing  Note: Patient turned Q2, mepilex in place.  Cleaned as needed  Goal: Nutritional status is improving  Outcome: Progressing  Note: Patient remains NPO.  OG tube placed today.  Nutrition consulting     Problem: Glucose Imbalance  Goal: Achieve optimal glucose control  Outcome: Progressing  Note: BS ACHS, SS ordered.     Problem: Potential for hemodynamic instability  Goal: Cardiac output is adequate  Note: Patient urine output continues to be low.  [id number]:  54mL. MD aware.  180mg IV lasix given and Lasix gtt started per MD.       Problem: Risk of Injury Due to Unsafe Behavior  Goal: Alternatives to restraint will be continually assessed with use of least restrictive device and discontinuation as soon as possible  Note: Patient with bilat soft wrist restraints.  Documented Q2, pt tolerating well.

## 2021-06-11 NOTE — PROGRESS NOTES
[facility] Daily Progress Note    Assessment/Plan:  Gardenia Muller is a 70-year-old lady with atrial fibrillation, diastolic congestive heart failure, hypertension, coronary artery disease status post PCI 1995, CVA, peripheral vascular disease, T2DM, malnutrition, intestinal angina, acalculous cholecystitis status post laparoscopic cholecystectomy in 2018, depression who came into [facility] on 8/11/2020 for abdominal pain and vomiting.  CT scan of the abdomen revealed diverticulitis with perforation.  On 814 it showed increased size of abscess from 2x2 cm to 4x3 cm.  Though there was clinical improvement in her symptoms.  General surgery was consulted who recommended interventional radiology consultation for possible drainage of the abdominal abscess which was done on 8/17/2020 by placing CT scan guided drain in the left pelvic diverticular abscess. On 8/20/2020 CT scan of the abdomen showed near collapse of the fluid cavity        .  Subsequently patient developed leukocytosis with possible hospital-acquired pneumonia seen on chest x-ray.  She also had Candida infection for which fluconazole was started with improvement of WBC count.  On 825 patient had shortness of breath with 25 pound weight gain and was started on IV Lasix.  On 826 developed acute kidney injury and had episodes of bradycardia.  Digoxin level was elevated at 6.9 on 8/26/2020 and 10.7 on 8/28/2024 which DigiFab was started and patient transferred to ICU.  Required atropine in ICU for heart rate which went down between 20 and 40.  Started on dopamine.  Developed respiratory failure.  There is still fluid around sigmoid colon and rectum.  Abscessogram done on 8/31/2020 showed contracted abscess cavity with persistent fistula to the sigmoid colon with drain exchanged and tailored 10 [place] drain was placed lateral to the fistula site.  There is a plan to do another abscessogram in 1 week's time.     Assessment:  Digoxin toxicity  Acute kidney  injury  Acute perforated diverticulitis with pericolonic abscess  Sigmoid colon fistula   Hospital-acquired pneumonia -on meropenem  Candida bacteremia -on mucous function  Acute exacerbation on chronic diastolic CHF  Acute anemia  Atrial fibrillation  Type 2 diabetes mellitus      Plan:  Management as per pulmonary critical care, nephrology, ID, surgery will resume care when on the floor  Sedation has been slowly decreased with Precedex off now     Code status:No CPR- Pre-arrest Intubation OK        Subjective:  Patient is intubated and sedated opening eyes to command and nodding yes and no    Review of Systems:   As above    Current Medications Reviewed via EHR List    Objective:  Vital signs in last 24 hours:  Temp:  [98.4  F (36.9  C)-101.1  F (38.4  C)] 98.4  F (36.9  C)  Heart Rate:  [65-96] 86  Resp:  [16-46] 18  BP: ()/(47-72) 107/47  SpO2:  [96 %-99 %] 98 %  FiO2 (%):  [28 %-39 %] 28 %    Intake/Output last 3 shifts:  I/O last 3 completed shifts:  In: 647.6 [I.V.:52.6; NG/GT:495; IV Piggyback:100]  Out: 2025 [Urine:2025]      Physical Exam:  EYES: EOM able to Betamide  NECK: no JVD  HEART : S1 S2 RRR    LUNGS:  Clear to auscultation without  Crepitations or Wheezing    ABDOMEN:   Soft, No tenderness ?, Bowel sounds are positive  CNS patient is intubated and open eyes to command nodding yes and no             Lab Results:  Personally Reviewed.   Fingerstick Blood Glucose:   Recent Labs     09/04/20  2343 09/05/20  0416 09/05/20  0757   POCGLUFGR 120 117 120       Recent Results (from the past 24 hour(s))   Phosphorus    Collection Time: 09/06/20  5:26 AM   Result Value Ref Range    Phosphorus 2.1 (L) 2.5 - 4.5 mg/dL   Magnesium    Collection Time: 09/06/20  5:26 AM   Result Value Ref Range    Magnesium 1.7 (L) 1.8 - 2.6 mg/dL   INR    Collection Time: 09/06/20  5:26 AM   Result Value Ref Range    INR 1.90 (H) 0.90 - 1.10   Basic Metabolic Panel    Collection Time: 09/06/20  5:26 AM   Result Value Ref  Range    Sodium 145 136 - 145 mmol/L    Potassium 2.8 (LL) 3.5 - 5.0 mmol/L    Chloride 108 (H) 98 - 107 mmol/L    CO2 29 22 - 31 mmol/L    Anion Gap, Calculation 8 5 - 18 mmol/L    Glucose 107 70 - 125 mg/dL    Calcium 10.2 8.5 - 10.5 mg/dL    BUN 20 8 - 28 mg/dL    Creatinine 0.51 (L) 0.60 - 1.10 mg/dL    GFR MDRD Af Amer >60 >60 mL/min/1.73m2    GFR MDRD Non Af Amer >60 >60 mL/min/1.73m2   HM1 (CBC with Diff)    Collection Time: 09/06/20  5:26 AM   Result Value Ref Range    WBC 11.9 (H) 4.0 - 11.0 thou/uL    RBC 2.46 (L) 3.80 - 5.40 mill/uL    Hemoglobin 7.5 (L) 12.0 - 16.0 g/dL    Hematocrit 24.6 (L) 35.0 - 47.0 %     80 - 100 fL    MCH 30.5 27.0 - 34.0 pg    MCHC 30.5 (L) 32.0 - 36.0 g/dL    RDW 24.1 (H) 11.0 - 14.5 %    Platelets 154 140 - 440 thou/uL    MPV 13.2 (H) 8.5 - 12.5 fL   Manual Differential    Collection Time: 09/06/20  5:26 AM   Result Value Ref Range    Total Neutrophils % 79 (H) 50 - 70 %    Lymphocytes % 6 (L) 20 - 40 %    Monocytes % 7 2 - 10 %    Eosinophils %  6 0 - 6 %    Basophils % 2 0 - 2 %    Immature Granulocyte % - Manual 0 <=0 %    Total Neutrophils Absolute 9.4 (H) 2.0 - 7.7 thou/ul    Lymphocytes Absolute 0.7 (L) 0.8 - 4.4 thou/uL    Monocytes Absolute 0.8 0.0 - 0.9 thou/uL    Eosinophils Absolute 0.7 (H) 0.0 - 0.4 thou/uL    Basophils Absolute 0.2 0.0 - 0.2 thou/uL    Immature Granulocyte Absolute - Manual 0.0 <=0.0 thou/uL    Manual nRBC per 100 Cells 8 (H) 0-<1    Platelet Estimate Normal Normal    Polychromasia 1+ (!) Negative    Target Cells 1+ (!) Negative           Advanced Care Planning  Discharge plan: Unknown at this time      Michael Abreu  Date: 9/6/2020  Time: 3:41 PM  Hospitalist Service  Part of this chart was created using a dictation software. Typographic errors, word substitutions, and Grammatical errors may unintentionally occur.

## 2021-06-11 NOTE — PROGRESS NOTES
"Spiritual Care Note    Spiritual Assessment: I introduced myself and Spiritual Health to the patient, nephew Bridger and niece Bret (sp?). Patient's eyes are open, she is not talking.    \"I can't do this, I can't do this right now,\" Bret said several times, crying. She said she just lost a sister recently, as well. She is very close with patient.    Bridger asked for prayer and we prayed together. Bridger said he's trying to get a hold of patient's daughter.     Care Provided: Spiritual assessment, provided grief support, provided prayer    Plan of Care:  available for spiritual care or emotional support as needed.      Vandana Patterson,     "

## 2021-06-11 NOTE — PROGRESS NOTES
[facility] Daily Progress Note      Date of Service: 9/11/2020     Brief History: Gardenia Muller is 70 y.o. female with history of type 2 diabetes mellitus, chronic atrial fibrillation, diastolic congestive heart failure, CAD s/p PCI in 1995, CVA, peripheral vascular disease, presented to the hospital on 8/11/2020 for abdominal pain, and vomiting. CT abdomen revealed diverticulitis with perforation. General surgery and IR were consulted. Follow up image on 08/14 showed increase in size of abscess, and she underwent CT guided drain in the left pelvic diverticular abscess on 08/17. CT abdomen on 08/20 showed near collapse of fluid cavity. The patient developed hospital acquired pneumonia, and candida growing from the abdominal fluid. ID was consulted, and she was placed on broad spectrum antimicrobials. Patient developed acute respiratory distress, and had 25 lb weight gain since admission, and was started on IV lasix. On 08/26 develop MAY and episodes of bradycardia. Digoxin level was found to be subsequently elevated, and the patient was started on DigiFab. Transferred to ICU for closer monitoring. Required atropine in ICU for low heart rate and was started on dopamine. Patient was endotracheally intubated on 08/28 for volume overload and pneumonia. She has failed spontaneous breathing trial. She underwent abscessogram with tube check on 09/09, and as there were no more fluid collection, the drain was removed.    Assessment/Plan:     Acute respiratory failure with hypoxia  -Secondary to HCAP, fluid overload  -Spontaneous breathing trial per pulmonary. Failing daily weans  -Consider percutaneous trachesotomy. Discussion with family today    Electrolyte imbalance  -Hypo/hypernatremia-improved. Monitor. Fluid flush with tube  -Hypomagnesemia, continue with replacement  -Hypophosphatemia, continue with replacement  -Hypokalemia, continue with replacement    Perforated diverticulitis  Pericolonic abscess with sigmoid  colon fistula  -S/p CT guided drain placement on 08/17  -Abscessogram tube check on 09/01 and on 09/09. Drain removed as there was no fluid collection, and drain output was zero.  -Treated with meropenem, micafungin. Antibiotics stopped on 09/10  -Tube fluid culture grew candida    Digitalis toxicity  Junctional bradycardia, secondary to digoxin toxicity  -Received digifab. Required dopamine for persistently low heart rate  -Now heart rate has stabilized    Acute kidney injury  -Multifactorial in etiology- contrast induced, digoxin toxicity, medication, sepsis, hypotension  -Consented for dialysis, but never received one. Started to make large volume urine output and renal function has improved  -Renal function in normal range now  -Monitor renal function. Avoid nephrotoxic agents.    Acute on chronic diastolic/systolic congestive heart failure  -LVEF of 45%  -Currently on IV lasix  -Monitor I/Os, electrolytes, and renal function.    Type 2 diabetes mellitus with hyperglycemia  -Not on any insulin regimen  -Blood glucose fairly stable  -Last A1C of 6.5    Anemia, normocytic  -Multifactorial in etiology- acute illness, blood loss  -Monitor closely    Acute encephalopathy, metabolic  -Requiring restraints  -Precedex as needed    Coronary artery disease  -Known  to RCA  -Currently on aspirin and brilinta  -Monitor    Other issues:    Afib with RVR on 08/19. Rate controlled at this time. Currently on warfarin    Transaminitis from hepatic congestion with heart failure    Depression/anxiety on zoloft      Subjective:     Chart reviewed, events noted. Pt seen and examined. Opens eyes, and follows simple instructions. No overnight issues. Remains on full vent support. No fever.    Objective     Vital signs in last 24 hours:  Temp:  [97.1  F (36.2  C)-99.2  F (37.3  C)] 98.5  F (36.9  C)  Heart Rate:  [] 80  Resp:  [16-45] 19  BP: ()/(46-70) 108/70  FiO2 (%):  [25 %] 25 %  Weight:   199 lb 8 oz (90.5  "kg)  Weight change: -3 lb 1.6 oz (-1.406 kg)  Body mass index is 36.49 kg/m .    Intake/Output last 3 shifts:  I/O last 3 completed shifts:  In: 1393.7 [I.V.:195.7; NG/GT:1198]  Out: 1395 [Urine:1395]  Intake/Output this shift:  I/O this shift:  In: 293.1 [I.V.:43.1; NG/GT:250]  Out: 100 [Urine:100]      Physical Exam:  /70   Pulse 80   Temp 98.5  F (36.9  C) (Oral)   Resp 19   Ht 5' 2\" (1.575 m)   Wt 199 lb 8 oz (90.5 kg)   LMP 01/25/1999   SpO2 (!) 89%   BMI 36.49 kg/m      FiO2 (%): 25 % O2 Device: Ventilator O2 Flow Rate (L/min): 3 L/min    General Appearance: Opening eyes. Not in distress  HEENT: Normocephalic. No scleral icterus. Mucous membranes moist. Endotracheal intubation  Heart: S1 S2 heard. Irregular rate and rhythm.  Lungs: Decreased breath sounds, coarse sounds  Abdomen: Soft, non tender, bowel sounds present.  Extremity: No deformity. No joint swelling. No edema.  Neurologic: Opens eyes. Moving extremities  Skin: No skin rashes      Imaging:  personally reviewed.    Xr Chest 1 View Portable    Result Date: 9/8/2020  EXAM: XR CHEST 1 VIEW PORTABLE LOCATION: [facility] DATE/TIME: 9/8/2020 12:01 PM INDICATION: Respiratory failure COMPARISON: 09/06/2020 and older studies.     Xr Chest 1 View Portable    Result Date: 9/6/2020  EXAM: XR CHEST 1 VIEW PORTABLE LOCATION: [facility] DATE/TIME: 9/6/2020 11:59 AM INDICATION: f/u intubation COMPARISON: 09/03/2020     Ir Abscessogram Tube Check    [facility]: [facility]9/2020  [facility] DATE: 9/9/2020 PROCEDURE: ABSCESS TUBE CHECK AND REMOVAL INTERVENTIONAL RADIOLOGIST: Karlos Mckeon MD. INDICATION: 70-year-old female with a left pelvic abscess and known fistula to the sigmoid colon. Drain outputs have been 0 the past several days. CONSENT: The risks, benefits and alternatives of an abscessogram with possible exchange, manipulation or removal were discussed with the patient  in detail. All " questions were answered. Informed consent was given to proceed with the procedure. MODERATE SEDATION: None. CONTRAST: 10 mL Omnipaque into the abscess cavity. ANTIBIOTICS: None. ADDITIONAL MEDICATIONS: None. FLUOROSCOPIC TIME: 0.3 minutes. RADIATION DOSE: Air Kerma: 37 mGy. COMPLICATIONS: No immediate complications. STERILE BARRIER TECHNIQUE: Maximum sterile barrier technique was used. Cutaneous antisepsis was performed at the operative site with application of 2% chlorhexidine and large sterile drape. Prior to the procedure, the  and assistant performed hand hygiene and wore hat, mask, sterile gown, and sterile gloves during the entire procedure. PROCEDURE:  A  image was obtained. The pre-existing drainage catheter was injected with a small amount of contrast and multiple images were obtained. Based on the results of the study the drainage catheter was removed. FINDINGS: The  image shows the existing straight drainage catheter tubing. The tubing tip terminates approximately 3-4 cm lateral to the loop of the catheter on the previous study. An injection of contrast initially shows the drainage catheter is occluded. A more forceful injection exhibits peritubal leakage with no residual abscess. No residual fistula is identified.        Lab Results:  personally reviewed.   Lab Results   Component Value Date     09/11/2020    K 3.4 (L) 09/11/2020     09/11/2020    CO2 26 09/11/2020    BUN 13 09/11/2020    CREATININE 0.56 (L) 09/11/2020    CALCIUM 10.4 09/11/2020     Lab Results   Component Value Date    WBC 10.5 09/11/2020    HGB 7.8 (L) 09/11/2020    HCT 25.7 (L) 09/11/2020     (H) 09/11/2020     09/11/2020     Results from last 7 days   Lab Units 09/11/20  0456 09/10/20  0450 09/09/20  0526   LN-MAGNESIUM mg/dL 1.5* 1.9 1.6*        Results from last 7 days   Lab Units 09/05/20  0757 09/05/20  0416 09/04/20  2343 09/04/20  1612   LN-POC GLUCOSE FINGERSTICK- HE mg/dL 120 117  120 117         Advance Care Planning:   Barriers to discharge: Active issues  Anticipated discharge day: To be determined  Disposition: To be determined      Uzair Burdick MD  [facility]

## 2021-06-11 NOTE — PROGRESS NOTES
Assisted with ventilator transfer to IR on portable ventilator AC, , RR 16, FiO2 .50, tolerated well no distress or respiratory issues. Patient remained on portable ventilator throughout procedure. RN to monitor and notify RT por any concerns or issues.     Al Kennedy, LRT

## 2021-06-11 NOTE — PROGRESS NOTES
Pharmacy Consult: Warfarin Management    Pharmacy consulted to dose warfarin for Gardenia Muller, a 70 y.o. female    Ordering provider: Karen Hilliard MD     Reason for warfarin therapy: Atrial Fibrillation  Goal INR Range: 2-3    Subjective  Medication lists (home and hospital) were reviewed.    New medications that may increase bleeding risk/INR: none currently    Restarted home medications that may increase bleeding risk/INR: ASA, Ticagrelor, omeprazole, sertraline     Home medications NOT restarted that may increase bleeding risk/INR:  trazadone     Home Warfarin Dosing: multiple dose reductions during August 2020.  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)     Advanced directives, counseling/discussion     Cystitis     Healthcare maintenance     Essential hypertension     Cerebral infarction (H)     Anemia     Cerebrovascular disease     RLS (restless legs syndrome)     Incisional hernia, without obstruction or gangrene     Diverticular disease of large intestine     Coronary artery disease involving native coronary artery of native heart without angina pectoris     Dyslipidemia     Ventral hernia without obstruction or gangrene     Anxiety     (HFpEF) heart failure with preserved ejection fraction (H)     Anticoagulant long-term use     Persistent atrial fibrillation (H)     Bradyarrhythmia     Acute kidney injury (H)     Transaminitis     Physical deconditioning     Lipoma of back     Acalculous cholecystitis     Onychogryphosis     Hyperparathyroidism (H)     Microalbuminuria     Intestinal  angina (H)     Chronic abdominal pain     Weight loss, non-intentional     Warfarin anticoagulation     Severe protein-calorie malnutrition (H)     Hypertrophy of nail     Dysuria     Class 1 obesity with serious comorbidity and body mass index (BMI) of 33.0 to 33.9 in adult, unspecified obesity type     Trigger finger, acquired     Acute liver failure without hepatic coma     Hematochezia     Hyperkalemia     Acute on chronic combined systolic (congestive) and diastolic (congestive) heart failure (H)     Ischemic cardiomyopathy     Acute kidney failure, unspecified (H)     Acute diastolic heart failure (H)     Diverticulitis     Supratherapeutic INR     Diverticulitis of large intestine with perforation and abscess without bleeding     Atrial fibrillation with rapid ventricular response (H)     Obstructive sleep apnea syndrome     Poisoning by digoxin, accidental or unintentional, initial encounter     Acute respiratory failure with hypoxia and hypercapnia (H)     Acute metabolic encephalopathy     Shock circulatory (H)     Metabolic acidosis     Other hypervolemia    Past Medical History:   Diagnosis Date     Acute diastolic heart failure (H) 11/2015     Acute on chronic diastolic heart failure (H) 6/15/2019     Advanced directives, counseling/discussion 8/5/2015    Patient completed 2011.  Discussed today, 5/24/17, and wants to change healthcare agent from niece to daughter.  Discussed that she will need to complete new form to indicate this.     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  Recommend 5 year follow-up.  Single tubular adenoma     Biliary obstruction 10/3/2018    Added automatically from request for surgery [id number]     Cerebral vascular accident (H)      Coronary artery disease 2006    100% RCA with collateral filling per Dr. Elizabeth's angio report     Diabetes mellitus type 2 in obese (H)       Fibrocystic breast      GERD (gastroesophageal reflux disease)      History of anesthesia complications     slow to wake     Hypertension      Obstructive sleep apnea syndrome      Peripheral vascular disease (H)      Pneumonia 11/11/2018     PONV (postoperative nausea and vomiting)      S/P cholecystectomy 6/22/2018     SBO (small bowel obstruction) (H) 11/12/2015     Stroke (H)      Transaminitis 10/22/2018     Upper respiratory infection 12/20/2018     Urinary incontinence         Social History     Tobacco Use     Smoking status: Former Smoker     Packs/day: 0.25     Smokeless tobacco: Former User     Tobacco comment: e-cig a few times/day   Substance Use Topics     Alcohol use: No     Drug use: No       Objective   Labs:  Last 3 days:    Recent Labs     09/06/20  0526 09/07/20  0440 09/08/20  0403   CREATININE 0.51* 0.53* 0.54*   HGB 7.5* 7.6* 7.4*   HCT 24.6* 25.4* 24.9*    161 168     Last 7 days:   Recent labs: (last 7 days)     09/02/20  0618 09/03/20  0439 09/04/20  0532 09/05/20  0417 09/06/20  0526 09/07/20  0440 09/08/20  0403   INR 2.07* 1.86* 1.89* 1.79* 1.90* 2.09* 2.14*       Warfarin Dosing History:    Date INR Warfarin Dose Comment   9/2/20 2.07 none    9/3/20 1.86 1.5 mg Pharmacist consulted to dose warfarin   9/4/20 1.89 1.5mg    9/5 1.79 2.5mg     9/6 1.90 2.5 mg    9/7/20 2.09 2.5 mg - SQ heparin discontinued   9/8/20 2.14 2.5 mg      Assessment  The patient is resuming warfarin for the indication of Atrial Fibrillation with a goal INR of 2-3. INR today is therapeutic.     Multiple dose reductions during August 2020.  Last dosing regimen just before admission was 1.5mg daily.  Patient was admitted with supratherapeutic INR.  INR was reversed for surgery and then remained elevated most likely due to liver issues post operatively.       INR increased and now within goal range. Will order warfarin 2.5mg again for today, likely able to resume pta dosing tomorrow.      Plan  1. Administer warfarin 2.5 mg PO today.  2. Check INR daily or as appropriate.  3. Continue to follow the patient's INR, PLT, and HGB as available.  4. Monitor for potential drug/disease interactions.    Thank you for the consult,  Jose Raul Han, PharmD, BCPS 9/8/2020 11:33 AM

## 2021-06-11 NOTE — PROGRESS NOTES
[facility] Daily Progress Note    Assessment/Plan:  Gardenia Muller is a 70-year-old lady with atrial fibrillation, diastolic congestive heart failure, hypertension, coronary artery disease status post PCI 1995, CVA, peripheral vascular disease, T2DM, malnutrition, intestinal angina, acalculous cholecystitis status post laparoscopic cholecystectomy in 2018, depression who came into [facility] on 8/11/2020 for abdominal pain and vomiting.  CT scan of the abdomen revealed diverticulitis with perforation.  On 814 it showed increased size of abscess from 2x2 cm to 4x3 cm.  Though there was clinical improvement in her symptoms.  General surgery was consulted who recommended interventional radiology consultation for possible drainage of the abdominal abscess which was done on 8/17/2020 by placing CT scan guided drain in the left pelvic diverticular abscess. On 8/20/2020 CT scan of the abdomen showed near collapse of the fluid cavity        .  Subsequently patient developed leukocytosis with possible hospital-acquired pneumonia seen on chest x-ray.  She also had Candida infection for which fluconazole was started with improvement of WBC count.  On 825 patient had shortness of breath with 25 pound weight gain and was started on IV Lasix.  On 826 developed acute kidney injury and had episodes of bradycardia.  Digoxin level was elevated at 6.9 on 8/26/2020 and 10.7 on 8/28/2024 which DigiFab was started and patient transferred to ICU.  Required atropine in ICU for heart rate which went down between 20 and 40.  Started on dopamine.  Developed respiratory failure.  There is still fluid around sigmoid colon and rectum.  Abscessogram done on 8/31/2020 showed contracted abscess cavity with persistent fistula to the sigmoid colon with drain exchanged and tailored 10 Frisian drain was placed lateral to the fistula site.  There is a plan to do another abscessogram in 1 week's time.     Assessment:  Digoxin toxicity  Acute kidney  injury  Acute perforated diverticulitis with pericolonic abscess  Sigmoid colon fistula   Hospital-acquired pneumonia -on meropenem  Candida bacteremia -on mucous function  Acute exacerbation on chronic diastolic CHF  Acute anemia  Atrial fibrillation  Type 2 diabetes mellitus      Plan:  Management as per pulmonary critical care, nephrology, ID, surgery -hospitalist service has nothing to add at this time.     Code status:No CPR- Pre-arrest Intubation OK        Subjective:  Patient is intubated and sedated opening eyes to command    Review of Systems:   As above    Current Medications Reviewed via EHR List    Objective:  Vital signs in last 24 hours:  Temp:  [98.3  F (36.8  C)-98.8  F (37.1  C)] 98.3  F (36.8  C)  Heart Rate:  [2-98] 69  Resp:  [9-42] 25  BP: ()/(42-72) 99/46  SpO2:  [96 %-100 %] 98 %  FiO2 (%):  [30 %-100 %] 30 %    Intake/Output last 3 shifts:  I/O last 3 completed shifts:  In: 1155.4 [I.V.:393.4; NG/GT:657; IV Piggyback:105]  Out: 3925 [Urine:3925]      Physical Exam:  EYES: EOM able to Betamide  NECK: no JVD  HEART : S1 S2 RRR    LUNGS:  Clear to auscultation without  Crepitations or Wheezing    ABDOMEN:   Soft, No tenderness ?, Bowel sounds are positive  CNS patient is intubated and sedated               Lab Results:  Personally Reviewed.   Fingerstick Blood Glucose:   Recent Labs     09/02/20  2042 09/02/20  2341 09/03/20  0344 09/03/20  0806 09/03/20  1140 09/03/20  1550 09/03/20  1953 09/03/20  2324 09/04/20  0346 09/04/20  0800   POCGLUFGR 99 94 87 81 74 98 90 98 93 78       Recent Results (from the past 24 hour(s))   POCT Glucose    Collection Time: 09/03/20  3:50 PM    Specimen: Blood   Result Value Ref Range    Glucose 98 70 - 139 mg/dL   POCT Glucose    Collection Time: 09/03/20  7:53 PM    Specimen: Blood   Result Value Ref Range    Glucose 90 70 - 139 mg/dL   Potassium    Collection Time: 09/03/20 10:02 PM   Result Value Ref Range    Potassium 3.6 3.5 - 5.0 mmol/L   POCT  Glucose    Collection Time: 09/03/20 11:24 PM    Specimen: Blood   Result Value Ref Range    Glucose 98 70 - 139 mg/dL   POCT Glucose    Collection Time: 09/04/20  3:46 AM    Specimen: Blood   Result Value Ref Range    Glucose 93 70 - 139 mg/dL   Magnesium    Collection Time: 09/04/20  5:32 AM   Result Value Ref Range    Magnesium 1.3 (L) 1.8 - 2.6 mg/dL   INR    Collection Time: 09/04/20  5:32 AM   Result Value Ref Range    INR 1.89 (H) 0.90 - 1.10   Renal Function Profile    Collection Time: 09/04/20  5:32 AM   Result Value Ref Range    Albumin 1.3 (L) 3.5 - 5.0 g/dL    Calcium 9.9 8.5 - 10.5 mg/dL    Phosphorus 2.4 (L) 2.5 - 4.5 mg/dL    Glucose 99 70 - 125 mg/dL    BUN 17 8 - 28 mg/dL    Creatinine 0.52 (L) 0.60 - 1.10 mg/dL    Sodium 144 136 - 145 mmol/L    Potassium 3.8 3.5 - 5.0 mmol/L    Chloride 107 98 - 107 mmol/L    CO2 30 22 - 31 mmol/L    Anion Gap, Calculation 7 5 - 18 mmol/L    GFR MDRD Af Amer >60 >60 mL/min/1.73m2    GFR MDRD Non Af Amer >60 >60 mL/min/1.73m2   HM1 (CBC with Diff)    Collection Time: 09/04/20  5:32 AM   Result Value Ref Range    WBC 11.4 (H) 4.0 - 11.0 thou/uL    RBC 2.54 (L) 3.80 - 5.40 mill/uL    Hemoglobin 7.7 (L) 12.0 - 16.0 g/dL    Hematocrit 24.8 (L) 35.0 - 47.0 %    MCV 98 80 - 100 fL    MCH 30.3 27.0 - 34.0 pg    MCHC 31.0 (L) 32.0 - 36.0 g/dL    RDW 24.5 (H) 11.0 - 14.5 %    Platelets 116 (L) 140 - 440 thou/uL    MPV 12.9 (H) 8.5 - 12.5 fL   Manual Differential    Collection Time: 09/04/20  5:32 AM   Result Value Ref Range    Total Neutrophils % 82 (H) 50 - 70 %    Lymphocytes % 8 (L) 20 - 40 %    Monocytes % 7 2 - 10 %    Eosinophils %  3 0 - 6 %    Basophils % 0 0 - 2 %    Immature Granulocyte % - Manual 0 <=0 %    Total Neutrophils Absolute 9.3 (H) 2.0 - 7.7 thou/ul    Lymphocytes Absolute 0.9 0.8 - 4.4 thou/uL    Monocytes Absolute 0.8 0.0 - 0.9 thou/uL    Eosinophils Absolute 0.3 0.0 - 0.4 thou/uL    Basophils Absolute 0.0 0.0 - 0.2 thou/uL    Immature Granulocyte  Absolute - Manual 0.0 <=0.0 thou/uL    Manual nRBC per 100 Cells 3 (H) 0-<1    Platelet Estimate Decreased (!) Normal    Polychromasia 1+ (!) Negative    Target Cells 2+ (!) Negative   POCT Glucose    Collection Time: 09/04/20  8:00 AM    Specimen: Blood   Result Value Ref Range    Glucose 78 70 - 139 mg/dL           Advanced Care Planning  Discharge plan: Unknown at this time      Michael Abreu  Date: 9/4/2020  Time: 3:41 PM  Hospitalist Service  Part of this chart was created using a dictation software. Typographic errors, word substitutions, and Grammatical errors may unintentionally occur.

## 2021-06-11 NOTE — PLAN OF CARE
Problem: Breathing  Goal: Patient will maintain patent airway  Outcome: Progressing     Problem: Mechanical Ventilation  Goal: Patient will maintain patent airway  Outcome: Progressing  Goal: Oral health is maintained or improved  Outcome: Progressing  Goal: ET tube will be managed safely  Outcome: Progressing     Problem: Inadequate Gas Exchange  Goal: Patient will achieve/maintain normal respiratory rate/effort  Outcome: Progressing     Moustapha Garnica, LRT

## 2021-06-11 NOTE — PROGRESS NOTES
PULMONARY / CRITICAL CARE PROGRESS NOTE    Date / Time of Admission:  8/11/2020 10:16 AM    Assessment:   Principal Problem:    Diverticulitis  Active Problems:    Bradyarrhythmia    Acute kidney injury (H)    Diverticulitis of large intestine with perforation and abscess without bleeding    Atrial fibrillation with rapid ventricular response (H)    Obstructive sleep apnea syndrome    Poisoning by digoxin, accidental or unintentional, initial encounter    Acute respiratory failure with hypoxia and hypercapnia (H)    Acute metabolic encephalopathy    Shock circulatory (H)    Metabolic acidosis    70yoF with history of DMII, HFpEF, afib on coumadin presented with perforated diverticulitis causing abscess resulting in acute kidney injury and respiratory failure now intubated, not felt to be a good surgical candidate.     Advance Directives:  Full Code      Plan:   Pulmonary:  1) Intubated for pneumonia and volume overload  -ABG stable  -No SBT due to labile blood pressure  -Added duonebs standing for rhonchi    C/V:  1) Afib with RVR--resolved  2) Dig Toxicity  3) Hypotension related in part to sedation  -Levophed for MAP>60, vasopressin added for low diastolic  -Diuresis ongoing  -Consider adding back Brillinta 8/31 if INR<3.  -Echo found stable EF 45%, no significant valve pathology    Renal:  1) MAY on CKD  Current autodiuresing, holding diuretics.   -Appreciate renal assistance, may avoid HD    GI:  1) perforated diverticulitis  -started feeding and titrating up as tolerated.     Neuro:  1) Sedation requirements  -Propofol  -Fentanyl    ID: perforated diverticulitis  Pneumonia seen on abdominal CT  -Appreciate ID  -Micafungin and meropenem  -sputum culture pending      Updated daughter Aria via phone--her bus arrives from [place] 8/31 at 5:00pm    ICU DAILY CHECKLIST                           Can patient transfer out of MICU? no    FAST HUG:    Feeding:  Feeding: No.  Patient is receiving trickle feeding and  will titrate up.  Bardales:Yes  Analgesia/Sedation:Yes fentanyl and propofol  Thromboembolic prophylaxis: yes; Mode:  Coumadin  HOB>30:  Yes  Stress Ulcer Protocol Active: yes; Mode: PPI  Glycemic Control: Any glucose > 180 no; Mode of Insulin Therapy: Sliding Scale Insulin    INTUBATED:  Can patient have daily waking:  no  Can patient have spontaneous breathing trial:  No--still too rhonchorous    Restraints? yes      Critical Care Time greater than: 45 Minutes-this patient is critically ill on mechanical ventilation with hemodynamic insability.         Subjective:   HPI:  Gardenia Muller is a 70 y.o. female with chronic afib on anticoagulation, HFpEF, CAD, CVA, DMII who presented 8/11 with nausea and vomiting. CT found perforated diverticulitis. Drain placed into abscess and antibiotics initiated given her poor candidacy for surgery. Worsening renal failure in setting of Afib with RVR and became digoxin-toxic. Received digibind with improvement in HR. Level still pending (reported level is spurious). Minimal urine output and hypervolemic, lasix drip started with slow improvement in urine output. 8/30 lasix drip stopped and started autodiuresing with ongoing improvemetn in creatinine 4-->2.48.    Intubated 8/28 for worsening work of breathing and hypoxia, CT found bibasilar infiltrates and she remains rhonchorous.     Principal Problem:    Diverticulitis  Active Problems:    Bradyarrhythmia    Acute kidney injury (H)    Diverticulitis of large intestine with perforation and abscess without bleeding    Atrial fibrillation with rapid ventricular response (H)    Obstructive sleep apnea syndrome    Poisoning by digoxin, accidental or unintentional, initial encounter    Acute respiratory failure with hypoxia and hypercapnia (H)    Acute metabolic encephalopathy    Shock circulatory (H)    Metabolic acidosis      Allergies: Penicillins     MEDS:  Scheduled Meds:    amino acids-protein hydrolys  1 packet Enteral Tube BID  "    aspirin  81 mg Enteral Tube DAILY     chlorhexidine  15 mL Topical Q12H     insulin aspart (NovoLOG) injection   Subcutaneous Q4H FIXED TIMES     meropenem  1 g Intravenous Q12H     micafungin (MYCAMINE) IV  100 mg Intravenous Q24H     omeprazole  20 mg Oral QAM AC    Or     omeprazole  20 mg Enteral Tube QAM AC    Or     pantoprazole  40 mg Intravenous QAM AC     [Held by provider] sertraline  100 mg Oral DAILY     sodium chloride bacteriostatic  1-5 mL Intradermal Once     sodium chloride  10 mL Intravenous Q8H FIXED TIMES     sodium chloride  10-30 mL Intravenous Q8H FIXED TIMES     [Held by provider] ticagrelor  90 mg Enteral Tube BID     Continuous Infusions:    fentaNYL citrate (PF) 75 mcg/hr (08/30/20 1016)     [Held by provider] furosemide Stopped (08/29/20 2040)     norepinephrine 0.01 mcg/kg/min (08/30/20 1442)     propofol 10 mcg/kg/min (08/30/20 1200)     vasopressin 2.4 Units/hr (08/30/20 1400)     PRN Meds:.albuterol, alteplase, atropine, bacitracin, bisacodyL, carboxymethylcellulose, codeine-guaiFENesin, dextrose 50 % (D50W), glucagon (human recombinant), hydrOXYzine pamoate, lidocaine (PF), lipase-protease-amylase **AND** sodium bicarbonate, [Held by provider] metoprolol tartrate, naloxone **OR** naloxone, oxyCODONE, polyethylene glycol, sodium chloride 0.9%, sodium chloride bacteriostatic, sodium chloride, sodium chloride, sodium chloride, traZODone      Objective:   VITALS:  BP (!) 86/30   Pulse 73   Temp 97.4  F (36.3  C) (Axillary)   Resp 16   Ht 5' 2\" (1.575 m)   Wt 212 lb (96.2 kg)   LMP 01/25/1999   SpO2 94%   BMI 38.78 kg/m    EXAM:  General appearance: sedated but easily arousable  Lungs: rhonchourous bilaterally  Heart: irregularly irregular rhythm  Abdomen: soft, non-tender; bowel sounds normal; no masses,  no organomegaly  Extremities: extremities normal, atraumatic, no cyanosis or edema  Skin: Skin color, texture, turgor normal. No rashes or lesions  Neurologic: follows " commands but falls back to sleep.     I&O:      Intake/Output Summary (Last 24 hours) at 8/30/2020 1451  Last data filed at 8/30/2020 1400  Gross per 24 hour   Intake 2515.55 ml   Output 4480 ml   Net -1964.45 ml       Data Review:  CXR personally interpreted  EXAM: XR CHEST 1 VIEW PORTABLE  LOCATION: [facility]  DATE/TIME: 8/28/2020 3:39 PM     INDICATION: Postintubation.  COMPARISON: 08/27/2020.     IMPRESSION:      ET tube tip at the level of the emily; recommend 1 to 2 cm retraction.     New feeding tube coursing into the stomach and off the field-of-view. Stable right PICC.     Similar to marginally improved coarse interstitial appearance and opacities bilaterally. No substantial pleural effusion. No pneumothorax. Stable enlarged cardiac silhouette.        ET tube tip findings verbally communicated to patient's nurse, Lavern, at 3:45 PM on 08/28/2020.     NOTE: ABNORMAL REPORT     THE DICTATION ABOVE DESCRIBES AN ABNORMALITY FOR WHICH FOLLOW-UP IS NEEDED.     EXAM: CT ABDOMEN PELVIS WO ORAL WO IV CONTRAST  LOCATION: [facility]  DATE/TIME: 8/29/2020 1:41 PM     INDICATION: Abdominal pain, acute, nonlocalized. Evaluate abdominal abscess.  COMPARISON: 08/20/2020.  TECHNIQUE: CT scan of the abdomen and pelvis was performed without oral or IV contrast. Multiplanar reformats were obtained. Dose reduction techniques were used.  CONTRAST: None.     FINDINGS:   LOWER CHEST: Increasing bilateral pulmonary infiltrates.     HEPATOBILIARY: Cholecystectomy.     PANCREAS: Normal.     SPLEEN: Normal.     ADRENAL GLANDS: Normal.     KIDNEY/BLADDER: Normal.     BOWEL: There is no residual fluid around the left lower quadrant drain. Sigmoid colonic diverticulosis without diverticulitis. Scant free fluid in the pelvic cul-de-sac. New NG tube in the stomach.     LYMPH NODES: Normal.     VASCULATURE: Diffuse atherosclerotic calcification.     PELVIC ORGANS: Multiple uterine fibroids. Bardales catheter in the  bladder.     MUSCULOSKELETAL: Normal.     IMPRESSION:   1.  No significant residual abscess around the left lower quadrant drainage catheter. No new abscess.  2.  Trace free fluid in the pelvis as well as subcutaneous edema is new from previous.  3.  New bilateral pulmonary infiltrates may represent pulmonary edema or pneumonia.    Results for orders placed or performed during the hospital encounter of 08/11/20   Basic Metabolic Panel   Result Value Ref Range    Sodium 138 136 - 145 mmol/L    Potassium 3.6 3.5 - 5.0 mmol/L    Chloride 105 98 - 107 mmol/L    CO2 22 22 - 31 mmol/L    Anion Gap, Calculation 11 5 - 18 mmol/L    Glucose 111 70 - 125 mg/dL    Calcium 8.9 8.5 - 10.5 mg/dL    BUN 53 (H) 8 - 28 mg/dL    Creatinine 2.48 (H) 0.60 - 1.10 mg/dL    GFR MDRD Af Amer 23 (L) >60 mL/min/1.73m2    GFR MDRD Non Af Amer 19 (L) >60 mL/min/1.73m2     Lab Results   Component Value Date    WBC 14.1 (H) 08/28/2020    HGB 9.0 (L) 08/28/2020    HCT 27.1 (L) 08/28/2020    MCV 91 08/28/2020     (L) 08/28/2020     ABG:  Recent Labs     08/28/20  1536   PHART 7.43   OXYHB 98.3*   BEARTCALC -2.4   POCPEEP 5   TEMP 37.0   POCRATE 20         By:  Sandra Lilly MD, 8/30/2020, 2:31 PM    Primary Care Physician:  Jerson Hernandez MD

## 2021-06-11 NOTE — PROGRESS NOTES
Respiratory Care Note    Vent day 9. Pt on the following settings:    Vent Mode: VCV  FiO2 (%):  [30 %] 30 %  S RR:  [16] 16  S VT:  [450 mL] 450 mL  PEEP/CPAP (cm H2O):  [5 cm H2O] 5 cm H2O  Minute Ventilation (L/min):  [7.2 L/min-8.1 L/min] 7.7 L/min  PIP:  [5 cm H2O-39 cm H2O] 33 cm H2O  MAP (cm H2O):  [5-10] 9    Pt continues on full support. Nebs given as scheduled. Pt tolerates well. LS coarse. Plan for daily weaning trials as pt tolerates. Suctioning clear/thin inline. RT following.       09/05/20 1133   Patient Data   Auto/Intrinsic PEEP Observed (cm H2O) 1 cm H2O   Plateau Pressure (cm H2O) 17 cm H2O   Static Compliance (L/cm H2O) 23   Dynamic Compliance (L/cm H2O) 34 L/cm H2O   Airway Resistance 16       KAYLA Ventura

## 2021-06-11 NOTE — PLAN OF CARE
Vitals:    09/16/20 0739   BP: 135/62   Pulse: (!) 127   Resp: 26   Temp: 98.2  F (36.8  C)   SpO2: 98%     VSS ex tachycardic and tachypenic. Scheduled haldol and morphine for comfort. Q2 turns. Patient had a bath and new mepilex placed on bottom. Drowsy, rouses to voice, moves all extremities.     Problem: Pain  Goal: Patient's pain/discomfort is manageable  Outcome: Progressing     Problem: Safety  Goal: Patient will be injury free during hospitalization  Outcome: Progressing     Problem: Daily Care  Goal: Daily care needs are met  Outcome: Progressing     Problem: Psychosocial Needs  Goal: Demonstrates ability to cope with hospitalization/illness  Outcome: Progressing  Goal: Collaborate with patient/family/caregiver to identify patient specific goals for this hospitalization  Outcome: Progressing     Problem: Discharge Barriers  Goal: Patient's discharge needs are met  Outcome: Progressing     Problem: Knowledge Deficit  Goal: Patient/family/caregiver demonstrates understanding of disease process, treatment plan, medications, and discharge instructions  Outcome: Progressing     Problem: Potential for Falls  Goal: Patient will remain free of falls  Outcome: Progressing     Problem: Potential for Compromised Skin Integrity  Goal: Skin integrity is maintained or improved  Outcome: Progressing  Goal: Nutritional status is improving  Outcome: Progressing     Problem: Urinary Incontinence  Goal: Perineal skin integrity is maintained or improved  Outcome: Progressing     Problem: Potential for hemodynamic instability  Goal: Cardiac output is adequate  Outcome: Progressing     Problem: Impaired Gas Exchange  Goal: Demonstrate improved ventilation and adequate oxygenation of tissues as evidenced by absence of respiratory distress  Description: Patient intubated 8/28 @1430.  ABG's collected s/p.  Sating well.    Outcome: Progressing     Problem: Breathing  Goal: Patient will maintain patent airway  Outcome: Progressing      Problem: Risk for Infection  Goal: Identify and demonstrate techniques, lifestyle changes to prevent/reduce risk of infection and promote safe environment  Outcome: Progressing     Problem: Mechanical Ventilation  Goal: Patient will maintain patent airway  Outcome: Progressing  Goal: Oral health is maintained or improved  Outcome: Progressing  Goal: Ability to express needs and understand communication  Outcome: Progressing  Goal: Mobility/activity is maintained at optimum level for patient  Outcome: Progressing  Goal: Respiratory status - ventilation  Outcome: Progressing     Problem: Inadequate Gas Exchange  Goal: Patient will achieve/maintain normal respiratory rate/effort  Outcome: Progressing     Problem: Decreased Mental Status Causing Increased Need for Safety  Goal: Provide a safe environment for patient (Implement appropriate elements in plan of care)  Outcome: Progressing  Goal: Decrease patient's symptoms  Outcome: Progressing  Goal: To ensure patient safety, patient will abstain from any threats or actions to harm self or others  Outcome: Progressing

## 2021-06-11 NOTE — PLAN OF CARE
Problem: Discharge Barriers  Goal: Patient s discharge needs will be met  Outcome: Progressing  Care Plan-Care Management    Care Management Goals of the Day:   Progression of Care, Discharge Planning     Care Progression to be reviewed with MD and RN CM: Updates to be provided to Charge RN, CMSW, HUC,therapy and other disciplines as indicated.    Barriers to Discharge and plan:  Comfort Cares    Family Care Conference: Not at this time. Spoke with daughter Aria over the phone     Discharge Disposition:   TBD    Expected Discharge Date:  TBD    Transportation:  TBD    Care Management/Coordination Narrative:   Patient extubated and transitioned to comfort cares on 9/14. Palliative placed consult for hospice to see and recommended hospice facility at discharge. At this time [facility], and [facility] are full. Was not able to get in touch with anyone at [facility]. [facility] does have a hospice bed, but would be $600 per day. Spoke with patients daughter Aria in regards to discharge planning. They would not be able to afford $600 per day fee. States discharging home with family and hospice services would not be an option as she lives in [place]. Not sure if they would be able to afford LTC. Aria states that when she spoke with the doctor this morning they were surprised by how the patient was doing, so she is hopeful there may be a change in plan. Will monitor patient status over night and rediscuss options with Aria on 9/16. Obtained Aria's brothers phone number, #[phone], as patient requesting to talk to him; updated floor nurse Adalberto. Spoke with hospice nurse Valeria, she is aware of patient. Transport TBD. Care manager to follow.    Filomena Rojo RN  09/15/20     Abbreviation  Code:  [facility] Patient (pt), Transitional Care Unit (TCU), Skilled Nursing Facility (SNF), Long Term Care (LTC),  Assisted Living (halfway or AL), Care Management (CM), Physical Therapy (PT), Occupational Therapy (OT), Speech Therapy (ST), Respiratory Therapy (RT).

## 2021-06-11 NOTE — PLAN OF CARE
Patient alert, responds to verbal command. Patient on 2L of oxygen NC. Patient tachycardic with a HR Of 108 bpm. Patient with low-grade temperature. Patient turned and repositioned q2. Patient has a Bardales in place, patent and draining, urine savita in color. Plan to discharge patient to SNF with Hospice. Continue to monitor the patient for now.     Problem: Pain  Goal: Patient's pain/discomfort is manageable  Outcome: Progressing     Problem: Safety  Goal: Patient will be injury free during hospitalization  Outcome: Progressing     Problem: Psychosocial Needs  Goal: Demonstrates ability to cope with hospitalization/illness  Outcome: Progressing     Problem: Potential for Compromised Skin Integrity  Goal: Skin integrity is maintained or improved  Outcome: Progressing     Problem: Urinary Incontinence  Goal: Perineal skin integrity is maintained or improved  Outcome: Progressing

## 2021-06-11 NOTE — PROGRESS NOTES
Patient remains on CPAP/PS 5/15 .30. RR 38, Vt 321, RSBI 120, SpO2 95 %. Changed back to VCV settings to allow patient to rest. RT to follow.       RESPIRATORY CARE NOTE    Vent Mode: VCV  FiO2 (%):  [30 %] 30 %  S RR:  [16] 16  S VT:  [450 mL] 450 mL  PEEP/CPAP (cm H2O):  [5 cm H2O] 5 cm H2O  Minute Ventilation (L/min):  [7.9 L/min-13 L/min] 8.6 L/min  PIP:  [20 cm H2O-56 cm H2O] 41 cm H2O  MAP (cm H2O):  [9-14] 10     Weaned for 68 minutes, changed back to full support at 1030, due to increased RR and RSBI    BS mild congestion upper airway     Al Kennedy, LRT

## 2021-06-11 NOTE — PROGRESS NOTES
Patient continues on mechanical ventilation. Tolerating well, sedated but arousable.     RESPIRATORY CARE NOTE    Vent Mode: VCV  FiO2 (%):  [30 %] 30 %  S RR:  [16] 16  S VT:  [450 mL] 450 mL  PEEP/CPAP (cm H2O):  [5 cm H2O] 5 cm H2O  Minute Ventilation (L/min):  [7.7 L/min-10.6 L/min] 7.9 L/min  PIP:  [33 cm H2O-42 cm H2O] 42 cm H2O  IN SUP:  [10 cm H20] 10 cm H20  MAP (cm H2O):  [9-10] 10     BS congestion upper lobes, gave Duoneb treatment  Sxn small amount normal viscosity tan/white secretions Pt tolerated well  .  Plan is to monitor vent, wean as tolerated.     Al Kennedy, LRT           09/03/20 0741   Patient Data   Vt (observed, mL) 513 mL   Vt Exp (mL) 511 mL   Minute Ventilation (L/min) 7.9 L/min   Total Resp Rate  15 BPM   PIP Observed (cm H2O) 42 cm H2O   MAP (cm H2O) 10   Auto/Intrinsic PEEP Observed (cm H2O) 2 cm H2O   Plateau Pressure (cm H2O) 27 cm H2O   Static Compliance (L/cm H2O) 25   Dynamic Compliance (L/cm H2O) 17 L/cm H2O   Airway Resistance 22

## 2021-06-11 NOTE — PROGRESS NOTES
Spontaneous Vt 206, Ve 10.3, RR 41. RSBI 164, patient's breathing appears labored and uncomfortable, changed back to VCV settings at 1227.     Al Kennedy, LRT

## 2021-06-11 NOTE — PROGRESS NOTES
[facility] Palliative Care Social Work Note:     Called dtr Aria twice to offer emotional support and inquire about when she will be visiting her mom today. Left voicemail and invited call back.     Dr. Ramirez aware.    Melina Turner, [facility]  Palliative Care   Office # [phone]

## 2021-06-11 NOTE — PROGRESS NOTES
Spoke with MD regarding the A.M. weaning trial, responded to hold weaning trial until tomorrow. HHN Duoneb tx given x 2 this shift, BS slight to moderate scattered rhonchi, slight change after. Will continue to monitor and assess.     Al Kennedy, LRT         08/31/20 1532   Patient Data   Vt (observed, mL) 575 mL   Vt Exp (mL) 572 mL   Minute Ventilation (L/min) 9.2 L/min   Total Resp Rate  16 BPM   PIP Observed (cm H2O) 37 cm H2O   MAP (cm H2O) 9   Auto/Intrinsic PEEP Observed (cm H2O) 1 cm H2O   Plateau Pressure (cm H2O) 26 cm H2O   Static Compliance (L/cm H2O) 28   Dynamic Compliance (L/cm H2O) 29 L/cm H2O   Airway Resistance 13

## 2021-06-11 NOTE — PROGRESS NOTES
"BP (!) 86/30   Pulse 68   Temp 99.5  F (37.5  C) (Axillary)   Resp 17   Ht 5' 2\" (1.575 m)   Wt 212 lb (96.2 kg)   LMP 01/25/1999   SpO2 96%   BMI 38.78 kg/m      Vent Mode: VCV  FiO2 (%):  [30 %] 30 %  S RR:  [16] 16  S VT:  [450 mL] 450 mL  PEEP/CPAP (cm H2O):  [5 cm H2O] 5 cm H2O  Minute Ventilation (L/min):  [7.9 L/min-13 L/min] 7.9 L/min  PIP:  [34 cm H2O-56 cm H2O] 34 cm H2O  MAP (cm H2O):  [9-14] 12      Bs are clear , no changes this shift. Nebs given as scheduled.     Plan to wean as tolerated on days.     Valentín TAYLOR Madelyn    "

## 2021-06-11 NOTE — PROGRESS NOTES
"Patient remains on full vent support and this is day 2. ET tube is a 7.5 20 at the teeth.  Patient transported to CT without incident using portable vent.    Lungs coarse and sputum sample sent to lab. PK 40, Plat 25.    BP (!) 86/30   Pulse 79   Temp 98.2  F (36.8  C) (Axillary)   Resp 18   Ht 5' 2\" (1.575 m)   Wt 214 lb 8 oz (97.3 kg)   LMP 01/25/1999   SpO2 99%   BMI 39.23 kg/m      Vent Mode: VCV  FiO2 (%):  [40 %-100 %] 40 %  S RR:  [16-20] 16  S VT:  [450 mL-475 mL] 450 mL  PEEP/CPAP (cm H2O):  [5 cm H2O] 5 cm H2O  Minute Ventilation (L/min):  [7.3 L/min-10.5 L/min] 7.3 L/min  PIP:  [38 cm H2O-44 cm H2O] 39 cm H2O  MAP (cm H2O):  [10-12] 10    RT following,    Moustapha Burton, LRT    "

## 2021-06-11 NOTE — PLAN OF CARE
RN talked with daughter via phone about plans for transition to comfort care. Per daughter Aria, she would like to wait to withdraw care and breathing tube until the morning (9/14) so that her brother Ramsey Muller may be present. The daughter requested that she be present via video conference during/after extubation. Dr. Zamora updated on plan. Per MD, continue all cares except do not restart tube feeding and do not need to check morning labs.     Nayla Ponce RN

## 2021-06-11 NOTE — PROGRESS NOTES
"  Clinical Nutrition Therapy Nutrition Support Note        Subjective:  Pt intubated.  TF started 8/30 at trophic rate.    Pt NPO this am for abcessogram.  Chart reviewed.        Nutrition Prescription:   Diet: Trophic TF started 8/30:  Replete no fiber at 15 ml/hr continuous.    Modulars:  No carb prosource 1 pkt 2x/day  Water flush 60 ml q 6 hrs.  IV dextrose or Fluids:fentaNYL citrate (PF), Last Rate: 50 mcg/hr (09/01/20 0439)  [Held by provider] furosemide, Last Rate: Stopped (08/29/20 2040)  norepinephrine, Last Rate: 0.02 mcg/kg/min (09/01/20 0104)  propofol, Last Rate: 10 mcg/kg/min (09/01/20 0535)  vasopressin, Last Rate: 2.4 Units/hr (09/01/20 0158)      Nutrition Intake:  Pt has OG placed 8/28/20  TF provides:  480 calories, 52 g protein, 44 g carb, 539 ml free water.   Note propofol provided 142 calories in past 24 hrs.  Nutrition needs not met    Anthropometrics:  Height: 5' 2\" (157.5 cm)  Admission weight: 195#  Weight: 198 lb 11.2 oz (90.1 kg)    Physical Findings:  Edema +2       GI Status/Output:   GI symptoms per nursing: rounded abdomen  Last BM recorded: 9/1, loose  Emesis 8/26.    Skin/Wound:   James Scale Score: 14    Per WOC note- gluteal cleft denudement noted.    Medications:  K and Mg replacement  Medications reviewed.    Labs:  Lab Results   Component Value Date/Time    PREALBUMIN 25.6 07/30/2019 12:17 PM    ALBUMIN 1.4 (L) 08/30/2020 05:17 AM     09/01/2020 03:57 AM    K 3.4 (L) 09/01/2020 03:57 AM    K 3.4 (L) 09/01/2020 03:57 AM    BUN 42 (H) 09/01/2020 03:57 AM    CREATININE 0.81 09/01/2020 03:57 AM     (H) 09/01/2020 03:57 AM    PHOS 4.2 09/01/2020 03:57 AM    MG 1.6 (L) 09/01/2020 03:57 AM   Labs reviewed    Estimated Nutrition Needs:  Using adjusted weight of 59 kg.    Energy Needs: 5676-9767 kcals daily per 25-30 kcal/kg   Protein Needs: 71-89 g daily, 1.2-1.5 g/kg.  Fluid Needs: 3233-2855 mls daily, 25-30 mls/kg    Malnutrition: Noted previously    Nutrition Risk " Level: high risk    Nutrition DX: inadequate intake r/t acute illness evidenced by NPO/CL x 6 days    Goals:  Meet nutrition needs-not met  Electrolytes WNL-progressing    Plan:  Start TF advance to goal:  Replete no fiber increase 10 ml q 8 hrs to goal of 60 ml/hr continuous.  Water flush 80 ml q6 hrs.  At goal provides for needs:  1440 calories, 89 g protein, 178 g carb, 0 fiber 1520 ml free water.  Stop No carb modulars.    Monitor:  Per goals.    See Care Plan for Problems, Goals, and Interventions.

## 2021-06-11 NOTE — PROGRESS NOTES
Respiratory Care Note    Pt currently weaning. Started with PS of 5, but RR increased into mid 30s, VT in low 200s, and RSBI over 105. ICU MD ok to have PS up to 20 for weaning. Increased PS to 10, then 15, and now pt is on 20. Seems to be tolerating well. RSBI 60s. MD aware of situation. Will let pt wean for awhile and continue to monitor.    GENO VenturaT

## 2021-06-11 NOTE — PLAN OF CARE
Problem: Breathing  Goal: STG - Patient will maintain patent airway  Outcome: Progressing  Goal: STG - Respiratory rate and effort will be within normal limits for the patient  Outcome: Progressing

## 2021-06-11 NOTE — PROGRESS NOTES
Patient continues on mechanical ventilation, tolerating well, sedated, BS slight congestion RUL, suction inline catheter small amount clear secretions. Duoneb tx given in line, Tolerated well. Weaning on hold per MD due to sedation and tachypnea with sedation interruption. RT to follow and assess.       Al Kennedy, LRT         09/12/20 0751   Patient Data   Vt Exp (mL) 441 mL   Minute Ventilation (L/min) 7.9 L/min   Total Resp Rate  17 BPM   PIP Observed (cm H2O) 35 cm H2O   MAP (cm H2O) 9   Auto/Intrinsic PEEP Observed (cm H2O) 4 cm H2O   Plateau Pressure (cm H2O) 25 cm H2O   Static Compliance (L/cm H2O) 21   Dynamic Compliance (L/cm H2O) 20 L/cm H2O   Airway Resistance 17

## 2021-06-11 NOTE — PLAN OF CARE
Problem: Discharge Barriers  Goal: Patient s discharge needs will be met  Outcome: Progressing  Care Plan-Care Management    Care Management Goals of the Day:   Progression of Care     Care Progression to be reviewed with MD and RN CM: Updates to be provided to Charge RN, CMSW, HUC,therapy and other disciplines as indicated.    Barriers to Discharge and plan:  Transition to Comfort Cares on Sunday 9/13    Family Care Conference: Last held on 9/11-decision to transition to comfort cares on 9/13     Discharge Disposition:   TBD    Expected Discharge Date:  TBD    Transportation:  TBD    Care Management/Coordination Narrative:   Family care conference held on 9/11. Plan to transition patient to comfort care measures on 9/13 so family is able to visit with patient prior to it occurring. Care manager to follow for further discharge planning as needed.    Filomena SUBRAMANIAN  09/12/20     Abbreviation  Code:  [employer] [place] Wheelchair ([facility] WC), [facility] ([facility] STR), Patient (pt), Transitional Care Unit (TCU), Skilled Nursing Facility (SNF), Long Term Care (LTC), Assisted Living ([facility] or AL), Care Management (CM), Physical Therapy (PT), Occupational Therapy (OT), Speech Therapy (ST), Respiratory Therapy (RT).

## 2021-06-11 NOTE — PROGRESS NOTES
Palliative Care Progress Note  NAME: Gardenia Muller, : 1950,   MRN: [id number] Date: 2020    Problem List:  Active Problems   Principal Problem:    Diverticulitis  Active Problems:    Bradyarrhythmia    Acute kidney injury (H)    Diverticulitis of large intestine with perforation and abscess without bleeding    Atrial fibrillation with rapid ventricular response (H)    Obstructive sleep apnea syndrome    Poisoning by digoxin, accidental or unintentional, initial encounter    Acute respiratory failure with hypoxia and hypercapnia (H)    Acute metabolic encephalopathy    Shock circulatory (H)    Metabolic acidosis    Other hypervolemia    Difficult ventilator weaning (H)    Assessment: Gardenia Muller, 70 y.o., female with a medical history of chronic A Fib (on digoxin and warfarin), diastolic HF, HTN, CAD, CVA, PAD, DM II, chronic malnutrition, chronic abdominal pain, and depression admitted on 2020 with abdominal pain, vomiting, and diarrhea and was found to have acute perforated diverticulitis w/small abscess formation (drain placement ). Unfortunately has had a complicated hospital course and transferred to ICU on  for digoxin toxicity causing hemodynamic instability and bradyarrhythmias. Required intubation on  and transitioned to comfort cares on .     Recommendations:   Weakness: remains minimally responsive, actively dying.   - Reposition for comfort as needed  - Haloperidol 0.5mg SL q8h    - Oral cares as able      Shortness of breath: remain somewhat shallow, irregular, and non labored.    - Morphine 5mg SL q4h    - Morphine PO/SL and IV available PRN pain/sob   - Supplemental O2 as needed for comfort      Goals of Care: Comfort      Code Status: DNR/I   =========================================================  Current Medical Status:  Patient remains minimally responsive. No family present at bedside. Did speak with son yesterday afternoon. Daughter in [place], may not  "make it back in time for visit.     Symptom-Focused ROS:  Unable to obtain due to pt being intubated/sedated      Palliative Care Focused Medications:  See MAR     PERTINENT PHYSICAL EXAMINATION:  Vital Signs: Blood pressure 128/63, pulse (!) 118, temperature (!) 100.6  F (38.1  C), temperature source Axillary, resp. rate 22, height 5' 2\" (1.575 m), weight 205 lb 14.4 oz (93.4 kg), last menstrual period 01/25/1999, SpO2 99 %, not currently breastfeeding.   GENERAL: minimally responsive   SKIN: Warm and dry   HEENT: Normocephalic, anicteric sclera,  LUNGS: coarse   CARDIAC: irregularly irregular, normal s1/s2, w/o m/r/g  : dias in place   EXTREMITIES: generalized edema   NEUROLOGIC: minimally responsive     I have reviewed labs and imaging in [facility]     ----------------------------------------------------  TT: I have personally spent a total of 15 minutes  today on the unit in review of medical record, consultation with the medical providers and assessment of patient today, with more than 50% of this time spent in counseling, coordination of care, and discussion with care team re: end of life care and symptom management.     Jose Raul Ramirez MD  [facility]  Palliative Medicine   Office: [phone]  "

## 2021-06-11 NOTE — SIGNIFICANT EVENT
Pt very labored breathing this am. fluctuates between periods of apnea lasting 12-20 seconds and tachypnea with loud gurgling noted. Heart rate 160's. Medicated with scheduled and prn sublingual MS and ativan. scheduled haldol also. Valeria from hospice at bedside visiting, Daughter, Aria, at bedside along with a couple cousins. Pt remains unresponsive. Continue medications for comfort and support family during this hard time. Offered spiritual care if/when needed.

## 2021-06-11 NOTE — PROGRESS NOTES
PULMONARY / CRITICAL CARE PROGRESS NOTE    Date / Time of Admission:  8/11/2020 10:16 AM    Assessment:   Principal Problem:    Diverticulitis  Active Problems:    Bradyarrhythmia    Acute kidney injury (H)    Diverticulitis of large intestine with perforation and abscess without bleeding    Atrial fibrillation with rapid ventricular response (H)    Obstructive sleep apnea syndrome    Poisoning by digoxin, accidental or unintentional, initial encounter    Acute respiratory failure with hypoxia and hypercapnia (H)    Acute metabolic encephalopathy    Shock circulatory (H)    Metabolic acidosis    Other hypervolemia  Digoxin toxicity      Advance Directives: No CPR, okay to intubate      Plan:   Neuro:  Patient was on Precedex this morning.  That has been turned off.  Fentanyl l has been discontinued as well and removed from a moderate.    Cardiovascular:  Coronary artery disease status post PCI in 1995.  Chronic atrial fibrillation on digoxin and warfarin.  Diastolic heart failure.  Patient developed digoxin toxicity while in the hospital with dig level of 6.9.  Received Digibind.  Required dopamine for bradycardia.  Eventually it was switched to norepinephrine.  Now off vasopressors.  Over 3 L negative.  Continue diuresis.    Respiratory:  Acute hypoxic respiratory failure.   Bilateral lung infiltrates.  Thought to be due to pulmonary edema but no improvement since cardiac issues resolved.  Superimposed pneumonia cannot be excluded given the sputum cultures are negative.  Continuing diuresis.    Pressure support trial.  We had to do 20/5 to get adequate tidal volume.  She is getting DuoNebs every 6 hours    GI:  Admitted with perforated diverticulitis and left lower quadrant abscess  Drain under CT guidance placed by interventional radiology on 8/17  Abscessogram on 8/24 showed no residual abscess pocket.  Fistula to the colon.  Drain exchanged by IR earlier this week.      :  Acute renal failure secondary to ATN  and presumed vancomycin toxicity.  Nephrology following.    ATN resolved.  Good urine output initially with high-dose furosemide and thiazide.  Now down to Lasix 20 mg IV twice daily.  DC maintenance IV fluids.    ID:  Currently on meropenem, daptomycin and fluconazole  Cultures from left lower quadrant abscess grew Ashley Dubliniensis and albicans.  Antibiotics per ID - meropenem, micafungin    Heme:  INR is subtherapeutic.  Warfarin is being dosed by pharmacy.  Added SQ Heprain until INR is in the therapeutic range.         Endocrine:  Non-insulin-dependent diabetes mellitus at baseline.  Treated with metformin.  Here despite receiving tube feeds her blood glucose is normal.  Will DC Accu-Cheks and sliding scale insulin every 4 hours.  We will continue monitoring blood glucose with her morning labs.    We will see if we could try and mobilize her.  Get her up sitting up.    ICU DAILY CHECKLIST                           Can patient transfer out of MICU? no    FAST HUG:    Feeding:  Feeding: Yes tube feeds  Bardales:Yes  Analgesia/Sedation:No   Thromboembolic prophylaxis: yes; Mode:  Coumadin, heparin subcu  HOB>30:  Yes  Stress Ulcer Protocol Active: yes; Mode: PPI  Glycemic Control: Any glucose > 180 no.  None indicated        Critical Care Time greater than: 38 minutes        Subjective:   HPI:  Gardenia Muller is a 70 y.o. female with history of chronic atrial fibrillation, diastolic heart failure, coronary artery disease, hypertension, CVA, diabetes mellitus type 2, calculus cholecystitis status post lap nimesh in 2018 and depression.  Presented here on 8/11 with nausea vomiting and diarrhea.  CT of the abdomen in the emergency room showed diverticulitis with perforation.  Principal Problem:    Diverticulitis  Active Problems:    Bradyarrhythmia    Acute kidney injury (H)    Diverticulitis of large intestine with perforation and abscess without bleeding    Atrial fibrillation with rapid ventricular response (H)     Obstructive sleep apnea syndrome    Poisoning by digoxin, accidental or unintentional, initial encounter    Acute respiratory failure with hypoxia and hypercapnia (H)    Acute metabolic encephalopathy    Shock circulatory (H)    Metabolic acidosis    Other hypervolemia      Allergies: Penicillins     MEDS:  Scheduled Meds:    amino acids-protein hydrolys  1 packet Enteral Tube BID     aspirin  81 mg Enteral Tube DAILY     chlorhexidine  15 mL Topical Q12H     furosemide  20 mg Intravenous BID - diuretic     heparin (PF) ANTICOAGULANT  5,000 Units Subcutaneous Q8H FIXED TIMES     ipratropium-albuteroL  3 mL Nebulization Q6H - RT     meropenem  1 g Intravenous Q8H     micafungin (MYCAMINE) IV  100 mg Intravenous Q24H     nystatin  5 mL Swish & Swallow QID     pantoprazole  40 mg Intravenous BID    Or     omeprazole  20 mg Oral BID    Or     omeprazole  20 mg Enteral Tube BID     potassium chloride liquid/packet  40 mEq Oral Q4H     potassium, sodium phosphates  1 packet Oral TID     [Held by provider] sertraline  100 mg Oral DAILY     sodium chloride  10 mL Intravenous Q8H FIXED TIMES     sodium chloride  10-30 mL Intravenous Q8H FIXED TIMES     ticagrelor  90 mg Enteral Tube BID     warfarin - daily dose required   Other Med Consult or Protocol     Continuous Infusions:    dexmedetomidine infusion orderable (PRECEDEX) Stopped (09/05/20 0957)     norepinephrine Stopped (09/05/20 2017)     PRN Meds:.alteplase, atropine, bacitracin, bisacodyL, carboxymethylcellulose, codeine-guaiFENesin, dextrose 50 % (D50W), glucagon (human recombinant), hydrOXYzine pamoate, lidocaine (PF), lipase-protease-amylase **AND** sodium bicarbonate, metoclopramide, [Held by provider] metoprolol tartrate, naloxone **OR** naloxone, oxyCODONE, polyethylene glycol, sodium chloride 0.9%, sodium chloride bacteriostatic, sodium chloride, sodium chloride, sodium chloride, traZODone      Objective:   VITALS:  /49   Pulse 75   Temp 98.4  F  "(36.9  C) (Axillary)   Resp 17   Ht 5' 2\" (1.575 m)   Wt 202 lb 2.6 oz (91.7 kg)   LMP 01/25/1999   SpO2 97%   BMI 36.98 kg/m    EXAM:  General appearance: On mechanical ventilation.  Appears comfortable.  Head: Normocephalic, without obvious abnormality, atraumatic  Lungs: clear to auscultation bilaterally  Heart: Irregularly irregular.  Abdomen: Soft, nontender.  Extremities: Bilateral edema  Neurologic: Follows basic commands.    I&O:      Intake/Output Summary (Last 24 hours) at 9/6/2020 1130  Last data filed at 9/6/2020 0800  Gross per 24 hour   Intake 918.62 ml   Output 2025 ml   Net -1106.38 ml       Data Review:  Chest X-Ray: Vascular congestion    CBC:   Lab Results   Component Value Date    WBC 11.9 (H) 09/06/2020    RBC 2.46 (L) 09/06/2020     BMP:   Lab Results   Component Value Date    CO2 29 09/06/2020    BUN 20 09/06/2020    CREATININE 0.51 (L) 09/06/2020    CALCIUM 10.2 09/06/2020     Serum Glucose range:Invalid input(s): GLUCOSEPOCT    By:  Tyrone Robles, 9/6/2020, 1:05 PM    Primary Care Physician:  Jerson Hernandez MD                   "

## 2021-06-11 NOTE — PLAN OF CARE
Care Plan  Care Management      Care Management Goals of the Day: Progression of Care / Goals of Care discussion    Care Progression Reviewed With: Charge RN, MD, HUC, RNCM, CMSW    Barriers to Discharge: Medical Management, intubated    Family Care Conference: Daughter to visit 9/11 to discuss goals of care    Discharge Disposition: TBD    Expected Discharge Date: TBD    Transportation: TBD      Care Coordination Narrative: Pt resides at [facility] in [place] ([phone])  Care Manager is Hilario ([phone]). Pt's daughter Aria is the primary contact and healthcare agent. Aria resides out of state but intends to visit 9/11/20 to discuss goals of care with her mother and the care team. CM following      RICK Xavier  Care Manager  09/11/20   1:58 PM       Abbreviation  Code:  [facility] Wheelchair ([facility] WC), [facility] ([facility] STR), Patient (pt), Transitional Care Unit (TCU), Skilled Nursing Facility (SNF), Physical Therapy (PT), Occupational Therapy (OT), Speech Therapy (ST [place]), Respiratory Therapy (RT)

## 2021-06-11 NOTE — PLAN OF CARE
Problem: Discharge Barriers  Goal: Patient s discharge needs will be met  Outcome: Progressing  Care Plan-Care Management    Care Management Goals of the Day:   Progression of Care     Care Progression to be reviewed with MD and RN CM: Updates to be provided to Charge RN, CMSW, HUC,therapy and other disciplines as indicated.    Barriers to Discharge and plan:  Plan to transition to comfort cares today 9/13 at 1600    Family Care Conference: Family coming to see patient prior to transition to comfort cares     Discharge Disposition:   TBD    Expected Discharge Date:  TBD    Transportation:  TBD    Care Management/Coordination Narrative:   Plan to likely transition to comfort care measures today 9/13 at 1600. Family coming from out of town to see patient. Care manager to follow as needed.    Filomena Rojo RN  09/13/20     Abbreviation  Code:  [employer] [facility] Wheelchair ([facility] WC), [facility] ([facility] STR), Patient (pt), Transitional Care Unit (TCU), Skilled Nursing Facility (SNF), Long Term Care (LTC), Assisted Living ([facility] or AL), Care Management (CM), Physical Therapy (PT), Occupational Therapy (OT), Speech Therapy (ST), Respiratory Therapy (RT).

## 2021-06-11 NOTE — PROGRESS NOTES
"Vent day 10    oETT 7.0  19 @ teeth     Diverticulitis  Active Problems:    Bradyarrhythmia    Acute kidney injury     Diverticulitis of large intestine with perforation and abscess without bleeding    Atrial fibrillation with rapid ventricular response     Obstructive sleep apnea syndrome    Poisoning by digoxin, accidental or unintentional, initial encounter    Acute respiratory failure with hypoxia and hypercapnia     Acute metabolic encephalopathy    Shock circulatory     Metabolic acidosis  Digoxin toxicity    Acute hypoxic respiratory failure.   Bilateral lung infiltrates.  Thought to be due to pulmonary edema but no improvement since cardiac issues resolved.  Superimposed pneumonia cannot be excluded given the sputum cultures are negative.  Continuing diuresis.  Attempted SBT.  Low tidal volumes and tachypnea despite pressure support up to 12.  We will try again tomorrow.    /55   Pulse 67   Temp 98.4  F (36.9  C) (Axillary)   Resp 16   Ht 5' 2\" (1.575 m)   Wt 202 lb 2.6 oz (91.7 kg)   LMP 01/25/1999   SpO2 98%   BMI 36.98 kg/m      Vent Mode: VCV  FiO2 (%):  [30 %] 30 %  S RR:  [16] 16  S VT:  [450 mL] 450 mL  PEEP/CPAP (cm H2O):  [5 cm H2O] 5 cm H2O  Minute Ventilation (L/min):  [7.1 L/min-9.3 L/min] 7.2 L/min  PIP:  [5 cm H2O-36 cm H2O] 36 cm H2O  MAP (cm H2O):  [5-16] 11     pplat 21  Driving pressure - 16    Moderate white thick secretions / copious oral secretions    No issues during noc, continue to monitor and attempt weaning from mechanical ventilation.    Yamilet Suero LRT      "

## 2021-06-11 NOTE — PROGRESS NOTES
[facility] Daily Progress Note    Assessment/Plan:  Gardenia Muller is a 70-year-old lady with atrial fibrillation, diastolic congestive heart failure, hypertension, coronary artery disease status post PCI 1995, CVA, peripheral vascular disease, T2DM, malnutrition, intestinal angina, acalculous cholecystitis status post laparoscopic cholecystectomy in 2018, depression who came into [facility] on 8/11/2020 for abdominal pain and vomiting.  CT scan of the abdomen revealed diverticulitis with perforation.  On 814 it showed increased size of abscess from 2x2 cm to 4x3 cm.  Though there was clinical improvement in her symptoms.  General surgery was consulted who recommended interventional radiology consultation for possible drainage of the abdominal abscess which was done on 8/17/2020 by placing CT scan guided drain in the left pelvic diverticular abscess. On 8/20/2020 CT scan of the abdomen showed near collapse of the fluid cavity. Subsequently patient developed leukocytosis with possible hospital-acquired pneumonia seen on chest x-ray.  She also had Candida infection for which fluconazole was started with improvement of WBC count.  On 825 patient had shortness of breath with 25 pound weight gain and was started on IV Lasix.  On 826 developed acute kidney injury and had episodes of bradycardia.  Digoxin level was elevated at 6.9 on 8/26/2020 and 10.7 on 8/28/2024 which DigiFab was started and patient transferred to ICU.  Required atropine in ICU for heart rate which went down between 20 and 40.  Started on dopamine.  Developed respiratory failure. Abscessogram done on 8/31/2020 showed contracted abscess cavity with persistent fistula to the sigmoid colon with drain exchanged and tailored 10 [place] drain was placed lateral to the fistula site.  She underwent abscessogram with tube check on 09/09, and as there were no more fluid collection, the drain was removed.  Family care conference was held on 09/11, and as  patient's wishes were not to pursue prolonged ventilator support and tracheostomy, extubated on 9/14, transitioned to comfort care.      Assessment:    Digoxin toxicity  Acute kidney injury  Acute perforated diverticulitis with pericolonic abscess  Sigmoid colon fistula   Hospital-acquired pneumonia   Candida bacteremia   Acute exacerbation on chronic diastolic CHF  Acute anemia  Atrial fibrillation  Type 2 diabetes mellitus  Acute Metabolic encephalopathy   Coronary Artery Disease     Plan:  Patient is on comfort cares.  Hospice is following.  Patient had fever and will get Tylenol for this.     Code status:No CPR- Do NOT Intubate        Subjective:  Patient is having apneic spells though looks comfortable   I did inform the family (daughter Aria who has traveled from [place] about imminent death with patient having apneic spells.     Review of Systems:   She is not responding to questions    Current Medications Reviewed via EHR List    Objective:  Vital signs in last 24 hours:  Temp:  [101.7  F (38.7  C)-102.6  F (39.2  C)] 101.7  F (38.7  C)  Heart Rate:  [158-167] 167  Resp:  [26] 26  BP: (109)/(59) 109/59  SpO2:  [98 %] 98 %    Intake/Output last 3 shifts:  I/O last 3 completed shifts:  In: -   Out: 150 [Urine:150]      Physical Exam:  EYES: Are closed unable to communicate  NECK: no JVD  HEART : S1 S2 RRR    LUNGS: Crackles in the lung base  ABDOMEN:   Soft, No tenderness ?, Bowel sounds are positive  CNS patient is comfortable not responding to voice having apneic spells           Lab Results:  Personally Reviewed.            Advanced Care Planning  Discharge plan: Unknown at this time      Michael Abreu  Date: 9/19/2020  Time: 3:41 PM  Hospitalist Service  Part of this chart was created using a dictation software. Typographic errors, word substitutions, and Grammatical errors may unintentionally occur.

## 2021-06-11 NOTE — PROGRESS NOTES
Wound Ostomy  WOC Assessment         Allergies:  Penicillins    Diagnosis:   Patient Active Problem List    Diagnosis Date Noted     Acute respiratory failure with hypoxia and hypercapnia (H)      Acute metabolic encephalopathy      Shock circulatory (H)      Metabolic acidosis      Poisoning by digoxin, accidental or unintentional, initial encounter      Atrial fibrillation with rapid ventricular response (H)      Obstructive sleep apnea syndrome      Supratherapeutic INR 08/12/2020     Diverticulitis of large intestine with perforation and abscess without bleeding      Diverticulitis 08/11/2020     Acute kidney failure, unspecified (H) 07/24/2020     Acute liver failure without hepatic coma 07/23/2020     Hematochezia 07/23/2020     Hyperkalemia 07/23/2020     Acute on chronic combined systolic (congestive) and diastolic (congestive) heart failure (H) 07/23/2020     Ischemic cardiomyopathy 07/23/2020     Trigger finger, acquired 07/02/2020     Class 1 obesity with serious comorbidity and body mass index (BMI) of 33.0 to 33.9 in adult, unspecified obesity type 02/07/2020     Dysuria 08/20/2019     Hypertrophy of nail 07/30/2019     Severe protein-calorie malnutrition (H) 07/03/2019     Weight loss, non-intentional 07/01/2019     Warfarin anticoagulation 07/01/2019     Chronic abdominal pain 06/27/2019     Intestinal angina (H) 06/16/2019     Microalbuminuria 02/12/2019     Hyperparathyroidism (H) 01/15/2019     Onychogryphosis 01/11/2019     Lipoma of back 11/15/2018     Physical deconditioning 11/04/2018     Acute kidney injury (H) 10/22/2018     Transaminitis 10/22/2018     Bradyarrhythmia      Persistent atrial fibrillation (H)      Anticoagulant long-term use      Acalculous cholecystitis 10/03/2018     (HFpEF) heart failure with preserved ejection fraction (H) 09/18/2018     Anxiety 08/21/2018     Ventral hernia without obstruction or gangrene 03/15/2018     Coronary artery disease involving native coronary  "artery of native heart without angina pectoris 02/26/2018     Dyslipidemia 02/26/2018     Incisional hernia, without obstruction or gangrene 09/20/2017     RLS (restless legs syndrome) 05/24/2017     Diverticular disease of large intestine 03/23/2017     Cerebrovascular disease 01/16/2017     Healthcare maintenance 03/07/2016     Cystitis 03/05/2016     Acute diastolic heart failure (H) 11/2015     Advanced directives, counseling/discussion 08/05/2015     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)      Spinal stenosis      PAD (peripheral artery disease) (H)      Dermatosis Papulosa Nigra      Depression      Hypercalcemia      Right Renal Artery Stenosis      Osteoarthritis      Abnormality Of Walk      Essential hypertension 07/02/2010     Cerebral infarction (H) 07/02/2010     Anemia 07/02/2010       Height:  5' 2\" (1.575 m)    Weight:   207 lb 4.8 oz (94 kg)    Labs:  Recent Labs     08/30/20  0517   ALBUMIN 1.4*       James:  James Scale Score: 11    Specialty Bed:  Specialty Bed: [facility] Pro (ICU only) (STACH)    Wound culture obtained: No    Edema:  Yes:  Generalized    Full body skin assessment completed except scalp (patient with cap in place).  Anatomic Site/Laterality: Gluteal cleft    Reason for ongoing care:   Wound assessment and plan of care    Encounter Type:  Initial Wound Type:   Denudement:  Foreign body present? No    Tissue Damage:   Breakdown of skin    Related trauma: Intertrigo    Assessment:    Length: 3 cm    Width: 0.5 cm    Tunneling/Undermining: No    Wound Bed: Pink, viable 100% Agranular    Exudate: No    Periwound Skin: Intact    Treatment Plan: Silicone foam       Nursing care provided was coordinated care with RN and repositioned.    Discussed plan of care with nurse and patient as able.    Outcomes and treatment recommendations are to promote skin integrity and promote wound healing.    Actions taken by WOC RN: 5 minutes of education and WOC Discharge " recommendations entered.    Planned Follow Up: Weekly.    Plan for next visit: Reassess wound(s) and Reassess skin integrity.

## 2021-06-11 NOTE — PROGRESS NOTES
PULMONARY / CRITICAL CARE PROGRESS NOTE    Date / Time of Admission:  8/11/2020 10:16 AM    Assessment:   Principal Problem:    Diverticulitis  Active Problems:    Bradyarrhythmia    Acute kidney injury (H)    Diverticulitis of large intestine with perforation and abscess without bleeding    Atrial fibrillation with rapid ventricular response (H)    Obstructive sleep apnea syndrome    Poisoning by digoxin, accidental or unintentional, initial encounter    Acute respiratory failure with hypoxia and hypercapnia (H)    Acute metabolic encephalopathy    Shock circulatory (H)    Metabolic acidosis    Other hypervolemia    70yoF with history of DMII, HFpEF, afib on coumadin presented with perforated diverticulitis causing abscess resulting in acute kidney injury and respiratory failure now intubated, not felt to be a good surgical candidate.     Advance Directives:  Full Code      Plan:   Pulmonary:  1)Acute hypoxic respiratory failure  2) Pulmonary edema  3) Pneuonia  -Negative sputum cultures  -Completed course of antibiotics  -Ongoing diuresis  -Failing daily weans even while in the chair.     C/V:  1) Afib with RVR--resolved  2) Dig Toxicity--resolved  3) Hypotension--resolved  -Levophed for MAP>60  -Gentle Diuresis ongoing  -Brillinta  -Anticoagulated for atrial fibrillation  -Lopressor for HR control.     Renal:  1) MAY on CKD--resolved  -Increase free water flushes slightly  -Continue gentle diuresis    GI:  1) perforated diverticulitis  -Continue tube feeds, attempt to reach goal    Neuro:  1) Sedation requirements  -No benefit to propofol still failing, switch back to Precedex    ID: perforated diverticulitis  Pneumonia seen on abdominal CT  -Appreciate ID  -Meropenem, micafungin. Drain grew candida  -sputum culture pending  -Likely plan for abscessogram this week--awaiting IR      ICU DAILY CHECKLIST                           Can patient transfer out of MICU? no    FAST HUG:    Feeding:  Feeding: No.  Patient is  receiving tube feeds  Bardales:Yes  Analgesia/Sedation:Yes propofol  Thromboembolic prophylaxis: yes; Mode:  Coumadin  HOB>30:  Yes  Stress Ulcer Protocol Active: yes; Mode: PPI  Glycemic Control: Any glucose > 180 no; Mode of Insulin Therapy: Sliding Scale Insulin    INTUBATED:  Can patient have daily waking:  no  Can patient have spontaneous breathing trial:  no    Restraints? Yes    PROVIDER RESTRAINT FOR NON-VIOLENT BEHAVIOR FACE TO FACE EVALUATION    Patient's Immediate Situation:  Patient demonstrated the following behaviors: Pulling/tugging at invasive lines or tubes and does not respond to verbal/non-verbal redirection    Patient's Reaction to the intervention:  Does patient understand the reason for restraint/seclusion? No    Medical Condition:  Is there any evidence of compromise of Skin integrity, Respiratory, Cardiovascular, Musculoskeletal, Hydration? No    Behavioral Condition:  In consultation with the RN, is there a need to continue this restraint or seclusion? Yes    See Restraint Flowsheet for complete restraint documentation and assessment.    Sandra Lilly MD        Critical Care Time greater than: 45 Minutes-this patient is critically ill on mechanical ventilation.    **Discussed with daughter Aria. She plans to be here in person Thursday and discuss with her mom.         Subjective:   HPI:  Gardenia Muller is a 70 y.o. female with chronic afib on anticoagulation, HFpEF, CAD, CVA, DMII who presented 8/11 with nausea and vomiting. CT found perforated diverticulitis. Drain placed into abscess and antibiotics initiated given her poor candidacy for surgery. Worsening renal failure in setting of Afib with RVR and became digoxin-toxic. Received digibind with improvement in HR. Renal failure impressive, but on lasix drip began making urine and diuresed. Intubated for pulmonary edema and pneumonia with copious secretions (no bacteria identified). On minimal vent settings now hemodynamically stable  but without ability to SBT well.    Propofol attempted today with SBT up in chair, still low volumes with rates 40s despite 12/5.      Principal Problem:    Diverticulitis  Active Problems:    Bradyarrhythmia    Acute kidney injury (H)    Diverticulitis of large intestine with perforation and abscess without bleeding    Atrial fibrillation with rapid ventricular response (H)    Obstructive sleep apnea syndrome    Poisoning by digoxin, accidental or unintentional, initial encounter    Acute respiratory failure with hypoxia and hypercapnia (H)    Acute metabolic encephalopathy    Shock circulatory (H)    Metabolic acidosis    Other hypervolemia      Allergies: Penicillins     MEDS:  Scheduled Meds:    aspirin  81 mg Enteral Tube DAILY     chlorhexidine  15 mL Topical Q12H     furosemide  20 mg Intravenous Q8H     guar gum  2 packet Enteral Tube DAILY     ipratropium-albuteroL  3 mL Nebulization Q6H - RT     meropenem  1 g Intravenous Q8H     metoprolol tartrate  25 mg Enteral Tube BID     micafungin (MYCAMINE) IV  100 mg Intravenous Q24H     nystatin  5 mL Swish & Swallow QID     pantoprazole  40 mg Intravenous BID    Or     omeprazole  20 mg Oral BID    Or     omeprazole  20 mg Enteral Tube BID     potassium, sodium phosphates  1 packet Oral TID     sertraline  50 mg Enteral Tube DAILY     sodium chloride  10 mL Intravenous Q8H FIXED TIMES     sodium chloride  10-30 mL Intravenous Q8H FIXED TIMES     ticagrelor  90 mg Enteral Tube BID     warfarin - daily dose required   Other Med Consult or Protocol     warfarin ANTICOAGULANT  2.5 mg Oral Once Warfarin     Continuous Infusions:    dexmedetomidine infusion orderable (PRECEDEX) Stopped (09/05/20 0957)     norepinephrine Stopped (09/05/20 2017)     propofol 10 mcg/kg/min (09/08/20 1504)     PRN Meds:.alteplase, atropine, bacitracin, bisacodyL, carboxymethylcellulose, codeine-guaiFENesin, dextrose 50 % (D50W), glucagon (human recombinant), hydrOXYzine pamoate, lidocaine  "(PF), lipase-protease-amylase **AND** sodium bicarbonate, metoclopramide, naloxone **OR** naloxone, oxyCODONE, polyethylene glycol, sodium chloride 0.9%, sodium chloride bacteriostatic, sodium chloride, sodium chloride, sodium chloride, traZODone      Objective:   VITALS:  /50   Pulse 78   Temp 98.4  F (36.9  C)   Resp 19   Ht 5' 2\" (1.575 m)   Wt 180 lb 8.9 oz (81.9 kg)   LMP 01/25/1999   SpO2 100%   BMI 33.02 kg/m    EXAM:  General appearance: sedated but easily arousable  Lungs: clear bilaterally  Heart: irregularly irregular rhythm  Abdomen: soft, non-tender; bowel sounds normal; no masses,  no organomegaly  Extremities: extremities normal, atraumatic, no cyanosis or edema  Skin: Skin color, texture, turgor normal. No rashes or lesions  Neurologic: follows commands but falls back to sleep.     I&O:      Intake/Output Summary (Last 24 hours) at 9/8/2020 1514  Last data filed at 9/8/2020 1400  Gross per 24 hour   Intake 2376.1 ml   Output 1135 ml   Net 1241.1 ml       Data Review:  CXR personally interpreted  EXAM: XR CHEST 1 VIEW PORTABLE  LOCATION: [facility]  DATE/TIME: 8/28/2020 3:39 PM     INDICATION: Postintubation.  COMPARISON: 08/27/2020.     IMPRESSION:      ET tube tip at the level of the emily; recommend 1 to 2 cm retraction.     New feeding tube coursing into the stomach and off the field-of-view. Stable right PICC.     Similar to marginally improved coarse interstitial appearance and opacities bilaterally. No substantial pleural effusion. No pneumothorax. Stable enlarged cardiac silhouette.        ET tube tip findings verbally communicated to patient's nurse, Lavern, at 3:45 PM on 08/28/2020.     NOTE: ABNORMAL REPORT     THE DICTATION ABOVE DESCRIBES AN ABNORMALITY FOR WHICH FOLLOW-UP IS NEEDED.     Results for orders placed or performed during the hospital encounter of 08/11/20   Basic Metabolic Panel   Result Value Ref Range    Sodium 145 136 - 145 mmol/L    Potassium 3.5 " 3.5 - 5.0 mmol/L    Chloride 109 (H) 98 - 107 mmol/L    CO2 29 22 - 31 mmol/L    Anion Gap, Calculation 7 5 - 18 mmol/L    Glucose 135 (H) 70 - 125 mg/dL    Calcium 10.9 (H) 8.5 - 10.5 mg/dL    BUN 16 8 - 28 mg/dL    Creatinine 0.54 (L) 0.60 - 1.10 mg/dL    GFR MDRD Af Amer >60 >60 mL/min/1.73m2    GFR MDRD Non Af Amer >60 >60 mL/min/1.73m2     Lab Results   Component Value Date    WBC 12.2 (H) 09/08/2020    HGB 7.4 (L) 09/08/2020    HCT 24.9 (L) 09/08/2020     (H) 09/08/2020     09/08/2020       By:  Sandra Lilly MD, 9/8/2020, 2:31 PM    Primary Care Physician:  Jerson Hernandez MD

## 2021-06-11 NOTE — PLAN OF CARE
Problem: Inadequate Intake (NI-2.1)  Goal: Food and/or nutrient delivery  Outcome: Not Progressing     Problem: Malnutrition  (NI-5.2)  Goal: Food and/or nutrient delivery  Outcome: Not Progressing   TF on hold yesterday and this am d/t elevated gastric residuals to 650 mls.  No residuals this am.  Resume trophic TF of Replete no fiber at 15 ml/hr today.  Monitor for tolerance.  Last BM 9/2 per charting.

## 2021-06-11 NOTE — PROGRESS NOTES
Palliative Care Progress Note  NAME: Gardenia Muller, : 1950,   MRN: [id number] Date: 2020    Problem List:  Active Problems   Principal Problem:    Diverticulitis  Active Problems:    Bradyarrhythmia    Acute kidney injury (H)    Diverticulitis of large intestine with perforation and abscess without bleeding    Atrial fibrillation with rapid ventricular response (H)    Obstructive sleep apnea syndrome    Poisoning by digoxin, accidental or unintentional, initial encounter    Acute respiratory failure with hypoxia and hypercapnia (H)    Acute metabolic encephalopathy    Shock circulatory (H)    Metabolic acidosis    Other hypervolemia    Difficult ventilator weaning (H)    Assessment: Gardenia Muller, 70 y.o., female with a medical history of chronic A Fib (on digoxin and warfarin), diastolic HF, HTN, CAD, CVA, PAD, DM II, chronic malnutrition, chronic abdominal pain, and depression admitted on 2020 with abdominal pain, vomiting, and diarrhea and was found to have acute perforated diverticulitis w/small abscess formation (drain placement ). Unfortunately has had a complicated hospital course and transferred to ICU on  for digoxin toxicity causing hemodynamic instability and bradyarrhythmias. Required intubation on  and transitioned to comfort cares on .     Recommendations:   Weakness: less responsive this morning, appears to be transitioning to actively dying phase.   - Reposition for comfort as needed   - Oral cares as able      Shortness of breath: labored and quick this morning, more shallow, likely transitioning to actively dying phase.    - Morphine 5mg SL increased to q4h    - Morphine PO/SL and IV available PRN pain/sob   - Supplemental O2 as needed for comfort      Goals of Care: Comfort  - Hospice following, recommending OLOP.        Code Status: DNR/I   =========================================================  Current Medical Status:  Patient unable to respond to  "questions this morning. Appears more withdrawn, lethargic, with quick pursed lipped labored breathing. Cares discussed with bedside RN.     Symptom-Focused ROS:  Unable to obtain due to pt being intubated/sedated      Palliative Care Focused Medications:  See MAR     PERTINENT PHYSICAL EXAMINATION:  Vital Signs: Blood pressure 135/62, pulse (!) 127, temperature 98.2  F (36.8  C), temperature source Oral, resp. rate 26, height 5' 2\" (1.575 m), weight 205 lb 14.4 oz (93.4 kg), last menstrual period 01/25/1999, SpO2 98 %, not currently breastfeeding.   GENERAL: minimally responsive   SKIN: Warm and dry   HEENT: Normocephalic, anicteric sclera,  LUNGS: coarse   CARDIAC: irregularly irregular, normal s1/s2, w/o m/r/g  : dias in place   EXTREMITIES: generalized edema   NEUROLOGIC: minimally responsive     I have reviewed labs and imaging in [facility]     ----------------------------------------------------  TT: I have personally spent a total of 15 minutes  today on the unit in review of medical record, consultation with the medical providers and assessment of patient today, with more than 50% of this time spent in counseling, coordination of care, and discussion with care team re: end of life care and symptom management.     Jose Raul Ramirez MD  [facility]  Palliative Medicine   Office: [phone]  "

## 2021-06-11 NOTE — PROGRESS NOTES
Pt is being managed by intensivist. Will resume care when out of ICU.    In summary:  Gardenia Muller is a 70-year-old lady with atrial fibrillation, diastolic congestive heart failure, hypertension, coronary artery disease status post PCI 1995, CVA, peripheral vascular disease, T2DM, malnutrition, intestinal angina, acalculous cholecystitis status post laparoscopic cholecystectomy in 2018, depression who came into [facility] on 8/11/2020 for abdominal pain and vomiting.  CT scan of the abdomen revealed diverticulitis with perforation.  On 8/14 it showed increased size of abscess from 2x2 cm to 4x3 cm.  Though there was clinical improvement in her symptoms,  general surgery was consulted who recommended interventional radiology consultation for possible drainage of the abdominal abscess which was done on 8/17/2020 by placing CT scan guided drain in the left pelvic diverticular abscess. On 8/20/2020 CT scan of the abdomen showed near collapse of the fluid cavity. Subsequently patient developed leukocytosis with possible hospital-acquired pneumonia seen on chest x-ray.  She also had Candida infection for which fluconazole was started with improvement of WBC count.  On 8/25 patient had shortness of breath with 25 pound weight gain, developed acute respiratory failure needing intubation. Pt also on  IV Lasix and abx..  On 8/26 developed acute kidney injury and had episodes of bradycardia.  Digoxin level was elevated at 6.9 on 8/26/2020 and 10.7 on 8/28/2024 which DigiFab was started and patient transferred to ICU.  Required atropine in ICU for heart rate which went down between 20 and 40. Pt also needed dopamine which was then switched to norepinephrine. All pressors have been d/verona now.  There is still fluid around sigmoid colon and rectum.  Abscessogram done on 8/31/2020 showed contracted abscess cavity with persistent fistula to the sigmoid colon with drain exchanged and tailored 10 [place] drain was  placed lateral to the fistula site.  There is a plan to do another abscessogram likely in this week.     Nephrology and cardiology  signed off.  ID, palliative following  Surgery following remotely.     Marissa Urrutia MD  Hospitalist.

## 2021-06-11 NOTE — PLAN OF CARE
Problem: Pain  Goal: Patient's pain/discomfort is manageable  Outcome: Progressing     Problem: Safety  Goal: Patient will be injury free during hospitalization  Outcome: Progressing     Problem: Daily Care  Goal: Daily care needs are met  Outcome: Progressing     Problem: Psychosocial Needs  Goal: Demonstrates ability to cope with hospitalization/illness  Outcome: Progressing  Goal: Collaborate with patient/family/caregiver to identify patient specific goals for this hospitalization  Outcome: Progressing     Problem: Discharge Barriers  Goal: Patient's discharge needs are met  Outcome: Progressing   Pt is bedfast, drowsy but can be aroused.  Does open eyes with name, and receiving oral pain medications.  Pt appears comfortable.  Will continue to monitor for good pain control.

## 2021-06-11 NOTE — PROGRESS NOTES
PULMONARY / CRITICAL CARE PROGRESS NOTE    Date / Time of Admission:  8/11/2020 10:16 AM    Assessment:   Principal Problem:    Diverticulitis  Active Problems:    Bradyarrhythmia    Acute kidney injury (H)    Diverticulitis of large intestine with perforation and abscess without bleeding    Atrial fibrillation with rapid ventricular response (H)    Obstructive sleep apnea syndrome    Poisoning by digoxin, accidental or unintentional, initial encounter    Acute respiratory failure with hypoxia and hypercapnia (H)    Acute metabolic encephalopathy    Shock circulatory (H)    Metabolic acidosis    Other hypervolemia  ETT cuff leak    70 year old female with history of DMII, HFpEF, afib on coumadin presented with perforated diverticulitis causing abscess resulting in acute kidney injury and respiratory failure now intubated, not felt to be a good surgical candidate.     Advance Directives:  Full Code      Plan:   Pulmonary:  1)Acute hypoxic respiratory failure  2) Pulmonary edema  3) Pneumonia  4) ETT cuff leak   -Negative sputum cultures  -Completed course of antibiotics  -Ongoing diuresis  -Failing daily weans even while in the chair.   -even with ETT cuff leak, pt still getting good VT    C/V:  1) Afib with RVR--resolved  2) Dig Toxicity--resolved  3) Hypotension--resolved  -Levophed for MAP>60  -Gentle Diuresis ongoing  -Brillinta  -Anticoagulated for atrial fibrillation  -Lopressor for HR control.     Renal:  1) MAY on CKD--resolved  -free water flushes increased 9/8  -Continue gentle diuresis    GI:  1) perforated diverticulitis  -Continue tube feeds, attempt to reach goal    Neuro:  1) Sedation requirements  -No benefit to propofol still failing, switch back to Precedex    ID: perforated diverticulitis  Pneumonia seen on abdominal CT  -Appreciate ID  -Meropenem, micafungin. Drain grew candida  -sputum culture pending  -abscessogram completed today(9/9) No residual abscess cavity or fistula. Given that the drain  has had no output it was removed.      ICU DAILY CHECKLIST                           Can patient transfer out of MICU? no    FAST HUG:    Feeding:  Feeding: No.  Patient is receiving tube feeds  Bardales:Yes  Analgesia/Sedation:Yes propofol  Thromboembolic prophylaxis: yes; Mode:  Coumadin  HOB>30:  Yes  Stress Ulcer Protocol Active: yes; Mode: PPI  Glycemic Control: Any glucose > 180 no; Mode of Insulin Therapy: Sliding Scale Insulin    INTUBATED:  Can patient have daily waking:  no  Can patient have spontaneous breathing trial:  no    Restraints? Yes    PROVIDER RESTRAINT FOR NON-VIOLENT BEHAVIOR FACE TO FACE EVALUATION    Patient's Immediate Situation:  Patient demonstrated the following behaviors: Pulling/tugging at invasive lines or tubes and does not respond to verbal/non-verbal redirection    Patient's Reaction to the intervention:  Does patient understand the reason for restraint/seclusion? No    Medical Condition:  Is there any evidence of compromise of Skin integrity, Respiratory, Cardiovascular, Musculoskeletal, Hydration? No    Behavioral Condition:  In consultation with the RN, is there a need to continue this restraint or seclusion? Yes    See Restraint Flowsheet for complete restraint documentation and assessment.    Tierra Alberto CNP        Critical Care Time greater than: 40 Minutes-this patient is critically ill on mechanical ventilation.    Appreciate Dr Ramirez's involvement  He will talk with pt and daughter tomorrow         Subjective:   HPI:  Gardenia Muller is a 70 y.o. female with chronic afib on anticoagulation, HFpEF, CAD, CVA, DMII who presented 8/11 with nausea and vomiting. CT found perforated diverticulitis. Drain placed into abscess and antibiotics initiated given her poor candidacy for surgery. Worsening renal failure in setting of Afib with RVR and became digoxin-toxic. Received digibind with improvement in HR. Renal failure impressive, but on lasix drip began making urine and  diuresed. Intubated for pulmonary edema and pneumonia with copious secretions (no bacteria identified). On minimal vent settings now hemodynamically stable but without ability to SBT well..      Principal Problem:    Diverticulitis  Active Problems:    Bradyarrhythmia    Acute kidney injury (H)    Diverticulitis of large intestine with perforation and abscess without bleeding    Atrial fibrillation with rapid ventricular response (H)    Obstructive sleep apnea syndrome    Poisoning by digoxin, accidental or unintentional, initial encounter    Acute respiratory failure with hypoxia and hypercapnia (H)    Acute metabolic encephalopathy    Shock circulatory (H)    Metabolic acidosis    Other hypervolemia      Allergies: Penicillins     MEDS:  Scheduled Meds:    aspirin  81 mg Enteral Tube DAILY     chlorhexidine  15 mL Topical Q12H     furosemide  20 mg Intravenous Q8H     guar gum  2 packet Enteral Tube DAILY     ipratropium-albuteroL  3 mL Nebulization Q6H - RT     meropenem  1 g Intravenous Q8H     metoprolol tartrate  25 mg Enteral Tube BID     micafungin (MYCAMINE) IV  100 mg Intravenous Q24H     nystatin  5 mL Swish & Swallow QID     pantoprazole  40 mg Intravenous BID    Or     omeprazole  20 mg Oral BID    Or     omeprazole  20 mg Enteral Tube BID     sertraline  50 mg Enteral Tube DAILY     sodium chloride  10 mL Intravenous Q8H FIXED TIMES     sodium chloride  10-30 mL Intravenous Q8H FIXED TIMES     ticagrelor  90 mg Enteral Tube BID     warfarin - daily dose required   Other Med Consult or Protocol     Continuous Infusions:    dexmedetomidine infusion orderable (PRECEDEX) Stopped (09/05/20 0957)     norepinephrine Stopped (09/05/20 2017)     PRN Meds:.alteplase, atropine, bacitracin, bisacodyL, carboxymethylcellulose, dextrose 50 % (D50W), glucagon (human recombinant), hydrOXYzine pamoate, lidocaine (PF), lipase-protease-amylase **AND** sodium bicarbonate, metoclopramide, naloxone **OR** naloxone,  "oxyCODONE, polyethylene glycol, sodium chloride 0.9%, sodium chloride bacteriostatic, sodium chloride, sodium chloride, sodium chloride, traZODone      Objective:   VITALS:  /53   Pulse 80   Temp 98.5  F (36.9  C) (Oral)   Resp 18   Ht 5' 2\" (1.575 m)   Wt 183 lb 6.4 oz (83.2 kg)   LMP 01/25/1999   SpO2 98%   BMI 33.54 kg/m    EXAM:  General appearance: sedated but easily arousable  Lungs: clear bilaterally  Heart: irregularly irregular rhythm  Abdomen: soft, non-tender; bowel sounds normal; no masses,  no organomegaly  Extremities: extremities normal, atraumatic, no cyanosis or edema  Skin: Skin color, texture, turgor normal. No rashes or lesions  Neurologic: follows commands but falls back to sleep.     I&O:      Intake/Output Summary (Last 24 hours) at 9/9/2020 1200  Last data filed at 9/9/2020 0830  Gross per 24 hour   Intake 1076.11 ml   Output 1350 ml   Net -273.89 ml       Data Review:  CXR personally interpreted  EXAM: XR CHEST 1 VIEW PORTABLE  LOCATION: [facility]  DATE/TIME: 8/28/2020 3:39 PM     INDICATION: Postintubation.  COMPARISON: 08/27/2020.     IMPRESSION:      ET tube tip at the level of the emily; recommend 1 to 2 cm retraction.     New feeding tube coursing into the stomach and off the field-of-view. Stable right PICC.     Similar to marginally improved coarse interstitial appearance and opacities bilaterally. No substantial pleural effusion. No pneumothorax. Stable enlarged cardiac silhouette.        ET tube tip findings verbally communicated to patient's nurse, Lavern, at 3:45 PM on 08/28/2020.     NOTE: ABNORMAL REPORT     THE DICTATION ABOVE DESCRIBES AN ABNORMALITY FOR WHICH FOLLOW-UP IS NEEDED.     Results for orders placed or performed during the hospital encounter of 08/11/20   Basic Metabolic Panel   Result Value Ref Range    Sodium 144 136 - 145 mmol/L    Potassium 3.5 3.5 - 5.0 mmol/L    Chloride 107 98 - 107 mmol/L    CO2 29 22 - 31 mmol/L    Anion Gap, " Calculation 8 5 - 18 mmol/L    Glucose 124 70 - 125 mg/dL    Calcium 10.4 8.5 - 10.5 mg/dL    BUN 14 8 - 28 mg/dL    Creatinine 0.54 (L) 0.60 - 1.10 mg/dL    GFR MDRD Af Amer >60 >60 mL/min/1.73m2    GFR MDRD Non Af Amer >60 >60 mL/min/1.73m2     Lab Results   Component Value Date    WBC 12.5 (H) 09/09/2020    HGB 7.4 (L) 09/09/2020    HCT 25.0 (L) 09/09/2020     (H) 09/09/2020     09/09/2020       By:  Tierra Alberto CNP, 9/9/2020, 2:31 PM    Primary Care Physician:  Jerson Hernandez MD

## 2021-06-11 NOTE — PLAN OF CARE
Problem: Pain  Goal: Patient's pain/discomfort is manageable  Outcome: Progressing     Problem: Knowledge Deficit  Goal: Patient/family/caregiver demonstrates understanding of disease process, treatment plan, medications, and discharge instructions  Outcome: Progressing     Problem: Glucose Imbalance  Goal: Achieve optimal glucose control  Outcome: Progressing

## 2021-06-11 NOTE — PROGRESS NOTES
Patient received neb tx as ordered.  BS scat coarse with sx of moderate secretions.  No weaning at this time, will continue to monitor and wean as appropriated.

## 2021-06-11 NOTE — PROGRESS NOTES
[facility] Daily Progress Note      Date of Service: 9/8/2020     Brief History: Gardenia Muller is 70 y.o. female with history of type 2 diabetes mellitus, chronic atrial fibrillation, diastolic congestive heart failure, CAD s/p PCI in 1995, CVA, peripheral vascular disease, presented to the hospital on 8/11/2020 for abdominal pain, and vomiting. CT abdomen revealed diverticulitis with perforation. General surgery and IR were consulted. Follow up image on 08/14 showed increase in size of abscess, and she underwent CT guided drain in the left pelvic diverticular abscess on 08/17. CT abdomen on 08/20 showed near collapse of fluid cavity. The patient developed hospital acquired pneumonia, and candida growing from the abdominal fluid. ID was consulted, and she was placed on broad spectrum antimicrobials. Patient developed acute respiratory distress, and had 25 lb weight gain since admission, and was started on IV lasix. On 08/26 develop MAY and episodes of bradycardia. Digoxin level was found to be subsequently elevated, and the patient was started on DigiFab. Transferred to ICU for closer monitoring. Required atropine in ICU for low heart rate and was started on dopamine. Patient was endotracheally intubated on 08/28 for volume overload and pneumonia. She has failed spontaneous breathing trial.    Assessment/Plan:     Perforated diverticulitis  Pericolonic abscess with sigmoid colon fistula  -S/p CT guided drain placement on 08/17  -Abscessogram tube check on 09/01  -Currently on meropenem, micafungin  -Tube fluid culture grew candida    Acute respiratory failure with hypoxia  -Secondary to HCAP, fluid overload  -Spontaneous breathing trial per pulmonary  -Consider percutaneous trachesotomy    Digitalis toxicity  Junctional bradycardia, secondary to digoxin toxicity  -Received digifab. Required dopamine for persistently low heart rate    Acute kidney injury  -Multifactorial in etiology- contrast induced, digoxin  "toxicity, medication, sepsis, hypotension  -Consented for dialysis, but never received one. Started to make large volume urine output and renal function has improved    Electrolyte imbalance  -Hypo/hypernatremia  -Hypomagnesemia  -Hypophosphatemia  -Hypokalemia    Acute on chronic diastolic/systolic congestive heart failure  -LVEF of 45%  -Receiving lasix    Type 2 diabetes mellitus  -Not on any insulin regimen  -Blood glucose fairly stable  -Last A1C of 6.5    Anemia, normocytic  -Multifactorial in etiology- acute illness, blood loss  -Monitor closely    Acute encephalopathy, metabolic  -Requiring restraints  -Precedex as needed    Other issues:    Afib with RVR on 08/19. Rate controlled at this time. Currently on warfarin    Transaminitis from hepatic congestion with heart failure    Depression/anxiety on zoloft      Subjective:     Chart reviewed, events noted. Pt seen and examined. No overnight issues. Did not tolerate spontaneous breathing trial. No fever or chills. No nausea or vomiting.    Objective     Vital signs in last 24 hours:  Temp:  [98.1  F (36.7  C)-99.1  F (37.3  C)] 98.2  F (36.8  C)  Heart Rate:  [] 78  Resp:  [16-29] 19  BP: ()/(42-63) 111/50  FiO2 (%):  [28 %-100 %] 28 %  Weight:   180 lb 8.9 oz (81.9 kg)  Weight change: -12 lb 9.1 oz (-5.7 kg)  Body mass index is 33.02 kg/m .    Intake/Output last 3 shifts:  I/O last 3 completed shifts:  In: 2376.1 [I.V.:256.1; NG/GT:1770; IV Piggyback:350]  Out: 1135 [Urine:1135]  Intake/Output this shift:  No intake/output data recorded.      Physical Exam:  /50   Pulse 78   Temp 98.2  F (36.8  C)   Resp 19   Ht 5' 2\" (1.575 m)   Wt 180 lb 8.9 oz (81.9 kg)   LMP 01/25/1999   SpO2 100%   BMI 33.02 kg/m      FiO2 (%): 28 % O2 Device: Ventilator O2 Flow Rate (L/min): 3 L/min    General Appearance: Poorly responsive. Not in distress  HEENT: Normocephalic. No scleral icterus. Mucous membranes moist. Endotracheal intubation  Heart: S1 S2 " heard. Irregular rate and rhythm.  Lungs: Decreased breath sounds  Abdomen: Soft, non tender, bowel sounds present.  Extremity: No deformity. No joint swelling. No edema.  Neurologic: Sleepy. Moving extremities  Skin: No skin rashes      Imaging:  personally reviewed.    Xr Chest 1 View Portable    Result Date: 9/8/2020  EXAM: XR CHEST 1 VIEW PORTABLE LOCATION: [facility] DATE/TIME: 9/8/2020 12:01 PM INDICATION: Respiratory failure COMPARISON: 09/06/2020 and older studies.     Xr Chest 1 View Portable    Result Date: 9/6/2020  EXAM: XR CHEST 1 VIEW PORTABLE LOCATION: [facility] DATE/TIME: 9/6/2020 11:59 AM INDICATION: f/u intubation COMPARISON: 09/03/2020     Xr Chest 1 View Portable    Result Date: 9/3/2020  EXAM: XR CHEST 1 VIEW PORTABLE LOCATION: [facility] DATE/TIME: 9/3/2020 10:28 AM INDICATION: acute resp failure COMPARISON: 9/1/2020     Xr Abdomen Ap Portable    Result Date: 9/2/2020  EXAM: XR ABDOMEN AP PORTABLE LOCATION: [facility] DATE/TIME: 9/2/2020 4:58 PM INDICATION: Abdominal abscess COMPARISON: CT abdomen and pelvis 08/29/2020        Lab Results:  personally reviewed.   Lab Results   Component Value Date     09/08/2020    K 3.5 09/08/2020     (H) 09/08/2020    CO2 29 09/08/2020    BUN 16 09/08/2020    CREATININE 0.54 (L) 09/08/2020    CALCIUM 10.9 (H) 09/08/2020     Lab Results   Component Value Date    WBC 12.2 (H) 09/08/2020    HGB 7.4 (L) 09/08/2020    HCT 24.9 (L) 09/08/2020     (H) 09/08/2020     09/08/2020     Results from last 7 days   Lab Units 09/08/20  0403 09/07/20  0440 09/06/20  0526   LN-MAGNESIUM mg/dL 1.5* 1.8 1.7*        Results from last 7 days   Lab Units 09/05/20  0757 09/05/20  0416 09/04/20  2343 09/04/20  1612 09/04/20  0800 09/04/20  0346 09/03/20  2324 09/03/20 1953 09/03/20  1550 09/03/20  1140   LN-POC GLUCOSE FINGERSTICK- HE mg/dL 120 117 120 117 78 93 98 90 98 74         Advance Care Planning:    Barriers to discharge: Active issues  Anticipated discharge day: To be determined  Disposition: To be determined      Uzair Burdick MD  [facility]

## 2021-06-11 NOTE — PROGRESS NOTES
"RESPIRATORY CARE NOTE      Vent Mode: VCV  FiO2 (%):  [25 %] 25 %  S RR:  [16] 16  S VT:  [450 mL] 450 mL  PEEP/CPAP (cm H2O):  [5 cm H2O] 5 cm H2O  Minute Ventilation (L/min):  [7.9 L/min-10.8 L/min] 8.7 L/min  PIP:  [24 cm H2O-39 cm H2O] 39 cm H2O  TN SUP:  [10 cm H20-15 cm H20] 10 cm H20  MAP (cm H2O):  [9-10] 10    BP 98/50   Pulse 76   Temp 99  F (37.2  C) (Axillary)   Resp 19   Ht 5' 2\" (1.575 m)   Wt 202 lb 9.6 oz (91.9 kg)   LMP 01/25/1999   SpO2 99%   BMI 37.06 kg/m      Vent day #15, 7.5 ETT 21cm at the teeth and tolerating vent well. No issues overnight.  Cuff leak resolved, deflated and reinflated cuff was difficult, Suspect airway edema still.   No weaning done this shift. Will continue to assess SBT and liberation. RT following.         09/11/20 0300   Patient Data   Vt Exp (mL) 507 mL   Minute Ventilation (L/min) 8.7 L/min   Total Resp Rate  17 BPM   PIP Observed (cm H2O) 39 cm H2O   MAP (cm H2O) 10   Auto/Intrinsic PEEP Observed (cm H2O) 1 cm H2O   Plateau Pressure (cm H2O) 27 cm H2O   Static Compliance (L/cm H2O) 20   Dynamic Compliance (L/cm H2O) 19 L/cm H2O   Airway Resistance 19     Jerson Mccormack, LRT    "

## 2021-06-11 NOTE — PLAN OF CARE
Problem: Pain  Goal: Patient's pain/discomfort is manageable  Outcome: Progressing     Problem: Potential for Falls  Goal: Patient will remain free of falls  Outcome: Progressing     Problem: Glucose Imbalance  Goal: Achieve optimal glucose control  Outcome: Progressing

## 2021-06-11 NOTE — PROGRESS NOTES
ID chart check.  Afebrile off antibiotics. ID will sign off. Please call with additional questions or change in clinical status.   Kendra Toussaint MD  [facility]  [phone]

## 2021-06-11 NOTE — PROGRESS NOTES
Patient continues on mechanical ventilation, sedated.Tolerating well.    RESPIRATORY CARE NOTE    Vent Mode: VCV  FiO2 (%):  [30 %] 30 %  S RR:  [16] 16  S VT:  [450 mL] 450 mL  PEEP/CPAP (cm H2O):  [5 cm H2O] 5 cm H2O  Minute Ventilation (L/min):  [7.6 L/min-10.6 L/min] 7.6 L/min  PIP:  [33 cm H2O-42 cm H2O] 35 cm H2O  MAP (cm H2O):  [9-10] 9    BS clear.  Plan is to monitor vent, wean when indicated and as tolerated.     Al Kennedy, LRT           09/03/20 1146   Patient Data   Vt (observed, mL) 417 mL   Vt Exp (mL) 472 mL   Minute Ventilation (L/min) 7.6 L/min   Total Resp Rate  16 BPM   PIP Observed (cm H2O) 35 cm H2O   MAP (cm H2O) 9   Auto/Intrinsic PEEP Observed (cm H2O) 2 cm H2O   Plateau Pressure (cm H2O) 25 cm H2O   Static Compliance (L/cm H2O) 24   Dynamic Compliance (L/cm H2O) 18 L/cm H2O   Airway Resistance 14

## 2021-06-11 NOTE — PROGRESS NOTES
Pulm update:    Patient now on comfort care and extubated.      Pulm/CC to sign off.  Please call with any questions.     Titus Rodriguez CNP  Pulmonary and Critical Care Medicine  [facility]  Pager [phone]

## 2021-06-11 NOTE — PROGRESS NOTES
"Progress Note    Brief summary:  Per previous provider summary is\"   70 year  old female with history of HTN, T2DM, chronic atrial fibrillation on warfarin, diastolic CHF, coronary artery disease status post stenting (7/27/2020), peripheral artery disease, calculus cholecystitis status post cholecystectomy, depression, who came to [facility] on 8/11/2020 for abdominal pain and vomiting. CT scan revealed acute diverticulitis with perforation. on 8/14 showed increased size of abscess from 2x2cm to 4x3cm, though pt reports clinical improvement of her symptoms.. General surgery consulted - no current plans for operating room so interventional radiology was consulted for possible drainage which was done on 8/17/2020 by placing CT scan guided drain placement in the left pelvic diverticular abscess, repeat CTAP (8/20) - near complete collapse of fluid cavity.   Patient developed new leukocytosis.  Chest x-ray showed possible hospital-acquired pneumonia.  Due to concern of leukocytosis being from untreated Candida infection (at least improved Ashley dubliniensis), so she was started on  Fluconazole. WBC slowly improving.  On 8/25, patient was noted to be shortness of breath and given 25 lbs weight gain since admission, started on iv lasix.   On 8/26, developed acute kidney injury. Started having episodes of bradycardia. Digoxin level elevated. Started on digifab and transferred to ICU. Required Atropine in ICU for HR as low as 20-40s. Started on dopamine.  Developed respiratory  Failure, unable to tolerate bipap, now intubated, lasix were held on 8/30 due ot high UOP and renal function improving and blood pressure dropping. Repeat CT scan showed improvement in abscess but surgeons are recommending IR assessment of drain as there is still fluid around signup colon and rectum, CT abscessogram done today, drain is changed.      Assessment/Plan  Principal Problem:    Diverticulitis  Active Problems:    " Bradyarrhythmia    Acute kidney injury (H)    Diverticulitis of large intestine with perforation and abscess without bleeding    Atrial fibrillation with rapid ventricular response (H)    Obstructive sleep apnea syndrome    Poisoning by digoxin, accidental or unintentional, initial encounter    Acute respiratory failure with hypoxia and hypercapnia (H)    Acute metabolic encephalopathy    Shock circulatory (H)    Metabolic acidosis      1. Digoxin toxicity: bradyarrhythmia, renal failure, hyperkalemia. EKG showed junctional rhythm. Digoxin level of 6. S/p Digifab, on 8/31 dig level 2.5.  Now in sinus rhythm.  On vasopressin and norepinephrine drip.    2. MAY: Likely due to contrast, dig toxicity, lisinopril with metabolic acidosis and hyperkalemia. due to digoxin. Digoxin stopped.  Patient now in ATN.  Large urine output.  Monitor electrolytes electrolytes.  Neurology recommending fluid boluses if hypotensive.    3. Acute perforated diverticulitis with pericolonic abscess: Initially started on Cipro and Flagyl.  Status post CT-guided drain placement on 8/17, culture growing Candida.  CT abdomen on 8/20/2020 with near complete resolution of the abscess. 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  vanco switched to dapto on 8/26.    4. Hospital-acquired pneumonia: On meropenem and daptomycin.  Covid negative X 2.   5. Fever : No fevers in last 24 hours   6. Leukocytosis: Likely due to above  7. Acute on chronic diastolic CHF:   8. Acute hypoxic resp failure: Likely from heart failure as well as pneumonia.  On ventilator now.    9. Transaminitis: likely from hepatic congestion with heart failure. Similar events during last admission and in 2018.  10. Acute anemia: Hemoglobin dropped to 6.1 on 8/20.  Status post 1 unit.  Hemoglobin slowly  trending down.  Will transfuse if hemoglobin goes below 7  11. Supratherapeutic INR: on presentation, improved with Vit K. INR went up again with liver failure. Worsened again likely from reaction of INR reagent with daptomycin now within normal range.  12. Atrial fibrillation: Had RVR on 8/19.  Digoxin held due to toxicity. Metoprolol held due to bradycardia.  Received a dose of warfarin.  INR today 2.52.  13. Type 2 diabetes mellitus: was on SSI but BG consistently low, [id number] infusion.  CAD: Status post coronary angiogram and PCI to LAD on 7/27/2020    Subjective  Sedated, intubated      Objective    Vital signs in last 24 hours  Temp:  [97.7  F (36.5  C)-99.2  F (37.3  C)] 98.5  F (36.9  C)  Heart Rate:  [] 62  Resp:  [16-35] 16  BP: ()/(44-60) 128/58  Arterial Line BP: ()/(22-85) 91/85  FiO2 (%):  [21 %-30 %] 30 %  Weight:   198 lb 11.2 oz (90.1 kg)    Intake/Output last 3 shifts  I/O last 3 completed shifts:  In: 1239.9 [I.V.:654.9; NG/GT:425; IV Piggyback:160]  Out: 2690 [Urine:2690]  Intake/Output this shift:  No intake/output data recorded.    Physical Exam   General: drowsy but arousable  Eyes: no pallor  Chest: Clear to auscultation bilaterally  Heart: RRR, normal S1 and S2, mild pitting edema  Abdomen: soft, non tender drain in pelvis.  Neuro: moving all extremities  Bardales catheter      Meds    aspirin  81 mg Enteral Tube DAILY     chlorhexidine  15 mL Topical Q12H     insulin aspart (NovoLOG) injection   Subcutaneous Q4H FIXED TIMES     ipratropium-albuteroL  3 mL Nebulization Q6H - RT     meropenem  1 g Intravenous Q8H     micafungin (MYCAMINE) IV  100 mg Intravenous Q24H     nystatin  5 mL Swish & Swallow QID     omeprazole  20 mg Oral QAM AC    Or     omeprazole  20 mg Enteral Tube QAM AC    Or     pantoprazole  40 mg Intravenous QAM AC     potassium chloride liquid/packet  30 mEq Enteral Tube Q4H     [Held by provider] sertraline  100 mg Oral DAILY     sodium chloride  bacteriostatic  1-5 mL Intradermal Once     sodium chloride  10 mL Intravenous Q8H FIXED TIMES     sodium chloride  10-30 mL Intravenous Q8H FIXED TIMES     ticagrelor  90 mg Enteral Tube BID       Pertinent Labs   Lab Results: personally reviewed.   not applicable    Results from last 7 days   Lab Units 09/01/20  1339 09/01/20  0357 08/31/20  1833 08/31/20  0501  08/30/20  0517   LN-SODIUM mmol/L  --  140  --  139  --  138   LN-POTASSIUM mmol/L 3.5 3.4*  3.4* 3.2* 3.2*  3.2*   < > 3.6  3.6   LN-CHLORIDE mmol/L  --  100  --  101  --  105   LN-CO2 mmol/L  --  29  --  26  --  22   LN-BLOOD UREA NITROGEN mg/dL  --  42*  --  47*  --  53*   LN-CREATININE mg/dL  --  0.81  --  1.41*  --  2.48*   LN-CALCIUM mg/dL  --  8.6  --  8.5  --  8.9    < > = values in this interval not displayed.         Results from last 7 days   Lab Units 09/01/20  1023 08/28/20  0923 08/28/20  0431   LN-WHITE BLOOD CELL COUNT thou/uL 12.4* 14.1* 14.6*   LN-HEMOGLOBIN g/dL 8.2* 9.0* 8.9*   LN-HEMATOCRIT % 24.8* 27.1* 27.0*   LN-PLATELET COUNT thou/uL 95* 116* 115*         Pertinent Radiology   Radiology Results: Personally reviewed impression/s    Ct Abdomen Pelvis Without Oral Without Iv Contrast    Result Date: 8/29/2020  EXAM: CT ABDOMEN PELVIS WO ORAL WO IV CONTRAST LOCATION: [facility] DATE/TIME: 8/29/2020 1:41 PM INDICATION: Abdominal pain, acute, nonlocalized. Evaluate abdominal abscess. COMPARISON: 08/20/2020. TECHNIQUE: CT scan of the abdomen and pelvis was performed without oral or IV contrast. Multiplanar reformats were obtained. Dose reduction techniques were used. CONTRAST: None. FINDINGS: LOWER CHEST: Increasing bilateral pulmonary infiltrates. HEPATOBILIARY: Cholecystectomy. PANCREAS: Normal. SPLEEN: Normal. ADRENAL GLANDS: Normal. KIDNEY/BLADDER: Normal. BOWEL: There is no residual fluid around the left lower quadrant drain. Sigmoid colonic diverticulosis without diverticulitis. Scant free fluid in the pelvic  cul-de-sac. New NG tube in the stomach. LYMPH NODES: Normal. VASCULATURE: Diffuse atherosclerotic calcification. PELVIC ORGANS: Multiple uterine fibroids. Bardales catheter in the bladder. MUSCULOSKELETAL: Normal.     1.  No significant residual abscess around the left lower quadrant drainage catheter. No new abscess. 2.  Trace free fluid in the pelvis as well as subcutaneous edema is new from previous. 3.  New bilateral pulmonary infiltrates may represent pulmonary edema or pneumonia.    Ct Abdomen Pelvis Without Oral Without Iv Contrast    Result Date: 8/14/2020  EXAM: CT ABDOMEN PELVIS WO ORAL WO IV CONTRAST LOCATION: [facility] DATE/TIME: 8/14/2020 10:26 AM INDICATION: Abdominal infection suspected perforated diverticulitis, assess for abscess COMPARISON: 8/11/2020 TECHNIQUE: CT scan of the abdomen and pelvis was performed without oral or IV contrast. Multiplanar reformats were obtained. Dose reduction techniques were used. CONTRAST: None. FINDINGS: LOWER CHEST: Trace pleural fluid. Minimal scarring or edema at the lung bases. The heart is enlarged. There is coronary artery calcification. HEPATOBILIARY: Cholecystectomy. There is extrahepatic bile duct dilatation. PANCREAS: Pancreatic duct dilatation is not as well-seen on this study as on the comparison study. SPLEEN: Normal. ADRENAL GLANDS: Normal. KIDNEY/BLADDER: Normal kidneys and ureters. There is wall thickening of the nondistended urinary bladder. A Bardales catheter is present. BOWEL: There is colonic diverticulosis. There is stranding adjacent to the sigmoid colon in the region of diverticulitis. There is a complex 4 cm x 3 cm collection to the left of the sigmoid colon in this region (previously this was 2 cm x 2 cm). Minimal  extraluminal air is noted. LYMPH NODES: Normal. VASCULATURE: Atherosclerotic disease. PELVIC ORGANS: Fibroid uterus. MUSCULOSKELETAL: Normal.     1.  Again seen is sigmoid colon diverticulitis with a small contained  perforation. A small fluid collection adjacent to the sigmoid colon has increased in size to 4 cm x 3 cm (previously 2 cm x 2 cm). Percutaneous drainage would be difficult though may be possible. 2.  Biliary and pancreatic ductal dilatation is not as well-seen as on the comparison study. See prior study for details.    Xr Chest 1 View Portable    Result Date: 9/1/2020  EXAM: XR CHEST 1 VIEW PORTABLE LOCATION: [facility] DATE/TIME: 9/1/2020 5:55 AM INDICATION: resp failure COMPARISON: 08/28/2020.     Endotracheal tube terminates 1.4 cm above the emily. Nasoenteric tube passes below the left hemidiaphragm and the inferior margin of the radiograph. Right PICC tip at the superior cavoatrial junction. Persistent interstitial coarsening and patchy alveolar opacities diffusely in both lungs. There may be a small left pleural effusion. Stable cardiomegaly. Calcified plaque of the aorta. No pneumothorax.    Xr Chest 1 View Portable    Result Date: 8/28/2020  EXAM: XR CHEST 1 VIEW PORTABLE LOCATION: [facility] DATE/TIME: 8/28/2020 3:39 PM INDICATION: Postintubation. COMPARISON: 08/27/2020.     ET tube tip at the level of the emily; recommend 1 to 2 cm retraction. New feeding tube coursing into the stomach and off the field-of-view. Stable right PICC. Similar to marginally improved coarse interstitial appearance and opacities bilaterally. No substantial pleural effusion. No pneumothorax. Stable enlarged cardiac silhouette. ET tube tip findings verbally communicated to patient's nurse, Lavern, at 3:45 PM on 08/28/2020. NOTE: ABNORMAL REPORT THE DICTATION ABOVE DESCRIBES AN ABNORMALITY FOR WHICH FOLLOW-UP IS NEEDED.     Xr Chest 1 View Portable    Result Date: 8/27/2020  EXAM: XR CHEST 1 VIEW PORTABLE LOCATION: [facility] DATE/TIME: 8/27/2020 5:53 AM INDICATION: Respiratory failure with progressive hypoxia. COMPARISON: 08/26/2020     Slight interval improvement in reticular interstitial  infiltrates in alveolar infiltrates since prior study. There still remains extensive infiltrates throughout both lungs. Stable cardiac enlargement. Mild pulmonary vascular congestion improved. Atherosclerotic vascular calcification of the aorta. Right PICC with tip in good position low SVC. Degenerative changes in the spine and shoulders    Xr Chest 1 View Portable    Result Date: 8/26/2020  EXAM: XR CHEST 1 VIEW PORTABLE LOCATION: [facility] DATE/TIME: 8/26/2020 4:56 PM INDICATION: sob COMPARISON: 08/23/2020     Right PICC tip projects over the mid SVC. Unchanged bilateral mixed reticular and alveolar opacities. These could represent cardiogenic or noncardiogenic pulmonary edema, pneumonia, and drug reaction. No pleural effusion.    Xr Chest 1 View Portable    Result Date: 8/21/2020  EXAM: XR CHEST 1 VIEW PORTABLE LOCATION: [facility] DATE/TIME: 8/21/2020 7:34 PM INDICATION: r/o pna- shortness of breath COMPARISON: 08/11/2020     New bilateral interstitial infiltrates could represent edema or pneumonia including COVID. Enlarged cardiac silhouette. No pleural effusion. No definite pulmonary vascular congestion.    Xr Chest 2 Views    Result Date: 8/11/2020  EXAM: XR CHEST 2 VIEWS LOCATION: [facility] DATE/TIME: 8/11/2020 11:50 AM INDICATION: Cough. COMPARISON: None.     Negative chest. Lungs are clear. Coronary stenting.     Xr Foot Right 2 Vws    Result Date: 8/16/2020  EXAM: XR FOOT RIGHT 2 VWS LOCATION: [facility] DATE/TIME: 8/16/2020 12:51 PM INDICATION: Acute right foot pain COMPARISON: None.     Bones are demineralized. No definitive evidence of an acute fracture. No cortical destructive changes to suggest osteomyelitis. Scattered degenerative changes.    Xr Chest 1 View For Picc Placement Portable    Result Date: 8/23/2020  EXAM: XR CHEST 1 VIEW FOR PICC LINE PLACEMENT PORTABLE LOCATION: [facility] DATE/TIME: 8/23/2020 6:28 PM INDICATION: verify catheter  placement COMPARISON: 08/21/2020     Right upper extremity PICC line terminates approximately 3 cm above the expected cavoatrial junction. No change in the chest otherwise. Extensive abnormal opacity throughout both lungs persists. Mild cardiomegaly. No pneumothorax or large pleural effusion.    Ct Abdomen Pelvis Without Oral With Iv Contrast    Result Date: 8/11/2020  EXAM: CT ABDOMEN PELVIS WO ORAL W IV CONTRAST LOCATION: [facility] DATE/TIME: 8/11/2020 11:41 AM INDICATION: right sided abdominal pain, nausea and vomiting COMPARISON: 7/22/2020 TECHNIQUE: CT scan of the abdomen and pelvis was performed following injection of IV contrast. Multiplanar reformats were obtained. Dose reduction techniques were used. CONTRAST: Iohexol (Omni) 75mL FINDINGS: LOWER CHEST: Minimal interstitial edema. The heart is mildly enlarged. There is coronary artery disease. HEPATOBILIARY: Cholecystectomy. There is intrahepatic and extrahepatic bile duct dilatation. There is pancreatic duct dilatation. PANCREAS: No discrete pancreatic masses identified. SPLEEN: Normal. ADRENAL GLANDS: Normal. KIDNEYS/BLADDER: Normal. BOWEL: There is colonic diverticulosis. There is acute diverticulosis involving the distal sigmoid colon with a small contained perforation. No drainable abscess at this point. LYMPH NODES: Normal. VASCULATURE: Atherosclerotic disease. Right renal artery stent. PELVIC ORGANS: Fibroid uterus. MUSCULOSKELETAL: Degenerative changes.     1.  Acute diverticulitis with a small contained perforation. No drainable abscess at this point. 2.  Biliary and pancreatic duct dilatation. No discrete pancreatic masses identified. Consider MRCP, ERCP, EUS.    Ct Abdomen Pelvis With Oral With Iv Contrast    Result Date: 8/20/2020  EXAM: CT ABDOMEN PELVIS W ORAL W IV CONTRAST LOCATION: [facility] DATE/TIME: 8/20/2020 3:43 PM INDICATION: Abdominal infection suspected ongoing pain and increasing leukocytosis COMPARISON: CT  from 8/14/2020 TECHNIQUE: CT scan of the abdomen and pelvis was performed following injection of IV contrast. Multiplanar reformats were obtained. Dose reduction techniques were used. CONTRAST: Iohexol (Omni) 75mL FINDINGS: LOWER CHEST: Dependent atelectasis and interstitial edema within the lung bases without consolidation. Cardiomegaly and mitral annular calcifications. HEPATOBILIARY: Gallbladder surgically absent. Mild ectasia of the extrahepatic biliary tree. PANCREAS: Unchanged appearance. SPLEEN: Normal. ADRENAL GLANDS: Normal. KIDNEYS/BLADDER: Normal. BOWEL: Interval placement of a left anterior abdominal approach percutaneous drain into the perisigmoid abscess. The drain is well positioned with near complete collapse of the previously seen abscess collection. There is persistent mild adjacent stranding. The bowel is nonobstructed. No new intra-abdominal abscess identified. No pneumoperitoneum. LYMPH NODES: Normal. VASCULATURE: Atherosclerotic calcification throughout the arterial tree. No evidence of vascular complication following drain placement. PELVIC ORGANS: Fibroid uterus. No ascites. Bardales catheter is within a decompressed urinary bladder. Subcutaneous scattered calcifications in the subcutaneous flank regions compatible with prior injection sites. MUSCULOSKELETAL: No new or acute osseous findings. Degenerative changes at the hips and lumbar spine are stable.     1.  Interval percutaneous drain placement into the diverticular abscess with near complete collapse of the fluid cavity. Drain abuts the adjacent sigmoid colon. No evidence of post drain placement complication. 2.  No new intra-abdominal abscess or other findings to correlate with ongoing leukocytosis. 3.  Stable appearance of the pancreas with previously seen main pancreatic ductal dilatation not well visualized on this study.    Poc Us 3cg Picc Placement Guidance    Result Date: 8/23/2020  Exam was performed as guidance for PICC line  "insertion.  Click \"PACS images\" hyperlink below to view any stored images.  For specific procedure details, view procedure note authored by PICC/Vascular Access Nurse.    Poc Us 3cg Picc Placement Guidance    Result Date: 8/19/2020  Exam was performed as guidance for PICC line insertion.  Click \"PACS images\" hyperlink below to view any stored images.  For specific procedure details, view procedure note authored by PICC/Vascular Access Nurse.    Ct Abscess Drain Pelvic    Result Date: 8/17/2020  EXAM: 1. CT OF ABDOMEN AND PELVIS WITHOUT CONTRAST 2. PERCUTANEOUS DRAIN PLACEMENT LEFT PELVIS LOCATION: [facility] DATE/TIME: 8/17/2020 9:59 AM INDICATION: diverticulitis TECHNIQUE: Dose reduction techniques were used. FINDINGS: The limited visualized portions of the lung bases are clear. Evaluation of the solid visceral organs is suboptimal given the lack of intravenous contrast. Within these limitations no focal hepatic lesion is seen. The patient is post cholecystectomy. There is no intra or extrahepatic biliary ductal dilatation. The stomach and duodenum are normal. The spleen size is normal. The kidneys enhance symmetrically and there is no renal collecting system dilatation. Note again is made of a diverticular abscess, small in size. This is unchanged from the prior examination. Fibroid uterus is noted. PROCEDURE: Informed consent obtained. Site marked. Prior images reviewed. Required items made available. Patient identity confirmed verbally and with arm band. Patient reevaluated immediately before administering sedation. Universal protocol was followed. Time out performed. The site was prepped and draped in sterile fashion. 10 mL of 1% lidocaine was infused into the local soft tissues. Using standard technique and under direct CT guidance, a 10 [place]er drainage catheter was inserted into the fluid collection.  SPECIMEN: 10 mL of purulent fluid was aspirated and sent to lab for cultures and Gram stain. BLOOD " LOSS: Minimal. The patient tolerated the procedure well. No immediate complications. RADIOLOGIC SUPERVISION AND INTERPRETATION: CT GUIDANCE: Images demonstrate the needle and subsequent catheter to be in good position. SEDATION: Versed 1 mg. Fentanyl 50 mcg. The procedure was performed with administration intravenous conscious sedation with appropriate preoperative, intraoperative, and postoperative evaluation. 15 minutes of supervised face to face conscious sedation time was provided by a radiology nurse under my direct supervision.     1. Stable diverticular abscess. 2. Successful CT-guided drain placement into left pelvic diverticular abscess. Reference CPT Codes: 10583, 75620    Ir Abscessogram Tube Check    Result Date: 9/1/2020  LOCATION: [facility] DATE: 9/1/2020 PROCEDURE: ABSCESS TUBE CHANGE INTERVENTIONAL RADIOLOGIST: Ruben Coelho MD INDICATION: Left pelvic abscess. Indwelling 10 [place]an percutaneous drain. 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 No output for the past 6 days. MODERATE SEDATION: None minutes. CONTRAST:Omnipaque 350: 40 mL. ANTIBIOTICS: None. ADDITIONAL MEDICATIONS: None. FLUOROSCOPY TIME: 2.4. RADIATION DOSE: Air Kerma: 129 mGy COMPLICATIONS: No immediate complications. FINDINGS: An abscessogram was performed by contrast injection through the indwelling drain. Based on the images it was decided to exchange this drain. The indwelling 10 [place]an APDL drain and site were prepped and draped in sterile fashion. 1% lidocaine was infiltrated around the drain site. The retention mechanism was cut and the drain removed over an 035 Amplatz superstiff guidewire. A new 10 [place]an APDL drain was tailored by cutting off the loop. Over the guidewire, the tailored 10 [place]an APDL drain was advanced with the tip placed lateral to the site of the fistulous communication with the colon. Adequate position confirmed with contrast injection. Drain was connected to a gravity bag. FINDINGS:  1. Initial  abscessogram demonstrates an indwelling 10 Lao APDL drain within a contracted left pelvic abscess cavity. Almost immediate opacification of the adjacent distal sigmoid colon. Short, widely apparent fistulous tract to the sigmoid colon. 2. Completion fluoroscopic image demonstrates a new 10 Lao APDL drain, placed approximately 2 cm lateral to the site of the fistula.     1.  Contracted abscess cavity. 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 Drain exchanged for a tailored 10 Lao drain placed lateral to the fistula site. PLAN: Gravity drainage. Abscessogram in one week. CPT codes included for physician reference only: 65522/25345     Ir Abscessogram Tube Check    Result Date: 8/25/2020  [place] [facility] LOCATION: [facility] DATE: 8/25/2020 PROCEDURE: ABSCESSOGRAM INTERVENTIONAL RADIOLOGIST: Altaf Bateman MD. INDICATION: Diverticular abscess with indwelling drain here for follow-up. FLUOROSCOPIC TIME: 0.8 minutes NUMBER OF IMAGES: 2 CONTRAST: 5 cc of Omni COMPLICATIONS: No immediate complications. PROCEDURE: Contrast injection through the existing drain demonstrates no residual abscess pocket. 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     Abscessogram reveals no residual abscess pocket. Fistula to the colon. Switched drain to gravity drainage bag from SHAMIKA suction bulb. No flushes are needed. Follow-up abscessogram in one week. CPT codes for physician use only: 79746/12270            MD Joseline  Hospitalist  [phone]    This note was dictated using voice recognition software. Any grammatical or context distortions are unintentional and inherent to the software.

## 2021-06-11 NOTE — PLAN OF CARE
Care Plan  Care Management      Care Management Goals of the Day: Progression of care    Care Progression Reviewed With: Charge RN, MD, HUC, RNCM, CMSW    Barriers to Discharge: Intubated - unable to wean, Palliative following for GOC, IV lasix, tube feeds    Family Care Conference: held 9/2 - next cc tomorrow or Friday, palliative involved in planning    Discharge Disposition: tbd    Expected Discharge Date: tbd    Transportation: tbd      Care Coordination Narrative: Intubated. Pt resides at AL facility [facility] in [facility] ([phone] & CM Hilario @ [phone]). Daughter is primary contact/HCA, [place] and will return to [facility] after helping granddtr start school. Next care conference Thursday or Friday to determine continued GOC, per palliative. Pt opposed to trach. Family re-considering trach option. Plan tbd as cares progress, possibly eol. CM following.    RISHABH Jeffrey  9/9/2020                Abbreviation  Code:  [facility] Wheelchair ([facility] WC), [facility] Stretcher ([facility] STR), Patient (pt), Transitional Care Unit (TCU), Skilled Nursing Facility (SNF), Physical Therapy (PT), Occupational Therapy (OT), Speech Therapy (ST TX), Respiratory Therapy (RT)

## 2021-06-11 NOTE — PROGRESS NOTES
Infectious Disease Follow up Note:    Service: Infectious Disease   Note: Follow Up Note  Date: 9/10/2020    Chief Complaint: Follow up for acute diverticulitis with perforation    ASSESSMENT:     Gardenia Muller is a 70 y.o. old female with acute diverticulitis with perforation.   C. dubliniensis and gram positive cocci in chains aug 17th time frame -abdominal infection-- active issue         Intubated, respiratory failure-active issue    Recommendations:     IR consulted by Surgery for abdominal abscess drain repositioning/upsize--Drain management per IR and Surgery- gravity bag    Abscessogram  with drain removed. WBC seems stable.   Note plans for care conference when family arrives today. Palliative following.     WBC stable. Drain out. Will stop antimicrobial agents and observe. Discussed with critical care.    Kendra Hudson MD  [facility]  [phone]      ______________________________________________________________________  Notes / labs / vitals reviewed.    SUBJECTIVE / INTERVAL HISTORY: care conference today. No acute events overnight    ROS: intubated    PMHx/FHx/SHx: Reviewed. Interval changes noted.    OBJECTIVE:  Vitals:    09/10/20 0816   BP:    Pulse: 60   Resp:    Temp:    SpO2: 96%       Temp (24hrs), Av.4  F (36.9  C), Min:98.3  F (36.8  C), Max:98.6  F (37  C)    Exam on 9/10/2020   GEN: tracking. In chair  HEAD, EARS, NOSE, MOUTH, AND THROAT:  ETT  RESPIRATORY: intubated  CARDIOVASCULAR:  Arm swelling  Previous exam: S1 S2 No murmur, click, gallop or rub. No dependent edema. No excess JVD.  ABDOMEN:  Soft, tenderness, gravity bag minimal output  MUSCULOSKELETAL:  Joints without effusion or acute inflammation. No muscle atrophy.Dedconditioned  LYMPHATIC:  No cervical or supraclavicular adenopathy  SKIN/HAIR/NAILS:  Warm, ecchymosis  NEUROLOGIC:  Somnolent, opens eyes    Antibiotics:  IV vancomycin --> Daptomycin, stopped on 2020  IV meropenem  IV  fluconazole --> Micafungin 8/29    Pertinent labs:  Reviewed    Results from last 7 days   Lab Units 09/10/20  0451 09/09/20  0526 09/08/20  0403  09/05/20  0417   LN-WHITE BLOOD CELL COUNT thou/uL 11.6* 12.5* 12.2*   < > 10.7   LN-HEMOGLOBIN g/dL 7.7* 7.4* 7.4*   < > 7.7*   LN-HEMATOCRIT % 25.5* 25.0* 24.9*   < > 25.2*   LN-PLATELET COUNT thou/uL 158 155 168   < > 128*   LN-NEUTROPHILS RELATIVE PERCENT % 74*  --  68  --  69   LN-MONOCYTES RELATIVE PERCENT % 7  --  10  --  12*   LN-MONOCYTES - REL (DIFF) %  --  7  --    < >  --     < > = values in this interval not displayed.       Results from last 7 days   Lab Units 09/10/20  0450 09/09/20  1245 09/09/20  0526  09/08/20  0403  09/05/20  0417 09/04/20  0532   LN-SODIUM mmol/L 142  --  144  --  145   < > 143 144   LN-POTASSIUM mmol/L 4.3 4.3 3.5   < > 3.5   < > 4.0 3.8   LN-CHLORIDE mmol/L 107  --  107  --  109*   < > 107 107   LN-CO2 mmol/L 28  --  29  --  29   < > 30 30   LN-BLOOD UREA NITROGEN mg/dL 13  --  14  --  16   < > 20 17   LN-CREATININE mg/dL 0.58*  --  0.54*  --  0.54*   < > 0.58* 0.52*   LN-CALCIUM mg/dL 10.2  --  10.4  --  10.9*   < > 10.2 9.9   LN-ALBUMIN g/dL  --   --  1.6*  --   --   --  1.3* 1.3*   LN-PROTEIN TOTAL g/dL  --   --  6.3  --   --   --   --   --    LN-BILIRUBIN TOTAL mg/dL  --   --  1.4*  --   --   --   --   --    LN-ALKALINE PHOSPHATASE U/L  --   --  101  --   --   --   --   --    LN-ALT (SGPT) U/L  --   --  24  --   --   --   --   --    LN-AST (SGOT) U/L  --   --  39  --   --   --   --   --     < > = values in this interval not displayed.       MICROBIOLOGY DATA:    Cultures reviewed  Culture:  C. dubcameliaiensis august 17th    IMAGING/RADIOLOGY:  Reviewed  XR Chest 1 View Portable (Order [id number])   Imaging         Study Result     EXAM: XR CHEST 1 VIEW PORTABLE  LOCATION: [facility]  DATE/TIME: 8/21/2020 7:34 PM     INDICATION: r/o pna- shortness of breath  COMPARISON: 08/11/2020     IMPRESSION:   New bilateral  interstitial infiltrates could represent edema or pneumonia including COVID. Enlarged cardiac silhouette. No pleural effusion. No definite pulmonary vascular congestion.     Abscessogram reveals no residual abscess pocket. Fistula to the colon. Switched drain to gravity drainage bag from SHAMIKA suction bulb. No flushes are needed. Follow-up abscessogram in one week.

## 2021-06-11 NOTE — PLAN OF CARE
Care Plan  Care Management       Care Management Goals of the Day: comfort care    Care Progression Reviewed With: Charge Nurse, Medical Doctor, Health Unit Coordinator, Nurse Care Manager, Palliative, Hospice    Barriers to Discharge: end of life care    Discharge Disposition: to remain in hospital    Expected Discharge Date: pending    Transportation:      Care Coordination Narrative: Patient is on comfort care. She is at end of life and will remain in hospital.

## 2021-06-11 NOTE — PROGRESS NOTES
"  Infectious Disease Follow up Note:    Service: Infectious Disease   Note: Follow Up Note  Date: 2020    Chief Complaint: Follow up for acute diverticulitis with perforation    ASSESSMENT:     Gardenia Muller is a 70 y.o. old female with acute diverticulitis with perforation.   C. dubliniensis and gram positive cocci in chains aug 17th time frame -abdominal infection-- active issue         Intubated, respiratory failure-active issue    Recommendations:     IR consulted by Surgery for abdominal abscess drain repositioning/upsize--Drain management per IR and Surgery- gravity bag    Follow culture results--no orgs aug 28th  Abscessogram  ----->IMPRESSION:   1.  Contracted abscess cavity. 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 Drain exchanged for a tailored 10 Italian drain placed lateral to the fistula site.     PLAN: Gravity drainage. Abscessogram in one week.     Continue Meropenem and  Micafungin probably into next week will need some guidance from surgery if/when they think we have a \"controlled\" fistula.  Certainly aren't there yet.     We can see again after the holiday weekend.  thanks      ARY Doherty MD  [facility]  [phone]            ______________________________________________________________________  Notes / labs / vitals reviewed.    SUBJECTIVE / INTERVAL HISTORY: Afebrile.  Intubated.  Sick.    ROS: intubated    PMHx/FHx/SHx: Reviewed. Interval changes noted.    OBJECTIVE:  Vitals:    20 0942   BP:    Pulse: 90   Resp:    Temp:    SpO2: 96%       Temp (24hrs), Av.7  F (37.1  C), Min:98.5  F (36.9  C), Max:99  F (37.2  C)    Exam on 2020   GEN: Somnolent  EYES:  Anicteric, RAYMUNDO, EOM intact.  HEAD, EARS, NOSE, MOUTH, AND THROAT:  Thrush, ETT  NECK:  Supple.   RESPIRATORY: intubated  CARDIOVASCULAR:  Arm swelling  Previous exam: S1 S2 No murmur, click, gallop or rub. No dependent edema. No excess JVD.  ABDOMEN:  Soft, tenderness, gravity bag minimal " output  MUSCULOSKELETAL:  Joints without effusion or acute inflammation. No muscle atrophy.Dedconditioned  LYMPHATIC:  No cervical or supraclavicular adenopathy  SKIN/HAIR/NAILS:  Warm, ecchymosis  NEUROLOGIC:  Somnolent, opens eyes    Antibiotics:  IV vancomycin --> Daptomycin, stopped on 8/28/2020  IV meropenem  IV fluconazole --> Micafungin 8/29    Pertinent labs:  Reviewed    Results from last 7 days   Lab Units 09/04/20  0532 09/03/20  0439 09/02/20  0618   LN-WHITE BLOOD CELL COUNT thou/uL 11.4* 13.3* 13.1*   LN-HEMOGLOBIN g/dL 7.7* 8.1* 9.3*   LN-HEMATOCRIT % 24.8* 25.4* 29.7*   LN-PLATELET COUNT thou/uL 116* 103* 81*   LN-MONOCYTES - REL (DIFF) % 7 7 2       Results from last 7 days   Lab Units 09/04/20  0532 09/03/20 2202 09/03/20  0439 09/02/20  0618  08/30/20  0517   LN-SODIUM mmol/L 144  --  142 139   < > 138   LN-POTASSIUM mmol/L 3.8 3.6 3.9 4.3   < > 3.6  3.6   LN-CHLORIDE mmol/L 107  --  107 103   < > 105   LN-CO2 mmol/L 30  --  27 25   < > 22   LN-BLOOD UREA NITROGEN mg/dL 17  --  26 33*   < > 53*   LN-CREATININE mg/dL 0.52*  --  0.53* 0.60   < > 2.48*   LN-CALCIUM mg/dL 9.9  --  9.3 9.1   < > 8.9   LN-ALBUMIN g/dL 1.3*  --  1.4*  --   --  1.4*   LN-PROTEIN TOTAL g/dL  --   --   --   --   --  6.0   LN-BILIRUBIN TOTAL mg/dL  --   --   --   --   --  2.0*   LN-ALKALINE PHOSPHATASE U/L  --   --   --   --   --  152*   LN-ALT (SGPT) U/L  --   --   --   --   --  27   LN-AST (SGOT) U/L  --   --   --   --   --  247*    < > = values in this interval not displayed.       MICROBIOLOGY DATA:    Cultures reviewed  Culture:  C. dubliniensis august 17th    IMAGING/RADIOLOGY:  Reviewed  XR Chest 1 View Portable (Order [id number])   Imaging         Study Result     EXAM: XR CHEST 1 VIEW PORTABLE  LOCATION: [facility]  DATE/TIME: 8/21/2020 7:34 PM     INDICATION: r/o pna- shortness of breath  COMPARISON: 08/11/2020     IMPRESSION:   New bilateral interstitial infiltrates could represent edema or pneumonia  including COVID. Enlarged cardiac silhouette. No pleural effusion. No definite pulmonary vascular congestion.     Abscessogram reveals no residual abscess pocket. Fistula to the colon. Switched drain to gravity drainage bag from SHAMIKA suction bulb. No flushes are needed. Follow-up abscessogram in one week.

## 2021-06-11 NOTE — PLAN OF CARE
Problem: Pain  Goal: Patient's pain/discomfort is manageable  Outcome: Progressing     Problem: Potential for Falls  Goal: Patient will remain free of falls  Outcome: Progressing     Problem: Glucose Imbalance  Goal: Achieve optimal glucose control  Outcome: Progressing     Problem: Risk of Injury Due to Unsafe Behavior  Goal: Patient will remain safe while in restraint; physical/psychological needs will be met  Outcome: Progressing   Patient has appeared comfortable this shift. Patient has remained safe while restrained in bed. All care needs have been met by staff. Patient was noted to have extremely high gastric residuals from OG tube ranging from 500-675mL this shift. Tube feeding has been put on hold since 2000 on 9/1/2020. All blood glucose readings have been WDL this shift and no insulin has been given.

## 2021-06-11 NOTE — PROGRESS NOTES
Patient tried on SBT on vent at 1030. Attempted PS/CPAP 5/5 and volumes of 150, RR 36, RSBI 140, pressure increased to 10/5 and volumes up to 175 but rate and RSBI the same.    Finally PS 15, CPAP 5 and volumes 300, RR 26 but RSBI was 110.    Total trial 10 minutes.    Will attempt later this afternoon.    Moustapha Burton, LRT

## 2021-06-11 NOTE — PLAN OF CARE
Care Plan  Care Management      Care Management Goals of the Day: Progression of care    Care Progression Reviewed With: Charge RN, MD, HUC, RNCM, CMSW    Barriers to Discharge: IV meds, intubated / weaning trial delayed until today, Palliative following for GOC, therapy evals when appropriate    Family Care Conference: held today with palliative / intensivist / dtr at 2 PM    Discharge Disposition: pending - need new referral sent to [facility] TCU    Expected Discharge Date: tbd    Transportation: [facility]      Care Coordination Narrative: Intubated. Pt from [facility] TCU and bed has been released. Per notes, pt resided at AL facility [facility] on 8/12. Daughter is primary contact/HCA, [place] and has arrived in [place] this week. Plan tbd as cares progress. CM following.    Dtr reported in  that she was NOT interested in a trach. Asking that CM call [facility] to give staff pt update. Hospital staff to reassess pt next week 9/9 to determine if pt should be transitioned to comfort care.    SWCM called out to [facility] in [facility] ([phone]) to ask for a good callback number for daughter and to confirm that staff was aware of pt critical condition. Staff is aware - SCOOTER Rich @ [phone]: number given to dtr for return contact.     RISHABH Jeffrey  9/2/2020                Abbreviation  Code:  [facility] Wheelchair ([facility] WC), [facility] Stretcher ([facility] STR), Patient (pt), Transitional Care Unit (TCU), Skilled Nursing Facility (SNF), Physical Therapy (PT), Occupational Therapy (OT), Speech Therapy (ST TX), Respiratory Therapy (RT)

## 2021-06-11 NOTE — PLAN OF CARE
Problem: Urinary Incontinence  Goal: Perineal skin integrity is maintained or improved  Outcome: Progressing   Patient was incontinent of stool with every turn and reposition. Patient had 2 large loose stools and new brief was applied. Excoriated buttocks noted and sacral heart mepilex changed with new one applied. Will continue to monitor.    Problem: Inadequate Gas Exchange  Goal: Patient will achieve/maintain normal respiratory rate/effort  Outcome: Progressing   Patient on oxygen at 2 L via NC. Patient was noted to be purse lip breathing at times. Oxygen saturation maintained at 100%. HOB elevated at 30 degree and patient denied shortness of breath. Will continue to monitor.

## 2021-06-11 NOTE — PLAN OF CARE
Care Plan  Care Management      Care Management Goals of the Day: Progression of care    Care Progression Reviewed With: Charge RN, MD, HUC, RNCM, CMSW    Barriers to Discharge: IV meds, intubated / weaning trial delayed until today, Palliative following, therapy evals when appropriate    Family Care Conference: offered via palliative when appropriate, dtr arrived from [place] 8/31 evening    Discharge Disposition: pending - need new referral sent to [facility] TCU    Expected Discharge Date: tbd    Transportation: [facility]      Care Coordination Narrative: Intubated. Pt from [facility] TCU and bed has been released. Per notes, pt resided at AL facility [facility] on 8/12. Daughter is primary contact/HCA, is [place] and has arrived in [place] this week. Plan tbd as cares progress. CM following    RISHABH Jeffrey  9/1/2020                Abbreviation  Code:  [facility] Wheelchair ([facility] WC), [facility] Stretcher ([facility] STR), Patient (pt), Transitional Care Unit (TCU), Skilled Nursing Facility (SNF), Physical Therapy (PT), Occupational Therapy (OT), Speech Therapy (ST TX), Respiratory Therapy (RT)

## 2021-06-11 NOTE — DISCHARGE SUMMARY
[facility] MEDICINE  DISCHARGE SUMMARY     Primary Care Physician:  Jerson Hernandez MD  Admission Date: 2020    Discharge Provider: Michael Abreu  Discharge Date: 2020    Code Status: No CPR- Do NOT Intubate       Condition at Discharge:  patient was declared dead at 1250 hrs. on 2020.     REASON FOR ADMISSION (See Admission Note for Details)   Admission Diagnoses:   Acute perforated diverticulitis with pericolonic abscess  Sigmoid colon fistula   Digoxin toxicity  Acute kidney injury  Hospital-acquired pneumonia   Candida bacteremia   Acute exacerbation on chronic diastolic CHF  Acute anemia  Atrial fibrillation  Diastolic congestive heart failure  Essential hypertension  CVA  Mesenteric ischemia  Acalculous cholecystitis  Depression  Type 2 diabetes mellitus  Acute Metabolic encephalopathy   Coronary Artery Disease   Diverticulitis of large intestine with perforation without bleeding         Problem list from this hospital stay:   Principal Problem:    Diverticulitis  Active Problems:    Bradyarrhythmia    Acute kidney injury (H)    Diverticulitis of large intestine with perforation and abscess without bleeding    Atrial fibrillation with rapid ventricular response (H)    Obstructive sleep apnea syndrome    Poisoning by digoxin, accidental or unintentional, initial encounter    Acute respiratory failure with hypoxia and hypercapnia (H)    Acute metabolic encephalopathy    Shock circulatory (H)    Metabolic acidosis    Other hypervolemia    Difficult ventilator weaning (H)      SIGNIFICANT FINDINGS (Imaging, labs):     Results for orders placed or performed during the hospital encounter of 20   XR Chest 2 Views    Impression    Negative chest. Lungs are clear. Coronary stenting.       CT Abdomen Pelvis Without Oral With IV Contrast    Impression    1.  Acute diverticulitis with a small contained perforation. No drainable abscess at this point.  2.  Biliary and pancreatic  duct dilatation. No discrete pancreatic masses identified. Consider MRCP, ERCP, EUS.   CT Abdomen Pelvis Without Oral Without IV Contrast    Impression    1.  Again seen is sigmoid colon diverticulitis with a small contained perforation. A small fluid collection adjacent to the sigmoid colon has increased in size to 4 cm x 3 cm (previously 2 cm x 2 cm). Percutaneous drainage would be difficult though may   be possible.  2.  Biliary and pancreatic ductal dilatation is not as well-seen as on the comparison study. See prior study for details.   CT Abscess Drain Pelvic    Impression    1. Stable diverticular abscess.  2. Successful CT-guided drain placement into left pelvic diverticular abscess.    Reference CPT Codes: 27488, 92231   XR Foot Right 2 VWS    Impression    Bones are demineralized. No definitive evidence of an acute fracture. No cortical destructive changes to suggest osteomyelitis. Scattered degenerative changes.   CT Abdomen Pelvis With Oral With IV Contrast    Impression    1.  Interval percutaneous drain placement into the diverticular abscess with near complete collapse of the fluid cavity. Drain abuts the adjacent sigmoid colon. No evidence of post drain placement complication.  2.  No new intra-abdominal abscess or other findings to correlate with ongoing leukocytosis.  3.  Stable appearance of the pancreas with previously seen main pancreatic ductal dilatation not well visualized on this study.   XR Chest 1 View Portable    Impression    New bilateral interstitial infiltrates could represent edema or pneumonia including COVID. Enlarged cardiac silhouette. No pleural effusion. No definite pulmonary vascular congestion.   XR Chest 1 View for PICC Placement Portable    Impression    Right upper extremity PICC line terminates approximately 3 cm above the expected cavoatrial junction. No change in the chest otherwise. Extensive abnormal opacity throughout both lungs persists. Mild cardiomegaly. No  pneumothorax or large   pleural effusion.   IR Abscessogram Tube Check    Impression    Abscessogram reveals no residual abscess pocket. Fistula to the colon. Switched drain to gravity drainage bag from SHAMIKA suction bulb. No flushes are needed. Follow-up abscessogram in one week.    CPT codes for physician use only:  43065/12999   XR Chest 1 View Portable    Impression    Right PICC tip projects over the mid SVC. Unchanged bilateral mixed reticular and alveolar opacities. These could represent cardiogenic or noncardiogenic pulmonary edema, pneumonia, and drug reaction. No pleural effusion.   XR Chest 1 View Portable    Impression    Slight interval improvement in reticular interstitial infiltrates in alveolar infiltrates since prior study. There still remains extensive infiltrates throughout both lungs. Stable cardiac enlargement. Mild pulmonary vascular congestion   improved. Atherosclerotic vascular calcification of the aorta. Right PICC with tip in good position low SVC. Degenerative changes in the spine and shoulders   XR Chest 1 View Portable    Impression    ET tube tip at the level of the emily; recommend 1 to 2 cm retraction.    New feeding tube coursing into the stomach and off the field-of-view. Stable right PICC.    Similar to marginally improved coarse interstitial appearance and opacities bilaterally. No substantial pleural effusion. No pneumothorax. Stable enlarged cardiac silhouette.      ET tube tip findings verbally communicated to patient's nurse, Lavern, at 3:45 PM on 08/28/2020.    NOTE: ABNORMAL REPORT    THE DICTATION ABOVE DESCRIBES AN ABNORMALITY FOR WHICH FOLLOW-UP IS NEEDED.    CT Abdomen Pelvis Without Oral Without IV Contrast    Impression    1.  No significant residual abscess around the left lower quadrant drainage catheter. No new abscess.  2.  Trace free fluid in the pelvis as well as subcutaneous edema is new from previous.  3.  New bilateral pulmonary infiltrates may represent  pulmonary edema or pneumonia.   IR Abscessogram Tube Check    Impression    1.  Contracted abscess cavity. 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 Drain exchanged for a tailored 10 Sami drain placed lateral to the fistula site.    PLAN: Gravity drainage. Abscessogram in one week.    CPT codes included for physician reference only: 18260/67813     XR Chest 1 View Portable    Impression    Endotracheal tube terminates 1.4 cm above the emily. Nasoenteric tube passes below the left hemidiaphragm and the inferior margin of the radiograph. Right PICC tip at the superior cavoatrial junction. Persistent interstitial coarsening and   patchy alveolar opacities diffusely in both lungs. There may be a small left pleural effusion. Stable cardiomegaly. Calcified plaque of the aorta. No pneumothorax.   XR Abdomen AP Portable    Impression    Enteric feeding tube with tip located in the low lying mid gastric lumen. Nonobstructive bowel gas pattern. No grossly evident free air. Patchy bibasilar airspace opacities. Cardiomegaly.    XR Chest 1 View Portable    Impression    Endotracheal tube tip 1.2 cm from emily. Right PICC tip projects over the cavoatrial junction. Feeding tube tip below the film.    Stable heart size. Stable bilateral diffuse pulmonary infiltrates.   XR Chest 1 View Portable    Impression    Endotracheal tube is 1.5 cm from the emily with the chin down. An enteric tube terminates off the field-of-view. A right PICC is present. Heterogeneous opacities throughout the lungs have not significantly changed from the prior study and   could represent edema and/or multifocal infection. Left pleural fluid is present. No pneumothorax. The cardiomediastinal silhouette is enlarged. There is aortic calcification.    XR Chest 1 View Portable    Impression    Patient's rotated to the right right. Endotracheal tube is in the distal trachea, 1.5 cm from the emily. Feeding tube can be seen coursing abdomen. Right upper  extremity PICC line catheter overlies the proximal right atrium.    No change in the diffuse, coarse interstitial opacities bilaterally. No pneumothorax. Heart is of normal size.   IR Abscessogram Tube Check    Impression    No residual abscess cavity or fistula. Given that the drain has had no output it was removed.    PLAN:  The patient will monitor for signs of infection such as increased pain or fever. Should any of these symptoms develop they will contact us immediately.      ____________________________________________________________________    CPT codes for physician reference only:  90907  65564           *Note: Due to a large number of results and/or encounters for the requested time period, some results have not been displayed. A complete set of results can be found in Results Review.            PROCEDURES (this hospitalization only)    Patient was intubated and mechanically ventilated         SUMMARY OF HOSPITAL COURSE:      Gardenia Muller is a 70-year-old lady with atrial fibrillation, diastolic congestive heart failure, hypertension, coronary artery disease status post PCI 1995, CVA, peripheral vascular disease, T2DM, malnutrition, intestinal angina, acalculous cholecystitis status post laparoscopic cholecystectomy in 2018, depression who came into [facility] on 8/11/2020 for abdominal pain and vomiting.  CT scan of the abdomen revealed diverticulitis with perforation.  On 814 it showed increased size of abscess from 2x2 cm to 4x3 cm.  Though there was clinical improvement in her symptoms.  General surgery was consulted who recommended interventional radiology consultation for possible drainage of the abdominal abscess which was done on 8/17/2020 by placing CT scan guided drain in the left pelvic diverticular abscess. On 8/20/2020 CT scan of the abdomen showed near collapse of the fluid cavity. Subsequently patient developed leukocytosis with possible hospital-acquired pneumonia seen on  chest x-ray.  She also had Candida infection for which fluconazole was started with improvement of WBC count.  On 825 patient had shortness of breath with 25 pound weight gain and was started on IV Lasix.  On 826 developed acute kidney injury and had episodes of bradycardia.  Digoxin level was elevated at 6.9 on 8/26/2020 and 10.7 on 8/28/2024 which DigiFab was started and patient transferred to ICU.  Required atropine in ICU for heart rate which went down between 20 and 40.  Started on dopamine.  Developed respiratory failure. Abscessogram done on 8/31/2020 showed contracted abscess cavity with persistent fistula to the sigmoid colon with drain exchanged and tailored 10 Turkish drain was placed lateral to the fistula site.  She underwent abscessogram with tube check on 09/09, and as there were no more fluid collection, the drain was removed.  Family care conference was held on 09/11, and as patient's wishes were not to pursue prolonged ventilator support and tracheostomy, extubated on 9/14, transitioned to comfort care.    Patient passed away and was declared dead at 1250 hrs. on 19 September 2020.          Consult/s:  cardiology, pulmonary/intensive care, ID, nephrology, general surgery and Palliative care and hospice       Please see EMR for more detailed significant labs, imaging, consultant notes etc.  Total time spent on discharge: 45 minutes    Michael Abreu MD  Pager #:  CC: Jerson Hernandez MD

## 2021-06-11 NOTE — PROGRESS NOTES
Pharmacy Consult: Warfarin Management    Pharmacy consulted to dose warfarin for Gardenia Muller, a 70 y.o. female    Ordering provider: Karen Hilliard MD     Reason for warfarin therapy: Atrial Fibrillation  Goal INR Range: 2-3    Subjective  Medication lists (home and hospital) were reviewed.    New medications that may increase bleeding risk/INR: none currently    Restarted home medications that may increase bleeding risk/INR: ASA, Ticagrelor, omeprazole, sertraline     Home medications NOT restarted that may increase bleeding risk/INR:  trazodone     Home Warfarin Dosing: multiple dose reductions during August 2020.  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)     Advanced directives, counseling/discussion     Cystitis     Healthcare maintenance     Essential hypertension     Cerebral infarction (H)     Anemia     Cerebrovascular disease     RLS (restless legs syndrome)     Incisional hernia, without obstruction or gangrene     Diverticular disease of large intestine     Coronary artery disease involving native coronary artery of native heart without angina pectoris     Dyslipidemia     Ventral hernia without obstruction or gangrene     Anxiety     (HFpEF) heart failure with preserved ejection fraction (H)     Anticoagulant long-term use     Persistent atrial fibrillation (H)     Bradyarrhythmia     Acute kidney injury (H)     Transaminitis     Physical deconditioning     Lipoma of back     Acalculous cholecystitis     Onychogryphosis     Hyperparathyroidism (H)     Microalbuminuria     Intestinal  angina (H)     Chronic abdominal pain     Weight loss, non-intentional     Warfarin anticoagulation     Severe protein-calorie malnutrition (H)     Hypertrophy of nail     Dysuria     Class 1 obesity with serious comorbidity and body mass index (BMI) of 33.0 to 33.9 in adult, unspecified obesity type     Trigger finger, acquired     Acute liver failure without hepatic coma     Hematochezia     Hyperkalemia     Acute on chronic combined systolic (congestive) and diastolic (congestive) heart failure (H)     Ischemic cardiomyopathy     Acute kidney failure, unspecified (H)     Acute diastolic heart failure (H)     Diverticulitis     Supratherapeutic INR     Diverticulitis of large intestine with perforation and abscess without bleeding     Atrial fibrillation with rapid ventricular response (H)     Obstructive sleep apnea syndrome     Poisoning by digoxin, accidental or unintentional, initial encounter     Acute respiratory failure with hypoxia and hypercapnia (H)     Acute metabolic encephalopathy     Shock circulatory (H)     Metabolic acidosis     Other hypervolemia     Difficult ventilator weaning (H)    Past Medical History:   Diagnosis Date     Acute diastolic heart failure (H) 11/2015     Acute on chronic diastolic heart failure (H) 6/15/2019     Advanced directives, counseling/discussion 8/5/2015    Patient completed 2011.  Discussed today, 5/24/17, and wants to change healthcare agent from niece to daughter.  Discussed that she will need to complete new form to indicate this.     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  Recommend 5 year follow-up.  Single tubular adenoma     Biliary obstruction 10/3/2018    Added automatically from request for surgery [id number]     Cerebral vascular accident (H)      Coronary artery disease 2006    100% RCA with collateral filling per Dr. Elizabeth's angio report      Diabetes mellitus type 2 in obese (H)      Fibrocystic breast      GERD (gastroesophageal reflux disease)      History of anesthesia complications     slow to wake     Hypertension      Obstructive sleep apnea syndrome      Peripheral vascular disease (H)      Pneumonia 11/11/2018     PONV (postoperative nausea and vomiting)      S/P cholecystectomy 6/22/2018     SBO (small bowel obstruction) (H) 11/12/2015     Stroke (H)      Transaminitis 10/22/2018     Upper respiratory infection 12/20/2018     Urinary incontinence         Social History     Tobacco Use     Smoking status: Former Smoker     Packs/day: 0.25     Smokeless tobacco: Former User     Tobacco comment: e-cig a few times/day   Substance Use Topics     Alcohol use: No     Drug use: No       Objective   Labs:  Last 3 days:    Recent Labs     09/11/20  0456 09/11/20  0457 09/12/20  0542 09/13/20  0407   CREATININE 0.56*  --  0.62 0.61   HGB  --  7.8* 7.8* 7.5*   HCT  --  25.7* 26.0* 25.2*   PLT  --  155 137* 128*     Last 7 days:   Recent labs: (last 7 days)     09/07/20  0440 09/08/20  0403 09/09/20  0525 09/10/20  0450 09/11/20  0456 09/12/20  0542 09/13/20  0407   INR 2.09* 2.14* 2.50* 2.40* 2.85* 2.49* 2.17*       Warfarin Dosing History:    Date INR Warfarin Dose Comment   9/2/20 2.07 none    9/3/20 1.86 1.5 mg Pharmacist consulted to dose warfarin   9/4/20 1.89 1.5mg    9/5 1.79 2.5mg     9/6 1.90 2.5 mg    9/7/20 2.09 2.5 mg - SQ heparin discontinued   9/8/20 2.14 2.5 mg    9/9/20 2.50 1.5 mg    9/10/20 2.40 2 mg    9/11/20 2.85 1.5 mg    9/12/2020 2.49 2 mg    9/13/20 2.17 2.5 mg                  Assessment  The patient is resuming warfarin for the indication of Atrial Fibrillation with a goal INR of 2-3. INR today is therapeutic. INR decreased 0.32 from yesterday so will give larger warfarin dose than patient received last evening.  Multiple dose reductions during August 2020.  Last dosing regimen just before admission was 1.5mg daily.  Patient was  admitted with supratherapeutic INR.  INR was reversed for surgery and then remained elevated most likely due to liver issues post operatively.  INR increased and now within goal range.     Plan  1. Administer warfarin 2.5 mg via ET today.  2. Check INR daily or as appropriate.  3. Continue to follow the patient's INR, PLT, and HGB as available.  4. Monitor for potential drug/disease interactions.    Thank you for the consult,  Monik Bailey, PharmD, 9/13/2020 11:16 AM

## 2021-06-11 NOTE — PLAN OF CARE
Problem: Pain  Goal: Patient's pain/discomfort is manageable  Outcome: Progressing     Problem: Safety  Goal: Patient will be injury free during hospitalization  Outcome: Progressing     Problem: Daily Care  Goal: Daily care needs are met  Outcome: Progressing     Problem: Potential for Compromised Skin Integrity  Goal: Skin integrity is maintained or improved  Outcome: Progressing  Patient turn and repositioned every 2 hrs.     Goal: Nutritional status is improving  Outcome: Progressing     Problem: Potential for hemodynamic instability  Goal: Cardiac output is adequate  Outcome: Progressing     Problem: Breathing  Goal: Patient will maintain patent airway  Outcome: Progressing     Problem: Mechanical Ventilation  Goal: Patient will maintain patent airway  Outcome: Progressing  Goal: Oral health is maintained or improved  Outcome: Progressing  Goal: ET tube will be managed safely  Outcome: Progressing

## 2021-06-11 NOTE — PROGRESS NOTES
RESPIRATORY CARE NOTE      Patient remains on vent.  Breath sounds are coarse.  Sx small amount white secretions.  Duoneb given X1 on schedule.  No changes to ventilator settings.     Vent Mode: VCV  RR:  16  VT:  450 mL  FiO2:  30 %  PEEP:  5 cm H2O    7.5 ET 21 @ teeth    Pulse 80   Resp 27    SpO2 100%          09/13/20 0421   Patient Data   PIP Observed (cm H2O) 34 cm H2O   MAP (cm H2O) 9   Auto/Intrinsic PEEP Observed (cm H2O)   (failed)   Plateau Pressure (cm H2O) 28 cm H2O   Static Compliance (L/cm H2O) 25   Dynamic Compliance (L/cm H2O) 14 L/cm H2O   Airway Resistance 16      Moustapha Garnica, LRT

## 2021-06-11 NOTE — PLAN OF CARE
Care Plan  Care Management      Care Management Goals of the Day: Progression of Care / Goals of Care Discussion    Care Progression Reviewed With: Charge RN, MD, HUC, RNCM, CMSW    Barriers to Discharge: Medical Management    Family Care Conference: Pt's daughter traveling by bus to [place] today, 9/10, anticipated arrival 5:30pm    Discharge Disposition: TBD    Expected Discharge Date: TBD    Transportation: TBD      Care Coordination Narrative: Pt resides at [facility] in [place] ([phone])  Care Manager is Hilario ([phone]). Pt's daughter Aria is the primary contact and healthcare agent. Aria [place] out of state but intends to be in [place] on 9/10 in the evening to arrange for comfort cares. Pt will possibly pass while inpatient.     Per 9/9/10  Note:   Pt is not recovering and wants to die peacefully in her sleep according to dtr, Aria. Dtr supports this decision and says pt has suffered enough. Aria is travelling by bus tomorrow, arriving in [place] 5:30 PM. Will authorize comfort care. Dtr does not expect pt to live long after removal of vent.      Nereyda Toribio [facility]  Care Manager  09/10/20   1:07 PM       Abbreviation  Code:  [facility] Wheelchair ([facility] WC), [facility] Stretcher ([facility] STR), Patient (pt), Transitional Care Unit (TCU), Skilled Nursing Facility (SNF), Physical Therapy (PT), Occupational Therapy (OT), Speech Therapy (ST TX), Respiratory Therapy (RT)

## 2021-06-11 NOTE — PROGRESS NOTES
Respiratory Care Note    Wean attempted x2. First trial, the settings were 5/5. Pts RR increased into the 40s and VT were 100-150 ml. Second wean, PS increased to 12. RR increased to 35, -200 ml. Pt returned to full support. MD notified of result.     William Barton, LRT

## 2021-06-11 NOTE — PLAN OF CARE
Problem: Pain  Goal: Patient's pain/discomfort is manageable  Outcome: Progressing  Pt is on comfort care. Pt is not showing any signs or symptoms of pain during shift. Pt is receiving scheduled morphine. Pt is on 2 L O2 during shift. Pt is turned and repositioned every 2 hours. Pt has a fever of 101.7 and is diaphoretic. Will continue to monitor.

## 2021-06-11 NOTE — PROGRESS NOTES
PULMONARY / CRITICAL CARE PROGRESS NOTE    Date / Time of Admission:  8/11/2020 10:16 AM    Assessment:   Principal Problem:    Diverticulitis  Active Problems:    Bradyarrhythmia    Acute kidney injury (H)    Diverticulitis of large intestine with perforation and abscess without bleeding    Atrial fibrillation with rapid ventricular response (H)    Obstructive sleep apnea syndrome    Poisoning by digoxin, accidental or unintentional, initial encounter    Acute respiratory failure with hypoxia and hypercapnia (H)    Acute metabolic encephalopathy    Shock circulatory (H)    Metabolic acidosis    Other hypervolemia    Difficult ventilator weaning (H)    70yoF with history of DMII, HFpEF, afib on coumadin presented with perforated diverticulitis causing abscess resulting in acute kidney injury and respiratory failure now intubated, not felt to be a good surgical candidate.     Advance Directives:  Full Code      Plan:   Pulmonary:  1)Acute hypoxic respiratory failure-prolonged.  Still requiring mechanical ventilation for prolongation of life.  -Negative sputum cultures  -Completed course of antibiotics  -Ongoing diuresis  -After discussion of tracheostomy the family has determined that proceeding with tracheostomy and other advanced cares would not be within [facility]ia's goals of cares.  Currently elected to transition to comfort cares tomorrow.    C/V:  -Gentle Diuresis ongoing  -Brillinta/Aspirin  -Anticoagulated for atrial fibrillation  -Lopressor for HR control.     Renal:  Gentle diuresis    GI:  1) perforated diverticulitis  -Continue tube feeds  -Abscessogram shows minimal residual abscess    Neuro:  Therapeutic sedation    ID: perforated diverticulitis and pneumonia.  Treatment course completed.  ID is following.      ICU DAILY CHECKLIST                           Can patient transfer out of MICU? no    FAST HUG:    Feeding:  Feeding: No.  Patient is receiving tube feeds  Bardales:Yes  Analgesia/Sedation:Yes  precedex  Thromboembolic prophylaxis: yes; Mode:  Coumadin  HOB>30:  Yes  Stress Ulcer Protocol Active: yes; Mode: PPI  Glycemic Control: Any glucose > 180 no; Mode of Insulin Therapy: Sliding Scale Insulin    INTUBATED:  Can patient have daily waking:  no  Can patient have spontaneous breathing trial:  no    Restraints? Yes    PROVIDER RESTRAINT FOR NON-VIOLENT BEHAVIOR FACE TO FACE EVALUATION    Patient's Immediate Situation:  Patient demonstrated the following behaviors: Pulling/tugging at invasive lines or tubes and does not respond to verbal/non-verbal redirection    Patient's Reaction to the intervention:  Does patient understand the reason for restraint/seclusion? No    Medical Condition:  Is there any evidence of compromise of Skin integrity, Respiratory, Cardiovascular, Musculoskeletal, Hydration? No    Behavioral Condition:  In consultation with the RN, is there a need to continue this restraint or seclusion? Yes    See Restraint Flowsheet for complete restraint documentation and assessment.    Chandrakant Michele MD        This patient is critically ill with high risk of decompensation and death.  30 minutes of critical care time thus far today.        Subjective: No events overnight.   HPI:  Gardenia Muller is a 70 y.o. female with chronic afib on anticoagulation, HFpEF, CAD, CVA, DMII who presented 8/11 with nausea and vomiting. CT found perforated diverticulitis. Drain placed into abscess and antibiotics initiated given her poor candidacy for surgery. Worsening renal failure in setting of Afib with RVR and became digoxin-toxic. Received digibind with improvement in HR. Renal failure impressive, but on lasix drip began making urine and diuresed. Intubated for pulmonary edema and pneumonia with copious secretions (no bacteria identified). On minimal vent settings now hemodynamically stable but without ability to SBT well.          Principal Problem:    Diverticulitis  Active Problems:     Bradyarrhythmia    Acute kidney injury (H)    Diverticulitis of large intestine with perforation and abscess without bleeding    Atrial fibrillation with rapid ventricular response (H)    Obstructive sleep apnea syndrome    Poisoning by digoxin, accidental or unintentional, initial encounter    Acute respiratory failure with hypoxia and hypercapnia (H)    Acute metabolic encephalopathy    Shock circulatory (H)    Metabolic acidosis    Other hypervolemia    Difficult ventilator weaning (H)      Allergies: Penicillins     MEDS:  Scheduled Meds:    alteplase  1 mg Intracatheter Once     alteplase  1 mg Intracatheter Once     aspirin  81 mg Enteral Tube DAILY     chlorhexidine  15 mL Topical Q12H     furosemide  20 mg Intravenous Q8H     guar gum  2 packet Enteral Tube BID     ipratropium-albuteroL  3 mL Nebulization Q6H - RT     metoprolol tartrate  25 mg Enteral Tube BID     nystatin  5 mL Swish & Swallow QID     pantoprazole  40 mg Intravenous BID    Or     omeprazole  20 mg Oral BID    Or     omeprazole  20 mg Enteral Tube BID     sertraline  50 mg Enteral Tube DAILY     sodium chloride  10 mL Intravenous Q8H FIXED TIMES     sodium chloride  10-30 mL Intravenous Q8H FIXED TIMES     ticagrelor  90 mg Enteral Tube BID     warfarin - daily dose required   Other Med Consult or Protocol     warfarin ANTICOAGULANT  2 mg Oral Daily 1700     Continuous Infusions:    dexmedetomidine infusion orderable (PRECEDEX) 0.4 mcg/kg/hr (09/12/20 1000)     norepinephrine Stopped (09/05/20 2017)     PRN Meds:.alteplase, atropine, bacitracin, bisacodyL, carboxymethylcellulose, dextrose 50 % (D50W), glucagon (human recombinant), hydrOXYzine pamoate, lidocaine (PF), lipase-protease-amylase **AND** sodium bicarbonate, lipase-protease-amylase **AND** sodium bicarbonate, metoclopramide, naloxone **OR** naloxone, oxyCODONE, polyethylene glycol, sodium chloride 0.9%, sodium chloride bacteriostatic, sodium chloride, sodium chloride, sodium  "chloride, traZODone      Objective:   VITALS:  /50   Pulse 78   Temp 98.8  F (37.1  C) (Oral)   Resp 20   Ht 5' 2\" (1.575 m)   Wt 199 lb 8 oz (90.5 kg)   LMP 01/25/1999   SpO2 100%   BMI 36.49 kg/m    EXAM:  General appearance: Sedated and intubated  Lungs: Small bibasilar crackles.  Heart: irregularly irregular rhythm  Abdomen: Soft, no evident tenderness, positive bowel sounds.  Extremities: extremities normal, atraumatic, no cyanosis or edema  Skin: Skin color, texture, turgor normal. No rashes or lesions  Neurologic: Moves all extremities.    I&O:      Intake/Output Summary (Last 24 hours) at 9/12/2020 1247  Last data filed at 9/12/2020 1000  Gross per 24 hour   Intake 2425.2 ml   Output 985 ml   Net 1440.2 ml       Results for orders placed or performed during the hospital encounter of 08/11/20   Basic Metabolic Panel   Result Value Ref Range    Sodium 140 136 - 145 mmol/L    Potassium 4.1 3.5 - 5.0 mmol/L    Chloride 105 98 - 107 mmol/L    CO2 25 22 - 31 mmol/L    Anion Gap, Calculation 10 5 - 18 mmol/L    Glucose 202 (H) 70 - 125 mg/dL    Calcium 10.2 8.5 - 10.5 mg/dL    BUN 13 8 - 28 mg/dL    Creatinine 0.62 0.60 - 1.10 mg/dL    GFR MDRD Af Amer >60 >60 mL/min/1.73m2    GFR MDRD Non Af Amer >60 >60 mL/min/1.73m2     Lab Results   Component Value Date    WBC 10.5 09/12/2020    HGB 7.8 (L) 09/12/2020    HCT 26.0 (L) 09/12/2020     (H) 09/12/2020     (L) 09/12/2020       By:  Chandrakant Michele MD, 9/12/2020, 2:31 PM    Primary Care Physician:  Jerson Hernandez MD                 "

## 2021-06-11 NOTE — PROGRESS NOTES
PULMONARY / CRITICAL CARE PROGRESS NOTE    Date / Time of Admission:  8/11/2020 10:16 AM    Assessment:   Principal Problem:    Diverticulitis  Active Problems:    Bradyarrhythmia    Acute kidney injury (H)    Diverticulitis of large intestine with perforation and abscess without bleeding    Atrial fibrillation with rapid ventricular response (H)    Obstructive sleep apnea syndrome    Poisoning by digoxin, accidental or unintentional, initial encounter    Acute respiratory failure with hypoxia and hypercapnia (H)    Acute metabolic encephalopathy    Shock circulatory (H)    Metabolic acidosis    Other hypervolemia    Difficult ventilator weaning (H)    70yoF with history of DMII, HFpEF, afib on coumadin presented with perforated diverticulitis causing abscess resulting in acute kidney injury and respiratory failure now intubated, not felt to be a good surgical candidate.     Advance Directives:  Full Code      Plan:   Pulmonary:  1)Acute hypoxic respiratory failure-prolonged.  Still requiring mechanical ventilation for prolongation of life.  Consistently failed SBTs.   -Negative sputum cultures  -Completed course of antibiotics  -Ongoing diuresis  -After discussion of tracheostomy the family has determined that proceeding with tracheostomy and other advanced cares would not be within [facility]ia's goals of cares.  Currently elected to transition to comfort cares today.  Palliative following     C/V:  -Gentle Diuresis ongoing  -Brillinta/Aspirin  -Anticoagulated for atrial fibrillation  -Lopressor for HR control.     Renal:  Gentle diuresis    GI:  1) perforated diverticulitis  -Tube feeds held for planned extubation.  -Most recent abscessogram shows minimal residual abscess    Neuro:  Therapeutic sedation    ID: perforated diverticulitis and pneumonia.  Treatment course completed.       ICU DAILY CHECKLIST                           Can patient transfer out of MICU? no    FAST HUG:    Feeding:  Feeding: No.  Patient is  receiving tube feeds  Bardales:Yes  Analgesia/Sedation:Yes precedex  Thromboembolic prophylaxis: yes; Mode:  Coumadin  HOB>30:  Yes  Stress Ulcer Protocol Active: yes; Mode: PPI  Glycemic Control: Any glucose > 180 no; Mode of Insulin Therapy: Sliding Scale Insulin    INTUBATED:  Can patient have daily waking:  no  Can patient have spontaneous breathing trial:  no    Restraints? Yes    PROVIDER RESTRAINT FOR NON-VIOLENT BEHAVIOR FACE TO FACE EVALUATION    Patient's Immediate Situation:  Patient demonstrated the following behaviors: Pulling/tugging at invasive lines or tubes and does not respond to verbal/non-verbal redirection    Patient's Reaction to the intervention:  Does patient understand the reason for restraint/seclusion? No    Medical Condition:  Is there any evidence of compromise of Skin integrity, Respiratory, Cardiovascular, Musculoskeletal, Hydration? No    Behavioral Condition:  In consultation with the RN, is there a need to continue this restraint or seclusion? Yes    See Restraint Flowsheet for complete restraint documentation and assessment.    Titus Rodriguez, CNP        This patient is critically ill with high risk of decompensation and death.  30 minutes of critical care time thus far today.        Subjective: No events overnight.  Plan for family at bedside this afternoon and transition to comfort care.   HPI:  Gardenia Muller is a 70 y.o. female with chronic afib on anticoagulation, HFpEF, CAD, CVA, DMII who presented 8/11 with nausea and vomiting. CT found perforated diverticulitis. Drain placed into abscess and antibiotics initiated given her poor candidacy for surgery. Worsening renal failure in setting of Afib with RVR and became digoxin-toxic. Received digibind with improvement in HR. Renal failure impressive, but on lasix drip began making urine and diuresed. Intubated for pulmonary edema and pneumonia with copious secretions (no bacteria identified). On minimal vent settings now  hemodynamically stable but without ability to SBT well.    She is sedated on vent, appears comfortable    Principal Problem:    Diverticulitis  Active Problems:    Bradyarrhythmia    Acute kidney injury (H)    Diverticulitis of large intestine with perforation and abscess without bleeding    Atrial fibrillation with rapid ventricular response (H)    Obstructive sleep apnea syndrome    Poisoning by digoxin, accidental or unintentional, initial encounter    Acute respiratory failure with hypoxia and hypercapnia (H)    Acute metabolic encephalopathy    Shock circulatory (H)    Metabolic acidosis    Other hypervolemia    Difficult ventilator weaning (H)      Allergies: Penicillins     MEDS:  Scheduled Meds:    alteplase  1 mg Intracatheter Once     alteplase  1 mg Intracatheter Once     aspirin  81 mg Enteral Tube DAILY     chlorhexidine  15 mL Topical Q12H     famotidine (PEPCID) injection  20 mg Intravenous BID    Or     famotidine  20 mg Oral BID     furosemide  20 mg Intravenous Q8H     guar gum  2 packet Enteral Tube BID     metoprolol tartrate  25 mg Enteral Tube BID     nystatin  5 mL Swish & Swallow QID     senna-docusate  1 tablet Oral Once     sertraline  50 mg Enteral Tube DAILY     sodium chloride  10 mL Intravenous Q8H FIXED TIMES     sodium chloride  10-30 mL Intravenous Q8H FIXED TIMES     ticagrelor  90 mg Enteral Tube BID     warfarin - daily dose required   Other Med Consult or Protocol     Continuous Infusions:    dexmedetomidine infusion orderable (PRECEDEX) 1.2 mcg/kg/hr (09/14/20 0743)     norepinephrine Stopped (09/14/20 0226)     PRN Meds:.alteplase, atropine, atropine, bacitracin, bisacodyL, camphor-menthoL, carboxymethylcellulose, dextrose 50 % (D50W), glucagon (human recombinant), haloperidol  (HALDOL) solution 2 mg/mL, haloperidol lactate, haloperidoL, hydrOXYzine pamoate, lidocaine (PF), lidocaine HCL, lipase-protease-amylase **AND** sodium bicarbonate, lipase-protease-amylase **AND** sodium  "bicarbonate, LORazepam, metoclopramide, morphine concentrated (ROXANOL) 20 mg/mL oral solution, naloxone **OR** naloxone, ondansetron **OR** ondansetron, oxyCODONE, polyethylene glycol, sodium chloride, sodium chloride 0.9%, sodium chloride bacteriostatic, sodium chloride, sodium chloride, sodium chloride, traZODone      Objective:   VITALS:  BP 92/65   Pulse 61   Temp 98.4  F (36.9  C) (Oral)   Resp 28   Ht 5' 2\" (1.575 m)   Wt 205 lb 14.4 oz (93.4 kg)   LMP 01/25/1999   SpO2 100%   BMI 37.66 kg/m    EXAM:  General appearance: Sedated and intubated  Lungs: CTA anteriorly b/l; non-labored on vent   Heart: irregularly irregular rhythm  Abdomen: Soft, no evident tenderness, positive bowel sounds.  Extremities: extremities normal, atraumatic, no cyanosis or edema  Skin: Skin color, texture, turgor normal. No rashes or lesions  Neurologic: Sedated on vent    I&O:      Intake/Output Summary (Last 24 hours) at 9/14/2020 0945  Last data filed at 9/14/2020 0743  Gross per 24 hour   Intake 1359.15 ml   Output 1055 ml   Net 304.15 ml       Results for orders placed or performed during the hospital encounter of 08/11/20   Basic Metabolic Panel   Result Value Ref Range    Sodium 138 136 - 145 mmol/L    Potassium 3.8 3.5 - 5.0 mmol/L    Chloride 102 98 - 107 mmol/L    CO2 27 22 - 31 mmol/L    Anion Gap, Calculation 9 5 - 18 mmol/L    Glucose 233 (H) 70 - 125 mg/dL    Calcium 10.0 8.5 - 10.5 mg/dL    BUN 15 8 - 28 mg/dL    Creatinine 0.61 0.60 - 1.10 mg/dL    GFR MDRD Af Amer >60 >60 mL/min/1.73m2    GFR MDRD Non Af Amer >60 >60 mL/min/1.73m2     Lab Results   Component Value Date    WBC 10.2 09/13/2020    HGB 7.5 (L) 09/13/2020    HCT 25.2 (L) 09/13/2020     (H) 09/13/2020     (L) 09/13/2020       By:  Titus Rodriguez, CNP, 9/14/2020, 2:31 PM    Primary Care Physician:  Jerson Hernandez MD               "

## 2021-06-11 NOTE — PROGRESS NOTES
Pharmacy Consult: Warfarin Management    Pharmacy consulted to dose warfarin for Gardenia uMller, a 70 y.o. female    Ordering provider: Karen Hilliard MD     Reason for warfarin therapy: Atrial Fibrillation  Goal INR Range: 2-3    Subjective  Medication lists (home and hospital) were reviewed.    New medications that may increase bleeding risk/INR: heparin subcut    Restarted home medications that may increase bleeding risk/INR: ASA, Ticagrelor, omeprazole     Home medications NOT restarted that may increase bleeding risk/INR: sertraline, trazadone     Home Warfarin Dosing: multiple dose reductions during August 2020.  Last dosing regimen just before admission was 1.5mg daily     Other Anticoagulants: heparin subcut   Patient being bridged: No    Patient Active Problem List   Diagnosis     Spinal stenosis     PAD (peripheral artery disease) (H)     Dermatosis Papulosa Nigra     Depression     Hypercalcemia     Right Renal Artery Stenosis     Osteoarthritis     Abnormality Of Walk     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)     Advanced directives, counseling/discussion     Cystitis     Healthcare maintenance     Essential hypertension     Cerebral infarction (H)     Anemia     Cerebrovascular disease     RLS (restless legs syndrome)     Incisional hernia, without obstruction or gangrene     Diverticular disease of large intestine     Coronary artery disease involving native coronary artery of native heart without angina pectoris     Dyslipidemia     Ventral hernia without obstruction or gangrene     Anxiety     (HFpEF) heart failure with preserved ejection fraction (H)     Anticoagulant long-term use     Persistent atrial fibrillation (H)     Bradyarrhythmia     Acute kidney injury (H)     Transaminitis     Physical deconditioning     Lipoma of back     Acalculous cholecystitis     Onychogryphosis     Hyperparathyroidism (H)     Microalbuminuria     Intestinal angina (H)     Chronic abdominal pain      Weight loss, non-intentional     Warfarin anticoagulation     Severe protein-calorie malnutrition (H)     Hypertrophy of nail     Dysuria     Class 1 obesity with serious comorbidity and body mass index (BMI) of 33.0 to 33.9 in adult, unspecified obesity type     Trigger finger, acquired     Acute liver failure without hepatic coma     Hematochezia     Hyperkalemia     Acute on chronic combined systolic (congestive) and diastolic (congestive) heart failure (H)     Ischemic cardiomyopathy     Acute kidney failure, unspecified (H)     Acute diastolic heart failure (H)     Diverticulitis     Supratherapeutic INR     Diverticulitis of large intestine with perforation and abscess without bleeding     Atrial fibrillation with rapid ventricular response (H)     Obstructive sleep apnea syndrome     Poisoning by digoxin, accidental or unintentional, initial encounter     Acute respiratory failure with hypoxia and hypercapnia (H)     Acute metabolic encephalopathy     Shock circulatory (H)     Metabolic acidosis     Other hypervolemia    Past Medical History:   Diagnosis Date     Acute diastolic heart failure (H) 11/2015     Acute on chronic diastolic heart failure (H) 6/15/2019     Advanced directives, counseling/discussion 8/5/2015    Patient completed 2011.  Discussed today, 5/24/17, and wants to change healthcare agent from niece to daughter.  Discussed that she will need to complete new form to indicate this.     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  Recommend 5 year follow-up.  Single tubular adenoma     Biliary obstruction 10/3/2018    Added automatically from request for surgery [id number]     Cerebral vascular accident (H)      Coronary artery disease 2006    100% RCA with collateral filling per Dr. Elizabeth's angio report     Diabetes mellitus type 2 in obese (H)      Fibrocystic breast      GERD  (gastroesophageal reflux disease)      History of anesthesia complications     slow to wake     Hypertension      Obstructive sleep apnea syndrome      Peripheral vascular disease (H)      Pneumonia 11/11/2018     PONV (postoperative nausea and vomiting)      S/P cholecystectomy 6/22/2018     SBO (small bowel obstruction) (H) 11/12/2015     Stroke (H)      Transaminitis 10/22/2018     Upper respiratory infection 12/20/2018     Urinary incontinence         Social History     Tobacco Use     Smoking status: Former Smoker     Packs/day: 0.25     Smokeless tobacco: Former User     Tobacco comment: e-cig a few times/day   Substance Use Topics     Alcohol use: No     Drug use: No       Objective   Labs:  Last 3 days:    Recent Labs     09/04/20  0532 09/05/20  0417 09/06/20  0526   CREATININE 0.52* 0.58* 0.51*   HGB 7.7* 7.7* 7.5*   HCT 24.8* 25.2* 24.6*   * 128* 154     Last 7 days:   Recent labs: (last 7 days)     08/31/20  0501 09/01/20  0357 09/02/20  0618 09/03/20  0439 09/04/20  0532 09/05/20  0417 09/06/20  0526   INR 3.01* 2.52* 2.07* 1.86* 1.89* 1.79* 1.90*       Warfarin Dosing History:    Date INR Warfarin Dose Comment   9/2/20 2.07 none    9/3/20 1.86 1.5 mg Pharmacist consulted to dose warfarin   9/4/20 1.89 1.5mg    9/5 1.79 2.5mg     9/6 1.90                   Assessment  The patient is resuming warfarin for the indication of Atrial Fibrillation with a goal INR of 2-3. INR today is subtherapeutic. Multiple dose reductions during August 2020.  Last dosing regimen just before admission was 1.5mg daily.  Patient was admitted with supratherapeutic INR.  INR was reversed for surgery and then remained elevated most likely due to liver issues post operatively.       INR increased will give  2.5mg again  today, likely able to resume pta dosing Monday     Plan  1. Administer warfarin 2.5 mg mg PO today.  2. Check INR daily or as appropriate.  3. Continue to follow the patient's INR, PLT, and HGB as  available.  4. Monitor for potential drug/disease interactions.    Thank you for the consult,  Ernestine Zapata RPh, BCPS, [facility] CenterCP 9/6/2020 11:29 AM

## 2021-06-11 NOTE — PROGRESS NOTES
[facility] COVID RT PROGRESS NOTE    DATA:    VENT DAY#  12    CURRENT SETTINGS:  VENT SETTINGS   VCV16/450/+5/28%            PATIENT PARAMETERS:    PIP 40  Pplat:  28  Pmean:  9  SBT: 15/5, 12/5 lasted only few min and become high RSBI and RR above 40bpm  SECRETIONS:  Small to moderate white secretion  02 SATS:  98%    ETT SIZE 7.5  Secured at  21 cm at teeth        Respiratory Medications: Duo-neb Q6H         NOTE / PLAN:   Pt remain on full support, no changed made with the vent settings. Pt didn't tolerated weaning regardless of high PS. ETT cuff has only 1cc air to secure the tube and intensivist notified. No cuff leak noted during the test.  RT continue follow.    Sylvie Manrique, LRT

## 2021-06-11 NOTE — PROGRESS NOTES
RESPIRATORY CARE NOTE      Patient remains on vent, day 8.  Breath sounds coarse.  Suctioned for moderate amount thick white secretions.  Duoneb given X1 on schedule.   No changes to vent settings.      Vent Mode: VCV  RR:  16  VT:  450 mL  FiO2:  30 %  PEEP:  cm H2O    7.5 ET 18 @ teeth       09/04/20 0422   Patient Data   Vt Exp (mL) 445 mL   Minute Ventilation (L/min) 6.9 L/min   Total Resp Rate  15 BPM   PIP Observed (cm H2O) 33 cm H2O   MAP (cm H2O) 9   Auto/Intrinsic PEEP Observed (cm H2O) 1 cm H2O   Plateau Pressure (cm H2O) 24 cm H2O   Static Compliance (L/cm H2O) 28   Dynamic Compliance (L/cm H2O) 20 L/cm H2O   Airway Resistance 15   SpO2 99 %   Heart Rate 73        Moustapha Garnica LRT

## 2021-06-11 NOTE — CONSULTS
HOSPICE - writer spoke with pt daughter Aria. Aria will not be in [facility] until Friday at the earliest.   Writer explained hospice benefits and philosophy. Writer spoke of the three places hospice takes place.   Family does not have the funds to pay privately. At this time [facility] does not have a bed but I did put her on our list.   Writer spoke with IDALIA Craig CM and strongly suggested [facility].   Writer will continue to follow and will update if a bed at [facility] becomes available.   Thank you  [facility] Ref Spec

## 2021-06-11 NOTE — PROGRESS NOTES
Respiratory care note:    SBT via PS 15/+5 for ~3 minutes. Cheyne fleming breathing observed. Pt didn't tolerate pressure support less than 15; RR in the 50s RSBI >200.     Lilia Hunt, GENOT

## 2021-06-11 NOTE — PROGRESS NOTES
Extubated per MD order and placed on 2 lpm/NC. Will monitor and titrate O2 as indicated.     Al Kennedy, LRT

## 2021-06-11 NOTE — PROGRESS NOTES
General surgery secondary evaluation:    70-year-old patient with an abscess associated with her distal sigmoid colon leading down into her pelvis.  The patient was evaluated with a new CT scan today.  I can appreciate fluid in and around the distal sigmoid colon and rectum.  I do not see any free air.  I suspect the drain is not working particularly well at this point and I would recommend a consultation with IR to re-evaluate the drain.  It may need to be cleared or upsized or evaluated with an abscessogram all of which would be best addressed by our interventional radiology colleagues.    [facility]  [phone]

## 2021-06-11 NOTE — PLAN OF CARE
Problem: Pain  Goal: Patient's pain/discomfort is manageable  Outcome: Progressing     Problem: Safety  Goal: Patient will be injury free during hospitalization  Outcome: Progressing     Problem: Daily Care  Goal: Daily care needs are met  Outcome: Progressing     Problem: Psychosocial Needs  Goal: Demonstrates ability to cope with hospitalization/illness  Outcome: Progressing   Pt continues to be on comfort cares, Pt is aroused with stimulation and touch. Turn aand repositioned q2hrs. Scheduled morphine given. Will continue to monitor.

## 2021-06-11 NOTE — PROGRESS NOTES
Respiratory Care Note    PS titrated from 20 to 15 cmH2O. Pt tolerating fairly well. RR 28, , RSBI subsequently 87. Will continue with this for now and continue to monitor. Nebs given as scheduled. Suctioning small to moderate amount of clear/thick inline.     William Barton, GENOT

## 2021-06-11 NOTE — PROGRESS NOTES
PULMONARY / CRITICAL CARE PROGRESS NOTE    Date / Time of Admission:  8/11/2020 10:16 AM    Assessment:   Principal Problem:    Diverticulitis  Active Problems:    Bradyarrhythmia    Acute kidney injury (H)    Diverticulitis of large intestine with perforation and abscess without bleeding    Atrial fibrillation with rapid ventricular response (H)    Obstructive sleep apnea syndrome    Poisoning by digoxin, accidental or unintentional, initial encounter    Acute respiratory failure with hypoxia and hypercapnia (H)    Acute metabolic encephalopathy    Shock circulatory (H)    Metabolic acidosis    69 yo F with history of DMII, HFpEF, afib on coumadin presented with perforated diverticulitis causing abscess resulting in acute kidney injury and respiratory failure now intubated, not felt to be a good surgical candidate.     Advance Directives:  Full Code      Plan:   Pulmonary:  1) Intubated for pneumonia and volume overload  -daily sedation vacation and SBT. Failed SBT today to high RR.  -check cxr in am    C/V:  1) Afib with RVR--resolved.  Echo found stable EF 45%, no significant valve pathology  2) Dig Toxicity  3) Hypotension related in part to sedation  -Levophed for MAP>60  -Vasopressin added for low diastolic  -Diuresis ongoing  -place back on Brillinta 8/31 if INR<3.  -check a digoxin level     Renal:  1) MAY on CKD  Current autodiuresing, holding diuretics.   -Appreciate renal assistance, may avoid HD    GI:  1) perforated diverticulitis  -started feeding and titrating up as tolerated.   -f/u per Interventional radiology to evaluate adequacy of drainage    Neuro:  1) Sedation requirements  -Propofol  -Fentanyl    ID: perforated diverticulitis  Pneumonia seen on abdominal CT  -Micafungin and meropenem for abscess. Grew C. dubliniensis and gram positive cocci in chains.  -sputum culture usual Anne.  -f/u per Interventional radiology to evaluate adequacy of drainage     Disposition/Goals: palliative care  consulted      ICU DAILY CHECKLIST                           Can patient transfer out of MICU? no    FAST HUG:    Feeding:  Feeding: No.  Patient is receiving trickle feeding and will titrate up.  Bardales:Yes  Analgesia/Sedation:Yes fentanyl and propofol  Thromboembolic prophylaxis: yes; Mode:  Coumadin  HOB>30:  Yes  Stress Ulcer Protocol Active: yes; Mode: PPI  Glycemic Control: Any glucose > 180 no; Mode of Insulin Therapy: Sliding Scale Insulin    INTUBATED:  Can patient have daily waking:  yes  Can patient have spontaneous breathing trial:  yes    Restraints? yes      Critical Care Time greater than: 45 Minutes-this patient is critically ill on mechanical ventilation with hemodynamic insability.         Subjective:   HPI:  Gardenia Muller is a 70 y.o. female with chronic afib on anticoagulation, HFpEF, CAD, CVA, DMII who presented 8/11 with nausea and vomiting. CT found perforated diverticulitis. Drain placed into abscess and antibiotics initiated given her poor candidacy for surgery. Worsening renal failure in setting of Afib with RVR and became digoxin-toxic. Received digibind with improvement in HR. Level still pending (reported level is spurious). Minimal urine output and hypervolemic, lasix drip started with slow improvement in urine output. 8/30 lasix drip stopped and started autodiuresing with ongoing improvemetn in creatinine 4-->2.48.    Intubated 8/28 for worsening work of breathing and hypoxia, CT found bibasilar infiltrates and she remains rhonchorous.     Interval history:   8/31: overnight events reviewed. Remains on pressors, NE and vasopressin. ETT secretions improved. Sedation vacation and SBT today.    Principal Problem:    Diverticulitis  Active Problems:    Bradyarrhythmia    Acute kidney injury (H)    Diverticulitis of large intestine with perforation and abscess without bleeding    Atrial fibrillation with rapid ventricular response (H)    Obstructive sleep apnea syndrome    Poisoning by  digoxin, accidental or unintentional, initial encounter    Acute respiratory failure with hypoxia and hypercapnia (H)    Acute metabolic encephalopathy    Shock circulatory (H)    Metabolic acidosis      Allergies: Penicillins     MEDS:  Scheduled Meds:    amino acids-protein hydrolys  1 packet Enteral Tube BID     aspirin  81 mg Enteral Tube DAILY     chlorhexidine  15 mL Topical Q12H     insulin aspart (NovoLOG) injection   Subcutaneous Q4H FIXED TIMES     ipratropium-albuteroL  3 mL Nebulization Q6H - RT     magnesium sulfate IVPB  2 g Intravenous Once     meropenem  1 g Intravenous Q12H     micafungin (MYCAMINE) IV  100 mg Intravenous Q24H     omeprazole  20 mg Oral QAM AC    Or     omeprazole  20 mg Enteral Tube QAM AC    Or     pantoprazole  40 mg Intravenous QAM AC     potassium chloride liquid/packet  20 mEq Enteral Tube Q4H     [Held by provider] sertraline  100 mg Oral DAILY     sodium chloride bacteriostatic  1-5 mL Intradermal Once     sodium chloride  10 mL Intravenous Q8H FIXED TIMES     sodium chloride  10-30 mL Intravenous Q8H FIXED TIMES     [Held by provider] ticagrelor  90 mg Enteral Tube BID     Continuous Infusions:    fentaNYL citrate (PF) 75 mcg/hr (08/31/20 0600)     [Held by provider] furosemide Stopped (08/29/20 2040)     norepinephrine 0.06 mcg/kg/min (08/31/20 0949)     propofol Stopped (08/31/20 0752)     vasopressin 2.4 Units/hr (08/31/20 0600)     PRN Meds:.alteplase, atropine, bacitracin, bisacodyL, carboxymethylcellulose, codeine-guaiFENesin, dextrose 50 % (D50W), glucagon (human recombinant), hydrOXYzine pamoate, lidocaine (PF), lipase-protease-amylase **AND** sodium bicarbonate, [Held by provider] metoprolol tartrate, naloxone **OR** naloxone, oxyCODONE, polyethylene glycol, sodium chloride 0.9%, sodium chloride bacteriostatic, sodium chloride, sodium chloride, sodium chloride, traZODone      Objective:   VITALS:  BP (!) 86/30   Pulse 70   Temp 99.8  F (37.7  C) (Oral)   Resp 16  "  Ht 5' 2\" (1.575 m)   Wt 207 lb 4.8 oz (94 kg)   LMP 01/25/1999   SpO2 96%   BMI 37.92 kg/m    EXAM:  General appearance: sedated but easily arousable  Lungs: rhonchourous bilaterally  Heart: irregularly irregular rhythm  Abdomen: soft, non-tender; bowel sounds normal; no masses,  no organomegaly  Extremities: extremities normal, atraumatic, no cyanosis or edema  Skin: Skin color, texture, turgor normal. No rashes or lesions  Neurologic: follows commands but falls back to sleep.     I&O:      Intake/Output Summary (Last 24 hours) at 8/31/2020 1116  Last data filed at 8/31/2020 1013  Gross per 24 hour   Intake 1766.99 ml   Output 5380 ml   Net -3613.01 ml       Data Review:  CXR personally interpreted  EXAM: XR CHEST 1 VIEW PORTABLE  LOCATION: [facility]  DATE/TIME: 8/28/2020 3:39 PM     INDICATION: Postintubation.  COMPARISON: 08/27/2020.     IMPRESSION:      ET tube tip at the level of the emily; recommend 1 to 2 cm retraction.     New feeding tube coursing into the stomach and off the field-of-view. Stable right PICC.     Similar to marginally improved coarse interstitial appearance and opacities bilaterally. No substantial pleural effusion. No pneumothorax. Stable enlarged cardiac silhouette.        ET tube tip findings verbally communicated to patient's nurse, Lavern, at 3:45 PM on 08/28/2020.     NOTE: ABNORMAL REPORT     THE DICTATION ABOVE DESCRIBES AN ABNORMALITY FOR WHICH FOLLOW-UP IS NEEDED.     EXAM: CT ABDOMEN PELVIS WO ORAL WO IV CONTRAST  LOCATION: [facility]  DATE/TIME: 8/29/2020 1:41 PM     INDICATION: Abdominal pain, acute, nonlocalized. Evaluate abdominal abscess.  COMPARISON: 08/20/2020.  TECHNIQUE: CT scan of the abdomen and pelvis was performed without oral or IV contrast. Multiplanar reformats were obtained. Dose reduction techniques were used.  CONTRAST: None.     FINDINGS:   LOWER CHEST: Increasing bilateral pulmonary infiltrates.     HEPATOBILIARY: " Cholecystectomy.     PANCREAS: Normal.     SPLEEN: Normal.     ADRENAL GLANDS: Normal.     KIDNEY/BLADDER: Normal.     BOWEL: There is no residual fluid around the left lower quadrant drain. Sigmoid colonic diverticulosis without diverticulitis. Scant free fluid in the pelvic cul-de-sac. New NG tube in the stomach.     LYMPH NODES: Normal.     VASCULATURE: Diffuse atherosclerotic calcification.     PELVIC ORGANS: Multiple uterine fibroids. Bardales catheter in the bladder.     MUSCULOSKELETAL: Normal.     IMPRESSION:   1.  No significant residual abscess around the left lower quadrant drainage catheter. No new abscess.  2.  Trace free fluid in the pelvis as well as subcutaneous edema is new from previous.  3.  New bilateral pulmonary infiltrates may represent pulmonary edema or pneumonia.    Results for orders placed or performed during the hospital encounter of 08/11/20   Basic Metabolic Panel   Result Value Ref Range    Sodium 139 136 - 145 mmol/L    Potassium 3.2 (L) 3.5 - 5.0 mmol/L    Chloride 101 98 - 107 mmol/L    CO2 26 22 - 31 mmol/L    Anion Gap, Calculation 12 5 - 18 mmol/L    Glucose 140 (H) 70 - 125 mg/dL    Calcium 8.5 8.5 - 10.5 mg/dL    BUN 47 (H) 8 - 28 mg/dL    Creatinine 1.41 (H) 0.60 - 1.10 mg/dL    GFR MDRD Af Amer 45 (L) >60 mL/min/1.73m2    GFR MDRD Non Af Amer 37 (L) >60 mL/min/1.73m2     Lab Results   Component Value Date    WBC 14.1 (H) 08/28/2020    HGB 9.0 (L) 08/28/2020    HCT 27.1 (L) 08/28/2020    MCV 91 08/28/2020     (L) 08/28/2020     ABG:  Recent Labs     08/28/20  1536   PHART 7.43   OXYHB 98.3*   BEARTCALC -2.4   POCPEEP 5   TEMP 37.0   POCRATE 20         By:  Will Torres MD, 8/31/2020, 2:31 PM    Primary Care Physician:  Jerson Hernandez MD

## 2021-06-11 NOTE — PROGRESS NOTES
"  Infectious Disease Follow up Note:    Service: Infectious Disease   Note: Follow Up Note  Date: 2020    Chief Complaint: Follow up for acute diverticulitis with perforation    ASSESSMENT:     Gardenia Muller is a 70 y.o. old female with acute diverticulitis with perforation.   C. dubliniensis and gram positive cocci in chains aug 17th time frame -abdominal infection-- active issue  Intra-abdominal abscess worse on CT scan       Intubated, respiratory failure-active issue    Recommendations:     IR consulted by Surgery for abdominal abscess drain repositioning/upsize--Drain management per IR and Surgery- gravity bag    Follow culture results--no orgs aug 28th  Abscessogram  ----->IMPRESSION:   1.  Contracted abscess cavity. 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 Drain exchanged for a tailored 10 Angolan drain placed lateral to the fistula site.     PLAN: Gravity drainage. Abscessogram in one week.     Continue Meropenem and  Micafungin probably into next week will need some guidance from surgery if/when they think we have a \"controlled\" fistula.  Certainly aren't there yet.       ARY Doherty MD  [facility]  [phone]            ______________________________________________________________________  Notes / labs / vitals reviewed.    SUBJECTIVE / INTERVAL HISTORY: Afebrile.  Intubated.  Sick.    ROS: intubated    PMHx/FHx/SHx: Reviewed. Interval changes noted.    OBJECTIVE:  Vitals:    20 1130   BP: 92/49   Pulse: 69   Resp: 17   Temp:    SpO2: 97%       Temp (24hrs), Av.2  F (36.8  C), Min:96.6  F (35.9  C), Max:99  F (37.2  C)    Exam on 2020   GEN: Somnolent  EYES:  Anicteric, RAYMUNDO, EOM intact.  HEAD, EARS, NOSE, MOUTH, AND THROAT:  Thrush, ETT  NECK:  Supple.   RESPIRATORY: intubated  CARDIOVASCULAR:  Arm swelling  Previous exam: S1 S2 No murmur, click, gallop or rub. No dependent edema. No excess JVD.  ABDOMEN:  Soft, tenderness, gravity bag minimal " output  MUSCULOSKELETAL:  Joints without effusion or acute inflammation. No muscle atrophy.Dedconditioned  LYMPHATIC:  No cervical or supraclavicular adenopathy  SKIN/HAIR/NAILS:  Warm, ecchymosis  NEUROLOGIC:  Somnolent, opens eyes    Antibiotics:  IV vancomycin --> Daptomycin, stopped on 8/28/2020  IV meropenem  IV fluconazole --> Micafungin 8/29    Pertinent labs:  Reviewed    Results from last 7 days   Lab Units 09/02/20  0618 09/01/20  1023 08/28/20  0923   LN-WHITE BLOOD CELL COUNT thou/uL 13.1* 12.4* 14.1*   LN-HEMOGLOBIN g/dL 9.3* 8.2* 9.0*   LN-HEMATOCRIT % 29.7* 24.8* 27.1*   LN-PLATELET COUNT thou/uL 81* 95* 116*   LN-MONOCYTES - REL (DIFF) % 2 4 5       Results from last 7 days   Lab Units 09/02/20  0618 09/02/20  0135 09/01/20  1339 09/01/20  0357  08/31/20  0501  08/30/20  0517  08/28/20  0431  08/27/20  0405   LN-SODIUM mmol/L 139  --   --  140  --  139  --  138   < > 138   < > 140   LN-POTASSIUM mmol/L 4.3 3.8 3.5 3.4*  3.4*   < > 3.2*  3.2*   < > 3.6  3.6   < > 3.5  3.5   < > 4.6   LN-CHLORIDE mmol/L 103  --   --  100  --  101  --  105   < > 106   < > 108*   LN-CO2 mmol/L 25  --   --  29  --  26  --  22   < > 20*   < > 17*   LN-BLOOD UREA NITROGEN mg/dL 33*  --   --  42*  --  47*  --  53*   < > 48*   < > 39*   LN-CREATININE mg/dL 0.60  --   --  0.81  --  1.41*  --  2.48*   < > 3.13*   < > 2.86*   LN-CALCIUM mg/dL 9.1  --   --  8.6  --  8.5  --  8.9   < > 8.4*   < > 9.0   LN-ALBUMIN g/dL  --   --   --   --   --   --   --  1.4*  --  1.7*  --  1.9*   LN-PROTEIN TOTAL g/dL  --   --   --   --   --   --   --  6.0  --  6.3  --  6.6   LN-BILIRUBIN TOTAL mg/dL  --   --   --   --   --   --   --  2.0*  --  1.9*  --  2.0*   LN-ALKALINE PHOSPHATASE U/L  --   --   --   --   --   --   --  152*  --  147*  --  147*   LN-ALT (SGPT) U/L  --   --   --   --   --   --   --  27  --  119*  --  166*   LN-AST (SGOT) U/L  --   --   --   --   --   --   --  247*  --  1,413*  --  2,613*    < > = values in this interval not  displayed.       MICROBIOLOGY DATA:    Cultures reviewed  Culture:  C. dubliniensis august 17th    IMAGING/RADIOLOGY:  Reviewed  XR Chest 1 View Portable (Order [id number])   Imaging         Study Result     EXAM: XR CHEST 1 VIEW PORTABLE  LOCATION: [facility]  DATE/TIME: 8/21/2020 7:34 PM     INDICATION: r/o pna- shortness of breath  COMPARISON: 08/11/2020     IMPRESSION:   New bilateral interstitial infiltrates could represent edema or pneumonia including COVID. Enlarged cardiac silhouette. No pleural effusion. No definite pulmonary vascular congestion.     Abscessogram reveals no residual abscess pocket. Fistula to the colon. Switched drain to gravity drainage bag from SHAMIKA suction bulb. No flushes are needed. Follow-up abscessogram in one week.

## 2021-06-11 NOTE — PLAN OF CARE
Problem: Breathing  Goal: Patient will maintain patent airway  Outcome: Progressing     Problem: Mechanical Ventilation  Goal: Patient will maintain patent airway  Outcome: Progressing  Goal: ET tube will be managed safely  Outcome: Progressing     Problem: Mechanical Ventilation  Goal: Respiratory status - ventilation  Outcome: Not Progressing

## 2021-06-11 NOTE — PLAN OF CARE
Problem: Impaired Gas Exchange  Goal: Demonstrate improved ventilation and adequate oxygenation of tissues as evidenced by absence of respiratory distress  Description: Patient intubated 8/28 @1430.  ABG's collected s/p.  Sating well.    Outcome: Progressing     Problem: Breathing  Goal: Patient will maintain patent airway  Outcome: Progressing     Problem: Mechanical Ventilation  Goal: Patient will maintain patent airway  Outcome: Progressing  Goal: ET tube will be managed safely  Outcome: Progressing     Moustapha Garnica, LRT

## 2021-06-11 NOTE — PROGRESS NOTES
[facility] Daily Progress Note      Date of Service: 9/13/2020     Brief History: Gardenia Muller is 70 y.o. female with history of type 2 diabetes mellitus, chronic atrial fibrillation, diastolic congestive heart failure, CAD s/p PCI in 1995, CVA, peripheral vascular disease, presented to the hospital on 8/11/2020 for abdominal pain, and vomiting. CT abdomen revealed diverticulitis with perforation. General surgery and IR were consulted. Follow up image on 08/14 showed increase in size of abscess, and she underwent CT guided drain in the left pelvic diverticular abscess on 08/17. CT abdomen on 08/20 showed near collapse of fluid cavity. The patient developed hospital acquired pneumonia, and had candida growing from the abdominal fluid. ID was consulted, and she was placed on broad spectrum antimicrobials. Patient developed acute respiratory distress, and had 25 lb weight gain since admission, and was started on IV lasix. On 08/26 develop MAY and episodes of bradycardia. Digoxin level was found to be subsequently elevated, and the patient was started on DigiFab. Transferred to ICU for closer monitoring. Required atropine in ICU for low heart rate and was started on dopamine. Patient was endotracheally intubated on 08/28 for volume overload and pneumonia. She has failed spontaneous breathing trial and has remained on full vent support. She underwent abscessogram with tube check on 09/09, and as there were no more fluid collection, the drain was removed.    Patient has failed multiple weaning trials. Family care conference was held on 09/11, and as patient's wishes were not to pursue prolonged ventilator support and tracheostomy, plan will be to withdraw support and transition to comfort cares on Sunday, once all family member will be able to come visit.    Assessment/Plan:     Acute respiratory failure with hypoxia  -Secondary to HCAP, fluid overload  -Spontaneous breathing trial per pulmonary. Failing daily  weans  -Does not wish to prolong mechanical ventilation or tracheostomy  -Plan to withdraw support later today, once all family members are able to visit her    Electrolyte imbalance  -Hypo/hypernatremia-improved. Fluid flush with tube  -Hypomagnesemia, continuing with replacement  -Hypophosphatemia, replaced  -Hypokalemia, replaced    Perforated diverticulitis  Pericolonic abscess with sigmoid colon fistula  -S/p CT guided drain placement on 08/17  -Abscessogram tube check on 09/01 and on 09/09. Drain removed as there was no fluid collection, and drain output was zero.  -Treated with meropenem, micafungin. Antibiotics stopped on 09/10  -Tube fluid culture grew candida    Digitalis toxicity  Junctional bradycardia, secondary to digoxin toxicity  -Received digifab. Required dopamine for persistently low heart rate  -Now heart rate has stabilized    Acute kidney injury  -Multifactorial in etiology- contrast induced, digoxin toxicity, medication, sepsis, hypotension  -Consented for dialysis, but never received one. Started to make large volume urine output and renal function has improved  -Renal function in normal range now    Acute on chronic diastolic/systolic congestive heart failure  -LVEF of 45%  -Currently on IV lasix  -Monitor I/Os, electrolytes, and renal function.    Type 2 diabetes mellitus with hyperglycemia  -Insulin as needed  -Blood glucose elevated per metabolic panel readings  -Last A1C of 6.5    Anemia, normocytic  -Multifactorial in etiology- acute illness, blood loss  -Monitor closely    Acute encephalopathy, metabolic  -Requiring restraints  -Precedex as needed    Coronary artery disease  -Known  to RCA  -Currently on aspirin and brilinta  -Monitor    Other issues:    Afib with RVR on 08/19. Rate controlled at this time. Currently on warfarin    Transaminitis from hepatic congestion with heart failure    Depression/anxiety on zoloft      Subjective:     Chart reviewed, events noted. Pt seen and  "examined. Sedated this morning. Did not wake for me. Again, did not tolerated vent weaning.    Objective     Vital signs in last 24 hours:  Temp:  [98  F (36.7  C)-98.5  F (36.9  C)] 98.4  F (36.9  C)  Heart Rate:  [] 92  Resp:  [6-33] 25  BP: ()/(47-66) 116/50  FiO2 (%):  [30 %] 30 %  Weight:   207 lb 4.8 oz (94 kg)  Weight change:   Body mass index is 37.92 kg/m .    Intake/Output last 3 shifts:  I/O last 3 completed shifts:  In: 2622.2 [I.V.:360.2; NG/GT:2262]  Out: 1175 [Urine:1175]  Intake/Output this shift:  I/O this shift:  In: 318.8 [I.V.:78.8; NG/GT:190; IV Piggyback:50]  Out: 100 [Urine:100]      Physical Exam:  /50   Pulse 92   Temp 98.4  F (36.9  C) (Oral)   Resp 25   Ht 5' 2\" (1.575 m)   Wt 207 lb 4.8 oz (94 kg)   LMP 01/25/1999   SpO2 100%   BMI 37.92 kg/m      FiO2 (%): 30 % O2 Device: Ventilator O2 Flow Rate (L/min): 3 L/min    General Appearance: Not in distress  HEENT: Normocephalic. No scleral icterus. Mucous membranes moist. Endotracheal intubation  Heart: S1 S2 heard. Irregular rate and rhythm.  Lungs: Decreased breath sounds, coarse sounds  Abdomen: Soft, non tender, bowel sounds present.  Extremity: No deformity. No joint swelling. No edema.  Neurologic: No focal deficit.  Skin: No skin rashes      Imaging:  personally reviewed.    Xr Chest 1 View Portable    Result Date: 9/8/2020  EXAM: XR CHEST 1 VIEW PORTABLE LOCATION:  DATE/TIME: 9/8/2020 12:01 PM INDICATION: Respiratory failure COMPARISON: 09/06/2020 and older studies.     Xr Chest 1 View Portable    Result Date: 9/6/2020  EXAM: XR CHEST 1 VIEW PORTABLE LOCATION:  DATE/TIME: 9/6/2020 11:59 AM INDICATION: f/u intubation COMPARISON: 09/03/2020     Ir Abscessogram Tube Check    Result Date: 9/9/2020  [facility] LOCATION:  DATE: 9/9/2020 PROCEDURE: ABSCESS TUBE CHECK AND REMOVAL INTERVENTIONAL RADIOLOGIST: Karlos Mckeon MD. INDICATION: 70-year-old " female with a left pelvic abscess and known fistula to the sigmoid colon. Drain outputs have been 0 the past several days. CONSENT: The risks, benefits and alternatives of an abscessogram with possible exchange, manipulation or removal were discussed with the patient  in detail. All questions were answered. Informed consent was given to proceed with the procedure. MODERATE SEDATION: None. CONTRAST: 10 mL Omnipaque into the abscess cavity. ANTIBIOTICS: None. ADDITIONAL MEDICATIONS: None. FLUOROSCOPIC TIME: 0.3 minutes. RADIATION DOSE: Air Kerma: 37 mGy. COMPLICATIONS: No immediate complications. STERILE BARRIER TECHNIQUE: Maximum sterile barrier technique was used. Cutaneous antisepsis was performed at the operative site with application of 2% chlorhexidine and large sterile drape. Prior to the procedure, the  and assistant performed hand hygiene and wore hat, mask, sterile gown, and sterile gloves during the entire procedure. PROCEDURE:  A  image was obtained. The pre-existing drainage catheter was injected with a small amount of contrast and multiple images were obtained. Based on the results of the study the drainage catheter was removed. FINDINGS: The  image shows the existing straight drainage catheter tubing. The tubing tip terminates approximately 3-4 cm lateral to the loop of the catheter on the previous study. An injection of contrast initially shows the drainage catheter is occluded. A more forceful injection exhibits peritubal leakage with no residual abscess. No residual fistula is identified.        Lab Results:  personally reviewed.   Lab Results   Component Value Date     09/13/2020    K 3.8 09/13/2020     09/13/2020    CO2 27 09/13/2020    BUN 15 09/13/2020    CREATININE 0.61 09/13/2020    CALCIUM 10.0 09/13/2020     Lab Results   Component Value Date    WBC 10.2 09/13/2020    HGB 7.5 (L) 09/13/2020    HCT 25.2 (L) 09/13/2020     (H) 09/13/2020     (L)  09/13/2020     Results from last 7 days   Lab Units 09/13/20  0407 09/12/20  0542 09/11/20  0456   LN-MAGNESIUM mg/dL 1.5* 2.0 1.5*        Results from last 7 days   Lab Units 09/12/20  0832   LN-POC GLUCOSE FINGERSTICK- HE mg/dL 176*       Advance Care Planning:   Barriers to discharge: Active issues, planning to transition to comfort measures  Anticipated discharge day: To be determined  Disposition: To be determined      Uzair Burdick MD  [facility]   [facility]

## 2021-06-11 NOTE — PLAN OF CARE
Care Plan  Care Management      Care Management Goals of the Day: Progression of care    Care Progression Reviewed With: Charge RN, MD, HUC, RNCM, CMSW    Barriers to Discharge: Intubated, Palliative following for [facility]    Family Care Conference: held 9/2 with palliative / intensivist / dtr at 2 PM - Dtr NOT interested in trach    Discharge Disposition: pending    Expected Discharge Date: tbd    Transportation: tbd      Care Coordination Narrative: Intubated. Pt from [facility] TCU and bed has been released. Per notes, pt resides at AL facility [facility] in [facility] ([phone] & [address] Rich @ [phone]). Daughter is primary contact/HCA, is from [place] and has arrived in [place] this week. Hospital staff to reassess next week 9/9 to determine if pt should be transitioned to comfort care. Plan tbd as cares progress, possibly eol. CM following.    RISHABH Jeffrey  9/3/2020                Abbreviation  Code:  [facility] Wheelchair ([facility] WC), [facility] Stretcher ([facility] STR), Patient (pt), Transitional Care Unit (TCU), Skilled Nursing Facility (SNF), Physical Therapy (PT), Occupational Therapy (OT), Speech Therapy (ST TX), Respiratory Therapy (RT)

## 2021-06-11 NOTE — PLAN OF CARE
Problem: Occupational Therapy  Goal: OT Goals  Description: Patient will demonstrate the following by 8/21/20, in order to maximize independence with ADL/IADL performance:    -Demonstrate safety with Bed/Chair/Toilet transfer with min A   -Complete LB dressing with min A, using appropriate adaptive equipment PRN   -Participate in grooming/hygiene tasks with mod in sitting   -Patient will participate in further cognitive testing in order to assist with safe discharge planning    Goals entered on 8/15/2020 by Tiara Morales, OT    Patient will demonstrate the following by 8/26/20, in order to maximize independence with ADL/IADL performance:    -Demonstrate safety with Bed/Chair/Toilet transfer with min A   -Complete LB dressing with min A, using appropriate adaptive equipment PRN   -Participate in grooming/hygiene tasks with mod in sitting   -Patient will participate in further cognitive testing in order to assist with safe discharge planning  Goals reviewed on 8/21/2020 -Renae Cordova OTR/L                             atient will demonstrate the following by 8/31/20, in order to maximize independence with ADL/IADL performance:    -Demonstrate safety with Bed/Chair/Toilet transfer with min A   -Complete LB dressing with min A, using appropriate adaptive equipment PRN   -Participate in grooming/hygiene tasks with mod in sitting   -Patient will participate in further cognitive testing in order to assist with safe discharge planning  Continue with goals  Rajendra Preciado OTR/L  8/26/2020                       Occupational Therapy Discharge Summary    Date of OT Discharge: 8-28-20  Refer to daily doc flowsheet for equipment issued and current functional status.  Discharge Destination: Remains in hospital  Discharge Comments: Pt now intubated.  Will discontinue OT orders.  Please reorder when Pt is appropriate to start therapy.     Please note that if goals are not fully met the patient is not making progress  towards established goals, which is documented in flowsheet notes. If further therapy is recommended it is related to documented deficits, and is necessary to maximize functional independence in order for patient to return to previous level of function.      8/28/2020 by Jonathan Romero OT        Outcome: Completed

## 2021-06-11 NOTE — PROGRESS NOTES
"  Clinical Nutrition Therapy Nutrition Support Note      RD managing TF per consult    Subjective:  Pt remains intubated.  TF infusing at goal rate.  Discussed pt in team rounds.  Chart reviewed.        Nutrition Prescription:   Diet:   Replete no fiber at 65 ml/hr x 22 hrs.    Modulars:  benefiber 4 pkts/day  Water flush 190 ml q 6 hrs.  IV dextrose or Fluids:dexmedetomidine infusion orderable (PRECEDEX), Last Rate: 0.4 mcg/kg/hr (09/11/20 1009)  norepinephrine, Last Rate: Stopped (09/05/20 2017)      Nutrition Intake:  Pt has OG placed 8/28/20  TF provides:  1430 calories, 91 g protein, 177 g carb, 12 g fiber, 1760 ml free water.     Nutrition needs met    Anthropometrics:  Height: 5' 2\" (157.5 cm)  Admission weight: 195#  Weight: 199 lb 8 oz (90.5 kg)        GI Status/Output:   GI symptoms per nursing: rounded abdomen  Last BM recorded: 9/11, loose  Gastric residuals:  minimal    Skin/Wound:   James Scale Score: 13    Per WOC note- gluteal cleft and buttocks denudement noted.    Medications:  warfarin  K, Mg, phos replacement  Medications reviewed.    Labs:  Lab Results   Component Value Date/Time    PREALBUMIN 25.6 07/30/2019 12:17 PM    ALBUMIN 1.6 (L) 09/09/2020 05:26 AM     09/11/2020 04:56 AM    K 3.4 (L) 09/11/2020 04:56 AM    BUN 13 09/11/2020 04:56 AM    CREATININE 0.56 (L) 09/11/2020 04:56 AM     (H) 09/11/2020 04:56 AM    PHOS 1.7 (L) 09/11/2020 04:56 AM    MG 1.5 (L) 09/11/2020 04:56 AM   Labs reviewed    Estimated Nutrition Needs:  Using adjusted weight of 59 kg.    Energy Needs: 5832-4179 kcals daily per 25-30 kcal/kg   Protein Needs: 71-89 g daily, 1.2-1.5 g/kg.  Fluid Needs: 1928-6180 mls daily, 25-30 mls/kg    Malnutrition: Noted previously    Nutrition Risk Level: stable-high risk    Nutrition DX: inadequate intake r/t acute illness evidenced by NPO/CL x 6 days-completed.  New-Swallow difficulty r/t intubation evidenced by nutrition support    Goals:  Meet nutrition needs- " met  Electrolytes WNL-progressing  Tolerate TF    Plan:  Continue TF as ordered.  Consider checking fingerstick glucose as TF is at goal rate.  Will continue same TF at this time though could now switch to the fiber version.    Monitor:  Per goals.    See Care Plan for Problems, Goals, and Interventions.

## 2021-06-11 NOTE — PROGRESS NOTES
Infectious Disease Follow up Note:    Service: Infectious Disease   Note: Follow Up Note  Date: 8/29/2020    Chief Complaint: Follow up for acute diverticulitis with perforation    ASSESSMENT:     Gardenia Muller is a 70 y.o. old female with acute diverticulitis with perforation.   C. dubliniensis and gram positive cocci in chains -abdominal infection-- active issue      Intubated, respiratory failure- active issue    Fever--active issue- curve improving    Digoxin levels elevated-- active issue. Transferred to ICU on 8/26/2020    History of CVA, obesity, chronic abdominal pain.  Acute diverticulitis with perforation admitted on 8/11/2020.  On IV Cipro and Flagyl.  Cultures from abscess drainage on 8/17/2020 with Candida albicans.  CT scan on 8/20/2020 with near complete resolution of the abscess.    Abscessogram reveals no residual abscess pocket. Fistula to the colon. Switched drain to gravity drainage bag from SHAMIKA suction bulb. No flushes are needed. Follow-up abscessogram in one week.       New leukocytosis.    C. difficile negative on 8/21/2020.  Feeling short of breath.  Chest x-ray ordered. HAP is a possibility.  Leukocytosis could be from untreated Candida infection.  Fluconazole added on 8/21/2020.  WBC better, at 17,000 on 8/22/2020.  Abdomen is better.  New bilateral interstitial infiltrate.  COVID-19 PCR negative on 8/22/2020.  Most likely pulmonary edema.  Penicillin allergy, reaction swelling.     Recommendations:     Follow culture results-- GPC pending  Continue Meropenem and changed fluconazole to Micafungin   Elevated CK, stopped Daptomycin on 8/28/2020  Repeat blood cultures in process-- follow  Stopped Vancomycin and started Daptomycin on 8/26  Monitor white blood cell count.     Drain management per IR and Surgery- gravity bag  Discussed with Pharm D  Monitor CBC,CMP      Discussed with patient's nurse at bedside. Appreciate input    ID will chart check over the weekend and follow on Monday,  2020. Please call with questions over the weekend.     Patient remains critically ill.  Please see previous note by Dr. Christopher Vela MD  [facility]  [phone]            ______________________________________________________________________  Notes / labs / vitals reviewed.    SUBJECTIVE / INTERVAL HISTORY:   Intubated, resp failure  Renal failure  Wakes up, somnolent    : Bipap, resp failure  Somnolent    : Wakes up, slightly better mentation    Sleeping, wakes up easily  Previous note:    Intermittent abdominal pain  Tolerating abx  Needs help with sitting up in bed      : Abdomen is better.  Continue to have respiratory distress.  Now on a lot of oxygen.  COVID-19 negative.    ROS: A complete 12 point ROS is negative except as listed above.    PMHx/FHx/SHx: Reviewed. Interval changes noted.    OBJECTIVE:  Vitals:    20 0645   BP:    Pulse: 69   Resp:    Temp:    SpO2: 99%       Temp (24hrs), Av.8  F (37.1  C), Min:98  F (36.7  C), Max:99.4  F (37.4  C)    Exam on 2020   GEN: Somnolent  EYES:  Anicteric, RAYMUNDO, EOM intact.  HEAD, EARS, NOSE, MOUTH, AND THROAT:  Thrush, ETT  NECK:  Supple.   RESPIRATORY:  Intubated  CARDIOVASCULAR:  Arm swelling  Previous exam: S1 S2 No murmur, click, gallop or rub. No dependent edema. No excess JVD.  ABDOMEN:  Soft, tenderness, SHAMIKA drain   MUSCULOSKELETAL:  Joints without effusion or acute inflammation. No muscle atrophy.Dedconditioned  LYMPHATIC:  No cervical or supraclavicular adenopathy  SKIN/HAIR/NAILS:  Warm, ecchymosis  NEUROLOGIC:  Somnolent, opens eyes    Antibiotics:  IV vancomycin --> Daptomycin, stopped on 2020  IV meropenem  IV fluconazole --> Micafungin     Pertinent labs:  Reviewed    Results from last 7 days   Lab Units 20  0923 20  0431 20  0406   LN-WHITE BLOOD CELL COUNT thou/uL 14.1* 14.6* 18.3*   LN-HEMOGLOBIN g/dL 9.0* 8.9* 9.1*   LN-HEMATOCRIT % 27.1* 27.0* 28.3*    LN-PLATELET COUNT thou/uL 116* 115* 111*   LN-MONOCYTES - REL (DIFF) % 5 6 6       Results from last 7 days   Lab Units 08/29/20  0500 08/28/20  1715 08/28/20  0431  08/27/20  1207  08/27/20  0405  08/26/20  1805   LN-SODIUM mmol/L 137  --  138  --  139   < > 140   < > 138   LN-POTASSIUM mmol/L 3.6  3.6 3.7 3.5  3.5   < > 3.8   < > 4.6   < > 5.8*   LN-CHLORIDE mmol/L 106  --  106  --  107   < > 108*   < > 109*   LN-CO2 mmol/L 22  --  20*  --  20*   < > 17*   < > 10*   LN-BLOOD UREA NITROGEN mg/dL 52*  --  48*  --  43*   < > 39*   < > 33*   LN-CREATININE mg/dL 3.08*  --  3.13*  --  2.92*   < > 2.86*   < > 2.60*   LN-CALCIUM mg/dL 8.6  --  8.4*  --  8.7   < > 9.0   < > 9.7   LN-ALBUMIN g/dL  --   --  1.7*  --   --   --  1.9*  --  1.9*   LN-PROTEIN TOTAL g/dL  --   --  6.3  --   --   --  6.6  --  6.6   LN-BILIRUBIN TOTAL mg/dL  --   --  1.9*  --   --   --  2.0*  --  1.9*   LN-ALKALINE PHOSPHATASE U/L  --   --  147*  --   --   --  147*  --  143*   LN-ALT (SGPT) U/L  --   --  119*  --   --   --  166*  --  116*   LN-AST (SGOT) U/L  --   --  1,413*  --   --   --  2,613*  --  1,543*    < > = values in this interval not displayed.       MICROBIOLOGY DATA:    Cultures reviewed  Culture:  C. dubliniensis     IMAGING/RADIOLOGY:  Reviewed  XR Chest 1 View Portable (Order [id number])   Imaging         Study Result     EXAM: XR CHEST 1 VIEW PORTABLE  LOCATION: [facility]  DATE/TIME: 8/21/2020 7:34 PM     INDICATION: r/o pna- shortness of breath  COMPARISON: 08/11/2020     IMPRESSION:   New bilateral interstitial infiltrates could represent edema or pneumonia including COVID. Enlarged cardiac silhouette. No pleural effusion. No definite pulmonary vascular congestion.     Abscessogram reveals no residual abscess pocket. Fistula to the colon. Switched drain to gravity drainage bag from SHAMIKA suction bulb. No flushes are needed. Follow-up abscessogram in one week.

## 2021-06-11 NOTE — PLAN OF CARE
Care Plan  Care Management      Care Management Goals of the Day: Progression of care    Care Progression Reviewed With: Charge RN, MD, HUC, RNCM, CMSW    Barriers to Discharge: Intubated - weaning trials, Palliative following for [facility], Nephrology+ pulminary + ID following, IV lasix, tube feeds    Family Care Conference: held 9/2 - Dtr NOT interested in trach    Discharge Disposition: pending    Expected Discharge Date: tbd    Transportation: tbd      Care Coordination Narrative: Intubated. Pt resides at AL facility [facility] in [facility] ([phone] & [address] @ [phone]). Daughter is primary contact/HCA, is from [place]of-state and has arrived in [place] this week. Hospital staff to reassess pt status next week 9/9 to determine if pt should be transitioned to comfort care. Dtr will go to [place] this weekend to help granddtr start school, then return to [place] early next week. Plan tbd as cares progress, possibly eol. CM following.    RISHABH Jeffrey  9/6/2020                Abbreviation  Code:  [facility] Wheelchair ([facility] WC), [facility] ([facility] STR), Patient (pt), Transitional Care Unit (TCU), Skilled Nursing Facility (SNF), Physical Therapy (PT), Occupational Therapy (OT), Speech Therapy (ST TX), Respiratory Therapy (RT)

## 2021-06-11 NOTE — PROGRESS NOTES
I was asked by the staff to come and declared patient date    Examined the patient.  She would not responsive.  No central pulse was palpated.  No spontaneous respiration of breathing was heard.  Pupils were dilated and fixed.     Patient was therefore declared dead at 1250 hrs. on the 19th day of September 2020.     I did discuss the patient's death with the family including daughter Aria.  At the present time family does not want any autopsy done.  They do not want any organ donated either.     Dr. Michael EID.  Hospitalist [facility].

## 2021-06-11 NOTE — PLAN OF CARE
Problem: Impaired Gas Exchange  Goal: Demonstrate improved ventilation and adequate oxygenation of tissues as evidenced by absence of respiratory distress  Description: Patient intubated 8/28 @1430.  ABG's collected s/p.  Sating well.    8/29/2020 1351 by Moustapha Burton LRT  Outcome: Progressing  8/29/2020 1350 by Moustapha Burton LRT  Outcome: Progressing     Problem: Breathing  Goal: Patient will maintain patent airway  Outcome: Progressing     Problem: Mechanical Ventilation  Goal: Patient will maintain patent airway  Outcome: Progressing  Goal: ET tube will be managed safely  Outcome: Progressing     KAYLA Lora

## 2021-06-11 NOTE — PROGRESS NOTES
"Vent Mode: VCV  FiO2 (%):  [25 %-100 %] 25 %  S RR:  [16] 16  S VT:  [450 mL] 450 mL  PEEP/CPAP (cm H2O):  [5 cm H2O] 5 cm H2O  Minute Ventilation (L/min):  [6.8 L/min-10.8 L/min] 7.9 L/min  PIP:  [24 cm H2O-37 cm H2O] 35 cm H2O  [place] SUP:  [10 cm H20-15 cm H20] 10 cm H20  MAP (cm H2O):  [9-10] 9     Pt is on Ventilator day 14. 7.5 ETT 21cm at the teeth. Duoneb given as scheduled. Pt tolerated well.BS coarse. Suctioned small amount of clear/white secretions. Pt weaned for 36 minutes today on PS 15/5 then decreased to 10/5. Pt has cheyne fleming breathing pattern during the wean. MD aware. RT will continue to monitor.    /55   Pulse 71   Temp 98.4  F (36.9  C) (Oral)   Resp 16   Ht 5' 2\" (1.575 m)   Wt 202 lb 9.6 oz (91.9 kg)   LMP 01/25/1999   SpO2 98%   BMI 37.06 kg/m       Dilia Maddox, LRT  "

## 2021-06-11 NOTE — PROGRESS NOTES
Palliative Spiritual Care Note    Spiritual Assessment: Pt resting, responds to verbal questions by opening eyes. Dtr Aria was to have come at 3PM for prayer with writer and pt. Remained in room 40 minutes. Dtr did not show.    Care Provided: Visited with pt, one way conversation. Shared prayer and blessing.    Plan of Care: Will continue to visit as able.    HARLEY Barriga.

## 2021-06-11 NOTE — PLAN OF CARE
Problem: Pain  Goal: Patient's pain/discomfort is manageable  Outcome: Progressing  Note: Patient being trialed off sedation.  Denies pain, will continue to monitor.       Problem: Potential for hemodynamic instability  Goal: Cardiac output is adequate  Outcome: Progressing  Note: Patient trialed off levo, unable to tolerate, levo back on at 0.01 with vaso @ 2.4      Problem: Potential for Compromised Skin Integrity  Goal: Nutritional status is improving  Outcome: Not Progressing  Note: Patients tube feed on hold.  Residules at 0800 425, at 1200 225.  Continue to monitor.

## 2021-06-11 NOTE — PROGRESS NOTES
Palliative Care Progress Note  NAME: Gardenia Muller, : 1950,   MRN: [id number] Date: 9/15/2020    Problem List:  Active Problems   Principal Problem:    Diverticulitis  Active Problems:    Bradyarrhythmia    Acute kidney injury (H)    Diverticulitis of large intestine with perforation and abscess without bleeding    Atrial fibrillation with rapid ventricular response (H)    Obstructive sleep apnea syndrome    Poisoning by digoxin, accidental or unintentional, initial encounter    Acute respiratory failure with hypoxia and hypercapnia (H)    Acute metabolic encephalopathy    Shock circulatory (H)    Metabolic acidosis    Other hypervolemia    Difficult ventilator weaning (H)    Assessment: Gardenia Muller, 70 y.o., female with a medical history of chronic A Fib (on digoxin and warfarin), diastolic HF, HTN, CAD, CVA, PAD, DM II, chronic malnutrition, chronic abdominal pain, and depression admitted on 2020 with abdominal pain, vomiting, and diarrhea and was found to have acute perforated diverticulitis w/small abscess formation (drain placement ). Unfortunately has had a complicated hospital course and transferred to ICU on  for digoxin toxicity causing hemodynamic instability and bradyarrhythmias. Required intubation on  and transitioned to comfort cares on .     Recommendations:   Weakness: initially from acute perforated diverticulitis w/small abscess formation requiring drain placement.  Hospital course complicated by worsening renal failure, digoxin toxicity, and acute respiratory failure leading to intubation on . Extubated to comfort cares on .   - Reposition for comfort as needed   - Oral cares as able      Shortness of breath: acute respiratory failure with hypoxia and mild hypercapnia due to possible HCAP, also w/concern of pulmonary edema given bradyarrhythmias and oliguric renal failure. Intubated on , unable to tolerate SBT's. Would not want tracheostomy for  "prolonged ventilator needs. Extubated to comfort on 9/14.   - Morphine 5mg SL q6h   - Morphine PO/SL and IV available PRN pain/sob   - Supplemental O2 as needed for comfort      Goals of Care: Comfort  - Will place hospice consult, would benefit from hospice house if able to be arranged.       Code Status: DNR/I   =========================================================  Current Medical Status:  Extubated to comfort care yesterday. This morning is sleeping, wakes easily, asking for water, then falls back to sleep. Cares discussed with RN.     Symptom-Focused ROS:  Unable to obtain due to pt being intubated/sedated      Palliative Care Focused Medications:  See MAR     PERTINENT PHYSICAL EXAMINATION:  Vital Signs: Blood pressure 111/56, pulse 80, temperature (!) 96.4  F (35.8  C), temperature source Axillary, resp. rate 26, height 5' 2\" (1.575 m), weight 205 lb 14.4 oz (93.4 kg), last menstrual period 01/25/1999, SpO2 100 %, not currently breastfeeding.   GENERAL: alert, cooperative, in no distress  SKIN: Warm and dry   HEENT: Normocephalic, anicteric sclera,  LUNGS: coarse   CARDIAC: irregularly irregular, normal s1/s2, w/o m/r/g   ABDOMINAL: BS (+), soft, non distended, non tender, drains in place  : dias in place   EXTREMITIES: generalized edema   NEUROLOGIC: alert, oriented to self     I have reviewed labs and imaging in [facility]     ----------------------------------------------------  TT: I have personally spent a total of 15 minutes  today on the unit in review of medical record, consultation with the medical providers and assessment of patient today, with more than 50% of this time spent in counseling, coordination of care, and discussion with patient re: end of life care and symptom management.     Jose Raul Ramirez MD  [facility]  Palliative Medicine   Office: [phone]    "

## 2021-06-11 NOTE — PROGRESS NOTES
Palliative Care Progress Note  NAME: Gardenia Muller, : 1950,   MRN: [id number] Date: 2020    Problem List:  Active Problems   Principal Problem:    Diverticulitis  Active Problems:    Bradyarrhythmia    Acute kidney injury (H)    Diverticulitis of large intestine with perforation and abscess without bleeding    Atrial fibrillation with rapid ventricular response (H)    Obstructive sleep apnea syndrome    Poisoning by digoxin, accidental or unintentional, initial encounter    Acute respiratory failure with hypoxia and hypercapnia (H)    Acute metabolic encephalopathy    Shock circulatory (H)    Metabolic acidosis    Other hypervolemia    Assessment: Gardenia Muller, 70 y.o., female with a medical history of chronic A Fib (on digoxin and warfarin), diastolic HF, HTN, CAD, CVA, PAD, DM II, chronic malnutrition, chronic abdominal pain, and depression admitted on 2020 with abdominal pain, vomiting, and diarrhea and was found to have acute perforated diverticulitis w/small abscess formation (drain placement ). Unfortunately has had a complicated hospital course and transferred to ICU on  for digoxin toxicity causing hemodynamic instability and bradyarrhythmias. Required intubation on , remains intubated in the ICU.     Recommendations:   Weakness: initially from acute perforated diverticulitis w/small abscess formation requiring drain placement.  Hospital course complicated by worsening renal failure, digoxin toxicity, and acute respiratory failure leading to intubation on . Remains intubated at this time.   - ICU primary   - Cardiology, ID, Nephrology, ID and surgery following    - Reposition for comfort as able      Shortness of breath: acute respiratory failure with hypoxia and mild hypercapnia due to possible HCAP, also w/concern of pulmonary edema given bradyarrhythmias and oliguric renal failure. Intubated on , unable to tolerate SBT's.   - Ventilator management per ICU team  "  - Weaning trials per ICU/RT     Goals of Care: Restorative if able   - Patient would not want not pursue Trach/PEG if pt unable to be weaned from ventilator  - Planning on family meeting when daughter Aria arrives from [place] in next day or 2.      Code Status: No CPR- Pre-arrest Intubation OK  =========================================================  Current Medical Status:  Remains intubated/sedated. Overnight events discussed with ICU team during multi-disciplinary rounds. Consultant notes reviewed.     Met with patient at bedside, able to shake head yes/no in response to simple questions. Does not report any pain. Discussed with patient difficulty in weaning from ventilator and options moving forward including trach/peg placement for prolonged ventilator support vs extubating and focusing on comfort cares only. Patient shook head no when asked if tracheostomy route would be acceptable to her. When discussing this with Aria today (via phone) she was very emotional and responded \"I knew my mom wouldn't want that, it's just me not wanting to let go, but I know she's tired and I know she wants to be at peace.\" Discussed with her that we will have a formal care conference when she arrives back in [facility] and determine a plan of care at that time.     Symptom-Focused ROS:  Negative unless otherwise noted above     Palliative Care Focused Medications:  See MAR     PERTINENT PHYSICAL EXAMINATION:  Vital Signs: Blood pressure 110/53, pulse 77, temperature 98.5  F (36.9  C), temperature source Oral, resp. rate 18, height 5' 2\" (1.575 m), weight 183 lb 6.4 oz (83.2 kg), last menstrual period 01/25/1999, SpO2 100 %, not currently breastfeeding.   GENERAL: intubated, in no distress   SKIN: Warm and dry   HEENT: Normocephalic, anicteric sclera, intubated  LUNGS: ventilated BS   CARDIAC: irregularly irregular, normal s1/s2, w/o m/r/g   ABDOMINAL: BS (+), soft, non distended, non tender, drains in place  : larissa in " place   EXTREMITIES: generalized edema   NEUROLOGIC: intubated, alert, appropriate   PSYCH: intubated, calm, appropriate     I have reviewed labs and imaging in [facility]     ----------------------------------------------------  TT: I have personally spent a total of 35 minutes  today on the unit in review of medical record, consultation with the medical providers and assessment of patient today, with more than 50% of this time spent in counseling, coordination of care, and discussion with patient and daughter re:diagnostic results, prognosis, symptom management, risks and benefits of management options, and development of plan of care.    Jose Raul Ramirez MD  [facility]  Palliative Medicine   Office: [phone]

## 2021-06-11 NOTE — PLAN OF CARE
Problem: Knowledge Deficit  Goal: Patient/family/caregiver demonstrates understanding of disease process, treatment plan, medications, and discharge instructions  Outcome: Progressing     Problem: Mechanical Ventilation  Goal: ET tube will be managed safely  Outcome: Progressing  Goal: Respiratory status - ventilation  Outcome: Progressing

## 2021-06-11 NOTE — PLAN OF CARE
Problem: Knowledge Deficit  Goal: Patient/family/caregiver demonstrates understanding of disease process, treatment plan, medications, and discharge instructions  Outcome: Progressing     Problem: Impaired Gas Exchange  Goal: Demonstrate improved ventilation and adequate oxygenation of tissues as evidenced by absence of respiratory distress  Description: Patient intubated 8/28 @1430.  ABG's collected s/p.  Sating well.    Outcome: Progressing       Problem: Mechanical Ventilation  Goal: Patient will maintain patent airway  Outcome: Progressing  Goal: Oral health is maintained or improved  Outcome: Progressing  Goal: ET tube will be managed safely  Outcome: Progressing  Goal: Ability to express needs and understand communication  Outcome: Progressing    MD trialed a brief CPAP/PS trial this morning 5/5. .40. Patient quickly became tachypneic with low Vt. MD aborted weaning trial in approximately 1 minute.

## 2021-06-11 NOTE — PROGRESS NOTES
[facility] Progress Note      Date of Service: 9/15/2020     Brief History: Gardenia Muller is 70 y.o. female with history of type 2 diabetes mellitus, chronic atrial fibrillation, diastolic congestive heart failure, CAD s/p PCI in 1995, CVA, peripheral vascular disease, presented to the hospital on 8/11/2020 for abdominal pain, and vomiting. CT abdomen revealed diverticulitis with perforation. General surgery and IR were consulted. Follow up image on 08/14 showed increase in size of abscess, and she underwent CT guided drain in the left pelvic diverticular abscess on 08/17. CT abdomen on 08/20 showed near collapse of fluid cavity. The patient developed hospital acquired pneumonia, and had candida growing from the abdominal fluid. ID was consulted, and she was placed on broad spectrum antimicrobials. Patient developed acute respiratory distress, and had 25 lb weight gain since admission, and was started on IV lasix. On 08/26 develop MAY and episodes of bradycardia. Digoxin level was found to be subsequently elevated, and the patient was started on DigiFab. Transferred to ICU for closer monitoring. Required atropine in ICU for low heart rate and was started on dopamine. Patient was endotracheally intubated on 08/28 for volume overload and pneumonia. She has failed spontaneous breathing trial and has remained on full vent support. She underwent abscessogram with tube check on 09/09, and as there were no more fluid collection, the drain was removed.  Family care conference was held on 09/11, and as patient's wishes were not to pursue prolonged ventilator support and tracheostomy, extubated on 9/14, transitioned to comfort care    Assessment/Plan:     Acute respiratory failure with hypoxia  Electrolyte imbalance  Perforated diverticulitis  Pericolonic abscess with sigmoid colon fistula  Digitalis toxicity  Junctional bradycardia  Acute kidney injury  Acute on chronic diastolic/systolic congestive heart  "failure  Type 2 diabetes mellitus with hyperglycemia  Anemia, normocytic  Acute encephalopathy, metabolic  Coronary artery disease                                          Comfort care orders in place  Hospice consult    Subjective:     Seen and examined, shortness of breath, looks anxious    Objective     Vital signs in last 24 hours:  Temp:  [96.4  F (35.8  C)-98.5  F (36.9  C)] 98.5  F (36.9  C)  Heart Rate:  [] 119  Resp:  [26-30] 30  BP: (111-112)/(56) 112/56  Weight:   205 lb 14.4 oz (93.4 kg)  Weight change:   Body mass index is 37.66 kg/m .    Intake/Output last 3 shifts:  I/O last 3 completed shifts:  In: 73.4 [I.V.:43.4; NG/GT:30]  Out: 950 [Urine:950]  Intake/Output this shift:  No intake/output data recorded.      Physical Exam:  /56   Pulse (!) 119   Temp 98.5  F (36.9  C) (Oral)   Resp (!) 30   Ht 5' 2\" (1.575 m)   Wt 205 lb 14.4 oz (93.4 kg)   LMP 01/25/1999   SpO2 99%   BMI 37.66 kg/m      FiO2 (%): 21 % O2 Device: Nasal cannula O2 Flow Rate (L/min): 2 L/min    General Appearance: uncomfortable  HEENT: Normocephalic. No scleral icterus. Mucous membranes moist. Endotracheal intubation  Heart: S1 S2 heard. Irregular rate and rhythm.  Lungs: Decreased breath sounds, coarse sounds  Abdomen: Soft, non tender, bowel sounds present.  Extremity: No deformity. No joint swelling. No edema.  Neurologic: No focal deficit.  Skin: No skin rashes      Imaging:  personally reviewed.    Xr Chest 1 View Portable    Result Date: 9/8/2020  EXAM: XR CHEST 1 VIEW PORTABLE LOCATION: [facility] DATE/TIME: 9/8/2020 12:01 PM INDICATION: Respiratory failure COMPARISON: 09/06/2020 and older studies.     Xr Chest 1 View Portable    Result Date: 9/6/2020  EXAM: XR CHEST 1 VIEW PORTABLE LOCATION: [facility] DATE/TIME: 9/6/2020 11:59 AM INDICATION: f/u intubation COMPARISON: 09/03/2020     Ir Abscessogram Tube Check    Result Date: 9/9/2020  [facility] LOCATION: [facility] " DATE: 9/9/2020 PROCEDURE: ABSCESS TUBE CHECK AND REMOVAL INTERVENTIONAL RADIOLOGIST: Karlos Mckeon MD. INDICATION: 70-year-old female with a left pelvic abscess and known fistula to the sigmoid colon. Drain outputs have been 0 the past several days. CONSENT: The risks, benefits and alternatives of an abscessogram with possible exchange, manipulation or removal were discussed with the patient  in detail. All questions were answered. Informed consent was given to proceed with the procedure. MODERATE SEDATION: None. CONTRAST: 10 mL Omnipaque into the abscess cavity. ANTIBIOTICS: None. ADDITIONAL MEDICATIONS: None. FLUOROSCOPIC TIME: 0.3 minutes. RADIATION DOSE: Air Kerma: 37 mGy. COMPLICATIONS: No immediate complications. STERILE BARRIER TECHNIQUE: Maximum sterile barrier technique was used. Cutaneous antisepsis was performed at the operative site with application of 2% chlorhexidine and large sterile drape. Prior to the procedure, the  and assistant performed hand hygiene and wore hat, mask, sterile gown, and sterile gloves during the entire procedure. PROCEDURE:  A  image was obtained. The pre-existing drainage catheter was injected with a small amount of contrast and multiple images were obtained. Based on the results of the study the drainage catheter was removed. FINDINGS: The  image shows the existing straight drainage catheter tubing. The tubing tip terminates approximately 3-4 cm lateral to the loop of the catheter on the previous study. An injection of contrast initially shows the drainage catheter is occluded. A more forceful injection exhibits peritubal leakage with no residual abscess. No residual fistula is identified.        Lab Results:  personally reviewed.   Lab Results   Component Value Date     09/13/2020    K 3.8 09/13/2020     09/13/2020    CO2 27 09/13/2020    BUN 15 09/13/2020    CREATININE 0.61 09/13/2020    CALCIUM 10.0 09/13/2020     Lab Results   Component Value  Date    WBC 10.2 09/13/2020    HGB 7.5 (L) 09/13/2020    HCT 25.2 (L) 09/13/2020     (H) 09/13/2020     (L) 09/13/2020     Results from last 7 days   Lab Units 09/13/20  0407 09/12/20  0542 09/11/20  0456   LN-MAGNESIUM mg/dL 1.5* 2.0 1.5*        Results from last 7 days   Lab Units 09/12/20  0832   LN-POC GLUCOSE FINGERSTICK- HE mg/dL 176*       Advance Care Planning:   Comfort care  Hospice consult  Discharge date; TBD      Total unit/floor time > 35 minutes, time consisted of the following examination of patient, reviewing the record  ( patient is new to me and saw the patient 1st time during this hospitalization) and completing documentation. Counseling time 18 minutes, time spent in counseling with the patient regarding the plan of care, discussed with RN and CM team regarding plan of care. more than 50% of the total visit spent face to face counseling and/or coordination of care activities.     Seble Trevizo MD  [facility]

## 2021-06-11 NOTE — PROGRESS NOTES
Palliative Care Progress Note  NAME: Gardenia Muller, : 1950,   MRN: [id number] Date: 2020    Problem List:  Active Problems   Principal Problem:    Diverticulitis  Active Problems:    Bradyarrhythmia    Acute kidney injury (H)    Diverticulitis of large intestine with perforation and abscess without bleeding    Atrial fibrillation with rapid ventricular response (H)    Obstructive sleep apnea syndrome    Poisoning by digoxin, accidental or unintentional, initial encounter    Acute respiratory failure with hypoxia and hypercapnia (H)    Acute metabolic encephalopathy    Shock circulatory (H)    Metabolic acidosis    Other hypervolemia    Difficult ventilator weaning (H)    Assessment: Gardenia Muller, 70 y.o., female with a medical history of chronic A Fib (on digoxin and warfarin), diastolic HF, HTN, CAD, CVA, PAD, DM II, chronic malnutrition, chronic abdominal pain, and depression admitted on 2020 with abdominal pain, vomiting, and diarrhea and was found to have acute perforated diverticulitis w/small abscess formation (drain placement ). Unfortunately has had a complicated hospital course and transferred to ICU on  for digoxin toxicity causing hemodynamic instability and bradyarrhythmias. Required intubation on , remains intubated in the ICU. Transitioning to comfort cares today.     Recommendations:   Weakness: initially from acute perforated diverticulitis w/small abscess formation requiring drain placement.  Hospital course complicated by worsening renal failure, digoxin toxicity, and acute respiratory failure leading to intubation on . Remains intubated at this time.   - Transitioning to comfort cares today at 12pm      Shortness of breath: acute respiratory failure with hypoxia and mild hypercapnia due to possible HCAP, also w/concern of pulmonary edema given bradyarrhythmias and oliguric renal failure. Intubated on , unable to tolerate SBT's. Would not want  "tracheostomy for prolonged ventilator needs.   - Extubating to comfort cares at 12pm (order placed)  - Please pre-medicate with hydromorphone and lorazepam prior to extubation      Goals of Care: Comfort  - Transitioning to comfort cares at 12pm today   - Comfort care order set placed, all non-comfort orders discontinued   - Aria would like iPAD turned on AFTER extubation      Code Status: DNR/I   =========================================================  Current Medical Status:  Remains intubated/sedated. Overnight events discussed with ICU team during multi-disciplinary rounds. Consultant notes reviewed.     Phone call this morning with daughter Aria.  Discussed events of the weekend including decision not to transition patient to comfort care on Sunday afternoon as previously discussed.  Aria was under the impression that the patient would be extubated before the family arrived and when she was not the family found it too distressing to be present and did not want her extubated and transition to comfort care at that time.  Discussed with Aria that we would be extubating patient to comfort care at 12:00 today.  She is in agreement with this.  She is also back in [place].  She states that her brother will do his best to come visit once patient has been extubated.  Aria asked that we do our best to make sure the patient does not suffer or struggle at the end of her life.  Advised Aria that we will focus solely on patient's comfort moving forward when she has been extubated, which Aria is in agreement with.    Symptom-Focused ROS:  Unable to obtain due to pt being intubated/sedated      Palliative Care Focused Medications:  See MAR     PERTINENT PHYSICAL EXAMINATION:  Vital Signs: Blood pressure 101/49, pulse 72, temperature 98.4  F (36.9  C), temperature source Oral, resp. rate (!) 30, height 5' 2\" (1.575 m), weight 205 lb 14.4 oz (93.4 kg), last menstrual period 01/25/1999, SpO2 97 %, " not currently breastfeeding.   GENERAL: intubated, in no distress   SKIN: Warm and dry   HEENT: Normocephalic, anicteric sclera, intubated  LUNGS: ventilated BS   CARDIAC: irregularly irregular, normal s1/s2, w/o m/r/g   ABDOMINAL: BS (+), soft, non distended, non tender, drains in place  : dias in place   EXTREMITIES: generalized edema   NEUROLOGIC: intubated  PSYCH: intubated    I have reviewed labs and imaging in [facility]     ----------------------------------------------------  TT: I have personally spent a total of 35 minutes  today on the unit in review of medical record, consultation with the medical providers and assessment of patient today, with more than 50% of this time spent in counseling, coordination of care, and discussion with patient and daughter over the phone re: transitioning patient to comfort care, terminal extubation, end of life symptom management and development of plan of care.     Jose Raul Ramirez MD  [facility]  Palliative Medicine   Office: [phone]

## 2021-06-11 NOTE — PROGRESS NOTES
RESPIRATORY CARE NOTE    Pt remains on vent.  Breath sounds clear to decreased.  Duoneb given X1 on schedule.  Sx small amount white secretions.  No changes to vent settings.    Vent Mode: VCV  RR:  16  VT:  450 mL  FiO2:  30 %  PEEP:  5 cm H2O  7.5 ET 21 @ teeth    Pulse 79    Resp 18    SpO2 100%        09/12/20 0428   Patient Data   PIP Observed (cm H2O) 34 cm H2O   MAP (cm H2O) 15   Auto/Intrinsic PEEP Observed (cm H2O) 3 cm H2O   Plateau Pressure (cm H2O) 25 cm H2O   Static Compliance (L/cm H2O) 24   Dynamic Compliance (L/cm H2O) 20 L/cm H2O   Airway Resistance 16      Moustapha Garnica, LRT

## 2021-06-11 NOTE — PROGRESS NOTES
Pharmacy Consult: Warfarin Management    Pharmacy consulted to dose warfarin for Gardenia Muller, a 70 y.o. female    Ordering provider: Karen Hilliard MD     Reason for warfarin therapy: Atrial Fibrillation  Goal INR Range: 2-3    Subjective  Medication lists (home and hospital) were reviewed.    New medications that may increase bleeding risk/INR: none currently    Restarted home medications that may increase bleeding risk/INR: ASA, Ticagrelor, omeprazole, sertraline     Home medications NOT restarted that may increase bleeding risk/INR:  trazodone     Home Warfarin Dosing: multiple dose reductions during August 2020.  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)     Advanced directives, counseling/discussion     Cystitis     Healthcare maintenance     Essential hypertension     Cerebral infarction (H)     Anemia     Cerebrovascular disease     RLS (restless legs syndrome)     Incisional hernia, without obstruction or gangrene     Diverticular disease of large intestine     Coronary artery disease involving native coronary artery of native heart without angina pectoris     Dyslipidemia     Ventral hernia without obstruction or gangrene     Anxiety     (HFpEF) heart failure with preserved ejection fraction (H)     Anticoagulant long-term use     Persistent atrial fibrillation (H)     Bradyarrhythmia     Acute kidney injury (H)     Transaminitis     Physical deconditioning     Lipoma of back     Acalculous cholecystitis     Onychogryphosis     Hyperparathyroidism (H)     Microalbuminuria     Intestinal  angina (H)     Chronic abdominal pain     Weight loss, non-intentional     Warfarin anticoagulation     Severe protein-calorie malnutrition (H)     Hypertrophy of nail     Dysuria     Class 1 obesity with serious comorbidity and body mass index (BMI) of 33.0 to 33.9 in adult, unspecified obesity type     Trigger finger, acquired     Acute liver failure without hepatic coma     Hematochezia     Hyperkalemia     Acute on chronic combined systolic (congestive) and diastolic (congestive) heart failure (H)     Ischemic cardiomyopathy     Acute kidney failure, unspecified (H)     Acute diastolic heart failure (H)     Diverticulitis     Supratherapeutic INR     Diverticulitis of large intestine with perforation and abscess without bleeding     Atrial fibrillation with rapid ventricular response (H)     Obstructive sleep apnea syndrome     Poisoning by digoxin, accidental or unintentional, initial encounter     Acute respiratory failure with hypoxia and hypercapnia (H)     Acute metabolic encephalopathy     Shock circulatory (H)     Metabolic acidosis     Other hypervolemia     Difficult ventilator weaning (H)    Past Medical History:   Diagnosis Date     Acute diastolic heart failure (H) 11/2015     Acute on chronic diastolic heart failure (H) 6/15/2019     Advanced directives, counseling/discussion 8/5/2015    Patient completed 2011.  Discussed today, 5/24/17, and wants to change healthcare agent from niece to daughter.  Discussed that she will need to complete new form to indicate this.     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  Recommend 5 year follow-up.  Single tubular adenoma     Biliary obstruction 10/3/2018    Added automatically from request for surgery [id number]     Cerebral vascular accident (H)      Coronary artery disease 2006    100% RCA with collateral filling per Dr. Elizabeth's angio report      Diabetes mellitus type 2 in obese (H)      Fibrocystic breast      GERD (gastroesophageal reflux disease)      History of anesthesia complications     slow to wake     Hypertension      Obstructive sleep apnea syndrome      Peripheral vascular disease (H)      Pneumonia 11/11/2018     PONV (postoperative nausea and vomiting)      S/P cholecystectomy 6/22/2018     SBO (small bowel obstruction) (H) 11/12/2015     Stroke (H)      Transaminitis 10/22/2018     Upper respiratory infection 12/20/2018     Urinary incontinence         Social History     Tobacco Use     Smoking status: Former Smoker     Packs/day: 0.25     Smokeless tobacco: Former User     Tobacco comment: e-cig a few times/day   Substance Use Topics     Alcohol use: No     Drug use: No       Objective   Labs:  Last 3 days:    Recent Labs     09/10/20  0450 09/10/20  0451 09/11/20  0456 09/11/20  0457 09/12/20  0542   CREATININE 0.58*  --  0.56*  --  0.62   HGB  --  7.7*  --  7.8* 7.8*   HCT  --  25.5*  --  25.7* 26.0*   PLT  --  158  --  155 137*     Last 7 days:   Recent labs: (last 7 days)     09/06/20  0526 09/07/20  0440 09/08/20  0403 09/09/20  0525 09/10/20  0450 09/11/20  0456 09/12/20  0542   INR 1.90* 2.09* 2.14* 2.50* 2.40* 2.85* 2.49*       Warfarin Dosing History:    Date INR Warfarin Dose Comment   9/2/20 2.07 none    9/3/20 1.86 1.5 mg Pharmacist consulted to dose warfarin   9/4/20 1.89 1.5mg    9/5 1.79 2.5mg     9/6 1.90 2.5 mg    9/7/20 2.09 2.5 mg - SQ heparin discontinued   9/8/20 2.14 2.5 mg    9/9/20 2.50 1.5 mg    9/10/20 2.40 2 mg    9/11/20 2.85 1.5 mg    9/12/2020 2.49 2 mg      Assessment  The patient is resuming warfarin for the indication of Atrial Fibrillation with a goal INR of 2-3. INR today is therapeutic. INR decreased 0.36 from yesterday so will give larger warfarin dose than patient received last evening.  Multiple dose reductions during August 2020.  Last dosing regimen just before admission was 1.5mg daily.  Patient was  admitted with supratherapeutic INR.  INR was reversed for surgery and then remained elevated most likely due to liver issues post operatively.  INR increased and now within goal range.     Plan  1. Administer warfarin 2 mg via ET today.  2. Check INR daily or as appropriate.  3. Continue to follow the patient's INR, PLT, and HGB as available.  4. Monitor for potential drug/disease interactions.    Thank you for the consult,  Monik Bailey, PharmD, 9/12/2020 12:06 PM

## 2021-06-11 NOTE — PLAN OF CARE
Problem: Impaired Gas Exchange  Goal: Demonstrate improved ventilation and adequate oxygenation of tissues as evidenced by absence of respiratory distress  Description: Patient intubated 8/28 @1430.  ABG's collected s/p.  Sating well.    Outcome: Progressing        Problem: Mechanical Ventilation  Goal: Patient will maintain patent airway  Outcome: Progressing  Goal: Oral health is maintained or improved  Outcome: Progressing  Goal: ET tube will be managed safely  Outcome: Progressing  Goal: Respiratory status - ventilation  Outcome: Progressing    Weaning on hold.

## 2021-06-11 NOTE — PROGRESS NOTES
[facility] Palliative Care Social Work Note:     Supportive check in call to dtr Aria. Introduced self and role on the Palliative team. She is struggling with knowing her mom's health is failing, and she isn't able to be here. She lives in [place]. Her  and dtr support her. She shared she was able to connect with her brother last night whom she hasn't talked to in years. Explored her feelings around that experience. She is worried about pt's belongings at the care facility and hope her cousin can pick the items up for her. Encouraged her to call the facility to coordinate. Provided active and empathetic listening, validation of feelings and emotional  Support. She welcomed continued calls for support.     Updated Rachele WARD and Palliative team.    PC SW remains available to provide psychosocial support to pt and dtr.     Melina Turner, [facility]  Palliative Care   Office # [phone]

## 2021-06-11 NOTE — PROGRESS NOTES
"[facility] Palliative Care Social Work Note:     Called dtr Aria to provide emotional support and follow up from family meeting. She shared that she was told her mom is \"doing a little better\" and she is grateful for that. She saw her late yesterday and will visit again this afternoon. She acknowledged that she is very \"drained\" from traveling and going through this experience with her mom. Francisco on her strengths to build trust and rapport. Encouraged sharing of feelings and experiences. Provided active listening, validation of feelings and emotional support.     Updated Palliative Care team.     PC SW remains available to provide psychosocial support to dtr.     Melina Turner, [facility]  Palliative Care   Office # [phone]  "

## 2021-06-11 NOTE — PROGRESS NOTES
Patient remaians on CPAP/PS 5/15 .30, RR 32, Vt 332, SpO2 97. Will continue to monitor and assess.     Al Kennedy, LRT

## 2021-06-11 NOTE — PROGRESS NOTES
PULMONARY / CRITICAL CARE PROGRESS NOTE    Date / Time of Admission:  8/11/2020 10:16 AM    Assessment:   Principal Problem:    Diverticulitis  Active Problems:    Bradyarrhythmia    Acute kidney injury (H)    Diverticulitis of large intestine with perforation and abscess without bleeding    Atrial fibrillation with rapid ventricular response (H)    Obstructive sleep apnea syndrome    Poisoning by digoxin, accidental or unintentional, initial encounter    Acute respiratory failure with hypoxia and hypercapnia (H)    Acute metabolic encephalopathy    Shock circulatory (H)    Metabolic acidosis    Other hypervolemia    70yoF with history of DMII, HFpEF, afib on coumadin presented with perforated diverticulitis causing abscess resulting in acute kidney injury and respiratory failure now intubated, not felt to be a good surgical candidate.     Advance Directives:  Full Code      Plan:   Pulmonary:  1)Acute hypoxic respiratory failure  2) Pulmonary edema--resolved  3) Pneuonia--resolved  -Negative sputum cultures  -Completed course of antibiotics  -Ongoing diuresis  -Failing daily weans even while in the chair.  Today was able to tolerate 10/5 for a short period of time.     C/V:  1) Afib with RVR--resolved  2) Dig Toxicity--resolved  3) Hypotension--resolved  -Gentle Diuresis ongoing  -Brillinta/Aspirin  -Anticoagulated for atrial fibrillation  -Lopressor for HR control.     Renal:  1) MAY on CKD--resolved  -Continue gentle diuresis    GI:  1) perforated diverticulitis  -Continue tube feeds, attempt to reach goal  -Abscessogram shows minimal residual abscess    Neuro:  1) Sedation requirements  -Precedex/Fentanyl for comfort.     ID: perforated diverticulitis  Pneumonia seen on abdominal CT  -Appreciate ID  -Meropenem, micafungin completed, will stop and monitor.   -sputum culture pending      ICU DAILY CHECKLIST                           Can patient transfer out of MICU? no    FAST HUG:    Feeding:  Feeding: No.   Patient is receiving tube feeds  Bardales:Yes  Analgesia/Sedation:Yes propofol  Thromboembolic prophylaxis: yes; Mode:  Coumadin  HOB>30:  Yes  Stress Ulcer Protocol Active: yes; Mode: PPI  Glycemic Control: Any glucose > 180 no; Mode of Insulin Therapy: Sliding Scale Insulin    INTUBATED:  Can patient have daily waking:  no  Can patient have spontaneous breathing trial:  no    Restraints? Yes    PROVIDER RESTRAINT FOR NON-VIOLENT BEHAVIOR FACE TO FACE EVALUATION    Patient's Immediate Situation:  Patient demonstrated the following behaviors: Impulsive behavior, poor safety judgment, with other less restrictive interventions/devices ineffective    Patient's Reaction to the intervention:  Does patient understand the reason for restraint/seclusion? No    Medical Condition:  Is there any evidence of compromise of Skin integrity, Respiratory, Cardiovascular, Musculoskeletal, Hydration? No    Behavioral Condition:  In consultation with the RN, is there a need to continue this restraint or seclusion? Yes    See Restraint Flowsheet for complete restraint documentation and assessment.    Sandra Lilly MD        Critical Care Time greater than: 40 Minutes-this patient is critically ill on mechanical ventilation.    **Updated daughter dixon at the bedside.         Subjective:   HPI:  Gardenia Muller is a 70 y.o. female with chronic afib on anticoagulation, HFpEF, CAD, CVA, DMII who presented 8/11 with nausea and vomiting. CT found perforated diverticulitis. Drain placed into abscess and antibiotics initiated given her poor candidacy for surgery. Worsening renal failure in setting of Afib with RVR and became digoxin-toxic. Received digibind with improvement in HR. Renal failure impressive, but on lasix drip began making urine and diuresed. Intubated for pulmonary edema and pneumonia with copious secretions (no bacteria identified). On minimal vent settings now hemodynamically stable but without ability to SBT  well.    Propofol attempted today with SBT up in chair, still low volumes with rates 40s despite 12/5.      Principal Problem:    Diverticulitis  Active Problems:    Bradyarrhythmia    Acute kidney injury (H)    Diverticulitis of large intestine with perforation and abscess without bleeding    Atrial fibrillation with rapid ventricular response (H)    Obstructive sleep apnea syndrome    Poisoning by digoxin, accidental or unintentional, initial encounter    Acute respiratory failure with hypoxia and hypercapnia (H)    Acute metabolic encephalopathy    Shock circulatory (H)    Metabolic acidosis    Other hypervolemia      Allergies: Penicillins     MEDS:  Scheduled Meds:    aspirin  81 mg Enteral Tube DAILY     chlorhexidine  15 mL Topical Q12H     furosemide  20 mg Intravenous Q8H     guar gum  2 packet Enteral Tube BID     ipratropium-albuteroL  3 mL Nebulization Q6H - RT     metoprolol tartrate  25 mg Enteral Tube BID     nystatin  5 mL Swish & Swallow QID     pantoprazole  40 mg Intravenous BID    Or     omeprazole  20 mg Oral BID    Or     omeprazole  20 mg Enteral Tube BID     sertraline  50 mg Enteral Tube DAILY     sodium chloride  10 mL Intravenous Q8H FIXED TIMES     sodium chloride  10-30 mL Intravenous Q8H FIXED TIMES     ticagrelor  90 mg Enteral Tube BID     warfarin - daily dose required   Other Med Consult or Protocol     Continuous Infusions:    dexmedetomidine infusion orderable (PRECEDEX) Stopped (09/10/20 0900)     norepinephrine Stopped (09/05/20 2017)     PRN Meds:.alteplase, atropine, bacitracin, bisacodyL, carboxymethylcellulose, dextrose 50 % (D50W), glucagon (human recombinant), hydrOXYzine pamoate, lidocaine (PF), lipase-protease-amylase **AND** sodium bicarbonate, lipase-protease-amylase **AND** sodium bicarbonate, metoclopramide, naloxone **OR** naloxone, oxyCODONE, polyethylene glycol, sodium chloride 0.9%, sodium chloride bacteriostatic, sodium chloride, sodium chloride, sodium  "chloride, traZODone      Objective:   VITALS:  /55   Pulse 71   Temp 98.4  F (36.9  C) (Oral)   Resp 16   Ht 5' 2\" (1.575 m)   Wt 202 lb 9.6 oz (91.9 kg)   LMP 01/25/1999   SpO2 98%   BMI 37.06 kg/m    EXAM:  General appearance: awake, following commands  Lungs: clear bilaterally  Heart: irregularly irregular rhythm  Abdomen: soft, non-tender; bowel sounds normal; no masses,  no organomegaly  Extremities: extremities normal, atraumatic, no cyanosis or edema  Skin: Skin color, texture, turgor normal. No rashes or lesions  Neurologic: follows commands moving all extremities but very weak.     I&O:      Intake/Output Summary (Last 24 hours) at 9/10/2020 1802  Last data filed at 9/10/2020 1600  Gross per 24 hour   Intake 661.28 ml   Output 1600 ml   Net -938.72 ml       Data Review:  CXR personally interpreted  EXAM: XR CHEST 1 VIEW PORTABLE  LOCATION: [facility]  DATE/TIME: 8/28/2020 3:39 PM     INDICATION: Postintubation.  COMPARISON: 08/27/2020.     IMPRESSION:      ET tube tip at the level of the emily; recommend 1 to 2 cm retraction.     New feeding tube coursing into the stomach and off the field-of-view. Stable right PICC.     Similar to marginally improved coarse interstitial appearance and opacities bilaterally. No substantial pleural effusion. No pneumothorax. Stable enlarged cardiac silhouette.        ET tube tip findings verbally communicated to patient's nurse, Lavern, at 3:45 PM on 08/28/2020.     NOTE: ABNORMAL REPORT     THE DICTATION ABOVE DESCRIBES AN ABNORMALITY FOR WHICH FOLLOW-UP IS NEEDED.     Results for orders placed or performed during the hospital encounter of 08/11/20   Basic Metabolic Panel   Result Value Ref Range    Sodium 142 136 - 145 mmol/L    Potassium 4.3 3.5 - 5.0 mmol/L    Chloride 107 98 - 107 mmol/L    CO2 28 22 - 31 mmol/L    Anion Gap, Calculation 7 5 - 18 mmol/L    Glucose 128 (H) 70 - 125 mg/dL    Calcium 10.2 8.5 - 10.5 mg/dL    BUN 13 8 - 28 mg/dL    " Creatinine 0.58 (L) 0.60 - 1.10 mg/dL    GFR MDRD Af Amer >60 >60 mL/min/1.73m2    GFR MDRD Non Af Amer >60 >60 mL/min/1.73m2     Lab Results   Component Value Date    WBC 11.6 (H) 09/10/2020    HGB 7.7 (L) 09/10/2020    HCT 25.5 (L) 09/10/2020     (H) 09/10/2020     09/10/2020       By:  Sandra Lilly MD, 9/10/2020, 2:31 PM    Primary Care Physician:  Jerson Hernandez MD

## 2021-06-11 NOTE — PLAN OF CARE
Vitals:    09/16/20 1540   BP: 140/71   Pulse: (!) 130   Resp: 26   Temp: 99.6  F (37.6  C)   SpO2: 97%     VSS ex tachycardic and tachypenic. Scheduled morphine and haldol given. Q2 repo, no other concerns.    Problem: Pain  Goal: Patient's pain/discomfort is manageable  9/16/2020 1549 by Adalberto Motta RN  Outcome: Progressing  9/16/2020 0827 by Adalberto Motta RN  Outcome: Progressing     Problem: Safety  Goal: Patient will be injury free during hospitalization  9/16/2020 1549 by Adalberto Motta RN  Outcome: Progressing  9/16/2020 0827 by Adalberto Motta RN  Outcome: Progressing     Problem: Daily Care  Goal: Daily care needs are met  9/16/2020 1549 by Adalberto Motta RN  Outcome: Progressing  9/16/2020 0827 by Adalberto Motta RN  Outcome: Progressing     Problem: Psychosocial Needs  Goal: Demonstrates ability to cope with hospitalization/illness  9/16/2020 1549 by Adalberto Motta RN  Outcome: Progressing  9/16/2020 0827 by Adalberto Motta RN  Outcome: Progressing  Goal: Collaborate with patient/family/caregiver to identify patient specific goals for this hospitalization  9/16/2020 1549 by Adalberto Motta RN  Outcome: Progressing  9/16/2020 0827 by Adalberto Motta RN  Outcome: Progressing     Problem: Discharge Barriers  Goal: Patient's discharge needs are met  9/16/2020 1549 by Adalberto Motta RN  Outcome: Progressing  9/16/2020 0827 by Adalberto Motta RN  Outcome: Progressing     Problem: Knowledge Deficit  Goal: Patient/family/caregiver demonstrates understanding of disease process, treatment plan, medications, and discharge instructions  9/16/2020 1549 by Adalberto Motta RN  Outcome: Progressing  9/16/2020 0827 by Adalberto Motta RN  Outcome: Progressing     Problem: Potential for Falls  Goal: Patient will remain free of falls  9/16/2020 1549 by Adalberto Motta RN  Outcome: Progressing  9/16/2020 0827 by Adalberto Motta RN  Outcome: Progressing     Problem: Potential for Compromised Skin  Integrity  Goal: Skin integrity is maintained or improved  9/16/2020 1549 by Adalberto Motta RN  Outcome: Progressing  9/16/2020 0827 by Adalberto Motta RN  Outcome: Progressing  Goal: Nutritional status is improving  9/16/2020 1549 by Adalberto Motta RN  Outcome: Progressing  9/16/2020 0827 by Adalberto Motta RN  Outcome: Progressing     Problem: Urinary Incontinence  Goal: Perineal skin integrity is maintained or improved  9/16/2020 1549 by Adalberto Motta RN  Outcome: Progressing  9/16/2020 0827 by Adalberto Motta RN  Outcome: Progressing     Problem: Potential for hemodynamic instability  Goal: Cardiac output is adequate  9/16/2020 1549 by Adalberto Motta RN  Outcome: Progressing  9/16/2020 0827 by Adalberto Motta RN  Outcome: Progressing     Problem: Impaired Gas Exchange  Goal: Demonstrate improved ventilation and adequate oxygenation of tissues as evidenced by absence of respiratory distress  Description: Patient intubated 8/28 @1430.  ABG's collected s/p.  Sating well.    9/16/2020 1549 by Adalberto Motta RN  Outcome: Progressing  9/16/2020 0827 by Adalberto Motta RN  Outcome: Progressing     Problem: Breathing  Goal: Patient will maintain patent airway  9/16/2020 1549 by Adalberto Motta RN  Outcome: Progressing  9/16/2020 0827 by Adalberto Motta RN  Outcome: Progressing     Problem: Risk for Infection  Goal: Identify and demonstrate techniques, lifestyle changes to prevent/reduce risk of infection and promote safe environment  9/16/2020 1549 by Adalberto Motta RN  Outcome: Progressing  9/16/2020 0827 by Adalberto Motta RN  Outcome: Progressing     Problem: Mechanical Ventilation  Goal: Patient will maintain patent airway  9/16/2020 1549 by Adalberto Motta RN  Outcome: Progressing  9/16/2020 0827 by Adalberto Motta RN  Outcome: Progressing  Goal: Oral health is maintained or improved  9/16/2020 1549 by Adalberto Motta RN  Outcome: Progressing  9/16/2020 0827 by Adalberto Motta RN  Outcome:  Progressing  Goal: Ability to express needs and understand communication  9/16/2020 1549 by Adalberto Motta RN  Outcome: Progressing  9/16/2020 0827 by Adalberto Motta RN  Outcome: Progressing  Goal: Mobility/activity is maintained at optimum level for patient  9/16/2020 1549 by Adalberto Motta RN  Outcome: Progressing  9/16/2020 0827 by Adalberto Motta RN  Outcome: Progressing  Goal: Respiratory status - ventilation  9/16/2020 1549 by Adalberto Motta RN  Outcome: Progressing  9/16/2020 0827 by Adalberto Motta RN  Outcome: Progressing     Problem: Inadequate Gas Exchange  Goal: Patient will achieve/maintain normal respiratory rate/effort  9/16/2020 1549 by Adalberto Motta RN  Outcome: Progressing  9/16/2020 0827 by Adalberto Motta RN  Outcome: Progressing     Problem: Decreased Mental Status Causing Increased Need for Safety  Goal: Provide a safe environment for patient (Implement appropriate elements in plan of care)  9/16/2020 1549 by Adalberto Motta RN  Outcome: Progressing  9/16/2020 0827 by Adalberto Motta RN  Outcome: Progressing  Goal: Decrease patient's symptoms  9/16/2020 1549 by Adalberto Motta RN  Outcome: Progressing  9/16/2020 0827 by Adalberto Motta RN  Outcome: Progressing  Goal: To ensure patient safety, patient will abstain from any threats or actions to harm self or others  9/16/2020 1549 by Adalberto Motta RN  Outcome: Progressing  9/16/2020 0827 by Adalberto Motta RN  Outcome: Progressing

## 2021-06-11 NOTE — PLAN OF CARE
Care Plan  Care Management (CM)      Care Management Goals of the Day: care progression and dc planning    Care Progression Reviewed With: Charge Nurse, Medical Doctor (MD), Health Unit Coordinator, (HUC) Nurse Care Manager (RNCM),  Social Work Care Manager (SWCM)    Barriers to Discharge:on comfort cares. [facility] is following. No beds in area hospice homes. Trying LTC at [facility]    Discharge Disposition: SNF with hospice unless actively dying    Expected Discharge Date:9/17    Transportation:stretcher      Care Coordination Narrative:Patient was in TCU at [facility]. They are screening her for LTC with hospice. Daughter, Aria lives in [place] and plans on visiting late week. Aria is primary contact.    Care Manager to continue to follow.       Abbreviation  Code:  [facility] ([facility]), [facility] ([facility]), Patient (pt), Transitional Care Unit (TCU), Skilled Nursing Facility (SNF), Physical Therapy (PT), Occupational Therapy (OT), Speech Therapy (ST TX), Respiratory Therapy (RT)

## 2021-06-11 NOTE — PLAN OF CARE
Problem: Knowledge Deficit  Goal: Patient/family/caregiver demonstrates understanding of disease process, treatment plan, medications, and discharge instructions  Outcome: Progressing     Problem: Breathing  Goal: STG - Patient will maintain patent airway  Outcome: Progressing     Problem: Breathing  Goal: Patient will maintain patent airway  Outcome: Progressing     Problem: Mechanical Ventilation  Goal: Patient will maintain patent airway  Outcome: Progressing  Goal: ET tube will be managed safely  Outcome: Progressing     Problem: Inadequate Gas Exchange  Goal: Patient will achieve/maintain normal respiratory rate/effort  Outcome: Progressing     Problem: Impaired Gas Exchange  Goal: Demonstrate improved ventilation and adequate oxygenation of tissues as evidenced by absence of respiratory distress  Description: Patient intubated 8/28 @1430.  ABG's collected s/p.  Sating well.    Outcome: Not Progressing     Problem: Mechanical Ventilation  Goal: Respiratory status - ventilation  Outcome: Not Progressing     KAYLA Lora

## 2021-06-11 NOTE — PLAN OF CARE
Problem: Breathing  Goal: STG - Patient will maintain patent airway  Outcome: Progressing     Problem: Impaired Gas Exchange  Goal: Demonstrate improved ventilation and adequate oxygenation of tissues as evidenced by absence of respiratory distress  Description: Patient intubated 8/28 @1430.  ABG's collected s/p.  Sating well.    Outcome: Progressing

## 2021-06-11 NOTE — PROGRESS NOTES
PULMONARY / CRITICAL CARE PROGRESS NOTE    Date / Time of Admission:  8/11/2020 10:16 AM    Assessment:   Principal Problem:    Diverticulitis  Active Problems:    Bradyarrhythmia    Acute kidney injury (H)    Diverticulitis of large intestine with perforation and abscess without bleeding    Atrial fibrillation with rapid ventricular response (H)    Obstructive sleep apnea syndrome    Poisoning by digoxin, accidental or unintentional, initial encounter    Acute respiratory failure with hypoxia and hypercapnia (H)    Acute metabolic encephalopathy    Shock circulatory (H)    Metabolic acidosis    Other hypervolemia    70yoF with history of DMII, HFpEF, afib on coumadin presented with perforated diverticulitis causing abscess resulting in acute kidney injury and respiratory failure now intubated, not felt to be a good surgical candidate.     Advance Directives:  Full Code      Plan:   Pulmonary:  1)Acute hypoxic respiratory failure  2) Pulmonary edema  3) Pneuonia  -Negative sputum cultures  -Completed course of antibiotics  -Ongoing diuresis  -Minimal ventilator settings but unsuccessful with SBT.    C/V:  1) Afib with RVR--resolved  2) Dig Toxicity--resolved  3) Hypotension--resolved  -Levophed for MAP>60  -Diuresis ongoing  -Brillinta  -Anticoagulated for atrial fibrillation  -No furhter diltiazem, will use lopressor for HR control and titrate up.     Renal:  1) MAY on CKD--resolved  -Increase free water flushes slightly  -Continue gentle diuresis    GI:  1) perforated diverticulitis  -Continue tube feeds, attempt to uptitrate    Neuro:  1) Sedation requirements  -Propofol restarted today to see if that would help her anxiety during SBT    ID: perforated diverticulitis  Pneumonia seen on abdominal CT  -Appreciate ID  -Meropenem, micafungin. Drain grew candida  -sputum culture pending  -Likely plan for abscessogram this week.       ICU DAILY CHECKLIST                           Can patient transfer out of MICU?  no    FAST HUG:    Feeding:  Feeding: No.  Patient is receiving tube feeds  Bardales:Yes  Analgesia/Sedation:Yes propofol  Thromboembolic prophylaxis: yes; Mode:  Coumadin  HOB>30:  Yes  Stress Ulcer Protocol Active: yes; Mode: PPI  Glycemic Control: Any glucose > 180 no; Mode of Insulin Therapy: Sliding Scale Insulin    INTUBATED:  Can patient have daily waking:  no  Can patient have spontaneous breathing trial:  no    Restraints? Yes    PROVIDER RESTRAINT FOR NON-VIOLENT BEHAVIOR FACE TO FACE EVALUATION    Patient's Immediate Situation:  Patient demonstrated the following behaviors: Pulling/tugging at invasive lines or tubes and does not respond to verbal/non-verbal redirection    Patient's Reaction to the intervention:  Does patient understand the reason for restraint/seclusion? No    Medical Condition:  Is there any evidence of compromise of Skin integrity, Respiratory, Cardiovascular, Musculoskeletal, Hydration? No    Behavioral Condition:  In consultation with the RN, is there a need to continue this restraint or seclusion? Yes    See Restraint Flowsheet for complete restraint documentation and assessment.    Sandra Lilly MD        Critical Care Time greater than: 45 Minutes-this patient is critically ill on mechanical ventilation.    **Discussed with daughter Aria. Asking more questions about tracheostomy, wants to think about it, talk to the patient's sister.         Subjective:   HPI:  Gardenia Muller is a 70 y.o. female with chronic afib on anticoagulation, HFpEF, CAD, CVA, DMII who presented 8/11 with nausea and vomiting. CT found perforated diverticulitis. Drain placed into abscess and antibiotics initiated given her poor candidacy for surgery. Worsening renal failure in setting of Afib with RVR and became digoxin-toxic. Received digibind with improvement in HR. Renal failure impressive, but on lasix drip began making urine and diuresed. Intubated for pulmonary edema and pneumonia with copious  secretions (no bacteria identified). On minimal vent settings now hemodynamically stable but without ability to SBT well.      Principal Problem:    Diverticulitis  Active Problems:    Bradyarrhythmia    Acute kidney injury (H)    Diverticulitis of large intestine with perforation and abscess without bleeding    Atrial fibrillation with rapid ventricular response (H)    Obstructive sleep apnea syndrome    Poisoning by digoxin, accidental or unintentional, initial encounter    Acute respiratory failure with hypoxia and hypercapnia (H)    Acute metabolic encephalopathy    Shock circulatory (H)    Metabolic acidosis    Other hypervolemia      Allergies: Penicillins     MEDS:  Scheduled Meds:    aspirin  81 mg Enteral Tube DAILY     chlorhexidine  15 mL Topical Q12H     furosemide  20 mg Intravenous BID - diuretic     guar gum  2 packet Enteral Tube DAILY     ipratropium-albuteroL  3 mL Nebulization Q6H - RT     meropenem  1 g Intravenous Q8H     metoprolol tartrate  12.5 mg Enteral Tube BID     micafungin (MYCAMINE) IV  100 mg Intravenous Q24H     nystatin  5 mL Swish & Swallow QID     pantoprazole  40 mg Intravenous BID    Or     omeprazole  20 mg Oral BID    Or     omeprazole  20 mg Enteral Tube BID     sertraline  50 mg Enteral Tube DAILY     sodium chloride  10 mL Intravenous Q8H FIXED TIMES     sodium chloride  10-30 mL Intravenous Q8H FIXED TIMES     ticagrelor  90 mg Enteral Tube BID     warfarin - daily dose required   Other Med Consult or Protocol     warfarin ANTICOAGULANT  2.5 mg Enteral Tube Once Warfarin     Continuous Infusions:    dexmedetomidine infusion orderable (PRECEDEX) Stopped (09/05/20 0957)     norepinephrine Stopped (09/05/20 2017)     propofol 15 mcg/kg/min (09/07/20 1059)     PRN Meds:.alteplase, atropine, bacitracin, bisacodyL, carboxymethylcellulose, codeine-guaiFENesin, dextrose 50 % (D50W), glucagon (human recombinant), hydrOXYzine pamoate, lidocaine (PF), lipase-protease-amylase **AND**  "sodium bicarbonate, metoclopramide, naloxone **OR** naloxone, oxyCODONE, polyethylene glycol, sodium chloride 0.9%, sodium chloride bacteriostatic, sodium chloride, sodium chloride, sodium chloride, traZODone      Objective:   VITALS:  BP 99/48   Pulse 81   Temp 98.2  F (36.8  C) (Oral)   Resp 17   Ht 5' 2\" (1.575 m)   Wt 193 lb 2 oz (87.6 kg)   LMP 01/25/1999   SpO2 97%   BMI 35.32 kg/m    EXAM:  General appearance: sedated but easily arousable  Lungs: rhonchourous on left  Heart: irregularly irregular rhythm  Abdomen: soft, non-tender; bowel sounds normal; no masses,  no organomegaly  Extremities: extremities normal, atraumatic, no cyanosis or edema  Skin: Skin color, texture, turgor normal. No rashes or lesions  Neurologic: follows commands but falls back to sleep.     I&O:      Intake/Output Summary (Last 24 hours) at 9/7/2020 1458  Last data filed at 9/7/2020 1200  Gross per 24 hour   Intake 797.78 ml   Output 1925 ml   Net -1127.22 ml       Data Review:  CXR personally interpreted  EXAM: XR CHEST 1 VIEW PORTABLE  LOCATION: [facility]  DATE/TIME: 8/28/2020 3:39 PM     INDICATION: Postintubation.  COMPARISON: 08/27/2020.     IMPRESSION:      ET tube tip at the level of the emily; recommend 1 to 2 cm retraction.     New feeding tube coursing into the stomach and off the field-of-view. Stable right PICC.     Similar to marginally improved coarse interstitial appearance and opacities bilaterally. No substantial pleural effusion. No pneumothorax. Stable enlarged cardiac silhouette.        ET tube tip findings verbally communicated to patient's nurse, Lavern, at 3:45 PM on 08/28/2020.     NOTE: ABNORMAL REPORT     THE DICTATION ABOVE DESCRIBES AN ABNORMALITY FOR WHICH FOLLOW-UP IS NEEDED.     Results for orders placed or performed during the hospital encounter of 08/11/20   Basic Metabolic Panel   Result Value Ref Range    Sodium 146 (H) 136 - 145 mmol/L    Potassium 3.7 3.5 - 5.0 mmol/L    " Chloride 110 (H) 98 - 107 mmol/L    CO2 28 22 - 31 mmol/L    Anion Gap, Calculation 8 5 - 18 mmol/L    Glucose 107 70 - 125 mg/dL    Calcium 10.8 (H) 8.5 - 10.5 mg/dL    BUN 18 8 - 28 mg/dL    Creatinine 0.53 (L) 0.60 - 1.10 mg/dL    GFR MDRD Af Amer >60 >60 mL/min/1.73m2    GFR MDRD Non Af Amer >60 >60 mL/min/1.73m2     Lab Results   Component Value Date    WBC 12.2 (H) 09/07/2020    HGB 7.6 (L) 09/07/2020    HCT 25.4 (L) 09/07/2020     (H) 09/07/2020     09/07/2020       By:  Sandra Lilly MD, 9/7/2020, 2:31 PM    Primary Care Physician:  Jerson Hernandez MD

## 2021-06-11 NOTE — PROGRESS NOTES
PULMONARY / CRITICAL CARE PROGRESS NOTE    Date / Time of Admission:  8/11/2020 10:16 AM    Assessment:   Principal Problem:    Diverticulitis  Active Problems:    Bradyarrhythmia    Acute kidney injury (H)    Diverticulitis of large intestine with perforation and abscess without bleeding    Atrial fibrillation with rapid ventricular response (H)    Obstructive sleep apnea syndrome    Poisoning by digoxin, accidental or unintentional, initial encounter    Acute respiratory failure with hypoxia and hypercapnia (H)    Acute metabolic encephalopathy    Shock circulatory (H)    Metabolic acidosis  Digoxin toxicity      Advance Directives: No CPR, okay to intubate      Plan:   Neuro:  The patient is awake and able to answer basic questions.    Cardiovascular:  Coronary artery disease status post PCI in 1995.  Chronic atrial fibrillation on digoxin and warfarin.  Diastolic heart failure.  Patient developed digoxin toxicity while in the hospital with dig level of 6.9.  Received Digibind.  Had a lot of rhythm abnormalities prior to that and she remains on dopamine due to bradycardia.    Respiratory:  Acute hypoxic respiratory failure.  Patient requiring 3 L nasal cannula.  Yesterday she was on BiPAP for increased work of breathing but has been off overnight into today.    GI:  Admitted with perforated diverticulitis and left lower quadrant abscess  Drain under CT guidance placed by interventional radiology on 8/17  Abscessogram on 8/24 shows no residual abscess pocket.  Fistula to the colon.    Shock liver secondary to hypotension -decompensation secondary to bradycardia and hypotension from dig toxicity  Hepatic congestion secondary to heart failure as noted above contributing.  LFTs improving    :  Acute renal failure secondary to ATN and presumed vancomycin toxicity.  Nephrology following.  Trying diuresis with high-dose furosemide.  If that fails the next will be hemodialysis.    ID:  Currently on meropenem,  daptomycin and fluconazole  Cultures from left lower quadrant abscess grew Ashley Dubliniensis and albicans.  Antibiotics per ID    Heme:  Elevated INR.  Patient was on warfarin.  Now with shock liver plus minus liver injury from hepatic congestion.  Daptomycin can also spuriously elevate the INR.  If she is to have procedures like catheter placement she will probably require correction.  Considering her diastolic heart failure and tenuous respiratory status will give her Kcentra to minimize volume load.      Endocrine:  Non-insulin-dependent diabetes mellitus at baseline.  Treated with metformin.  Start sliding scale insulin with sensitive scale.    ICU DAILY CHECKLIST                           Can patient transfer out of MICU? no    FAST HUG:    Feeding:  Feeding: No.  Patient is receiving NPO    Bardales:Yes  Analgesia/Sedation:No   Thromboembolic prophylaxis: yes; Mode:  Coumadin  HOB>30:  Yes  Stress Ulcer Protocol Active: yes; Mode: PPI  Glycemic Control: Any glucose > 180 yes; Mode of Insulin Therapy: Sliding Scale Insulin    PHYSICAL THERAPY AND MOBILITY:  Can patient have PT and mobility trial: no  Activity: Bed Rest    Critical Care Time greater than: 45 Minutes  Total time spent with patient greater than: 45 Minutes        Subjective:   HPI:  Gardenia Muller is a 70 y.o. female with history of chronic atrial fibrillation, diastolic heart failure, coronary artery disease, hypertension, CVA, diabetes mellitus type 2, calculus cholecystitis status post lap nimesh in 2018 and depression.  Presented here on 8/11 with nausea vomiting and diarrhea.  CT of the abdomen in the emergency room showed diverticulitis with perforation.  Principal Problem:    Diverticulitis  Active Problems:    Bradyarrhythmia    Acute kidney injury (H)    Diverticulitis of large intestine with perforation and abscess without bleeding    Atrial fibrillation with rapid ventricular response (H)    Obstructive sleep apnea syndrome     "Poisoning by digoxin, accidental or unintentional, initial encounter    Acute respiratory failure with hypoxia and hypercapnia (H)    Acute metabolic encephalopathy    Shock circulatory (H)    Metabolic acidosis      Allergies: Penicillins     MEDS:  Scheduled Meds:    aspirin  81 mg Oral DAILY     daptomycin (CUBICIN) IV  6 mg/kg Intravenous Q48H     fluconazole (DIFLUCAN) IV  200 mg Intravenous Q24H     [Held by provider] furosemide  40 mg Intravenous DAILY     [Held by provider] lisinopriL  5 mg Oral DAILY     meropenem  1 g Intravenous Q12H     [Held by provider] metoprolol succinate  12.5 mg Oral DAILY     norepinephrine         [Held by provider] omeprazole  20 mg Oral BID AC     pantoprazole  40 mg Intravenous Q12H     [Held by provider] sertraline  100 mg Oral DAILY     sodium chloride bacteriostatic  1-5 mL Intradermal Once     sodium chloride  10 mL Intravenous Q8H FIXED TIMES     sodium chloride  10-30 mL Intravenous Q8H FIXED TIMES     [Held by provider] spironolactone  25 mg Oral DAILY     ticagrelor  90 mg Oral BID     Continuous Infusions:    DOPamine 10 mcg/kg/min (08/28/20 0801)     norepinephrine Stopped (08/28/20 0900)     sodium bicarbonate IV infusion in D5W 50 mL/hr at 08/28/20 0403     PRN Meds:.albuterol, alteplase, atropine, bacitracin, carboxymethylcellulose, codeine-guaiFENesin, dextrose 50 % (D50W), glucagon (human recombinant), hydrOXYzine pamoate, lidocaine (PF), [Held by provider] metoprolol tartrate, naloxone **OR** naloxone, oxyCODONE, sodium chloride 0.9%, sodium chloride bacteriostatic, sodium chloride, sodium chloride, sodium chloride, traZODone      Objective:   VITALS:  /55   Pulse 84   Temp 98  F (36.7  C) (Oral)   Resp (!) 33   Ht 5' 2\" (1.575 m)   Wt 213 lb 14.4 oz (97 kg)   LMP 01/25/1999   SpO2 94%   BMI 39.12 kg/m    EXAM:  General appearance: Uncomfortable appearing.  Complaining of abdominal pain and with shallow respirations.  Head: Normocephalic, without " obvious abnormality, atraumatic  Lungs: clear to auscultation bilaterally  Heart: regular rate and rhythm, S1, S2 normal, no murmur, click, rub or gallop  Abdomen: Diffusely tender to palpation.  Bowel sounds present.  Extremities: Bilateral edema  Neurologic: Answers questions.    I&O:      Intake/Output Summary (Last 24 hours) at 8/28/2020 1305  Last data filed at 8/28/2020 1200  Gross per 24 hour   Intake 2082.54 ml   Output 72 ml   Net 2010.54 ml       Data Review:  Chest X-Ray: Vascular congestion    CBC:   Lab Results   Component Value Date    WBC 14.1 (H) 08/28/2020    RBC 2.98 (L) 08/28/2020     BMP:   Lab Results   Component Value Date    CO2 20 (L) 08/28/2020    BUN 48 (H) 08/28/2020    CREATININE 3.13 (H) 08/28/2020    CALCIUM 8.4 (L) 08/28/2020     Serum Glucose range:Invalid input(s): GLUCOSEPOCT    By:  Tyrone Robles, 8/28/2020, 1:05 PM    Primary Care Physician:  Jerson Hernandez MD

## 2021-06-11 NOTE — PLAN OF CARE
Physical Therapy Discharge Summary    Pt is not appropriate for skilled PT services at this time as she remains on a vent. Will D/C current PT orders. Please reorder if status changes and pt is appropriate to participate. Thank you.    8/31/2020 by Gilmar Orozco, PT    Problem: Physical Therapy  Goal: PT Goals  Description: Patient will demonstrate the following by 8/21/20, in order to maximize independence with functional mobility to facilitate safe discharge:   -Supine<>sit with head of bed flat, no rail, Sup  -Sit<>stand with fww assistive device, SBA  -Ambulate 150 feet with fww assistive device, SBA     Goals entered on 8/15/2020 by Gilmar Orozco, PT     Patient will demonstrate the following by 8/28/20, in order to maximize independence with functional mobility to facilitate safe discharge:   -Supine<>sit with head of bed elevated with rail, Min A x 1  -Sit<>stand with FWW assistive device, Min A x 1  -Ambulate 25 feet with FWW assistive device, Min A x 1     Goals entered on 8/21/2020 by Grazyna Arambula, PT     Outcome: Completed

## 2021-06-11 NOTE — SIGNIFICANT EVENT
Upon entering room to assess medication need- pt not breathing, no heartrate. MD notified, hospice notified. Family was off floor- informed prior to entering room. suport given to family by myself and hospice nurse. Family will call later with  home information.

## 2021-06-11 NOTE — PROCEDURES
Procedure Note - Endotracheal Intubation    : Tyrone Robles MD   Procedure: Endotracheal Intubation, Emergent   Indications: See above.   Complications: None   Instruments:   1. Glidescope with #3 blade (used electively due to facilitate higher first pass success).     2. 7.5 cuffed endotracheal tube.   3. No adjuncts required.  Anesthesia:     Midazolam 2 mg IV x1  Fentanyl 50 mcg IV x1  Rocuronium 50 mg IV x1    Etomidate 20 mg IV x1  Grade view achieved: I   Ease of Intubation: Easy  Findings:   Some dry secretions around the glottic opening.  Narrative:   The patient and her daughter talk to palliative care earlier today.  No CPR but okay to intubate.  I did update the patient daughter and of course the patient herself that she will need intubation.  They both agreed.  This patient required emergent intubation for the above reasons. All equipment was readied at bedside. Continuous oxygen saturation, telemetry monitoring were in effect. Blood pressure checks every 2 minutes continuously. Preoxygenated with BIPAP. The patient was induced with the above medications and excellent hypnosis and relaxation was achieved. The patient was easily bag valve mask ventilated without resistance. The layngoscope was gently inserted into the oropharynx taking care to avoid the teeth. The tube was inserted gently through the cords with a single attempt. The cuff was inflated, the stylet was removed. There was positive humidity return within the tube, equal bilateral breath sounds, and positive end tidal CO2 color change. There was maintenance of O2 saturation in the 100% range. The patient tolerated the procedure exceedingly well without any complications in the immediate phase.    A post-procedure chest-xray demonstrated the tube appropriately positioned in the trachea.    Tyrone Robles MD

## 2021-06-11 NOTE — PLAN OF CARE
Care Plan  Care Management      Care Management Goals of the Day: Progression of care    Care Progression Reviewed With: Charge RN, MD, HUC, RNCM, CMSW    Barriers to Discharge: Intubated - weaning trials, Palliative following for GOC, Nephrology+ pulminary + ID following, IV lasix, tube feeds    Family Care Conference: held 9/2 with palliative / intensivist / dtr at 2 PM - Dtr NOT interested in trach    Discharge Disposition: pending    Expected Discharge Date: tbd    Transportation: tbd      Care Coordination Narrative: Intubated. Pt resides at AL facility [facility] in [facility] ([phone] & [address] [address] @ [phone]). Daughter is primary contact/HCA, is [place] and has arrived in [place] this week. Hospital staff to reassess pt status next week 9/9 to determine if pt should be transitioned to comfort care. Dtr will go to [place] this weekend to help granddtr start school, then return to [place] early next week. Plan tbd as cares progress, possibly eol. CM following.    RISHABH Jeffrey  9/5/2020                Abbreviation  Code:  [facility] Wheelchair ([facility] WC), [facility] ([facility] STR), Patient (pt), Transitional Care Unit (TCU), Skilled Nursing Facility (SNF), Physical Therapy (PT), Occupational Therapy (OT), Speech Therapy (ST TX), Respiratory Therapy (RT)

## 2021-06-11 NOTE — PROGRESS NOTES
Infectious Disease Follow up Note:    Service: Infectious Disease   Note: Follow Up Note  Date: 2020    Chief Complaint: Follow up for acute diverticulitis with perforation    ASSESSMENT:     Gardenia Muller is a 70 y.o. old female with acute diverticulitis with perforation.   C. dubliniensis and gram positive cocci in chains aug 17th time frame -abdominal infection-- active issue  Intra-abdominal abscess worse on CT scan       Intubated, respiratory failure-active issue    Recommendations:     IR consulted by Surgery for abdominal abscess drain repositioning/upsize--Drain management per IR and Surgery- gravity bag    Follow culture results--no orgs aug 28th  Abscessogram I think is today  Continue Meropenem and  Micafungin     ARY Doherty MD  [facility]  [phone]            ______________________________________________________________________  Notes / labs / vitals reviewed.    SUBJECTIVE / INTERVAL HISTORY: ICU status.  Intubated.  No rash. Temp under 99now.  I can not get any ROS again today      ROS: intubated    PMHx/FHx/SHx: Reviewed. Interval changes noted.    OBJECTIVE:  Vitals:    20 0947   BP:    Pulse:    Resp: (!) 35   Temp:    SpO2:        Temp (24hrs), Av.4  F (36.9  C), Min:97.7  F (36.5  C), Max:99.4  F (37.4  C)    Exam on 2020   GEN: Somnolent  EYES:  Anicteric, RAYMUNDO, EOM intact.  HEAD, EARS, NOSE, MOUTH, AND THROAT:  Thrush, ETT  NECK:  Supple.   RESPIRATORY: intubated  CARDIOVASCULAR:  Arm swelling  Previous exam: S1 S2 No murmur, click, gallop or rub. No dependent edema. No excess JVD.  ABDOMEN:  Soft, tenderness, gravity bag minimal output  MUSCULOSKELETAL:  Joints without effusion or acute inflammation. No muscle atrophy.Dedconditioned  LYMPHATIC:  No cervical or supraclavicular adenopathy  SKIN/HAIR/NAILS:  Warm, ecchymosis  NEUROLOGIC:  Somnolent, opens eyes    Antibiotics:  IV vancomycin --> Daptomycin, stopped on 2020  IV  meropenem  IV fluconazole --> Micafungin 8/29    Pertinent labs:  Reviewed    Results from last 7 days   Lab Units 08/28/20  0923 08/28/20  0431 08/27/20  0406   LN-WHITE BLOOD CELL COUNT thou/uL 14.1* 14.6* 18.3*   LN-HEMOGLOBIN g/dL 9.0* 8.9* 9.1*   LN-HEMATOCRIT % 27.1* 27.0* 28.3*   LN-PLATELET COUNT thou/uL 116* 115* 111*   LN-MONOCYTES - REL (DIFF) % 5 6 6       Results from last 7 days   Lab Units 09/01/20  0357 08/31/20  1833 08/31/20  0501  08/30/20  0517  08/28/20  0431  08/27/20  0405   LN-SODIUM mmol/L 140  --  139  --  138   < > 138   < > 140   LN-POTASSIUM mmol/L 3.4*  3.4* 3.2* 3.2*  3.2*   < > 3.6  3.6   < > 3.5  3.5   < > 4.6   LN-CHLORIDE mmol/L 100  --  101  --  105   < > 106   < > 108*   LN-CO2 mmol/L 29  --  26  --  22   < > 20*   < > 17*   LN-BLOOD UREA NITROGEN mg/dL 42*  --  47*  --  53*   < > 48*   < > 39*   LN-CREATININE mg/dL 0.81  --  1.41*  --  2.48*   < > 3.13*   < > 2.86*   LN-CALCIUM mg/dL 8.6  --  8.5  --  8.9   < > 8.4*   < > 9.0   LN-ALBUMIN g/dL  --   --   --   --  1.4*  --  1.7*  --  1.9*   LN-PROTEIN TOTAL g/dL  --   --   --   --  6.0  --  6.3  --  6.6   LN-BILIRUBIN TOTAL mg/dL  --   --   --   --  2.0*  --  1.9*  --  2.0*   LN-ALKALINE PHOSPHATASE U/L  --   --   --   --  152*  --  147*  --  147*   LN-ALT (SGPT) U/L  --   --   --   --  27  --  119*  --  166*   LN-AST (SGOT) U/L  --   --   --   --  247*  --  1,413*  --  2,613*    < > = values in this interval not displayed.       MICROBIOLOGY DATA:    Cultures reviewed  Culture:  C. dubliniensis august 17th    IMAGING/RADIOLOGY:  Reviewed  XR Chest 1 View Portable (Order [id number])   Imaging         Study Result     EXAM: XR CHEST 1 VIEW PORTABLE  LOCATION: [facility]  DATE/TIME: 8/21/2020 7:34 PM     INDICATION: r/o pna- shortness of breath  COMPARISON: 08/11/2020     IMPRESSION:   New bilateral interstitial infiltrates could represent edema or pneumonia including COVID. Enlarged cardiac silhouette. No pleural  effusion. No definite pulmonary vascular congestion.     Abscessogram reveals no residual abscess pocket. Fistula to the colon. Switched drain to gravity drainage bag from SHAMIKA suction bulb. No flushes are needed. Follow-up abscessogram in one week.

## 2021-06-11 NOTE — PROGRESS NOTES
RESPIRATORY CARE    VENT DAY #: 13      ETT SIZE + PLACEMENT: 7.5 secured 20 cm at the teeth      CURRENT VENT SETTINGS:  Mode: VCV  Rate: 16  Tidal volume: 450  PEEP: +5  FiO2: 25%    PATIENT PARAMETERS:  PIP: 31  Pplateau: 21  Pmean: 9  RR: 16  SpO2: 98%    BREATH SOUNDS: Coarse    SECRETIONS: Small thick white via inline    RESPIRATORY MEDS: Duoneb Q6    SBT: PS 15/+5 attempted for ~3 minutes. RSI ~130, RR 40s. Cheyne fleming noted during SBT.    ABG: No recent ABG    NOTE/PLAN: Pt remains on full mechanical ventilator support. No ventilator changes today. Small cuff leak addressed during rounds this morning. Pt maintaining good tidal volumes. Pt was transported to IR for abscessogram today via transport vent without incident. Plan to get pt up in the chair and continue to assess for readiness to wean as able.       Lilia Hunt, LRT

## 2021-06-11 NOTE — PLAN OF CARE
Care Plan  Care Management      Care Management Goals of the Day: Progression of care    Care Progression Reviewed With: Charge RN, MD, HUC, RNCM, CMSW    Barriers to Discharge: Intubated, Palliative following for GOC, Nephrology+ pulminary + ID + surgery following, IV lasix, tube feeds    Family Care Conference: held 9/2 with palliative / intensivist / dtr at 2 PM - Dtr NOT interested in trach    Discharge Disposition: pending    Expected Discharge Date: tbd    Transportation: tbd      Care Coordination Narrative: Intubated. Pt resides at AL facility [facility] in [facility] ([phone] & [facility] @ [phone]). Daughter is primary contact/HCA, [place] and has arrived in [place] this week. Hospital staff to reassess pt status next week 9/9 to determine if pt should be transitioned to comfort care. Dtr will go to [place] this weekend to help granddtr start school, then return to [place] early next week. Plan tbd as cares progress, possibly eol. CM following.    RISHABH Jeffrey  9/4/2020                Abbreviation  Code:  [facility] ([facility]), [facility] ([facility] STR), Patient (pt), Transitional Care Unit (TCU), Skilled Nursing Facility (SNF), Physical Therapy (PT), Occupational Therapy (OT), Speech Therapy (ST TX), Respiratory Therapy (RT)

## 2021-06-11 NOTE — PROGRESS NOTES
Spiritual Care Note    Summary of Visit:   I checked in with the patient to see how patient was doing. No family was present. Offered prayer to patient.     Care Provided:  Provided empathetic and pastoral presence, provided prayer    Spiritual Care Plan:   available for spiritual care or emotional support as needed.     Chaplain Shay MA, BCC

## 2021-06-11 NOTE — PROGRESS NOTES
PULMONARY / CRITICAL CARE PROGRESS NOTE    Date / Time of Admission:  8/11/2020 10:16 AM    Assessment:   Principal Problem:    Diverticulitis  Active Problems:    Bradyarrhythmia    Acute kidney injury (H)    Diverticulitis of large intestine with perforation and abscess without bleeding    Atrial fibrillation with rapid ventricular response (H)    Obstructive sleep apnea syndrome    Poisoning by digoxin, accidental or unintentional, initial encounter    Acute respiratory failure with hypoxia and hypercapnia (H)    Acute metabolic encephalopathy    Shock circulatory (H)    Metabolic acidosis  Digoxin toxicity      Advance Directives: No CPR, okay to intubate      Plan:   Neuro:  Patient was on Precedex this morning.  That has been turned off.  Fentanyl l has been discontinued as well and removed from a moderate.    Cardiovascular:  Coronary artery disease status post PCI in 1995.  Chronic atrial fibrillation on digoxin and warfarin.  Diastolic heart failure.  Patient developed digoxin toxicity while in the hospital with dig level of 6.9.  Received Digibind.  Required dopamine for bradycardia.  Eventually it was switched to norepinephrine.  Now off vasopressors.  Over 1 L negative.  Continue diuresis.    Respiratory:  Acute hypoxic respiratory failure.   Bilateral lung infiltrates.  Thought to be due to pulmonary edema but no improvement since cardiac issues resolved.  Superimposed pneumonia cannot be excluded given the sputum cultures are negative.  Continuing diuresis.  Attempted SBT.  Low tidal volumes and tachypnea despite pressure support up to 12.  We will try again tomorrow.    GI:  Admitted with perforated diverticulitis and left lower quadrant abscess  Drain under CT guidance placed by interventional radiology on 8/17  Abscessogram on 8/24 showed no residual abscess pocket.  Fistula to the colon.  Drain exchanged by IR earlier this week.      :  Acute renal failure secondary to ATN and presumed vancomycin  toxicity.  Nephrology following.    ATN resolved.  Good urine output initially with high-dose furosemide and thiazide.  Now down to Lasix 20 mg IV twice daily.  DC maintenance IV fluids.    ID:  Currently on meropenem, daptomycin and fluconazole  Cultures from left lower quadrant abscess grew Ashley Dubliniensis and albicans.  Antibiotics per ID - meropenem, micafungin    Heme:  INR is subtherapeutic.  Warfarin is being dosed by pharmacy.  Add SQ Heprain for now.         Endocrine:  Non-insulin-dependent diabetes mellitus at baseline.  Treated with metformin.  Here despite receiving tube feeds her blood glucose is normal.  Will DC Accu-Cheks and sliding scale insulin every 4 hours.  We will continue monitoring blood glucose with her morning labs.    ICU DAILY CHECKLIST                           Can patient transfer out of MICU? no    FAST HUG:    Feeding:  Feeding: Yes tube feeds  Bardales:Yes  Analgesia/Sedation:No   Thromboembolic prophylaxis: yes; Mode:  Coumadin, heparin subcu  HOB>30:  Yes  Stress Ulcer Protocol Active: yes; Mode: PPI  Glycemic Control: Any glucose > 180 no.  None indicated    PHYSICAL THERAPY AND MOBILITY:  Can patient have PT and mobility trial: no  Activity: Bed Rest    Critical Care Time greater than: 40 minutes        Subjective:   HPI:  Gardenia Muller is a 70 y.o. female with history of chronic atrial fibrillation, diastolic heart failure, coronary artery disease, hypertension, CVA, diabetes mellitus type 2, calculus cholecystitis status post lap nimesh in 2018 and depression.  Presented here on 8/11 with nausea vomiting and diarrhea.  CT of the abdomen in the emergency room showed diverticulitis with perforation.  Principal Problem:    Diverticulitis  Active Problems:    Bradyarrhythmia    Acute kidney injury (H)    Diverticulitis of large intestine with perforation and abscess without bleeding    Atrial fibrillation with rapid ventricular response (H)    Obstructive sleep apnea  "syndrome    Poisoning by digoxin, accidental or unintentional, initial encounter    Acute respiratory failure with hypoxia and hypercapnia (H)    Acute metabolic encephalopathy    Shock circulatory (H)    Metabolic acidosis      Allergies: Penicillins     MEDS:  Scheduled Meds:    amino acids-protein hydrolys  1 packet Enteral Tube BID     aspirin  81 mg Enteral Tube DAILY     chlorhexidine  15 mL Topical Q12H     furosemide  20 mg Intravenous BID - diuretic     ipratropium-albuteroL  3 mL Nebulization Q6H - RT     meropenem  1 g Intravenous Q8H     micafungin (MYCAMINE) IV  100 mg Intravenous Q24H     nystatin  5 mL Swish & Swallow QID     pantoprazole  40 mg Intravenous BID    Or     omeprazole  20 mg Oral BID    Or     omeprazole  20 mg Enteral Tube BID     [Held by provider] sertraline  100 mg Oral DAILY     sodium chloride bacteriostatic  1-5 mL Intradermal Once     sodium chloride  10 mL Intravenous Q8H FIXED TIMES     sodium chloride  10-30 mL Intravenous Q8H FIXED TIMES     ticagrelor  90 mg Enteral Tube BID     warfarin - daily dose required   Other Med Consult or Protocol     Continuous Infusions:    dexmedetomidine infusion orderable (PRECEDEX) Stopped (09/05/20 6626)     norepinephrine Stopped (09/04/20 7912)     PRN Meds:.alteplase, atropine, bacitracin, bisacodyL, carboxymethylcellulose, codeine-guaiFENesin, dextrose 50 % (D50W), glucagon (human recombinant), hydrOXYzine pamoate, lidocaine (PF), lipase-protease-amylase **AND** sodium bicarbonate, metoclopramide, [Held by provider] metoprolol tartrate, naloxone **OR** naloxone, oxyCODONE, polyethylene glycol, sodium chloride 0.9%, sodium chloride bacteriostatic, sodium chloride, sodium chloride, sodium chloride, traZODone      Objective:   VITALS:  /50   Pulse 74   Temp 98.3  F (36.8  C)   Resp 18   Ht 5' 2\" (1.575 m)   Wt 192 lb 14.4 oz (87.5 kg)   LMP 01/25/1999   SpO2 97%   BMI 35.28 kg/m    EXAM:  General appearance: On mechanical " ventilation.  Appears comfortable.  Head: Normocephalic, without obvious abnormality, atraumatic  Lungs: clear to auscultation bilaterally  Heart: Irregularly irregular.  Abdomen: Soft, nontender.  Extremities: Bilateral edema  Neurologic: Very slow to respond at this point.    I&O:      Intake/Output Summary (Last 24 hours) at 9/5/2020 1100  Last data filed at 9/5/2020 0900  Gross per 24 hour   Intake 1384.57 ml   Output 1875 ml   Net -490.43 ml       Data Review:  Chest X-Ray: Vascular congestion    CBC:   Lab Results   Component Value Date    WBC 10.7 09/05/2020    RBC 2.53 (L) 09/05/2020     BMP:   Lab Results   Component Value Date    CO2 30 09/05/2020    BUN 20 09/05/2020    CREATININE 0.58 (L) 09/05/2020    CALCIUM 10.2 09/05/2020     Serum Glucose range:Invalid input(s): GLUCOSEPOCT    By:  Tyrone Robles, 9/5/2020, 1:05 PM    Primary Care Physician:  Jerson Hernandez MD

## 2021-06-11 NOTE — PROGRESS NOTES
PULMONARY / CRITICAL CARE PROGRESS NOTE    Date / Time of Admission:  8/11/2020 10:16 AM    Assessment:   Principal Problem:    Diverticulitis  Active Problems:    Bradyarrhythmia    Acute kidney injury (H)    Diverticulitis of large intestine with perforation and abscess without bleeding    Atrial fibrillation with rapid ventricular response (H)    Obstructive sleep apnea syndrome    Poisoning by digoxin, accidental or unintentional, initial encounter    Acute respiratory failure with hypoxia and hypercapnia (H)    Acute metabolic encephalopathy    Shock circulatory (H)    Metabolic acidosis    Other hypervolemia    Difficult ventilator weaning (H)    70yoF with history of DMII, HFpEF, afib on coumadin presented with perforated diverticulitis causing abscess resulting in acute kidney injury and respiratory failure now intubated, not felt to be a good surgical candidate.     Advance Directives:  Full Code      Plan:   Pulmonary:  1)Acute hypoxic respiratory failure-prolonged.  Still requiring mechanical ventilation for prolongation of life.  -Negative sputum cultures  -Completed course of antibiotics  -Ongoing diuresis  -After discussion of tracheostomy the family has determined that proceeding with tracheostomy and other advanced cares would not be within Gardenia's goals of cares.  Currently elected to transition to comfort cares today..    C/V:  -Gentle Diuresis ongoing  -Brillinta/Aspirin  -Anticoagulated for atrial fibrillation  -Lopressor for HR control.     Renal:  Gentle diuresis    GI:  1) perforated diverticulitis  -Tube feeds held for planned extubation.  -Most recent abscessogram shows minimal residual abscess    Neuro:  Therapeutic sedation    ID: perforated diverticulitis and pneumonia.  Treatment course completed.       ICU DAILY CHECKLIST                           Can patient transfer out of MICU? no    FAST HUG:    Feeding:  Feeding: No.  Patient is receiving tube  feeds  Bardales:Yes  Analgesia/Sedation:Yes precedex  Thromboembolic prophylaxis: yes; Mode:  Coumadin  HOB>30:  Yes  Stress Ulcer Protocol Active: yes; Mode: PPI  Glycemic Control: Any glucose > 180 no; Mode of Insulin Therapy: Sliding Scale Insulin    INTUBATED:  Can patient have daily waking:  no  Can patient have spontaneous breathing trial:  no    Restraints? Yes    PROVIDER RESTRAINT FOR NON-VIOLENT BEHAVIOR FACE TO FACE EVALUATION    Patient's Immediate Situation:  Patient demonstrated the following behaviors: Pulling/tugging at invasive lines or tubes and does not respond to verbal/non-verbal redirection    Patient's Reaction to the intervention:  Does patient understand the reason for restraint/seclusion? No    Medical Condition:  Is there any evidence of compromise of Skin integrity, Respiratory, Cardiovascular, Musculoskeletal, Hydration? No    Behavioral Condition:  In consultation with the RN, is there a need to continue this restraint or seclusion? Yes    See Restraint Flowsheet for complete restraint documentation and assessment.    Chandrakant Michele MD        This patient is critically ill with high risk of decompensation and death.  30 minutes of critical care time thus far today.        Subjective: No events overnight.  Plan for family at bedside this afternoon and transition to comfort care.   HPI:  Gardenia Muller is a 70 y.o. female with chronic afib on anticoagulation, HFpEF, CAD, CVA, DMII who presented 8/11 with nausea and vomiting. CT found perforated diverticulitis. Drain placed into abscess and antibiotics initiated given her poor candidacy for surgery. Worsening renal failure in setting of Afib with RVR and became digoxin-toxic. Received digibind with improvement in HR. Renal failure impressive, but on lasix drip began making urine and diuresed. Intubated for pulmonary edema and pneumonia with copious secretions (no bacteria identified). On minimal vent settings now hemodynamically  stable but without ability to SBT well.          Principal Problem:    Diverticulitis  Active Problems:    Bradyarrhythmia    Acute kidney injury (H)    Diverticulitis of large intestine with perforation and abscess without bleeding    Atrial fibrillation with rapid ventricular response (H)    Obstructive sleep apnea syndrome    Poisoning by digoxin, accidental or unintentional, initial encounter    Acute respiratory failure with hypoxia and hypercapnia (H)    Acute metabolic encephalopathy    Shock circulatory (H)    Metabolic acidosis    Other hypervolemia    Difficult ventilator weaning (H)      Allergies: Penicillins     MEDS:  Scheduled Meds:    alteplase  1 mg Intracatheter Once     alteplase  1 mg Intracatheter Once     aspirin  81 mg Enteral Tube DAILY     chlorhexidine  15 mL Topical Q12H     furosemide  20 mg Intravenous Q8H     guar gum  2 packet Enteral Tube BID     ipratropium-albuteroL  3 mL Nebulization Q6H - RT     metoprolol tartrate  25 mg Enteral Tube BID     nystatin  5 mL Swish & Swallow QID     pantoprazole  40 mg Intravenous BID    Or     omeprazole  20 mg Oral BID    Or     omeprazole  20 mg Enteral Tube BID     sertraline  50 mg Enteral Tube DAILY     sodium chloride  10 mL Intravenous Q8H FIXED TIMES     sodium chloride  10-30 mL Intravenous Q8H FIXED TIMES     ticagrelor  90 mg Enteral Tube BID     warfarin - daily dose required   Other Med Consult or Protocol     Continuous Infusions:    dexmedetomidine infusion orderable (PRECEDEX) 0.8 mcg/kg/hr (09/13/20 0944)     norepinephrine Stopped (09/05/20 2017)     PRN Meds:.alteplase, atropine, bacitracin, bisacodyL, carboxymethylcellulose, dextrose 50 % (D50W), glucagon (human recombinant), hydrOXYzine pamoate, lidocaine (PF), lipase-protease-amylase **AND** sodium bicarbonate, lipase-protease-amylase **AND** sodium bicarbonate, metoclopramide, naloxone **OR** naloxone, oxyCODONE, polyethylene glycol, sodium chloride 0.9%, sodium chloride  "bacteriostatic, sodium chloride, sodium chloride, sodium chloride, traZODone      Objective:   VITALS:  /55   Pulse 78   Temp 98.4  F (36.9  C) (Oral)   Resp 22   Ht 5' 2\" (1.575 m)   Wt 207 lb 4.8 oz (94 kg)   LMP 01/25/1999   SpO2 100%   BMI 37.92 kg/m    EXAM:  General appearance: Sedated and intubated  Lungs: Small bibasilar crackles.  Heart: irregularly irregular rhythm  Abdomen: Soft, no evident tenderness, positive bowel sounds.  Extremities: extremities normal, atraumatic, no cyanosis or edema  Skin: Skin color, texture, turgor normal. No rashes or lesions  Neurologic: Moves all extremities.    I&O:      Intake/Output Summary (Last 24 hours) at 9/13/2020 1108  Last data filed at 9/13/2020 0944  Gross per 24 hour   Intake 2734.17 ml   Output 1075 ml   Net 1659.17 ml       Results for orders placed or performed during the hospital encounter of 08/11/20   Basic Metabolic Panel   Result Value Ref Range    Sodium 138 136 - 145 mmol/L    Potassium 3.8 3.5 - 5.0 mmol/L    Chloride 102 98 - 107 mmol/L    CO2 27 22 - 31 mmol/L    Anion Gap, Calculation 9 5 - 18 mmol/L    Glucose 233 (H) 70 - 125 mg/dL    Calcium 10.0 8.5 - 10.5 mg/dL    BUN 15 8 - 28 mg/dL    Creatinine 0.61 0.60 - 1.10 mg/dL    GFR MDRD Af Amer >60 >60 mL/min/1.73m2    GFR MDRD Non Af Amer >60 >60 mL/min/1.73m2     Lab Results   Component Value Date    WBC 10.2 09/13/2020    HGB 7.5 (L) 09/13/2020    HCT 25.2 (L) 09/13/2020     (H) 09/13/2020     (L) 09/13/2020       By:  Chandrakant Michele MD, 9/13/2020, 2:31 PM    Primary Care Physician:  Jerson Hernandez MD               "

## 2021-06-11 NOTE — PROGRESS NOTES
Patient continues on mechanical ventilation, will continue to monitor and assess.    RESPIRATORY CARE NOTE    Vent Mode: VCV  FiO2 (%):  [30 %] 30 %  S RR:  [16] 16  S VT:  [450 mL] 450 mL  PEEP/CPAP (cm H2O):  [5 cm H2O] 5 cm H2O  Minute Ventilation (L/min):  [7.9 L/min-13 L/min] 8.6 L/min  PIP:  [20 cm H2O-56 cm H2O] 41 cm H2O  MAP (cm H2O):  [9-14] 10      Al eKnnedy, LRT       08/31/20 1130   Patient Data   Vt (observed, mL) 527 mL   Vt Exp (mL) 532 mL   Minute Ventilation (L/min) 8.6 L/min   Total Resp Rate  16 BPM   PIP Observed (cm H2O) 41 cm H2O   MAP (cm H2O) 10   Auto/Intrinsic PEEP Observed (cm H2O) 1 cm H2O   Plateau Pressure (cm H2O) 27 cm H2O   Static Compliance (L/cm H2O) 23   Dynamic Compliance (L/cm H2O) 19 L/cm H2O   Airway Resistance 15

## 2021-06-11 NOTE — PLAN OF CARE
Problem: Knowledge Deficit  Goal: Patient/family/caregiver demonstrates understanding of disease process, treatment plan, medications, and discharge instructions  Outcome: Progressing     Outcome: Progressing  Goal: STG - Respiratory rate and effort will be within normal limits for the patient  Outcome: Progressing     Problem: Impaired Gas Exchange  Goal: Demonstrate improved ventilation and adequate oxygenation of tissues as evidenced by absence of respiratory distress  Description: Patient intubated 8/28 @1430.  ABG's collected s/p.  Sating well.    Outcome: Progressing     Problem: Breathing  Goal: Patient will maintain patent airway  Outcome: Progressing     Problem: Mechanical Ventilation  Goal: Patient will maintain patent airway  Outcome: Progressing  Goal: Oral health is maintained or improved  Outcome: Progressing  Goal: ET tube will be managed safely  Outcome: Progressing  Goal: Ability to express needs and understand communication  Outcome: Progressing  Goal: Respiratory status - ventilation  Outcome: Progressing    Patient has had difficulty with weaning trials with tachypnea and high RSBI. Oxygenation has been maintained. Weaning on hold for now.

## 2021-06-11 NOTE — PROGRESS NOTES
Respiratory Care Note    Notified by RN that Precedex is off. Attempted wean 5/5. RR began to increase into the 30s. Gradually increased PS until 12 cm H2O to see if this would help. RR increased into the 40s. Pt returned back to full support. ICU MD notified of result.    GENO VenturaT

## 2021-06-11 NOTE — PROGRESS NOTES
GENERAL SURGERY CHART CHECK:    Patient not seen today.    Chart check reveals that patient remains on vent with minimal pressor support and only fentanyl for sedation.     IR exchanged drain and is planning an abscessogram in a week. The drain has had no output.     Surgery ok with tube feeds.     We will follow peripherally; please contact us with questions or concerns.    Lynn Herbert CNP  [phone] pager  [facility]

## 2021-06-11 NOTE — PLAN OF CARE
Vitals:    09/15/20 1518   BP: 134/73   Pulse: (!) 124   Resp: 27   Temp: 98.7  F (37.1  C)   SpO2: 97%     VSS ex tachy. Labored breathing with coarse lungs. Tachypnea, given PRN morphine, ativan as well as scheduled morphine with effect. 1 emesis, given zofran. No other concerns.    Problem: Pain  Goal: Patient's pain/discomfort is manageable  Outcome: Progressing     Problem: Safety  Goal: Patient will be injury free during hospitalization  Outcome: Progressing     Problem: Daily Care  Goal: Daily care needs are met  Outcome: Progressing     Problem: Psychosocial Needs  Goal: Demonstrates ability to cope with hospitalization/illness  Outcome: Progressing  Goal: Collaborate with patient/family/caregiver to identify patient specific goals for this hospitalization  Outcome: Progressing     Problem: Discharge Barriers  Goal: Patient's discharge needs are met  Outcome: Progressing     Problem: Knowledge Deficit  Goal: Patient/family/caregiver demonstrates understanding of disease process, treatment plan, medications, and discharge instructions  Outcome: Progressing     Problem: Potential for Falls  Goal: Patient will remain free of falls  Outcome: Progressing     Problem: Potential for Compromised Skin Integrity  Goal: Skin integrity is maintained or improved  Outcome: Progressing  Goal: Nutritional status is improving  Outcome: Progressing     Problem: Urinary Incontinence  Goal: Perineal skin integrity is maintained or improved  Outcome: Progressing     Problem: Potential for hemodynamic instability  Goal: Cardiac output is adequate  Outcome: Progressing     Problem: Impaired Gas Exchange  Goal: Demonstrate improved ventilation and adequate oxygenation of tissues as evidenced by absence of respiratory distress  Description: Patient intubated 8/28 @1430.  ABG's collected s/p.  Sating well.    Outcome: Progressing     Problem: Breathing  Goal: Patient will maintain patent airway  Outcome: Progressing     Problem: Risk  for Infection  Goal: Identify and demonstrate techniques, lifestyle changes to prevent/reduce risk of infection and promote safe environment  Outcome: Progressing     Problem: Mechanical Ventilation  Goal: Patient will maintain patent airway  Outcome: Progressing  Goal: Oral health is maintained or improved  Outcome: Progressing  Goal: Ability to express needs and understand communication  Outcome: Progressing  Goal: Mobility/activity is maintained at optimum level for patient  Outcome: Progressing  Goal: Respiratory status - ventilation  Outcome: Progressing     Problem: Inadequate Gas Exchange  Goal: Patient will achieve/maintain normal respiratory rate/effort  Outcome: Progressing

## 2021-06-11 NOTE — PROGRESS NOTES
Infectious Disease Follow up Note:    Service: Infectious Disease   Note: Follow Up Note  Date: 2020    Chief Complaint: Follow up for acute diverticulitis with perforation    ASSESSMENT:     Gardenia Muller is a 70 y.o. old female with acute diverticulitis with perforation.   C. dubliniensis and gram positive cocci in chains -abdominal infection-- active issue  Intra-abdominal abscess worse on CT scan       Intubated, respiratory failure-active issue    Recommendations:     IR consulted by Surgery for abdominal abscess drain repositioning/upsize--Drain management per IR and Surgery- gravity bag    Follow culture results--no orgs aug 28th  Continue Meropenem and  Micafungin     ARY Doherty MD  [facility]  [phone]            ______________________________________________________________________  Notes / labs / vitals reviewed.    SUBJECTIVE / INTERVAL HISTORY: ICU status.  Temp 99.9 low grade.  NO ROS possible. Winces with belly palpation.  Eyes mostly closed.         ROS: intubated    PMHx/FHx/SHx: Reviewed. Interval changes noted.    OBJECTIVE:  Vitals:    20 1045   BP:    Pulse: 74   Resp: 17   Temp:    SpO2: 96%       Temp (24hrs), Av.9  F (37.2  C), Min:97.4  F (36.3  C), Max:99.8  F (37.7  C)    Exam on 2020   GEN: Somnolent  EYES:  Anicteric, RAYMUNDO, EOM intact.  HEAD, EARS, NOSE, MOUTH, AND THROAT:  Thrush, ETT  NECK:  Supple.   RESPIRATORY: intubated  CARDIOVASCULAR:  Arm swelling  Previous exam: S1 S2 No murmur, click, gallop or rub. No dependent edema. No excess JVD.  ABDOMEN:  Soft, tenderness, gravity bag minimal output  MUSCULOSKELETAL:  Joints without effusion or acute inflammation. No muscle atrophy.Dedconditioned  LYMPHATIC:  No cervical or supraclavicular adenopathy  SKIN/HAIR/NAILS:  Warm, ecchymosis  NEUROLOGIC:  Somnolent, opens eyes    Antibiotics:  IV vancomycin --> Daptomycin, stopped on 2020  IV meropenem  IV fluconazole --> Micafungin  8/29    Pertinent labs:  Reviewed    Results from last 7 days   Lab Units 08/28/20  0923 08/28/20  0431 08/27/20  0406   LN-WHITE BLOOD CELL COUNT thou/uL 14.1* 14.6* 18.3*   LN-HEMOGLOBIN g/dL 9.0* 8.9* 9.1*   LN-HEMATOCRIT % 27.1* 27.0* 28.3*   LN-PLATELET COUNT thou/uL 116* 115* 111*   LN-MONOCYTES - REL (DIFF) % 5 6 6       Results from last 7 days   Lab Units 08/31/20  0501 08/30/20  1733 08/30/20  0517 08/29/20  1618  08/28/20  0431  08/27/20  0405   LN-SODIUM mmol/L 139  --  138 139   < > 138   < > 140   LN-POTASSIUM mmol/L 3.2*  3.2* 3.7 3.6  3.6 3.7  3.7   < > 3.5  3.5   < > 4.6   LN-CHLORIDE mmol/L 101  --  105 107   < > 106   < > 108*   LN-CO2 mmol/L 26  --  22 19*   < > 20*   < > 17*   LN-BLOOD UREA NITROGEN mg/dL 47*  --  53* 54*   < > 48*   < > 39*   LN-CREATININE mg/dL 1.41*  --  2.48* 2.89*   < > 3.13*   < > 2.86*   LN-CALCIUM mg/dL 8.5  --  8.9 8.8   < > 8.4*   < > 9.0   LN-ALBUMIN g/dL  --   --  1.4*  --   --  1.7*  --  1.9*   LN-PROTEIN TOTAL g/dL  --   --  6.0  --   --  6.3  --  6.6   LN-BILIRUBIN TOTAL mg/dL  --   --  2.0*  --   --  1.9*  --  2.0*   LN-ALKALINE PHOSPHATASE U/L  --   --  152*  --   --  147*  --  147*   LN-ALT (SGPT) U/L  --   --  27  --   --  119*  --  166*   LN-AST (SGOT) U/L  --   --  247*  --   --  1,413*  --  2,613*    < > = values in this interval not displayed.       MICROBIOLOGY DATA:    Cultures reviewed  Culture:  C. dubliniensis august 17th    IMAGING/RADIOLOGY:  Reviewed  XR Chest 1 View Portable (Order [id number])   Imaging         Study Result     EXAM: XR CHEST 1 VIEW PORTABLE  LOCATION: [facility]  DATE/TIME: 8/21/2020 7:34 PM     INDICATION: r/o pna- shortness of breath  COMPARISON: 08/11/2020     IMPRESSION:   New bilateral interstitial infiltrates could represent edema or pneumonia including COVID. Enlarged cardiac silhouette. No pleural effusion. No definite pulmonary vascular congestion.     Abscessogram reveals no residual abscess pocket.  Fistula to the colon. Switched drain to gravity drainage bag from SHAMIKA suction bulb. No flushes are needed. Follow-up abscessogram in one week.

## 2021-06-11 NOTE — PROGRESS NOTES
Infectious Disease Follow up Note:    Service: Infectious Disease   Note: Follow Up Note  Date: 8/28/2020    Chief Complaint: Follow up for acute diverticulitis with perforation    ASSESSMENT:     Gardenia Muller is a 70 y.o. old female with acute diverticulitis with perforation.   C. dubliniensis and gram positive cocci in chaing -abdominal infection-- active issue  Patient on Bipap-- resp failure    Fever--active issue- curve improving    Digoxin levels elevated-- active issue. Transferred to ICU on 8/26/2020    History of CVA, obesity, chronic abdominal pain.  Acute diverticulitis with perforation admitted on 8/11/2020.  On IV Cipro and Flagyl.  Cultures from abscess drainage on 8/17/2020 with Candida albicans.  CT scan on 8/20/2020 with near complete resolution of the abscess.    Abscessogram reveals no residual abscess pocket. Fistula to the colon. Switched drain to gravity drainage bag from SHAMIKA suction bulb. No flushes are needed. Follow-up abscessogram in one week.       New leukocytosis.    C. difficile negative on 8/21/2020.  Feeling short of breath.  Chest x-ray ordered. HAP is a possibility.  Leukocytosis could be from untreated Candida infection.  Fluconazole added on 8/21/2020.  WBC better, at 17,000 on 8/22/2020.  Abdomen is better.  New bilateral interstitial infiltrate.  COVID-19 PCR negative on 8/22/2020.  Most likely pulmonary edema.  Penicillin allergy, reaction swelling.     Recommendations:     Follow culture results-- GPC pending  Continue Meropenem and fluconazole  Elevated CK, stopped Daptomycin on 8/28/2020  Repeat blood culture   Stopped Vancomycin and started Daptomycin on 8/26  Monitor white blood cell count..  Monitor respiratory status and abdomen.  Hold statin  Cardiology and ICU team following    Drain management per IR and Surgery-  Discussed with Pharm D  Monitor CBC,CMP    ID will chart check over the weekend and follow on Monday, 8/31/2020. Please call with questions over the  weekend.     Remains ill.  Please see previous note by Dr. Christopher Vela MD  [facility]  [phone]            ______________________________________________________________________  Notes / labs / vitals reviewed.    SUBJECTIVE / INTERVAL HISTORY:   Bipap, resp failure  Somnolent    : Wakes up, slightly better mentation    Sleeping, wakes up easily  Previous note:    Intermittent abdominal pain  Tolerating abx  Needs help with sitting up in bed      : Abdomen is better.  Continue to have respiratory distress.  Now on a lot of oxygen.  COVID-19 negative.    ROS: A complete 12 point ROS is negative except as listed above.    PMHx/FHx/SHx: Reviewed. Interval changes noted.    OBJECTIVE:  Vitals:    20 1345   BP: 130/60   Pulse: 92   Resp: (!) 40   Temp:    SpO2: 95%       Temp (24hrs), Av.9  F (36.6  C), Min:97  F (36.1  C), Max:98.6  F (37  C)    Exam on 2020   GEN: Somnolent  EYES:  Anicteric, RAYMUNDO, EOM intact.  HEAD, EARS, NOSE, MOUTH, AND THROAT:  Oropharynx without thrush or ulcers  NECK:  Supple.   RESPIRATORY:  Bipap  CARDIOVASCULAR:  Arm swelling  Previous exam: S1 S2 No murmur, click, gallop or rub. No dependent edema. No excess JVD.  ABDOMEN:  Soft, tenderness, SHAMIKA drain   MUSCULOSKELETAL:  Joints without effusion or acute inflammation. No muscle atrophy.Dedconditioned  LYMPHATIC:  No cervical or supraclavicular adenopathy  SKIN/HAIR/NAILS:  No rashes. No stigmata of infective endocarditis.  NEUROLOGIC:  Gross neurological exam non-focal.  PSYCHIATRIC:  A&Ox3, appropriate, mentation normal.    Antibiotics:  IV vancomycin --> Daptomycin, stopped on 2020  IV meropenem  IV fluconazole    Pertinent labs:    Results from last 7 days   Lab Units 20  0923 20  0431 20  0406   LN-WHITE BLOOD CELL COUNT thou/uL 14.1* 14.6* 18.3*   LN-HEMOGLOBIN g/dL 9.0* 8.9* 9.1*   LN-HEMATOCRIT % 27.1* 27.0* 28.3*   LN-PLATELET COUNT thou/uL 116*  115* 111*   LN-MONOCYTES - REL (DIFF) % 5 6 6       Results from last 7 days   Lab Units 08/28/20  0431 08/27/20  2351 08/27/20 2011 08/27/20  1207 08/27/20  0753 08/27/20  0405  08/26/20  1805   LN-SODIUM mmol/L 138  --   --   --  139 139 140   < > 138   LN-POTASSIUM mmol/L 3.5  3.5 3.5 3.7   < > 3.8 4.2 4.6   < > 5.8*   LN-CHLORIDE mmol/L 106  --   --   --  107 108* 108*   < > 109*   LN-CO2 mmol/L 20*  --   --   --  20* 20* 17*   < > 10*   LN-BLOOD UREA NITROGEN mg/dL 48*  --   --   --  43* 40* 39*   < > 33*   LN-CREATININE mg/dL 3.13*  --   --   --  2.92* 2.88* 2.86*   < > 2.60*   LN-CALCIUM mg/dL 8.4*  --   --   --  8.7 8.6 9.0   < > 9.7   LN-ALBUMIN g/dL 1.7*  --   --   --   --   --  1.9*  --  1.9*   LN-PROTEIN TOTAL g/dL 6.3  --   --   --   --   --  6.6  --  6.6   LN-BILIRUBIN TOTAL mg/dL 1.9*  --   --   --   --   --  2.0*  --  1.9*   LN-ALKALINE PHOSPHATASE U/L 147*  --   --   --   --   --  147*  --  143*   LN-ALT (SGPT) U/L 119*  --   --   --   --   --  166*  --  116*   LN-AST (SGOT) U/L 1,413*  --   --   --   --   --  2,613*  --  1,543*    < > = values in this interval not displayed.       MICROBIOLOGY DATA:    Cultures reviewed  Culture:  C. dubliniensis     IMAGING/RADIOLOGY:  Reviewed  XR Chest 1 View Portable (Order [id number])   Imaging         Study Result     EXAM: XR CHEST 1 VIEW PORTABLE  LOCATION: [facility]  DATE/TIME: 8/21/2020 7:34 PM     INDICATION: r/o pna- shortness of breath  COMPARISON: 08/11/2020     IMPRESSION:   New bilateral interstitial infiltrates could represent edema or pneumonia including COVID. Enlarged cardiac silhouette. No pleural effusion. No definite pulmonary vascular congestion.     Abscessogram reveals no residual abscess pocket. Fistula to the colon. Switched drain to gravity drainage bag from SHAMIKA suction bulb. No flushes are needed. Follow-up abscessogram in one week.

## 2021-06-11 NOTE — PROGRESS NOTES
Pharmacy Consult: Warfarin Management    Pharmacy consulted to dose warfarin for Gardenia Muller, a 70 y.o. female    Ordering provider: Karen Hilliard MD     Reason for warfarin therapy: Atrial Fibrillation  Goal INR Range: 2-3    Subjective  Medication lists (home and hospital) were reviewed.    New medications that may increase bleeding risk/INR: none currently    Restarted home medications that may increase bleeding risk/INR: ASA, Ticagrelor, omeprazole, sertraline     Home medications NOT restarted that may increase bleeding risk/INR:  trazadone     Home Warfarin Dosing: multiple dose reductions during August 2020.  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)     Advanced directives, counseling/discussion     Cystitis     Healthcare maintenance     Essential hypertension     Cerebral infarction (H)     Anemia     Cerebrovascular disease     RLS (restless legs syndrome)     Incisional hernia, without obstruction or gangrene     Diverticular disease of large intestine     Coronary artery disease involving native coronary artery of native heart without angina pectoris     Dyslipidemia     Ventral hernia without obstruction or gangrene     Anxiety     (HFpEF) heart failure with preserved ejection fraction (H)     Anticoagulant long-term use     Persistent atrial fibrillation (H)     Bradyarrhythmia     Acute kidney injury (H)     Transaminitis     Physical deconditioning     Lipoma of back     Acalculous cholecystitis     Onychogryphosis     Hyperparathyroidism (H)     Microalbuminuria     Intestinal  angina (H)     Chronic abdominal pain     Weight loss, non-intentional     Warfarin anticoagulation     Severe protein-calorie malnutrition (H)     Hypertrophy of nail     Dysuria     Class 1 obesity with serious comorbidity and body mass index (BMI) of 33.0 to 33.9 in adult, unspecified obesity type     Trigger finger, acquired     Acute liver failure without hepatic coma     Hematochezia     Hyperkalemia     Acute on chronic combined systolic (congestive) and diastolic (congestive) heart failure (H)     Ischemic cardiomyopathy     Acute kidney failure, unspecified (H)     Acute diastolic heart failure (H)     Diverticulitis     Supratherapeutic INR     Diverticulitis of large intestine with perforation and abscess without bleeding     Atrial fibrillation with rapid ventricular response (H)     Obstructive sleep apnea syndrome     Poisoning by digoxin, accidental or unintentional, initial encounter     Acute respiratory failure with hypoxia and hypercapnia (H)     Acute metabolic encephalopathy     Shock circulatory (H)     Metabolic acidosis     Other hypervolemia    Past Medical History:   Diagnosis Date     Acute diastolic heart failure (H) 11/2015     Acute on chronic diastolic heart failure (H) 6/15/2019     Advanced directives, counseling/discussion 8/5/2015    Patient completed 2011.  Discussed today, 5/24/17, and wants to change healthcare agent from niece to daughter.  Discussed that she will need to complete new form to indicate this.     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  Recommend 5 year follow-up.  Single tubular adenoma     Biliary obstruction 10/3/2018    Added automatically from request for surgery [id number]     Cerebral vascular accident (H)      Coronary artery disease 2006    100% RCA with collateral filling per Dr. Elizabeth's angio report     Diabetes mellitus type 2 in obese (H)       Fibrocystic breast      GERD (gastroesophageal reflux disease)      History of anesthesia complications     slow to wake     Hypertension      Obstructive sleep apnea syndrome      Peripheral vascular disease (H)      Pneumonia 11/11/2018     PONV (postoperative nausea and vomiting)      S/P cholecystectomy 6/22/2018     SBO (small bowel obstruction) (H) 11/12/2015     Stroke (H)      Transaminitis 10/22/2018     Upper respiratory infection 12/20/2018     Urinary incontinence         Social History     Tobacco Use     Smoking status: Former Smoker     Packs/day: 0.25     Smokeless tobacco: Former User     Tobacco comment: e-cig a few times/day   Substance Use Topics     Alcohol use: No     Drug use: No       Objective   Labs:  Last 3 days:    Recent Labs     09/07/20  0440 09/08/20  0403 09/09/20  0526   CREATININE 0.53* 0.54* 0.54*   HGB 7.6* 7.4* 7.4*   HCT 25.4* 24.9* 25.0*    168 155   BILITOT  --   --  1.4*   AST  --   --  39   ALT  --   --  24   ALKPHOS  --   --  101     Last 7 days:   Recent labs: (last 7 days)     09/03/20  0439 09/04/20  0532 09/05/20  0417 09/06/20  0526 09/07/20  0440 09/08/20  0403 09/09/20  0525   INR 1.86* 1.89* 1.79* 1.90* 2.09* 2.14* 2.50*       Warfarin Dosing History:    Date INR Warfarin Dose Comment   9/2/20 2.07 none    9/3/20 1.86 1.5 mg Pharmacist consulted to dose warfarin   9/4/20 1.89 1.5mg    9/5 1.79 2.5mg     9/6 1.90 2.5 mg    9/7/20 2.09 2.5 mg - SQ heparin discontinued   9/8/20 2.14 2.5 mg    9/9/20 2.50 1.5 mg            Assessment  The patient is resuming warfarin for the indication of Atrial Fibrillation with a goal INR of 2-3. INR today is therapeutic.     Multiple dose reductions during August 2020.  Last dosing regimen just before admission was 1.5mg daily.  Patient was admitted with supratherapeutic INR.  INR was reversed for surgery and then remained elevated most likely due to liver issues post operatively.  INR increased and now within goal  range.     Plan  1. Administer warfarin 1.5 mg PO today.  2. Check INR daily or as appropriate.  3. Continue to follow the patient's INR, PLT, and HGB as available.  4. Monitor for potential drug/disease interactions.    Thank you for the consult,  Jose Raul Han, PharmD, BCPS 9/9/2020 2:34 PM

## 2021-06-11 NOTE — PROGRESS NOTES
Wound Ostomy  WOC Assessment         Allergies:  Penicillins    Diagnosis:   Patient Active Problem List    Diagnosis Date Noted     Other hypervolemia      Acute respiratory failure with hypoxia and hypercapnia (H)      Acute metabolic encephalopathy      Shock circulatory (H)      Metabolic acidosis      Poisoning by digoxin, accidental or unintentional, initial encounter      Atrial fibrillation with rapid ventricular response (H)      Obstructive sleep apnea syndrome      Supratherapeutic INR 08/12/2020     Diverticulitis of large intestine with perforation and abscess without bleeding      Diverticulitis 08/11/2020     Acute kidney failure, unspecified (H) 07/24/2020     Acute liver failure without hepatic coma 07/23/2020     Hematochezia 07/23/2020     Hyperkalemia 07/23/2020     Acute on chronic combined systolic (congestive) and diastolic (congestive) heart failure (H) 07/23/2020     Ischemic cardiomyopathy 07/23/2020     Trigger finger, acquired 07/02/2020     Class 1 obesity with serious comorbidity and body mass index (BMI) of 33.0 to 33.9 in adult, unspecified obesity type 02/07/2020     Dysuria 08/20/2019     Hypertrophy of nail 07/30/2019     Severe protein-calorie malnutrition (H) 07/03/2019     Weight loss, non-intentional 07/01/2019     Warfarin anticoagulation 07/01/2019     Chronic abdominal pain 06/27/2019     Intestinal angina (H) 06/16/2019     Microalbuminuria 02/12/2019     Hyperparathyroidism (H) 01/15/2019     Onychogryphosis 01/11/2019     Lipoma of back 11/15/2018     Physical deconditioning 11/04/2018     Acute kidney injury (H) 10/22/2018     Transaminitis 10/22/2018     Bradyarrhythmia      Persistent atrial fibrillation (H)      Anticoagulant long-term use      Acalculous cholecystitis 10/03/2018     (HFpEF) heart failure with preserved ejection fraction (H) 09/18/2018     Anxiety 08/21/2018     Ventral hernia without obstruction or gangrene 03/15/2018     Coronary artery disease  "involving native coronary artery of native heart without angina pectoris 02/26/2018     Dyslipidemia 02/26/2018     Incisional hernia, without obstruction or gangrene 09/20/2017     RLS (restless legs syndrome) 05/24/2017     Diverticular disease of large intestine 03/23/2017     Cerebrovascular disease 01/16/2017     Healthcare maintenance 03/07/2016     Cystitis 03/05/2016     Acute diastolic heart failure (H) 11/2015     Advanced directives, counseling/discussion 08/05/2015     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)      Spinal stenosis      PAD (peripheral artery disease) (H)      Dermatosis Papulosa Nigra      Depression      Hypercalcemia      Right Renal Artery Stenosis      Osteoarthritis      Abnormality Of Walk      Essential hypertension 07/02/2010     Cerebral infarction (H) 07/02/2010     Anemia 07/02/2010       Height:  5' 2\" (1.575 m)    Weight:   180 lb 8.9 oz (81.9 kg)    Labs:  Recent Labs     09/08/20  0403   HGB 7.4*       James:  James Scale Score: 11    Specialty Bed:  Specialty Bed: [facility] Pro (ICU only) (STACH)    Wound culture obtained: No    Edema:  Yes:  Generalized    Full body skin assessment completed except scalp.  Two areas of light purple discoloration noted to forehead consistent with size and shape of pulse oximeter. Stage 1 pressure injury - new. Approximately 0.3 cm x 0.3 cm each with intact periwound skin. No pulse on forehead at this time.  Anatomic Site/Laterality: Gluteal cleft    Reason for ongoing care:   Wound assessment and plan of care    Encounter Type:  Subsequent Encounter Wound Type:   Denudement:  Foreign body present? No    Tissue Damage:   Breakdown of skin    Related trauma: Intertrigo    Assessment:    Length: 3 cm    Width: 0.5 cm    Tunneling/Undermining: No    Wound Bed: Pink, viable 100% Agranular    Exudate: No    Periwound Skin: Intact    Treatment Plan: Silicone foam     Anatomic Site/Laterality: Buttocks    Reason for ongoing " care:   Wound assessment and plan of care    Encounter Type:  Initial Wound Type:   Denudement:  Foreign body present? No    Tissue Damage:   Breakdown of skin    Related trauma: Appears to be shearing injury given ridging and pattern of tissue injury (more apparent on left buttock than right).    Assessment:    L buttock with total affected area measuring approximately 7 cm x 3 cm.   R buttock with total affected area measuring approximately 2 cm x 3 cm.    Tunneling/Undermining: No    Wound Bed: Pink, viable 100% Agranular    Exudate: No    Periwound Skin: Intact    Treatment Plan: Silicone foam     Nursing care provided was coordinated care with RN and repositioned.    Discussed plan of care with nurse and patient as able.    Outcomes and treatment recommendations are to promote skin integrity and promote wound healing.    Actions taken by WOC RN: 5 minutes of education and WOC Discharge recommendations entered.    Planned Follow Up: Later this week or Early next week.    Plan for next visit: Reassess wound(s) and Reassess skin integrity.

## 2021-06-11 NOTE — CONSULTS
"Clinical Nutrition Therapy Follow Up Note     Reason for Assessment: Gardenia Muller is a 70 y.o. female assessed by the dietitian for consult to initiate and manage tube feeding.    Pt discussed during rounds. Pt intubated and sedated with propofol, on pressors, HD vs CRRT planned for today.    Subjective:    Current Nutrition Prescription:   Diet: NPO  IV dextrose or Fluids:     fentaNYL citrate (PF) 50 mcg/hr (08/29/20 0722)     furosemide Stopped (08/29/20 1027)     norepinephrine 0.09 mcg/kg/min (08/29/20 0933)     propofol 15 mcg/kg/min (08/29/20 0800)       Current Nutrition Intake:  Per previous RD notes, patient has had poor PO >6 days.    Enteral nutrition access is a nasal gastric tube, with a placement date of 8/28, verified via chest xray as in stomach or below.     The current propofol order provides 230 calories daily    Total nutrient intake from all sources does not meet estimated nutritional needs.    Anthropometrics:  Height: 5' 2\" (1.575 m)  Admission weight: 195#  Most recent weight: 214 lb 8 oz (97.3 kg)- +2 edema in all extremities, +1 generalized, low UOP  Weight History:   Wt Readings from Last 10 Encounters:   08/29/20 214 lb 8 oz (97.3 kg)   08/11/20 179 lb 4.8 oz (81.3 kg)   08/12/20 179 lb (81.2 kg)   08/10/20 179 lb (81.2 kg)   07/31/20 183 lb 6.4 oz (83.2 kg)   07/02/20 198 lb (89.8 kg)   02/07/20 180 lb 8 oz (81.9 kg)   12/06/19 156 lb (70.8 kg)   08/20/19 130 lb (59 kg)   07/30/19 132 lb (59.9 kg)       GI Status/Output:   GI symptoms include:Abdominal pain per nurse  Bowel Sounds hypoactive per nurse  Last BM: 8/26 per nurse    Skin/Wound:  James Scale Score: 10    Medications:  Medications reviewed.  Noted: Diuril, SSI, Prilosec, IV abx    Labs:  Results from last 7 days   Lab Units 08/29/20  0500   LN-SODIUM mmol/L 137   LN-POTASSIUM mmol/L 3.6  3.6   LN-CHLORIDE mmol/L 106   LN-CO2 mmol/L 22   LN-BLOOD UREA NITROGEN mg/dL 52*   LN-CREATININE mg/dL 3.08*   LN-CALCIUM mg/dL 8.6 "     Results from last 7 days   Lab Units 08/28/20  0431   LN-ALBUMIN g/dL 1.7*   LN-PROTEIN TOTAL g/dL 6.3   LN-BILIRUBIN TOTAL mg/dL 1.9*   LN-ALKALINE PHOSPHATASE U/L 147*   LN-ALT (SGPT) U/L 119*   LN-AST (SGOT) U/L 1,413*         Results from last 7 days   Lab Units 08/29/20  0500   LN-MAGNESIUM mg/dL 2.3     Results from last 7 days   Lab Units 08/25/20  0526   LN-PHOSPHORUS mg/dL 3.2             Lab Results   Component Value Date    POCGLUFGR 106 08/29/2020    POCGLUFGR 117 08/29/2020    POCGLUFGR 144 (H) 08/29/2020    POCGLUFGR 154 (H) 08/28/2020    POCGLUFGR 185 (H) 08/28/2020    POCGLUFGR 198 (H) 08/28/2020    POCGLUFGR 199 (H) 08/28/2020    POCGLUFGR 191 (H) 08/28/2020    POCGLUFGR 188 (H) 08/27/2020    POCGLUFGR 180 (H) 08/27/2020      Lab Results   Component Value Date    HGB 9.0 (L) 08/28/2020     Hemoglobin A1c   Date Value Ref Range Status   07/23/2020 6.5 (H) <=5.6 % Final     Comment:     Prediabetes:   HBA1c       5.7 to 6.4%        Diabetes:        HBA1c        >=6.5%   Patients with Hgb F >5%, total bilirubin >10.0 mg/dL, abnormal red cell turnover, severe renal or hepatic disease or malignancy should not have this A1C method used to diagnose or monitor diabetes.       12/06/2019 5.5 3.5 - 6.0 % Final     Estimated Nutrition Needs:  Assessment weight is 88.6, admit weight    Energy Needs: 5733-6880 kcals daily, 14-17 kcal/kg  Protein Needs: 100+ g daily, 2+ g/kg based on IBW of 50 kg (plan for HD). 60-75 g (1.2-1.5 g/kg) based on IBW if creatinine >2 and NOT on HD.   Fluid Needs: Per provider d/t poor UOP    Malnutrition Assessment: Noted previously, remains current    Nutrition Risk Level: high risk    Nutrition DX: inadequate intake r/t acute illness evidenced by NPO/CL x 6 days     Goals:  Meet nutrition needs-not met  Tolerate diet advancement-n/a  Electrolytes WNL- progressing     Plan: plan likely for nutrition support, but MD would like recommendations at this time as it will depend on if  patient goes to surgery. Recommend Replete NO fiber at trophic rate for now. Eventual goal rate will be 50 ml/hr + 2 pkts No Carb Prosource, which will provide 1320 kcals, 107 g protein, 134 g CHO, 60 g fat, 1008 ml free H2O. Can use more concentrated formula if necessary based on removal of fluid with HD, but prefer low fat, low CHO formula at this point. Strongly suspect patient may develop refeeding syndrome as a result of poor PO for >6 days.     Monitor:   Diet start, per goals.    See Care Plan for further Problems, Goals, and Interventions    Electronically signed by:  Sonali Driver RD

## 2021-06-11 NOTE — PLAN OF CARE
Problem: Breathing  Goal: Patient will maintain patent airway  Outcome: Progressing     Problem: Mechanical Ventilation  Goal: Patient will maintain patent airway  Outcome: Progressing  Goal: ET tube will be managed safely  Outcome: Progressing  Goal: Mobility/activity is maintained at optimum level for patient  Outcome: Progressing     Problem: Mechanical Ventilation  Goal: Ability to express needs and understand communication  Outcome: Not Progressing

## 2021-06-11 NOTE — PROGRESS NOTES
Palliative Spiritual Care Note    Spiritual Assessment: Yi is important to pt, it is a source of strength and comfort. Prior to intubation, requested prayer. Pt appears to be more lethargic today. Dtr Aria called via iPad at 3:40 this afternoon.    Care Provided: Initiated prayer with dtr and pt. Pt did not open eyes. Encouraged dtr to speak with pt which she did. Dtr tearful. Cousins of Aria also on call. Cousins provided support for her. Validated dtr's grief and deep weariness.    Plan of Care: Aria leaving tomorrow for [place] on bus. She would like to stop by and give pt a kiss. Writer will continue to follow.    HARLEY Barriga.

## 2021-06-11 NOTE — PROGRESS NOTES
Palliative Spiritual Care Note    Spiritual Assessment: Pt actively dying. Yi important. Opens one eye and appears to track. Responds to words, supportive conversation through visual cues, sometimes weak vocalization of sound, no words. Reached for writer's hand.    Care Provided: Bedside presence, assurance and prayer. Family not yet in room.    Plan of Care: Will continue to follow as long as in hospital.    HARLEY Barriga.

## 2021-06-11 NOTE — PROGRESS NOTES
09/01/20 0300   Patient Data   PIP Observed (cm H2O) 37 cm H2O   MAP (cm H2O) 9   Auto/Intrinsic PEEP Observed (cm H2O) 1 cm H2O   Plateau Pressure (cm H2O) 25 cm H2O   Static Compliance (L/cm H2O) 30   Dynamic Compliance (L/cm H2O) 24 L/cm H2O   Airway Resistance 13   SpO2 99 %   Heart Rate (!) 59     ETT # 7.5 and is secured 20 cm at the teeth. Patient remains on full supported; current settings: VCV 16 450 30% 5. Ventilations/oxygens are adequate per sats and ETCO2. BS clear; moderate/tan secretions suctioned. Vent changes are not made. RT will monitor.    GENO JosephT

## 2021-06-11 NOTE — PROGRESS NOTES
09/13/20 1211   Patient Data   Vt (observed, mL) 403 mL   Vt Exp (mL) 471 mL   Minute Ventilation (L/min) 8.6 L/min   Total Resp Rate  18 BPM   PIP Observed (cm H2O) 34 cm H2O   MAP (cm H2O) 9   Auto/Intrinsic PEEP Observed (cm H2O)   (failed)   Plateau Pressure (cm H2O) 25 cm H2O   Static Compliance (L/cm H2O) 25   Dynamic Compliance (L/cm H2O) 21 L/cm H2O   Airway Resistance 15

## 2021-06-11 NOTE — PROGRESS NOTES
Palliative Spiritual Care Note    Spiritual Assessment: Pt is not recovering and wants to die peacefully in her sleep according to dtr, Aria. Dtr supports this decision and says pt has suffered enough.    Care Provided: Pastoral care through conversation with dtr, affirmation of lindsay and courage. Prayer with pt at bedside.    Plan of Care: Aria is travelling by bus tomorrow, arriving in [place] 5:30 PM. Will authorize comfort care. Dtr does not expect pt to live long after removal of vent. Writer will try to connect with Aria Friday if possible.    HARLEY Barriga.

## 2021-06-11 NOTE — PROGRESS NOTES
[facility] Daily Progress Note    Assessment/Plan:  Gardenia Muller is a 70-year-old lady with atrial fibrillation, diastolic congestive heart failure, hypertension, coronary artery disease status post PCI 1995, CVA, peripheral vascular disease, T2DM, malnutrition, intestinal angina, acalculous cholecystitis status post laparoscopic cholecystectomy in 2018, depression who came into [facility] on 8/11/2020 for abdominal pain and vomiting.  CT scan of the abdomen revealed diverticulitis with perforation.  On 814 it showed increased size of abscess from 2x2 cm to 4x3 cm.  Though there was clinical improvement in her symptoms.  General surgery was consulted who recommended interventional radiology consultation for possible drainage of the abdominal abscess which was done on 8/17/2020 by placing CT scan guided drain in the left pelvic diverticular abscess. On 8/20/2020 CT scan of the abdomen showed near collapse of the fluid cavity        .  Subsequently patient developed leukocytosis with possible hospital-acquired pneumonia seen on chest x-ray.  She also had Candida infection for which fluconazole was started with improvement of WBC count.  On 825 patient had shortness of breath with 25 pound weight gain and was started on IV Lasix.  On 826 developed acute kidney injury and had episodes of bradycardia.  Digoxin level was elevated at 6.9 on 8/26/2020 and 10.7 on 8/28/2024 which DigiFab was started and patient transferred to ICU.  Required atropine in ICU for heart rate which went down between 20 and 40.  Started on dopamine.  Developed respiratory failure.  There is still fluid around sigmoid colon and rectum.  Abscessogram done on 8/31/2020 showed contracted abscess cavity with persistent fistula to the sigmoid colon with drain exchanged and tailored 10 Turkish drain was placed lateral to the fistula site.  There is a plan to do another abscessogram in 1 week's time.     Assessment:  Digoxin toxicity  Acute kidney  injury  Acute perforated diverticulitis with pericolonic abscess  Sigmoid colon fistula   Hospital-acquired pneumonia -on meropenem  Candida bacteremia -on mucous function  Acute exacerbation on chronic diastolic CHF  Acute anemia  Atrial fibrillation  Type 2 diabetes mellitus      Plan:  Management as per pulmonary critical care, nephrology, ID, surgery will resume care when on the floor    Code status:No CPR- Pre-arrest Intubation OK        Subjective:  Patient is intubated and sedated opening eyes to command and nodding yes and no    Review of Systems:   As above    Current Medications Reviewed via EHR List    Objective:  Vital signs in last 24 hours:  Temp:  [98.2  F (36.8  C)-99  F (37.2  C)] 99  F (37.2  C)  Heart Rate:  [58-80] 72  Resp:  [16-43] 23  BP: ()/(34-60) 99/48  SpO2:  [95 %-100 %] 97 %  FiO2 (%):  [30 %] 30 %    Intake/Output last 3 shifts:  I/O last 3 completed shifts:  In: 1338.7 [I.V.:490.7; NG/GT:598; IV Piggyback:250]  Out: 1575 [Urine:1575]      Physical Exam:  EYES: EOM able to Betamide  NECK: no JVD  HEART : S1 S2 RRR    LUNGS:  Clear to auscultation without  Crepitations or Wheezing    ABDOMEN:   Soft, No tenderness ?, Bowel sounds are positive  CNS patient is intubated and open eyes to command nodding yes and no             Lab Results:  Personally Reviewed.   Fingerstick Blood Glucose:   Recent Labs     09/03/20  1953 09/03/20  2324 09/04/20  0346 09/04/20  0800 09/04/20  1612 09/04/20  2343 09/05/20  0416 09/05/20  0757   POCGLUFGR 90 98 93 78 117 120 117 120       Recent Results (from the past 24 hour(s))   POCT Glucose    Collection Time: 09/04/20 11:43 PM    Specimen: Blood   Result Value Ref Range    Glucose 120 70 - 139 mg/dL   POCT Glucose    Collection Time: 09/05/20  4:16 AM    Specimen: Blood   Result Value Ref Range    Glucose 117 70 - 139 mg/dL   Magnesium    Collection Time: 09/05/20  4:17 AM   Result Value Ref Range    Magnesium 1.7 (L) 1.8 - 2.6 mg/dL   INR     Collection Time: 09/05/20  4:17 AM   Result Value Ref Range    INR 1.79 (H) 0.90 - 1.10   Renal Function Profile    Collection Time: 09/05/20  4:17 AM   Result Value Ref Range    Albumin 1.3 (L) 3.5 - 5.0 g/dL    Calcium 10.2 8.5 - 10.5 mg/dL    Phosphorus 2.7 2.5 - 4.5 mg/dL    Glucose 124 70 - 125 mg/dL    BUN 20 8 - 28 mg/dL    Creatinine 0.58 (L) 0.60 - 1.10 mg/dL    Sodium 143 136 - 145 mmol/L    Potassium 4.0 3.5 - 5.0 mmol/L    Chloride 107 98 - 107 mmol/L    CO2 30 22 - 31 mmol/L    Anion Gap, Calculation 6 5 - 18 mmol/L    GFR MDRD Af Amer >60 >60 mL/min/1.73m2    GFR MDRD Non Af Amer >60 >60 mL/min/1.73m2   HM1 (CBC with Diff)    Collection Time: 09/05/20  4:17 AM   Result Value Ref Range    WBC 10.7 4.0 - 11.0 thou/uL    RBC 2.53 (L) 3.80 - 5.40 mill/uL    Hemoglobin 7.7 (L) 12.0 - 16.0 g/dL    Hematocrit 25.2 (L) 35.0 - 47.0 %     80 - 100 fL    MCH 30.4 27.0 - 34.0 pg    MCHC 30.6 (L) 32.0 - 36.0 g/dL    RDW 24.5 (H) 11.0 - 14.5 %    Platelets 128 (L) 140 - 440 thou/uL    MPV 13.1 (H) 8.5 - 12.5 fL    Neutrophils % 69 50 - 70 %    Lymphocytes % 13 (L) 20 - 40 %    Monocytes % 12 (H) 2 - 10 %    Eosinophils % 3 0 - 6 %    Basophils % 1 0 - 2 %    Immature Granulocyte % 3 (H) <=0 %    Neutrophils Absolute 7.4 2.0 - 7.7 thou/uL    Lymphocytes Absolute 1.3 0.8 - 4.4 thou/uL    Monocytes Absolute 1.2 (H) 0.0 - 0.9 thou/uL    Eosinophils Absolute 0.4 0.0 - 0.4 thou/uL    Basophils Absolute 0.1 0.0 - 0.2 thou/uL    Immature Granulocyte Absolute 0.3 (H) <=0.0 thou/uL   Manual Differential    Collection Time: 09/05/20  4:17 AM   Result Value Ref Range    Platelet Estimate Decreased (!) Normal    Polychromasia 1+ (!) Negative    Target Cells 2+ (!) Negative   POCT Glucose    Collection Time: 09/05/20  7:57 AM    Specimen: Blood   Result Value Ref Range    Glucose 120 70 - 139 mg/dL           Advanced Care Planning  Discharge plan: Unknown at this time      Michael Abreu  Date: 9/5/2020  Time: 3:41  PM  Hospitalist Service  Part of this chart was created using a dictation software. Typographic errors, word substitutions, and Grammatical errors may unintentionally occur.

## 2021-06-11 NOTE — PROGRESS NOTES
Patient not seen today.     Chart check reveals that patient remains on vent and not progressing.  Is now off pressors.     IR exchanged drain and is planning an abscessogram this  week. The drain has had no output.      Surgery ok with tube feeds.      We will continue to follow peripherally; please contact us with questions or concerns.    Nela Camacho PA-C [phone]

## 2021-06-11 NOTE — PLAN OF CARE
Patient calm and comfortable during night. Levo turned off around midnight, blood pressure goals maintained without since then.   Continues on dex gtt, meets RASS goal. Turned Q2 hours.   Patient needed frequent oral suctioning, but O2 goals met well. Al Del Rio 9/5/2020

## 2021-06-11 NOTE — PLAN OF CARE
Problem: Pain  Goal: Patient's pain/discomfort is manageable  Outcome: Progressing  PRN Oxycodone was given once for generalize pain. Will continue to assess and medicate as needed.      Problem: Daily Care  Goal: Daily care needs are met  Outcome: Progressing  - Turn and reposition every two hours. Up to hair with raúl lift.   - Precedex at 0.6 for comfort. Will continue to monitor.

## 2021-06-11 NOTE — PROGRESS NOTES
General Surgery Progress Note  Hospital Day # 18    Subjective:   Pt was intubated yesterday, need for vasopressors is going up.  The output from her drain is been minimal.  Patient is complaining of more abdominal pain.    Vitals:    08/29/20 0600 08/29/20 0615 08/29/20 0630 08/29/20 0645   BP:       Patient Position:       Pulse: 66 65 64 69   Resp:       Temp:       TempSrc:       SpO2: 99% 99% 99% 99%   Weight: 214 lb 8 oz (97.3 kg)      Height:           Physical Exam:  Lungs:  CTA, intubated  CV:      Tachycardic with regular rhythm  Ab:       Soft, + BS, tender to palpation, drain coming out the left side of the abdomen with residual of brownish appearing fluid    Results from last 7 days   Lab Units 08/28/20  0923   LN-WHITE BLOOD CELL COUNT thou/uL 14.1*   LN-HEMOGLOBIN g/dL 9.0*   LN-HEMATOCRIT % 27.1*   LN-PLATELET COUNT thou/uL 116*       Results from last 7 days   Lab Units 08/29/20  0500  08/28/20  0431   LN-SODIUM mmol/L 137  --  138   LN-POTASSIUM mmol/L 3.6  3.6   < > 3.5  3.5   LN-CHLORIDE mmol/L 106  --  106   LN-CO2 mmol/L 22  --  20*   LN-BLOOD UREA NITROGEN mg/dL 52*  --  48*   LN-CREATININE mg/dL 3.08*  --  3.13*   LN-CALCIUM mg/dL 8.6  --  8.4*   LN-ALBUMIN g/dL  --   --  1.7*   LN-PROTEIN TOTAL g/dL  --   --  6.3   LN-BILIRUBIN TOTAL mg/dL  --   --  1.9*   LN-ALKALINE PHOSPHATASE U/L  --   --  147*   LN-ALT (SGPT) U/L  --   --  119*   LN-AST (SGOT) U/L  --   --  1,413*    < > = values in this interval not displayed.       Assessment: A concerning progression of symptoms taking place currently requiring intubation and going up on vasoactive medications.  I like to assess this patient's abdomen to ensure were doing what we can to treat the intra-abdominal abscess and/or infection and to rule out need for urgent surgery.    Plan: CT scan of abdomen and pelvis.    Jose R Stewart MD  [facility]  [phone]

## 2021-06-11 NOTE — PROGRESS NOTES
PULMONARY / CRITICAL CARE PROGRESS NOTE    Date / Time of Admission:  8/11/2020 10:16 AM    Assessment:   Principal Problem:    Diverticulitis  Active Problems:    Bradyarrhythmia    Acute kidney injury (H)    Diverticulitis of large intestine with perforation and abscess without bleeding    Atrial fibrillation with rapid ventricular response (H)    Obstructive sleep apnea syndrome    Poisoning by digoxin, accidental or unintentional, initial encounter    Acute respiratory failure with hypoxia and hypercapnia (H)    Acute metabolic encephalopathy    Shock circulatory (H)    Metabolic acidosis    Other hypervolemia    Difficult ventilator weaning (H)    70yoF with history of DMII, HFpEF, afib on coumadin presented with perforated diverticulitis causing abscess resulting in acute kidney injury and respiratory failure now intubated, not felt to be a good surgical candidate.     Advance Directives:  Full Code      Plan:   Pulmonary:  1)Acute hypoxic respiratory failure  2) Pulmonary edema--resolved  3) Pneuonia--resolved  -Negative sputum cultures  -Completed course of antibiotics  -Ongoing diuresis  -Failing daily weans. Attempted again at 10/5, unable to pull in good volumes, rate 40.      C/V:  1) Afib with RVR--resolved  2) Dig Toxicity--resolved  3) Hypotension--resolved  -Gentle Diuresis ongoing  -Brillinta/Aspirin  -Anticoagulated for atrial fibrillation  -Lopressor for HR control.     Renal:  1) MAY on CKD--resolved  -Continue gentle diuresis    GI:  1) perforated diverticulitis  -Continue tube feeds  -Abscessogram shows minimal residual abscess    Neuro:  1) Sedation requirements  -Precedex/Fentanyl for comfort.     ID: perforated diverticulitis  Pneumonia seen on abdominal CT  -Appreciate ID  -Meropenem, micafungin completed, will stop and monitor.   -sputum culture pending    Updated daughter Aria today with Dr. Ramirez. She acknowledges her mother would not want a trach or to live on a ventilator and is  ready for comfort care. Family will gather and Sunday at 4pm she will be terminally extubated.       ICU DAILY CHECKLIST                           Can patient transfer out of MICU? no    FAST HUG:    Feeding:  Feeding: No.  Patient is receiving tube feeds  Bardales:Yes  Analgesia/Sedation:Yes precedex  Thromboembolic prophylaxis: yes; Mode:  Coumadin  HOB>30:  Yes  Stress Ulcer Protocol Active: yes; Mode: PPI  Glycemic Control: Any glucose > 180 no; Mode of Insulin Therapy: Sliding Scale Insulin    INTUBATED:  Can patient have daily waking:  no  Can patient have spontaneous breathing trial:  no    Restraints? Yes    PROVIDER RESTRAINT FOR NON-VIOLENT BEHAVIOR FACE TO FACE EVALUATION    Patient's Immediate Situation:  Patient demonstrated the following behaviors: Pulling/tugging at invasive lines or tubes and does not respond to verbal/non-verbal redirection    Patient's Reaction to the intervention:  Does patient understand the reason for restraint/seclusion? No    Medical Condition:  Is there any evidence of compromise of Skin integrity, Respiratory, Cardiovascular, Musculoskeletal, Hydration? No    Behavioral Condition:  In consultation with the RN, is there a need to continue this restraint or seclusion? Yes    See Restraint Flowsheet for complete restraint documentation and assessment.    Sandra Lilly MD        Critical Care Time greater than: 45 Minutes-this patient is critically ill on mechanical ventilation.        Subjective:   HPI:  Gardenia Muller is a 70 y.o. female with chronic afib on anticoagulation, HFpEF, CAD, CVA, DMII who presented 8/11 with nausea and vomiting. CT found perforated diverticulitis. Drain placed into abscess and antibiotics initiated given her poor candidacy for surgery. Worsening renal failure in setting of Afib with RVR and became digoxin-toxic. Received digibind with improvement in HR. Renal failure impressive, but on lasix drip began making urine and diuresed. Intubated for  pulmonary edema and pneumonia with copious secretions (no bacteria identified). On minimal vent settings now hemodynamically stable but without ability to SBT well.    SBT today with precedex while patient fairly sedated. Intermittent Tv 400 but most 250 and rate quickly 45. She asked to be put back on full ventilator support.       Principal Problem:    Diverticulitis  Active Problems:    Bradyarrhythmia    Acute kidney injury (H)    Diverticulitis of large intestine with perforation and abscess without bleeding    Atrial fibrillation with rapid ventricular response (H)    Obstructive sleep apnea syndrome    Poisoning by digoxin, accidental or unintentional, initial encounter    Acute respiratory failure with hypoxia and hypercapnia (H)    Acute metabolic encephalopathy    Shock circulatory (H)    Metabolic acidosis    Other hypervolemia    Difficult ventilator weaning (H)      Allergies: Penicillins     MEDS:  Scheduled Meds:    alteplase  1 mg Intracatheter Once     alteplase  1 mg Intracatheter Once     aspirin  81 mg Enteral Tube DAILY     chlorhexidine  15 mL Topical Q12H     furosemide  20 mg Intravenous Q8H     guar gum  2 packet Enteral Tube BID     ipratropium-albuteroL  3 mL Nebulization Q6H - RT     metoprolol tartrate  25 mg Enteral Tube BID     nystatin  5 mL Swish & Swallow QID     pantoprazole  40 mg Intravenous BID    Or     omeprazole  20 mg Oral BID    Or     omeprazole  20 mg Enteral Tube BID     potassium, sodium phosphates  2 packet Oral TID     sertraline  50 mg Enteral Tube DAILY     sodium chloride  10 mL Intravenous Q8H FIXED TIMES     sodium chloride  10-30 mL Intravenous Q8H FIXED TIMES     ticagrelor  90 mg Enteral Tube BID     warfarin - daily dose required   Other Med Consult or Protocol     Continuous Infusions:    dexmedetomidine infusion orderable (PRECEDEX) 0.3 mcg/kg/hr (09/11/20 1830)     norepinephrine Stopped (09/05/20 2017)     PRN Meds:.alteplase, atropine, bacitracin,  "bisacodyL, carboxymethylcellulose, dextrose 50 % (D50W), glucagon (human recombinant), hydrOXYzine pamoate, lidocaine (PF), lipase-protease-amylase **AND** sodium bicarbonate, lipase-protease-amylase **AND** sodium bicarbonate, metoclopramide, naloxone **OR** naloxone, oxyCODONE, polyethylene glycol, sodium chloride 0.9%, sodium chloride bacteriostatic, sodium chloride, sodium chloride, sodium chloride, traZODone      Objective:   VITALS:  BP 94/54   Pulse (!) 110   Temp 98.8  F (37.1  C) (Oral)   Resp 17   Ht 5' 2\" (1.575 m)   Wt 199 lb 8 oz (90.5 kg)   LMP 01/25/1999   SpO2 96%   BMI 36.49 kg/m    EXAM:  General appearance: awake, following commands  Lungs: clear bilaterally  Heart: irregularly irregular rhythm  Abdomen: soft, non-tender; bowel sounds normal; no masses,  no organomegaly  Extremities: extremities normal, atraumatic, no cyanosis or edema  Skin: Skin color, texture, turgor normal. No rashes or lesions  Neurologic: follows commands moving all extremities but very weak.     I&O:      Intake/Output Summary (Last 24 hours) at 9/11/2020 1836  Last data filed at 9/11/2020 1800  Gross per 24 hour   Intake 2314.3 ml   Output 1320 ml   Net 994.3 ml       Data Review:  CXR personally interpreted  EXAM: XR CHEST 1 VIEW PORTABLE  LOCATION: [facility]  DATE/TIME: 8/28/2020 3:39 PM     INDICATION: Postintubation.  COMPARISON: 08/27/2020.     IMPRESSION:      ET tube tip at the level of the emily; recommend 1 to 2 cm retraction.     New feeding tube coursing into the stomach and off the field-of-view. Stable right PICC.     Similar to marginally improved coarse interstitial appearance and opacities bilaterally. No substantial pleural effusion. No pneumothorax. Stable enlarged cardiac silhouette.        ET tube tip findings verbally communicated to patient's nurse, Lavern, at 3:45 PM on 08/28/2020.     NOTE: ABNORMAL REPORT     THE DICTATION ABOVE DESCRIBES AN ABNORMALITY FOR WHICH FOLLOW-UP IS " NEEDED.     Results for orders placed or performed during the hospital encounter of 08/11/20   Basic Metabolic Panel   Result Value Ref Range    Sodium 139 136 - 145 mmol/L    Potassium 3.4 (L) 3.5 - 5.0 mmol/L    Chloride 106 98 - 107 mmol/L    CO2 26 22 - 31 mmol/L    Anion Gap, Calculation 7 5 - 18 mmol/L    Glucose 164 (H) 70 - 125 mg/dL    Calcium 10.4 8.5 - 10.5 mg/dL    BUN 13 8 - 28 mg/dL    Creatinine 0.56 (L) 0.60 - 1.10 mg/dL    GFR MDRD Af Amer >60 >60 mL/min/1.73m2    GFR MDRD Non Af Amer >60 >60 mL/min/1.73m2     Lab Results   Component Value Date    WBC 10.5 09/11/2020    HGB 7.8 (L) 09/11/2020    HCT 25.7 (L) 09/11/2020     (H) 09/11/2020     09/11/2020       By:  Sandra Lilly MD, 9/11/2020, 2:31 PM    Primary Care Physician:  Jerson Hernandez MD

## 2021-06-11 NOTE — PLAN OF CARE
Problem: Pain  Goal: Patient's pain/discomfort is manageable  Outcome: Progressing     Problem: Safety  Goal: Patient will be injury free during hospitalization  Outcome: Progressing     Problem: Daily Care  Goal: Daily care needs are met  Outcome: Progressing     Problem: Psychosocial Needs  Goal: Demonstrates ability to cope with hospitalization/illness  Outcome: Progressing  Goal: Collaborate with patient/family/caregiver to identify patient specific goals for this hospitalization  Outcome: Progressing     Problem: Discharge Barriers  Goal: Patient's discharge needs are met  Outcome: Progressing     Problem: Knowledge Deficit  Goal: Patient/family/caregiver demonstrates understanding of disease process, treatment plan, medications, and discharge instructions  Outcome: Progressing     Problem: Potential for Falls  Goal: Patient will remain free of falls  Outcome: Progressing     Problem: Potential for Compromised Skin Integrity  Goal: Skin integrity is maintained or improved  Outcome: Progressing  Goal: Nutritional status is improving  Outcome: Progressing  Note: Tolerating TF     Problem: Urinary Incontinence  Goal: Perineal skin integrity is maintained or improved  Outcome: Progressing     Problem: Glucose Imbalance  Goal: Achieve optimal glucose control  Outcome: Progressing     Problem: Potential for transmission of organism by Contact, Enteric, Droplet and/or Airborne routes  Goal: Prevent transmission of organisms  Outcome: Progressing     Problem: Potential for hemodynamic instability  Goal: Cardiac output is adequate  Outcome: Progressing     Problem: Risk of Injury Due to Unsafe Behavior  Goal: Patient will remain safe while in restraint; physical/psychological needs will be met  Outcome: Progressing  Goal: Alternatives to restraint will be continually assessed with use of least restrictive device and discontinuation as soon as possible  Outcome: Progressing  Goal: Patient/Family will be able to  communicate reason for restraint and steps for restraint application and removal  Outcome: Progressing     Problem: Impaired Gas Exchange  Goal: Demonstrate improved ventilation and adequate oxygenation of tissues as evidenced by absence of respiratory distress  Description: Patient intubated 8/28 @1430.  ABG's collected s/p.  Sating well.    Outcome: Progressing     Problem: Breathing  Goal: Patient will maintain patent airway  Outcome: Progressing     Problem: Risk for Infection  Goal: Identify and demonstrate techniques, lifestyle changes to prevent/reduce risk of infection and promote safe environment  Outcome: Progressing     Problem: Mechanical Ventilation  Goal: Patient will maintain patent airway  Outcome: Progressing  Goal: Oral health is maintained or improved  Outcome: Progressing  Goal: ET tube will be managed safely  Outcome: Progressing  Goal: Ability to express needs and understand communication  Outcome: Progressing  Goal: Mobility/activity is maintained at optimum level for patient  Outcome: Progressing  Goal: Tracheostomy will be managed safely  Outcome: Progressing  Goal: Respiratory status - ventilation  Outcome: Progressing     Problem: Inadequate Gas Exchange  Goal: Patient will achieve/maintain normal respiratory rate/effort  Outcome: Progressing

## 2021-06-11 NOTE — PROGRESS NOTES
"  Clinical Nutrition Therapy Nutrition Support Note        Subjective:  Pt intubated.  TF started 8/30 at trophic rate.    Chart reviewed.        Nutrition Prescription:   Diet: Trophic TF started 8/30:  Replete no fiber at 15 ml/hr continuous.    Modulars:  No carb prosource 1 pkt 2x/day  Water flush 60 ml q 6 hrs.  IV dextrose or Fluids:fentaNYL citrate (PF), Last Rate: 75 mcg/hr (08/31/20 0600)  [Held by provider] furosemide, Last Rate: Stopped (08/29/20 2040)  norepinephrine, Last Rate: 0.06 mcg/kg/min (08/31/20 0949)  propofol, Last Rate: Stopped (08/31/20 0752)  vasopressin, Last Rate: 2.4 Units/hr (08/31/20 0600)      Nutrition Intake:  Pt has OG placed 8/28/20  TF provides:  480 calories, 52 g protein, 44 g carb, 539 ml free water.     Nutrition needs not met    Anthropometrics:  Height: 5' 2\" (157.5 cm)  Admission weight: 195#  Weight: 207 lb 4.8 oz (94 kg)    Physical Findings:  Edema +2       GI Status/Output:   GI symptoms per nursing: rounded abdomen  Last BM recorded: 8/26 per chart  Emesis 8/26.    Skin/Wound:   James Scale Score: 11    Per WOC note- gluteal cleft denudement noted.    Medications:  K and Mg replacement  Medications reviewed.    Labs:  Lab Results   Component Value Date/Time    PREALBUMIN 25.6 07/30/2019 12:17 PM    ALBUMIN 1.4 (L) 08/30/2020 05:17 AM     08/31/2020 05:01 AM    K 3.2 (L) 08/31/2020 05:01 AM    K 3.2 (L) 08/31/2020 05:01 AM    BUN 47 (H) 08/31/2020 05:01 AM    CREATININE 1.41 (H) 08/31/2020 05:01 AM     (H) 08/31/2020 05:01 AM    PHOS 5.3 (H) 08/31/2020 05:01 AM    MG 1.7 (L) 08/31/2020 05:01 AM   Labs reviewed    Estimated Nutrition Needs:  Using adjusted weight of 59 kg.    Energy Needs: 7067-8167 kcals daily per 25-30 kcal/kg   Protein Needs: 71-89 g daily, 1.2-1.5 g/kg.  Fluid Needs: 2976-8162 mls daily, 25-30 mls/kg    Malnutrition: Noted previously    Nutrition Risk Level: high risk    Nutrition DX: inadequate intake r/t acute illness evidenced by " NPO/CL x 6 days    Goals:  Meet nutrition needs-not met  Tolerate diet advancement-n/a  Electrolytes WNL-progressing    Plan:  Continue trophic TF at this time.     Monitor:   Diet start, per goals.    See Care Plan for Problems, Goals, and Interventions.

## 2021-06-11 NOTE — PROGRESS NOTES
"RESPIRATORY CARE NOTE      Vent Mode: VCV  FiO2 (%):  [21 %-100 %] 25 %  S RR:  [16] 16  S VT:  [450 mL] 450 mL  PEEP/CPAP (cm H2O):  [5 cm H2O] 5 cm H2O  Minute Ventilation (L/min):  [6.8 L/min-8.4 L/min] 7.7 L/min  PIP:  [31 cm H2O-37 cm H2O] 34 cm H2O  NM SUP:  [15 cm H20] 15 cm H20  MAP (cm H2O):  [9] 9    BP 96/48   Pulse 69   Temp 98.3  F (36.8  C) (Oral)   Resp 16   Ht 5' 2\" (1.575 m)   Wt 202 lb 9.6 oz (91.9 kg)   LMP 01/25/1999   SpO2 95%   BMI 37.06 kg/m      Vent day #14 7.5 ETT 21cm at the teeth and tolerating vent well. No issues overnight.   balloo n and subglottic suction were wrapped around the ETT inside the bite block. Cuff leak noted, deflated and reinflated cuff was difficult, Suspect airway edema. Cuff holds 4-5ml air.  No weaning done this shift. Will continue to assess SBT and liberation. RT following.        09/10/20 0110   Patient Data   Vt (observed, mL) 477 mL   Vt Exp (mL) 481 mL   Minute Ventilation (L/min) 7.7 L/min   Total Resp Rate  16 BPM   PIP Observed (cm H2O) 34 cm H2O   MAP (cm H2O) 9   Auto/Intrinsic PEEP Observed (cm H2O) 0 cm H2O   Plateau Pressure (cm H2O) 25 cm H2O   Static Compliance (L/cm H2O) 25   Dynamic Compliance (L/cm H2O) 22 L/cm H2O   Airway Resistance 18     KAYLA Crespo      "

## 2021-06-11 NOTE — PROGRESS NOTES
Patient continues on mechanical ventilation, tolerating well, sedated. BS slight rhonchi RUL, suctioned with pre-oxygenation scant thick tan secretions.     RESPIRATORY CARE NOTE    Vent Mode: VCV  FiO2 (%):  [21 %-30 %] 21 %  S RR:  [16] 16  S VT:  [450 mL] 450 mL  PEEP/CPAP (cm H2O):  [5 cm H2O] 5 cm H2O  Minute Ventilation (L/min):  [8.6 L/min-12.9 L/min] 10.6 L/min  PIP:  [34 cm H2O-47 cm H2O] 42 cm H2O  MAP (cm H2O):  [9-17] 10      Plan is to monitor vent, wean as tolerated if requested. Planned extubation this morning.      Al Kennedy, LRT           09/14/20 0805   Patient Data   Vt (observed, mL) 529 mL   Vt Exp (mL) 560 mL   Minute Ventilation (L/min) 10.6 L/min   Total Resp Rate  21 BPM   PIP Observed (cm H2O) 42 cm H2O   MAP (cm H2O) 10   Auto/Intrinsic PEEP Observed (cm H2O) 4 cm H2O   Plateau Pressure (cm H2O) 25 cm H2O   Static Compliance (L/cm H2O) 26   Dynamic Compliance (L/cm H2O) 21 L/cm H2O   Airway Resistance 15

## 2021-06-11 NOTE — PROGRESS NOTES
Patient continues on mechanical ventilation, sedated but arousable, tolerating well.     RESPIRATORY CARE NOTE    Vent Mode: VCV  FiO2 (%):  [30 %] 30 %  S RR:  [16] 16  S VT:  [450 mL] 450 mL  PEEP/CPAP (cm H2O):  [5 cm H2O] 5 cm H2O  Minute Ventilation (L/min):  [7.6 L/min-10.4 L/min] 10.4 L/min  PIP:  [34 cm H2O-46 cm H2O] 37 cm H2O  MAP (cm H2O):  [9-14] 10      BS clear, gave Duoneb treatment,  patient tolerated well.  Sxn inline small amount clear secretions, Pt tolerated well  .  Plan is to monitor vent and assess.      Al Kennedy, LRT           09/13/20 0723   Patient Data   Vt (observed, mL) 542 mL   Vt Exp (mL) 523 mL   Minute Ventilation (L/min) 10.4 L/min   Total Resp Rate  22 BPM   PIP Observed (cm H2O) 37 cm H2O   MAP (cm H2O) 10   Auto/Intrinsic PEEP Observed (cm H2O) 6 cm H2O   Plateau Pressure (cm H2O) 24 cm H2O   Static Compliance (L/cm H2O) 16   Dynamic Compliance (L/cm H2O) 20 L/cm H2O   Airway Resistance 16

## 2021-06-11 NOTE — PROGRESS NOTES
Duoneb tx given inline, BS clear, diminished bases. SpO2 100 % on .30 FiO2, decreased to 21 %. RT to follow/monitor.    Al Kennedy, LRT

## 2021-06-11 NOTE — PLAN OF CARE
Problem: Mechanical Ventilation  Goal: Patient will maintain patent airway  Outcome: Not Progressing     Problem: Mechanical Ventilation  Goal: ET tube will be managed safely  Outcome: Not Progressing     GENO JosephT

## 2021-06-11 NOTE — PROGRESS NOTES
09/01/20 1513   Patient Data   Vt (observed, mL) 606 mL   Vt Exp (mL) 601 mL   Minute Ventilation (L/min) 9.5 L/min   Total Resp Rate  16 BPM   PIP Observed (cm H2O) 37 cm H2O   MAP (cm H2O) 9   Auto/Intrinsic PEEP Observed (cm H2O) 2 cm H2O   Plateau Pressure (cm H2O) 25 cm H2O   Static Compliance (L/cm H2O) 29   Dynamic Compliance (L/cm H2O) 24 L/cm H2O   Airway Resistance 23

## 2021-06-11 NOTE — PLAN OF CARE
Problem: Pain  Goal: Patient's pain/discomfort is manageable  Outcome: Progressing  Note: Patient continues on Fent gtt @ 75.  CPOT to evaluate pain.     Problem: Daily Care  Goal: Daily care needs are met  Outcome: Progressing     Problem: Knowledge Deficit  Goal: Patient/family/caregiver demonstrates understanding of disease process, treatment plan, medications, and discharge instructions  Outcome: Progressing  Note: MD and RN update family, daughter Aria, daily.  Family facetimed with patient last night.      Problem: Potential for Compromised Skin Integrity  Goal: Nutritional status is improving  Outcome: Progressing  Note: Tube feeds started at 1045; Replete no fiber @ 15 with Q6 60mL H2O flushes.       Problem: Glucose Imbalance  Goal: Achieve optimal glucose control  Outcome: Progressing  Note: BG within normal limits.  No insulin given per orders.

## 2021-06-11 NOTE — PROGRESS NOTES
Assisted with ventilator transfer from IR to room 5152 on portable ventilator AC, Vt 450, RR 16, PEEP 5,  FiO2 .50. Tolerated well with no distress or respiratory issues, placed back on VCV mode RR 16, Vt 450, PEEP 5 cm, FiO2 .30.  SpO2 remained 99 %. RT to follow    Al Kennedy, LRT

## 2021-06-11 NOTE — PLAN OF CARE
Problem: Knowledge Deficit  Goal: Patient/family/caregiver demonstrates understanding of disease process, treatment plan, medications, and discharge instructions  Outcome: Progressing     Problem: Breathing  Goal: Patient will maintain patent airway  Outcome: Progressing     Problem: Mechanical Ventilation  Goal: Patient will maintain patent airway  Outcome: Progressing  Goal: Oral health is maintained or improved  Outcome: Progressing  Goal: ET tube will be managed safely  Outcome: Progressing  Goal: Ability to express needs and understand communication  Outcome: Progressing  Goal: Respiratory status - ventilation  Outcome: Progressing   Weaning on hold pending outcome of IR procedure this morning.

## 2021-06-11 NOTE — PROGRESS NOTES
09/14/20 1108   Patient Data   Vt (observed, mL) 501 mL   Vt Exp (mL) 473 mL   Minute Ventilation (L/min) 10.7 L/min   Total Resp Rate  21 BPM   PIP Observed (cm H2O) 38 cm H2O   MAP (cm H2O) 13   Auto/Intrinsic PEEP Observed (cm H2O)   (failed)   Plateau Pressure (cm H2O) 29 cm H2O   Static Compliance (L/cm H2O) 15   Dynamic Compliance (L/cm H2O) 30 L/cm H2O   Airway Resistance 13

## 2021-06-11 NOTE — PROGRESS NOTES
Palliative Care Brief Note    Chart reviewed. Patient seen and cares discussed with ICU team during multi-disciplinary rounds. Remains intubated and unable to be liberated from ventilator. Met with pt's daughter Aria this afternoon. Medical updates provided, hospital course discussed, known pt wishes of not pursuing prolonged mechanical ventilator support via tracheostomy expressed. Plan will be to transition to comfort care this Sunday to allow for family to come and visit patient. ICU physician present for discussion this afternoon.     Jose Raul Ramirez MD  [facility] - Palliative Medicine  Tel: [phone]

## 2021-06-11 NOTE — PROGRESS NOTES
ANTICOAGULATION  MANAGEMENT: Discharge Review    Gardenia Muller chart reviewed for anticoagulation continuity of care    Hospital admission on   to  for diverticulitis perferation.    Discharge disposition:     INR Results:     No results for input(s): INR in the last 168 hours.           Plan     ACM referral discontinued at this time.     Danna Chew RN

## 2021-06-11 NOTE — PROGRESS NOTES
"Progress Note    Brief summary:  Per previous provider summary is\"   70 year  old female with history of HTN, T2DM, chronic atrial fibrillation on warfarin, diastolic CHF, coronary artery disease status post stenting (7/27/2020), peripheral artery disease, calculus cholecystitis status post cholecystectomy, depression, who came to [facility] on 8/11/2020 for abdominal pain and vomiting. CT scan revealed acute diverticulitis with perforation. on 8/14 showed increased size of abscess from 2x2cm to 4x3cm, though pt reports clinical improvement of her symptoms.. General surgery consulted - no current plans for operating room so interventional radiology was consulted for possible drainage which was done on 8/17/2020 by placing CT scan guided drain placement in the left pelvic diverticular abscess, repeat CTAP (8/20) - near complete collapse of fluid cavity.   Patient developed new leukocytosis.  Chest x-ray showed possible hospital-acquired pneumonia.  Due to concern of leukocytosis being from untreated Candida infection (at least improved Ashley dubliniensis), so she was started on  Fluconazole. WBC slowly improving.  On 8/25, patient was noted to be shortness of breath and given 25 lbs weight gain since admission, started on iv lasix.   On 8/26, developed acute kidney injury. Started having episodes of bradycardia. Digoxin level elevated. Started on digifab and transferred to ICU. Required Atropine in ICU for HR as low as 20-40s. Started on dopamine.  Developed respiratory  Failure, unable to tolerate bipap, now intubated, lasix were held on 8/30 due ot high UOP and renal function improving and blood pressure dropping. Repeat CT scan showed improvement in abscess but surgeons are recommending IR assessment of drain as there is still fluid around signup colon and rectum    Assessment/Plan  Principal Problem:    Diverticulitis  Active Problems:    Bradyarrhythmia    Acute kidney injury (H)    Diverticulitis " of large intestine with perforation and abscess without bleeding    Atrial fibrillation with rapid ventricular response (H)    Obstructive sleep apnea syndrome    Poisoning by digoxin, accidental or unintentional, initial encounter    Acute respiratory failure with hypoxia and hypercapnia (H)    Acute metabolic encephalopathy    Shock circulatory (H)    Metabolic acidosis      1. Digoxin toxicity: bradyarrhythmia, renal failure, hyperkalemia. EKG showed junctional rhythm. Digoxin level of 6. S/p Digifab.  Now in sinus rhythm.  Continue dopamine drip waiting for resolution of dig toxicity.  Low threshold for TV pacing repeat digoxin level pending. Cardiology on board.  2. MAY: Likely due to contrast, dig toxicity, lisinopril with metabolic acidosis and hyperkalemia. due to digoxin. Digoxin stopped. Holding diuretics, lisinopril.  Not making enough urine.  Making urine now, Cr impr    3. Acute perforated diverticulitis with pericolonic abscess: Initially started on Cipro and Flagyl.  Status post CT-guided drain placement on 8/17, culture growing Candida.  CT abdomen on 8/20/2020 with near complete resolution of the abscess. 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  Patient is now on meropenem, daptomycin and fluconazole.  vanco switched to dapto on 8/26.    4. Hospital-acquired pneumonia: On meropenem and daptomycin.  Covid negative X 2.   5. Fever : No fevers in last 24 hours . Treating for pneumonia. Drug fever?  6. Leukocytosis: Likely due to above  7. Acute on chronic diastolic CHF: weight is up by 25 lbs since admission. Holding lasix due to MAY, hemodialysis today.  8. Acute hypoxic resp failure: Likely from heart failure as well as pneumonia.  3 L nasal cannula saturating well   9. transaminitis: likely from hepatic congestion with heart failure. Similar events during last admission and in 2018.  10. Acute anemia: Hemoglobin dropped to 6.1 on  8/20.  Status post 1 unit.  Hemoglobin slowly trending down.  Will transfuse if hemoglobin goes below 7  11. Supratherapeutic INR: on presentation, improved with Vit K. INR went up again with liver failure. Worsened today likely from reaction of INR reagent with daptomycin  12. Atrial fibrillation: Had RVR on 8/19.  Digoxin held due to toxicity. Metoprolol held due to bradycardia. Warfarin held due to supratherapeutic INR.  13. Type 2 diabetes mellitus: was on SSI but BG consistently low, will stop SSI for now and monitor  14. CAD: Status post coronary angiogram and PCI to LAD on 7/27/2020    Subjective  Sedated, intubated      Objective    Vital signs in last 24 hours  Temp:  [98.2  F (36.8  C)-98.9  F (37.2  C)] 98.9  F (37.2  C)  Heart Rate:  [] 67  Resp:  [16-32] 17  Arterial Line BP: (-)/(-) 134/38  FiO2 (%):  [30 %-40 %] 30 %  Weight:   212 lb (96.2 kg)    Intake/Output last 3 shifts  I/O last 3 completed shifts:  In: 2382.2 [I.V.:1849.2; NG/GT:180; IV Piggyback:353]  Out: 3610 [Urine:3610]  Intake/Output this shift:  I/O this shift:  In: 918.9 [I.V.:893.9; NG/GT:25]  Out: 440 [Urine:440]    Physical Exam   General: drowsy but arousable  Eyes: no pallor  Chest: Clear to auscultation bilaterally  Heart: RRR, normal S1 and S2, mild pitting edema  Abdomen: soft, non tender drain in pelvis.  Neuro: moving all extremities  Bardales catheter      Meds    amino acids-protein hydrolys  1 packet Enteral Tube BID     aspirin  81 mg Enteral Tube DAILY     chlorhexidine  15 mL Topical Q12H     chlorothiazide (DIURIL) IVPB  500 mg Intravenous Q12H     insulin aspart (NovoLOG) injection   Subcutaneous Q4H FIXED TIMES     meropenem  1 g Intravenous Q12H     micafungin (MYCAMINE) IV  100 mg Intravenous Q24H     omeprazole  20 mg Oral QAM AC    Or     omeprazole  20 mg Enteral Tube QAM AC    Or     pantoprazole  40 mg Intravenous QAM AC     [Held by provider] sertraline  100 mg Oral DAILY     sodium chloride  bacteriostatic  1-5 mL Intradermal Once     sodium chloride  10 mL Intravenous Q8H FIXED TIMES     sodium chloride  10-30 mL Intravenous Q8H FIXED TIMES     [Held by provider] ticagrelor  90 mg Enteral Tube BID       Pertinent Labs   Lab Results: personally reviewed.   not applicable    Results from last 7 days   Lab Units 08/30/20  0517 08/29/20  1618 08/29/20  0500   LN-SODIUM mmol/L 138 139 137   LN-POTASSIUM mmol/L 3.6  3.6 3.7  3.7 3.6  3.6   LN-CHLORIDE mmol/L 105 107 106   LN-CO2 mmol/L 22 19* 22   LN-BLOOD UREA NITROGEN mg/dL 53* 54* 52*   LN-CREATININE mg/dL 2.48* 2.89* 3.08*   LN-CALCIUM mg/dL 8.9 8.8 8.6         Results from last 7 days   Lab Units 08/28/20  0923 08/28/20  0431 08/27/20  0406   LN-WHITE BLOOD CELL COUNT thou/uL 14.1* 14.6* 18.3*   LN-HEMOGLOBIN g/dL 9.0* 8.9* 9.1*   LN-HEMATOCRIT % 27.1* 27.0* 28.3*   LN-PLATELET COUNT thou/uL 116* 115* 111*         Pertinent Radiology   Radiology Results: Personally reviewed impression/s    Ct Abdomen Pelvis Without Oral Without Iv Contrast    Result Date: 8/29/2020  EXAM: CT ABDOMEN PELVIS WO ORAL WO IV CONTRAST LOCATION: [facility] DATE/TIME: 8/29/2020 1:41 PM INDICATION: Abdominal pain, acute, nonlocalized. Evaluate abdominal abscess. COMPARISON: 08/20/2020. TECHNIQUE: CT scan of the abdomen and pelvis was performed without oral or IV contrast. Multiplanar reformats were obtained. Dose reduction techniques were used. CONTRAST: None. FINDINGS: LOWER CHEST: Increasing bilateral pulmonary infiltrates. HEPATOBILIARY: Cholecystectomy. PANCREAS: Normal. SPLEEN: Normal. ADRENAL GLANDS: Normal. KIDNEY/BLADDER: Normal. BOWEL: There is no residual fluid around the left lower quadrant drain. Sigmoid colonic diverticulosis without diverticulitis. Scant free fluid in the pelvic cul-de-sac. New NG tube in the stomach. LYMPH NODES: Normal. VASCULATURE: Diffuse atherosclerotic calcification. PELVIC ORGANS: Multiple uterine fibroids. Bardales catheter in  the bladder. MUSCULOSKELETAL: Normal.     1.  No significant residual abscess around the left lower quadrant drainage catheter. No new abscess. 2.  Trace free fluid in the pelvis as well as subcutaneous edema is new from previous. 3.  New bilateral pulmonary infiltrates may represent pulmonary edema or pneumonia.    Ct Abdomen Pelvis Without Oral Without Iv Contrast    Result Date: 8/14/2020  EXAM: CT ABDOMEN PELVIS WO ORAL WO IV CONTRAST LOCATION: [facility] DATE/TIME: 8/14/2020 10:26 AM INDICATION: Abdominal infection suspected perforated diverticulitis, assess for abscess COMPARISON: 8/11/2020 TECHNIQUE: CT scan of the abdomen and pelvis was performed without oral or IV contrast. Multiplanar reformats were obtained. Dose reduction techniques were used. CONTRAST: None. FINDINGS: LOWER CHEST: Trace pleural fluid. Minimal scarring or edema at the lung bases. The heart is enlarged. There is coronary artery calcification. HEPATOBILIARY: Cholecystectomy. There is extrahepatic bile duct dilatation. PANCREAS: Pancreatic duct dilatation is not as well-seen on this study as on the comparison study. SPLEEN: Normal. ADRENAL GLANDS: Normal. KIDNEY/BLADDER: Normal kidneys and ureters. There is wall thickening of the nondistended urinary bladder. A Bardales catheter is present. BOWEL: There is colonic diverticulosis. There is stranding adjacent to the sigmoid colon in the region of diverticulitis. There is a complex 4 cm x 3 cm collection to the left of the sigmoid colon in this region (previously this was 2 cm x 2 cm). Minimal  extraluminal air is noted. LYMPH NODES: Normal. VASCULATURE: Atherosclerotic disease. PELVIC ORGANS: Fibroid uterus. MUSCULOSKELETAL: Normal.     1.  Again seen is sigmoid colon diverticulitis with a small contained perforation. A small fluid collection adjacent to the sigmoid colon has increased in size to 4 cm x 3 cm (previously 2 cm x 2 cm). Percutaneous drainage would be difficult though may  be possible. 2.  Biliary and pancreatic ductal dilatation is not as well-seen as on the comparison study. See prior study for details.    Xr Chest 1 View Portable    Result Date: 8/28/2020  EXAM: XR CHEST 1 VIEW PORTABLE LOCATION: [facility] DATE/TIME: 8/28/2020 3:39 PM INDICATION: Postintubation. COMPARISON: 08/27/2020.     ET tube tip at the level of the emily; recommend 1 to 2 cm retraction. New feeding tube coursing into the stomach and off the field-of-view. Stable right PICC. Similar to marginally improved coarse interstitial appearance and opacities bilaterally. No substantial pleural effusion. No pneumothorax. Stable enlarged cardiac silhouette. ET tube tip findings verbally communicated to patient's nurse, Lavern, at 3:45 PM on 08/28/2020. NOTE: ABNORMAL REPORT THE DICTATION ABOVE DESCRIBES AN ABNORMALITY FOR WHICH FOLLOW-UP IS NEEDED.     Xr Chest 1 View Portable    Result Date: 8/27/2020  EXAM: XR CHEST 1 VIEW PORTABLE LOCATION: [facility] DATE/TIME: 8/27/2020 5:53 AM INDICATION: Respiratory failure with progressive hypoxia. COMPARISON: 08/26/2020     Slight interval improvement in reticular interstitial infiltrates in alveolar infiltrates since prior study. There still remains extensive infiltrates throughout both lungs. Stable cardiac enlargement. Mild pulmonary vascular congestion improved. Atherosclerotic vascular calcification of the aorta. Right PICC with tip in good position low SVC. Degenerative changes in the spine and shoulders    Xr Chest 1 View Portable    Result Date: 8/26/2020  EXAM: XR CHEST 1 VIEW PORTABLE LOCATION: [facility] DATE/TIME: 8/26/2020 4:56 PM INDICATION: sob COMPARISON: 08/23/2020     Right PICC tip projects over the mid SVC. Unchanged bilateral mixed reticular and alveolar opacities. These could represent cardiogenic or noncardiogenic pulmonary edema, pneumonia, and drug reaction. No pleural effusion.    Xr Chest 1 View Portable    Result  Date: 8/21/2020  EXAM: XR CHEST 1 VIEW PORTABLE LOCATION: [facility] DATE/TIME: 8/21/2020 7:34 PM INDICATION: r/o pna- shortness of breath COMPARISON: 08/11/2020     New bilateral interstitial infiltrates could represent edema or pneumonia including COVID. Enlarged cardiac silhouette. No pleural effusion. No definite pulmonary vascular congestion.    Xr Chest 2 Views    Result Date: 8/11/2020  EXAM: XR CHEST 2 VIEWS LOCATION: [facility] DATE/TIME: 8/11/2020 11:50 AM INDICATION: Cough. COMPARISON: None.     Negative chest. Lungs are clear. Coronary stenting.     Xr Foot Right 2 Vws    Result Date: 8/16/2020  EXAM: XR FOOT RIGHT 2 VWS LOCATION: [facility] DATE/TIME: 8/16/2020 12:51 PM INDICATION: Acute right foot pain COMPARISON: None.     Bones are demineralized. No definitive evidence of an acute fracture. No cortical destructive changes to suggest osteomyelitis. Scattered degenerative changes.    Xr Chest 1 View For Picc Placement Portable    Result Date: 8/23/2020  EXAM: XR CHEST 1 VIEW FOR PICC LINE PLACEMENT PORTABLE LOCATION: [facility] DATE/TIME: 8/23/2020 6:28 PM INDICATION: verify catheter placement COMPARISON: 08/21/2020     Right upper extremity PICC line terminates approximately 3 cm above the expected cavoatrial junction. No change in the chest otherwise. Extensive abnormal opacity throughout both lungs persists. Mild cardiomegaly. No pneumothorax or large pleural effusion.    Ct Abdomen Pelvis Without Oral With Iv Contrast    Result Date: 8/11/2020  EXAM: CT ABDOMEN PELVIS WO ORAL W IV CONTRAST LOCATION: [facility] DATE/TIME: 8/11/2020 11:41 AM INDICATION: right sided abdominal pain, nausea and vomiting COMPARISON: 7/22/2020 TECHNIQUE: CT scan of the abdomen and pelvis was performed following injection of IV contrast. Multiplanar reformats were obtained. Dose reduction techniques were used. CONTRAST: Iohexol (Omni) 75mL FINDINGS: LOWER CHEST:  Minimal interstitial edema. The heart is mildly enlarged. There is coronary artery disease. HEPATOBILIARY: Cholecystectomy. There is intrahepatic and extrahepatic bile duct dilatation. There is pancreatic duct dilatation. PANCREAS: No discrete pancreatic masses identified. SPLEEN: Normal. ADRENAL GLANDS: Normal. KIDNEYS/BLADDER: Normal. BOWEL: There is colonic diverticulosis. There is acute diverticulosis involving the distal sigmoid colon with a small contained perforation. No drainable abscess at this point. LYMPH NODES: Normal. VASCULATURE: Atherosclerotic disease. Right renal artery stent. PELVIC ORGANS: Fibroid uterus. MUSCULOSKELETAL: Degenerative changes.     1.  Acute diverticulitis with a small contained perforation. No drainable abscess at this point. 2.  Biliary and pancreatic duct dilatation. No discrete pancreatic masses identified. Consider MRCP, ERCP, EUS.    Ct Abdomen Pelvis With Oral With Iv Contrast    Result Date: 8/20/2020  EXAM: CT ABDOMEN PELVIS W ORAL W IV CONTRAST LOCATION: [facility] DATE/TIME: 8/20/2020 3:43 PM INDICATION: Abdominal infection suspected ongoing pain and increasing leukocytosis COMPARISON: CT from 8/14/2020 TECHNIQUE: CT scan of the abdomen and pelvis was performed following injection of IV contrast. Multiplanar reformats were obtained. Dose reduction techniques were used. CONTRAST: Iohexol (Omni) 75mL FINDINGS: LOWER CHEST: Dependent atelectasis and interstitial edema within the lung bases without consolidation. Cardiomegaly and mitral annular calcifications. HEPATOBILIARY: Gallbladder surgically absent. Mild ectasia of the extrahepatic biliary tree. PANCREAS: Unchanged appearance. SPLEEN: Normal. ADRENAL GLANDS: Normal. KIDNEYS/BLADDER: Normal. BOWEL: Interval placement of a left anterior abdominal approach percutaneous drain into the perisigmoid abscess. The drain is well positioned with near complete collapse of the previously seen abscess collection. There is  "persistent mild adjacent stranding. The bowel is nonobstructed. No new intra-abdominal abscess identified. No pneumoperitoneum. LYMPH NODES: Normal. VASCULATURE: Atherosclerotic calcification throughout the arterial tree. No evidence of vascular complication following drain placement. PELVIC ORGANS: Fibroid uterus. No ascites. Bardales catheter is within a decompressed urinary bladder. Subcutaneous scattered calcifications in the subcutaneous flank regions compatible with prior injection sites. MUSCULOSKELETAL: No new or acute osseous findings. Degenerative changes at the hips and lumbar spine are stable.     1.  Interval percutaneous drain placement into the diverticular abscess with near complete collapse of the fluid cavity. Drain abuts the adjacent sigmoid colon. No evidence of post drain placement complication. 2.  No new intra-abdominal abscess or other findings to correlate with ongoing leukocytosis. 3.  Stable appearance of the pancreas with previously seen main pancreatic ductal dilatation not well visualized on this study.    Poc Us 3cg Picc Placement Guidance    Result Date: 8/23/2020  Exam was performed as guidance for PICC line insertion.  Click \"PACS images\" hyperlink below to view any stored images.  For specific procedure details, view procedure note authored by PICC/Vascular Access Nurse.    Poc Us 3cg Picc Placement Guidance    Result Date: 8/19/2020  Exam was performed as guidance for PICC line insertion.  Click \"PACS images\" hyperlink below to view any stored images.  For specific procedure details, view procedure note authored by PICC/Vascular Access Nurse.    Ct Abscess Drain Pelvic    Result Date: 8/17/2020  EXAM: 1. CT OF ABDOMEN AND PELVIS WITHOUT CONTRAST 2. PERCUTANEOUS DRAIN PLACEMENT LEFT PELVIS LOCATION: [facility] DATE/TIME: 8/17/2020 9:59 AM INDICATION: diverticulitis TECHNIQUE: Dose reduction techniques were used. FINDINGS: The limited visualized portions of the lung bases " are clear. Evaluation of the solid visceral organs is suboptimal given the lack of intravenous contrast. Within these limitations no focal hepatic lesion is seen. The patient is post cholecystectomy. There is no intra or extrahepatic biliary ductal dilatation. The stomach and duodenum are normal. The spleen size is normal. The kidneys enhance symmetrically and there is no renal collecting system dilatation. Note again is made of a diverticular abscess, small in size. This is unchanged from the prior examination. Fibroid uterus is noted. PROCEDURE: Informed consent obtained. Site marked. Prior images reviewed. Required items made available. Patient identity confirmed verbally and with arm band. Patient reevaluated immediately before administering sedation. Universal protocol was followed. Time out performed. The site was prepped and draped in sterile fashion. 10 mL of 1% lidocaine was infused into the local soft tissues. Using standard technique and under direct CT guidance, a 10 [place] drainage catheter was inserted into the fluid collection.  SPECIMEN: 10 mL of purulent fluid was aspirated and sent to lab for cultures and Gram stain. BLOOD LOSS: Minimal. The patient tolerated the procedure well. No immediate complications. RADIOLOGIC SUPERVISION AND INTERPRETATION: CT GUIDANCE: Images demonstrate the needle and subsequent catheter to be in good position. SEDATION: Versed 1 mg. Fentanyl 50 mcg. The procedure was performed with administration intravenous conscious sedation with appropriate preoperative, intraoperative, and postoperative evaluation. 15 minutes of supervised face to face conscious sedation time was provided by a radiology nurse under my direct supervision.     1. Stable diverticular abscess. 2. Successful CT-guided drain placement into left pelvic diverticular abscess. Reference CPT Codes: 88193, 58021    Ir Abscessogram Tube Check    Result Date: 8/25/2020  [place] RADIOLOGY LOCATION: [facility] DATE: 8/25/2020 PROCEDURE: ABSCESSOGRAM INTERVENTIONAL RADIOLOGIST: Altaf Bateman MD. INDICATION: Diverticular abscess with indwelling drain here for follow-up. FLUOROSCOPIC TIME: 0.8 minutes NUMBER OF IMAGES: 2 CONTRAST: 5 cc of Omni COMPLICATIONS: No immediate complications. PROCEDURE: Contrast injection through the existing drain demonstrates no residual abscess pocket. 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     Abscessogram reveals no residual abscess pocket. Fistula to the colon. Switched drain to gravity drainage bag from SHAMIKA suction bulb. No flushes are needed. Follow-up abscessogram in one week. CPT codes for physician use only: 43232/28516            MD Joseline  Hospitalist  [phone]    This note was dictated using voice recognition software. Any grammatical or context distortions are unintentional and inherent to the software.

## 2021-06-11 NOTE — PROGRESS NOTES
"Vent Mode: VCV  FiO2 (%):  [30 %] 30 %  S RR:  [16] 16  S VT:  [450 mL] 450 mL  PEEP/CPAP (cm H2O):  [5 cm H2O] 5 cm H2O  Minute Ventilation (L/min):  [7.9 L/min-13 L/min] 7.9 L/min  PIP:  [34 cm H2O-56 cm H2O] 34 cm H2O  MAP (cm H2O):  [9-14] 12    Patient remains intubated and ventilated on above settings. Continue to suction moderate amounts of thick tan secretions inline. Nebulizer treatments given as scheduled. RT to follow.  BP (!) 86/30   Pulse 73   Temp 99.5  F (37.5  C) (Axillary)   Resp 21   Ht 5' 2\" (1.575 m)   Wt 212 lb (96.2 kg)   LMP 01/25/1999   SpO2 98%   BMI 38.78 kg/m      "

## 2021-06-11 NOTE — PROGRESS NOTES
General Surgery Progress Note    Subjective:   Intubated and sedated on 2 pressors. Opens eyes to sound, but does not respond to yes/no questions. Sedation is off, but patient is still on a fentanyl drip    RN reports that IR called this morning and is trying to get ahold of patient's family. They have not seen patient    Vitals:    08/31/20 1445 08/31/20 1500 08/31/20 1515 08/31/20 1532   BP:       Patient Position:       Pulse: 70 71 73 69   Resp: 16 16 16    Temp:       TempSrc:       SpO2: 97% 97% 98% 98%   Weight:       Height:           Physical Exam:  General: NAD  Ab:       Soft, not distended, nontender; Drain intact with only scant amount of greenish brown fluid in bag  :      Patient is voiding adequate amount    Results from last 7 days   Lab Units 08/28/20  0923   LN-WHITE BLOOD CELL COUNT thou/uL 14.1*   LN-HEMOGLOBIN g/dL 9.0*   LN-HEMATOCRIT % 27.1*   LN-PLATELET COUNT thou/uL 116*       Results from last 7 days   Lab Units 08/31/20  0501  08/30/20  0517   LN-SODIUM mmol/L 139  --  138   LN-POTASSIUM mmol/L 3.2*  3.2*   < > 3.6  3.6   LN-CHLORIDE mmol/L 101  --  105   LN-CO2 mmol/L 26  --  22   LN-BLOOD UREA NITROGEN mg/dL 47*  --  53*   LN-CREATININE mg/dL 1.41*  --  2.48*   LN-CALCIUM mg/dL 8.5  --  8.9   LN-ALBUMIN g/dL  --   --  1.4*   LN-PROTEIN TOTAL g/dL  --   --  6.0   LN-BILIRUBIN TOTAL mg/dL  --   --  2.0*   LN-ALKALINE PHOSPHATASE U/L  --   --  152*   LN-ALT (SGPT) U/L  --   --  27   LN-AST (SGOT) U/L  --   --  247*    < > = values in this interval not displayed.       Assessment:  Colonic abscess with drain in place. CT showed fluid around the distal sigmoid colon and rectum that the drain is not likely to be communicating with. IR asked to evaluate this, but has not seen patient yet. No other issues noted on CT that could be contributing to her current condition.     Plan:   - await IR input  - no surgical intervention planned    Lynn Herbert, CNP  [phone]  [facility]  [facility]gery

## 2021-06-11 NOTE — PROGRESS NOTES
PULMONARY / CRITICAL CARE PROGRESS NOTE    Date / Time of Admission:  8/11/2020 10:16 AM  Assessment   Gardenia Muller is a 70 y.o. 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 MAY improved but pt continues to fail SBTs. Off vasopressin, but still requiring levophed to maintain MAP > 55.       Principal Problem:    Diverticulitis  Active Problems:    Bradyarrhythmia    Acute kidney injury (H)    Diverticulitis of large intestine with perforation and abscess without bleeding    Atrial fibrillation with rapid ventricular response (H)    Obstructive sleep apnea syndrome    Poisoning by digoxin, accidental or unintentional, initial encounter    Acute respiratory failure with hypoxia and hypercapnia (H)    Acute metabolic encephalopathy    Shock circulatory (H)    Metabolic acidosis        Plan       PULMONARY  Vent Mode: VCV  FiO2 (%):  [30 %] 30 %  S RR:  [16] 16  S VT:  [450 mL] 450 mL  PEEP/CPAP (cm H2O):  [5 cm H2O] 5 cm H2O  Minute Ventilation (L/min):  [7.7 L/min-10.6 L/min] 7.9 L/min  PIP:  [33 cm H2O-42 cm H2O] 42 cm H2O  MAP (cm H2O):  [9-10] 10  1) Intubated for pneumonia and volume overload: Repeat CXR this morning w/ diffuse persistent alveolar opacities and interstitial coarsening and small left pleural effusion. Failed SBT yesterday to high RR.  - daily sedation vacation and SBT  - duonebs four times a day   - weaning trials per RT  - Will pull ETT about 2 cm.      CV  1) Afib with RVR--resolved.  Echo found stable EF 45%, no significant valve pathology  2) Dig toxicity: Improved. Dig level 2.5 8/31. Will hold digoxin unless further recs from Cardiology.   3) Hypotension: related in part to sedation  4) Acute on chronic combined systolic  and diastolic heart failure and ischemic cardiomyopathy s/p PCI w/ stent 7/24/20. Required dual pressors; now off vasopressin.   - resume warfarin w/ dosing by PharmD  - ASA 81 mg daily  - tigagrelor 90 mg BID restarted 9/1   - Levophed to keep MAP > 55  - Diuresis ongoing; IVF bolus prn        RENAL/FLUID/LYTES  1) MAY on CKD: Currently large UOP 2/2 post-ATN diuresis  - resume diuretics when OK per nephrology  - monitor lytes   - nephrology recommending fluid bolus prn if hypotension  - Appreciate renal assistance, may avoid HD  2) Hypokalemia: Initial hyperkalemia improved w/ tx of digoxin toxicity.   - K replacement protocol prn  - monitor urine output, I&O        GI  1) Perforated diverticulitis with abscess: s/p CT guided 10F left pelvic drain placement 8/17/20, exchanged 9/1/2020 No further surgical interventions planned as pt is poor surgical candidate. moderate malnutrition in the context of acute illnes d/t poor intake > 7 days and edema. Abdominal XR not concerning for obstruction.  - will restart trickle feeds. Added reglan for motility.   - abx as below  - per IR, do not flush drain  - will need follow up abscessogram 9/8 per surgery; earlier if changes in clinical status or output        HEME/COAG  1) Normocytic anemia: stable  2) Reactive neutrophilia: no organisms on sputum, urine, blood cx  3) Thrombocytopenia: Currently on dual antiplatelet therapy.   - on ticagrelor, ASA  - will start warfarin today   - transfuse if Hgb < 7 and symptomatic   - recheck CBC once presumed infection resolved  - daily INR, Plt, Hgb      ID  1) PNA: new bilateral pulmonary infiltrates 8/29.   - On abx for intraabdominal abscess  - Sputum cx pending  - Follow cx results; sputum cx no orgs 9/3  - Appreciate ID input  2) Abdominal abscess: Candida and G+ cocci in chains. S/p drain exchange with IR.   - drain to gravity  - continue micafungin + meropenem per ID until fistula controlled         ENDO/SKIN  - sacral wound  cares per nursing  - FS blood glucose per ICU protocol       NEUROLOGIC  Sedation requirements:   - Propofol  - Fentanyl      PALLIATIVE  Family meeting held 9/2.   - Family clear they would not pursue Trach/PEG if pt unable to be weaned from ventilator  - Family open to any/all continued aggressive interventions with a restorative focus  - Copy of HCD placed in chart and emailed to [facility]     ICU Daily Checklist     Can patient transfer out of MICU? no         Lines/Tubes Drips Prophylaxis   Bardales:Yes   fentaNYL citrate (PF) 75 mcg/hr (09/02/20 2211)     norepinephrine 0.04 mcg/kg/min (09/03/20 0144)     propofol Stopped (09/02/20 1030)     sodium chloride 0.9% 50 mL/hr (09/02/20 1443)     vasopressin Stopped (09/02/20 1510)    DVT: ASA, ticagrelor and SCDs  Stress Ulcer: PPI     VAP: HOB > 30 degrees; chlorhexidine rinses   Feeding: TUBE FEEDS    Analgesia/Sedation: Yes fentanyl and propofol  Glycemic control: Sliding Scale Insulin. Any glucose > 180? no.       INTUBATED?  Can patient have daily waking? yes  SBT: yes  Restraints? yes      PT AND MOBILITY  OK for PT and mobility trial? no  Activity: Bed Rest    Code: DNR, OK to intubate  POA: Aria Muller (daughter) [phone]       Patient was staffed with Dr. Torres.        Karen Hilliard MD 9/3/2020, 7:43 AM  PGY1  Pager # [phone]    Faculty Supervision of Residents     I have personally examined and assessed this patient. I have reviewed and discussed plan of care with the resident. The documentation outlined by the resident reflects my direct assessment and plan of care.     The patient is critically ill with impairment in organ system and high risk of life threatening deterioration.    Will Torres MD  9/3/2020    Critical Care Time > 1 Hour    Subjective   Gardenia Muller is a 70 y.o. female with chronic afib on anticoagulation, HFpEF, CAD s/p stent 7/2020, CVA, DMII who was admitted 8/11 with perforated diverticulitis with abscess,  now s/p drain placement and on abx. Digoxin toxicity with worsening renal failure in setting of Afib with RVR; now s/p Digibind with improvement in heart rate and renal function. Initially oliguric and hypervolemic, now autodiuresing with ongoing improvement in Cr and urine output. Intubated 8/28 for worsening work of breathing and hypoxia. CT w/ bibasilar infiltrates concerning for pneumonia. Remains intubated w/ continued pressor requirements and not tolerating SBTs.    Interval events:   Remains intubated and sedated. Off vasopressin but still failing SBTs.    Principal Problem:    Diverticulitis  Active Problems:    Bradyarrhythmia    Acute kidney injury (H)    Diverticulitis of large intestine with perforation and abscess without bleeding    Atrial fibrillation with rapid ventricular response (H)    Obstructive sleep apnea syndrome    Poisoning by digoxin, accidental or unintentional, initial encounter    Acute respiratory failure with hypoxia and hypercapnia (H)    Acute metabolic encephalopathy    Shock circulatory (H)    Metabolic acidosis      Allergies   Penicillins     MEDS  Scheduled:     aspirin  81 mg Enteral Tube DAILY     chlorhexidine  15 mL Topical Q12H     insulin aspart (NovoLOG) injection   Subcutaneous Q4H FIXED TIMES     ipratropium-albuteroL  3 mL Nebulization Q6H - RT     meropenem  1 g Intravenous Q8H     micafungin (MYCAMINE) IV  100 mg Intravenous Q24H     nystatin  5 mL Swish & Swallow QID     omeprazole  20 mg Oral QAM AC    Or     omeprazole  20 mg Enteral Tube QAM AC    Or     pantoprazole  40 mg Intravenous QAM AC     [Held by provider] sertraline  100 mg Oral DAILY     sodium chloride bacteriostatic  1-5 mL Intradermal Once     sodium chloride  10 mL Intravenous Q8H FIXED TIMES     sodium chloride  10-30 mL Intravenous Q8H FIXED TIMES     ticagrelor  90 mg Enteral Tube BID     Continuous Infusions:     fentaNYL citrate (PF) 75 mcg/hr (09/02/20 5767)     norepinephrine 0.04  "mcg/kg/min (09/03/20 0144)     propofol Stopped (09/02/20 1030)     sodium chloride 0.9% 50 mL/hr (09/02/20 1443)     vasopressin Stopped (09/02/20 1510)     PRN: alteplase, atropine, bacitracin, bisacodyL, carboxymethylcellulose, codeine-guaiFENesin, dextrose 50 % (D50W), glucagon (human recombinant), hydrOXYzine pamoate, lidocaine (PF), lipase-protease-amylase **AND** sodium bicarbonate, metoclopramide, [Held by provider] metoprolol tartrate, naloxone **OR** naloxone, oxyCODONE, polyethylene glycol, sodium chloride 0.9%, sodium chloride bacteriostatic, sodium chloride, sodium chloride, sodium chloride, traZODone    Objective     VITALS    /53   Pulse 78   Temp 99  F (37.2  C) (Oral)   Resp 16   Ht 5' 2\" (1.575 m)   Wt 197 lb 1.6 oz (89.4 kg)   LMP 01/25/1999   SpO2 99%   BMI 36.05 kg/m      EXAM    GEN: Sedated on vent. Does not open eyes to voice. NAD.    HEENT: Normocephalic, atraumatic  NECK: Supple. ETT in place.  PULM: Coarse lung sound bilaterally.   CVS: Irregularly irregular. Systolic murmur at right upper sternal border  ABDOMEN: Diminished but present bowel sounds in all quadrants. Soft, nondistended. Winces to palpation LLQ and RLQ. Drain in place with minimal output.  : Bardales in place.     EXTREMITES:  Generalized edema. Dry skin.Warm.   NEURO: Intubated, sedated.     I&O    Intake/Output Summary (Last 24 hours) at 9/3/2020 0743  Last data filed at 9/3/2020 0600  Gross per 24 hour   Intake 1685.85 ml   Output 1145 ml   Net 540.85 ml       LABS  CBC:   Lab Results   Component Value Date    WBC 13.3 (H) 09/03/2020    RBC 2.65 (L) 09/03/2020     BMP:   Lab Results   Component Value Date    CO2 27 09/03/2020    BUN 26 09/03/2020    CREATININE 0.53 (L) 09/03/2020    CALCIUM 9.3 09/03/2020     Coagulation:   Lab Results   Component Value Date    INR 1.86 (H) 09/03/2020    INR 1.60 (!) 07/27/2020         IMAGING    CXR 9/3/2020 10:28 AM  COMPARISON: 9/1/2020  IMPRESSION:   Endotracheal tube " tip 1.2 cm from emily. Right PICC tip projects over the cavoatrial junction. Feeding tube tip below the film. Stable heart size. Stable bilateral diffuse pulmonary infiltrates.        Additional comments:  I reviewed the patient's new clinical lab test results.         PCP: Jerson Hernandez MD

## 2021-06-11 NOTE — PROGRESS NOTES
Palliative Care Progress Note  NAME: Gardenia Muller, : 1950,   MRN: [id number] Date: 2020    Problem List:  Active Problems   Principal Problem:    Diverticulitis  Active Problems:    Bradyarrhythmia    Acute kidney injury (H)    Diverticulitis of large intestine with perforation and abscess without bleeding    Atrial fibrillation with rapid ventricular response (H)    Obstructive sleep apnea syndrome    Poisoning by digoxin, accidental or unintentional, initial encounter    Acute respiratory failure with hypoxia and hypercapnia (H)    Acute metabolic encephalopathy    Shock circulatory (H)    Metabolic acidosis    Other hypervolemia    Difficult ventilator weaning (H)    Assessment: Gardenia Muller, 70 y.o., female with a medical history of chronic A Fib (on digoxin and warfarin), diastolic HF, HTN, CAD, CVA, PAD, DM II, chronic malnutrition, chronic abdominal pain, and depression admitted on 2020 with abdominal pain, vomiting, and diarrhea and was found to have acute perforated diverticulitis w/small abscess formation (drain placement ). Unfortunately has had a complicated hospital course and transferred to ICU on  for digoxin toxicity causing hemodynamic instability and bradyarrhythmias. Required intubation on  and transitioned to comfort cares on .     Recommendations:   Weakness: remains minimally responsive, actively dying.   - Reposition for comfort as needed  - Haloperidol 0.5mg SL q8h    - Oral cares as able      Shortness of breath: remain somewhat shallow, irregular, and non labored.    - Morphine increased to 10mg SL q4h    - Morphine PO/SL and IV available PRN pain/sob   - Supplemental O2 as needed for comfort      Goals of Care: Comfort      Code Status: DNR/I   =========================================================  Current Medical Status:  Patient remains minimally responsive. No family present at bedside. Cares discussed with bedside RN. RR increased, febrile,  "and less responsive overall.     Symptom-Focused ROS:  Unable to obtain due to pt being intubated/sedated      Palliative Care Focused Medications:  See MAR     PERTINENT PHYSICAL EXAMINATION:  Vital Signs: Blood pressure 146/69, pulse (!) 145, temperature (!) 102.6  F (39.2  C), temperature source Oral, resp. rate 26, height 5' 2\" (1.575 m), weight 205 lb 14.4 oz (93.4 kg), last menstrual period 01/25/1999, SpO2 99 %, not currently breastfeeding.   GENERAL: minimally responsive   SKIN: Warm and dry   HEENT: Normocephalic, anicteric sclera,  LUNGS: coarse, tachypneic   CARDIAC: irregularly irregular, normal s1/s2, w/o m/r/g  : dias in place   EXTREMITIES: generalized edema   NEUROLOGIC: minimally responsive     I have reviewed labs and imaging in [facility]     ----------------------------------------------------  TT: I have personally spent a total of 15 minutes  today on the unit in review of medical record, consultation with the medical providers and assessment of patient today, with more than 50% of this time spent in counseling, coordination of care, and discussion with care team re: end of life care and symptom management.     Jose Raul Ramirez MD  [facility]  Palliative Medicine   Office: [phone]  "

## 2021-06-11 NOTE — PROGRESS NOTES
ABHIJIT COVID RT PROGRESS NOTE    DATA:    VENT DAY#  10    CURRENT SETTINGS:  VENT SETTINGS   VCV16/450/+5/28%            PATIENT PARAMETERS:    PIP 37  Pplat:  23  Pmean:  9  SBT: Was done in AM on high PS  SECRETIONS:  Small to moderate normal clear secretion  02 SATS:  97%    ETT SIZE 7.5  Secured at  20 cm at teeth        Respiratory Medications: Duo-neb Q6H         NOTE / PLAN:   Pt remain on full support, no changed made with the vent settings. Pt required PS 20 during the wean this morning.   RT continue follow.

## 2021-06-11 NOTE — PROGRESS NOTES
ABHIJIT COVID RT PROGRESS NOTE    DATA:    VENT DAY#  11    CURRENT SETTINGS:  VENT SETTINGS   VCV16/450/+5/28%            PATIENT PARAMETERS:    PIP 33  Pplat:  22  Pmean:  9  SBT: 15/5 lasted only 3 min and become sat low 80s and RSBI 233, RR 47bpm  SECRETIONS:  Small normal tan secretion  02 SATS:  97%    ETT SIZE 7.5  Secured at  20 cm at teeth        Respiratory Medications: Duo-neb Q6H         NOTE / PLAN:   Pt remain on full support, no changed made with the vent settings. Pt didn't tolerated weaning on 15/5 with some propofol for anxiety and failed due to the above reason.   RT continue follow.

## 2021-06-11 NOTE — PROGRESS NOTES
Palliative Spiritual Care Note    Spiritual Assessment: Pt's chart reads that she is Caodaism. Pt unable to talk as was preparing for intubation. Pt did nod that prayer was wanted.     Care Provided: Shared prayer and encouragement.    Plan of Care: Will follow along with hospital  for support and prayer.    HARLEY Dhaliwal.

## 2021-06-11 NOTE — PLAN OF CARE
Problem: Mechanical Ventilation  Goal: Oral health is maintained or improved  Outcome: Progressing  Goal: ET tube will be managed safely  Outcome: Progressing  Goal: Respiratory status - ventilation  Outcome: Progressing     GENO ShresthaT

## 2021-06-11 NOTE — PROGRESS NOTES
Palliative Care Progress Note  NAME: Gardenia Muller, : 1950,   MRN: [id number] Date: 2020    Problem List:  Active Problems   Principal Problem:    Diverticulitis  Active Problems:    Bradyarrhythmia    Acute kidney injury (H)    Diverticulitis of large intestine with perforation and abscess without bleeding    Atrial fibrillation with rapid ventricular response (H)    Obstructive sleep apnea syndrome    Poisoning by digoxin, accidental or unintentional, initial encounter    Acute respiratory failure with hypoxia and hypercapnia (H)    Acute metabolic encephalopathy    Shock circulatory (H)    Metabolic acidosis      Assessment: Gardenia Muller, 70 y.o., female with a medical history of chronic A Fib (on digoxin and warfarin), diastolic HF, HTN, CAD, CVA, PAD, DM II, chronic malnutrition, chronic abdominal pain, and depression admitted on 2020 with abdominal pain, vomiting, and diarrhea and was found to have acute perforated diverticulitis w/small abscess formation (drain placement ). Unfortunately has had a complicated hospital course and transferred to ICU on  for digoxin toxicity causing hemodynamic instability and bradyarrhythmias. Required intubation on .     Recommendations:   Weakness: initially from acute perforated diverticulitis w/small abscess formation requiring drain placement.  Hospital course complicated by worsening renal failure, digoxin toxicity, and acute respiratory failure leading to intubation on .    - ICU primary   - Cardiology, ID, Nephrology, ID and surgery following    - Reposition for comfort as able      Shortness of breath: acute respiratory failure with hypoxia and mild hypercapnia due to possible HCAP, also w/concern of pulmonary edema given bradyarrhythmias and oliguric renal failure. Intubated on .   - Ventilator management per ICU team      Decision making capacity: None, Daughter, Aria, is surrogate decision maker.      Estimated length  "of life: defer to primary team for definitive prognosis      Goals of Care: Restorative      Code Status: No CPR- Pre-arrest Intubation OK  =========================================================  Current Medical Status:  Remains intubated/sedated. Overnight events discussed with ICU team during multi-disciplinary rounds. Consultant notes reviewed. Unable to speak with Aria, per report possibly on her way via bus.     Symptom-Focused ROS:  Unable to assess fully as patient is intubated/sedated     Palliative Care Focused Medications:  Fentanyl gtt  Norepinephrine gtt  Vasopressin gtt      PERTINENT PHYSICAL EXAMINATION:  Vital Signs: Blood pressure (!) 86/30, pulse 72, temperature 99.8  F (37.7  C), temperature source Oral, resp. rate (!) 35, height 5' 2\" (1.575 m), weight 207 lb 4.8 oz (94 kg), last menstrual period 01/25/1999, SpO2 97 %, not currently breastfeeding.   GENERAL: sedated, opens eyes briefly to gentle stimulus, in no acute distress    SKIN: Warm and dry and without jaundice, ecchymoses, or petechiae.   HEENT: Normocephalic, anicteric sclera, intubated  LUNGS: ventilated BS   CARDIAC: irregularly irregular, normal s1/s2, w/o m/r/g   ABDOMINAL: BS (+), soft, non distended, non tender, drains in place  : dias in place   EXTREMITIES: generalized edema   NEUROLOGIC: intubated/sedated   PSYCH: intubated/sedated     I have reviewed labs and imaging in [facility]     ----------------------------------------------------  TT: I have personally spent a total of 15 minutes  today on the unit in review of medical record, consultation with the medical providers and assessment of patient today, with more than 50% of this time spent in counseling, coordination of care, and discussion re:diagnostic results, prognosis, symptom management, risks and benefits of management options, and development of plan of care.    Jose Raul Ramirez MD  [facility]  Palliative Medicine   Office: [phone]    "

## 2021-06-11 NOTE — PROGRESS NOTES
[place] Daily Progress Note      Date of Service: 9/9/2020     Brief History: Gardenia Muller is 70 y.o. female with history of type 2 diabetes mellitus, chronic atrial fibrillation, diastolic congestive heart failure, CAD s/p PCI in 1995, CVA, peripheral vascular disease, presented to the hospital on 8/11/2020 for abdominal pain, and vomiting. CT abdomen revealed diverticulitis with perforation. General surgery and IR were consulted. Follow up image on 08/14 showed increase in size of abscess, and she underwent CT guided drain in the left pelvic diverticular abscess on 08/17. CT abdomen on 08/20 showed near collapse of fluid cavity. The patient developed hospital acquired pneumonia, and candida growing from the abdominal fluid. ID was consulted, and she was placed on broad spectrum antimicrobials. Patient developed acute respiratory distress, and had 25 lb weight gain since admission, and was started on IV lasix. On 08/26 develop MAY and episodes of bradycardia. Digoxin level was found to be subsequently elevated, and the patient was started on DigiFab. Transferred to ICU for closer monitoring. Required atropine in ICU for low heart rate and was started on dopamine. Patient was endotracheally intubated on 08/28 for volume overload and pneumonia. She has failed spontaneous breathing trial. She underwent abscessogram with tube check on 09/09, and as there were no more fluid collection, the drain was removed.    Assessment/Plan:     Perforated diverticulitis  Pericolonic abscess with sigmoid colon fistula  -S/p CT guided drain placement on 08/17  -Abscessogram tube check on 09/01 and on 09/09. Drain removed as there was no fluid collection, and drain output was zero.  -Currently on meropenem, micafungin  -Tube fluid culture grew candida    Acute respiratory failure with hypoxia  -Secondary to HCAP, fluid overload  -Spontaneous breathing trial per pulmonary  -Consider percutaneous trachesotomy    Digitalis  toxicity  Junctional bradycardia, secondary to digoxin toxicity  -Received digifab. Required dopamine for persistently low heart rate    Acute kidney injury  -Multifactorial in etiology- contrast induced, digoxin toxicity, medication, sepsis, hypotension  -Consented for dialysis, but never received one. Started to make large volume urine output and renal function has improved  -Monitor renal function. Avoid nephrotoxic agents.    Electrolyte imbalance  -Hypo/hypernatremia  -Hypomagnesemia, being replaced  -Hypophosphatemia, being replaced  -Hypokalemia, replaced    Acute on chronic diastolic/systolic congestive heart failure  -LVEF of 45%  -Receiving lasix  -Has large volume urine output  -Monitor I/Os, electrolytes, and renal function.    Type 2 diabetes mellitus with hyperglycemia  -Not on any insulin regimen  -Blood glucose fairly stable  -Last A1C of 6.5    Anemia, normocytic  -Multifactorial in etiology- acute illness, blood loss  -Monitor closely    Acute encephalopathy, metabolic  -Requiring restraints  -Precedex as needed    Other issues:    Afib with RVR on 08/19. Rate controlled at this time. Currently on warfarin    Transaminitis from hepatic congestion with heart failure    Depression/anxiety on zoloft      Subjective:     Chart reviewed, events noted. Pt seen and examined. She is not tolerating spontaneous breathing trial. She underwent abscessogram and tube check today. No fever or chills.    Objective     Vital signs in last 24 hours:  Temp:  [98  F (36.7  C)-98.6  F (37  C)] 98.5  F (36.9  C)  Heart Rate:  [69-90] 80  Resp:  [10-37] 18  BP: ()/(46-70) 110/53  FiO2 (%):  [21 %-30 %] 25 %  Weight:   183 lb 6.4 oz (83.2 kg)  Weight change: 2 lb 13.5 oz (1.29 kg)  Body mass index is 33.54 kg/m .    Intake/Output last 3 shifts:  I/O last 3 completed shifts:  In: 1839.1 [I.V.:89.1; NG/GT:1550; IV Piggyback:200]  Out: 1700 [Urine:1700]  Intake/Output this shift:  No intake/output data  "recorded.      Physical Exam:  /53   Pulse 80   Temp 98.5  F (36.9  C) (Oral)   Resp 18   Ht 5' 2\" (1.575 m)   Wt 183 lb 6.4 oz (83.2 kg)   LMP 01/25/1999   SpO2 98%   BMI 33.54 kg/m      FiO2 (%): (S) 25 % O2 Device: Ventilator O2 Flow Rate (L/min): 3 L/min    General Appearance: Opening eyes. Not in distress  HEENT: Normocephalic. No scleral icterus. Mucous membranes moist. Endotracheal intubation  Heart: S1 S2 heard. Irregular rate and rhythm.  Lungs: Decreased breath sounds  Abdomen: Soft, non tender, bowel sounds present.  Extremity: No deformity. No joint swelling. No edema.  Neurologic: Opens eyes. Moving extremities  Skin: No skin rashes      Imaging:  personally reviewed.    Xr Chest 1 View Portable    Result Date: 9/8/2020  EXAM: XR CHEST 1 VIEW PORTABLE LOCATION: [facility] DATE/TIME: 9/8/2020 12:01 PM INDICATION: Respiratory failure COMPARISON: 09/06/2020 and older studies.     Xr Chest 1 View Portable    Result Date: 9/6/2020  EXAM: XR CHEST 1 VIEW PORTABLE LOCATION: [facility] DATE/TIME: 9/6/2020 11:59 AM INDICATION: f/u intubation COMPARISON: 09/03/2020     Xr Chest 1 View Portable    Result Date: 9/3/2020  EXAM: XR CHEST 1 VIEW PORTABLE LOCATION: [facility] DATE/TIME: 9/3/2020 10:28 AM INDICATION: acute resp failure COMPARISON: 9/1/2020     Xr Abdomen Ap Portable    Result Date: 9/2/2020  EXAM: XR ABDOMEN AP PORTABLE LOCATION: [facility] DATE/TIME: 9/2/2020 4:58 PM INDICATION: Abdominal abscess COMPARISON: CT abdomen and pelvis 08/29/2020        Lab Results:  personally reviewed.   Lab Results   Component Value Date     09/09/2020    K 4.3 09/09/2020     09/09/2020    CO2 29 09/09/2020    BUN 14 09/09/2020    CREATININE 0.54 (L) 09/09/2020    CALCIUM 10.4 09/09/2020     Lab Results   Component Value Date    WBC 12.5 (H) 09/09/2020    HGB 7.4 (L) 09/09/2020    HCT 25.0 (L) 09/09/2020     (H) 09/09/2020     09/09/2020 "     Results from last 7 days   Lab Units 09/09/20  0526 09/08/20  0403 09/07/20  0440   LN-MAGNESIUM mg/dL 1.6* 1.5* 1.8        Results from last 7 days   Lab Units 09/05/20  0757 09/05/20  0416 09/04/20  2343 09/04/20  1612 09/04/20  0800 09/04/20  0346 09/03/20  2324 09/03/20  1953 09/03/20  1550 09/03/20  1140   LN-POC GLUCOSE FINGERSTICK- HE mg/dL 120 117 120 117 78 93 98 90 98 74         Advance Care Planning:   Barriers to discharge: Active issues  Anticipated discharge day: To be determined  Disposition: To be determined      Uzair Burdick MD  [facility]   [facility]

## 2021-06-11 NOTE — PLAN OF CARE
Problem: Pain  Goal: Patient's pain/discomfort is manageable  Outcome: Progressing  PRN Oxycodone SL and Morphine SL medicated per comfort care order. Will continue to assess and medicate as needed.      Problem: Daily Care  Goal: Daily care needs are met  Outcome: Progressing  Pt extubated at 1205 on 2 L Oxygen via NC sating > 98%.      Problem: Potential for Compromised Skin Integrity  Goal: Skin integrity is maintained or improved  Outcome: Progressing  Turn and reposition every two hours. Up to chair as tolerated.

## 2021-06-11 NOTE — PROGRESS NOTES
Nutrition therapy update:    Pt now on comfort cares and extubated. RD will sign off.    Sonali Driver RD, LD

## 2021-06-11 NOTE — PLAN OF CARE
Problem: Breathing  Goal: STG - Patient will maintain patent airway  Outcome: Progressing  Goal: STG - Respiratory rate and effort will be within normal limits for the patient  Outcome: Progressing     Problem: Impaired Gas Exchange  Goal: Demonstrate improved ventilation and adequate oxygenation of tissues as evidenced by absence of respiratory distress  Description: Patient intubated 8/28 @1430.  ABG's collected s/p.  Sating well.    Outcome: Progressing     Problem: Breathing  Goal: Patient will maintain patent airway  Outcome: Progressing     Problem: Mechanical Ventilation  Goal: Patient will maintain patent airway  Outcome: Progressing

## 2021-06-11 NOTE — PROGRESS NOTES
Palliative Spiritual Care Note    Spiritual Assessment: Pt on comfort care. Can speak softly. Asks for water. Good eye contact. Unsure if pt is ready to die. Pt inquired about daughter. iPad is set up.    Care Provided: Sat with pt 1 hour. No family present. Pt and writer conversed briefly and intermittently. Pt likes ice chips more so than liquid. Shared prayers and sang. Pt asked if writer will come back.     Plan of Care: Will visit pt tomorrow morning if she is still with us.    HARLEY Barriga.

## 2021-06-11 NOTE — PLAN OF CARE
VSS throughout night. Patient remains intubated, no sedation at this time.  Lung sounds course to clear.  ETT with small amount of clear/white secretions.  Patient expresses frequent desire for suction because of her sensitivity to oral secretions.  TF remains on.  Restraints on bilateral wrists for safety, patient turned and repositioned every 2 hours, along with oral cares and repositioning of ETT.  No complaints of pain throughout the night, though appears anxious at time. She has declined any PRN medications.  Sacrum remains reddened and excoriated, though no stool noted on nights.  No additional concerns.

## 2021-06-11 NOTE — PLAN OF CARE
Problem: Knowledge Deficit  Goal: Patient/family/caregiver demonstrates understanding of disease process, treatment plan, medications, and discharge instructions  9/1/2020 1421 by Al Kennedy LRT  Outcome: Progressing  9/1/2020 1223 by Al Kennedy, LRT  Outcome: Progressing  9/1/2020 0957 by Al Kennedy, LRT  Outcome: Progressing     Problem: Impaired Gas Exchange  Goal: Demonstrate improved ventilation and adequate oxygenation of tissues as evidenced by absence of respiratory distress  Description: Patient intubated 8/28 @1430.  ABG's collected s/p.  Sating well.    9/1/2020 1421 by Al Kennedy LRT  Outcome: Progressing  9/1/2020 1223 by Al Kennedy, LRT  Outcome: Progressing     Problem: Breathing  Goal: Patient will maintain patent airway  9/1/2020 1421 by Al Kennedy LRT  Outcome: Progressing  9/1/2020 1223 by Al Kennedy, LRT  Outcome: Progressing  9/1/2020 0957 by Al Kennedy LRT  Outcome: Progressing     Problem: Mechanical Ventilation  Goal: Patient will maintain patent airway  9/1/2020 1421 by Al Kennedy LRT  Outcome: Progressing  9/1/2020 1223 by Al Kennedy, LRT  Outcome: Progressing  9/1/2020 0957 by Al Kennedy, LRT  Outcome: Progressing  Goal: Oral health is maintained or improved  9/1/2020 1421 by Al Kennedy, LRT  Outcome: Progressing  9/1/2020 0957 by Al Kennedy, LRT  Outcome: Progressing  Goal: ET tube will be managed safely  9/1/2020 1421 by Al Kennedy LRT  Outcome: Progressing  9/1/2020 1223 by Al Kennedy, LRT  Outcome: Progressing  9/1/2020 0957 by Al Kennedy, LRT  Outcome: Progressing  Goal: Ability to express needs and understand communication  9/1/2020 1421 by Al Kennedy, LRT  Outcome: Progressing  9/1/2020 1223 by Al Kennedy, LRT  Outcome: Progressing  9/1/2020 0957 by Al Kennedy, LRT  Outcome: Progressing  Goal: Respiratory status - ventilation  9/1/2020 1421 by  Al Kennedy, LRT  Outcome: Progressing  9/1/2020 1223 by Al Kennedy, LRT  Outcome: Progressing  9/1/2020 0957 by Al Kennedy, GENOT  Outcome: Progressing    CPAP/PS trial 5/15 terminated after 7 minutes second to tachypnea/RSBI 164

## 2021-06-11 NOTE — PLAN OF CARE
Pt response to voice, non verbal; tachycardia; tachypnea.  On 2 L O2 via nasal cannula; turn Q2; scheduled hadol and morphine; oral care.  Will continue to monitoring.     Problem: Pain  Goal: Patient's pain/discomfort is manageable  Outcome: Progressing     Problem: Safety  Goal: Patient will be injury free during hospitalization  Outcome: Progressing     Problem: Daily Care  Goal: Daily care needs are met  Outcome: Progressing

## 2021-06-11 NOTE — PROGRESS NOTES
Patient given Duoneb tx. Inline ventilator circuit, HR pre/post 138, RR 28-31. Patient does not have a hisltory of COPD and no bronchodilator use at home, BS clear and diminished bilaterally. Spoke with MD regarding discontinuing Duoneb tx, physician agreed. RT to follow.    Al Kennedy, LRT

## 2021-06-11 NOTE — PROGRESS NOTES
09/01/20 1127   Patient Data   Vt (observed, mL) 495 mL   Vt Exp (mL) 492 mL   Minute Ventilation (L/min) 9.1 L/min   Total Resp Rate  18 BPM   PIP Observed (cm H2O) 37 cm H2O   MAP (cm H2O) 9   Auto/Intrinsic PEEP Observed (cm H2O) 0 cm H2O   Plateau Pressure (cm H2O) 28 cm H2O   Static Compliance (L/cm H2O) 22   Dynamic Compliance (L/cm H2O) 19 L/cm H2O   Airway Resistance 14

## 2021-06-11 NOTE — PLAN OF CARE
Problem: Pain  Goal: Patient's pain/discomfort is manageable  Outcome: Progressing     Problem: Safety  Goal: Patient will be injury free during hospitalization  Outcome: Progressing     Problem: Potential for Compromised Skin Integrity  Goal: Skin integrity is maintained or improved  Outcome: Progressing   Patient has appeared comfortable this shift. Patient continues to be restrained for safety and has remained free of injury this shift. There are no new skin concerns noted at this time. Dressing to sacrum changed once for soiling. Fentanyl decreased to 50mcg/hr and patient is tolerating lower dose well.

## 2021-06-11 NOTE — PROGRESS NOTES
"Vent Day 9    oETT 7.5 19 @ teeth    presented 8/17 w/ perforated diverticulitis causing abscess and later developed MAY and respiratory failure in the setting of digoxin toxicity;  intubated 8/28 for worsening work of breathing and hypoxia. CT w/ bibasilar infiltrates concerning for pneumonia and on multiple pressors. Plan CT guided Percustaneous Drainage Catheter when stable.     /50   Pulse 67   Temp 98.2  F (36.8  C) (Axillary)   Resp 16   Ht 5' 2\" (1.575 m)   Wt 192 lb 14.4 oz (87.5 kg)   LMP 01/25/1999   SpO2 97%   BMI 35.28 kg/m      Vent Mode: VCV  FiO2 (%):  [30 %] 30 %  S RR:  [16] 16  S VT:  [450 mL] 450 mL  PEEP/CPAP (cm H2O):  [5 cm H2O] 5 cm H2O  Minute Ventilation (L/min):  [6.9 L/min-9.1 L/min] 8.1 L/min  PIP:  [6 cm H2O-39 cm H2O] 34 cm H2O  MS SUP:  [5 cm H20-12 cm H20] 12 cm H20  MAP (cm H2O):  [5-10] 9  pplat = 24-25  DP  = 20    Continue to montior on current settings.    Yamilet Suero LRT    "

## 2021-06-11 NOTE — PROGRESS NOTES
"Spiritual Care Note    Spiritual Assessment:   made a tele-visit with patient's daughter. Daughter initially panicked a bit upon  introduction  assures daughter immediately patient is doing ok. Daughter shares its been so hard to see her mother this way and is hoping and praying for the day when the breathing tube can come out but also states she, \"doesn't want to rush it.\" Daughter feeling like her mother is aware of her presence as when she speaks to her she moves her feet.  encouraged daughter to continue to talk to her mom and assume she can hear her. Daughter appreciative of  visit and support offered. No concerns noted.     Care Provided: Listening and support provided.     Plan of Care: Spiritual care will continue to follow as part of patient's care team.    ALPHONSE Valdez, [facility]    "

## 2021-06-11 NOTE — PROGRESS NOTES
General Surgery Progress Note  Hospital Day # 19    Subjective:   Pt remains intubated and has come down a bit on her vasopressors.    Vitals:    08/30/20 1930 08/30/20 1945 08/30/20 2000 08/30/20 2015   BP:       Patient Position:       Pulse: 69 73 68 68   Resp: 16 16 21 21   Temp:   99.5  F (37.5  C)    TempSrc:   Axillary    SpO2: 97% 96% 96% 96%   Weight:       Height:           Physical Exam:  Lungs:  CTA  CV:       RRR  Ab:       Soft, + BS,     Results from last 7 days   Lab Units 08/28/20  0923   LN-WHITE BLOOD CELL COUNT thou/uL 14.1*   LN-HEMOGLOBIN g/dL 9.0*   LN-HEMATOCRIT % 27.1*   LN-PLATELET COUNT thou/uL 116*       Results from last 7 days   Lab Units 08/30/20  1733 08/30/20  0517   LN-SODIUM mmol/L  --  138   LN-POTASSIUM mmol/L 3.7 3.6  3.6   LN-CHLORIDE mmol/L  --  105   LN-CO2 mmol/L  --  22   LN-BLOOD UREA NITROGEN mg/dL  --  53*   LN-CREATININE mg/dL  --  2.48*   LN-CALCIUM mg/dL  --  8.9   LN-ALBUMIN g/dL  --  1.4*   LN-PROTEIN TOTAL g/dL  --  6.0   LN-BILIRUBIN TOTAL mg/dL  --  2.0*   LN-ALKALINE PHOSPHATASE U/L  --  152*   LN-ALT (SGPT) U/L  --  27   LN-AST (SGOT) U/L  --  247*       Assessment:  Pt is requiring ventilatory and hemodynamic support.  I would still like to see interventional radiology assess the efficacy of the drain that is currently in place otherwise I am encouraged by the results of the CT scan performed yesterday and I do not think there is a role for any surgical interventions at this time.    Plan: Interventional radiology to assess the  pericolonic drain.    Jose R Stewart MD  [facility]  [phone]

## 2021-06-11 NOTE — PROGRESS NOTES
ICU Cross Cover Note:    Dig level returned 10.7. Patient received DigiFab, making this inaccurate. Resend as misc - free digoxin level.        Arleth Blsa MD  Pulmonary and Critical Care Medicine  [facility]  Office: [phone]  Pager: [phone]

## 2021-06-11 NOTE — PROGRESS NOTES
[facility] Social Work Note:    Provided emotional support to pt's son at bedside. He shared that he know his mom's time is soon. He is glad to be able to be with her. His sister, Aria will arrive Friday, but knows that pt will most likely die before then. Provided calm presence and emotional support to family.     Called dtr Aria to offer support. She shared she has a lot on her plate right now so is glad her brother can be with her mom. She is glad she was able to see her a few times. Acknowledged all she is experiencing during this difficult time. Provided active listening, validation of feelings and emotional support.     Melina Turner, [facility]  Palliative Care   Office # [phone]

## 2021-06-11 NOTE — PROGRESS NOTES
Pharmacy Consult: Warfarin Management    Pharmacy consulted to dose warfarin for Gardenia Muller, a 70 y.o. female    Ordering provider: Karen Hilliard MD     Reason for warfarin therapy: Atrial Fibrillation  Goal INR Range: 2-3    Subjective  Medication lists (home and hospital) were reviewed.    New medications that may increase bleeding risk/INR: none    Restarted home medications that may increase bleeding risk/INR: ASA, Ticagrelor    Home Warfarin Dosing: multiple dose reductions during August 2020.  Last dosing regimen just before admission was 1.5mg daily     Other Anticoagulants: Patient being bridged: No    Patient Active Problem List   Diagnosis     Spinal stenosis     PAD (peripheral artery disease) (H)     Dermatosis Papulosa Nigra     Depression     Hypercalcemia     Right Renal Artery Stenosis     Osteoarthritis     Abnormality Of Walk     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)     Advanced directives, counseling/discussion     Cystitis     Healthcare maintenance     Essential hypertension     Cerebral infarction (H)     Anemia     Cerebrovascular disease     RLS (restless legs syndrome)     Incisional hernia, without obstruction or gangrene     Diverticular disease of large intestine     Coronary artery disease involving native coronary artery of native heart without angina pectoris     Dyslipidemia     Ventral hernia without obstruction or gangrene     Anxiety     (HFpEF) heart failure with preserved ejection fraction (H)     Anticoagulant long-term use     Persistent atrial fibrillation (H)     Bradyarrhythmia     Acute kidney injury (H)     Transaminitis     Physical deconditioning     Lipoma of back     Acalculous cholecystitis     Onychogryphosis     Hyperparathyroidism (H)     Microalbuminuria     Intestinal angina (H)     Chronic abdominal pain     Weight loss, non-intentional     Warfarin anticoagulation     Severe protein-calorie malnutrition (H)     Hypertrophy of nail      Dysuria     Class 1 obesity with serious comorbidity and body mass index (BMI) of 33.0 to 33.9 in adult, unspecified obesity type     Trigger finger, acquired     Acute liver failure without hepatic coma     Hematochezia     Hyperkalemia     Acute on chronic combined systolic (congestive) and diastolic (congestive) heart failure (H)     Ischemic cardiomyopathy     Acute kidney failure, unspecified (H)     Acute diastolic heart failure (H)     Diverticulitis     Supratherapeutic INR     Diverticulitis of large intestine with perforation and abscess without bleeding     Atrial fibrillation with rapid ventricular response (H)     Obstructive sleep apnea syndrome     Poisoning by digoxin, accidental or unintentional, initial encounter     Acute respiratory failure with hypoxia and hypercapnia (H)     Acute metabolic encephalopathy     Shock circulatory (H)     Metabolic acidosis    Past Medical History:   Diagnosis Date     Acute diastolic heart failure (H) 11/2015     Acute on chronic diastolic heart failure (H) 6/15/2019     Advanced directives, counseling/discussion 8/5/2015    Patient completed 2011.  Discussed today, 5/24/17, and wants to change healthcare agent from niece to daughter.  Discussed that she will need to complete new form to indicate this.     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  Recommend 5 year follow-up.  Single tubular adenoma     Biliary obstruction 10/3/2018    Added automatically from request for surgery [id number]     Cerebral vascular accident (H)      Coronary artery disease 2006    100% RCA with collateral filling per Dr. Elizabeth's angio report     Diabetes mellitus type 2 in obese (H)      Fibrocystic breast      GERD (gastroesophageal reflux disease)      History of anesthesia complications     slow to wake     Hypertension      Obstructive sleep apnea syndrome       Peripheral vascular disease (H)      Pneumonia 11/11/2018     PONV (postoperative nausea and vomiting)      S/P cholecystectomy 6/22/2018     SBO (small bowel obstruction) (H) 11/12/2015     Stroke (H)      Transaminitis 10/22/2018     Upper respiratory infection 12/20/2018     Urinary incontinence         Social History     Tobacco Use     Smoking status: Former Smoker     Packs/day: 0.25     Smokeless tobacco: Former User     Tobacco comment: e-cig a few times/day   Substance Use Topics     Alcohol use: No     Drug use: No       Objective   Labs:  Last 3 days:    Recent Labs     09/01/20  0357 09/01/20  1023 09/02/20  0618 09/03/20  0439   CREATININE 0.81  --  0.60 0.53*   HGB  --  8.2* 9.3* 8.1*   HCT  --  24.8* 29.7* 25.4*   PLT  --  95* 81* 103*     Last 7 days:   Recent labs: (last 7 days)     08/28/20  0431 08/29/20  0500 08/30/20  0517 08/31/20  0501 09/01/20  0357 09/02/20  0618 09/03/20  0439   INR 5.21* 4.81* 3.62* 3.01* 2.52* 2.07* 1.86*       Warfarin Dosing History:    Date INR Warfarin Dose Comment   9/2/20 2.07 none    9/3/20 1.86 1.5 mg Pharmacist consulted to dose warfarin                 Assessment  The patient is resuming warfarin for the indication of Atrial Fibrillation with a goal INR of 2-3. INR today is subtherapeutic. Multiple dose reductions during August 2020.  Last dosing regimen just before admission was 1.5mg daily.  Patient was admitted with supratherapeutic INR.  INR was reversed for surgery and then remained elevated most likely due to liver issues post operatively.      Plan  1. Administer warfarin 1.5 mg mg PO today.  2. Check INR daily or as appropriate.  3. Continue to follow the patient's INR, PLT, and HGB as available.  4. Monitor for potential drug/disease interactions.    Thank you for the consult,  Terra Turner, PharmD, BCPS, BCCCP 9/3/2020 10:41 AM

## 2021-06-11 NOTE — PROGRESS NOTES
[place] Daily Progress Note      Date of Service: 9/14/2020     Brief History: Gardenia Muller is 70 y.o. female with history of type 2 diabetes mellitus, chronic atrial fibrillation, diastolic congestive heart failure, CAD s/p PCI in 1995, CVA, peripheral vascular disease, presented to the hospital on 8/11/2020 for abdominal pain, and vomiting. CT abdomen revealed diverticulitis with perforation. General surgery and IR were consulted. Follow up image on 08/14 showed increase in size of abscess, and she underwent CT guided drain in the left pelvic diverticular abscess on 08/17. CT abdomen on 08/20 showed near collapse of fluid cavity. The patient developed hospital acquired pneumonia, and had candida growing from the abdominal fluid. ID was consulted, and she was placed on broad spectrum antimicrobials. Patient developed acute respiratory distress, and had 25 lb weight gain since admission, and was started on IV lasix. On 08/26 develop MAY and episodes of bradycardia. Digoxin level was found to be subsequently elevated, and the patient was started on DigiFab. Transferred to ICU for closer monitoring. Required atropine in ICU for low heart rate and was started on dopamine. Patient was endotracheally intubated on 08/28 for volume overload and pneumonia. She has failed spontaneous breathing trial and has remained on full vent support. She underwent abscessogram with tube check on 09/09, and as there were no more fluid collection, the drain was removed.    Patient has failed multiple weaning trials. Family care conference was held on 09/11, and as patient's wishes were not to pursue prolonged ventilator support and tracheostomy, plan will be to withdraw support and transition to comfort cares on Sunday, once all family member will be able to come visit.        9/14 :     Extubated   Put on hospice and comfort cares        Assessment/Plan:     Acute respiratory failure with hypoxia  -Secondary to HCAP, fluid  overload  -Spontaneous breathing trial per pulmonary. Failing daily weans  -Does not wish to prolong mechanical ventilation or tracheostomy  -Plan to withdraw support later today, once all family members are able to visit her    Electrolyte imbalance  -Hypo/hypernatremia-improved. Fluid flush with tube  -Hypomagnesemia, continuing with replacement  -Hypophosphatemia, replaced  -Hypokalemia, replaced    Perforated diverticulitis  Pericolonic abscess with sigmoid colon fistula  -S/p CT guided drain placement on 08/17  -Abscessogram tube check on 09/01 and on 09/09. Drain removed as there was no fluid collection, and drain output was zero.  -Treated with meropenem, micafungin. Antibiotics stopped on 09/10  -Tube fluid culture grew candida    Digitalis toxicity  Junctional bradycardia, secondary to digoxin toxicity  -Received digifab. Required dopamine for persistently low heart rate  -Now heart rate has stabilized    Acute kidney injury  -Multifactorial in etiology- contrast induced, digoxin toxicity, medication, sepsis, hypotension  -Consented for dialysis, but never received one. Started to make large volume urine output and renal function has improved  -Renal function in normal range now    Acute on chronic diastolic/systolic congestive heart failure  -LVEF of 45%  -Currently on IV lasix  -Monitor I/Os, electrolytes, and renal function.    Type 2 diabetes mellitus with hyperglycemia  -Insulin as needed  -Blood glucose elevated per metabolic panel readings  -Last A1C of 6.5    Anemia, normocytic  -Multifactorial in etiology- acute illness, blood loss  -Monitor closely    Acute encephalopathy, metabolic  -Requiring restraints  -Precedex as needed    Coronary artery disease  -Known  to RCA  -Currently on aspirin and brilinta  -Monitor    Other issues:    Afib with RVR on 08/19. Rate controlled at this time. Currently on warfarin    Transaminitis from hepatic congestion with heart failure    Depression/anxiety on  "zoloft      Subjective:     Chart reviewed, events noted. Pt seen and examined. Sedated this morning. Did not wake for me. Again, did not tolerated vent weaning.    Objective     Vital signs in last 24 hours:  Temp:  [98.4  F (36.9  C)-99.2  F (37.3  C)] 98.4  F (36.9  C)  Heart Rate:  [] 60  Resp:  [11-31] 24  BP: ()/() 110/50  FiO2 (%):  [21 %-25 %] 21 %  Weight:   205 lb 14.4 oz (93.4 kg)  Weight change: -1 lb 6.4 oz (-0.635 kg)  Body mass index is 37.66 kg/m .    Intake/Output last 3 shifts:  I/O last 3 completed shifts:  In: 816.9 [I.V.:566.9; NG/GT:250]  Out: 1155 [Urine:1155]  Intake/Output this shift:  I/O this shift:  In: 0   Out: 100 [Urine:100]      Physical Exam:  /50   Pulse 60   Temp 98.4  F (36.9  C) (Oral)   Resp 24   Ht 5' 2\" (1.575 m)   Wt 205 lb 14.4 oz (93.4 kg)   LMP 01/25/1999   SpO2 100%   BMI 37.66 kg/m      FiO2 (%): 21 % O2 Device: Nasal cannula O2 Flow Rate (L/min): 2 L/min    General Appearance: Not in distress  HEENT: Normocephalic. No scleral icterus. Mucous membranes moist. Endotracheal intubation  Heart: S1 S2 heard. Irregular rate and rhythm.  Lungs: Decreased breath sounds, coarse sounds  Abdomen: Soft, non tender, bowel sounds present.  Extremity: No deformity. No joint swelling. No edema.  Neurologic: No focal deficit.  Skin: No skin rashes      Imaging:  personally reviewed.    Xr Chest 1 View Portable    Result Date: 9/8/2020  EXAM: XR CHEST 1 VIEW PORTABLE LOCATION: [facility] DATE/TIME: 9/8/2020 12:01 PM INDICATION: Respiratory failure COMPARISON: 09/06/2020 and older studies.     Xr Chest 1 View Portable    Result Date: 9/6/2020  EXAM: XR CHEST 1 VIEW PORTABLE LOCATION: [facility] DATE/TIME: 9/6/2020 11:59 AM INDICATION: f/u intubation COMPARISON: 09/03/2020     Ir Abscessogram Tube Check    Result Date: 9/9/2020  [facility] LOCATION: [facility] DATE: 9/9/2020 PROCEDURE: ABSCESS TUBE CHECK AND REMOVAL " INTERVENTIONAL RADIOLOGIST: Karlos Mckeon MD. INDICATION: 70-year-old female with a left pelvic abscess and known fistula to the sigmoid colon. Drain outputs have been 0 the past several days. CONSENT: The risks, benefits and alternatives of an abscessogram with possible exchange, manipulation or removal were discussed with the patient  in detail. All questions were answered. Informed consent was given to proceed with the procedure. MODERATE SEDATION: None. CONTRAST: 10 mL Omnipaque into the abscess cavity. ANTIBIOTICS: None. ADDITIONAL MEDICATIONS: None. FLUOROSCOPIC TIME: 0.3 minutes. RADIATION DOSE: Air Kerma: 37 mGy. COMPLICATIONS: No immediate complications. STERILE BARRIER TECHNIQUE: Maximum sterile barrier technique was used. Cutaneous antisepsis was performed at the operative site with application of 2% chlorhexidine and large sterile drape. Prior to the procedure, the  and assistant performed hand hygiene and wore hat, mask, sterile gown, and sterile gloves during the entire procedure. PROCEDURE:  A  image was obtained. The pre-existing drainage catheter was injected with a small amount of contrast and multiple images were obtained. Based on the results of the study the drainage catheter was removed. FINDINGS: The  image shows the existing straight drainage catheter tubing. The tubing tip terminates approximately 3-4 cm lateral to the loop of the catheter on the previous study. An injection of contrast initially shows the drainage catheter is occluded. A more forceful injection exhibits peritubal leakage with no residual abscess. No residual fistula is identified.        Lab Results:  personally reviewed.   Lab Results   Component Value Date     09/13/2020    K 3.8 09/13/2020     09/13/2020    CO2 27 09/13/2020    BUN 15 09/13/2020    CREATININE 0.61 09/13/2020    CALCIUM 10.0 09/13/2020     Lab Results   Component Value Date    WBC 10.2 09/13/2020    HGB 7.5 (L) 09/13/2020     HCT 25.2 (L) 09/13/2020     (H) 09/13/2020     (L) 09/13/2020     Results from last 7 days   Lab Units 09/13/20  0407 09/12/20  0542 09/11/20  0456   LN-MAGNESIUM mg/dL 1.5* 2.0 1.5*        Results from last 7 days   Lab Units 09/12/20  0832   LN-POC GLUCOSE FINGERSTICK- HE mg/dL 176*       Advance Care Planning:   Barriers to discharge: Active issues, planning to transition to comfort measures  Anticipated discharge day: To be determined  Disposition: To be determined      Manav Martínez MD  [facility]   [facility] [place]

## 2021-06-11 NOTE — PLAN OF CARE
Care Plan  Care Management      Care Management Goals of the Day: Progression of care    Care Progression Reviewed With: Charge RN, MD, HUC, RNCM, CMSW    Barriers to Discharge: Intubated - weaning trials, Palliative following for GOC, IV lasix, tube feeds    Family Care Conference: held 9/2 - Pt NOT interested in trach    Discharge Disposition: tbd    Expected Discharge Date: tbd    Transportation: tbd      Care Coordination Narrative: Intubated. Pt resides at AL facility [facility] in [facility] ([phone] & [facility] @ [phone]). Daughter is primary contact/HCA, [place] and will return to [facility] after helping granddtr start school. Next care conference Thursday or Friday to determine continued GOC, per palliative. Pt opposed to trach. Family re-considering trach option. Plan tbd as cares progress, possibly eol. CM following.    RISHABH Jeffrey  9/8/2020                Abbreviation  Code:  [facility] Wheelchair ([facility] WC), [facility] Stretcher ([facility] STR), Patient (pt), Transitional Care Unit (TCU), Skilled Nursing Facility (SNF), Physical Therapy (PT), Occupational Therapy (OT), Speech Therapy (ST TX), Respiratory Therapy (RT)

## 2021-06-11 NOTE — PROGRESS NOTES
[facility] Daily Progress Note    Assessment/Plan:  Gardenia Muller is a 70-year-old lady with atrial fibrillation, diastolic congestive heart failure, hypertension, coronary artery disease status post PCI 1995, CVA, peripheral vascular disease, T2DM, malnutrition, intestinal angina, acalculous cholecystitis status post laparoscopic cholecystectomy in 2018, depression who came into [facility] on 8/11/2020 for abdominal pain and vomiting.  CT scan of the abdomen revealed diverticulitis with perforation.  On 814 it showed increased size of abscess from 2x2 cm to 4x3 cm.  Though there was clinical improvement in her symptoms.  General surgery was consulted who recommended interventional radiology consultation for possible drainage of the abdominal abscess which was done on 8/17/2020 by placing CT scan guided drain in the left pelvic diverticular abscess. On 8/20/2020 CT scan of the abdomen showed near collapse of the fluid cavity        .  Subsequently patient developed leukocytosis with possible hospital-acquired pneumonia seen on chest x-ray.  She also had Candida infection for which fluconazole was started with improvement of WBC count.  On 825 patient had shortness of breath with 25 pound weight gain and was started on IV Lasix.  On 826 developed acute kidney injury and had episodes of bradycardia.  Digoxin level was elevated at 6.9 on 8/26/2020 and 10.7 on 8/28/2024 which DigiFab was started and patient transferred to ICU.  Required atropine in ICU for heart rate which went down between 20 and 40.  Started on dopamine.  Developed respiratory failure.  There is still fluid around sigmoid colon and rectum.  Abscessogram done on 8/31/2020 showed contracted abscess cavity with persistent fistula to the sigmoid colon with drain exchanged and tailored 10 Italian drain was placed lateral to the fistula site.  She underwent abscessogram with tube check on 09/09, and as there were no more fluid collection, the drain  was removed.  Family care conference was held on 09/11, and as patient's wishes were not to pursue prolonged ventilator support and tracheostomy, extubated on 9/14, transitioned to comfort care.       Assessment:    Digoxin toxicity  Acute kidney injury  Acute perforated diverticulitis with pericolonic abscess  Sigmoid colon fistula   Hospital-acquired pneumonia   Candida bacteremia   Acute exacerbation on chronic diastolic CHF  Acute anemia  Atrial fibrillation  Type 2 diabetes mellitus  Acute Metabolic encephalopathy   Coronary Artery Disease     Plan:  Patient is on comfort cares.  Hospice is following    Code status:No CPR- Do NOT Intubate        Subjective:  Patient looks comfortable not in any distress surrounded by family  Did talk to patient's daughter was talking to the family when I was in the room about the patient's condition and plan of care.    Review of Systems:   She is not responding to questions    Current Medications Reviewed via EHR List    Objective:  Vital signs in last 24 hours:  Temp:  [98.8  F (37.1  C)-100.6  F (38.1  C)] 100.6  F (38.1  C)  Heart Rate:  [118-130] 118  Resp:  [22-28] 22  BP: (114-140)/(54-71) 128/63  SpO2:  [97 %-100 %] 99 %    Intake/Output last 3 shifts:  I/O last 3 completed shifts:  In: -   Out: 600 [Urine:600]      Physical Exam:  EYES: Are closed unable to communicate  NECK: no JVD  HEART : S1 S2 RRR    LUNGS:  Clear to auscultation without  Crepitations or Wheezing    ABDOMEN:   Soft, No tenderness ?, Bowel sounds are positive  CNS patient is comfortable not responding to voice breathing spontaneously             Lab Results:  Personally Reviewed.            Advanced Care Planning  Discharge plan: Unknown at this time      Michael Abreu  Date: 9/17/2020  Time: 3:41 PM  Hospitalist Service  Part of this chart was created using a dictation software. Typographic errors, word substitutions, and Grammatical errors may unintentionally occur.

## 2021-06-11 NOTE — PROGRESS NOTES
09/13/20 1552   Patient Data   Vt (observed, mL) 510 mL   Vt Exp (mL) 512 mL   Minute Ventilation (L/min) 8.9 L/min   Total Resp Rate  19 BPM   PIP Observed (cm H2O) 37 cm H2O   MAP (cm H2O) 9   Auto/Intrinsic PEEP Observed (cm H2O) 2 cm H2O   Plateau Pressure (cm H2O) 26 cm H2O   Static Compliance (L/cm H2O) 21   Dynamic Compliance (L/cm H2O) 19 L/cm H2O   Airway Resistance 14

## 2021-06-11 NOTE — PLAN OF CARE
Problem: Pain  Goal: Patient's pain/discomfort is manageable  Outcome: Progressing   Started on scheduled sublingual morphine today which seems to have helped some restlessness/ possible pain. Patient unable to state in having pain, but appears uncomfortable. Frequent repositioning also helpful.      Problem: Mechanical Ventilation  Goal: Respiratory status - ventilation  Outcome: Progressing   On 2L NC at this time. Lung sounds with heavy crackles noted. Likely silently aspirating as does not cough with ice chips or water. Will cough on command at times, but secretions do not clear. Comfort cares at this time.    Patient remains on comfort care, is alert and awake. Some speech, but is garbled and unclear. Will follow some commands, but not consistently. Can move all extremities minimally spontaneously. Family at bedside. Hospice consulted today and palliative placed orders for pain and secretion management. Will monitor and provide for comfort.     Emperatriz Samano RN

## 2021-06-11 NOTE — PROGRESS NOTES
[place] Daily Progress Note      Date of Service: 9/10/2020     Brief History: Gardenia Muller is 70 y.o. female with history of type 2 diabetes mellitus, chronic atrial fibrillation, diastolic congestive heart failure, CAD s/p PCI in 1995, CVA, peripheral vascular disease, presented to the hospital on 8/11/2020 for abdominal pain, and vomiting. CT abdomen revealed diverticulitis with perforation. General surgery and IR were consulted. Follow up image on 08/14 showed increase in size of abscess, and she underwent CT guided drain in the left pelvic diverticular abscess on 08/17. CT abdomen on 08/20 showed near collapse of fluid cavity. The patient developed hospital acquired pneumonia, and candida growing from the abdominal fluid. ID was consulted, and she was placed on broad spectrum antimicrobials. Patient developed acute respiratory distress, and had 25 lb weight gain since admission, and was started on IV lasix. On 08/26 develop MAY and episodes of bradycardia. Digoxin level was found to be subsequently elevated, and the patient was started on DigiFab. Transferred to ICU for closer monitoring. Required atropine in ICU for low heart rate and was started on dopamine. Patient was endotracheally intubated on 08/28 for volume overload and pneumonia. She has failed spontaneous breathing trial. She underwent abscessogram with tube check on 09/09, and as there were no more fluid collection, the drain was removed.    Assessment/Plan:     Perforated diverticulitis  Pericolonic abscess with sigmoid colon fistula  -S/p CT guided drain placement on 08/17  -Abscessogram tube check on 09/01 and on 09/09. Drain removed as there was no fluid collection, and drain output was zero.  -Currently on meropenem, micafungin  -Tube fluid culture grew candida    Acute respiratory failure with hypoxia  -Secondary to HCAP, fluid overload  -Spontaneous breathing trial per pulmonary. Failing daily weans  -Consider percutaneous  trachesotomy    Digitalis toxicity  Junctional bradycardia, secondary to digoxin toxicity  -Received digifab. Required dopamine for persistently low heart rate  -Now heart rate has stabilized    Acute kidney injury  -Multifactorial in etiology- contrast induced, digoxin toxicity, medication, sepsis, hypotension  -Consented for dialysis, but never received one. Started to make large volume urine output and renal function has improved  -Monitor renal function. Avoid nephrotoxic agents.    Electrolyte imbalance  -Hypo/hypernatremia-improved. Monitor. Fluid flush with tube  -Hypomagnesemia, replaced  -Hypophosphatemia, replaced  -Hypokalemia, replaced    Acute on chronic diastolic/systolic congestive heart failure  -LVEF of 45%  -Receiving lasix  -Has large volume urine output  -Monitor I/Os, electrolytes, and renal function.    Type 2 diabetes mellitus with hyperglycemia  -Not on any insulin regimen  -Blood glucose fairly stable  -Last A1C of 6.5    Anemia, normocytic  -Multifactorial in etiology- acute illness, blood loss  -Monitor closely    Acute encephalopathy, metabolic  -Requiring restraints  -Precedex as needed    Coronary artery disease  -Known  to RCA  -Currently on aspirin and brilinta  -Monitor    Other issues:    Afib with RVR on 08/19. Rate controlled at this time. Currently on warfarin    Transaminitis from hepatic congestion with heart failure    Depression/anxiety on zoloft      Subjective:     Chart reviewed, events noted. Pt seen and examined. Opens eyes, restless. No overnight issues. Remains on full vent support.    Objective     Vital signs in last 24 hours:  Temp:  [98.2  F (36.8  C)-98.6  F (37  C)] 98.2  F (36.8  C)  Heart Rate:  [60-94] 68  Resp:  [16-43] 28  BP: ()/(46-64) 105/50  FiO2 (%):  [25 %-100 %] 25 %  Weight:   202 lb 9.6 oz (91.9 kg)  Weight change: 19 lb 3.2 oz (8.709 kg)  Body mass index is 37.06 kg/m .    Intake/Output last 3 shifts:  I/O last 3 completed shifts:  In:  "541.8 [I.V.:195.8; NG/GT:146; IV Piggyback:200]  Out: 1740 [Urine:1740]  Intake/Output this shift:  I/O this shift:  In: -   Out: 350 [Urine:350]      Physical Exam:  /50   Pulse 68   Temp 98.2  F (36.8  C) (Oral)   Resp 28   Ht 5' 2\" (1.575 m)   Wt 202 lb 9.6 oz (91.9 kg)   LMP 01/25/1999   SpO2 99%   BMI 37.06 kg/m      FiO2 (%): 25 % O2 Device: Ventilator O2 Flow Rate (L/min): 3 L/min    General Appearance: Opening eyes. Not in distress  HEENT: Normocephalic. No scleral icterus. Mucous membranes moist. Endotracheal intubation  Heart: S1 S2 heard. Irregular rate and rhythm.  Lungs: Decreased breath sounds, coarse sounds  Abdomen: Soft, non tender, bowel sounds present.  Extremity: No deformity. No joint swelling. No edema.  Neurologic: Opens eyes. Moving extremities  Skin: No skin rashes      Imaging:  personally reviewed.    Xr Chest 1 View Portable    Result Date: 9/8/2020  EXAM: XR CHEST 1 VIEW PORTABLE LOCATION: [facility] DATE/TIME: 9/8/2020 12:01 PM INDICATION: Respiratory failure COMPARISON: 09/06/2020 and older studies.     Xr Chest 1 View Portable    Result Date: 9/6/2020  EXAM: XR CHEST 1 VIEW PORTABLE LOCATION: [facility] DATE/TIME: 9/6/2020 11:59 AM INDICATION: f/u intubation COMPARISON: 09/03/2020     Ir Abscessogram Tube Check    Result Date: 9/9/2020  [facility] LOCATION: [facility] DATE: 9/9/2020 PROCEDURE: ABSCESS TUBE CHECK AND REMOVAL INTERVENTIONAL RADIOLOGIST: Karlos Mckeon MD. INDICATION: 70-year-old female with a left pelvic abscess and known fistula to the sigmoid colon. Drain outputs have been 0 the past several days. CONSENT: The risks, benefits and alternatives of an abscessogram with possible exchange, manipulation or removal were discussed with the patient  in detail. All questions were answered. Informed consent was given to proceed with the procedure. MODERATE SEDATION: None. CONTRAST: 10 mL Omnipaque into the abscess cavity. " ANTIBIOTICS: None. ADDITIONAL MEDICATIONS: None. FLUOROSCOPIC TIME: 0.3 minutes. RADIATION DOSE: Air Kerma: 37 mGy. COMPLICATIONS: No immediate complications. STERILE BARRIER TECHNIQUE: Maximum sterile barrier technique was used. Cutaneous antisepsis was performed at the operative site with application of 2% chlorhexidine and large sterile drape. Prior to the procedure, the  and assistant performed hand hygiene and wore hat, mask, sterile gown, and sterile gloves during the entire procedure. PROCEDURE:  A  image was obtained. The pre-existing drainage catheter was injected with a small amount of contrast and multiple images were obtained. Based on the results of the study the drainage catheter was removed. FINDINGS: The  image shows the existing straight drainage catheter tubing. The tubing tip terminates approximately 3-4 cm lateral to the loop of the catheter on the previous study. An injection of contrast initially shows the drainage catheter is occluded. A more forceful injection exhibits peritubal leakage with no residual abscess. No residual fistula is identified.        Lab Results:  personally reviewed.   Lab Results   Component Value Date     09/10/2020    K 4.3 09/10/2020     09/10/2020    CO2 28 09/10/2020    BUN 13 09/10/2020    CREATININE 0.58 (L) 09/10/2020    CALCIUM 10.2 09/10/2020     Lab Results   Component Value Date    WBC 11.6 (H) 09/10/2020    HGB 7.7 (L) 09/10/2020    HCT 25.5 (L) 09/10/2020     (H) 09/10/2020     09/10/2020     Results from last 7 days   Lab Units 09/10/20  0450 09/09/20  0526 09/08/20  0403   LN-MAGNESIUM mg/dL 1.9 1.6* 1.5*        Results from last 7 days   Lab Units 09/05/20  0757 09/05/20  0416 09/04/20  2343 09/04/20  1612 09/04/20  0800 09/04/20  0346 09/03/20  2324 09/03/20  1953 09/03/20  1550   LN-POC GLUCOSE FINGERSTICK- HE mg/dL 120 117 120 117 78 93 98 90 98         Advance Care Planning:   Barriers to discharge: Active  issues  Anticipated discharge day: To be determined  Disposition: To be determined      Uzair Burdick MD  [facility]   [facility]

## 2021-06-11 NOTE — PLAN OF CARE
Problem: Daily Care  Goal: Daily care needs are met  Outcome: Progressing  Pt continue to be hypotensive bolus of 500 cc without improvement, Levophed start at 0.02 mcg to keep SBP > 90. Plan to be terminally extubate after family arrival around 4 pm.      Problem: Knowledge Deficit  Goal: Patient/family/caregiver demonstrates understanding of disease process, treatment plan, medications, and discharge instructions  Outcome: Progressing  Turn and reposition every two hours.

## 2021-06-11 NOTE — PROGRESS NOTES
Pharmacy Consult: Warfarin Management    Pharmacy consulted to dose warfarin for Gardenia Muller, a 70 y.o. female    Ordering provider: Karen Hilliard MD     Reason for warfarin therapy: Atrial Fibrillation  Goal INR Range: 2-3    Subjective  Medication lists (home and hospital) were reviewed.    New medications that may increase bleeding risk/INR: none currently    Restarted home medications that may increase bleeding risk/INR: ASA, Ticagrelor, omeprazole, sertraline     Home medications NOT restarted that may increase bleeding risk/INR:  trazadone     Home Warfarin Dosing: multiple dose reductions during August 2020.  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)     Advanced directives, counseling/discussion     Cystitis     Healthcare maintenance     Essential hypertension     Cerebral infarction (H)     Anemia     Cerebrovascular disease     RLS (restless legs syndrome)     Incisional hernia, without obstruction or gangrene     Diverticular disease of large intestine     Coronary artery disease involving native coronary artery of native heart without angina pectoris     Dyslipidemia     Ventral hernia without obstruction or gangrene     Anxiety     (HFpEF) heart failure with preserved ejection fraction (H)     Anticoagulant long-term use     Persistent atrial fibrillation (H)     Bradyarrhythmia     Acute kidney injury (H)     Transaminitis     Physical deconditioning     Lipoma of back     Acalculous cholecystitis     Onychogryphosis     Hyperparathyroidism (H)     Microalbuminuria     Intestinal  angina (H)     Chronic abdominal pain     Weight loss, non-intentional     Warfarin anticoagulation     Severe protein-calorie malnutrition (H)     Hypertrophy of nail     Dysuria     Class 1 obesity with serious comorbidity and body mass index (BMI) of 33.0 to 33.9 in adult, unspecified obesity type     Trigger finger, acquired     Acute liver failure without hepatic coma     Hematochezia     Hyperkalemia     Acute on chronic combined systolic (congestive) and diastolic (congestive) heart failure (H)     Ischemic cardiomyopathy     Acute kidney failure, unspecified (H)     Acute diastolic heart failure (H)     Diverticulitis     Supratherapeutic INR     Diverticulitis of large intestine with perforation and abscess without bleeding     Atrial fibrillation with rapid ventricular response (H)     Obstructive sleep apnea syndrome     Poisoning by digoxin, accidental or unintentional, initial encounter     Acute respiratory failure with hypoxia and hypercapnia (H)     Acute metabolic encephalopathy     Shock circulatory (H)     Metabolic acidosis     Other hypervolemia    Past Medical History:   Diagnosis Date     Acute diastolic heart failure (H) 11/2015     Acute on chronic diastolic heart failure (H) 6/15/2019     Advanced directives, counseling/discussion 8/5/2015    Patient completed 2011.  Discussed today, 5/24/17, and wants to change healthcare agent from niece to daughter.  Discussed that she will need to complete new form to indicate this.     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  Recommend 5 year follow-up.  Single tubular adenoma     Biliary obstruction 10/3/2018    Added automatically from request for surgery [id number]     Cerebral vascular accident (H)      Coronary artery disease 2006    100% RCA with collateral filling per Dr. Elizabeth's angio report     Diabetes mellitus type 2 in obese (H)       Fibrocystic breast      GERD (gastroesophageal reflux disease)      History of anesthesia complications     slow to wake     Hypertension      Obstructive sleep apnea syndrome      Peripheral vascular disease (H)      Pneumonia 11/11/2018     PONV (postoperative nausea and vomiting)      S/P cholecystectomy 6/22/2018     SBO (small bowel obstruction) (H) 11/12/2015     Stroke (H)      Transaminitis 10/22/2018     Upper respiratory infection 12/20/2018     Urinary incontinence         Social History     Tobacco Use     Smoking status: Former Smoker     Packs/day: 0.25     Smokeless tobacco: Former User     Tobacco comment: e-cig a few times/day   Substance Use Topics     Alcohol use: No     Drug use: No       Objective   Labs:  Last 3 days:    Recent Labs     09/05/20  0417 09/06/20  0526 09/07/20  0440   CREATININE 0.58* 0.51* 0.53*   HGB 7.7* 7.5* 7.6*   HCT 25.2* 24.6* 25.4*   * 154 161     Last 7 days:   Recent labs: (last 7 days)     09/01/20  0357 09/02/20  0618 09/03/20  0439 09/04/20  0532 09/05/20  0417 09/06/20  0526 09/07/20  0440   INR 2.52* 2.07* 1.86* 1.89* 1.79* 1.90* 2.09*       Warfarin Dosing History:    Date INR Warfarin Dose Comment   9/2/20 2.07 none    9/3/20 1.86 1.5 mg Pharmacist consulted to dose warfarin   9/4/20 1.89 1.5mg    9/5 1.79 2.5mg     9/6 1.90 2.5 mg    9/7/20 2.09 2.5 mg - SQ heparin discontinued           Assessment  The patient is resuming warfarin for the indication of Atrial Fibrillation with a goal INR of 2-3. INR today is therapeutic.     Multiple dose reductions during August 2020.  Last dosing regimen just before admission was 1.5mg daily.  Patient was admitted with supratherapeutic INR.  INR was reversed for surgery and then remained elevated most likely due to liver issues post operatively.       INR increased and now within goal range. Will order warfarin 2.5mg again for today, likely able to resume pta dosing tomorrow.     Plan  1. Administer warfarin 2.5  mg PO today.  2. Check INR daily or as appropriate.  3. Continue to follow the patient's INR, PLT, and HGB as available.  4. Monitor for potential drug/disease interactions.    Thank you for the consult,  Sheri Hanna, PharmD, BCPS, BCCCP 9/7/2020 11:06 AM

## 2021-06-11 NOTE — PROGRESS NOTES
Patient continues on mechanical ventilation, tolerating well, sedated but arousable.  BS clear with slight upper airway congestion. Suctioning with pre-oxygenation and 3 ml NS lavage, small amount thick tan secretions. Weaning on hold at this time, IR procedure pending.      RESPIRATORY CARE NOTE    Vent Mode: VCV  FiO2 (%):  [30 %] 30 %  S RR:  [16] 16  S VT:  [450 mL] 450 mL  PEEP/CPAP (cm H2O):  [5 cm H2O] 5 cm H2O  Minute Ventilation (L/min):  [8.6 L/min-11.6 L/min] 9.5 L/min  PIP:  [20 cm H2O-42 cm H2O] 42 cm H2O  MAP (cm H2O):  [9-10] 10    Plan is to monitor vent, wean when indicated and as tolerated.     Al Kennedy, LRT           09/01/20 0729   Patient Data   Vt (observed, mL) 597 mL   Vt Exp (mL) 593 mL   Minute Ventilation (L/min) 9.5 L/min   Total Resp Rate  16 BPM   PIP Observed (cm H2O) 42 cm H2O   MAP (cm H2O) 10   Auto/Intrinsic PEEP Observed (cm H2O) 1 cm H2O   Plateau Pressure (cm H2O) 26 cm H2O   Static Compliance (L/cm H2O) 27   Dynamic Compliance (L/cm H2O) 21 L/cm H2O   Airway Resistance 17

## 2021-06-11 NOTE — PROGRESS NOTES
"  Infectious Disease Follow up Note:    Service: Infectious Disease   Note: Follow Up Note  Date: 2020    Chief Complaint: Follow up for acute diverticulitis with perforation    ASSESSMENT:     Gardenia Muller is a 70 y.o. old female with acute diverticulitis with perforation.   C. dubliniensis and gram positive cocci in chains aug 17th time frame -abdominal infection-- active issue         Intubated, respiratory failure-active issue    Recommendations:     IR consulted by Surgery for abdominal abscess drain repositioning/upsize--Drain management per IR and Surgery- gravity bag    Follow culture results--no orgs aug 29th  Abscessogram  ----->IMPRESSION:   1.  Contracted abscess cavity. 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 Drain exchanged for a tailored 10 [place]ian drain placed lateral to the fistula site.     PLAN: Gravity drainage. Abscessogram in one week.     Continue Meropenem and  Micafungin probably into next week will need some guidance from surgery if/when they think we have a \"controlled\" fistula.  Certainly aren't there yet.   Abscessogram planned this week. Not much output from drain. WBC seems stable.       Kendra Hudson MD  [facility]  [phone]            ______________________________________________________________________  Notes / labs / vitals reviewed.    SUBJECTIVE / INTERVAL HISTORY: Afebrile. Still intubated.   ROS: intubated    PMHx/FHx/SHx: Reviewed. Interval changes noted.    OBJECTIVE:  Vitals:    20 1132   BP:    Pulse: 71   Resp: 17   Temp:    SpO2: 94%       Temp (24hrs), Av.5  F (36.9  C), Min:98.1  F (36.7  C), Max:99.1  F (37.3  C)    Exam on 2020   GEN: Somnolent  EYES:  Anicteric, RAYMUNDO, EOM intact.  HEAD, EARS, NOSE, MOUTH, AND THROAT:  Thrush, ETT  NECK:  Supple.   RESPIRATORY: intubated  CARDIOVASCULAR:  Arm swelling  Previous exam: S1 S2 No murmur, click, gallop or rub. No dependent edema. No excess JVD.  ABDOMEN:  Soft, " tenderness, gravity bag minimal output  MUSCULOSKELETAL:  Joints without effusion or acute inflammation. No muscle atrophy.Dedconditioned  LYMPHATIC:  No cervical or supraclavicular adenopathy  SKIN/HAIR/NAILS:  Warm, ecchymosis  NEUROLOGIC:  Somnolent, opens eyes    Antibiotics:  IV vancomycin --> Daptomycin, stopped on 8/28/2020  IV meropenem  IV fluconazole --> Micafungin 8/29    Pertinent labs:  Reviewed    Results from last 7 days   Lab Units 09/08/20  0403 09/07/20  0440 09/06/20  0526 09/05/20  0417   LN-WHITE BLOOD CELL COUNT thou/uL 12.2* 12.2* 11.9* 10.7   LN-HEMOGLOBIN g/dL 7.4* 7.6* 7.5* 7.7*   LN-HEMATOCRIT % 24.9* 25.4* 24.6* 25.2*   LN-PLATELET COUNT thou/uL 168 161 154 128*   LN-NEUTROPHILS RELATIVE PERCENT % 68  --   --  69   LN-MONOCYTES RELATIVE PERCENT % 10  --   --  12*   LN-MONOCYTES - REL (DIFF) %  --  4 7  --        Results from last 7 days   Lab Units 09/08/20  0403 09/07/20 0440 09/06/20  1625 09/06/20  0526 09/05/20  0417 09/04/20  0532  09/03/20  0439   LN-SODIUM mmol/L 145 146*  --  145 143 144  --  142   LN-POTASSIUM mmol/L 3.5 3.7 4.7 2.8* 4.0 3.8   < > 3.9   LN-CHLORIDE mmol/L 109* 110*  --  108* 107 107  --  107   LN-CO2 mmol/L 29 28  --  29 30 30  --  27   LN-BLOOD UREA NITROGEN mg/dL 16 18  --  20 20 17  --  26   LN-CREATININE mg/dL 0.54* 0.53*  --  0.51* 0.58* 0.52*  --  0.53*   LN-CALCIUM mg/dL 10.9* 10.8*  --  10.2 10.2 9.9  --  9.3   LN-ALBUMIN g/dL  --   --   --   --  1.3* 1.3*  --  1.4*    < > = values in this interval not displayed.       MICROBIOLOGY DATA:    Cultures reviewed  Culture:  C. dubliniensis august 17th    IMAGING/RADIOLOGY:  Reviewed  XR Chest 1 View Portable (Order [id number])   Imaging         Study Result     EXAM: XR CHEST 1 VIEW PORTABLE  LOCATION: [facility]  DATE/TIME: 8/21/2020 7:34 PM     INDICATION: r/o pna- shortness of breath  COMPARISON: 08/11/2020     IMPRESSION:   New bilateral interstitial infiltrates could represent edema or  pneumonia including COVID. Enlarged cardiac silhouette. No pleural effusion. No definite pulmonary vascular congestion.     Abscessogram reveals no residual abscess pocket. Fistula to the colon. Switched drain to gravity drainage bag from SHAMIKA suction bulb. No flushes are needed. Follow-up abscessogram in one week.

## 2021-06-11 NOTE — PROGRESS NOTES
Infectious Disease Follow up Note:    Service: Infectious Disease   Note: Follow Up Note  Date: 8/30/2020    Chief Complaint: Follow up for acute diverticulitis with perforation    ASSESSMENT:     Gadrenia Muller is a 70 y.o. old female with acute diverticulitis with perforation.   C. dubliniensis and gram positive cocci in chains -abdominal infection-- active issue  Intra-abdominal abscess worse on CT scan       Intubated, respiratory failure-active issue    Fever--active issue- curve improving    Digoxin levels elevated-- active issue. Transferred to ICU on 8/26/2020    History of CVA, obesity, chronic abdominal pain.  Acute diverticulitis with perforation admitted on 8/11/2020.  On IV Cipro and Flagyl.  Cultures from abscess drainage on 8/17/2020 with Candida albicans.  CT scan on 8/20/2020 with near complete resolution of the abscess.    Abscessogram reveals no residual abscess pocket. Fistula to the colon. Switched drain to gravity drainage bag from SHAMIKA suction bulb. No flushes are needed. Follow-up abscessogram in one week.       New leukocytosis.    C. difficile negative on 8/21/2020.  Feeling short of breath.  Chest x-ray ordered. HAP is a possibility.  Leukocytosis could be from untreated Candida infection.  Fluconazole added on 8/21/2020.  WBC better, at 17,000 on 8/22/2020.  Abdomen is better.  New bilateral interstitial infiltrate.  COVID-19 PCR negative on 8/22/2020.  Most likely pulmonary edema.  Penicillin allergy, reaction swelling.     Recommendations:     IR consulted by Surgery for abdominal abscess drain repositioning/upsize--Drain management per IR and Surgery- gravity bag    Follow culture results-- GPC pending  Continue Meropenem and changed fluconazole to Micafungin   Elevated CK, stopped Daptomycin on 8/28/2020  Sputum culture in process-- darren cultures    Repeat blood cultures in process-- follow  Stopped Vancomycin and started Daptomycin on 8/26      Continue to monitor CBC, CMP      Patient remains critically ill.  Please see previous notes by Dr. Christopher Vela MD  [facility]  [phone]            ______________________________________________________________________  Notes / labs / vitals reviewed.    SUBJECTIVE / INTERVAL HISTORY:   Intubated, resp failure-- remains unchanged  Drain with minimal output== gravity bag    Renal failure  Wakes up, somnolent    : Bipap, resp failure  Somnolent    : Wakes up, slightly better mentation    Sleeping, wakes up easily  Previous note:    Intermittent abdominal pain  Tolerating abx  Needs help with sitting up in bed      : Abdomen is better.  Continue to have respiratory distress.  Now on a lot of oxygen.  COVID-19 negative.    ROS: A complete 12 point ROS is negative except as listed above.    PMHx/FHx/SHx: Reviewed. Interval changes noted.    OBJECTIVE:  Vitals:    20 0830   BP:    Pulse: 67   Resp: 17   Temp:    SpO2: 97%       Temp (24hrs), Av.4  F (36.9  C), Min:98  F (36.7  C), Max:98.9  F (37.2  C)    Exam on 2020   GEN: Somnolent  EYES:  Anicteric, RAYMUNDO, EOM intact.  HEAD, EARS, NOSE, MOUTH, AND THROAT:  Thrush, ETT  NECK:  Supple.   RESPIRATORY: intubated  CARDIOVASCULAR:  Arm swelling  Previous exam: S1 S2 No murmur, click, gallop or rub. No dependent edema. No excess JVD.  ABDOMEN:  Soft, tenderness, gravity bag minimal output  MUSCULOSKELETAL:  Joints without effusion or acute inflammation. No muscle atrophy.Dedconditioned  LYMPHATIC:  No cervical or supraclavicular adenopathy  SKIN/HAIR/NAILS:  Warm, ecchymosis  NEUROLOGIC:  Somnolent, opens eyes    Antibiotics:  IV vancomycin --> Daptomycin, stopped on 2020  IV meropenem  IV fluconazole --> Micafungin     Pertinent labs:  Reviewed    Results from last 7 days   Lab Units 20  0923 20  0431 20  0406   LN-WHITE BLOOD CELL COUNT thou/uL 14.1* 14.6* 18.3*   LN-HEMOGLOBIN g/dL 9.0* 8.9* 9.1*    LN-HEMATOCRIT % 27.1* 27.0* 28.3*   LN-PLATELET COUNT thou/uL 116* 115* 111*   LN-MONOCYTES - REL (DIFF) % 5 6 6       Results from last 7 days   Lab Units 08/30/20  0517 08/29/20  1618 08/29/20  0500  08/28/20  0431  08/27/20  0405   LN-SODIUM mmol/L 138 139 137  --  138   < > 140   LN-POTASSIUM mmol/L 3.6  3.6 3.7  3.7 3.6  3.6   < > 3.5  3.5   < > 4.6   LN-CHLORIDE mmol/L 105 107 106  --  106   < > 108*   LN-CO2 mmol/L 22 19* 22  --  20*   < > 17*   LN-BLOOD UREA NITROGEN mg/dL 53* 54* 52*  --  48*   < > 39*   LN-CREATININE mg/dL 2.48* 2.89* 3.08*  --  3.13*   < > 2.86*   LN-CALCIUM mg/dL 8.9 8.8 8.6  --  8.4*   < > 9.0   LN-ALBUMIN g/dL 1.4*  --   --   --  1.7*  --  1.9*   LN-PROTEIN TOTAL g/dL 6.0  --   --   --  6.3  --  6.6   LN-BILIRUBIN TOTAL mg/dL 2.0*  --   --   --  1.9*  --  2.0*   LN-ALKALINE PHOSPHATASE U/L 152*  --   --   --  147*  --  147*   LN-ALT (SGPT) U/L 27  --   --   --  119*  --  166*   LN-AST (SGOT) U/L 247*  --   --   --  1,413*  --  2,613*    < > = values in this interval not displayed.       MICROBIOLOGY DATA:    Cultures reviewed  Culture:  C. dubliniensis     IMAGING/RADIOLOGY:  Reviewed  XR Chest 1 View Portable (Order [id number])   Imaging         Study Result     EXAM: XR CHEST 1 VIEW PORTABLE  LOCATION: [facility]  DATE/TIME: 8/21/2020 7:34 PM     INDICATION: r/o pna- shortness of breath  COMPARISON: 08/11/2020     IMPRESSION:   New bilateral interstitial infiltrates could represent edema or pneumonia including COVID. Enlarged cardiac silhouette. No pleural effusion. No definite pulmonary vascular congestion.     Abscessogram reveals no residual abscess pocket. Fistula to the colon. Switched drain to gravity drainage bag from SHAMIKA suction bulb. No flushes are needed. Follow-up abscessogram in one week.

## 2021-06-11 NOTE — PLAN OF CARE
Problem: Pain  Goal: Patient's pain/discomfort is manageable  Outcome: Progressing  Note: Patient c/o abd pain today.  Medicated with PO Oxy 5mg as ordered.  Patient also on fent drip @ 50.      Problem: Glucose Imbalance  Goal: Achieve optimal glucose control  Outcome: Progressing  Note: Patient Q4 BS checks.  0800, 106.  1200, 106.  Glucose within range, no insulin given.       Problem: Potential for hemodynamic instability  Goal: Cardiac output is adequate  Outcome: Progressing  Note: Increased urine output responded well to Diuril.  MAP maintaining >55, SBP >100.     Problem: Breathing  Goal: Patient will maintain patent airway  Outcome: Progressing  Note: Patient on ventilator, increased propofol to assist with work of breathing per titration guidelines.

## 2021-06-11 NOTE — PROGRESS NOTES
[facility] Daily Progress Note      Date of Service: 9/16/2020     Brief History: Gardenia Muller is 70 y.o. female with history of type 2 diabetes mellitus, chronic atrial fibrillation, diastolic congestive heart failure, CAD s/p PCI in 1995, CVA, peripheral vascular disease, presented to the hospital on 8/11/2020 for abdominal pain, and vomiting. CT abdomen revealed diverticulitis with perforation. General surgery and IR were consulted. Follow up image on 08/14 showed increase in size of abscess, and she underwent CT guided drain in the left pelvic diverticular abscess on 08/17. CT abdomen on 08/20 showed near collapse of fluid cavity. The patient developed hospital acquired pneumonia, and had candida growing from the abdominal fluid. ID was consulted, and she was placed on broad spectrum antimicrobials. Patient developed acute respiratory distress, and had 25 lb weight gain since admission, and was started on IV lasix. On 08/26 develop MAY and episodes of bradycardia. Digoxin level was found to be subsequently elevated, and the patient was started on DigiFab. Transferred to ICU for closer monitoring. Required atropine in ICU for low heart rate and was started on dopamine. Patient was endotracheally intubated on 08/28 for volume overload and pneumonia. She has failed spontaneous breathing trial and has remained on full vent support. She underwent abscessogram with tube check on 09/09, and as there were no more fluid collection, the drain was removed.  Family care conference was held on 09/11, and as patient's wishes were not to pursue prolonged ventilator support and tracheostomy, extubated on 9/14, transitioned to comfort care.     Assessment/Plan:     Acute respiratory failure with hypoxia  Electrolyte imbalance  Perforated diverticulitis  Pericolonic abscess with sigmoid colon fistula  Digitalis toxicity  Junctional bradycardia  Acute kidney injury  Acute on chronic diastolic/systolic congestive heart  "failure  Type 2 diabetes mellitus with hyperglycemia  Anemia, normocytic  Acute encephalopathy, metabolic  Coronary artery disease                                          Comfort care orders in place  Hospice consult    Subjective:     Seen and examined, calm, comfortable. NAD    Objective     Vital signs in last 24 hours:  Temp:  [98  F (36.7  C)-99.9  F (37.7  C)] 99.4  F (37.4  C)  Heart Rate:  [] 128  Resp:  [16-36] 22  BP: (115-141)/(57-74) 115/57  Weight:   205 lb 14.4 oz (93.4 kg)  Weight change:   Body mass index is 37.66 kg/m .    Intake/Output last 3 shifts:  I/O last 3 completed shifts:  In: -   Out: 300 [Urine:300]  Intake/Output this shift:  No intake/output data recorded.      Physical Exam:  /57 (Patient Position: Lying)   Pulse (!) 128   Temp 99.4  F (37.4  C) (Axillary)   Resp 22   Ht 5' 2\" (1.575 m)   Wt 205 lb 14.4 oz (93.4 kg)   LMP 01/25/1999   SpO2 98%   BMI 37.66 kg/m      FiO2 (%): 21 % O2 Device: Nasal cannula O2 Flow Rate (L/min): 2 L/min    General Appearance: uncomfortable  HEENT: Normocephalic. No scleral icterus. Mucous membranes moist. Endotracheal intubation  Heart: S1 S2 heard. Irregular rate and rhythm.  Lungs: Decreased breath sounds, coarse sounds  Abdomen: Soft, non tender, bowel sounds present.  Extremity: No deformity. No joint swelling. No edema.  Neurologic: No focal deficit.  Skin: No skin rashes      Imaging:  personally reviewed.    Xr Chest 1 View Portable    Result Date: 9/8/2020  EXAM: XR CHEST 1 VIEW PORTABLE LOCATION: [facility] DATE/TIME: 9/8/2020 12:01 PM INDICATION: Respiratory failure COMPARISON: 09/06/2020 and older studies.     Xr Chest 1 View Portable    Result Date: 9/6/2020  EXAM: XR CHEST 1 VIEW PORTABLE LOCATION: [facility] DATE/TIME: 9/6/2020 11:59 AM INDICATION: f/u intubation COMPARISON: 09/03/2020     Ir Abscessogram Tube Check    [facility]: [facility]9/2020  [facility] DATE: " 9/9/2020 PROCEDURE: ABSCESS TUBE CHECK AND REMOVAL INTERVENTIONAL RADIOLOGIST: Karlos Mckeon MD. INDICATION: 70-year-old female with a left pelvic abscess and known fistula to the sigmoid colon. Drain outputs have been 0 the past several days. CONSENT: The risks, benefits and alternatives of an abscessogram with possible exchange, manipulation or removal were discussed with the patient  in detail. All questions were answered. Informed consent was given to proceed with the procedure. MODERATE SEDATION: None. CONTRAST: 10 mL Omnipaque into the abscess cavity. ANTIBIOTICS: None. ADDITIONAL MEDICATIONS: None. FLUOROSCOPIC TIME: 0.3 minutes. RADIATION DOSE: Air Kerma: 37 mGy. COMPLICATIONS: No immediate complications. STERILE BARRIER TECHNIQUE: Maximum sterile barrier technique was used. Cutaneous antisepsis was performed at the operative site with application of 2% chlorhexidine and large sterile drape. Prior to the procedure, the  and assistant performed hand hygiene and wore hat, mask, sterile gown, and sterile gloves during the entire procedure. PROCEDURE:  A  image was obtained. The pre-existing drainage catheter was injected with a small amount of contrast and multiple images were obtained. Based on the results of the study the drainage catheter was removed. FINDINGS: The  image shows the existing straight drainage catheter tubing. The tubing tip terminates approximately 3-4 cm lateral to the loop of the catheter on the previous study. An injection of contrast initially shows the drainage catheter is occluded. A more forceful injection exhibits peritubal leakage with no residual abscess. No residual fistula is identified.        Lab Results:  personally reviewed.   Lab Results   Component Value Date     09/13/2020    K 3.8 09/13/2020     09/13/2020    CO2 27 09/13/2020    BUN 15 09/13/2020    CREATININE 0.61 09/13/2020    CALCIUM 10.0 09/13/2020     Lab Results   Component Value Date     WBC 10.2 09/13/2020    HGB 7.5 (L) 09/13/2020    HCT 25.2 (L) 09/13/2020     (H) 09/13/2020     (L) 09/13/2020     Results from last 7 days   Lab Units 09/13/20  0407 09/12/20  0542 09/11/20  0456   LN-MAGNESIUM mg/dL 1.5* 2.0 1.5*        Results from last 7 days   Lab Units 09/12/20  0832   LN-POC GLUCOSE FINGERSTICK- HE mg/dL 176*       Advance Care Planning:   Comfort care  Hospice consult  Discharge date      Seble Trevizo MD  [facility]

## 2021-06-11 NOTE — PROGRESS NOTES
[facility] Daily Progress Note    Assessment/Plan:  Gardenia Muller is a 70-year-old lady with atrial fibrillation, diastolic congestive heart failure, hypertension, coronary artery disease status post PCI 1995, CVA, peripheral vascular disease, T2DM, malnutrition, intestinal angina, acalculous cholecystitis status post laparoscopic cholecystectomy in 2018, depression who came into [facility] on 8/11/2020 for abdominal pain and vomiting.  CT scan of the abdomen revealed diverticulitis with perforation.  On 814 it showed increased size of abscess from 2x2 cm to 4x3 cm.  Though there was clinical improvement in her symptoms.  General surgery was consulted who recommended interventional radiology consultation for possible drainage of the abdominal abscess which was done on 8/17/2020 by placing CT scan guided drain in the left pelvic diverticular abscess. On 8/20/2020 CT scan of the abdomen showed near collapse of the fluid cavity. Subsequently patient developed leukocytosis with possible hospital-acquired pneumonia seen on chest x-ray.  She also had Candida infection for which fluconazole was started with improvement of WBC count.  On 825 patient had shortness of breath with 25 pound weight gain and was started on IV Lasix.  On 826 developed acute kidney injury and had episodes of bradycardia.  Digoxin level was elevated at 6.9 on 8/26/2020 and 10.7 on 8/28/2024 which DigiFab was started and patient transferred to ICU.  Required atropine in ICU for heart rate which went down between 20 and 40.  Started on dopamine.  Developed respiratory failure. Abscessogram done on 8/31/2020 showed contracted abscess cavity with persistent fistula to the sigmoid colon with drain exchanged and tailored 10 [place] drain was placed lateral to the fistula site.  She underwent abscessogram with tube check on 09/09, and as there were no more fluid collection, the drain was removed.  Family care conference was held on 09/11, and as  patient's wishes were not to pursue prolonged ventilator support and tracheostomy, extubated on 9/14, transitioned to comfort care.       Assessment:    Digoxin toxicity  Acute kidney injury  Acute perforated diverticulitis with pericolonic abscess  Sigmoid colon fistula   Hospital-acquired pneumonia   Candida bacteremia   Acute exacerbation on chronic diastolic CHF  Acute anemia  Atrial fibrillation  Type 2 diabetes mellitus  Acute Metabolic encephalopathy   Coronary Artery Disease     Plan:  Patient is on comfort cares.  Hospice is following.  Patient had fever and will get Tylenol for this.     Code status:No CPR- Do NOT Intubate        Subjective:  Patient looks comfortable was breathing faster with temperature elevation  Review of Systems:   She is not responding to questions    Current Medications Reviewed via EHR List    Objective:  Vital signs in last 24 hours:  Temp:  [99  F (37.2  C)-102.6  F (39.2  C)] 102.6  F (39.2  C)  Heart Rate:  [108-145] 145  Resp:  [22-26] 26  BP: (138-166)/(60-69) 146/69  SpO2:  [96 %-99 %] 99 %  FiO2 (%):  [2 %] 2 %    Intake/Output last 3 shifts:  I/O last 3 completed shifts:  In: -   Out: 150 [Urine:150]      Physical Exam:  EYES: Are closed unable to communicate  NECK: no JVD  HEART : S1 S2 RRR    LUNGS:  Clear to auscultation without  Crepitations or Wheezing    ABDOMEN:   Soft, No tenderness ?, Bowel sounds are positive  CNS patient is comfortable not responding to voice breathing spontaneously             Lab Results:  Personally Reviewed.            Advanced Care Planning  Discharge plan: Unknown at this time      Michael Abreu  Date: 9/18/2020  Time: 3:41 PM  Hospitalist Service  Part of this chart was created using a dictation software. Typographic errors, word substitutions, and Grammatical errors may unintentionally occur.

## 2021-06-11 NOTE — PROGRESS NOTES
Pharmacy Consult: Warfarin Management    Pharmacy consulted to dose warfarin for Gardenia Muller, a 70 y.o. female    Ordering provider: Karen Hilliard MD     Reason for warfarin therapy: Atrial Fibrillation  Goal INR Range: 2-3    Subjective  Medication lists (home and hospital) were reviewed.    New medications that may increase bleeding risk/INR: none    Restarted home medications that may increase bleeding risk/INR: ASA, Ticagrelor    Home Warfarin Dosing: multiple dose reductions during August 2020.  Last dosing regimen just before admission was 1.5mg daily     Other Anticoagulants: Patient being bridged: No    Patient Active Problem List   Diagnosis     Spinal stenosis     PAD (peripheral artery disease) (H)     Dermatosis Papulosa Nigra     Depression     Hypercalcemia     Right Renal Artery Stenosis     Osteoarthritis     Abnormality Of Walk     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)     Advanced directives, counseling/discussion     Cystitis     Healthcare maintenance     Essential hypertension     Cerebral infarction (H)     Anemia     Cerebrovascular disease     RLS (restless legs syndrome)     Incisional hernia, without obstruction or gangrene     Diverticular disease of large intestine     Coronary artery disease involving native coronary artery of native heart without angina pectoris     Dyslipidemia     Ventral hernia without obstruction or gangrene     Anxiety     (HFpEF) heart failure with preserved ejection fraction (H)     Anticoagulant long-term use     Persistent atrial fibrillation (H)     Bradyarrhythmia     Acute kidney injury (H)     Transaminitis     Physical deconditioning     Lipoma of back     Acalculous cholecystitis     Onychogryphosis     Hyperparathyroidism (H)     Microalbuminuria     Intestinal angina (H)     Chronic abdominal pain     Weight loss, non-intentional     Warfarin anticoagulation     Severe protein-calorie malnutrition (H)     Hypertrophy of nail      Dysuria     Class 1 obesity with serious comorbidity and body mass index (BMI) of 33.0 to 33.9 in adult, unspecified obesity type     Trigger finger, acquired     Acute liver failure without hepatic coma     Hematochezia     Hyperkalemia     Acute on chronic combined systolic (congestive) and diastolic (congestive) heart failure (H)     Ischemic cardiomyopathy     Acute kidney failure, unspecified (H)     Acute diastolic heart failure (H)     Diverticulitis     Supratherapeutic INR     Diverticulitis of large intestine with perforation and abscess without bleeding     Atrial fibrillation with rapid ventricular response (H)     Obstructive sleep apnea syndrome     Poisoning by digoxin, accidental or unintentional, initial encounter     Acute respiratory failure with hypoxia and hypercapnia (H)     Acute metabolic encephalopathy     Shock circulatory (H)     Metabolic acidosis    Past Medical History:   Diagnosis Date     Acute diastolic heart failure (H) 11/2015     Acute on chronic diastolic heart failure (H) 6/15/2019     Advanced directives, counseling/discussion 8/5/2015    Patient completed 2011.  Discussed today, 5/24/17, and wants to change healthcare agent from niece to daughter.  Discussed that she will need to complete new form to indicate this.     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  Recommend 5 year follow-up.  Single tubular adenoma     Biliary obstruction 10/3/2018    Added automatically from request for surgery [id number]     Cerebral vascular accident (H)      Coronary artery disease 2006    100% RCA with collateral filling per Dr. Elizabeth's angio report     Diabetes mellitus type 2 in obese (H)      Fibrocystic breast      GERD (gastroesophageal reflux disease)      History of anesthesia complications     slow to wake     Hypertension      Obstructive sleep apnea syndrome       Peripheral vascular disease (H)      Pneumonia 11/11/2018     PONV (postoperative nausea and vomiting)      S/P cholecystectomy 6/22/2018     SBO (small bowel obstruction) (H) 11/12/2015     Stroke (H)      Transaminitis 10/22/2018     Upper respiratory infection 12/20/2018     Urinary incontinence         Social History     Tobacco Use     Smoking status: Former Smoker     Packs/day: 0.25     Smokeless tobacco: Former User     Tobacco comment: e-cig a few times/day   Substance Use Topics     Alcohol use: No     Drug use: No       Objective   Labs:  Last 3 days:    Recent Labs     09/02/20  0618 09/03/20  0439 09/04/20  0532   CREATININE 0.60 0.53* 0.52*   HGB 9.3* 8.1* 7.7*   HCT 29.7* 25.4* 24.8*   PLT 81* 103* 116*     Last 7 days:   Recent labs: (last 7 days)     08/29/20  0500 08/30/20  0517 08/31/20  0501 09/01/20  0357 09/02/20  0618 09/03/20  0439 09/04/20  0532   INR 4.81* 3.62* 3.01* 2.52* 2.07* 1.86* 1.89*       Warfarin Dosing History:    Date INR Warfarin Dose Comment   9/2/20 2.07 none    9/3/20 1.86 1.5 mg Pharmacist consulted to dose warfarin   9/4/20 1.89 1.5mg            Assessment  The patient is resuming warfarin for the indication of Atrial Fibrillation with a goal INR of 2-3. INR today is subtherapeutic. Multiple dose reductions during August 2020.  Last dosing regimen just before admission was 1.5mg daily.  Patient was admitted with supratherapeutic INR.  INR was reversed for surgery and then remained elevated most likely due to liver issues post operatively.      Plan  1. Administer warfarin 1.5 mg mg PO today.  2. Check INR daily or as appropriate.  3. Continue to follow the patient's INR, PLT, and HGB as available.  4. Monitor for potential drug/disease interactions.    Thank you for the consult,  Kandi Arroyo, RP, BCPS, BCCCP 9/4/2020 1:09 PM

## 2021-06-11 NOTE — PROGRESS NOTES
Respiratory care note:    SBT via PS 15/+5 for completed for 10 minutes. SBT terminated due to RSBI >150, RR 40s, coughing, and agitation.     Lilia Hunt, LRT

## 2021-06-13 NOTE — PROGRESS NOTES
"Assessment/ Plan  1. Atypical angina  Inserting story of nausea/vomiting with exertion or anxiety, coupled with shortness of breath and palpitations in a patient with underlying coronary artery disease.  Though she has a relatively normal EKG, she is unable to exercise.  Will order stress imaging test.  Discussed use of nitroglycerin with symptoms and importance according the emergency room if symptoms persist.  Also did prescribe Lorazepam to use on a as needed basis with anxiety  - HM2(CBC w/o Differential)  - Troponin I  - XR Chest PA and Lateral; Future  - Electrocardiogram Perform - Clinic  - Basic Metabolic Panel  - NM Pharmacologic Stress Test; Future    2. Type 2 diabetes mellitus with other circulatory complication, unspecified long term insulin use status  Diet controlled, well controlled, no change  - Glycosylated Hemoglobin A1c    3. CVA (cerebrovascular accident)  Secondary prevention with warfarin, statin, blood pressure control.  - Lipid Cascade  - ALT (SGPT)  - INR    4. Health care maintenance  Flu shot given.  Incisional hernia, await cardiac workup but will have her see surgery in follow-up.  Body mass index is 33.29 kg/(m^2).    Subjective  CC:  Chief Complaint   Patient presents with     Follow-up     HPI:  DMII    A  Testing blood sugars/ frequency? Bid    AM       PM  109-130    HTN  Narrative/ comments: None  Antihypertensive meds: hctz 25, metop xl 25  Does patient check blood pressure on his/her own?  Yes, daily  Low salt, plenty of fruits and vegetables in diet?  Does well  Exercise?  Unable  Alcohol? no  Meds that may raise BP (NSAIDs, decongestants etc?  No      Suspected Hernia    -------------------------------  Location & Description: R lower abd  Duration:  4 months  Timing of Onset/ Context: had umbilical hernia repair in the past  Now having some \"gas leading to cramps  Getting bigger recently? no  Pain? : no- but sometimes  Other associated symptoms?  no  Comment/ previous " history of hernia? Yes- umbilical hernia last year    Nausea and Vomiting    ___________________________  Notes  Duration/ Timing/ Frequency/context- 3 months, happens when excited/ anxious; feels indigestion and generally;  Vomited 10 times.  Sometimes gets with walking  Abdominal Pain? No pain  Diarrhea? no  Other associated symptoms? no      No chesp pain  But sob   No sweating  Gets palpitations.     History of CAD  : PCI 1995 -   Details unknownneg adnosine caridolite 2/16/2006- EF 65%Angiogram 2006 showed   chronic RCA occlusion, minimal disease in LAD, LCx, Normal LVEF                      PF[facility]:  Patient Active Problem List    Diagnosis Date Noted     PAF (paroxysmal atrial fibrillation) 11/12/2015     Priority: High     Overview Note:     Post-op emergent hernia repair 11/2015       CHF (congestive heart failure) 11/12/2015     Priority: High     Overview Note:     Diastolic  Dx with volume overload post-op emergent hernia repair 11/2015-   Echo 2011- hyperdynamic lv- EF= 65%, mild AI,TI,MI and aortic sclerosis without stenosis         Type 2 diabetes mellitus with circulatory disorder      Priority: High     Overview Note:     Created by Conversion         Lumbar Canal Stenosis      Priority: High     Overview Note:     Created by [facility] Annotation: Aug 20 2010 10:36AM - Jerson Hernandez: see MRI 5/12/10.    Severe l4-5 stenosis and severe L5-S1  l foraminal stenosis- due, in paryt,   to lipomatosis         Peripheral Vascular Disease      Priority: High     Overview Note:     Created by Conversion         Patent Foramen Ovale      Priority: High     Overview Note:     Created by [facility] Annotation: Sep  4 2008  4:30PM - Jerson Hernandez: probable- ECHO   7/5/2006no shunt seen on NIVIA following CVA 8/11- report scanned         Acute myocardial infarction      Priority: High     Overview Note:     Created by [facility] Annotation: Oct  8 2007  4:47PM - Jerson Hernandez:  Stent RCA    Replacement Utility updated for latest IMO load       Coronary Arteriosclerosis      Priority: High     Overview Note:     Created by Conversion  [facility] Annotation: Sep 28 2007 11:22AM Desean HernandezJerson: PCI 1995 -   Details unknownneg adnosine caridolite 2/16/2006- EF 65%Angiogram 2006 showed   chronic RCA occlusion, minimal disease in LAD, LCx, Normal LVEF    Replacement Utility updated for latest IMO load       Hyperlipidemia      Priority: High     Overview Note:     Created by Conversion         Cerebral atherosclerosis      Priority: High     Overview Note:     Created by Conversion         Right Renal Artery Stenosis      Priority: High     Overview Note:     Created by Conversion  [facility] Annotation: Oct  8 2007  4:47PM Jerson Esparza: stented 1999         Hypertension 07/02/2010     Priority: High     Dysarthria 07/02/2010     Priority: High     Hypomagnesemia      Priority: Medium     Dermatosis Papulosa Nigra      Priority: Medium     Overview Note:     Created by Conversion         Depression      Priority: Medium     Overview Note:     Created by Conversion         Hypercalcemia      Priority: Medium     Overview Note:     Created by Conversion  [facility] Annotation: Jun 5 2013  7:58AM Jerson Esparza: most likely due   to hydrochlorothiazide  Normal parathyroid hormone 9/2008       Osteoarthritis      Priority: Medium     Overview Note:     Created by Conversion    Replacement Utility updated for latest IMO load       Tachycardia 07/02/2010     Priority: Medium     RLS (restless legs syndrome) 05/24/2017     Benign adenomatous polyp of large intestine 03/30/2017     Overview Note:     Colonoscopy March 2017.  Recommend 5 year follow-up.  Single tubular adenoma       CVA (cerebrovascular accident) 01/16/2017     Health care maintenance 03/07/2016     Cystitis 03/05/2016     SBO (small bowel obstruction) 11/12/2015     Advanced directives, counseling/discussion 08/05/2015      Overview Note:     Patient completed 2011.  Discussed today, 5/24/17, and wants to change healthcare agent from niece to daughter.  Discussed that she will need to complete new form to indicate this.       Abnormality Of Walk      Overview Note:     Created by Conversion         Cerebral infarction 07/02/2010     Anemia 07/02/2010     Current medications reviewed as follows:  Current Outpatient Prescriptions on File Prior to Visit   Medication Sig     aspirin 81 MG EC tablet TAKE 1 TABLET BY MOUTH ONCE DAILY.     blood glucose meter (GLUCOMETER) Please Dispense kit per pharmacy discretion and patient's insurance Test blood sugar.     blood glucose test strips Dispense brand per patient's insurance at pharmacy discretion.  Patient to test twice daily     blood glucose test strips Test twice a day.   Dispense brand per patient's insurance at pharmacy discretion.     blood glucose test strips Use 1 each As Directed as needed. Dispense brand per patient's insurance at pharmacy discretion.     blood-glucose meter Misc Use as directed      mg capsule TAKE 1 CAPSULE BY MOUTH TWICE DAILY AT 6AM AND 4PM.     enoxaparin (LOVENOX) 80 mg/0.8 mL syringe Inject 0.8 mL (80 mg total) under the skin every 12 (twelve) hours.     FIBER, CALCIUM POLYCARBOPHIL, 625 mg tablet TAKE 2 TABLETS (1250MG) BY MOUTH ONCE DAILY     gabapentin (NEURONTIN) 100 MG capsule TAKE 1 CAPSULE BY MOUTH AT BEDTIME     generic lancets Use 1 each As Directed as needed. Dispense brand per patient's insurance at pharmacy discretion.     hydrochlorothiazide (HYDRODIURIL) 25 MG tablet TAKE 1 TABLET BY MOUTH ONCE DAILY.     lancets 33 gauge Misc Test twice daily Dispense brand per patient's insurance at pharmacy discretion.     lidocaine (LMX5) 5 % Crea Apply 1 application topically every 12 (twelve) hours as needed (pain).     loratadine (CLARITIN) 10 mg tablet TAKE 1 TABLET BY MOUTH ONCE DAILY     magnesium oxide (MAG-OX) 400 mg tablet TAKE 1 TABLET BY  MOUTH TWICE DAILY     MAPAP ARTHRITIS PAIN 650 mg CR tablet TAKE 1 TABLET BY MOUTH THREE TIMES DAILY.     metoprolol succinate (TOPROL-XL) 25 MG TAKE 1 TABLET BY MOUTH ONCE DAILY     multivitamin (TAB-A-JULIET) per tablet Take 1 tablet by mouth daily.     nitroglycerin (NITROSTAT) 0.4 MG SL tablet Place 1 tablet (0.4 mg total) under the tongue every 5 (five) minutes as needed for chest pain (for up to 3 doses and call 911 if persists).     omeprazole (PRILOSEC) 20 MG capsule 20 mg 2 (two) times a day.      omeprazole (PRILOSEC) 20 MG capsule TAKE 1 CAPSULE BY MOUTH TWICE DAILY.     polyethylene glycol 3350 Powd Take 17 g by mouth daily as needed (constipation).      polyvinyl alcohol (ARTIFICIAL TEARS, POLYVIN ALC,) 1.4 % ophthalmic solution Administer 1 drop to both eyes 4 (four) times a day. As directed.     REFRESH OPTIVE 0.5-0.9 % Drop INSTILL TWO DROPS IN BOTH EYES TWICE DAILY AS NEEDED     rosuvastatin (CRESTOR) 40 MG tablet TAKE 1 TABLET BY MOUTH AT BEDTIME     sertraline (ZOLOFT) 100 MG tablet TAKE 1 TABLET BY MOUTH ONCE DAILY     traZODone (DESYREL) 50 MG tablet TAKE 1 TABLET BY MOUTH AT BEDTIME.     VITAMIN D3 2,000 unit capsule TAKE 1 CAPSULE BY MOUTH ONCE DAILY **DO NOT BRAND CHANGE- PATIENT ONLY WANT CAPSULE*     warfarin (COUMADIN) 2 MG tablet TAKE 2 TABLETS (4MG) BY MOUTH ONCE DAILY     blood-glucose meter Misc Use 1 Device As Directed once for 1 dose.     No current facility-administered medications on file prior to visit.      History   Smoking Status     Former Smoker   Smokeless Tobacco     Never Used     Social History     Social History Narrative     Patient Care Team:  Jerson Hernandez MD as PCP - General (Family Medicine)  ROS  Full 10 system review including constitutional, respiratory, cardiac, gi, urinary, rheumatologic, neurologic, reproductive, dermatologic psychiatric is  performed (via questionnaire) and is negative except chills, weight change, dizziness, sleeping difficulties,  "ringing in ears, shortness of breath, heartburn, getting up at night late to urinate, back pain, muscle cramps, cold and      Objective  Physical Exam  Vitals:    09/20/17 1006   BP: 138/52   Pulse: 78   Resp: 20   SpO2: 96%   Weight: 182 lb (82.6 kg)   Height: 5' 2\" (1.575 m)     Gen- alert, oriented/ appropriately responsive  HEENT- normal cephalic, atraumatic.   Chest- Normal inspiration and expiration.  Clear to ascultation.  No chest wall deformity or scar.  CV- Heart regular rate and rhythm, normal tones, no murmurs gallops or rubs.  Ext- appear well perfused, no edema  Skin- warm and dry, no visualized rash  Large abdominal hernia to the right of the midline incision  Diagnostics  Results for orders placed or performed in visit on 09/20/17   HM2(CBC w/o Differential)   Result Value Ref Range    WBC 4.8 4.0 - 11.0 thou/uL    RBC 4.06 3.80 - 5.40 mill/uL    Hemoglobin 11.2 (L) 12.0 - 16.0 g/dL    Hematocrit 35.8 35.0 - 47.0 %    MCV 88 80 - 100 fL    MCH 27.5 27.0 - 34.0 pg    MCHC 31.2 (L) 32.0 - 36.0 g/dL    RDW 13.0 11.0 - 14.5 %    Platelets 203 140 - 440 thou/uL    MPV 8.8 7.0 - 10.0 fL   Glycosylated Hemoglobin A1c   Result Value Ref Range    Hemoglobin A1c 6.2 (H) 3.5 - 6.0 %   INR   Result Value Ref Range    INR 3.10 (H) 0.90 - 1.10   Electrocardiogram Perform - Clinic   Result Value Ref Range    SYSTOLIC BLOOD PRESSURE  mmHg    DIASTOLIC BLOOD PRESSURE  mmHg    VENTRICULAR RATE 63 BPM    ATRIAL RATE 63 BPM    P-R INTERVAL  ms    QRS DURATION 90 ms    Q-T INTERVAL 452 ms    QTC CALCULATION (BEZET) 462 ms    P Axis  degrees    R AXIS -15 degrees    T AXIS 40 degrees    MUSE DIAGNOSIS       Sinus rhythm with Fusion complexes  Otherwise normal ECG  When compared with ECG of 07-JUL-2016 12:12,  Fusion complexes are now Present       Finally agreed.  Shows anterior septal MI, mild nonspecific ST changes and no change compared with July 7, 2016    Personally reviewed and is unchanged, borderline " cardiomegaly.  Please note: Voice recognition software was used in this dictation.  It may therefore contain typographical errors.

## 2021-06-13 NOTE — PROGRESS NOTES
Assessment: Follow up with Gardenia sánchez, short visit, saw MD before out visit and this appt was longer than usual.      Continues to check bg bid, fasting and HS, bg noted and all WNL, A1c check today 6.2.  Gardenia states bg have actually been running lower sometimes in the 80's and will eat some cookies.  Encouraged snack when bg < 100 mg/dl, verbalized understanding and will continue this.    No real change in intake, eating three times daily but overall eating less not as hungry. Breakfast might be toast or cereal, will make eggs and turkey sausage once in awhile, lunch is generally a sandwich and dinner is either frozen healthy choice meal or another sandwich. Snack as desired between meals, fruit, a few cookies, may consider cheese sticks, granola bars or yogurt. With cooler weather around the corner encouraged healthy choice soups for meals or half sandwich with soup for a meal.    Wt 182# today, last CDE visit 177# in 5.2017, daily wt chart at home indicates stable wt of 181-182#.    Overall doing well with current dm mgmt.     Plan: follow up 3-4 months.     Subjective and Objective:      Gardenia Muller is referred by  for Diabetes Education.     Lab Results   Component Value Date    HGBA1C 6.2 (H) 09/20/2017         Current diabetes medications:  none    Goals       monitoring            1. Maintain A1c < 7.0.  3.16.16  MET            Follow up:   CDE (certified diabetic educator)      Education:     Monitoring   Meter (per above goals): Not addressed  Monitoring: Competent  BG goals: Discussed and Competent    Nutrition Management  Nutrition Management: Discussed and Competent  Weight: Discussed  Portions/Balance: Not addressed  Carb ID/Count: Competent  Menu Planning: Discussed and Competent  Physical Activity: Discussed and Competent      Acute Complications: Prevent, Detect, Treat:  Hypoglycemia: Assessed and Discussed  Hyperglycemia: Discussed    Chronic Complications  Foot  Care:Competent  Skin Care: Competent  Eye: Competent  ABC: Assessed and Discussed  Teeth:Discussed  Goal Setting and Problem Solving: Discussed  Barriers: Assessed  Psychosocial Adjustments: Assessed      Time spent with the patient:30 min for diabetes education and counseling.   Previous Education: yes  Visit Type:DSMT  Hours Remaining: DSMT 1.5 and MNT 45 min      Lolis Young  9/20/2017

## 2021-06-15 NOTE — PROGRESS NOTES
Assessment/ Plan  Problem List Items Addressed This Visit        High    PAD (peripheral artery disease) - Primary     No current symptoms  See cerebrovascular disease for discussion of secondary prevention  Unable to walk for fitness         Coronary Arteriosclerosis     Not symptomatic, see cerebrovascular disease for discussion of secondary prevention         Relevant Orders    INR (Completed)    Cerebral atherosclerosis     No recent headaches/symptoms of cerebral ischemia    Anti-thrombotic treatment with aspirin plus warfarin due to recurrent CVAs  Blood pressure controlled with hydrochlorothiazide and metoprolol 25 mg, intolerant of lisinopril in the past  Patient takes statin, Crestor 30         Type 2 diabetes mellitus with circulatory disorder     Assessment of blood sugar control: Very good  Lab Results   Component Value Date    HGBA1C 6.5 (H) 01/10/2018     Diabetes medication: None, diet controlled,   Statin medication (>40 or ^CVD Risk)-Crestor  At blood pressure goal (JNC 8 <140/90 all ages): Yes  Lab Results   Component Value Date    MICROALBUR 1.15 10/26/2016     Ace/arb indicated and taking?  No, intolerant  Low dose ASA? (indicated for most men> 50, most women > 60 if any other card RF yes  Up to date with yearly dilated retina eval?  Not discussed  Checking blood sugars twice a day which are good  Plan microalbumin, A1c  Follow-up 6 months           Relevant Orders    Glycosylated Hemoglobin A1c (Completed)    Microalbumin, Random Urine       Medium    Major depressive disorder     Patient indicates she is well so did not fill out PHQ 9 unfortunately.  Sertraline 100 mg plus trazodone at night         Hypomagnesemia     Recheck, not currently on magnesium supplement         Relevant Orders    Magnesium      Other Visit Diagnoses     GERD (gastroesophageal reflux disease)        Relevant Orders    Vitamin B12    Healthcare maintenance        Relevant Orders    Vitamin D, Total (25-Hydroxy)    DXA  Bone Density Scan      Elected to order Adacel today, should be done in the future    She will purpose for this visit was a face-to-face visit to obtain a motorized lift assist.  Patient walks with a walker but has inability to get out of chairs and stand up.  This is due to a number of factors including osteoarthritis of the knees, history of recurrent ischemic strokes, generalized weakness.  Subjective  CC:  Chief Complaint   Patient presents with     Follow-up     HPI:  For vascular disease  Aspirin plus warfarin  Crestor 40  Blood pressure controlled with hydrochlorothiazide 25 and metoprolol extended release 25    DMII    Assessment of blood sugar control: Blood sugars look excellent  Lab Results   Component Value Date    HGBA1C 6.2 (H) 09/20/2017     Diabetes medications reviewed- compliance: none    Additional diabetes objectives reviewed/ comment: yes  Statin medication (>40 or ^CVD Risk)  Blood pressure goal (JNC 8 <140/90 all ages)  Lab Results   Component Value Date    MICROALBUR 1.15 10/26/2016     Ace/ARB if indicated- not n, had side effect  Low dose ASA? (indicated for most men> 50, most women > 60 if any other card RF   Yearly dilated retina evaluation- has appt    Blood Sugar Control  Testing blood sugars/ frequency? bid  AM   499=516    HS  110-136    GERD  Patient assessment of control:fair  Medication: omeprazole bid  Duration of treatment: 6 months  Taking Dietary precautions? Not so much  Frequency of daytime symptoms: Patient has it occasionally.  Turns out, she is not taking any vegetables because of what she has been taught with warfarin.  Discussed that I think she should use vegetables but try to keep her amount stable every day.  Frequency of nighttime symptoms: Did not discuss  History of endoscopy/ Gonzalez's?  No  Comments: Patient remains on twice a day omeprazole after this was increased around the time of her epigastric discomfort.        Wt Readings from Last 3 Encounters:    01/10/18 187 lb (84.8 kg)   09/20/17 182 lb (82.6 kg)   05/24/17 177 lb (80.3 kg)     Healthcare maintenance assessment  Immunization-due for Adacel  Cancer screening-mammogram 1 year ago, should be on every two-year screening  Vascular-up-to-date  Other-never had DEXA scan which should be done this was ordered              [facility]:  Patient Active Problem List   Diagnosis     Lumbar Canal Stenosis     PAD (peripheral artery disease)     Patent Foramen Ovale     Dermatosis Papulosa Nigra     Acute myocardial infarction     Coronary Arteriosclerosis     Major depressive disorder     Hypercalcemia     Hyperlipidemia     Cerebral atherosclerosis     Right Renal Artery Stenosis     Osteoarthritis     Abnormality Of Walk     Type 2 diabetes mellitus with circulatory disorder     Advanced directives, counseling/discussion     SBO (small bowel obstruction)     PAF (paroxysmal atrial fibrillation)     CHF (congestive heart failure)     Cystitis     Hypomagnesemia     Tachycardia     Hypertension     Dysarthria     Cerebral infarction     Anemia     CVA (cerebrovascular accident)     Benign adenomatous polyp of large intestine     RLS (restless legs syndrome)     Incisional hernia     Current medications reviewed as follows:  Current Outpatient Prescriptions on File Prior to Visit   Medication Sig     aspirin 81 MG EC tablet TAKE 1 TABLET BY MOUTH ONCE DAILY     blood glucose meter (GLUCOMETER) Please Dispense kit per pharmacy discretion and patient's insurance Test blood sugar.     blood glucose test strips Dispense brand per patient's insurance at pharmacy discretion.  Patient to test twice daily     blood glucose test strips Test twice a day.   Dispense brand per patient's insurance at pharmacy discretion.     blood glucose test strips Use 1 each As Directed as needed. Dispense brand per patient's insurance at pharmacy discretion.     blood-glucose meter Misc Use as directed      mg capsule TAKE 1 CAPSULE BY MOUTH  TWICE DAILY AT 6AM AND 4PM.     enoxaparin (LOVENOX) 80 mg/0.8 mL syringe Inject 0.8 mL (80 mg total) under the skin every 12 (twelve) hours.     FIBER, CALCIUM POLYCARBOPHIL, 625 mg tablet TAKE 2 TABLETS (1250MG) BY MOUTH ONCE DAILY     gabapentin (NEURONTIN) 100 MG capsule TAKE 1 CAPSULE BY MOUTH AT BEDTIME     generic lancets Use 1 each As Directed as needed. Dispense brand per patient's insurance at pharmacy discretion.     hydrochlorothiazide (HYDRODIURIL) 25 MG tablet TAKE 1 TABLET BY MOUTH ONCE DAILY.     lancets 33 gauge Misc Test twice daily Dispense brand per patient's insurance at pharmacy discretion.     lidocaine (LMX5) 5 % Crea Apply 1 application topically every 12 (twelve) hours as needed (pain).     loratadine (CLARITIN) 10 mg tablet TAKE 1 TABLET BY MOUTH ONCE DAILY     LORazepam (ATIVAN) 0.5 MG tablet 1  tabs po q 6 hours prn     magnesium oxide (MAG-OX) 400 mg tablet TAKE 1 TABLET BY MOUTH TWICE DAILY     MAPAP ARTHRITIS PAIN 650 mg CR tablet TAKE 1 TABLET BY MOUTH THREE TIMES DAILY.     metoprolol succinate (TOPROL-XL) 25 MG TAKE 1 TABLET BY MOUTH ONCE DAILY     multivitamin (TAB-A-JULIET) per tablet Take 1 tablet by mouth daily.     nitroglycerin (NITROSTAT) 0.4 MG SL tablet Place 1 tablet (0.4 mg total) under the tongue every 5 (five) minutes as needed for chest pain (for up to 3 doses and call 911 if persists).     omeprazole (PRILOSEC) 20 MG capsule 20 mg 2 (two) times a day.      omeprazole (PRILOSEC) 20 MG capsule TAKE 1 CAPSULE BY MOUTH TWICE DAILY.     polyethylene glycol 3350 Powd Take 17 g by mouth daily as needed (constipation).      polyvinyl alcohol (ARTIFICIAL TEARS, POLYVIN ALC,) 1.4 % ophthalmic solution Administer 1 drop to both eyes 4 (four) times a day. As directed.     REFRESH OPTIVE 0.5-0.9 % Drop INSTILL TWO DROPS IN BOTH EYES TWICE DAILY AS NEEDED     rosuvastatin (CRESTOR) 40 MG tablet TAKE 1 TABLET BY MOUTH AT BEDTIME     sertraline (ZOLOFT) 100 MG tablet TAKE 1 TABLET BY  "MOUTH ONCE DAILY     traZODone (DESYREL) 50 MG tablet TAKE 1 TABLET BY MOUTH AT BEDTIME.     VITAMIN D3 2,000 unit capsule TAKE 1 CAPSULE BY MOUTH ONCE DAILY * DO NOT BRAND CHANGE - PATIENT ONLY WANT CAPSULE* (1 TOTAL FILL)     warfarin (COUMADIN) 2 MG tablet TAKE 2 TABLETS (4MG) BY MOUTH ONCE DAILY     blood-glucose meter Misc Use 1 Device As Directed once for 1 dose.     No current facility-administered medications on file prior to visit.      History   Smoking Status     Former Smoker   Smokeless Tobacco     Never Used     Social History     Social History Narrative     Patient Care Team:  Jerson Hernandez MD as PCP - General (Family Medicine)  ROS  Full 10 system review including constitutional, respiratory, cardiac, gi, urinary, rheumatologic, neurologic, reproductive, dermatologic psychiatric is  performed (via questionnaire) and is negative    Objective  Physical Exam  Vitals:    01/10/18 0945   BP: 126/52   Pulse: 80   Resp: 20   Temp: 97  F (36.1  C)   TempSrc: Oral   SpO2: 96%   Weight: 187 lb (84.8 kg)   Height: 5' 2\" (1.575 m)     Body mass index is 34.2 kg/(m^2).  Gen- alert, oriented/ appropriately responsive  HEENT- normal cephalic, atraumatic.   Chest- Normal inspiration and expiration.  Clear to ascultation.  No chest wall deformity or scar.  CV- Heart regular rate and rhythm, normal tones, no murmurs gallops or rubs.  Ext- appear well perfused, no edema  Skin- warm and dry, no visualized rash    Diagnostics:         Please note: Voice recognition software was used in this dictation.  It may therefore contain typographical errors.           "

## 2021-06-16 NOTE — PROGRESS NOTES
Assessment/ Plan  Problem List Items Addressed This Visit        High    Coronary Arteriosclerosis     Last intervention 2006, no recent symptoms, chronically obstructed RCA.  We will have her see cardiology in preparation for surgery         Relevant Orders    Ambulatory referral to Cardiology    Cerebral atherosclerosis     No recent CVA  On warfarin because of recurrent CVA  Last carotid ultrasound 2010, reviewed recommendations for preop carotid evaluation.  Recommendation is assessment of carotids only for recurrent TIAs that can be attributed to carotid artery stenosis.  Therefore will not pursue carotid artery evaluation.  Will plan on bridging warfarin with heparin         Hypertension     Blood pressures were high upon admission to the emergency room but have been around 100-120 systolic at home.  Consider cutting back on metoprolol or hydrochlorothiazide in the future but will follow for now.            Unprioritized    RLS (restless legs syndrome)     Treatment initiated with Mirapex and hospitalization and patient reports that it is effective         Ventral hernia without obstruction or gangrene     Reason for recent hospitalization, plan outpatient elective surgery  Patient wondered what the risk of this hernia was and whether she truly needs surgery.  My assessment is that given previous symptoms and location, the hernia is quite high risk for going on to cause ongoing pain.  Plan surgery pending cardiology clearance         Relevant Orders    Ambulatory referral to General Surgery      Other Visit Diagnoses     Hospital discharge follow-up    -  Primary    Primary diagnosis is ventral hernia, plan for elective repair, see below        Subjective  CC:  Chief Complaint   Patient presents with     Follow-up     [facility]ia 02/25/18 [facility]      HPI:  Hospital Follow-up  Date of admission: 2/26  Date of discharge: 3/1  Hospital: [facility]  Chief Diagnosis: Abdominal pain, ventral hernia  Narrative:  Admitted for abdominal pain, CT showed right midline paramedian hernia without obstruction.  Surgery followed.  Dr. Flores recommended outpatient robotic assisted repair.  Also recommended cardiology evaluation before elective surgery  Procedures During hospital stay not mentioned: None  Radiology studies: Abdominal CT 2/26  CONCLUSION:  1.  Ventral incisional hernias with new moderate size right midline perimedian hernia which contains a portion of the transverse colon. No evidence of bowel obstruction.            Labs:   Admission hemoglobin 11.2 with normal white blood cell count and platelets, comprehensive metabolic profile normal except slightly elevated protein, lactic acid was normal at 1.1.  Last A1c 1/10/18 was 6.5   Gardenia Muller   Home Medication Instructions SATINDER:    Printed on:03/15/18 2315   Medication Information                      ACCU-CHEK YAZMIN PLUS TEST STRP strips  TEST TWICE DAILY AS DIRECTED. *6 TOTAL FILLS*             acetaminophen (TYLENOL) 650 MG CR tablet  Take 1,300 mg by mouth 2 (two) times a day.             aspirin 81 MG EC tablet  Take 81 mg by mouth daily.             blood glucose meter (GLUCOMETER)  Please Dispense kit per pharmacy discretion and patient's insurance Test blood sugar.             calcium polycarbophil (FIBERCON) 625 mg tablet  Take 1,250 mg by mouth daily.             cholecalciferol, vitamin D3, 1,000 unit tablet  Take 2,000 Units by mouth daily.             docusate sodium (COLACE) 100 MG capsule  Take 100 mg by mouth 2 (two) times a day. Takes at 6am and 4 pm             docusate sodium (DOK) 100 MG capsule  Take 1 capsule (100 mg total) by mouth 2 times a day at 6:00 am and 4:00 pm.             gabapentin (NEURONTIN) 100 MG capsule  TAKE 1 CAPSULE BY MOUTH AT BEDTIME             hydroCHLOROthiazide (HYDRODIURIL) 25 MG tablet  Take 25 mg by mouth daily.             ibuprofen (ADVIL) 200 MG tablet  Take 2 tablets (400 mg total) by mouth every 6 (six) hours  as needed for pain.             lancets 33 gauge Misc  Test twice daily Dispense brand per patient's insurance at pharmacy discretion.             lidocaine (LMX5) 5 % Crea  Apply 1 application topically every 12 (twelve) hours as needed (applies to back).             loratadine (CLARITIN) 10 mg tablet  Take 10 mg by mouth daily.             LORazepam (ATIVAN) 0.5 MG tablet  1  tabs po q 6 hours prn             magnesium oxide (MAG-OX) 400 mg tablet  Take 1 tablet (400 mg total) by mouth 3 (three) times a day.             metoprolol succinate (TOPROL-XL) 25 MG  Take 25 mg by mouth daily.             multivitamin (TAB-A-JULIET) per tablet  Take 1 tablet by mouth daily.             nitroglycerin (NITROSTAT) 0.4 MG SL tablet  Place 1 tablet (0.4 mg total) under the tongue every 5 (five) minutes as needed for chest pain (for up to 3 doses and call 911 if persists).             omeprazole (PRILOSEC) 20 MG capsule  Take 20 mg by mouth 2 (two) times a day.              polyethylene glycol 3350 Powd  Take 17 g by mouth daily as needed (constipation).              polyvinyl alcohol (ARTIFICIAL TEARS, POLYVIN ALC,) 1.4 % ophthalmic solution  Administer 2 drops to both eyes 2 (two) times a day. As directed.              pramipexole (MIRAPEX) 0.125 MG tablet  Take 1 tablet (0.125 mg total) by mouth at bedtime.             rosuvastatin (CRESTOR) 40 MG tablet  Take 40 mg by mouth at bedtime.             sertraline (ZOLOFT) 100 MG tablet  Take 100 mg by mouth daily.             sod bicarb-citric acid-simethicone (E-Z GAS) 2.21-1.53 gram/4 gram GrEP  Take 1 packet by mouth 3 (three) times a day as needed (gas).             traZODone (DESYREL) 50 MG tablet  TAKE 1 TABLET BY MOUTH AT BEDTIME.             VITAMIN D3 2,000 unit capsule  TAKE 1 CAPSULE BY MOUTH ONCE DAILY * DO NOT BRAND CHANGE - PATIENT ONLY WANT CAPSULE* (1 TOTAL FILL)             warfarin (COUMADIN) 2 MG tablet  Take warfarin dose 2 mg daily on Wed, Sat; and 4 mg daily  rest of week             warfarin (COUMADIN) 2 MG tablet  Take 2mg daily as directed             warfarin (COUMADIN) 4 MG tablet  Take 4mg daily as directed               Patient last saw cardiology, Dr. August as an outpatient 6/5/12  IMPRESSION:   1. Cryptogenic stroke: At this point I do not see any cardiovascular cause  for the patient's cryptogenic stroke. Previous extensive evaluation including  fairly recent monitoring showed no evidence of any sustained arrhythmia,  particularly atrial fibrillation. The patient's previously performed NIVIA  August of 2011 showed normal left ventricular size and function. There was  no intracardiac shunt present. There was no evidence of thrombus noted.  2. Atherosclerotic coronary vascular disease: The patient has a prior known  chronic total occlusion of the right coronary artery with normal left  ventricular function.     PLAN:  1. At this point I think that the patient is a good candidate for long-term  anticoagulant therapy given the fact that she has had no further recurrent  symptoms.  2. No further cardiac workup at this time.  3. The patient should be targeted for secondary prevention of further  atherosclerotic coronary vascular disease with an LDL target of 70 or less.  4. Follow up with general cardiology if the patient has any recurrent signs  or symptoms suggestive of coronary ischemia.    Follow-up recommended by discharging doctor:Consider carotid ultrasound  Follow-up with surgery 1-2 weeks, follow-up with cardiology 3-4 weeks       Dizziness  Narrative:yesterday had some slight lightheadedness in am- but points out theat she hadnt eaten  -----------------  Is this presyncope? Yes-not consistent with vertigo  Duration/ frequency? Only one time- lasted a few minutes when it was waxing and waning - ate something and it helped  Trigger? unsure      Blood sugars remain good-reviewed these.  She checks them twice a day.  PFSH:  Patient Active Problem List   Diagnosis      Spinal stenosis     PAD (peripheral artery disease)     Patent Foramen Ovale     Dermatosis Papulosa Nigra     Acute myocardial infarction     Coronary Arteriosclerosis     Depression     Hypercalcemia     Type 2 Diabetes Mellitus     Hyperlipidemia     Cerebral atherosclerosis     Right Renal Artery Stenosis     Osteoarthritis     Abnormality Of Walk     Type 2 diabetes mellitus with circulatory disorder     Advanced directives, counseling/discussion     SBO (small bowel obstruction)     PAF (paroxysmal atrial fibrillation)     CHF (congestive heart failure)     Cystitis     Hypomagnesemia     Tachycardia     Hypertension     Dysarthria     Cerebral infarction     Anemia     Cerebrovascular disease     Benign adenomatous polyp of large intestine     RLS (restless legs syndrome)     Incisional hernia     Abdominal pain     Diverticular disease of large intestine     Diverticulosis of large intestine without perforation or abscess without bleeding     Dysphagia     Abnormal gait     Peripheral vascular disease     Coronary artery disease involving native coronary artery of native heart without angina pectoris     Dyslipidemia     Epigastric pain     Ventral hernia without obstruction or gangrene     Current medications reviewed as follows:  Current Outpatient Prescriptions on File Prior to Visit   Medication Sig     ACCU-CHEK YAZMIN PLUS TEST STRP strips TEST TWICE DAILY AS DIRECTED. *6 TOTAL FILLS*     acetaminophen (TYLENOL) 650 MG CR tablet Take 1,300 mg by mouth 2 (two) times a day.     aspirin 81 MG EC tablet Take 81 mg by mouth daily.     blood glucose meter (GLUCOMETER) Please Dispense kit per pharmacy discretion and patient's insurance Test blood sugar.     calcium polycarbophil (FIBERCON) 625 mg tablet Take 1,250 mg by mouth daily.     cholecalciferol, vitamin D3, 1,000 unit tablet Take 2,000 Units by mouth daily.     docusate sodium (COLACE) 100 MG capsule Take 100 mg by mouth 2 (two) times a day. Takes  at 6am and 4 pm     docusate sodium (DOK) 100 MG capsule Take 1 capsule (100 mg total) by mouth 2 times a day at 6:00 am and 4:00 pm.     gabapentin (NEURONTIN) 100 MG capsule TAKE 1 CAPSULE BY MOUTH AT BEDTIME     hydroCHLOROthiazide (HYDRODIURIL) 25 MG tablet Take 25 mg by mouth daily.     ibuprofen (ADVIL) 200 MG tablet Take 2 tablets (400 mg total) by mouth every 6 (six) hours as needed for pain.     lancets 33 gauge Misc Test twice daily Dispense brand per patient's insurance at pharmacy discretion.     lidocaine (LMX5) 5 % Crea Apply 1 application topically every 12 (twelve) hours as needed (applies to back).     loratadine (CLARITIN) 10 mg tablet Take 10 mg by mouth daily.     LORazepam (ATIVAN) 0.5 MG tablet 1  tabs po q 6 hours prn (Patient taking differently: 0.5 mg every 6 (six) hours as needed for anxiety. 1  tabs po q 6 hours prn)     magnesium oxide (MAG-OX) 400 mg tablet Take 1 tablet (400 mg total) by mouth 3 (three) times a day. (Patient taking differently: Take 1,200 mg by mouth Daily after lunch. )     metoprolol succinate (TOPROL-XL) 25 MG Take 25 mg by mouth daily.     multivitamin (TAB-A-JULIET) per tablet Take 1 tablet by mouth daily.     nitroglycerin (NITROSTAT) 0.4 MG SL tablet Place 1 tablet (0.4 mg total) under the tongue every 5 (five) minutes as needed for chest pain (for up to 3 doses and call 911 if persists).     omeprazole (PRILOSEC) 20 MG capsule Take 20 mg by mouth 2 (two) times a day.      polyethylene glycol 3350 Powd Take 17 g by mouth daily as needed (constipation).      polyvinyl alcohol (ARTIFICIAL TEARS, POLYVIN ALC,) 1.4 % ophthalmic solution Administer 2 drops to both eyes 2 (two) times a day. As directed.      pramipexole (MIRAPEX) 0.125 MG tablet Take 1 tablet (0.125 mg total) by mouth at bedtime.     rosuvastatin (CRESTOR) 40 MG tablet Take 40 mg by mouth at bedtime.     sertraline (ZOLOFT) 100 MG tablet Take 100 mg by mouth daily.     sod bicarb-citric acid-simethicone  (E-Z GAS) 2.21-1.53 gram/4 gram GrEP Take 1 packet by mouth 3 (three) times a day as needed (gas).     traZODone (DESYREL) 50 MG tablet TAKE 1 TABLET BY MOUTH AT BEDTIME.     VITAMIN D3 2,000 unit capsule TAKE 1 CAPSULE BY MOUTH ONCE DAILY * DO NOT BRAND CHANGE - PATIENT ONLY WANT CAPSULE* (1 TOTAL FILL)     warfarin (COUMADIN) 2 MG tablet Take warfarin dose 2 mg daily on Wed, Sat; and 4 mg daily rest of week (Patient taking differently: Take by mouth See Admin Instructions. Take warfarin dose 2 mg daily on Wed, Sat; and 4 mg daily rest of week)     warfarin (COUMADIN) 2 MG tablet Take 2mg daily as directed     warfarin (COUMADIN) 4 MG tablet Take 4mg daily as directed     No current facility-administered medications on file prior to visit.      History   Smoking Status     Former Smoker     Quit date: 2/26/2017   Smokeless Tobacco     Never Used     Social History     Social History Narrative     Patient Care Team:  Jerson Hernanedz MD as PCP - General (Family Medicine)  ROS  Full 10 system review including constitutional, respiratory, cardiac, gi, urinary, rheumatologic, neurologic, reproductive, dermatologic psychiatric is  performed (via questionnaire) and is otherwise negative         Objective  Physical Exam  Vitals:    03/15/18 0832   BP: 115/60   Pulse: 80   Resp: 20   Temp: 97.1  F (36.2  C)   TempSrc: Oral   Weight: 186 lb (84.4 kg)     Body mass index is 34.02 kg/(m^2).  Gen- alert, oriented/ appropriately responsive  HEENT- normal cephalic, atraumatic.   Chest- Normal inspiration and expiration.  Clear to ascultation.  No chest wall deformity or scar.  CV- Heart regular rate and rhythm, normal tones, no murmurs gallops or rubs.  Ext- appear well perfused, no edema  Skin- warm and dry, no visualized rash    Diagnostics:   INR done yesterday was 2.9      Please note: Voice recognition software was used in this dictation.  It may therefore contain typographical errors.

## 2021-06-16 NOTE — TELEPHONE ENCOUNTER
Telephone Encounter by Joe Vasquez RN at 3/11/2020 11:02 AM     Author: Joe Vasquez RN Service: -- Author Type: Registered Nurse    Filed: 3/11/2020 11:12 AM Encounter Date: 3/11/2020 Status: Attested    : Joe Vasquez RN (Registered Nurse) Cosigner: Jerson Hernandez MD at 3/11/2020  4:08 PM    Attestation signed by Jerson Hernandez MD at 3/11/2020  4:08 PM    reviewed                ANTICOAGULATION  MANAGEMENT- Home Care/Care Facility Result    Assessment     Today's INR result of 2.45 is Therapeutic (goal INR of 2.0-3.0)        Warfarin taken as previously instructed    No new diet changes affecting INR    No new medication/supplements affecting INR    Continues to tolerate warfarin with no reported s/s of bleeding or thromboembolism     Previous INR was Therapeutic     Fax[facility] to [facility] [phone]    Plan:     Spoke with nurse Marin at [facility] discussed INR result and instructed:     Warfarin Dosing Instructions: Continue current warfarin dose 6 mg daily on mon/wed/fri; and 4 mg daily rest of week      Next INR to be drawn: two weeks      Tania verbalizes understanding and agrees to warfarin dosing plan.   ?   Joe Vasquez RN ACN for Jerson Hernandez    Subjective/Objective:      Gardenia Muller, a 69 y.o. female is established on warfarin.     Home care/care facility RN's report of [facility] INR, recent warfarin dosing, diet changes, medication changes, and symptoms is documented below.    Additional findings: none    Anticoagulation Episode Summary     Current INR goal:   2.0-3.0   TTR:   49.2 % (11.4 mo)   Next INR check:   3/25/2020   INR from last check:   2.45 (3/11/2020)   Weekly max warfarin dose:      Target end date:   Indefinite   INR check location:      Preferred lab:      Send INR reminders to:   [facility]    Indications    Cerebrovascular disease [I67.9]           Comments:            Anticoagulation Care Providers     Provider Role Specialty  Phone number    Jerson Hernandez MD [facility] Medicine [phone]

## 2021-06-16 NOTE — PROGRESS NOTES
Assessment: Follow up with [facility] today, brings in log book, bg noted below, continues to check bg bid.    Current dm medication:  None    A1c 6.5    BG:  Fastin-130 mg/dl    HS  100-140 mg/dl    No change in intake or food choices, light breakfast of cereal and toast or egg whites with turkey sausage.  Lunch is generally a sandwich or half sandwich, may snack in the afternoon, dinner is frozen meal, healthy choice, lean cuisine or wt watchers.  Stopped ordering meals from meals on wheels/optage, got tired of these and did not care for food choices.  Likes her sugar free candy but is moderate and clearly aware of portion control.    Active daily with walk and attends group two times per week where they exercise, understandings the importance of keeping active.      Surgery planned in the next few week for hernia, not sure if she will go home or attend rehab before going home. Deferred follow up appt at this time, provided [facility] with CDE contact information to make follow up in  or July, will try and make on same day as MD appt.      Overall doing well with current dm mgmt.      Plan:  As above    Subjective and Objective:      Gardenia Muller is referred by Dr. Hernandez for Diabetes Education.     Lab Results   Component Value Date    HGBA1C 6.5 (H) 01/10/2018         Current diabetes medications:  None    Goals       monitoring            1. Maintain A1c < 7.0.  3.16.16  MET            Follow up:   CDE (certified diabetic educator)      Education:     Monitoring   Meter (per above goals): Competent  Monitoring: Competent  BG goals: Assessed and Discussed    Nutrition Management  Nutrition Management: Discussed and Competent  Weight: Not addressed  Portions/Balance: Competent  Carb ID/Count: Competent  Heart Healthy Fats: Competent  Menu Planning: Assessed and Discussed  Physical Activity: Discussed and Competent  Medications: Assessed, Discussed, Competent and nurse sets up medications  Orals:  Competent    Acute Complications: Prevent, Detect, Treat:  Hypoglycemia: Assessed  Hyperglycemia: Discussed  Sick Days: Discussed    Chronic Complications  Foot Care:Competent  Skin Care: Competent  Eye: Competent  ABC: Assessed and Discussed  Teeth:Competent  Goal Setting and Problem Solving: Assessed  Barriers: Assessed  Psychosocial Adjustments: Assessed      Time spent with the patient: 45 for diabetes education and counseling.   Previous Education: yes  Visit Type:MNT  Hours Remaining: DSMT 2 and MNT 1.25      Lolis Young  3/15/2018

## 2021-06-16 NOTE — PROGRESS NOTES
Thank you for asking the [facility] team to see Gardenia Muller in consultation     Assessment/Plan:     Complex patient with symptoms that could be related to her hernia or could be a anginal equivalent. A stress test in September was negative. Given the GI component of her symptoms (vomiting and diarrhea) I suspect that it is GI, despite it being exertional and associated with fast heart rates and dizziness.     Will get a pre-op EKG today as well as a 1 week monitor to look for arrhythmia other than sinus tachycardia and possibly ST changes. (EKG normal)    CAD - on rosuvastatin 40mg daily (LDL 85 in September) and asprin 81mg daily    Atrial fibrillation - NSR on exam today. On metoprolol and coumadin for CHADSVASc score of 7.    HTN - controlled on multiple agents. Does have unilateral renal artery stenosis.    F/U 1 year       Current History:   Gardenia Muller is a 67 y.o. with diffuse vascular disease, obesity, diabetes, hypertension, hyperlipidemia and renal insufficiency. She comes to cardiology clinic today for a pre-operative evaluation for ventral hernia repair. Her last coronary angiogram in 2006 showed coronary calcification with non-obstructive left coronary disease and a chronically occluded right coronary that was well collateralized. She saw Dr. August in 2012 for evaluation for a cryptogenic stroke and is on coumadin for presumed atrial fibrillation (as best I can tell from chart review) after a NIVIA showed no PFO. Gardenia uses a walker due to gait instability from her strokes. She notes that she will get heart burn and need to throw up when walking too much. This comes and goes depending on the state of her hernia. She has also had severe diarrhea from the hernia, which does contain some transverse colon. Denies bathes and dresses herself, but does get help with house work. She uses pillows to prop her head up at night due to dyspnea, and this is chronic, but denies edema. She does  note that sometimes when she walks too much her heart races and she gets dizzy. This resolves quickly with rest.    Gardenia brings a log of controlled blood pressures and blood sugars. She had a negative stress test September 2017 despite having a known occluded, but well collateralized RCA. Echo in 2011 was normal.      Past Medical History:     Past Medical History:   Diagnosis Date     Acute diastolic heart failure 11/2015     Atrial fibrillation      Cerebral vascular accident      Coronary artery disease 2006    100% RCA with collateral filling per Dr. Elizabeth's angio report     Diabetes mellitus type 2 in obese      Fibrocystic breast      GERD (gastroesophageal reflux disease)      Hypertension      Peripheral vascular disease      Stroke        Past Surgical History:     Past Surgical History:   Procedure Laterality Date     CORONARY STENT PLACEMENT  1995    stent     VENTRAL HERNIA REPAIR N/A 11/12/2015    Procedure: STRANGULATED VENTRAL HERNIA REPAIR ;  Surgeon: Ernesto Bailey MD;  Location: [facility];  Service:        Family History:     Family History   Problem Relation Age of Onset     Cancer Paternal Grandmother      Cancer Paternal Uncle      No Medical Problems Mother      No Medical Problems Father      Heart attack Sister      No Medical Problems Daughter      No Medical Problems Maternal Grandmother      No Medical Problems Maternal Grandfather      No Medical Problems Paternal Grandfather      No Medical Problems Maternal Aunt      No Medical Problems Paternal Aunt      BRCA 1/2 Neg Hx      Breast cancer Neg Hx      Colon cancer Neg Hx      Endometrial cancer Neg Hx      Ovarian cancer Neg Hx        Social History:    reports that she quit smoking about 12 months ago. She has never used smokeless tobacco. She reports that she does not drink alcohol or use illicit drugs.    Meds:     Current Outpatient Prescriptions   Medication Sig Note     ACCU-CHEK YAZMIN PLUS TEST STRP strips TEST  TWICE DAILY AS DIRECTED. *6 TOTAL FILLS*      acetaminophen (TYLENOL) 650 MG CR tablet Take 1,300 mg by mouth 2 (two) times a day.      aspirin 81 MG EC tablet Take 81 mg by mouth daily.      blood glucose meter (GLUCOMETER) Please Dispense kit per pharmacy discretion and patient's insurance Test blood sugar.      calcium polycarbophil (FIBERCON) 625 mg tablet Take 1,250 mg by mouth daily.      cholecalciferol, vitamin D3, 1,000 unit tablet Take 2,000 Units by mouth daily.      docusate sodium (DOK) 100 MG capsule Take 1 capsule (100 mg total) by mouth 2 times a day at 6:00 am and 4:00 pm.      gabapentin (NEURONTIN) 100 MG capsule TAKE 1 CAPSULE BY MOUTH AT BEDTIME      hydroCHLOROthiazide (HYDRODIURIL) 25 MG tablet TAKE 1 TABLET BY MOUTH ONCE DAILY      ibuprofen (ADVIL) 200 MG tablet Take 2 tablets (400 mg total) by mouth every 6 (six) hours as needed for pain.      lancets 33 gauge Misc Test twice daily Dispense brand per patient's insurance at pharmacy discretion.      lidocaine (LMX5) 5 % Crea Apply 1 application topically every 12 (twelve) hours as needed (applies to back).      loratadine (CLARITIN) 10 mg tablet Take 10 mg by mouth daily.      LORazepam (ATIVAN) 0.5 MG tablet 1  tabs po q 6 hours prn (Patient taking differently: 0.5 mg every 6 (six) hours as needed for anxiety. 1  tabs po q 6 hours prn)      magnesium oxide (MAG-OX) 400 mg tablet Take 1 tablet (400 mg total) by mouth 3 (three) times a day. (Patient taking differently: Take 1,200 mg by mouth Daily after lunch. )      metoprolol succinate (TOPROL-XL) 25 MG Take 25 mg by mouth daily.      multivitamin (TAB-A-JULIET) per tablet Take 1 tablet by mouth daily.      nitroglycerin (NITROSTAT) 0.4 MG SL tablet Place 1 tablet (0.4 mg total) under the tongue every 5 (five) minutes as needed for chest pain (for up to 3 doses and call 911 if persists).      omeprazole (PRILOSEC) 20 MG capsule Take 20 mg by mouth 2 (two) times a day.  8/3/2016: Received  "from: External Pharmacy     polyethylene glycol 3350 Powd Take 17 g by mouth daily as needed (constipation).       polyvinyl alcohol (ARTIFICIAL TEARS, POLYVIN ALC,) 1.4 % ophthalmic solution Administer 2 drops to both eyes 2 (two) times a day. As directed.       pramipexole (MIRAPEX) 0.125 MG tablet Take 1 tablet (0.125 mg total) by mouth at bedtime.      rosuvastatin (CRESTOR) 40 MG tablet Take 40 mg by mouth at bedtime.      sertraline (ZOLOFT) 100 MG tablet Take 100 mg by mouth daily.      sod bicarb-citric acid-simethicone (E-Z GAS) 2.21-1.53 gram/4 gram GrEP Take 1 packet by mouth 3 (three) times a day as needed (gas).      traZODone (DESYREL) 50 MG tablet TAKE 1 TABLET BY MOUTH AT BEDTIME.      VITAMIN D3 2,000 unit capsule TAKE 1 CAPSULE BY MOUTH ONCE DAILY * DO NOT BRAND CHANGE - PATIENT ONLY WANT CAPSULE* (1 TOTAL FILL)      warfarin (COUMADIN) 2 MG tablet Take warfarin dose 2 mg daily on Wed, Sat; and 4 mg daily rest of week (Patient taking differently: Take by mouth See Admin Instructions. Take warfarin dose 2 mg daily on Wed, Sat; and 4 mg daily rest of week)      warfarin (COUMADIN) 2 MG tablet Take 2mg daily as directed      warfarin (COUMADIN) 4 MG tablet Take 4mg daily as directed      docusate sodium (COLACE) 100 MG capsule Take 100 mg by mouth 2 (two) times a day. Takes at 6am and 4 pm      hydroCHLOROthiazide (HYDRODIURIL) 25 MG tablet Take 25 mg by mouth daily.        Allergies:   Penicillins    Review of Systems:   Review of Systems:   General: WNL  Eyes: WNL  Ears/Nose/Throat: WNL  Lungs: Cough, Shortness of Breath, Wheezing  Heart: Arm Pain, Shortness of Breath with activity  Stomach: Heartburn  Bladder: WNL  Muscle/Joints: Joint Pain, Muscle Weakness, Muscle Pain  Skin: WNL  Nervous System: Dizziness, Loss of Balance  Mental Health: WNL     Blood: WNL       Objective:      Physical Exam  @LASTENCWT:3@  5' 2\" (1.575 m)  @BMI:3@  /66 (Patient Site: Left Arm, Patient Position: Sitting, " "Cuff Size: Adult Large)  Pulse 72  Resp 16  Ht 5' 2\" (1.575 m)  Wt 183 lb (83 kg)  LMP 01/25/1999  BMI 33.47 kg/m2    General Appearance:   Alert, cooperative and in no acute distress.   HEENT:  No scleral icterus; the mucous membranes were pink and moist.   Neck: JVP flat. No thyromegaly. No HJR   Chest: The spine was straight. The chest was symmetric.   Lungs:   Respirations unlabored; the lungs are clear to auscultation.   Cardiovascular:   S1 and S2 normal and without murmur. No clicks or rubs. No carotid bruits noted. Right DP, PT, and radial pulses 2+. Left DP, PT, and radial pulses 2+.   Abdomen:  No organomegaly, masses, bruits, or tenderness. Bowels sounds are present   Extremities: No cyanosis, clubbing, or edema.   Skin: No xanthelasma.   Neurologic: Mood and affect are appropriate.         Lab Review   Lab Results   Component Value Date     02/26/2018     09/20/2017     05/18/2017    K 3.8 02/26/2018    K 3.8 09/20/2017    K 4.3 05/18/2017    CL 99 02/26/2018     09/20/2017     05/18/2017    CO2 31 02/26/2018    CO2 24 09/20/2017    CO2 25 05/18/2017    BUN 14 02/26/2018    BUN 12 09/20/2017    BUN 16 05/18/2017    CREATININE 0.75 02/26/2018    CREATININE 0.73 09/20/2017    CREATININE 0.78 05/18/2017    CALCIUM 10.5 02/26/2018    CALCIUM 10.4 09/20/2017    CALCIUM 10.3 05/18/2017     Lab Results   Component Value Date    WBC 6.8 02/26/2018    WBC 4.8 09/20/2017    WBC 5.9 05/18/2017    HGB 11.2 (L) 02/26/2018    HGB 11.2 (L) 09/20/2017    HGB 11.8 (L) 05/18/2017    HCT 37.3 02/26/2018    HCT 35.8 09/20/2017    HCT 36.8 05/18/2017    MCV 86 02/26/2018    MCV 88 09/20/2017    MCV 89 05/18/2017     02/28/2018     02/26/2018     09/20/2017     Lab Results   Component Value Date    CHOL 165 09/20/2017    CHOL 151 06/11/2014    CHOL 166 06/05/2013    TRIG 121 09/20/2017    TRIG 117 06/11/2014    TRIG 131 06/05/2013    HDL 56 09/20/2017    HDL 54 " 06/11/2014    HDL 51 06/05/2013    LDLDIRECT 94 04/15/2015     Lab Results   Component Value Date    BNP 57 07/07/2016    BNP 90 11/12/2015         Gisel Choe M.D.

## 2021-06-16 NOTE — PROGRESS NOTES
"Physical Therapy  WHEELCHAIR: INITIAL EVALUATION   PHYSICAL THERAPY - OUTPATIENT       Eval Date: 3/14/2018   Rx Units:  Eval 2, W/C Management 2  Total OP Minutes: 60     SUBJECTIVE    Medical Diagnosis and Date: s/p CVA  Date of last MD visit: 3/1/2018  Treatment Diagnosis: Dyspnea upon exertion, Physical deconditioning  Height: 5' 3\"   Weight: 185 lbs  Past Medical History per OT evaluation and confirmed by pt: Pt has a history of multiple CVAs with her most recent CVA on 1/16/2017 per pt's report and chart review. Patient had a recent hospitalization on 2/26/2018 due to a hernia. According to the admission note, patient had a strangulated umbilical hernia that was repaired in 2015. She is planning to have surgery for the most recent hernia after approval from her cardiologist. Per chart review, patient has a history of CHF, lumbar canal stenosis, peripheral vascular disease, HTN, cerebral atherosclerosis, right renal artery stenosis, dysarthria, RLS GERD, DM type II, CAD, stent placement, CVA, atrial fibrillation, patent foramen ovale, MI, acute diastolic heart failure, ventral hernia and repair surgery.      Living Situation: Apartment 3rd floor near elevator Mail is located on first floor and manager of building is located on first floor  Doorway/Room accessibility: Yes.   Lives: Alone   Caregiver Support: Nurse assists with setting up meds, Has homemaker that does light cleaning and laundry, Has assistance with shower    Current/Future Model of Transportation: Pt doesn't drive, Uses medical transportation ([employer])   Where is w/c stored during transport? With medical transportation    Hours spent in wheelchair:  \"I used it about 4-5 hours a day\" regarding previous 3 wheeled scooter  How is wheelchair used throughout the day? Pt used the 3 wheel scooter to go to the store, to get outside and socialize, to access her bedroom, bathroom and kitchen  Current seating/mobility equipment: 3 wheeled scooter  " "purchased through [employer]  Years Old: Approximately 7-8 years old  Problems with current equipment:\" It is tippy.\"  Pt was moving on concrete side walk and hit a bump and fell over with the scooter falling over too.  \"This happened Twice!  I haven't been riding it because I am scared of it!. I have been taking Coumadin for 5 years\"  This occurred a year ago   Cost of repairs: na    Pain: Currently reports stomach pain rated 6/10 along surgery site (hernia), Left Knee pain rated 5/10 that occurs when she walks.   How does pain interfere with mobility?: Sometimes - it makes her walk slower and more fatiguing    Patient s Goal: \"To feel safe and go anywhere I want to go.\" Pt reports she feels scared to use her current 3 wheeled scooter due to previous falls.   Pt wants mobility to be more efficient and less fatiguing.    OBJECTIVE    Baseline/Resting Vitals :  Oxygen Saturation (SaO2): 97 on room air   Heart Rate (HR): 71          Blood Pressure (BP): 144/69  Rate of Perceived Exertion (RPE): 0/10     Rate of Perceived Dyspnea (RPD): 5/10    Cognition: Memory: intact  Problem Solving: intact  Judgement: intact Attn/Concentration: intact  Vision: intact  Hearing: intact  Comments:     Sensation: intact     Comments: B LEs  Proprioception: intact Comments: B LEs  History of Skin Breakdown/Pressure Sores: No       History of Swelling:   Yes     Comments: \"Sometimes in my Left leg\" Pt reports this occurs when she sits too long (hours)  Ability to Weight Shift: Yes  Comments: NA       Strength and ROM:  Lower Extremity Strength   L       R ROM L    R Comments Upper Extremity Strength    L      R     ROM L    R Comments   Hip Flexion 5/5 [place]   Shoulder Flexion 4+/5; 3+/5 WFL, R, 90 degress Painful in R shoulder   Hip Extension 4/5 [place]  Assessed in sitting d/t recent hernia surgery Shoulder Extension 4+/5 [place] WFL    Hip Abduction 2+/5 [place]   Shoulder Abd 4/5, 3/5 L WFL, R approx 75 degrees Painful in R shoulder   Knee Ext " 5/5 [place]   Elbow Flexion 4+/5 [place]     Knee Flexion 5/5 [place]  Assessed in sitting d/t recent hernia surgery Elbow Ext 5/5 [place]     D[place]i Flexion 5/5 [place]   Wrist Flexion B WFL     Plantar Flex    Wrist Ext          Finger Ext/Flex Good  strength [place]     Tone:    Lower Extremity: WFL [place]   Upper Extremity: WFL [place]  Balance:    Seated: Static: I  Dynamic: I  Standing: Static: I   [place] EO: 60 sec Dynamic: SBA     Test/Score: TUG: Trial 1: 22.69 sec, Trial 2: 22.22 sec = 22.45 sec with 4WW    ADL Status:    Independent Assist Unable Comments   Dressing X      Bathing  X     Grooming/Hygiene  X      Feeding X      Toileting X      Meal Prep X      Bowel/Bladder X   Incontinent   Transfers:   Independent Assist Comments   Bed Mobility X     W/C ? Chair X     Car NT       Gait:    Assistive Device: 4WW    Assistance: Mod I    Distance: 300 feet  Gait Analysis: Slow paced, step through gait  10M Walk test: 6 m in 10.41 sec taking 16 steps with 4WW = .57 m/sec    Activity Tolerance:    6 Minute Walk test with 4WW:  300 feet Pt quit before time was complete with complaints of fatigue.  After 150 feet pt complained of 8/10 low back pain  After Activity:  O2 Saturation 98 %       75Heart Rate    7/10 RPE       5/10 RPD         W/C Mobility Skills:     Independent  Assistance Comments/Tolerance (Fatigue, Dyspnea,Pain)   Manual W/C   NT   Scooter   NT   Power W/C   NT     Pt came to PT eval requesting 4 wheel scooter as current 3 wheel scooter is approximately 7-8 years old and has tipped over 2x in community. No w/c ATP was present during evaluation. Plan to contact w/c vendor following evaluation.  Educated pt at length the insurance requirements in order to receive a power scooter.  Pt verbalized understanding.  Vendor to perform home trial and in-home assessment as able.  PT will complete a Letter of Medical Necessity for equipment justification and accessories.

## 2021-06-16 NOTE — PROGRESS NOTES
" HPI: Gardenia Muller is here for follow up after discharge from the hospital for incarceration of ventral hernia.  Currently she is doing well but continues to complain of pain at the site of the hernia.  She is eating without any nausea or vomiting.  He has also undergone evaluation by her cardiologist and is wearing a Holter monitor.  She denies any other symptoms at this time.    Allergies, Medications, Social History, Past Medical History and Past Surgical History were reviewed and are noted in the chart.    /60 (Patient Site: Right Arm, Patient Position: Sitting, Cuff Size: Adult Large)  Pulse 68  Ht 5' 2\" (1.575 m)  Wt 186 lb 1.6 oz (84.4 kg)  LMP 01/25/1999  SpO2 97%  Breastfeeding? No  BMI 34.04 kg/m2  Body mass index is 34.04 kg/(m^2).      EXAM:   GENERAL: Appears well  Abdomen- reducible hernia with abdominal binder in place, soft, mild tenderness palpation over ventral/incisional hernia         Assessment/Plan: Gardenia Muller continues have symptoms relating to her incisional hernia.  I reviewed the CT scan images with her and noted that she does have a fairly large and complex incisional hernia.  Given the fact she was recently admitted for incarceration, it would be my recommendation that she have this fixed.  Unfortunately, she may not be the best surgical candidate given a precarious cardiac status.  We discussed the option of non-operative management which does have risks which include recurrent incarceration, strangulation and possible bowel necrosis as well as enlarging hernia size.  She does not want to continue with the current management of observation as the hernia is affecting her activities of daily living.  I discussed with her that first and foremost, we should have her continue with her cardiac evaluation to see if she is actually safe for general anesthesia.  I will recheck to her cardiologist to see if there is any further input or workup needed.  I will contact Mrs." Renzo once we have a better assessment.    Jones Harding  DO [facility] Department of Surgery

## 2021-06-16 NOTE — TELEPHONE ENCOUNTER
Telephone Encounter by Gaye Sahu at 2/19/2019  3:24 PM     Author: Gaye Sahu Service: -- Author Type: --    Filed: 2/19/2019  3:26 PM Encounter Date: 2/14/2019 Status: Signed    : Gaye Sahu APPROVED:    Approval start date: 2/19/19  Approval end date: 2/19/20    Pharmacy has been notified of approval and will contact patient when medication is ready for pickup.

## 2021-06-16 NOTE — TELEPHONE ENCOUNTER
Telephone Encounter by Joe Vasquez RN at 3/25/2020 10:22 AM     Author: Joe Vasquez RN Service: -- Author Type: Registered Nurse    Filed: 3/25/2020 10:26 AM Encounter Date: 3/25/2020 Status: Attested    : Joe Vasquez RN (Registered Nurse) Cosigner: Jerson Hernandez MD at 3/25/2020 11:07 AM    Attestation signed by Jerson Hernandez MD at 3/25/2020 11:07 AM    reviewed                ANTICOAGULATION  MANAGEMENT- Home Care/Care Facility Result    Assessment     Today's INR result of 2.5 is Therapeutic (goal INR of 2.0-3.0)        Warfarin taken as previously instructed    No new diet changes affecting INR    No new medication/supplements affecting INR    Continues to tolerate warfarin with no reported s/s of bleeding or thromboembolism     Previous INR was Therapeutic    Plan:     Spoke with nurse alan discussed INR result and instructed:     Warfarin Dosing Instructions: Continue current warfarin dose 6 mg daily on mon/wed/fri; and 4 mg daily rest of week  (0 % change)    Next INR to be drawn: two weeks  Driss verbalizes understanding and agrees to warfarin dosing plan.   ?   Joe Vasquez RN    Subjective/Objective:      Gardenia Muller, a 69 y.o. female is established on warfarin.     Home care/care facility RN's report of Gardenia INR, recent warfarin dosing, diet changes, medication changes, and symptoms is documented below.    Additional findings: none    Anticoagulation Episode Summary     Current INR goal:   2.0-3.0   TTR:   52.5 % (11.4 mo)   Next INR check:   4/8/2020   INR from last check:   2.57 (3/25/2020)   Weekly max warfarin dose:      Target end date:   Indefinite   INR check location:      Preferred lab:      Send INR reminders to:   [facility]    Indications    Cerebrovascular disease [I67.9]           Comments:            Anticoagulation Care Providers     Provider Role Specialty Phone number    Jerson Hernandez MD Referring Family Medicine  [phone]

## 2021-06-16 NOTE — PROGRESS NOTES
Physical Therapy  Therapy Initial Plan of Care      Medical Diagnosis: s/p CVA         Treatment Dx: Physical Deconditioning, Dyspnea upon exertion   Referring MD: Jerson Hernandez, *  Onset date 3/8/2018     Start of Care 3/14/2018      Assessment:  67 year old female present to PT requesting a four wheeled scooter as her current scooter has only 3 wheels and she has tipped it over twice when navigating through the community.  Pt's PMHx includes but not limited to  multiple CVAs, lumbar stenosis, incontinence, and PVD.  She is on Coumadin which places her at risk if she sustains a fall.   At the PT evaluation, pt demonstrated below age and gender norms for the 6 minute walk test, gait speed and balance assessment. Pt would be appropriate for a new four wheeled scooter due to her elevated falls risk, inefficient gait speed, and overall deconditioning.    Prognostic Indicators:  Rehabilitation potential: Fair    Impairment:  Gait, Balance, Activity Tolerance and Pain    Functional Goals:  to be met by 1 evaluation and 1 visit  1. Pt and/or caregiver will verbalize understanding of current process of receiving a w/c covered by insurance.  2. Pt will have process initiated to receive proper adaptive equipment to maximize efficiency, safety and independence with functional mobility.      Plan of Care:  Communication with referral source, patient, caregiver., Paitent/Family Instruction: Treatment plan/rationale, home exercise program, expected functional outcome. and Wheelchair Management    Frequency / Duration: 1 x/week for up to 1 visits    Filomena Yousif   3/14/2018   4:15 PM      Physician Recommendation:  1. I certify the need for these services furnished within this plan and while under my care. I agree with the therapist's recommendation for plan of care.    2. If there is any recommendation for modification of therapy plan, please indicate below.      Physician's Signature (Printed):   _____________________________

## 2021-06-16 NOTE — TELEPHONE ENCOUNTER
Telephone Encounter by Gaye Sahu at 2/19/2019 11:02 AM     Author: Gaye Sahu Service: -- Author Type: --    Filed: 2/19/2019 11:03 AM Encounter Date: 2/14/2019 Status: Signed    : Gaye Sahu       PA INITIATION

## 2021-06-16 NOTE — PROGRESS NOTES
Occupational Therapy  Occupational Therapy  Occupational Therapy Initial Evaluation    Medicaid Insurance           Units: Matthew Puentes  Total Minutes: 65 minutes    Medical Diagnosis: Post stroke  Treatment Diagnosis:Lack of Coordination 781.3, Muscle weakness (general) 729.87 and Cognitive Impairment 294.9    Referring Practitioner: Jerson Hernandez MD  Date of referral: 2/8/18  Start of Care: March 8, 2018    ASSESSMENT  Patient presented to occupational therapy looking well groomed and alert. Patient demonstrates bilateral fine motor coordination deficits with greater impairment noted with her RUE per nine hole peg test. Patient also demonstrates decreased  strength with her RUE. Patient's largest complaint is difficulty with functional mobility and difficulty with using her current scooter. Therapist sent a request for a physical therapy evaluation. Patient's cognitive testing suggests mild cognitive impairment. Patient would benefit from skilled OT services to address coordination,  strength, and adaptive equipment to increase independence with I/ADLs.    Recommendations: Patient is appropriate for 1:1 skilled OT at this time.    PLAN  Frequency/Duration: 1-2 times per week, for up to 4 weeks; Up to 4 additional visits    Long-term goals:   1. Patient will improve RUE FMC per 9HPT by 8 seconds to improve I/ADL independence.  2. Patient will verbalize and demonstrate understanding of home exercise program for her RUE to improve function with I/ADLs.  3. Patient will verbalize and demonstrate understanding of adaptive equipment with SBA for I/ADL tasks to increase independence and safety.   4. Patient will improve  strength with her RUE by 5 lbs to improve I/ADL independence.    Plan of Care: Self Cares / ADL Training, Communications with referral Source, patient, caregiver and treatment Team, Patient/ Family instruction: Etiology of diagnosis or presented symtoms, Treatment plan/rationale, Home Exercise  Program and Expected Functional Outcomes , Cognitive Training, Neuromuscular Re-education, Therapeutic Exercise, Mobility/ Transfer Training, Compensatory Strategies and Adaptive Equipment/ DME    Prognosis to achieve goals: Good    Goals were established with the patient: Yes    SUBJECTIVE  Pain: Discomfort in abdomen. Pt had difficulty stating pain number as pain fluctuates based on her amount of movement.     Past Medical History: Pt has a history of multiple CVAs with her most recent CVA on 1/16/2017 per pt's report and chart review. Patient had a recent hospitalization on 2/26/2018 due to a hernia. According to the admission note, patient had a strangulated umbilical hernia that was repaired in 2015. She is planning to have surgery for the most recent hernia after approval from her cardiologist. Per chart review, patient has a history of CHF, lumbar canal stenosis, peripheral vascular disease, HTN, cerebral atherosclerosis, right renal artery stenosis, dysarthria, RLS GERD, DM type II, CAD, stent placement, CVA, atrial fibrillation, patent foramen ovale, MI, acute diastolic heart failure, ventral hernia and repair surgery.    Current Level of Function:  Apartment alone  Shopping: Patient manages  Meals: Patient manages  Housework: Patient has assistance with cleaning but feels she needs more assistance.  Laundry: Patient has assistance with laundry but feels she needs more assistance.  Driving: Patient does not drive.  Medications: Patient has a nurse who comes every Wednesday to sets up the medications.  Finances: Patient manages her own finances.  Stairs required: N/A, pt uses an elevator  Functional mobility: Patient has a three wheeled scooter currently and feels that this is not safe. Pt would like a four wheeled scooter. Therapist sent a note to patient's MD to request a physical therapy evaluation for mobility needs. Patient uses a four wheeled walker with a seat.    Patient has PCA services four hours  per week. Patient feels she needs more assistance at this time. Patient reported she is unable to  anything due to hernia. She is hoping to have surgery in a few months. Pt has a sock aid and a reacher at home. Pt uses a wedge at home to sleep. Pt has a  who helps manage her care.    Patient used to exercise group two times per week. She has not returned since her hospitalization.              OBJECTIVE  U/E Motor Assessments: Dominant Upper Extremity: Right   L R   AROM Pain with reaching up due to hernia. Difficult to determine if shoulder flexion is decreased. All other movements appear functional. Pain with reaching up due to hernia. Difficult to determine if shoulder flexion is decreased. All other movements appear functional. Previous break on 5th digit which caused deformity.       L L Norms R R Norms   9 HPT 28.5 sec Female 66-70 years old: 21.44 seconds 53 sec Female 66-70 years old: 19.90 seconds    35lbs Females 65-69 years old: 41.0 lbs. 30lbs Females 65-69 years old: 49.6 lbs.     Functional difficulties:  Pt reported difficulty tying shoes, donning socks, picking up items from the floor. Pt reported that she feels buttons can be difficult. Pt reported difficulty combing her hair. Patient has difficulty cooking due to poor endurance. She reported she has a chair in her kitchen to sit as needed. Pt reported she has assistance to shower.     Cognitive Assessments:   ACL: 4.8/5.8    Cognitive functioning recommendations: 4.6-4.8: The assessment scores indicate a recommendation for an assisted living environment with daily check-in supervision (i.e. at home with assistance or assisted living facility). Assistance with managing medications and finances is recommended as Gardenia may have increased difficulty with complex problem solving. Learning new skills and information may be very challenging for Gardenia but she may be able to do so with a lot of repetition. Problem solving is often  challenging especially when it is not anticipated or expected. Gardenia may lack insight into her deficits indicating an increased need for assistance with complex tasks requiring accuracy for safety. Supervision is recommended for Gardenia when using hot tools and major appliances during meal preparation. Driving is not recommended for Gardenia due to difficulty with divided attention and complex problem solving. Further, Gardenia may benefit from increased structure throughout the day creating healthy habits and routines and eliminating her need to problem solve from one task to another.    MEDICARE PATIENT: No    Physician Recommendation:  1. I certify the need for these services furnished within this plan and while under my care. I agree with the therapist's recommendation for plan of care.  2. If there is any recommendation for modification of therapy plan, please indicate below.    Trinidad Reilly, OTR/L, CLT, 3/9/2018

## 2021-06-17 NOTE — TELEPHONE ENCOUNTER
Telephone Encounter by Bianka Mares at 7/24/2020 11:46 AM     Author: Bianka Mares Service: -- Author Type: --    Filed: 7/24/2020 11:47 AM Encounter Date: 7/17/2020 Status: Signed    : Bianka Mares APPROVED:    Approval start date: 06/20/2020  Approval end date:  07/20/2023    Pharmacy has been notified of approval and will contact patient when medication is ready for pickup.

## 2021-06-17 NOTE — PATIENT INSTRUCTIONS - HE
Patient Instructions by Jerson Hernandez MD at 12/6/2019  9:40 AM     Author: Jerson Hernandez MD Service: -- Author Type: Physician    Filed: 12/6/2019  5:50 PM Encounter Date: 12/6/2019 Status: Signed    : Jerson Hernandez MD (Physician)         Patient Education   Depression and Suicide in Older Adults  Nearly 2 million older Americans have some type of depression. Sadly, some of them even take their own lives. Yet depression among older adults is often ignored. Learn the warning signs. You may help spare a loved one needless pain. You may also save a life.       What Is Depression?  Depression is a mood disorder that affects the way you think and feel. The most common symptom is a feeling of deep sadness. People who are depressed also may seem tired and listless. And nothing seems to give them pleasure. Its normal to grieve or be sad sometimes. But sadness lessens or passes with time. Depression rarely goes away or improves on its own. Other symptoms of depression are:    Sleeping more or less than normal    Eating more or less than normal    Having headaches, stomachaches, or other pains that dont go away    Feeling nervous, empty, or worthless    Crying a great deal    Thinking or talking about suicide or death    Feeling confused or forgetful  What Causes It?  The causes of depression arent fully known. Certain chemicals in the brain play a role. Depression does run in families. And life stresses can also trigger depression in some people. That may be the case with older adults. They often face great burdens, such as the death of friends or a spouse. They may have failing health. And they are more likely to be alone, lonely, or poor.  How You Can Help  Often, depressed people may not want to ask for help. When they do, they may be ignored. Or, they may receive the wrong treatment. You can help by showing parents and older friends love and support. If they seem depressed, help them find  the right treatment. Talk to your doctor. Or contact a local mental health center, social service agency, or hospital. With modern treatment, no one has to suffer from depression.  Resources:    National [place] of Mental Health  848.506.9015  www.nimh.nih.gov    National [place] on Mental Illness  148.660.6103  www.reji.org    [facility]  548.470.2529  www.[facility].org    National Suicide Hotline  394.865.6498 (800-SUICIDE)      2981-8489 [employer]. [address] [place] [address]. All rights reserved. This information is not intended as a substitute for professional medical care. Always follow your healthcare professional's instructions.         Patient Education   Preventing Falls in the Home  As you get older, falls are more likely. Thats because your reaction time slows. Your muscles and joints may also get stiffer, making them less flexible. Illness, medications, and vision changes can also affect your balance. A fall could leave you unable to live on your own. To make your home safer, follow these tips:    Floors    Put nonskid pads under area rugs.    Remove throw rugs.    Replace worn floor coverings.    Tack carpets firmly to each step on carpeted stairs. Put nonskid strips on the edges of uncarpeted stairs.    Keep floors and stairs free of clutter and cords.    Arrange furniture so there are clear pathways.    Clean up any spills right away.    Bathrooms    Install grab bars in the tub or shower.    Apply nonskid strips or put a nonskid rubber mat in the tub or shower.    Sit on a bath chair to bathe.    Use bathmats with nonskid backing.    Lighting    Keep a flashlight in each room.    Put a nightlight along the pathway between the bedroom and the bathroom.    3020-4931 [employer]. [address] [place] [address]. All rights reserved. This information is not intended as a substitute for professional medical care. Always follow your healthcare  professional's instructions.         Patient Education   Personalized Prevention Plan  You are due for the preventive services outlined below.  Your care team is available to assist you in scheduling these services.  If you have already completed any of these items, please share that information with your care team to update in your medical record.  Health Maintenance   Topic Date Due   ? ZOSTER VACCINES (2 of 3) 12/21/2016   ? MAMMOGRAM  01/25/2019   ? DIABETIC EYE EXAM  05/02/2019   ? DIABETIC FOOT EXAM  07/31/2019   ? PNEUMOCOCCAL IMMUNIZATION 65+ LOW/MEDIUM RISK (2 of 2 - PPSV23) 06/11/2019   ? A1C  12/15/2019   ? DXA SCAN  01/25/2020   ? BMP  02/06/2020   ? MICROALBUMIN  02/08/2020   ? DEPRESSION FOLLOW UP  02/20/2020   ? LIPID  03/26/2020   ? ALT  07/30/2020   ? CBC  07/30/2020   ? MEDICARE ANNUAL WELLNESS VISIT  12/06/2020   ? FALL RISK ASSESSMENT  12/06/2020   ? ADVANCE CARE PLANNING  10/08/2024   ? COLONOSCOPY  03/23/2027   ? TD 18+ HE  09/20/2028   ? TSH  Completed   ? HEPATITIS C SCREENING  Completed   ? INFLUENZA VACCINE RULE BASED  Completed

## 2021-06-18 NOTE — PROGRESS NOTES
Per Dr. Harding, he would like Gardenia to get her INR checked again because her last INR on 6/13/18 was too high. Surgery is this Friday 6/22/18. I called and spoke to Gardenia. She asked to me please call her nurse, Temo at [phone] to get this arranged.    I left a voicemail for Temo 6/20/18 @ 12:45pm. I asked he she would be able to check Gardenia's INR today, or if I need to get her to a [facility] to get it check.    Zita Aldana, [facility]  Surgery Scheduling  [phone]

## 2021-06-18 NOTE — PROGRESS NOTES
The nurse Temo called back today. She checked Gardenia's INR today at 2:30 and the level was 1.1. I paged Dr. Harding to let him know the results.    Zita Aldana [facility]  Surgery Scheduling  [phone]

## 2021-06-18 NOTE — LETTER
Letter by Jerson Hernandez MD at      Author: Jerson Hernandez MD Service: -- Author Type: --    Filed:  Encounter Date: 1/15/2019 Status: (Other)       Gardenia Muller  [address] [place] [address]             January 15, 2019         Dear Ms. Muller,    Below are the results from your recent visit:    Resulted Orders   Calcium, Ionized, Measured   Result Value Ref Range    Calcium, Ionized Measured 1.28 1.11 - 1.30 mmol/L    Calcium, Ionized pH 7.4 1.26 1.11 - 1.30 mmol/L    pH 7.37 7.35 - 7.45   HM2(CBC w/o Differential)   Result Value Ref Range    WBC 5.5 4.0 - 11.0 thou/uL    RBC 4.20 3.80 - 5.40 mill/uL    Hemoglobin 11.7 (L) 12.0 - 16.0 g/dL    Hematocrit 37.1 35.0 - 47.0 %    MCV 88 80 - 100 fL    MCH 27.9 27.0 - 34.0 pg    MCHC 31.5 (L) 32.0 - 36.0 g/dL    RDW 16.8 (H) 11.0 - 14.5 %    Platelets 178 140 - 440 thou/uL    MPV 9.7 7.0 - 10.0 fL   Comprehensive Metabolic Panel   Result Value Ref Range    Sodium 137 136 - 145 mmol/L    Potassium 4.3 3.5 - 5.0 mmol/L    Chloride 104 98 - 107 mmol/L    CO2 22 22 - 31 mmol/L    Anion Gap, Calculation 11 5 - 18 mmol/L    Glucose 108 70 - 125 mg/dL    BUN 14 8 - 22 mg/dL    Creatinine 0.83 0.60 - 1.10 mg/dL    GFR MDRD Af Amer >60 >60 mL/min/1.73m2    GFR MDRD Non Af Amer >60 >60 mL/min/1.73m2    Bilirubin, Total 0.5 0.0 - 1.0 mg/dL    Calcium 10.4 8.5 - 10.5 mg/dL    Protein, Total 7.1 6.0 - 8.0 g/dL    Albumin 3.4 (L) 3.5 - 5.0 g/dL    Alkaline Phosphatase 68 45 - 120 U/L    AST 18 0 - 40 U/L    ALT 9 0 - 45 U/L    Narrative    Fasting Glucose reference range is 70-99 mg/dL per  American Diabetes Association (ADA) guidelines.   Parathyroid Hormone Intact   Result Value Ref Range     (H) 10 - 86 pg/mL   Glycosylated Hemoglobin A1c   Result Value Ref Range    Hemoglobin A1c 5.8 3.5 - 6.0 %       Gardenia, blood tests look good.    Your PTH is elevated, that is your parathyroid hormone which regulates calcium.    You have had  mildly elevated calciums a number of times in your life, nothing serious.  You almost certainly have stress cells in your parathyroid gland that secretes extra parathyroid hormone and sometimes makes her calcium a little high.    But the only way to get rid of that is surgery.  Currently, the recommendations for when to do surgery are:  Usually due to single adenoma, less likely more than one adenoma or hypertrophy of all 4 parathyroid glands.  Parathyroidectomy is only effective therapy.    Indications for surgery  1) symptoms caused by hypercalcemia  2) low bone mass  3) nephrolithiasis  4) age younger than 50  5) unfeasibility of long-term follow-up    To my knowledge you do not have any of those things.  We will just continue to follow your calcium      Please call with questions or contact us using [employer].    Sincerely,        Electronically signed by Jerson Hernandez MD

## 2021-06-18 NOTE — ANESTHESIA CARE TRANSFER NOTE
Last vitals:   Vitals:    06/22/18 1450   BP: 130/61   Pulse: 80   Resp: 16   Temp: 36.5  C (97.7  F)   SpO2: 96%     Patient's level of consciousness is drowsy  Spontaneous respirations: yes  Maintains airway independently: yes  Dentition unchanged: yes  Oropharynx: oropharynx clear of all foreign objects    QCDR Measures:  ASA# 20 - Surgical Safety Checklist: WHO surgical safety checklist completed prior to induction  PQRS# 430 - Adult PONV Prevention: 4558F - Pt received => 2 anti-emetic agents (different classes) preop & intraop  ASA# 8 - Peds PONV Prevention: NA - Not pediatric patient, not GA or 2 or more risk factors NOT present  PQRS# 424 - Elzbieta-op Temp Management: 4559F - At least one body temp DOCUMENTED => 35.5C or 95.9F within required timeframe  PQRS# 426 - PACU Transfer Protocol: - Transfer of care checklist used  ASA# 14 - Acute Post-op Pain: ASA14B - Patient did NOT experience pain >= 7 out of 10

## 2021-06-18 NOTE — PROGRESS NOTES
" HPI: Gardenia Muller is here for follow up after clearance by her cardiologist.  She continues to have a desire to fix her abdominal wall hernia.  She has no new symptoms.  Allergies, Medications, Social History, Past Medical History and Past Surgical History were reviewed and are noted in the chart.    /62 (Patient Site: Right Arm, Patient Position: Sitting, Cuff Size: Adult Large)  Pulse 68  Ht 5' 2.25\" (1.581 m)  Wt 184 lb 11.2 oz (83.8 kg)  LMP 01/25/1999  Breastfeeding? No  BMI 33.51 kg/m2  Body mass index is 33.51 kg/(m^2).      EXAM:   GENERAL: Appears well       Assessment/Plan: Gardenia Muller continues to do well.  At this point we can proceed with surgery.  However, she is on Coumadin will need to have this held if possible for 5 days prior.  She has a history of stroke and I will have her follow-up with her primary care physician for perioperative Coumadin and anticoagulation management.  The plan will be for a robotic transversus abdominis release and abdominal wall reconstruction.  She understand the risks and benefits of the procedure and would like to proceed as scheduled.    Jones Harding  DO [facility] Department of Surgery  "

## 2021-06-18 NOTE — LETTER
Letter by Jerson Hernandez MD at      Author: Jerson Hernandez MD Service: -- Author Type: --    Filed:  Encounter Date: 2/12/2019 Status: (Other)       Gardenia Muller  [address] [place] [address]             February 12, 2019         Dear Ms. Muller,    Below are the results from your recent visit:    Resulted Orders   Microalbumin, Random Urine   Result Value Ref Range    Microalbumin, Random Urine 13.50 (H) 0.00 - 1.99 mg/dL    Creatinine, Urine 279.1 mg/dL    Microalbumin/Creatinine Ratio Random Urine 48.4 (H) <=19.9 mg/g    Narrative    Microalbumin, Random Urine  <2.0 mg/dL . . . . . . . . Normal  3.0-30.0 mg/dL . . . . . . Microalbuminuria  >30.0 mg/dL . . . . . .  . Clinical Proteinuria    Microalbumin/Creatinine Ratio, Random Urine  <20 mg/g . . . . .. . . . Normal   mg/g . . . . . . . Microalbuminuria  >300 mg/g . . . . . . . . Clinical Proteinuria         Well, elevated protein in your urine would be a reason to restart lisinopril.  I do not think we should do that with your low blood pressures.  We can talk about this next time you come in, no rush.    Please call with questions or contact us using [employer].    Sincerely,        Electronically signed by Jerson Hernandez MD

## 2021-06-18 NOTE — PROGRESS NOTES
[facility] PRE-OPERATIVE VISIT FOR:    Gardenia Muller,  1950, MRN [id number]    Upcoming surgery date:   Surgeon: Meaghan  Surgery Facility: [facility]    Chief Complaint: Ventral hernia    PCP: Jerson Hernandez MD, [phone]    SUBJECTIVE:  History of Present Illness:   Gardenia Muller is a 67 y.o. female with history of abdominal pain which led to hospitalization 18 and diagnosis of large ventral hernia.  At t that time time, she was noted to have multiple cardiac risk factors and felt to be a tenuous candidate for surgery so she was discharged without surgical management.  Cardiology consultation was requested.    Patient does not have any symptoms of angina  She had a nuclear stress test on 18 which did show apical ischemia.  However, Dr. Flanagan address this, stating that this was likely due to known chronic occlusion of her right coronary artery and that patient should be okay for surgery if she is symptom-free.    She also has had a history of multiple ischemic cerebrovascular accidents.  Had atrial fibrillation on one occasion remotely postoperatively.  She was started on Coumadin a pill of years ago upon admission for yet another CVA as she had failed aspirin a number of times and this was not related to the isolated incident of atrial fibrillation.    She lives independently and uses a walker for ambulation.    Past Medical History:  Patient Active Problem List   Diagnosis     Spinal stenosis     PAD (peripheral artery disease)     Dermatosis Papulosa Nigra     Depression     Hypercalcemia     Right Renal Artery Stenosis     Osteoarthritis     Abnormality Of Walk     Type 2 diabetes mellitus with circulatory disorder     Advanced directives, counseling/discussion     SBO (small bowel obstruction)     PAF (paroxysmal atrial fibrillation)     Cystitis     Hypomagnesemia     Hypertension     Dysarthria     Cerebral infarction     Anemia     Cerebrovascular disease     Benign  adenomatous polyp of large intestine     RLS (restless legs syndrome)     Incisional hernia     Abdominal pain     Diverticular disease of large intestine     Dysphagia     Coronary artery disease involving native coronary artery of native heart without angina pectoris     Dyslipidemia     Ventral hernia without obstruction or gangrene     Past Surgical History:   Procedure Laterality Date     CARDIAC CATHETERIZATION       CORONARY STENT PLACEMENT  1995    stent     VENTRAL HERNIA REPAIR N/A 11/12/2015    Procedure: STRANGULATED VENTRAL HERNIA REPAIR ;  Surgeon: Ernesto Bailey MD;  Location: [facility];  Service:      Any history of anesthesia reaction no  Do you have difficulty breathing or chest pain after walking up a flight of stairs: No  History of obstructive sleep apnea: No  Steroid use in the last 6 months: No  Frequent Aspirin/NSAID use: Yes: See above, patient has been on Coumadin and aspirin.  Prior Blood Transfusion: No  Prior Blood Transfusion Reaction: No  If for some reason prior to, during or after the procedure, if it is medically indicated, would you be willing to have a blood transfusion?:  There is no transfusion refusal.    Social History:  she  reports that she has quit smoking. She uses smokeless tobacco. She reports that she does not drink alcohol or use illicit drugs.    Family History:  Family History   Problem Relation Age of Onset     Cancer Paternal Grandmother      Cancer Paternal Uncle      No Medical Problems Mother      No Medical Problems Father      Heart attack Sister      Chronic Kidney Disease Sister      No Medical Problems Daughter      No Medical Problems Maternal Grandmother      No Medical Problems Maternal Grandfather      No Medical Problems Paternal Grandfather      No Medical Problems Maternal Aunt      No Medical Problems Paternal Aunt      Diabetes Brother      BRCA 1/2 Neg Hx      Breast cancer Neg Hx      Colon cancer Neg Hx      Endometrial cancer Neg Hx       Ovarian cancer Neg Hx        Current Medications:  Current Outpatient Prescriptions   Medication Sig Dispense Refill     ACCU-CHEK YAZMIN PLUS TEST STRP strips TEST TWICE DAILY AS DIRECTED. *6 TOTAL FILLS* 180 strip 10     acetaminophen (TYLENOL) 650 MG CR tablet Take 1,300 mg by mouth 2 (two) times a day.       aspirin 81 MG EC tablet Take 81 mg by mouth daily.       blood glucose meter (GLUCOMETER) Please Dispense kit per pharmacy discretion and patient's insurance Test blood sugar. 1 each 0     gabapentin (NEURONTIN) 100 MG capsule TAKE 1 CAPSULE BY MOUTH AT BEDTIME 90 capsule 3     hydroCHLOROthiazide (HYDRODIURIL) 25 MG tablet Take 25 mg by mouth daily.       lancets 33 gauge Misc Test twice daily Dispense brand per patient's insurance at pharmacy discretion. 100 each 11     lidocaine (LMX5) 5 % Crea Apply 1 application topically every 12 (twelve) hours as needed (applies to back).       loratadine (CLARITIN) 10 mg tablet Take 10 mg by mouth daily.       LORazepam (ATIVAN) 0.5 MG tablet 1  tabs po q 6 hours prn (Patient taking differently: 0.5 mg every 6 (six) hours as needed for anxiety. 1  tabs po q 6 hours prn) 10 tablet 0     magnesium oxide (MAG-OX) 400 mg tablet Take 1 tablet (400 mg total) by mouth 3 (three) times a day. (Patient taking differently: Take 1,200 mg by mouth Daily after lunch. ) 270 tablet 3     metoprolol succinate (TOPROL-XL) 25 MG Take 25 mg by mouth daily.       metoprolol succinate (TOPROL-XL) 25 MG TAKE 1 TABLET BY MOUTH ONCE DAILY 30 tablet 11     multivitamin (TAB-A-JULIET) per tablet Take 1 tablet by mouth daily. 100 tablet 11     nitroglycerin (NITROSTAT) 0.4 MG SL tablet Place 1 tablet (0.4 mg total) under the tongue every 5 (five) minutes as needed for chest pain (for up to 3 doses and call 911 if persists). 90 tablet 0     omeprazole (PRILOSEC) 20 MG capsule Take 20 mg by mouth 2 (two) times a day.        polyvinyl alcohol (ARTIFICIAL TEARS, POLYVIN ALC,) 1.4 % ophthalmic  solution Administer 2 drops to both eyes 2 (two) times a day. As directed.        pramipexole (MIRAPEX) 0.125 MG tablet TAKE 1 TABLET BY MOUTH AT BEDTIME 28 tablet 11     rosuvastatin (CRESTOR) 40 MG tablet Take 40 mg by mouth at bedtime.       sertraline (ZOLOFT) 100 MG tablet Take 100 mg by mouth daily.       sertraline (ZOLOFT) 100 MG tablet TAKE 1 TABLET BY MOUTH ONCE DAILY 31 tablet 10     sod bicarb-citric acid-simethicone (E-Z GAS) 2.21-1.53 gram/4 gram GrEP Take 1 packet by mouth 3 (three) times a day as needed (gas).  0     traZODone (DESYREL) 50 MG tablet TAKE 1 TABLET BY MOUTH AT BEDTIME. 30 tablet 10     VITAMIN D3 2,000 unit capsule TAKE 1 CAPSULE BY MOUTH ONCE DAILY * DO NOT BRAND CHANGE - PATIENT ONLY WANT CAPSULE* (1 TOTAL FILL) 100 capsule 3     warfarin (COUMADIN) 2 MG tablet Take 2mg Mon,Wed,Fri or as directed by clinic 60 tablet 1     warfarin (COUMADIN) 4 MG tablet Take 4 mg Sun, Tues, Thurs, Sat or as directed by clinic 60 tablet 1     No current facility-administered medications for this visit.        Allergies:  Penicillins    Review or Systems:  Constitution: normal  HEENT: normal  Pulmonary: normal  Cardiovascular: normal  GI: normal  : normal  Musculoskeletal: normal  Neuro: normal  Endocrine: normal  Psych: normal  Lymph/Heme: normal    OBJECTIVE:  Vitals:    05/29/18 0959   BP: 110/56   Pulse: 74   Resp: 16   Temp: 97.2  F (36.2  C)     General Appearance:    Alert, cooperative, no distress, appears stated age   Head:    Normocephalic, without obvious abnormality, atraumatic   Eyes:    PERRL, conjunctiva/corneas clear, EOM's intact   Nose:   Mucosa moist, normal    Throat:   Lips, mucosa, and tongue normal; ora phaynx clear   Neck:   Supple, symmetrical, trachea midline, no adenopathy;     thyroid:  no enlargement/tenderness/nodules; no carotid    bruit or JVD   Lungs:     Clear to auscultation bilaterally, respirations unlabored    Heart:    Regular rate and rhythm, S1 and S2 normal,  no murmur, rub   or gallop   Abdomen:     Soft, non-tender, bowel sounds active all four quadrants,     no masses, no organomegaly   Extremities:   Extremities normal, atraumatic, no cyanosis or edema   Pulses:   2+ and symmetric all extremities   Skin:   Skin color, texture, turgor normal, no rashes or lesions   Neurologic:   No focal deficits         Labs:  Results for orders placed or performed in visit on 05/29/18   HM2(CBC w/o Differential)   Result Value Ref Range    WBC 5.0 4.0 - 11.0 thou/uL    RBC 4.32 3.80 - 5.40 mill/uL    Hemoglobin 11.1 (L) 12.0 - 16.0 g/dL    Hematocrit 35.8 35.0 - 47.0 %    MCV 83 80 - 100 fL    MCH 25.6 (L) 27.0 - 34.0 pg    MCHC 30.9 (L) 32.0 - 36.0 g/dL    RDW 15.4 (H) 11.0 - 14.5 %    Platelets 193 140 - 440 thou/uL    MPV 9.3 7.0 - 10.0 fL   INR   Result Value Ref Range    INR 2.40 (H) 0.90 - 1.10       ASSESSMENT/PLAN:  Acceptable surgical candidate  Intermediate procedure risk  Intermediate cardiac risk  Recent stress test with limited ischemia, cardiology commented that this is probably due to chronically occluded RCA and it would be okay to proceed.  I have conferred with the surgeon and he i is willing to proceed with the surgery while she remains on daily aspirin for vascular prevention  She will be stopping her warfarin 5 days prior to the procedure.  She should start this as soon after the procedure as surgical team is willing    She will take metoprolol alone on the morning of surgery.          Jerson Hernandez MD

## 2021-06-18 NOTE — ANESTHESIA PROCEDURE NOTES
Arterial Line  Reason for Procedure: hemodynamic monitoring  Patient location during procedure: OR post-induction  Start time: 6/22/2018 9:22 AM  End time: 6/22/2018 9:27 AM  Staffing:  Performing  Anesthesiologist: LISBETH MCGREGOR  Sterile Precautions:  sterile barriers used during insertion: cap, mask, sterile gloves, large sheet, and hand hygiene used.  Arterial Line:   Immediately prior to procedure a time out was called to verify the correct patient, procedure, equipment, support staff and site/side marked as required  Laterality: left  Location: radial  Prepped with: ChloroPrep    Needle gauge: 20 G  Number of Attempts: 1  Secured with: tape and transparent dressing

## 2021-06-18 NOTE — ANESTHESIA POSTPROCEDURE EVALUATION
Patient: Gardenia Muller  ROBOTIC INCISIONAL HERNIA REPAIR WITH TRANSVERSUS ABDOMINUS RELEASE  Anesthesia type: general    Patient location: PACU  Last vitals:   Vitals:    06/22/18 1550   BP: 130/60   Pulse: 79   Resp: 17   Temp: 37.1  C (98.8  F)   SpO2: 97%     Post vital signs: stable  Level of consciousness: awake and responds to simple questions  Post-anesthesia pain: pain controlled  Post-anesthesia nausea and vomiting: no  Pulmonary: unassisted, return to baseline  Cardiovascular: stable and blood pressure at baseline  Hydration: adequate  Anesthetic events: no    QCDR Measures:  ASA# 11 - [place]zbieta-op Cardiac Arrest: ASA11B - Patient did NOT experience unanticipated cardiac arrest  ASA# 12 - Elzbieta-op Mortality Rate: ASA12B - Patient did NOT die  ASA# 13 - PACU Re-Intubation Rate: ASA13B - Patient did NOT require a new airway mgmt  ASA# 10 - Composite Anes Safety: ASA10A - No serious adverse event    Additional Notes: Did very well in PACU!! No CPAP required. Sats good. Wheezing treated with albuterol neb.

## 2021-06-19 NOTE — LETTER
Letter by Arleth Barton CNP at      Author: Arleth Barton CNP Service: -- Author Type: --    Filed:  Encounter Date: 7/17/2019 Status: (Other)         Patient: Gardenia Muller   MR Number: [id number]   YOB: 1950   Date of Visit: 7/17/2019     [facility]    Facility:   [facility] [[id number]]   Code Status: FULL CODE  PCP: Jerson Hernandez MD   Phone: [phone]   Fax: [phone]      CHIEF COMPLAINT/REASON FOR VISIT:  Chief Complaint   Patient presents with   ? Discharge Summary     TCU stay following two recent hospitilizations for chronic abdominal pain/ malnutrition/ FTT       HISTORY COURSE:  Gardenia is a 69 y.o. 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     Gardenia has had two recent hospital stays at [facility] due to failure to thrive and chronic abdominal pain. One was from 6/14/-6/17/19 where she was admitted from clinic for concerns of weakness and weight loss, although was found to be in acute on chronic diastolic heart failure requiring IV diuretics. Abdominal CT at this time was stable, and consideration for  "diagnosis of \"intestinal angina\" was given due to her history of vascular disease and extensive history of work-up for abdominal pain without evidential cause. Placed on oxycodone for abdominal pain control. Pt declined recommended discharge to TCU following this hospital stay and was subsequently discharged home alone. Pt seems to be socially isolated. Closest family (daughter) lives in [place]. Pt reports she has neighbors and friends that stop by to check on her.     Most recent hospitalization was from 6/27/19-7/3/19 and was admitted for abdominal pain. Cause of abdominal pain not clear and is multifactorial. Hospital course was uncomplicated but management of patient's care plan is overall challenging with factors including weight loss, malnutrition, comorbidities including HF, persistent a fib, and fluctuations in blood pressure, DMII, and depression with anxiety, and social isolation. Pt was then discharged to [facility].      Today pt is being evaluated for appropriateness to discharge from [facility] to home. Overall, Gardenia reports feeling anxious regarding discharge home but states that she is looking forward to being in her own environment. States that she is looking into assisted living options, but it is not feasible to move directly into one now after TCU stay. She states her daughter lives in [place] and she has some family in [place] which makes it hard for her to organize a move into a facility. Pt reports that she has been in contact with neighbors and friends who will be stopping by to bring her meals and check-in on her while she is home. She will also have home care. Pt reports having emesis x2 last night about an hour after taking her pills. Pt reports it was a very small amount and was yellow in color. Pt is wanting a nutritional supplement since she is eating less food. She does have a physician order for Ensure. Discussed where she could purchase these after she discharges. Pt also " "reports feeling like her heart is \"pounding.\" Denies chest pain/ pressure, palpitations, shortness of breath, diaphoresis, dizziness, vision changes or confusion. Pt's metoprolol was increased this week by Dr. Torrez from 6.25 mg two times a day to 12.5mg two times a day. Unclear if pt's emesis has caused her to not receive pills proper. The pt does not think so. Nursing staff reports Gardenia has been vocalizing her anxiety about returning home but otherwise no concerns from nursing staff.          Past Medical History:   Diagnosis Date   ? Acute diastolic heart failure (H) 11/2015   ? Acute on chronic diastolic heart failure (H) 6/15/2019   ? Advanced directives, counseling/discussion 8/5/2015     Patient completed 2011.  Discussed today, 5/24/17, and wants to change healthcare agent from niece to daughter.  Discussed that she will need to complete new form to indicate this.   ? MAY (acute kidney injury) (H) 10/22/2018   ? Atrial fibrillation (H)     ? Benign adenomatous polyp of large intestine 3/30/2017     Colonoscopy March 2017.  Recommend 5 year follow-up.  Single tubular adenoma   ? Biliary obstruction 10/3/2018     Added automatically from request for surgery [id number]   ? Cerebral vascular accident (H)     ? Coronary artery disease 2006     100% RCA with collateral filling per Dr. Elizabeth's angio report   ? Diabetes mellitus type 2 in obese (H)     ? Fibrocystic breast     ? GERD (gastroesophageal reflux disease)     ? History of anesthesia complications       slow to wake   ? Hypertension     ? Peripheral vascular disease (H)     ? Pneumonia 11/11/2018   ? PONV (postoperative nausea and vomiting)     ? S/P cholecystectomy 6/22/2018   ? SBO (small bowel obstruction) (H) 11/12/2015   ? Stroke (H)     ? Transaminitis 10/22/2018   ? Upper respiratory infection 12/20/2018   ? Urinary incontinence                     Family History   Problem Relation Age of Onset   ? Cancer Paternal Grandmother     ? Cancer Paternal " Uncle     ? No Medical Problems Mother     ? No Medical Problems Father     ? Heart attack Sister     ? Chronic Kidney Disease Sister     ? No Medical Problems Daughter     ? No Medical Problems Maternal Grandmother     ? No Medical Problems Maternal Grandfather     ? No Medical Problems Paternal Grandfather     ? No Medical Problems Maternal Aunt     ? No Medical Problems Paternal Aunt     ? Diabetes Brother     ? BRCA 1/2 Neg Hx     ? Breast cancer Neg Hx     ? Colon cancer Neg Hx     ? Endometrial cancer Neg Hx     ? Ovarian cancer Neg Hx        Social History            Socioeconomic History   ? Marital status: Legally        Spouse name: None   ? Number of children: 1   ? Years of education: None   ? Highest education level: None   Occupational History   ? None   Social Needs   ? Financial resource strain: None   ? Food insecurity:       Worry: None       Inability: None   ? Transportation needs:       Medical: None       Non-medical: None   Tobacco Use   ? Smoking status: Former Smoker       Packs/day: 0.25   ? Smokeless tobacco: Former User   ? Tobacco comment: e-cig a few times/day   Substance and Sexual Activity   ? Alcohol use: No   ? Drug use: No   ? Sexual activity: None   Lifestyle   ? Physical activity:       Days per week: None       Minutes per session: None   ? Stress: None   Relationships   ? Social connections:       Talks on phone: None       Gets together: None       Attends Mandaen service: None       Active member of club or organization: None       Attends meetings of clubs or organizations: None       Relationship status: None   ? Intimate partner violence:       Fear of current or ex partner: None       Emotionally abused: None       Physically abused: None       Forced sexual activity: None   Other Topics Concern   ? None   Social History Narrative     Single/, lives alone; daughter in [place];     Gets help from neighbors and extended family     Former smoker.no  "alcohol problems      Review of Systems   PER HPI  Constitutional: denies fever, chills   HEENT: denies vision changes, cold-like symptoms  Respiratory: denies cough, shortness of breath   Cardiovascular: reports a \"pounding heart\" denies chest pain/pressure, palpitations, feels like edema is well controlled- states that she is able to apply compression stockings on own   GI: denies changes in bowel habits, does have a hx of fluctuating between diarrhea and constipation, reports emesis x2 last night, denies nausea currently but reports intermittent nausea, reports poor appetite, chronic abdominal pain     : denies dysuria, increased frequency   MSK: reports feeling fatigued/ generalized weakness but denies acute changes   Skin: denies concerns for suspicious lesions or sounds   Neuro: denies headache, gait disturbance   Psych: reports hx anxiety and depression, acutely worsened with anticipation with discharge     Physical Exam   Vitals:    07/17/19 1037   BP: 101/73   Pulse: (!) 126   Resp: 20   Temp: 97.4  F (36.3  C)   SpO2: 99%   General appearance: alert, appears stated age and cooperative  HEENT: Head is normocephalic with normal hair distribution. No evidence of trauma. Ears: Without lesions or deformity. No acute purulent discharge. Eyes: Conjunctivae pink with no scleral icterus or erythema. Nose: Normal mucosa and septum. Oropharnyx: mmm, no lesions present.  Lungs: clear to auscultation bilaterally, respirations without effort  Heart: tachycardic, apical pulse >100, regular rhythm, S1, S2 normal, no murmur, click, rub or gallop  Abdomen: soft, diffusely mildly tender; bowel sounds normal; no masses,  no organomegaly  Extremities: lower extremities covered with compression wraps, UTV skin underneath, edema is lower extremities difficult to assess since covered, appears mild, non-pitting   Pulses: 2+ and symmetric  Skin: Skin color, texture, turgor normal. No rashes or lesions  Neurologic: Grossly normal, " A&Ox4, long- and short-term memory grossly intact   Psych: conversational, appears anxious but consolable     MEDICATION LIST:  Current Outpatient Medications   Medication Sig   ? furosemide (LASIX) 20 MG tablet Take 20 mg by mouth daily.   ? ARTHRITIS PAIN RELIEF, ACETAM, 650 mg CR tablet Take 650 mg by mouth 2 (two) times a day.          ? aspirin 81 MG EC tablet Take 81 mg by mouth daily.    ? atorvastatin (LIPITOR) 80 MG tablet Take 80 mg by mouth at bedtime.   ? blood glucose meter (GLUCOMETER) Please Dispense kit per pharmacy discretion and patient's insurance Test blood sugar.   ? blood glucose test strips Use 1 each As Directed as needed. Dispense brand per patient's insurance at pharmacy discretion.   ? carboxymethylcellulose (REFRESH PLUS) 0.5 % Dpet ophthalmic dropperette Administer 2 drops to both eyes every hour as needed (dry eyes).    ? food supplemt, lactose-reduced (ENSURE ACTIVE CLEAR) Liqd Take twice daily or as directed for calorie supplement   ? guaiFENesin (HUMIBID 3) 400 mg Tab Take 400 mg by mouth every 4 (four) hours as needed (chest congestion, every 4 hours while awake PRN).   ? lancets 33 gauge Misc Test twice daily Dispense brand per patient's insurance at pharmacy discretion.   ? loratadine (CLARITIN) 10 mg tablet Take 10 mg by mouth daily with lunch. Noon   ? magnesium gluconate (MAGONATE) 27.5 mg magne- sium (500 mg) tablet Take 500 mg by mouth 3 (three) times a day.          ? metoprolol tartrate (LOPRESSOR) 25 MG tablet Take 25 mg by mouth 2 (two) times a day.         ? multivitamin (TAB-A-JULIET) per tablet Take 1 tablet by mouth daily. (Patient taking differently: Take 1 tablet by mouth daily. )   ? nitroglycerin (NITROSTAT) 0.4 MG SL tablet Place 1 tablet (0.4 mg total) under the tongue every 5 (five) minutes as needed for chest pain (for up to 3 doses and call 911 if persists). (Patient taking differently: Place 0.4 mg under the tongue every 5 (five) minutes as needed for chest  pain (for up to 3 doses and call 911 if persists). )   ? omeprazole (PRILOSEC) 20 MG capsule TAKE 1 CAPSULE BY MOUTH TWICE DAILY (8AM & 8PM)   ? oxyCODONE (ROXICODONE) 10 mg immediate release tablet Take 1 tablet (10 mg total) by mouth every 6 (six) hours as needed.   ? polyethylene glycol (GLYCOLAX) 17 gram/dose powder Take 17 g by mouth daily as needed.    ? potassium chloride (K-DUR,KLOR-CON) 20 MEQ tablet Take 1 tablet (20 mEq total) by mouth 2 (two) times a day.   ? senna-docusate (PERICOLACE) 8.6-50 mg tablet Take 1 tablet by mouth 2 (two) times a day.   ? sertraline (ZOLOFT) 100 MG tablet Take 1 tablet (100 mg total) by mouth daily.   ? sodium chloride (OCEAN) 0.65 % nasal spray Apply 1 spray into each nostril as needed for congestion.   ? spironolactone (ALDACTONE) 25 MG tablet Take 1 tablet (25 mg total) by mouth daily.   ? traZODone (DESYREL) 50 MG tablet Take 1 tablet (50 mg total) by mouth at bedtime.   ? VITAMIN D3 2,000 unit capsule TAKE 1 CAPSULE BY MOUTH ONCE DAILY AT 8AM (DO NOT BRAND CHANGE - PATIENT ONLY WANT CAPSULES)   ? warfarin (COUMADIN/JANTOVEN) 2 MG tablet Take 7 mg by mouth daily.             DISCHARGE DIAGNOSIS:    ICD-10-CM    1. Chronic abdominal pain R10.9     G89.29    2. Chronic heart failure with preserved ejection fraction (H) I50.32    3. Physical deconditioning R53.81    4. Persistent atrial fibrillation (H) I48.1    5. Essential hypertension I10    6. (HFpEF) heart failure with preserved ejection fraction (H) I50.30      PLAN:    Physical Deconditioning  -Continue PT/OT and other therapies as per care plan.  -Encouraged good nutrition and movement habits.   -Discussed care plan and expected course of stay.   -Continue to follow-up per routine schedule or sooner if needed.      Abdominal Pain  -Low sodium diet.   -Continue to work with GI as needed.   -Oxycodone 10 mg by mouth every 6 hours as need for pain.  -Omeprazole 20 mg by mouth daily.      Hypertension/Atrial  Fibrillation  -ASA 81 mg by mouth daily.   -Metoprolol 25 mg by mouth two times a day.   -Spironolactone 25 mg by mouth daily.   -Atorvastatin 80 mg by mouth daily.  -Coumadin 7 mg by mouth daily.   -Encouraged cardiac diet, low sodium diet, exercise and stress reduction techniques.   -Emphasized importance of blood pressure control.   -Continue current medications as prescribed.   -Follow up per routine schedule or sooner with any complaints or concerns.     HFpEF  --mild pitting edema in lower extremities stable for patient  --lasix 20mg daily due to hypotension  --metoprolol 25mg two times a day  --spironolactone 25mg daily    Nausea/Vomiting  -Resolved.      Otherwise continue current care plan for all other chronic medical conditions, as they are stable. Encouraged patient to engage in healthy lifestyle behaviors such as engaging in social activities, exercising (PT/OT), eating well, and following care plan. Follow up for routine check-up, or sooner if needed. Will continue to monitor patient and work with nursing staff collaboratively to work toward positive patient outcomes.    MEDICAL EQUIPMENT NEEDS:  None     DISCHARGE PLAN/FACE TO FACE:  I certify that services are/were furnished while this patient was under the care of a physician and that a physician or an allowed non-physician practitioner (NPP), had a face-to-face encounter that meets the physician face-to-face encounter requirements. The encounter was in whole, or in part, related to the primary reason for home health. The patient is confined to his/her home and needs intermittent skilled nursing, physical therapy, speech-language pathology, or the continued need for occupational therapy. A plan of care has been established by a physician and is periodically reviewed by a physician.  Date of Face-to-Face Encounter: 7/17/19    I certify that, based on my findings, the following services are medically necessary home health services: lab draws, RN nurse  visits     My clinical findings support the need for the above skilled services because: pt is a socially and physically complex patient that requires frequent monitoring for safe ongoing management of care      This patient is homebound because: she is a frail elderly gentleman with multiple comorbidities and recent hospitalizations making it unsafe for her to go out into the community for services.    The patient is, or has been, under my care and I have initiated the establishment of the plan of care. This patient will be followed by a physician who will periodically review the plan of care. Schedule follow up visit with primary care provider within 7 days to reestablish care.    Total unit/floor time of 35 minutes time consisted of the following: time spent with patient, examination of patient, reviewing the record including pertinent labs and completing documentation. More than 50% of this time was spent in coordination of care time with nursing staff and other healthcare providers, this time was spent on discussion/counseling regarding discharge planning, discharge follow-up and discharge cares.    Electronically signed by: Arleth Barton CNP

## 2021-06-19 NOTE — PROGRESS NOTES
[facility]      Facility[facility]    [facility] [[id number]]    Code Status: FULL CODE   [facility]     6/22 -  7/3/18      Chief Complaint/Reason for Visit:  Chief Complaint   Patient presents with     Review Of Multiple Medical Conditions     ventral hernia redo       HPI:   Gardenia Muller is a 67 y.o. female with history of abdominal pain which led to hospitalization 2/26/18 and diagnosis of incarcerated large incisional ventral hernia. She had ongoing pain and had some issue with eating  and vomiting.  She had multiple cardiac risk factors and was felt to be a tenuous candidate for surgery so she was discharged without surgical management. Cardiology consulted, did a nuclear stress test on 4/9/18 which did show apical ischemia. Dr. Flanagan  stated  this was likely due to known chronic occlusion of her right coronary artery and that patient should be okay for surgery as she was symptom-free.    She did have a previous incarcerated umbilical hernia repair  November 12, 2015.    She had a robotic incisional hernia repair with transverse abdominis release, complex abdominal reconstruction on 6-22-18.  She did have recurrent small bowel obstructions, had a repeat laparotomy on 6-30-18.  She had to be on TPN for a while, but after the second surgery was started on an oral diet.    She did have postop atrial fibrillation with RVR, converted back to sinus.  She briefly had amiodarone, was on IV but switched to oral metoprolol.  Warfarin was held for a while but restarted before discharge.    She had acute blood loss anemia, received 2 units of packed red cells.      Other medical history:  -Coronary artery disease: Nonobstructive left coronary disease, chronically occluded right coronary which is well collateralized  -Essential hypertension  -Peripheral arterial disease  -Renal artery stenosis: Right  -Paroxysmal atrial fibrillation: No PFO  - Multiple ischemic cerebrovascular  accidents.  She failed aspirin, and had been on Coumadin thereafter.  -Dyslipidemia  -Diabetes type 2  -Obesity      Past Medical History:  Past Medical History:   Diagnosis Date     Acute diastolic heart failure (H) 11/2015     Atrial fibrillation (H)      Cerebral vascular accident (H)      Coronary artery disease 2006    100% RCA with collateral filling per Dr. Elizabeth's angio report     Diabetes mellitus type 2 in obese (H)      Fibrocystic breast      GERD (gastroesophageal reflux disease)      History of anesthesia complications     slow to wake     Hypertension      Peripheral vascular disease (H)      PONV (postoperative nausea and vomiting)      Stroke (H)      Urinary incontinence            Surgical History:  Past Surgical History:   Procedure Laterality Date     CARDIAC CATHETERIZATION       CORONARY STENT PLACEMENT  1995    stent     EXPLORATORY LAPAROTOMY N/A 6/29/2018    Procedure: LAPAROTOMY, CLOSURE OF PERITONEAL RENT;  Surgeon: Jones Harding DO;  Location: [facility];  Service:      [facility]  6/29/2018          VENTRAL HERNIA REPAIR N/A 11/12/2015    Procedure: STRANGULATED VENTRAL HERNIA REPAIR ;  Surgeon: Ernesto Bailey MD;  Location: [facility];  Service:        Family History:   Family History   Problem Relation Age of Onset     Cancer Paternal Grandmother      Cancer Paternal Uncle      No Medical Problems Mother      No Medical Problems Father      Heart attack Sister      Chronic Kidney Disease Sister      No Medical Problems Daughter      No Medical Problems Maternal Grandmother      No Medical Problems Maternal Grandfather      No Medical Problems Paternal Grandfather      No Medical Problems Maternal Aunt      No Medical Problems Paternal Aunt      Diabetes Brother      BRCA 1/2 Neg Hx      Breast cancer Neg Hx      Colon cancer Neg Hx      Endometrial cancer Neg Hx      Ovarian cancer Neg Hx        Social History:    Social History     Social History     Marital status:  Legally      Spouse name: N/A     Number of children: One daughter in [place], son in [place]     Years of education: N/A     Social History Main Topics     Smoking status: Current Every Day Smoker     Packs/day: 0.25     Smokeless tobacco: Current User      Comment: e-cig a few times/day     Alcohol use No     Drug use: No     Sexual activity: Not on file            Review of Systems   Left side of the abdomen feels like it has a pulled muscle, less intense today   Feels abdominal binder is too tight.  Is doing therapy, feels a little stronger  Pain control is good, no constipation or diarrhea, no nausea, sleeping well.      Vitals:    07/09/18 0900   BP: 105/52   Pulse: 72   Resp: 18   Temp: 97.7  F (36.5  C)   SpO2: 90%       Physical Exam    Constitutional: Pleasant middle-aged black female, obese. No distress.   Cardiovascular: Regular rhythm and normal heart sounds.    No murmur heard.  Pulmonary/Chest: Effort normal and breath sounds normal. She has no wheezes. She has no rales.   Abdominal: Soft. Bowel sounds are hollow . She exhibits no distension. There is tenderness.   Still a  bulging area proximal to the incision,    Musculoskeletal: Normal range of motion.  2-3+ pedal edema bilateral legs patella distally  Moves all extremities well. Normal strength and coordination .   Neurological: She is alert and oriented to person, place, and time,normal muscle tone.   Skin: Skin is warm and dry.   Has horizontal stretch marks bilaterally on her lower extremities, whitish tan/not new)   Psychiatric: She has a normal mood , affect, behavior          Medication List:  Current Outpatient Prescriptions   Medication Sig     ACCU-CHEK YAZMIN PLUS TEST STRP strips TEST TWICE DAILY AS DIRECTED. *6 TOTAL FILLS*     acetaminophen (TYLENOL) 650 MG CR tablet Take 650 mg by mouth 2 (two) times a day.      aspirin 81 MG EC tablet Take 81 mg by mouth daily.     blood glucose meter (GLUCOMETER) Please Dispense kit per  pharmacy discretion and patient's insurance Test blood sugar.     cholecalciferol, vitamin D3, (VITAMIN D3) 2,000 unit capsule Take 2,000 Units by mouth daily with lunch.     docusate sodium (COLACE) 100 MG capsule Take 1 capsule (100 mg total) by mouth 2 (two) times a day for 10 days.     furosemide (LASIX) 20 MG tablet Take 1 tablet (20 mg total) by mouth daily.     lancets 33 gauge Misc Test twice daily Dispense brand per patient's insurance at pharmacy discretion.     lidocaine (LMX5) 5 % Crea Apply 1 application topically every 12 (twelve) hours as needed (applies to back).     loratadine (CLARITIN) 10 mg tablet Take 10 mg by mouth daily with lunch. Noon     LORazepam (ATIVAN) 0.5 MG tablet Take 1 tablet (0.5 mg total) by mouth every 6 (six) hours as needed for anxiety. 1  tabs po q 6 hours prn     metoprolol tartrate (LOPRESSOR) 25 MG tablet Take 1 tablet (25 mg total) by mouth 2 (two) times a day.     multivitamin (TAB-A-JULIET) per tablet Take 1 tablet by mouth daily.     nitroglycerin (NITROSTAT) 0.4 MG SL tablet Place 1 tablet (0.4 mg total) under the tongue every 5 (five) minutes as needed for chest pain (for up to 3 doses and call 911 if persists).     omeprazole (PRILOSEC) 20 MG capsule Take 20 mg by mouth 2 (two) times a day.      oxyCODONE-acetaminophen (PERCOCET) 5-325 mg per tablet Take 1 tablet by mouth every 4 (four) hours as needed for pain.     polyethylene glycol (MIRALAX) 17 gram packet Take 17 g by mouth daily as needed.     polyvinyl alcohol (ARTIFICIAL TEARS, POLYVIN ALC,) 1.4 % ophthalmic solution Administer 2 drops to both eyes 2 (two) times a day. As directed.      pramipexole (MIRAPEX) 0.125 MG tablet TAKE 1 TABLET BY MOUTH AT BEDTIME     rosuvastatin (CRESTOR) 40 MG tablet TAKE 1 TABLET BY MOUTH AT BEDTIME     sertraline (ZOLOFT) 100 MG tablet TAKE 1 TABLET BY MOUTH ONCE DAILY     sod bicarb-citric acid-simethicone (E-Z GAS) 2.21-1.53 gram/4 gram GrEP Take 1 packet by mouth 3 (three)  times a day as needed (gas).     traZODone (DESYREL) 50 MG tablet Take 1 tablet (50 mg total) by mouth at bedtime.     warfarin (COUMADIN) 2 MG tablet Take by mouth See Admin Instructions. . Adjust dose based on INR results as directed.       Labs:        Ref Range & Units 7/2/18  7:09 AM     Sodium 136 - 145 mmol/L 138   Potassium 3.5 - 5.0 mmol/L 4.0   Chloride 98 - 107 mmol/L 104   CO2 22 - 31 mmol/L 27   Anion Gap, Calculation 5 - 18 mmol/L 7   Glucose 70 - 125 mg/dL 136 (H)   Calcium 8.5 - 10.5 mg/dL 9.7   BUN 8 - 22 mg/dL 16   Creatinine 0.60 - 1.10 mg/dL 0.63   GFR MDRD Af Amer >60 mL/min/1.73m2 >60        Ref Range & Units 7/3/18  6:40 AM   7/2/18  7:09 AM   7/2/18  7:09 AM     Magnesium 1.8 - 2.6 mg/dL 1.4 (L) 1.7 (L) 1.7 (L)           Ref Range & Units 7/2/18  7:09 AM   7/1/18  6:20 AM     Phosphorus 2.5 - 4.5 mg/dL 2.3 (L) 2.3 (L)          Ref Range & Units 7/2/18  7:09 AM   7/1/18  6:20 AM     WBC 4.0 - 11.0 thou/uL 11.2 (H) 11.6 (H)   Hemoglobin 12.0 - 16.0 g/dL 9.1 (L) 9.2 (L)   RDW 11.0 - 14.5 % 17.6 (H) 17.4 (H)   Platelets 140 - 440 thou/uL 176 200         Assessment: Plan:      ICD-10-CM    1. S/P repair of ventral hernia Z98.890 Tolerating po, pain controlled on Percocet    Z87.19    2. Anemia due to blood loss, acute D62    3. Type 2 diabetes mellitus with complication, without long-term current use of insulin (H) E11.8 Gluc 149, 169   4. Essential hypertension I10 On lasix, metoprolol. Lowish BP with normal pulse. Tolerating   5.   Hypomagnesemia E83.42 Order replacement Mg/Phos, recheck in 2 weeks   6.   Hypophosphatemia E83.39    7 Paroxysmal atrial fibrillation (H) I48.0 On Coumadin       Total time 48 minutes, > 50 % face to face teaching re therapy recovery, reviewing medications and addressing questions    Electronically signed by: Monik Cruz MD

## 2021-06-19 NOTE — LETTER
Letter by Monik Cruz MD at      Author: Monik Cruz MD Service: -- Author Type: --    Filed:  Encounter Date: 7/15/2019 Status: (Other)         Patient: Gardenia Muller   MR Number: [id number]   YOB: 1950   Date of Visit: 7/15/2019     [facility]      Facility:    [facility] [[id number]]    Code Status: FULL CODE   [facility] 10/3  through 10/25/18  [facility]   6/14 through 617/2019  [facility] 6/27 through 7/3/2019      Chief Complaint/Reason for Visit:  Chief Complaint   Patient presents with   ? Review Of Multiple Medical Conditions     fall with superior pubic ramus fracture       HPI:   Gardenia is a 69 y.o. female with MI, peripheral arterial disease, right renal artery stenosis, diabetes type 2, essential hypertension, past stroke, HFpEF, atrial flutter, atrial fibrillation (converted), cholecystitis status post laparoscopic cholecystectomy, ventral hernia with incisional hernia repair, previous transaminitis..      She had a recent hospitalization in June 2019 for CHF, CT of the abdomen at that time was stable.    Gardenia  had a few months of chronic abdominal pain and weight loss.  He states she chronically has 1-2 loose stools a day.  Couple days prior to admission, she was having vomiting, and a worsening of her right and mid abdominal pain.  Also complained it was hard to take care of herself at home.    She came to the emergency department: Urinalysis looked suspicious, urine culture was no growth.      Ultrasound of the abdomen was unremarkable  CT showed Mitchell, stable common bile duct dilatation without stones, pancreatic atrophy.  MRCP shows Mitchell, stable CBD, mild hepatic congestion.  HBV   And  HBC studies were negative.    Gastroenterology ( Dr Mueller) was consulted: as in the past they felt her chronic abdominal pain might be mild abdominal ischemia.  She also felt her transaminitis was likely due to  "right-sided heart failure with hepatic congestion.  MRCP showed hepatic congestion.    Dr. Norberto Elias recommended angiogram of the pelvis:   IMPRESSION:CTA abd/pelvis (7/3/19)  CONCLUSION:  1.  Mild-to-moderate atherosclerotic narrowing of the SMA secondary to calcified and noncalcified plaque. No evidence of severe SMA narrowing.  2.  Severe narrowing at the origin of left main renal artery due to calcified plaque. A widely patent right renal artery stent is noted.  3.  Additional chronic findings as described above.      [facility] concerns[facility]    UPDATE:  Nurse on duty states her weight is up: did not know if pt symptomatic, did not listen to breath sounds for crackles    Over the past few days, she has had occasional heart rates in the 110s, we had decreased her metoprolol from 12.5 mg twice daily down to 6.25 mg twice daily order written on 7/ 7, that was because some of her heart rates were too low.  Blood pressures have held steady.  The hospital stopped digoxin therapy      PT weekly update: She is moderately independent in bed, transfers, ambulation.  Her tug =18 seconds  She is disappointed that she does not To stay at [facility] until she moves to new assisted living apartment.  Probable discharge in a few days time    There was a care conference with her ,  will be assisting with looking for different living situation, although again she gets quite a bit of support in her current location.  She just says the tendons in the building have become \"rough\" since it ceased being exclusively a senior apartment.    Past Medical History:  Past Medical History:   Diagnosis Date   ? Acute diastolic heart failure (H) 11/2015   ? Acute on chronic diastolic heart failure (H) 6/15/2019   ? Advanced directives, counseling/discussion 8/5/2015    Patient completed 2011.  Discussed today, 5/24/17, and wants to change " healthcare agent from niece to daughter.  Discussed that she will need to complete new form to indicate this.   ? MAY (acute kidney injury) (H) 10/22/2018   ? Atrial fibrillation (H)    ? Benign adenomatous polyp of large intestine 3/30/2017    Colonoscopy March 2017.  Recommend 5 year follow-up.  Single tubular adenoma   ? Biliary obstruction 10/3/2018    Added automatically from request for surgery [id number]   ? Cerebral vascular accident (H)    ? Coronary artery disease 2006    100% RCA with collateral filling per Dr. Elizabeth's angio report   ? Diabetes mellitus type 2 in obese (H)    ? Fibrocystic breast    ? GERD (gastroesophageal reflux disease)    ? History of anesthesia complications     slow to wake   ? Hypertension    ? Peripheral vascular disease (H)    ? Pneumonia 11/11/2018   ? PONV (postoperative nausea and vomiting)    ? S/P cholecystectomy 6/22/2018   ? SBO (small bowel obstruction) (H) 11/12/2015   ? Stroke (H)    ? Transaminitis 10/22/2018   ? Upper respiratory infection 12/20/2018   ? Urinary incontinence            Surgical History:  Past Surgical History:   Procedure Laterality Date   ? CARDIAC CATHETERIZATION     ? CARDIOVERSION  10/18/2018   ? CORONARY STENT PLACEMENT  1995    stent   ? ERCP N/A 10/5/2018    Procedure: ENDOSCOPIC RETROGRADE CHOLANGIOPANCREATOGRAPHY, SPHINCTEROTOMY;  Surgeon: Anson Stokes MD;  Location[facility];  Service:    ? EXPLORATORY LAPAROTOMY N/A 6/29/2018    Procedure: LAPAROTOMY, CLOSURE OF PERITONEAL RENT;  Surgeon: Jones Harding DO;  Location: [facility] OR;  Service:    ? LAPAROSCOPIC CHOLECYSTECTOMY N/A 10/7/2018    Procedure: CHOLECYSTECTOMY, LAPAROSCOPIC;  Surgeon: Felicia So MD;  Location: [facility] OR;  Service:    ? VENTRAL HERNIA REPAIR N/A 11/12/2015    Procedure: STRANGULATED VENTRAL HERNIA REPAIR ;  Surgeon: Ernesto Bailey MD;  Location: [facility] OR;  Service:             Review of Systems   She denies any change  "in breath sounds  She is worried about occasional heart rates into the 100 teens this past couple days (she \"thought\" she could feel them)    Blood pressure 113/71, pulse (!) 113, temperature 96.6  F (35.9  C), resp. rate 16, SpO2 94 %, not currently breastfeeding.          Physical Exam     Constitutional:  No distress. Alert overweight Black-American female .   Cardiovascular:Mosty regular rhythm and normal heart sounds.   No murmur heard.  Near holosystolic murmur at the mitral/apex area   Pulmonary/Chest: Breath sounds clear ,no rales.   Abdominal: Soft. Bowel sounds are normal,no tenderness.   Surgical wound well-healed (midline from umbilical hernia repair)   Musculoskeletal: Normal range of motion. She exhibits no edema or tenderness.   1+ pretibial edema (unchanged)   Neurological: She is alert and oriented to person, place, and time. Coordination normal.   Skin: Skin is warm and dry. No erythema.   Psychiatric: She has a normal mood .Demonstrates passivity    Allergies   Allergen Reactions   ? Penicillins Swelling       Medication List:  Current Outpatient Medications   Medication Sig   ? ARTHRITIS PAIN RELIEF, ACETAM, 650 mg CR tablet Take 650 mg by mouth 2 (two) times a day.          ? aspirin 81 MG EC tablet Take 81 mg by mouth daily.    ? atorvastatin (LIPITOR) 80 MG tablet Take 80 mg by mouth at bedtime.   ? blood glucose meter (GLUCOMETER) Please Dispense kit per pharmacy discretion and patient's insurance Test blood sugar.   ? blood glucose test strips Use 1 each As Directed as needed. Dispense brand per patient's insurance at pharmacy discretion.   ? carboxymethylcellulose (REFRESH PLUS) 0.5 % Dpet ophthalmic dropperette Administer 2 drops to both eyes every hour as needed (dry eyes).    ? food supplemt, lactose-reduced (ENSURE ACTIVE CLEAR) Liqd Take twice daily or as directed for calorie supplement   ? furosemide (LASIX) 40 MG tablet Take 1 tablet (40 mg total) by mouth daily.   ? guaiFENesin " (HUMIBID 3) 400 mg Tab Take 400 mg by mouth every 4 (four) hours as needed (chest congestion, every 4 hours while awake PRN).   ? lancets 33 gauge Misc Test twice daily Dispense brand per patient's insurance at pharmacy discretion.   ? loratadine (CLARITIN) 10 mg tablet Take 10 mg by mouth daily with lunch. Noon   ? magnesium gluconate (MAGONATE) 27.5 mg magne- sium (500 mg) tablet Take 500 mg by mouth 3 (three) times a day.          ? metoprolol tartrate (LOPRESSOR) 25 MG tablet Take 6.25 mg by mouth 2 (two) times a day.          ? multivitamin (TAB-A-JULIET) per tablet Take 1 tablet by mouth daily. (Patient taking differently: Take 1 tablet by mouth daily. )   ? nitroglycerin (NITROSTAT) 0.4 MG SL tablet Place 1 tablet (0.4 mg total) under the tongue every 5 (five) minutes as needed for chest pain (for up to 3 doses and call 911 if persists). (Patient taking differently: Place 0.4 mg under the tongue every 5 (five) minutes as needed for chest pain (for up to 3 doses and call 911 if persists). )   ? omeprazole (PRILOSEC) 20 MG capsule TAKE 1 CAPSULE BY MOUTH TWICE DAILY (8AM & 8PM)   ? oxyCODONE (ROXICODONE) 10 mg immediate release tablet Take 1 tablet (10 mg total) by mouth every 6 (six) hours as needed.   ? polyethylene glycol (GLYCOLAX) 17 gram/dose powder Take 17 g by mouth daily as needed.    ? potassium chloride (K-DUR,KLOR-CON) 20 MEQ tablet Take 1 tablet (20 mEq total) by mouth 2 (two) times a day.   ? senna-docusate (PERICOLACE) 8.6-50 mg tablet Take 1 tablet by mouth 2 (two) times a day.   ? sertraline (ZOLOFT) 100 MG tablet Take 1 tablet (100 mg total) by mouth daily.   ? sodium chloride (OCEAN) 0.65 % nasal spray Apply 1 spray into each nostril as needed for congestion.   ? spironolactone (ALDACTONE) 25 MG tablet Take 1 tablet (25 mg total) by mouth daily.   ? traZODone (DESYREL) 50 MG tablet Take 1 tablet (50 mg total) by mouth at bedtime.   ? VITAMIN D3 2,000 unit capsule TAKE 1 CAPSULE BY MOUTH ONCE  DAILY AT 8AM (DO NOT BRAND CHANGE - PATIENT ONLY WANT CAPSULES)   ? warfarin (COUMADIN/JANTOVEN) 2 MG tablet Take 2 mg by mouth daily.       Labs:    Ref Range & Units 7/15/19 1035    INR 0.90 - 1.10 2.86High           Lab Units 06/28/19  0734 06/27/19  1342   LN-WHITE BLOOD CELL COUNT thou/uL 5.3 8.0   LN-HEMOGLOBIN g/dL 13.8 15.2   LN-HEMATOCRIT % 44.3 47.6*   LN-PLATELET COUNT thou/uL 139* 165         Lab Units 06/28/19  0734 06/27/19  1342   LN-SODIUM mmol/L 139 139   LN-POTASSIUM mmol/L 4.1 5.5*   LN-CHLORIDE mmol/L 108* 104   LN-CO2 mmol/L 27 27   LN-BLOOD UREA NITROGEN mg/dL 13 17   LN-CREATININE mg/dL 0.63 0.73   LN-CALCIUM mg/dL 10.5 12.0*   LN-ALBUMIN g/dL 2.7* 3.4*   LN-PROTEIN TOTAL g/dL 6.6 8.2*   LN-BILIRUBIN TOTAL mg/dL 1.2* 1.5*   LN-ALKALINE PHOSPHATASE U/L 140* 182*   LN-ALT (SGPT) U/L 70* 89*   LN-AST (SGOT) U/L 133* 200*            Lipase   Date Value Ref Range Status   06/27/2019 14 0 - 52 U/L Final   06/29/2016 17 0 - 52 U/L Final   03/04/2016 29 0 - 52 U/L Final     Hepatitis C antibody = negative    IMAGING:  EXAM: MR MRCP W WO CONTRAST  LOCATION: [facility]  DATE/TIME: 6/27/2019 8:42 PM     CONCLUSION:  1.  Moderate dilatation of extrahepatic bile ducts, especially the common hepatic duct which measures 13 mm. No intraductal stone or obstructing mass evident. Patient does have a small duodenal diverticulum at the level the ampulla which could be playing   a role.  2.  There is minimal dilatation of the main pancreatic duct.  3.  Trace volume ascites. Mild cardiomegaly.        EXAM: CT ABDOMEN PELVIS WO ORAL W IV CONTRAST  DATE/TIME: 6/27/2019     INDICATION: Abdominal pain.  COMPARISON: Ultrasound earlier today, CT 06/14/2019.   CONCLUSION:   1.  Hepatic steatosis.  2.  Enlarged heart with fibroatelectasis and possible interstitial edema. Correlate with chest x-ray.  3.  Diverticulosis without diverticulitis.  4.  Prominent common hepatic duct unchanged.  5.  Presumed uterine  fibroids.  6.  Diffuse thickening of urinary bladder accentuated from incomplete distention.  7.  Remainder stable.    EXAM: US ABDOMEN LIMITED  LOCATION: [facility]  DATE/TIME: 6/27/2019 4:50 PM  CONCLUSION:  1.  Prominent common hepatic duct however this is unchanged from recent CT. Differential would include reservoir effect however given abnormal liver function tests distal obstructing lesion cannot be excluded.  2.  Minimally heterogeneous hepatic steatosis.        ICD-10-CM    1. Chronic abdominal pain  R10.9     G89.29    2. 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 Persistent atrial fibrillation (H) I48.1  rate less controlled, will resume metoprolol 12.5 mg twice daily.  If she has low pressures, low pulse rates,  may have to add back digoxin   4. Chronic heart failure with preserved ejection fraction (H) I50.32  weight up, suspect is not accurate his exam has not changed, there are no symptoms   5. PAD (peripheral artery disease) (H) I73.9  body wide arterial disease   6. Cerebrovascular disease I67.9    7. Essential hypertension I10  follow blood pressures with increase in metoprolol   8. Coronary artery disease involving native coronary artery of native heart without angina pectoris I25.10  No complaints of cardiac angina   9. Other depression F32.89    10. Hyperparathyroidism (H) E21.3                    Electronically signed by: Monik Cruz MD

## 2021-06-19 NOTE — ANESTHESIA PREPROCEDURE EVALUATION
Anesthesia Evaluation      Patient summary reviewed   History of anesthetic complications     Airway   Mallampati: I   Pulmonary - normal exam                          Cardiovascular   (+) hypertension poorly controlled, CAD, dysrhythmias (recent a fib with last surgery), ,   Received beta blockers in last 24 hours prior to incision.  ,  ECG reviewed  Rhythm: regular  Rate: normal,         Neuro/Psych    (+) CVA ,     Endo/Other    (+) diabetes mellitus,      GI/Hepatic/Renal    (+) GERD,   chronic renal disease,      Other findings: INR 2.0, now s/p 2 FFP    The pharmacologic nuclear stress test is abnormal.    Stress nuclear study suggests a small area of nontransmural infarction involving the inferoapical wall, although specificity reduced due to splanchnic attenuation. No significant ischemia identified.    Transient increased stress to rest cavity ratio of 1.35    The left ventricular ejection fraction is 73% without evidence of a regional wall motion abnormality..    When compared to the images of 9/27/2017, small area of nontransmural infarction is suggested. Transient increased stress to rest cavity ratio is now noted.    PMH        Spinal stenosis    PAD (peripheral artery disease)    Dermatosis Papulosa Nigra    Depression    Hypercalcemia    Right Renal Artery Stenosis    Osteoarthritis    Abnormality Of Walk    Type 2 diabetes mellitus with circulatory disorder    Advanced directives, counseling/discussion    SBO (small bowel obstruction)    PAF (paroxysmal atrial fibrillation)    Cystitis    Hypomagnesemia    Hypertension    Dysarthria    Cerebral infarction    Anemia    Cerebrovascular disease    Benign adenomatous polyp of large intestine    RLS (restless legs syndrome)    Incisional hernia    Abdominal pain    Diverticular disease of large intestine    Dysphagia    Coronary artery disease involving native coronary artery of native heart without angina pectoris    Dyslipidemia    Ventral hernia  without obstruction or gangrene      Recent stress test with limited ischemia, cardiology commented that this is probably due to chronically occluded RCA and it would be okay to proceed.    patient with no CP post op, did have a fib last surgery requiring dilt.  Metoprolol today for HR control.  NG in place with high output. Very poor dentiition      Dental                         Anesthesia Plan  Planned anesthetic: general endotracheal    ASA 3 - emergent     Anesthetic plan and risks discussed with: patient  Anesthesia plan special considerations: rapid sequence induction,   Post-op plan: routine recovery        PICC in place, placed this am.

## 2021-06-19 NOTE — ANESTHESIA POSTPROCEDURE EVALUATION
Patient: Gardenia Muller  LAPAROTOMY, CLOSURE OF PERITONEAL RENT  Anesthesia type: general    Patient location: PACU  Last vitals:   Vitals:    06/29/18 1700   BP: 169/72   Pulse: 71   Resp: 24   Temp:    SpO2: 100%     Post vital signs: stable  Level of consciousness: awake, alert, oriented and responds to simple questions  Post-anesthesia pain: pain controlled  Post-anesthesia nausea and vomiting: no  Pulmonary: unassisted, return to baseline  Cardiovascular: stable and blood pressure at baseline  Hydration: adequate  Anesthetic events: no    QCDR Measures:  ASA# 11 - Elzbieta-op Cardiac Arrest: ASA11B - Patient did NOT experience unanticipated cardiac arrest  ASA# 12 - Elzbieta-op Mortality Rate: ASA12B - Patient did NOT die  ASA# 13 - PACU Re-Intubation Rate: ASA13B - Patient did NOT require a new airway mgmt  ASA# 10 - Composite Anes Safety: ASA10A - No serious adverse event    Additional Notes:  Dong well in pacu, patient states she has no complaints.

## 2021-06-19 NOTE — PROGRESS NOTES
" HPI: Gardenia Muller is here for follow up after her transversus abdominis release.  She is doing well with mild achiness in the upper epigastric region but has no other complaints.  Allergies, Medications, Social History, Past Medical History and Past Surgical History were reviewed and are noted in the chart.    /84 (Patient Site: Right Arm, Patient Position: Sitting, Cuff Size: Adult Large)  Pulse 75  Ht 5' 2\" (1.575 m)  Wt 188 lb (85.3 kg)  LMP 01/25/1999  SpO2 98%  Breastfeeding? No  BMI 34.39 kg/m2  Body mass index is 34.39 kg/(m^2).      EXAM:   GENERAL: Appears well  Abdomen-soft, no recurrent hernias noted, adipose tissue with slight bulge but no hernia noted.  Mild tenderness palpation    Incision 06/22/18 Abdomen (Active)   Site Assessment Clean;Dry;Approximated 7/3/2018  8:00 AM   Surrounding Tissue Assessment Clean;Dry;Intact 7/3/2018 12:10 AM   Closure Steristrips 7/3/2018  8:00 AM   Drainage Description Sanguineous (Bloody) 6/23/2018  6:00 PM   Dressing Open to air 7/3/2018  8:00 AM   Dressing Status  Clean;Dry;Intact 7/3/2018 12:10 AM       Incision 06/29/18 Abdomen (Active)   Site Assessment Clean;Dry;Approximated 7/3/2018  8:00 AM   Surrounding Tissue Assessment Clean;Dry;Intact 7/3/2018 12:10 AM   Closure Steristrips 7/3/2018  8:00 AM   Drainage Amount Scant 7/3/2018 12:10 AM   Drainage Description Sanguineous (Bloody) 7/1/2018  3:45 PM   Dressing Open to air 7/3/2018  8:00 AM   Dressing Status  Clean;Dry;Intact 7/3/2018 12:10 AM       Assessment/Plan: Gardenia Muller continues to do well. She wound is healing and overall progressing well.  She still in the healing phase and is likely that her muscles are beginning to accommodate the reapproximation.  At this point she will continue with ambulation.  She is still refrain from any heavy lifting.  I will have her follow-up with me in 1 month if things are not progressing in the right direction.    Jones Harding  DO FACS  [facility] " Department of Surgery

## 2021-06-19 NOTE — LETTER
Letter by Jerson Hernandez MD at      Author: Jerson Hernandez MD Service: -- Author Type: --    Filed:  Encounter Date: 4/23/2019 Status: (Other)         Gardenia Muller  [address] [place] [address]             April 23, 2019         Dear Ms. Muller,    Below are the results from your recent visit:    Resulted Orders   Basic Metabolic Panel   Result Value Ref Range    Sodium 140 136 - 145 mmol/L    Potassium 4.9 3.5 - 5.0 mmol/L    Chloride 106 98 - 107 mmol/L    CO2 23 22 - 31 mmol/L    Anion Gap, Calculation 11 5 - 18 mmol/L    Glucose 100 70 - 125 mg/dL    Calcium 10.1 8.5 - 10.5 mg/dL    BUN 9 8 - 22 mg/dL    Creatinine 0.73 0.60 - 1.10 mg/dL    GFR MDRD Af Amer >60 >60 mL/min/1.73m2    GFR MDRD Non Af Amer >60 >60 mL/min/1.73m2    Narrative    Fasting Glucose reference range is 70-99 mg/dL per  American Diabetes Association (ADA) guidelines.       Kidney tests are normal,Gardenia      Please call with questions or contact us using [employer].    Sincerely,        Electronically signed by Jerson Hernandez MD

## 2021-06-19 NOTE — ANESTHESIA CARE TRANSFER NOTE
Last vitals:   Vitals:    06/29/18 1700   BP: 169/72   Pulse: 71   Resp: 24   Temp:    SpO2: 100%     Temp: 99.6f temporal    Volatile agents turned off, muscle relaxant reversed, 4/4 twitches with sustained tetany. Pt breathing spontaneously with adequate tidal volumes, following commands, gently suctioned oropharynx, extubated without issue. Transported by CRNA and RN to recovery.        Patient's level of consciousness is awake and drowsy  Spontaneous respirations: yes  Maintains airway independently: yes  Dentition unchanged: yes  Oropharynx: oropharynx clear of all foreign objects    QCDR Measures:  ASA# 20 - Surgical Safety Checklist: WHO surgical safety checklist completed prior to induction  PQRS# 430 - Adult PONV Prevention: 4558F - Pt received => 2 anti-emetic agents (different classes) preop & intraop  ASA# 8 - Peds PONV Prevention: NA - Not pediatric patient, not GA or 2 or more risk factors NOT present  PQRS# 424 - Elzbieta-op Temp Management: 4559F - At least one body temp DOCUMENTED => 35.5C or 95.9F within required timeframe  PQRS# 426 - PACU Transfer Protocol: - Transfer of care checklist used  ASA# 14 - Acute Post-op Pain: ASA14B - Patient did NOT experience pain >= 7 out of 10

## 2021-06-19 NOTE — PROGRESS NOTES
Thank you for asking the [facility] team to see Gardenia Muller.      Assessment/Plan:     Atrial fibrillation - RVR. Will start diltiazem today and have her call on Friday. If not doing better or getting worse will plan on her being admitted for afib with RVR and heart failure    HFpEF - fluid up. Increasing furosemide to 80mg daily x 3 day. Check CBC, BNP, BMP    CAD - on statin    HTN - BP low today, likely related to RVR.     F/U 3 months but she will call on Friday. Gardenia sees Dr. Hernandez next week.     Current History:   Gardenia Muller is a 68 y.o. with diffuse vascular disease, obesity, diabetes, hypertension, hyperlipidemia and renal insufficiency. Her last coronary angiogram in 2006 showed coronary calcification with non-obstructive left coronary disease and a chronically occluded right coronary that was well collateralized. She saw Dr. August in 2012 for evaluation for a cryptogenic stroke and is on coumadin for presumed atrial fibrillation after a NIVIA showed no PFO. Gardenia uses a walker due to gait instability from her strokes. She notes that she will get heart burn and need to throw up when walking too much. This comes and goes depending on the state of her hernia. She has also had severe diarrhea from the hernia, which does contain some transverse colon. Denies bathes and dresses herself, but does get help with house work. She uses pillows to prop her head up at night due to dyspnea, and this is chronic, but denies edema. She does note that sometimes when she walks too much her heart races and she gets dizzy. This resolves quickly with rest. She had her hernia surgery in July and had atrial fibrillation with RVR post op that spontaneously converted on IV amiodarone gtt.    She returns for f/u today. She was started on iron for low hemoglobin 2 weeks ago and has been worried about her black stools since. She has also felt progressively more short of breath and had to use a wheelchair to get  into the clinic today. She denies syncope or chest pain but has noted her heart rates have been fast again for the past 4 days (100-130s) with a stable blood pressure. Gardenia also complains of abdominal cramping but is stooling and eating okay.     Past Medical History:     Past Medical History:   Diagnosis Date     Acute diastolic heart failure (H) 11/2015     Atrial fibrillation (H)      Cerebral vascular accident (H)      Coronary artery disease 2006    100% RCA with collateral filling per Dr. Elizabeth's angio report     Diabetes mellitus type 2 in obese (H)      Fibrocystic breast      GERD (gastroesophageal reflux disease)      History of anesthesia complications     slow to wake     Hypertension      Peripheral vascular disease (H)      PONV (postoperative nausea and vomiting)      Stroke (H)      Urinary incontinence        Past Surgical History:     Past Surgical History:   Procedure Laterality Date     CARDIAC CATHETERIZATION       CORONARY STENT PLACEMENT  1995    stent     EXPLORATORY LAPAROTOMY N/A 6/29/2018    Procedure: LAPAROTOMY, CLOSURE OF PERITONEAL RENT;  Surgeon: Jones Harding DO;  Location: [facility];  Service:      [facility]  6/29/2018          VENTRAL HERNIA REPAIR N/A 11/12/2015    Procedure: STRANGULATED VENTRAL HERNIA REPAIR ;  Surgeon: Ernesto Bailey MD;  Location: [facility];  Service:        Family History:     Family History   Problem Relation Age of Onset     Cancer Paternal Grandmother      Cancer Paternal Uncle      No Medical Problems Mother      No Medical Problems Father      Heart attack Sister      Chronic Kidney Disease Sister      No Medical Problems Daughter      No Medical Problems Maternal Grandmother      No Medical Problems Maternal Grandfather      No Medical Problems Paternal Grandfather      No Medical Problems Maternal Aunt      No Medical Problems Paternal Aunt      Diabetes Brother      BRCA 1/2 Neg Hx      Breast cancer Neg Hx      Colon cancer Neg  Hx      Endometrial cancer Neg Hx      Ovarian cancer Neg Hx        Social History:    reports that she has been smoking.  She has been smoking about 0.25 packs per day. She has quit using smokeless tobacco. She reports that she does not drink alcohol or use illicit drugs.    Meds:     Current Outpatient Prescriptions   Medication Sig Note     ACCU-CHEK YAZMIN PLUS TEST STRP strips TEST TWICE DAILY AS DIRECTED. *6 TOTAL FILLS* (Patient taking differently: TEST QID DAILY AS DIRECTED. *6 TOTAL FILLS*)      furosemide (LASIX) 40 MG tablet 1 po daily as directed      lancets 33 gauge Misc Test twice daily Dispense brand per patient's insurance at pharmacy discretion.      loratadine (CLARITIN) 10 mg tablet Take 10 mg by mouth daily with lunch. Noon      LORazepam (ATIVAN) 0.5 MG tablet Take 1 tablet (0.5 mg total) by mouth every 6 (six) hours as needed for anxiety. 1  tabs po q 6 hours prn      multivitamin (TAB-A-JULIET) per tablet Take 1 tablet by mouth daily.      nitroglycerin (NITROSTAT) 0.4 MG SL tablet Place 1 tablet (0.4 mg total) under the tongue every 5 (five) minutes as needed for chest pain (for up to 3 doses and call 911 if persists).      omeprazole (PRILOSEC) 20 MG capsule Take 20 mg by mouth 2 (two) times a day.       warfarin (COUMADIN) 2 MG tablet Take 2 to 4mg (1 or 2 tabs) by mouth daily as directed.  Adjust dose based on INR.      acetaminophen (TYLENOL) 650 MG CR tablet Take 650 mg by mouth 2 (two) times a day.       aspirin 81 MG EC tablet Take 81 mg by mouth daily.      blood glucose meter (GLUCOMETER) Please Dispense kit per pharmacy discretion and patient's insurance Test blood sugar.      cholecalciferol, vitamin D3, (VITAMIN D3) 2,000 unit capsule Take 2,000 Units by mouth daily with lunch.      ferrous sulfate 325 (65 FE) MG tablet Take 1 tablet (325 mg total) by mouth 2 (two) times a day.      lidocaine (LMX5) 5 % Crea Apply 1 application topically every 12 (twelve) hours as needed (applies to  "back).      oxyCODONE-acetaminophen (PERCOCET) 5-325 mg per tablet Take 1 tablet by mouth every 4 (four) hours as needed for pain. (Patient taking differently: Take 1 tablet by mouth every 6 (six) hours as needed for pain. )      polyethylene glycol (GLYCOLAX) 17 gram/dose powder  7/13/2018: Received from: External Pharmacy     polyvinyl alcohol (ARTIFICIAL TEARS, POLYVIN ALC,) 1.4 % ophthalmic solution Administer 2 drops to both eyes 2 (two) times a day. As directed.       pramipexole (MIRAPEX) 0.125 MG tablet TAKE 1 TABLET BY MOUTH AT BEDTIME      rosuvastatin (CRESTOR) 40 MG tablet TAKE 1 TABLET BY MOUTH AT BEDTIME      sertraline (ZOLOFT) 100 MG tablet TAKE 1 TABLET BY MOUTH ONCE DAILY      traZODone (DESYREL) 50 MG tablet Take 1 tablet (50 mg total) by mouth at bedtime.        Allergies:   Penicillins    Review of Systems:   Review of Systems:   General: WNL  Eyes: WNL  Ears/Nose/Throat: WNL  Lungs: WNL  Heart: WNL  Stomach: Abdominal Pain  Bladder: WNL  Muscle/Joints: WNL  Skin: WNL  Nervous System: WNL  Mental Health: WNL     Blood: WNL       Objective:      Physical Exam  @LASTENCWT:3@  5' 2\" (1.575 m)  @BMI:3@  BP 92/60 (Patient Site: Left Arm, Patient Position: Sitting, Cuff Size: Adult Large)  Pulse (!) 110  Resp 20  Ht 5' 2\" (1.575 m)  Wt 189 lb (85.7 kg)  LMP 01/25/1999  BMI 34.57 kg/m2    General Appearance:   Alert, cooperative and in no acute distress.   HEENT:  No scleral icterus; the mucous membranes were pink and moist.   Neck: JVP flat. No thyromegaly. + HJR   Chest: The spine was straight. The chest was symmetric.   Lungs:   Respirations unlabored; the lungs are clear to auscultation.   Cardiovascular:   S1 and S2 irregular and without murmur. No clicks or rubs. No carotid bruits noted. Right DP, PT, and radial pulses 2+. Left DP, PT, and radial pulses 2+.   Abdomen:  No organomegaly, masses, bruits, or tenderness. Bowels sounds are present   Extremities: No cyanosis, clubbing. + LE " pitting edema.   Skin: No xanthelasma.   Neurologic: Mood and affect are appropriate.         Lab Review   Lab Results   Component Value Date     08/07/2018     07/31/2018     07/02/2018     07/02/2018    K 3.7 08/07/2018    K 4.0 07/31/2018    K 4.3 07/03/2018     08/07/2018     07/31/2018     07/02/2018     07/02/2018    CO2 31 08/07/2018    CO2 27 07/31/2018    CO2 27 07/02/2018    CO2 27 07/02/2018    BUN 9 08/07/2018    BUN 11 07/31/2018    BUN 16 07/02/2018    BUN 16 07/02/2018    CREATININE 0.72 08/07/2018    CREATININE 0.80 07/31/2018    CREATININE 0.63 07/02/2018    CREATININE 0.63 07/02/2018    CALCIUM 10.0 08/07/2018    CALCIUM 9.6 07/31/2018    CALCIUM 9.7 07/02/2018    CALCIUM 9.7 07/02/2018     Lab Results   Component Value Date    WBC 5.2 07/31/2018    WBC 11.2 (H) 07/02/2018    WBC 11.6 (H) 07/01/2018    HGB 8.6 (L) 07/31/2018    HGB 9.1 (L) 07/02/2018    HGB 9.2 (L) 07/01/2018    HCT 27.6 (L) 07/31/2018    HCT 29.8 (L) 07/02/2018    HCT 29.7 (L) 07/01/2018    MCV 82 07/31/2018    MCV 86 07/02/2018    MCV 86 07/01/2018     07/31/2018     07/03/2018     07/02/2018     Lab Results   Component Value Date    CHOL 165 09/20/2017    CHOL 151 06/11/2014    CHOL 166 06/05/2013    TRIG 116 06/30/2018    TRIG 121 09/20/2017    TRIG 117 06/11/2014    HDL 56 09/20/2017    HDL 54 06/11/2014    HDL 51 06/05/2013    LDLDIRECT 94 04/15/2015     Lab Results   Component Value Date     (H) 07/31/2018    BNP 57 07/07/2016    BNP 90 11/12/2015         Gisel Choe M.D.

## 2021-06-19 NOTE — PROGRESS NOTES
[facility]      Facility:    [facility] [[id number]]    Code Status: FULL CODE   [facility]     6/22 -  7/3/18      Chief Complaint/Reason for Visit:  Chief Complaint   Patient presents with     INR Check       HPI:   Gardenia Muller is a 67 y.o. female with history of abdominal pain which led to hospitalization 2/26/18 and diagnosis of incarcerated large incisional ventral hernia. She had ongoing pain and had some issue with eating  and vomiting.  She had multiple cardiac risk factors and was felt to be a tenuous candidate for surgery so she was discharged without surgical management. Cardiology consulted, did a nuclear stress test on 4/9/18 which did show apical ischemia. Dr. Flanagan  stated  this was likely due to known chronic occlusion of her right coronary artery and that patient should be okay for surgery as she was symptom-free.    She did have a previous incarcerated umbilical hernia repair  November 12, 2015.    She had a robotic incisional hernia repair with transverse abdominis release, complex abdominal reconstruction on 6-22-18.  She did have recurrent small bowel obstructions, had a repeat laparotomy on 6-30-18.  She had to be on TPN for a while, but after the second surgery was started on an oral diet.    She did have postop atrial fibrillation with RVR, converted back to sinus.  She briefly had amiodarone, was on IV but switched to oral metoprolol.  Warfarin was held for a while but restarted before discharge. She had acute blood loss anemia, received 2 units of packed red cells.    Other medical history:  -Coronary artery disease: Nonobstructive left coronary disease, chronically occluded right coronary which is well collateralized  -Essential hypertension  -Peripheral arterial disease  -Renal artery stenosis: Right  -Paroxysmal atrial fibrillation: No PFO  - Multiple ischemic cerebrovascular accidents.  She failed aspirin, and had been on Coumadin  thereafter.  -Dyslipidemia  -Diabetes type 2  -Obesity    Today she is being evaluated for anticoagulation management. She states she is doing well and does not have any concerns. She is looking forward to discharge tomorrow. Nursing staff do not have any concerns. However, her INR is outside of the goal range, of 2-3. It is 3.81. She denies any uncontrolled bleeding, any active bleeding, any bruising, hematuria, epistaxis, blood in stools or black/dark/tarry stools. No other concerns noted. She uses a home health service that does her INR and dosing on a regular basis. They are set to come on Thursday.     Past Medical History:  Past Medical History:   Diagnosis Date     Acute diastolic heart failure (H) 11/2015     Atrial fibrillation (H)      Cerebral vascular accident (H)      Coronary artery disease 2006    100% RCA with collateral filling per Dr. Elizabeth's angio report     Diabetes mellitus type 2 in obese (H)      Fibrocystic breast      GERD (gastroesophageal reflux disease)      History of anesthesia complications     slow to wake     Hypertension      Peripheral vascular disease (H)      PONV (postoperative nausea and vomiting)      Stroke (H)      Urinary incontinence            Surgical History:  Past Surgical History:   Procedure Laterality Date     CARDIAC CATHETERIZATION       CORONARY STENT PLACEMENT  1995    stent     EXPLORATORY LAPAROTOMY N/A 6/29/2018    Procedure: LAPAROTOMY, CLOSURE OF PERITONEAL RENT;  Surgeon: Jones Harding DO;  Location: [facility];  Service:      [facility]  6/29/2018          VENTRAL HERNIA REPAIR N/A 11/12/2015    Procedure: STRANGULATED VENTRAL HERNIA REPAIR ;  Surgeon: Ernesto Bailey MD;  Location: [facility];  Service:        Family History:   Family History   Problem Relation Age of Onset     Cancer Paternal Grandmother      Cancer Paternal Uncle      No Medical Problems Mother      No Medical Problems Father      Heart attack Sister      Chronic  Kidney Disease Sister      No Medical Problems Daughter      No Medical Problems Maternal Grandmother      No Medical Problems Maternal Grandfather      No Medical Problems Paternal Grandfather      No Medical Problems Maternal Aunt      No Medical Problems Paternal Aunt      Diabetes Brother      BRCA 1/2 Neg Hx      Breast cancer Neg Hx      Colon cancer Neg Hx      Endometrial cancer Neg Hx      Ovarian cancer Neg Hx        Social History:    Social History     Social History     Marital status: Legally      Spouse name: N/A     Number of children: One daughter in [place], son in [place]     Years of education: N/A     Social History Main Topics     Smoking status: Current Every Day Smoker     Packs/day: 0.25     Smokeless tobacco: Current User      Comment: e-cig a few times/day     Alcohol use No     Drug use: No     Sexual activity: Not on file     Review of Systems   Per HPI.    Vitals:    07/17/18 2142   BP: 133/72   Pulse: 93   Resp: 18   Temp: 98.4  F (36.9  C)   SpO2: 96%   Weight: 196 lb 3.2 oz (89 kg)       Physical Exam    Constitutional: Pleasant middle-aged black female, obese. No distress.   Pulmonary/Chest: respirations without effort  Musculoskeletal: Normal range of motion.  2-3+ pedal edema bilateral legs patella distally  Moves all extremities well. Normal strength and coordination .   Neurological: She is alert and oriented to person, place, and time, normal muscle tone.   Skin: Skin is warm and dry.   Psychiatric: She has a normal mood , affect, behavior        Medication List:  Current Outpatient Prescriptions   Medication Sig     ACCU-CHEK YAZMIN PLUS TEST STRP strips TEST TWICE DAILY AS DIRECTED. *6 TOTAL FILLS*     acetaminophen (TYLENOL) 650 MG CR tablet Take 650 mg by mouth 2 (two) times a day.      aspirin 81 MG EC tablet Take 81 mg by mouth daily.     blood glucose meter (GLUCOMETER) Please Dispense kit per pharmacy discretion and patient's insurance Test blood sugar.      cholecalciferol, vitamin D3, (VITAMIN D3) 2,000 unit capsule Take 2,000 Units by mouth daily with lunch.     furosemide (LASIX) 20 MG tablet Take 1 tablet (20 mg total) by mouth daily.     lancets 33 gauge Misc Test twice daily Dispense brand per patient's insurance at pharmacy discretion.     lidocaine (LMX5) 5 % Crea Apply 1 application topically every 12 (twelve) hours as needed (applies to back).     loratadine (CLARITIN) 10 mg tablet Take 10 mg by mouth daily with lunch. Noon     LORazepam (ATIVAN) 0.5 MG tablet Take 1 tablet (0.5 mg total) by mouth every 6 (six) hours as needed for anxiety. 1  tabs po q 6 hours prn     metoprolol tartrate (LOPRESSOR) 25 MG tablet Take 1 tablet (25 mg total) by mouth 2 (two) times a day.     multivitamin (TAB-A-JULIET) per tablet Take 1 tablet by mouth daily.     nitroglycerin (NITROSTAT) 0.4 MG SL tablet Place 1 tablet (0.4 mg total) under the tongue every 5 (five) minutes as needed for chest pain (for up to 3 doses and call 911 if persists).     omeprazole (PRILOSEC) 20 MG capsule Take 20 mg by mouth 2 (two) times a day.      oxyCODONE-acetaminophen (PERCOCET) 5-325 mg per tablet Take 1 tablet by mouth every 4 (four) hours as needed for pain.     polyethylene glycol (GLYCOLAX) 17 gram/dose powder      polyethylene glycol (MIRALAX) 17 gram packet Take 17 g by mouth daily as needed.     polyvinyl alcohol (ARTIFICIAL TEARS, POLYVIN ALC,) 1.4 % ophthalmic solution Administer 2 drops to both eyes 2 (two) times a day. As directed.      pramipexole (MIRAPEX) 0.125 MG tablet TAKE 1 TABLET BY MOUTH AT BEDTIME     rosuvastatin (CRESTOR) 40 MG tablet TAKE 1 TABLET BY MOUTH AT BEDTIME     sertraline (ZOLOFT) 100 MG tablet TAKE 1 TABLET BY MOUTH ONCE DAILY     sod bicarb-citric acid-simethicone (E-Z GAS) 2.21-1.53 gram/4 gram GrEP Take 1 packet by mouth 3 (three) times a day as needed (gas).     traZODone (DESYREL) 50 MG tablet Take 1 tablet (50 mg total) by mouth at bedtime.     warfarin  (COUMADIN) 2 MG tablet Take 2 to 4mg (1 or 2 tabs) by mouth daily as directed.  Adjust dose based on INR.       Labs:  INR on Thursday.     Assessment:     1. Persistent atrial fibrillation (H)     2. Anticoagulation management encounter       Plan:  Anticoagulation Management for A fib  -Hold coumadin until Thursday when home health nurse visits.   -INR to be done and followed by home health nurse as it has been done in the past.   -Patient understands and is comfortable with plan.   -Discussed monitoring for signs of bleeding and reporting any issues to the provider team immediately.     Otherwise continue current care plan for all other chronic medical conditions, as they are stable. Encouraged patient to engage in healthy lifestyle behaviors such as engaging in social activities, exercising (PT/OT), eating well, and following care plan. Follow up for routine check-up, or sooner if needed. Will continue to monitor patient and work with nursing staff collaboratively to work toward positive patient outcomes.    Greater than 15 minutes spent with patient with at least 55% of this time spent on review of previous records, counseling, education, and discussion of the above care plan with nursing staff, and patient.     Electronically signed by: Arleth Barton CNP

## 2021-06-19 NOTE — LETTER
"Letter by Arleth Barton CNP at      Author: Arleth Barton CNP Service: -- Author Type: --    Filed:  Encounter Date: 7/5/2019 Status: (Other)         Patient: Gardenia Muller   MR Number: [id number]   YOB: 1950   Date of Visit: 7/5/2019     [facility]    Facility:   [facility] [[id number]]   Code Status: FULL CODE and POLST AVAILABLE      CHIEF COMPLAINT/REASON FOR VISIT:  Chief Complaint   Patient presents with   ? Review Of Multiple Medical Conditions     physical deconditioning, abdominal pain, HTN, atrial fibrillation       HISTORY:      HPI: Gardenia is a 68 y.o. 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 She also has no past medical history of Family history of malignant hyperthermia, History of transfusion, Malignant hyperthermia due to anesthesia, Sleep apnea, or Sleep apnea, obstructive. Gardenia was recently admitted to the hospital for abdominal discomfort. She was admitted to [facility] from 6/27/2019 to 7/3/2019. The discharging provider summarized the hospitalization as follow:     \"See H&P and consultant notes. End stage vascular type " "problems with weight loss and failure to thrive and nutrition issues. Chronic abdominal pain multifactorial no evident reversible cause.  She agrees to SNF/TCU and should do better in [facility] type of setting especially in regard to nutrition  meds at discharge essentially the same  Visceral angiogram had been considered but after review of CT this was not felt indicated.\"    Today Gardenia is being evaluated for an intake into the TCU. She states she is doing much better since     Advanced Care Planning  Spoke with Gardenia regarding code status and advanced care planning. Discussed that she would like to have full resuscitation efforts. she would also like to have treatment if she were to fall ill. she would like full medical treatment for all medical issues and family would decide for her if she was unable to decide.       Past Medical History:   Diagnosis Date   ? Acute diastolic heart failure (H) 11/2015   ? Acute on chronic diastolic heart failure (H) 6/15/2019   ? Advanced directives, counseling/discussion 8/5/2015    Patient completed 2011.  Discussed today, 5/24/17, and wants to change healthcare agent from niece to daughter.  Discussed that she will need to complete new form to indicate this.   ? MAY (acute kidney injury) (H) 10/22/2018   ? Atrial fibrillation (H)    ? Benign adenomatous polyp of large intestine 3/30/2017    Colonoscopy March 2017.  Recommend 5 year follow-up.  Single tubular adenoma   ? Biliary obstruction 10/3/2018    Added automatically from request for surgery [id number]   ? Cerebral vascular accident (H)    ? Coronary artery disease 2006    100% RCA with collateral filling per Dr. Elizabeth's angio report   ? Diabetes mellitus type 2 in obese (H)    ? Fibrocystic breast    ? GERD (gastroesophageal reflux disease)    ? History of anesthesia complications     slow to wake   ? Hypertension    ? Peripheral vascular disease (H)    ? Pneumonia 11/11/2018   ? PONV (postoperative nausea and vomiting) "    ? S/P cholecystectomy 6/22/2018   ? SBO (small bowel obstruction) (H) 11/12/2015   ? Stroke (H)    ? Transaminitis 10/22/2018   ? Upper respiratory infection 12/20/2018   ? Urinary incontinence              Family History   Problem Relation Age of Onset   ? Cancer Paternal Grandmother    ? Cancer Paternal Uncle    ? No Medical Problems Mother    ? No Medical Problems Father    ? Heart attack Sister    ? Chronic Kidney Disease Sister    ? No Medical Problems Daughter    ? No Medical Problems Maternal Grandmother    ? No Medical Problems Maternal Grandfather    ? No Medical Problems Paternal Grandfather    ? No Medical Problems Maternal Aunt    ? No Medical Problems Paternal Aunt    ? Diabetes Brother    ? BRCA 1/2 Neg Hx    ? Breast cancer Neg Hx    ? Colon cancer Neg Hx    ? Endometrial cancer Neg Hx    ? Ovarian cancer Neg Hx      Social History     Socioeconomic History   ? Marital status: Legally      Spouse name: None   ? Number of children: 1   ? Years of education: None   ? Highest education level: None   Occupational History   ? None   Social Needs   ? Financial resource strain: None   ? Food insecurity:     Worry: None     Inability: None   ? Transportation needs:     Medical: None     Non-medical: None   Tobacco Use   ? Smoking status: Former Smoker     Packs/day: 0.25   ? Smokeless tobacco: Former User   ? Tobacco comment: e-cig a few times/day   Substance and Sexual Activity   ? Alcohol use: No   ? Drug use: No   ? Sexual activity: None   Lifestyle   ? Physical activity:     Days per week: None     Minutes per session: None   ? Stress: None   Relationships   ? Social connections:     Talks on phone: None     Gets together: None     Attends Voodoo service: None     Active member of club or organization: None     Attends meetings of clubs or organizations: None     Relationship status: None   ? Intimate partner violence:     Fear of current or ex partner: None     Emotionally abused: None      Physically abused: None     Forced sexual activity: None   Other Topics Concern   ? None   Social History Narrative    Single/, lives alone; daughter in [place];    Gets help from neighbors and extended family    Former smoker.no alcohol problems       REVIEW OF SYSTEM:  Pertinent items are noted in HPI.    PHYSICAL EXAM:   /49   Pulse 82   Temp 97  F (36.1  C)   Resp 16   Wt 137 lb 12.8 oz (62.5 kg)   LMP 01/25/1999   SpO2 96%   BMI 24.41 kg/m     General appearance: alert, appears stated age and cooperative  HEENT: Head is normocephalic with normal hair distribution. No evidence of trauma. Ears: Without lesions or deformity. No acute purulent discharge. Eyes: Conjunctivae pink with no scleral icterus or erythema. Nose: Normal mucosa and septum. Oropharnyx: mmm, no lesions present.  Lungs: clear to auscultation bilaterally, respirations without effort  Heart: regular rate and irregular rhythm, S1, S2 normal, no murmur, click, rub or gallop  Abdomen: soft, non-tender; bowel sounds normal; no masses,  no organomegaly  Extremities: extremities normal, atraumatic, no cyanosis or edema  Pulses: 2+ and symmetric  Skin: Skin color, texture, turgor normal. No rashes or lesions  Neurologic: Grossly normal   Psych: interacts well with caregivers, exhibits logical thought processes and connections, pleasant      LABS:   None today.     ASSESSMENT:      ICD-10-CM    1. Physical deconditioning R53.81    2. Abdominal pain, generalized R10.84    3. Essential hypertension I10    4. Persistent atrial fibrillation (H) I48.1        PLAN:    Physical Deconditioning  -Continue PT/OT and other therapies as per care plan.  -Encouraged good nutrition and movement habits.   -Discussed care plan and expected course of stay.   -Continue to follow-up per routine schedule or sooner if needed.     Abdominal Pain  -Low sodium diet.   -Continue to work with GI as needed.   -Oxycodone 10 mg by mouth every 6 hours as need for  pain.  -Omeprazole 20 mg by mouth daily.     Hypertension/Atrial Fibrillation  -ASA 81 mg by mouth daily.   -Metoprolol 6.25 mg by mouth two times a day.   -Spironolactone 25 mg by mouth daily.   -Atorvastatin 80 mg by mouth daily.  -Coumadin 7 mg by mouth daily.   -Encouraged cardiac diet, low sodium diet, exercise and stress reduction techniques.   -Emphasized importance of blood pressure control.   -Continue current medications as prescribed.   -Follow up per routine schedule or sooner with any complaints or concerns.    Admission history and physical per MD in the next 30 days. At this time continue current care plan for all chronic medical conditions, as they are stable. Encouraged patient to engage in PT/OT for strengthening and conditioning. Encouraged patient to work closely with nursing staff to ensure any medical complaints are quickly addressed. Follow up this week or sooner if needed. Will continue to monitor patient and work with nursing staff collaboratively to work toward positive patient outcomes.    Total unit/floor time of 35 minutes time consisted of the following: time spent with patient, examination of patient, reviewing the record including pertinent labs and completing documentation. More than 50% of this time was spent in coordination of care time with nursing staff and other healthcare providers, this time was spent on discussion/counseling on current care plan including medical management of chronic health problems and acute health problems, education pertaining to plan, and discussion of the goals of care pertaining to the current outlined plan with nursing staff and patient. An additional 16 minutes of time was spent discussing code status, wishes for end of life care and reviewing POLST from 1300 to 1316. POLST signed and left with nursing staff.     Electronically signed by: Arleth Barton CNP

## 2021-06-19 NOTE — LETTER
Letter by Arleth Barton CNP at      Author: Arleth Barton CNP Service: -- Author Type: --    Filed:  Encounter Date: 7/12/2019 Status: (Other)         Patient: Gardenia Muller   MR Number: [id number]   YOB: 1950   Date of Visit: 7/12/2019     [facility]    Facility:   [facility] [[id number]]   Code Status: FULL CODE and POLST AVAILABLE      CHIEF COMPLAINT/REASON FOR VISIT:  Chief Complaint   Patient presents with   ? Review Of Multiple Medical Conditions     abdominal pain, HTN, Hypotension, tobacco cessation       HISTORY:      HPI: Gardenia is a 69 y.o. 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 She also has no past medical history of Family history of malignant hyperthermia, History of transfusion, Malignant hyperthermia due to anesthesia, Sleep apnea, or Sleep apnea, obstructive. Gardenia was recently admitted to the hospital for abdominal discomfort. She was admitted to [facility] from 6/27/2019 to 7/3/2019. The discharging provider summarized the hospitalization in previous notes.     Patient patient is being evaluated for a review of multiple  "medical conditions. She is overall feeling well and feels like she is making good progress with her rehabilitation. She does think a lot about where she is going to end up after being discharged from TCU. She hopes that she ends up in a nice assisted living facility. She is eating well but would like to try a different nutritional supplement. She does note some dizziness with position changes recently. Blood pressure have been noted to be low. She denies fever, chills, chest pain, shortness of breath, cough, nausea, diarrhea, constipation, urinary frequency, dysuria, headache. She had also requested from nursing staff that she be prescribed nicorette gum. She no longer wants this when asked. We did discuss smoking cessation at length:     Counseling given: Not Answered  Comment: e-cig a few times/day  This patient continues to smoke e-cig \"a few times\" per day after several failed attempts at quitting. Approximately 5 minutes were spent counseling the patient in cessation techniques. Discussed alternatives for smoking cessation including nicorette gum, nicoderm, zyban and chantix. She understands continuing to smoke could lead to stroke and death. The benefits of stopping were also presented to her. The patient has verbalized his desire to quit. She has set his own goal to quit smoking.     Past Medical History:   Diagnosis Date   ? Acute diastolic heart failure (H) 11/2015   ? Acute on chronic diastolic heart failure (H) 6/15/2019   ? Advanced directives, counseling/discussion 8/5/2015    Patient completed 2011.  Discussed today, 5/24/17, and wants to change healthcare agent from niece to daughter.  Discussed that she will need to complete new form to indicate this.   ? MAY (acute kidney injury) (H) 10/22/2018   ? Atrial fibrillation (H)    ? Benign adenomatous polyp of large intestine 3/30/2017    Colonoscopy March 2017.  Recommend 5 year follow-up.  Single tubular adenoma   ? Biliary obstruction 10/3/2018    " Added automatically from request for surgery [id number]   ? Cerebral vascular accident (H)    ? Coronary artery disease 2006    100% RCA with collateral filling per Dr. Elizabeth's angio report   ? Diabetes mellitus type 2 in obese (H)    ? Fibrocystic breast    ? GERD (gastroesophageal reflux disease)    ? History of anesthesia complications     slow to wake   ? Hypertension    ? Peripheral vascular disease (H)    ? Pneumonia 11/11/2018   ? PONV (postoperative nausea and vomiting)    ? S/P cholecystectomy 6/22/2018   ? SBO (small bowel obstruction) (H) 11/12/2015   ? Stroke (H)    ? Transaminitis 10/22/2018   ? Upper respiratory infection 12/20/2018   ? Urinary incontinence              Family History   Problem Relation Age of Onset   ? Cancer Paternal Grandmother    ? Cancer Paternal Uncle    ? No Medical Problems Mother    ? No Medical Problems Father    ? Heart attack Sister    ? Chronic Kidney Disease Sister    ? No Medical Problems Daughter    ? No Medical Problems Maternal Grandmother    ? No Medical Problems Maternal Grandfather    ? No Medical Problems Paternal Grandfather    ? No Medical Problems Maternal Aunt    ? No Medical Problems Paternal Aunt    ? Diabetes Brother    ? BRCA 1/2 Neg Hx    ? Breast cancer Neg Hx    ? Colon cancer Neg Hx    ? Endometrial cancer Neg Hx    ? Ovarian cancer Neg Hx      Social History     Socioeconomic History   ? Marital status: Legally      Spouse name: None   ? Number of children: 1   ? Years of education: None   ? Highest education level: None   Occupational History   ? None   Social Needs   ? Financial resource strain: None   ? Food insecurity:     Worry: None     Inability: None   ? Transportation needs:     Medical: None     Non-medical: None   Tobacco Use   ? Smoking status: Former Smoker     Packs/day: 0.25   ? Smokeless tobacco: Former User   ? Tobacco comment: e-cig a few times/day   Substance and Sexual Activity   ? Alcohol use: No   ? Drug use: No   ?  Sexual activity: None   Lifestyle   ? Physical activity:     Days per week: None     Minutes per session: None   ? Stress: None   Relationships   ? Social connections:     Talks on phone: None     Gets together: None     Attends Tenri[employer] service: None     Active member of club or organization: None     Attends meetings of clubs or organizations: None     Relationship status: None   ? Intimate partner violence:     Fear of current or ex partner: None     Emotionally abused: None     Physically abused: None     Forced sexual activity: None   Other Topics Concern   ? None   Social History Narrative    Single/, lives alone; daughter in [place];    Gets help from neighbors and extended family    Former smoker.no alcohol problems       REVIEW OF SYSTEM:  Pertinent items are noted in HPI.    PHYSICAL EXAM:   BP (!) 86/66 (Patient Position: Standing)   Pulse 83   Wt 138 lb 3.2 oz (62.7 kg)   LMP 01/25/1999   BMI 24.48 kg/m     General appearance: alert, appears stated age and cooperative  HEENT: Head is normocephalic with normal hair distribution. No evidence of trauma. Ears: Without lesions or deformity. No acute purulent discharge. Eyes: Conjunctivae pink with no scleral icterus or erythema. Nose: Normal mucosa and septum. Oropharnyx: mmm, no lesions present.  Lungs: clear to auscultation bilaterally, respirations without effort  Heart: regular rate and irregular rhythm, S1, S2 normal, no murmur, click, rub or gallop  Abdomen: soft, non-tender; bowel sounds normal; no masses,  no organomegaly  Extremities: extremities normal, atraumatic, no cyanosis, +1 edema in lower extremities  Pulses: 2+ and symmetric  Skin: Skin color, texture, turgor normal. No rashes or lesions  Neurologic: Grossly normal   Psych: interacts well with caregivers, exhibits logical thought processes and connections, pleasant      LABS:   None today.     ASSESSMENT:      ICD-10-CM    1. Chronic abdominal pain R10.9     G89.29    2.  Hypotension I95.9    3. Chronic heart failure with preserved ejection fraction (H) I50.32    4. Physical deconditioning R53.81    5. Persistent atrial fibrillation (H) I48.1    6. Essential hypertension I10        PLAN:    Physical Deconditioning  -Continue PT/OT and other therapies as per care plan.  -Encouraged good nutrition and movement habits.   -Discussed care plan and expected course of stay.   -Continue to follow-up per routine schedule or sooner if needed.     Abdominal Pain  -Low sodium diet.   -Continue to work with GI as needed.   -Oxycodone 10 mg by mouth every 6 hours as need for pain.  -Omeprazole 20 mg by mouth daily.     Hypertension/Atrial Fibrillation  -ASA 81 mg by mouth daily.   -Metoprolol 6.25 mg by mouth two times a day.   -Spironolactone 25 mg by mouth daily.   -Atorvastatin 80 mg by mouth daily.  -Coumadin 7 mg by mouth daily.   -Encouraged cardiac diet, low sodium diet, exercise and stress reduction techniques.   -Emphasized importance of blood pressure control.   -Continue current medications as prescribed.   -Follow up per routine schedule or sooner with any complaints or concerns.    HFpEF  --mild pitting edema in lower extremities stable for patient  --decreasing lasix dose from 40mg daily to 20mg daily due to hypotension  --metoprolol 6.25mg two times a day  --spironolactone 25mg daily    Hypotension  --Patient encouraged fluid intake  --metoprolol dosing has already been decreased  --lasix dose will be reduced to 20mg daily  --encouraged changing positions slowly     Otherwise continue current care plan for all other chronic medical conditions, as they are stable. Encouraged patient to engage in healthy lifestyle behaviors such as engaging in social activities, exercising (PT/OT), eating well, and following care plan. Follow up for routine check-up, or sooner if needed. Will continue to monitor patient and work with nursing staff collaboratively to work toward positive patient  outcomes.    Electronically signed by: Arleth Barton CNP

## 2021-06-19 NOTE — PROGRESS NOTES
[facility] Patient Outreach:     Discharge Date::  7/18/18      Reason for TCU stay (discharge diagnosis)::  S/p ventral hernia repair, anemia, A-fib      Are you feeling better, the same or worse since your discharge?:  Patient is feeling the same          As part of your discharge plan, did they discuss home care with you?: Yes        Have your seen them yet, or are they scheduled to visit?: Yes                Do you have any follow up visits scheduled with your PCP or Specialist?:  Yes, with Specialist      Who are you seeing and when is it scheduled?:  Cardiology in August      I'm glad to hear you're doing well and we want you to continue to do well. Your PCP would like to see you for a follow-up visit. Can we help set that up for your today?: Yes            (RN) Patient transferred to [facility]? **If immediate concers (e.g. patient is feeling worse and/or not taking new medictations), send in basket message to PCP with quick summary of concern.: Yes

## 2021-06-19 NOTE — LETTER
Letter by Monik Cruz MD at      Author: Monik Cruz MD Service: -- Author Type: --    Filed:  Encounter Date: 7/8/2019 Status: (Other)         Patient: Gardenia Muller   MR Number: [id number]   YOB: 1950   Date of Visit: 7/8/2019     [facility]      Facility:    [facility] [[id number]]    Code Status: FULL CODE   [facility] 10/3  through 10/25/18  [facility]   6/14 through 617/2019  [facility] 6/27 through 7/3/2019      Chief Complaint/Reason for Visit:  Chief Complaint   Patient presents with   ? H & P     chronic upper abdominal pain       HPI:   Gardenia is a 68 y.o. female with MI, peripheral arterial disease, right renal artery stenosis, diabetes type 2, essential hypertension, past stroke, HFpEF, atrial flutter, atrial fibrillation (converted), cholecystitis status post laparoscopic cholecystectomy, ventral hernia with incisional hernia repair, previous transaminitis..      She had a recent hospitalization in June 2019 for CHF, CT of the abdomen at that time was stable.    Gardenia  had a few months of chronic abdominal pain and weight loss.  He states she chronically has 1-2 loose stools a day.  Couple days prior to admission, she was having vomiting, and a worsening of her right and mid abdominal pain.  Also complained it was hard to take care of herself at home.    She came to the emergency department: Urinalysis looked suspicious, urine culture was no growth.      Ultrasound of the abdomen was unremarkable  CT showed Mitchell, stable common bile duct dilatation without stones, pancreatic atrophy.  MRCP shows Mitchell, stable CBD, mild hepatic congestion.  HBV   And  HBC studies were negative.    Gastroenterology ( Dr Mueller) was consulted: as in the past they felt her chronic abdominal pain might be mild abdominal ischemia.  She also felt her transaminitis was likely due to right-sided heart failure with hepatic  congestion.  MRCP showed hepatic congestion.    Dr. Norberto Elias recommended angiogram of the pelvis:   IMPRESSION:CTA abd/pelvis (7/3/19)  CONCLUSION:  1.  Mild-to-moderate atherosclerotic narrowing of the SMA secondary to calcified and noncalcified plaque. No evidence of severe SMA narrowing.  2.  Severe narrowing at the origin of left main renal artery due to calcified plaque. A widely patent right renal artery stent is noted.  3.  Additional chronic findings as described above.      She was discharged to [facility] for therapy and help with placement concerns      Past Medical History:  Past Medical History:   Diagnosis Date   ? Acute diastolic heart failure (H) 11/2015   ? Acute on chronic diastolic heart failure (H) 6/15/2019   ? Advanced directives, counseling/discussion 8/5/2015    Patient completed 2011.  Discussed today, 5/24/17, and wants to change healthcare agent from niece to daughter.  Discussed that she will need to complete new form to indicate this.   ? MAY (acute kidney injury) (H) 10/22/2018   ? Atrial fibrillation (H)    ? Benign adenomatous polyp of large intestine 3/30/2017    Colonoscopy March 2017.  Recommend 5 year follow-up.  Single tubular adenoma   ? Biliary obstruction 10/3/2018    Added automatically from request for surgery [id number]   ? Cerebral vascular accident (H)    ? Coronary artery disease 2006    100% RCA with collateral filling per Dr. Elizabeth's angio report   ? Diabetes mellitus type 2 in obese (H)    ? Fibrocystic breast    ? GERD (gastroesophageal reflux disease)    ? History of anesthesia complications     slow to wake   ? Hypertension    ? Peripheral vascular disease (H)    ? Pneumonia 11/11/2018   ? PONV (postoperative nausea and vomiting)    ? S/P cholecystectomy 6/22/2018   ? SBO (small bowel obstruction) (H) 11/12/2015   ? Stroke (H)    ? Transaminitis 10/22/2018   ? Upper respiratory infection 12/20/2018   ? Urinary incontinence            Surgical  History:  Past Surgical History:   Procedure Laterality Date   ? CARDIAC CATHETERIZATION     ? CARDIOVERSION  10/18/2018   ? CORONARY STENT PLACEMENT  1995    stent   ? ERCP N/A 10/5/2018    Procedure: ENDOSCOPIC RETROGRADE CHOLANGIOPANCREATOGRAPHY, SPHINCTEROTOMY;  Surgeon: Anson Stokes MD;  Location: [facility];  Service:    ? EXPLORATORY LAPAROTOMY N/A 6/29/2018    Procedure: LAPAROTOMY, CLOSURE OF PERITONEAL RENT;  Surgeon: Jones Harding DO;  Location: [facility] OR;  Service:    ? LAPAROSCOPIC CHOLECYSTECTOMY N/A 10/7/2018    Procedure: CHOLECYSTECTOMY, LAPAROSCOPIC;  Surgeon: Felicia So MD;  Location: [facility] OR;  Service:    ? PICC  6/29/2018        ? PICC  10/21/2018        ? VENTRAL HERNIA REPAIR N/A 11/12/2015    Procedure: STRANGULATED VENTRAL HERNIA REPAIR ;  Surgeon: Ernesto Bailey MD;  Location: [facility] OR;  Service:        Family History:   Family History   Problem Relation Age of Onset   ? Cancer Paternal Grandmother    ? Cancer Paternal Uncle    ? No Medical Problems Mother    ? No Medical Problems Father    ? Heart attack Sister    ? Chronic Kidney Disease Sister    ? No Medical Problems Daughter    ? No Medical Problems Maternal Grandmother    ? No Medical Problems Maternal Grandfather    ? No Medical Problems Paternal Grandfather    ? No Medical Problems Maternal Aunt    ? No Medical Problems Paternal Aunt    ? Diabetes Brother    ? BRCA 1/2 Neg Hx    ? Breast cancer Neg Hx    ? Colon cancer Neg Hx    ? Endometrial cancer Neg Hx    ? Ovarian cancer Neg Hx        Social History:    Social History     Socioeconomic History   ? Marital status: Legally      Spouse name: Not on file   ? Number of children: 1   ? Years of education: Not on file   ? Highest education level: Not on file   Occupational History   ? Not on file   Social Needs   ? Financial resource strain: Not on file   ? Food insecurity:     Worry: Not on file     Inability: Not on file    ? Transportation needs:     Medical: Not on file     Non-medical: Not on file   Tobacco Use   ? Smoking status: Former Smoker     Packs/day: 0.25   ? Smokeless tobacco: Former User   ? Tobacco comment: e-cig a few times/day   Substance and Sexual Activity   ? Alcohol use: No   ? Drug use: No   ? Sexual activity: Not on file   Lifestyle   ? Physical activity:     Days per week: Not on file     Minutes per session: Not on file   ? Stress: Not on file   Relationships   ? Social connections:     Talks on phone: Not on file     Gets together: Not on file     Attends Holiness service: Not on file     Active member of club or organization: Not on file     Attends meetings of clubs or organizations: Not on file     Relationship status: Not on file   ? Intimate partner violence:     Fear of current or ex partner: Not on file     Emotionally abused: Not on file     Physically abused: Not on file     Forced sexual activity: Not on file   Other Topics Concern   ? Not on file   Social History Narrative    Single/, lives alone; daughter in [place];    Gets help from neighbors and extended family    Former smoker.no alcohol problems          Review of Systems   She lives in a senior building and has many services  She has a , gets laundry and cleaning services, weekly visit by nurse, shower given weekly, meals are provided, gets a daily check from the staff.  She insists she needs an nursing home for someone is a available around the clock  She states over a year ago she was 175 pounds, currently about 128 pounds.  She likes Boost, does not like Glucerna  She continues to rely on a niece who lives here, her daughter lives in [place], her son lives in [place]  Remainder of the comprehensive review of systems is negative    Blood pressure 104/84, pulse 100, temperature 97.9  F (36.6  C), resp. rate 18,  SpO2 96 %        Physical Exam   Constitutional: She is oriented to person, place, and time. No distress.   Alert  overweight Black-American female   HENT:   Nose: Nose normal.   Mouth/Throat: Oropharynx is clear and moist.   Missing some of her teeth, so she has partial dentures that she does not like because they do not fit well   Eyes: Conjunctivae and EOM are normal.   Cardiovascular: Regular rhythm and normal heart sounds.   No murmur heard.  Near holosystolic murmur at the mitral/apex area   Pulmonary/Chest: Breath sounds normal. She has no wheezes. She has no rales.   Abdominal: Soft. Bowel sounds are normal. She exhibits no distension. There is no tenderness. There is no rebound.   Surgical wound well-healed (midline from umbilical hernia repair)   Musculoskeletal: Normal range of motion. She exhibits no edema or tenderness.   1+ pretibial edema  Symmetric extremity moves     Lymphadenopathy:     She has no cervical adenopathy.   Neurological: She is alert and oriented to person, place, and time. Coordination normal.   Skin: Skin is warm and dry. No erythema.   Psychiatric: She has a normal mood and affect. Her behavior is normal.       Medication List:  Current Outpatient Medications   Medication Sig   ? ARTHRITIS PAIN RELIEF, ACETAM, 650 mg CR tablet Take 650 mg by mouth 2 (two) times a day.          ? aspirin 81 MG EC tablet Take 81 mg by mouth daily.    ? atorvastatin (LIPITOR) 80 MG tablet Take 80 mg by mouth at bedtime.   ? blood glucose meter (GLUCOMETER) Please Dispense kit per pharmacy discretion and patient's insurance Test blood sugar.   ? blood glucose test strips Use 1 each As Directed as needed. Dispense brand per patient's insurance at pharmacy discretion.   ? carboxymethylcellulose (REFRESH PLUS) 0.5 % Dpet ophthalmic dropperette Administer 2 drops to both eyes every hour as needed (dry eyes).    ? food supplemt, lactose-reduced (ENSURE ACTIVE CLEAR) Liqd Take twice daily or as directed for calorie supplement   ? furosemide (LASIX) 40 MG tablet Take 1 tablet (40 mg total) by mouth daily.   ? guaiFENesin  (HUMIBID 3) 400 mg Tab Take 400 mg by mouth every 4 (four) hours as needed (chest congestion, every 4 hours while awake PRN).   ? lancets 33 gauge Misc Test twice daily Dispense brand per patient's insurance at pharmacy discretion.   ? loratadine (CLARITIN) 10 mg tablet Take 10 mg by mouth daily with lunch. Noon   ? magnesium gluconate (MAGONATE) 27.5 mg magne- sium (500 mg) tablet Take 500 mg by mouth 3 (three) times a day.          ? metoprolol tartrate (LOPRESSOR) 25 MG tablet Take 12.5 mg by mouth 2 (two) times a day.   ? multivitamin (TAB-A-JULIET) per tablet Take 1 tablet by mouth daily. (Patient taking differently: Take 1 tablet by mouth daily. )   ? nitroglycerin (NITROSTAT) 0.4 MG SL tablet Place 1 tablet (0.4 mg total) under the tongue every 5 (five) minutes as needed for chest pain (for up to 3 doses and call 911 if persists). (Patient taking differently: Place 0.4 mg under the tongue every 5 (five) minutes as needed for chest pain (for up to 3 doses and call 911 if persists). )   ? omeprazole (PRILOSEC) 20 MG capsule TAKE 1 CAPSULE BY MOUTH TWICE DAILY (8AM & 8PM)   ? oxyCODONE (ROXICODONE) 10 mg immediate release tablet Take 1 tablet (10 mg total) by mouth every 6 (six) hours as needed.   ? polyethylene glycol (GLYCOLAX) 17 gram/dose powder Take 17 g by mouth daily as needed.    ? potassium chloride (K-DUR,KLOR-CON) 20 MEQ tablet Take 1 tablet (20 mEq total) by mouth 2 (two) times a day.   ? senna-docusate (PERICOLACE) 8.6-50 mg tablet Take 1 tablet by mouth 2 (two) times a day.   ? sertraline (ZOLOFT) 100 MG tablet Take 1 tablet (100 mg total) by mouth daily.   ? sodium chloride (OCEAN) 0.65 % nasal spray Apply 1 spray into each nostril as needed for congestion.   ? spironolactone (ALDACTONE) 25 MG tablet Take 1 tablet (25 mg total) by mouth daily.   ? traZODone (DESYREL) 50 MG tablet Take 1 tablet (50 mg total) by mouth at bedtime.   ? VITAMIN D3 2,000 unit capsule TAKE 1 CAPSULE BY MOUTH ONCE DAILY AT  8AM (DO NOT BRAND CHANGE - PATIENT ONLY WANT CAPSULES)   ? warfarin (COUMADIN/JANTOVEN) 2 MG tablet Take 2 mg by mouth daily.       Labs:  Lab Units 06/28/19  0734 06/27/19  1342   LN-WHITE BLOOD CELL COUNT thou/uL 5.3 8.0   LN-HEMOGLOBIN g/dL 13.8 15.2   LN-HEMATOCRIT % 44.3 47.6*   LN-PLATELET COUNT thou/uL 139* 165         Lab Units 06/28/19  0734 06/27/19  1342   LN-SODIUM mmol/L 139 139   LN-POTASSIUM mmol/L 4.1 5.5*   LN-CHLORIDE mmol/L 108* 104   LN-CO2 mmol/L 27 27   LN-BLOOD UREA NITROGEN mg/dL 13 17   LN-CREATININE mg/dL 0.63 0.73   LN-CALCIUM mg/dL 10.5 12.0*   LN-ALBUMIN g/dL 2.7* 3.4*   LN-PROTEIN TOTAL g/dL 6.6 8.2*   LN-BILIRUBIN TOTAL mg/dL 1.2* 1.5*   LN-ALKALINE PHOSPHATASE U/L 140* 182*   LN-ALT (SGPT) U/L 70* 89*   LN-AST (SGOT) U/L 133* 200*            Lipase   Date Value Ref Range Status   06/27/2019 14 0 - 52 U/L Final   06/29/2016 17 0 - 52 U/L Final   03/04/2016 29 0 - 52 U/L Final     Hepatitis C antibody = negative    IMAGING:  EXAM: MR MRCP W WO CONTRAST  LOCATION: [facility]  DATE/TIME: 6/27/2019 8:42 PM     CONCLUSION:  1.  Moderate dilatation of extrahepatic bile ducts, especially the common hepatic duct which measures 13 mm. No intraductal stone or obstructing mass evident. Patient does have a small duodenal diverticulum at the level the ampulla which could be playing   a role.  2.  There is minimal dilatation of the main pancreatic duct.  3.  Trace volume ascites. Mild cardiomegaly.        EXAM: CT ABDOMEN PELVIS WO ORAL W IV CONTRAST  DATE/TIME: 6/27/2019     INDICATION: Abdominal pain.  COMPARISON: Ultrasound earlier today, CT 06/14/2019.   CONCLUSION:   1.  Hepatic steatosis.  2.  Enlarged heart with fibroatelectasis and possible interstitial edema. Correlate with chest x-ray.  3.  Diverticulosis without diverticulitis.  4.  Prominent common hepatic duct unchanged.  5.  Presumed uterine fibroids.  6.  Diffuse thickening of urinary bladder accentuated from incomplete  distention.  7.  Remainder stable.    EXAM: US ABDOMEN LIMITED  LOCATION: [facility]  DATE/TIME: 6/27/2019 4:50 PM  CONCLUSION:  1.  Prominent common hepatic duct however this is unchanged from recent CT. Differential would include reservoir effect however given abnormal liver function tests distal obstructing lesion cannot be excluded.  2.  Minimally heterogeneous hepatic steatosis.                    Electronically signed by: Monik Cruz MD

## 2021-06-19 NOTE — PROGRESS NOTES
[facility]      Facility:    [facility] [[id number]]    Code Status: FULL CODE   [facility]     6/22 -  7/3/18      Chief Complaint/Reason for Visit:  Chief Complaint   Patient presents with     H & P     ventral hernia: redo repair       HPI:   Gardenia Muller is a 67 y.o. female with history of abdominal pain which led to hospitalization 2/26/18 and diagnosis of incarcerated large incisional ventral hernia. She had ongoing pain and had some issue with eating  and vomiting.  She had multiple cardiac risk factors and was felt to be a tenuous candidate for surgery so she was discharged without surgical management. Cardiology consulted, did a nuclear stress test on 4/9/18 which did show apical ischemia. Dr. Flanagan  stated  this was likely due to known chronic occlusion of her right coronary artery and that patient should be okay for surgery as she was symptom-free.    She did have a previous incarcerated umbilical hernia repair  November 12, 2015.    She had a robotic incisional hernia repair with transverse abdominis release, complex abdominal reconstruction on 6-22-18.  She did have recurrent small bowel obstructions, had a repeat laparotomy on 6-30-18.  She had to be on TPN for a while, but after the second surgery was started on an oral diet.    She did have postop atrial fibrillation with RVR, converted back to sinus.  She briefly had amiodarone, was on IV but switched to oral metoprolol.  Warfarin was held for a while but restarted before discharge.    She had acute blood loss anemia, received 2 units of packed red cells.      Other medical history:  -Coronary artery disease: Nonobstructive left coronary disease, chronically occluded right coronary which is well collateralized  -Essential hypertension  -Peripheral arterial disease  -Renal artery stenosis: Right  -Paroxysmal atrial fibrillation: No PFO  - Multiple ischemic cerebrovascular accidents.  She failed  aspirin, and had been on Coumadin thereafter.  -Dyslipidemia  -Diabetes type 2  -Obesity      Past Medical History:  Past Medical History:   Diagnosis Date     Acute diastolic heart failure (H) 11/2015     Atrial fibrillation (H)      Cerebral vascular accident (H)      Coronary artery disease 2006    100% RCA with collateral filling per Dr. Elizabeth's angio report     Diabetes mellitus type 2 in obese (H)      Fibrocystic breast      GERD (gastroesophageal reflux disease)      History of anesthesia complications     slow to wake     Hypertension      Peripheral vascular disease (H)      PONV (postoperative nausea and vomiting)      Stroke (H)      Urinary incontinence            Surgical History:  Past Surgical History:   Procedure Laterality Date     CARDIAC CATHETERIZATION       CORONARY STENT PLACEMENT  1995    stent     EXPLORATORY LAPAROTOMY N/A 6/29/2018    Procedure: LAPAROTOMY, CLOSURE OF PERITONEAL RENT;  Surgeon: Jones Harding DO;  Location: [facility];  Service:      [facility]  6/29/2018          VENTRAL HERNIA REPAIR N/A 11/12/2015    Procedure: STRANGULATED VENTRAL HERNIA REPAIR ;  Surgeon: Ernesto Bailey MD;  Location: [facility];  Service:        Family History:   Family History   Problem Relation Age of Onset     Cancer Paternal Grandmother      Cancer Paternal Uncle      No Medical Problems Mother      No Medical Problems Father      Heart attack Sister      Chronic Kidney Disease Sister      No Medical Problems Daughter      No Medical Problems Maternal Grandmother      No Medical Problems Maternal Grandfather      No Medical Problems Paternal Grandfather      No Medical Problems Maternal Aunt      No Medical Problems Paternal Aunt      Diabetes Brother      BRCA 1/2 Neg Hx      Breast cancer Neg Hx      Colon cancer Neg Hx      Endometrial cancer Neg Hx      Ovarian cancer Neg Hx        Social History:    Social History     Social History     Marital status: Legally       Spouse name: N/A     Number of children: One daughter in [place], son in [place]     Years of education: N/A     Social History Main Topics     Smoking status: Current Every Day Smoker     Packs/day: 0.25     Smokeless tobacco: Current User      Comment: e-cig a few times/day     Alcohol use No     Drug use: No     Sexual activity: Not on file            Review of Systems   Left side of the abdomen feels like it has a pulled muscle  Uses a walker at home  Has a  once a week, nurse visit on Wednesdays, PCA to give her shower on Wednesdays.  She has a niece locally who helps with [place], gets Meals on Wheels.  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  Pain control is good, no constipation or diarrhea, no nausea, sleeping well.  The remainder of the comprehensive review of systems is negative    Vitals:    07/05/18 0900   BP: 99/48   Pulse: 73   Resp: 18   Temp: 96.9  F (36.1  C)   SpO2: 97%       Physical Exam   Constitutional: She is oriented to person, place, and time. She appears well-nourished. No distress.   Pleasant middle-aged black female, obese   HENT:   Nose: Nose normal.   Mouth/Throat: Oropharynx is clear and moist.   Missing many molars, upper right front tooth, poor dental hygiene   Eyes: Conjunctivae and EOM are normal. No scleral icterus.   Cardiovascular: Regular rhythm and normal heart sounds.    No murmur heard.  Pulmonary/Chest: Effort normal and breath sounds normal. She has no wheezes. She has no rales.   Abdominal: Soft. Bowel sounds are normal. She exhibits no distension. There is tenderness.   Incision clean and dry, there is a bulging area proximal to the incision, does not change with Valsalva.   Musculoskeletal: Normal range of motion. She exhibits no tenderness or deformity.   3+ pedal edema bilateral legs patella distally  Moves all extremities  well  Normal strength and coordination   Lymphadenopathy:     She has no cervical adenopathy.   Neurological: She is alert and oriented to person, place, and time. She exhibits normal muscle tone. Coordination normal.   Skin: Skin is warm and dry.   Has horizontal stretch marks bilaterally on her lower extremities, whitish tan/not new)   Psychiatric: She has a normal mood and affect. Her behavior is normal. Thought content normal.       Medication List:  Current Outpatient Prescriptions   Medication Sig     ACCU-CHEK YAZMIN PLUS TEST STRP strips TEST TWICE DAILY AS DIRECTED. *6 TOTAL FILLS*     acetaminophen (TYLENOL) 650 MG CR tablet Take 650 mg by mouth 2 (two) times a day.      aspirin 81 MG EC tablet Take 81 mg by mouth daily.     blood glucose meter (GLUCOMETER) Please Dispense kit per pharmacy discretion and patient's insurance Test blood sugar.     cholecalciferol, vitamin D3, (VITAMIN D3) 2,000 unit capsule Take 2,000 Units by mouth daily with lunch.     docusate sodium (COLACE) 100 MG capsule Take 1 capsule (100 mg total) by mouth 2 (two) times a day for 10 days.     furosemide (LASIX) 20 MG tablet Take 1 tablet (20 mg total) by mouth daily.     lancets 33 gauge Misc Test twice daily Dispense brand per patient's insurance at pharmacy discretion.     lidocaine (LMX5) 5 % Crea Apply 1 application topically every 12 (twelve) hours as needed (applies to back).     loratadine (CLARITIN) 10 mg tablet Take 10 mg by mouth daily with lunch. Noon     LORazepam (ATIVAN) 0.5 MG tablet Take 1 tablet (0.5 mg total) by mouth every 6 (six) hours as needed for anxiety. 1  tabs po q 6 hours prn     metoprolol tartrate (LOPRESSOR) 25 MG tablet Take 1 tablet (25 mg total) by mouth 2 (two) times a day.     multivitamin (TAB-A-JULIET) per tablet Take 1 tablet by mouth daily.     nitroglycerin (NITROSTAT) 0.4 MG SL tablet Place 1 tablet (0.4 mg total) under the tongue every 5 (five) minutes as needed for chest pain (for up to 3  doses and call 911 if persists).     omeprazole (PRILOSEC) 20 MG capsule Take 20 mg by mouth 2 (two) times a day.      oxyCODONE-acetaminophen (PERCOCET) 5-325 mg per tablet Take 1 tablet by mouth every 4 (four) hours as needed for pain.     polyethylene glycol (MIRALAX) 17 gram packet Take 17 g by mouth daily as needed.     polyvinyl alcohol (ARTIFICIAL TEARS, POLYVIN ALC,) 1.4 % ophthalmic solution Administer 2 drops to both eyes 2 (two) times a day. As directed.      pramipexole (MIRAPEX) 0.125 MG tablet TAKE 1 TABLET BY MOUTH AT BEDTIME     rosuvastatin (CRESTOR) 40 MG tablet TAKE 1 TABLET BY MOUTH AT BEDTIME     sertraline (ZOLOFT) 100 MG tablet TAKE 1 TABLET BY MOUTH ONCE DAILY     sod bicarb-citric acid-simethicone (E-Z GAS) 2.21-1.53 gram/4 gram GrEP Take 1 packet by mouth 3 (three) times a day as needed (gas).     traZODone (DESYREL) 50 MG tablet Take 1 tablet (50 mg total) by mouth at bedtime.     warfarin (COUMADIN) 2 MG tablet Take 2mg Mon,Wed,Fri or as directed by clinic (Patient taking differently: Take 2 mg by mouth See Admin Instructions. Take 2mg Mon,Wed,Fri or as directed by clinic.)     warfarin (COUMADIN) 4 MG tablet Take 4 mg Sun, Tues, Thurs, Sat or as directed by clinic (Patient taking differently: Take 4 mg by mouth See Admin Instructions. Take 4 mg Sun, Tues, Thurs, Sat or as directed by clinic.)       Labs:  Newer results are available. Click to view them now.            Ref Range & Units 7/2/18  7:09 AM     Sodium 136 - 145 mmol/L 138   Potassium 3.5 - 5.0 mmol/L 4.0   Chloride 98 - 107 mmol/L 104   CO2 22 - 31 mmol/L 27   Anion Gap, Calculation 5 - 18 mmol/L 7   Glucose 70 - 125 mg/dL 136 (H)   Calcium 8.5 - 10.5 mg/dL 9.7   BUN 8 - 22 mg/dL 16   Creatinine 0.60 - 1.10 mg/dL 0.63   GFR MDRD Af Amer >60 mL/min/1.73m2 >60        Ref Range & Units 7/3/18  6:40 AM   7/2/18  7:09 AM   7/2/18  7:09 AM     Magnesium 1.8 - 2.6 mg/dL 1.4 (L) 1.7 (L) 1.7 (L)           Ref Range & Units 7/2/18  7:09  AM   7/1/18  6:20 AM     Phosphorus 2.5 - 4.5 mg/dL 2.3 (L) 2.3 (L)          Ref Range & Units 7/2/18  7:09 AM   7/1/18  6:20 AM     WBC 4.0 - 11.0 thou/uL 11.2 (H) 11.6 (H)   Hemoglobin 12.0 - 16.0 g/dL 9.1 (L) 9.2 (L)   RDW 11.0 - 14.5 % 17.6 (H) 17.4 (H)   Platelets 140 - 440 thou/uL 176 200         Assessment: Plan:      ICD-10-CM    1. S/P repair of ventral hernia Z98.890 Tolerating po, pain controlled on Percocet    Z87.19    2. Coronary artery disease involving native coronary artery of native heart without angina pectoris I25.10 On ASA, Crestor   3. Cerebrovascular disease I67.9 On Coumadin   4. Right Renal Artery Stenosis I70.1    5. PAD (peripheral artery disease) (H) I73.9    6. Anemia due to blood loss, acute D62    7. Type 2 diabetes mellitus with complication, without long-term current use of insulin (H) E11.8    8. Essential hypertension I10 On lasix, metoprolo   9. Paroxysmal atrial fibrillation (H) I48.0 On Coumadin       Total time 48 minutes, > 50 % face to face teaching re therapy recovery, reviewing medications and addressing questions    Electronically signed by: Monik Cruz MD

## 2021-06-19 NOTE — PROGRESS NOTES
" HPI: Gardenia Muller is here for follow up after hospitalization from her surgery.  She is doing well with no abdominal complaints.  She is still the transitional care unit but is able to walk, eat and has no other complaints.  Allergies, Medications, Social History, Past Medical History and Past Surgical History were reviewed and are noted in the chart.    /69 (Patient Site: Left Arm, Patient Position: Sitting, Cuff Size: Adult Regular)  Pulse 75  Ht 5' 2\" (1.575 m)  Wt 207 lb (93.9 kg)  LMP 01/25/1999  SpO2 96%  BMI 37.86 kg/m2  Body mass index is 37.86 kg/(m^2).      EXAM:   GENERAL: Appears well  Abdomen-well-healed incision with abdominal binder in place.  No seromas, nontender to palpation    Incision 06/22/18 Abdomen (Active)   Site Assessment Clean;Dry;Approximated 7/3/2018  8:00 AM   Surrounding Tissue Assessment Clean;Dry;Intact 7/3/2018 12:10 AM   Closure Steristrips 7/3/2018  8:00 AM   Drainage Description Sanguineous (Bloody) 6/23/2018  6:00 PM   Dressing Open to air 7/3/2018  8:00 AM   Dressing Status  Clean;Dry;Intact 7/3/2018 12:10 AM       Incision 06/29/18 Abdomen (Active)   Site Assessment Clean;Dry;Approximated 7/3/2018  8:00 AM   Surrounding Tissue Assessment Clean;Dry;Intact 7/3/2018 12:10 AM   Closure Steristrips 7/3/2018  8:00 AM   Drainage Amount Scant 7/3/2018 12:10 AM   Drainage Description Sanguineous (Bloody) 7/1/2018  3:45 PM   Dressing Open to air 7/3/2018  8:00 AM   Dressing Status  Clean;Dry;Intact 7/3/2018 12:10 AM       Assessment/Plan: Gardenia Muller continues to do well.  At this point I have encouraged her to continue with physical therapy but to refrain from any strenuous activities or heavy lifting over 20 pounds for approximately 6 weeks from surgery.  She should continue to heal well.  I would like to see her back in clinic in approximately 1 month for ongoing evaluation.  I expect a full recovery otherwise.    Jones Harding  DO [facility] Department " of Surgery

## 2021-06-19 NOTE — PROGRESS NOTES
[facility]    Facility:   [facility] [[id number]]     Code Status: FULL CODE  PCP: Jerson Hernandez MD   Phone: [phone]   Fax: [phone]   [facility]     6/22 -  7/3/18      CHIEF COMPLAINT/REASON FOR VISIT:  Chief Complaint   Patient presents with     Discharge Summary       HISTORY COURSE:  Gardenia had a history of large ventral hernia.  She had her most recent bout of abdominal pain , nausea and vomiting in February 2018 with diagnosis of incarcerated large ventral incisional hernia.  She had multiple cardiac risk factors, and needed to be cleared by cardiology prior to consideration of her repair.  Cardiac stress test was done early April showing apical ischemia, but the cardiologist felt this was the known chronic occlusion of her right coronary artery, and she was given clearance for surgery.    She had robotic incisional hernia repair on 6/22/18 with transverse abdominis release, complex abdominal reconstruction.  She had a recurrent small bowel obstruction, repeat laparotomy on 6/30/18.  Oral feedings were resumed and she was able to come off TPN.  She had brief episode of postoperative atrial fibrillation with RVR, converted on her own back to sinus.  Warfarin had been held for time the hospital was restarted before discharge to the nursing home.  She had acute blood loss anemia, had 2 units of packed cells.    She was at [facility], had issues with pain, reticence to participate in therapy, but she did improve and actually did quite well.  Her request was to have bed rails at home to make it easier to sit up as she lives alone.    Review of Systems   Was seen in surgical followup: bulge above incision was there after surgery, expected to recede with time  No complaints, abdominal pain is generally decreasing her mobility is increasing.    Vitals:    07/16/18 0900   BP: 108/53   Pulse: 85   Resp: 18   Temp: 98.8  F (37.1  C)   SpO2: 98%        Physical Exam    Constitutional: Pleasant middle-aged black female, obese. No distress.   Cardiovascular: Regular rhythm and normal heart sounds.    No murmur heard.  Pulmonary/Chest: Breath sounds clear.    Abdominal: Soft. Bowel sounds are normal, no distension. Minimal tenderness.   Still a  bulging area proximal to the incision,    Musculoskeletal: Normal range of motion.  2-3+ pedal edema bilateral legs patella distally  Moves all extremities well. Normal strength and coordination .   Neurological: She is alert and oriented to person, place, and time,normal muscle tone.   Skin: Skin is warm and dry.    Psychiatric: She has a normal mood , affect, behavior        Allergies   Allergen Reactions     Penicillins Swelling         MEDICATION LIST:  Current Outpatient Prescriptions   Medication Sig     ACCU-CHEK YAZMIN PLUS TEST STRP strips TEST TWICE DAILY AS DIRECTED. *6 TOTAL FILLS* (Patient taking differently: TEST QID DAILY AS DIRECTED. *6 TOTAL FILLS*)     acetaminophen (TYLENOL) 650 MG CR tablet Take 650 mg by mouth 2 (two) times a day.      aspirin 81 MG EC tablet Take 81 mg by mouth daily.     blood glucose meter (GLUCOMETER) Please Dispense kit per pharmacy discretion and patient's insurance Test blood sugar.     cholecalciferol, vitamin D3, (VITAMIN D3) 2,000 unit capsule Take 2,000 Units by mouth daily with lunch.     furosemide (LASIX) 20 MG tablet Take 1 tablet (20 mg total) by mouth daily.     lancets 33 gauge Misc Test twice daily Dispense brand per patient's insurance at pharmacy discretion.     lidocaine (LMX5) 5 % Crea Apply 1 application topically every 12 (twelve) hours as needed (applies to back).     loratadine (CLARITIN) 10 mg tablet Take 10 mg by mouth daily with lunch. Noon     LORazepam (ATIVAN) 0.5 MG tablet Take 1 tablet (0.5 mg total) by mouth every 6 (six) hours as needed for anxiety. 1  tabs po q 6 hours prn     metoprolol tartrate (LOPRESSOR) 25 MG tablet Take 1 tablet (25 mg  total) by mouth 2 (two) times a day.     multivitamin (TAB-A-JULIET) per tablet Take 1 tablet by mouth daily.     nitroglycerin (NITROSTAT) 0.4 MG SL tablet Place 1 tablet (0.4 mg total) under the tongue every 5 (five) minutes as needed for chest pain (for up to 3 doses and call 911 if persists).     omeprazole (PRILOSEC) 20 MG capsule Take 20 mg by mouth 2 (two) times a day.      oxyCODONE-acetaminophen (PERCOCET) 5-325 mg per tablet Take 1 tablet by mouth every 4 (four) hours as needed for pain. (Patient taking differently: Take 1 tablet by mouth every 6 (six) hours as needed for pain. )     polyethylene glycol (GLYCOLAX) 17 gram/dose powder      polyvinyl alcohol (ARTIFICIAL TEARS, POLYVIN ALC,) 1.4 % ophthalmic solution Administer 2 drops to both eyes 2 (two) times a day. As directed.      pramipexole (MIRAPEX) 0.125 MG tablet TAKE 1 TABLET BY MOUTH AT BEDTIME     rosuvastatin (CRESTOR) 40 MG tablet TAKE 1 TABLET BY MOUTH AT BEDTIME     sertraline (ZOLOFT) 100 MG tablet TAKE 1 TABLET BY MOUTH ONCE DAILY     traZODone (DESYREL) 50 MG tablet Take 1 tablet (50 mg total) by mouth at bedtime.     warfarin (COUMADIN) 2 MG tablet Take 2 to 4mg (1 or 2 tabs) by mouth daily as directed.  Adjust dose based on INR.       DISCHARGE DIAGNOSIS:    ICD-10-CM    1. Persistent atrial fibrillation (H) I48.1    2. S/P repair of recurrent ventral hernia Z98.890     Z87.19    3. PAD (peripheral artery disease) (H) I73.9    4. Essential hypertension I10    5. Anemia D64.9    6. Coronary artery disease involving native coronary artery of native heart without angina pectoris I25.10    7. Other depression F32.89    8. Cerebral infarction (H) I63.9               MEDICAL EQUIPMENT NEEDS:  Pair of bed rails    DISCHARGE PLAN  Face to Face visit : 7/16/18  Based on an evaluation conducted at her bedside today, she is deemed to need outpatient physical therapy, to advance functional mobility.  She is overweight had a large abdominal surgery,  lives alone, and needs help being more mobile.  We have ordered bedrails to help her get up as she does not have a hospital bed.  Also would like an assessment of her home environment.  She is homebound because she is at risk for fall, and pain limitations for distance walking, getting in and out of vehicles.    He should have a 2 g sodium diet per day and follow diabetic guidelines.  She should follow-up with Dr. Oviedo in 1 month from general surgery, cardiology in 1 month.  The patient is, or has been, under my care and I have initiated the establishment of the plan of care. This patient will be followed by a physician who will periodically review the plan of care.    Schedule follow up visit with primary care provider within 7 days to reestablish care.    Electronically signed by: Monik Cruz MD

## 2021-06-19 NOTE — LETTER
Letter by Jerson Hernandez MD at      Author: Jerson Hernandez MD Service: -- Author Type: --    Filed:  Encounter Date: 8/13/2019 Status: (Other)       8/13/2019  Gardenia Muller      To Whom It May Concern,    Gardenia Muller should have the following overlapping special diets:  Low cholesterol, diabetes, low sodium.      Feel free to contact me with questions.    Sincerely,          Jerson Hernandez MD

## 2021-06-20 NOTE — OP NOTE
Operative Note    Name:  Gardenia Muller  PCP:  Jerson Hernandez MD  Procedure Date:  10/7/2018      Procedure:  CHOLECYSTECTOMY, LAPAROSCOPIC (N/A)    Pre-Procedure Diagnosis:  Acalculous cholecystitis    Post-Procedure Diagnosis:    Same     Surgeon(s):  Felicia So MD    Assistant:  Lynn Quevedo CNP    Anesthesia Type:  General      Findings:  Somewhat distended gallbladder.  No stones.  Mild wall thickening.    Operative Report:    The patient was properly identified and brought to the operating suite where they were placed in the supine position, general anesthetic was administered and prepped and draped in a sterile fashion.  A small incision was made in the left upper quadrant as to avoid the previous mesh that was placed  and a Veress needle passed into the abdominal cavity which was insufflated with carbon dioxide.  A 5mm trocar port was then placed followed by the camera.  Under direct vision a 10 mm port was placed in the epigastrium, a 5 mm ports in the right upper quadrant and then another 5 mm port just above the umbilicus.  The camera was transferred to the midline lower port.  The gallbladder was visualized.  It was mildly distended and thickened.  With traction on the gallbladder dissection was carried out in the triangle of Calot where the cystic duct and artery were easily identified clipped and divided.  The gallbladder was removed from the gallbladder bed with electrocautery with no difficulty and pulled up through the epigastric incision.  The port was replaced and the entire area irrigated until clear.  Hemostasis was assured.  All the ports removed and each site closed with subcuticular sutures of 4-0 Monocryl.  Sterile dressings were placed.  Each site was also infiltrated with quarter percent Marcaine for a total of 20 mL.  The patient tolerated the procedure well.  My assistant provided retraction and exposure for me throughout the case.        Estimated Blood Loss:   10 mL from  10/7/2018 10:19 AM to 10/7/2018 11:20 AM    Specimens:      ID Type Source Tests Collected by Time Destination   A :  Tissue Gallbladder SURGICAL PATHOLOGY EXAM Felicia So MD 10/7/2018 1041           Drains:        Complications:    None    Felicia So     Date: 10/7/2018  Time: 11:20 AM

## 2021-06-20 NOTE — PROGRESS NOTES
"Clinical Nutrition Therapy Assessment Note      Reason for Assessment:   Gardenia Muller is a 68 y.o. female assessed by the registered Dietitian for NPO/CL >= 3 days. Today is day 4 of NPO. Pt admitted w/ abdominal pain d/t cholecystitis, biliary obstruction. PMHx significant for PAD, T2DM, HFpEF.     S: Pt underwent cholecystectomy today. Seen in room after surgery. Pt eating gelatin and drinking applesauce-- knows to go slow. She feels much better. Denied nausea at this time. Has an appetite. Does not want to go home until she has been able to eat and tolerate several meals of a regular diet. Wants to know if she can keep her gallstones.    Nutrition History:  Information from patient.  Diet prior to admission: Low salt. Pt denies following a diabetic diet but does state she drinks diet pop, avoids sugar, eats wheat bread not white  Recent food/fluid intake: poor x 1 week d/t abdominal pain, N/V. NPO/CL x4 days.  Recent Supplements: none  Cultural/Caodaism food needs or preferences: low salt diet  Food allergies or intolerances: nkfa    Current Nutrition Prescription:   Diet: Clear liquids   IV dextrose or Fluids:  nacl 0.9% Last Rate: 50 mL/hr (10/07/18 0111)       Current Nutrition Intake:  Total nutrient intake from all sources does not meet estimated nutritional needs. Expect this to improve as diet is upgraded, pt is hungry.    Anthropometrics:  Height: 5' 3\" (160 cm)  Admission weight: 179 lbs  Weight: 173 lb 9.6 oz (78.7 kg) down 6 lbs (3.3%) x 1 week, significant  BMI (Calculated): 31.1  BMI indication: 30-34.9 obesity (class 1)  Ideal body weight 115 lbs +/- 10%  Weight History:   Wt Readings from Last 3 Encounters:   10/07/18 173 lb 9.6 oz (78.7 kg)   09/20/18 172 lb 4 oz (78.1 kg)   09/18/18 174 lb 4.8 oz (79.1 kg)         RD Nutrition Focused Physical Exam:  The patient has the following physical signs which could indicate malnutrition: none noted    GI Status/Output:   GI symptoms include:Nausea " per nurse, Vomiting per nurse and Abdominal pain per nurse (appear resolved at this time)  Last BM: 10/6 per nurse    Skin/Wound:  James score James Scale Score: 20    No wound was noted.    Medications:  Medications reviewed.  Levaquin, KCl, pericolace    Labs:  Labs reviewed  K 3.2, Mg 1.5, glu 97  FSBG     Estimated Nutrition Needs:  Assessment weight is 58.8, adjusted weight    Energy Needs: 5276-7549 kcals daily, 25-30 kcal/kg  Protein Needs: 59-71 g daily, 1-1.2 g/kg.  Fluid Needs: 3576-1145 mls daily, 30-35 mls/kg    Malnutrition: Patient meets diagnostic criteria for severe protein calorie malnutrition in the context of acute illness as evidenced by  unintentional weight loss and poor nutrient intake    Nutrition Risk Level: high risk    Nutrition dx:   Malnutrition r/t cholecystitis and biliary obstruction as evidenced by po intake <50% est needs x6 days, 3.3% wt loss x 1 week.     Goal:   PO intake > 75% and Diet advance    Intervention:   Advance diet as tolerated. Advised pt to go slow w/ clear liquids. The fact that she has an appetite and is interested in eating is good, will likely meet estimated nutrition needs once diet advances.    Recommend cardiac, diabetic diet given pt's medical hx and preferences    Monitoring/Evaluation:   Diet advance, po intake, wts    Electronically signed by:  Ashley Weber RD

## 2021-06-20 NOTE — LETTER
Letter by Eileen Torres CNP at      Author: Eileen Torres CNP Service: -- Author Type: --    Filed:  Encounter Date: 8/6/2020 Status: (Other)         Patient: Gardenia Muller   MR Number: [id number]   YOB: 1950   Date of Visit: 8/6/2020     [facility]    Facility:   [facility] [[id number]]   Code Status: FULL CODE and POLST AVAILABLE      CHIEF COMPLAINT/REASON FOR VISIT:  Chief Complaint   Patient presents with   ? Review Of Multiple Medical Conditions     abdominal pain, HTN, Hypotension, tobacco cessation       HISTORY:      HPI: Gardenia is a 69 y.o. 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    Gardenia is seen at [facility] for new admission after recent hospitalization from July 22 to July 31.  She was also hospitalized in June from June 27 to July 3 and had been at another TCU prior to her recent hospitalization.  She presented with severely elevated INR and LFTs with an INR of 13 on admit.  Possibly secondary to top of acute liver failure secondary to CHF.  This was reversed with vitamin K and fresh frozen plasma.  Her work-up was  negative for hepatitis and a TTE showed right vent dysfunction with an EF of 40%.  During her hospitalization she was diuresed with Lasix and chlorothiazide and also underwent right and left heart cath.  See previous hospital notes for full details.  She developed A. fib with RVR that was controlled with digoxin.  She had hyperkalemia that improved with diuresis and Kayexalate.  Also hypercalcemia and the chlorthalidone was discontinued.  She will continue on aspirin 81 daily, ticagrelor, and warfarin.    Acute diastolic heart failure and hypertension: Metoprolol supinate 12.5, spironolactone, potassium chloride 20 mEq daily, lisinopril, magnesium gluconate, furosemide 20 mg daily, digoxin.    A. Fib: On warfarin, INR 5.9 today.  The significant increase from Wednesday when she was 1.75.  She was on 3 mg of Coumadin for 2 days in a row which is actually a decrease from her hospital dose.  Liver will be checked on Friday as well, however it has been downtrending since she left the hospital.    Diabetes type 2: On metformin 500 twice daily, would like her blood sugar checked twice daily as well.    CAD with recent stent placement: Continue Brilinta, aspirin 81, anticoagulated.  Atorvastatin on hold until LFTs return to normal.  -Check LFTs Friday.    Today she is seen sitting up in her wheelchair in the nursing home.  She has not seen therapy yet.  She has a brief understanding of her complicated hospital course and is able to tell me that it was caused by high INR.  Her main concern today is making sure that we get her blood sugars checked as the facility has not been doing so.   She is alert and oriented at what seems to be her baseline.  She is denying any concerns with shortness of breath, chest pain, nausea, vomiting, constipation or diarrhea, urinary symptoms.  She typically resides at an [facility].       Past Medical History:   Diagnosis Date   ? Acute diastolic heart failure (H) 11/2015   ? Acute on chronic  diastolic heart failure (H) 6/15/2019   ? Advanced directives, counseling/discussion 8/5/2015    Patient completed 2011.  Discussed today, 5/24/17, and wants to change healthcare agent from niece to daughter.  Discussed that she will need to complete new form to indicate this.   ? MAY (acute kidney injury) (H) 10/22/2018   ? Atrial fibrillation (H)    ? Benign adenomatous polyp of large intestine 3/30/2017    Colonoscopy March 2017.  Recommend 5 year follow-up.  Single tubular adenoma   ? Biliary obstruction 10/3/2018    Added automatically from request for surgery [id number]   ? Cerebral vascular accident (H)    ? Coronary artery disease 2006    100% RCA with collateral filling per Dr. Elizabeth's angio report   ? Diabetes mellitus type 2 in obese (H)    ? Fibrocystic breast    ? GERD (gastroesophageal reflux disease)    ? History of anesthesia complications     slow to wake   ? Hypertension    ? Peripheral vascular disease (H)    ? Pneumonia 11/11/2018   ? PONV (postoperative nausea and vomiting)    ? S/P cholecystectomy 6/22/2018   ? SBO (small bowel obstruction) (H) 11/12/2015   ? Stroke (H)    ? Transaminitis 10/22/2018   ? Upper respiratory infection 12/20/2018   ? Urinary incontinence              Family History   Problem Relation Age of Onset   ? Cancer Paternal Grandmother    ? Cancer Paternal Uncle    ? No Medical Problems Mother    ? No Medical Problems Father    ? Heart attack Sister    ? Chronic Kidney Disease Sister    ? No Medical Problems Daughter    ? No Medical Problems Maternal Grandmother    ? No Medical Problems Maternal Grandfather    ? No Medical Problems Paternal Grandfather    ? No Medical Problems Maternal Aunt    ? No Medical Problems Paternal Aunt    ? Diabetes Brother    ? BRCA 1/2 Neg Hx    ? Breast cancer Neg Hx    ? Colon cancer Neg Hx    ? Endometrial cancer Neg Hx    ? Ovarian cancer Neg Hx      Social History     Socioeconomic History   ? Marital status: Legally      Spouse  name: None   ? Number of children: 1   ? Years of education: None   ? Highest education level: None   Occupational History   ? None   Social Needs   ? Financial resource strain: None   ? Food insecurity:     Worry: None     Inability: None   ? Transportation needs:     Medical: None     Non-medical: None   Tobacco Use   ? Smoking status: Former Smoker     Packs/day: 0.25   ? Smokeless tobacco: Former User   ? Tobacco comment: e-cig a few times/day   Substance and Sexual Activity   ? Alcohol use: No   ? Drug use: No   ? Sexual activity: None   Lifestyle   ? Physical activity:     Days per week: None     Minutes per session: None   ? Stress: None   Relationships   ? Social connections:     Talks on phone: None     Gets together: None     Attends Spiritism service: None     Active member of club or organization: None     Attends meetings of clubs or organizations: None     Relationship status: None   ? Intimate partner violence:     Fear of current or ex partner: None     Emotionally abused: None     Physically abused: None     Forced sexual activity: None   Other Topics Concern   ? None   Social History Narrative    Single/, lives alone; daughter in [place];    Gets help from neighbors and extended family    Former smoker.no alcohol problems       REVIEW OF SYSTEM:  Pertinent items are noted in HPI.    PHYSICAL EXAM:   BP (!) 86/66 (Patient Position: Standing)   Pulse 83   Wt 138 lb 3.2 oz (62.7 kg)   LMP 01/25/1999   BMI 24.48 kg/m    General appearance: alert, appears stated age and cooperative  HEENT: Head is normocephalic with normal hair distribution. No evidence of trauma. Ears: Without lesions or deformity. No acute purulent discharge. Eyes: Conjunctivae pink with no scleral icterus or erythema.   Lungs: clear to auscultation bilaterally, respirations without effort  Heart: regular rate and irregular rhythm, S1, S2 normal, no murmur, click, rub or gallop  Abdomen: soft, non-tender; bowel sounds  normal; no masses,  no organomegaly  Extremities: extremities normal, atraumatic, no cyanosis, +1 edema in lower extremities  Pulses: 2+ and symmetric  Skin: Skin color, texture, turgor normal. No rashes or lesions  Neurologic: Grossly normal   Psych: interacts well with caregivers, exhibits logical thought processes and connections, pleasant      LABS:   None today.     ASSESSMENT:    1. Warfarin anticoagulation     2. Supratherapeutic INR     3. Acute liver failure without hepatic coma         PLAN:    Acute diastolic heart failure and hypertension: Metoprolol supinate 12.5, spironolactone, potassium chloride 20 mEq daily, lisinopril, magnesium gluconate, furosemide 20 mg daily, digoxin.    A. Fib: On warfarin, INR 5.9 today, up from 1.75 on Tuesday. She was given 3 mg on Tuesday and Wednesday.   -Hold Coumadin today.    -Recheck INR on Friday.    Diabetes type 2: On metformin 500 twice daily, would like her blood sugar checked twice daily as well.    CAD with recent stent placement: Continue Brilinta, aspirin 81, anticoagulated.  Atorvastatin on hold until LFTs return to normal.  -Check LFTs Friday.    Advance care planning: Patient elects remain full code.    Electronically signed by: Arleth Barton CNP

## 2021-06-20 NOTE — PROGRESS NOTES
Clinical Nutrition Therapy Follow Up Note      Current Nutrition Prescription:   Diet:rregular, pt reports is eating majority, is ordering full meals.     Anthropometrics:  Admission weight:175#  Weight: 173 lb 3 oz (78.6 kg)    GI Status/Output:   GI symptoms include: Flatus per nurse  Last BM: 10/6    Skin/Wound:  Lap surgery wound was noted.    Medications:  Medications reviewed.    Labs:  Labs reviewed    Malnutrition: Noted previously    Nutrition Risk Level: low risk, is eating adequately at present.    Nutrition dx:  completed    Intervention:  Diet advanced to regular, pt is on low Na and avoids sugars at home, Pt reports she can pick off the menu and meet these restrictions. Pt thinks she will be discharged so, so didn't wish to change diet.    Monitoring/Evaluation:  Meeting est needs, follow prn, adjust as needed.

## 2021-06-20 NOTE — PROGRESS NOTES
General Surgery Progress Note    1 Day Post-Op    ENDOSCOPIC RETROGRADE CHOLANGIOPANCREATOGRAPHY, SPHINCTEROTOMY    Subjective:   Pt is feeling worse since her ERCP. She states that her pain is even worse since the procedure yesterday and complains of bloating in her epigastrium. She is tolerating clear liquids, but admits to taking in very small amounts do to fear of pain increasing or nausea returning. She is frustrating with the continued pain.    Vitals:    10/05/18 2058 10/06/18 0031 10/06/18 0428 10/06/18 0733   BP: 146/71 134/68 106/64 158/70   Patient Position: Sitting Lying Lying Lying   Pulse: 65 (!) 59 (!) 58 65   Resp:  16 16 16   Temp:  98  F (36.7  C) 97.9  F (36.6  C) 98.7  F (37.1  C)   TempSrc:  Oral Oral Oral   SpO2: 96% 96% 97% 97%   Weight:   175 lb (79.4 kg)    Height:           Physical Exam:  General: NAD, pleasant and cooperative  Ab:       Soft, diffuse tenderness across upper abdomen with increase in epigatrium and RUQ; mild distention in mid upper abdomen  :      Patient is voiding adequate amount      Results from last 7 days  Lab Units 10/06/18  0612   LN-WHITE BLOOD CELL COUNT thou/uL 6.0   LN-HEMOGLOBIN g/dL 11.3*   LN-HEMATOCRIT % 36.1   LN-PLATELET COUNT thou/uL 74*         Results from last 7 days  Lab Units 10/06/18  0612 10/05/18  0531   LN-SODIUM mmol/L  --  135*   LN-POTASSIUM mmol/L 3.0* 3.8   LN-CHLORIDE mmol/L  --  100   LN-CO2 mmol/L  --  26   LN-BLOOD UREA NITROGEN mg/dL  --  18   LN-CREATININE mg/dL 1.12* 1.39*   LN-CALCIUM mg/dL  --  10.5   LN-ALBUMIN g/dL 3.1* 3.0*   LN-PROTEIN TOTAL g/dL 6.8 6.9   LN-BILIRUBIN TOTAL mg/dL 2.0* 0.7   LN-ALKALINE PHOSPHATASE U/L 197* 111   LN-ALT (SGPT) U/L 139* 115*   LN-AST (SGOT) U/L 307* 193*       Assessment/Plan:   Epigastric and upper abdominal pain of unknown etiology. No leukocytosis, no fever, and ERCP was normal with no findings of sludge or stones in the ducts that would be indicative of gallbladder dysfunction. Her pain  persists and is increased today. LFTs are also elevated today, which could be reactive to the ERCP yesterday, but we will get a HIDA scan to attempt to definitively rule out gallbladder as her source of pain. If HIDA negative, would strongly encourage that cardiology work up is performed given the evidence on the CT of cardio hepatic reflux and no true objective signs of cholecystitis. If HIDA is indicative of acute cholecystitis, we will look at antibiotic management instead of surgery due to her recent abdominal surgery history.    - HIDA today; can restart diet after scan  - will continue to follow      Lynn Herbert, CNP  [phone]  [facility]

## 2021-06-20 NOTE — PROGRESS NOTES
Pharmacy Consult: Warfarin Management    Pharmacy consulted to dose warfarin for Gardenia Muller, a 68 y.o. female    Ordering provider: Dr. Michael Abreu, Hospitalist    Reason for warfarin therapy: Atrial Fibrillation  Goal INR Range: 2-3    Subjective  Medication lists (home and hospital) were reviewed.    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)     Advanced directives, counseling/discussion     SBO (small bowel obstruction) (H)     Cystitis     Hypomagnesemia     Healthcare maintenance     Essential hypertension     Dysarthria     Cerebral infarction (H)     Anemia     Cerebrovascular disease     Benign adenomatous polyp of large intestine     RLS (restless legs syndrome)     Incisional hernia, without obstruction or gangrene     Abdominal pain, generalized     Diverticular disease of large intestine     Dysphagia     Coronary artery disease involving native coronary artery of native heart without angina pectoris     Dyslipidemia     Ventral hernia without obstruction or gangrene     Paroxysmal atrial fibrillation (H)     Anticoagulation management encounter     S/P repair of recurrent ventral hernia     SOB (shortness of breath)     Lower extremity edema     Anxiety     Hypokalemia     (HFpEF) heart failure with preserved ejection fraction (H)     Tachycardia     Cholecystitis     Anticoagulant long-term use     Biliary  obstruction     Abdominal pain, right upper quadrant    Past Medical History:   Diagnosis Date     Acute diastolic heart failure (H) 11/2015     Atrial fibrillation (H)      Cerebral vascular accident (H)      Coronary artery disease 2006    100% RCA with collateral filling per Dr. Elizabeth's angio report     Diabetes mellitus type 2 in obese (H)      Fibrocystic breast      GERD (gastroesophageal reflux disease)      History of anesthesia complications     slow to wake     Hypertension      Peripheral vascular disease (H)      PONV (postoperative nausea and vomiting)      Stroke (H)      Urinary incontinence         Social History   Substance Use Topics     Smoking status: Current Every Day Smoker     Packs/day: 0.25     Smokeless tobacco: Former User      Comment: e-cig a few times/day     Alcohol use No       Objective   Labs:  Last 3 days:    Recent Labs      10/08/18   1015  10/09/18   0614   CREATININE  1.19*  1.03   HGB   --   11.8*   HCT   --   37.4   PLT   --   98*   BILITOT  1.3*  1.0   AST  118*  85*   ALT  93*  74*   ALKPHOS  267*  223*     Last 7 days:   Recent labs: (last 7 days)      10/03/18   1342  10/04/18   0041  10/05/18   0846  10/06/18   0941  10/07/18   0732  10/09/18   0614  10/10/18   0550   INR  2.09*  2.20*  2.84*  1.42*  1.44*  1.43*  1.29*       Warfarin Dosing History:    Date INR Warfarin Dose Comment   10/8/18 1.44 2 mg Patient may discharge tomorrow.   10/9/18 1.43 4 mg    10/10 1.29 4 mg            Assessment  The patient is resuming warfarin for the indication of Atrial Fibrillation with a goal INR of 2-3. INR today is subtherapeutic which is expected as warfarin was held from 10/3/18 - 10/7/18 for surgery.      Plan  1. Administer warfarin 4 mg PO today.  2. Check INR daily or as appropriate.  3. Continue to follow the patient's INR, PLT, and HGB as available.  4. Monitor for potential drug/disease interactions.  5. Recommend to resume PTA warfarin dosing if patient being  discharged today.    Thank you for the consult,  Ernestine Zapata RPh, BCPS 10/10/2018 11:52 AM

## 2021-06-20 NOTE — PROGRESS NOTES
[facility] Daily Progress Note    Assessment/Plan:  Gardenia Muller is a 68 yr. old female with   history of atrial flutter, on Coumadin, ventral hernia, incisional hernia repair June 2018, presented 10/03/2018 with Epigastric and RUQ abdominal pain,x 1week, CT showed gallbladder wall thickening suggestive of cholecystitis US Abdomen showed significantly distended gallbladder with wall edema and thickening representing a calculus cholecystitis with dilatation of CBD and pancreatic duct ERCP was done by Dr. Stokes 10/5/2018 which did not show any filling defect.  HIDA scan was done on 10/6/2018 which showed marked intrahepatic cholestasis associated with cystic duct obstruction and cholecystitis.  I did discuss this findings with the surgeon on call who wanted me to keep the patient n.p.o. patient is allergic to penicillin and is on treatment with levofloxacin.  She did undergo laparoscopic cholecystectomy on 10/07/2018 by Dr. Felicia So MD which revealed Acalculus cholecystitis      Assessment:    Acute cholecystitis-s/p laparoscopic cholecystectomy 10/7/2018 we will discontinue IV fluids, continue pain medications, levofloxacin,     Incisional hernia status post repair with mesh    Atrial flutter with RVR in the past is not on warfarin now with slowly decreasing INR    Chronic diastolic heart failure  on torsemide.  Lungs have bilateral crackles will restart torsemide.     Type 2 diabetes mellitus on sliding scale insulin sugars less than 200    Hypokalemia and Hypomagnesemia  Replacement per protocol    Hypertension on treatment with Cardizem and metoprolol    Anxiety on Ativan treatment, Zoloft and trazodone    GERD on proton pump inhibitor    Restless leg syndrome on Requip    Thrombocytopenia which will be monitored    Plan:  Discontinue IV fluids  Restart torsemide  Encouraged to drink more  PT OT evaluation and treatment  Restart warfarin when okay with surgery  Continue to monitor INR    Code status:Full  Code        Subjective:  Complains of 8/10 abdominal pain    Review of Systems:   Denies any fever or chills  Patient says that before this she was able to climb 2 flight of stairs without any chest pain or shortness of breath    Current Medications Reviewed via EHR List    Objective:  Vital signs in last 24 hours:  Temp:  [97.6  F (36.4  C)-98.9  F (37.2  C)] 97.8  F (36.6  C)  Heart Rate:  [] 71  Resp:  [17-22] 19  BP: (111-163)/(54-82) 118/54  SpO2:  [94 %-99 %] 96 %    Intake/Output last 3 shifts:  I/O last 3 completed shifts:  In: 50 [P.O.:50]  Out: 1250 [Urine:1250]      Physical Exam:  EYES: EOM+   NECK: NO JVD  HEART : S1 S2 RRR   LUNGS: Bilateral basal crepitations without wheezing   ABDOMEN: Jacome sign is positive Bowel sounds are decreased  CNS Alert and Oriented x 3 moving all 4 limbs        Imaging:  HIDA scan  FINDINGS: There is marked intrahepatic cholestasis. No activity is seen in the extrahepatic bile ducts during the first 60 minutes of the examination. Delayed images were obtained at 4 hours the show activity in intra and extrahepatic bile ducts   including the common bile duct. Small bowel activity is seen. No activity is seen within the gallbladder. Findings are consistent with cystic duct obstruction and cholecystitis.     IMPRESSION:   CONCLUSION:  1.  Marked intrahepatic cholestasis. Delayed images show activity in the common bile duct and small bowel but no activity in the gallbladder. Findings are consistent with cystic duct obstruction and cholecystitis.    Lab Results:  Personally Reviewed.         Advanced Care Planning  Discharge plan: Unknown at this time      Michael Abreu  Date: 10/7/2018  Time: 5:36 PM  Hospitalist Service  Part of this chart was created using a dictation software. Typographic errors, word substitutions, and Grammatical errors may unintentionally occur.

## 2021-06-20 NOTE — PROGRESS NOTES
Pharmacy Note - Admission Medication History    Pertinent Provider Information:   - Patient prescribed torsemide 20 mg twice daily, but she reports she only takes it once daily because she doesn't like the way it makes her feel.     ______________________________________________________________________    Prior To Admission (PTA) med list completed and updated in EMR.       PTA Med List   Medication Sig Last Dose     acetaminophen (TYLENOL) 650 MG CR tablet Take 650 mg by mouth 2 (two) times a day.  10/3/2018 at AM     aspirin 81 MG EC tablet Take 81 mg by mouth daily. 10/3/2018 at Unknown time     carboxymethylcellulose (REFRESH PLUS) 0.5 % Dpet ophthalmic dropperette Administer 2 drops to both eyes every hour as needed (dry eyes). Unknown at Unknown time     diltiazem (CARDIZEM CD) 180 MG 24 hr capsule Take 1 capsule (180 mg total) by mouth daily. 10/3/2018 at Unknown time     ferrous sulfate 325 (65 FE) MG tablet Take 1 tablet (325 mg total) by mouth 2 (two) times a day. 10/3/2018 at AM     loratadine (CLARITIN) 10 mg tablet Take 10 mg by mouth daily with lunch. Noon 10/3/2018 at Unknown time     LORazepam (ATIVAN) 0.5 MG tablet 1-2 tabs po q 6 hours prn Unknown at Unknown time     magnesium oxide (MAG-OX) 400 mg tablet Take 1,200 mg by mouth daily. 10/3/2018 at Unknown time     metoprolol tartrate (LOPRESSOR) 25 MG tablet TAKE 1 TABLET BY MOUTH TWICE DAILY (8AM & 8PM) 10/3/2018 at AM     multivitamin (TAB-A-JULIET) per tablet Take 1 tablet by mouth daily. 10/3/2018 at Unknown time     nitroglycerin (NITROSTAT) 0.4 MG SL tablet Place 1 tablet (0.4 mg total) under the tongue every 5 (five) minutes as needed for chest pain (for up to 3 doses and call 911 if persists). Unknown at Unknown time     omeprazole (PRILOSEC) 20 MG capsule TAKE 1 CAPSULE BY MOUTH TWICE DAILY (8AM & 8PM) 10/3/2018 at AM     polyethylene glycol (GLYCOLAX) 17 gram/dose powder Take 17 g by mouth daily as needed.  Unknown at Unknown time      potassium chloride (K-DUR,KLOR-CON) 20 MEQ tablet Take 1 tablet (20 mEq total) by mouth daily. 10/3/2018 at Unknown time     rOPINIRole (REQUIP) 1 MG tablet 1 tablet 30-60 minutes before bedtime 10/2/2018 at Unknown time     rosuvastatin (CRESTOR) 40 MG tablet TAKE 1 TABLET BY MOUTH AT BEDTIME 10/2/2018 at Unknown time     sertraline (ZOLOFT) 100 MG tablet TAKE 1 TABLET BY MOUTH ONCE DAILY 10/3/2018 at Unknown time     torsemide (DEMADEX) 20 MG tablet Take 1 tablet (20 mg total) by mouth 2 (two) times a day at 9am and 6pm. (Patient taking differently: Take 20 mg by mouth daily. ) 9/30/2018     traZODone (DESYREL) 50 MG tablet TAKE 1 TABLET BY MOUTH AT BEDTIME 10/2/2018 at Unknown time     VITAMIN D3 2,000 unit capsule TAKE 1 CAPSULE BY MOUTH ONCE DAILY AT 8AM (DO NOT BRAND CHANGE - PATIENT ONLY WANT CAPSULES) 10/3/2018 at Unknown time     warfarin (COUMADIN/JANTOVEN) 2 MG tablet Take by mouth See Admin Instructions. Take 4 mg on Sun/Tues/Thurs and 2 mg all other days of the week. Adjust dose based on INR results as directed. 10/2/2018 at Unknown time       Information source(s): Patient and CareEverywhere/SureScripts    Summary of Changes to PTA Med List  New: none  Discontinued: Mirapex (replaced by Requip)  Changed: warfarin (to match most recent anticoagulation visit)    Patient was asked about OTC/herbal products specifically.  PTA med list reflects this.    Based on the pharmacist s assessment, the PTA med list information appears reliable    Patient appears adherent: Yes    Allergies were reviewed, assessed, and updated with the patient.      Patient does not use any multi-dose medications prior to admission.    Thank you for the opportunity to participate in the care of this patient.    Roshan Aggarwal, PharmD  10/3/2018 2:30 PM

## 2021-06-20 NOTE — PROGRESS NOTES
"[facility] Daily Progress Note      Date of Service: 10/10/2018     Brief History: Gardenia Muller is 68 y.o. female with past medical history of atrial flutter on warfarin, hypertension, dyslipidemia, diet controlled diabetes, heart failure with preserved EF, presented to the hospital on 10/3/2018 with abdominal pain, and was found to have acute cholecystitis with cholelithiasis and dilatation of CBD and pancreatic duct. GI and surgery were consulted. She underwent ERCP on 10/05, which did not show any filling defect. She underwent HIDA scan on 10/06, which showed marked intrahepatic cholestasis, associated with cystic duct obstruction and cholecystitis. She was treated with IV antibiotics. She underwent laparoscopic cholecystectomy on 10/07/18.    Subjective:     Chart reviewed, events noted. Pt seen and examined. She feels weak. Abdominal pain is better. No nausea or vomiting. PT has recommended TCU.    Objective     Vital signs in last 24 hours:  Temp:  [97.9  F (36.6  C)-98.3  F (36.8  C)] 98  F (36.7  C)  Heart Rate:  [61-73] 61  Resp:  [16-18] 16  BP: (101-169)/(52-92) 101/52  Weight:   173 lb 3 oz (78.6 kg)  Weight change: -3 lb 9.8 oz (-1.639 kg)  Body mass index is 30.68 kg/(m^2).    Intake/Output last 3 shifts:  I/O last 3 completed shifts:  In: 770 [P.O.:720; IV Piggyback:50]  Out: 1725 [Urine:1725]  Intake/Output this shift:         Physical Exam:  /52 (Patient Position: Sitting)  Pulse 61  Temp 98  F (36.7  C) (Oral)   Resp 16  Ht 5' 3\" (1.6 m)  Wt 173 lb 3 oz (78.6 kg)  LMP 01/25/1999  SpO2 93%  BMI 30.68 kg/m2    FiO2 (%): (!) 2 % O2 Device: None (Room air) O2 Flow Rate (L/min): 2 L/min    General Appearance: Alert, not in distress.  HEENT: Normocephalic. No scleral icterus. Mucous membranes moist.  Heart: S1 S2 heard. Regular rate and rhythm.  Lungs: Clear to auscultation bilaterally.  Abdomen: Soft, bowel sounds present. No tenderness  Extremity: No deformity. No joint swelling. No " edema.  Neurologic: No new deficits  Skin: Post surgical wound.      Imaging:  personally reviewed.      Nm Hepatobiliary Wo Ef Or Pharm    Result Date: 10/6/2018  NM HEPATOBILIARY WO EF OR PHARM 10/6/2018 5:01 PM INDICATION: Distended gallbladder on ct distended gallblader with ruq pain TECHNIQUE: Following the administration of 8.2 mCi of Tc-99m mebrofenin intravenously, anterior planar imaging of the abdomen was obtained for 60 minutes. COMPARISON: CT abdomen of 10/03/2018, abdominal ultrasound of 10/03/2018. FINDINGS: There is marked intrahepatic cholestasis. No activity is seen in the extrahepatic bile ducts during the first 60 minutes of the examination. Delayed images were obtained at 4 hours the show activity in intra and extrahepatic bile ducts including the common bile duct. Small bowel activity is seen. No activity is seen within the gallbladder. Findings are consistent with cystic duct obstruction and cholecystitis.     CONCLUSION: 1.  Marked intrahepatic cholestasis. Delayed images show activity in the common bile duct and small bowel but no activity in the gallbladder. Findings are consistent with cystic duct obstruction and cholecystitis. Findings were called to the bases nurseAndriy, at 1710 hours on 10/06/2018. NOTE: ABNORMAL REPORT THE DICTATION ABOVE DESCRIBES AN ABNORMALITY FOR WHICH FOLLOW-UP IS NEEDED.          Lab Results:  personally reviewed.   Lab Results   Component Value Date     10/09/2018    K 3.7 10/10/2018    CL 97 (L) 10/09/2018    CO2 31 10/09/2018    BUN 18 10/09/2018    CREATININE 1.03 10/09/2018    CALCIUM 11.0 (H) 10/09/2018     Lab Results   Component Value Date    WBC 6.6 10/09/2018    HGB 11.8 (L) 10/09/2018    HCT 37.4 10/09/2018    MCV 86 10/09/2018    PLT 98 (L) 10/09/2018       Results from last 7 days  Lab Units 10/10/18  0550 10/09/18  0614 10/08/18  1015   LN-MAGNESIUM mg/dL 1.5* 2.0 2.2      Lab Results   Component Value Date    INR 1.29 (H) 10/10/2018    INR  1.43 (H) 10/09/2018    INR 1.44 (H) 10/07/2018        Results from last 7 days  Lab Units 10/09/18  0614 10/08/18  1015 10/07/18  0732 10/06/18  0612   LN-ALKALINE PHOSPHATASE U/L 223* 267* 295* 197*   LN-BILIRUBIN TOTAL mg/dL 1.0 1.3* 1.7* 2.0*   LN-BILIRUBIN DIRECT mg/dL  --  0.8* 1.0* 1.3*   LN-PROTEIN TOTAL g/dL 7.3 7.5 7.4 6.8   LN-ALT (SGPT) U/L 74* 93* 115* 139*   LN-AST (SGOT) U/L 85* 118* 190* 307*        Results from last 7 days  Lab Units 10/10/18  1146 10/10/18  0824 10/09/18  2055 10/09/18  1646 10/09/18  1114 10/09/18  0729 10/08/18  2256 10/08/18  1624 10/08/18  1113 10/07/18  2115   LN-POC GLUCOSE FINGERSTICK- HE mg/dL 162 93 155 190 160 129 128 195 259 162       Assessment:     Acute acalculous cholecystitis, s/p laparoscopic cholecystectomy on 10/07. Clinically improved. Off of antibiotics. Liver enzymes improving.    Atrial flutter with variable AV block, rate controlled. She has been restarted on warfarin, which was held for surgery. She is on metoprolol, diltiazem.    Chronic diastolic heart failure. Volume compensated. She is on torsemide. LVEF of 55%.    Transaminitis, elevated liver enzymes. Secondary to cholecystitis. Possible choledocholithiasis. S/p ERCP followed by lap cholecystectomy. Liver enzymes improving.    Type 2 diabetes mellitus, she is on sliding scale insulin. Blood glucose in acceptable range.    Dyslipidemia on rosuvastatin    Restless leg syndrome on requip.    Hypokalemia and hypomagnesemia. Continue with replacement.    GERD on PPI    Thrombocytopenia.    Plan:   Replace electrolytes. Increase activity as tolerated. Continue therapy.      Advance Care Planning:   Barriers to discharge: Mobility progress, insurance issue  Anticipated discharge day: later today or tomorrow  Disposition: TCU      Uzair Burdick MD  [facility]  Hospitalist  Paging: [employer] Paging

## 2021-06-20 NOTE — PROGRESS NOTES
"__  Assessment/ Plan  Problem List Items Addressed This Visit        High    Paroxysmal atrial fibrillation (H)     Labile pulse makes me think she is possibly in and out of atrial fibrillation.  Mild tachycardia, on the order of 110 based her own pulse readings.    Continue diltiazem, anticoagulation            Medium    RLS (restless legs syndrome) - Primary     Recently changed from Mirapex to ropinirole.  Sounds like she just started her ropinirole last night but that her symptoms were better already.  Seemed to get better with improvement of volume status  Plan: Continue ropinirole 1 mg  Follow-up: As needed                  Unprioritized    Healthcare maintenance     Flu and tetanus booster given today         (HFpEF) heart failure with preserved ejection fraction (H)     Diastolic Congestive Heart Failure iswell compensated  Wt Readings from Last 3 Encounters:   09/20/18 172 lb 4 oz (78.1 kg)   09/18/18 174 lb 4.8 oz (79.1 kg)   09/05/18 185 lb (83.9 kg)     BP Readings from Last 3 Encounters:   09/20/18 102/62   09/18/18 134/62   09/05/18 116/60       Meds:  Antihypertensives: Metoprolol 25 twice daily, diltiazem 180  Diuretic?  Torsemide 80 twice daily  Nitrites/other? No  Seems like her weight continues to drop very slowly on 80 twice daily of torsemide.  Last BMP while on this medication showed good kidney function without arise in BUN.  Changes/Recommendations: 80 mg torsemide every day, 80 twice daily most days but okay to not take second dose if going out.  Follow weights.  If weight increasing, make sure to take twice daily torsemide, otherwise \"play it  loose\"  Follow-up: 2 months and with cardiology as scheduled                 Subjective  CC:  Chief Complaint   Patient presents with     Follow-up     HPI:  Follow-up CHF  Diastolic  Narrative/ current symptoms last visit, changed furosemide 80 twice daily to torsemide 20 twice twice daily due to failure to diuresis despite escalating doses of loop " diuretic.  Since this time she has lost 11 pounds.  Note current mediations/adherance  Diltiazem 180 daily, metoprolol 25 twice daily, diuretic as above      Wt Readings from Last 3 Encounters:   09/20/18 172 lb 4 oz (78.1 kg)   09/18/18 174 lb 4.8 oz (79.1 kg)   09/05/18 185 lb (83.9 kg)       Is patient monitoring weight?  Yes    Results for orders placed or performed in visit on 09/18/18   Basic Metabolic Panel   Result Value Ref Range    Sodium 139 136 - 145 mmol/L    Potassium 4.0 3.5 - 5.0 mmol/L    Chloride 104 98 - 107 mmol/L    CO2 28 22 - 31 mmol/L    Anion Gap, Calculation 7 5 - 18 mmol/L    Glucose 103 70 - 125 mg/dL    Calcium 10.9 (H) 8.5 - 10.5 mg/dL    BUN 14 8 - 22 mg/dL    Creatinine 0.72 0.60 - 1.10 mg/dL    GFR MDRD Af Amer >60 >60 mL/min/1.73m2    GFR MDRD Non Af Amer >60 >60 mL/min/1.73m2     Lab Results   Component Value Date     (H) 09/05/2018     Restless leg syndrome was also under control when patient was seen 9/5.  Based on this, Mirapex was stopped, started moderate dose ropinirole, 1 mg daily.    _Patient is happy to get a flu shot today.   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)     Advanced directives, counseling/discussion     SBO (small bowel obstruction)     Cystitis     Hypomagnesemia     Healthcare maintenance     Essential hypertension     Dysarthria     Cerebral infarction (H)     Anemia     Cerebrovascular disease     Benign adenomatous polyp of large intestine     RLS (restless legs syndrome)     Incisional hernia, without obstruction or gangrene     Abdominal pain, generalized     Diverticular disease of large intestine     Dysphagia     Coronary artery disease involving native coronary artery of native heart  without angina pectoris     Dyslipidemia     Ventral hernia without obstruction or gangrene     Paroxysmal atrial fibrillation (H)     Anticoagulation management encounter     S/P repair of recurrent ventral hernia     SOB (shortness of breath)     Lower extremity edema     Anxiety     Hypokalemia     (HFpEF) heart failure with preserved ejection fraction (H)     Current medications reviewed as follows:  Current Outpatient Prescriptions on File Prior to Visit   Medication Sig     acetaminophen (TYLENOL) 650 MG CR tablet Take 650 mg by mouth 2 (two) times a day.      aspirin 81 MG EC tablet Take 81 mg by mouth daily.     blood glucose meter (GLUCOMETER) Please Dispense kit per pharmacy discretion and patient's insurance Test blood sugar.     blood glucose test strips Use 1 each As Directed as needed. Dispense brand per patient's insurance at pharmacy discretion.     cholecalciferol, vitamin D3, (VITAMIN D3) 2,000 unit capsule Take 2,000 Units by mouth daily with lunch.     diltiazem (CARDIZEM CD) 180 MG 24 hr capsule Take 1 capsule (180 mg total) by mouth daily.     ferrous sulfate 325 (65 FE) MG tablet Take 1 tablet (325 mg total) by mouth 2 (two) times a day.     lancets 33 gauge Misc Test twice daily Dispense brand per patient's insurance at pharmacy discretion.     loratadine (CLARITIN) 10 mg tablet Take 10 mg by mouth daily with lunch. Noon     LORazepam (ATIVAN) 0.5 MG tablet 1-2 tabs po q 6 hours prn     magnesium oxide (MAG-OX) 400 mg tablet Take 1,200 mg by mouth daily.     metoprolol tartrate (LOPRESSOR) 25 MG tablet TAKE 1 TABLET BY MOUTH TWICE DAILY (8AM & 8PM)     multivitamin (TAB-A-JULIET) per tablet Take 1 tablet by mouth daily.     nitroglycerin (NITROSTAT) 0.4 MG SL tablet Place 1 tablet (0.4 mg total) under the tongue every 5 (five) minutes as needed for chest pain (for up to 3 doses and call 911 if persists).     omeprazole (PRILOSEC) 20 MG capsule Take 20 mg by mouth 2 (two) times a day.       omeprazole (PRILOSEC) 20 MG capsule TAKE 1 CAPSULE BY MOUTH TWICE DAILY (8AM & 8PM)     polyethylene glycol (GLYCOLAX) 17 gram/dose powder 17 g daily as needed.      polyvinyl alcohol (ARTIFICIAL TEARS, POLYVIN ALC,) 1.4 % ophthalmic solution Administer 2 drops to both eyes as needed for dry eyes. As directed.      potassium chloride (K-DUR,KLOR-CON) 20 MEQ tablet Take 1 tablet (20 mEq total) by mouth daily.     pramipexole (MIRAPEX) 0.25 MG tablet Take 1 tablet (0.25 mg total) by mouth at bedtime.     rOPINIRole (REQUIP) 1 MG tablet 1 tablet 30-60 minutes before bedtime     rosuvastatin (CRESTOR) 40 MG tablet TAKE 1 TABLET BY MOUTH AT BEDTIME     sertraline (ZOLOFT) 100 MG tablet TAKE 1 TABLET BY MOUTH ONCE DAILY     torsemide (DEMADEX) 20 MG tablet Take 1 tablet (20 mg total) by mouth 2 (two) times a day at 9am and 6pm.     traZODone (DESYREL) 50 MG tablet Take 1 tablet (50 mg total) by mouth at bedtime.     traZODone (DESYREL) 50 MG tablet TAKE 1 TABLET BY MOUTH AT BEDTIME     VITAMIN D3 2,000 unit capsule TAKE 1 CAPSULE BY MOUTH ONCE DAILY AT 8AM (DO NOT BRAND CHANGE - PATIENT ONLY WANT CAPSULES)     warfarin (COUMADIN) 2 MG tablet Take 2 to 4mg (1 or 2 tabs) by mouth daily as directed.  Adjust dose based on INR.     No current facility-administered medications on file prior to visit.      History   Smoking Status     Current Every Day Smoker     Packs/day: 0.25   Smokeless Tobacco     Former User     Comment: e-cig a few times/day     Social History     Social History Narrative     Patient Care Team:  Jerson Hernandez MD as PCP - General (Family Medicine)  Jones Harding DO as Physician (General Surgery)  ROS  Full 10 system review including constitutional, respiratory, cardiac, gi, urinary, rheumatologic, neurologic, reproductive, dermatologic psychiatric is  performed (via questionnaire) and is negative except weight loss, sleep difficulties, heart palpitations and weakness        Objective  Physical  Exam  Vitals:    09/20/18 1022   BP: 102/62   Patient Site: Left Arm   Patient Position: Sitting   Cuff Size: Adult Large   Pulse: 72   Resp: 28   Temp: 96.8  F (36  C)   TempSrc: Oral   Weight: 172 lb 4 oz (78.1 kg)     Body mass index is 31.5 kg/(m^2).  Gen- alert, oriented/ appropriately responsive  HEENT- normal cephalic, atraumatic.   Chest- Normal inspiration and expiration.    Clear to ascultation.    No chest wall deformity or scar.  CV- Heart regular rate and rhythm  normal tones, no murmurs   No gallops or rubs.  Ext- appear well perfused, no edema  Skin- warm and dry,   no visualized rash    Diagnostics:   Results for orders placed or performed in visit on 09/19/18   INR   Result Value Ref Range    INR 2.00 (!) 0.9 - 1.1           Please note: Voice recognition software was used in this dictation.  It may therefore contain typographical errors.    __________________________

## 2021-06-20 NOTE — LETTER
Letter by Abhishek Pearson MD at      Author: Abhishek Pearson MD Service: -- Author Type: --    Filed:  Encounter Date: 8/5/2020 Status: (Other)         Patient: Gardenia Muller   MR Number: [id number]   YOB: 1950   Date of Visit: 8/5/2020      [facility]    Facility:  [facility] [[id number]]    Code Status:  FULL CODE    Chief Complaint   Patient presents with   ? H & P   :                    Patient Active Problem List   Diagnosis   ? Spinal stenosis   ? PAD (peripheral artery disease) (H)   ? Dermatosis Papulosa Nigra   ? Depression   ? Hypercalcemia   ? Right Renal Artery Stenosis   ? Osteoarthritis   ? Abnormality Of Walk   ? 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 Advanced directives, counseling/discussion   ? Cystitis   ? Healthcare maintenance   ? Essential hypertension   ? Cerebral infarction (H)   ? Anemia   ? Cerebrovascular disease   ? RLS (restless legs syndrome)   ? Incisional hernia, without obstruction or gangrene   ? Diverticular disease of large intestine   ? Coronary artery disease involving native coronary artery of native heart without angina pectoris   ? Dyslipidemia   ? Ventral hernia without obstruction or gangrene   ? Anxiety   ? (HFpEF) heart failure with preserved ejection fraction (H)   ? Anticoagulant long-term use   ? Persistent atrial fibrillation (H)   ? MAY (acute kidney injury) (H)   ? Transaminitis   ? Physical deconditioning   ? Lipoma of back   ? Acalculous cholecystitis   ? Onychogryphosis   ? Hyperparathyroidism (H)   ? Microalbuminuria   ? Intestinal angina (H)   ? Chronic abdominal pain   ? Weight loss, non-intentional   ? Warfarin anticoagulation   ? Severe protein-calorie malnutrition (H)   ? Hypertrophy of nail   ? Dysuria   ? Class 1 obesity with serious comorbidity and body mass index (BMI) of 33.0 to 33.9 in adult, unspecified obesity type   ? Trigger finger, acquired   ?  Acute liver failure without hepatic coma   ? Hematochezia   ? Hyperkalemia   ? Acute on chronic combined systolic (congestive) and diastolic (congestive) heart failure (H)   ? Ischemic cardiomyopathy   ? Acute kidney failure, unspecified (H)       History:  Gardenia Muller  is a 70 year old female with history of CVA, hyperlipidemia, coronary artery disease (now status post 3 ZURDO to LAD this admission July 2020), cardiomyopathy/chronic systolic heart failure, depression, renal artery stenosis right-sided with hypertension, chronic abdominal pain, atrial fibrillation seen for admission to TCU on 8/5/2020    Hospital Course: Patient was admitted from July 22 through July 31 presenting to the emergency room from her assisted living facility who found her routine INR check to be elevated at 13.    Patient was found to be in hepatic failure/elevated LFTs although the cause was not immediately clear.  Her hypercoagulability was reversed with vitamin K and fresh frozen plasma.  An echocardiogram revealed severe right ventricular dysfunction with high IVC pressure severe tricuspid regurgitation, moderate mitral vegetation and reduced LV function with EF 40%.  It was hypothesized that her liver failure was secondary to heart failure.  That appears to be monitored by her improvement with diuresis treatment.  She did have negative hepatitis serologies as well.    Heart failure was treated with Lasix drip and chlorthalidone however the chlorthalidone had to be discontinued due to hypercalcemia.  Investigation of the heart failure included left and right heart cath which revealed severe native coronary artery disease.  She initially had 2 drug-eluting stents placed to the LAD but there was some dissection requiring an additional ZURDO to the ostial LAD. Results show severe native CAD with RCA  and mLAD that supplies L-R collaterals. She received PCI to LAD with ZURDO x2 with subsequent ZURDO x1 to ostial LAD secondary to coronary  "dissection.  Cardiology recommended the following anticoagulation plan for the patient:  Per cardiology recommendation: Patient should be on ASA 81mg daily indefinitely, ticagrelor 90mg twice daily for at least 12 months. If has to be on warfarin, would do triple therapy for at least 3 months, then can stop ASA. After 1 year, would switch ticagrelor for clopidogrel and continue clopidogrel/warfarin indefinitely.   ....    Her situation is complicated by onset of atrial fibrillation with RVR which was treated with digoxin.  She also had hyperkalemia which improved with Kayexalate and Lasix treatment.  Again no difficulty electrolyte continuation of the chlorthalidone.    Subjective/ROS:    -augmented by discussion with facility staff involved in direct care      Patient reports that she is doing well considering.  She says that she is \"just tired\".  She admits to some generalized weakness.  She has no chest pain orthopnea PND and reports that her peripheral edema is much better than it was.  She has a long history of wearing compression stockings.  She reports no headaches change in vision speaking swallowing or hearing.  She says that her speech has been slow from previous CVA.  She has chronic abdominal pain but says that is not bothering her right now.  She has no nausea or vomiting melena bright red blood per rectum.  She does tend to loose stools chronically she does tend toward constipation and has MiraLAX in place.  She says she is feeling optimistic and not depressed.  She says that her sleep has been greatly helped by trazodone and she is thankful for that.  She says her appetite is still not where it should be.  She says she is got good control of her bladder.  Remainder 13 system ROS negative    Past Medical History:   Diagnosis Date   ? Acute diastolic heart failure (H) 11/2015   ? Acute on chronic diastolic heart failure (H) 6/15/2019   ? Advanced directives, counseling/discussion 8/5/2015    Patient " completed 2011.  Discussed today, 5/24/17, and wants to change healthcare agent from niece to daughter.  Discussed that she will need to complete new form to indicate this.   ? MAY (acute kidney injury) (H) 10/22/2018   ? Atrial fibrillation (H)    ? Benign adenomatous polyp of large intestine 3/30/2017    Colonoscopy March 2017.  Recommend 5 year follow-up.  Single tubular adenoma   ? Biliary obstruction 10/3/2018    Added automatically from request for surgery [id number]   ? Cerebral vascular accident (H)    ? Coronary artery disease 2006    100% RCA with collateral filling per Dr. Elizabeth's angio report   ? Diabetes mellitus type 2 in obese (H)    ? Fibrocystic breast    ? GERD (gastroesophageal reflux disease)    ? History of anesthesia complications     slow to wake   ? Hypertension    ? Peripheral vascular disease (H)    ? Pneumonia 11/11/2018   ? PONV (postoperative nausea and vomiting)    ? S/P cholecystectomy 6/22/2018   ? SBO (small bowel obstruction) (H) 11/12/2015   ? Stroke (H)    ? Transaminitis 10/22/2018   ? Upper respiratory infection 12/20/2018   ? Urinary incontinence      Past Surgical History:   Procedure Laterality Date   ? CARDIAC CATHETERIZATION     ? CARDIOVERSION  10/18/2018   ? CORONARY STENT PLACEMENT  1995    stent   ? CV CORONARY ANGIOGRAM N/A 7/27/2020    Procedure: Coronary Angiogram;  Surgeon: Luis Monge MD;  Location: [facility] Cath Lab;  Service: Cardiology   ? CV RIGHT HEART CATH SHUNT RUN  7/27/2020    Procedure: Right Heart Catheterization Shunt Run;  Surgeon: Luis Monge MD;  Location: [facility] Cath Lab;  Service: Cardiology   ? ERCP N/A 10/5/2018    Procedure: ENDOSCOPIC RETROGRADE CHOLANGIOPANCREATOGRAPHY, SPHINCTEROTOMY;  Surgeon: Anson Stokes MD;  Location: [facility] OR;  Service:    ? EXPLORATORY LAPAROTOMY N/A 6/29/2018    Procedure: LAPAROTOMY, CLOSURE OF PERITONEAL RENT;  Surgeon: Jones Harding DO;  Location: [facility] OR;  Service:     ? LAPAROSCOPIC CHOLECYSTECTOMY N/A 10/7/2018    Procedure: CHOLECYSTECTOMY, LAPAROSCOPIC;  Surgeon: Felicia So MD;  Location: [facility];  Service:    ? [facility]  6/29/2018        ? [facility]  10/21/2018        ? VENTRAL HERNIA REPAIR N/A 11/12/2015    Procedure: STRANGULATED VENTRAL HERNIA REPAIR ;  Surgeon: Ernesto Bailey MD;  Location: [facility];  Service:           Family History   Problem Relation Age of Onset   ? Cancer Paternal Grandmother    ? Cancer Paternal Uncle    ? No Medical Problems Mother    ? No Medical Problems Father    ? Heart attack Sister    ? Chronic Kidney Disease Sister    ? No Medical Problems Daughter    ? No Medical Problems Maternal Grandmother    ? No Medical Problems Maternal Grandfather    ? No Medical Problems Paternal Grandfather    ? No Medical Problems Maternal Aunt    ? No Medical Problems Paternal Aunt    ? Diabetes Brother    ? BRCA 1/2 Neg Hx    ? Breast cancer Neg Hx    ? Colon cancer Neg Hx    ? Endometrial cancer Neg Hx    ? Ovarian cancer Neg Hx    :       Social History     Socioeconomic History   ? Marital status: Legally      Spouse name: Not on file   ? Number of children: 1   ? Years of education: Not on file   ? Highest education level: Not on file   Occupational History   ? Not on file   Social Needs   ? Financial resource strain: Not on file   ? Food insecurity     Worry: Not on file     Inability: Not on file   ? Transportation needs     Medical: Not on file     Non-medical: Not on file   Tobacco Use   ? Smoking status: Former Smoker     Packs/day: 0.25   ? Smokeless tobacco: Former User   ? Tobacco comment: e-cig a few times/day   Substance and Sexual Activity   ? Alcohol use: No   ? Drug use: No   ? Sexual activity: Not on file   Lifestyle   ? Physical activity     Days per week: Not on file     Minutes per session: Not on file   ? Stress: Not on file   Relationships   ? Social connections     Talks on phone: Not on file     Gets  together: Not on file     Attends Zoroastrian service: Not on file     Active member of club or organization: Not on file     Attends meetings of clubs or organizations: Not on file     Relationship status: Not on file   ? Intimate partner violence     Fear of current or ex partner: Not on file     Emotionally abused: Not on file     Physically abused: Not on file     Forced sexual activity: Not on file   Other Topics Concern   ? Not on file   Social History Narrative    Single/, lives alone; daughter in [place];    Gets help from neighbors and extended family    Former smoker.no alcohol problems   :    Social history update: Patient reports that she lives in a senior apartment in [place].  She says she has some cleaning help and that someone comes in to set up her medications and she gets meals 3 times a day 7 days a week.  She also gets help with bathing.  She asks me to call her niece Liz Goldman at [phone] and that test was accomplished today.  She also says that her nephew Bridger is very helpful to her.  Her daughter lives in [place].    Current Outpatient Medications on File Prior to Visit   Medication Sig Dispense Refill   ? ARTHRITIS PAIN RELIEF, ACETAM, 650 mg CR tablet Take 650 mg by mouth 2 (two) times a day.            ? aspirin 81 mg chewable tablet Chew 1 tablet (81 mg total) daily. Stop after 3 months 90 tablet 0   ? atorvastatin (LIPITOR) 80 MG tablet Take 1 tablet (80 mg total) by mouth at bedtime. Start only after your liver function test is normal 30 tablet 11   ? blood glucose meter (GLUCOMETER) Please Dispense kit per pharmacy discretion and patient's insurance Test blood sugar. 1 each 0   ? blood glucose test strips Use 1 each As Directed as needed. Dispense brand per patient's insurance at pharmacy discretion. 50 strip 11   ? carboxymethylcellulose (REFRESH PLUS) 0.5 % Dpet ophthalmic dropperette Administer 2 drops to both eyes every hour as needed (dry eyes).  0   ?  cholecalciferol, vitamin D3, 2,000 unit Tab TAKE 1 TABLET BY MOUTH ONCE DAILY. *1 TOTAL FILL* 30 each 11   ? digoxin (LANOXIN) 250 mcg (0.25 mg) tablet Take 1 tablet (250 mcg total) by mouth daily with supper. 30 tablet 0   ? furosemide (LASIX) 20 MG tablet Take 1 tablet (20 mg total) by mouth daily. 30 tablet 0   ? lancets 33 gauge Misc Test twice daily Dispense brand per patient's insurance at pharmacy discretion. 100 each 11   ? lisinopriL (PRINIVIL,ZESTRIL) 5 MG tablet Take 1 tablet (5 mg total) by mouth daily. 30 tablet 0   ? loratadine (CLARITIN) 10 mg tablet TAKE 1 TABLET BY MOUTH ONCE DAILY. *1 TOTAL FILL* 30 tablet 11   ? magnesium gluconate (MAGONATE) 27 mg magnesium (500 mg) Tab tablet Take 1 tablet (27 mg total) by mouth 2 (two) times a day. 60 tablet 11   ? metFORMIN (GLUCOPHAGE) 500 MG tablet Take 1 tablet (500 mg total) by mouth 2 (two) times a day with meals. 60 tablet 0   ? metoprolol succinate (TOPROL-XL) 25 MG Take 0.5 tablets (12.5 mg total) by mouth at bedtime. 30 tablet 0   ? multivitamin therapeutic tablet Take 1 tablet by mouth daily.     ? nitroglycerin (NITROSTAT) 0.4 MG SL tablet Place 1 tablet (0.4 mg total) under the tongue every 5 (five) minutes as needed for chest pain (for up to 3 doses and call 911 if persists). (Patient taking differently: Place 0.4 mg under the tongue every 5 (five) minutes as needed for chest pain (for up to 3 doses and call 911 if persists). ) 90 tablet 0   ? omeprazole (PRILOSEC) 20 MG capsule TAKE 1 CAPSULE BY MOUTH TWICE DAILY. *1 TOTAL FILL* 60 capsule 11   ? polyethylene glycol (GLYCOLAX) 17 gram/dose powder Take 17 g by mouth daily as needed.      ? potassium chloride (K-DUR,KLOR-CON) 20 MEQ tablet TAKE 1 TABLET BY MOUTH TWICE DAILY. *1 TOTAL FILL* 60 tablet 11   ? senna-docusate (PERICOLACE) 8.6-50 mg tablet Take 1 tablet by mouth 2 (two) times a day. (Patient taking differently: Take 1 tablet by mouth 2 (two) times a day as needed for constipation. )  0    ? sertraline (ZOLOFT) 100 MG tablet TAKE 1 TABLET BY MOUTH ONCE DAILY. *1 TOTAL FILL* 30 tablet 11   ? spironolactone (ALDACTONE) 25 MG tablet TAKE 1 TABLET BY MOUTH ONCE DAILY. *1 TOTAL FILL* 30 tablet 11   ? ticagrelor (BRILINTA) 90 mg Tab Take 1 tablet (90 mg total) by mouth 2 (two) times a day. Indefinitely but at least 12 months 60 tablet 11   ? traZODone (DESYREL) 50 MG tablet TAKE 1 TABLET BY MOUTH AT BEDTIME. *1 TOTAL FILL* 30 tablet 11   ? warfarin ANTICOAGULANT (COUMADIN/JANTOVEN) 2 MG tablet Take 1.5 tablets (3 mg total) by mouth daily. Adjust dose per INR results. 30 tablet 0     No current facility-administered medications on file prior to visit.    :      ALLERGIES:  Penicillins    Vitals:  LMP 01/25/1999  except as noted below    Vital signs: Reviewed per facility EMR vitals including as follows:                Current Vitals     BP: 120/44 mmHg  8/5/2020 17:02  Temp:96.3  F  8/5/2020 17:02    Pulse: 50 bpm  8/5/2020 17:02  Weight: 179.2 Lbs  8/5/2020 12:01  Resp: 16 Breaths/min  8/5/2020 17:02  BS: 139 mg/dL  8/5/2020 20:28  O2: 98 %  8/5/2020 17:02  Pain: 4  8/5/2020 22:05  There is no height or weight on file to calculate BMI.    Physical exam:    General:  Alert  oriented x3 appears pleasant, conversant with some speech hesitancy of slowness at times which she says is not new.    HEENT:  NC/AT, sclera clear, EOMI , gaze is conjugate  Neck:  no mass, adenopathy, thyromegaly  Chest:  khyphosis:   tenderness/deformity:     Heart:  rhythm: Irregularly irregular rate: 80s m/g/r: Soft systolic murmur right sternal border Pmi:  palpable in precordium   Lungs: She is moving air easily without rales rhonchi wheezing.  She does decreased breath sounds bases bilaterally  Abd:  nontender, nondistended, bowel sounds audible and wnls, no mass, no organomegaly   Ext:  perfusion/pulses/capillary refill           Edema just trace ankle edema at this time  Skin: Multiple bruises from IVs and blood pokes etc. on  her arms            Warm, dry, pink, intact   Neuro:  as above                 Due to the 2020 Covid 19 pandemic, except as noted above, the patient was visually observed at a 6 foot plus distance.  An observational exam was performed in an effort to keep patient safe from Covid 19 and other communicable diseases.   Labs:  Lab Results   Component Value Date    WBC 8.2 08/04/2020    HGB 11.2 (L) 08/04/2020    HCT 36.5 08/04/2020    MCV 99 08/04/2020     08/04/2020     Results for orders placed or performed in visit on 08/04/20   Basic Metabolic Panel   Result Value Ref Range    Sodium 137 136 - 145 mmol/L    Potassium 5.2 (H) 3.5 - 5.0 mmol/L    Chloride 108 (H) 98 - 107 mmol/L    CO2 22 22 - 31 mmol/L    Anion Gap, Calculation 7 5 - 18 mmol/L    Glucose 77 70 - 125 mg/dL    Calcium 10.6 (H) 8.5 - 10.5 mg/dL    BUN 33 (H) 8 - 28 mg/dL    Creatinine 0.95 0.60 - 1.10 mg/dL    GFR MDRD Af Amer >60 >60 mL/min/1.73m2    GFR MDRD Non Af Amer 58 (L) >60 mL/min/1.73m2         Lab Results   Component Value Date    TSH 8.31 (H) 10/22/2018     Lab Results   Component Value Date    HGBA1C 6.5 (H) 07/23/2020     [unfilled]  Lab Results   Component Value Date    [id number] 1,065 (H) 01/10/2018     Lab Results   Component Value Date     (H) 07/22/2020     [unfilled]  Most Recent EKG     Units 07/29/20  0459   VENTRATE BPM 69   ATRIALRATE    QRSDURATION ms 92   QTINTERVAL ms 350   QTCCALC ms 375   [place] degrees 76   RAXIS degrees -16   TAXIS degrees -19   MUSEDX  Atrial flutter with 4:1 A-V conduction  Septal infarct (cited on or before 26-MAR-2019)  Abnormal ECG  When compared with ECG of 28-JUL-2020 08:04,  Atrial flutter has replaced Atrial fibrillation  Nonspecific T wave abnormality no longer evident in Lateral leads  QT has shortened  Confirmed by CRISTIANO PAGAN, GUS LOC: ([id number]) on 7/29/2020 2:09:36 PM             CT abdomen and pelvis:     FINDINGS:   LOWER CHEST: moderate right atrial enlargement.     HEPATOBILIARY: Prior cholecystectomy. Mild hepatomegaly. Mildly heterogeneous enhancement suggesting passive congestion.   PANCREAS: Normal.   SPLEEN: Normal.   ADRENAL GLANDS: Normal.   KIDNEYS/BLADDER: Mild circumferential urinary bladder wall thickening. No calcification. No hydronephrosis or hydroureter. No renal mass or stone.   BOWEL: Moderate distal colonic diverticulosis. No evidence for acute diverticulitis. Small volume ascites. No free air. No evidence for bowel obstruction.   LYMPH NODES: Normal.   VASCULATURE: Unremarkable.   PELVIC ORGANS: Multiple partially calcified uterine masses compatible with uterine fibroids.   MUSCULOSKELETAL: Severe multilevel degenerative change.     IMPRESSION:   1.  Mild cystitis suggested please correlate clinically.  2.  Otherwise no acute abnormality in the abdomen or pelvis. No evidence for bowel obstruction.  3.  Enlarged heterogeneous liver suggesting passive hepatic congestion. Given right atrial enlargement, IVC and hepatic vein distention, correlate clinically for right heart failure.     Echocardiogram:    When compared to the previous study dated 7/23/2020, no significant change. Limited study done and tricuspid regurgitation not well assessed on this study.    Left ventricle ejection fraction is normal. The calculated left ventricular ejection fraction is 56%.    Normal left ventricular size and systolic function.    Left atrial volume is moderately increased.    Aortic Valve: The aortic valve is sclerotic without reduced excursion. Mild aortic regurgitation.    Mitral Valve: There is severe valve leaflet thickening. Mild mitral regurgitation.    Assessment/Plan:      ICD-10-CM    1. Acute liver failure without hepatic coma  K72.00    2. Type 2 diabetes mellitus with other circulatory complication, without long-term current use of insulin (H)  E11.59    3. Coronary artery disease involving native coronary artery of native heart without angina pectoris  I25.10     4. Persistent atrial fibrillation (H)  I48.19    5. PAD (peripheral artery disease) (H)  I73.9    6. Right Renal Artery Stenosis  I70.1    7. Chronic heart failure with preserved ejection fraction (H)  I50.32    8. Spinal stenosis of lumbar region, unspecified whether neurogenic claudication present  M48.061        Hepatic failure due to CHF/passive congestion   Improving now.  AST 43  albumin 2.9 bilirubin 1.4 INR 2.7.  Likely recheck LFTs in a month.  Follow INR closely    Coronary artery disease  Ischemic cardiomyopathy   Anticoagulation plan outlined by cardiology  -Per cardiology recommendation: Patient should be on ASA 81mg daily indefinitely, ticagrelor 90mg twice daily for at least 12 months. If has to be on warfarin, would do triple therapy for at least 3 months, then can stop ASA. After 1 year, would switch ticagrelor for clopidogrel and continue clopidogrel/warfarin indefinitely.   -Atorvastatin LDL goal less than 70  -Blood pressure control  - Cardiology follow-up  :    Atrial fibrillation with RVR   Digoxin and metoprolol as current.  Warfarin for anticoagulation, INR 2.7 on August 3.  Cardiology follow-up.    Hypertension  History of right renal stenosis  Peripheral artery disease   Spironolactone, lisinopril, metoprolol.  She is also on Lasix.  Secondary prevention for PAD will be same as for coronary artery disease.    Chronic systolic heart failure  Ischemic cardiomyopathy EF 40%   Acute component appears resolved.  Continue Lasix 20 mg daily.  Continue lisinopril and spinal lactone and metoprolol  -Follow-up with cardiology    DM 2   Metformin 500 twice daily    Lab Results   Component Value Date    HGBA1C 6.5 (H) 07/23/2020         Hypokalemia   35.2 on August 4.  She is on both lisinopril and spironolactone.  Ms. Torres has made arrangements to recheck her potassium this Friday.  It would be nice to continue these medications for the above medical problems, but she may not be able to  tolerate current doses for both at once?  Time will tell.  I added a full BMP for Friday to make sure her creatinine is still good.    CVA, history of  Generalized weakness   Patient reports some speech slowness.  Unclear how much weakness might be chronic?  PT, OT,  involved    Multiple other chronic medical problems reviewed without change in therapy today             Case discussed with:  Primary CNP   Facility staff   Family niedilberto Goldman      Time:  62 minutes with > 50% in counseling and coordination of care as noted above         Abhishek Pearson MD

## 2021-06-20 NOTE — PROGRESS NOTES
Pharmacy Consult: Warfarin Management    Pharmacy consulted to dose warfarin for Gardenia Muller, a 68 y.o. female    Ordering provider: Dr. Michael Abreu, Hospitalist    Reason for warfarin therapy: Atrial Fibrillation  Goal INR Range: 2-3    Subjective  Medication lists (home and hospital) were reviewed.    New medications that may increase bleeding risk/INR: Levofloxacin    Restarted home medications that may increase bleeding risk/INR: Torsemide, Ropinirole    Home Warfarin Dosin mg on , Tuesday and Thursday; 2 mg on all other days of the week    Other Anticoagulants: No  Patient being bridged: No    Patient Active Problem List   Diagnosis     Spinal stenosis     PAD (peripheral artery disease) (H)     Dermatosis Papulosa Nigra     Depression     Hypercalcemia     Right Renal Artery Stenosis     Osteoarthritis     Abnormality Of Walk     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)     Advanced directives, counseling/discussion     SBO (small bowel obstruction) (H)     Cystitis     Hypomagnesemia     Healthcare maintenance     Essential hypertension     Dysarthria     Cerebral infarction (H)     Anemia     Cerebrovascular disease     Benign adenomatous polyp of large intestine     RLS (restless legs syndrome)     Incisional hernia, without obstruction or gangrene     Abdominal pain, generalized     Diverticular disease of large intestine     Dysphagia     Coronary artery disease involving native coronary artery of native heart without angina pectoris     Dyslipidemia     Ventral hernia without obstruction or gangrene     Paroxysmal atrial fibrillation (H)     Anticoagulation management encounter     S/P repair of recurrent ventral hernia     SOB (shortness of breath)     Lower extremity edema     Anxiety     Hypokalemia     (HFpEF) heart failure with preserved ejection fraction (H)     Tachycardia     Cholecystitis     Anticoagulant long-term use     Biliary obstruction     Abdominal pain, right  upper quadrant    Past Medical History:   Diagnosis Date     Acute diastolic heart failure (H) 11/2015     Atrial fibrillation (H)      Cerebral vascular accident (H)      Coronary artery disease 2006    100% RCA with collateral filling per Dr. Elizabeth's angio report     Diabetes mellitus type 2 in obese (H)      Fibrocystic breast      GERD (gastroesophageal reflux disease)      History of anesthesia complications     slow to wake     Hypertension      Peripheral vascular disease (H)      PONV (postoperative nausea and vomiting)      Stroke (H)      Urinary incontinence         Social History   Substance Use Topics     Smoking status: Current Every Day Smoker     Packs/day: 0.25     Smokeless tobacco: Former User      Comment: e-cig a few times/day     Alcohol use No       Objective   Labs:  Last 3 days:    Recent Labs      10/06/18   0612  10/07/18   0732  10/07/18   0946  10/07/18   0947  10/08/18   1015   CREATININE  1.12*   --   0.78   --   1.19*   HGB  11.3*  12.0   --   12.0   --    HCT  36.1  39.1   --   39.2   --    PLT  74*  85*   --   87*   --    BILITOT  2.0*  1.7*   --    --   1.3*   AST  307*  190*   --    --   118*   ALT  139*  115*   --    --   93*   ALKPHOS  197*  295*   --    --   267*     Last 7 days:   Recent labs: (last 7 days)      10/03/18   1342  10/04/18   0041  10/05/18   0846  10/06/18   0941  10/07/18   0732   INR  2.09*  2.20*  2.84*  1.42*  1.44*       Warfarin Dosing History:    Date INR Warfarin Dose Comment   10/8/18 1.44 Warfarin 2 mg Patient may discharge tomorrow.                       Assessment  The patient is resuming warfarin for the indication of Atrial Fibrillation with a goal INR of 2-3. INR today is subtherapeutic.    Plan  1. Administer warfarin 2 mg PO today.  2. Check INR daily or as appropriate.  3. Continue to follow the patient's INR, PLT, and HGB as available.  4. Monitor for potential drug/disease interactions.    Thank you for the consult,  Rylee StevensD  10/8/2018 7:06 PM

## 2021-06-20 NOTE — PROGRESS NOTES
[facility] Daily Progress Note    Assessment/Plan:  Gardenia Muller is a 68 yr. old female with   history of atrial flutter, on Coumadin, ventral hernia, incisional hernia repair June 2018, presented 10/03/2018 with Epigastric and RUQ abdominal pain,x 1week, CT showed gallbladder wall thickening suggestive of cholecystitis US Abdomen showed significantly distended gallbladder with wall edema and thickening representing a calculus cholecystitis with dilatation of CBD and pancreatic duct ERCP was done by Dr. Stokes 10/5/2018 which did not show any filling defect.  HIDA scan was done on 10/6/2018 which showed marked intrahepatic cholestasis associated with cystic duct obstruction and cholecystitis.  I did discuss this findings with the surgeon on call who wanted me to keep the patient n.p.o. patient is allergic to penicillin and is on treatment with levofloxacin.  She did undergo laparoscopic cholecystectomy on 10/07/2018 by Dr. Felicia So MD which revealed Acalculus cholecystitis      Assessment:    Acute cholecystitis-s/p laparoscopic cholecystectomy 10/7/2018 , continue pain medications, levofloxacin,     Incisional hernia status post repair with mesh in the past    Atrial flutter with RVR in the past is not on warfarin now with slowly decreasing INR    Chronic diastolic heart failure  on torsemide 20 mg twice daily.  Lungs are clear now    Type 2 diabetes mellitus on sliding scale insulin sugars less than 200    Hypokalemia and Hypomagnesemia  Replacement per protocol    Hypertension on treatment with Cardizem and metoprolol    Anxiety on Ativan treatment, Zoloft and trazodone    GERD on proton pump inhibitor    Restless leg syndrome on Requip    Thrombocytopenia which will be monitored    Plan:  Evaluate patient by physical therapy if she needs to go to a rehab place or home  PT OT evaluation and treatment  Restart warfarin today  Continue to monitor INR    Code status:Full Code        Subjective:  Complains of 8/10  abdominal pain when moving around otherwise it is 5/10 when she is at rest    Review of Systems:   Denies any fever or chills      Current Medications Reviewed via EHR List    Objective:  Vital signs in last 24 hours:  Temp:  [97.6  F (36.4  C)-98.2  F (36.8  C)] 97.9  F (36.6  C)  Heart Rate:  [] 71  Resp:  [18-20] 18  BP: ()/(50-83) 97/56  SpO2:  [92 %-99 %] 92 %    Intake/Output last 3 shifts:  I/O last 3 completed shifts:  In: 1230 [P.O.:630; I.V.:600]  Out: 1260 [Urine:1250; Blood:10]      Physical Exam:  EYES: EOM+   NECK: NO JVD  HEART : S1 S2 RRR   LUNGS: Lungs are clear to auscultation  ABDOMEN: Abdomen not significantly tender bowel sounds are decreased  CNS Alert and Oriented x 3 moving all 4 limbs        Imaging:  HIDA scan  FINDINGS: There is marked intrahepatic cholestasis. No activity is seen in the extrahepatic bile ducts during the first 60 minutes of the examination. Delayed images were obtained at 4 hours the show activity in intra and extrahepatic bile ducts   including the common bile duct. Small bowel activity is seen. No activity is seen within the gallbladder. Findings are consistent with cystic duct obstruction and cholecystitis.     IMPRESSION:   CONCLUSION:  1.  Marked intrahepatic cholestasis. Delayed images show activity in the common bile duct and small bowel but no activity in the gallbladder. Findings are consistent with cystic duct obstruction and cholecystitis.    Lab Results:  Personally Reviewed.         Advanced Care Planning  Discharge plan: Unknown at this time      Michael Abreu  Date: 10/8/2018  Time: 5:36 PM  Hospitalist Service  Part of this chart was created using a dictation software. Typographic errors, word substitutions, and Grammatical errors may unintentionally occur.

## 2021-06-20 NOTE — LETTER
Letter by Eileen Torres CNP at      Author: Eileen Torres CNP Service: -- Author Type: --    Filed:  Encounter Date: 8/3/2020 Status: (Other)         Patient: Gardenia Muller   MR Number: [id number]   YOB: 1950   Date of Visit: 8/3/2020     [facility]    Facility:   [facility] [[id number]]   Code Status: FULL CODE and POLST AVAILABLE      CHIEF COMPLAINT/REASON FOR VISIT:  Chief Complaint   Patient presents with   ? Review Of Multiple Medical Conditions     abdominal pain, HTN, Hypotension, tobacco cessation       HISTORY:      HPI: Gardenia is a 69 y.o. 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    Gardenia is seen at [facility] for new admission after recent hospitalization from July 22 to July 31.  She was also hospitalized in June from June 27 to July 3 and had been at another TCU prior to her recent hospitalization.  She presented with severely elevated INR and LFTs with an INR of 13 on admit.  Possibly secondary to top of acute liver failure secondary to CHF.  This was reversed with vitamin K and fresh frozen plasma.  Her work-up was  negative for hepatitis and a TTE showed right vent dysfunction with an EF of 40%.  During her hospitalization she was diuresed with Lasix and chlorothiazide and also underwent right and left heart cath.  See previous hospital notes for full details.  She developed A. fib with RVR that was controlled with digoxin.  She had hyperkalemia that improved with diuresis and Kayexalate.  Also hypercalcemia and the chlorthalidone was discontinued.  She will continue on aspirin 81 daily, ticagrelor, and warfarin.    Acute diastolic heart failure and hypertension: Metoprolol supinate 12.5, spironolactone, potassium chloride 20 mEq daily, lisinopril, magnesium gluconate, furosemide 20 mg daily, digoxin.    A. Fib: On warfarin, INR 1.75 today.  Has been on 4 and 3 mg of Coumadin.    Diabetes type 2: On metformin 500 twice daily, would like her blood sugar checked twice daily as well.    CAD with recent stent placement: Continue Brilinta, aspirin 81, anticoagulated.  Atorvastatin on hold until LFTs return to normal.  -Check LFTs Friday.    Today she is seen sitting up in her wheelchair in the nursing home.  She has not seen therapy yet.  She has a brief understanding of her complicated hospital course and is able to tell me that it was caused by high INR.  Her main concern today is making sure that we get her blood sugars checked as the facility has not been doing so.   She is alert and oriented at what seems to be her baseline.  She is denying any concerns with shortness of breath, chest pain, nausea, vomiting, constipation or diarrhea, urinary symptoms.  She typically resides at an [facility].       Past Medical History:   Diagnosis Date   ? Acute diastolic heart failure (H) 11/2015   ? Acute on chronic diastolic heart failure (H) 6/15/2019   ? Advanced directives, counseling/discussion 8/5/2015    Patient completed 2011.  Discussed today, 5/24/17, and wants to change healthcare agent from niece to daughter.  Discussed that she will  need to complete new form to indicate this.   ? MAY (acute kidney injury) (H) 10/22/2018   ? Atrial fibrillation (H)    ? Benign adenomatous polyp of large intestine 3/30/2017    Colonoscopy March 2017.  Recommend 5 year follow-up.  Single tubular adenoma   ? Biliary obstruction 10/3/2018    Added automatically from request for surgery [id number]   ? Cerebral vascular accident (H)    ? Coronary artery disease 2006    100% RCA with collateral filling per Dr. Elizabeth's angio report   ? Diabetes mellitus type 2 in obese (H)    ? Fibrocystic breast    ? GERD (gastroesophageal reflux disease)    ? History of anesthesia complications     slow to wake   ? Hypertension    ? Peripheral vascular disease (H)    ? Pneumonia 11/11/2018   ? PONV (postoperative nausea and vomiting)    ? S/P cholecystectomy 6/22/2018   ? SBO (small bowel obstruction) (H) 11/12/2015   ? Stroke (H)    ? Transaminitis 10/22/2018   ? Upper respiratory infection 12/20/2018   ? Urinary incontinence              Family History   Problem Relation Age of Onset   ? Cancer Paternal Grandmother    ? Cancer Paternal Uncle    ? No Medical Problems Mother    ? No Medical Problems Father    ? Heart attack Sister    ? Chronic Kidney Disease Sister    ? No Medical Problems Daughter    ? No Medical Problems Maternal Grandmother    ? No Medical Problems Maternal Grandfather    ? No Medical Problems Paternal Grandfather    ? No Medical Problems Maternal Aunt    ? No Medical Problems Paternal Aunt    ? Diabetes Brother    ? BRCA 1/2 Neg Hx    ? Breast cancer Neg Hx    ? Colon cancer Neg Hx    ? Endometrial cancer Neg Hx    ? Ovarian cancer Neg Hx      Social History     Socioeconomic History   ? Marital status: Legally      Spouse name: None   ? Number of children: 1   ? Years of education: None   ? Highest education level: None   Occupational History   ? None   Social Needs   ? Financial resource strain: None   ? Food insecurity:     Worry: None     Inability:  None   ? Transportation needs:     Medical: None     Non-medical: None   Tobacco Use   ? Smoking status: Former Smoker     Packs/day: 0.25   ? Smokeless tobacco: Former User   ? Tobacco comment: e-cig a few times/day   Substance and Sexual Activity   ? Alcohol use: No   ? Drug use: No   ? Sexual activity: None   Lifestyle   ? Physical activity:     Days per week: None     Minutes per session: None   ? Stress: None   Relationships   ? Social connections:     Talks on phone: None     Gets together: None     Attends Restorationism service: None     Active member of club or organization: None     Attends meetings of clubs or organizations: None     Relationship status: None   ? Intimate partner violence:     Fear of current or ex partner: None     Emotionally abused: None     Physically abused: None     Forced sexual activity: None   Other Topics Concern   ? None   Social History Narrative    Single/, lives alone; daughter in [place];    Gets help from neighbors and extended family    Former smoker.no alcohol problems       REVIEW OF SYSTEM:  Pertinent items are noted in HPI.    PHYSICAL EXAM:   BP (!) 86/66 (Patient Position: Standing)   Pulse 83   Wt 138 lb 3.2 oz (62.7 kg)   LMP 01/25/1999   BMI 24.48 kg/m    General appearance: alert, appears stated age and cooperative  HEENT: Head is normocephalic with normal hair distribution. No evidence of trauma. Ears: Without lesions or deformity. No acute purulent discharge. Eyes: Conjunctivae pink with no scleral icterus or erythema.   Lungs: clear to auscultation bilaterally, respirations without effort  Heart: regular rate and irregular rhythm, S1, S2 normal, no murmur, click, rub or gallop  Abdomen: soft, non-tender; bowel sounds normal; no masses,  no organomegaly  Extremities: extremities normal, atraumatic, no cyanosis, +1 edema in lower extremities  Pulses: 2+ and symmetric  Skin: Skin color, texture, turgor normal. No rashes or lesions  Neurologic: Grossly  normal   Psych: interacts well with caregivers, exhibits logical thought processes and connections, pleasant      LABS:   None today.     ASSESSMENT:    1. Acute liver failure without hepatic coma     2. Acute on chronic combined systolic (congestive) and diastolic (congestive) heart failure (H)     3. Essential hypertension     4. Type 2 diabetes mellitus with other circulatory complication, without long-term current use of insulin (H)         PLAN:    Acute diastolic heart failure and hypertension: Metoprolol supinate 12.5, spironolactone, potassium chloride 20 mEq daily, lisinopril, magnesium gluconate, furosemide 20 mg daily, digoxin.    A. Fib: On warfarin, INR 1.75 today.  Has been on 4 and 3 mg of Coumadin.    Diabetes type 2: On metformin 500 twice daily, would like her blood sugar checked twice daily as well.    CAD with recent stent placement: Continue Brilinta, aspirin 81, anticoagulated.  Atorvastatin on hold until LFTs return to normal.  -Check LFTs Friday.    Advance care planning: Patient elects remain full code.    Greater than 35 minutes with 20 minutes spent face-to-face with patient and nursing spent with obtaining history, education regarding anticoagulation, diabetes monitoring and diet, and plans for TCU as well as laboratory monitoring as well as discharge planning for eventual return to [facility].    Electronically signed by: Arleth Barton CNP

## 2021-06-20 NOTE — ANESTHESIA PREPROCEDURE EVALUATION
Anesthesia Evaluation      Patient summary reviewed   History of anesthetic complications (PONV)     Airway   Mallampati: II  Neck ROM: full   Pulmonary - normal exam   (+) shortness of breath,                          Cardiovascular   Exercise tolerance: > or = 4 METS  (+) hypertension, CAD, dysrhythmias (Atrial flutter), CHF, , hypercholesterolemia, PVD    ECG reviewed  Rhythm: irregular  Rate: normal,    murmur Location: lower left sternal border   ROS comment: Ef 55%     Neuro/Psych    (+) CVA , depression, anxiety/panic attacks,     Endo/Other    (+) diabetes mellitus type 2 well controlled, arthritis, obesity,      GI/Hepatic/Renal    (+) GERD,   chronic renal disease,      Other findings: Spinal stenosis     PAD (peripheral artery disease) (H)    Dermatosis Papulosa Nigra    Depression    Hypercalcemia    Right Renal Artery Stenosis    Osteoarthritis    Abnormality Of Walk    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)    Advanced directives, counseling/discussion    SBO (small bowel obstruction) (H)    Cystitis    Hypomagnesemia    Healthcare maintenance    Essential hypertension    Dysarthria    Cerebral infarction (H)    Anemia    Cerebrovascular disease    Benign adenomatous polyp of large intestine    RLS (restless legs syndrome)    Incisional hernia, without obstruction or gangrene    Abdominal pain, generalized    Diverticular disease of large intestine    Dysphagia    Coronary artery disease involving native coronary artery of native heart without angina pectoris    Dyslipidemia    Ventral hernia without obstruction or gangrene    Paroxysmal atrial fibrillation (H)    Anticoagulation management encounter    S/P repair of recurrent ventral hernia    SOB (shortness of breath)    Lower extremity edema    Anxiety    Hypokalemia    (HFpEF) heart failure with preserved ejection fraction (H)    Tachycardia    Cholecystitis    Anticoagulant long-term use    Biliary obstruction    Abdominal pain,  right upper quadrant          Dental    (+) poor dentition and upper dentures                       Anesthesia Plan  Planned anesthetic: general endotracheal  50 mg ketamine IV on induction.    ASA 4   Induction: intravenous   Anesthetic plan and risks discussed with: patient  Anesthesia plan special considerations: antiemetics,   Post-op plan: routine recovery

## 2021-06-20 NOTE — ANESTHESIA POSTPROCEDURE EVALUATION
Patient: Gardenia Muller  CHOLECYSTECTOMY, LAPAROSCOPIC  Anesthesia type: general    Patient location: PACU  Last vitals:   Vitals:    10/07/18 1215   BP: 118/54   Pulse: 71   Resp: 19   Temp:    SpO2: 96%     Post vital signs: stable  Level of consciousness: awake and responds to simple questions  Post-anesthesia pain: pain controlled  Post-anesthesia nausea and vomiting: no  Pulmonary: unassisted, nasal cannula  Cardiovascular: stable and blood pressure at baseline  Hydration: adequate  Anesthetic events: no    QCDR Measures:  ASA# 11 - Elzbieta-op Cardiac Arrest: ASA11B - Patient did NOT experience unanticipated cardiac arrest  ASA# 12 - Elzbieta-op Mortality Rate: ASA12B - Patient did NOT die  ASA# 13 - PACU Re-Intubation Rate: ASA13B - Patient did NOT require a new airway mgmt  ASA# 10 - Composite Anes Safety: ASA10A - No serious adverse event    Additional Notes:

## 2021-06-20 NOTE — H&P
Admission History and Physical   Gardenia GATITO Muller,  1950, MRN [id number]    PCP: Jerson Hernandez MD, [phone]   Code status:  Full Code       Extended Emergency Contact Information  Primary Emergency Contact: Lenora Goldman   [facility]  Home Phone: [phone]  Relation: Niece  Secondary Emergency Contact: Aria Muller   [facility]  Home Phone: [phone]  Relation: Child       Assessment and Plan        Abdominal pain  Due to cholecystitis, ultrasound showed distended gallbladder thickening/edema which may represent a calyculus cholecystitis with dilatation of the common bile duct and the pancreas which may suggest possibility of pancreatic head mass which was not seen on a CT abdomen  -Surgery is following, liver enzymes has been elevated,  -Patient had incisional hernia repair a few months ago  -Keep n.p.o. for now  -Received IV Cipro, and IV Flagyl in the ER,    A flutter   -In the ER she was noted to be in sinus tachycardia, EKG showed evidence of atrial flutter,  -Patient has been seen by cardiology 2018, currently on Cardizem, beta-blocker, both resumed,  -Holding parameters due to low blood pressure  -Anticoagulated with Coumadin with INR 2.09 surgery recommended to hold Coumadin for now    Heart failure with preserved ejection fraction,  She has occasional shortness of breath,-she takes torsemide 20 mg twice daily,  -BNP is elevated but lower than previous,  Resume home dose of torsemide,  We will monitor intake and output, daily weight,        Diet controlled diabetes  -Last A1c is 5.6    Hyperlipidemia  On Crestor      Hyperkalemia  Potassium was 5.5 in the ER received dextrose and insulin  Will recheck potassium tomorrow-  Magnesium level 2.2        Hypertension  -Currently her blood pressure is running low, Aldactone and torsemide had been resumed with holding parameters  History of right renal artery stenosis  Peripheral vascular  disease  History of CVA      Code status  -full code          Chief Complaint:  Abdominal pain     HPI:    Gardenia Muller is a 68 y.o. old female with past medical history of infarction, atrial flutter, on Coumadin, history of ventral hernia, with incisional hernia repair in  June 2018, presented to the hospital with chief complaint of abdominal pain, appears to be more epigastric and right upper quadrant, for about a week, she had multiple episodes of emesis most recently this morning, she did not have any fever or chills no change in bowel habits, in the ER, she was noted to be tachycardic,  CT chest did not show evidence of PE but did show evidence of gallbladder wall thickening suggestive of cholecystitis for which she had a CT abdomen which showed also gallbladder wall thickening and has been suggestive of cholecystitis, surgery has been contacted and ordered ultrasound of the gallbladder,  For tachycardia,, she received 1 L of IV fluid bolus and 1 dose of Lopressor her heart rate was better controlled as was admitted for further evaluation      At this time her pain is better controlled after pain medication, she is denying any nausea, no headache or dizziness, no chest pain or shortness of breath, had bowel movement this morning that was normal, no weakness numbness or tingling     Medical History  Active Ambulatory (Non-Hospital) Problems    Diagnosis     Hypokalemia     Anxiety     SOB (shortness of breath)     Lower extremity edema     Anticoagulation management encounter     Paroxysmal atrial fibrillation (H)     S/P repair of recurrent ventral hernia     Ventral hernia without obstruction or gangrene     Abdominal pain, generalized     Incisional hernia, without obstruction or gangrene     RLS (restless legs syndrome)     Benign adenomatous polyp of large intestine     Diverticular disease of large intestine     Dysphagia     Healthcare maintenance     Cystitis     Hypomagnesemia     SBO (small bowel  obstruction) (H)     Advanced directives, counseling/discussion     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)     Spinal stenosis     Dermatosis Papulosa Nigra     Hypercalcemia     Right Renal Artery Stenosis     Osteoarthritis     Abnormality Of Walk     Essential hypertension     Dysarthria     Cerebral infarction (H)     Anemia     Past Medical History:   Diagnosis Date     Acute diastolic heart failure (H) 11/2015     Atrial fibrillation (H)      Cerebral vascular accident (H)      Coronary artery disease 2006     Diabetes mellitus type 2 in obese (H)      Fibrocystic breast      GERD (gastroesophageal reflux disease)      History of anesthesia complications      Hypertension      Peripheral vascular disease (H)      PONV (postoperative nausea and vomiting)      Stroke (H)      Urinary incontinence         Surgical History  She  has a past surgical history that includes Ventral hernia repair (N/A, 11/12/2015); Coronary stent placement (1995); Cardiac catheterization; PICC (6/29/2018); and Exploratory laparotomy (N/A, 6/29/2018).       Social History  Reviewed, and she  reports that she has been smoking.  She has been smoking about 0.25 packs per day. She has quit using smokeless tobacco. She reports that she does not drink alcohol or use illicit drugs.       Allergies  Allergies   Allergen Reactions     Penicillins Swelling    Family History  Reviewed, and family history includes Cancer in her paternal grandmother and paternal uncle; Chronic Kidney Disease in her sister; Diabetes in her brother; Heart attack in her sister; No Medical Problems in her daughter, father, maternal aunt, maternal grandfather, maternal grandmother, mother, paternal aunt, and paternal grandfather. There is no history of BRCA 1/2, Breast cancer, Colon cancer, Endometrial cancer, or Ovarian cancer.          Prior to Admission Medications   Prescriptions Prior to Admission   Medication Sig Dispense Refill Last Dose      acetaminophen (TYLENOL) 650 MG CR tablet Take 650 mg by mouth 2 (two) times a day.    10/3/2018 at AM     aspirin 81 MG EC tablet Take 81 mg by mouth daily.   10/3/2018 at Unknown time     carboxymethylcellulose (REFRESH PLUS) 0.5 % Dpet ophthalmic dropperette Administer 2 drops to both eyes every hour as needed (dry eyes).   Unknown at Unknown time     diltiazem (CARDIZEM CD) 180 MG 24 hr capsule Take 1 capsule (180 mg total) by mouth daily. 30 capsule 11 10/3/2018 at Unknown time     ferrous sulfate 325 (65 FE) MG tablet Take 1 tablet (325 mg total) by mouth 2 (two) times a day. 60 tablet 3 10/3/2018 at AM     loratadine (CLARITIN) 10 mg tablet Take 10 mg by mouth daily with lunch. Noon   10/3/2018 at Unknown time     LORazepam (ATIVAN) 0.5 MG tablet 1-2 tabs po q 6 hours prn 20 tablet 0 Unknown at Unknown time     magnesium oxide (MAG-OX) 400 mg tablet Take 1,200 mg by mouth daily.   10/3/2018 at Unknown time     metoprolol tartrate (LOPRESSOR) 25 MG tablet TAKE 1 TABLET BY MOUTH TWICE DAILY (8AM & 8PM) 60 tablet 10 10/3/2018 at AM     multivitamin (TAB-A-JULIET) per tablet Take 1 tablet by mouth daily. 100 tablet 11 10/3/2018 at Unknown time     nitroglycerin (NITROSTAT) 0.4 MG SL tablet Place 1 tablet (0.4 mg total) under the tongue every 5 (five) minutes as needed for chest pain (for up to 3 doses and call 911 if persists). 90 tablet 0 Unknown at Unknown time     omeprazole (PRILOSEC) 20 MG capsule TAKE 1 CAPSULE BY MOUTH TWICE DAILY (8AM & 8PM) 60 capsule 5 10/3/2018 at AM     polyethylene glycol (GLYCOLAX) 17 gram/dose powder Take 17 g by mouth daily as needed.    Unknown at Unknown time     potassium chloride (K-DUR,KLOR-CON) 20 MEQ tablet Take 1 tablet (20 mEq total) by mouth daily. 30 tablet 11 10/3/2018 at Unknown time     rOPINIRole (REQUIP) 1 MG tablet 1 tablet 30-60 minutes before bedtime 30 tablet 2 10/2/2018 at Unknown time     rosuvastatin (CRESTOR) 40 MG tablet TAKE 1 TABLET BY MOUTH AT BEDTIME 30  tablet 11 10/2/2018 at Unknown time     sertraline (ZOLOFT) 100 MG tablet TAKE 1 TABLET BY MOUTH ONCE DAILY 31 tablet 10 10/3/2018 at Unknown time     torsemide (DEMADEX) 20 MG tablet Take 1 tablet (20 mg total) by mouth 2 (two) times a day at 9am and 6pm. (Patient taking differently: Take 20 mg by mouth daily. ) 60 tablet 3 9/30/2018     traZODone (DESYREL) 50 MG tablet TAKE 1 TABLET BY MOUTH AT BEDTIME 30 tablet 5 10/2/2018 at Unknown time     VITAMIN D3 2,000 unit capsule TAKE 1 CAPSULE BY MOUTH ONCE DAILY AT 8AM (DO NOT BRAND CHANGE - PATIENT ONLY WANT CAPSULES) 30 capsule 5 10/3/2018 at Unknown time     warfarin (COUMADIN/JANTOVEN) 2 MG tablet Take by mouth See Admin Instructions. Take 4 mg on Sun/Tues/Thurs and 2 mg all other days of the week. Adjust dose based on INR results as directed.   10/2/2018 at Unknown time     blood glucose meter (GLUCOMETER) Please Dispense kit per pharmacy discretion and patient's insurance Test blood sugar. 1 each 0 Taking     blood glucose test strips Use 1 each As Directed as needed. Dispense brand per patient's insurance at pharmacy discretion. 50 strip 11 Taking     lancets 33 gauge Misc Test twice daily Dispense brand per patient's insurance at pharmacy discretion. 100 each 11 Taking          Review of Systems:  Pertinent items are noted in HPI. Physical Exam:  Temp:  [97.9  F (36.6  C)-98  F (36.7  C)] 98  F (36.7  C)  Heart Rate:  [106-128] 106  Resp:  [17-36] 20  BP: ()/(55-89) 101/74    General appearance: alert, appears stated age and cooperative  Neck: no adenopathy, no carotid bruit, no JVD, supple, symmetrical, trachea midline and thyroid not enlarged, symmetric, no tenderness/mass/nodules  Lungs: clear to auscultation bilaterally  Heart: regular rate and rhythm, S1, S2 normal, no murmur, click, rub or gallop  Abdomen: Soft, diffusely tender, periumbilical reducible hernia, bowel sounds positive, no guarding or rigidity,  Extremities: extremities normal,  atraumatic, no cyanosis or edema  Neurologic: Grossly normal     Pertinent Labs  Lab Results: personally reviewed.   Lab Results   Component Value Date     10/03/2018    K 5.5 (H) 10/03/2018     10/03/2018    CO2 22 10/03/2018    BUN 22 10/03/2018    CREATININE 0.89 10/03/2018    CALCIUM 11.1 (H) 10/03/2018     Lab Results   Component Value Date    TROPONINI 0.01 10/03/2018     Lab Results   Component Value Date    CHOL 165 09/20/2017    TRIG 116 06/30/2018    HDL 56 09/20/2017    LDLDIRECT 94 04/15/2015       Pertinent Radiology  Radiology Results: Personally reviewed impression/s  EKG Results: personally reviewed.     Advanced Care Planning  Discharge Planning discussed with patient   Discussed care with patient  for 60  minutes with greater than 50% of time spent in counseling and coordination of care.

## 2021-06-20 NOTE — LETTER
Letter by Jerson Hernandez MD at      Author: Jerson Hernandez MD Service: -- Author Type: --    Filed:  Encounter Date: 7/3/2020 Status: (Other)         Gardenia Muller  [address]  [place] [address]             July 3, 2020         Dear Ms. Muller,    Below are the results from your recent visit:    Resulted Orders   Microalbumin, Random Urine   Result Value Ref Range    Microalbumin, Random Urine 4.09 (H) 0.00 - 1.99 mg/dL    Creatinine, Urine 131.8 mg/dL    Microalbumin/Creatinine Ratio Random Urine 31.0 (H) <=19.9 mg/g    Narrative    Microalbumin, Random Urine  <2.0 mg/dL . . . . . . . . Normal  3.0-30.0 mg/dL . . . . . . Microalbuminuria  >30.0 mg/dL . . . . . .  . Clinical Proteinuria    Microalbumin/Creatinine Ratio, Random Urine  <20 mg/g . . . . .. . . . Normal   mg/g . . . . . . . Microalbuminuria  >300 mg/g . . . . . . . . Clinical Proteinuria           Urine test looks good, Gardenia    Please call with questions or contact us using [employer].    Sincerely,        Electronically signed by Jerson Hernandez MD

## 2021-06-20 NOTE — PROGRESS NOTES
Pt a/o x4, pleasant, cooperative. Up to bathroom w/ walker w/ stand by assist. Up in chair most of shift. Ate approx 35% of supper, then ate 2 packs of anupam crackers as hs snack. vicodin given for pain control--pt said is somewhat helpful; writer gave zofran w/ 1st dose of vicodin at beginning of shift b/c pt said it made her nauseated before. Pt said surgical pain is worsened w/ movement. Steri strips to lap sites c/d/i. (4). Pt incont. Of urine & voided in toilet. Tele tracing Afib--controlled rate. Reminded pt to use call light for needs. Continue to monitor pt.

## 2021-06-20 NOTE — PROGRESS NOTES
General Surgery Progress Note    2 Days Post-Op    CHOLECYSTECTOMY, LAPAROSCOPIC    Subjective:   Pt is feeling bit better. Having some sharp stabbing pain mid abdomin. No nausea. Eating better but still low appetite. Labs improving. Afebrile.       Vitals:    10/08/18 2016 10/09/18 0008 10/09/18 0416 10/09/18 0712   BP: 123/75 129/76 140/85 112/86   Patient Position: Lying Lying Lying Lying   Pulse: 87 92 93 (!) 119   Resp: 18 20 18 19   Temp: 98.5  F (36.9  C) 98.4  F (36.9  C) 98.3  F (36.8  C) 98.2  F (36.8  C)   TempSrc: Oral Oral Oral Oral   SpO2: 98% 94% 100% 97%   Weight:   176 lb 12.8 oz (80.2 kg)    Height:           Physical Exam:  Lungs:  CTA  CV:       RRR  Ab:       Soft, + BS, The wound/ dressings are clean and dry      Results from last 7 days  Lab Units 10/09/18  0614   LN-WHITE BLOOD CELL COUNT thou/uL 6.6   LN-HEMOGLOBIN g/dL 11.8*   LN-HEMATOCRIT % 37.4   LN-PLATELET COUNT thou/uL 98*         Results from last 7 days  Lab Units 10/09/18  0614   LN-SODIUM mmol/L 136   LN-POTASSIUM mmol/L 4.4   LN-CHLORIDE mmol/L 97*   LN-CO2 mmol/L 31   LN-BLOOD UREA NITROGEN mg/dL 18   LN-CREATININE mg/dL 1.03   LN-CALCIUM mg/dL 11.0*   LN-ALBUMIN g/dL 2.9*   LN-PROTEIN TOTAL g/dL 7.3   LN-BILIRUBIN TOTAL mg/dL 1.0   LN-ALKALINE PHOSPHATASE U/L 223*   LN-ALT (SGPT) U/L 74*   LN-AST (SGOT) U/L 85*       Assessment:  Pt is progressing well.    Plan: Ok discharge when medically ready. No further abx needed from our perspective. Will place surgery recommendations in computer.     Nela Camacho PA-C [phone]    [facility]  [address] [place] [id number]

## 2021-06-20 NOTE — PROGRESS NOTES
"Spiritual Care Note    Spiritual Assessment: Pt is [employer]. Yi is integral in pt's well being. \"He is with me. Couldn't do it without Him.\"     Care Provided: Support through conversation, affirmation of person and yi, prayer. Pt expressed gratitude for visit.    Plan of Care: Our department will follow as needed or as requested.    HARLEY [facility].    "

## 2021-06-20 NOTE — PROGRESS NOTES
Progress Note        BRIEF HOSPITAL COURSE :       68 yr old female admitted with abdominal pain and abnormal LFT's. Abdominal imagine concerning for probable acute cholecystitis but surgery team does not think its cholecystitis.        SUBJECTIVE :       S/p ERCP and no clinical improvement yet.  No improvement in pain   No nausea or vomiting  No  Fevers        PLAN :       -  start on liquid diet, advance diet.    - may need consult surgery again if persistent abdominal symptoms for possible lap nimesh    - not yet medically ready for discharge.          Barriers to Discharge :  Ongoing abdominal symptoms with negative evaluation so far.  Anticipated discharge : in am ?  Disposition : home      TIME SPENT : Total time spent > 35 minutes and > 50% time spent on counseling patient about plan of care and coordination of care.          ASSESSMENT :            1. Acute Abdominal pain : iv dilaudid prn    2. Ultrasound concerning for Acute cholecystitis : iv antibiotics, surgery team doesn't think its cholecystitis. May need to consult surgery again for possible lap nimesh again.    3. Dilated CBD and pancreatic duct : ? Pancreatic head mass, consult GI, s/p ERCP : no significant abnormality.    4. Abnormal LFT's : monitor     5. S/p Incisional hernia  Repair    6. A flutter with RVR : on coumadin, cardizem 180 mg daily, Metoprolol 25 mg two times a day, stable    7. Chronic diastolic Heart failure : on torsemide 20 mg two times a day, stable.    8. Diabetes : diet controlled, stable.    9. Hyperlipidemia : on Crestor    10. Hyperkalemia, mild : resolved.    11. Hypertension, Essential : on cardizem, metoprolol.    12. Anxiety : on ativan 0.5 mg 1-2 tabs q 6 hrly prn, Zoloft 100 mg daily, trazodone 50 mg at bedtime.    13. GERD : on ppi    14. RLS : on requip    15. Thrombocytopenia ; monitor          Diet : clear liquid   , IV fluids : 100/hr    , IV Meds :    , Antibiotics : cipro , flagyl    QTc :  DVT Prophylaxis :    INR > 2.0  Code Status : Full  Admit status : inpatient  Morning labs ordered :             Objective :            Review of Systems   A 12 point comprehensive review of systems was negative except as noted.        Physical Exam    HEENT : no distended veins, no lymphadenopathy, thyroid is normal  LUNGS : b/l air entry present, no significant crackles/wheezing.  ABDOMEN : soft, tender, non-distended, BS present.  HEART :  Regular rate & rhythm, S1 & S2 normal, no murmur, clicks/rubs, no ankle edema  NEURO : conscious, oriented, responds to commands, no obvious focal deficit.  MUSCULOSKELETAL / EXTREMITIES :  no calf tenderness.  SKIN : no rashes  BACK : wnl        Pertinent Labs   Lab Results: personally reviewed.   Lab Results   Component Value Date     (L) 10/05/2018    K 3.8 10/05/2018     10/05/2018    CO2 26 10/05/2018    BUN 18 10/05/2018    CREATININE 1.39 (H) 10/05/2018    CALCIUM 10.5 10/05/2018           Pertinent Radiology   Radiology Results: Personally reviewed image/s  EKG Results: not done              DR. YONY CHACON  [facility]

## 2021-06-20 NOTE — PROGRESS NOTES
Assessment/ Plan  Problem List Items Addressed This Visit        High    Cerebrovascular disease     Aspirin, Crestor 40, blood pressure control with metoprolol 25 twice daily         Persistent atrial fibrillation (H)     Try to achieve rate control and anticoagulation.  Check INR         Relevant Medications    torsemide (DEMADEX) 20 MG tablet    Other Relevant Orders    INR (Completed)    Acute diastolic congestive heart failure (H) - Primary     Diastolic Congestive Heart Failure isnot compensated  Wt Readings from Last 3 Encounters:   09/05/18 185 lb (83.9 kg)   08/24/18 189 lb (85.7 kg)   08/21/18 189 lb 12 oz (86.1 kg)     BP Readings from Last 3 Encounters:   09/05/18 116/60   08/24/18 110/70   08/21/18 90/64       Meds:  Antihypertensives: Metoprolol 25 twice daily  Diuretic?  Furosemide 80 twice daily/  Nitrites/other? No    Changes/Recommendations: It seems furosemide at 80 mg twice daily is ineffective, weights are stable with ongoing symptoms of volume overload.  Changed to torsemide 20 twice daily, I have asked her to call in her weights in 2 days to assess whether this is started working.  Adjust dosage according  Follow-up: 2 weeks           Relevant Medications    torsemide (DEMADEX) 20 MG tablet    Other Relevant Orders    Hemoglobin (Completed)    Basic Metabolic Panel    BNP(B-type Natriuretic Peptide)       Medium    RLS (restless legs syndrome)     Mirapex 0.5 ineffective.  Could go up and this but decided instead to change to ropinirole 1 mg (medium dose) 30-60 minutes before bedtime.  Also, will check hemoglobin which has been steadily increasing with use of iron as iron deficiency is likely what exacerbated current problem.         Relevant Orders    Hemoglobin (Completed)       Unprioritized    Anemia due to blood loss, acute    Relevant Orders    Hemoglobin (Completed)      Other Visit Diagnoses     URI with cough and congestion        Improving, no therapy, follow-up if not resolved in 1  week        Patient Instructions   For heart failure and fluid retention    Stop furosemide  Start torsemide 20 mg twice daily  These call in your weights on Friday morning so that we can adjust your dosage of this medication.      For restless legs  Continue the iron pill.  I think this will get better as your iron gets replaced  Stop Mirapex  Start ropinirole-at a medium dose to replace the Mirapex and see if this works better      Subjective  CC:  Chief Complaint   Patient presents with     Other     Potassium follow up     Congestive Heart Failure     Follow up     HPI:  Follow-up CHF  Diastolic  Narrative/ current symptoms still some shortness of breath, PND and orthopnea.  Review of her weights today which currently are running between 188 and 190.  This is similar to the weights she was at last time.  Had begun to time a day 80 mg Lasix just before that visit.  Note current mediations/adherance  Furosemide 80 twice daily    Wt Readings from Last 3 Encounters:   09/05/18 185 lb (83.9 kg)   08/24/18 189 lb (85.7 kg)   08/21/18 189 lb 12 oz (86.1 kg)       Is patient monitoring weight?  Yes    Results for orders placed or performed in visit on 08/24/18   Basic Metabolic Panel   Result Value Ref Range    Sodium 141 136 - 145 mmol/L    Potassium 3.3 (L) 3.5 - 5.0 mmol/L    Chloride 101 98 - 107 mmol/L    CO2 29 22 - 31 mmol/L    Anion Gap, Calculation 11 5 - 18 mmol/L    Glucose 124 70 - 125 mg/dL    Calcium 10.2 8.5 - 10.5 mg/dL    BUN 11 8 - 22 mg/dL    Creatinine 0.75 0.60 - 1.10 mg/dL    GFR MDRD Af Amer >60 >60 mL/min/1.73m2    GFR MDRD Non Af Amer >60 >60 mL/min/1.73m2     Lab Results   Component Value Date     (H) 08/18/2018   Since blood pressures had been elevated on metoprolol 25 twice daily.  As noted below with cardiology visit, diltiazem was increased.    As noted below, she has a history of atrial fibrillation.  Strategy rate control, anticoagulation.  Underlying cerebrovascular disease    Per  visit with nurse practitioner, cardiology 8/24  1.  Heart failure with preserved ejection fraction: She is now taking Lasix 80 mg twice daily.  Lower extremity edema and shortness of breath have improved over the past week.  She is not back to baseline yet.  Her weight has decreased about 7 pounds since early August.  She struggles to follow a low-sodium diet. We reviewed heart failure diagnosis, medications, treatment plan, low sodium diet, weight monitoring, and symptom monitoring.  She met with a heart failure nurse clinician to further discuss.     2.  Persistent atrial fibrillation: Diltiazem was started on August 15.  Heart rates remain elevated at home between  bpm.  Heart rate today was 120 bpm.        Plan:  1.  BMP pending  2.  I discussed with Dr. Choe.  Increase diltiazem to 180 mg daily and check Holter monitor in 2 weeks.  3.  Low-sodium diet and daily weights  4.  Compression stockings were ordered by her primary care provider which she is hoping to receive next week     Gardenia Muller will follow up with her primary care provider next week.  She will follow-up in the heart failure clinic in 3 weeks and with Dr. Choe on November 30.    Patient also complained of severe symptoms of restless legs's, so much that she had stopped her 0.125 mg Mirapex.  Difficulty sleeping.  When I saw her last, 8/21, we increased this to 0.25.  Today she reports that this has not gotten any better and still suffers from severe symptoms which keep her awake at night.  ________________________________      Cough  -----------------------------------------  Duration:  -4 days  Associated URI symptoms?  Runny nose  Productive?  Yes- yellow  SOB?  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)     Advanced directives, counseling/discussion     SBO (small bowel obstruction)     Cystitis     Hypomagnesemia     Essential hypertension     Dysarthria     Cerebral infarction (H)     Anemia     Cerebrovascular disease     Benign adenomatous polyp of large intestine     RLS (restless legs syndrome)     Incisional hernia, without obstruction or gangrene     Abdominal pain, generalized     Diverticular disease of large intestine     Dysphagia     Coronary artery disease involving native coronary artery of native heart without angina pectoris     Dyslipidemia     Ventral hernia without obstruction or gangrene     Persistent atrial fibrillation (H)     Anemia due to blood loss, acute     Anticoagulation management encounter     S/P repair of recurrent ventral hernia     SOB (shortness of breath)     Lower extremity edema     Acute diastolic congestive heart failure (H)     Anxiety     Hypokalemia     Current medications reviewed as follows:  Current Outpatient Prescriptions on File Prior to Visit   Medication Sig     acetaminophen (TYLENOL) 650 MG CR tablet Take 650 mg by mouth 2 (two) times a day.      aspirin 81 MG EC tablet Take 81 mg by mouth daily.     blood glucose meter (GLUCOMETER) Please Dispense kit per pharmacy discretion and patient's insurance Test blood sugar.     cholecalciferol, vitamin D3, (VITAMIN D3) 2,000 unit capsule Take 2,000 Units by mouth daily with lunch.     diltiazem (CARDIZEM CD) 180 MG 24 hr capsule Take 1 capsule (180 mg total) by mouth daily.     ferrous sulfate 325 (65 FE) MG tablet Take 1 tablet (325 mg total) by mouth 2 (two) times a day.     furosemide (LASIX) 40 MG tablet Take 2 tablets (80 mg total) by mouth 2 (two) times a day at 9am and 6pm. Then dosing per your primary care doctor     lancets 33 gauge Misc Test twice daily Dispense brand per  patient's insurance at pharmacy discretion.     loratadine (CLARITIN) 10 mg tablet Take 10 mg by mouth daily with lunch. Noon     LORazepam (ATIVAN) 0.5 MG tablet 1-2 tabs po q 6 hours prn     magnesium oxide (MAG-OX) 400 mg tablet Take 1,200 mg by mouth daily.     metoprolol tartrate (LOPRESSOR) 25 MG tablet TAKE 1 TABLET BY MOUTH TWICE DAILY (8AM & 8PM)     multivitamin (TAB-A-JULIET) per tablet Take 1 tablet by mouth daily.     nitroglycerin (NITROSTAT) 0.4 MG SL tablet Place 1 tablet (0.4 mg total) under the tongue every 5 (five) minutes as needed for chest pain (for up to 3 doses and call 911 if persists).     omeprazole (PRILOSEC) 20 MG capsule Take 20 mg by mouth 2 (two) times a day.      polyethylene glycol (GLYCOLAX) 17 gram/dose powder 17 g daily as needed.      polyvinyl alcohol (ARTIFICIAL TEARS, POLYVIN ALC,) 1.4 % ophthalmic solution Administer 2 drops to both eyes as needed for dry eyes. As directed.      potassium chloride (K-DUR,KLOR-CON) 20 MEQ tablet Take 1 tablet (20 mEq total) by mouth daily.     pramipexole (MIRAPEX) 0.25 MG tablet Take 1 tablet (0.25 mg total) by mouth at bedtime.     rosuvastatin (CRESTOR) 40 MG tablet TAKE 1 TABLET BY MOUTH AT BEDTIME     sertraline (ZOLOFT) 100 MG tablet TAKE 1 TABLET BY MOUTH ONCE DAILY     traZODone (DESYREL) 50 MG tablet Take 1 tablet (50 mg total) by mouth at bedtime.     warfarin (COUMADIN) 2 MG tablet Take 2 to 4mg (1 or 2 tabs) by mouth daily as directed.  Adjust dose based on INR.     [DISCONTINUED] ACCU-CHEK YAZMIN PLUS TEST STRP strips TEST TWICE DAILY AS DIRECTED. *6 TOTAL FILLS* (Patient taking differently: TEST QID DAILY AS DIRECTED. *6 TOTAL FILLS*)     No current facility-administered medications on file prior to visit.      History   Smoking Status     Current Every Day Smoker     Packs/day: 0.25   Smokeless Tobacco     Former User     Comment: e-cig a few times/day     Social History     Social History Narrative     Patient Care Team:  Jerson  Bobo Hernandez MD as PCP - General (Family Medicine)  Jones Harding DO as Physician (General Surgery)  ROS  Full 10 system review including constitutional, respiratory, cardiac, gi, urinary, rheumatologic, neurologic, reproductive, dermatologic psychiatric is  performed  and is otherwise negative separate abdominal pain        Objective  Physical Exam  Vitals:    09/05/18 0953   BP: 116/60   Patient Site: Left Arm   Patient Position: Sitting   Cuff Size: Adult Large   Pulse: 68   Resp: 24   Weight: 185 lb (83.9 kg)     Body mass index is 33.84 kg/(m^2).  Gen- alert, oriented  No acute distress  HEENT- normal cephalic, atraumatic.   Chest- Normal inspiration and expiration.    Clear to ascultation.    No chest wall deformity or scar.  No reproducible chest wall tenderness  CV- Heart regular rate and rhythm  normal tones, no murmurs   No gallops or rubs.  Upper abdomen is not tender on palpation  Ext-no cyanosis  2+ lower extremity edema  Skin- warm and dry,   no visualized rash    Diagnostics:   Results for orders placed or performed in visit on 09/05/18   Hemoglobin   Result Value Ref Range    Hemoglobin 12.0 12.0 - 16.0 g/dL   INR   Result Value Ref Range    INR 2.30 (H) 0.90 - 1.10           Please note: Voice recognition software was used in this dictation.  It may therefore contain typographical errors.

## 2021-06-20 NOTE — OP NOTE
ENDOSCOPIC RETROGRADE CHOLANGIOPANCREATOGRAPHY PROCEDURE NOTE        Patient Name:           Gardenia Muller      Date of Procedure:   October 5, 2018      Endoscopist:             Anson Stokes      Procedure:       ENDOSCOPIC RETROGRADE CHOLANGIOPANCREATOGRAPHY      Pre-operative Diagnosis:        Biliary obstruction      Post-operative Diagnosis:      Dilated CBD       Indications:                              68-year-old female with abdominal pain, elevated LFTs, dilated CBD/distended GB on US      Medications:                             General anesthesia      Procedure Details    The patient was informed of the risks, benefits and alternatives and gave informed consent. The patient had stable cardiovascular status and was judged to be adequate for sedation. A timeout was performed.    The Olympus videoendoscope was passed into the upper esophagus without difficulty. The distal stomach and duodenum were briefly examined and seen to be normal.    The scope was advanced into the second portion of the duodenum and the ampulla encountered with a straightening maneuver. The ampulla appeared normal, beside a large joe-ampullary diverticulum  .  Initial cannulation was performed utilizing Dreamtome and .035 guidewire. The guidewire advanced in the trajectory of the PD and secured at the tail of the PD. A second .035 guidewire advanced readily to the bile duct, followed by the sphincterotome. Contrast was injected. The CBD was dilated to 11-12 mm without filling defects or stricture. Sphincterotomy was cut. The CBD was cleared with the 12 mm balloon and basket. There was note of copious dark bile without stone or sludge. Procedure was terminated.      Findings              Bile duct: dilated CBD to 11-12 mm without filling defects/stricture  Pancreas: n/a    Estimated Blood Loss:           none      Specimens:                              None    Complications:                        No immediate  complications    Impression:                              Dilated CBD without stone/sludge/stricture                                                            Recommendations:                 Consider cholecystectomy mirna if no clinical improvement after sphincterotomy                                                       Anson Stokes MD  10/5/2018 2:11 PM  [facility]  [phone]

## 2021-06-20 NOTE — PROGRESS NOTES
Progress Note        BRIEF HOSPITAL COURSE :       68 yr old female admitted with abdominal pain and abnormal LFT's. Abdominal imagine concerning for probable acute cholecystitis but surgery team does not think its cholecystitis.        SUBJECTIVE :       Continues to have significant abdominal pain  No nausea or vomiting  No  Fevers      PLAN :     - consult GI, keep on liquid diet, discontinue iv antibiotics    - not yet medically ready for discharge.          Barriers to Discharge :  Ongoing abdominal symptoms  Anticipated discharge : in am ?  Disposition : home      TIME SPENT : Total time spent > 35 minutes and > 50% time spent on counseling patient about plan of care and coordination of care.                  ASSESSMENT :            1. Acute Abdominal pain : iv dilaudid prn    2. Ultrasound concerning for Acute cholecystitis : iv antibiotics, surgery team doesn't think its cholecystitis.    3. Dilated CBD and pancreatic duct : ? Pancreatic head mass, consult GI.    4. Abnormal LFT's : monitor     5. S/p Incisional hernia  Repair    6. A flutter with RVR : on coumadin, cardizem 180 mg daily, Metoprolol 25 mg two times a day, stable    7. Chronic diastolic Heart failure : on torsemide 20 mg two times a day, stable.    8. Diabetes : diet controlled    9. Hyperlipidemia : on Crestor    10. Hyperkalemia, mild : resolved.    11. Hypercalcemia : not new, monitor,    12. Hypertension, Essential : on cardizem, metoprolol.    13. Anxiety : on ativan 0.5 mg 1-2 tabs q 6 hrly prn, Zoloft 100 mg daily, trazodone 50 mg at bedtime.    14. GERD : on ppi    15. RLS : on requip    16. Thrombocytopenia ; monitor              Diet : clear liquid   , IV fluids :     , IV Meds :    , Antibiotics : cipro , flagyl    QTc :  DVT Prophylaxis :   INR > 2.0  Code Status : Full  Admit status : inpatient  Morning labs ordered :             Objective :            Review of Systems   A 12 point comprehensive review of systems was negative  except as noted.        Physical Exam    HEENT : no distended veins, no lymphadenopathy, thyroid is normal  LUNGS : b/l air entry present, no significant crackles/wheezing.  ABDOMEN : soft, tender, non-distended, BS present.  HEART :  Regular rate & rhythm, S1 & S2 normal, no murmur, clicks/rubs, no ankle edema  NEURO : conscious, oriented, responds to commands, no obvious focal deficit.  MUSCULOSKELETAL / EXTREMITIES :  no calf tenderness.  SKIN : no rashes  BACK : wnl        Pertinent Labs   Lab Results: personally reviewed.   Lab Results   Component Value Date     10/04/2018     10/04/2018    K 4.0 10/04/2018    K 4.0 10/04/2018     10/04/2018     10/04/2018    CO2 23 10/04/2018    CO2 23 10/04/2018    BUN 17 10/04/2018    BUN 17 10/04/2018    CREATININE 0.75 10/04/2018    CREATININE 0.75 10/04/2018    CALCIUM 10.7 (H) 10/04/2018    CALCIUM 10.6 (H) 10/04/2018           Pertinent Radiology   Radiology Results: Personally reviewed image/s  EKG Results: not done              DR. YONY CHACON  [facility]

## 2021-06-20 NOTE — PROGRESS NOTES
"[facility] Progress Note    Subjective: HIDA yesterday consistent with cystic duct obstruction and cholecystitis; she reports continued pain without change    Objective:  /70 (Patient Position: Lying)  Pulse 75  Temp 97.8  F (36.6  C) (Oral)   Resp 18  Ht 5' 3\" (1.6 m)  Wt 173 lb 9.6 oz (78.7 kg)  LMP 01/25/1999  SpO2 96%  BMI 30.75 kg/m2   Gen: Awake, no acute distress  Pulmonary: Lungs clear to ausculation bilaterally  Cardiovascular: Regular  Gastrointestinal: + bowel sounds, soft, +epigastric and ruq ttp (less tender than previous)      Patient Active Problem List   Diagnosis     Spinal stenosis     PAD (peripheral artery disease) (H)     Dermatosis Papulosa Nigra     Depression     Hypercalcemia     Right Renal Artery Stenosis     Osteoarthritis     Abnormality Of Walk     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)     Advanced directives, counseling/discussion     SBO (small bowel obstruction) (H)     Cystitis     Hypomagnesemia     Healthcare maintenance     Essential hypertension     Dysarthria     Cerebral infarction (H)     Anemia     Cerebrovascular disease     Benign adenomatous polyp of large intestine     RLS (restless legs syndrome)     Incisional hernia, without obstruction or gangrene     Abdominal pain, generalized     Diverticular disease of large intestine     Dysphagia     Coronary artery disease involving native coronary artery of native heart without angina pectoris     Dyslipidemia     Ventral hernia without obstruction or gangrene     Paroxysmal atrial fibrillation (H)     Anticoagulation management encounter     S/P repair of recurrent ventral hernia     SOB (shortness of breath)     Lower extremity edema     Anxiety     Hypokalemia     (HFpEF) heart failure with preserved ejection fraction (H)     Tachycardia     Cholecystitis     Anticoagulant long-term use     Biliary obstruction     Abdominal pain, right upper quadrant       Labs:    Results " from last 7 days  Lab Units 10/07/18  0732 10/06/18  0612 10/05/18  0531   LN-WHITE BLOOD CELL COUNT thou/uL 5.9 6.0 7.6   LN-HEMOGLOBIN g/dL 12.0 11.3* 12.1   LN-HEMATOCRIT % 39.1 36.1 39.5   LN-PLATELET COUNT thou/uL 85* 74* 85*         Results from last 7 days  Lab Units 10/07/18  0732 10/06/18  0612 10/05/18  0531 10/04/18  0638 10/03/18  1342   LN-SODIUM mmol/L  --   --  135* 137  138 136   LN-POTASSIUM mmol/L 2.9* 3.0* 3.8 4.0  4.0 5.5*   LN-CHLORIDE mmol/L  --   --  100 106  106 104   LN-CO2 mmol/L  --   --  26 23  23 22   LN-BLOOD UREA NITROGEN mg/dL  --   --  18 17  17 22   LN-CREATININE mg/dL  --  1.12* 1.39* 0.75  0.75 0.89   LN-CALCIUM mg/dL  --   --  10.5 10.6*  10.7* 11.1*   LN-ALBUMIN g/dL 3.1* 3.1* 3.0* 2.9* 3.3*   LN-PROTEIN TOTAL g/dL 7.4 6.8 6.9 6.3 7.7   LN-BILIRUBIN TOTAL mg/dL 1.7* 2.0* 0.7 0.5 0.6   LN-ALKALINE PHOSPHATASE U/L 295* 197* 111 96 113   LN-ALT (SGPT) U/L 115* 139* 115* 93* 75*   LN-AST (SGOT) U/L 190* 307* 193* 195* 179*     Lipase   Date Value Ref Range Status   06/29/2016 17 0 - 52 U/L Final   03/04/2016 29 0 - 52 U/L Final   11/11/2015 21 0 - 52 U/L Final       Results from last 7 days  Lab Units 10/07/18  0732 10/06/18  0941 10/05/18  0846   LN-INR  1.44* 1.42* 2.84*         Image(s):    NM Hepatobiliary WO EF Or Pharm [[id number]] Collected: 10/06/18 1701       Order Status: Completed Updated: 10/06/18 1714       Narrative:         NM HEPATOBILIARY WO EF OR PHARM  10/6/2018 5:01 PM    INDICATION: Distended gallbladder on ct distended gallblader with ruq pain  TECHNIQUE: Following the administration of 8.2 mCi of Tc-99m mebrofenin intravenously, anterior planar imaging of the abdomen was obtained for 60 minutes.    COMPARISON: CT abdomen of 10/03/2018, abdominal ultrasound of 10/03/2018.    FINDINGS: There is marked intrahepatic cholestasis. No activity is seen in the extrahepatic bile ducts during the first 60 minutes of the examination. Delayed images were obtained at  4 hours the show activity in intra and extrahepatic bile ducts   including the common bile duct. Small bowel activity is seen. No activity is seen within the gallbladder. Findings are consistent with cystic duct obstruction and cholecystitis.         Impression:         CONCLUSION:  1.  Marked intrahepatic cholestasis. Delayed images show activity in the common bile duct and small bowel but no activity in the gallbladder. Findings are consistent with cystic duct obstruction and cholecystitis. Findings were called to the bases nurse,   Andriy, at 1710 hours on 10/06/2018.          Assessment: 67 yo f on asa/coumadin as outpatient admitted with 1 week epigastric/ruq pain with dilated cbd and abnormal lfts s/p ERCP on 10/5 with sphincterotomy. Hgb stable.  Hida c/w cholecystitis. Plan for cholecystectomy as per surgery today    Plan:   1. Continue antibiotics  2. Cholecystectomy  Will sign off. Call with questions.     Luly Salgado MD  10/7/2018 8:40 AM  [facility]

## 2021-06-20 NOTE — CONSULTS
[facility] Consult       Name: Gardenia Muller    Medical Record #: [id number]    YOB: 1950    Date/Time: 10/5/2018/10:50 AM    Reason for Consultation: Manav Martínez MD has asked me to evaluate Gardenia Muller regarding abdominal pain.    HPI: Patient is 67 yo F w/ pmh s.f. Aflutter on coumadin (held), DM, htn, previous CVA, heart failure, hernia repair 6/2018 admitted with epigastric/ruq pain x 1 week. She reports intermittent pain associated with n/v for the last week which worsened prompting admission. U/s reveals a distended gallbladder without stones, and a dilated cbd and PD. CT reveals cbd 11mm and PD upper limit of normal, with o/w normal appearing pancreas. LFT's are abnormal with ast 193/alt 115, normal tbili and alk phos (normal 2/2018). She reports continued pain which she describes as crampy and rates 8/10 controlled with pain meds. She denies etoh. She reports normal bm's without blood. She had egd/colon 3/2017. EGD for dysphagia was normal with normal esophageal biopsies. Colonoscopy revealed sigmoid/descending diverticulosis, TA, 5 year repeat recommended. Patient has had no fevers, wbc is normal. Noted inr 2.2, plts 85 (previously normal), crp 6.5. She reports 10lb weight loss in last month which she says is from fluid. Surgery has seen her and do not think she has cholecystitis.    Patient Active Problem List   Diagnosis     Spinal stenosis     PAD (peripheral artery disease) (H)     Dermatosis Papulosa Nigra     Depression     Hypercalcemia     Right Renal Artery Stenosis     Osteoarthritis     Abnormality Of Walk     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)     Advanced directives, counseling/discussion     SBO (small bowel obstruction) (H)     Cystitis     Hypomagnesemia     Healthcare maintenance     Essential hypertension     Dysarthria     Cerebral infarction (H)     Anemia     Cerebrovascular disease     Benign adenomatous polyp of large  intestine     RLS (restless legs syndrome)     Incisional hernia, without obstruction or gangrene     Abdominal pain, generalized     Diverticular disease of large intestine     Dysphagia     Coronary artery disease involving native coronary artery of native heart without angina pectoris     Dyslipidemia     Ventral hernia without obstruction or gangrene     Paroxysmal atrial fibrillation (H)     Anticoagulation management encounter     S/P repair of recurrent ventral hernia     SOB (shortness of breath)     Lower extremity edema     Anxiety     Hypokalemia     (HFpEF) heart failure with preserved ejection fraction (H)     Tachycardia     Cholecystitis     Anticoagulant long-term use     Biliary obstruction          Review of Systems (ROS): Pertinent items are noted in HPI.    Past Medical History:  Past Medical History:   Diagnosis Date     Acute diastolic heart failure (H) 11/2015     Atrial fibrillation (H)      Cerebral vascular accident (H)      Coronary artery disease 2006    100% RCA with collateral filling per Dr. Elizabeth's angio report     Diabetes mellitus type 2 in obese (H)      Fibrocystic breast      GERD (gastroesophageal reflux disease)      History of anesthesia complications     slow to wake     Hypertension      Peripheral vascular disease (H)      PONV (postoperative nausea and vomiting)      Stroke (H)      Urinary incontinence        Medications:   Current Facility-Administered Medications   Medication Dose Route Frequency Provider Last Rate Last Dose     acetaminophen CR tablet 650 mg (TYLENOL)  650 mg Oral BID Rohit Perla MD   650 mg at 10/05/18 0900     bisacodyl suppository 10 mg (DULCOLAX)  10 mg Rectal Daily PRN Rohit Perla MD         diltiazem 24 hr capsule 180 mg (CARDIZEM CD)  180 mg Oral DAILY Rohit Perla MD   180 mg at 10/05/18 0900     HYDROmorphone injection 0.5-1 mg (DILAUDID)  0.5-1 mg Intravenous Q3H PRN Fuad Mcrae MD   1 mg at 10/05/18 0811     levoFLOXacin 500  mg/100 mL IVPB 500 mg (LEVAQUIN)  500 mg Intravenous On Call Luly Salgado MD         magnesium hydroxide suspension 30 mL (MILK OF MAG)  30 mL Oral Daily PRN Rohit Perla MD         metoprolol tartrate tablet 25 mg (LOPRESSOR)  25 mg Oral BID Rohit Perla MD   25 mg at 10/05/18 0900     naloxone injection 0.2-0.4 mg (NARCAN)  0.2-0.4 mg Intravenous PRN Sweetie Mcmillan MD        Or     naloxone injection 0.2-0.4 mg (NARCAN)  0.2-0.4 mg Intramuscular PRN Sweetie Mcmillan MD         nitroglycerin SL tablet 0.4 mg (NITROSTAT)  0.4 mg Sublingual Q5 Min PRN Rohit Perla MD         omeprazole capsule 20 mg (PriLOSEC)  20 mg Oral BID AC Rohit Perla MD   20 mg at 10/05/18 0730     ondansetron injection 4 mg (ZOFRAN)  4 mg Intravenous Q4H PRN Rohit Perla MD   4 mg at 10/04/18 2248    Or     ondansetron tablet 8 mg (ZOFRAN)  8 mg Oral Q8H PRN Rohit Perla MD         rOPINIRole tablet 1 mg (REQUIP)  1 mg Oral QHS Rohit Perla MD         rosuvastatin tablet 40 mg (CRESTOR)  40 mg Oral QHS Rohit Perla MD   40 mg at 10/04/18 2019     senna-docusate 8.6-50 mg tablet 1 tablet (PERICOLACE)  1 tablet Oral BID Rohit Perla MD   1 tablet at 10/05/18 0900     sertraline tablet 100 mg (ZOLOFT)  100 mg Oral DAILY Rohit Perla MD   100 mg at 10/05/18 0900     sodium chloride 0.9%  100 mL/hr Intravenous Continuous uLly Salgado  mL/hr at 10/05/18 0900 100 mL/hr at 10/05/18 0900     traZODone tablet 50 mg (DESYREL)  50 mg Oral QHS Rohit Perla MD   50 mg at 10/04/18 2021       Allergies: Penicillins    Family History:  Family History   Problem Relation Age of Onset     Cancer Paternal Grandmother      Cancer Paternal Uncle      No Medical Problems Mother      No Medical Problems Father      Heart attack Sister      Chronic Kidney Disease Sister      No Medical Problems Daughter      No Medical Problems Maternal Grandmother      No Medical Problems Maternal Grandfather      No Medical Problems Paternal  "Grandfather      No Medical Problems Maternal Aunt      No Medical Problems Paternal Aunt      Diabetes Brother      BRCA 1/2 Neg Hx      Breast cancer Neg Hx      Colon cancer Neg Hx      Endometrial cancer Neg Hx      Ovarian cancer Neg Hx        Social History:  Social History     Social History     Marital status: Legally      Spouse name: N/A     Number of children: N/A     Years of education: N/A     Occupational History     Not on file.     Social History Main Topics     Smoking status: Current Every Day Smoker     Packs/day: 0.25     Smokeless tobacco: Former User      Comment: e-cig a few times/day     Alcohol use No     Drug use: No     Sexual activity: Not on file     Other Topics Concern     Not on file     Social History Narrative       PHYSICAL EXAMINATION:  /70 (Patient Position: Lying)  Pulse 66  Temp 98.5  F (36.9  C) (Oral)   Resp 18  Ht 5' 3\" (1.6 m)  Wt 174 lb 1.6 oz (79 kg)  LMP 01/25/1999  SpO2 93%  BMI 30.84 kg/m2 Body mass index is 30.84 kg/(m^2).  General:  NAD  HEENT: Sclera non-icteric.  Moist mucous membranes. Oropharynx clear.   Lymph: No cervical lymphadenopathy.  Pulm: Lungs clear to ausculation bilaterally.  Cardio: irreg irreg  Gastrointestinal: +bs, soft, significant epigastric and ruq ttp, no rebound/guarding  Musculoskeletal: Extremities are warm, no lower extremity edema.  Neuro: Alert and oriented.  No gross focal abnormalities.  Psych: Mood and affect normal.  Skin: No suspicious lesions or rashes.    I have reviewed recent laboratory studies:    LABORATORY DATA:    Results from last 7 days  Lab Units 10/05/18  0531 10/04/18  0638 10/03/18  1342   LN-WHITE BLOOD CELL COUNT thou/uL 7.6 6.0 6.4   LN-HEMOGLOBIN g/dL 12.1 11.5* 12.5   LN-HEMATOCRIT % 39.5 37.7 39.9   LN-PLATELET COUNT thou/uL 85* 94* 126*         Results from last 7 days  Lab Units 10/05/18  0531 10/04/18  0638 10/03/18  1342   LN-SODIUM mmol/L 135* 137  138 136   LN-POTASSIUM mmol/L 3.8 4.0 "  4.0 5.5*   LN-CHLORIDE mmol/L 100 106  106 104   LN-CO2 mmol/L 26 23  23 22   LN-BLOOD UREA NITROGEN mg/dL 18 17  17 22   LN-CREATININE mg/dL 1.39* 0.75  0.75 0.89   LN-CALCIUM mg/dL 10.5 10.6*  10.7* 11.1*         Results from last 7 days  Lab Units 10/05/18  0531 10/04/18  0638 10/03/18  1342   LN-ALKALINE PHOSPHATASE U/L 111 96 113   LN-BILIRUBIN TOTAL mg/dL 0.7 0.5 0.6   LN-BILIRUBIN DIRECT mg/dL 0.4  --   --    LN-PROTEIN TOTAL g/dL 6.9 6.3 7.7   LN-ALT (SGPT) U/L 115* 93* 75*   LN-AST (SGOT) U/L 193* 195* 179*       Results from last 7 days  Lab Units 10/05/18  0846 10/04/18  0041 10/03/18  1342   LN-INR  2.84* 2.20* 2.09*         Radiology:   US Abdomen Limited [[id number]] Collected: 10/03/18 1956        Order Status: Completed Updated: 10/03/18 2007       Narrative:         US ABDOMEN LIMITED  10/3/2018 7:56 PM    INDICATION: Abd pain, elevated lfts, surgery requesting us  COMPARISON: CT from earlier today    FINDINGS:  GALLBLADDER: The gallbladder is distended. The gallbladder wall is thickened and edematous. No gallstones identified.  BILE DUCTS: Bile ducts are dilated. The common bile duct measures 11 mm.  LIVER: Normal where seen.  RIGHT KIDNEY: No hydronephrosis.  PANCREAS: The pancreatic duct is dilated, measuring 5 mm in the body of the pancreas.    Small amount of ascites in the right upper quadrant.         Impression:         CONCLUSION:  1.  The gallbladder is significantly distended. There is marked gallbladder wall thickening/edema. No visible gallstones. Findings may represent acalculus cholecystitis.  2.  There is dilation of the common bile duct and pancreatic duct. This raises the possibility of a pancreatic head mass, although none is definitely seen on this examination or on the prior CT.       CTA Chest PE Run [[id number]] Collected: 10/03/18 1659       Order Status: Completed Updated: 10/03/18 1721       Narrative:         CTA CHEST PE RUN, CT ABDOMEN PELVIS WO ORAL W IV  CONTRAST  10/3/2018 4:59 PM    INDICATION: Shortness of breath and pain.   TECHNIQUE: Helical acquisition through the chest was performed during the arterial phase of contrast enhancement using IV contrast. 2D and 3D reconstructions were performed by the CT technologist. Dose reduction techniques were used.   IV CONTRAST: Iohexol (Omni) 100 mL  COMPARISON: 6/27/2018     FINDINGS:  ANGIOGRAM CHEST: No PE. The main pulmonary artery is 31 mm in diameter.  No thoracic aortic aneurysm or dissection. There is coronary artery calcification.    RV/LV RATIO: N/A    LUNGS AND PLEURA: There is mild interlobular septal thickening. No consolidative airspace disease.     MEDIASTINUM: The heart is enlarged. Normal esophagus. No thoracic lymphadenopathy by size criteria.     ABDOMEN: Reflux of contrast material into the hepatic veins and IVC. Right renal artery stent noted.  Normal spleen and adrenal glands. The liver is enlarged and is heterogeneous. There is gallbladder wall thickening and mucosal enhancement. The   gallbladder is distended. The common bile duct is distended at 11 mm in diameter. The pancreatic duct is at the upper limits of normal in caliber. Otherwise the pancreas is normal in appearance. Normal kidneys and ureters. Normal stomach. There is a   small duodenal diverticulum. Normal caliber of the small bowel. Atherosclerotic disease is present.    PELVIS: Nondistended urinary bladder. Fibroid uterus. No adnexal masses. There is a small to moderate amount of free fluid. There is colonic diverticulosis. Normal appendix.    MUSCULOSKELETAL: Postsurgical changes at the ventral abdominal wall. No contained fluid collections identified.  There is degenerative change.         Impression:         CONCLUSION:  1.  No PE.  2.  Gallbladder wall thickening and enhancement. Consider cholecystitis. There is extrahepatic bile duct dilatation.  3.  Interlobular septal thickening is likely edema.   4.  Cardiomegaly. Reflux of  contrast material into the hepatic veins suggests cardiac dysfunction. There is atherosclerotic disease including coronary artery calcification. Enlargement of the main pulmonary artery can be seen with pulmonary hypertension.  5.  Hepatomegaly and heterogeneity of of the liver may be related to cardiac dysfunction.   6.  Colonic diverticulosis. No definite acute diverticulitis.  7.  Free fluid in the pelvis is nonspecific.     NOTE: ABNORMAL REPORT    THE DICTATION ABOVE DESCRIBES AN ABNORMALITY FOR WHICH FOLLOW-UP IS NEEDED.         CT Abdomen Pelvis Without Oral With IV Contrast [[id number]] Collected: 10/03/18 1703       Order Status: Completed Updated: 10/03/18 1721       Narrative:         CTA CHEST PE RUN, CT ABDOMEN PELVIS WO ORAL W IV CONTRAST  10/3/2018 4:59 PM    INDICATION: Shortness of breath and pain.   TECHNIQUE: Helical acquisition through the chest was performed during the arterial phase of contrast enhancement using IV contrast. 2D and 3D reconstructions were performed by the CT technologist. Dose reduction techniques were used.   IV CONTRAST: Iohexol (Omni) 100 mL  COMPARISON: 6/27/2018     FINDINGS:  ANGIOGRAM CHEST: No PE. The main pulmonary artery is 31 mm in diameter.  No thoracic aortic aneurysm or dissection. There is coronary artery calcification.    RV/LV RATIO: N/A    LUNGS AND PLEURA: There is mild interlobular septal thickening. No consolidative airspace disease.     MEDIASTINUM: The heart is enlarged. Normal esophagus. No thoracic lymphadenopathy by size criteria.     ABDOMEN: Reflux of contrast material into the hepatic veins and IVC. Right renal artery stent noted.  Normal spleen and adrenal glands. The liver is enlarged and is heterogeneous. There is gallbladder wall thickening and mucosal enhancement. The   gallbladder is distended. The common bile duct is distended at 11 mm in diameter. The pancreatic duct is at the upper limits of normal in caliber. Otherwise the pancreas is  normal in appearance. Normal kidneys and ureters. Normal stomach. There is a   small duodenal diverticulum. Normal caliber of the small bowel. Atherosclerotic disease is present.    PELVIS: Nondistended urinary bladder. Fibroid uterus. No adnexal masses. There is a small to moderate amount of free fluid. There is colonic diverticulosis. Normal appendix.    MUSCULOSKELETAL: Postsurgical changes at the ventral abdominal wall. No contained fluid collections identified.  There is degenerative change.         Impression:         CONCLUSION:  1.  No PE.  2.  Gallbladder wall thickening and enhancement. Consider cholecystitis. There is extrahepatic bile duct dilatation.  3.  Interlobular septal thickening is likely edema.   4.  Cardiomegaly. Reflux of contrast material into the hepatic veins suggests cardiac dysfunction. There is atherosclerotic disease including coronary artery calcification. Enlargement of the main pulmonary artery can be seen with pulmonary hypertension.  5.  Hepatomegaly and heterogeneity of of the liver may be related to cardiac dysfunction.   6.  Colonic diverticulosis. No definite acute diverticulitis.  7.  Free fluid in the pelvis is nonspecific          Impression: 67 yo F on asa/coumadin as outpatient, with 1 week epigastric/ruq pain with dilated cbd and b/l PD on imaging as well as distended gallbladder; concern for biliary obstruction +/- cholecystitis. Given dilated cbd and PD malignancy cannot be excluded though no mass seen on CT. Her inr today is 2.8. Platelets are noted to be downtrending. No evidence of cholangitis.     Recommendation:   1. Correct inr  2. Antibiotics  3. ERCP today   4. Defer eval of thrombocytopenia to primary  Will follow  Luly Salgado MD  10/5/2018/10:50 AM  [facility]

## 2021-06-20 NOTE — PROGRESS NOTES
Progress Note        BRIEF HOSPITAL COURSE :       Gardenia Muller is 68 y.o. female with past medical history of atrial flutter on warfarin, hypertension, dyslipidemia, diet controlled diabetes, heart failure with preserved EF, presented to the hospital on 10/3/2018 with abdominal pain, and was found to have acute cholecystitis with cholelithiasis and dilatation of CBD and pancreatic duct. GI and surgery were consulted. She underwent ERCP on 10/05, which did not show any filling defect. She underwent HIDA scan on 10/06, which showed marked intrahepatic cholestasis, associated with cystic duct obstruction and cholecystitis. She was treated with IV antibiotics. She underwent laparoscopic cholecystectomy on 10/07/18.       SUBJECTIVE :       No fever  Abdominal pain is controlled  No sob          PLAN :     - continue post op care    - not yet medically ready for discharge.          Barriers to Discharge : placement  Anticipated discharge : in am  Disposition :tcu      TIME SPENT : Total time spent > 35 minutes and > 50% time spent on counseling patient about plan of care and coordination of care.                  ASSESSMENT :          1. Acute acalculous cholecystitis, s/p laparoscopic cholecystectomy on 10/07. Clinically improved. Off of antibiotics. Liver enzymes improving.    2. Atrial flutter with variable AV block, rate controlled. She has been restarted on warfarin, which was held for surgery. She is on metoprolol, diltiazem.    3. Chronic diastolic heart failure. Volume compensated. She is on torsemide. LVEF of 55%.    4. Transaminitis, elevated liver enzymes. Secondary to cholecystitis. Possible choledocholithiasis. S/p ERCP followed by lap cholecystectomy. Liver enzymes improving.    5. Type 2 diabetes mellitus, she is on sliding scale insulin. Blood glucose in acceptable range.    6. Dyslipidemia on rosuvastatin    7. Restless leg syndrome on requip.    8. Hypokalemia and hypomagnesemia. Continue with  replacement.    9. GERD on PPI    10. Thrombocytopenia.            Diet :  regular   , IV fluids :     , IV Meds :    , Antibiotics     QTc :  DVT Prophylaxis : SCD's  Code Status : Full  Admit status : inpatient  Morning labs ordered :             Objective :            Review of Systems   A 12 point comprehensive review of systems was negative except as noted.        Physical Exam    HEENT : no distended veins, no lymphadenopathy, thyroid is normal  LUNGS : b/l air entry present, no significant crackles/wheezing.  ABDOMEN : soft, non-tender, non-distended, BS present.  HEART :  Regular rate & rhythm, S1 & S2 normal, no murmur, clicks/rubs, no ankle edema  NEURO : conscious, oriented, responds to commands, no obvious focal deficit.  MUSCULOSKELETAL / EXTREMITIES :  no calf tenderness.  SKIN : no rashes  BACK : wnl        Pertinent Labs   Lab Results: personally reviewed.   Lab Results   Component Value Date     10/09/2018    K 4.1 10/11/2018    CL 97 (L) 10/09/2018    CO2 31 10/09/2018    BUN 18 10/09/2018    CREATININE 1.03 10/09/2018    CALCIUM 11.0 (H) 10/09/2018           Pertinent Radiology   Radiology Results: Personally reviewed image/s  EKG Results: not done              DR. YONY CHACON  [facility]

## 2021-06-20 NOTE — PROGRESS NOTES
"[facility] Progress Note    Subjective: ERCP yesterday for dilated cbd with sphincterotomy no stones or sludge; patient with persistent severe epigastric and ruq pain. Afebrile. Noted increase in lfts.    Objective:  /70 (Patient Position: Lying)  Pulse 65  Temp 98.7  F (37.1  C) (Oral)   Resp 16  Ht 5' 3\" (1.6 m)  Wt 175 lb (79.4 kg)  LMP 01/25/1999  SpO2 97%  BMI 31 kg/m2   Gen: Awake, no acute distress  Pulmonary: Lungs clear to ausculation bilaterally  Cardiovascular: Regular  Gastrointestinal: + bowel sounds, significant epigastric and ruq ttp similar to yesterday pre-procedure      Patient Active Problem List   Diagnosis     Spinal stenosis     PAD (peripheral artery disease) (H)     Dermatosis Papulosa Nigra     Depression     Hypercalcemia     Right Renal Artery Stenosis     Osteoarthritis     Abnormality Of Walk     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)     Advanced directives, counseling/discussion     SBO (small bowel obstruction) (H)     Cystitis     Hypomagnesemia     Healthcare maintenance     Essential hypertension     Dysarthria     Cerebral infarction (H)     Anemia     Cerebrovascular disease     Benign adenomatous polyp of large intestine     RLS (restless legs syndrome)     Incisional hernia, without obstruction or gangrene     Abdominal pain, generalized     Diverticular disease of large intestine     Dysphagia     Coronary artery disease involving native coronary artery of native heart without angina pectoris     Dyslipidemia     Ventral hernia without obstruction or gangrene     Paroxysmal atrial fibrillation (H)     Anticoagulation management encounter     S/P repair of recurrent ventral hernia     SOB (shortness of breath)     Lower extremity edema     Anxiety     Hypokalemia     (HFpEF) heart failure with preserved ejection fraction (H)     Tachycardia     Cholecystitis     Anticoagulant long-term use     Biliary obstruction "       Labs:    Results from last 7 days  Lab Units 10/06/18  0612 10/05/18  0531 10/04/18  0638   LN-WHITE BLOOD CELL COUNT thou/uL 6.0 7.6 6.0   LN-HEMOGLOBIN g/dL 11.3* 12.1 11.5*   LN-HEMATOCRIT % 36.1 39.5 37.7   LN-PLATELET COUNT thou/uL 74* 85* 94*         Results from last 7 days  Lab Units 10/06/18  0612 10/05/18  0531 10/04/18  0638 10/03/18  1342   LN-SODIUM mmol/L  --  135* 137  138 136   LN-POTASSIUM mmol/L 3.0* 3.8 4.0  4.0 5.5*   LN-CHLORIDE mmol/L  --  100 106  106 104   LN-CO2 mmol/L  --  26 23  23 22   LN-BLOOD UREA NITROGEN mg/dL  --  18 17  17 22   LN-CREATININE mg/dL 1.12* 1.39* 0.75  0.75 0.89   LN-CALCIUM mg/dL  --  10.5 10.6*  10.7* 11.1*   LN-ALBUMIN g/dL 3.1* 3.0* 2.9* 3.3*   LN-PROTEIN TOTAL g/dL 6.8 6.9 6.3 7.7   LN-BILIRUBIN TOTAL mg/dL 2.0* 0.7 0.5 0.6   LN-ALKALINE PHOSPHATASE U/L 197* 111 96 113   LN-ALT (SGPT) U/L 139* 115* 93* 75*   LN-AST (SGOT) U/L 307* 193* 195* 179*     Lipase   Date Value Ref Range Status   06/29/2016 17 0 - 52 U/L Final   03/04/2016 29 0 - 52 U/L Final   11/11/2015 21 0 - 52 U/L Final       Results from last 7 days  Lab Units 10/06/18  0941 10/05/18  0846 10/04/18  0041   LN-INR  1.42* 2.84* 2.20*         Image(s):  [unfilled]    Assessment: 67 yo F on asa/coumadin as outpatient admitted with 1 wek epigastric/ruq pain with dilated cbd and borderline PD on imaging as well as distended gallbladder. She underwent ERCP yesterday for dilated bile duct with sphincterotomy with no stones/sludge. Today pain persist and lfts are up. Concern remains for cholecystitis. Unclear if lfts are up now due to clots s/p sphincterotomy. She is scheduled for HIDA today at noon.    Plan:   1. Await HIDA results  2. Continue antibiotics.  3. Follow lfts  4. Keep npo  5. Pending hida results if evidence of biliary obstruction will repeat ERCP  6. Defer eval of thrombocytopenia to primary   Will follow    Luly Salgado MD  10/6/2018 9:58 AM  [facility]

## 2021-06-20 NOTE — PROGRESS NOTES
"Progress Note    Assessment/Plan  Hd 3  Principal Problem:    Biliary obstruction  Active Problems:    PAD (peripheral artery disease) (H)    Depression    Cerebrovascular disease    Coronary artery disease involving native coronary artery of native heart without angina pectoris    Dyslipidemia    (HFpEF) heart failure with preserved ejection fraction (H)    Tachycardia    Cholecystitis    Anticoagulant long-term use  Plan- ERCP at some point with GI. We don't plan to do cholecystectomy at this time. Will continue to follow.     Subjective  Patient confused currently about her plan. She does have ruq pain still. Pain worse with palpitation. Pain mild until palpitation then becomes more moderate . Has dry mouth. GI seen today. No note as of yet but sounds like plan is get INR in control as it is elevated then ERCP. No nausea  Objective    Vital signs in last 24 hours  Temp:  [97.5  F (36.4  C)-98.7  F (37.1  C)] 98.5  F (36.9  C)  Heart Rate:  [59-88] 66  Resp:  [18] 18  BP: ()/(58-79) 137/70  Weight:   174 lb 1.6 oz (79 kg)    Intake/Output last 3 shifts  I/O last 3 completed shifts:  In: 1580 [P.O.:1580]  Out: 1050 [Urine:1050]  Intake/Output this shift:       Review of Systems   Pertinent items are noted in HPI.    Physical Exam  /70 (Patient Position: Lying)  Pulse 66  Temp 98.5  F (36.9  C) (Oral)   Resp 18  Ht 5' 3\" (1.6 m)  Wt 174 lb 1.6 oz (79 kg)  LMP 01/25/1999  SpO2 92%  BMI 30.84 kg/m2  General appearance: alert, appears stated age and cooperative  Abdomen: soft. tender ruq.guarding. no rebound.     Pertinent Labs   Lab Results: personally reviewed.   Lab Results   Component Value Date     (L) 10/05/2018     10/04/2018     10/04/2018    K 3.8 10/05/2018    K 4.0 10/04/2018    K 4.0 10/04/2018    CO2 26 10/05/2018    CO2 23 10/04/2018    CO2 23 10/04/2018    BUN 18 10/05/2018    BUN 17 10/04/2018    BUN 17 10/04/2018    CREATININE 1.39 (H) 10/05/2018    CREATININE 0.75 " 10/04/2018    CREATININE 0.75 10/04/2018    CALCIUM 10.5 10/05/2018    CALCIUM 10.7 (H) 10/04/2018    CALCIUM 10.6 (H) 10/04/2018     Lab Results   Component Value Date    TROPONINI 0.01 10/03/2018    TROPONINI 0.02 06/24/2018    TROPONINI <0.01 09/20/2017     Lab Results   Component Value Date    WBC 7.6 10/05/2018    HGB 12.1 10/05/2018    HCT 39.5 10/05/2018    MCV 88 10/05/2018    PLT 85 (L) 10/05/2018     Nela Camacho PA-C [phone]      I have seen and examined the patient.  I have reviewed and agree with the note written by the NP/PA team member.   Presented with right-sided abdominal pain.  ERCP done today.  Feels okay with still abdominal discomfort in the midline and right lateral wall.  However, pain improving, hungry  Abdomen-no recurrent hernias noted, midline tenderness and mild right mid abdominal tenderness  CT scan reviewed  Assessment/plan-abdominal pain of an unclear etiology  -Mrs. Muller is well known to me.  She is status post transversus abdominis repair several months ago.  I did review her CT scan which does show an edematous gallbladder with the finding of cardio hepatic reflux of contrast indicating retrograde blood flow in the liver.  We do see edematous gallbladder is on asymptomatic patients which are noted to be cholecystitis however, hepatic congestion and cardiac dysfunction can cause this finding on imaging.  Additionally.  Liver capsular engorgement can cause discomfort.  In the setting of a normal white blood cell count, it is not clear that she actually has cholecystitis despite radiographic findings.  Additionally, the ERCP was completely normal.  I would not have any plans for surgery at this time.  I would continue with conservative management including cardiac workup.  If at any time she continues to have discomfort in the right abdominal region you can always entertain a HIDA scan.  If this is indicative of cholecystitis then I would recommend antibiotic treatment for the  time being.  Given the large size of the hernia she had as well as the large piece of macro porous mesh, surgery may be somewhat difficult.  -will continue to follow    Howard Harding D.O. FACS  [phone]  [facility] Department of Surgery

## 2021-06-20 NOTE — ANESTHESIA CARE TRANSFER NOTE
Last vitals:   Vitals:    10/05/18 1424   BP: 130/72   Pulse: (!) 57   Resp: 20   Temp:    SpO2: 98%     Patient's level of consciousness is awake  Spontaneous respirations: yes  Maintains airway independently: yes  Dentition unchanged: yes  Oropharynx: oropharynx clear of all foreign objects    QCDR Measures:  ASA# 20 - Surgical Safety Checklist: WHO surgical safety checklist completed prior to induction  PQRS# 430 - Adult PONV Prevention: 4558F - Pt received => 2 anti-emetic agents (different classes) preop & intraop  ASA# 8 - Peds PONV Prevention: NA - Not pediatric patient, not GA or 2 or more risk factors NOT present  PQRS# 424 - Elzbieta-op Temp Management: 4559F - At least one body temp DOCUMENTED => 35.5C or 95.9F within required timeframe  PQRS# 426 - PACU Transfer Protocol: - Transfer of care checklist used  ASA# 14 - Acute Post-op Pain: patient does not have pain > 7

## 2021-06-20 NOTE — PROGRESS NOTES
Assessment/ Plan  Problem List Items Addressed This Visit        Unprioritized    RLS (restless legs syndrome)     Current Mirapex dose ineffective, so much so she stopped it.  Severe interference with sleep.  Will restart at 0.25         Abdominal pain, generalized     Abdominal wall pain-leg due to recent surgery.  Patient is limited in what she can do for pain medicines given congestive heart failure.  Pain long-term and would like to avoid opiates, discussed this with her and she agrees.  Tylenol only           Persistent atrial fibrillation (H)     Rate variable but generally reasonably controlled on her personal vitals.  On warfarin, recent INR therapeutic         Acute diastolic congestive heart failure (H) - Primary     She has been instructed to increase furosemide to 80 mg 3 times a day but remains on 80 daily weights are unchanging  Increase frequency of dose to 80 twice a day  Follow-up 1 week         Anxiety     Low-dose Lorazepam prescribed temporarily, cautioned about instability on feet and high risk of falls.             Patient Instructions   You are stuck with Tylenol as the painkiller.  All of the other options have side effects that are not acceptable.    I prescribed 20 lorazepam to settle your anxiety.  Be careful when getting up and moving around with this as it puts you at higher risk of falling.    Increase furosemide to 80 mg twice a day.  Take second dose at noon or 1 PM    I increased her Mirapex to 0.25-called in a new prescription.  That would be the same as taking 2 of your existing pills    See me next Tuesday-hope that you lose 4-6 pounds of fluid, you will feel better          Subjective  CC:  Chief Complaint   Patient presents with     Follow-up     INR - Pt legs are swelling worse. Pt. is requesting pain medication. Stomach pain. Feet turning colors      HPI:  Follow-up CHF  Diastolic  Narrative/ current symptoms patient has been struggling with volume overload lately.  She saw  Dr. Choe on 8/15 who increased her furosemide to 80 mg 3 times a day a day.  Her blood pressure is noted to be a little low at this visit.  She was noted to be in atrial fibrillation with rapid rate.  Started on diltiazem 120 she is to follow-up with me and Dr. Choe in 3 month.  Subsequently seen in the emergency room 8/18  Note current mediations/adherance  Patient was noted to be an atrial fibrillation at the same time.  Patient feels only mildly short of breath currently, denies significant chest pain.  She needs to have some lower extremity edema  ____________________      Wt Readings from Last 3 Encounters:   08/21/18 189 lb 12 oz (86.1 kg)   08/18/18 188 lb (85.3 kg)   08/15/18 189 lb (85.7 kg)       Is patient monitoring weight?   Yes, weight is checked twice daily  Going back to August 3, the day her furosemide was increased to 80 mg daily her morning weights have been as follows:  194, 194, 192, 192, 188, 189, 190, 190, 190, 191, 189, 189, 188, 190, 189, 188, 188, 188, 188, 188  I told her to increase furosemide to 80 mg a day, Dr. Hinojosa told her to increase to 80 mg 3 times daily but patient continues to take it only 80 mg daily.  Results for orders placed or performed during the hospital encounter of 08/18/18   Basic Metabolic Panel   Result Value Ref Range    Sodium 141 136 - 145 mmol/L    Potassium 3.6 3.5 - 5.0 mmol/L    Chloride 104 98 - 107 mmol/L    CO2 29 22 - 31 mmol/L    Anion Gap, Calculation 8 5 - 18 mmol/L    Glucose 131 (H) 70 - 125 mg/dL    Calcium 9.8 8.5 - 10.5 mg/dL    BUN 9 8 - 22 mg/dL    Creatinine 0.72 0.60 - 1.10 mg/dL    GFR MDRD Af Amer >60 >60 mL/min/1.73m2    GFR MDRD Non Af Amer >60 >60 mL/min/1.73m2     Lab Results   Component Value Date     (H) 08/18/2018     Patient subsequently was in the emergency room for shortness of breath on 8/18.  This time she was told to continue to take 80 mg 3 times a day of furosemide but again patient has only been taking  daily    ER Follow-up  Date of ER visit: 8/18  [facility]  Chief Diagnosis: Ingestive heart failure, atrial flutter  Narrative: Presented with shortness of breath  EKG showed atrial flutter with flutter with variable block with a rate of 118.  INR was 2.05, glucose 131, BUN 9, creatinine 0.72, white blood cell count 4.8, hemoglobin 10.1 BNP was elevated at 931  Radiology studies: Chest x-ray showed cardiomegaly, mild interstitial opacities were unchanged.  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)     Advanced directives, counseling/discussion     SBO (small bowel obstruction)     Cystitis     Hypomagnesemia     Essential hypertension     Dysarthria     Cerebral infarction (H)     Anemia     Cerebrovascular disease     Benign adenomatous polyp of large intestine     RLS (restless legs syndrome)     Incisional hernia, without obstruction or gangrene     Abdominal pain, generalized     Diverticular disease of large intestine     Dysphagia     Coronary artery disease involving native coronary artery of native heart without angina pectoris     Dyslipidemia     Ventral hernia without obstruction or gangrene     Persistent atrial fibrillation (H)     Type 2 diabetes mellitus with complication, without long-term current use of insulin (H)     Anemia due to blood loss, acute     Anticoagulation management encounter     S/P repair of recurrent ventral hernia     SOB (shortness of breath)     Lower extremity edema     Acute diastolic congestive heart failure (H)     Anxiety     Current medications reviewed as follows:  Current Outpatient Prescriptions on File Prior to Visit   Medication Sig     ACCU-CHEK YAZMIN PLUS TEST STRP strips TEST TWICE DAILY AS  DIRECTED. *6 TOTAL FILLS* (Patient taking differently: TEST QID DAILY AS DIRECTED. *6 TOTAL FILLS*)     acetaminophen (TYLENOL) 650 MG CR tablet Take 650 mg by mouth 2 (two) times a day.      aspirin 81 MG EC tablet Take 81 mg by mouth daily.     blood glucose meter (GLUCOMETER) Please Dispense kit per pharmacy discretion and patient's insurance Test blood sugar.     cholecalciferol, vitamin D3, (VITAMIN D3) 2,000 unit capsule Take 2,000 Units by mouth daily with lunch.     diltiazem (CARDIZEM CD) 120 MG 24 hr capsule Take 1 capsule (120 mg total) by mouth daily.     ferrous sulfate 325 (65 FE) MG tablet Take 1 tablet (325 mg total) by mouth 2 (two) times a day.     furosemide (LASIX) 40 MG tablet Take 2 tablets (80 mg total) by mouth daily for 5 days. Then dosing per your primary care doctor     lancets 33 gauge Misc Test twice daily Dispense brand per patient's insurance at pharmacy discretion.     loratadine (CLARITIN) 10 mg tablet Take 10 mg by mouth daily with lunch. Noon     magnesium oxide (MAG-OX) 400 mg tablet Take 1,200 mg by mouth daily.     metoprolol tartrate (LOPRESSOR) 25 MG tablet Take 25 mg by mouth 2 (two) times a day.     multivitamin (TAB-A-JULIET) per tablet Take 1 tablet by mouth daily.     nitroglycerin (NITROSTAT) 0.4 MG SL tablet Place 1 tablet (0.4 mg total) under the tongue every 5 (five) minutes as needed for chest pain (for up to 3 doses and call 911 if persists).     omeprazole (PRILOSEC) 20 MG capsule Take 20 mg by mouth 2 (two) times a day.      polyethylene glycol (GLYCOLAX) 17 gram/dose powder 17 g daily as needed.      polyvinyl alcohol (ARTIFICIAL TEARS, POLYVIN ALC,) 1.4 % ophthalmic solution Administer 2 drops to both eyes as needed for dry eyes. As directed.      rosuvastatin (CRESTOR) 40 MG tablet TAKE 1 TABLET BY MOUTH AT BEDTIME     sertraline (ZOLOFT) 100 MG tablet TAKE 1 TABLET BY MOUTH ONCE DAILY     traZODone (DESYREL) 50 MG tablet Take 1 tablet (50 mg total) by mouth at  bedtime.     warfarin (COUMADIN) 2 MG tablet Take 2 to 4mg (1 or 2 tabs) by mouth daily as directed.  Adjust dose based on INR. (Patient taking differently: See Admin Instructions. 4 mg on Sun/Tue/Thurs. 2 mg on Mon/Wed/Fri/Sat.  Adjust dose based on INR.)     [DISCONTINUED] pramipexole (MIRAPEX) 0.125 MG tablet TAKE 1 TABLET BY MOUTH AT BEDTIME     No current facility-administered medications on file prior to visit.      History   Smoking Status     Current Every Day Smoker     Packs/day: 0.25   Smokeless Tobacco     Former User     Comment: e-cig a few times/day     Social History     Social History Narrative     Patient Care Team:  Jerson Hernandez MD as PCP - General (Family Medicine)  Jones Harding DO as Physician (General Surgery)  ROS  Full 10 system review including constitutional, respiratory, cardiac, gi, urinary, rheumatologic, neurologic, reproductive, dermatologic psychiatric is  performed (via questionnaire) and is negative        Objective  Physical Exam  Vitals:    08/21/18 0905   BP: 90/64   Patient Site: Left Arm   Patient Position: Sitting   Cuff Size: Adult Large   Pulse: 96   Resp: (!) 32   Temp: 97  F (36.1  C)   TempSrc: Oral   Weight: 189 lb 12 oz (86.1 kg)     Body mass index is 34.71 kg/(m^2).  Gen- alert, oriented  No acute distress  HEENT- normal cephalic, atraumatic.   Chest- Normal inspiration and expiration.    Clear to ascultation.    No chest wall deformity or scar.  No reproducible chest wall tenderness  CV- Heart regular rate and rhythm  normal tones, no murmurs   No gallops or rubs.  Upper abdomen is not tender on palpation  Ext-no cyanosis  1+ lower extremity edema  Skin- warm and dry,   no visualized rash    Diagnostics:   None      Please note: Voice recognition software was used in this dictation.  It may therefore contain typographical errors.

## 2021-06-20 NOTE — ED PROVIDER NOTES
Patient was admitted to the hospital by Dr. Mcmillan at the end of her shift with a diagnosis of cholecystitis.  Surgery has seen the patient in the ER and is requesting an ultrasound of her right upper quadrant and this has been ordered.  CT scan shows findings to suggest possible cholecystitis with mildly elevated LFTs but surgery would like ultrasound first.     Filomena Gil MD  10/03/18 1933

## 2021-06-20 NOTE — PROGRESS NOTES
Pharmacy Consult: Warfarin Management    Pharmacy consulted to dose warfarin for Gardenia Muller, a 68 y.o. female    Ordering provider: Dr. Michael Abreu, Hospitalist    Reason for warfarin therapy: Atrial Fibrillation  Goal INR Range: 2-3    Subjective  Medication lists (home and hospital) were reviewed.    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)     Advanced directives, counseling/discussion     SBO (small bowel obstruction) (H)     Cystitis     Hypomagnesemia     Healthcare maintenance     Essential hypertension     Dysarthria     Cerebral infarction (H)     Anemia     Cerebrovascular disease     Benign adenomatous polyp of large intestine     RLS (restless legs syndrome)     Incisional hernia, without obstruction or gangrene     Abdominal pain, generalized     Diverticular disease of large intestine     Dysphagia     Coronary artery disease involving native coronary artery of native heart without angina pectoris     Dyslipidemia     Ventral hernia without obstruction or gangrene     Paroxysmal atrial fibrillation (H)     Anticoagulation management encounter     S/P repair of recurrent ventral hernia     SOB (shortness of breath)     Lower extremity edema     Anxiety     Hypokalemia     (HFpEF) heart failure with preserved ejection fraction (H)     Tachycardia     Cholecystitis     Anticoagulant long-term use     Biliary  obstruction     Abdominal pain, right upper quadrant    Past Medical History:   Diagnosis Date     Acute diastolic heart failure (H) 11/2015     Atrial fibrillation (H)      Cerebral vascular accident (H)      Coronary artery disease 2006    100% RCA with collateral filling per Dr. Elizabeth's angio report     Diabetes mellitus type 2 in obese (H)      Fibrocystic breast      GERD (gastroesophageal reflux disease)      History of anesthesia complications     slow to wake     Hypertension      Peripheral vascular disease (H)      PONV (postoperative nausea and vomiting)      Stroke (H)      Urinary incontinence         Social History   Substance Use Topics     Smoking status: Current Every Day Smoker     Packs/day: 0.25     Smokeless tobacco: Former User      Comment: e-cig a few times/day     Alcohol use No       Objective   Labs:  Last 3 days:    Recent Labs      10/09/18   0614   CREATININE  1.03   HGB  11.8*   HCT  37.4   PLT  98*   BILITOT  1.0   AST  85*   ALT  74*   ALKPHOS  223*     Last 7 days:   Recent labs: (last 7 days)      10/05/18   0846  10/06/18   0941  10/07/18   0732  10/09/18   0614  10/10/18   0550  10/11/18   0705   INR  2.84*  1.42*  1.44*  1.43*  1.29*  1.31*       Warfarin Dosing History:    Date INR Warfarin Dose Comment   10/7/18 1.44 hold    10/8/18 N/A 2 mg Patient may discharge tomorrow.   10/9/18 1.43 4 mg    10/10 1.29 4 mg    10/11 1.31 4 mg      Assessment  The patient is continuing warfarin for the indication of Atrial Fibrillation with a goal INR of 2-3. INR today is subtherapeutic which is expected as warfarin was held from 10/3/18 - 10/7/18 for surgery.      Plan  1. Administer warfarin 4 mg PO today.  2. Check INR daily or as appropriate.  3. Continue to follow the patient's INR, PLT, and HGB as available.  4. Monitor for potential drug/disease interactions.  5. Recommend to resume PTA warfarin dosing if patient being discharged today.    Thank you for the consult,  Tavares TAYLOR  Patel, Pharmacy Student, [facility] 10/11/2018 1:09 PM

## 2021-06-20 NOTE — PROGRESS NOTES
The gallbladder did not fill on the HIDA scan consistent with cholecystitis.  The patient has persistent right upper quadrant pain.  I think the best course at this point is laparoscopic cholecystectomy.  Because of her previous surgery she is at risk for going open but I do not see a lot of alternative.  She understands the risks and wishes to proceed.

## 2021-06-20 NOTE — ANESTHESIA POSTPROCEDURE EVALUATION
Patient: Gardenia Muller  ENDOSCOPIC RETROGRADE CHOLANGIOPANCREATOGRAPHY, SPHINCTEROTOMY  Anesthesia type: general    Patient location: PACU  Last vitals:   Vitals:    10/05/18 1500   BP: 118/68   Pulse: (!) 50   Resp: 16   Temp: 36.8  C (98.3  F)   SpO2: 95%     Post vital signs: stable  Level of consciousness: awake and responds to simple questions  Post-anesthesia pain: pain controlled  Post-anesthesia nausea and vomiting: no  Pulmonary: unassisted, return to baseline  Cardiovascular: stable and blood pressure at baseline  Hydration: adequate  Anesthetic events: no    QCDR Measures:  ASA# 11 - Elzbieta-op Cardiac Arrest: ASA11B - Patient did NOT experience unanticipated cardiac arrest  ASA# 12 - Elzbieta-op Mortality Rate: ASA12B - Patient did NOT die  ASA# 13 - PACU Re-Intubation Rate: ASA13B - Patient did NOT require a new airway mgmt  ASA# 10 - Composite Anes Safety: ASA10A - No serious adverse event    Additional Notes:

## 2021-06-20 NOTE — PROGRESS NOTES
Patient seen in clinic for HF education s/p recent hospital discharge 8/18/18.  Reviewed HF Binder that includes the  HF Sx Awareness & Action plan  handout and  A Stronger Pump  booklet and Weight log booklet highlighting :  __X_patient s type of heart failure _X__Na management in diet  __X_importance of daily wts  _X__Fluid Guidelines, if applicable  __X_medication review and importance of compliance     Instructed patient in signs and sx of heart failure, reiterated when to call clinic - reviewed HF hotline # [phone] and after hours call # [phone].  Majority of time was spent reviewing: low sodium diet.  Patient verbalized understanding of HF discussion.  Plan for f/u with continued HF education reviewed.

## 2021-06-20 NOTE — PROGRESS NOTES
I spoke with Gardenia about labs results.  Renal function stable.  Potassium low.  Start potassium 20 meq daily. Recheck potassium next week when she see Dr. Hernandez.

## 2021-06-20 NOTE — PROGRESS NOTES
Addended by: LESLYE HULL on: 11/3/2018 01:40 PM     Modules accepted: Orders     [facility] Daily Progress Note    Assessment/Plan:  Gardenia Muller is a 68 yr. old female with   history of atrial flutter, on Coumadin, ventral hernia, incisional hernia repair June 2018, presented 10/03/2018 with Epigastric and RUQ abdominal pain,x 1week, CT showed gallbladder wall thickening suggestive of cholecystitis US Abdomen showed significantly distended gallbladder with wall edema and thickening representing a calculus cholecystitis with dilatation of CBD and pancreatic duct ERCP was done by Dr. Stokes 10/5/2018 which did not show any filling defect.  HIDA scan was done on 10/6/2018 which showed marked intrahepatic cholestasis associated with cystic duct obstruction and cholecystitis.  I did discuss this findings with the surgeon on call who wanted me to keep the patient n.p.o. patient is allergic to penicillin and is on treatment with levofloxacin      Assessment:    Acute cholecystitis-on IV fluids, pain medications, levofloxacin, discussed this with the surgeon general surgeon on call that HIDA scan is positive    Incisional hernia status post repair with mesh    Atrial flutter with RVR in the past is not on warfarin now with slowly decreasing INR    Chronic diastolic heart failure which is stable on torsemide    Type 2 diabetes mellitus on sliding scale insulin sugars less than 200    Hypertension on treatment with Cardizem and metoprolol    Anxiety on Ativan treatment, Zoloft and trazodone    GERD on proton pump inhibitor    Restless leg syndrome on Requip    Thrombocytopenia which will be monitored    Plan:  N.P.O. for surgery tomorrow morning 10/7/2018  Continue IV fluids  Continue to monitor INR    Code status:Full Code        Subjective:  Complains of 8/10 abdominal pain    Review of Systems:   Denies any fever or chills  Patient says that before this she was able to climb 2 flight of stairs without any chest pain or shortness of breath    Current Medications Reviewed via EHR List    Objective:  Vital  signs in last 24 hours:  Temp:  [97.5  F (36.4  C)-98.7  F (37.1  C)] 98.3  F (36.8  C)  Heart Rate:  [58-71] 71  Resp:  [16-18] 18  BP: (106-176)/(64-82) 146/73  SpO2:  [85 %-99 %] 99 %    Intake/Output last 3 shifts:  I/O last 3 completed shifts:  In: 880 [P.O.:880]  Out: 700 [Urine:700]      Physical Exam:  EYES: EOM+   NECK: NO JVD  HEART : S1 S2 RRR   LUNGS: Clear to auscultation without Crepitations or Wheezing   ABDOMEN: Jacome sign is positive Bowel sounds are decreased  CNS Alert and Oriented x 3 moving all 4 limbs        Imaging:  HIDA scan  FINDINGS: There is marked intrahepatic cholestasis. No activity is seen in the extrahepatic bile ducts during the first 60 minutes of the examination. Delayed images were obtained at 4 hours the show activity in intra and extrahepatic bile ducts   including the common bile duct. Small bowel activity is seen. No activity is seen within the gallbladder. Findings are consistent with cystic duct obstruction and cholecystitis.     IMPRESSION:   CONCLUSION:  1.  Marked intrahepatic cholestasis. Delayed images show activity in the common bile duct and small bowel but no activity in the gallbladder. Findings are consistent with cystic duct obstruction and cholecystitis.    Lab Results:  Personally Reviewed.   Fingerstick Blood Glucose:   Recent Labs      10/05/18   1425   [id number]  157       Recent Results (from the past 24 hour(s))   Plasma Product Information    Collection Time: 10/06/18 12:04 AM   Result Value Ref Range    Unit Type B Pos     Blood Expiration Date 05949280628477     Unit Number [id number]     Status Transfused     Component FROZEN PLASMA     PRODUCT CODE Q1204K83     Issue Date and Time [id number]     Blood Type 7300     CODING SYSTEM [id number]    Plasma Product Information    Collection Time: 10/06/18 12:04 AM   Result Value Ref Range    Unit Type B Pos     Blood Expiration Date 76003339585040     Unit Number [id number]     Status Transfused      Component FROZEN PLASMA     PRODUCT CODE [id number]     Issue Date and Time [id number]     Blood Type 7300     CODING SYSTEM [id number]    Plasma Product Information    Collection Time: 10/06/18 12:04 AM   Result Value Ref Range    Unit Type B Pos     Blood Expiration Date 30296713029932     Unit Number [id number]     Status Transfused     Component FROZEN PLASMA     PRODUCT CODE [id number]     Issue Date and Time [id number]     Blood Type 7300     CODING SYSTEM [id number]    Hepatic Profile    Collection Time: 10/06/18  6:12 AM   Result Value Ref Range    Bilirubin, Total 2.0 (H) 0.0 - 1.0 mg/dL    Bilirubin, Direct 1.3 (H) <=0.5 mg/dL    Protein, Total 6.8 6.0 - 8.0 g/dL    Albumin 3.1 (L) 3.5 - 5.0 g/dL    Alkaline Phosphatase 197 (H) 45 - 120 U/L     (H) 0 - 40 U/L     (H) 0 - 45 U/L   HM2(CBC W/O DIFF)    Collection Time: 10/06/18  6:12 AM   Result Value Ref Range    WBC 6.0 4.0 - 11.0 thou/uL    RBC 4.11 3.80 - 5.40 mill/uL    Hemoglobin 11.3 (L) 12.0 - 16.0 g/dL    Hematocrit 36.1 35.0 - 47.0 %    MCV 88 80 - 100 fL    MCH 27.5 27.0 - 34.0 pg    MCHC 31.3 (L) 32.0 - 36.0 g/dL    RDW 17.5 (H) 11.0 - 14.5 %    Platelets 74 (L) 140 - 440 thou/uL   Potassium    Collection Time: 10/06/18  6:12 AM   Result Value Ref Range    Potassium 3.0 (L) 3.5 - 5.0 mmol/L   Phosphorus    Collection Time: 10/06/18  6:12 AM   Result Value Ref Range    Phosphorus 3.0 2.5 - 4.5 mg/dL   Creatinine    Collection Time: 10/06/18  6:12 AM   Result Value Ref Range    Creatinine 1.12 (H) 0.60 - 1.10 mg/dL    GFR MDRD Af Amer 59 (L) >60 mL/min/1.73m2    GFR MDRD Non Af Amer 48 (L) >60 mL/min/1.73m2   Magnesium    Collection Time: 10/06/18  6:12 AM   Result Value Ref Range    Magnesium 1.3 (L) 1.8 - 2.6 mg/dL   Protime-INR    Collection Time: 10/06/18  9:41 AM   Result Value Ref Range    INR 1.42 (H) 0.90 - 1.10           Advanced Care Planning  Discharge plan: Unknown at this time      Michael Abreu  Date: 10/6/2018  Time:  5:36 PM  Hospitalist Service  Part of this chart was created using a dictation software. Typographic errors, word substitutions, and Grammatical errors may unintentionally occur.

## 2021-06-20 NOTE — ED PROVIDER NOTES
eMERGENCY dEPARTMENT eNCOUnter         FINAL IMPRESSION     1. Abdominal pain, epigastric    2. (HFpEF) heart failure with preserved ejection fraction (H)    3. Anticoagulated on Coumadin    4. Tachycardia    5. Atrial flutter (H)    6. Hyperkalemia            New Prescriptions    No medications on file                  ED COURSE & MEDICAL DECISION MAKING        MEDICAL DECISION MAKING    68-year-old female presents complaining of above the umbilicus abdominal pain.  Patient states she is also been vomiting and having shortness of breath.  Evaluation reveals a nontoxic appearing laying in bed tachycardia with above umbilicus tenderness palpation.  Has an umbilical hernia that is not easily reducible  Cardiac remains at times is irregular.  Patient has a history of atrial fibrillation paroxysmal.  Recently Cardizem has been increased.  Initially difficulty obtaining IV access.  Patient on was placed on cardiac pulse ox monitors.  EKG reveals sinus tachycardia with a complaint of shortness of breath and the hypoxia and was concerned about pulmonary embolism.  CT chest reveals no pulmonary embolism.  CT abdomen does not reveal obstruction or free air but thickening around the gallbladder concerning for cholecystitis.  No Right upper quadrant abdominal tenderness palpation.  Spoke with surgery who recommends an MRCP.  I started patient on Cipro and Flagyl given previous allergy to penicillin.  He was given 500 cc of normal saline x2.  Given 5 mg of metoprolol x2 to help with a heart rate.  pain was treated with morphine.  Patient will be admitted to cardiac telemetry for further evaluation of abdominal pain and tachycardia.      LABS  Lactic acid 1.4  White blood cell count 6.4  Hemoglobin 12.5  INR 2.09    ALT 75  Normal bilirubin  Troponin 0.01    Calcium 5.5      Review of previous records  cardiology note on 9/18/2018    .  Heart failure with preserved ejection fraction: She has lost about 13 pounds in  the past 2 weeks since her primary changed her to torsemide.  Her weight has been stable for the past 4 days around 173 pounds.  She no longer has lower extremity edema.  Her shortness of breath with activity is mild and has improved.  She denies any lightheadedness and has no low blood pressures.     2.  Paroxysmal atrial fibrillation: Holter monitor on September 7, 2018 showed sinus rhythm with heart rate  bpm.  No atrial fibrillation.  Her heart rates according to her home blood pressure machine on September 13 and 14 were around 120 bpm so she may have been in atrial fibrillation again.  She was asymptomatic.  She continues to take warfarin.       ED Course     12:35 PM The patient was interviewed and examined. History in the chart was reviewed.     3:22 PM updated on lab results  Appears comfortable  Tachycardic suspect multifactorial due to vomiting her morning meds    3:26 PM PICC nurse at bedside.    5:34 PM updated  Spoke with Dr. Baxter, recommeds [facility]    6:04 PM blood pressure of 80 I immediately went into the room nurse was present the blood pressure cuff was in patient's elbow.  I repeated the blood pressure is 120/75.  Rate is 128 irregular.  States her heart rate has been going fast for the last week.    6:35 PM with admitting physician.  Wants to hyperkalemia treated.  Systolic blood pressure is 101, heart rate is 118 at this time he does appear a flutter with variable ventricular response.  Updated on all plans.  Feels comfortable with the plan.    At the conclusion of the encounter I discussed  the results of all of the tests and the disposition with the patient.   All questions were answered.  The patient acknowledged understanding and was involved in the decision making regarding the overall care plan.            CHIEF COMPLAINT     Chief Complaint   Patient presents with     Abdominal Pain         HPI   Jerson Hernandez MD  Gardenia Muller is a 68 y.o. female with history of atrial  fibrillation, CVA, diabetes, hypertension, and GERD who presents to the ED via EMS with abdominal pain. The patient states that she's been experiencing constant worsening upper-middle abdominal pain for the past 1 week. She explains that she underwent surgery to repair her hernia about 3-4 months ago and she's been having difficulty with generalized weakness and abdominal pain since. She also endorses recent worsening shortness of breath with exertion, chills, and vomiting green sputum 2x today with no blood noted. The patient expresses that she did take her medications, today but vomited shortly after. She denies any diarrhea, chest pain, or fevers. She confirms attempting to alleviate her pain with Tylenol, but without success.     This document serves as a record of services performed by Dr. Sweetie Mcmillan MD,  it was created on her  behalf by Coby Laguna, a trained medical scribe. The creation of this record is based on the scribes personal observations and the providers statements to her. This document has been checked and approved by the attending provider.           PAST MEDICAL HISTORY SURGICAL HISTORY     Past Medical History:   Diagnosis Date     Acute diastolic heart failure (H) 11/2015     Atrial fibrillation (H)      Cerebral vascular accident (H)      Coronary artery disease 2006    100% RCA with collateral filling per Dr. Elizabeth's angio report     Diabetes mellitus type 2 in obese (H)      Fibrocystic breast      GERD (gastroesophageal reflux disease)      History of anesthesia complications     slow to wake     Hypertension      Peripheral vascular disease (H)      PONV (postoperative nausea and vomiting)      Stroke (H)      Urinary incontinence     Past Surgical History:   Procedure Laterality Date     CARDIAC CATHETERIZATION       CORONARY STENT PLACEMENT  1995    stent     EXPLORATORY LAPAROTOMY N/A 6/29/2018    Procedure: LAPAROTOMY, CLOSURE OF PERITONEAL RENT;  Surgeon: Jones Harding,  DO;  Location: [facility];  Service:      [facility]  6/29/2018          VENTRAL HERNIA REPAIR N/A 11/12/2015    Procedure: STRANGULATED VENTRAL HERNIA REPAIR ;  Surgeon: Ernesto Bailey MD;  Location: [facility];  Service:           CURRENT MEDICATIONS ALLERGIES     Current Outpatient Prescriptions   Medication Sig     acetaminophen (TYLENOL) 650 MG CR tablet Take 650 mg by mouth 2 (two) times a day.      aspirin 81 MG EC tablet Take 81 mg by mouth daily.     carboxymethylcellulose (REFRESH PLUS) 0.5 % Dpet ophthalmic dropperette Administer 2 drops to both eyes every hour as needed (dry eyes).     diltiazem (CARDIZEM CD) 180 MG 24 hr capsule Take 1 capsule (180 mg total) by mouth daily.     ferrous sulfate 325 (65 FE) MG tablet Take 1 tablet (325 mg total) by mouth 2 (two) times a day.     loratadine (CLARITIN) 10 mg tablet Take 10 mg by mouth daily with lunch. Noon     LORazepam (ATIVAN) 0.5 MG tablet 1-2 tabs po q 6 hours prn     magnesium oxide (MAG-OX) 400 mg tablet Take 1,200 mg by mouth daily.     metoprolol tartrate (LOPRESSOR) 25 MG tablet TAKE 1 TABLET BY MOUTH TWICE DAILY (8AM & 8PM)     multivitamin (TAB-A-JULIET) per tablet Take 1 tablet by mouth daily.     nitroglycerin (NITROSTAT) 0.4 MG SL tablet Place 1 tablet (0.4 mg total) under the tongue every 5 (five) minutes as needed for chest pain (for up to 3 doses and call 911 if persists).     omeprazole (PRILOSEC) 20 MG capsule TAKE 1 CAPSULE BY MOUTH TWICE DAILY (8AM & 8PM)     polyethylene glycol (GLYCOLAX) 17 gram/dose powder Take 17 g by mouth daily as needed.      potassium chloride (K-DUR,KLOR-CON) 20 MEQ tablet Take 1 tablet (20 mEq total) by mouth daily.     rOPINIRole (REQUIP) 1 MG tablet 1 tablet 30-60 minutes before bedtime     rosuvastatin (CRESTOR) 40 MG tablet TAKE 1 TABLET BY MOUTH AT BEDTIME     sertraline (ZOLOFT) 100 MG tablet TAKE 1 TABLET BY MOUTH ONCE DAILY     torsemide (DEMADEX) 20 MG tablet Take 1 tablet (20 mg total) by  mouth 2 (two) times a day at 9am and 6pm. (Patient taking differently: Take 20 mg by mouth daily. )     traZODone (DESYREL) 50 MG tablet TAKE 1 TABLET BY MOUTH AT BEDTIME     VITAMIN D3 2,000 unit capsule TAKE 1 CAPSULE BY MOUTH ONCE DAILY AT 8AM (DO NOT BRAND CHANGE - PATIENT ONLY WANT CAPSULES)     warfarin (COUMADIN/JANTOVEN) 2 MG tablet Take by mouth See Admin Instructions. Take 4 mg on Sun/Tues/Thurs and 2 mg all other days of the week. Adjust dose based on INR results as directed.     blood glucose meter (GLUCOMETER) Please Dispense kit per pharmacy discretion and patient's insurance Test blood sugar.     blood glucose test strips Use 1 each As Directed as needed. Dispense brand per patient's insurance at pharmacy discretion.     lancets 33 gauge Misc Test twice daily Dispense brand per patient's insurance at pharmacy discretion.    Allergies   Allergen Reactions     Penicillins Swelling          FAMILY HISTORY     Family History   Problem Relation Age of Onset     Cancer Paternal Grandmother      Cancer Paternal Uncle      No Medical Problems Mother      No Medical Problems Father      Heart attack Sister      Chronic Kidney Disease Sister      No Medical Problems Daughter      No Medical Problems Maternal Grandmother      No Medical Problems Maternal Grandfather      No Medical Problems Paternal Grandfather      No Medical Problems Maternal Aunt      No Medical Problems Paternal Aunt      Diabetes Brother      BRCA 1/2 Neg Hx      Breast cancer Neg Hx      Colon cancer Neg Hx      Endometrial cancer Neg Hx      Ovarian cancer Neg Hx          SOCIAL HISTORY     Social History     Social History     Marital status: Legally      Spouse name: N/A     Number of children: N/A     Years of education: N/A     Social History Main Topics     Smoking status: Current Every Day Smoker     Packs/day: 0.25     Smokeless tobacco: Former User      Comment: e-cig a few times/day     Alcohol use No     Drug use: No  "    Sexual activity: Not on file     Other Topics Concern     Not on file     Social History Narrative         REVIEW OF SYSTEMS   Review of Systems   Constitutional: Positive for chills. Negative for fever.   HENT: Negative for congestion, ear pain, rhinorrhea and sore throat.    Respiratory: Positive for shortness of breath. Negative for cough and chest tightness.    Cardiovascular: Negative for chest pain and palpitations.   Gastrointestinal: Positive for abdominal pain and vomiting. Negative for blood in stool and diarrhea.   Genitourinary: Negative for dysuria, frequency and hematuria.   Musculoskeletal: Negative for arthralgias and myalgias.   Neurological: Positive for weakness ( generalized). Negative for dizziness, numbness and headaches.   All other systems reviewed and are negative.               PHYSICAL EXAM   VITAL SIGNS: /75  Pulse (!) 128  Temp 97.9  F (36.6  C) (Oral)   Resp 26  Ht 5' 2\" (1.575 m)  Wt 179 lb 1.6 oz (81.2 kg)  LMP 01/25/1999  SpO2 94%  BMI 32.76 kg/m2   Physical Exam   Constitutional: She is oriented to person, place, and time. She appears well-developed and well-nourished. No distress.   Appears stated age, laying in bed, cooperative and pleasant with examination, tachycardic but appears comfortable.   HENT:   Head: Normocephalic and atraumatic.   Right Ear: External ear normal.   Left Ear: External ear normal.   Nose: Nose normal.   Mouth/Throat: Oropharynx is clear and moist.   Eyes: Conjunctivae and EOM are normal. Right eye exhibits no discharge. Left eye exhibits no discharge.   Neck: Normal range of motion. Neck supple. No JVD present.   Cardiovascular: Normal heart sounds and intact distal pulses.    No murmur heard.  Tachycardic, irregular at times   Pulmonary/Chest: Effort normal and breath sounds normal. No stridor. No respiratory distress. She has no wheezes. She has no rales. She exhibits no tenderness.   Abdominal: Soft. There is tenderness.   Midline " abdominal scar.  umbilical hernia difficult to reduce.  Pain is located above the umbilicus.  There is some induration in this area.   Musculoskeletal: Normal range of motion. She exhibits edema. She exhibits no tenderness or deformity.   Trace pedal edema   Neurological: She is alert and oriented to person, place, and time.   Skin: Skin is warm and dry. No rash noted. She is not diaphoretic. No erythema. No pallor.   Psychiatric: She has a normal mood and affect. Her behavior is normal.   Nursing note and vitals reviewed.            PROCEDURES   Procedures  none      RADIOLOGY   Xr Chest 1 View Portable    Result Date: 10/3/2018  XR CHEST 1 VIEW PORTABLE 10/3/2018 1:53 PM INDICATION: Pain COMPARISON: Chest x-ray 8/18/2018 FINDINGS: No definite pneumothorax. Mild asymmetric elevation of the right hemidiaphragm. Medial right basilar opacities may reflect atelectasis or infiltrates. The left lung is grossly clear. No definite pleural effusions. Stable moderate-to-marked cardiomegaly. Stable central vascular prominence. Stable interstitial coarsening is likely chronic.    Cta Chest Pe Run    Result Date: 10/3/2018  CTA CHEST PE RUN, CT ABDOMEN PELVIS WO ORAL W IV CONTRAST 10/3/2018 4:59 PM INDICATION: Shortness of breath and pain. TECHNIQUE: Helical acquisition through the chest was performed during the arterial phase of contrast enhancement using IV contrast. 2D and 3D reconstructions were performed by the CT technologist. Dose reduction techniques were used. IV CONTRAST: Iohexol (Omni) 100 mL COMPARISON: 6/27/2018 FINDINGS: ANGIOGRAM CHEST: No PE. The main pulmonary artery is 31 mm in diameter.  No thoracic aortic aneurysm or dissection. There is coronary artery calcification. RV/LV RATIO: N/A LUNGS AND PLEURA: There is mild interlobular septal thickening. No consolidative airspace disease. MEDIASTINUM: The heart is enlarged. Normal esophagus. No thoracic lymphadenopathy by size criteria. ABDOMEN: Reflux of contrast  material into the hepatic veins and IVC. Right renal artery stent noted.  Normal spleen and adrenal glands. The liver is enlarged and is heterogeneous. There is gallbladder wall thickening and mucosal enhancement. The gallbladder is distended. The common bile duct is distended at 11 mm in diameter. The pancreatic duct is at the upper limits of normal in caliber. Otherwise the pancreas is normal in appearance. Normal kidneys and ureters. Normal stomach. There is a small duodenal diverticulum. Normal caliber of the small bowel. Atherosclerotic disease is present. PELVIS: Nondistended urinary bladder. Fibroid uterus. No adnexal masses. There is a small to moderate amount of free fluid. There is colonic diverticulosis. Normal appendix. MUSCULOSKELETAL: Postsurgical changes at the ventral abdominal wall. No contained fluid collections identified.  There is degenerative change.     CONCLUSION: 1.  No PE. 2.  Gallbladder wall thickening and enhancement. Consider cholecystitis. There is extrahepatic bile duct dilatation. 3.  Interlobular septal thickening is likely edema. 4.  Cardiomegaly. Reflux of contrast material into the hepatic veins suggests cardiac dysfunction. There is atherosclerotic disease including coronary artery calcification. Enlargement of the main pulmonary artery can be seen with pulmonary hypertension. 5.  Hepatomegaly and heterogeneity of of the liver may be related to cardiac dysfunction. 6.  Colonic diverticulosis. No definite acute diverticulitis. 7.  Free fluid in the pelvis is nonspecific. NOTE: ABNORMAL REPORT THE DICTATION ABOVE DESCRIBES AN ABNORMALITY FOR WHICH FOLLOW-UP IS NEEDED.      Ct Abdomen Pelvis Without Oral With Iv Contrast    Result Date: 10/3/2018  CTA CHEST PE RUN, CT ABDOMEN PELVIS WO ORAL W IV CONTRAST 10/3/2018 4:59 PM INDICATION: Shortness of breath and pain. TECHNIQUE: Helical acquisition through the chest was performed during the arterial phase of contrast enhancement using  IV contrast. 2D and 3D reconstructions were performed by the CT technologist. Dose reduction techniques were used. IV CONTRAST: Iohexol (Omni) 100 mL COMPARISON: 6/27/2018 FINDINGS: ANGIOGRAM CHEST: No PE. The main pulmonary artery is 31 mm in diameter.  No thoracic aortic aneurysm or dissection. There is coronary artery calcification. RV/LV RATIO: N/A LUNGS AND PLEURA: There is mild interlobular septal thickening. No consolidative airspace disease. MEDIASTINUM: The heart is enlarged. Normal esophagus. No thoracic lymphadenopathy by size criteria. ABDOMEN: Reflux of contrast material into the hepatic veins and IVC. Right renal artery stent noted.  Normal spleen and adrenal glands. The liver is enlarged and is heterogeneous. There is gallbladder wall thickening and mucosal enhancement. The gallbladder is distended. The common bile duct is distended at 11 mm in diameter. The pancreatic duct is at the upper limits of normal in caliber. Otherwise the pancreas is normal in appearance. Normal kidneys and ureters. Normal stomach. There is a small duodenal diverticulum. Normal caliber of the small bowel. Atherosclerotic disease is present. PELVIS: Nondistended urinary bladder. Fibroid uterus. No adnexal masses. There is a small to moderate amount of free fluid. There is colonic diverticulosis. Normal appendix. MUSCULOSKELETAL: Postsurgical changes at the ventral abdominal wall. No contained fluid collections identified.  There is degenerative change.     CONCLUSION: 1.  No PE. 2.  Gallbladder wall thickening and enhancement. Consider cholecystitis. There is extrahepatic bile duct dilatation. 3.  Interlobular septal thickening is likely edema. 4.  Cardiomegaly. Reflux of contrast material into the hepatic veins suggests cardiac dysfunction. There is atherosclerotic disease including coronary artery calcification. Enlargement of the main pulmonary artery can be seen with pulmonary hypertension. 5.  Hepatomegaly and  heterogeneity of of the liver may be related to cardiac dysfunction. 6.  Colonic diverticulosis. No definite acute diverticulitis. 7.  Free fluid in the pelvis is nonspecific. NOTE: ABNORMAL REPORT THE DICTATION ABOVE DESCRIBES AN ABNORMALITY FOR WHICH FOLLOW-UP IS NEEDED.        Reviewed and Interpreted by me pending formal Radiology read.    EKG     I have independently reviewed and interpreted the patient's EKG with comments made below    EKG #1  Sinus tachycardia ventricular rate of 126, WI appears normal, QRS 86, QTc 411  Anterior R wave progression  PVCs  Left axis deviation    Compared to EKG on August 18, 2018  Atrial flutter with variable AV block was present before, poor anterior R wave progression was seen before, left axis deviation was seen before,  Q waves inferiorly no longer seen.         LABS     Results for orders placed or performed during the hospital encounter of 10/03/18   Comprehensive Metabolic Panel   Result Value Ref Range    Sodium 136 136 - 145 mmol/L    Potassium 5.5 (H) 3.5 - 5.0 mmol/L    Chloride 104 98 - 107 mmol/L    CO2 22 22 - 31 mmol/L    Anion Gap, Calculation 10 5 - 18 mmol/L    Glucose 115 70 - 125 mg/dL    BUN 22 8 - 22 mg/dL    Creatinine 0.89 0.60 - 1.10 mg/dL    GFR MDRD Af Amer >60 >60 mL/min/1.73m2    GFR MDRD Non Af Amer >60 >60 mL/min/1.73m2    Bilirubin, Total 0.6 0.0 - 1.0 mg/dL    Calcium 11.1 (H) 8.5 - 10.5 mg/dL    Protein, Total 7.7 6.0 - 8.0 g/dL    Albumin 3.3 (L) 3.5 - 5.0 g/dL    Alkaline Phosphatase 113 45 - 120 U/L     (H) 0 - 40 U/L    ALT 75 (H) 0 - 45 U/L   Lactic Acid   Result Value Ref Range    Lactic Acid 1.4 0.5 - 2.2 mmol/L   Troponin I   Result Value Ref Range    Troponin I 0.01 0.00 - 0.29 ng/mL   BNP(B-type Natriuretic Peptide)   Result Value Ref Range     (H) 0 - 114 pg/mL   INR   Result Value Ref Range    INR 2.09 (H) 0.90 - 1.10   HM1 (CBC with Diff)   Result Value Ref Range    WBC 6.4 4.0 - 11.0 thou/uL    RBC 4.61 3.80 - 5.40  mill/uL    Hemoglobin 12.5 12.0 - 16.0 g/dL    Hematocrit 39.9 35.0 - 47.0 %    MCV 87 80 - 100 fL    MCH 27.1 27.0 - 34.0 pg    MCHC 31.3 (L) 32.0 - 36.0 g/dL    RDW 17.6 (H) 11.0 - 14.5 %    Platelets 126 (L) 140 - 440 thou/uL    MPV 12.3 8.5 - 12.5 fL    Neutrophils % 57 50 - 70 %    Lymphocytes % 29 20 - 40 %    Monocytes % 12 (H) 2 - 10 %    Eosinophils % 1 0 - 6 %    Basophils % 1 0 - 2 %    Neutrophils Absolute 3.6 2.0 - 7.7 thou/uL    Lymphocytes Absolute 1.9 0.8 - 4.4 thou/uL    Monocytes Absolute 0.8 0.0 - 0.9 thou/uL    Eosinophils Absolute 0.1 0.0 - 0.4 thou/uL    Basophils Absolute 0.0 0.0 - 0.2 thou/uL   ECG 12 lead with MUSE   Result Value Ref Range    SYSTOLIC BLOOD PRESSURE 117 mmHg    DIASTOLIC BLOOD PRESSURE 83 mmHg    VENTRICULAR RATE 126 BPM    ATRIAL RATE 250 BPM    P-R INTERVAL  ms    QRS DURATION 86 ms    Q-T INTERVAL 284 ms    QTC CALCULATION (BEZET) 411 ms    P Axis 199 degrees    R AXIS -37 degrees    T AXIS 111 degrees    MUSE DIAGNOSIS       Atrial flutter with variable A-V block with premature ventricular or aberrantly conducted complexes  Left axis deviation  Septal infarct (cited on or before 31-JUL-2018)  Abnormal ECG  When compared with ECG of 18-AUG-2018 11:30,  Criteria for Inferior infarct are no longer Present  Confirmed by ERLIN TRIPATHI MD LOC:[facility] ([id number]) on 10/3/2018 3:12:00 PM         This completes the documentation on Gardenia Muller.          I, Dr. Sweetie Mcmillan, personally performed the services described in this documentation , as scribed by Coby Laguna in my presence, and it is both accurate and complete.     Sweetie Mcmillan MD  10/03/18 [id number]

## 2021-06-20 NOTE — PROGRESS NOTES
General Surgery Progress Note    3 Days Post-Op    CHOLECYSTECTOMY, LAPAROSCOPIC    Subjective:   Pt is feeling weak but better. Pain improving. Did better with percocet for pain. No nausea. Eating going well. Passing gas. No bm.       Vitals:    10/10/18 0022 10/10/18 0036 10/10/18 0441 10/10/18 0811   BP: 146/66  144/72 (!) 169/92   Patient Position: Lying  Lying Lying   Pulse: 71  73 66   Resp: 18  17 16   Temp:  98.3  F (36.8  C) 98.2  F (36.8  C) 97.9  F (36.6  C)   TempSrc: Oral  Oral Oral   SpO2: 98%  96% 94%   Weight:   173 lb 3 oz (78.6 kg)    Height:           Physical Exam:  Deferred. Patient on commode and getting up.       Results from last 7 days  Lab Units 10/09/18  0614   LN-WHITE BLOOD CELL COUNT thou/uL 6.6   LN-HEMOGLOBIN g/dL 11.8*   LN-HEMATOCRIT % 37.4   LN-PLATELET COUNT thou/uL 98*         Results from last 7 days  Lab Units 10/10/18  0550 10/09/18  0614   LN-SODIUM mmol/L  --  136   LN-POTASSIUM mmol/L 3.7 4.4   LN-CHLORIDE mmol/L  --  97*   LN-CO2 mmol/L  --  31   LN-BLOOD UREA NITROGEN mg/dL  --  18   LN-CREATININE mg/dL  --  1.03   LN-CALCIUM mg/dL  --  11.0*   LN-ALBUMIN g/dL  --  2.9*   LN-PROTEIN TOTAL g/dL  --  7.3   LN-BILIRUBIN TOTAL mg/dL  --  1.0   LN-ALKALINE PHOSPHATASE U/L  --  223*   LN-ALT (SGPT) U/L  --  74*   LN-AST (SGOT) U/L  --  85*       Assessment:  Pt is progressing well.    Plan: Discharge probably tcu vs home. Ok to be discharged from surgical standpoint. Will sign off. Call if any questions. Discharge recommendations are already placed.    Nela Camacho PA-C [phone]    [facility]  [address] [place] [id number]

## 2021-06-20 NOTE — PROGRESS NOTES
"[place] Daily Progress Note      Date of Service: 10/9/2018     Brief History: Gardenia Muller is 68 y.o. female with past medical history of atrial flutter on warfarin, hypertension, dyslipidemia, diet controlled diabetes, heart failure with preserved EF, presented to the hospital on 10/3/2018 with abdominal pain, and was found to have acute cholecystitis with cholelithiasis and dilatation of CBD and pancreatic duct. GI and surgery were consulted. She underwent ERCP on 10/05, which did not show any filling defect. She underwent HIDA scan on 10/06, which showed marked intrahepatic cholestasis, associated with cystic duct obstruction and cholecystitis. She was treated with IV antibiotics. She underwent laparoscopic cholecystectomy on 10/07/18.    Subjective:     Chart reviewed, events noted. Pt seen and examined. Abdominal pain is tolerable. No nausea or vomiting. She feels weak. No chest pain or shortness of breath.    Objective     Vital signs in last 24 hours:  Temp:  [98.2  F (36.8  C)-98.5  F (36.9  C)] 98.2  F (36.8  C)  Heart Rate:  [] 79  Resp:  [18-20] 18  BP: (100-140)/(67-86) 129/83  Weight:   176 lb 12.8 oz (80.2 kg)  Weight change: 4 lb 6.4 oz (1.996 kg)  Body mass index is 31.32 kg/(m^2).    Intake/Output last 3 shifts:  I/O last 3 completed shifts:  In: 560 [P.O.:560]  Out: 1300 [Urine:1300]  Intake/Output this shift:         Physical Exam:  /83 (Patient Position: Sitting)  Pulse 79  Temp 98.2  F (36.8  C) (Oral)   Resp 18  Ht 5' 3\" (1.6 m)  Wt 176 lb 12.8 oz (80.2 kg)  LMP 01/25/1999  SpO2 92%  BMI 31.32 kg/m2    FiO2 (%): (!) 2 % O2 Device: None (Room air) O2 Flow Rate (L/min): 2 L/min    General Appearance: Alert, not in distress.  HEENT: Normocephalic. No scleral icterus. Mucous membranes moist.  Heart: S1 S2 heard. Regular rate and rhythm.  Lungs: Clear to auscultation bilaterally.  Abdomen: Soft, bowel sounds present. No tenderness  Extremity: No deformity. No joint " swelling. No edema.  Neurologic: No new deficits  Skin: Post surgical wound.      Imaging:  personally reviewed.      Nm Hepatobiliary Wo Ef Or Pharm    Result Date: 10/6/2018  NM HEPATOBILIARY WO EF OR PHARM 10/6/2018 5:01 PM INDICATION: Distended gallbladder on ct distended gallblader with ruq pain TECHNIQUE: Following the administration of 8.2 mCi of Tc-99m mebrofenin intravenously, anterior planar imaging of the abdomen was obtained for 60 minutes. COMPARISON: CT abdomen of 10/03/2018, abdominal ultrasound of 10/03/2018. FINDINGS: There is marked intrahepatic cholestasis. No activity is seen in the extrahepatic bile ducts during the first 60 minutes of the examination. Delayed images were obtained at 4 hours the show activity in intra and extrahepatic bile ducts including the common bile duct. Small bowel activity is seen. No activity is seen within the gallbladder. Findings are consistent with cystic duct obstruction and cholecystitis.     CONCLUSION: 1.  Marked intrahepatic cholestasis. Delayed images show activity in the common bile duct and small bowel but no activity in the gallbladder. Findings are consistent with cystic duct obstruction and cholecystitis. Findings were called to the bases nurse,  Andriy, at 1710 hours on 10/06/2018. NOTE: ABNORMAL REPORT THE DICTATION ABOVE DESCRIBES AN ABNORMALITY FOR WHICH FOLLOW-UP IS NEEDED.          Lab Results:  personally reviewed.   Lab Results   Component Value Date     10/09/2018    K 4.4 10/09/2018    CL 97 (L) 10/09/2018    CO2 31 10/09/2018    BUN 18 10/09/2018    CREATININE 1.03 10/09/2018    CALCIUM 11.0 (H) 10/09/2018     Lab Results   Component Value Date    WBC 6.6 10/09/2018    HGB 11.8 (L) 10/09/2018    HCT 37.4 10/09/2018    MCV 86 10/09/2018    PLT 98 (L) 10/09/2018       Results from last 7 days  Lab Units 10/09/18  0614 10/08/18  1015 10/07/18  0946   LN-MAGNESIUM mg/dL 2.0 2.2 1.5*      Lab Results   Component Value Date    INR 1.43 (H)  10/09/2018    INR 1.44 (H) 10/07/2018    INR 1.42 (H) 10/06/2018        Results from last 7 days  Lab Units 10/09/18  0614 10/08/18  1015 10/07/18  0732 10/06/18  0612   LN-ALKALINE PHOSPHATASE U/L 223* 267* 295* 197*   LN-BILIRUBIN TOTAL mg/dL 1.0 1.3* 1.7* 2.0*   LN-BILIRUBIN DIRECT mg/dL  --  0.8* 1.0* 1.3*   LN-PROTEIN TOTAL g/dL 7.3 7.5 7.4 6.8   LN-ALT (SGPT) U/L 74* 93* 115* 139*   LN-AST (SGOT) U/L 85* 118* 190* 307*        Results from last 7 days  Lab Units 10/09/18  1114 10/09/18  0729 10/08/18  2256 10/08/18  1624 10/08/18  1113 10/07/18  2115 10/07/18  1644 10/07/18  1135 10/07/18  0849 10/07/18  0827   LN-POC GLUCOSE FINGERSTICK- HE mg/dL 160 129 128 195 259 162 251 73 127 56       Assessment:     Acute acalculous cholecystitis, s/p laparoscopic cholecystectomy on 10/07. Clinically improved. Off of antibiotics. Liver enzymes improving.    Atrial flutter with variable AV block, rate controlled. She has been restarted on warfarin, which was held for surgery. She is on metoprolol, diltiazem.    Chronic diastolic heart failure. Volume compensated. She is on torsemide. LVEF of 55%.    Transaminitis, elevated liver enzymes. Secondary to cholecystitis. Possible choledocholithiasis. S/p ERCP followed by lap cholecystectomy. Liver enzymes improving.    Type 2 diabetes mellitus, she is on sliding scale insulin. Blood glucose in acceptable range.    Dyslipidemia on rosuvastatin    Restless leg syndrome on requip.    Hypokalemia and hypomagnesemia, replaced.    GERD on PPI    Thrombocytopenia.    Plan:   Continue supportive cares. Increase activity as tolerated. PT/OT to see. Discontinue IV dilaudid. Discontinue IV antibiotics.      Advance Care Planning:   Barriers to discharge: Mobility progress  Anticipated discharge day: tomorrow  Disposition: Home vs TCU    Total time spent was more than 35 minutes in which greater than 50% of the time was spent on patient care coordination, counseling and review of  chart.      Uzair Burdick MD  [facility]  Paging: [employer] Paging

## 2021-06-20 NOTE — PROGRESS NOTES
Pharmacy Consult: Warfarin Management    Pharmacy consulted to dose warfarin for Gardenia Muller, a 68 y.o. female    Ordering provider: Dr. Michael Abreu, Hospitalist    Reason for warfarin therapy: Atrial Fibrillation  Goal INR Range: 2-3    Subjective  Medication lists (home and hospital) were reviewed.    New medications that may increase bleeding risk/INR: Levofloxacin    Restarted home medications that may increase bleeding risk/INR: Torsemide, Ropinirole, sertraline    Home Warfarin Dosin mg on , Tuesday and Thursday; 2 mg on all other days of the week    Other Anticoagulants: No  Patient being bridged: No    Patient Active Problem List   Diagnosis     Spinal stenosis     PAD (peripheral artery disease) (H)     Dermatosis Papulosa Nigra     Depression     Hypercalcemia     Right Renal Artery Stenosis     Osteoarthritis     Abnormality Of Walk     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)     Advanced directives, counseling/discussion     SBO (small bowel obstruction) (H)     Cystitis     Hypomagnesemia     Healthcare maintenance     Essential hypertension     Dysarthria     Cerebral infarction (H)     Anemia     Cerebrovascular disease     Benign adenomatous polyp of large intestine     RLS (restless legs syndrome)     Incisional hernia, without obstruction or gangrene     Abdominal pain, generalized     Diverticular disease of large intestine     Dysphagia     Coronary artery disease involving native coronary artery of native heart without angina pectoris     Dyslipidemia     Ventral hernia without obstruction or gangrene     Paroxysmal atrial fibrillation (H)     Anticoagulation management encounter     S/P repair of recurrent ventral hernia     SOB (shortness of breath)     Lower extremity edema     Anxiety     Hypokalemia     (HFpEF) heart failure with preserved ejection fraction (H)     Tachycardia     Cholecystitis     Anticoagulant long-term use     Biliary obstruction     Abdominal  pain, right upper quadrant    Past Medical History:   Diagnosis Date     Acute diastolic heart failure (H) 11/2015     Atrial fibrillation (H)      Cerebral vascular accident (H)      Coronary artery disease 2006    100% RCA with collateral filling per Dr. Elizabeth's angio report     Diabetes mellitus type 2 in obese (H)      Fibrocystic breast      GERD (gastroesophageal reflux disease)      History of anesthesia complications     slow to wake     Hypertension      Peripheral vascular disease (H)      PONV (postoperative nausea and vomiting)      Stroke (H)      Urinary incontinence         Social History   Substance Use Topics     Smoking status: Current Every Day Smoker     Packs/day: 0.25     Smokeless tobacco: Former User      Comment: e-cig a few times/day     Alcohol use No       Objective   Labs:  Last 3 days:    Recent Labs      10/07/18   0732  10/07/18   0946  10/07/18   0947  10/08/18   1015  10/09/18   0614   CREATININE   --   0.78   --   1.19*  1.03   HGB  12.0   --   12.0   --   11.8*   HCT  39.1   --   39.2   --   37.4   PLT  85*   --   87*   --   98*   BILITOT  1.7*   --    --   1.3*  1.0   AST  190*   --    --   118*  85*   ALT  115*   --    --   93*  74*   ALKPHOS  295*   --    --   267*  223*     Last 7 days:   Recent labs: (last 7 days)      10/03/18   1342  10/04/18   0041  10/05/18   0846  10/06/18   0941  10/07/18   0732  10/09/18   0614   INR  2.09*  2.20*  2.84*  1.42*  1.44*  1.43*       Warfarin Dosing History:    Date INR Warfarin Dose Comment   10/8/18 1.44 2 mg Patient may discharge tomorrow.   10/9/18 1.43 4 mg                  Assessment  The patient is resuming warfarin for the indication of Atrial Fibrillation with a goal INR of 2-3. INR today is subtherapeutic which is expected as warfarin was held from 10/3/18 - 10/7/18 for surgery. Warfarin restarted yesterday.    Plan  1. Administer warfarin 4 mg PO today.  2. Check INR daily or as appropriate.  3. Continue to follow the  patient's INR, PLT, and HGB as available.  4. Monitor for potential drug/disease interactions.  5. Recommend to resume PTA warfarin dosing if patient being discharged today.    Thank you for the consult,  Gisel Kumar, PharmD, BCPS 10/9/2018 8:08 AM

## 2021-06-20 NOTE — LETTER
Letter by Charlotte Eden CNP at      Author: Charlotte Eden CNP Service: -- Author Type: --    Filed:  Encounter Date: 8/11/2020 Status: (Other)         Patient: Gardenia Muller   MR Number: [id number]   YOB: 1950   Date of Visit: 8/11/2020     [facility]    Facility:   [facility] [[id number]]   Code Status: FULL CODE      CHIEF COMPLAINT/REASON FOR VISIT:  Chief Complaint   Patient presents with   ? Problem Visit     nausea with emesis       HISTORY:      HPI: Gardenia is a 70 y.o. female who has a past medical history of Acute diastolic heart failure (H) (11/2015), Acute on chronic diastolic heart failure (H) (6/15/2019), Advanced directives, counseling/discussion (8/5/2015), MAY (acute kidney injury) (H) (10/22/2018), Atrial fibrillation (H), Benign adenomatous polyp of large intestine (3/30/2017), Biliary obstruction (10/3/2018), Cerebral vascular accident (H), Coronary artery disease (2006), Diabetes mellitus type 2 in obese (H), Fibrocystic breast, GERD (gastroesophageal reflux disease), History of anesthesia complications, Hypertension, Peripheral vascular disease (H), Pneumonia (11/11/2018), PONV (postoperative nausea and vomiting), S/P cholecystectomy (6/22/2018), SBO (small bowel obstruction) (H) (11/12/2015), Stroke (H), Transaminitis (10/22/2018), Upper respiratory infection (12/20/2018), and Urinary incontinence.     Gardenia is seen at [facility] for new admission after recent hospitalization from July 22 to July 31.  She was also hospitalized in June from June 27 to July 3 and had been at another TCU prior to her recent hospitalization.  She presented with severely elevated INR and LFTs with an INR of 13 on admit.  Possibly secondary to top of acute liver failure secondary to CHF.  This was reversed with vitamin K and fresh frozen plasma.  Her work-up was negative for hepatitis and a TTE showed right vent dysfunction with an  EF of 40%.  During her hospitalization she was diuresed with Lasix and chlorothiazide and also underwent right and left heart cath.  See previous hospital notes for full details.  She developed A. fib with RVR that was controlled with digoxin.  She had hyperkalemia that improved with diuresis and Kayexalate.  Also hypercalcemia and the chlorthalidone was discontinued.  She will continue on aspirin 81 daily, ticagrelor, and warfarin.     Acute diastolic heart failure and hypertension: Metoprolol supinate 12.5, spironolactone, potassium chloride 20 mEq daily, lisinopril, magnesium gluconate, furosemide 20 mg daily, digoxin.     A. Fib: On warfarin, INR 1.75 today.  Has been on 4 and 3 mg of Coumadin.     Diabetes type 2: On metformin 500 twice daily, would like her blood sugar checked twice daily as well.     CAD with recent stent placement: Continue Brilinta, aspirin 81, anticoagulated.  Atorvastatin on hold until LFTs return to normal.  -Check LFTs Friday.     Today Ms. Muller is being evaluated for nausea with emesis.  She has been having nausea with clear, mucous emesis 2-3 times per day over the past two days.  Gardenia reported that she has had mild nausea since TCU admission last week that has progressively worsened.  She noted that she has only been taking sips of ginger ale and water with inability to tolerate solid foods at this time due to nausea.  Gardenia was started on PRN Zofran yesterday and has utilized a couple doses without any noticeable improvement in her nausea.  She has had three regular bowel movements over the past two days without feeling constipated.  She said that she has a history of bowel obstruction and said that it was a similar presentation.  Gardenia reported RLQ abdominal cramping pain that she said was intermittent and severe that has worsened over the past few days.  The patient denied lightheadedness, dizziness, breathing difficulty, chest pain, palpitations, constipation, urinary  symptoms, or numbness or tingling in extremities,. We discussed her options for further evaluation at the facility versus ED transfer and she elected to go to [facility] ED for further evaluation of possible bowel obstruction.  Nursing staff were updated with patient's wishes and will be transferring her to ED STAT.       Past Medical History:   Diagnosis Date   ? Acute diastolic heart failure (H) 11/2015   ? Acute on chronic diastolic heart failure (H) 6/15/2019   ? Advanced directives, counseling/discussion 8/5/2015    Patient completed 2011.  Discussed today, 5/24/17, and wants to change healthcare agent from niece to daughter.  Discussed that she will need to complete new form to indicate this.   ? MAY (acute kidney injury) (H) 10/22/2018   ? Atrial fibrillation (H)    ? Benign adenomatous polyp of large intestine 3/30/2017    Colonoscopy March 2017.  Recommend 5 year follow-up.  Single tubular adenoma   ? Biliary obstruction 10/3/2018    Added automatically from request for surgery [id number]   ? Cerebral vascular accident (H)    ? Coronary artery disease 2006    100% RCA with collateral filling per Dr. Elizabeth's angio report   ? Diabetes mellitus type 2 in obese (H)    ? Fibrocystic breast    ? GERD (gastroesophageal reflux disease)    ? History of anesthesia complications     slow to wake   ? Hypertension    ? Peripheral vascular disease (H)    ? Pneumonia 11/11/2018   ? PONV (postoperative nausea and vomiting)    ? S/P cholecystectomy 6/22/2018   ? SBO (small bowel obstruction) (H) 11/12/2015   ? Stroke (H)    ? Transaminitis 10/22/2018   ? Upper respiratory infection 12/20/2018   ? Urinary incontinence              Family History   Problem Relation Age of Onset   ? Cancer Paternal Grandmother    ? Cancer Paternal Uncle    ? No Medical Problems Mother    ? No Medical Problems Father    ? Heart attack Sister    ? Chronic Kidney Disease Sister    ? No Medical Problems Daughter    ? No Medical Problems Maternal  Grandmother    ? No Medical Problems Maternal Grandfather    ? No Medical Problems Paternal Grandfather    ? No Medical Problems Maternal Aunt    ? No Medical Problems Paternal Aunt    ? Diabetes Brother    ? BRCA 1/2 Neg Hx    ? Breast cancer Neg Hx    ? Colon cancer Neg Hx    ? Endometrial cancer Neg Hx    ? Ovarian cancer Neg Hx      Social History     Socioeconomic History   ? Marital status: Legally      Spouse name: Not on file   ? Number of children: 1   ? Years of education: Not on file   ? Highest education level: Not on file   Occupational History   ? Not on file   Social Needs   ? Financial resource strain: Not on file   ? Food insecurity     Worry: Not on file     Inability: Not on file   ? Transportation needs     Medical: Not on file     Non-medical: Not on file   Tobacco Use   ? Smoking status: Former Smoker     Packs/day: 0.25   ? Smokeless tobacco: Former User   ? Tobacco comment: e-cig a few times/day   Substance and Sexual Activity   ? Alcohol use: No   ? Drug use: No   ? Sexual activity: Not on file   Lifestyle   ? Physical activity     Days per week: Not on file     Minutes per session: Not on file   ? Stress: Not on file   Relationships   ? Social connections     Talks on phone: Not on file     Gets together: Not on file     Attends Alevism service: Not on file     Active member of club or organization: Not on file     Attends meetings of clubs or organizations: Not on file     Relationship status: Not on file   ? Intimate partner violence     Fear of current or ex partner: Not on file     Emotionally abused: Not on file     Physically abused: Not on file     Forced sexual activity: Not on file   Other Topics Concern   ? Not on file   Social History Narrative    Single/, lives alone; daughter in [place];    Gets help from neighbors and extended family    Former smoker.no alcohol problems       REVIEW OF SYSTEM:  Pertinent items are noted in HPI.  A 12 point comprehensive  review of systems was negative except as noted.    PHYSICAL EXAM:     General Appearance:    Alert, cooperative, no distress, appears stated age   Head:    Normocephalic, without obvious abnormality, atraumatic   Eyes:    PERRL, conjunctiva/corneas clear, both eyes   Ears:    Normal external ear canals, both ears   Nose:   Nares normal, septum midline, mucosa normal, no drainage    or sinus tenderness   Throat:   Lips, mucosa, and tongue normal; teeth and gums normal   Neck:   Supple, symmetrical, trachea midline, no adenopathy;     thyroid:  no enlargement/tenderness/nodules; no carotid    bruit or JVD   Back:     Symmetric, no curvature, ROM normal, no CVA tenderness   Lungs:     Clear to auscultation bilaterally, respirations unlabored   Chest Wall:    No tenderness or deformity    Heart:    Regular rate and rhythm, S1 and S2 normal, no murmur, rub   or gallop   Abdomen:     Soft, distended; tenderness to light palpation in all quadrants; bowel sounds hyperactive active all four quadrants,     no masses, no organomegaly   Extremities:   Extremities normal, atraumatic, no cyanosis or edema   Pulses:   2+ and symmetric all extremities   Skin:   Skin color, texture, turgor normal, no rashes or lesions   Neurologic:   Oriented to person, place, time, and situation; exhibits logical thought processes; generalized weakness; ambulatory with walker           LABS:   Lab Results   Component Value Date    WBC 8.2 08/04/2020    HGB 11.2 (L) 08/04/2020    HCT 36.5 08/04/2020     08/04/2020    CHOL 86 03/26/2019    TRIG 93 03/26/2019    HDL 35 (L) 03/26/2019    LDLDIRECT 94 04/15/2015    ALT 53 (H) 08/07/2020    AST 26 08/07/2020     08/07/2020    K 4.2 08/07/2020     (H) 08/07/2020    CREATININE 0.67 08/07/2020    BUN 17 08/07/2020    CO2 21 (L) 08/07/2020    TSH 8.31 (H) 10/22/2018    INR 2.83 (H) 08/10/2020    HGBA1C 6.5 (H) 07/23/2020    MICROALBUR 4.09 (H) 07/02/2020         ASSESSMENT:      ICD-10-CM     1. RLQ abdominal pain  R10.31    2. Nausea and vomiting, intractability of vomiting not specified, unspecified vomiting type  R11.2    3. Hx SBO  Z87.19    4. Acute liver failure without hepatic coma  K72.00        PLAN:      **Send to ED STAT for nausea with emesis and hx of bowel obstruction    Otherwise continue current care plan for all other chronic medical conditions, as they are stable. Encouraged patient to engage in healthy lifestyle behaviors such as engaging in social activities, exercising (PT/OT), eating well, and following care plan. Follow up for routine check-up, or sooner if needed. Will continue to monitor patient and work with nursing staff collaboratively to work toward positive patient outcomes.     Electronically signed by: Charlotte Eden, CNP

## 2021-06-20 NOTE — ANESTHESIA CARE TRANSFER NOTE
Last vitals:   Vitals:    10/07/18 1134   BP: 120/66   Pulse: (!) 120   Resp: 22   Temp: 36.8  C (98.3  F)   SpO2: 99%     Patient's level of consciousness is awake and drowsy  Spontaneous respirations: yes  Maintains airway independently: yes  Dentition unchanged: yes  Oropharynx: oropharynx clear of all foreign objects    QCDR Measures:  ASA# 20 - Surgical Safety Checklist: WHO surgical safety checklist completed prior to induction  PQRS# 430 - Adult PONV Prevention: 4558F - Pt received => 2 anti-emetic agents (different classes) preop & intraop  ASA# 8 - Peds PONV Prevention: NA - Not pediatric patient, not GA or 2 or more risk factors NOT present  PQRS# 424 - [place]eta-op Temp Management: 4559F - At least one body temp DOCUMENTED => 35.5C or 95.9F within required timeframe  PQRS# 426 - PACU Transfer Protocol: - Transfer of care checklist used  ASA# 14 - Acute Post-op Pain: ASA14B - Patient did NOT experience pain >= 7 out of 10

## 2021-06-20 NOTE — CONSULTS
General surgery consult         ASSESSMENT     1. Abdominal pain, epigastric    2. (HFpEF) heart failure with preserved ejection fraction (H)    3. Anticoagulated on Coumadin    4. Tachycardia    5. Atrial flutter (H)    6. Hyperkalemia       No imaging signs of hernia or other explanations for her periumbilical pain and mid abdominal pain. Nausea and vomiting are not postprandial and oral intake does not appear to be a driving factor for when she becomes nauseated or when pain is exacerbated. CT shows distended gallbladder with wall thickening and extrahepatic biliary dilatation without cholelithiasis. US confirms duct dilatation with no choledocholithiasis. Mild transaminitis is noted without increase in bilirubin or ALP. There is no leukocytosis and she is afebrile. Patient has some tenderness to palpation in her RUQ, epigastrium and increases inseverity in her LUQ, however Jacome's sign is negative. Coupled with a normal WBC, this is a questionable presentation for cholecystitis. This could be related to the duct dilatation and gallbladder distention noted on imaging, which can occur with cardiac reflux. She does have an elevated BNP (although it is noted to be lower than last month) and presented with tachycardia, hypercalcemia and hyperkalemia. Without clinical or laboratory signs of infection and/or biliary obstruction, in the setting of cardiac compromise, it is likely that her hepatobiliary abnormalities are related to her cardiac issues rather than indicative of cholecystitis. However, we will continue to monitor her in case clinical signs of cholecystitis present.      PLAN      - cardiac and electrolyte management per [facility] and cards  - diet per [facility]; from a surgical standpoint she can start with clears and advance as tolerated  - we will continue to follow       CHIEF COMPLAINT     Chief Complaint   Patient presents with     Abdominal Pain         HPI     Gardenia Muller is a 68 y.o. female who we are  "consulted to see for cholecystitis. She is known to our service for incisional hernia repair requiring TAR performed robotically at [place]. She is a complex patient with a PMH that is extensive and including diastolic heart failure, atrial fibrillation, CAD, DM2, HTN, PVD, CVA, and GERD. She presented to the ED with a 1-2 week history of abdominal pain, shortness of breath with exertion, chills and vomiting. She reports that the pain was just above her incision and has been off and on over the past 2 weeks but was only \"severe\" that past week. The pain has been primarily generalized across her abdomen since admission and is described as an \"aching\". She reports occasional \"sharp\" pains just proximal to her hernia repair incision. She denies pain that radiates to shoulder or back, acholic stools, dark urine, and postprandial pain or nausea. She reports that she has felt chilled, but unsure if she has had any fevers. Her shortness of breath has been worsening recently, especially with exertion. She reports feeling weaker than normal over the past two weeks and not able to catch her breath. She has noted a nonproductive cough that has been present for over a week. She denies numbness, tingling, vertigo, headache, CP, hematemesis, melena, hematochezia, problems with urination, presence of rashes or lesions, and recent sick contacts.          PAST MEDICAL HISTORY SURGICAL HISTORY     Past Medical History:   Diagnosis Date     Acute diastolic heart failure (H) 11/2015     Atrial fibrillation (H)      Cerebral vascular accident (H)      Coronary artery disease 2006    100% RCA with collateral filling per Dr. Elizabeth's angio report     Diabetes mellitus type 2 in obese (H)      Fibrocystic breast      GERD (gastroesophageal reflux disease)      History of anesthesia complications     slow to wake     Hypertension      Peripheral vascular disease (H)      PONV (postoperative nausea and vomiting)      Stroke (H)      " Urinary incontinence     Past Surgical History:   Procedure Laterality Date     CARDIAC CATHETERIZATION       CORONARY STENT PLACEMENT  1995    stent     EXPLORATORY LAPAROTOMY N/A 6/29/2018    Procedure: LAPAROTOMY, CLOSURE OF PERITONEAL RENT;  Surgeon: Jones Harding DO;  Location: [facility];  Service:      [facility]  6/29/2018          VENTRAL HERNIA REPAIR N/A 11/12/2015    Procedure: STRANGULATED VENTRAL HERNIA REPAIR ;  Surgeon: Ernesto Bailey MD;  Location: [facility];  Service:           CURRENT MEDICATIONS ALLERGIES       Current Facility-Administered Medications:      acetaminophen CR tablet 650 mg (TYLENOL), 650 mg, Oral, BID, Rohit Perla MD, 650 mg at 10/04/18 0832     bisacodyl suppository 10 mg (DULCOLAX), 10 mg, Rectal, Daily PRN, Rohit Perla MD     dextrose 5%, 25 mL/hr, Intravenous, Continuous, Manav Martínez MD, Last Rate: 25 mL/hr at 10/04/18 0841, 25 mL/hr at 10/04/18 0841     diltiazem 24 hr capsule 180 mg (CARDIZEM CD), 180 mg, Oral, DAILY, Rohit Perla MD, 180 mg at 10/04/18 0833     HYDROmorphone injection 0.2-0.4 mg (DILAUDID), 0.2-0.4 mg, Intravenous, Q3H PRN, Rohit Perla MD, 0.4 mg at 10/04/18 0828     magnesium hydroxide suspension 30 mL (MILK OF MAG), 30 mL, Oral, Daily PRN, Rohit Perla MD     metoprolol tartrate tablet 25 mg (LOPRESSOR), 25 mg, Oral, BID, Rohit Perla MD, 25 mg at 10/04/18 0833     naloxone injection 0.2-0.4 mg (NARCAN), 0.2-0.4 mg, Intravenous, PRN **OR** naloxone injection 0.2-0.4 mg (NARCAN), 0.2-0.4 mg, Intramuscular, PRN, Sweetie Mcmillan MD     nitroglycerin SL tablet 0.4 mg (NITROSTAT), 0.4 mg, Sublingual, Q5 Min PRN, Rohit Perla MD     omeprazole capsule 20 mg (PriLOSEC), 20 mg, Oral, BID AC, Rohit Perla MD, 20 mg at 10/04/18 0832     ondansetron injection 4 mg (ZOFRAN), 4 mg, Intravenous, Q4H PRN **OR** ondansetron tablet 8 mg (ZOFRAN), 8 mg, Oral, Q8H PRN, Rohit Perla MD     rOPINIRole tablet 1 mg (REQUIP), 1 mg, Oral, QHS,  Rohit Perla MD     rosuvastatin tablet 40 mg (CRESTOR), 40 mg, Oral, QHS, Rohit Perla MD, 40 mg at 10/03/18 2223     senna-docusate 8.6-50 mg tablet 1 tablet (PERICOLACE), 1 tablet, Oral, BID, Rohit Perla MD, 1 tablet at 10/04/18 0833     sertraline tablet 100 mg (ZOLOFT), 100 mg, Oral, DAILY, Rohit Perla MD, 100 mg at 10/04/18 0833     torsemide tablet 20 mg (DEMADEX), 20 mg, Oral, BID - diuretic, Rohit Prela MD, 20 mg at 10/04/18 0833     traZODone tablet 50 mg (DESYREL), 50 mg, Oral, QHS, Rohit Perla MD, 50 mg at 10/03/18 2223   Allergies   Allergen Reactions     Penicillins Swelling          FAMILY HISTORY     Family History   Problem Relation Age of Onset     Cancer Paternal Grandmother      Cancer Paternal Uncle      No Medical Problems Mother      No Medical Problems Father      Heart attack Sister      Chronic Kidney Disease Sister      No Medical Problems Daughter      No Medical Problems Maternal Grandmother      No Medical Problems Maternal Grandfather      No Medical Problems Paternal Grandfather      No Medical Problems Maternal Aunt      No Medical Problems Paternal Aunt      Diabetes Brother      BRCA 1/2 Neg Hx      Breast cancer Neg Hx      Colon cancer Neg Hx      Endometrial cancer Neg Hx      Ovarian cancer Neg Hx          SOCIAL HISTORY     Social History     Social History     Marital status: Legally      Spouse name: N/A     Number of children: N/A     Years of education: N/A     Social History Main Topics     Smoking status: Current Every Day Smoker     Packs/day: 0.25     Smokeless tobacco: Former User      Comment: e-cig a few times/day     Alcohol use No     Drug use: No     Sexual activity: Not on file     Other Topics Concern     Not on file     Social History Narrative         REVIEW OF SYSTEMS      Review of Systems - 12 system review negative except as noted in HPI    PHYSICAL EXAM     VITAL SIGNS: /58 (Patient Position: Lying)  Pulse (!) 109  Temp 97.8  F  "(36.6  C) (Oral)   Resp 18  Ht 5' 3\" (1.6 m)  Wt 175 lb (79.4 kg)  LMP 01/25/1999  SpO2 94%  BMI 31 kg/m2   General : Alert, cooperative, appears stated age   Skin: Skin color and texture normal for patient   Head: Normocephalic, without obvious abnormality, atraumatic   HEENT: PERRL, conjunctiva/corneas clear, EOM's intact; no scleral edema or icterus noted   Throat: Lips, mucosa, and tongue dry but intact   Neck: Supple, no obvious adenopathy   Respiratory: Clear to auscultation bilaterally  Chest Wall: Atraumatic, no tenderness or deformity noted  Heart: Atrial flutter with 3 to 1 conduction and appropriate rate currently  Cardiovascular: Pulses 2+ and symmetric, capillary refill <3 seconds; no edema noted   GI: Abdomen is soft with active bowel sounds in all quadrants; ttp at midline incision and proximal to incision, mild tenderness in RUQ and epigatrium with increased tenderness in the LUQ; no guarding, distention or rebound; negative Jacome's sign  : Patient is eliminating bladder via void with adequate output noted   Musculoskeletal: Stable gait but weak; no obvious swelling, bruising or deformity  Neurologic: Cranial Nerves II-XII intact, moves all extremities with equal strength; no focal findings      RADIOLOGY      US Abdomen Limited   Final Result   CONCLUSION:   1.  The gallbladder is significantly distended. There is marked gallbladder wall thickening/edema. No visible gallstones. Findings may represent acalculus cholecystitis.   2.  There is dilation of the common bile duct and pancreatic duct. This raises the possibility of a pancreatic head mass, although none is definitely seen on this examination or on the prior CT.      CTA Chest PE Run   Final Result   CONCLUSION:   1.  No PE.   2.  Gallbladder wall thickening and enhancement. Consider cholecystitis. There is extrahepatic bile duct dilatation.   3.  Interlobular septal thickening is likely edema.    4.  Cardiomegaly. Reflux of contrast " material into the hepatic veins suggests cardiac dysfunction. There is atherosclerotic disease including coronary artery calcification. Enlargement of the main pulmonary artery can be seen with pulmonary hypertension.   5.  Hepatomegaly and heterogeneity of of the liver may be related to cardiac dysfunction.    6.  Colonic diverticulosis. No definite acute diverticulitis.   7.  Free fluid in the pelvis is nonspecific.       NOTE: ABNORMAL REPORT      THE DICTATION ABOVE DESCRIBES AN ABNORMALITY FOR WHICH FOLLOW-UP IS NEEDED.        CT Abdomen Pelvis Without Oral With IV Contrast   Final Result   CONCLUSION:   1.  No PE.   2.  Gallbladder wall thickening and enhancement. Consider cholecystitis. There is extrahepatic bile duct dilatation.   3.  Interlobular septal thickening is likely edema.    4.  Cardiomegaly. Reflux of contrast material into the hepatic veins suggests cardiac dysfunction. There is atherosclerotic disease including coronary artery calcification. Enlargement of the main pulmonary artery can be seen with pulmonary hypertension.   5.  Hepatomegaly and heterogeneity of of the liver may be related to cardiac dysfunction.    6.  Colonic diverticulosis. No definite acute diverticulitis.   7.  Free fluid in the pelvis is nonspecific.       NOTE: ABNORMAL REPORT      THE DICTATION ABOVE DESCRIBES AN ABNORMALITY FOR WHICH FOLLOW-UP IS NEEDED.        XR Chest 1 View Portable   Final Result          EKG     Reviewed        LABS     Results for orders placed or performed during the hospital encounter of 10/03/18   Comprehensive Metabolic Panel   Result Value Ref Range    Sodium 136 136 - 145 mmol/L    Potassium 5.5 (H) 3.5 - 5.0 mmol/L    Chloride 104 98 - 107 mmol/L    CO2 22 22 - 31 mmol/L    Anion Gap, Calculation 10 5 - 18 mmol/L    Glucose 115 70 - 125 mg/dL    BUN 22 8 - 22 mg/dL    Creatinine 0.89 0.60 - 1.10 mg/dL    GFR MDRD Af Amer >60 >60 mL/min/1.73m2    GFR MDRD Non Af Amer >60 >60 mL/min/1.73m2     Bilirubin, Total 0.6 0.0 - 1.0 mg/dL    Calcium 11.1 (H) 8.5 - 10.5 mg/dL    Protein, Total 7.7 6.0 - 8.0 g/dL    Albumin 3.3 (L) 3.5 - 5.0 g/dL    Alkaline Phosphatase 113 45 - 120 U/L     (H) 0 - 40 U/L    ALT 75 (H) 0 - 45 U/L   Lactic Acid   Result Value Ref Range    Lactic Acid 1.4 0.5 - 2.2 mmol/L   Troponin I   Result Value Ref Range    Troponin I 0.01 0.00 - 0.29 ng/mL   BNP(B-type Natriuretic Peptide)   Result Value Ref Range     (H) 0 - 114 pg/mL   INR   Result Value Ref Range    INR 2.09 (H) 0.90 - 1.10   HM1 (CBC with Diff)   Result Value Ref Range    WBC 6.4 4.0 - 11.0 thou/uL    RBC 4.61 3.80 - 5.40 mill/uL    Hemoglobin 12.5 12.0 - 16.0 g/dL    Hematocrit 39.9 35.0 - 47.0 %    MCV 87 80 - 100 fL    MCH 27.1 27.0 - 34.0 pg    MCHC 31.3 (L) 32.0 - 36.0 g/dL    RDW 17.6 (H) 11.0 - 14.5 %    Platelets 126 (L) 140 - 440 thou/uL    MPV 12.3 8.5 - 12.5 fL    Neutrophils % 57 50 - 70 %    Lymphocytes % 29 20 - 40 %    Monocytes % 12 (H) 2 - 10 %    Eosinophils % 1 0 - 6 %    Basophils % 1 0 - 2 %    Neutrophils Absolute 3.6 2.0 - 7.7 thou/uL    Lymphocytes Absolute 1.9 0.8 - 4.4 thou/uL    Monocytes Absolute 0.8 0.0 - 0.9 thou/uL    Eosinophils Absolute 0.1 0.0 - 0.4 thou/uL    Basophils Absolute 0.0 0.0 - 0.2 thou/uL   Magnesium   Result Value Ref Range    Magnesium 2.2 1.8 - 2.6 mg/dL   Protime-INR   Result Value Ref Range    INR 2.20 (H) 0.90 - 1.10   Basic Metabolic Panel   Result Value Ref Range    Sodium 138 136 - 145 mmol/L    Potassium 4.0 3.5 - 5.0 mmol/L    Chloride 106 98 - 107 mmol/L    CO2 23 22 - 31 mmol/L    Anion Gap, Calculation 9 5 - 18 mmol/L    Glucose 70 70 - 125 mg/dL    Calcium 10.7 (H) 8.5 - 10.5 mg/dL    BUN 17 8 - 22 mg/dL    Creatinine 0.75 0.60 - 1.10 mg/dL    GFR MDRD Af Amer >60 >60 mL/min/1.73m2    GFR MDRD Non Af Amer >60 >60 mL/min/1.73m2   HM2(CBC W/O DIFF)   Result Value Ref Range    WBC 6.0 4.0 - 11.0 thou/uL    RBC 4.21 3.80 - 5.40 mill/uL    Hemoglobin 11.5  (L) 12.0 - 16.0 g/dL    Hematocrit 37.7 35.0 - 47.0 %    MCV 90 80 - 100 fL    MCH 27.3 27.0 - 34.0 pg    MCHC 30.5 (L) 32.0 - 36.0 g/dL    RDW 17.6 (H) 11.0 - 14.5 %    Platelets 94 (L) 140 - 440 thou/uL   Comprehensive Metabolic Panel   Result Value Ref Range    Sodium 137 136 - 145 mmol/L    Potassium 4.0 3.5 - 5.0 mmol/L    Chloride 106 98 - 107 mmol/L    CO2 23 22 - 31 mmol/L    Anion Gap, Calculation 8 5 - 18 mmol/L    Glucose 71 70 - 125 mg/dL    BUN 17 8 - 22 mg/dL    Creatinine 0.75 0.60 - 1.10 mg/dL    GFR MDRD Af Amer >60 >60 mL/min/1.73m2    GFR MDRD Non Af Amer >60 >60 mL/min/1.73m2    Bilirubin, Total 0.5 0.0 - 1.0 mg/dL    Calcium 10.6 (H) 8.5 - 10.5 mg/dL    Protein, Total 6.3 6.0 - 8.0 g/dL    Albumin 2.9 (L) 3.5 - 5.0 g/dL    Alkaline Phosphatase 96 45 - 120 U/L     (H) 0 - 40 U/L    ALT 93 (H) 0 - 45 U/L   POCT Glucose   Result Value Ref Range    Glucose,  mg/dL   POCT Glucose   Result Value Ref Range    Glucose, POC 68 mg/dL   ECG 12 lead with MUSE   Result Value Ref Range    SYSTOLIC BLOOD PRESSURE 117 mmHg    DIASTOLIC BLOOD PRESSURE 83 mmHg    VENTRICULAR RATE 126 BPM    ATRIAL RATE 250 BPM    P-R INTERVAL  ms    QRS DURATION 86 ms    Q-T INTERVAL 284 ms    QTC CALCULATION (BEZET) 411 ms    P Axis 199 degrees    R AXIS -37 degrees    T AXIS 111 degrees    MUSE DIAGNOSIS       Atrial flutter with variable A-V block with premature ventricular or aberrantly conducted complexes  Left axis deviation  Septal infarct (cited on or before 31-JUL-2018)  Abnormal ECG  When compared with ECG of 18-AUG-2018 11:30,  Criteria for Inferior infarct are no longer Present  Confirmed by ERLIN TRIPATHI MD LOC: ([id number]) on 10/3/2018 3:12:00 PM           Thank you for the consult and allowing us to be involved in the care of this patient.    Lynn Herbert, CNP  [phone]  [facility] Surgery    I have seen and examined the patient.  I have reviewed and agree with the note written by the NP/PA team  member.   Chart and CT imaging reviewed. No evidence of recurrent hernias, no evidence of subcutaneous infections.  GB distended and overall liver picture may be secondary to cardiac etiology.  White count is completely normal.  -We will continue to follow.    Howard Harding D.O. FACS  [phone]  [facility] Department of Surgery

## 2021-06-21 NOTE — PROGRESS NOTES
Pharmacy Consult: Warfarin Management    Pharmacy consulted to dose warfarin for Gardenia Muller, a 68 y.o. female    Ordering provider: Dr. Michael Abreu, Hospitalist    Reason for warfarin therapy: Atrial Fibrillation  Goal INR Range: 2-3    Subjective  Medication lists (home and hospital) were reviewed.    New medications that may increase bleeding risk/INR:  none    Restarted home medications that may increase bleeding risk/INR: Torsemide, Ropinirole, sertraline, omeprazole, rosuvastatin, aspirin    Home Warfarin Dosin mg on , Tuesday and Thursday; 2 mg on all other days of the week    Other Anticoagulants: No  Patient being bridged: No    Patient Active Problem List   Diagnosis     Spinal stenosis     PAD (peripheral artery disease) (H)     Dermatosis Papulosa Nigra     Depression     Hypercalcemia     Right Renal Artery Stenosis     Osteoarthritis     Abnormality Of Walk     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)     Advanced directives, counseling/discussion     SBO (small bowel obstruction) (H)     Cystitis     Hypomagnesemia     Healthcare maintenance     Essential hypertension     Dysarthria     Cerebral infarction (H)     Anemia     Cerebrovascular disease     Benign adenomatous polyp of large intestine     RLS (restless legs syndrome)     Incisional hernia, without obstruction or gangrene     Abdominal pain, generalized     Diverticular disease of large intestine     Dysphagia     Coronary artery disease involving native coronary artery of native heart without angina pectoris     Dyslipidemia     Ventral hernia without obstruction or gangrene     Paroxysmal atrial fibrillation (H)     Anticoagulation management encounter     S/P repair of recurrent ventral hernia     SOB (shortness of breath)     Lower extremity edema     Anxiety     Hypokalemia     (HFpEF) heart failure with preserved ejection fraction (H)     Tachycardia     Cholecystitis     Anticoagulant long-term use      Biliary obstruction     Abdominal pain, right upper quadrant     Persistent atrial fibrillation (H)    Past Medical History:   Diagnosis Date     Acute diastolic heart failure (H) 11/2015     Atrial fibrillation (H)      Cerebral vascular accident (H)      Coronary artery disease 2006    100% RCA with collateral filling per Dr. Elizabeth's angio report     Diabetes mellitus type 2 in obese (H)      Fibrocystic breast      GERD (gastroesophageal reflux disease)      History of anesthesia complications     slow to wake     Hypertension      Peripheral vascular disease (H)      PONV (postoperative nausea and vomiting)      Stroke (H)      Urinary incontinence         Social History   Substance Use Topics     Smoking status: Current Every Day Smoker     Packs/day: 0.25     Smokeless tobacco: Former User      Comment: e-cig a few times/day     Alcohol use No       Objective   Labs:  Last 3 days:    Recent Labs      10/14/18   0520  10/14/18   0810  10/15/18   0546  10/16/18   0630   CREATININE  0.94   --   0.97   --    HGB   --   13.7   --    --    HCT   --   43.7   --    --    PLT   --   193   --    --    BILITOT  0.7   --   0.7  0.7   AST  47*   --   59*  57*   ALT  34   --   39  39   ALKPHOS  187*   --   174*  190*     Last 7 days:   Recent labs: (last 7 days)      10/10/18   0550  10/11/18   0705  10/12/18   0524  10/13/18   0554  10/14/18   0520  10/15/18   0546  10/16/18   0630   INR  1.29*  1.31*  1.44*  1.64*  1.95*  2.34*  2.39*       Warfarin Dosing History:    Date INR Warfarin Dose Comment   10/5/18 2.84 hold 5 mg of vitamin K   10/6/18 1.42 hold    10/7/18 1.44 hold    10/8/18 N/A 2 mg Patient may discharge tomorrow.   10/9/18 1.43 4 mg    10/10/18 1.29 4 mg    10/11/18 1.31 4 mg    10/12/18 1.44 4 mg    10/13/18 1.64 4 mg    10/14/18 1.95 4 mg    10/15/18 2.34 2 mg    10/16/18 2.39 4 mg      Assessment  The patient is continuing warfarin for the indication of Atrial Fibrillation with a goal INR of 2-3. INR  today is therapeutic.   Will give warfarin dose as per home regimen.    Plan  1. Administer warfarin 4 mg PO today.  2. Check INR daily or as appropriate.  3. Continue to follow the patient's INR, PLT, and HGB as available.  4. Monitor for potential drug/disease interactions.  5. Recommend to resume PTA warfarin dosing if patient being discharged today.    Thank you for the consult,  Tavares Sweeney, Pharmacy Student, [facility] 10/16/2018 7:53 AM

## 2021-06-21 NOTE — PROGRESS NOTES
"GASTROENTEROLOGY PROGRESS NOTE     SUBJECTIVE   The patient did relatively well overnight.  She states that her abdominal pain is much better today and that she is tolerating some oral intake.  No acute events overnight.     OBJECTIVE     Vitals Blood pressure 95/66, pulse 67, temperature 97.6  F (36.4  C), temperature source Oral, resp. rate 18, height 5' 3\" (1.6 m), weight 170 lb 4.8 oz (77.2 kg), last menstrual period 01/25/1999, SpO2 95 %, not currently breastfeeding.          Physical Exam   General: awake, alert, oriented times three    Cardiovascular: RRR, mild lower extremity edema    Chest: lungs are clear to auscultation bilaterally    Abdomen: soft, tender to palpation over incisional sites, non-distended, bowel sounds present    Neurologic: grossly intact        LABORATORY    ELECTROLYTE PANEL     Results from last 7 days  Lab Units 10/21/18  0640 10/20/18  0553 10/19/18  0743   LN-SODIUM mmol/L 132* 135*  135* 136   LN-POTASSIUM mmol/L 4.0 4.2  4.2 4.9   LN-CHLORIDE mmol/L 101 103  103 103   LN-CO2 mmol/L 20* 22  22 22   LN-BLOOD UREA NITROGEN mg/dL 39* 38*  38* 40*   LN-CREATININE mg/dL 1.15* 1.17*  1.17* 1.14*   LN-CALCIUM mg/dL 12.0* 11.7*  11.7* 11.8*        HEMATOLOGY PANEL     Results from last 7 days  Lab Units 10/21/18  0640 10/20/18  0553 10/19/18  0743 10/18/18  1535  10/14/18  0810   LN-HEMOGLOBIN g/dL  --   --   --  13.2  --  13.7   LN-MEAN CORPUSCULAR VOLUME fL  --   --   --  87  --  86   LN-WHITE BLOOD CELL COUNT thou/uL  --   --   --  11.6*  --  9.2   LN-PLATELET COUNT thou/uL  --   --   --  213  --  193   LN-INR  6.16* 5.08* 3.76*  --   < >  --    < > = values in this interval not displayed.   LIVER AND PANCREAS PANEL     Results from last 7 days  Lab Units 10/21/18  0640 10/20/18  0553 10/19/18  0743  10/16/18  0630 10/15/18  0546   LN-ALKALINE PHOSPHATASE U/L 368* 342* 298*  < > 190* 174*   LN-BILIRUBIN TOTAL mg/dL 1.2* 1.2* 1.0  < > 0.7 0.7   LN-BILIRUBIN DIRECT mg/dL  --   --  "  --   --  0.4 0.3   LN-PROTEIN TOTAL g/dL 7.1 7.2 7.2  < > 7.6 7.0   LN-ALT (SGPT) U/L 364* 334* 317*  < > 39 39   LN-AST (SGOT) U/L 615* 614* 749*  < > 57* 59*   < > = values in this interval not displayed.  IMAGING STUDIES    Abdominal ultrasound from October 20, 2018  1.  Gallbladder surgically removed. No bile duct dilatation.  2.  Tiny sliver of ascites over the right lobe of the liver likely postoperative. No fluid elsewhere in the abdomen.    Abdominal CT scan from October 16, 2018  1.  No complications related to recent surgery. No new abnormalities.  2.  There are small sites of ecchymoses or tiny subcutaneous hematomas at right upper quadrant and midline.    I have reviewed the current diagnostic and laboratory tests.           MASON Muller is a pleasant 68 y.o. female with multiple medical issues including diastolic heart failure and recent cholecystectomy for acalculous cholecystitis.  The patient now has elevated liver function tests with elevated transaminases and alkaline phosphatase.     RECOMMENDATION     Elevated LFTs   No evidence of bile duct dilation or choledocholithiasis on abdominal ultrasound.  This picture in the setting of diastolic heart failure is most consistent with hepatic congestion as a cause of the patient's elevated liver function tests.  Continue to monitor LFTs and to treat heart failure.       Heart failure   Management per cardiology and primary care team.       Anticoagulation   INR remains elevated and would need to be reversed prior to any interventions.     Dimitrios Guerra M.D.  [facility]  Thank you for the opportunity to participate in the care of this patient.   Please feel free to call me with any questions or concerns.

## 2021-06-21 NOTE — PROGRESS NOTES
Progress Note  Asked by Dr Foy to re-evaluate the patient for abdominal pain. Had lap cholecystectomy 10/7/18.     Subjective  Pain fluctuates; worst in LUQ  Nausea; no appetite. Bowels working, but constipated  No fevers  Has palpitations    Objective    Vital signs in last 24 hours  Temp:  [97.5  F (36.4  C)-98.3  F (36.8  C)] 97.5  F (36.4  C)  Heart Rate:  [] 84  Resp:  [18-19] 18  BP: ()/(60-68) 81/60  Weight:   166 lb 1.6 oz (75.3 kg)    Intake/Output last 3 shifts  I/O last 3 completed shifts:  In: 1220 [P.O.:1220]  Out: 751 [Urine:750; Stool:1]  Intake/Output this shift:       Physical Exam  Chest: Clear.  Heart: S1 S2 irreg  Abdomen: Soft, mild tenderness both upper quadrants without peritonism, bowel sounds present, incisions clean and dry.      Pertinent Labs   Lab Results: personally reviewed.     Results from last 7 days  Lab Units 10/16/18  0630 10/15/18  0546 10/14/18  1719 10/14/18  0520 10/13/18  0554   LN-SODIUM mmol/L  --  136  --  137  --    LN-POTASSIUM mmol/L 4.6 4.3  4.3 5.2* 3.6  3.5 4.2   LN-CHLORIDE mmol/L  --  98  --  98  --    LN-CO2 mmol/L  --  28  --  30  --    LN-BLOOD UREA NITROGEN mg/dL  --  24*  --  23*  --    LN-CREATININE mg/dL  --  0.97  --  0.94 0.89   LN-CALCIUM mg/dL  --  11.3*  --  11.4*  --    LN-ALBUMIN g/dL 2.9* 2.8*  --  2.7*  --    LN-PROTEIN TOTAL g/dL 7.6 7.0  --  6.8  --    LN-BILIRUBIN TOTAL mg/dL 0.7 0.7  --  0.7  --    LN-ALKALINE PHOSPHATASE U/L 190* 174*  --  187*  --    LN-ALT (SGPT) U/L 39 39  --  34  --    LN-AST (SGOT) U/L 57* 59*  --  47*  --        Results from last 7 days  Lab Units 10/14/18  0810   LN-WHITE BLOOD CELL COUNT thou/uL 9.2   LN-HEMOGLOBIN g/dL 13.7   LN-HEMATOCRIT % 43.7   LN-PLATELET COUNT thou/uL 193       INR 2.39    Pertinent Radiology   Radiology Results: Personally reviewed impression/s    Ct Abdomen Pelvis Without Oral Without Iv Contrast    Result Date: 10/16/2018  CT ABDOMEN PELVIS WO ORAL WO IV CONTRAST 10/16/2018  9:48 AM INDICATION: Abdomen pain , post cholecytectomy look for organic cause TECHNIQUE: Routine CT abdomen and pelvis without oral or IV contrast. Multiplanar reformation images (MPR). Dose reduction techniques were used. COMPARISON: 10/03/2018 FINDINGS: LUNG BASES: No change in mild interstitial prominence and cardiomegaly. ABDOMEN: Interval cholecystectomy. Minimal soft tissue stranding at the gallbladder bed but no suggestion for leak or abscess. Bile ducts normal in caliber. Liver, pancreas, spleen and kidneys negative. PELVIS: Trace volume ascites, improved compared to prior study. Calcified uterine fibroids. No adnexal lesions. Dense atherosclerotic calcifications, prior right renal artery stenting. No inflammatory changes of bowel, no obstruction. Sigmoid diverticulosis unchanged. MUSCULOSKELETAL: Tiny ventral fluid collection likely subcutaneous hematoma at port entry site, similar ecchymoses within subcutaneous tissues right upper quadrant likely from recent surgery. Previous lower ventral duct abdominal repair unchanged. Degenerative changes of spine.     CONCLUSION: 1.  No complications related to recent surgery. No new abnormalities. 2.  There are small sites of ecchymoses or tiny subcutaneous hematomas at right upper quadrant and midline.        Assessment/Plan  Abdominal pain likely secondary to CHF  Normal postop findings on exam and CT; normal wbc and bilirubin  Do not think this amenable to surgical intervention.   Will sign off, but we are easily available if needed. Please call if we can be of further assistance.    Principal Problem:    Biliary obstruction  Active Problems:    PAD (peripheral artery disease) (H)    Depression    Cerebrovascular disease    Coronary artery disease involving native coronary artery of native heart without angina pectoris    Dyslipidemia    (HFpEF) heart failure with preserved ejection fraction (H)    Tachycardia    Cholecystitis    Anticoagulant long-term use     Abdominal pain, right upper quadrant    Persistent atrial fibrillation (H)

## 2021-06-21 NOTE — PROGRESS NOTES
[facility] Progress Note      Date of Service: 10/14/2018     Assessment and plan    Shortness of breath on exertion  ; Saturating well on room air  ; No clear wheezing or crackles audible on examination  ; Does have uncontrolled atrial fibrillation  ; Does have history of underlying diastolic CHF: We will check BNP and chest x-ray  ; We will get cardiology input    Persistent atrial fibrillation  ; Tachycardia on examination  ; Cardiology consulted  ; Monitor in telemetry  ; Pharmacy dose warfarin    Acute on chronic diastolic CHF  Continue diuresis as per cardiology    Acute acalculous cholecystitis  ; Status post laparoscopic cholecystectomy on 10/7/2018  ; Liver enzymes improving  ; Tolerating diet    Diabetes mellitus type 2  ; Last hemoglobin A1c 5.7 on 7/31/2018  ; Diet controlled    Thrombocytopenia  ; Platelet count improved to 193      This note was dictated using voice recognition software. Any grammatical or context distortions are unintentional and inherent to the software.  Subjective:   Patient is sitting up in chair in no acute distress  Patient states she is feeling better.  She still has some abdominal pain but is getting better, tolerating diet, mild on and off nausea.  Patient complains of shortness of breath on exertion, states she feels short of breath even when going to the bathroom    Brief History: 68 y.o. female with past medical history of atrial flutter on warfarin, hypertension, dyslipidemia, diet controlled diabetes, heart failure with preserved EF, presented to the hospital on 10/3/2018 with abdominal pain, and was found to have acute cholecystitis with cholelithiasis and dilatation of CBD and pancreatic duct. GI and surgery were consulted. She underwent ERCP on 10/05, which did not show any filling defect. She underwent HIDA scan on 10/06, which showed marked intrahepatic cholestasis, associated with cystic duct obstruction and cholecystitis. She was treated with IV antibiotics.  "She underwent laparoscopic cholecystectomy on 10/07/18.    Chart reviewed, events noted. Pt seen and examined.     Objective     Vital signs in last 24 hours:  Temp:  [97.5  F (36.4  C)-98.5  F (36.9  C)] 97.5  F (36.4  C)  Heart Rate:  [] 77  Resp:  [18-20] 19  BP: ()/(56-78) 95/58  Weight:   165 lb (74.8 kg)  Weight change: -2 lb 3.2 oz (-0.998 kg)  Body mass index is 29.23 kg/(m^2).    Intake/Output last 3 shifts:  I/O last 3 completed shifts:  In: 720 [P.O.:720]  Out: 600 [Urine:600]  Intake/Output this shift:         Physical Exam:  BP 95/58 (Patient Position: Lying)  Pulse 77  Temp 97.5  F (36.4  C) (Oral)   Resp 19  Ht 5' 3\" (1.6 m)  Wt 165 lb (74.8 kg)  LMP 01/25/1999  SpO2 94%  BMI 29.23 kg/m2    FiO2 (%): (!) 2 % O2 Device: None (Room air) O2 Flow Rate (L/min): 2 L/min    General Appearance:    Alert, no apparent distress.    HEENT:    Normocephalic. Pupils equal and reactive. No scleral   Icterus. Moist oral mucosa    Lungs:     Clear to auscultation bilaterally, respirations unlabored  Mild decrease at base  No clear wheezing or crackles audible   Heart::   Irregularly irregular, tachycardia   no peripheral edema.   Abdomen:   Soft, tenderness at site of surgery  Non distended. Bowel sounds present.    Extremity :   No deformity   Pulses:   2+ and symmetric all extremities   CNS:   Alert and oriented, no facial asymmetry  No focal neurologic deficits         Imaging:  personally reviewed.      Scheduled Meds:    acetaminophen  650 mg Oral BID     aspirin  81 mg Oral DAILY     diltiazem  180 mg Oral DAILY     magnesium oxide  400 mg Oral TID     metoprolol tartrate  75 mg Oral BID     omeprazole  20 mg Oral BID AC     potassium chloride  20 mEq Oral DAILY     potassium chloride ER  30 mEq Oral Q4H     rOPINIRole  1 mg Oral QHS     rosuvastatin  40 mg Oral QHS     senna-docusate  1 tablet Oral BID     sertraline  100 mg Oral DAILY     torsemide  20 mg Oral BID - diuretic     traZODone  " 50 mg Oral QHS     warfarin - daily dose required   Other Med Consult or Protocol     Continuous Infusions:  PRN Meds:.bisacodyl, LORazepam, magnesium hydroxide, naloxone **OR** naloxone, nitroglycerin, ondansetron **OR** ondansetron, oxyCODONE    Lab Results:  personally reviewed.   not applicable  Recent Results (from the past 24 hour(s))   INR    Collection Time: 10/14/18  5:20 AM   Result Value Ref Range    INR 1.95 (H) 0.90 - 1.10   Magnesium    Collection Time: 10/14/18  5:20 AM   Result Value Ref Range    Magnesium 1.9 1.8 - 2.6 mg/dL   Potassium - Next AM    Collection Time: 10/14/18  5:20 AM   Result Value Ref Range    Potassium 3.5 3.5 - 5.0 mmol/L   Comprehensive Metabolic Panel    Collection Time: 10/14/18  5:20 AM   Result Value Ref Range    Sodium 137 136 - 145 mmol/L    Potassium 3.6 3.5 - 5.0 mmol/L    Chloride 98 98 - 107 mmol/L    CO2 30 22 - 31 mmol/L    Anion Gap, Calculation 9 5 - 18 mmol/L    Glucose 125 70 - 125 mg/dL    BUN 23 (H) 8 - 22 mg/dL    Creatinine 0.94 0.60 - 1.10 mg/dL    GFR MDRD Af Amer >60 >60 mL/min/1.73m2    GFR MDRD Non Af Amer 59 (L) >60 mL/min/1.73m2    Bilirubin, Total 0.7 0.0 - 1.0 mg/dL    Calcium 11.4 (H) 8.5 - 10.5 mg/dL    Protein, Total 6.8 6.0 - 8.0 g/dL    Albumin 2.7 (L) 3.5 - 5.0 g/dL    Alkaline Phosphatase 187 (H) 45 - 120 U/L    AST 47 (H) 0 - 40 U/L    ALT 34 0 - 45 U/L   POCT Glucose    Collection Time: 10/14/18  8:02 AM   Result Value Ref Range    Glucose,  mg/dL       Results from last 7 days  Lab Units 10/14/18  0802 10/12/18  1704 10/11/18  1132 10/11/18  0801 10/10/18  1146 10/10/18  0824 10/09/18  2055 10/09/18  1646 10/09/18  1114 10/09/18  0729   LN-POC GLUCOSE FINGERSTICK- HE mg/dL 118 164 146 99 162 93 155 190 160 129           Advance Care Planning:   Barriers to discharge: Shortness of breath on exertion, placement  Anticipated discharge day: 1-2 days, likely tomorrow  Disposition: Plan for TCU awaiting insurance approval, patient wanted  to go home tomorrow if unable to get TCU   aware      Danii Smith MD  [facility] Hospitalist

## 2021-06-21 NOTE — PROGRESS NOTES
Post cardioversion difficulty waking up with hypotentsion.  See flowsheet for vital signs.  Family updated by Dr Vallejo.  Drowsy.  Moves all extremities when asked.

## 2021-06-21 NOTE — PROGRESS NOTES
Pharmacy Consult: Warfarin Management    Pharmacy consulted to dose warfarin for Gardenia Muller, a 68 y.o. female who had abdominal pain upon presentation to [facility] on 10/3. Patient had an ERCP on 10/5 with no sludge or stones found. Patient had HIDA on 10/6, cystic duct consistent with obstruction. On 10/7 a laproscopic cholecystectomy was done. Patient remains in hospital for persistent atrial fibrillation, with rapid heart rate limited by blood pressure. 10/18 cardioversion done, elevated AST and ALT.    Ordering provider: Dr. Michael Abreu, Hospitalist    Reason for warfarin therapy: Atrial Fibrillation  Goal INR Range: 2-3    Subjective  Medication lists (home and hospital) were reviewed.    New medications that may increase bleeding risk/INR:  Amiodarone     Restarted home medications that may increase bleeding risk/INR: Torsemide, Ropinirole, sertraline, omeprazole, rosuvastatin, aspirin    Home Warfarin Dosin mg on , Tuesday and Thursday; 2 mg on all other days of the week    Other Anticoagulants: No  Patient being bridged: No    Patient Active Problem List   Diagnosis     Spinal stenosis     PAD (peripheral artery disease) (H)     Dermatosis Papulosa Nigra     Depression     Hypercalcemia     Right Renal Artery Stenosis     Osteoarthritis     Abnormality Of Walk     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)     Advanced directives, counseling/discussion     SBO (small bowel obstruction) (H)     Cystitis     Hypomagnesemia     Healthcare maintenance     Essential hypertension     Dysarthria     Cerebral infarction (H)     Anemia     Cerebrovascular disease     Benign adenomatous polyp of large intestine     RLS (restless legs syndrome)     Incisional hernia, without obstruction or gangrene     Abdominal pain, generalized     Diverticular disease of large intestine     Dysphagia     Coronary artery disease involving native coronary artery of native heart without angina  pectoris     Dyslipidemia     Ventral hernia without obstruction or gangrene     Paroxysmal atrial fibrillation (H)     Anticoagulation management encounter     S/P repair of recurrent ventral hernia     SOB (shortness of breath)     Lower extremity edema     Anxiety     Hypokalemia     (HFpEF) heart failure with preserved ejection fraction (H)     Tachycardia     Cholecystitis     Anticoagulant long-term use     Biliary obstruction     Abdominal pain, right upper quadrant     Persistent atrial fibrillation (H)     Abdominal pain, left upper quadrant     Sinus bradycardia    Past Medical History:   Diagnosis Date     Acute diastolic heart failure (H) 11/2015     Atrial fibrillation (H)      Cerebral vascular accident (H)      Coronary artery disease 2006    100% RCA with collateral filling per Dr. Elizabeth's angio report     Diabetes mellitus type 2 in obese (H)      Fibrocystic breast      GERD (gastroesophageal reflux disease)      History of anesthesia complications     slow to wake     Hypertension      Peripheral vascular disease (H)      PONV (postoperative nausea and vomiting)      Stroke (H)      Urinary incontinence         Social History   Substance Use Topics     Smoking status: Current Every Day Smoker     Packs/day: 0.25     Smokeless tobacco: Former User      Comment: e-cig a few times/day     Alcohol use No       Objective   Labs:  Last 3 days:    Recent Labs      10/20/18   0553  10/21/18   0640  10/21/18   1926  10/22/18   0453   CREATININE  1.17*  1.17*  1.15*  1.60*  1.62*   BILITOT  1.2*  1.2*  1.7*  1.8*   AST  614*  615*  706*  2443*   ALT  334*  364*  416*  778*   ALKPHOS  342*  368*  404*  340*     Last 7 days:   Recent labs: (last 7 days)      10/17/18   0551  10/18/18   0600  10/19/18   0743  10/20/18   0553  10/21/18   0640  10/21/18   1620  10/22/18   0453   INR  2.65*  3.88*  3.76*  5.08*  6.16*  4.45*  3.31*       Warfarin Dosing History:    Date INR Warfarin Dose Comment   10/5/18  2.84 hold 5 mg of vitamin K   10/6/18 1.42 hold    10/7/18 1.44 hold    10/8/18 N/A 2 mg Patient may discharge tomorrow.   10/9/18 1.43 4 mg    10/10/18 1.29 4 mg    10/11/18 1.31 4 mg    10/12/18 1.44 4 mg    10/13/18 1.64 4 mg    10/14/18 1.95 4 mg    10/15/18 2.34 2 mg    10/16/18 2.39 4 mg    10/17/18 2.65 2 mg    10/18/18 3.88 hold    10/19/18 3.76 hold    10/20/18 5.08 hold    10/21/18 6.16 hold Phytonadione 2.5 mg po x 1   10/22/18 3.31 hold            Assessment  The patient is continuing warfarin for the indication of Atrial Fibrillation with a goal INR of 2-3. INR today is supratherapeutic. Warfarin dosing was decreased 10/17 due to amiodarone initiation and INR increase, however INR still became supratherapeutic. Warfarin was held 10/18, 10/19 and 10/20  but continues to be supratherapeutic. Vit K po given. INR mildly supra-therapeutic today, but will continue to hold warfarin at least another day and check daily INR.    Plan  1. Hold warfarin today due to INR increase, believed to be due in part to amiodarone initiation.  2. Check INR daily or as appropriate.  3. Continue to follow the patient's INR, PLT, and HGB as available.  4. Monitor for potential drug/disease interactions.  5. Amiodarone started on 10/17, due to the drug-drug interaction between warfarin and amiodarone, warfarin dosing will likely need to be decreased. The drug-drug interaction can be delayed, so close monitoring will be needed on discharge.     Thank you for the consult,  Amelie Good [facility], 10/22/2018 9:31 AM

## 2021-06-21 NOTE — PROGRESS NOTES
"GI PROGRESS NOTE  10/25/2018  Gardenia Muller  1950  [id number]    Subjective:   No new complaints. Eating lunch.Denies abdominal pain, nausea, vomiting.      Objective:     Blood pressure 105/63, pulse 64, temperature 97.4  F (36.3  C), temperature source Oral, resp. rate 16, height 5' 3\" (1.6 m), weight 171 lb 3.2 oz (77.7 kg), last menstrual period 01/25/1999, SpO2 94 %, not currently breastfeeding.    Body mass index is 30.33 kg/(m^2).  Gen: NAD  Cardio: RRR  GI: Non-distended, BS positive, soft, non-tender    Laboratory:    Results from last 7 days  Lab Units 10/24/18  0508 10/23/18  0533 10/22/18  1824   LN-WHITE BLOOD CELL COUNT thou/uL 8.9 9.6 11.7*   LN-HEMOGLOBIN g/dL 11.1* 10.6* 11.4*   LN-HEMATOCRIT % 35.5 34.1* 36.0   LN-PLATELET COUNT thou/uL 95* 91* 104*         Results from last 7 days  Lab Units 10/25/18  0627 10/24/18  0508 10/23/18  0533   LN-SODIUM mmol/L 136  136 139  139 138  138   LN-POTASSIUM mmol/L 3.8  3.8 3.7  3.7 3.6  3.6   LN-CHLORIDE mmol/L 101  101 101  101 99  99   LN-CO2 mmol/L 25  25 28  28 29  29   LN-BLOOD UREA NITROGEN mg/dL 33*  33* 40*  40* 46*  46*   LN-CREATININE mg/dL 0.92  0.92 0.91  0.91 1.19*  1.19*   LN-CALCIUM mg/dL 12.3*  12.3* 12.2*  12.2* 11.7*  11.7*   LN-ALBUMIN g/dL 2.7*  2.7* 2.7*  2.7* 2.6*  2.6*   LN-PROTEIN TOTAL g/dL 7.0 6.9 6.7   LN-BILIRUBIN TOTAL mg/dL 2.4* 2.3* 1.9*   LN-ALKALINE PHOSPHATASE U/L 382* 371* 333*   LN-ALT (SGPT) U/L 400* 525* 646*   LN-AST (SGOT) U/L 443* 763* 1404*     Lipase   Date Value Ref Range Status   06/29/2016 17 0 - 52 U/L Final   03/04/2016 29 0 - 52 U/L Final   11/11/2015 21 0 - 52 U/L Final       Results from last 7 days  Lab Units 10/25/18  0627 10/24/18  0508 10/23/18  0533   LN-INR  2.02* 1.77* 2.10*     Procedures:  ERCP 10/5/18:  Procedure Details      The patient was informed of the risks, benefits and alternatives and gave informed consent. The patient had stable cardiovascular status and " was judged to be adequate for sedation. A timeout was performed.      The Olympus videoendoscope was passed into the upper esophagus without difficulty. The distal stomach and duodenum were briefly examined and seen to be normal.      The scope was advanced into the second portion of the duodenum and the ampulla encountered with a straightening maneuver. The ampulla appeared normal, beside a large joe-ampullary diverticulum  .  Initial cannulation was performed utilizing Dreamtome and .035 guidewire. The guidewire advanced in the trajectory of the PD and secured at the tail of the PD. A second .035 guidewire advanced readily to the bile duct, followed by the sphincterotome. Contrast was injected. The CBD was dilated to 11-12 mm without filling defects or stricture. Sphincterotomy was cut. The CBD was cleared with the 12 mm balloon and basket. There was note of copious dark bile without stone or sludge. Procedure was terminated.          Findings              Bile duct: dilated CBD to 11-12 mm without filling defects/stricture  Pancreas: n/a         Assessment/Plan:   1. Elevated LFTs  68 year-old female with history of HTN, DM, CAD, a flutter on warfarin admitted with acalculous cholecystitis s/p cholecystectomy 10/7/18, ERCP with sphincterotomy 10/5/18 with markedly high LFTs which occurred after she was started on amiodarone 10/18/18. Also suspect some component of hepatic congestion given heart failure with BNP of 2600. Amiodarone has been stopped (last dose 20/21 AM), and her LFTs continue to trend down.    1. Trend LFTs until normal     GI will sign off. Please call with questions.       Rachelle Cuellar PA-C  [facility]  [phone]

## 2021-06-21 NOTE — PROGRESS NOTES
"HOSPITALIST PROGRESS NOTE    Patient:  Gardenia Muller    LOS: 19    Subjective / Interval History:  - c/o nausea, not tolerating PO intake  - No other complaints on ROS otherwise    Objective:  BP 98/56  Pulse 60  Temp 98  F (36.7  C)  Resp (!) 31  Ht 5' 3\" (1.6 m)  Wt 170 lb 14.4 oz (77.5 kg)  LMP 01/25/1999  SpO2 93%  BMI 30.27 kg/m2  General appearance: lethargic, weak  Lungs: bibasilar crackles on limited exam  Heart: regular rate and rhythm, S1, S2 normal, no murmur, click, rub or gallop  Abdomen: soft, distended, mild general TTP  Extremities: BLE edema present  Skin: Skin color, texture, turgor normal. No rashes or lesions  Neurologic: Grossly normal    Vitals:    Temp:  [97.5  F (36.4  C)-98  F (36.7  C)] 98  F (36.7  C)  Heart Rate:  [41-65] 60  Resp:  [18-36] 31  BP: ()/(51-85) 98/56    SpO2: (!) 85 % on O2 Device: Nasal cannula      Intake/Output Summary (Last 24 hours) at 10/22/18 1209  Last data filed at 10/22/18 1000   Gross per 24 hour   Intake             1759 ml   Output               30 ml   Net             1729 ml       Vitals:    10/03/18 1236 10/03/18 2013 10/04/18 0420 10/05/18 0412   Weight: 179 lb 1.6 oz (81.2 kg) 175 lb 4.8 oz (79.5 kg) 175 lb (79.4 kg) 174 lb 1.6 oz (79 kg)    10/06/18 0428 10/07/18 0603 10/08/18 0631 10/09/18 0416   Weight: 175 lb (79.4 kg) 173 lb 9.6 oz (78.7 kg) 172 lb 6.4 oz (78.2 kg) 176 lb 12.8 oz (80.2 kg)    10/10/18 0441 10/11/18 0600 10/13/18 0445 10/14/18 0515   Weight: 173 lb 3 oz (78.6 kg) 171 lb 8 oz (77.8 kg) 167 lb 3.2 oz (75.8 kg) 165 lb (74.8 kg)    10/15/18 0254 10/16/18 0503 10/17/18 0530 10/18/18 0311   Weight: 164 lb 8 oz (74.6 kg) 166 lb 1.6 oz (75.3 kg) 165 lb 6.4 oz (75 kg) 154 lb 6.4 oz (70 kg)    10/19/18 0302 10/20/18 0321 10/20/18 0500 10/21/18 0405   Weight: 167 lb 1.6 oz (75.8 kg) 170 lb 8 oz (77.3 kg) 166 lb 9.6 oz (75.6 kg) 170 lb 4.8 oz (77.2 kg)    10/22/18 0500   Weight: 170 lb 14.4 oz (77.5 kg)       Medications:    " magnesium oxide  400 mg Oral TID     omeprazole  20 mg Oral BID AC     rOPINIRole  1 mg Oral QHS     sertraline  100 mg Oral DAILY     sodium chloride 0.9%  500 mL Intravenous Once     sodium chloride  10-30 mL Intravenous Q8H FIXED TIMES     torsemide  20 mg Oral DAILY     warfarin - daily dose required   Other Med Consult or Protocol     warfarin - NO DOSE TODAY   Other No Dose Today       Labs:    Results from last 7 days  Lab Units 10/18/18  1535   LN-WHITE BLOOD CELL COUNT thou/uL 11.6*   LN-HEMOGLOBIN g/dL 13.2   LN-HEMATOCRIT % 42.9   LN-PLATELET COUNT thou/uL 213       Results from last 7 days  Lab Units 10/22/18  0453 10/21/18  1926 10/21/18  0640   LN-SODIUM mmol/L 133* 134* 132*   LN-POTASSIUM mmol/L 4.1 5.1* 4.0   LN-CHLORIDE mmol/L 98 100 101   LN-CO2 mmol/L 23 13* 20*   LN-BLOOD UREA NITROGEN mg/dL 49* 44* 39*   LN-CREATININE mg/dL 1.62* 1.60* 1.15*   LN-CALCIUM mg/dL 11.7* 12.6* 12.0*       Results from last 7 days  Lab Units 10/22/18  0453 10/21/18  1620 10/21/18  0640   LN-INR  3.31* 4.45* 6.16*     Estimated Creatinine Clearance: 32.7 mL/min (by C-G formula based on Cr of 1.62).    Imaging:  XR Chest 1 View Portable   Final Result      CT Abdomen Pelvis Without Oral Without IV Contrast   Final Result   CONCLUSION:   1.  The subcutaneous hematomas are unchanged.      2.  Nothing for intra-abdominal hemorrhage.      3.  Small amount of free fluid in the abdomen around the liver and in the pelvis was present on preop 10/03/2018 exam as well. Uncertain etiology. Possibly underlying liver disease?      4.  No other significant findings.            US Abdomen Limited   Final Result   CONCLUSION:   1.  Gallbladder surgically removed. No bile duct dilatation.      2.  Tiny sliver of ascites over the right lobe of the liver likely postoperative. No fluid elsewhere in the abdomen.      CT Abdomen Pelvis Without Oral Without IV Contrast   Final Result   CONCLUSION:   1.  No complications related to recent  surgery. No new abnormalities.   2.  There are small sites of ecchymoses or tiny subcutaneous hematomas at right upper quadrant and midline.      XR Chest 2 Views   Final Result      NM Hepatobiliary WO EF Or Pharm   Final Result   CONCLUSION:   1.  Marked intrahepatic cholestasis. Delayed images show activity in the common bile duct and small bowel but no activity in the gallbladder. Findings are consistent with cystic duct obstruction and cholecystitis. Findings were called to the bases nurse,    Andriy, at 1710 hours on 10/06/2018.      NOTE: ABNORMAL REPORT      THE DICTATION ABOVE DESCRIBES AN ABNORMALITY FOR WHICH FOLLOW-UP IS NEEDED.       XR C Arm Less Than One Hour   Final Result      US Abdomen Limited   Final Result   CONCLUSION:   1.  The gallbladder is significantly distended. There is marked gallbladder wall thickening/edema. No visible gallstones. Findings may represent acalculus cholecystitis.   2.  There is dilation of the common bile duct and pancreatic duct. This raises the possibility of a pancreatic head mass, although none is definitely seen on this examination or on the prior CT.      CTA Chest PE Run   Final Result   CONCLUSION:   1.  No PE.   2.  Gallbladder wall thickening and enhancement. Consider cholecystitis. There is extrahepatic bile duct dilatation.   3.  Interlobular septal thickening is likely edema.    4.  Cardiomegaly. Reflux of contrast material into the hepatic veins suggests cardiac dysfunction. There is atherosclerotic disease including coronary artery calcification. Enlargement of the main pulmonary artery can be seen with pulmonary hypertension.   5.  Hepatomegaly and heterogeneity of of the liver may be related to cardiac dysfunction.    6.  Colonic diverticulosis. No definite acute diverticulitis.   7.  Free fluid in the pelvis is nonspecific.       NOTE: ABNORMAL REPORT      THE DICTATION ABOVE DESCRIBES AN ABNORMALITY FOR WHICH FOLLOW-UP IS NEEDED.        CT Abdomen Pelvis  Without Oral With IV Contrast   Final Result   CONCLUSION:   1.  No PE.   2.  Gallbladder wall thickening and enhancement. Consider cholecystitis. There is extrahepatic bile duct dilatation.   3.  Interlobular septal thickening is likely edema.    4.  Cardiomegaly. Reflux of contrast material into the hepatic veins suggests cardiac dysfunction. There is atherosclerotic disease including coronary artery calcification. Enlargement of the main pulmonary artery can be seen with pulmonary hypertension.   5.  Hepatomegaly and heterogeneity of of the liver may be related to cardiac dysfunction.    6.  Colonic diverticulosis. No definite acute diverticulitis.   7.  Free fluid in the pelvis is nonspecific.       NOTE: ABNORMAL REPORT      THE DICTATION ABOVE DESCRIBES AN ABNORMALITY FOR WHICH FOLLOW-UP IS NEEDED.        XR Chest 1 View Portable   Final Result      NM Parathyroid Planar W Spect    (Results Pending)       Assessment & Plan:  # Abd Pain 2/2 Subcutaneous Hematomas: 10/21 repeat CT shows hematomas are unchanged  - symptom management  - encourage ambulation    # Chronic Diastolic Heart Failure: BNP 2613, unable to measure I/O accurately 2/2 incontinence  Today's weight 77.5kg  Weight on 10/18 was 70kg  - will request Cardiology to re-evaluate  - hold diuretics (receieved am Torsemide already)  - TTE pending  - daily weight, strict I/O    # MAY in the setting of diuresis  - appreciate Nephrology assistance  - plan as above  - monitor creat    # Bradycardia: HR 60's today, bradycardic to 40's yesterday evening, hemodynamically stable  - Sotalol and Metoprolol d/c'ed  - telemetry protocol    # Transaminitis likely 2/2 hepatic congestion in the setting of diastolic heart failure: , AST 2443  - appreciate GI assistance  - monitor LFT    # Atrial Fibrillation s/p NIVIA cardioversion on 10/18/18: INR 3.3 today s/p Vitamin K yesterday  - cont warfarin per pharmacy  - f/u in a.fib clinic in 1-2 weeks post d/c    #  Acalculous Cholecystitis s/p lap nimesh on 10/7/18  - appreciate General Surgery assistance    # NIDDM: 07/2018 Hgb A1c 5.7%  - Novolog s/s    # Chronic Hypercalcemia 2/2 primary hyperparathyroidism  - appreciate Nephrology assistance  - outpt NM parathyroid scan    # FEN:  - low Na diet    # PPX:  - on warfarin    # Dispo:  - GI and Nephrology following  - re-consulting Cardiology    Jeffy Pina MD  10/22/2018 12:09 PM

## 2021-06-21 NOTE — PROGRESS NOTES
Hospitalist Progress Note    Brief history  69 yo female with PMH of persistent AFib/flutter, chronic diastolic CHF, HTN, DM 2 who presented with a one-week history of epigastric/right upper quadrant pain with dilated CBD and pancreatic ducts without evidence of stones/sludge/stricture on ERCP.  On 10/7/18 patient underwent laparoscopic cholecystectomy for acalculous cholecystitis.  Postoperatively patient has been in persistent atrial fibrillation/flutter; elevated ventricular rates initially improved on higher dose of metoprolol and diltiazem, however over the past 2 days heart rate remained elevated and use of medication was limited by hypotension.  On 10/18 patient underwent cardioversion which was successful in restoring sinus rhythm.  Patient had a brief episode of hypotension after the procedure which was probably related to anesthesia. She continues to complain of abdominal pain, nausea, poor oral intake. Also noted to have worsening hypercalcemia which is PTH driven    Assessment and plan    1.  Hypercalcemia.  Likely primary hyperparathyroidism since PTH is elevated 190.  Nuclear medicine parathyroid scan has been ordered to evaluate for parathyroid adenoma.   2.  Abdominal pain.  Likely due to subcutaneous hematomas.  Recent CT of the abdomen and pelvis 10/16 did not show any obstruction, evidence for leak or abscess.   3.  Persistent atrial fibrillation/flutter.  Status post cardioversion 10/18 with restoration of normal sinus rhythm.  Continue amiodarone and metoprolol per cardiology recommendations. On warfarin for stroke prevention, INR is supratherapeutic 3.76 today   4.  Chronic diastolic CHF.  He does not appear significantly volume overloaded on exam.  Continue torsemide 20 mg daily  5.  Abnormal LFTs. Possibly due to CHF - will monitor for now.   5.  MAY.  Probably prerenal in the setting of significant diuresis.  Improved since yesterday.  Monitor closely  6. Hyperkalemia. Due to MAY and  potassium supplements.  Resolved after discontinuing potassium  7.  Acalculous cholecystitis, status post laparoscopic cholecystectomy 10/7/18. Still having abdominal discomfort, nausea and poor oral intake.  Encourage ambulation, protein supplements  5.  DM 2 without complications.  Diet controlled.  Most recent hemoglobin A1c 5.7 on 7/31/18      Barriers to Discharge : poor oral intake, hypercalcemia    Anticipated Discharge :1-2 days  Disposition :home versus TCU      Subjective  Patient states that she feels better and less tired today.  She still complains of mid abdominal discomfort and some nausea.  Very poor oral intake.  Refuses to do physical therapy stating that she is too tired.      Objective    Vital signs in last 24 hours  Temp:  [97.4  F (36.3  C)-98.6  F (37  C)] 98.4  F (36.9  C)  Heart Rate:  [] 61  Resp:  [18-28] 18  BP: ()/(47-85) 110/62 90% O2 Device: None (Room air) O2 Flow Rate (L/min): 4 L/min  Weight:   167 lb 1.6 oz (75.8 kg) Weight change: 12 lb 11.2 oz (5.761 kg)    Intake/Output last 3 shifts  I/O last 3 completed shifts:  In: 400 [I.V.:400]  Out: 200 [Urine:200]  Intake/Output this shift:  I/O this shift:  In: 480 [P.O.:480]  Out: 200 [Urine:200]    Physical Exam:  GENERAL: No acute distress  CARDIAC: Regular rate and rhythm.  S1 & S2 normal.  No gallops or murmurs. No edema  LUNGS: Fine scattered crackles over bases bilaterally  ABDOMEN: Soft, mild tenderness in the epigastrium and RUQ, fullness noted around midline incision; bowel sounds present all quadrants  NEURO: Awake and alert.  Moves all extremities       Pertinent Labs   Lab Results: personally reviewed.     Results from last 7 days  Lab Units 10/19/18  0743 10/18/18  1536 10/17/18  0551 10/16/18  0630 10/15/18  0546  10/14/18  0520   LN-SODIUM mmol/L 136 131*  --   --  136  --  137   LN-POTASSIUM mmol/L 4.9 5.3* 4.6 4.6 4.3  4.3  < > 3.6  3.5   LN-CHLORIDE mmol/L 103 99  --   --  98  --  98   LN-CO2 mmol/L 22  21*  --   --  28  --  30   LN-BLOOD UREA NITROGEN mg/dL 40* 41*  --   --  24*  --  23*   LN-CREATININE mg/dL 1.14* 1.42*  --   --  0.97  --  0.94   LN-CALCIUM mg/dL 11.8* 12.3*  --   --  11.3*  --  11.4*   LN-ALBUMIN g/dL 2.8* 3.0*  --  2.9* 2.8*  --  2.7*   LN-PROTEIN TOTAL g/dL 7.2 8.1*  --  7.6 7.0  --  6.8   LN-BILIRUBIN TOTAL mg/dL 1.0 0.9  --  0.7 0.7  --  0.7   LN-ALKALINE PHOSPHATASE U/L 298* 303*  --  190* 174*  --  187*   LN-ALT (SGPT) U/L 317* 270*  --  39 39  --  34   LN-AST (SGOT) U/L 749* 773*  --  57* 59*  --  47*   LN-MAGNESIUM mg/dL  --   --   --  2.1 1.8  --  1.9   < > = values in this interval not displayed.    Results from last 7 days  Lab Units 10/18/18  1535 10/14/18  0810   LN-WHITE BLOOD CELL COUNT thou/uL 11.6* 9.2   LN-HEMOGLOBIN g/dL 13.2 13.7   LN-HEMATOCRIT % 42.9 43.7   LN-PLATELET COUNT thou/uL 213 193   LN-NEUTROPHILS RELATIVE PERCENT %  --  55   LN-MONOCYTES RELATIVE PERCENT %  --  10           Medications  Scheduled Meds:    acetaminophen  650 mg Oral BID     amiodarone  200 mg Oral TID     aspirin  81 mg Oral DAILY     magnesium oxide  400 mg Oral TID     metoprolol tartrate  50 mg Oral BID     omeprazole  20 mg Oral BID AC     polyethylene glycol  17 g Oral DAILY     rOPINIRole  1 mg Oral QHS     rosuvastatin  40 mg Oral QHS     senna-docusate  1 tablet Oral BID     sertraline  100 mg Oral DAILY     sodium chloride 0.9%  500 mL Intravenous Once     torsemide  20 mg Oral DAILY     traZODone  50 mg Oral QHS     warfarin - daily dose required   Other Med Consult or Protocol     warfarin - NO DOSE TODAY   Other No Dose Today     Continuous Infusions:    nacl 0.9% 75 mL/hr (10/19/18 1406)     PRN Meds:.bisacodyl, LORazepam, magnesium hydroxide, naloxone **OR** naloxone, nitroglycerin, ondansetron, oxyCODONE    Pertinent Radiology   No new pertinent data is available      Advanced Care Planning:  Treatment/Discharge Planning discussed with the patient, Irina Hidalgo and Tin and  IDALIA Osman MD  Internal Medicine Hospitalist  10/19/2018

## 2021-06-21 NOTE — PROGRESS NOTES
Pharmacy Consult: Warfarin Management    Pharmacy consulted to dose warfarin for Gardenia Muller, a 68 y.o. female who had abdominal pain upon presentation to [facility] on 10/3. Patient had an ERCP on 10/5 with no sludge or stones found. Patient had HIDA on 10/6, cystic duct consistent with obstruction. On 10/7 a laproscopic cholecystectomy was done. Patient remains in hospital for persistent atrial fibrillation, with rapid heart rate limited by blood pressure.    Ordering provider: Dr. Michael Abreu, Hospitalist    Reason for warfarin therapy: Atrial Fibrillation  Goal INR Range: 2-3    Subjective  Medication lists (home and hospital) were reviewed.    New medications that may increase bleeding risk/INR:  Amiodarone     Restarted home medications that may increase bleeding risk/INR: Torsemide, Ropinirole, sertraline, omeprazole, rosuvastatin, aspirin    Home Warfarin Dosin mg on , Tuesday and Thursday; 2 mg on all other days of the week    Other Anticoagulants: No  Patient being bridged: No    Patient Active Problem List   Diagnosis     Spinal stenosis     PAD (peripheral artery disease) (H)     Dermatosis Papulosa Nigra     Depression     Hypercalcemia     Right Renal Artery Stenosis     Osteoarthritis     Abnormality Of Walk     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)     Advanced directives, counseling/discussion     SBO (small bowel obstruction) (H)     Cystitis     Hypomagnesemia     Healthcare maintenance     Essential hypertension     Dysarthria     Cerebral infarction (H)     Anemia     Cerebrovascular disease     Benign adenomatous polyp of large intestine     RLS (restless legs syndrome)     Incisional hernia, without obstruction or gangrene     Abdominal pain, generalized     Diverticular disease of large intestine     Dysphagia     Coronary artery disease involving native coronary artery of native heart without angina pectoris     Dyslipidemia     Ventral hernia without  obstruction or gangrene     Paroxysmal atrial fibrillation (H)     Anticoagulation management encounter     S/P repair of recurrent ventral hernia     SOB (shortness of breath)     Lower extremity edema     Anxiety     Hypokalemia     (HFpEF) heart failure with preserved ejection fraction (H)     Tachycardia     Cholecystitis     Anticoagulant long-term use     Biliary obstruction     Abdominal pain, right upper quadrant     Persistent atrial fibrillation (H)     Abdominal pain, left upper quadrant    Past Medical History:   Diagnosis Date     Acute diastolic heart failure (H) 11/2015     Atrial fibrillation (H)      Cerebral vascular accident (H)      Coronary artery disease 2006    100% RCA with collateral filling per Dr. Elizabeth's angio report     Diabetes mellitus type 2 in obese (H)      Fibrocystic breast      GERD (gastroesophageal reflux disease)      History of anesthesia complications     slow to wake     Hypertension      Peripheral vascular disease (H)      PONV (postoperative nausea and vomiting)      Stroke (H)      Urinary incontinence         Social History   Substance Use Topics     Smoking status: Current Every Day Smoker     Packs/day: 0.25     Smokeless tobacco: Former User      Comment: e-cig a few times/day     Alcohol use No       Objective   Labs:  Last 3 days:    Recent Labs      10/16/18   0630   BILITOT  0.7   AST  57*   ALT  39   ALKPHOS  190*     Last 7 days:   Recent labs: (last 7 days)      10/12/18   0524  10/13/18   0554  10/14/18   0520  10/15/18   0546  10/16/18   0630  10/17/18   0551  10/18/18   0600   INR  1.44*  1.64*  1.95*  2.34*  2.39*  2.65*  3.88*       Warfarin Dosing History:    Date INR Warfarin Dose Comment   10/5/18 2.84 hold 5 mg of vitamin K   10/6/18 1.42 hold    10/7/18 1.44 hold    10/8/18 N/A 2 mg Patient may discharge tomorrow.   10/9/18 1.43 4 mg    10/10/18 1.29 4 mg    10/11/18 1.31 4 mg    10/12/18 1.44 4 mg    10/13/18 1.64 4 mg    10/14/18 1.95 4 mg     10/15/18 2.34 2 mg    10/16/18 2.39 4 mg    10/17/18 2.65 2 mg    10/18/18 3.88 hold      Assessment  The patient is continuing warfarin for the indication of Atrial Fibrillation with a goal INR of 2-3. INR today is supratherapeutic. Warfarin dosing was decreased yesterday due to amiodarone initiation and INR increase, however INR still became supratherapeutic. Warfarin to be held today.    Plan  1. Hold warfarin today due to INR increase, believed to be due in part to amiodarone initiation.  2. Check INR daily or as appropriate.  3. Continue to follow the patient's INR, PLT, and HGB as available.  4. Monitor for potential drug/disease interactions.  5. Amiodarone started on 10/17, due to the drug-drug interaction between warfarin and amiodarone, warfarin dosing will likely need to be decreased. The drug-drug interaction can be delayed, so close monitoring will be needed on discharge.     Thank you for the consult,  Tavares Sweeney, Pharmacy Student, [facility] 10/18/2018 1:18 PM

## 2021-06-21 NOTE — ANESTHESIA PREPROCEDURE EVALUATION
Anesthesia Evaluation      Patient summary reviewed   History of anesthetic complications     Airway   Mallampati: II  Neck ROM: full   Pulmonary - normal exam    breath sounds clear to auscultation  (+) shortness of breath, a smoker                         Cardiovascular   Exercise tolerance: < 4 METS  (+) hypertension, CAD, dysrhythmias, , PVD    ECG reviewed  Rhythm: irregular  Rate: abnormal,      ROS comment:   Atrial fibrillation with increased ventricular response.    Normal left ventricular size.    Left ventricle ejection fraction is mildly decreased. Left ventricular ejection fraction estimated 45-50%. There is mild global hypokinesis.    Normal right ventricular size. Right ventricular systolic function appears mildly reduced.TAPSE is reduced.    Moderate left atrial enlargement. Mild right atrial enlargement.    Moderate to severe mitral regurgitation.    Moderate tricuspid insufficiency.    Mild to moderate aortic regurgitation suggested    Small pericardial effusion    When compared to the previous study dated 8/3/2018, left ventricular systolic function appears mildly reduced to this examiner on the prior study. The degree of mitral insufficiency appears more significant on the current study.         Neuro/Psych    (+) CVA , depression, anxiety/panic attacks,     Comments: SS  Dysarthria  Dysphagia  RLS    Endo/Other    (+) diabetes mellitus type 2, arthritis, obesity,      GI/Hepatic/Renal    (+) GERD,   chronic renal disease,     Comments: MARCIO          Dental    (+) poor dentition                       Anesthesia Plan  Planned anesthetic: general mask and total IV anesthesia    ASA 3   Induction: intravenous   Anesthetic plan and risks discussed with: patient    Post-op plan: routine recovery

## 2021-06-21 NOTE — PROGRESS NOTES
Spiritual Care Note    Spiritual Assessment:  Patient seen by Deacon Bardales for spiritual support.     Care Provided: Listening, support, and prayer provided.     Plan of Care: Spiritual care will continue to follow as part of patient's care team.    ALPHONSE Donovan, BCC

## 2021-06-21 NOTE — PROGRESS NOTES
Pharmacy Consult: Warfarin Management    Pharmacy consulted to dose warfarin for Gardenia Muller, a 68 y.o. female    Ordering provider: Dr. Michael Abreu, Hospitalist    Reason for warfarin therapy: Atrial Fibrillation  Goal INR Range: 2-3    Subjective  Medication lists (home and hospital) were reviewed.    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)     Advanced directives, counseling/discussion     SBO (small bowel obstruction) (H)     Cystitis     Hypomagnesemia     Healthcare maintenance     Essential hypertension     Dysarthria     Cerebral infarction (H)     Anemia     Cerebrovascular disease     Benign adenomatous polyp of large intestine     RLS (restless legs syndrome)     Incisional hernia, without obstruction or gangrene     Abdominal pain, generalized     Diverticular disease of large intestine     Dysphagia     Coronary artery disease involving native coronary artery of native heart without angina pectoris     Dyslipidemia     Ventral hernia without obstruction or gangrene     Paroxysmal atrial fibrillation (H)     Anticoagulation management encounter     S/P repair of recurrent ventral hernia     SOB (shortness of breath)     Lower extremity edema     Anxiety     Hypokalemia     (HFpEF) heart failure with preserved ejection fraction (H)     Tachycardia     Cholecystitis     Anticoagulant long-term use     Biliary  obstruction     Abdominal pain, right upper quadrant    Past Medical History:   Diagnosis Date     Acute diastolic heart failure (H) 11/2015     Atrial fibrillation (H)      Cerebral vascular accident (H)      Coronary artery disease 2006    100% RCA with collateral filling per Dr. Elizabeth's angio report     Diabetes mellitus type 2 in obese (H)      Fibrocystic breast      GERD (gastroesophageal reflux disease)      History of anesthesia complications     slow to wake     Hypertension      Peripheral vascular disease (H)      PONV (postoperative nausea and vomiting)      Stroke (H)      Urinary incontinence         Social History   Substance Use Topics     Smoking status: Current Every Day Smoker     Packs/day: 0.25     Smokeless tobacco: Former User      Comment: e-cig a few times/day     Alcohol use No       Objective   Labs:  Last 3 days:    Recent Labs      10/13/18   0554   CREATININE  0.89     Last 7 days:   Recent labs: (last 7 days)      10/07/18   0732  10/09/18   0614  10/10/18   0550  10/11/18   0705  10/12/18   0524  10/13/18   0554   INR  1.44*  1.43*  1.29*  1.31*  1.44*  1.64*       Warfarin Dosing History:    Date INR Warfarin Dose Comment   10/5/18 2.84 hold 5 mg of vitamin K   10/6/18 1.42 hold    10/7/18 1.44 hold    10/8/18 N/A 2 mg Patient may discharge tomorrow.   10/9/18 1.43 4 mg    10/10/18 1.29 4 mg    10/11/18 1.31 4 mg    10/12/18 1.44 4 mg    10/13/18 1.64 4 mg      Assessment  The patient is continuing warfarin for the indication of Atrial Fibrillation with a goal INR of 2-3. INR today is subtherapeutic which is expected as warfarin was held from 10/3/18 - 10/7/18 for surgery. INR is trending up, will continue yesterday's dose.     Plan  1. Administer warfarin 4 mg PO today.  2. Check INR daily or as appropriate.  3. Continue to follow the patient's INR, PLT, and HGB as available.  4. Monitor for potential drug/disease interactions.  5. Recommend to resume PTA warfarin dosing if  patient being discharged today.    Thank you for the consult,  Sheri Hanna, PharmD, BCPS 10/13/2018 3:15 PM

## 2021-06-21 NOTE — PROGRESS NOTES
Pt. Heart rate continues to bounce between 102 and 124. Monitoring BP.  Pt. Concerned what to do at home with this, if her heart rate remains high.  She states she does get dizzy some times but then each time up to Bathroom she does not complain of being dizzy.  She is having nausea and she has pain in the stab site that is next to umbilicus.  Oxy given with good relief.

## 2021-06-21 NOTE — PROGRESS NOTES
PROGRESS NOTE      Subjective:              No new changes. Tolerating diet, no abd pain     Objective:                Vital signs in last 24 hrs;  Temp:  [97.5  F (36.4  C)-98.2  F (36.8  C)] 97.7  F (36.5  C)  Heart Rate:  [] 86  Resp:  [14-40] 14  BP: ()/(56-75) 113/62  SpO2:  [91 %-98 %] 93 %    Physical Exam:   Physical Examination    Vitals:   Vitals:    10/24/18 1123   BP: 113/62   Pulse: 86   Resp: 14   Temp: 97.7  F (36.5  C)   SpO2: 93%     General: Alert and Oriented x3, No acute distress  HEENT: no scleral icterus  Chest: RRR, no m/r/g  Resp: CTA B, no r/r/w  GI: s/nt/nd, no guarding, no rebound  Ext: no LE edema  Neuro: no asterixis    Current Labs;  Admission on 10/03/2018   No results displayed because visit has over 200 results.        Assessment:       68 F with HTN, DM, CAD, atrial flutter on coumadin, admitted with acalculous cholecystitis, s/p CCY on Oct 7, s/p ERCP with sphincterotomy Oct 5.     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 Her LFTs closely mirror timeframe of amiodarone usage. While chronic use is more common with amiodarone drug injury, acute injury can occur at higher doses.  Heart failure and congested hepatopathy is also likely contributing. BNP was up to 2600, highest level noted in Epic for this patient.    Biliary obstruction is unlikely as patient is s/p ERCP with sphincterotomy, s/p CCY and has a normal CBD.   Improvement in LFTs consistent with drug injury and hepatic congestion     Plan:     Amiodarone has now been discontinued  Diuresis per primary team  Trend LFTs    Ernesto Reynolds  10/24/2018 1:45 PM  [facility]

## 2021-06-21 NOTE — PROGRESS NOTES
"HOSPITALIST PROGRESS NOTE    Patient:  Gardenia Muller    LOS: 21    Subjective / Interval History:  - tolerating breakfast, mild nausea without vomiting, denies abd pain  - No other complaints on ROS otherwise    Objective:  /56 (Patient Position: Semi-nicole)  Pulse 61  Temp 97.6  F (36.4  C) (Oral)   Resp 16  Ht 5' 3\" (1.6 m)  Wt 170 lb 3.2 oz (77.2 kg)  LMP 01/25/1999  SpO2 91%  BMI 30.15 kg/m2  General appearance: sitting up in bed, NAD, A&Ox3  Lungs: bibasilar crackles present  Heart: regular rate and rhythm, S1, S2 normal, no murmur, click, rub or gallop  Abdomen: soft, distended, mild general TTP  Extremities: BLE edema present  Skin: Skin color, texture, turgor normal. No rashes or lesions  Neurologic: Grossly normal    Vitals:    Temp:  [97.5  F (36.4  C)-98.2  F (36.8  C)] 97.6  F (36.4  C)  Heart Rate:  [] 61  Resp:  [16-40] 16  BP: ()/(56-75) 119/56    SpO2: 91 % on O2 Device: None (Room air)      Intake/Output Summary (Last 24 hours) at 10/24/18 0910  Last data filed at 10/24/18 0512   Gross per 24 hour   Intake              680 ml   Output              575 ml   Net              105 ml       Vitals:    10/03/18 1236 10/03/18 2013 10/04/18 0420 10/05/18 0412   Weight: 179 lb 1.6 oz (81.2 kg) 175 lb 4.8 oz (79.5 kg) 175 lb (79.4 kg) 174 lb 1.6 oz (79 kg)    10/06/18 0428 10/07/18 0603 10/08/18 0631 10/09/18 0416   Weight: 175 lb (79.4 kg) 173 lb 9.6 oz (78.7 kg) 172 lb 6.4 oz (78.2 kg) 176 lb 12.8 oz (80.2 kg)    10/10/18 0441 10/11/18 0600 10/13/18 0445 10/14/18 0515   Weight: 173 lb 3 oz (78.6 kg) 171 lb 8 oz (77.8 kg) 167 lb 3.2 oz (75.8 kg) 165 lb (74.8 kg)    10/15/18 0254 10/16/18 0503 10/17/18 0530 10/18/18 0311   Weight: 164 lb 8 oz (74.6 kg) 166 lb 1.6 oz (75.3 kg) 165 lb 6.4 oz (75 kg) 154 lb 6.4 oz (70 kg)    10/19/18 0302 10/20/18 0321 10/20/18 0500 10/21/18 0405   Weight: 167 lb 1.6 oz (75.8 kg) 170 lb 8 oz (77.3 kg) 166 lb 9.6 oz (75.6 kg) 170 lb 4.8 oz (77.2 kg) "    10/22/18 0500 10/23/18 0400 10/24/18 0428   Weight: 170 lb 14.4 oz (77.5 kg) 173 lb 12.8 oz (78.8 kg) 170 lb 3.2 oz (77.2 kg)       Medications:    magnesium oxide  400 mg Oral TID     metoprolol tartrate  12.5 mg Oral BID     omeprazole  20 mg Oral BID AC     rOPINIRole  1 mg Oral QHS     sertraline  100 mg Oral DAILY     sodium chloride 0.9%  500 mL Intravenous Once     sodium chloride  10-30 mL Intravenous Q8H FIXED TIMES     warfarin - daily dose required   Other Med Consult or Protocol       Labs:    Results from last 7 days  Lab Units 10/24/18  0508 10/23/18  0533 10/22/18  1824   LN-WHITE BLOOD CELL COUNT thou/uL 8.9 9.6 11.7*   LN-HEMOGLOBIN g/dL 11.1* 10.6* 11.4*   LN-HEMATOCRIT % 35.5 34.1* 36.0   LN-PLATELET COUNT thou/uL 95* 91* 104*       Results from last 7 days  Lab Units 10/24/18  0508 10/23/18  0533 10/22/18  1824   LN-SODIUM mmol/L 139  139 138  138 136   LN-POTASSIUM mmol/L 3.7  3.7 3.6  3.6 3.7   LN-CHLORIDE mmol/L 101  101 99  99 98   LN-CO2 mmol/L 28  28 29  29 26   LN-BLOOD UREA NITROGEN mg/dL 40*  40* 46*  46* 47*   LN-CREATININE mg/dL 0.91  0.91 1.19*  1.19* 1.37*   LN-CALCIUM mg/dL 12.2*  12.2* 11.7*  11.7* 11.9*       Results from last 7 days  Lab Units 10/24/18  0508 10/23/18  0533 10/22/18  1824   LN-INR  1.77* 2.10* 2.51*     Estimated Creatinine Clearance: 58.2 mL/min (by C-G formula based on Cr of 0.91).    Imaging:  XR Chest 1 View Portable   Final Result      CT Abdomen Pelvis Without Oral Without IV Contrast   Final Result   CONCLUSION:   1.  The subcutaneous hematomas are unchanged.      2.  Nothing for intra-abdominal hemorrhage.      3.  Small amount of free fluid in the abdomen around the liver and in the pelvis was present on preop 10/03/2018 exam as well. Uncertain etiology. Possibly underlying liver disease?      4.  No other significant findings.            US Abdomen Limited   Final Result   CONCLUSION:   1.  Gallbladder surgically removed. No bile duct  dilatation.      2.  Tiny sliver of ascites over the right lobe of the liver likely postoperative. No fluid elsewhere in the abdomen.      CT Abdomen Pelvis Without Oral Without IV Contrast   Final Result   CONCLUSION:   1.  No complications related to recent surgery. No new abnormalities.   2.  There are small sites of ecchymoses or tiny subcutaneous hematomas at right upper quadrant and midline.      XR Chest 2 Views   Final Result      NM Hepatobiliary WO EF Or Pharm   Final Result   CONCLUSION:   1.  Marked intrahepatic cholestasis. Delayed images show activity in the common bile duct and small bowel but no activity in the gallbladder. Findings are consistent with cystic duct obstruction and cholecystitis. Findings were called to the bases nurse,    Andriy, at 1710 hours on 10/06/2018.      NOTE: ABNORMAL REPORT      THE DICTATION ABOVE DESCRIBES AN ABNORMALITY FOR WHICH FOLLOW-UP IS NEEDED.       XR C Arm Less Than One Hour   Final Result      US Abdomen Limited   Final Result   CONCLUSION:   1.  The gallbladder is significantly distended. There is marked gallbladder wall thickening/edema. No visible gallstones. Findings may represent acalculus cholecystitis.   2.  There is dilation of the common bile duct and pancreatic duct. This raises the possibility of a pancreatic head mass, although none is definitely seen on this examination or on the prior CT.      CTA Chest PE Run   Final Result   CONCLUSION:   1.  No PE.   2.  Gallbladder wall thickening and enhancement. Consider cholecystitis. There is extrahepatic bile duct dilatation.   3.  Interlobular septal thickening is likely edema.    4.  Cardiomegaly. Reflux of contrast material into the hepatic veins suggests cardiac dysfunction. There is atherosclerotic disease including coronary artery calcification. Enlargement of the main pulmonary artery can be seen with pulmonary hypertension.   5.  Hepatomegaly and heterogeneity of of the liver may be related to  cardiac dysfunction.    6.  Colonic diverticulosis. No definite acute diverticulitis.   7.  Free fluid in the pelvis is nonspecific.       NOTE: ABNORMAL REPORT      THE DICTATION ABOVE DESCRIBES AN ABNORMALITY FOR WHICH FOLLOW-UP IS NEEDED.        CT Abdomen Pelvis Without Oral With IV Contrast   Final Result   CONCLUSION:   1.  No PE.   2.  Gallbladder wall thickening and enhancement. Consider cholecystitis. There is extrahepatic bile duct dilatation.   3.  Interlobular septal thickening is likely edema.    4.  Cardiomegaly. Reflux of contrast material into the hepatic veins suggests cardiac dysfunction. There is atherosclerotic disease including coronary artery calcification. Enlargement of the main pulmonary artery can be seen with pulmonary hypertension.   5.  Hepatomegaly and heterogeneity of of the liver may be related to cardiac dysfunction.    6.  Colonic diverticulosis. No definite acute diverticulitis.   7.  Free fluid in the pelvis is nonspecific.       NOTE: ABNORMAL REPORT      THE DICTATION ABOVE DESCRIBES AN ABNORMALITY FOR WHICH FOLLOW-UP IS NEEDED.        XR Chest 1 View Portable   Final Result      NM Parathyroid Planar W Spect    (Results Pending)       Assessment & Plan:  # Chronic Diastolic Heart Failure: 10/2018 EF 55-60%, BNP 2481, unable to measure I/O accurately 2/2 incontinence, has had intermittent hypotension and acute transaminitis  Today's Weight 77.2kg  Yesterday's Weight 78.8kg  - appreciate Cardiology assistance  - holding diuretics  - plan to d/c on Lasix 40mg daily  - will restart statin pending LFT improvement  - daily weight, strict I/O  - outpt f/u with CHF NP in 2 weeks and Dr. Choe in 6 weeks      # MAY in the setting of diuresis: resolved while holding diuretics, creat 0.91 (1.19)  - appreciate Nephrology assistance  - cont holding diuretics  - plan as above  - monitor creat      # Transaminitis likely 2/2 hepatic congestion in the setting of diastolic heart failure:   (646),  (1404)  - appreciate GI assistance  - monitor LFT      # Bradycardia: was previously on Amiodarone for a.fib, switched to Sotalol and Metoprolol, had bradycardic episode to 40's on 10/21 and Metoprolol/Sotalol was stopped at that time. On 10/23 reverted back to A.fib with HR , restarted Metoprolol. Today NSR, HR 60's on telemetry  - cont Metoprolol 12.5mg PO two times a day  - telemetry protocol      # Atrial Fibrillation s/p NIVIA cardioversion on 10/18/18  - Metoprolol 12.5mg PO two times a day to maintain HR < 110  - previously on Amiodarone, stopped 2/2 elevated LFTs (last dose 10/21)  - cont warfarin per pharmacy  - f/u in a.fib clinic in 1-2 weeks post d/c      # Acalculous Cholecystitis s/p lap nimesh on 10/7/18  - appreciate General Surgery assistance      # NIDDM: 10/2018 Hgb A1c 7.3%  - Novolog s/s      # Chronic Hypercalcemia 2/2 primary hyperparathyroidism  - appreciate Nephrology assistance  - outpt NM parathyroid scan      # FEN:  - low Na diet      # PPX:  - on warfarin      # Dispo:  - Cardiology, GI and Nephrology following    Jeffy Pina MD  10/24/2018 9:10 AM

## 2021-06-21 NOTE — PROGRESS NOTES
Pt. Scared about tomorrow and wants  To talk to her niece and dtr to explain before she has procedure.  Pt. Still having some abdomen,. Pain in middle of abdomen.  Pt. Alert and orientated. ( May be slightly forgetful and covers for it well?) moves with assist of one and walker.

## 2021-06-21 NOTE — SEDATION DOCUMENTATION
NIVIA probe removed & procedure completed.  EKG completed;  Adenosine 6 mg given to assess for SR vs A Fib.  No rate reduction noted.

## 2021-06-21 NOTE — PROGRESS NOTES
Cardiology Progress Note    Assessment:  1.  Atrial fibrillation with rapid ventricular response.  Patient with a history of paroxysmal atrial fibrillation.  She was seen in June 2018 where she was in atrial fibrillation and subsequently converted back to sinus rhythm.  She had postoperative atrial fibrillation at that time as well.  He was also given digoxin at that time.  It appears that in June 2018 she was given a course of amiodarone.  She converted back to sinus rhythm.  She saw Dr. Hinojosa August 2018 in the office and noted to be in atrial fibrillation with rapid ventricular response at that time.  Diltiazem was added to her medication regimen.  Diltiazem was increased at that time.  He was seen again in September where she was back in sinus rhythm.  The Holter monitor September that reported sinus rhythm.  There was 1.64 on October 13, 2018.    I discussed with patient consideration of NIVIA guided cardioversion tomorrow we talked about alternative medications including sotalol or amiodarone.  We will plan to check an echocardiogram and an EKG today.  Echocardiogram to document the left ventricular systolic function remains normal and EKG to check QT interval.  Pending these results can discuss further with my EP colleagues regarding next steps.    2.  History of coronary artery disease.  Reported angiogram 2006 showed nonobstructive left coronary disease with chronically occluded right coronary artery.  She did undergo evaluation for cryptogenic stroke and apparently showed no PFO at that time.    3.  Heart failure with preserved ejection fraction.  Monitor I's and O's.  Output mildly greater than input this admission.  Weight up 2 pounds from yesterday but down from a maximum weight of 176 pounds October 9, 2018  Principal Problem:    Biliary obstruction  Active Problems:    PAD (peripheral artery disease) (H)    Depression    Cerebrovascular disease    Coronary artery disease involving native coronary  artery of native heart without angina pectoris    Dyslipidemia    (HFpEF) heart failure with preserved ejection fraction (H)    Tachycardia    Cholecystitis    Anticoagulant long-term use    Abdominal pain, right upper quadrant    Persistent atrial fibrillation (H)      Plan:  1.  EKG today  2.  Echocardiogram  3.  N.p.o. after midnight for possible NIVIA guided cardioversion.      Subjective:   Gardenia Muller is seen in follow-up today.  Chart record is reviewed.  First visit for this examiner.  Patient with persisted atrial fibrillation/flutter.  She is on metoprolol and diltiazem.  Mid October 3 for abdominal pain secondary to acute cholecystitis with ERCP October 5 and laparoscopic cholecystectomy October 7, 2018.    Notes that her heart rate is beating fast.  She feels mildly short of breath and mild chest discomfort.  She states she has been experiencing this at home and is aware when her heart is rapid.    Objective:   Vital signs in last 24 hours:  Temp:  [97.4  F (36.3  C)-98.3  F (36.8  C)] 98.1  F (36.7  C)  Heart Rate:  [] 128  Resp:  [18-19] 18  BP: ()/(60-74) 101/67  Weight:   166 lb 1.6 oz (75.3 kg)     Physical Exam:   Neck: No JVD noted in the upright position.   Lungs: Mild bibasilar crackles   COR: Tachycardic, irregular, soft systolic murmur   Abd: nondistended, BS present   Extrem: No edema    Current Facility-Administered Medications   Medication Dose Route Frequency Provider Last Rate Last Dose     acetaminophen CR tablet 650 mg (TYLENOL)  650 mg Oral BID Rohit Perla MD   650 mg at 10/16/18 0825     aspirin EC tablet 81 mg  81 mg Oral DAILY Michael Abreu MD   81 mg at 10/16/18 0824     bisacodyl suppository 10 mg (DULCOLAX)  10 mg Rectal Daily PRN Rohit Perla MD         diltiazem 24 hr capsule 180 mg (CARDIZEM CD)  180 mg Oral DAILY Michael Abreu MD   180 mg at 10/16/18 0825     LORazepam tablet 0.5 mg (ATIVAN)  0.5 mg Oral Q6H PRN Michael Abreu MD         magnesium  hydroxide suspension 30 mL (MILK OF MAG)  30 mL Oral Daily PRN Rohit Perla MD         magnesium oxide tablet 400 mg (MAG-OX)  400 mg Oral TID Uzair Burdick MD   400 mg at 10/16/18 0824     metoprolol tartrate tablet 50 mg (LOPRESSOR)  50 mg Oral BID DOROTHY Paredes   50 mg at 10/16/18 0825     naloxone injection 0.2-0.4 mg (NARCAN)  0.2-0.4 mg Intravenous PRN Karlos Wynn MD        Or     naloxone injection 0.2-0.4 mg (NARCAN)  0.2-0.4 mg Intramuscular PRN Karlos Wynn MD         nitroglycerin SL tablet 0.4 mg (NITROSTAT)  0.4 mg Sublingual Q5 Min PRN Rohit Perla MD         omeprazole capsule 20 mg (PriLOSEC)  20 mg Oral BID AC Rohit Perla MD   20 mg at 10/16/18 0825     ondansetron injection 4 mg (ZOFRAN)  4 mg Intravenous Q4H PRN Rohit Perla MD   4 mg at 10/05/18 1634    Or     ondansetron tablet 8 mg (ZOFRAN)  8 mg Oral Q8H PRN Rohit Perla MD   8 mg at 10/15/18 2203     oxyCODONE immediate release tablet 5 mg (ROXICODONE)  5 mg Oral Q4H PRN Uzair Burdick MD   5 mg at 10/15/18 2204     potassium chloride CR tablet 20 mEq (K-DUR,KLOR-CON)  20 mEq Oral DAILY Michael Abreu MD   20 mEq at 10/16/18 0824     rOPINIRole tablet 1 mg (REQUIP)  1 mg Oral QHS Rohit Perla MD   1 mg at 10/15/18 1944     rosuvastatin tablet 40 mg (CRESTOR)  40 mg Oral QHS Rohit Perla MD   40 mg at 10/15/18 1945     senna-docusate 8.6-50 mg tablet 1 tablet (PERICOLACE)  1 tablet Oral BID Rohit Perla MD   1 tablet at 10/16/18 0824     sertraline tablet 100 mg (ZOLOFT)  100 mg Oral DAILY Rohit Perla MD   100 mg at 10/16/18 0825     torsemide tablet 20 mg (DEMADEX)  20 mg Oral DAILY DOROTHY Paredes         traZODone tablet 50 mg (DESYREL)  50 mg Oral QHS Rohit Perla MD   50 mg at 10/15/18 1945     warfarin - daily dose required   Other Med Consult or Protocol Michael Abreu MD           Cardiographics:    EC-OCT-2018 04:36:37 HE JOES/JOHNS/MICAH/BRENDA  Atrial fibrillation with rapid ventricular  response with premature ventricular or aberrantly conducted complexes  Left axis deviation  Anterior infarct (cited on or before 2018)  Marked ST abnormality, possible lateral subendocardial injury  Abnormal ECG  When compared with ECG of 03-OCT-2018 13:21,  Atrial fibrillation has replaced Atrial flutter  Confirmed by ZENIA WOODWARD MD LOC:RANJEET [id number]) on 10/12/2018 8:58:01 AM  25mm/s 10mm/mV 100Hz 8.0 SP2 12SL 237 MALIKA: 1  Referred by: John Dupont / MD: ZENIA LOC:RANJEET WOODWARD MD  Echocardiogram:   Summary        When compared to the previous study dated 2011, no significant change.    Normal left ventricular size and systolic function.    Left ventricle ejection fraction is normal. The estimated left ventricular ejection fraction is 55%.    Normal right ventricular size and systolic function.    Mild mitral regurgitation     [id number] Scoring:  History of CHF: 1  History of Diabetes: 1  History of Hypertension: 1  History of Stroke, TIA, or Thromboembolism: 2  History of Vascular Disease: 0  [id number] Age: 1  [id number] Gender: 1  [id number] Score: 7      A result of 7 equals a stroke risk of 11.2% per year and 15.7% risk of stroke/TIA/systemic embolism.    Telemetry: Heart rates 100-128 bpm.    Imaging:   Chest X-Ray:   XR CHEST 2 VIEWS  10/14/2018 8:56 AM     INDICATION: Sob on exertion  COMPARISON: 10/3/2018     FINDINGS: Mildly coarsened interstitial lung markings. No consolidation. No pleural effusions. No pneumothorax. Unchanged cardiac size. Aortic atherosclerosis. Surgical clips in the right upper quadrant. Vascular stent projects over the L2 vertebral   body. Mild gas and fluid in the stomach    Conclusion         HOLTER MONITOR     PATIENT:  Gardenia Muller  :  1950  MRN:  [id number]  ACCESSION:  [id number]     INTERPRETATION DATE:  2018     TEST DATE:  2018     INTERPRETATION:  Predominant rhythm is sinus rhythm with rate change from 58 beats  per minute to 102 beats per  minute.  Minor sinus arrhythmia was noted, but there  were no significant pauses.  Rare single atrial premature beats were noted, 13 over  24 hours.  Rare single ventricular premature beats were noted, 187 over 24 hours.   There was a single ventricular couplet.  No diary was submitted.  No atrial  fibrillation was observed.     CONCLUSION:  Normal Holter.        LISBETH CHRISTIAN 09/13/2018 10:17:38  T 09/13/2018 10:29:55  R 09/13/2018 10:29:55       Lab Results:   Sodium   Date Value Ref Range Status   10/15/2018 136 136 - 145 mmol/L Final   10/14/2018 137 136 - 145 mmol/L Final   10/09/2018 136 136 - 145 mmol/L Final     Potassium   Date Value Ref Range Status   10/16/2018 4.6 3.5 - 5.0 mmol/L Final   10/15/2018 4.3 3.5 - 5.0 mmol/L Final   10/15/2018 4.3 3.5 - 5.0 mmol/L Final     Chloride   Date Value Ref Range Status   10/15/2018 98 98 - 107 mmol/L Final   10/14/2018 98 98 - 107 mmol/L Final   10/09/2018 97 (L) 98 - 107 mmol/L Final     CO2   Date Value Ref Range Status   10/15/2018 28 22 - 31 mmol/L Final   10/14/2018 30 22 - 31 mmol/L Final   10/09/2018 31 22 - 31 mmol/L Final     BUN   Date Value Ref Range Status   10/15/2018 24 (H) 8 - 22 mg/dL Final   10/14/2018 23 (H) 8 - 22 mg/dL Final   10/09/2018 18 8 - 22 mg/dL Final     Creatinine   Date Value Ref Range Status   10/15/2018 0.97 0.60 - 1.10 mg/dL Final   10/14/2018 0.94 0.60 - 1.10 mg/dL Final   10/13/2018 0.89 0.60 - 1.10 mg/dL Final     Calcium   Date Value Ref Range Status   10/15/2018 11.3 (H) 8.5 - 10.5 mg/dL Final   10/14/2018 11.4 (H) 8.5 - 10.5 mg/dL Final   10/09/2018 11.0 (H) 8.5 - 10.5 mg/dL Final     WBC   Date Value Ref Range Status   10/14/2018 9.2 4.0 - 11.0 thou/uL Final   10/09/2018 6.6 4.0 - 11.0 thou/uL Final   10/07/2018 6.3 4.0 - 11.0 thou/uL Final     Hemoglobin   Date Value Ref Range Status   10/14/2018 13.7 12.0 - 16.0 g/dL Final   10/09/2018 11.8 (L) 12.0 - 16.0 g/dL Final   10/07/2018 12.0 12.0 - 16.0 g/dL Final      Hematocrit   Date Value Ref Range Status   10/14/2018 43.7 35.0 - 47.0 % Final   10/09/2018 37.4 35.0 - 47.0 % Final   10/07/2018 39.2 35.0 - 47.0 % Final     MCV   Date Value Ref Range Status   10/14/2018 86 80 - 100 fL Final   10/09/2018 86 80 - 100 fL Final   10/07/2018 88 80 - 100 fL Final     Platelets   Date Value Ref Range Status   10/14/2018 193 140 - 440 thou/uL Final   10/09/2018 98 (L) 140 - 440 thou/uL Final   10/07/2018 87 (L) 140 - 440 thou/uL Final     Troponin I   Date Value Ref Range Status   10/03/2018 0.01 0.00 - 0.29 ng/mL Final   06/24/2018 0.02 0.00 - 0.29 ng/mL Final   09/20/2017 <0.01 0.00 - 0.29 ng/mL Final     BNP   Date Value Ref Range Status   10/14/2018 1140 (H) 0 - 114 pg/mL Final   10/03/2018 546 (H) 0 - 114 pg/mL Final   09/05/2018 736 (H) 0 - 114 pg/mL Final     INR   Date Value Ref Range Status   10/16/2018 2.39 (H) 0.90 - 1.10 Final   10/15/2018 2.34 (H) 0.90 - 1.10 Final   10/14/2018 1.95 (H) 0.90 - 1.10 Final

## 2021-06-21 NOTE — PROGRESS NOTES
[facility] Daily Progress Note      Date of Service: 10/15/2018     Assessment and plan    Persistent atrial fibrillation  : Patient still appears mildly tachycardic  ; Cardiology consulted  ; Monitor in telemetry  ; Pharmacy dose warfarin  : On Cardizem and metoprolol  ; Blood pressure on lower side today: Nursing staff requested to give Cardizem this morning  ; We will need to reassess to see patient will tolerate metoprolol  ; Discussed with cardiology: Recommended cutting down the dose of metoprolol    Acute on chronic diastolic CHF  Continue diuresis as per cardiology  ; Torsemide was held this morning secondary to low blood pressure  ; We will need to reassess blood pressure with control of heart rate  ; Give torsemide as tolerated  ; Decreased dose of torsemide as per cardiology recommendation    Acute acalculous cholecystitis  ; Status post laparoscopic cholecystectomy on 10/7/2018  ; Liver enzymes improving  ; Tolerating diet  ; Still having abdominal pain  ; Recheck liver enzyme improving    Diabetes mellitus type 2  ; Last hemoglobin A1c 5.7 on 7/31/2018  ; Diet controlled    Thrombocytopenia  ; Platelet count improved to 193      This note was dictated using voice recognition software. Any grammatical or context distortions are unintentional and inherent to the software.  Subjective:   Patient states she feels better except for the abdominal pain  Patient states abdominal pain is told that getting better, still has significant pain  Denies feeling worsening of abdominal pain    Patient states shortness of breath on exertion is getting better      Brief History: 68 y.o. female with past medical history of atrial flutter on warfarin, hypertension, dyslipidemia, diet controlled diabetes, heart failure with preserved EF, presented to the hospital on 10/3/2018 with abdominal pain, and was found to have acute cholecystitis with cholelithiasis and dilatation of CBD and pancreatic duct. GI and surgery were  "consulted. She underwent ERCP on 10/05, which did not show any filling defect. She underwent HIDA scan on 10/06, which showed marked intrahepatic cholestasis, associated with cystic duct obstruction and cholecystitis. She was treated with IV antibiotics. She underwent laparoscopic cholecystectomy on 10/07/18.  Patient coming of shortness of breath on exertion, found to have uncontrolled atrial fibrillation, and mild acute on chronic diastolic heart failure.  Cardiology was consulted    Chart reviewed, events noted. Pt seen and examined.     Objective     Vital signs in last 24 hours:  Temp:  [97.6  F (36.4  C)-98.9  F (37.2  C)] 97.8  F (36.6  C)  Heart Rate:  [] 103  Resp:  [18-20] 18  BP: ()/(58-72) 97/68  Weight:   164 lb 8 oz (74.6 kg)  Weight change: -8 oz (-0.227 kg)  Body mass index is 29.14 kg/(m^2).    Intake/Output last 3 shifts:  I/O last 3 completed shifts:  In: 800 [P.O.:800]  Out: 1325 [Urine:1325]  Intake/Output this shift:  I/O this shift:  In: 240 [P.O.:240]  Out: 150 [Urine:150]      Physical Exam:  BP 97/68 (Patient Position: Semi-nicole)  Pulse (!) 103  Temp 97.8  F (36.6  C) (Oral)   Resp 18  Ht 5' 3\" (1.6 m)  Wt 164 lb 8 oz (74.6 kg)  LMP 01/25/1999  SpO2 95%  BMI 29.14 kg/m2    FiO2 (%): (!) 2 % O2 Device: None (Room air) O2 Flow Rate (L/min): 2 L/min  No significant change in physical exam compared to 10/14/2018  General Appearance:    Alert, no apparent distress.    HEENT:    Normocephalic. Pupils equal and reactive. No scleral   Icterus. Moist oral mucosa    Lungs:     Clear to auscultation bilaterally, respirations unlabored  Mild decrease at base  No clear wheezing or crackles audible   Heart::   Irregularly irregular, tachycardia   no peripheral edema.   Abdomen:   Soft, tenderness at site of surgery  Non distended. Bowel sounds present.    Extremity :   No deformity   Pulses:   2+ and symmetric all extremities   CNS:   Alert and oriented, no facial asymmetry  No " focal neurologic deficits         Imaging:  personally reviewed.      Scheduled Meds:    acetaminophen  650 mg Oral BID     aspirin  81 mg Oral DAILY     diltiazem  180 mg Oral DAILY     magnesium oxide  400 mg Oral TID     metoprolol tartrate  75 mg Oral BID     omeprazole  20 mg Oral BID AC     potassium chloride  20 mEq Oral DAILY     rOPINIRole  1 mg Oral QHS     rosuvastatin  40 mg Oral QHS     senna-docusate  1 tablet Oral BID     sertraline  100 mg Oral DAILY     torsemide  20 mg Oral BID - diuretic     traZODone  50 mg Oral QHS     warfarin - daily dose required   Other Med Consult or Protocol     warfarin  2 mg Oral Once Warfarin     Continuous Infusions:  PRN Meds:.bisacodyl, LORazepam, magnesium hydroxide, naloxone **OR** naloxone, nitroglycerin, ondansetron **OR** ondansetron, oxyCODONE    Lab Results:  personally reviewed.   not applicable  Recent Results (from the past 24 hour(s))   Potassium    Collection Time: 10/14/18  5:19 PM   Result Value Ref Range    Potassium 5.2 (H) 3.5 - 5.0 mmol/L   INR    Collection Time: 10/15/18  5:46 AM   Result Value Ref Range    INR 2.34 (H) 0.90 - 1.10   Magnesium    Collection Time: 10/15/18  5:46 AM   Result Value Ref Range    Magnesium 1.8 1.8 - 2.6 mg/dL   Potassium - Next AM    Collection Time: 10/15/18  5:46 AM   Result Value Ref Range    Potassium 4.3 3.5 - 5.0 mmol/L   Basic Metabolic Panel    Collection Time: 10/15/18  5:46 AM   Result Value Ref Range    Sodium 136 136 - 145 mmol/L    Potassium 4.3 3.5 - 5.0 mmol/L    Chloride 98 98 - 107 mmol/L    CO2 28 22 - 31 mmol/L    Anion Gap, Calculation 10 5 - 18 mmol/L    Glucose 110 70 - 125 mg/dL    Calcium 11.3 (H) 8.5 - 10.5 mg/dL    BUN 24 (H) 8 - 22 mg/dL    Creatinine 0.97 0.60 - 1.10 mg/dL    GFR MDRD Af Amer >60 >60 mL/min/1.73m2    GFR MDRD Non Af Amer 57 (L) >60 mL/min/1.73m2   Hepatic Profile    Collection Time: 10/15/18  5:46 AM   Result Value Ref Range    Bilirubin, Total 0.7 0.0 - 1.0 mg/dL     Bilirubin, Direct 0.3 <=0.5 mg/dL    Protein, Total 7.0 6.0 - 8.0 g/dL    Albumin 2.8 (L) 3.5 - 5.0 g/dL    Alkaline Phosphatase 174 (H) 45 - 120 U/L    AST 59 (H) 0 - 40 U/L    ALT 39 0 - 45 U/L       Results from last 7 days  Lab Units 10/14/18  0802 10/12/18  1704 10/11/18  1132 10/11/18  0801 10/10/18  1146 10/10/18  0824 10/09/18  2055 10/09/18  1646 10/09/18  1114 10/09/18  0729   LN-POC GLUCOSE FINGERSTICK- HE mg/dL 118 164 146 99 162 93 155 190 160 129           Advance Care Planning:   Barriers to discharge: Monitoring tolerance to medication, still tachycardic  Anticipated discharge day: Today afternoon or tomorrow likely tomorrow  Disposition: Plan for TCU awaiting insurance approval, patient wanted to go home tomorrow if unable to get TCU   aware      Danii Smith MD  [facility]  Hospitalist

## 2021-06-21 NOTE — PROGRESS NOTES
"HOSPITALIST PROGRESS NOTE    Patient:  Gardenia Muller    LOS: 22    Subjective / Interval History:  - feels okay this morning  - No other complaints on ROS otherwise    Objective:  /63  Pulse 64  Temp 97.4  F (36.3  C) (Oral)   Resp 16  Ht 5' 3\" (1.6 m)  Wt 171 lb 3.2 oz (77.7 kg)  LMP 01/25/1999  SpO2 94%  BMI 30.33 kg/m2  General appearance: sitting up in bed, NAD, A&Ox3  Lungs: bibasilar crackles present  Heart: regular rate and rhythm, S1, S2 normal, no murmur, click, rub or gallop  Abdomen: soft, distended, mild general TTP  Extremities: BLE edema present  Skin: Skin color, texture, turgor normal. No rashes or lesions  Neurologic: Grossly normal    Vitals:    Temp:  [97.4  F (36.3  C)-98  F (36.7  C)] 97.4  F (36.3  C)  Heart Rate:  [63-88] 64  Resp:  [14-20] 16  BP: (105-121)/(56-80) 105/63    SpO2: 94 % on O2 Device: None (Room air)      Intake/Output Summary (Last 24 hours) at 10/25/18 1102  Last data filed at 10/25/18 0403   Gross per 24 hour   Intake              230 ml   Output              450 ml   Net             -220 ml       Vitals:    10/03/18 1236 10/03/18 2013 10/04/18 0420 10/05/18 0412   Weight: 179 lb 1.6 oz (81.2 kg) 175 lb 4.8 oz (79.5 kg) 175 lb (79.4 kg) 174 lb 1.6 oz (79 kg)    10/06/18 0428 10/07/18 0603 10/08/18 0631 10/09/18 0416   Weight: 175 lb (79.4 kg) 173 lb 9.6 oz (78.7 kg) 172 lb 6.4 oz (78.2 kg) 176 lb 12.8 oz (80.2 kg)    10/10/18 0441 10/11/18 0600 10/13/18 0445 10/14/18 0515   Weight: 173 lb 3 oz (78.6 kg) 171 lb 8 oz (77.8 kg) 167 lb 3.2 oz (75.8 kg) 165 lb (74.8 kg)    10/15/18 0254 10/16/18 0503 10/17/18 0530 10/18/18 0311   Weight: 164 lb 8 oz (74.6 kg) 166 lb 1.6 oz (75.3 kg) 165 lb 6.4 oz (75 kg) 154 lb 6.4 oz (70 kg)    10/19/18 0302 10/20/18 0321 10/20/18 0500 10/21/18 0405   Weight: 167 lb 1.6 oz (75.8 kg) 170 lb 8 oz (77.3 kg) 166 lb 9.6 oz (75.6 kg) 170 lb 4.8 oz (77.2 kg)    10/22/18 0500 10/23/18 0400 10/24/18 0428 10/25/18 0403   Weight: 170 lb " 14.4 oz (77.5 kg) 173 lb 12.8 oz (78.8 kg) 170 lb 3.2 oz (77.2 kg) 171 lb 3.2 oz (77.7 kg)       Medications:    furosemide  40 mg Oral DAILY     magnesium oxide  400 mg Oral TID     metoprolol tartrate  25 mg Oral BID     omeprazole  20 mg Oral BID AC     rOPINIRole  1 mg Oral QHS     sertraline  100 mg Oral DAILY     sodium chloride  10-30 mL Intravenous Q8H FIXED TIMES     warfarin - daily dose required   Other Med Consult or Protocol     warfarin  2 mg Oral Once Warfarin       Labs:    Results from last 7 days  Lab Units 10/24/18  0508 10/23/18  0533 10/22/18  1824   LN-WHITE BLOOD CELL COUNT thou/uL 8.9 9.6 11.7*   LN-HEMOGLOBIN g/dL 11.1* 10.6* 11.4*   LN-HEMATOCRIT % 35.5 34.1* 36.0   LN-PLATELET COUNT thou/uL 95* 91* 104*       Results from last 7 days  Lab Units 10/25/18  0627 10/24/18  0508 10/23/18  0533   LN-SODIUM mmol/L 136  136 139  139 138  138   LN-POTASSIUM mmol/L 3.8  3.8 3.7  3.7 3.6  3.6   LN-CHLORIDE mmol/L 101  101 101  101 99  99   LN-CO2 mmol/L 25  25 28  28 29  29   LN-BLOOD UREA NITROGEN mg/dL 33*  33* 40*  40* 46*  46*   LN-CREATININE mg/dL 0.92  0.92 0.91  0.91 1.19*  1.19*   LN-CALCIUM mg/dL 12.3*  12.3* 12.2*  12.2* 11.7*  11.7*       Results from last 7 days  Lab Units 10/25/18  0627 10/24/18  0508 10/23/18  0533   LN-INR  2.02* 1.77* 2.10*     Estimated Creatinine Clearance: 57.7 mL/min (by C-G formula based on Cr of 0.92).    Imaging:  XR Chest 1 View Portable   Final Result      CT Abdomen Pelvis Without Oral Without IV Contrast   Final Result   CONCLUSION:   1.  The subcutaneous hematomas are unchanged.      2.  Nothing for intra-abdominal hemorrhage.      3.  Small amount of free fluid in the abdomen around the liver and in the pelvis was present on preop 10/03/2018 exam as well. Uncertain etiology. Possibly underlying liver disease?      4.  No other significant findings.            US Abdomen Limited   Final Result   CONCLUSION:   1.  Gallbladder surgically  removed. No bile duct dilatation.      2.  Tiny sliver of ascites over the right lobe of the liver likely postoperative. No fluid elsewhere in the abdomen.      CT Abdomen Pelvis Without Oral Without IV Contrast   Final Result   CONCLUSION:   1.  No complications related to recent surgery. No new abnormalities.   2.  There are small sites of ecchymoses or tiny subcutaneous hematomas at right upper quadrant and midline.      XR Chest 2 Views   Final Result      NM Hepatobiliary WO EF Or Pharm   Final Result   CONCLUSION:   1.  Marked intrahepatic cholestasis. Delayed images show activity in the common bile duct and small bowel but no activity in the gallbladder. Findings are consistent with cystic duct obstruction and cholecystitis. Findings were called to the bases nurse,    Andriy, at 1710 hours on 10/06/2018.      NOTE: ABNORMAL REPORT      THE DICTATION ABOVE DESCRIBES AN ABNORMALITY FOR WHICH FOLLOW-UP IS NEEDED.       XR C Arm Less Than One Hour   Final Result      US Abdomen Limited   Final Result   CONCLUSION:   1.  The gallbladder is significantly distended. There is marked gallbladder wall thickening/edema. No visible gallstones. Findings may represent acalculus cholecystitis.   2.  There is dilation of the common bile duct and pancreatic duct. This raises the possibility of a pancreatic head mass, although none is definitely seen on this examination or on the prior CT.      CTA Chest PE Run   Final Result   CONCLUSION:   1.  No PE.   2.  Gallbladder wall thickening and enhancement. Consider cholecystitis. There is extrahepatic bile duct dilatation.   3.  Interlobular septal thickening is likely edema.    4.  Cardiomegaly. Reflux of contrast material into the hepatic veins suggests cardiac dysfunction. There is atherosclerotic disease including coronary artery calcification. Enlargement of the main pulmonary artery can be seen with pulmonary hypertension.   5.  Hepatomegaly and heterogeneity of of the liver  may be related to cardiac dysfunction.    6.  Colonic diverticulosis. No definite acute diverticulitis.   7.  Free fluid in the pelvis is nonspecific.       NOTE: ABNORMAL REPORT      THE DICTATION ABOVE DESCRIBES AN ABNORMALITY FOR WHICH FOLLOW-UP IS NEEDED.        CT Abdomen Pelvis Without Oral With IV Contrast   Final Result   CONCLUSION:   1.  No PE.   2.  Gallbladder wall thickening and enhancement. Consider cholecystitis. There is extrahepatic bile duct dilatation.   3.  Interlobular septal thickening is likely edema.    4.  Cardiomegaly. Reflux of contrast material into the hepatic veins suggests cardiac dysfunction. There is atherosclerotic disease including coronary artery calcification. Enlargement of the main pulmonary artery can be seen with pulmonary hypertension.   5.  Hepatomegaly and heterogeneity of of the liver may be related to cardiac dysfunction.    6.  Colonic diverticulosis. No definite acute diverticulitis.   7.  Free fluid in the pelvis is nonspecific.       NOTE: ABNORMAL REPORT      THE DICTATION ABOVE DESCRIBES AN ABNORMALITY FOR WHICH FOLLOW-UP IS NEEDED.        XR Chest 1 View Portable   Final Result      NM Parathyroid Planar W Spect    (Results Pending)       Assessment & Plan:  # Chronic Diastolic Heart Failure: 10/2018 EF 55-60%, BNP 2481, unable to measure I/O accurately 2/2 incontinence, has had intermittent hypotension and acute transaminitis  Today's Weight 77.2kg  Yesterday's Weight 78.8kg  - appreciate Cardiology assistance  - restart Lasix 40mg PO daily tomorrow  - will restart statin pending LFT improvement  - daily weight, strict I/O  - outpt f/u with CHF NP in 2 weeks and Dr. Choe in 6 weeks      # MAY in the setting of diuresis: resolved while holding diuretics  - appreciate Nephrology assistance  - will restart diuretics tomorrow  - plan as above  - monitor creat      # Transaminitis likely 2/2 hepatic congestion in the setting of diastolic heart failure: ALT/AST  improving  - appreciate GI assistance  - monitor LFT      # Bradycardia: was previously on Amiodarone for a.fib, switched to Sotalol and Metoprolol, had bradycardic episode to 40's on 10/21 and Metoprolol/Sotalol was stopped at that time. On 10/23 reverted back to A.fib with HR , restarted Metoprolol. Today NSR, HR 60's on telemetry  - cont Metoprolol 12.5mg PO two times a day  - telemetry protocol      # Atrial Fibrillation s/p NIVIA cardioversion on 10/18/18  - Metoprolol 12.5mg PO two times a day to maintain HR < 110  - previously on Amiodarone, stopped 2/2 elevated LFTs (last dose 10/21)  - cont warfarin per pharmacy  - will discharge on warfarin 2mg PO daily with frequent INR checks while at TCU  - f/u in a.fib clinic in 1-2 weeks post d/c      # Acalculous Cholecystitis s/p lap nimesh on 10/7/18  - appreciate General Surgery assistance      # NIDDM: 10/2018 Hgb A1c 7.3%  - Novolog s/s      # Chronic Hypercalcemia 2/2 primary hyperparathyroidism  - appreciate Nephrology assistance  - outpt f/u with Endocrinology for parathyroid scan      # FEN:  - low Na diet      # PPX:  - on warfarin      # Dispo:  - patient is medically stable for d/c to TCU  - patient and family are now agreeable for TCU placement    Jeffy Pina MD  10/25/2018 11:02 AM

## 2021-06-21 NOTE — PROGRESS NOTES
"[facility]    Facility:   [facility] [[id number]]   Code Status: POLST AVAILABLE      CHIEF COMPLAINT/REASON FOR VISIT:  Chief Complaint   Patient presents with     Problem Visit     Lump on back       HISTORY:      HPI: Gardenia is a 68 y.o. female with MI, atrial flutter, ventral hernia with incisional hernia repair, on anticoagulation.   She had a week of abdominal pain, more centered in the epigastrium and right upper quadrant and elsewhere. She threw up several times in the morning of admission. She had no fever, no chills.   He was tachycardic, she had 1 L of IV fluid, and IV Lopressor to slow her heart rate.    CT of the abdomen: Gallbladder wall thickening right, consistent with cholecystitis.  Ultrasound of the gallbladder: Distended gallbladder, edema of the gallbladder wall, no gallstones are seen.  She had a laparoscopic cholecystectomy on 10/7/18 with Dr Felicia So.    She was tolerating an oral diet at the time of discharge, and [facility]    Today Ms. Muller is being evaluated by request of nursing staff due to a large \"deep blister\" on her back. Gardenia states she never noticed this and it has not been problematic. The mass is on the left shoulder/back. Gardenia states she probably never would have found it had it not been for the aid who was giving her a bath today. She has no pain, no redness, no fever/chills. She denies any other concerns including fevers, headaches, vision changes, chest pain/pressure, difficulty breathing, SOB, nausea, vomiting, diarrhea, dysuria, increasing weakness, increasing pain.     Past Medical History:   Diagnosis Date     Acute diastolic heart failure (H) 11/2015     Atrial fibrillation (H)      Cerebral vascular accident (H)      Coronary artery disease 2006    100% RCA with collateral filling per Dr. Elizabeth's angio report     Diabetes mellitus type 2 in obese (H)      Fibrocystic breast      " GERD (gastroesophageal reflux disease)      History of anesthesia complications     slow to wake     Hypertension      Peripheral vascular disease (H)      PONV (postoperative nausea and vomiting)      Stroke (H)      Urinary incontinence              Family History   Problem Relation Age of Onset     Cancer Paternal Grandmother      Cancer Paternal Uncle      No Medical Problems Mother      No Medical Problems Father      Heart attack Sister      Chronic Kidney Disease Sister      No Medical Problems Daughter      No Medical Problems Maternal Grandmother      No Medical Problems Maternal Grandfather      No Medical Problems Paternal Grandfather      No Medical Problems Maternal Aunt      No Medical Problems Paternal Aunt      Diabetes Brother      BRCA 1/2 Neg Hx      Breast cancer Neg Hx      Colon cancer Neg Hx      Endometrial cancer Neg Hx      Ovarian cancer Neg Hx      Social History     Socioeconomic History     Marital status: Legally      Spouse name: Not on file     Number of children: Not on file     Years of education: Not on file     Highest education level: Not on file   Social Needs     Financial resource strain: Not on file     Food insecurity - worry: Not on file     Food insecurity - inability: Not on file     Transportation needs - medical: Not on file     Transportation needs - non-medical: Not on file   Occupational History     Not on file   Tobacco Use     Smoking status: Current Every Day Smoker     Packs/day: 0.25     Smokeless tobacco: Former User     Tobacco comment: e-cig a few times/day   Substance and Sexual Activity     Alcohol use: No     Drug use: No     Sexual activity: Not on file   Other Topics Concern     Not on file   Social History Narrative     Not on file         Review of Systems   Per HPI    .  Vitals:    11/13/18 2212   BP: 133/86   Pulse: 75   Resp: 16   Temp: 99  F (37.2  C)   SpO2: 95%   Weight: 154 lb 9.6 oz (70.1 kg)       Physical Exam  General appearance:  alert, appears stated age and cooperative  HEENT: Head is normocephalic with normal hair distribution. No evidence of trauma. Ears: Without lesions or deformity. No acute purulent discharge. Eyes: Conjunctivae pink with no scleral icterus or erythema. Nose: Normal mucosa and septum. Oropharnyx: mmm, no lesions present.  Lungs: crackles at bases, left lower lobe diminished, respirations without effort  Heart: regular rate and rhythm, S1, S2 normal, 2/6 systolic murmur appreciated  Back: paraspinous muscles nontender, normal curvature of the spine, left lateral upper back/shoulder area 6 inches x 6 inches soft, movable mass  Abdomen: soft, non-tender; bowel sounds normal; no masses,  no organomegaly  Extremities: extremities normal, atraumatic, no cyanosis, trace pedal edema  Pulses: 2+ and symmetric  Skin: Skin color, texture, turgor normal. No rashes or lesions, four 1 cm incisions d/t laparoscopy-clean, dry, intact  Neurologic: Grossly normal   Psych: interacts well with caregivers, exhibits logical thought processes and connections, pleasant      LABS:   None today.     ASSESSMENT:      ICD-10-CM    1. Lipoma of back D17.1        PLAN:    Probable Lipoma  -Offered punch biopsy and ultrasound to determine definite diagnosis, patient declines.   -Watchful waiting, no function impairment.   -Follow up as needed.     Otherwise continue current care plan for all other chronic medical conditions, as they are stable. Encouraged patient to engage in healthy lifestyle behaviors such as engaging in social activities, exercising (PT/OT), eating well, and following care plan. Follow up for routine check-up, or sooner if needed. Will continue to monitor patient and work with nursing staff collaboratively to work toward positive patient outcomes.     Electronically signed by: Arleth Barton CNP

## 2021-06-21 NOTE — PROGRESS NOTES
"Clinical Nutrition Therapy Reassessment Note     Patient s/p NIVIA cardioversion 10/18 r/t persistent afib. Pt with persistent nausea and anorexia possibly r/t hypercalcemia per MD note, MAY possibly due to overdiuresis. Nephrology feels primary hyperparathyroidism cause of chronic hypercalcemia.   Patient sleeping at time of visit. Spoke with RN who reports patient is extremely tired today, has low appetite, and is only getting out of bed to use bathroom. She's refusing therapies as she's too tired. She had been taking supplements, but has asked for them to be put in refrigerator today.  Patient with persistent abdominal pain and nausea. RN does not know plan.     Current Nutrition Prescription:   Diet: CHAUNCEY, decreased saturated fat/cholesterol  Supplements and Modulars: Boost GC three times a day with meals  IV dextrose or Fluids:     nacl 0.9% 75 mL/hr (10/19/18 0915)       Current Nutrition Intake:  Most intake uncharted, but at those meals that have been charted, patient consuming anywhere from 5-100%    Total nutrient intake from all sources likely does not meet estimated nutritional needs.    Anthropometrics:  Height: 5' 3\" (1.6 m)  Admission weight: 179 lbs  Most recent weight: 167 lb 1.6 oz (75.8 kg)   BMI:Body mass index is 29.6 kg/(m^2).  Weight History:   10/07/18 173 lb 9.6 oz (78.7 kg)   09/20/18 172 lb 4 oz (78.1 kg)   09/18/18 174 lb 4.8 oz (79.1 kg)     *12 lb weight loss over 16 days- significant. Likely a combination  Of poor PO and fluid status as per MD note there is a question of overdiuresis     RD Nutrition Focused Physical Exam:  The patient has the following physical signs which could indicate malnutrition: Previously assessed and none noted     GI Status/Output:   GI symptoms include:Nausea per nurse and Abdominal pain per nurse  Bowel Sounds present per nurse  Last BM: 10/17/18 per nurse    Skin/Wound:  James score: 18    No wound was " noted.    Medications:  Mag-ox  Prilosec  Miralax  Pericolace  Torsemide  warfarin    Labs:    Results from last 7 days  Lab Units 10/19/18  0743   LN-SODIUM mmol/L 136   LN-POTASSIUM mmol/L 4.9   LN-CHLORIDE mmol/L 103   LN-CO2 mmol/L 22   LN-BLOOD UREA NITROGEN mg/dL 40*   LN-CREATININE mg/dL 1.14*   LN-CALCIUM mg/dL 11.8*   LN-ALBUMIN g/dL 2.8*   LN-PROTEIN TOTAL g/dL 7.2   LN-BILIRUBIN TOTAL mg/dL 1.0   LN-ALKALINE PHOSPHATASE U/L 298*   LN-ALT (SGPT) U/L 317*   LN-AST (SGOT) U/L 749*       Assessed Nutritional Needs:  Assessment weight is 58.2 kg, with a weight source of adjusted.    Estimated Energy Needs: 1831-0797 kcals (25-30 kcal/kg)    Estimated Protein Needs: 58-70 gm (1-1.2 gm/kg)    Estimated Fluid Needs: 7285-7108 mls (25-30 mls/kg)    Malnutrition Assessment:  Previously assessed as Severe PCM in the context of acute illness    Nutrition Risk Level: high risk     Nutrition dx:   Malnutrition r/t cholecystitis and biliary obstruction as evidenced by po intake <50% est needs x6 days, 3.3% wt loss x 1 week.      Goal:   PO intake > 75%      Intervention:   Continue current diet Rx, assess for snacks as appropriate    Monitoring/Evaluation:   PO intake, wts, GI status    See Care Plan for further Problems, Goals, and Interventions    Electronically signed by:  Sonali Driver RD

## 2021-06-21 NOTE — PROGRESS NOTES
Pharmacy Consult: Warfarin Management    Pharmacy consulted to dose warfarin for Gardenia Muller, a 68 y.o. female who had abdominal pain upon presentation to [facility] on 10/3. Patient had an ERCP on 10/5 with no sludge or stones found. Patient had HIDA on 10/6, cystic duct consistent with obstruction. On 10/7 a laproscopic cholecystectomy was done. Patient remains in hospital for persistent atrial fibrillation, with rapid heart rate limited by blood pressure. 10/18 cardioversion done, elevated AST and ALT.    Ordering provider: Dr. Michael Abreu, Hospitalist    Reason for warfarin therapy: Atrial Fibrillation  Goal INR Range: 2-3    Subjective  Medication lists (home and hospital) were reviewed.    New medications that may increase bleeding risk/INR:  Amiodarone     Restarted home medications that may increase bleeding risk/INR: Torsemide, Ropinirole, sertraline, omeprazole, rosuvastatin, aspirin    Home Warfarin Dosin mg on , Tuesday and Thursday; 2 mg on all other days of the week    Other Anticoagulants: No  Patient being bridged: No    Patient Active Problem List   Diagnosis     Spinal stenosis     PAD (peripheral artery disease) (H)     Dermatosis Papulosa Nigra     Depression     Hypercalcemia     Right Renal Artery Stenosis     Osteoarthritis     Abnormality Of Walk     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)     Advanced directives, counseling/discussion     SBO (small bowel obstruction) (H)     Cystitis     Hypomagnesemia     Healthcare maintenance     Essential hypertension     Dysarthria     Cerebral infarction (H)     Anemia     Cerebrovascular disease     Benign adenomatous polyp of large intestine     RLS (restless legs syndrome)     Incisional hernia, without obstruction or gangrene     Abdominal pain, generalized     Diverticular disease of large intestine     Dysphagia     Coronary artery disease involving native coronary artery of native heart without angina  pectoris     Dyslipidemia     Ventral hernia without obstruction or gangrene     Paroxysmal atrial fibrillation (H)     Anticoagulation management encounter     S/P repair of recurrent ventral hernia     SOB (shortness of breath)     Lower extremity edema     Anxiety     Hypokalemia     (HFpEF) heart failure with preserved ejection fraction (H)     Tachycardia     Cholecystitis     Anticoagulant long-term use     Biliary obstruction     Abdominal pain, right upper quadrant     Persistent atrial fibrillation (H)     Abdominal pain, left upper quadrant     Sinus bradycardia     MAY (acute kidney injury) (H)     Transaminitis    Past Medical History:   Diagnosis Date     Acute diastolic heart failure (H) 11/2015     Atrial fibrillation (H)      Cerebral vascular accident (H)      Coronary artery disease 2006    100% RCA with collateral filling per Dr. Elizabeth's angio report     Diabetes mellitus type 2 in obese (H)      Fibrocystic breast      GERD (gastroesophageal reflux disease)      History of anesthesia complications     slow to wake     Hypertension      Peripheral vascular disease (H)      PONV (postoperative nausea and vomiting)      Stroke (H)      Urinary incontinence         Social History   Substance Use Topics     Smoking status: Current Every Day Smoker     Packs/day: 0.25     Smokeless tobacco: Former User      Comment: e-cig a few times/day     Alcohol use No       Objective   Labs:  Last 3 days:    Recent Labs      10/21/18   0640  10/21/18   1926  10/22/18   0453  10/22/18   1824  10/23/18   0533   CREATININE  1.15*  1.60*  1.62*  1.37*  1.19*  1.19*   HGB   --    --    --   11.4*  10.6*   HCT   --    --    --   36.0  34.1*   PLT   --    --    --   104*  91*   BILITOT  1.2*  1.7*  1.8*  2.3*  1.9*   AST  615*  706*  2443*  2138*  1404*   ALT  364*  416*  778*  783*  646*   ALKPHOS  368*  404*  340*  355*  333*     Last 7 days:   Recent labs: (last 7 days)      10/19/18   0743  10/20/18   0553   10/21/18   0640  10/21/18   1620  10/22/18   0453  10/22/18   1824  10/23/18   0533   INR  3.76*  5.08*  6.16*  4.45*  3.31*  2.51*  2.10*       Warfarin Dosing History:    Date INR Warfarin Dose Comment   10/5/18 2.84 hold 5 mg of vitamin K   10/6/18 1.42 hold    10/7/18 1.44 hold    10/8/18 N/A 2 mg Patient may discharge tomorrow.   10/9/18 1.43 4 mg    10/10/18 1.29 4 mg    10/11/18 1.31 4 mg    10/12/18 1.44 4 mg    10/13/18 1.64 4 mg    10/14/18 1.95 4 mg    10/15/18 2.34 2 mg    10/16/18 2.39 4 mg    10/17/18 2.65 2 mg    10/18/18 3.88 hold    10/19/18 3.76 hold    10/20/18 5.08 hold    10/21/18 6.16 hold Phytonadione 2.5 mg po x 1, Amiodarone DC'd   10/22/18 3.31 hold    10/23/18 2.10 4 mg            Assessment  The patient is continuing warfarin for the indication of Atrial Fibrillation with a goal INR of 2-3. INR today is therapeutic. Warfarin dosing was decreased 10/17 due to amiodarone initiation and INR increase, however INR still became supratherapeutic. Warfarin was held 10/18, 10/19, 10/20, 10/21 and 10/22.  Vit K po given 10/21.     Plan  1. Resume Warfarin today. 4 mg x1  2. Check INR daily or as appropriate.  3. Continue to follow the patient's INR, PLT, and HGB as available.  4. Monitor for potential drug/disease interactions.  5. Amiodarone started on 10/17, and then stopped on 10/21. Due to the drug-drug interaction between warfarin and amiodarone, warfarin dosing will likely vary for at least a week after Amiodarone DC'd. The drug-drug interaction can be delayed, so close monitoring will be needed on discharge.     Thank you for the consult,  Amelie Good [facility], 10/23/2018 7:45 AM

## 2021-06-21 NOTE — PROGRESS NOTES
Pharmacy Consult: Warfarin Management    Pharmacy consulted to dose warfarin for Gardenia Muller, a 68 y.o. female who had abdominal pain upon presentation to [facility] on 10/3. Patient had an ERCP on 10/5 with no sludge or stones found. Patient had HIDA on 10/6, cystic duct consistent with obstruction. On 10/7 a laproscopic cholecystectomy was done. Patient remains in hospital for persistent atrial fibrillation, with rapid heart rate limited by blood pressure. 10/18 cardioversion done, elevated AST and ALT.    Ordering provider: Dr. Michael Abreu, Hospitalist    Reason for warfarin therapy: Atrial Fibrillation  Goal INR Range: 2-3    Subjective  Medication lists (home and hospital) were reviewed.    New medications that may increase bleeding risk/INR:  Amiodarone     Restarted home medications that may increase bleeding risk/INR: Torsemide, Ropinirole, sertraline, omeprazole, rosuvastatin, aspirin    Home Warfarin Dosin mg on , Tuesday and Thursday; 2 mg on all other days of the week    Other Anticoagulants: No  Patient being bridged: No    Patient Active Problem List   Diagnosis     Spinal stenosis     PAD (peripheral artery disease) (H)     Dermatosis Papulosa Nigra     Depression     Hypercalcemia     Right Renal Artery Stenosis     Osteoarthritis     Abnormality Of Walk     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)     Advanced directives, counseling/discussion     SBO (small bowel obstruction) (H)     Cystitis     Hypomagnesemia     Healthcare maintenance     Essential hypertension     Dysarthria     Cerebral infarction (H)     Anemia     Cerebrovascular disease     Benign adenomatous polyp of large intestine     RLS (restless legs syndrome)     Incisional hernia, without obstruction or gangrene     Abdominal pain, generalized     Diverticular disease of large intestine     Dysphagia     Coronary artery disease involving native coronary artery of native heart without angina  pectoris     Dyslipidemia     Ventral hernia without obstruction or gangrene     Paroxysmal atrial fibrillation (H)     Anticoagulation management encounter     S/P repair of recurrent ventral hernia     SOB (shortness of breath)     Lower extremity edema     Anxiety     Hypokalemia     (HFpEF) heart failure with preserved ejection fraction (H)     Tachycardia     Cholecystitis     Anticoagulant long-term use     Biliary obstruction     Abdominal pain, right upper quadrant     Persistent atrial fibrillation (H)     Abdominal pain, left upper quadrant    Past Medical History:   Diagnosis Date     Acute diastolic heart failure (H) 11/2015     Atrial fibrillation (H)      Cerebral vascular accident (H)      Coronary artery disease 2006    100% RCA with collateral filling per Dr. Elizabeth's angio report     Diabetes mellitus type 2 in obese (H)      Fibrocystic breast      GERD (gastroesophageal reflux disease)      History of anesthesia complications     slow to wake     Hypertension      Peripheral vascular disease (H)      PONV (postoperative nausea and vomiting)      Stroke (H)      Urinary incontinence         Social History   Substance Use Topics     Smoking status: Current Every Day Smoker     Packs/day: 0.25     Smokeless tobacco: Former User      Comment: e-cig a few times/day     Alcohol use No       Objective   Labs:  Last 3 days:    Recent Labs      10/18/18   1535  10/18/18   1536  10/19/18   0743  10/20/18   0553  10/21/18   0640   CREATININE   --   1.42*  1.14*  1.17*  1.17*  1.15*   HGB  13.2   --    --    --    --    HCT  42.9   --    --    --    --    PLT  213   --    --    --    --    BILITOT   --   0.9  1.0  1.2*  1.2*   AST   --   773*  749*  614*  615*   ALT   --   270*  317*  334*  364*   ALKPHOS   --   303*  298*  342*  368*     Last 7 days:   Recent labs: (last 7 days)      10/15/18   0546  10/16/18   0630  10/17/18   0551  10/18/18   0600  10/19/18   0743  10/20/18   0553  10/21/18   0640    INR  2.34*  2.39*  2.65*  3.88*  3.76*  5.08*  6.16*       Warfarin Dosing History:    Date INR Warfarin Dose Comment   10/5/18 2.84 hold 5 mg of vitamin K   10/6/18 1.42 hold    10/7/18 1.44 hold    10/8/18 N/A 2 mg Patient may discharge tomorrow.   10/9/18 1.43 4 mg    10/10/18 1.29 4 mg    10/11/18 1.31 4 mg    10/12/18 1.44 4 mg    10/13/18 1.64 4 mg    10/14/18 1.95 4 mg    10/15/18 2.34 2 mg    10/16/18 2.39 4 mg    10/17/18 2.65 2 mg    10/18/18 3.88 hold    10/19/18 3.76 hold    10/20/18 5.08 hold    10/21/18 6.16 hold Phytonadione 2.5 mg po x 1           Assessment  The patient is continuing warfarin for the indication of Atrial Fibrillation with a goal INR of 2-3. INR today is supratherapeutic. Warfarin dosing was decreased 10/17 due to amiodarone initiation and INR increase, however INR still became supratherapeutic. Warfarin was held 10/18, 10/19 and 10/20  but continues to be supratherapeutic. Warfarin to be held today.    Plan  1. Hold warfarin today due to INR increase, believed to be due in part to amiodarone initiation.  2. Check INR daily or as appropriate.  3. Continue to follow the patient's INR, PLT, and HGB as available.  4. Monitor for potential drug/disease interactions.  5. Amiodarone started on 10/17, due to the drug-drug interaction between warfarin and amiodarone, warfarin dosing will likely need to be decreased. The drug-drug interaction can be delayed, so close monitoring will be needed on discharge.     Thank you for the consult,  Monik Bailey, PharmD, 10/21/2018 9:21 AM

## 2021-06-21 NOTE — PROGRESS NOTES
Cardiology Progress Note    Assessment:  Active Problems:    (HFpEF) heart failure with preserved ejection fraction (H)    Cholecystitis    Persistent atrial fibrillation (H)    Sinus bradycardia    MAY (acute kidney injury) (H)    Transaminitis      Plan:  Patient with diabetes, cad, diastolic dysfunction and atrial fibrillation admitted initially with cholecystitis that was complicated post-op by afib with RVR and acute diastolic heart failure.  She underwent diuresis with torsemide NIVIA/cardioversion on 10/18 and amiodarone was initiated at 200mg three times a day 10/17. Sunday amiodarone was switched to sotalol given elevated LFTs. INR was 6.2 and vitamin K was given. Sunday night she was noted to be bradycardic in the 40s with c/o dizziness and dyspnea. Sotalol and metoprolol were discontinued. LFTs and renal function slowly improving. Elevated D-dimer and thrombocytopenia.  Briefly went back into atrial fibrillation yesterday and and this AM metoprolol restarted.     Atrial fibrillation - Slowly titrate metoprolol as tolerated. Can consider switching out to sotalol if necessary. Avoid amiodarone long term. Restart coumadin.     Diastolic heart failure - Euvolemic. D/c on furosemide 40mg daily.     CAD - restart statin as outpt pending LFT improvement    F/U CHF NP in 2 weeks and Dr. Choe in 6 weeks    Okay for discharge pending PT/OT evaluations    Subjective:   Briefly went back into atrial fibrillation, rate controlled, yesterday. Metoprolol restarted low dose.     Objective:   Vital signs in last 24 hours:  Temp:  [97.5  F (36.4  C)-98.2  F (36.8  C)] 97.7  F (36.5  C)  Heart Rate:  [] 86  Resp:  [14-40] 14  BP: ()/(56-75) 113/62  Weight:   170 lb 3.2 oz (77.2 kg)     Intake/Output Summary (Last 24 hours) at 10/24/18 1139  Last data filed at 10/24/18 0512   Gross per 24 hour   Intake              680 ml   Output              425 ml   Net              255 ml     I/O last 3 completed  shifts:  In: 1020 [P.O.:920; IV Piggyback:100]  Out: 575 [Urine:575]     Physical Exam:  GENERAL: no distress  NECK: No JVD  LUNGS: Clear to auscultation, no spinal or CV tenderness.  CARDIAC: regular rate & rhythm, S1 & S2 normal.  No heaves, thrills, gallops or murmurs.  ABDOMEN: flat, negative hepatosplenomegaly, soft and non-tender.  EXTREMITIES: No evidence of cyanosis, clubbing or edema.    Current Facility-Administered Medications   Medication Dose Route Frequency Provider Last Rate Last Dose     bisacodyl suppository 10 mg (DULCOLAX)  10 mg Rectal Daily PRN Rohit Perla MD         magnesium hydroxide suspension 30 mL (MILK OF MAG)  30 mL Oral Daily PRN Rohit Perla MD   30 mL at 10/16/18 1702     magnesium oxide tablet 400 mg (MAG-OX)  400 mg Oral TID Uzair Burdick MD   400 mg at 10/24/18 0853     metoprolol tartrate tablet 12.5 mg (LOPRESSOR)  12.5 mg Oral BID Jeffy Pina MD   12.5 mg at 10/24/18 0853     naloxone injection 0.2-0.4 mg (NARCAN)  0.2-0.4 mg Intravenous PRN Karlos Wynn MD        Or     naloxone injection 0.2-0.4 mg (NARCAN)  0.2-0.4 mg Intramuscular PRN Karlos Wynn MD         nitroglycerin SL tablet 0.4 mg (NITROSTAT)  0.4 mg Sublingual Q5 Min PRN Rohit Perla MD         omeprazole capsule 20 mg (PriLOSEC)  20 mg Oral BID AC Rohit Perla MD   20 mg at 10/24/18 0853     ondansetron injection 4 mg (ZOFRAN)  4 mg Intravenous Q6H PRN Olga Osman MD   4 mg at 10/22/18 0800     rOPINIRole tablet 1 mg (REQUIP)  1 mg Oral QHS Olga Osman MD   1 mg at 10/23/18 2106     sertraline tablet 100 mg (ZOLOFT)  100 mg Oral DAILY Rohit Perla MD   100 mg at 10/24/18 0853     sodium chloride 0.9% 500 mL  500 mL Intravenous Once Rula Chandra MD   Stopped at 10/18/18 1504     sodium chloride bacteriostatic 0.9 % injection 1-5 mL  1-5 mL Intradermal Once PRN Olga Osman MD         sodium chloride flush 10-20 mL (NS)  10-20 mL Intravenous PRN Olga Osman MD          sodium chloride flush 10-30 mL (NS)  10-30 mL Intravenous PRN Olga Osman MD         sodium chloride flush 10-30 mL (NS)  10-30 mL Intravenous Q8H FIXED TIMES Olga Osman MD   30 mL at 10/24/18 0600     sodium chloride flush 20 mL (NS)  20 mL Intravenous PRN Olga Osman MD         traMADol tablet 50 mg (ULTRAM)  50 mg Oral Q6H PRN Olga Osman MD   50 mg at 10/22/18 2027     traZODone tablet 50 mg (DESYREL)  50 mg Oral Bedtime PRN Olga Osman MD         warfarin - daily dose required   Other Med Consult or Protocol Michael Abreu MD           Cardiographics:    Telemetry: NSR, intermittent afib    Lab Results:   Results from last 7 days  Lab Units 10/24/18  0508 10/23/18  0533 10/22/18  1824   LN-SODIUM mmol/L 139  139 138  138 136   LN-POTASSIUM mmol/L 3.7  3.7 3.6  3.6 3.7   LN-CHLORIDE mmol/L 101  101 99  99 98   LN-CO2 mmol/L 28  28 29  29 26   LN-BLOOD UREA NITROGEN mg/dL 40*  40* 46*  46* 47*   LN-CREATININE mg/dL 0.91  0.91 1.19*  1.19* 1.37*   LN-CALCIUM mg/dL 12.2*  12.2* 11.7*  11.7* 11.9*       Results from last 7 days  Lab Units 10/24/18  0508 10/23/18  0533 10/22/18  1824   LN-WHITE BLOOD CELL COUNT thou/uL 8.9 9.6 11.7*   LN-HEMOGLOBIN g/dL 11.1* 10.6* 11.4*   LN-HEMATOCRIT % 35.5 34.1* 36.0   LN-PLATELET COUNT thou/uL 95* 91* 104*     Lab Results   Component Value Date    TROPONINI 0.01 10/03/2018    TROPONINI 0.02 06/24/2018    TROPONINI <0.01 09/20/2017     Lab Results   Component Value Date    LDLCALC 85 09/20/2017    LDLCALC 74 06/11/2014    LDLCALC 89 06/05/2013     No results found for: LDL  Lab Results   Component Value Date    BNP 2481 (H) 10/23/2018    BNP 2613 (H) 10/22/2018    BNP 1140 (H) 10/14/2018     Lab Results   Component Value Date    INR 1.77 (H) 10/24/2018    INR 2.10 (H) 10/23/2018    INR 2.51 (H) 10/22/2018       Gisel Choe MD

## 2021-06-21 NOTE — PROGRESS NOTES
PROGRESS NOTE      Subjective:              Patient notes that she has no appetite but no significant abdominal pain.      Objective:                Vital signs in last 24 hrs;  Temp:  [97.5  F (36.4  C)-98  F (36.7  C)] 97.8  F (36.6  C)  Heart Rate:  [41-65] 58  Resp:  [18-36] 29  BP: ()/(50-85) 121/63  SpO2:  [90 %-100 %] 98 %    Physical Exam:   Physical Examination    Vitals:   Vitals:    10/22/18 1500   BP: 121/63   Pulse: (!) 58   Resp: (!) 29   Temp:    SpO2: 98%     General: Alert and Oriented x3, No acute distress  HEENT: no scleral icterus  Chest: bradycardic, no m/r/g  Resp: CTA B, no r/r/w  GI: s/nt/nd, no guarding, no rebound  Ext: no LE edema  Neuro: no asterixis      Current Labs;  Admission on 10/03/2018   No results displayed because visit has over 200 results.          Assessment:       68 F with HTN, DM, CAD, atrial flutter on coumadin, admitted with acalculous cholecystitis, s/p CCY on Oct 7, s/p ERCP with sphincterotomy Oct 5.     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 Her LFTs closely mirror timeframe of amiodarone usage. While chronic use is more common with amiodarone drug injury, acute injury can occur at higher doses.  Heart failure and congested hepatopathy is also likely contributing. BNP is 2600, highest level noted in Epic.    Biliary obstruction is unlikely as patient is s/p ERCP with sphincterotomy, s/p CCY and has a normal CBD.     Plan:     Amiodarone has now been discontinued  Diuresis per primary team  Trend LFTs      Ernesto Reynolds  10/22/2018 4:06 PM  [facility]

## 2021-06-21 NOTE — PROGRESS NOTES
[place] Daily Progress Note      Date of Service: 10/16/2018     Assessment and plan    Persistent atrial fibrillation  : Patient still appears mildly tachycardic  ; Cardiology consulted  ; Monitor in telemetry  ; Pharmacy dose warfarin  : On Cardizem and metoprolol  : Cardiology note reviewed: Planning probable cardioversion    Acute on chronic diastolic CHF  Continue diuresis as per cardiology  : Currently saturating well on room air  ; Continue current dose of torsemide    Acute acalculous cholecystitis  ; Status post laparoscopic cholecystectomy on 10/7/2018  ; Liver enzymes seem to be improving with mild fluctuations  ; Continue more worse pain today, associated with lack of appetite  ; We will repeat CT abdomen pelvis  ; We will request surgery for evaluation    Diabetes mellitus type 2  ; Last hemoglobin A1c 5.7 on 7/31/2018  ; Diet controlled    Thrombocytopenia  ; Platelet count improved to 193      This note was dictated using voice recognition software. Any grammatical or context distortions are unintentional and inherent to the software.  Subjective:   Patient states she feels better except for the abdominal pain  Patient has been complaining of abdominal pain for last couple of days  She has been unclear as to severity but seems to coming on more pain today  Associated with lack of appetite  Had a small bowel movement yesterday  Has nausea, but no vomiting    States she still feels a little short of breath on ambulation      Brief History: 68 y.o. female with past medical history of atrial flutter on warfarin, hypertension, dyslipidemia, diet controlled diabetes, heart failure with preserved EF, presented to the hospital on 10/3/2018 with abdominal pain, and was found to have acute cholecystitis with cholelithiasis and dilatation of CBD and pancreatic duct. GI and surgery were consulted. She underwent ERCP on 10/05, which did not show any filling defect. She underwent HIDA scan on 10/06, which showed  "marked intrahepatic cholestasis, associated with cystic duct obstruction and cholecystitis. She was treated with IV antibiotics. She underwent laparoscopic cholecystectomy on 10/07/18.  Patient coming of shortness of breath on exertion, found to have uncontrolled atrial fibrillation, and mild acute on chronic diastolic heart failure.  Cardiology was consulted    Chart reviewed, events noted. Pt seen and examined.     Objective     Vital signs in last 24 hours:  Temp:  [97.4  F (36.3  C)-98.3  F (36.8  C)] 98.1  F (36.7  C)  Heart Rate:  [] 128  Resp:  [18-19] 18  BP: ()/(60-74) 101/67  Weight:   166 lb 1.6 oz (75.3 kg)  Weight change: 1 lb 9.6 oz (0.726 kg)  Body mass index is 29.42 kg/(m^2).    Intake/Output last 3 shifts:  I/O last 3 completed shifts:  In: 1180 [P.O.:1180]  Out: 451 [Urine:450; Stool:1]  Intake/Output this shift:         Physical Exam:  /67 (Patient Position: Sitting)  Pulse (!) 128  Temp 98.1  F (36.7  C) (Oral)   Resp 18  Ht 5' 3\" (1.6 m)  Wt 166 lb 1.6 oz (75.3 kg)  LMP 01/25/1999  SpO2 96%  BMI 29.42 kg/m2    FiO2 (%): (!) 2 % O2 Device: None (Room air) O2 Flow Rate (L/min): 2 L/min  No significant change in physical exam compared to 10/14/2018  General Appearance:    Alert, no apparent distress.    HEENT:    Normocephalic. Pupils equal and reactive. No scleral   Icterus. Moist oral mucosa    Lungs:     Clear to auscultation bilaterally, respirations unlabored  Mild decrease at base  No clear wheezing or crackles audible   Heart::   Irregularly irregular, tachycardia   no peripheral edema.   Abdomen:   Soft, abdomen AP more tender today  No rebound tenderness  Non distended. Bowel sounds present.    Extremity :   No deformity   Pulses:   2+ and symmetric all extremities   CNS:   Alert and oriented, no facial asymmetry  No focal neurologic deficits         Imaging:  personally reviewed.      Scheduled Meds:    acetaminophen  650 mg Oral BID     aspirin  81 mg Oral " DAILY     diltiazem  180 mg Oral DAILY     magnesium oxide  400 mg Oral TID     metoprolol tartrate  50 mg Oral BID     omeprazole  20 mg Oral BID AC     potassium chloride  20 mEq Oral DAILY     rOPINIRole  1 mg Oral QHS     rosuvastatin  40 mg Oral QHS     senna-docusate  1 tablet Oral BID     sertraline  100 mg Oral DAILY     torsemide  20 mg Oral DAILY     traZODone  50 mg Oral QHS     warfarin - daily dose required   Other Med Consult or Protocol     warfarin  4 mg Oral Daily 1700     Continuous Infusions:  PRN Meds:.bisacodyl, LORazepam, magnesium hydroxide, naloxone **OR** naloxone, nitroglycerin, ondansetron **OR** ondansetron, oxyCODONE    Lab Results:  personally reviewed.   not applicable  Recent Results (from the past 24 hour(s))   INR    Collection Time: 10/16/18  6:30 AM   Result Value Ref Range    INR 2.39 (H) 0.90 - 1.10   Potassium - Next AM    Collection Time: 10/16/18  6:30 AM   Result Value Ref Range    Potassium 4.6 3.5 - 5.0 mmol/L   Magnesium    Collection Time: 10/16/18  6:30 AM   Result Value Ref Range    Magnesium 2.1 1.8 - 2.6 mg/dL   Hepatic Profile    Collection Time: 10/16/18  6:30 AM   Result Value Ref Range    Bilirubin, Total 0.7 0.0 - 1.0 mg/dL    Bilirubin, Direct 0.4 <=0.5 mg/dL    Protein, Total 7.6 6.0 - 8.0 g/dL    Albumin 2.9 (L) 3.5 - 5.0 g/dL    Alkaline Phosphatase 190 (H) 45 - 120 U/L    AST 57 (H) 0 - 40 U/L    ALT 39 0 - 45 U/L   ECG 12 lead with MUSE    Collection Time: 10/16/18 10:21 AM   Result Value Ref Range    SYSTOLIC BLOOD PRESSURE  mmHg    DIASTOLIC BLOOD PRESSURE  mmHg    VENTRICULAR RATE 123 BPM    ATRIAL RATE 123 BPM    P-R INTERVAL 168 ms    QRS DURATION 90 ms    Q-T INTERVAL 338 ms    QTC CALCULATION (BEZET) 483 ms    P Axis  degrees    R AXIS -38 degrees    T AXIS 124 degrees    MUSE DIAGNOSIS       Sinus tachycardia  Left axis deviation  Septal infarct (cited on or before 31-JUL-2018)  ST & T wave abnormality, consider lateral ischemia  Abnormal ECG  When  compared with ECG of 12-OCT-2018 04:36,  Sinus rhythm has replaced Atrial fibrillation  Questionable change in initial forces of Anterior leads  ST elevation now present in Inferior leads         Results from last 7 days  Lab Units 10/14/18  0802 10/12/18  1704 10/11/18  1132 10/11/18  0801 10/10/18  1146 10/10/18  0824 10/09/18  2055 10/09/18  1646   LN-POC GLUCOSE FINGERSTICK- HE mg/dL 118 164 146 99 162 93 155 190           Advance Care Planning:   Barriers to discharge: Possible plan cardioversion  Anticipated discharge day: 1-2 days  Disposition: Was planned for TCU but unable to get insurance  Likely home with home care      Danii Smith MD  [facility]  Hospitalist

## 2021-06-21 NOTE — ANESTHESIA POSTPROCEDURE EVALUATION
Patient: Gardenia Muller  * No procedures listed *  Anesthesia type: No value filed.    Patient location: [facility]  Last vitals:   Vitals:    10/18/18 1518   BP: 106/69   Pulse: 78   Resp: 28   Temp: 36.5  C (97.7  F)   SpO2: 99%     Post vital signs: stable  Level of consciousness: awake and responds to simple questions  Post-anesthesia pain: pain controlled  Post-anesthesia nausea and vomiting: no  Pulmonary: unassisted, return to baseline  Cardiovascular: stable and blood pressure at baseline  Hydration: adequate  Anesthetic events: no    QCDR Measures:  ASA# 11 - [place]-op Cardiac Arrest: ASA11B - Patient did NOT experience unanticipated cardiac arrest  ASA# 12 - [place]-op Mortality Rate: ASA12B - Patient did NOT die  ASA# 13 - PACU Re-Intubation Rate: NA - No ETT / LMA used for case  ASA# 10 - Composite Anes Safety: ASA10A - No serious adverse event    Additional Notes:

## 2021-06-21 NOTE — PROGRESS NOTES
Pt. Not having chest pain. On evenings complained of nausea and pain in abdomen where mid stab site is. Will continue to monitor. BP barely ever over 100 systolically, and HR between 103- 124 past two shifts.

## 2021-06-21 NOTE — ANESTHESIA CARE TRANSFER NOTE
Last vitals:   Vitals:    10/18/18 1518   BP: 111/62   Pulse: 78   Resp: 28   Temp: 36.5  C (97.7  F)   SpO2: 99%     Patient's level of consciousness is drowsy  Spontaneous respirations: yes  Maintains airway independently: yes  Dentition unchanged: yes  Oropharynx: oropharynx clear of all foreign objects    QCDR Measures:  ASA# 20 - Surgical Safety Checklist: WHO surgical safety checklist completed prior to induction  PQRS# 430 - Adult PONV Prevention: 4558F - Pt received => 2 anti-emetic agents (different classes) preop & intraop  ASA# 8 - Peds PONV Prevention: NA - Not pediatric patient, not GA or 2 or more risk factors NOT present  PQRS# 424 - [place]bi[place]-op Temp Management: 4559F - At least one body temp DOCUMENTED => 35.5C or 95.9F within required timeframe  PQRS# 426 - PACU Transfer Protocol: - Transfer of care checklist used  ASA# 14 - Acute Post-op Pain: ASA14B - Patient did NOT experience pain >= 7 out of 10

## 2021-06-21 NOTE — PROGRESS NOTES
Pharmacy Consult: Warfarin Management    Pharmacy consulted to dose warfarin for Gardenia Muller, a 68 y.o. female    Ordering provider: Dr. Michael Abreu, Hospitalist    Reason for warfarin therapy: Atrial Fibrillation  Goal INR Range: 2-3    Subjective  Medication lists (home and hospital) were reviewed.    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)     Advanced directives, counseling/discussion     SBO (small bowel obstruction) (H)     Cystitis     Hypomagnesemia     Healthcare maintenance     Essential hypertension     Dysarthria     Cerebral infarction (H)     Anemia     Cerebrovascular disease     Benign adenomatous polyp of large intestine     RLS (restless legs syndrome)     Incisional hernia, without obstruction or gangrene     Abdominal pain, generalized     Diverticular disease of large intestine     Dysphagia     Coronary artery disease involving native coronary artery of native heart without angina pectoris     Dyslipidemia     Ventral hernia without obstruction or gangrene     Paroxysmal atrial fibrillation (H)     Anticoagulation management encounter     S/P repair of recurrent ventral hernia     SOB (shortness of breath)     Lower extremity edema     Anxiety     Hypokalemia     (HFpEF) heart failure with preserved ejection fraction (H)     Tachycardia     Cholecystitis     Anticoagulant long-term use     Biliary  obstruction     Abdominal pain, right upper quadrant     Persistent atrial fibrillation (H)    Past Medical History:   Diagnosis Date     Acute diastolic heart failure (H) 11/2015     Atrial fibrillation (H)      Cerebral vascular accident (H)      Coronary artery disease 2006    100% RCA with collateral filling per Dr. Elizabeth's angio report     Diabetes mellitus type 2 in obese (H)      Fibrocystic breast      GERD (gastroesophageal reflux disease)      History of anesthesia complications     slow to wake     Hypertension      Peripheral vascular disease (H)      PONV (postoperative nausea and vomiting)      Stroke (H)      Urinary incontinence         Social History   Substance Use Topics     Smoking status: Current Every Day Smoker     Packs/day: 0.25     Smokeless tobacco: Former User      Comment: e-cig a few times/day     Alcohol use No       Objective   Labs:  Last 3 days:    Recent Labs      10/13/18   0554  10/14/18   0520  10/14/18   0810  10/15/18   0546   CREATININE  0.89  0.94   --   0.97   HGB   --    --   13.7   --    HCT   --    --   43.7   --    PLT   --    --   193   --    BILITOT   --   0.7   --   0.7   AST   --   47*   --   59*   ALT   --   34   --   39   ALKPHOS   --   187*   --   174*     Last 7 days:   Recent labs: (last 7 days)      10/09/18   0614  10/10/18   0550  10/11/18   0705  10/12/18   0524  10/13/18   0554  10/14/18   0520  10/15/18   0546   INR  1.43*  1.29*  1.31*  1.44*  1.64*  1.95*  2.34*       Warfarin Dosing History:    Date INR Warfarin Dose Comment   10/5/18 2.84 hold 5 mg of vitamin K   10/6/18 1.42 hold    10/7/18 1.44 hold    10/8/18 N/A 2 mg Patient may discharge tomorrow.   10/9/18 1.43 4 mg    10/10/18 1.29 4 mg    10/11/18 1.31 4 mg    10/12/18 1.44 4 mg    10/13/18 1.64 4 mg    10/14/18 1.95 4 mg    10/15/18 2.34 2 mg            Assessment  The patient is continuing warfarin for the indication of Atrial Fibrillation with a goal INR of 2-3. INR today is therapeutic  . INR increased 0.39 from yesterday.   Will give warfarin dose as per home regimen this evening    Plan  1. Administer warfarin 2 mg PO today.  2. Check INR daily or as appropriate.  3. Continue to follow the patient's INR, PLT, and HGB as available.  4. Monitor for potential drug/disease interactions.  5. Recommend to resume PTA warfarin dosing if patient being discharged today.    Thank you for the consult,  Monik Bailey, PharmD, BCPS 10/15/2018 11:44 AM

## 2021-06-21 NOTE — PROGRESS NOTES
[facility]    Facility:   [facility] [[id number]]   Code Status: POLST AVAILABLE      CHIEF COMPLAINT/REASON FOR VISIT:  Chief Complaint   Patient presents with     Review Of Multiple Medical Conditions     physical deconditioning, s/p cholecystectomy, afib, anticoagulation management       HISTORY:      HPI: Gardenia is a 68 y.o. female with MI, atrial flutter, ventral hernia with incisional hernia repair, on anticoagulation.   She had a week of abdominal pain, more centered in the epigastrium and right upper quadrant and elsewhere. She threw up several times in the morning of admission. She had no fever, no chills.   He was tachycardic, she had 1 L of IV fluid, and IV Lopressor to slow her heart rate.    CT of the abdomen: Gallbladder wall thickening right, consistent with cholecystitis.  Ultrasound of the gallbladder: Distended gallbladder, edema of the gallbladder wall, no gallstones are seen.  She had a laparoscopic cholecystectomy on 10/7/18 with Dr Felicia So.    She was tolerating an oral diet at the time of discharge, and transferred to [facility] for ongoing therapy.    Today Ms. Muller is being evaluated for a routine review of multiple medical problems while in the TCU. Today Gardenia is doing great per her report. She states she is really starting to feel better and she does not have an cough- it has almost completely dissipated. Gardenia feels she is doing well in PT/OT and is getting stronger. Danni did have an INR completed today and she was subtherapeutic. She denies any uncontrolled bleeding, any active bleeding, any bruising, hematuria, epistaxis, blood in stools or black/dark/tarry stools. She denies any other concerns including fevers, headaches, vision changes, chest pain/pressure, difficulty breathing, SOB, nausea, vomiting, diarrhea, dysuria, increasing weakness, increasing pain.     Past Medical History:   Diagnosis Date     Acute diastolic heart  failure (H) 11/2015     Atrial fibrillation (H)      Cerebral vascular accident (H)      Coronary artery disease 2006    100% RCA with collateral filling per Dr. Elizabeth's angio report     Diabetes mellitus type 2 in obese (H)      Fibrocystic breast      GERD (gastroesophageal reflux disease)      History of anesthesia complications     slow to wake     Hypertension      Peripheral vascular disease (H)      PONV (postoperative nausea and vomiting)      Stroke (H)      Urinary incontinence              Family History   Problem Relation Age of Onset     Cancer Paternal Grandmother      Cancer Paternal Uncle      No Medical Problems Mother      No Medical Problems Father      Heart attack Sister      Chronic Kidney Disease Sister      No Medical Problems Daughter      No Medical Problems Maternal Grandmother      No Medical Problems Maternal Grandfather      No Medical Problems Paternal Grandfather      No Medical Problems Maternal Aunt      No Medical Problems Paternal Aunt      Diabetes Brother      BRCA 1/2 Neg Hx      Breast cancer Neg Hx      Colon cancer Neg Hx      Endometrial cancer Neg Hx      Ovarian cancer Neg Hx      Social History     Socioeconomic History     Marital status: Legally      Spouse name: Not on file     Number of children: Not on file     Years of education: Not on file     Highest education level: Not on file   Social Needs     Financial resource strain: Not on file     Food insecurity - worry: Not on file     Food insecurity - inability: Not on file     Transportation needs - medical: Not on file     Transportation needs - non-medical: Not on file   Occupational History     Not on file   Tobacco Use     Smoking status: Current Every Day Smoker     Packs/day: 0.25     Smokeless tobacco: Former User     Tobacco comment: e-cig a few times/day   Substance and Sexual Activity     Alcohol use: No     Drug use: No     Sexual activity: Not on file   Other Topics Concern     Not on file    Social History Narrative     Not on file         Review of Systems   Per HPI      Vitals:    11/15/18 2148   BP: 109/74   Pulse: 81   Resp: 18   Temp: 97.6  F (36.4  C)   SpO2: 96%   Weight: 155 lb 3.2 oz (70.4 kg)       Physical Exam  General appearance: alert, appears stated age and cooperative  HEENT: Head is normocephalic with normal hair distribution. No evidence of trauma. Ears: Without lesions or deformity. No acute purulent discharge. Eyes: Conjunctivae pink with no scleral icterus or erythema. Nose: Normal mucosa and septum. Oropharnyx: mmm, no lesions present.  Lungs: crackles at bases, left lower lobe diminished, respirations without effort  Heart: regular rate and rhythm, S1, S2 normal, 2/6 systolic murmur appreciated  Abdomen: soft, non-tender; bowel sounds normal; no masses,  no organomegaly  Extremities: extremities normal, atraumatic, no cyanosis, trace pedal edema  Pulses: 2+ and symmetric  Skin: Skin color, texture, turgor normal. No rashes or lesions, four 1 cm incisions d/t laparoscopy-clean, dry, intact  Neurologic: Grossly normal   Psych: interacts well with caregivers, exhibits logical thought processes and connections, pleasant      LABS:   None today.     ASSESSMENT:      ICD-10-CM    1. Physical deconditioning R53.81    2. S/P cholecystectomy Z90.49    3. Anticoagulation management encounter Z51.81     Z79.01    4. Anticoagulant long-term use Z79.01    5. Persistent atrial fibrillation (H) I48.1    6. Cholecystitis K81.9        PLAN:    Physical Deconditioning due to recent Cholecystectomy  -Continue PT/OT and other therapies as per care plan.  -Encouraged good nutrition and movement habits.   -Discussed care plan and expected course of stay.   -Continue to follow-up per routine schedule or sooner if needed.     S/p Choleycystectomy  -Abdominal pain resolved.   -Continue to monitor.     Afib  -Anticoagulation per Coumadin.   -ASA 81 mg by mouth daily.   -Lopressor 25 mg by mouth twice  daily for rate control.  -Follow up as needed and per anticoagulation needs.     Anticoagulation Management  -INR Subtherapeutic, did discuss risks of being subtherapeutic.  -Did discuss bridging with Lovenox, patient elects to wait until next week.    -Coumadin 3 mg by mouth daily.   -Coumadin dosed.   -INR in on Tuesday.     Lab Results   Component Value Date    INR 1.55 (H) 11/15/2018    INR 1.54 (H) 11/13/2018    INR 1.83 (H) 11/09/2018     Otherwise continue current care plan for all other chronic medical conditions, as they are stable. Encouraged patient to engage in healthy lifestyle behaviors such as engaging in social activities, exercising (PT/OT), eating well, and following care plan. Follow up for routine check-up, or sooner if needed. Will continue to monitor patient and work with nursing staff collaboratively to work toward positive patient outcomes.     Electronically signed by: Arleth Barton CNP

## 2021-06-21 NOTE — INTERVAL H&P NOTE
I have performed an assessment and examined the patient, as necessary, to update the patient's current status that may have changed since the prior History and Physical.  The History & Physical has been reviewed and changes to the patient's status are as follows the patient is in atrial fibrillation with RVR..   Madhu Keith MD  Non-invasive Cardiology  [facility]

## 2021-06-21 NOTE — CONSULTS
NEPHROLOGY CONSULTATION    REASON FOR CONSULTATION: Hypercalcemia    HISTORY OF PRESENT ILLNESS:  Patient is a 68-year-old female with history of myocardial infarction, chronic atrial fibrillation on chronic anticoagulation, history of ventral hernia repair, presented to the emergency department on 10/3/2018 for evaluation of abdominal pain, he was found to have a calculus cholecystitis, she underwent laparoscopic cholecystectomy on 10/7/2018, she is still having some abdominal discomfort and nausea and very poor oral intake since that time.    Pre-and post operatory.  Patient has had persistent atrial fibrillation with rapid ventricular rate, she underwent a cardioversion on 10/18, she is on metoprolol, amiodarone as per cardiology recommendation, she is also on chronic anticoagulation with warfarin.  Patient is also known to have history of chronic diastolic dysfunction, she had episodes of acute on chronic diastolic heart failure.  As well.    Patient is known to have history of hypercalcemia, in fact a review of laboratories showed that calcium has been high dating back as far as 2012, back in June 2018 calcium was around 11, at that time it was felt that high calcium was probably related to hydrochlorothiazide which was discontinued at that time.  During this admission patient has remained elevated between 11.1 on 12.3, (corrected calcium 13.0).   (10/18/18).  Vitamin D to total was 48 on 1/10/2018.  Ionized calcium 1.48 (10/18/18)    Patient has normal baseline creatinine at 0.89 on 10/13/2018, following diuresis of heart failure creatinine went up to 1.42, creatinine is down to 1.14 today (10/19).      REVIEW OF SYSTEMS:   At the time of visit patient refers persistent abdominal discomfort, denies chest pain, no shortness of breath, no angina.  Past Medical History:   Diagnosis Date     Acute diastolic heart failure (H) 11/2015     Atrial fibrillation (H)      Cerebral vascular accident (H)       Coronary artery disease 2006    100% RCA with collateral filling per Dr. Elizabeth's angio report     Diabetes mellitus type 2 in obese (H)      Fibrocystic breast      GERD (gastroesophageal reflux disease)      History of anesthesia complications     slow to wake     Hypertension      Peripheral vascular disease (H)      PONV (postoperative nausea and vomiting)      Stroke (H)      Urinary incontinence        Social History     Social History     Marital status: Legally      Spouse name: N/A     Number of children: N/A     Years of education: N/A     Occupational History     Not on file.     Social History Main Topics     Smoking status: Current Every Day Smoker     Packs/day: 0.25     Smokeless tobacco: Former User      Comment: e-cig a few times/day     Alcohol use No     Drug use: No     Sexual activity: Not on file     Other Topics Concern     Not on file     Social History Narrative       Family History   Problem Relation Age of Onset     Cancer Paternal Grandmother      Cancer Paternal Uncle      No Medical Problems Mother      No Medical Problems Father      Heart attack Sister      Chronic Kidney Disease Sister      No Medical Problems Daughter      No Medical Problems Maternal Grandmother      No Medical Problems Maternal Grandfather      No Medical Problems Paternal Grandfather      No Medical Problems Maternal Aunt      No Medical Problems Paternal Aunt      Diabetes Brother      BRCA 1/2 Neg Hx      Breast cancer Neg Hx      Colon cancer Neg Hx      Endometrial cancer Neg Hx      Ovarian cancer Neg Hx        Allergies   Allergen Reactions     Penicillins Swelling       MEDICATIONS:    acetaminophen  650 mg Oral BID     amiodarone  200 mg Oral TID     aspirin  81 mg Oral DAILY     magnesium oxide  400 mg Oral TID     metoprolol tartrate  50 mg Oral BID     omeprazole  20 mg Oral BID AC     polyethylene glycol  17 g Oral DAILY     rOPINIRole  1 mg Oral QHS     rosuvastatin  40 mg Oral QHS      senna-docusate  1 tablet Oral BID     sertraline  100 mg Oral DAILY     sodium chloride 0.9%  500 mL Intravenous Once     torsemide  20 mg Oral DAILY     traZODone  50 mg Oral QHS     warfarin - daily dose required   Other Med Consult or Protocol     warfarin - NO DOSE TODAY   Other No Dose Today     bisacodyl, LORazepam, magnesium hydroxide, naloxone **OR** naloxone, nitroglycerin, ondansetron, oxyCODONE    nacl 0.9% 75 mL/hr (10/19/18 0925)         PHYSICAL EXAM    Vitals:    10/19/18 1131   BP: 110/62   Pulse: 61   Resp: 18   Temp: 98.4  F (36.9  C)   SpO2: 90%         Intake/Output Summary (Last 24 hours) at 10/19/18 1340  Last data filed at 10/19/18 1131   Gross per 24 hour   Intake              880 ml   Output              400 ml   Net              480 ml          GENERAL: Chronically ill and debilitated  HEAD: Normal  HEENT: No eyelid edema.  CARDIOVASCULAR: No JVD present.  Atrial fibrillation present on cardiac monitor.. No peripheral edema  PULMONARY: No respiratory distress. No cyanosis  GASTROINTESTINAL: No ascites present  MSK: Diffuse muscle wasting.   NEURO: Alert, no gross focal findings  PSYCHIATRIC: Adequate mood and interaction  SKIN: Pale, no jaundice, no rash.    LABORATORIES      Results from last 7 days  Lab Units 10/18/18  1535 10/14/18  0810   LN-WHITE BLOOD CELL COUNT thou/uL 11.6* 9.2   LN-HEMOGLOBIN g/dL 13.2 13.7   LN-HEMATOCRIT % 42.9 43.7   LN-PLATELET COUNT thou/uL 213 193       Results from last 7 days  Lab Units 10/19/18  0743 10/18/18  1536 10/17/18  0551 10/16/18  0630 10/15/18  0546   LN-SODIUM mmol/L 136 131*  --   --  136   LN-POTASSIUM mmol/L 4.9 5.3* 4.6 4.6 4.3  4.3   LN-CHLORIDE mmol/L 103 99  --   --  98   LN-CO2 mmol/L 22 21*  --   --  28   LN-BLOOD UREA NITROGEN mg/dL 40* 41*  --   --  24*   LN-CREATININE mg/dL 1.14* 1.42*  --   --  0.97   LN-CALCIUM mg/dL 11.8* 12.3*  --   --  11.3*   LN-PROTEIN TOTAL g/dL 7.2 8.1*  --  7.6 7.0   LN-BILIRUBIN TOTAL mg/dL 1.0 0.9  --   0.7 0.7   LN-ALKALINE PHOSPHATASE U/L 298* 303*  --  190* 174*   LN-ALT (SGPT) U/L 317* 270*  --  39 39   LN-AST (SGOT) U/L 749* 773*  --  57* 59*       Results from last 7 days  Lab Units 10/19/18  0743 10/18/18  0600 10/17/18  0551   LN-INR  3.76* 3.88* 2.65*               RADIOLOGY    ECHOCARDIOGRAM  Atrial fibrillation       Summary        Normal left ventricular size and systolic function.    The estimated left ventricular ejection fraction is 55%.    Normal right ventricular size and systolic function.    Moderate biatrial enlargement.    No thrombus in either atrium or atrial appendage.    The posterior leaflet of the mitral valve has markedly reduced mobility. No mitral stenosis present. Moderate mitral regurgitation    No previous NIVIA available for comparison.           ASSESSMENT/PLAN:    1. Chronic hypercalcemia.  Asymptomatic.  , ionized calcium 1.48 on 10/18/2018.  Normal baseline renal function.  PTH driven hypercalcemia.  Most likely primary hyperparathyroidism.  Will check nuclear medicine parathyroid scan.   2. Acute kidney injury.  Prerenal failure secondary to diuresis of diastolic heart failure.  Creatinine is coming down.  Baseline creatinine 0.89 earlier this month.  3. Chronic atrial fibrillation.  Status post cardioversion, continue management as per cardiology, continue amiodarone, metoprolol and warfarin.  4. Acute on chronic diastolic dysfunction.  Left ventricular ejection fraction 55% (see NIVIA echo above).  Continue torsemide 20 mg daily, monitor renal function.  5. Hypertension.  Satisfactory control at present.  Continue same medications.  6. Acalculous cholecystitis.  Status post laparoscopic cholecystectomy on 10/7/2018.  Procedure note abdominal discomfort, nausea and poor oral intake.  Continue management as per hospitalist service.  7. Diabetes mellitus type 2.  Most recent hemoglobin A1c 5.7% on 7/31/2018.  Continue management as per hospitalist service.    Victor Manuel Castle  MD REYNOLDS FASN  Nephrology

## 2021-06-21 NOTE — PROGRESS NOTES
Cardiology Progress Note    Assessment:  Active Problems:    (HFpEF) heart failure with preserved ejection fraction (H)    Cholecystitis    Persistent atrial fibrillation (H)    Sinus bradycardia    MAY (acute kidney injury) (H)    Transaminitis      Plan:  Patient with diabetes, cad, diastolic dysfunction and atrial fibrillation admitted initially with cholecystitis that was complicated post-op by afib with RVR and acute diastolic heart failure.  She underwent diuresis with torsemide NIVIA/cardioversion on 10/18 and amiodarone was initiated at 200mg three times a day 10/17. Sunday amiodarone was switched to sotalol given elevated LFTs. INR was 6.2 and vitamin K was given. Sunday night she was noted to be bradycardic in the 40s with c/o dizziness and dyspnea. Sotalol and metoprolol were discontinued. LFTs and renal function slowly improving. Elevated D-dimer and thrombocytopenia by labs yesterday.     From a cardiac standpoint she may be slightly wet with some mild LE edema, but given her recent overdiuresis, hypotension, and acute transaminitis I think that we should run her a little wet.     Subjective:   Belly pain improved today. Trying to eat this AM, but fell to sleep while eating.    Objective:   Vital signs in last 24 hours:  Temp:  [97.6  F (36.4  C)-98  F (36.7  C)] 97.7  F (36.5  C)  Heart Rate:  [] 66  Resp:  [15-61] 33  BP: ()/(50-73) 90/55  Weight:   173 lb 12.8 oz (78.8 kg)     Intake/Output Summary (Last 24 hours) at 10/23/18 1045  Last data filed at 10/23/18 0900   Gross per 24 hour   Intake             1066 ml   Output              250 ml   Net              816 ml     I/O last 3 completed shifts:  In: 1526 [P.O.:980; I.V.:516; Other:30]  Out: 280 [Urine:250; Emesis/NG output:30]  I/O this shift:  In: 340 [P.O.:240; IV Piggyback:100]  Out: -     Physical Exam:  GENERAL: no distress  NECK: No JVD  LUNGS: dry crackles, no spinal or CV tenderness.  CARDIAC: regular rate & rhythm, S1 & S2  normal.  No heaves, thrills, gallops or murmurs.  ABDOMEN: flat, negative hepatosplenomegaly, soft and non-tender.  EXTREMITIES: No evidence of cyanosis, clubbing. Trace edema.    Current Facility-Administered Medications   Medication Dose Route Frequency Provider Last Rate Last Dose     bisacodyl suppository 10 mg (DULCOLAX)  10 mg Rectal Daily PRN Rohit Perla MD         magnesium hydroxide suspension 30 mL (MILK OF MAG)  30 mL Oral Daily PRN Rohit Perla MD   30 mL at 10/16/18 1702     magnesium oxide tablet 400 mg (MAG-OX)  400 mg Oral TID Uzair Burdick MD   400 mg at 10/23/18 0809     naloxone injection 0.2-0.4 mg (NARCAN)  0.2-0.4 mg Intravenous PRN Karlos Wynn MD        Or     naloxone injection 0.2-0.4 mg (NARCAN)  0.2-0.4 mg Intramuscular PRN Karlos Wynn MD         nitroglycerin SL tablet 0.4 mg (NITROSTAT)  0.4 mg Sublingual Q5 Min PRN Rohit Perla MD         omeprazole capsule 20 mg (PriLOSEC)  20 mg Oral BID AC Rohit Perla MD   20 mg at 10/23/18 0809     ondansetron injection 4 mg (ZOFRAN)  4 mg Intravenous Q6H PRN Olga Osman MD   4 mg at 10/22/18 0800     rOPINIRole tablet 1 mg (REQUIP)  1 mg Oral QHS Olga Osman MD   1 mg at 10/22/18 2027     sertraline tablet 100 mg (ZOLOFT)  100 mg Oral DAILY Rohit Perla MD   100 mg at 10/23/18 0809     sodium chloride 0.9% 500 mL  500 mL Intravenous Once Rula Chandra MD   Stopped at 10/18/18 1508     sodium chloride bacteriostatic 0.9 % injection 1-5 mL  1-5 mL Intradermal Once PRN Olga Osman MD         sodium chloride flush 10-20 mL (NS)  10-20 mL Intravenous PRN Olga Osman MD         sodium chloride flush 10-30 mL (NS)  10-30 mL Intravenous PRN Olga Osman MD         sodium chloride flush 10-30 mL (NS)  10-30 mL Intravenous Q8H FIXED TIMES Olga Osman MD   30 mL at 10/23/18 0600     sodium chloride flush 20 mL (NS)  20 mL Intravenous PRN Olga Osman MD         traMADol tablet 50 mg  (ULTRAM)  50 mg Oral Q6H PRN Olga Osman MD   50 mg at 10/22/18 2027     traZODone tablet 50 mg (DESYREL)  50 mg Oral Bedtime PRN Olga Osman MD         warfarin - daily dose required   Other Med Consult or Protocol Michael Abreu MD         warfarin tablet 4 mg (COUMADIN/JANTOVEN)  4 mg Oral Once Warfarin Madhu Keith MD           Cardiographics:    Telemetry: sinus dorcas      Lab Results:     Results from last 7 days  Lab Units 10/23/18  0533 10/22/18  1824 10/22/18  0453   LN-SODIUM mmol/L 138  138 136 133*   LN-POTASSIUM mmol/L 3.6  3.6 3.7 4.1   LN-CHLORIDE mmol/L 99  99 98 98   LN-CO2 mmol/L 29  29 26 23   LN-BLOOD UREA NITROGEN mg/dL 46*  46* 47* 49*   LN-CREATININE mg/dL 1.19*  1.19* 1.37* 1.62*   LN-CALCIUM mg/dL 11.7*  11.7* 11.9* 11.7*       Results from last 7 days  Lab Units 10/23/18  0533 10/22/18  1824 10/18/18  1535   LN-WHITE BLOOD CELL COUNT thou/uL 9.6 11.7* 11.6*   LN-HEMOGLOBIN g/dL 10.6* 11.4* 13.2   LN-HEMATOCRIT % 34.1* 36.0 42.9   LN-PLATELET COUNT thou/uL 91* 104* 213     Lab Results   Component Value Date    TROPONINI 0.01 10/03/2018    TROPONINI 0.02 06/24/2018    TROPONINI <0.01 09/20/2017     Lab Results   Component Value Date    LDLCALC 85 09/20/2017    LDLCALC 74 06/11/2014    LDLCALC 89 06/05/2013     No results found for: LDL  Lab Results   Component Value Date    BNP 2481 (H) 10/23/2018    BNP 2613 (H) 10/22/2018    BNP 1140 (H) 10/14/2018     Lab Results   Component Value Date    INR 2.10 (H) 10/23/2018    INR 2.51 (H) 10/22/2018    INR 3.31 (H) 10/22/2018       Giesl Choe MD

## 2021-06-21 NOTE — PROGRESS NOTES
Pharmacy Consult: Warfarin Management    Pharmacy consulted to dose warfarin for Gardenia Muller, a 68 y.o. female    Ordering provider: Dr. Michael Abreu, Hospitalist    Reason for warfarin therapy: Atrial Fibrillation  Goal INR Range: 2-3    Subjective  Medication lists (home and hospital) were reviewed.    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)     Advanced directives, counseling/discussion     SBO (small bowel obstruction) (H)     Cystitis     Hypomagnesemia     Healthcare maintenance     Essential hypertension     Dysarthria     Cerebral infarction (H)     Anemia     Cerebrovascular disease     Benign adenomatous polyp of large intestine     RLS (restless legs syndrome)     Incisional hernia, without obstruction or gangrene     Abdominal pain, generalized     Diverticular disease of large intestine     Dysphagia     Coronary artery disease involving native coronary artery of native heart without angina pectoris     Dyslipidemia     Ventral hernia without obstruction or gangrene     Paroxysmal atrial fibrillation (H)     Anticoagulation management encounter     S/P repair of recurrent ventral hernia     SOB (shortness of breath)     Lower extremity edema     Anxiety     Hypokalemia     (HFpEF) heart failure with preserved ejection fraction (H)     Tachycardia     Cholecystitis     Anticoagulant long-term use     Biliary  obstruction     Abdominal pain, right upper quadrant     Persistent atrial fibrillation (H)    Past Medical History:   Diagnosis Date     Acute diastolic heart failure (H) 11/2015     Atrial fibrillation (H)      Cerebral vascular accident (H)      Coronary artery disease 2006    100% RCA with collateral filling per Dr. Elizabeth's angio report     Diabetes mellitus type 2 in obese (H)      Fibrocystic breast      GERD (gastroesophageal reflux disease)      History of anesthesia complications     slow to wake     Hypertension      Peripheral vascular disease (H)      PONV (postoperative nausea and vomiting)      Stroke (H)      Urinary incontinence         Social History   Substance Use Topics     Smoking status: Current Every Day Smoker     Packs/day: 0.25     Smokeless tobacco: Former User      Comment: e-cig a few times/day     Alcohol use No       Objective   Labs:  Last 3 days:    Recent Labs      10/13/18   0554  10/14/18   0520  10/14/18   0810   CREATININE  0.89  0.94   --    HGB   --    --   13.7   HCT   --    --   43.7   PLT   --    --   193   BILITOT   --   0.7   --    AST   --   47*   --    ALT   --   34   --    ALKPHOS   --   187*   --      Last 7 days:   Recent labs: (last 7 days)      10/09/18   0614  10/10/18   0550  10/11/18   0705  10/12/18   0524  10/13/18   0554  10/14/18   0520   INR  1.43*  1.29*  1.31*  1.44*  1.64*  1.95*       Warfarin Dosing History:    Date INR Warfarin Dose Comment   10/5/18 2.84 hold 5 mg of vitamin K   10/6/18 1.42 hold    10/7/18 1.44 hold    10/8/18 N/A 2 mg Patient may discharge tomorrow.   10/9/18 1.43 4 mg    10/10/18 1.29 4 mg    10/11/18 1.31 4 mg    10/12/18 1.44 4 mg    10/13/18 1.64 4 mg    10/14/18 1.95 4 mg      Assessment  The patient is continuing warfarin for the indication of Atrial Fibrillation with a goal INR of 2-3. INR today is subtherapeutic which is expected as warfarin was held from 10/3/18 - 10/7/18 for surgery. INR is trending up, will continue  same dose.     Plan  1. Administer warfarin 4 mg PO today.  2. Check INR daily or as appropriate.  3. Continue to follow the patient's INR, PLT, and HGB as available.  4. Monitor for potential drug/disease interactions.  5. Recommend to resume PTA warfarin dosing if patient being discharged today.    Thank you for the consult,  Sheri Hanna, PharmD, BCPS 10/14/2018 1:51 PM

## 2021-06-21 NOTE — PROGRESS NOTES
[place] Daily Progress Note      Date of Service: 10/17/2018     Assessment and plan    Persistent atrial fibrillation  : Patient still appears mildly tachycardic  ; Cardiology consulted  ; Monitor in telemetry  ; Pharmacy dose warfarin  : On Cardizem and metoprolol  : Discussed with cardiology today: States patient not interested in cardioversion so started on amiodarone  : Patient states she would like to have a cardioversion done  ; Cardiology made aware  ; N.p.o. after midnight  ; Tried to call family but unable to contact    Acute on chronic diastolic CHF  Continue diuresis as per cardiology  : Currently saturating well on room air  ; Continue current dose of torsemide    Acute acalculous cholecystitis  ; Status post laparoscopic cholecystectomy on 10/7/2018  ; Liver enzymes seem to be improving with mild fluctuations  ; Continue more worse pain today, associated with lack of appetite  ; Repeat CT did not show any acute changes  : Appreciate surgery input  : Did have an episode of nausea and emesis today  ;?  Cause, ?  Secondary to poor intake or constipation  ; Start on scheduled MiraLAX  : Continue PPI    Diabetes mellitus type 2  ; Last hemoglobin A1c 5.7 on 7/31/2018  ; Diet controlled    Thrombocytopenia  ; Platelet count improved to 193      This note was dictated using voice recognition software. Any grammatical or context distortions are unintentional and inherent to the software.  Subjective:   In the morning patient states he felt better after having a bowel movement  Still feeling palpitation and shortness of breath on minimal exertion  Feels lack of appetite    In the afternoon patient had episode of emesis with nausea, yellowish stuff came out  Felt better after that    Discuss about cardioversion: Patient states she is interested in getting cardioversion done      Brief History: 68 y.o. female with past medical history of atrial flutter on warfarin, hypertension, dyslipidemia, diet controlled  "diabetes, heart failure with preserved EF, presented to the hospital on 10/3/2018 with abdominal pain, and was found to have acute cholecystitis with cholelithiasis and dilatation of CBD and pancreatic duct. GI and surgery were consulted. She underwent ERCP on 10/05, which did not show any filling defect. She underwent HIDA scan on 10/06, which showed marked intrahepatic cholestasis, associated with cystic duct obstruction and cholecystitis. She was treated with IV antibiotics. She underwent laparoscopic cholecystectomy on 10/07/18.  Patient coming of shortness of breath on exertion, found to have uncontrolled atrial fibrillation, and mild acute on chronic diastolic heart failure.  Cardiology was consulted.  Patient plan for cardioversion    Chart reviewed, events noted. Pt seen and examined.     Objective     Vital signs in last 24 hours:  Temp:  [97.5  F (36.4  C)-98.7  F (37.1  C)] 97.8  F (36.6  C)  Heart Rate:  [] 113  Resp:  [18-20] 18  BP: ()/(60-78) 124/76  Weight:   165 lb 6.4 oz (75 kg)  Weight change: -11.2 oz (-0.318 kg)  Body mass index is 29.3 kg/(m^2).    Intake/Output last 3 shifts:  I/O last 3 completed shifts:  In: 1280 [P.O.:1280]  Out: 700 [Urine:700]  Intake/Output this shift:  I/O this shift:  In: 240 [P.O.:240]  Out: 350 [Urine:350]      Physical Exam:  /76 (Patient Position: Lying)  Pulse (!) 113  Temp 97.8  F (36.6  C) (Oral)   Resp 18  Ht 5' 3\" (1.6 m)  Wt 165 lb 6.4 oz (75 kg)  LMP 01/25/1999  SpO2 96%  BMI 29.3 kg/m2    FiO2 (%): (!) 2 % O2 Device: None (Room air) O2 Flow Rate (L/min): 2 L/min    General Appearance:    Alert, no apparent distress.    HEENT:    Normocephalic. Pupils equal and reactive. No scleral   Icterus. Moist oral mucosa    Lungs:     Clear to auscultation bilaterally, respirations unlabored  Mild decrease at base  No clear wheezing or crackles audible   Heart::   Irregularly irregular, tachycardia   no peripheral edema.   Abdomen:   Soft, " nonspecific discomfort  No rebound tenderness  Non distended. Bowel sounds present.    Extremity :   No deformity   Pulses:   2+ and symmetric all extremities   CNS:   Alert and oriented, no facial asymmetry  No focal neurologic deficits         Imaging:  personally reviewed.      Scheduled Meds:    acetaminophen  650 mg Oral BID     amiodarone  400 mg Oral BID     aspirin  81 mg Oral DAILY     diltiazem  180 mg Oral DAILY     magnesium oxide  400 mg Oral TID     metoprolol tartrate  50 mg Oral BID     omeprazole  20 mg Oral BID AC     potassium chloride  20 mEq Oral DAILY     rOPINIRole  1 mg Oral QHS     rosuvastatin  40 mg Oral QHS     senna-docusate  1 tablet Oral BID     sertraline  100 mg Oral DAILY     torsemide  20 mg Oral DAILY     traZODone  50 mg Oral QHS     warfarin - daily dose required   Other Med Consult or Protocol     warfarin  2 mg Oral Once Warfarin     Continuous Infusions:  PRN Meds:.bisacodyl, LORazepam, magnesium hydroxide, naloxone **OR** naloxone, nitroglycerin, oxyCODONE    Lab Results:  personally reviewed.   not applicable  Recent Results (from the past 24 hour(s))   INR    Collection Time: 10/17/18  5:51 AM   Result Value Ref Range    INR 2.65 (H) 0.90 - 1.10   Potassium    Collection Time: 10/17/18  5:51 AM   Result Value Ref Range    Potassium 4.6 3.5 - 5.0 mmol/L       Results from last 7 days  Lab Units 10/14/18  0802 10/12/18  1704 10/11/18  1132 10/11/18  0801   LN-POC GLUCOSE FINGERSTICK- HE mg/dL 118 164 146 99           Advance Care Planning:   Barriers to discharge: Planned cardioversion tomorrow  Anticipated discharge day: 1-2 days  Disposition: Was planned for TCU but unable to get insurance  Likely home with home care      Danii Smith MD  [facility]  Hospitalist

## 2021-06-21 NOTE — PROGRESS NOTES
[facility]    Facility:   [facility] [[id number]]   Code Status: POLST AVAILABLE      CHIEF COMPLAINT/REASON FOR VISIT:  Chief Complaint   Patient presents with     Review Of Multiple Medical Conditions     physical deconditioning, s/p cholecystectomy, HTN, DMII       HISTORY:      HPI: Gardenia is a 68 y.o. female with MI, atrial flutter, ventral hernia with incisional hernia repair, on anticoagulation.   She had a week of abdominal pain, more centered in the epigastrium and right upper quadrant and elsewhere. She threw up several times in the morning of admission. She had no fever, no chills.   He was tachycardic, she had 1 L of IV fluid, and IV Lopressor to slow her heart rate.    CT of the abdomen: Gallbladder wall thickening right, consistent with cholecystitis.  Ultrasound of the gallbladder: Distended gallbladder, edema of the gallbladder wall, no gallstones are seen.  She had a laparoscopic cholecystectomy on 10/7/18 with Dr Felicia So.    She was tolerating an oral diet at the time of discharge, and [facility]    Today Ms. Muller is being evaluated for a routine review of multiple medical problems while in the TCU. Gardenia is doing great per her report. She continues to feel quite well and has been doing well with PT/OT. Danni states that she is looking forward to discharge and her discharge potential. Gardenia does continue to have a subtherapeutic INR and it is prudent to put her on Lovenox to mitigate risks of embolus. Danni did have an INR completed today and she was subtherapeutic. She denies any uncontrolled bleeding, any active bleeding, any bruising, hematuria, epistaxis, blood in stools or black/dark/tarry stools. She denies any other concerns including fevers, headaches, vision changes, chest pain/pressure, difficulty breathing, SOB, nausea, vomiting, diarrhea, dysuria, increasing weakness, increasing pain.     Past  Medical History:   Diagnosis Date     Acute diastolic heart failure (H) 11/2015     Atrial fibrillation (H)      Cerebral vascular accident (H)      Coronary artery disease 2006    100% RCA with collateral filling per Dr. Elizabeth's angio report     Diabetes mellitus type 2 in obese (H)      Fibrocystic breast      GERD (gastroesophageal reflux disease)      History of anesthesia complications     slow to wake     Hypertension      Peripheral vascular disease (H)      PONV (postoperative nausea and vomiting)      Stroke (H)      Urinary incontinence              Family History   Problem Relation Age of Onset     Cancer Paternal Grandmother      Cancer Paternal Uncle      No Medical Problems Mother      No Medical Problems Father      Heart attack Sister      Chronic Kidney Disease Sister      No Medical Problems Daughter      No Medical Problems Maternal Grandmother      No Medical Problems Maternal Grandfather      No Medical Problems Paternal Grandfather      No Medical Problems Maternal Aunt      No Medical Problems Paternal Aunt      Diabetes Brother      BRCA 1/2 Neg Hx      Breast cancer Neg Hx      Colon cancer Neg Hx      Endometrial cancer Neg Hx      Ovarian cancer Neg Hx      Social History     Socioeconomic History     Marital status: Legally      Spouse name: Not on file     Number of children: Not on file     Years of education: Not on file     Highest education level: Not on file   Social Needs     Financial resource strain: Not on file     Food insecurity - worry: Not on file     Food insecurity - inability: Not on file     Transportation needs - medical: Not on file     Transportation needs - non-medical: Not on file   Occupational History     Not on file   Tobacco Use     Smoking status: Current Every Day Smoker     Packs/day: 0.25     Smokeless tobacco: Former User     Tobacco comment: e-cig a few times/day   Substance and Sexual Activity     Alcohol use: No     Drug use: No     Sexual  activity: Not on file   Other Topics Concern     Not on file   Social History Narrative     Not on file         Review of Systems   Per HPI      Vitals:    11/23/18 1733   BP: 117/65   Pulse: 93   Resp: 18   Temp: 96.7  F (35.9  C)   SpO2: 98%   Weight: 154 lb 12.8 oz (70.2 kg)       Physical Exam  General appearance: alert, appears stated age and cooperative  HEENT: Head is normocephalic with normal hair distribution. No evidence of trauma. Ears: Without lesions or deformity. No acute purulent discharge. Eyes: Conjunctivae pink with no scleral icterus or erythema. Nose: Normal mucosa and septum. Oropharnyx: mmm, no lesions present.  Lungs: crackles at bases, left lower lobe diminished, respirations without effort  Heart: regular rate and rhythm, S1, S2 normal, 2/6 systolic murmur appreciated  Abdomen: soft, non-tender; bowel sounds normal; no masses,  no organomegaly  Extremities: extremities normal, atraumatic, no cyanosis, trace pedal edema  Pulses: 2+ and symmetric  Skin: Skin color, texture, turgor normal. No rashes or lesions, four 1 cm incisions d/t laparoscopy-clean, dry, intact  Neurologic: Grossly normal   Psych: interacts well with caregivers, exhibits logical thought processes and connections, pleasant      LABS:   None today.     ASSESSMENT:      ICD-10-CM    1. Physical deconditioning R53.81    2. S/P cholecystectomy Z90.49    3. Essential hypertension I10    4. (HFpEF) heart failure with preserved ejection fraction (H) I50.30    5. Paroxysmal A-fib (H) I48.0        PLAN:    Physical Deconditioning due to recent Cholecystectomy  -Continue PT/OT and other therapies as per care plan.  -Encouraged good nutrition and movement habits.   -Discussed care plan and expected course of stay.   -Continue to follow-up per routine schedule or sooner if needed.     S/p Choleycystectomy  -Continue PT/OT.  -Continue to monitor.     Afib  -Anticoagulation per Coumadin.   -ASA 81 mg by mouth daily.   -Lopressor 25 mg by  mouth twice daily for rate control.  -Follow up as needed and per anticoagulation needs.     Anticoagulation Management  -INR Subtherapeutic, did discuss risks of being subtherapeutic.  -Did discuss bridging with Lovenox, patient elects to wait.     -Coumadin 4 mg by mouth daily.   -Coumadin dosed.   -INR in on Friday.     Lab Results   Component Value Date    INR 1.50 (H) 11/26/2018    INR 1.38 (H) 11/23/2018    INR 1.59 (H) 11/20/2018     Otherwise continue current care plan for all other chronic medical conditions, as they are stable. Encouraged patient to engage in healthy lifestyle behaviors such as engaging in social activities, exercising (PT/OT), eating well, and following care plan. Follow up for routine check-up, or sooner if needed. Will continue to monitor patient and work with nursing staff collaboratively to work toward positive patient outcomes.     Care plan reviewed, no changes necessary at this time.     Electronically signed by: Arleth Barton CNP

## 2021-06-21 NOTE — PROGRESS NOTES
Clinical Nutrition Therapy Follow Up Note    Current Nutrition Prescription:   Diet: 2 gm Na+  Diet Supplements: boost glucose control TID  IV dextrose or Fluids:     Current Nutrition Intake:  The patient's current meal intake is poor <50%   Pt reports she is now tired of the boost and was interested in trying different variety of supplements d/t continued poor appetite with regular foods      GI Status/Output:   GI symptoms include: loose per nurse  Bowel Sounds present per nurse  Last BM: 10/23/18 per nurse    Skin/Wound:  No wound was noted.    Medications:  Medications reviewed.    Anthropometrics:  Most recent weight: :  Wt Readings from Last 3 Encounters:   10/24/18 170 lb 3.2 oz (77.2 kg)   09/20/18 172 lb 4 oz (78.1 kg)   09/18/18 174 lb 4.8 oz (79.1 kg)         Labs:  Labs reviewed :    Results from last 7 days  Lab Units 10/24/18  0508   LN-WHITE BLOOD CELL COUNT thou/uL 8.9   LN-HEMOGLOBIN g/dL 11.1*   LN-HEMATOCRIT % 35.5   LN-PLATELET COUNT thou/uL 95*       Results from last 7 days  Lab Units 10/24/18  0508   LN-SODIUM mmol/L 139  139   LN-POTASSIUM mmol/L 3.7  3.7   LN-CHLORIDE mmol/L 101  101   LN-CO2 mmol/L 28  28   LN-BLOOD UREA NITROGEN mg/dL 40*  40*   LN-CREATININE mg/dL 0.91  0.91   LN-CALCIUM mg/dL 12.2*  12.2*       Results from last 7 days  Lab Units 10/24/18  0508   LN-ALBUMIN g/dL 2.7*  2.7*   LN-PROTEIN TOTAL g/dL 6.9   LN-BILIRUBIN TOTAL mg/dL 2.3*   LN-ALKALINE PHOSPHATASE U/L 371*   LN-ALT (SGPT) U/L 525*   LN-AST (SGOT) U/L 763*               Results from last 7 days  Lab Units 10/24/18  0508   LN-MAGNESIUM mg/dL 1.9       Results from last 7 days  Lab Units 10/24/18  0508   LN-PHOSPHORUS mg/dL 2.7       Results from last 7 days  Lab Units 10/24/18  0508 10/23/18  0533 10/22/18  1824 10/22/18  0453 10/21/18  1926 10/21/18  0640 10/20/18  0553   LN-GLUCOSE mg/dL 77  77 109  109 108 133* 123 108 96  96         Malnutrition Assessment:  Previously assessed as Severe PCM  in the context of acute illness RD 10/22/18     Nutrition Risk Level: high risk      Nutrition dx:   Malnutrition r/t cholecystitis and biliary obstruction as evidenced by po intake <50% est needs x6 days, 3.3% wt loss x 1 week.       Goal:   PO intake > 75% - not met  New goals : > 50% of meals and supplements, normalize GI function, maintain weight      Intervention:   D/verona Boost GC TID. Per pt choices ordered: magic cup BID- in variety flavors and special K protein bar 1 x d     Monitoring/Evaluation:   Per  goals  See Care Plan for Problems, Goals, and Interventions.

## 2021-06-21 NOTE — DISCHARGE SUMMARY
[facility] MEDICINE  DISCHARGE SUMMARY     Primary Care Physician:  Jerson Hernandez MD  Admission Date: 10/3/2018    Discharge Provider: Jeffy Pina  Discharge Date: 10/25/2018    Code Status: Full Code    Diet:   Discharge Diet Order     None              Activity:   Discharge Activity Order (720h ago through future)    Start        10/09/18 0000  Return to normal activity as tolerated         10/09/18 0000  Weight Restrictions     Comments:  Do not lift over 20 pounds for 2 weeks             Condition at Discharge: Fair    REASON FOR ADMISSION (See Admission Note for Details)   Admission Diagnoses: Atrial flutter (H) [I48.92]  Abdominal pain, epigastric [R10.13]  Hyperkalemia [E87.5]  Tachycardia [R00.0]  Anticoagulated on Coumadin [Z51.81, Z79.01]  (HFpEF) heart failure with preserved ejection fraction (H) [I50.30]  Biliary obstruction [K83.1]  Pain in the abdomen [R10.9]      Problem list from this hospital stay:   Active Problems:    (HFpEF) heart failure with preserved ejection fraction (H)    Cholecystitis    Persistent atrial fibrillation (H)    Sinus bradycardia    MAY (acute kidney injury) (H)    Transaminitis      SIGNIFICANT FINDINGS (Imaging, labs):       PENDING LABS      Order Current Status    Comprehensive Metabolic Panel Collected (10/25/18 0545)    INR Collected (10/25/18 0545)    Magnesium Collected (10/25/18 0545)    Renal Function Profile Collected (10/25/18 0545)           PROCEDURES (this hospitalization only)    CHOLECYSTECTOMY, LAPAROSCOPIC      RECOMMENDATION FOR F/U VISIT   - f/u with PCP in 1-2 weeks  - f/u with Surgery  - f/u with Endocrinology    DISPOSITION (home, home care, TCU...)   Disposition: TCU - [facility]      SUMMARY OF HOSPITAL COURSE:      68F with PMH a.fib/flutter on warfarin, HFpEF presented on 10/03/18 with abd pain.    # Acalculous Cholecystitis  - General Surgery was consulted and the patient had a laparoscopic cholecystectomy on 10/07/2018. At the  time of discharge she was tolerating a regular diet without symptoms. She will f/u with surgery in clinic.    # Acute on Chronic Heart Failure with preserved EF  - Cardiology was consulted during admission. The patient was diuresed post-op and responded well. She developed MAY and so diuretics were held for several days. Per Cardiology, she will be discharged on Lasix 40mg PO daily.    # MAY  - The patient developed MAY in the setting of contrast exposure and diuretic therapy. Nephrology was consulted. Creat peaked at 1.62. At the time of discharge her creat had returned to 0.92. She will continue on Lasix daily as above.    # Transaminitis  - GI was consulted for elevated LFTs. ALT and AST peaked at 778 and 2443 respectively. This was likely due to hepatic congestion in the setting of CHF. At the time of discharge her levels improved to , . Rosuvastatin will be held at the time of discharge. She will have repeat labs performed at TCU.    # Atrial Fibrillation and Bradycardia  - The patient was previously on Amiodarone. In the setting of elevated LFTs the Amiodarone was discontinued on 10/21 and she was switched to Metoprolol and Sotalol. On the evening of 10/21 the patient became bradycardic to the 40's, Metoprolol and Sotalol were discontinued at that time. Prior to discharge Metoprolol was restarted and titrated up to 25mg PO two times a day. The patient's heart rate remained stable in the 60-70's.    # Hypercalcemia  - Nephrology was consulted and felt that the hypercalcemia was likely 2/2 hyperparathyroidism. They recommended outpatient f/u with Endocrinology for parathyroid scan.    Discharge Medications with Med changes:        Medication List      START taking these medications          furosemide 40 MG tablet   Commonly known as:  LASIX   Take 1 tablet (40 mg total) by mouth daily.       senna-docusate 8.6-50 mg tablet   Commonly known as:  PERICOLACE   Take 1 tablet by mouth 2 (two) times a  day.         CHANGE how you take these medications          rOPINIRole 1 MG tablet   Commonly known as:  REQUIP   1 tablet 30-60 minutes before bedtime   What changed:  additional instructions       warfarin 2 MG tablet   Commonly known as:  COUMADIN/JANTOVEN   Take 1 tablet (2 mg total) by mouth daily.   What changed:    - how much to take  - when to take this  - additional instructions         CONTINUE taking these medications          aspirin 81 MG EC tablet   Take 81 mg by mouth daily.       blood glucose meter   Commonly known as:  GLUCOMETER   Please Dispense kit per pharmacy discretion and patient's insurance Test blood sugar.       blood glucose test strips   Use 1 each As Directed as needed. Dispense brand per patient's insurance at pharmacy discretion.       carboxymethylcellulose 0.5 % Dpet ophthalmic dropperette   Commonly known as:  REFRESH PLUS   Administer 2 drops to both eyes every hour as needed (dry eyes).       ferrous sulfate 325 (65 FE) MG tablet   Take 1 tablet (325 mg total) by mouth 2 (two) times a day.       lancets 33 gauge Misc   Test twice daily Dispense brand per patient's insurance at pharmacy discretion.       loratadine 10 mg tablet   Commonly known as:  CLARITIN   Take 10 mg by mouth daily with lunch. Noon       magnesium oxide 400 mg (241.3 mg magnesium) tablet   Commonly known as:  MAG-OX   Take 1,200 mg by mouth daily.       metoprolol tartrate 25 MG tablet   Commonly known as:  LOPRESSOR   TAKE 1 TABLET BY MOUTH TWICE DAILY (8AM & 8PM)       multivitamin per tablet   Commonly known as:  TAB-A-JULIET   Take 1 tablet by mouth daily.       nitroglycerin 0.4 MG SL tablet   Commonly known as:  NITROSTAT   Place 1 tablet (0.4 mg total) under the tongue every 5 (five) minutes as needed for chest pain (for up to 3 doses and call 911 if persists).       omeprazole 20 MG capsule   Commonly known as:  PriLOSEC   TAKE 1 CAPSULE BY MOUTH TWICE DAILY (8AM & 8PM)       polyethylene glycol 17  gram/dose powder   Commonly known as:  GLYCOLAX   Take 17 g by mouth daily as needed.       potassium chloride 20 MEQ tablet   Commonly known as:  K-DUR,KLOR-CON   Take 1 tablet (20 mEq total) by mouth daily.       sertraline 100 MG tablet   Commonly known as:  ZOLOFT   TAKE 1 TABLET BY MOUTH ONCE DAILY       VITAMIN D3 2,000 unit capsule   Generic drug:  cholecalciferol (vitamin D3)   TAKE 1 CAPSULE BY MOUTH ONCE DAILY AT 8AM (DO NOT BRAND CHANGE - PATIENT ONLY WANT CAPSULES)         STOP taking these medications          acetaminophen 650 MG CR tablet   Commonly known as:  TYLENOL       diltiazem 180 MG 24 hr capsule   Commonly known as:  CARDIZEM CD       LORazepam 0.5 MG tablet   Commonly known as:  ATIVAN       rosuvastatin 40 MG tablet   Commonly known as:  CRESTOR       torsemide 20 MG tablet   Commonly known as:  DEMADEX       traZODone 50 MG tablet   Commonly known as:  DESYREL               Rationale for medication changes:    Lasix for CHF  Senna for constipaion  Diltiazem discontinued for bradycardia  Torsemide discontinued, switched to Lasix  Rosuvastatin discontinued for elevated LFTs  Warfarin dose adjustment in the setting of elevated LFTs    Consult/s:  cardiology, pulmonary/intensive care, GI, nephrology and general surgery    Discharge Instructions:  Additional discharge instructions:  - please monitor PT/INR frequently for ongoing warfarin dose adjustment  - please monitor CMP and restart Rosuvastatin 40mg PO daily when LFTs normalize    Discharge Instruction Orders (720h ago through future)    Start        10/25/18 0000  Future Lab Orders at [facility]     Comments:  1) Please monitor PT/INR frequently for warfarin dose adjustments  2) Please monitor CMP and have MD restart Rosuvastatin 40mg PO daily when LFTs are stable       10/25/18 0000  Ambulatory referral to Endocrinology     Comments:  Hyperparathyroidism. Nephrology recommends parathyroid scan.       10/12/18 0000  Amb Referral to Medication  Management     Comments:  Reason for referral: Hospital Discharge Transitions of Care       10/10/18 0000  Give Pneumococcal Vaccine If Not Current         10/10/18 0000  Give Influenza Vaccine (Seasonal) If Not Current         10/10/18 0000  Give Td Booster Vaccine If Not Current         10/10/18 0000  Discharge Condition:  Stabilized         10/10/18 0000  Discharge Summary:  Enclosed         10/10/18 0000  Admission H&P Valid:  Yes         10/10/18 0000  Patient Aware of Diagnosis: Yes         10/10/18 0000  Free of Communicable Disease:   Yes         10/10/18 0000  Rehab Potential:  Good         10/10/18 0000  Discharge Potential:  Length of Stay < 30 Days         10/10/18 0000  Level of Care:  Skilled         10/10/18 0000  Weight Bearing Status     Question:  Weight bearing status  Answer:  WBAT (Weight-bearing As Tolerated)       10/10/18 0000  Activity:  As Tolerated         10/10/18 0000  Agency Standing Orders:  Yes         10/10/18 0000  Follow Up:  With Primary MD in 1-2 Weeks         10/10/18 0000  Follow Up Appointments:     Comments:  General Surgery in 1-2 weeks       10/09/18 0000  Wash your hands with soap and warm water before you touch the site of surgery.         10/09/18 0000  If you have skin tapes on your surgical site(s), leave them on the surgical site(s) until they fall off on their own,         10/09/18 0000  Please see your surgeon for your follow-up appointment:     Comments:  [facility]  [address] [place], [place] [id number] [phone]    Call your surgeon's office with any questions or concerns.       10/09/18 0000  Stitches under the skin will dissolve.         10/09/18 0000  No incision or dressing care needed.                 Examination     Vital Signs in last 24 hours:  Temp:  [97.4  F (36.3  C)-98  F (36.7  C)] 97.4  F (36.3  C)  Heart Rate:  [63-88] 64  Resp:  [16-20] 16  BP: (105-121)/(56-80) 105/63  SpO2:  [90 %-95 %] 94 %    /63  " Pulse 64  Temp 97.4  F (36.3  C) (Oral)   Resp 16  Ht 5' 3\" (1.6 m)  Wt 171 lb 3.2 oz (77.7 kg)  LMP 01/25/1999  SpO2 94%  BMI 30.33 kg/m2  General appearance: sitting up in bed, NAD, A&Ox3  Lungs: bibasilar crackles present  Heart: regular rate and rhythm, S1, S2 normal, no murmur, click, rub or gallop  Abdomen: soft, distended, mild general TTP  Extremities: BLE edema present  Skin: Skin color, texture, turgor normal. No rashes or lesions  Neurologic: Grossly normal    Please see EMR for more detailed significant labs, imaging, consultant notes etc.  Total time spent on discharge: 60 minutes    Jeffy Pina MD  Pager #: [phone]  CC: Jerson Hernandez MD    "

## 2021-06-21 NOTE — PROGRESS NOTES
Clinical Nutrition Therapy Follow Up Note      Current Nutrition Prescription:   Diet: CHAUNCEY, 'low fat/ cholestrol 'Diet   Supplements:   IV dextrose or Fluids:     Current Nutrition Intake:  The patient's current meal intake is poor <50%     GI Status/Output:   GI symptoms include: loose BM per nursing  Bowel Sounds present per nurse  Last BM: 10/17/18 per nurse  Pt reports consistent nausea and subsequently does not want to eat regular foods requesting supplements    Skin/Wound:  No wound was noted.    Medications:  Medications reviewed.    Anthropometrics:  Most recent weight: :  Wt Readings from Last 3 Encounters:   10/17/18 165 lb 6.4 oz (75 kg)   09/20/18 172 lb 4 oz (78.1 kg)   09/18/18 174 lb 4.8 oz (79.1 kg)       Labs:  Labs reviewed :    Results from last 7 days  Lab Units 10/14/18  0810   LN-WHITE BLOOD CELL COUNT thou/uL 9.2   LN-HEMOGLOBIN g/dL 13.7   LN-HEMATOCRIT % 43.7   LN-PLATELET COUNT thou/uL 193       Results from last 7 days  Lab Units 10/17/18  0551  10/15/18  0546   LN-SODIUM mmol/L  --   --  136   LN-POTASSIUM mmol/L 4.6  < > 4.3  4.3   LN-CHLORIDE mmol/L  --   --  98   LN-CO2 mmol/L  --   --  28   LN-BLOOD UREA NITROGEN mg/dL  --   --  24*   LN-CREATININE mg/dL  --   --  0.97   LN-CALCIUM mg/dL  --   --  11.3*   < > = values in this interval not displayed.    Results from last 7 days  Lab Units 10/16/18  0630   LN-ALBUMIN g/dL 2.9*   LN-PROTEIN TOTAL g/dL 7.6   LN-BILIRUBIN TOTAL mg/dL 0.7   LN-ALKALINE PHOSPHATASE U/L 190*   LN-ALT (SGPT) U/L 39   LN-AST (SGOT) U/L 57*               Results from last 7 days  Lab Units 10/16/18  0630   LN-MAGNESIUM mg/dL 2.1           Results from last 7 days  Lab Units 10/15/18  0546 10/14/18  0520   LN-GLUCOSE mg/dL 110 125     Malnutrition: Noted previously 10/7/18     Nutrition Risk Level:moderate.     Nutrition dx:  10/17/18 poor intake r/t altered GI function     Intervention:  Ordered boost glucose control TID w/ meals in chocolate and strawberry  per pt request  Consider/changing antiemetic per MD to order  Encourage ordering/trialing foods as pt states she just wants the supplements currently  Goals:  Normalize GI function  Intake > 50 % of meals and supplements     Monitoring/Evaluation:  Per goals      See Care Plan for Problems, Goals, and Interventions.

## 2021-06-21 NOTE — CONSULTS
GASTROENTEROLOGY CONSULT NOTE     CONSULTING PHYSICIAN   Olga Osman MD     REASON FOR CONSULTATION   Elevated liver function test with concern for choledocholithiasis.     CHIEF COMPLAINT   Gardenia Muller came to the hospital for evaluation of abdominal discomfort.     HISTORY OF PRESENT ILLNESS   Gardenia Muller is a pleasant 68 y.o. female with a history of diastolic heart failure, CVA, CAD, diabetes mellitus type 2, GERD, hypertension, peripheral vascular disease, and urinary incontinence.  The patient underwent a lap scopic cholecystectomy for acalculous cholecystitis.  She has been recovering from this and developed signs of diastolic heart failure.  She has been managed by the cardiology service.  Over the last week her liver function tests have gradually increased and the GI service is asked to see the patient for help in evaluation of this issue.    The patient has incisional abdominal pain but denies any vomiting.  She has felt nauseated on and off.  The patient has not had any black or bloody stools and states that her reflux disease is well controlled.  Her liver function tests again are elevated and there is a concern that this may be related to choledocholithiasis.  The patient's INR is greater than 5 today so ERCP would only be possible after this would be reversed.  The patient has not had a right upper quadrant ultrasound since her LFTs have gone up.     PAST HISTORY   Past Medical History:   Diagnosis Date     Acute diastolic heart failure (H) 11/2015     Atrial fibrillation (H)      Cerebral vascular accident (H)      Coronary artery disease 2006    100% RCA with collateral filling per Dr. Elizabeth's angio report     Diabetes mellitus type 2 in obese (H)      Fibrocystic breast      GERD (gastroesophageal reflux disease)      History of anesthesia complications     slow to wake     Hypertension      Peripheral vascular disease (H)      PONV (postoperative nausea and vomiting)      Stroke  (H)      Urinary incontinence       Past Surgical History:   Procedure Laterality Date     CARDIAC CATHETERIZATION       CARDIOVERSION  10/18/2018     CORONARY STENT PLACEMENT  1995    stent     ERCP N/A 10/5/2018    Procedure: ENDOSCOPIC RETROGRADE CHOLANGIOPANCREATOGRAPHY, SPHINCTEROTOMY;  Surgeon: Anson Stokes MD;  Location: [facility];  Service:      EXPLORATORY LAPAROTOMY N/A 6/29/2018    Procedure: LAPAROTOMY, CLOSURE OF PERITONEAL RENT;  Surgeon: Jones Harding DO;  Location: [facility] OR;  Service:      LAPAROSCOPIC CHOLECYSTECTOMY N/A 10/7/2018    Procedure: CHOLECYSTECTOMY, LAPAROSCOPIC;  Surgeon: Felicia So MD;  Location: [facility] OR;  Service:      PICC  6/29/2018          VENTRAL HERNIA REPAIR N/A 11/12/2015    Procedure: STRANGULATED VENTRAL HERNIA REPAIR ;  Surgeon: Ernesto Bailey MD;  Location: [facility];  Service:         Family History Social History   Family History   Problem Relation Age of Onset     Cancer Paternal Grandmother      Cancer Paternal Uncle      No Medical Problems Mother      No Medical Problems Father      Heart attack Sister      Chronic Kidney Disease Sister      No Medical Problems Daughter      No Medical Problems Maternal Grandmother      No Medical Problems Maternal Grandfather      No Medical Problems Paternal Grandfather      No Medical Problems Maternal Aunt      No Medical Problems Paternal Aunt      Diabetes Brother      BRCA 1/2 Neg Hx      Breast cancer Neg Hx      Colon cancer Neg Hx      Endometrial cancer Neg Hx      Ovarian cancer Neg Hx       Occupation: Homemaker    Marital Status: 2 children    Tobacco: History of smoking    Alcohol: None    Recreational Drugs: None     MEDICATIONS & ALLERGIES   Prescriptions Prior to Admission   Medication Sig Dispense Refill Last Dose     acetaminophen (TYLENOL) 650 MG CR tablet Take 650 mg by mouth 2 (two) times a day.    10/3/2018 at AM     aspirin 81 MG EC tablet Take 81 mg by  mouth daily.    10/3/2018 at Unknown time     carboxymethylcellulose (REFRESH PLUS) 0.5 % Dpet ophthalmic dropperette Administer 2 drops to both eyes every hour as needed (dry eyes).    Unknown at Unknown time     diltiazem (CARDIZEM CD) 180 MG 24 hr capsule Take 1 capsule (180 mg total) by mouth daily. 30 capsule 11 10/3/2018 at Unknown time     ferrous sulfate 325 (65 FE) MG tablet Take 1 tablet (325 mg total) by mouth 2 (two) times a day. 60 tablet 3 10/3/2018 at AM     loratadine (CLARITIN) 10 mg tablet Take 10 mg by mouth daily with lunch. Noon   10/3/2018 at Unknown time     magnesium oxide (MAG-OX) 400 mg tablet Take 1,200 mg by mouth daily.    10/3/2018 at Unknown time     metoprolol tartrate (LOPRESSOR) 25 MG tablet TAKE 1 TABLET BY MOUTH TWICE DAILY (8AM & 8PM) 60 tablet 10 10/3/2018 at AM     multivitamin (TAB-A-JULIET) per tablet Take 1 tablet by mouth daily. (Patient taking differently: Take 1 tablet by mouth daily. ) 100 tablet 11 10/3/2018 at Unknown time     nitroglycerin (NITROSTAT) 0.4 MG SL tablet Place 1 tablet (0.4 mg total) under the tongue every 5 (five) minutes as needed for chest pain (for up to 3 doses and call 911 if persists). (Patient taking differently: Place 0.4 mg under the tongue every 5 (five) minutes as needed for chest pain (for up to 3 doses and call 911 if persists). ) 90 tablet 0 Unknown at Unknown time     omeprazole (PRILOSEC) 20 MG capsule TAKE 1 CAPSULE BY MOUTH TWICE DAILY (8AM & 8PM) 60 capsule 5 10/3/2018 at AM     polyethylene glycol (GLYCOLAX) 17 gram/dose powder Take 17 g by mouth daily as needed.    Unknown at Unknown time     potassium chloride (K-DUR,KLOR-CON) 20 MEQ tablet Take 1 tablet (20 mEq total) by mouth daily. 30 tablet 11 10/3/2018 at Unknown time     rOPINIRole (REQUIP) 1 MG tablet 1 tablet 30-60 minutes before bedtime (Patient taking differently: 1 tablet 30-60 minutes before bedtime) 30 tablet 2 10/2/2018 at Unknown time     rosuvastatin (CRESTOR) 40 MG  "tablet TAKE 1 TABLET BY MOUTH AT BEDTIME 30 tablet 11 10/2/2018 at Unknown time     sertraline (ZOLOFT) 100 MG tablet TAKE 1 TABLET BY MOUTH ONCE DAILY 31 tablet 10 10/3/2018 at Unknown time     torsemide (DEMADEX) 20 MG tablet Take 1 tablet (20 mg total) by mouth 2 (two) times a day at 9am and 6pm. (Patient taking differently: Take 20 mg by mouth daily. ) 60 tablet 3 9/30/2018     VITAMIN D3 2,000 unit capsule TAKE 1 CAPSULE BY MOUTH ONCE DAILY AT 8AM (DO NOT BRAND CHANGE - PATIENT ONLY WANT CAPSULES) 30 capsule 5 10/3/2018 at Unknown time     warfarin (COUMADIN/JANTOVEN) 2 MG tablet Take by mouth See Admin Instructions. Take 4 mg on Sun/Tues/Thurs and 2 mg all other days of the week. Adjust dose based on INR results as directed.    10/2/2018 at Unknown time     blood glucose meter (GLUCOMETER) Please Dispense kit per pharmacy discretion and patient's insurance Test blood sugar. 1 each 0 Taking     blood glucose test strips Use 1 each As Directed as needed. Dispense brand per patient's insurance at pharmacy discretion. 50 strip 11 Taking     lancets 33 gauge Misc Test twice daily Dispense brand per patient's insurance at pharmacy discretion. 100 each 11 Taking        ALLERGIES   Allergies   Allergen Reactions     Penicillins Swelling         REVIEW OF SYSTEMS     no chest pain    Mild shortness of breath with dyspnea on exertion    no fevers/sweats/chills    no weight gain or loss    no nausea or vomiting    Incisional abdominal discomfort    no constipation or diarrhea    no black or bloody stools    no numbness or weakness    no jaundice or dark urine  A comprehensive review of systems was performed and was otherwise noncontributory.     OBJECTIVE   Vitals Blood pressure 93/65, pulse 64, temperature 97.8  F (36.6  C), temperature source Oral, resp. rate 20, height 5' 3\" (1.6 m), weight 166 lb 9.6 oz (75.6 kg), last menstrual period 01/25/1999, SpO2 96 %, not currently breastfeeding.           Physical E xam   " GENERAL: alert and oriented, well nourished in no apparent distress    SKIN: warm and dry, no rashes    HEENT: atraumatic, anicteric, moist mucous membranes, neck soft/supple     PULMONARY: normal resp effort, breath sounds clear to auscultation bilateral    CARDIOVASCULAR: normal rate and rhythm, no murmurs, no edema    ABDOMEN: Incisional abdominal tenderness, no distention, bowel sounds normal    NEUROLOGICAL: Moves all 4 extremities    PSYCHIATRIC: normal mood, affect and insight        LABORATORY    ELECTROLYTE PANEL     Results from last 7 days  Lab Units 10/20/18  0553 10/19/18  0743 10/18/18  1536   LN-SODIUM mmol/L 135*  135* 136 131*   LN-POTASSIUM mmol/L 4.2  4.2 4.9 5.3*   LN-CHLORIDE mmol/L 103  103 103 99   LN-CO2 mmol/L 22  22 22 21*   LN-BLOOD UREA NITROGEN mg/dL 38*  38* 40* 41*   LN-CREATININE mg/dL 1.17*  1.17* 1.14* 1.42*   LN-CALCIUM mg/dL 11.7*  11.7* 11.8* 12.3*        HEMATOLOGY PANEL     Results from last 7 days  Lab Units 10/20/18  0553 10/19/18  0743 10/18/18  1535 10/18/18  0600  10/14/18  0810   LN-HEMOGLOBIN g/dL  --   --  13.2  --   --  13.7   LN-MEAN CORPUSCULAR VOLUME fL  --   --  87  --   --  86   LN-WHITE BLOOD CELL COUNT thou/uL  --   --  11.6*  --   --  9.2   LN-PLATELET COUNT thou/uL  --   --  213  --   --  193   LN-INR  5.08* 3.76*  --  3.88*  < >  --    < > = values in this interval not displayed.   LIVER AND PANCREAS PANEL     Results from last 7 days  Lab Units 10/20/18  0553 10/19/18  0743 10/18/18  1536 10/16/18  0630 10/15/18  0546   LN-ALKALINE PHOSPHATASE U/L 342* 298* 303* 190* 174*   LN-BILIRUBIN TOTAL mg/dL 1.2* 1.0 0.9 0.7 0.7   LN-BILIRUBIN DIRECT mg/dL  --   --   --  0.4 0.3   LN-PROTEIN TOTAL g/dL 7.2 7.2 8.1* 7.6 7.0   LN-ALT (SGPT) U/L 334* 317* 270* 39 39   LN-AST (SGOT) U/L 614* 749* 773* 57* 59*     IMAGING STUDIES    Abdominal CT scan from October 16, 2018  1.  No complications related to recent surgery. No new abnormalities.  2.  There are small  sites of ecchymoses or tiny subcutaneous hematomas at right upper quadrant and midline.    I have reviewed the current diagnostic and laboratory tests.           IMPRESSION   Gardenia Muller is a pleasant 68 y.o. female with multiple medical issues including diastolic heart failure and recent cholecystectomy for acalculous cholecystitis.  The patient now has elevated liver function tests with elevated transaminases and alkaline phosphatase.     RECOMMENDATION     Elevated LFTs   Likely secondary to diastolic heart failure versus choledocholithiasis.  Right upper quadrant ultrasound today to evaluate for bile duct dilation and choledocholithiasis.  Likely there is at least a component of diastolic heart failure that is causing hepatic congestion.  If an ERCP is needed the patient will need her INR reversed to less than 1.8.       Heart failure   Treatment per cardiology.       Anticoagulation   The patient's INR is currently greater than 5 minutes needs to be reversed to 2-3 and certainly below 1.8 if any interventional procedures will be needed.     Dimitrios Guerra M.D.  [facility]  Thank you for the opportunity to participate in the care of this patient.   Please feel free to call me with any questions or concerns [phone].

## 2021-06-21 NOTE — PROGRESS NOTES
Hospitalist Progress Note    Brief history  67 yo female with PMH of persistent AFib/flutter, chronic diastolic CHF, HTN, DM 2 who presented with a one-week history of epigastric/right upper quadrant pain with dilated CBD and pancreatic ducts without evidence of stones/sludge/stricture on ERCP.  On 10/7/18 patient underwent laparoscopic cholecystectomy for acalculous cholecystitis.  Postoperatively patient has been in persistent atrial fibrillation/flutter and ultimately underwent cardioversion on 10/18 which was successful in restoring sinus rhythm.  She was noted to have worsening hypercalcemia which is PTH driven and nuclear medicine parathyroid scan was ordered to evaluate for parathyroid adenoma.  LFTs have been worsening postoperatively, possibly due to CHF, liver ultrasound does not show any bile duct dilatation.       Assessment and plan    1.  Hypercalcemia.  Likely primary hyperparathyroidism since PTH is elevated 190.  Nuclear medicine parathyroid scan has been ordered to evaluate for parathyroid adenoma and is still pending.  Calcium level 11.7, stable from yesterday.  Discontinue IV fluids given chronic nature of hypercalcemia and CHF, encourage oral fluid intake  2.  Abdominal pain.  Suspect due to subcutaneous hematomas.  Recent CT of the abdomen and pelvis 10/16 did not show any obstruction, evidence for leak or abscess.  Encourage ambulation and oral intake.  3.  Abnormal LFTs, worsening postoperatively.  Liver ultrasound today does not show any bile duct dilatation.  Possibly due to CHF.  Appreciate GI input.  4.  Persistent atrial fibrillation/flutter.  Status post cardioversion 10/18.  Remains in sinus rhythm. Continue amiodarone and metoprolol per cardiology recommendations. On warfarin for stroke prevention, INR >5 today  5.  Chronic diastolic CHF.  Continue torsemide 20 mg daily   6.  MAY.  Probably prerenal in the setting of significant diuresis. Will monitor   7.  Acalculous cholecystitis,  status post laparoscopic cholecystectomy 10/7/18. Still having abdominal discomfort, nausea and poor oral intake.  Encourage ambulation, protein supplements  5.  DM 2 without complications.  Diet controlled.  Most recent hemoglobin A1c 5.7 on 7/31/18      Barriers to Discharge : Abnormal LFTs, hypercalcemia  Anticipated Discharge :1-2 days  Disposition :home versus TCU      Subjective  Patient denies having any chest pain or dyspnea.  She continues to have abdominal discomfort and intermittent nausea.    Objective    Vital signs in last 24 hours  Temp:  [97  F (36.1  C)-98.4  F (36.9  C)] 97.8  F (36.6  C)  Heart Rate:  [60-66] 62  Resp:  [16-20] 20  BP: ()/(59-85) 121/70 96% O2 Device: None (Room air) O2 Flow Rate (L/min): 4 L/min  Weight:   166 lb 9.6 oz (75.6 kg) Weight change: 3 lb 6.4 oz (1.542 kg)    Intake/Output last 3 shifts  I/O last 3 completed shifts:  In: 1456 [P.O.:810; I.V.:646]  Out: 650 [Urine:650]  Intake/Output this shift:       Physical Exam:  GENERAL: No acute distress, sitting in a chair  CARDIAC: Regular rate and rhythm.  S1 & S2 normal.  No gallops or murmurs. No edema  LUNGS: Fine scattered crackles over bases bilaterally  ABDOMEN: Soft, tenderness around the incisions, mostly midline incision, bowel sounds present all quadrants  NEURO: Awake and alert.  Moves all extremities       Pertinent Labs   Lab Results: personally reviewed.     Results from last 7 days  Lab Units 10/20/18  0553 10/19/18  0743 10/18/18  1536  10/16/18  0630 10/15/18  0546  10/14/18  0520   LN-SODIUM mmol/L 135*  135* 136 131*  --   --  136  --  137   LN-POTASSIUM mmol/L 4.2  4.2 4.9 5.3*  < > 4.6 4.3  4.3  < > 3.6  3.5   LN-CHLORIDE mmol/L 103  103 103 99  --   --  98  --  98   LN-CO2 mmol/L 22  22 22 21*  --   --  28  --  30   LN-BLOOD UREA NITROGEN mg/dL 38*  38* 40* 41*  --   --  24*  --  23*   LN-CREATININE mg/dL 1.17*  1.17* 1.14* 1.42*  --   --  0.97  --  0.94   LN-CALCIUM mg/dL 11.7*  11.7* 11.8*  12.3*  --   --  11.3*  --  11.4*   LN-ALBUMIN g/dL 2.8*  2.8* 2.8* 3.0*  --  2.9* 2.8*  --  2.7*   LN-PROTEIN TOTAL g/dL 7.2 7.2 8.1*  --  7.6 7.0  --  6.8   LN-BILIRUBIN TOTAL mg/dL 1.2* 1.0 0.9  --  0.7 0.7  --  0.7   LN-ALKALINE PHOSPHATASE U/L 342* 298* 303*  --  190* 174*  --  187*   LN-ALT (SGPT) U/L 334* 317* 270*  --  39 39  --  34   LN-AST (SGOT) U/L 614* 749* 773*  --  57* 59*  --  47*   LN-MAGNESIUM mg/dL  --   --   --   --  2.1 1.8  --  1.9   < > = values in this interval not displayed.    Results from last 7 days  Lab Units 10/18/18  1535 10/14/18  0810   LN-WHITE BLOOD CELL COUNT thou/uL 11.6* 9.2   LN-HEMOGLOBIN g/dL 13.2 13.7   LN-HEMATOCRIT % 42.9 43.7   LN-PLATELET COUNT thou/uL 213 193   LN-NEUTROPHILS RELATIVE PERCENT %  --  55   LN-MONOCYTES RELATIVE PERCENT %  --  10           Medications  Scheduled Meds:    amiodarone  200 mg Oral TID     aspirin  81 mg Oral DAILY     magnesium oxide  400 mg Oral TID     metoprolol tartrate  50 mg Oral BID     omeprazole  20 mg Oral BID AC     rOPINIRole  1 mg Oral QHS     sertraline  100 mg Oral DAILY     sodium chloride 0.9%  500 mL Intravenous Once     torsemide  20 mg Oral DAILY     traZODone  50 mg Oral QHS     warfarin - daily dose required   Other Med Consult or Protocol     Continuous Infusions:    PRN Meds:.bisacodyl, LORazepam, magnesium hydroxide, naloxone **OR** naloxone, nitroglycerin, ondansetron, oxyCODONE    Pertinent Radiology   Personally reviewed impressions  US ABDOMEN LIMITED  10/20/2018 9:30 AM  1.  Gallbladder surgically removed. No bile duct dilatation.   2.  Tiny sliver of ascites over the right lobe of the liver likely postoperative. No fluid elsewhere in the abdomen      Advanced Care Planning:  Treatment/Discharge Planning discussed with the patient    Olga Osman MD  Internal Medicine Hospitalist  10/20/2018

## 2021-06-21 NOTE — PROGRESS NOTES
"GI PROGRESS NOTE  10/23/2018  Gardenia Muller  1950  [id number]    Subjective:   Tells me she's very tired today. Denies abdominal pain, nausea, vomiting.      Objective:     Blood pressure 138/65, pulse 61, temperature 98  F (36.7  C), resp. rate 21, height 5' 3\" (1.6 m), weight 173 lb 12.8 oz (78.8 kg), last menstrual period 01/25/1999, SpO2 95 %, not currently breastfeeding.    Body mass index is 30.79 kg/(m^2).  Gen: NAD, lethargic  Cardio: RRR  GI: Non-distended, BS positive, soft, non-tender    Laboratory:    Results from last 7 days  Lab Units 10/23/18  0533 10/22/18  1824 10/18/18  1535   LN-WHITE BLOOD CELL COUNT thou/uL 9.6 11.7* 11.6*   LN-HEMOGLOBIN g/dL 10.6* 11.4* 13.2   LN-HEMATOCRIT % 34.1* 36.0 42.9   LN-PLATELET COUNT thou/uL 91* 104* 213         Results from last 7 days  Lab Units 10/23/18  0533 10/22/18  1824 10/22/18  0453   LN-SODIUM mmol/L 138  138 136 133*   LN-POTASSIUM mmol/L 3.6  3.6 3.7 4.1   LN-CHLORIDE mmol/L 99  99 98 98   LN-CO2 mmol/L 29  29 26 23   LN-BLOOD UREA NITROGEN mg/dL 46*  46* 47* 49*   LN-CREATININE mg/dL 1.19*  1.19* 1.37* 1.62*   LN-CALCIUM mg/dL 11.7*  11.7* 11.9* 11.7*   LN-ALBUMIN g/dL 2.6*  2.6* 2.9* 2.7*   LN-PROTEIN TOTAL g/dL 6.7 7.2 6.9   LN-BILIRUBIN TOTAL mg/dL 1.9* 2.3* 1.8*   LN-ALKALINE PHOSPHATASE U/L 333* 355* 340*   LN-ALT (SGPT) U/L 646* 783* 778*   LN-AST (SGOT) U/L 1404* 2138* 2443*     Lipase   Date Value Ref Range Status   06/29/2016 17 0 - 52 U/L Final   03/04/2016 29 0 - 52 U/L Final   11/11/2015 21 0 - 52 U/L Final       Results from last 7 days  Lab Units 10/23/18  0533 10/22/18  1824 10/22/18  0453   LN-INR  2.10* 2.51* 3.31*     Procedures:  ERCP 10/5/18:  Procedure Details     The patient was informed of the risks, benefits and alternatives and gave informed consent. The patient had stable cardiovascular status and was judged to be adequate for sedation. A timeout was performed.     The Olympus videoendoscope was passed into " the upper esophagus without difficulty. The distal stomach and duodenum were briefly examined and seen to be normal.     The scope was advanced into the second portion of the duodenum and the ampulla encountered with a straightening maneuver. The ampulla appeared normal, beside a large joe-ampullary diverticulum  .  Initial cannulation was performed utilizing Dreamtome and .035 guidewire. The guidewire advanced in the trajectory of the PD and secured at the tail of the PD. A second .035 guidewire advanced readily to the bile duct, followed by the sphincterotome. Contrast was injected. The CBD was dilated to 11-12 mm without filling defects or stricture. Sphincterotomy was cut. The CBD was cleared with the 12 mm balloon and basket. There was note of copious dark bile without stone or sludge. Procedure was terminated.        Findings              Bile duct: dilated CBD to 11-12 mm without filling defects/stricture  Pancreas: n/a        Assessment/Plan:   1. Elevated LFTs  68 year-old female with history of HTN, DM, CAD, a flutter on warfarin admitted with acalculous cholecystitis s/p cholecystectomy 10/7/18, ERCP with sphincterotomy 10/5/18 with markedly high LFTs which occurred after she was started on amiodarone 10/18/18. Also suspect some component of hepatic congestion given heart failure with BNP of 2600. Amiodarone has been stopped (last dose 20/21 AM), and her LFTs are trending down. Will continue to trend LFTs.         Rachelle Cuellar PA-C  [facility]  [phone]

## 2021-06-21 NOTE — PROGRESS NOTES
[facility]      Facility:    [facility] [[id number]]    Code Status: FULL CODE   [facility] 10/3  through 10/25/18      Chief Complaint/Reason for Visit:  Chief Complaint   Patient presents with     Review Of Multiple Medical Conditions       HPI:   Gardenia is a 68 y.o. female with MI, atrial flutter, ventral hernia with incisional hernia repair, on anticoagulation.  More  She had a week of abdominal pain, more centered in the epigastrium and right upper quadrant and elsewhere.  She threw up several times in the morning of admission.  She had no fever, no chills.   He was tachycardic, she had 1 L of IV fluid, and IV Lopressor to slow her heart rate.    CT of the chest: No PE, but gallbladder wall thickening.  CT of the abdomen: Gallbladder wall thickening right, consistent with cholecystitis.  Ultrasound of the gallbladder: Distended gallbladder, edema of the gallbladder wall, no gallstones are seen.  She had a laparoscopic cholecystectomy on 10/7/18 with Dr Felicia So.    She had elevated transaminases, with an AST = 2443, ALT = 778.  Was thought to be from hepatic congestion according to the GI consultant, in the setting of CHF.  Her LFTs were improving at the time of discharge.  Rosuvastatin was being held due to liver duress.    She had persistent atrial fibrillation with rapid ventricular rate, and had cardioversion on 10/18  She had an acute bout of diastolic heart failure    She had brief acute kidney injury thought to be from diuresis for her heart failure.  Ejection fraction 55%    She has had hypercalcemia since 2012, thought to be hydrochlorothiazide which was discontinued.  However her albumin remained elevated patient PTH was 190, vitamin D equal 48, ionized calcium 1.48; to be primary hyperparathyroidism.    She had  Bradycardia, and her  diltiazem was discontinued.    She was tolerating an oral diet at the time of discharge, and transferred to [facility].    UPDATE:  Still progressing slowly in therapy.  At this point she wants to move to an jail, does not think she can handle living alone.  She wants to go to her niece's for Thanksgiving, leaving the facility on Wednesday, returning Thursday Thanksgiving evening.  I asked  to check to be sure there would be no insurance sequela      Past Medical History:  Past Medical History:   Diagnosis Date     Acute diastolic heart failure (H) 11/2015     Atrial fibrillation (H)      Cerebral vascular accident (H)      Coronary artery disease 2006    100% RCA with collateral filling per Dr. Elizabeth's angio report     Diabetes mellitus type 2 in obese (H)      Fibrocystic breast      GERD (gastroesophageal reflux disease)      History of anesthesia complications     slow to wake     Hypertension      Peripheral vascular disease (H)      PONV (postoperative nausea and vomiting)      Stroke (H)      Urinary incontinence            Surgical History:  Past Surgical History:   Procedure Laterality Date     CARDIAC CATHETERIZATION       CARDIOVERSION  10/18/2018     CORONARY STENT PLACEMENT  1995    stent     ERCP N/A 10/5/2018    Procedure: ENDOSCOPIC RETROGRADE CHOLANGIOPANCREATOGRAPHY, SPHINCTEROTOMY;  Surgeon: Anson Stokes MD;  Location: [facility];  Service:      EXPLORATORY LAPAROTOMY N/A 6/29/2018    Procedure: LAPAROTOMY, CLOSURE OF PERITONEAL RENT;  Surgeon: Jones Harding DO;  Location: [facility] OR;  Service:      LAPAROSCOPIC CHOLECYSTECTOMY N/A 10/7/2018    Procedure: CHOLECYSTECTOMY, LAPAROSCOPIC;  Surgeon: Felicia So MD;  Location: [facility] OR;  Service:      PICC  6/29/2018          PIC  10/21/2018          VENTRAL HERNIA REPAIR N/A 11/12/2015    Procedure: STRANGULATED VENTRAL HERNIA REPAIR ;  Surgeon: Ernesto Bailey MD;  Location: [facility] OR;  Service:               Review of Systems   Occasional  nausea, but eating smaller  amounts  Persistent mild pain around small bulge above umbilicus  A bit more active in therapy      Blood pressure 126/82, pulse 69, temperature 96.7  F (35.9  C), resp. rate 20,  SpO2 93 %    SLUMS 16    Physical Exam  Constitutional:  Quiet, overweight Black-American female , no distress. In wheelchair   Cardiovascular: Irregular rhythm . Near holosystolic murmur at the mitral/apex area   Pulmonary/Chest: Breath sounds clear. She has no crackles.   Abdominal: Soft. Bowel sounds are normal. Mild distention, mildly firm  Lap nimesh incisions healed, dry   Can move all extremities well, symmetrically  Neurological: She is alert and oriented to person, place, and time. Symmetric extremity movement   Skin: Skin is warm and dry. No erythema.   Psychiatric: She has a normal mood. Her behavior is normal.         Allergies   Allergen Reactions     Penicillins Swelling         Medication List:  Current Outpatient Medications   Medication Sig     aspirin 81 MG EC tablet Take 81 mg by mouth daily.      blood glucose meter (GLUCOMETER) Please Dispense kit per pharmacy discretion and patient's insurance Test blood sugar.     blood glucose test strips Use 1 each As Directed as needed. Dispense brand per patient's insurance at pharmacy discretion.     carboxymethylcellulose (REFRESH PLUS) 0.5 % Dpet ophthalmic dropperette Administer 2 drops to both eyes every hour as needed (dry eyes).      ferrous sulfate 325 (65 FE) MG tablet Take 1 tablet (325 mg total) by mouth 2 (two) times a day.     furosemide (LASIX) 40 MG tablet Take 1 tablet (40 mg total) by mouth daily.     lancets 33 gauge Misc Test twice daily Dispense brand per patient's insurance at pharmacy discretion.     loratadine (CLARITIN) 10 mg tablet Take 10 mg by mouth daily with lunch. Noon     magnesium oxide (MAG-OX) 400 mg tablet Take 1,200 mg by mouth daily.      metoprolol tartrate (LOPRESSOR) 25 MG tablet TAKE 1 TABLET BY MOUTH TWICE DAILY (8AM & 8PM)      multivitamin (TAB-A-JULIET) per tablet Take 1 tablet by mouth daily. (Patient taking differently: Take 1 tablet by mouth daily. )     nitroglycerin (NITROSTAT) 0.4 MG SL tablet Place 1 tablet (0.4 mg total) under the tongue every 5 (five) minutes as needed for chest pain (for up to 3 doses and call 911 if persists). (Patient taking differently: Place 0.4 mg under the tongue every 5 (five) minutes as needed for chest pain (for up to 3 doses and call 911 if persists). )     omeprazole (PRILOSEC) 20 MG capsule TAKE 1 CAPSULE BY MOUTH TWICE DAILY (8AM & 8PM)     polyethylene glycol (GLYCOLAX) 17 gram/dose powder Take 17 g by mouth daily as needed.      potassium chloride (K-DUR,KLOR-CON) 20 MEQ tablet Take 1 tablet (20 mEq total) by mouth daily.     rOPINIRole (REQUIP) 1 MG tablet 1 tablet 30-60 minutes before bedtime (Patient taking differently: 1 tablet 30-60 minutes before bedtime)     senna-docusate (PERICOLACE) 8.6-50 mg tablet Take 1 tablet by mouth 2 (two) times a day.     sertraline (ZOLOFT) 100 MG tablet TAKE 1 TABLET BY MOUTH ONCE DAILY     VITAMIN D3 2,000 unit capsule TAKE 1 CAPSULE BY MOUTH ONCE DAILY AT 8AM (DO NOT BRAND CHANGE - PATIENT ONLY WANT CAPSULES)     warfarin (COUMADIN/JANTOVEN) 2 MG tablet Take 1 tablet (2 mg total) by mouth daily.       Labs:     Ref Range & Units 11/5/18       WBC 4.0 - 11.0 thou/uL 8.7   Hemoglobin 12.0 - 16.0 g/dL 11.0 (L)   RDW 11.0 - 14.5 % 20.2 (H)   Platelets 140 - 440 thou/uL 122 (L              Ref Range & Units 11/5/18    10/29/18  5:45 AM     Sodium 136 - 145 mmol/L 135 (L) 138   Potassium 3.5 - 5.0 mmol/L 4.1 4.5   Chloride 98 - 107 mmol/L 104 104   CO2 22 - 31 mmol/L 23 25   Anion Gap, Calculation 5 - 18 mmol/L 8 9   Glucose 70 - 125 mg/dL 125 80   Calcium 8.5 - 10.5 mg/dL 10.7 (H) 11.0 (H)   BUN 8 - 22 mg/dL 9 14   Creatinine 0.60 - 1.10 mg/dL 0.65 0.73   GFR MDRD Af Amer >60 mL/min/1.73m2 >60 >60       INR = 2.88 on 2 mg daily       Ref Range & Units 10/29/18   5:45 AM     Sodium 136 - 145 mmol/L 138   Potassium 3.5 - 5.0 mmol/L 4.5   Chloride 98 - 107 mmol/L 104   CO2 22 - 31 mmol/L 25   Anion Gap, Calculation 5 - 18 mmol/L 9   Glucose 70 - 125 mg/dL 80   BUN 8 - 22 mg/dL 14   Creatinine 0.60 - 1.10 mg/dL 0.73   GFR MDRD Non Af Amer >60 mL/min/1.73m2 >60   Bilirubin, Total 0.0 - 1.0 mg/dL 1.9 (H)   Calcium 8.5 - 10.5 mg/dL 11.0 (H)   Protein, Total 6.0 - 8.0 g/dL 6.6   Albumin 3.5 - 5.0 g/dL 2.4 (L)   Alkaline Phosphatase 45 - 120 U/L 275 (H)   AST 0 - 40 U/L 63 (H)   ALT 0 - 45 U/L 151 (H)        Ref Range & Units 10/25/18  6:27 AM   10/24/18  5:08 AM     Magnesium 1.8 - 2.6 mg/dL 1.8 1.9        Ref Range & Units 10/24/18  5:08 AM   10/23/18  5:33 AM     WBC 4.0 - 11.0 thou/uL 8.9 9.6   Hemoglobin 12.0 - 16.0 g/dL 11.1 (L) 10.6 (L)   RDW 11.0 - 14.5 % 18.3 (H) 18.0 (H)   Platelets 140 - 440 thou/uL 95 (L) 91 (L)      10/23/18  5:33 AM   10/23/18       BNP 0 - 114 pg/mL 2481 (H)         Assessment / Plan:    ICD-10-CM    1. Acalculous cholecystitis: s/p lap nimesh K81.9 Mild nausea   2. (HFpEF) heart failure with preserved ejection fraction (H) I50.30 compensated   3. Type 2 diabetes mellitus with other circulatory complication, unspecified whether long term insulin use (H) E11.59 Glucoses are in good range   4. Persistent atrial fibrillation (H) I48.1 warfarin   5. Essential hypertension I10 Good control   6. Depression, unspecified depression type F32.9 Sertraline   7. Lower extremity edema R60.0 lasix     Was slow to rehabilitate during her last stay at TCU as well....      Electronically signed by: Monik Cruz MD

## 2021-06-21 NOTE — PROGRESS NOTES
NIVIA completed with conscious sedation, consents signed for EP cardioversion prior.  12 LEAD EKG prior & after to confirm Atrial Flutter with variable conduction-Adenosine 6 mg, 12 mg IV given.  [place] [facility] [place].

## 2021-06-21 NOTE — SEDATION DOCUMENTATION
Consulted with Dr. Salazar & Dr. Keith re:  Brian briceño x 1 at 12 N, ate 3/4 of it & spit out.  Confirmed by pt's niece.  OK to proceed.

## 2021-06-21 NOTE — PROGRESS NOTES
[facility]    Facility:   [facility] [[id number]]   Code Status: POLST AVAILABLE      CHIEF COMPLAINT/REASON FOR VISIT:  Chief Complaint   Patient presents with     Review Of Multiple Medical Conditions     physical deconditioning, s/p cholecystectomy       HISTORY:      HPI: Gardenia is a 68 y.o. female with MI, atrial flutter, ventral hernia with incisional hernia repair, on anticoagulation.   She had a week of abdominal pain, more centered in the epigastrium and right upper quadrant and elsewhere. She threw up several times in the morning of admission. She had no fever, no chills.   He was tachycardic, she had 1 L of IV fluid, and IV Lopressor to slow her heart rate.    CT of the abdomen: Gallbladder wall thickening right, consistent with cholecystitis.  Ultrasound of the gallbladder: Distended gallbladder, edema of the gallbladder wall, no gallstones are seen.  She had a laparoscopic cholecystectomy on 10/7/18 with Dr Felicia So.    She was tolerating an oral diet at the time of discharge, and [facility].    Today Ms. Muller is being evaluated for a routine review of multiple medical problems while in the TCU. She states she has continued to have abdominal discomfort and at times some severe pain. She states that it comes and goes. She is occasionally nauseated with the pain, however she has been eating and drinking well even with the nausea per her report. She has otherwise been tolerating PT/OT well and does not have any other concerns. She is uncertain if this pain is different than the post-operative pain. She has not had any fevers, diarrhea or constipation. Gardenia states that the pain medications she has been taking manage the pain well. She denies any other concerns including fevers/chills, cough or cold symptoms, headaches, vision changes, chest pain/pressure, difficulty breathing, SOB, nausea, vomiting, diarrhea, dysuria,  increasing weakness, increasing pain.     Past Medical History:   Diagnosis Date     Acute diastolic heart failure (H) 11/2015     Atrial fibrillation (H)      Cerebral vascular accident (H)      Coronary artery disease 2006    100% RCA with collateral filling per Dr. Elizabeth's angio report     Diabetes mellitus type 2 in obese (H)      Fibrocystic breast      GERD (gastroesophageal reflux disease)      History of anesthesia complications     slow to wake     Hypertension      Peripheral vascular disease (H)      PONV (postoperative nausea and vomiting)      Stroke (H)      Urinary incontinence              Family History   Problem Relation Age of Onset     Cancer Paternal Grandmother      Cancer Paternal Uncle      No Medical Problems Mother      No Medical Problems Father      Heart attack Sister      Chronic Kidney Disease Sister      No Medical Problems Daughter      No Medical Problems Maternal Grandmother      No Medical Problems Maternal Grandfather      No Medical Problems Paternal Grandfather      No Medical Problems Maternal Aunt      No Medical Problems Paternal Aunt      Diabetes Brother      BRCA 1/2 Neg Hx      Breast cancer Neg Hx      Colon cancer Neg Hx      Endometrial cancer Neg Hx      Ovarian cancer Neg Hx      Social History     Social History     Marital status: Legally      Spouse name: N/A     Number of children: N/A     Years of education: N/A     Social History Main Topics     Smoking status: Current Every Day Smoker     Packs/day: 0.25     Smokeless tobacco: Former User      Comment: e-cig a few times/day     Alcohol use No     Drug use: No     Sexual activity: Not on file     Other Topics Concern     Not on file     Social History Narrative         Review of Systems   Per HPI    .  Vitals:    11/01/18 1642   BP: 151/78   Pulse: 75   Resp: 18   Temp: 98.9  F (37.2  C)   SpO2: 97%   Weight: 171 lb 3.2 oz (77.7 kg)       Physical Exam  General appearance: alert, appears stated age  and cooperative  HEENT: Head is normocephalic with normal hair distribution. No evidence of trauma. Ears: Without lesions or deformity. No acute purulent discharge. Eyes: Conjunctivae pink with no scleral icterus or erythema. Nose: Normal mucosa and septum. Oropharnyx: mmm, no lesions present.  Lungs: clear to auscultation bilaterally, respirations without effort  Heart: regular rate and rhythm, S1, S2 normal, 2/6 systolic murmur appreciated  Abdomen: soft, non-tender; bowel sounds normal; no masses,  no organomegaly  Extremities: extremities normal, atraumatic, no cyanosis, trace pedal edema  Pulses: 2+ and symmetric  Skin: Skin color, texture, turgor normal. No rashes or lesions, four 1 cm incisions d/t laparoscopy-clean, dry, intact  Neurologic: Grossly normal   Psych: interacts well with caregivers, exhibits logical thought processes and connections, pleasant      LABS:   None today.     ASSESSMENT:      ICD-10-CM    1. Physical deconditioning R53.81    2. S/P cholecystectomy Z90.49    3. Abdominal pain, generalized R10.84        PLAN:    Physical Deconditioning due to recent Cholecystectomy  -Continue PT/OT and other therapies as per care plan.  -Encouraged good nutrition and movement habits.   -Discussed care plan and expected course of stay.   -Continue to follow-up per routine schedule or sooner if needed.     Abdominal Pain  -Geary diet, small amounts of liquids frequently.   -CBC, CMP.  -Abdominal ultrasound.   -Care plan to follow results from diagnostics.     Otherwise continue current care plan for all other chronic medical conditions, as they are stable. Encouraged patient to engage in healthy lifestyle behaviors such as engaging in social activities, exercising (PT/OT), eating well, and following care plan. Follow up for routine check-up, or sooner if needed. Will continue to monitor patient and work with nursing staff collaboratively to work toward positive patient outcomes.    Total time of 25  minutes spent with patient and nursing staff with greater than 50% of this time spent on review of previous records, counseling, education, and discussion of the above care plan with nursing staff and patient.     Electronically signed by: Arleth Barton CNP

## 2021-06-21 NOTE — PROGRESS NOTES
"Spiritual Care Note    Spiritual Assessment: Yi important to pt's sense of well being. Has had several health events that could have been the end but keeps \"hangin' in there.\" Pt not wanting to die but acknowledges Maxwell is in charge.    Care Provided: Presence and empathetic listening. Affirmation of pt and her yi, Offered prayer.    Plan of Care: Our department will follow as needed or as requested.    HARLEY Dhaliwal Div.    "

## 2021-06-21 NOTE — PROGRESS NOTES
"HOSPITALIST PROGRESS NOTE    Patient:  Gardenia Muller    LOS: 20    Subjective / Interval History:  - was able to eat a bit today, mild nausea without vomiting, denies abd pain or postprandial pain  - No other complaints on ROS otherwise    Objective:  /65  Pulse 61  Temp 98  F (36.7  C)  Resp 21  Ht 5' 3\" (1.6 m)  Wt 173 lb 12.8 oz (78.8 kg)  LMP 01/25/1999  SpO2 95%  BMI 30.79 kg/m2  General appearance: lethargic, weak  Lungs: bibasilar crackles on limited exam  Heart: regular rate and rhythm, S1, S2 normal, no murmur, click, rub or gallop  Abdomen: soft, distended, mild general TTP  Extremities: BLE edema present  Skin: Skin color, texture, turgor normal. No rashes or lesions  Neurologic: Grossly normal    Vitals:    Temp:  [97.6  F (36.4  C)-98  F (36.7  C)] 98  F (36.7  C)  Heart Rate:  [] 61  Resp:  [15-61] 21  BP: ()/(50-73) 138/65    SpO2: 95 % on O2 Device: Nasal cannula      Intake/Output Summary (Last 24 hours) at 10/23/18 1504  Last data filed at 10/23/18 1030   Gross per 24 hour   Intake              505 ml   Output              400 ml   Net              105 ml       Vitals:    10/03/18 1236 10/03/18 2013 10/04/18 0420 10/05/18 0412   Weight: 179 lb 1.6 oz (81.2 kg) 175 lb 4.8 oz (79.5 kg) 175 lb (79.4 kg) 174 lb 1.6 oz (79 kg)    10/06/18 0428 10/07/18 0603 10/08/18 0631 10/09/18 0416   Weight: 175 lb (79.4 kg) 173 lb 9.6 oz (78.7 kg) 172 lb 6.4 oz (78.2 kg) 176 lb 12.8 oz (80.2 kg)    10/10/18 0441 10/11/18 0600 10/13/18 0445 10/14/18 0515   Weight: 173 lb 3 oz (78.6 kg) 171 lb 8 oz (77.8 kg) 167 lb 3.2 oz (75.8 kg) 165 lb (74.8 kg)    10/15/18 0254 10/16/18 0503 10/17/18 0530 10/18/18 0311   Weight: 164 lb 8 oz (74.6 kg) 166 lb 1.6 oz (75.3 kg) 165 lb 6.4 oz (75 kg) 154 lb 6.4 oz (70 kg)    10/19/18 0302 10/20/18 0321 10/20/18 0500 10/21/18 0405   Weight: 167 lb 1.6 oz (75.8 kg) 170 lb 8 oz (77.3 kg) 166 lb 9.6 oz (75.6 kg) 170 lb 4.8 oz (77.2 kg)    10/22/18 0500 10/23/18 " Tad-UV all results   0400   Weight: 170 lb 14.4 oz (77.5 kg) 173 lb 12.8 oz (78.8 kg)       Medications:    magnesium oxide  400 mg Oral TID     omeprazole  20 mg Oral BID AC     rOPINIRole  1 mg Oral QHS     sertraline  100 mg Oral DAILY     sodium chloride 0.9%  500 mL Intravenous Once     sodium chloride  10-30 mL Intravenous Q8H FIXED TIMES     warfarin - daily dose required   Other Med Consult or Protocol     warfarin  4 mg Oral Once Warfarin       Labs:    Results from last 7 days  Lab Units 10/23/18  0533 10/22/18  1824 10/18/18  1535   LN-WHITE BLOOD CELL COUNT thou/uL 9.6 11.7* 11.6*   LN-HEMOGLOBIN g/dL 10.6* 11.4* 13.2   LN-HEMATOCRIT % 34.1* 36.0 42.9   LN-PLATELET COUNT thou/uL 91* 104* 213       Results from last 7 days  Lab Units 10/23/18  0533 10/22/18  1824 10/22/18  0453   LN-SODIUM mmol/L 138  138 136 133*   LN-POTASSIUM mmol/L 3.6  3.6 3.7 4.1   LN-CHLORIDE mmol/L 99  99 98 98   LN-CO2 mmol/L 29  29 26 23   LN-BLOOD UREA NITROGEN mg/dL 46*  46* 47* 49*   LN-CREATININE mg/dL 1.19*  1.19* 1.37* 1.62*   LN-CALCIUM mg/dL 11.7*  11.7* 11.9* 11.7*       Results from last 7 days  Lab Units 10/23/18  0533 10/22/18  1824 10/22/18  0453   LN-INR  2.10* 2.51* 3.31*     Estimated Creatinine Clearance: 45 mL/min (by C-G formula based on Cr of 1.19).    Imaging:  XR Chest 1 View Portable   Final Result      CT Abdomen Pelvis Without Oral Without IV Contrast   Final Result   CONCLUSION:   1.  The subcutaneous hematomas are unchanged.      2.  Nothing for intra-abdominal hemorrhage.      3.  Small amount of free fluid in the abdomen around the liver and in the pelvis was present on preop 10/03/2018 exam as well. Uncertain etiology. Possibly underlying liver disease?      4.  No other significant findings.            US Abdomen Limited   Final Result   CONCLUSION:   1.  Gallbladder surgically removed. No bile duct dilatation.      2.  Tiny sliver of ascites over the right lobe of the liver likely postoperative. No fluid  elsewhere in the abdomen.      CT Abdomen Pelvis Without Oral Without IV Contrast   Final Result   CONCLUSION:   1.  No complications related to recent surgery. No new abnormalities.   2.  There are small sites of ecchymoses or tiny subcutaneous hematomas at right upper quadrant and midline.      XR Chest 2 Views   Final Result      NM Hepatobiliary WO EF Or Pharm   Final Result   CONCLUSION:   1.  Marked intrahepatic cholestasis. Delayed images show activity in the common bile duct and small bowel but no activity in the gallbladder. Findings are consistent with cystic duct obstruction and cholecystitis. Findings were called to the bases nurse,    Andriy, at 1710 hours on 10/06/2018.      NOTE: ABNORMAL REPORT      THE DICTATION ABOVE DESCRIBES AN ABNORMALITY FOR WHICH FOLLOW-UP IS NEEDED.       XR C Arm Less Than One Hour   Final Result      US Abdomen Limited   Final Result   CONCLUSION:   1.  The gallbladder is significantly distended. There is marked gallbladder wall thickening/edema. No visible gallstones. Findings may represent acalculus cholecystitis.   2.  There is dilation of the common bile duct and pancreatic duct. This raises the possibility of a pancreatic head mass, although none is definitely seen on this examination or on the prior CT.      CTA Chest PE Run   Final Result   CONCLUSION:   1.  No PE.   2.  Gallbladder wall thickening and enhancement. Consider cholecystitis. There is extrahepatic bile duct dilatation.   3.  Interlobular septal thickening is likely edema.    4.  Cardiomegaly. Reflux of contrast material into the hepatic veins suggests cardiac dysfunction. There is atherosclerotic disease including coronary artery calcification. Enlargement of the main pulmonary artery can be seen with pulmonary hypertension.   5.  Hepatomegaly and heterogeneity of of the liver may be related to cardiac dysfunction.    6.  Colonic diverticulosis. No definite acute diverticulitis.   7.  Free fluid in the  pelvis is nonspecific.       NOTE: ABNORMAL REPORT      THE DICTATION ABOVE DESCRIBES AN ABNORMALITY FOR WHICH FOLLOW-UP IS NEEDED.        CT Abdomen Pelvis Without Oral With IV Contrast   Final Result   CONCLUSION:   1.  No PE.   2.  Gallbladder wall thickening and enhancement. Consider cholecystitis. There is extrahepatic bile duct dilatation.   3.  Interlobular septal thickening is likely edema.    4.  Cardiomegaly. Reflux of contrast material into the hepatic veins suggests cardiac dysfunction. There is atherosclerotic disease including coronary artery calcification. Enlargement of the main pulmonary artery can be seen with pulmonary hypertension.   5.  Hepatomegaly and heterogeneity of of the liver may be related to cardiac dysfunction.    6.  Colonic diverticulosis. No definite acute diverticulitis.   7.  Free fluid in the pelvis is nonspecific.       NOTE: ABNORMAL REPORT      THE DICTATION ABOVE DESCRIBES AN ABNORMALITY FOR WHICH FOLLOW-UP IS NEEDED.        XR Chest 1 View Portable   Final Result      NM Parathyroid Planar W Spect    (Results Pending)       Assessment & Plan:  # Abd Pain 2/2 Subcutaneous Hematomas: symptoms improved todaym, tolerating some PO intake, 10/21 repeat CT shows hematomas are unchanged, Lactate 1.5 today  - symptom management  - encourage ambulation     # Chronic Diastolic Heart Failure: 10/2018 EF 55-60%, BNP 2481, unable to measure I/O accurately 2/2 incontinence, has had intermittent hypotension and acute transaminitis  Today's weight 78.8kg  - appreciate Cardiology assistance  - holding diuretics  - daily weight, strict I/O     # MAY in the setting of diuresis: creat 1.19 (1.62)  - appreciate Nephrology assistance  - holding diuretics  - plan as above  - monitor creat     # Bradycardia: bradycardic to 40's on 10/21, now HR   - restart Metoprolol  - telemetry protocol     # Transaminitis likely 2/2 hepatic congestion in the setting of diastolic heart failure:   (965), AST 1404 (4387)  - appreciate GI assistance  - monitor LFT     # Atrial Fibrillation s/p NIVIA cardioversion on 10/18/18: INR 2.10  - restart Metoprolol 12.5mg PO two times a day to maintain HR < 110  - previously on Amiodarone, stopped 2/2 elevated LFTs (last dose 10/21)  - cont warfarin per pharmacy  - holding BB 2/2 bradycardic episode on 10/21  - f/u in a.fib clinic in 1-2 weeks post d/c     # Acalculous Cholecystitis s/p lap nimesh on 10/7/18  - appreciate General Surgery assistance     # NIDDM: 10/2018 Hgb A1c 7.3%  - Novolog s/s     # Chronic Hypercalcemia 2/2 primary hyperparathyroidism  - appreciate Nephrology assistance  - outpt NM parathyroid scan     # FEN:  - low Na diet     # PPX:  - on warfarin     # Dispo:  - Cardiology, GI and Nephrology following  - transfer to cardiac tele    Jeffy Pina MD  10/23/2018 3:04 PM

## 2021-06-21 NOTE — PROGRESS NOTES
Progress Note        BRIEF HOSPITAL COURSE :       Gardenia Muller is 68 y.o. female with past medical history of atrial flutter on warfarin, hypertension, dyslipidemia, diet controlled diabetes, heart failure with preserved EF, presented to the hospital on 10/3/2018 with abdominal pain, and was found to have acute cholecystitis with cholelithiasis and dilatation of CBD and pancreatic duct. GI and surgery were consulted. She underwent ERCP on 10/05, which did not show any filling defect. She underwent HIDA scan on 10/06, which showed marked intrahepatic cholestasis, associated with cystic duct obstruction and cholecystitis. She was treated with IV antibiotics. She underwent laparoscopic cholecystectomy on 10/07/18.       SUBJECTIVE :       No fever  Abdominal pain is controlled  No sob          PLAN :     - continue post op care    - not yet medically ready for discharge.          Barriers to Discharge : placement  Anticipated discharge : in am  Disposition :tcu      TIME SPENT : Total time spent > 35 minutes and > 50% time spent on counseling patient about plan of care and coordination of care.                  ASSESSMENT :          1. Acute acalculous cholecystitis, s/p laparoscopic cholecystectomy on 10/07. Clinically improved. Off of antibiotics. Liver enzymes improving.    2. Atrial flutter with variable AV block, rate controlled. She has been restarted on warfarin, which was held for surgery. She is on metoprolol, diltiazem.    3. Chronic diastolic heart failure. Volume compensated. She is on torsemide. LVEF of 55%.    4. Transaminitis, elevated liver enzymes. Secondary to cholecystitis. Possible choledocholithiasis. S/p ERCP followed by lap cholecystectomy. Liver enzymes improving.    5. Type 2 diabetes mellitus, she is on sliding scale insulin. Blood glucose in acceptable range.    6. Dyslipidemia on rosuvastatin    7. Restless leg syndrome on requip.    8. Hypokalemia and hypomagnesemia. Continue with  replacement.    9. GERD on PPI    10. Thrombocytopenia.            Diet :  regular   , IV fluids :     , IV Meds :    , Antibiotics     QTc :  DVT Prophylaxis : SCD's  Code Status : Full  Admit status : inpatient  Morning labs ordered :             Objective :            Review of Systems   A 12 point comprehensive review of systems was negative except as noted.        Physical Exam    HEENT : no distended veins, no lymphadenopathy, thyroid is normal  LUNGS : b/l air entry present, no significant crackles/wheezing.  ABDOMEN : soft, non-tender, non-distended, BS present.  HEART :  Regular rate & rhythm, S1 & S2 normal, no murmur, clicks/rubs, no ankle edema  NEURO : conscious, oriented, responds to commands, no obvious focal deficit.  MUSCULOSKELETAL / EXTREMITIES :  no calf tenderness.  SKIN : no rashes  BACK : wnl        Pertinent Labs   Lab Results: personally reviewed.   Lab Results   Component Value Date     10/25/2018     10/25/2018    K 3.8 10/25/2018    K 3.8 10/25/2018     10/25/2018     10/25/2018    CO2 25 10/25/2018    CO2 25 10/25/2018    BUN 33 (H) 10/25/2018    BUN 33 (H) 10/25/2018    CREATININE 0.92 10/25/2018    CREATININE 0.92 10/25/2018    CALCIUM 12.3 (HH) 10/25/2018    CALCIUM 12.3 (HH) 10/25/2018           Pertinent Radiology   Radiology Results: Personally reviewed image/s  EKG Results: not done              DR. YONY CHACON  [facility], [place]

## 2021-06-21 NOTE — PROGRESS NOTES
Pharmacy Consult: Warfarin Management    Pharmacy consulted to dose warfarin for Gardenia Muller, a 68 y.o. female who had abdominal pain upon presentation to [facility] on 10/3. Patient had an ERCP on 10/5 with no sludge or stones found. Patient had HIDA on 10/6, cystic duct consistent with obstruction. On 10/7 a laproscopic cholecystectomy was done. Patient remains in hospital for persistent atrial fibrillation, with rapid heart rate limited by blood pressure. 10/18 cardioversion done, elevated AST and ALT.    Ordering provider: Dr. Michael Abreu, Hospitalist    Reason for warfarin therapy: Atrial Fibrillation  Goal INR Range: 2-3    Subjective  Medication lists (home and hospital) were reviewed.    New medications that may increase bleeding risk/INR:  Amiodarone     Restarted home medications that may increase bleeding risk/INR: Torsemide, Ropinirole, sertraline, omeprazole, rosuvastatin, aspirin    Home Warfarin Dosin mg on , Tuesday and Thursday; 2 mg on all other days of the week    Other Anticoagulants: No  Patient being bridged: No    Patient Active Problem List   Diagnosis     Spinal stenosis     PAD (peripheral artery disease) (H)     Dermatosis Papulosa Nigra     Depression     Hypercalcemia     Right Renal Artery Stenosis     Osteoarthritis     Abnormality Of Walk     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)     Advanced directives, counseling/discussion     SBO (small bowel obstruction) (H)     Cystitis     Hypomagnesemia     Healthcare maintenance     Essential hypertension     Dysarthria     Cerebral infarction (H)     Anemia     Cerebrovascular disease     Benign adenomatous polyp of large intestine     RLS (restless legs syndrome)     Incisional hernia, without obstruction or gangrene     Abdominal pain, generalized     Diverticular disease of large intestine     Dysphagia     Coronary artery disease involving native coronary artery of native heart without angina  pectoris     Dyslipidemia     Ventral hernia without obstruction or gangrene     Paroxysmal atrial fibrillation (H)     Anticoagulation management encounter     S/P repair of recurrent ventral hernia     SOB (shortness of breath)     Lower extremity edema     Anxiety     Hypokalemia     (HFpEF) heart failure with preserved ejection fraction (H)     Tachycardia     Cholecystitis     Anticoagulant long-term use     Biliary obstruction     Abdominal pain, right upper quadrant     Persistent atrial fibrillation (H)     Abdominal pain, left upper quadrant    Past Medical History:   Diagnosis Date     Acute diastolic heart failure (H) 11/2015     Atrial fibrillation (H)      Cerebral vascular accident (H)      Coronary artery disease 2006    100% RCA with collateral filling per Dr. Elizabeth's angio report     Diabetes mellitus type 2 in obese (H)      Fibrocystic breast      GERD (gastroesophageal reflux disease)      History of anesthesia complications     slow to wake     Hypertension      Peripheral vascular disease (H)      PONV (postoperative nausea and vomiting)      Stroke (H)      Urinary incontinence         Social History   Substance Use Topics     Smoking status: Current Every Day Smoker     Packs/day: 0.25     Smokeless tobacco: Former User      Comment: e-cig a few times/day     Alcohol use No       Objective   Labs:  Last 3 days:    Recent Labs      10/18/18   1535  10/18/18   1536  10/19/18   0743  10/20/18   0553   CREATININE   --   1.42*  1.14*  1.17*  1.17*   HGB  13.2   --    --    --    HCT  42.9   --    --    --    PLT  213   --    --    --    BILITOT   --   0.9  1.0  1.2*   AST   --   773*  749*  614*   ALT   --   270*  317*  334*   ALKPHOS   --   303*  298*  342*     Last 7 days:   Recent labs: (last 7 days)      10/14/18   0520  10/15/18   0546  10/16/18   0630  10/17/18   0551  10/18/18   0600  10/19/18   0743  10/20/18   0553   INR  1.95*  2.34*  2.39*  2.65*  3.88*  3.76*  5.08*       Warfarin  Dosing History:    Date INR Warfarin Dose Comment   10/5/18 2.84 hold 5 mg of vitamin K   10/6/18 1.42 hold    10/7/18 1.44 hold    10/8/18 N/A 2 mg Patient may discharge tomorrow.   10/9/18 1.43 4 mg    10/10/18 1.29 4 mg    10/11/18 1.31 4 mg    10/12/18 1.44 4 mg    10/13/18 1.64 4 mg    10/14/18 1.95 4 mg    10/15/18 2.34 2 mg    10/16/18 2.39 4 mg    10/17/18 2.65 2 mg    10/18/18 3.88 hold    10/19/18 3.76 hold    10/20/18 5.08 hold                  Assessment  The patient is continuing warfarin for the indication of Atrial Fibrillation with a goal INR of 2-3. INR today is supratherapeutic. Warfarin dosing was decreased 10/17 due to amiodarone initiation and INR increase, however INR still became supratherapeutic. Warfarin was held 10/18 10/19 and y but continues to be supratherapeutic. Warfarin to be held today.    Plan  1. Hold warfarin today due to INR increase, believed to be due in part to amiodarone initiation.  2. Check INR daily or as appropriate.  3. Continue to follow the patient's INR, PLT, and HGB as available.  4. Monitor for potential drug/disease interactions.  5. Amiodarone started on 10/17, due to the drug-drug interaction between warfarin and amiodarone, warfarin dosing will likely need to be decreased. The drug-drug interaction can be delayed, so close monitoring will be needed on discharge.     Thank you for the consult,  Monik Bailey, PharmD, 10/20/2018 2:00 PM

## 2021-06-21 NOTE — PROGRESS NOTES
Hospitalist Progress Note    Brief history  67 yo female with PMH of persistent AFib/flutter, chronic diastolic CHF, HTN, DM 2 who presented with a one-week history of epigastric/right upper quadrant pain with dilated CBD and pancreatic ducts without evidence of stones/sludge/stricture on ERCP.  On 10/7/18 patient underwent laparoscopic cholecystectomy for acalculous cholecystitis.  Postoperatively patient has been in persistent atrial fibrillation/flutter; elevated ventricular rates initially improved on higher dose of metoprolol and diltiazem, however over the past 2 days heart rate remained elevated and use of medication was limited by hypotension.  Today patient underwent cardioversion which was successful in restoring sinus rhythm.  Patient had a brief episode of hypotension after the procedure which was probably related to anesthesia. She continues to complain of abdominal pain, nausea, poor oral intake.     1.  Persistent atrial fibrillation/flutter.  Status post cardioversion today.  Continue amiodarone and metoprolol per cardiology recommendations. On warfarin for stroke prevention, INR 3.88 today  2.  Acute on chronic diastolic CHF.  Patient has been diuresing well.    3. Hypercalcemia (corrected calcium level is 13) Long standing history dating back to 2012, however calcium levels had been <11 until June 2018 when patient was admitted to the hospital and underwent incisional hernia repair and complex abdominal reconstruction.  At that time calcium level was 11-12 and it was felt to be related to HCTZ which was discontinued.  Calcium level on this admission 11.1; today is up to 12.3 (corrected 13).  Patient is not on thiazide diuretics or calcium supplements.  She has been taking vitamin D supplements prior to admission - possible Vit. D toxicity.  Will check intact PTH, vit. D level, as well as SPEP and UPEP to evaluate for myeloma.   Anorexia and nausea could potentially be related to hypercalcemia. Will  start NS at 100 ml/hr. while monitoring volume status closely. Nephrology consultation for further recommendations.   4. Abnormal LFTs. Possibly due to CHF - will monitor for now.   5. MAY. ?if due to overdiuresis (weight is down by 21 lb since admission). Will hydrate and recheck in AM  6. Hyperkalemia. Due to MAY and potassium supplements. Discontinue potassium, recheck in AM  7.  Acalculous cholecystitis, status post laparoscopic cholecystectomy 10/7/18. Still having abdominal discomfort, nausea and poor oral intake. Loose BM - discontinue stool softeners.   5.  DM 2 without complications.  Diet controlled.  Most recent hemoglobin A1c 5.7 on 7/31/18      Barriers to Discharge : monitoring after cardioversion, poor oral intake, hypercalcemia    Anticipated Discharge :1-2 days  Disposition :home      Subjective  Patient is seen after cardioversion. She is still drowsy but answers appropriately. Complains of abdominal pain and nausea.       Objective    Vital signs in last 24 hours  Temp:  [97.3  F (36.3  C)-97.8  F (36.6  C)] 97.7  F (36.5  C)  Heart Rate:  [] 79  Resp:  [16-34] 28  BP: ()/(47-89) 106/69 99% O2 Device: Nasal cannula O2 Flow Rate (L/min): 4 L/min  Weight:   154 lb 6.4 oz (70 kg) Weight change: -11 lb (-4.99 kg)    Intake/Output last 3 shifts  I/O last 3 completed shifts:  In: 630 [P.O.:480; I.V.:150]  Out: 650 [Urine:650]  Intake/Output this shift:       Physical Exam:  GENERAL: No acute distress  CARDIAC: Regular rate and rhythm.  S1 & S2 normal.  No gallops or murmurs. No edema  LUNGS: Clear to auscultation bilaterally  ABDOMEN: Soft, mild tenderness in the epigastrium and RUQ, bowel sounds present all quadrants  NEURO: Drowsy but answers appropriately. Moves all extremities       Pertinent Labs   Lab Results: personally reviewed.     Results from last 7 days  Lab Units 10/17/18  0551 10/16/18  0630 10/15/18  0546  10/14/18  0520 10/13/18  0554   LN-SODIUM mmol/L  --   --  136  --  137   --    LN-POTASSIUM mmol/L 4.6 4.6 4.3  4.3  < > 3.6  3.5 4.2   LN-CHLORIDE mmol/L  --   --  98  --  98  --    LN-CO2 mmol/L  --   --  28  --  30  --    LN-BLOOD UREA NITROGEN mg/dL  --   --  24*  --  23*  --    LN-CREATININE mg/dL  --   --  0.97  --  0.94 0.89   LN-CALCIUM mg/dL  --   --  11.3*  --  11.4*  --    LN-ALBUMIN g/dL  --  2.9* 2.8*  --  2.7*  --    LN-PROTEIN TOTAL g/dL  --  7.6 7.0  --  6.8  --    LN-BILIRUBIN TOTAL mg/dL  --  0.7 0.7  --  0.7  --    LN-ALKALINE PHOSPHATASE U/L  --  190* 174*  --  187*  --    LN-ALT (SGPT) U/L  --  39 39  --  34  --    LN-AST (SGOT) U/L  --  57* 59*  --  47*  --    LN-MAGNESIUM mg/dL  --  2.1 1.8  --  1.9  --    < > = values in this interval not displayed.    Results from last 7 days  Lab Units 10/18/18  1535 10/14/18  0810   LN-WHITE BLOOD CELL COUNT thou/uL 11.6* 9.2   LN-HEMOGLOBIN g/dL 13.2 13.7   LN-HEMATOCRIT % 42.9 43.7   LN-PLATELET COUNT thou/uL 213 193   LN-NEUTROPHILS RELATIVE PERCENT %  --  55   LN-MONOCYTES RELATIVE PERCENT %  --  10           Medications  Scheduled Meds:    acetaminophen  650 mg Oral BID     amiodarone  200 mg Oral TID     aspirin  81 mg Oral DAILY     magnesium oxide  400 mg Oral TID     metoprolol tartrate  50 mg Oral BID     omeprazole  20 mg Oral BID AC     polyethylene glycol  17 g Oral DAILY     potassium chloride  20 mEq Oral DAILY     rOPINIRole  1 mg Oral QHS     rosuvastatin  40 mg Oral QHS     senna-docusate  1 tablet Oral BID     sertraline  100 mg Oral DAILY     sodium chloride 0.9%  500 mL Intravenous Once     torsemide  20 mg Oral DAILY     traZODone  50 mg Oral QHS     warfarin - daily dose required   Other Med Consult or Protocol     warfarin - NO DOSE TODAY   Other No Dose Today     Continuous Infusions:  PRN Meds:.bisacodyl, LORazepam, magnesium hydroxide, naloxone **OR** naloxone, nitroglycerin, oxyCODONE    Pertinent Radiology   Radiology Results: Personally reviewed image/s  CT ABDOMEN PELVIS WO ORAL WO IV  CONTRAST  10/16/2018 9:48 AM  1.  No complications related to recent surgery. No new abnormalities.  2.  There are small sites of ecchymoses or tiny subcutaneous hematomas at right upper quadrant and midline.    TTE 10/16/18    Atrial fibrillation with increased ventricular response.    Normal left ventricular size.    Left ventricle ejection fraction is mildly decreased. Left ventricular ejection fraction estimated 45-50%. There is mild global hypokinesis.    Normal right ventricular size. Right ventricular systolic function appears mildly reduced.TAPSE is reduced.    Moderate left atrial enlargement. Mild right atrial enlargement.    Moderate to severe mitral regurgitation.    Moderate tricuspid insufficiency.    Mild to moderate aortic regurgitation suggested    Small pericardial effusion    When compared to the previous study dated 8/3/2018, left ventricular systolic function appears mildly reduced to this examiner on the prior study. The degree of mitral insufficiency appears more significant on the current study.      Advanced Care Planning:  Treatment/Discharge Planning discussed with the patient and IDALIA Osman MD  Internal Medicine Hospitalist  10/18/2018

## 2021-06-21 NOTE — PROGRESS NOTES
Hospitalist Progress Note    Brief history  69 yo female with PMH of persistent AFib/flutter, chronic diastolic CHF, HTN, DM 2 who presented with a one-week history of epigastric/right upper quadrant pain with dilated CBD and pancreatic ducts without evidence of stones/sludge/stricture on ERCP.  On 10/7/18 patient underwent laparoscopic cholecystectomy for acalculous cholecystitis.  Postoperatively patient has been in persistent atrial fibrillation/flutter and ultimately underwent cardioversion on 10/18 which was successful in restoring sinus rhythm.  She was noted to have worsening hypercalcemia which is PTH driven and nuclear medicine parathyroid scan was ordered to evaluate for parathyroid adenoma.  LFTs have been worsening postoperatively, possibly due to CHF, liver ultrasound does not show any bile duct dilatation.       Assessment and plan    1.  Abdominal pain.  Likely secondary to subcutaneous hematomas.  Recent CT of the abdomen and pelvis 10/16 did not show any obstruction, no evidence for leak or abscess.  Since pain is persistent and INR is today >6 will obtain repeat CT scan to evaluate for any progression.  Continue to encourage oral intake and ambulation.  2.  Abnormal LFTs.  Liver ultrasound on 10/20/18 did not show any bile duct dilatation.  Possibly due to CHF.  Continue to monitor.  We will give 2.5 mg of vitamin K since INR today is 6.16  3.  Hypercalcemia.  Likely primary hyperparathyroidism since PTH is elevated 190.  Patient had recent IV contrast, therefore nuclear medicine parathyroid scan will need to be done as an outpatient.  Calcium remains 11.7-12 range.  Due to chronic nature of hyperglycemia and no significant symptoms, hesitant to give any IV fluids.  Monitor closely.  4.  Persistent atrial fibrillation/flutter.  Status post cardioversion 10/18/18.  Remains in sinus rhythm.  Amiodarone was started on 10/17 prior to cardioversion.  Discussed with Dr. Vallejo today: Due to abnormal LFTs  will switch to sotalol 80 mg twice daily.  Continue metoprolol.  On warfarin for stroke prevention, INR is supratherapeutic today, will give vitamin K as above   5.  Chronic diastolic CHF.  Patient does have crackles at bases but no dyspnea or hypoxia.  Continue torsemide 20 mg daily.  Monitor daily weights closely  6.  MAY.  Probably prerenal in the setting of significant diuresis.  Creatinine has improved.  Will monitor   7.  Acalculous cholecystitis, status post laparoscopic cholecystectomy 10/7/18. Still having abdominal discomfort, nausea and poor oral intake.  Encourage ambulation, protein supplements  5.  DM 2 without complications.  Diet controlled.  Most recent hemoglobin A1c 5.7 on 7/31/18      Barriers to Discharge : Abnormal LFTs, abdominal pain, hypercalcemia  Anticipated Discharge :1-2 days  Disposition :home versus TCU      Subjective  Patient continues to complain of moderate abdominal pain, 5 out of 10 severity, intermittent nausea.  Oral intake is still poor.  She is reluctant to ambulate but once up she does fairly well.    Objective    Vital signs in last 24 hours  Temp:  [97.3  F (36.3  C)-97.8  F (36.6  C)] 97.4  F (36.3  C)  Heart Rate:  [60-70] 60  Resp:  [18-22] 18  BP: ()/(57-80) 124/57 93% O2 Device: None (Room air) O2 Flow Rate (L/min): 4 L/min  Weight:   170 lb 4.8 oz (77.2 kg) Weight change: -3.2 oz (-0.091 kg)    Intake/Output last 3 shifts  I/O last 3 completed shifts:  In: 1440 [P.O.:1440]  Out: 850 [Urine:850]  Intake/Output this shift:  I/O this shift:  In: -   Out: 100 [Urine:100]    Physical Exam:  GENERAL: No acute distress, sitting in bed  CARDIAC: Regular rate and rhythm.  S1 & S2 normal.  2/6 systolic murmur.  No edema  LUNGS: Fine scattered crackles over bases bilaterally  ABDOMEN: Soft, fullness and tenderness around the incisions, bowel sounds present all quadrants  NEURO: Awake and alert.  Moves all extremities       Pertinent Labs   Lab Results: personally  reviewed.     Results from last 7 days  Lab Units 10/21/18  0640 10/20/18  0553 10/19/18  0743  10/16/18  0630 10/15/18  0546   LN-SODIUM mmol/L 132* 135*  135* 136  < >  --  136   LN-POTASSIUM mmol/L 4.0 4.2  4.2 4.9  < > 4.6 4.3  4.3   LN-CHLORIDE mmol/L 101 103  103 103  < >  --  98   LN-CO2 mmol/L 20* 22  22 22  < >  --  28   LN-BLOOD UREA NITROGEN mg/dL 39* 38*  38* 40*  < >  --  24*   LN-CREATININE mg/dL 1.15* 1.17*  1.17* 1.14*  < >  --  0.97   LN-CALCIUM mg/dL 12.0* 11.7*  11.7* 11.8*  < >  --  11.3*   LN-ALBUMIN g/dL 2.8* 2.8*  2.8* 2.8*  < > 2.9* 2.8*   LN-PROTEIN TOTAL g/dL 7.1 7.2 7.2  < > 7.6 7.0   LN-BILIRUBIN TOTAL mg/dL 1.2* 1.2* 1.0  < > 0.7 0.7   LN-ALKALINE PHOSPHATASE U/L 368* 342* 298*  < > 190* 174*   LN-ALT (SGPT) U/L 364* 334* 317*  < > 39 39   LN-AST (SGOT) U/L 615* 614* 749*  < > 57* 59*   LN-MAGNESIUM mg/dL 2.2  --   --   --  2.1 1.8   < > = values in this interval not displayed.    Results from last 7 days  Lab Units 10/18/18  1535   LN-WHITE BLOOD CELL COUNT thou/uL 11.6*   LN-HEMOGLOBIN g/dL 13.2   LN-HEMATOCRIT % 42.9   LN-PLATELET COUNT thou/uL 213           Medications  Scheduled Meds:    aspirin  81 mg Oral DAILY     magnesium oxide  400 mg Oral TID     metoprolol tartrate  50 mg Oral BID     omeprazole  20 mg Oral BID AC     rOPINIRole  1 mg Oral QHS     sertraline  100 mg Oral DAILY     sodium chloride 0.9%  500 mL Intravenous Once     sotalol  80 mg Oral Q12H     torsemide  20 mg Oral DAILY     traZODone  50 mg Oral QHS     warfarin - daily dose required   Other Med Consult or Protocol     warfarin - NO DOSE TODAY   Other No Dose Today     Continuous Infusions:    PRN Meds:.bisacodyl, LORazepam, magnesium hydroxide, naloxone **OR** naloxone, nitroglycerin, ondansetron, oxyCODONE, traMADol    Pertinent Radiology   Personally reviewed impressions  US ABDOMEN LIMITED  10/20/2018 9:30 AM  1.  Gallbladder surgically removed. No bile duct dilatation.   2.  Tiny sliver of  ascites over the right lobe of the liver likely postoperative. No fluid elsewhere in the abdomen      Advanced Care Planning:  Treatment/Discharge Planning discussed with the patient, Dr. Vallejo, RN    Olga Osman MD  Internal Medicine Hospitalist  10/21/2018

## 2021-06-21 NOTE — PROGRESS NOTES
Progress Note        BRIEF HOSPITAL COURSE :       Gardenia Muller is 68 y.o. female with past medical history of atrial flutter on warfarin, hypertension, dyslipidemia, diet controlled diabetes, heart failure with preserved EF, presented to the hospital on 10/3/2018 with abdominal pain, and was found to have acute cholecystitis with cholelithiasis and dilatation of CBD and pancreatic duct. GI and surgery were consulted. She underwent ERCP on 10/05, which did not show any filling defect. She underwent HIDA scan on 10/06, which showed marked intrahepatic cholestasis, associated with cystic duct obstruction and cholecystitis. She was treated with IV antibiotics. She underwent laparoscopic cholecystectomy on 10/07/18.    Awaiting insurance approval for TCU placcement       SUBJECTIVE :       No fever  Abdominal pain is controlled  No sob          PLAN :     - continue post op care    - not yet medically ready for discharge.          Barriers to Discharge : placement  Anticipated discharge : in am  Disposition :tcu      TIME SPENT : Total time spent > 35 minutes and > 50% time spent on counseling patient about plan of care and coordination of care.                  ASSESSMENT :          1. Acute acalculous cholecystitis, s/p laparoscopic cholecystectomy on 10/07. Clinically improved. Off of antibiotics. Liver enzymes improving.    2. Atrial flutter with variable AV block, rate controlled. She has been restarted on warfarin, which was held for surgery. She is on metoprolol, diltiazem.    3. Chronic diastolic heart failure. Volume compensated. She is on torsemide. LVEF of 55%.    4. Transaminitis, elevated liver enzymes. Secondary to cholecystitis. Possible choledocholithiasis. S/p ERCP followed by lap cholecystectomy. Liver enzymes improving.    5. Type 2 diabetes mellitus, she is on sliding scale insulin. Blood glucose in acceptable range.    6. Dyslipidemia on rosuvastatin    7. Restless leg syndrome on  requip.    8. Hypokalemia and hypomagnesemia. Continue with replacement.    9. GERD on PPI    10. Thrombocytopenia, mild : stable.            Diet :  regular   , IV fluids :     , IV Meds :    , Antibiotics     QTc :  DVT Prophylaxis : SCD's  Code Status : Full  Admit status : inpatient  Morning labs ordered :             Objective :            Review of Systems   A 12 point comprehensive review of systems was negative except as noted.        Physical Exam    HEENT : no distended veins, no lymphadenopathy, thyroid is normal  LUNGS : b/l air entry present, no significant crackles/wheezing.  ABDOMEN : soft, non-tender, non-distended, BS present.  HEART :  Regular rate & rhythm, S1 & S2 normal, no murmur, clicks/rubs, no ankle edema  NEURO : conscious, oriented, responds to commands, no obvious focal deficit.  MUSCULOSKELETAL / EXTREMITIES :  no calf tenderness.  SKIN : no rashes  BACK : wnl        Pertinent Labs   Lab Results: personally reviewed.   Lab Results   Component Value Date     10/09/2018    K 3.5 10/14/2018    CL 97 (L) 10/09/2018    CO2 31 10/09/2018    BUN 18 10/09/2018    CREATININE 0.89 10/13/2018    CALCIUM 11.0 (H) 10/09/2018           Pertinent Radiology   Radiology Results: Personally reviewed image/s  EKG Results: not done              DR. YONY CHACON  [facility]

## 2021-06-21 NOTE — PROGRESS NOTES
Writer spoke with Dr. Foy about patient BPs this morning. Was told by doctor to still administer diltiazem even when her BP this morning was in the 90s HR was 102 in  Afib . Writer also was told to administer metoprolol and torsemide with a BP of 97/68. PT was asymptomatic. Will continue to monitor her vitals. Dr. Foy also aware pt doesn't not have an IV and state she doesn't need one at this time.   Leah C Behr, RN

## 2021-06-21 NOTE — CONSULTS
CARDIOLOGY CONSULTATION    Thank you for asking the [facility] team to see Gardenia Muller in consultation at [facility] to evaluate complex patient.      Assessment:   Active Problems:    (HFpEF) heart failure with preserved ejection fraction (H)    Cholecystitis    Persistent atrial fibrillation (H)    Sinus bradycardia    MAY (acute kidney injury) (H)    Transaminitis        Plan:   Medically complex patient who appears to be euvolemic on exam. She does have some dry sounding crackles but her BNP is up in the setting of an elevated creat. Her LFTs continue to rise, which I suspect may be due to amiodarone toxicity.     I am ordering a lactic acid and d-dimer as I wonder if she didn't throw a clot s/p cardioversion despite a supratherapeutic INR. Will also repeat the CBC, comp, and TSH. May want to have GI see her again to get their perspective, especially given her LFT rise. I do not think her abdominal pain is cardiac. Her HR is sinus dorcas currently. The hypoxia earlier today is concerning for PE.     Will continue to follow. Patient discussed with Dr. Pina, her hospitalist.       Current History:   68 year old medically complex patient who was admitted 10/3 with acute cholecystitis. She underwent ERCP 10/5 and lap nimesh 10/7. She has a history of atrial flutter on coumadin, diastolic heart failure, hypertension, hyperlipidemia, diabetes and coronary disease (RCA ). She was noted to have rapid atrial flutter/fibrillation and acute on chronic diastolic failure post-op. She underwent diuresis with torsemide NIVIA/cardioversion on 10/18 and amiodarone was initiated at 200mg three times a day 10/17. Yesterday amiodarone was switched to sotalol given elevated LFTs. INR was 6.2 and vitamin K was given. Last night she was noted to be bradycardic in the 40s with c/o dizziness and dyspnea. Sotalol and metoprolol were discontinued.     Today she continues to complain of abdominal pain and has been sick to her  stomach. She denies chest pain or difficulty breathing, but her breathing does appear labored. She was switched from PO torsemide to IV furosemide. Her BP was low today as well.     Past Medical History:     Past Medical History:   Diagnosis Date     Acute diastolic heart failure (H) 11/2015     Atrial fibrillation (H)      Cerebral vascular accident (H)      Coronary artery disease 2006    100% RCA with collateral filling per Dr. Elizabeth's angio report     Diabetes mellitus type 2 in obese (H)      Fibrocystic breast      GERD (gastroesophageal reflux disease)      History of anesthesia complications     slow to wake     Hypertension      Peripheral vascular disease (H)      PONV (postoperative nausea and vomiting)      Stroke (H)      Urinary incontinence        Past Surgical History:     Past Surgical History:   Procedure Laterality Date     CARDIAC CATHETERIZATION       CARDIOVERSION  10/18/2018     CORONARY STENT PLACEMENT  1995    stent     ERCP N/A 10/5/2018    Procedure: ENDOSCOPIC RETROGRADE CHOLANGIOPANCREATOGRAPHY, SPHINCTEROTOMY;  Surgeon: Anson Stokes MD;  Location: [facility];  Service:      EXPLORATORY LAPAROTOMY N/A 6/29/2018    Procedure: LAPAROTOMY, CLOSURE OF PERITONEAL RENT;  Surgeon: Jones Harding DO;  Location: [facility] OR;  Service:      LAPAROSCOPIC CHOLECYSTECTOMY N/A 10/7/2018    Procedure: CHOLECYSTECTOMY, LAPAROSCOPIC;  Surgeon: Felicia So MD;  Location: [facility] OR;  Service:      PIC  6/29/2018          [facility]  10/21/2018          VENTRAL HERNIA REPAIR N/A 11/12/2015    Procedure: STRANGULATED VENTRAL HERNIA REPAIR ;  Surgeon: Ernesto Bailey MD;  Location: [facility];  Service:        Family History:     Family History   Problem Relation Age of Onset     Cancer Paternal Grandmother      Cancer Paternal Uncle      No Medical Problems Mother      No Medical Problems Father      Heart attack Sister      Chronic Kidney Disease Sister      No  "Medical Problems Daughter      No Medical Problems Maternal Grandmother      No Medical Problems Maternal Grandfather      No Medical Problems Paternal Grandfather      No Medical Problems Maternal Aunt      No Medical Problems Paternal Aunt      Diabetes Brother      BRCA 1/2 Neg Hx      Breast cancer Neg Hx      Colon cancer Neg Hx      Endometrial cancer Neg Hx      Ovarian cancer Neg Hx        Social History:    reports that she has been smoking.  She has been smoking about 0.25 packs per day. She has quit using smokeless tobacco. She reports that she does not drink alcohol or use illicit drugs.    Meds:       furosemide  40 mg Intravenous BID - diuretic     magnesium oxide  400 mg Oral TID     omeprazole  20 mg Oral BID AC     rOPINIRole  1 mg Oral QHS     sertraline  100 mg Oral DAILY     sodium chloride 0.9%  500 mL Intravenous Once     sodium chloride  10-30 mL Intravenous Q8H FIXED TIMES     warfarin - daily dose required   Other Med Consult or Protocol     warfarin - NO DOSE TODAY   Other No Dose Today       Allergies:   Penicillins    Review of Systems:   A 12 point comprehensive review of systems was negative except as noted in the current history.    Objective:      Physical Exam  170 lb 14.4 oz (77.5 kg)  5' 3\" (1.6 m)  Body mass index is 30.27 kg/(m^2).  /63  Pulse (!) 58  Temp 97.8  F (36.6  C)  Resp (!) 29  Ht 5' 3\" (1.6 m)  Wt 170 lb 14.4 oz (77.5 kg)  LMP 01/25/1999  SpO2 98%  BMI 30.27 kg/m2  I/O last 3 completed shifts:  In: 2320 [P.O.:920; I.V.:1400]  Out: 30 [Emesis/NG output:30]         General Appearance:   No apparent distress   HEENT:  No scleral icterus; the mucous membranes were pink and moist.   Neck: No cervical bruits, jugular venous distention, or thyromegaly.    Chest: The spine was straight. The chest was symmetric.   Lungs:   Respirations unlabored; the lungs are clear to auscultation.   Cardiovascular:   Normal point of maximal impulse. Auscultation reveals normal " first and second heart sounds with no murmurs, rubs, or gallops. Carotid, radial, femoral, dorsalis pedis, and posterior tibial pulses are intact.   Abdomen:  No organomegaly, masses, bruits, or tenderness. Bowels sounds are present   Extremities: No cyanosis, clubbing, or edema.   Skin: No xanthelasma.   Neurologic: A/O x 3, Grossly intact, Mood and affect are appropriate.             Cardiographics  Echo: yesterday  1. Normal left ventricular size and systolic performance with a visually estimated ejection fraction of 55-60%.   2. There is abnormal septal motion c/w right ventricular overload; there is diastolic flattening of the septum with a more normal configuration at end systole.  3. There is mild aortic insufficiency.   4. There is moderate mitral insufficiency.   5. There is moderate tricuspid insufficiency.   6. There is mild right ventricular enlargement with mildly reduced right ventricular systolic performance.  7. There is moderate biatrial enlargement.  8. Right ventricular systolic pressure relative to right atrial pressure is moderately increased.  The pulmonary artery pressure is estimated to be 50-55 mmHg plus right atrial pressure. In addition, the IVC appears dilated with decreased phasic variation in caval diameter consistent with elevated right atrial pressure.    When compared to the prior real-time surface echocardiogram dated 16 October 2018 the degree of mitral insufficiency appears perhaps slightly less on the current examination.  Otherwise, the findings are fairly similar on both studies., there has been little appreciable interval change.     Stress test: negative 9/2017    EKG: sinus bradycardia yesterday    Telemetry:  Sinus dorcas      Imaging  reviewed    Lab Review   Lab Results   Component Value Date     (L) 10/22/2018     (L) 10/21/2018     (L) 10/21/2018    K 4.1 10/22/2018    K 5.1 (H) 10/21/2018    K 4.0 10/21/2018    CL 98 10/22/2018     10/21/2018      10/21/2018    CO2 23 10/22/2018    CO2 13 (L) 10/21/2018    CO2 20 (L) 10/21/2018    BUN 49 (H) 10/22/2018    BUN 44 (H) 10/21/2018    BUN 39 (H) 10/21/2018    CREATININE 1.62 (H) 10/22/2018    CREATININE 1.60 (H) 10/21/2018    CREATININE 1.15 (H) 10/21/2018    CALCIUM 11.7 (H) 10/22/2018    CALCIUM 12.6 (HH) 10/21/2018    CALCIUM 12.0 (H) 10/21/2018     Lab Results   Component Value Date    WBC 11.6 (H) 10/18/2018    WBC 9.2 10/14/2018    WBC 6.6 10/09/2018    HGB 13.2 10/18/2018    HGB 13.7 10/14/2018    HGB 11.8 (L) 10/09/2018    HCT 42.9 10/18/2018    HCT 43.7 10/14/2018    HCT 37.4 10/09/2018    MCV 87 10/18/2018    MCV 86 10/14/2018    MCV 86 10/09/2018     10/18/2018     10/14/2018    PLT 98 (L) 10/09/2018     Lab Results   Component Value Date    CHOL 165 09/20/2017    CHOL 151 06/11/2014    CHOL 166 06/05/2013    TRIG 116 06/30/2018    TRIG 121 09/20/2017    TRIG 117 06/11/2014    HDL 56 09/20/2017    HDL 54 06/11/2014    HDL 51 06/05/2013    LDLDIRECT 94 04/15/2015     Lab Results   Component Value Date    TROPONINI 0.01 10/03/2018    TROPONINI 0.02 06/24/2018    TROPONINI <0.01 09/20/2017     Lab Results   Component Value Date    INR 3.31 (H) 10/22/2018    INR 4.45 (H) 10/21/2018    INR 6.16 (HH) 10/21/2018     Lab Results   Component Value Date    BNP 2613 (H) 10/22/2018    BNP 1140 (H) 10/14/2018     (H) 10/03/2018       Gisel Choe MD

## 2021-06-21 NOTE — PROGRESS NOTES
NA called this writer to pt room at approx 1540, pt c/o difficulty breathing.  VS checked, O2 94% on RA, /76, HR 41.  Pt c/o nausea without vomiting, and dizziness.  Pt placed on 5L O2 via NC, sats increased to 99%.  BG checked.  Charge nurse notified, Rapid Response called at approx 1600.  L arm Peripheral IV placed during rapid, atropine 0.5 mg given at 1641,  pts HR 48.  Pt left unit at 1700 and transferred to 5100.

## 2021-06-21 NOTE — PROGRESS NOTES
"Clinical Nutrition Therapy Reassessment Note     Subjective:  Pt c/o nausea.  She states that she drinks the Boost supplements.   Difficult to understand pt as her voice is soft and words are not clear.  Chart reviewed.     Current Nutrition Prescription:   Diet: 2 gm Na, 1500 FR  Supplements and Modulars: Boost GC three times a day with meals      Current Nutrition Intake:  Last meals recorded were 10/20:  25% x 3 meals.    PO intake does not meet estimated nutrition needs.    Anthropometrics:  Height: 5' 3\" (1.6 m)  Admission weight: 175 lbs 10/3  Most recent weight: 170 lb 14.4 oz (77.5 kg)  Edema noted.    *12 lb weight loss over 16 days- significant. Likely a combination  Of poor PO and fluid status as per MD note there is a question of overdiuresis     RD Nutrition Focused Physical Exam:  Physical signs which could indicate malnutrition: edema may mask wt loss.     GI Status/Output:   GI symptoms include: Pt c/o nausea.  Last BM: 10/21/18     Skin/Wound:  James score: 18    No wound noted.    Medications:  Mag-ox  lasix  warfarin    Labs:  Na 133  BUN 49  Creat 1.62    Assessed Nutrition Needs:  Using adjusted wt of 58.2 kg,     Energy Needs: 1726-7492 kcals (25-30 kcal/kg)    Protein Needs: 58-70 gm (1-1.2 gm/kg)    Fluid Needs: 3236-8734 mls (25-30 mls/kg)    Malnutrition Assessment:  Previously assessed as Severe PCM in the context of acute illness    Nutrition Risk Level: high risk     Nutrition dx:   Malnutrition r/t cholecystitis and biliary obstruction as evidenced by po intake <50% est needs x6 days, 3.3% wt loss x 1 week.      Goal:   PO intake > 75%      Intervention:   Encourage po intake.  Consider nutrition support     Monitoring/Evaluation:   PO intake, wts, GI status    See Care Plan for further Problems, Goals, and Interventions    Electronically signed by:  Gardenia Argueta RD  "

## 2021-06-22 NOTE — TELEPHONE ENCOUNTER
INR result is 2.5  INR   Date Value Ref Range Status   12/18/2018 2.29 (H) 0.90 - 1.10 Final       Will the patient be seen, or did they already see, MD or CNP today? No    Most Recent Warfarin dose day/week  Sunday Monday Tuesday Wednesday Thursday Friday Saturday      2 4 2 2     Sunday Monday Tuesday Wednesday Thursday Friday Saturday   4 2 4           Has the patient missed any doses of Coumadin, Warfarin, Jantoven in the past 7 days? No    Has the patients medications changed since the last visit? No    Has the patient experienced any bleeding recently? No    Has the patient experienced any injuries or illness recently? No    Has the patient experienced any 'new' shortness of breath, severe headaches, or changes in vision recently? No    Has the patient had any changes in their diet, or alcohol consumption? Yes Low sodium now    Is the patient here today to prepare for any type of upcoming surgery, procedure, or for a cardioversion procedure? No    What phone number can we reach the patient at today? [place] [phone]

## 2021-06-22 NOTE — PROGRESS NOTES
[facility]    Facility:   [facility]  [[id number]]   Code Status: POLST AVAILABLE      CHIEF COMPLAINT/REASON FOR VISIT:  Chief Complaint   Patient presents with     Problem Visit     Cough     INR Check       HISTORY:      HPI: Gardenia is a 68 y.o. female with MI, atrial flutter, ventral hernia with incisional hernia repair, on anticoagulation. She had a recent TCU stay due to cholecystectomy and was then transferred to LTC.    Today Ms. Muller is being evaluated for a cough and for anticoagulation management. Apparently her room mate has been sick with a cough too. It appears to be going around the facility. Gardenia states she has been feeling fatigued and has had a harsh cough. She isn't getting anything up with the cough. She has not been having a fever or chills. No other issues per her report. She denies any uncontrolled bleeding, any active bleeding, any bruising, hematuria, epistaxis, blood in stools or black/dark/tarry stools. She denies any other concerns including fevers, headaches, vision changes, chest pain/pressure, difficulty breathing, SOB, nausea, vomiting, diarrhea, dysuria, increasing weakness, increasing pain.     Past Medical History:   Diagnosis Date     Acute diastolic heart failure (H) 11/2015     Atrial fibrillation (H)      Cerebral vascular accident (H)      Coronary artery disease 2006    100% RCA with collateral filling per Dr. Elizabeth's angio report     Diabetes mellitus type 2 in obese (H)      Fibrocystic breast      GERD (gastroesophageal reflux disease)      History of anesthesia complications     slow to wake     Hypertension      Peripheral vascular disease (H)      PONV (postoperative nausea and vomiting)      Stroke (H)      Urinary incontinence              Family History   Problem Relation Age of Onset     Cancer Paternal Grandmother      Cancer Paternal Uncle      No Medical Problems Mother      No Medical Problems Father      Heart attack  Sister      Chronic Kidney Disease Sister      No Medical Problems Daughter      No Medical Problems Maternal Grandmother      No Medical Problems Maternal Grandfather      No Medical Problems Paternal Grandfather      No Medical Problems Maternal Aunt      No Medical Problems Paternal Aunt      Diabetes Brother      BRCA 1/2 Neg Hx      Breast cancer Neg Hx      Colon cancer Neg Hx      Endometrial cancer Neg Hx      Ovarian cancer Neg Hx      Social History     Socioeconomic History     Marital status: Legally      Spouse name: None     Number of children: None     Years of education: None     Highest education level: None   Social Needs     Financial resource strain: None     Food insecurity - worry: None     Food insecurity - inability: None     Transportation needs - medical: None     Transportation needs - non-medical: None   Occupational History     None   Tobacco Use     Smoking status: Current Every Day Smoker     Packs/day: 0.25     Smokeless tobacco: Former User     Tobacco comment: e-cig a few times/day   Substance and Sexual Activity     Alcohol use: No     Drug use: No     Sexual activity: None   Other Topics Concern     None   Social History Narrative     None         Review of Systems   Per HPI      Vitals:    12/20/18 2151   BP: 133/62   Pulse: 78   Resp: 18   Temp: 98.8  F (37.1  C)   SpO2: 93%   Weight: 162 lb 3.2 oz (73.6 kg)       Physical Exam  General appearance: alert, appears stated age and cooperative  HEENT: Head is normocephalic with normal hair distribution. No evidence of trauma. Ears: Without lesions or deformity. No acute purulent discharge. Eyes: Conjunctivae pink with no scleral icterus or erythema. Nose: Normal mucosa and septum. Oropharnyx: mmm, no lesions present.  Lungs: crackles at bases, left lower lobe diminished, respirations without effort  Heart: regular rate and rhythm, S1, S2 normal, 2/6 systolic murmur appreciated  Abdomen: soft, non-tender; bowel sounds  normal; no masses,  no organomegaly  Extremities: extremities normal, atraumatic, no cyanosis, trace pedal edema  Pulses: 2+ and symmetric  Skin: Skin color, texture, turgor normal. No rashes or lesions, four 1 cm incisions d/t laparoscopy-clean, dry, intact  Neurologic: Grossly normal   Psych: interacts well with caregivers, exhibits logical thought processes and connections, pleasant      LABS:   None today.     ASSESSMENT:      ICD-10-CM    1. Anticoagulation management encounter Z51.81     Z79.01    2. Viral upper respiratory tract infection J06.9        PLAN:    URI  -Encouraged strict handwashing, covering cough, and keeping room neat and clean.   -Encouraged steam baths if patient can tolerate to help loosen phlegm.  -Encouraged use of hot tea with honey to help loosen phlegm and clear cough.  -Albuterol nebulizers every 4 hours as needed.   -Mucinex 400 mg by mouth every 6 hours as needed for cough.   -Close monitoring and follow up warranted.      Afib  -Anticoagulation per Coumadin.   -ASA 81 mg by mouth daily.   -Lopressor 25 mg by mouth twice daily for rate control.  -Follow up as needed and per anticoagulation needs.     Anticoagulation Management  -INR at goal.   -Coumadin dosed.   -INR  In one week.      Lab Results   Component Value Date    INR 2.29 (H) 12/18/2018    INR 3.33 (H) 12/11/2018    INR 2.48 (H) 12/06/2018     Otherwise continue current care plan for all other chronic medical conditions, as they are stable. Encouraged patient to engage in healthy lifestyle behaviors such as engaging in social activities, exercising (PT/OT), eating well, and following care plan. Follow up for routine check-up, or sooner if needed. Will continue to monitor patient and work with nursing staff collaboratively to work toward positive patient outcomes.       Electronically signed by: Arleth Barton CNP

## 2021-06-22 NOTE — PROGRESS NOTES
[facility]    Facility:   [facility] [[id number]]   Code Status: POLST AVAILABLE  PCP: No primary care provider on file.   Phone: None   Fax: None      CHIEF COMPLAINT/REASON FOR VISIT:  Chief Complaint   Patient presents with     Discharge Summary     physical deconditioning, s/p cholecystectomy, HTN, s/p CVA       HISTORY COURSE:  Gardenia is a 68 y.o. female with MI, atrial flutter, ventral hernia with incisional hernia repair, on anticoagulation.   She had a week of abdominal pain, more centered in the epigastrium and right upper quadrant and elsewhere. She threw up several times in the morning of admission. She had no fever, no chills.   He was tachycardic, she had 1 L of IV fluid, and IV Lopressor to slow her heart rate.    CT of the abdomen: Gallbladder wall thickening right, consistent with cholecystitis.  Ultrasound of the gallbladder: Distended gallbladder, edema of the gallbladder wall, no gallstones are seen.  She had a laparoscopic cholecystectomy on 10/7/18 with Dr Felicia So. She was tolerating an oral diet at the time of discharge, and transferred to [facility] for ongoing therapy.    Today Ms. Muller is being evaluated for a discharge from [facility]. Gardenia is doing great per her report. She continues to feel quite well and has been doing well with PT/OT. Danni states that she is looking forward to discharge. Gardenia is anticoagulated and is therapeutic at this time. She denies any uncontrolled bleeding, any active bleeding, any bruising, hematuria, epistaxis, blood in stools or black/dark/tarry stools. She denies any other concerns including fevers, headaches, vision changes, chest pain/pressure, difficulty breathing, SOB, nausea, vomiting, diarrhea, dysuria, increasing weakness, increasing pain. Gardenia is being discharged to an assisted living facility.     PHYSICAL EXAM:   General appearance: alert, appears stated age and cooperative  HEENT: Head is  normocephalic with normal hair distribution. No evidence of trauma. Ears: Without lesions or deformity. No acute purulent discharge. Eyes: Conjunctivae pink with no scleral icterus or erythema. Nose: Normal mucosa and septum. Oropharnyx: mmm, no lesions present.  Lungs: clear to auscultation bilaterally, respirations without effort  Heart: regular rate and rhythm, S1, S2 normal, 2/6 systolic murmur  Abdomen: soft, non-tender; bowel sounds normal; no masses,  no organomegaly  Extremities: extremities normal, atraumatic, no cyanosis or edema  Pulses: 2+ and symmetric  Skin: Skin color, texture, turgor normal. No rashes or lesions  Neurologic: Grossly normal   Psych: interacts well with caregivers, exhibits logical thought processes and connections, pleasant    MEDICATION LIST:  Current Outpatient Medications   Medication Sig     aspirin 81 MG EC tablet Take 81 mg by mouth daily.      blood glucose meter (GLUCOMETER) Please Dispense kit per pharmacy discretion and patient's insurance Test blood sugar.     blood glucose test strips Use 1 each As Directed as needed. Dispense brand per patient's insurance at pharmacy discretion.     carboxymethylcellulose (REFRESH PLUS) 0.5 % Dpet ophthalmic dropperette Administer 2 drops to both eyes every hour as needed (dry eyes).      ferrous sulfate 325 (65 FE) MG tablet Take 1 tablet (325 mg total) by mouth 2 (two) times a day.     furosemide (LASIX) 40 MG tablet Take 1 tablet (40 mg total) by mouth daily.     lancets 33 gauge Misc Test twice daily Dispense brand per patient's insurance at pharmacy discretion.     loratadine (CLARITIN) 10 mg tablet Take 10 mg by mouth daily with lunch. Noon     magnesium oxide (MAG-OX) 400 mg tablet Take 1,200 mg by mouth daily.      metoprolol tartrate (LOPRESSOR) 25 MG tablet TAKE 1 TABLET BY MOUTH TWICE DAILY (8AM & 8PM)     multivitamin (TAB-A-JULIET) per tablet Take 1 tablet by mouth daily. (Patient taking differently: Take 1 tablet by mouth  daily. )     nitroglycerin (NITROSTAT) 0.4 MG SL tablet Place 1 tablet (0.4 mg total) under the tongue every 5 (five) minutes as needed for chest pain (for up to 3 doses and call 911 if persists). (Patient taking differently: Place 0.4 mg under the tongue every 5 (five) minutes as needed for chest pain (for up to 3 doses and call 911 if persists). )     omeprazole (PRILOSEC) 20 MG capsule TAKE 1 CAPSULE BY MOUTH TWICE DAILY (8AM & 8PM)     polyethylene glycol (GLYCOLAX) 17 gram/dose powder Take 17 g by mouth daily as needed.      potassium chloride (K-DUR,KLOR-CON) 20 MEQ tablet Take 1 tablet (20 mEq total) by mouth daily.     rOPINIRole (REQUIP) 1 MG tablet 1 tablet 30-60 minutes before bedtime (Patient taking differently: 1 tablet 30-60 minutes before bedtime)     senna-docusate (PERICOLACE) 8.6-50 mg tablet Take 1 tablet by mouth 2 (two) times a day.     sertraline (ZOLOFT) 100 MG tablet TAKE 1 TABLET BY MOUTH ONCE DAILY     VITAMIN D3 2,000 unit capsule TAKE 1 CAPSULE BY MOUTH ONCE DAILY AT 8AM (DO NOT BRAND CHANGE - PATIENT ONLY WANT CAPSULES)     warfarin (COUMADIN/JANTOVEN) 2 MG tablet Take 1 tablet (2 mg total) by mouth daily. (Patient taking differently: Take 2 mg by mouth daily 12/3/18 INR 3.45 Hold x1 then 5mg daily Next INR 12/6.      )       DISCHARGE DIAGNOSIS:    ICD-10-CM    1. Physical deconditioning R53.81    2. S/P cholecystectomy Z90.49    3. SBO (small bowel obstruction) (H) K56.609    4. (HFpEF) heart failure with preserved ejection fraction (H) I50.30    5. Essential hypertension I10    6. Anticoagulation management encounter Z51.81     Z79.01      Physical Deconditioning due to recent Cholecystectomy  -Continue PT/OT and other therapies as per care plan.  -Encouraged good nutrition and movement habits.   -Discussed care plan and expected course of stay.   -Continue to follow-up per routine schedule or sooner if needed.     S/p Choleycystectomy  -Continue PT/OT.  -Continue to monitor.      Afib/HF  -Anticoagulation per Coumadin.   -ASA 81 mg by mouth daily.   -Lopressor 25 mg by mouth twice daily for rate control.  -Follow up as needed and per anticoagulation needs.     Anticoagulation Management   Lab Results   Component Value Date    INR 2.29 (H) 12/18/2018    INR 3.33 (H) 12/11/2018    INR 2.48 (H) 12/06/2018   -INR at goal.   -Coumadin dosed.   -INR in one week.     MEDICAL EQUIPMENT NEEDS:  None.     DISCHARGE PLAN/FACE TO FACE:  I certify that services are/were furnished while this patient was under the care of a physician and that a physician or an allowed non-physician practitioner (NPP), had a face-to-face encounter that meets the physician face-to-face encounter requirements. The encounter was in whole, or in part, related to the primary reason for home health. The patient is confined to his/her home and needs intermittent skilled nursing, physical therapy, speech-language pathology, or the continued need for occupational therapy. A plan of care has been established by a physician and is periodically reviewed by a physician.  Date of Face-to-Face Encounter: 12/27/2018    I certify that, based on my findings, the following services are medically necessary home health services: home health aid for bathing and ADLs, skilled nursing (RN) for medication set-up and teaching, symptoms and disease process monitoring and education, PT for strengthening, balance, endurance and safety within the home, OT for strengthening, ADL needs, adaptive equipment and safety.    My clinical findings support the need for the above skilled services because: home health aid for bathing and ADLs, skilled nursing (RN) for medication set-up and teaching, symptoms and disease process monitoring and education, PT for strengthening, balance, endurance and safety within the home, OT for strengthening, ADL needs, adaptive equipment and safety.    This patient is homebound because she is a frail elderly woman with  multiple comorbidities and recent hospitalizations making it unsafe for her to go out into the community for services.    The patient is, or has been, under my care and I have initiated the establishment of the plan of care. This patient will be followed by a physician who will periodically review the plan of care. Schedule follow up visit with primary care provider within 7 days to reestablish care.    Total time of 35 minutes spent with patient and nursing staff with greater than 50% of this time spent on review of previous records, counseling, education, and discussion of the above care plan with nursing staff and patient.     Electronically signed by: Arleth Barton CNP

## 2021-06-22 NOTE — TELEPHONE ENCOUNTER
ANTICOAGULATION  MANAGEMENT- Home Care/Care Facility Result    Assessment     Today's INR result of 2.5 is Therapeutic (goal INR of 2.0-3.0)        Warfarin taken as previously instructed    No new diet changes affecting INR    No new medication/supplements affecting INR    Continues to tolerate warfarin with no reported s/s of bleeding or thromboembolism     Previous INR was Therapeutic    Plan:     Spoke with home care nurse discussed INR result and instructed:     Warfarin Dosing Instructions: Continue current warfarin dose 4 mg daily on sun/tue/thu; and 2 mg daily rest of week  (0 % change)    Next INR to be drawn: two weeks with home care visit      Rozina verbalizes understanding and agrees to warfarin dosing plan.   ?   Joe Vasquez RN    Subjective/Objective:      Gardenia Muller, a 68 y.o. female is established on warfarin.     Home care/care facility RN's report of Gardenia INR, recent warfarin dosing, diet changes, medication changes, and symptoms is documented below.    Additional findings: verbally confirmed home dose with home care nurse and updated on anticoagulation calendar    Anticoagulation Episode Summary     Current INR goal:   2.0-3.0   TTR:   72.2 % (1.9 y)   Next INR check:   1/23/2019   INR from last check:   2.50 (1/9/2019)   Weekly max warfarin dose:      Target end date:   Indefinite   INR check location:      Preferred lab:      Send INR reminders to:   ANTICOAGULATION POOL A (WBY,WBE,MID,RSC)    Indications    Cerebrovascular disease [I67.9]           Comments:            Anticoagulation Care Providers     Provider Role Specialty Phone number    Jerson Hernandez MD Referring Family Medicine [phone]

## 2021-06-22 NOTE — TELEPHONE ENCOUNTER
#1    Who is calling:  IDALIA Butt with [facility]  Reason for Call:  She completed a readmitting assessment on this patient following her recent discharge from TCU. She is questioning when the patient should be due for her next INR.     Of note, the last contact the patient had with the clinic was on 9/20/2018 which was prior to her TCU admission. She also does not have a PCP listed, but Filomena is requesting to have this reviewed by Dr Hernandez.   Date of last appointment with primary care: 9/20/2018  Has the patient been recently seen:  No  Okay to leave a detailed message: Yes      #2    Medication Question or Clarification  Who is calling: Other: IDALIA Butt with [facility]  What medication are you calling about?: Lasix  What dose do you take?: 40 mg  How often are you taking the medication?: daily  Who prescribed the medication?: discharged from TCU on this dose  What is your question/concern?: Filomena is questioning if the patient is also suppose to be on torsemide 20 mg daily or two times a day. The patient is not currently taking this, but it was on her paperwork fro the TCU.    Of note, the patient's weights have been stable and she does not currently have any increased lower extremity edema.   Pharmacy: unknown  Okay to leave a detailed message?: Yes  Site CMT - Please call the pharmacy to obtain any additional needed information.    
Additionally I do not see any torsemide on the discharge summary from TCU, so just stay on the Lasix for now.    Recheck INR with a follow-up at the clinic.  However come in and get checked by Dr. Hernandez next Tuesday okay to add on as an extra  
Dr. Hernandez is not back until next Tuesday.    It looks like this patient had an INR on 12/18/2018.    Regarding the medications, she should be on the medications which were prescribed at discharge from the TCU.  Those supersede previous meds    She probably needs to make an appointment to follow-up here in the clinic ASAP if there are ongoing discrepancies  
RN notified per MD note below. Patient will call back to schedule.   
No

## 2021-06-22 NOTE — TELEPHONE ENCOUNTER
Called Gardenia to let her know I could not work he into my schedule this Friday 1.11.19.  Requests CDE appt 2.7.19 be cancelled, prefers a Friday appt.      Rescheduled for Friday 2.8.19, Gardenia is aware of change in

## 2021-06-22 NOTE — PROGRESS NOTES
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)     A1c, diet controlled         Relevant Orders    Comprehensive Metabolic Panel (Completed)    Glycosylated Hemoglobin A1c (Completed)    Cerebrovascular disease     Aspirin/warfarin/blood pressure control, not smoking, Crestor         Relevant Orders    HM2(CBC w/o Differential) (Completed)    Comprehensive Metabolic Panel (Completed)    Paroxysmal A-fib (H) - Primary     Coming heart rate, declining blood pressure,  She seems otherwise healthy  Continue metoprolol 25 mg, add low-dose digoxin 0.125 mg, discontinue etizolam, follow-up 2 weeks         Relevant Medications    diltiazem (TIAZAC) 180 MG 24 hr capsule    Other Relevant Orders    Comprehensive Metabolic Panel (Completed)    INR (Completed)       Medium    Depression     Gurgling following hospitalization, sertraline 100, trazodone.  Encouraged patient to get back in  where she has a social outlet and is challenged.  Reluctant to do so but agreeable.         Relevant Medications    traZODone (DESYREL) 50 MG tablet    sertraline (ZOLOFT) 100 MG tablet    Hypercalcemia     Recently normal parathyroid hormone, possibly related to secondary hyperparathyroidism/chronic kidney disease, check ionized calcium and PTH         Relevant Orders    Calcium, Ionized, Measured (Completed)    Parathyroid Hormone Intact (Completed)    RLS (restless legs syndrome)     She reports doing well, no problems, continue ropinirole         S/P cholecystectomy       Unprioritized    Coronary artery disease involving native coronary artery of native heart without angina pectoris (Chronic)    Relevant Medications    diltiazem (TIAZAC) 180 MG 24 hr capsule    Abdominal pain, generalized     Patient not complaining of significant abdominal pain today, resolved         Lower extremity edema     Insert discussion regarding this.  Patient concerned she  is unable to get on compression stockings and that they are too tight.  They seem to fit her fine, helped her with this.  We will troubleshoot going forward should this continue to be a problem         Hypokalemia     Potassium chloride 20 mEq/day, recheck         Relevant Medications    potassium chloride (K-DUR,KLOR-CON) 20 MEQ tablet    (HFpEF) heart failure with preserved ejection fraction (H)     Appears euvolemic, furosemide 40 mg p.o. daily, blood pressure controlled/over controlled on metoprolol and diltiazem         Relevant Medications    diltiazem (TIAZAC) 180 MG 24 hr capsule    Onychogryphosis     Great toenails both feet trimmed by patient request, they are uncomfortable and nobody has trimmed them for some time, second toenail right foot trimmed as well           Other Visit Diagnoses     Hospital discharge follow-up        Prolonged hospital stay, 3 weeks in October followed by rehab    Cholecystitis        Relevant Medications    warfarin (COUMADIN/JANTOVEN) 2 MG tablet    GERD (gastroesophageal reflux disease)        Relevant Medications    omeprazole (PRILOSEC) 20 MG capsule    Slow transit constipation        Relevant Medications    senna-docusate (PERICOLACE) 8.6-50 mg tablet        Subjective  CC:  Chief Complaint   Patient presents with     Hospital Visit Follow Up     Hernia infection and galbladder removal      HPI:  Patient presents for follow-up from hospitalization 10/3-10/25 at [facility] followed by extended stay at [facility] she was originally admitted to [facility] via the emergency room due to abdominal pain.  Below, copy and pasted is a summary of her hospital stay  68F with PMH a.fib/flutter on warfarin, HFpEF presented on 10/03/18 with abd pain.     # Acalculous Cholecystitis  - General Surgery was consulted and the patient had a laparoscopic cholecystectomy on 10/07/2018. At the time of discharge she was tolerating a regular diet without symptoms. She  will f/u with surgery in clinic.     # Acute on Chronic Heart Failure with preserved EF  - Cardiology was consulted during admission. The patient was diuresed post-op and responded well. She developed MAY and so diuretics were held for several days. Per Cardiology, she will be discharged on Lasix 40mg PO daily.     # MAY  - The patient developed MAY in the setting of contrast exposure and diuretic therapy. Nephrology was consulted. Creat peaked at 1.62. At the time of discharge her creat had returned to 0.92. She will continue on Lasix daily as above.     # Transaminitis  - GI was consulted for elevated LFTs. ALT and AST peaked at 778 and 2443 respectively. This was likely due to hepatic congestion in the setting of CHF. At the time of discharge her levels improved to , . Rosuvastatin will be held at the time of discharge. She will have repeat labs performed at TCU.     # Atrial Fibrillation and Bradycardia  - The patient was previously on Amiodarone. In the setting of elevated LFTs the Amiodarone was discontinued on 10/21 and she was switched to Metoprolol and Sotalol. On the evening of 10/21 the patient became bradycardic to the 40's, Metoprolol and Sotalol were discontinued at that time. Prior to discharge Metoprolol was restarted and titrated up to 25mg PO two times a day. The patient's heart rate remained stable in the 60-70's.     # Hypercalcemia  - Nephrology was consulted and felt that the hypercalcemia was likely 2/2 hyperparathyroidism. They recommended outpatient f/u with Endocrinology for parathyroid scan.  Cholecystectomy was done 10/7/18 with Dr. So.  She was transferred to [facility] after recovering and being able to tolerate p.o.    During her stay, she continued to have abdominal pain.  She underwent PT OT.  Denied other problems.    She was discharged 11/5 after brief workup for the abdominal pain.  I am not able to find the cell to the ultrasound that was apparently ordered  but her white blood cell count was normal.  __________________________________________  Follow-up CHF/Diastolic  Narrative/ current symptoms no problems currently  Note current mediations/adherance  Furosemide 40 mg daily  ________    Wt Readings from Last 3 Encounters:   12/27/18 163 lb 12.8 oz (74.3 kg)   12/20/18 162 lb 3.2 oz (73.6 kg)   11/23/18 154 lb 12.8 oz (70.2 kg)       Is patient monitoring weight?  Yes,    Results for orders placed or performed in visit on 11/05/18   Basic Metabolic Panel   Result Value Ref Range    Sodium 135 (L) 136 - 145 mmol/L    Potassium 4.1 3.5 - 5.0 mmol/L    Chloride 104 98 - 107 mmol/L    CO2 23 22 - 31 mmol/L    Anion Gap, Calculation 8 5 - 18 mmol/L    Glucose 125 70 - 125 mg/dL    Calcium 10.7 (H) 8.5 - 10.5 mg/dL    BUN 9 8 - 22 mg/dL    Creatinine 0.65 0.60 - 1.10 mg/dL    GFR MDRD Af Amer >60 >60 mL/min/1.73m2    GFR MDRD Non Af Amer >60 >60 mL/min/1.73m2     Lab Results   Component Value Date    BNP 2,481 (H) 10/23/2018       Adherence to low salt diet?  Yes, form filled out today for reduce sodium, low fat, diabetic diet  _________    Patient has history of atrial fibrillation, had postop problems during hospitalization as noted above.  Currently on Lopressor 25 mg twice daily and warfarin      History of major depressive disorder, takes sertraline 100 mg p.o. Daily.    History of severe restless leg syndrome, currently on ropinirole 1 mg p.o. Daily.      History of vascular disease, both coronary artery disease and cerebrovascular disease with recurrent strokes.  Aspirin, warfarin.  Pressure controlled with metoprolol 25 twice daily and furosemide 40 daily.      Patient has long-term history of borderline calcium, presumed due to hypercalcemia, likely primary hypercalcemia because this has occurred without evidence of renal insufficiency.  That said, during recent hospitalization had acute bump in creatinine.  My strategy through the years has been to follow calcium.   Until recently, she was on a thiazide diuretic but this was stopped due to the elevated calcium.  Recommendation at time of discharge was to have her see endocrinology but I feel that if her calcium remains normal, the risk benefit ratio of workup for primary hyperparathyroidism plus surgical resection most likely favors conservative treatment.    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)     Advanced directives, counseling/discussion     SBO (small bowel obstruction) (H)     Cystitis     Hypomagnesemia     Healthcare maintenance     Essential hypertension     Dysarthria     Cerebral infarction (H)     Anemia     Cerebrovascular disease     Benign adenomatous polyp of large intestine     RLS (restless legs syndrome)     Incisional hernia, without obstruction or gangrene     Abdominal pain, generalized     Diverticular disease of large intestine     Dysphagia     Coronary artery disease involving native coronary artery of native heart without angina pectoris     Dyslipidemia     Ventral hernia without obstruction or gangrene     Paroxysmal A-fib (H)     Anticoagulation management encounter     S/P cholecystectomy     SOB (shortness of breath)     Lower extremity edema     Anxiety     Hypokalemia     (HFpEF) heart failure with preserved ejection fraction (H)     Tachycardia     Anticoagulant long-term use     Biliary obstruction     Abdominal pain, right upper quadrant     Persistent atrial fibrillation (H)     Abdominal pain, left upper quadrant     Sinus bradycardia     MAY (acute kidney injury) (H)     Transaminitis     Physical deconditioning     Pneumonia     Lipoma of back     Acalculous  cholecystitis     Upper respiratory infection     Onychogryphosis     Current medications reviewed as follows:  Current Outpatient Medications on File Prior to Visit   Medication Sig     aspirin 81 MG EC tablet Take 81 mg by mouth daily.      blood glucose meter (GLUCOMETER) Please Dispense kit per pharmacy discretion and patient's insurance Test blood sugar.     blood glucose test strips Use 1 each As Directed as needed. Dispense brand per patient's insurance at pharmacy discretion.     carboxymethylcellulose (REFRESH PLUS) 0.5 % Dpet ophthalmic dropperette Administer 2 drops to both eyes every hour as needed (dry eyes).      diltiazem (TIAZAC) 180 MG 24 hr capsule Take 180 mg by mouth daily.     ferrous sulfate 325 (65 FE) MG tablet Take 1 tablet (325 mg total) by mouth 2 (two) times a day.     lancets 33 gauge Misc Test twice daily Dispense brand per patient's insurance at pharmacy discretion.     loratadine (CLARITIN) 10 mg tablet Take 10 mg by mouth daily with lunch. Noon     magnesium oxide (MAG-OX) 400 mg tablet Take 1,200 mg by mouth daily.      metoprolol tartrate (LOPRESSOR) 25 MG tablet TAKE 1 TABLET BY MOUTH TWICE DAILY (8AM & 8PM)     multivitamin (TAB-A-JULIET) per tablet Take 1 tablet by mouth daily. (Patient taking differently: Take 1 tablet by mouth daily. )     nitroglycerin (NITROSTAT) 0.4 MG SL tablet Place 1 tablet (0.4 mg total) under the tongue every 5 (five) minutes as needed for chest pain (for up to 3 doses and call 911 if persists). (Patient taking differently: Place 0.4 mg under the tongue every 5 (five) minutes as needed for chest pain (for up to 3 doses and call 911 if persists). )     polyethylene glycol (GLYCOLAX) 17 gram/dose powder Take 17 g by mouth daily as needed.      rOPINIRole (REQUIP) 1 MG tablet 1 tablet 30-60 minutes before bedtime (Patient taking differently: 1 tablet 30-60 minutes before bedtime)     VITAMIN D3 2,000 unit capsule TAKE 1 CAPSULE BY MOUTH ONCE DAILY AT 8AM  (DO NOT BRAND CHANGE - PATIENT ONLY WANT CAPSULES)     [DISCONTINUED] furosemide (LASIX) 40 MG tablet Take 1 tablet (40 mg total) by mouth daily.     [DISCONTINUED] omeprazole (PRILOSEC) 20 MG capsule TAKE 1 CAPSULE BY MOUTH TWICE DAILY (8AM & 8PM)     [DISCONTINUED] potassium chloride (K-DUR,KLOR-CON) 20 MEQ tablet Take 1 tablet (20 mEq total) by mouth daily.     [DISCONTINUED] senna-docusate (PERICOLACE) 8.6-50 mg tablet Take 1 tablet by mouth 2 (two) times a day.     [DISCONTINUED] sertraline (ZOLOFT) 100 MG tablet TAKE 1 TABLET BY MOUTH ONCE DAILY     [DISCONTINUED] traZODone (DESYREL) 50 MG tablet Take 50 mg by mouth at bedtime.     [DISCONTINUED] warfarin (COUMADIN/JANTOVEN) 2 MG tablet Take 1 tablet (2 mg total) by mouth daily. (Patient taking differently: Take 2 mg by mouth daily 12/3/18 INR 3.45 Hold x1 then 5mg daily Next INR 12/6.      )     warfarin (COUMADIN/JANTOVEN) 4 MG tablet Take 1 tablet (4 mg) daily on Sundays, Tuesdays and Thursdays and 1/2 tablet (2 mg) all other days. Adjust dose per INR results.     [DISCONTINUED] warfarin (COUMADIN/JANTOVEN) 4 MG tablet Take 1/2-1 tablet (2-4 mg) daily as directed. Adjust dose per INR results.     No current facility-administered medications on file prior to visit.      Social History     Tobacco Use   Smoking Status Current Every Day Smoker     Packs/day: 0.25   Smokeless Tobacco Former User   Tobacco Comment    e-cig a few times/day     Social History     Social History Narrative     Not on file     Patient Care Team:  Jerson Hernandez MD as PCP - General (Family Medicine)  Jones Harding DO as Physician (General Surgery)  [facility]  [place]  Full 10 system review including constitutional, respiratory, cardiac, gi, urinary, rheumatologic, neurologic, reproductive, dermatologic psychiatric is  performed (via questionnaire) and is negative         Objective  Physical Exam  Vitals:    01/11/19 0828   BP: 91/66   Patient Site:  Left Arm   Patient Position: Sitting   Cuff Size: Adult Regular   Pulse: (!) 124   Resp: 16   SpO2: 100%   Weight: 168 lb 8 oz (76.4 kg)     Body mass index is 29.85 kg/m .  Gen- alert, oriented/ appropriately responsive  HEENT- normal cephalic, atraumatic.   Chest- Normal inspiration and expiration.    Clear to ascultation.    No chest wall deformity or scar.  CV- Heart regular rate and rhythm  normal tones, no murmurs   No gallops or rubs.  Ext- appear well perfused, 1+ lower extremity edema  Skin- warm and dry,   no visualized rash    Diagnostics:   None      Please note: Voice recognition software was used in this dictation.  It may therefore contain typographical errors.

## 2021-06-22 NOTE — PROGRESS NOTES
[facility]      Facility:    [facility] [[id number]]    Code Status: FULL CODE   [facility] 10/3  through 10/25/18      Chief Complaint/Reason for Visit:  Chief Complaint   Patient presents with     Review Of Multiple Medical Conditions       HPI:   Gardenia is a 68 y.o. female with MI, atrial flutter, ventral hernia with incisional hernia repair, on anticoagulation.  More  She had a week of abdominal pain, more centered in the epigastrium and right upper quadrant and elsewhere.  She threw up several times in the morning of admission.  She had no fever, no chills.   He was tachycardic, she had 1 L of IV fluid, and IV Lopressor to slow her heart rate.    CT of the chest: No PE, but gallbladder wall thickening.  CT of the abdomen: Gallbladder wall thickening right, consistent with cholecystitis.  Ultrasound of the gallbladder: Distended gallbladder, edema of the gallbladder wall, no gallstones are seen.  She had a laparoscopic cholecystectomy on 10/7/18 with Dr Felicia So.    She had elevated transaminases, with an AST = 2443, ALT = 778.  Was thought to be from hepatic congestion according to the GI consultant, in the setting of CHF.  Her LFTs were improving at the time of discharge.  Rosuvastatin was being held due to liver duress.    She had persistent atrial fibrillation with rapid ventricular rate, and had cardioversion on 10/18  She had an acute bout of diastolic heart failure    She had brief acute kidney injury thought to be from diuresis for her heart failure.  Ejection fraction 55%    She has had hypercalcemia since 2012, thought to be hydrochlorothiazide which was discontinued.  However her albumin remained elevated patient PTH was 190, vitamin D equal 48, ionized calcium 1.48; to be primary hyperparathyroidism.    She had  Bradycardia, and her  diltiazem was discontinued.    She was tolerating an oral diet at the time of discharge, and transferred to [facility] for ongoing therapy.    UPDATE:  Making nice progress in therapy. Can walk well with a walker.  No abdominal pain. Eating normally, normal bowel movement  Therapy will be ending soon  Still interested in detention, doesn't want to live alone anymore, although doesn't have  specific   reasons why.      Past Medical History:  Past Medical History:   Diagnosis Date     Acute diastolic heart failure (H) 11/2015     Atrial fibrillation (H)      Cerebral vascular accident (H)      Coronary artery disease 2006    100% RCA with collateral filling per Dr. Elizabeth's angio report     Diabetes mellitus type 2 in obese (H)      Fibrocystic breast      GERD (gastroesophageal reflux disease)      History of anesthesia complications     slow to wake     Hypertension      Peripheral vascular disease (H)      PONV (postoperative nausea and vomiting)      Stroke (H)      Urinary incontinence            Surgical History:  Past Surgical History:   Procedure Laterality Date     CARDIAC CATHETERIZATION       CARDIOVERSION  10/18/2018     CORONARY STENT PLACEMENT  1995    stent     ERCP N/A 10/5/2018    Procedure: ENDOSCOPIC RETROGRADE CHOLANGIOPANCREATOGRAPHY, SPHINCTEROTOMY;  Surgeon: Anson Stokes MD;  Location: [facility];  Service:      EXPLORATORY LAPAROTOMY N/A 6/29/2018    Procedure: LAPAROTOMY, CLOSURE OF PERITONEAL RENT;  Surgeon: Jones Harding DO;  Location: [facility] OR;  Service:      LAPAROSCOPIC CHOLECYSTECTOMY N/A 10/7/2018    Procedure: CHOLECYSTECTOMY, LAPAROSCOPIC;  Surgeon: Felicia So MD;  Location: [facility] OR;  Service:      [facility]  6/29/2018          [facility]  10/21/2018          VENTRAL HERNIA REPAIR N/A 11/12/2015    Procedure: STRANGULATED VENTRAL HERNIA REPAIR ;  Surgeon: Ernesto Bailey MD;  Location: [facility];  Service:               Review of Systems   Tells me she has a shower chair at home.  Daughter sill out of town with her apartment keys.  We discussed that her  bety can get her another set of keys, and she plans to check it out      Blood pressure 126/82, pulse 69, temperature 96.7  F (35.9  C), resp. rate 20,  SpO2 93 %    SLUMS 16: I think at this point she would score higher    Physical Exam  Constitutional:  Quiet, overweight Black-American female , no distress. In wheelchair   Cardiovascular: Irregular rhythm .holosystolic murmur at the mitral/apex area   Pulmonary/Chest: Breath sounds clear. She has no rales.   Abdominal: Soft. Bowel sounds are normal. Mild distention, mildly firm  Lap nimesh incisions well healed. Hernia proximal to the umbilicus is unchanged, non tender to palpation  Musculoskeletal:Can move all extremities well, symmetrically  Neurological: She is alert and oriented to person, place, and time. Symmetric extremity movement   Skin: Skin is warm and dry. No erythema.   Psychiatric: She has a normal mood. Her behavior is normal.         Allergies   Allergen Reactions     Penicillins Swelling         Medication List:  Current Outpatient Medications   Medication Sig     aspirin 81 MG EC tablet Take 81 mg by mouth daily.      blood glucose meter (GLUCOMETER) Please Dispense kit per pharmacy discretion and patient's insurance Test blood sugar.     blood glucose test strips Use 1 each As Directed as needed. Dispense brand per patient's insurance at pharmacy discretion.     carboxymethylcellulose (REFRESH PLUS) 0.5 % Dpet ophthalmic dropperette Administer 2 drops to both eyes every hour as needed (dry eyes).      ferrous sulfate 325 (65 FE) MG tablet Take 1 tablet (325 mg total) by mouth 2 (two) times a day.     furosemide (LASIX) 40 MG tablet Take 1 tablet (40 mg total) by mouth daily.     lancets 33 gauge Misc Test twice daily Dispense brand per patient's insurance at pharmacy discretion.     loratadine (CLARITIN) 10 mg tablet Take 10 mg by mouth daily with lunch. Noon     magnesium oxide (MAG-OX) 400 mg tablet Take 1,200 mg by mouth daily.       metoprolol tartrate (LOPRESSOR) 25 MG tablet TAKE 1 TABLET BY MOUTH TWICE DAILY (8AM & 8PM)     multivitamin (TAB-A-JULIET) per tablet Take 1 tablet by mouth daily. (Patient taking differently: Take 1 tablet by mouth daily. )     nitroglycerin (NITROSTAT) 0.4 MG SL tablet Place 1 tablet (0.4 mg total) under the tongue every 5 (five) minutes as needed for chest pain (for up to 3 doses and call 911 if persists). (Patient taking differently: Place 0.4 mg under the tongue every 5 (five) minutes as needed for chest pain (for up to 3 doses and call 911 if persists). )     omeprazole (PRILOSEC) 20 MG capsule TAKE 1 CAPSULE BY MOUTH TWICE DAILY (8AM & 8PM)     polyethylene glycol (GLYCOLAX) 17 gram/dose powder Take 17 g by mouth daily as needed.      potassium chloride (K-DUR,KLOR-CON) 20 MEQ tablet Take 1 tablet (20 mEq total) by mouth daily.     rOPINIRole (REQUIP) 1 MG tablet 1 tablet 30-60 minutes before bedtime (Patient taking differently: 1 tablet 30-60 minutes before bedtime)     senna-docusate (PERICOLACE) 8.6-50 mg tablet Take 1 tablet by mouth 2 (two) times a day.     sertraline (ZOLOFT) 100 MG tablet TAKE 1 TABLET BY MOUTH ONCE DAILY     VITAMIN D3 2,000 unit capsule TAKE 1 CAPSULE BY MOUTH ONCE DAILY AT 8AM (DO NOT BRAND CHANGE - PATIENT ONLY WANT CAPSULES)     warfarin (COUMADIN/JANTOVEN) 2 MG tablet Take 1 tablet (2 mg total) by mouth daily.       Labs: most recent     Ref Range & Units 11/5/18       WBC 4.0 - 11.0 thou/uL 8.7   Hemoglobin 12.0 - 16.0 g/dL 11.0 (L)   RDW 11.0 - 14.5 % 20.2 (H)   Platelets 140 - 440 thou/uL 122 (L              Ref Range & Units 11/5/18    10/29/18  5:45 AM     Sodium 136 - 145 mmol/L 135 (L) 138   Potassium 3.5 - 5.0 mmol/L 4.1 4.5   Chloride 98 - 107 mmol/L 104 104   CO2 22 - 31 mmol/L 23 25   Anion Gap, Calculation 5 - 18 mmol/L 8 9   Glucose 70 - 125 mg/dL 125 80   Calcium 8.5 - 10.5 mg/dL 10.7 (H) 11.0 (H)   BUN 8 - 22 mg/dL 9 14   Creatinine 0.60 - 1.10 mg/dL 0.65 0.73   GFR  MDRD Af Amer >60 mL/min/1.73m2 >60 >60       INR = 2.88 on 2 mg daily         Ref Range & Units 10/25/18  6:27 AM   10/24/18  5:08 AM     Magnesium 1.8 - 2.6 mg/dL 1.8 1.9        Ref Range & Units 10/24/18  5:08 AM   10/23/18  5:33 AM     WBC 4.0 - 11.0 thou/uL 8.9 9.6   Hemoglobin 12.0 - 16.0 g/dL 11.1 (L) 10.6 (L)   RDW 11.0 - 14.5 % 18.3 (H) 18.0 (H)   Platelets 140 - 440 thou/uL 95 (L) 91 (L)      10/23/18  5:33 AM   10/23/18       BNP 0 - 114 pg/mL 2481 (H)         Assessment / Plan:    ICD-10-CM    1. Acalculous cholecystitis: s/p lap nimesh K81.9 Mild nausea gone   2. (HFpEF) heart failure with preserved ejection fraction (H) I50.30 Compensated, able to walk without respiratory distress   3. Type 2 diabetes mellitus with other circulatory complication, unspecified whether long term insulin use (H) E11.59 Glucoses are in good range   4. Persistent atrial fibrillation (H) I48.1 Warfarin: INR subtherapeutic today. Increase dose   5. Essential hypertension I10 Good control   6. Depression, unspecified depression type F32.9 Sertraline:seems dysphoric   7. Lower extremity edema R60.0 Lasix, no increase in swelling         Electronically signed by: Monik Cruz MD

## 2021-06-22 NOTE — TELEPHONE ENCOUNTER
Lolis Huynh would like to see you same day as she is seeing Dr Hernandez.    Please advise if this is okay. I did notify her that you are booked. She asked me to still send a msg.    She can be reached @ [phone]

## 2021-06-23 NOTE — TELEPHONE ENCOUNTER
Thanks      CMT called RN and she does not know the dosages as she does not have the medications with her. MICHAEL called [facility] pharmacy to clarify doses for the 3 medications.     Pramipexole- (September 2018) .25 mg at noc  Lisinopril- (September 2018) 10 mg once daily in the morning.   HCTZ- (September 2018) 25 mg once daily in the morning.     Queued for MD.

## 2021-06-23 NOTE — TELEPHONE ENCOUNTER
Called pt who read letter and gave 's name , Yrn Awan phone [phone].      Called Emperatriz who said pt was wanting to obtain more money for diets, but [facility] special diet does not cover low Na or DM diets.  It is for people who say have a gluten free diet where the food is way expensive and they are given $40 extra to obtain gluten free food for example.  Emperatriz will call pt to explain to her.      PCP does not need to do anything further.      Called in to [facility], pt Crestor as it was not received by them.  Thanks.

## 2021-06-23 NOTE — TELEPHONE ENCOUNTER
Can either covering provider or PCP (message is 12 days old).  Respond to HC RN questions.  See message string    IF covering provider unable to assist - message will need to wait for PCP return tomorrow. Thanks.

## 2021-06-23 NOTE — TELEPHONE ENCOUNTER
Who is calling:  Rozina from [facility]   Reason for Call:  Calling to check on most recent dosing instructions for patient, as well update ACN staff on pharmacy related needs.  Rozina states that the pharmacy, [facility] requires a new order each time the patient gets new dosing orders for the Warfarin. So this would be each time the patient has her INR done and dosing is completed.     Rozina states there has been issues lately with the pharmacy and the patient's prescription   Date of last appointment with primary care: 01/11/2019  Has the patient been recently seen:  Yes  Okay to leave a detailed message: Yes

## 2021-06-23 NOTE — TELEPHONE ENCOUNTER
Called Gardenia per 's request to instruct on change in Digoxn dose. Instructed to take 0.125 tablet daily, stop the .25 tablet.      Spoke with Gardenia but she did have a pen and paper to write down, requested I leave a message on her voice mail with spelling and dose of medication.

## 2021-06-23 NOTE — TELEPHONE ENCOUNTER
RN sent Rx refill to anticoagulation pool for review.  Emperatriz Rodriguez RN, [facility] Refill/Triage, 1/11/2019 7:33 AM

## 2021-06-23 NOTE — PROGRESS NOTES
Assessment: Follow up with Gardenia today, have not seen since last spring due hospitalization and 3-4 month of rehab at the nursing home. Came home on 12.27.18, and happy to be there.    No dm medications at this time.    A1c 5.8    BG:  Fasting < 130 mg/dl  PC < 140 mg/dl    Bg and A1c stable, doing well with dm mgmt.      Recall indicates fair appetite, still working on increasing strength and endurance.  Trying to eat three meals per day, encouraged this but try eating smaller amounts and consider snacking between meal, too much food will not help appetite or intake. Receptive to this, Gardenia is motivated to get back to her baseline, has made tremendous progress since last summer, states she had to learn to walk again because she was so weak.    Receives optage meals 7 days per week and either eats for lunch or dinner, breakfast is simple, cereal, eggs, sausage and toast.   Other meal soup, sandwich or left overs.  [place] have been very supportive with shopping and other errands.    Doing well with current dm mgmt.      Plan:  Follow up 6.2019    Subjective and Objective:      Gardenia Muller is referred by  for Diabetes Education.     Lab Results   Component Value Date    HGBA1C 5.8 01/11/2019         Current diabetes medications:  none    Goals     None          Follow up:   CDE (certified diabetic educator)      Education:     Monitoring   Meter (per above goals): Competent  Monitoring: Competent  BG goals: Assessed and Discussed    Nutrition Management  Nutrition Management: Discussed and Competent  Weight: Assessed  Portions/Balance: Competent  Carb ID/Count: Competent  Label Reading: Not addressed  Heart Healthy Fats: Not addressed  Menu Planning: Assessed, discussed  Dining Out: Not addressed  Physical Activity: as tolerated  Medications: set up by RN    Acute Complications: Prevent, Detect, Treat:  Hypoglycemia: Assessed  Hyperglycemia: Assessed  Sick Days: Discussed    Chronic  Complications  Foot Care:Competent  Skin Care: Competent  Eye: Discussed, Competent and follow up appt made today  ABC: Assessed and Discussed  Teeth:Competent  Goal Setting and Problem Solving: Competent  Barriers: assessed  Psychosocial Adjustments: Assessed      Time spent with the patient: 30 minutes for diabetes education and counseling.   Previous Education: yes  Visit Type:MNT  Hours Remaining: DSMT 2 and MNT 1.5      Lolis Young  2/8/2019

## 2021-06-23 NOTE — TELEPHONE ENCOUNTER
Spoke with Rozina and relayed warfarin instructions given on 1/25 to patient. Rozina verbalizes understanding and has no further questions

## 2021-06-23 NOTE — TELEPHONE ENCOUNTER
Who is calling:  Rozina with [facility]  Reason for Call:  Questioning the status of the below messages. Rozina states she never received a return phone call in regards to patient's medication list. Rozina also has other concerns she would like addressed in regards to patient's tachycardia. Please reach out to Rozina and advise.  Date of last appointment with primary care: 01/25/2019  Has the patient been recently seen:  Yes  Okay to leave a detailed message: Yes

## 2021-06-23 NOTE — TELEPHONE ENCOUNTER
Who is calling:  Rozina  Reason for Call:  She is calling to request a new up to date medication list for this patient. She states the pharmacy has sent out:    Pramipexole  Lisinopril  HCTZ    All of which she does not have on the patients medication list. She is requesting a up to date copy of her med list faxed to [phone].  Date of last appointment with primary care: 1/11/2019  Has the patient been recently seen:  Yes  Okay to leave a detailed message: Yes

## 2021-06-23 NOTE — TELEPHONE ENCOUNTER
Medication Question or Clarification  Who is calling: Patient  What medication are you calling about?:  rosuvastatin tablet 40 mg (CRESTOR)  What dose do you take?:  40 mg  How often are you taking the medication?:  daily  Who prescribed the medication?:  Mary  What is your question/concern?:  The rosuvastatin never went through to the pharmacy, please send  Pharmacy:  [facility] to leave a detailed message?: No  Site CMT - Please call the pharmacy to obtain any additional needed information.

## 2021-06-23 NOTE — TELEPHONE ENCOUNTER
ANTICOAGULATION  MANAGEMENT    Assessment     Today's INR result of 1.7 is Subtherapeutic (goal INR of 2.0-3.0)        Warfarin taken as previously instructed    No new diet changes affecting INR    No interaction expected between Digoxin and warfarin    shortness of breath addressed in office visit today    Previous INR was Therapeutic    Plan:     Spoke with Gardenia regarding INR result and instructed:     Warfarin Dosing Instructions:  one time booster of 4mg today then continue current warfarin dose 4 mg daily on Sun/Tues/Thurs ; and 2 mg daily rest of week  (0 % change)    Instructed patient to follow up no later than: two weeks    Education provided: importance of therapeutic range    Gardenia verbalizes understanding and agrees to warfarin dosing plan.    Instructed to call the [facility] for any changes, questions or concerns. (#[phone])   ?   Bia Crane RN    Subjective/Objective:      Gardenia Muller, a 68 y.o. female is on warfarin.     Gardenia reports:     Home warfarin dose: as updated on anticoagulation calendar per template     Missed doses: No     Medication changes:  Yes: Digoxin      S/S of bleeding or thromboembolism:  Yes shortness of breath (seen by PCP today for symptom)     New Injury or illness:  No     Changes in diet or alcohol consumption:  No     Upcoming surgery, procedure or cardioversion:  No    Anticoagulation Episode Summary     Current INR goal:   2.0-3.0   TTR:   72.0 % (1.9 y)   Next INR check:   2/8/2019   INR from last check:   1.70! (1/25/2019)   Weekly max warfarin dose:      Target end date:   Indefinite   INR check location:      Preferred lab:      Send INR reminders to:   ANTICOAGULATION POOL A (WBY,WBE,MID,RSC)    Indications    Cerebrovascular disease [I67.9]           Comments:            Anticoagulation Care Providers     Provider Role Specialty Phone number    Jerson Hernandez MD Referring Family Medicine [phone]

## 2021-06-23 NOTE — PROGRESS NOTES
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  Restart Crestor which was held during hospitalization due to increased transaminases which were felt to be due to passive venous congestion from heart failure         Relevant Medications    rosuvastatin tablet 40 mg (CRESTOR) (Start on 1/25/2019  9:00 PM)    Paroxysmal A-fib (H) - Primary     Diltiazem stopped last time  Digoxin 0.125 started due to hypotension and tachycardia    Pulse remains high, around and above 100  Blood pressure a little bit better, 100-115 systolic, for the most part    Check dig level  Increase digoxin to 0.25  Follow-up 2 weeks         Relevant Medications    rosuvastatin tablet 40 mg (CRESTOR) (Start on 1/25/2019  9:00 PM)    digoxin (LANOXIN) 250 mcg tablet    Other Relevant Orders    INR (Completed)    Digoxin (Lanoxin )       Unprioritized    Lower extremity edema     Unchanged         (HFpEF) heart failure with preserved ejection fraction (H)     Diastolic Congestive Heart Failure iswell compensated though note increase in weights  Wt Readings from Last 3 Encounters:   01/25/19 170 lb (77.1 kg)   01/11/19 168 lb 8 oz (76.4 kg)   12/27/18 163 lb 12.8 oz (74.3 kg)     BP Readings from Last 3 Encounters:   01/25/19 106/54   01/11/19 91/66   12/27/18 128/58       Meds:  Antihypertensives: Metoprolol 25 twice daily, lisinopril 10 mg  Diuretic?  Yes/furosemide 40  Nitrites/other?  None scheduled    Changes/Recommendations: No changes except increase dose of dig for rate control  Follow-up: 2 weeks           Relevant Medications    rosuvastatin tablet 40 mg (CRESTOR) (Start on 1/25/2019  9:00 PM)    digoxin (LANOXIN) 250 mcg tablet    Other Relevant Orders    Digoxin (Lanoxin )        Subjective  CC:  Chief Complaint   Patient presents with     Follow-up     HPI:  For follow-up from  1/11/19.  At that visit, the main issue noted was borderline low blood pressure, elevated pulse, history of chronic atrial fibrillation.  At that time, I elected to stop diltiazem to allow blood pressure to come up and start low-dose digoxin 0.125.    She has a history of hypertension treated with hydrochlorothiazide, metoprolol 25 twice daily, lisinopril 10 mg  History of diastolic congestive heart failure for which she takes furosemide 40 mg p.o. Daily  She has a history of hyperparathyroidism with borderline calciums but preserved renal function, no osteoporosis and no kidney stones    She has had lower extremity edema, it was recommended last visit that she is compression stockings to help with swelling though it was difficult for her to get these on.  Also has a history of diet-controlled diabetes, A1c done last visit was 5.8, last visit was 1/11    Patient does not feel well today, is unable to express exactly what this is, seems like she is not in a good mood.  Denies severe shortness of breath, worsening swelling, pain.  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)     Advanced directives, counseling/discussion     SBO (small bowel obstruction) (H)     Cystitis     Hypomagnesemia     Healthcare maintenance     Essential hypertension     Dysarthria     Cerebral infarction (H)     Anemia     Cerebrovascular disease     Benign adenomatous polyp of large intestine     RLS (restless legs syndrome)     Incisional hernia, without obstruction or gangrene     Abdominal pain, generalized     Diverticular disease of large intestine     Dysphagia     Coronary artery disease involving native coronary artery of native heart without angina pectoris     Dyslipidemia      Ventral hernia without obstruction or gangrene     Paroxysmal A-fib (H)     Anticoagulation management encounter     S/P cholecystectomy     SOB (shortness of breath)     Lower extremity edema     Anxiety     Hypokalemia     (HFpEF) heart failure with preserved ejection fraction (H)     Tachycardia     Anticoagulant long-term use     Biliary obstruction     Abdominal pain, right upper quadrant     Persistent atrial fibrillation (H)     Abdominal pain, left upper quadrant     Sinus bradycardia     MAY (acute kidney injury) (H)     Transaminitis     Physical deconditioning     Pneumonia     Lipoma of back     Acalculous cholecystitis     Upper respiratory infection     Onychogryphosis     Hyperparathyroidism (H)     Current medications reviewed as follows:  Current Outpatient Medications on File Prior to Visit   Medication Sig     aspirin 81 MG EC tablet Take 81 mg by mouth daily.      blood glucose meter (GLUCOMETER) Please Dispense kit per pharmacy discretion and patient's insurance Test blood sugar.     blood glucose test strips Use 1 each As Directed as needed. Dispense brand per patient's insurance at pharmacy discretion.     carboxymethylcellulose (REFRESH PLUS) 0.5 % Dpet ophthalmic dropperette Administer 2 drops to both eyes every hour as needed (dry eyes).      lancets 33 gauge Misc Test twice daily Dispense brand per patient's insurance at pharmacy discretion.     lisinopril (PRINIVIL,ZESTRIL) 10 MG tablet Take 1 tablet (10 mg total) by mouth every morning.     loratadine (CLARITIN) 10 mg tablet Take 10 mg by mouth daily with lunch. Noon     magnesium oxide (MAG-OX) 400 mg tablet Take 1,200 mg by mouth daily.      metoprolol tartrate (LOPRESSOR) 25 MG tablet TAKE 1 TABLET BY MOUTH TWICE DAILY (8AM & 8PM)     multivitamin (TAB-A-JULIET) per tablet Take 1 tablet by mouth daily. (Patient taking differently: Take 1 tablet by mouth daily. )     nitroglycerin (NITROSTAT) 0.4 MG SL tablet Place 1 tablet (0.4 mg  total) under the tongue every 5 (five) minutes as needed for chest pain (for up to 3 doses and call 911 if persists). (Patient taking differently: Place 0.4 mg under the tongue every 5 (five) minutes as needed for chest pain (for up to 3 doses and call 911 if persists). )     omeprazole (PRILOSEC) 20 MG capsule TAKE 1 CAPSULE BY MOUTH TWICE DAILY (8AM & 8PM)     polyethylene glycol (GLYCOLAX) 17 gram/dose powder Take 17 g by mouth daily as needed.      potassium chloride (K-DUR,KLOR-CON) 20 MEQ tablet Take 1 tablet (20 mEq total) by mouth daily.     pramipexole (MIRAPEX) 0.25 MG tablet Take 1 tablet (0.25 mg total) by mouth at bedtime.     senna-docusate (PERICOLACE) 8.6-50 mg tablet Take 1 tablet by mouth 2 (two) times a day.     sertraline (ZOLOFT) 100 MG tablet Take 1 tablet (100 mg total) by mouth daily.     traZODone (DESYREL) 50 MG tablet Take 1 tablet (50 mg total) by mouth at bedtime.     VITAMIN D3 2,000 unit capsule TAKE 1 CAPSULE BY MOUTH ONCE DAILY AT 8AM (DO NOT BRAND CHANGE - PATIENT ONLY WANT CAPSULES)     warfarin (COUMADIN/JANTOVEN) 2 MG tablet Take 2 tablets daily as directed.     warfarin (COUMADIN/JANTOVEN) 4 MG tablet Take 1 tablet (4 mg) daily on Sundays, Tuesdays and Thursdays and 1/2 tablet (2 mg) all other days. Adjust dose per INR results.     [DISCONTINUED] digoxin (LANOXIN) 125 mcg tablet Take 1 tablet (125 mcg total) by mouth daily.     [DISCONTINUED] ferrous sulfate 325 (65 FE) MG tablet Take 1 tablet (325 mg total) by mouth 2 (two) times a day.     [DISCONTINUED] hydroCHLOROthiazide (HYDRODIURIL) 25 MG tablet Take 1 tablet (25 mg total) by mouth every morning.     [DISCONTINUED] rOPINIRole (REQUIP) 1 MG tablet 1 tablet 30-60 minutes before bedtime (Patient taking differently: 1 tablet 30-60 minutes before bedtime)     No current facility-administered medications on file prior to visit.      Social History     Tobacco Use   Smoking Status Current Every Day Smoker     Packs/day: 0.25    Smokeless Tobacco Former User   Tobacco Comment    e-cig a few times/day     Social History     Social History Narrative     Not on file     Patient Care Team:  Jerson Hernandez MD as PCP - General (Family Medicine)  Jones Harding DO as Physician (General Surgery)  [facility]  [place]  Full 10 system review including constitutional, respiratory, cardiac, gi, urinary, rheumatologic, neurologic, reproductive, dermatologic psychiatric is  performed  and is otherwise negative         Objective  Physical Exam  Vitals:    01/25/19 0937   BP: 106/54   Patient Site: Left Arm   Patient Position: Sitting   Cuff Size: Adult Large   Pulse: (!) 102   SpO2: 100%   Weight: 170 lb (77.1 kg)     Body mass index is 30.11 kg/m .  Gen- alert, oriented/ appropriately responsive  HEENT- normal cephalic, atraumatic.   Chest- Normal inspiration and expiration.    Clear to ascultation.    No chest wall deformity or scar.  CV- Heart regular rate and rhythm  normal tones, no murmurs   No gallops or rubs.  Ext- appear well perfused, no edema  Skin- warm and dry,   no visualized rash    Diagnostics:   Pending      Please note: Voice recognition software was used in this dictation.  It may therefore contain typographical errors.

## 2021-06-23 NOTE — TELEPHONE ENCOUNTER
ANTICOAGULATION  MANAGEMENT    Assessment     Today's INR result of 1.9 is Subtherapeutic (goal INR of 2.0-3.0)        Warfarin taken as previously instructed    No new diet changes affecting INR    Potential interaction between Zpak (starting today) and warfarin which may affect subsequent INRs    Continues to tolerate warfarin with no reported s/s of bleeding or thromboembolism     Previous INR was Subtherapeutic    Plan:     Spoke with Gardenia regarding INR result and instructed:     Warfarin Dosing Instructions:  Change warfarin dose to 2 mg daily on mon/wed/sat; and 4 mg daily rest of week  (10 % change)    Instructed patient to follow up no later than: one week    Education provided: potential interaction between warfarin and Zpak    Gardenia verbalizes understanding and agrees to warfarin dosing plan.  Left message for home care nurse relaying instructions  Instructed to call the [facility] for any changes, questions or concerns. (#[phone])   ?   Bia Crane RN    Subjective/Objective:      Gardenia Muller, a 68 y.o. female is on warfarin.     Gardenia reports:     Home warfarin dose: as updated on anticoagulation calendar per template     Missed doses: No     Medication changes:  Yes: Zpak starting today     S/S of bleeding or thromboembolism:  No     New Injury or illness:  Yes respiratory infection     Changes in diet or alcohol consumption:  No     Upcoming surgery, procedure or cardioversion:  No    Anticoagulation Episode Summary     Current INR goal:   2.0-3.0   TTR:   70.5 % (1.9 y)   Next INR check:   2/15/2019   INR from last check:   1.90! (2/8/2019)   Weekly max warfarin dose:      Target end date:   Indefinite   INR check location:      Preferred lab:      Send INR reminders to:   ANTICOAGULATION POOL A (WBY,WBE,MID,RSC)    Indications    Cerebrovascular disease [I67.9]           Comments:            Anticoagulation Care Providers     Provider Role Specialty Phone number    Mary,  Jerson Broussard MD [facility] [phone]

## 2021-06-23 NOTE — TELEPHONE ENCOUNTER
She should be on 0.125- I told her to stop the .250 just after my last visit  Please follow my instructions to pharmacy today- 0.125

## 2021-06-23 NOTE — TELEPHONE ENCOUNTER
Who is calling:   patient  Reason for Call:   Patient is wondering what happened with the Diet letter, or Diabetic letter for her  it was not filled out properly.   Writer had a hard time understanding the patient.  There is a letter in Media that is in the chart.  Writer could not find a name of a .      Please follow up  Date of last appointment with primary care:   1/25/2018  Has the patient been recently seen:  No  Okay to leave a detailed message: No

## 2021-06-23 NOTE — TELEPHONE ENCOUNTER
PCP please address when RTC tomorrow.  No letter is in chart. Last letter was Lab results.  Thanks.

## 2021-06-23 NOTE — TELEPHONE ENCOUNTER
Who is calling:  Patient   Reason for Call:  Returning call  Date of last appointment with primary care:   Has the patient been recently seen:    Okay to leave a detailed message: Yes

## 2021-06-23 NOTE — TELEPHONE ENCOUNTER
Medication Question or Clarification  Who is calling: Other: Home Care nurse setting up meds  What medication are you calling about?: What are the current orders for lisinopril, furosemide and magnesium oxide.  Also is the provider ok with patient taking acetaminophen 650 mg twice daily scheduled.  Please fax current medication orders to nurse.  There are lots of confusion between [facility] orders, TCU orders and Dr Hernandez's orders.   What dose do you take?: Please advise.  How often are you taking the medication?: Please advise  Who prescribed the medication?: Dr Hernandez  What is your question/concern?: Need medication orders clarified and faxed to [phone]   .   Pharmacy: [facility]  Okay to leave a detailed message?: Yes [phone]    Site CMT - Please call the pharmacy to obtain any additional needed information.

## 2021-06-23 NOTE — TELEPHONE ENCOUNTER
CMT needs MD to look at these 3 meds, they are not on her list but pharmacy sent to RN. Pharmacy also sent Rosuvastatin to nurse. Home care RN needs to know. Please advise. CMT cannot send medication list until it is updated.

## 2021-06-23 NOTE — TELEPHONE ENCOUNTER
CMT left detailed message to advise the nurse per MD note below regarding Lisinopril and Furosemide. Left message for nurse to call back to clarify magnesium usage.

## 2021-06-23 NOTE — TELEPHONE ENCOUNTER
Who is calling:  Rozina [facility]  Reason for Call:  Rozina is setting up patient medications tomorrow in her home and is requesting her Coumadin orders as home care has not been checking the INR. The patient has been in linic for her recent INR checks. Please advise at the number provided today 1/29.  Has the patient been recently seen:  Yes  Okay to leave a detailed message: Yes

## 2021-06-23 NOTE — TELEPHONE ENCOUNTER
"Okay- got it - but please give me doses on those meds- can you get doses and pend them as \"no print\"- it would help  "

## 2021-06-23 NOTE — TELEPHONE ENCOUNTER
I think this appears to be a work in progress - I would defer to Dr. Hernandez.     Yes, I am aware.      I had increased digoxin to try to better rate control patient but level, on 0.125, was in the middle of normal range.  We had stopped diltiazem due to relatively low blood pressures.  I did not think we could safely increase the dose of digoxin.  She is on metoprolol referred to cardiology.

## 2021-06-23 NOTE — TELEPHONE ENCOUNTER
Called and spoke with a pharmacist at [facility] and informed them that she should only be on the 125 mcg NOT the 250 mcg dose.     Closing encounter.

## 2021-06-23 NOTE — TELEPHONE ENCOUNTER
Okay- I dont know current order on magnesium- no magnesium unless there is a current order at home and she is taking it- in that case I will order that but lets get it on our list    Lisinopril 10 mg po daily  Furosemide 40 mg po daily    Okay for acetaminpophen as written

## 2021-06-23 NOTE — TELEPHONE ENCOUNTER
Medication Question or Clarification  Who is calling: Pharmacy: [facility]  What medication are you calling about?: rosuvastatin (CRESTOR)   What dose do you take?: 20 MG tablet  How often are you taking the medication?: 2 tablets (40 mg total) by mouth at bedtime  Who prescribed the medication?: Jerson Hernandez MD  What is your question/concern?: Pharmacy states patient's insurance will not cover the above medication. Please advise.  Pharmacy: [facility]  Okay to leave a detailed message?: No  Site CMT - Please call the pharmacy to obtain any additional needed information.

## 2021-06-23 NOTE — PROGRESS NOTES
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  Microalbumin ordered         Relevant Orders    Microalbumin, Random Urine    Paroxysmal A-fib (H) - Primary     Strategy is rate control/ anticoagulation  Anticoagulation? Yes/ medication for stroke prevention: Warfarin  INR ordered today?  Yes    Pulse today:  Pulse Readings from Last 3 Encounters:   02/08/19 (!) 48   01/25/19 (!) 102   01/11/19 (!) 124     Medication for rate control: Digoxin 0.125, metoprolol 25 twice daily  Comment: Recently digoxin started, diltiazem stopped due to hypotension         Relevant Medications    digoxin (LANOXIN) 125 mcg tablet    Other Relevant Orders    INR       Unprioritized    (HFpEF) heart failure with preserved ejection fraction (H)     Diastolic Congestive Heart Failure iswell compensated  Wt Readings from Last 3 Encounters:   02/08/19 169 lb (76.7 kg)   01/25/19 170 lb (77.1 kg)   01/11/19 168 lb 8 oz (76.4 kg)     BP Readings from Last 3 Encounters:   02/08/19 128/52   01/25/19 106/54   01/11/19 91/66   Note, regarding weight, patient wearing a lot of heavy closed today    Meds:  Antihypertensives: Apparently off of lisinopril 10 mg, metoprolol 25 twice daily  Diuretic?  Some confusion about this.  Last visit, was on 40 mg.  Now, per home nurse, not taking furosemide.  Patient thinks she might be taking something like that.  Nitrites/other? No    Changes/Recommendations: I recommend that she stop her lisinopril officially as her blood pressures are somewhat low and her microalbumin's have been normal and she has had normal systolic function.  We are going to recheck her microalbumin again today.  Furosemide should be dosed based on weight.  She has been well above 170 in the past when she has had exacerbation of CHF.  Recent home weights have been consistently 164 and 165.  Will  write that she should take her furosemide when her weight is above 170 and not take it on other days.  40 mg daily dose  Follow-up: 2 months  Be seeing cardiology around this time as well         Relevant Medications    digoxin (LANOXIN) 125 mcg tablet    Flashing lights seen     Concerning for retinal or vitreal detachment  No vision loss at this point, semi-urgent referral to ophthalmology         Relevant Orders    Ambulatory referral to Ophthalmology        Subjective  CC:  Chief Complaint   Patient presents with     Follow-up     2 week - INR      HPI:  Upper Respiratory infection  Duration 1 week  Current symptoms  Worst cough- productve- yellow  Mild sob  Runny nose?  yes  Sore throat? no  Cough/ productive or dry? productive  Fever? mild  HA/ achiness? headache  Symptoms at start of illness sneezing  Any medications?  No  History of allergies or sinus infections?  No  Patient has been a smoker, currently quit    For paroxysmal atrial fibrillation.  Patient has an appointment to follow-up with Dr. Hinojosa on 3/26.  Strategy is currently rate control and anticoagulation.  She is on Coumadin and allowed by Coumadin nurses.  She has had widely fluctuating pulse and blood pressure.  Gets orthostatic blood pressure when she stands up occasionally and indeed, in the last month, she is followed over because of this.    Patient has diastolic heart failure as well.      I have reviewed her twice daily vital signs and blood sugars.  Blood sugars are no problem, ranging from 103-122 this month.    Weights are stable between 164 and 165.  I do not have record of previous weights at home which were around 180.  Patient indicates that her weight here today is high because of the multiple close she has on because of the cold weather.    Morning pulse ranges from .  She takes metoprolol 25 mg twice daily, diltiazem was stopped due to hypotension, she is on 0.125 mg of digoxin.  I had planned up this to 0.25 but  stopped my plan when I found that she was in the middle of the therapeutic range on only 0.125 mg    Patient is stopped taking the lisinopril on her own because of low blood pressures   she has had a history of intermittently elevated creatinines, none lately.  Her microalbumin's have been normal, last done 1 year ago which will be repeated today.      Finally, patient states she seen flashes of lights out of her eye about 3 times in the past without they are truly being flashes of light.  She denies any pain or vision loss or floaters with this.    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)     Advanced directives, counseling/discussion     SBO (small bowel obstruction) (H)     Cystitis     Hypomagnesemia     Healthcare maintenance     Essential hypertension     Dysarthria     Cerebral infarction (H)     Anemia     Cerebrovascular disease     Benign adenomatous polyp of large intestine     RLS (restless legs syndrome)     Incisional hernia, without obstruction or gangrene     Abdominal pain, generalized     Diverticular disease of large intestine     Dysphagia     Coronary artery disease involving native coronary artery of native heart without angina pectoris     Dyslipidemia     Ventral hernia without obstruction or gangrene     Paroxysmal A-fib (H)     Anticoagulation management encounter     S/P cholecystectomy     SOB (shortness of breath)     Lower extremity edema     Anxiety     Hypokalemia     (HFpEF) heart failure with preserved ejection fraction (H)     Tachycardia     Anticoagulant long-term use     Biliary obstruction     Abdominal pain, right upper quadrant     Persistent atrial fibrillation (H)     Abdominal pain, left upper quadrant     Sinus  bradycardia     MAY (acute kidney injury) (H)     Transaminitis     Physical deconditioning     Pneumonia     Lipoma of back     Acalculous cholecystitis     Upper respiratory infection     Onychogryphosis     Hyperparathyroidism (H)     Flashing lights seen     Current medications reviewed as follows:  Current Outpatient Medications on File Prior to Visit   Medication Sig     aspirin 81 MG EC tablet Take 81 mg by mouth daily.      atorvastatin (LIPITOR) 80 MG tablet Take 1 tablet (80 mg total) by mouth at bedtime.     blood glucose meter (GLUCOMETER) Please Dispense kit per pharmacy discretion and patient's insurance Test blood sugar.     blood glucose test strips Use 1 each As Directed as needed. Dispense brand per patient's insurance at pharmacy discretion.     carboxymethylcellulose (REFRESH PLUS) 0.5 % Dpet ophthalmic dropperette Administer 2 drops to both eyes every hour as needed (dry eyes).      lancets 33 gauge Misc Test twice daily Dispense brand per patient's insurance at pharmacy discretion.     loratadine (CLARITIN) 10 mg tablet Take 10 mg by mouth daily with lunch. Noon     magnesium oxide (MAG-OX) 400 mg tablet Take 1,200 mg by mouth daily.      metoprolol tartrate (LOPRESSOR) 25 MG tablet TAKE 1 TABLET BY MOUTH TWICE DAILY (8AM & 8PM)     multivitamin (TAB-A-JULIET) per tablet Take 1 tablet by mouth daily. (Patient taking differently: Take 1 tablet by mouth daily. )     nitroglycerin (NITROSTAT) 0.4 MG SL tablet Place 1 tablet (0.4 mg total) under the tongue every 5 (five) minutes as needed for chest pain (for up to 3 doses and call 911 if persists). (Patient taking differently: Place 0.4 mg under the tongue every 5 (five) minutes as needed for chest pain (for up to 3 doses and call 911 if persists). )     omeprazole (PRILOSEC) 20 MG capsule TAKE 1 CAPSULE BY MOUTH TWICE DAILY (8AM & 8PM)     polyethylene glycol (GLYCOLAX) 17 gram/dose powder Take 17 g by mouth daily as needed.      potassium chloride  (K-DUR,KLOR-CON) 20 MEQ tablet Take 1 tablet (20 mEq total) by mouth daily.     pramipexole (MIRAPEX) 0.25 MG tablet Take 1 tablet (0.25 mg total) by mouth at bedtime.     rosuvastatin (CRESTOR) 20 MG tablet Take 2 tablets (40 mg total) by mouth at bedtime.     senna-docusate (PERICOLACE) 8.6-50 mg tablet Take 1 tablet by mouth 2 (two) times a day.     sertraline (ZOLOFT) 100 MG tablet Take 1 tablet (100 mg total) by mouth daily.     traZODone (DESYREL) 50 MG tablet Take 1 tablet (50 mg total) by mouth at bedtime.     VITAMIN D3 2,000 unit capsule TAKE 1 CAPSULE BY MOUTH ONCE DAILY AT 8AM (DO NOT BRAND CHANGE - PATIENT ONLY WANT CAPSULES)     warfarin (COUMADIN/JANTOVEN) 2 MG tablet Take 1 tablet daily by mouth on Mon/Wed/Fri/Sat     warfarin (COUMADIN/JANTOVEN) 4 MG tablet Take 1 tablet by mouth on Sun/Tues/Thurs     [DISCONTINUED] digoxin (LANOXIN) 125 mcg tablet Take 1 tablet (125 mcg total) by mouth daily.     [DISCONTINUED] lisinopril (PRINIVIL,ZESTRIL) 10 MG tablet Take 1 tablet (10 mg total) by mouth every morning.     Current Facility-Administered Medications on File Prior to Visit   Medication     rosuvastatin tablet 40 mg (CRESTOR)     Social History     Tobacco Use   Smoking Status Current Every Day Smoker     Packs/day: 0.25   Smokeless Tobacco Former User   Tobacco Comment    e-cig a few times/day     Social History     Social History Narrative     Not on file     Patient Care Team:  Jerson Hernandez MD as PCP - General (Family Medicine)  Jones Harding DO as Physician (General Surgery)  [facility]  Full 10 system review including constitutional, respiratory, cardiac, gi, urinary, rheumatologic, neurologic, reproductive, dermatologic psychiatric is  performed  and is otherwise negative         Objective  Physical Exam  Vitals:    02/08/19 1012   BP: 128/52   Patient Site: Left Arm   Patient Position: Sitting   Cuff Size: Adult Regular   Pulse: (!) 48   Resp: 16    SpO2: 97%   Weight: 169 lb (76.7 kg)     Body mass index is 29.94 kg/m .  Gen- alert, oriented/ appropriately responsive  HEENT- normal cephalic, atraumatic.   Chest- Normal inspiration and expiration.    Clear to ascultation.    No chest wall deformity or scar.  CV- Heart regular rate and rhythm  normal tones, no murmurs   No gallops or rubs.  Ext- appear well perfused, no edema  Skin- warm and dry,   no visualized rash    Diagnostics:   Pending      Please note: Voice recognition software was used in this dictation.  It may therefore contain typographical errors.

## 2021-06-23 NOTE — TELEPHONE ENCOUNTER
[facility] calling regarding refill of Coumadin. [facility] reports they have not filled Coumadin since last September. Last refill for Coumadin was printed script.     Routing to anticoagulation to address asap.

## 2021-06-23 NOTE — TELEPHONE ENCOUNTER
Medication Question or Clarification  Who is calling: Pharmacy: Janny from [facility]  What medication are you calling about?: rosuvastatin (CRESTOR) 20 MG tablet  What dose do you take?: see below Rx  How often are you taking the medication?: see below Rx  Who prescribed the medication?: pcp  What is your question/concern?: Reports this medication is not covered by insurance. A covered alternative would be Lipitor. Please advise!  Pharmacy: [facility] [id number] [place] [address] [address]  Okay to leave a detailed message?: Yes  Site CMT - Please call the pharmacy to obtain any additional needed information.    rosuvastatin (CRESTOR) 20 MG tablet   Medication   Date: 1/30/2019 Department: [facility]/OB Ordering/Authorizing: Jerson Hernandez MD   Order Providers     Prescribing Provider Encounter Provider   Jerson Hernandez MD Kleven, Thomas Lowell, MD   Medication Detail      Disp Refills Start End    rosuvastatin (CRESTOR) 20 MG tablet 90 tablet 1 1/30/2019     Sig - Route: Take 2 tablets (40 mg total) by mouth at bedtime. - Oral    Sent to pharmacy as: rosuvastatin (CRESTOR) 20 MG tablet    E-Prescribing Status: Receipt confirmed by pharmacy (1/30/2019 11:15 AM CST)    Pharmacy     [facility] [address]

## 2021-06-23 NOTE — TELEPHONE ENCOUNTER
RN Triage:   2 orders for coumadin were received at the [facility].   2mg daily of Warfarin  And another order stating 4 mg Sunday Tues, Thurs 2 mg all other days.     Pharmacy is looking for clarification on orders. Per Anticoagulation clinic notes from 1/11/19.    Warfarin Dosing Instructions:  Continue current warfarin dose 4 mg daily on Sundays, Tuesdays and Thursdays; and 2 mg daily rest of week  (0 % change)  Spoke with Norberto CHRISTIAN and relayed this message.     Chery Causey RN, BSN Care Connection Triage Nurse          Reason for Disposition    Health Information question, no triage required and triager able to answer question    Protocols used: INFORMATION ONLY CALL-A-AH

## 2021-06-23 NOTE — TELEPHONE ENCOUNTER
Who is calling:  Joann from [facility]   Reason for Call:  Needs more specific directions   Date of last appointment with primary care:   Has the patient been recently seen:    Okay to leave a detailed message: Yes    Need more specific directions. Pharmacy sends medication in bubble packs for patient. Needs more specific daily directions.

## 2021-06-23 NOTE — TELEPHONE ENCOUNTER
"Medication Question or Clarification  Who is calling: Pharmacy: Gill from [facility]  What medication are you calling about?: digoxin  What dose do you take?: 125 mcg  How often are you taking the medication?: Take 1 tablet (125 mcg total) by mouth daily. - Oral  Who prescribed the medication?: Jerson Hernandez MD   What is your question/concern?: Caller stated their pharmacy received an Rx for digoxin 125 mcg on 1/11 and then also received an Rx for digoxin 250 mg on 1/25. Caller stated nurse Jerson, told them it was okay to discontinue the digoxin 125 mcg and diltiazem. Caller stated they are confused now because they dispensed to the patient the digoxin 250 mg and now today, they received an Rx for digoxin 125 mcg with notes on the Rx \"please do not fill, this is to confirm correct dose is 0.125\". Please call the pharmacy back with the correct dose.  Pharmacy: [facility]  Okay to leave a detailed message?: No [phone]  Site CMT - Please call the pharmacy to obtain any additional needed information.  "

## 2021-06-23 NOTE — TELEPHONE ENCOUNTER
Spoke with home care nurse Rozina with concerns of every INR needs warfarin dosing [facility]. This was noted in description section with calendar.

## 2021-06-23 NOTE — TELEPHONE ENCOUNTER
ANTICOAGULATION  MANAGEMENT    Assessment     Today's INR result of 2.4 is Therapeutic (goal INR of 2.0-3.0)        Warfarin taken as previously instructed    No new diet changes affecting INR    No new medication/supplements affecting INR    Continues to tolerate warfarin with no reported s/s of bleeding or thromboembolism     Previous INR was Therapeutic    Plan:     Spoke with Gardenia regarding INR result and instructed:     Message left for Alyssa nurse from [facility] in regards to INR result and instructions that were given to Gardenia.     Warfarin Dosing Instructions:  Continue current warfarin dose 4 mg daily on Sundays, Tuesdays and Thursdays; and 2 mg daily rest of week  (0 % change)    Instructed patient to follow up no later than: 1-2 weeks. Says she has appt in office again on 1/25 and will et rechecked at that time.    Education provided: importance of therapeutic range, importance of following up for INR monitoring at instructed interval and importance of taking warfarin as instructed    Gardenia verbalizes understanding and agrees to warfarin dosing plan.    [facility], questions or concerns. (#[phone])   ?   Danna Chew RN    Subjective/Objective:      Gardenia GATITO Renzo, a 68 y.o. female is on warfarin.     Gardenia reports:     Home warfarin dose: verbally confirmed home dose with Gardenia and updated on anticoagulation calendar     Missed doses: No     Medication changes:  No     S/S of bleeding or thromboembolism:  No     New Injury or illness:  No     Changes in diet or alcohol consumption:  No     Upcoming surgery, procedure or cardioversion:  No    Anticoagulation Episode Summary     Current INR goal:   2.0-3.0   TTR:   72.3 % (1.9 y)   Next INR check:   1/25/2019   INR from last check:   2.40 (1/11/2019)   Weekly max warfarin dose:      Target end date:   Indefinite   INR check location:      Preferred lab:      Send INR reminders to:    ANTICOAGULATION POOL A (WBY,WBE,MID,RSC)    Indications    Cerebrovascular disease [I67.9]           Comments:            Anticoagulation Care Providers     Provider Role Specialty Phone number    Jerson Hernandez MD Referring [facility] [phone]

## 2021-06-23 NOTE — TELEPHONE ENCOUNTER
Yes, not sure what to do, I think that is the letter.  Let me know what is needed.  May need to check with her

## 2021-06-23 NOTE — PATIENT INSTRUCTIONS - HE
Discontinue lisinopril    Water pill recommendations:  This is based on my understanding that you are not taking any furosemide currently  But that you have 40 mg furosemide pills at home    I recommend that you take 1 pill in the morning if/and only if your weight is 167 pounds or more area do not take furosemide on days your weight is less than 167 pounds    Wear compression stockings every day    See eye doctor as soon as possible.  I worry that those flashing lights may be related to retinal detachment which could be dangerous to your vision.    I will call in a simple antibiotic, azithromycin , for bronchitis.  Like most antibiotics, this can make your INR go higher.     Please let your nurse know that we are not able to give written orders for Coumadin because the recommendations are made by the Coumadin nurse after you are in the office.  We cant fax geritom every change in coumadin dose    See me back 2 months

## 2021-06-24 NOTE — TELEPHONE ENCOUNTER
Refill Request  Did you contact pharmacy: Yes  Medication name: warfarin (COUMADIN/JANTOVEN) 4 MG tablet and warfarin (COUMADIN/JANTOVEN) 2 MG tablet  Pharmacy is asking for a verbal to fill a years worth of this medications for the patient    Who prescribed the medication:  Dr. Hernandez  Pharmacy Name and Location: [facility]  Is patient out of medication: unknown  Patient notified refills processed in 72 hours:  yes  Okay to leave a detailed message: yes

## 2021-06-24 NOTE — TELEPHONE ENCOUNTER
ANTICOAGULATION  MANAGEMENT- Home Care/Care Facility Result    Assessment     Today's INR result of 2.3 is Therapeutic (goal INR of 2.0-3.0)        Warfarin taken as previously instructed    No new diet changes affecting INR    No new medication/supplements affecting INR    Continues to tolerate warfarin with no reported s/s of bleeding or thromboembolism     Previous INR was Therapeutic    Plan:     Spoke with home care nurse Rozina discussed INR result and instructed:     Warfarin Dosing Instructions: Continue current warfarin dose 2 mg daily on mon/wed/sat; and 4 mg daily rest of week  (0 % change)    Next INR to be drawn: two weeks      Rozina verbalizes understanding and agrees to warfarin dosing plan.   ?   Joe Vasquez RN    Subjective/Objective:      Gardenia Muller, a 68 y.o. female is established on warfarin.     Home care/care facility RN's report of [facility] INR, recent warfarin dosing, diet changes, medication changes, and symptoms is documented below.    Additional findings: none    Anticoagulation Episode Summary     Current INR goal:   2.0-3.0   TTR:   70.6 % (2 y)   Next INR check:   2/27/2019   INR from last check:   2.30 (2/13/2019)   Weekly max warfarin dose:      Target end date:   Indefinite   INR check location:      Preferred lab:      Send INR reminders to:   ANTICOAGULATION POOL A (WBY,WBE,MID,RSC)    Indications    Cerebrovascular disease [I67.9]           Comments:            Anticoagulation Care Providers     Provider Role Specialty Phone number    Jerson Hernandez MD Referring Family Medicine [phone]

## 2021-06-24 NOTE — TELEPHONE ENCOUNTER
Name of form/paperwork: Other:  [employer] large pull ups form request   Have you been seen for this request: No  Do we have the form: Form was faxed in on 2/11/19 and will refax today  When is form needed by: ASAP  How would you like the form returned: return via fax  Fax Number: [phone]  Patient Notified form requests are processed in 3-5 business days: No  (If patient needs form sooner, please note that in this message.)  Okay to leave a detailed message? Yes    Caller is asking for form status

## 2021-06-24 NOTE — TELEPHONE ENCOUNTER
INR result is 2.3  INR   Date Value Ref Range Status   02/08/2019 1.90 (H) 0.90 - 1.10 Final       Will the patient be seen, or did they already see, MD or CNP today? No    Most Recent Warfarin dose day/week  Sunday Monday Tuesday Wednesday Thursday Friday Saturday     4 mg 2 mg 4 mg 4 mg 2 mg     Sunday Monday Tuesday Wednesday Thursday Friday Saturday   4 mg 2 mg            Has the patient missed any doses of Coumadin, Warfarin, Jantoven in the past 7 days? No    Has the patients medications changed since the last visit? No    Has the patient experienced any bleeding recently? No    Has the patient experienced any injuries or illness recently? Yes Z-NÉSTOR Start 2/8 for cold symptoms     Has the patient experienced any 'new' shortness of breath, severe headaches, or changes in vision recently? No    Has the patient had any changes in their diet, or alcohol consumption? No    Is the patient here today to prepare for any type of upcoming surgery, procedure, or for a cardioversion procedure? No    What phone number can we reach the patient at today? [phone] Rozina .

## 2021-06-24 NOTE — TELEPHONE ENCOUNTER
ANTICOAGULATION  MANAGEMENT- Home Care/Care Facility Result    Assessment     Today's INR result of 4.1 is Supratherapeutic (goal INR of 2.0-3.0)        Warfarin taken as previously instructed    No new diet changes affecting INR    No new medication/supplements affecting INR    Continues to tolerate warfarin with no reported s/s of bleeding or thromboembolism     Previous INR was Therapeutic    Plan:     Spoke with Rozina, nurse, discussed INR result and instructed:     Warfarin Dosing Instructions: Hold warfarin today only then change warfarin dose to 4 mg daily on Sun, Tue, Thu; and 2 mg daily rest of week  (9.1 % change)    Next INR to be drawn: 1 week    Education provided: target INR goal and significance of current INR result, importance of following up for INR monitoring at instructed interval and importance of taking warfarin as instructed    Rozina verbalizes understanding and agrees to warfarin dosing plan.   ?   Flower Cee RN    Subjective/Objective:      G[place]ia Muller, a 68 y.o. female is established on warfarin.     Home care/care facility RN's report of [place] INR, recent warfarin dosing, diet changes, medication changes, and symptoms is documented below.    Additional findings: none    Anticoagulation Episode Summary     Current INR goal:   2.0-3.0   TTR:   70.0 % (2 y)   Next INR check:   3/6/2019   INR from last check:   4.10! (2/27/2019)   Weekly max warfarin dose:      Target end date:   Indefinite   INR check location:      Preferred lab:      Send INR reminders to:   ANTICOAGULATION POOL A (WBY,WBE,MID,RSC)    Indications    Cerebrovascular disease [I67.9]           Comments:            Anticoagulation Care Providers     Provider Role Specialty Phone number    Jerson Hernandez MD Referring Family Medicine [phone]

## 2021-06-24 NOTE — TELEPHONE ENCOUNTER
Fax received from [facility] pharmacy requesting Prior Authorization    Medication Name: Omeprazole 20mg capsules    Insurance Plan: [employer]   PBM: [employer]   Patient ID Number: [id number]    Please start PA process

## 2021-06-24 NOTE — TELEPHONE ENCOUNTER
INR result is 4.1  INR   Date Value Ref Range Status   02/13/2019 2.30 (!) 0.9 - 1.1 Final       Will the patient be seen, or did they already see, MD or CNP today? No    Most Recent Warfarin dose day/week  Sunday Monday Tuesday Wednesday Thursday Friday Saturday      2 4 4 2     Sunday Monday Tuesday Wednesday Thursday Friday Saturday   4 2 4           Has the patient missed any doses of Coumadin, Warfarin, Jantoven in the past 7 days? No    Has the patients medications changed since the last visit? No    Has the patient experienced any bleeding recently? Yes Some slight easy bruising on her arm    Has the patient experienced any injuries or illness recently? Feels tired    Has the patient experienced any 'new' shortness of breath, severe headaches, or changes in vision recently? No    Has the patient had any changes in their diet, or alcohol consumption? No    Is the patient here today to prepare for any type of upcoming surgery, procedure, or for a cardioversion procedure? No    What phone number can we reach the patient at today? home phone listed in demographics.

## 2021-06-24 NOTE — TELEPHONE ENCOUNTER
Dr. Hernandez,    Is there something we can send to them to fix this? I already called to clear this up with them and they still are not giving the correct dosage.

## 2021-06-24 NOTE — TELEPHONE ENCOUNTER
ANTICOAGULATION  MANAGEMENT- Home Care/Care Facility Result    Assessment     Today's INR result of 3.6 is Supratherapeutic (goal INR of 2.0-3.0)        Warfarin recently held as instructed which may be affecting INR    Stomach bug/diarrhea symptoms 2/28 and 2/29 may be affecting diet and INR- per Rozina the symptom has gotten better    No new medication/supplements affecting INR    Continues to tolerate warfarin with no reported s/s of bleeding or thromboembolism     Previous INR was Supratherapeutic    Plan:     Spoke with Rozina discussed INR result and instructed:     Warfarin Dosing Instructions: hold warfarin dose today then continue current warfarin dose    4 mg every Sun, Tue, Thu; 2 mg all other days     (0 % change)    Next INR to be drawn: one week  Rozina is requesting for the encounter to be faxed to [facility]@ [phone] due to patient is running out of warfarin medications    Education provided: importance of therapeutic range and target INR goal and significance of current INR result    Rozina verbalizes understanding and agrees to warfarin dosing plan.   ?   Martha Oakley RN    Subjective/Objective:      Gardenia Muller, a 68 y.o. female is established on warfarin.     Home care/care facility RN's report of [place] INR, recent warfarin dosing, diet changes, medication changes, and symptoms is documented below.    Additional findings: Diarrhea symptoms started on 2/28, 2/29 per Rozina the symptoms has gotten better. Appetite is affected due to symptoms    Anticoagulation Episode Summary     Current INR goal:   2.0-3.0   TTR:   69.3 % (2 y)   Next INR check:   3/13/2019   INR from last check:   3.60! (3/6/2019)   Weekly max warfarin dose:      Target end date:   Indefinite   INR check location:      Preferred lab:      Send INR reminders to:   ANTICOAGULATION POOL A (WBY,WBE,MID,RSC)    Indications    Cerebrovascular disease [I67.9]           Comments:            Anticoagulation Care  Providers     Provider Role Specialty Phone number    Jerson Hernandez MD Referring Family Medicine [phone]

## 2021-06-24 NOTE — TELEPHONE ENCOUNTER
Medication Question or Clarification  Who is calling: Other: Home Care nurse Desean Handy  What medication are you calling about?: Digoxin   What dose do you take?: per nurse this is the issue  How often are you taking the medication?: daily   Who prescribed the medication?: Dr. Jerson Hernandez   What is your question/concern?: Home care nurse was calling to get refill and clarification on the patient prescription.  Nurse states the pharmacy is telling home care the 0.125 prescription was stopped per a phone call from clinic. Nurse states patient is now out of the 0.125 dosage of the medication.   Nurse did state she was able to cut a 0.250 tablet in half for patient's dosage today.  But there needs to be a new refill called into to the pharmacy as well as a specific order stating to discontinue the 0.250 dosage.  If the pharmacy does not have a direct order to discontinue a medical they will continue to fill the prescription.      Pharmacy: [facility] [phone]  Okay to leave a detailed message?: Yes

## 2021-06-24 NOTE — TELEPHONE ENCOUNTER
ANTICOAGULATION  MANAGEMENT- Home Care/Care Facility Result    Assessment     Today's INR result of 3.0 is Therapeutic (goal INR of 2.0-3.0)        Warfarin taken as previously instructed    No new diet changes affecting INR    No new medication/supplements affecting INR    Continues to tolerate warfarin with no reported s/s of bleeding or thromboembolism     Previous INR was Supratherapeutic    Plan:     Spoke with Nurse discussed INR result and instructed:     Warfarin Dosing Instructions: Change warfarin dose to 4 mg daily on sun/tue; and 2 mg daily rest of week  (10 % change)    Next INR to be drawn: one week with home care      Rozina verbalizes understanding and agrees to warfarin dosing plan.   ?   Joe Vasquez RN    Subjective/Objective:      G[place]ia Muller, a 68 y.o. female is established on warfarin.     Home care/care facility RN's report of [place] INR, recent warfarin dosing, diet changes, medication changes, and symptoms is documented below.    Additional findings: verbally confirmed home dose with Rozina and updated on anticoagulation calendar    Anticoagulation Episode Summary     Current INR goal:   2.0-3.0   TTR:   68.6 % (2 y)   Next INR check:   3/20/2019   INR from last check:   3.00 (3/13/2019)   Weekly max warfarin dose:      Target end date:   Indefinite   INR check location:      Preferred lab:      Send INR reminders to:   ANTICOAGULATION POOL A (WBY,WBE,MID,RSC)    Indications    Cerebrovascular disease [I67.9]           Comments:            Anticoagulation Care Providers     Provider Role Specialty Phone number    Jerson Hernandez MD Referring Family Medicine [phone]

## 2021-06-24 NOTE — TELEPHONE ENCOUNTER
INR result is 3.0  INR   Date Value Ref Range Status   03/06/2019 3.60 (!) 0.9 - 1.1 Final       Will the patient be seen, or did they already see, MD or CNP today? No    Most Recent Warfarin dose day/week  Sunday Monday Tuesday Wednesday Thursday Friday Saturday   4 2 4 held 4 2 2     Sunday Monday Tuesday Wednesday Thursday Friday Saturday                Has the patient missed any doses of Coumadin, Warfarin, Jantoven in the past 7 days? No    Has the patients medications changed since the last visit? No    Has the patient experienced any bleeding recently? No    Has the patient experienced any injuries or illness recently? No    Has the patient experienced any 'new' shortness of breath, severe headaches, or changes in vision recently? No    Has the patient had any changes in their diet, or alcohol consumption? No    Is the patient here today to prepare for any type of upcoming surgery, procedure, or for a cardioversion procedure? No    What phone number can we reach the patient at today? home phone listed in demographics.

## 2021-06-24 NOTE — TELEPHONE ENCOUNTER
INR result is 3.6  INR   Date Value Ref Range Status   02/27/2019 4.10 (!) 0.9 - 1.1 Final       Will the patient be seen, or did they already see, MD or CNP today? No    Most Recent Warfarin dose day/week  Sunday Monday Tuesday Wednesday Thursday Friday Saturday      held 4 2 2     Sunday Monday Tuesday Wednesday Thursday Friday Saturday   4 2 4           Has the patient missed any doses of Coumadin, Warfarin, Jantoven in the past 7 days? No    Has the patients medications changed since the last visit? No    Has the patient experienced any bleeding recently? No    Has the patient experienced any injuries or illness recently? Yes possible stomach bug    Has the patient experienced any 'new' shortness of breath, severe headaches, or changes in vision recently? No    Has the patient had any changes in their diet, or alcohol consumption? No    Is the patient here today to prepare for any type of upcoming surgery, procedure, or for a cardioversion procedure? No    What phone number can we reach the patient at today? Please call Rozina back with dosing.

## 2021-06-25 NOTE — TELEPHONE ENCOUNTER
INR result is 3.5 finger stick  INR   Date Value Ref Range Status   03/13/2019 3.00 (!) 0.9 - 1.1 Final       Will the patient be seen, or did they already see, MD or CNP today? No    Most Recent Warfarin dose day/week  Sunday Monday Tuesday Wednesday Thursday Friday Saturday      2 mg 4 mg 2 mg 2 mg     Sunday Monday Tuesday Wednesday Thursday Friday Saturday   4 mg 2 mg 2 mg           Has the patient missed any doses of Coumadin, Warfarin, Jantoven in the past 7 days? No    Has the patients medications changed since the last visit? No    Has the patient experienced any bleeding recently? No    Has the patient experienced any injuries or illness recently? Yes Has cold symptoms     Has the patient experienced any 'new' shortness of breath, severe headaches, or changes in vision recently? No    Has the patient had any changes in their diet, or alcohol consumption? No    Is the patient here today to prepare for any type of upcoming surgery, procedure, or for a cardioversion procedure? No    What phone number can we reach the patient at today? [phone] Rozina.

## 2021-06-25 NOTE — TELEPHONE ENCOUNTER
ANTICOAGULATION  MANAGEMENT- Home Care/Care Facility Result    Assessment     Today's INR result of 3.5 is Supratherapeutic (goal INR of 2.0-3.0)        Warfarin taken as previously instructed    No new diet changes affecting INR    No new medication/supplements affecting INR    Continues to tolerate warfarin with no reported s/s of bleeding or thromboembolism     Note that patient currently complaining of cold symptoms    Previous INR was Therapeutic at 3.0    Plan:     Spoke with nurse Rozina discussed INR result and instructed:     Warfarin Dosing Instructions: Change warfarin dose to 4 mg daily on Sun; and 2 mg daily rest of week  (11 % change)    Next INR to be drawn: one week    Education provided: importance of therapeutic range and target INR goal and significance of current INR result    Rozina verbalizes understanding and agrees to warfarin dosing plan.   ?   Bia Crane RN    Subjective/Objective:      G[place]ia Muller, a 68 y.o. female is established on warfarin.     Home care/care facility RN's report of [place] INR, recent warfarin dosing, diet changes, medication changes, and symptoms is documented below.    Additional findings: none    Anticoagulation Episode Summary     Current INR goal:   2.0-3.0   TTR:   68.0 % (2 y)   Next INR check:   3/27/2019   INR from last check:   3.50! (3/20/2019)   Weekly max warfarin dose:      Target end date:   Indefinite   INR check location:      Preferred lab:      Send INR reminders to:   ANTICOAGULATION POOL A (WBY,WBE,MID,RSC)    Indications    Cerebrovascular disease [I67.9]           Comments:            Anticoagulation Care Providers     Provider Role Specialty Phone number    Jerson Hernandez MD Referring Family Medicine [phone]

## 2021-06-25 NOTE — TELEPHONE ENCOUNTER
Who is calling:  Pharmacy  Reason for Call:  Requesting more direct orders on what patient is taking.

## 2021-06-25 NOTE — TELEPHONE ENCOUNTER
Refill Request  Did you contact pharmacy: Yes; pharmacy stated they need a new order faxed to them for the medication refill.  Medication name: Warfarin reflecting new dosing for patient.  Requested Prescriptions      No prescriptions requested or ordered in this encounter     Who prescribed the medication: Dr. Hernandez  Pharmacy Name and Location: [facility] on file  Is patient out of medication: Yes  Patient notified refills processed in 72 hours:  yes  Okay to leave a detailed message: yes

## 2021-06-25 NOTE — TELEPHONE ENCOUNTER
Routed to the anticoagulation pool.    Filomena Aparicio, RN, BSN, PHN  [facility] Medication Refill Nurse  3/15/2019  7:54 AM

## 2021-06-26 NOTE — PROGRESS NOTES
Progress Notes by Rula Jorgensen CNP at 9/18/2018 12:50 PM     Author: Rula Jorgensen CNP Service: -- Author Type: Nurse Practitioner    Filed: 9/18/2018  1:46 PM Encounter Date: 9/18/2018 Status: Signed    : Rula Jorgensen CNP (Nurse Practitioner)           Click to link to [facility] HEART CARE NOTE      Assessment/Recommendations   Assessment:    1.  Heart failure with preserved ejection fraction: She has lost about 13 pounds in the past 2 weeks since her primary changed her to torsemide.  Her weight has been stable for the past 4 days around 173 pounds.  She no longer has lower extremity edema.  Her shortness of breath with activity is mild and has improved.  She denies any lightheadedness and has no low blood pressures.    2.  Paroxysmal atrial fibrillation: Holter monitor on September 7, 2018 showed sinus rhythm with heart rate  bpm.  No atrial fibrillation.  Her heart rates according to her home blood pressure machine on September 13 and 14 were around 120 bpm so she may have been in atrial fibrillation again.  She was asymptomatic.  She continues to take warfarin.    Plan:  1.  Continue current medications  2.  BMP pending  3.  Continue low-sodium diet and daily weights    Gardenia Muller will follow up with her primary care provider on September 20.  Follow-up in the heart failure clinic in 1 month and with Dr. Choe on November 30.     History of Present Illness    Ms. Gardenia Muller is a 68 y.o. female seen at [facility] heart failure clinic today for continued follow-up.  She has a history of hypertension, coronary artery disease, dyslipidemia, stroke, paroxysmal atrial fibrillation, and diabetes.  Echocardiogram on August 3, 2018 showed ejection fraction of 55% and mild mitral regurgitation.    During her last clinic visit, her diltiazem was increased to 180 mg daily due to increased heart rates in atrial fibrillation.   Holter monitor on September 7, 2018 showed sinus rhythm with heart rate  bpm and no atrial fibrillation.  She saw her primary care provider on September 5.  Lasix was changed to torsemide due to continued fluid retention.  She has lost about 13 pounds according to her home scale.  She has now been stable around 173 pounds for the past 4 days.  Her lower extremity edema has improved.  She has mild shortness of breath with activity which has improved.  She denies fatigue, lightheadedness, orthopnea, chest pain and lower extremity edema.      She monitors her blood pressure, heart rate, and weights closely at home.  Blood pressures have been well controlled around 110s over 60s.  Heart rates are typically in the 70s in September.  On September 13th and 14th, her heart rates were around 120 bpm so she may have been back in atrial fibrillation.       Physical Examination Review of Systems   Vitals:    09/18/18 1314   BP: 134/62   Pulse: 62   Resp: 16     Body mass index is 31.88 kg/(m^2).  Wt Readings from Last 3 Encounters:   09/18/18 174 lb 4.8 oz (79.1 kg)   09/05/18 185 lb (83.9 kg)   08/24/18 189 lb (85.7 kg)       General Appearance:     Alert, cooperative and in no acute distress.   ENT/Mouth: membranes moist, no oral lesions or bleeding gums.      EYES:  no scleral icterus, normal conjunctivae   Chest/Lungs:   lungs are clear to auscultation, no rales or wheezing, respirations unlabored   Cardiovascular:   Regular. Normal first and second heart sounds; no edema bilateral lower extremities    Abdomen:  Soft, nontender, nondistended, bowel sounds present   Extremities: no cyanosis or clubbing   Skin: warm, dry.    Neurologic: mood and affect are appropriate, alert and oriented x3      General: Weight Loss  Eyes: WNL  Ears/Nose/Throat: WNL  Lungs: Cough  Heart: WNL  Stomach: WNL  Bladder: WNL  Muscle/Joints: Muscle Weakness  Skin: WNL  Nervous System: WNL  Mental Health: WNL     Blood: WNL     Medical History   Surgical History Family History Social History   Past Medical History:   Diagnosis Date   ? Acute diastolic heart failure (H) 11/2015   ? Atrial fibrillation (H)    ? Cerebral vascular accident (H)    ? Coronary artery disease 2006    100% RCA with collateral filling per Dr. Elizabeth's angio report   ? Diabetes mellitus type 2 in obese (H)    ? Fibrocystic breast    ? GERD (gastroesophageal reflux disease)    ? History of anesthesia complications     slow to wake   ? Hypertension    ? Peripheral vascular disease (H)    ? PONV (postoperative nausea and vomiting)    ? Stroke (H)    ? Urinary incontinence     Past Surgical History:   Procedure Laterality Date   ? CARDIAC CATHETERIZATION     ? CORONARY STENT PLACEMENT  1995    stent   ? EXPLORATORY LAPAROTOMY N/A 6/29/2018    Procedure: LAPAROTOMY, CLOSURE OF PERITONEAL RENT;  Surgeon: Jones Harding DO;  Location: [facility];  Service:    ? [facility]  6/29/2018        ? VENTRAL HERNIA REPAIR N/A 11/12/2015    Procedure: STRANGULATED VENTRAL HERNIA REPAIR ;  Surgeon: Ernesto Bailey MD;  Location: [facility];  Service:     Family History   Problem Relation Age of Onset   ? Cancer Paternal Grandmother    ? Cancer Paternal Uncle    ? No Medical Problems Mother    ? No Medical Problems Father    ? Heart attack Sister    ? Chronic Kidney Disease Sister    ? No Medical Problems Daughter    ? No Medical Problems Maternal Grandmother    ? No Medical Problems Maternal Grandfather    ? No Medical Problems Paternal Grandfather    ? No Medical Problems Maternal Aunt    ? No Medical Problems Paternal Aunt    ? Diabetes Brother    ? BRCA 1/2 Neg Hx    ? Breast cancer Neg Hx    ? Colon cancer Neg Hx    ? Endometrial cancer Neg Hx    ? Ovarian cancer Neg Hx     Social History     Social History   ? Marital status: Legally      Spouse name: N/A   ? Number of children: N/A   ? Years of education: N/A     Occupational History   ? Not on file.     Social History  Main Topics   ? Smoking status: Current Every Day Smoker     Packs/day: 0.25   ? Smokeless tobacco: Former User      Comment: e-cig a few times/day   ? Alcohol use No   ? Drug use: No   ? Sexual activity: Not on file     Other Topics Concern   ? Not on file     Social History Narrative          Medications  Allergies   Current Outpatient Prescriptions   Medication Sig Dispense Refill   ? acetaminophen (TYLENOL) 650 MG CR tablet Take 650 mg by mouth 2 (two) times a day.      ? aspirin 81 MG EC tablet Take 81 mg by mouth daily.     ? blood glucose meter (GLUCOMETER) Please Dispense kit per pharmacy discretion and patient's insurance Test blood sugar. 1 each 0   ? blood glucose test strips Use 1 each As Directed as needed. Dispense brand per patient's insurance at pharmacy discretion. 50 strip 11   ? cholecalciferol, vitamin D3, (VITAMIN D3) 2,000 unit capsule Take 2,000 Units by mouth daily with lunch.     ? diltiazem (CARDIZEM CD) 180 MG 24 hr capsule Take 1 capsule (180 mg total) by mouth daily. 30 capsule 11   ? ferrous sulfate 325 (65 FE) MG tablet Take 1 tablet (325 mg total) by mouth 2 (two) times a day. 60 tablet 3   ? lancets 33 gauge Misc Test twice daily Dispense brand per patient's insurance at pharmacy discretion. 100 each 11   ? loratadine (CLARITIN) 10 mg tablet Take 10 mg by mouth daily with lunch. Noon     ? LORazepam (ATIVAN) 0.5 MG tablet 1-2 tabs po q 6 hours prn 20 tablet 0   ? magnesium oxide (MAG-OX) 400 mg tablet Take 1,200 mg by mouth daily.     ? metoprolol tartrate (LOPRESSOR) 25 MG tablet TAKE 1 TABLET BY MOUTH TWICE DAILY (8AM & 8PM) 60 tablet 10   ? multivitamin (TAB-A-JULIET) per tablet Take 1 tablet by mouth daily. 100 tablet 11   ? nitroglycerin (NITROSTAT) 0.4 MG SL tablet Place 1 tablet (0.4 mg total) under the tongue every 5 (five) minutes as needed for chest pain (for up to 3 doses and call 911 if persists). 90 tablet 0   ? omeprazole (PRILOSEC) 20 MG capsule Take 20 mg by mouth 2  (two) times a day.      ? omeprazole (PRILOSEC) 20 MG capsule TAKE 1 CAPSULE BY MOUTH TWICE DAILY (8AM & 8PM) 60 capsule 5   ? polyethylene glycol (GLYCOLAX) 17 gram/dose powder 17 g daily as needed.      ? polyvinyl alcohol (ARTIFICIAL TEARS, POLYVIN ALC,) 1.4 % ophthalmic solution Administer 2 drops to both eyes as needed for dry eyes. As directed.      ? potassium chloride (K-DUR,KLOR-CON) 20 MEQ tablet Take 1 tablet (20 mEq total) by mouth daily. 30 tablet 11   ? rOPINIRole (REQUIP) 1 MG tablet 1 tablet 30-60 minutes before bedtime 30 tablet 2   ? rosuvastatin (CRESTOR) 40 MG tablet TAKE 1 TABLET BY MOUTH AT BEDTIME 30 tablet 11   ? sertraline (ZOLOFT) 100 MG tablet TAKE 1 TABLET BY MOUTH ONCE DAILY 31 tablet 10   ? torsemide (DEMADEX) 20 MG tablet Take 1 tablet (20 mg total) by mouth 2 (two) times a day at 9am and 6pm. 60 tablet 3   ? traZODone (DESYREL) 50 MG tablet Take 1 tablet (50 mg total) by mouth at bedtime. 30 tablet 10   ? traZODone (DESYREL) 50 MG tablet TAKE 1 TABLET BY MOUTH AT BEDTIME 30 tablet 5   ? VITAMIN D3 2,000 unit capsule TAKE 1 CAPSULE BY MOUTH ONCE DAILY AT 8AM (DO NOT BRAND CHANGE - PATIENT ONLY WANT CAPSULES) 30 capsule 5   ? warfarin (COUMADIN) 2 MG tablet Take 2 to 4mg (1 or 2 tabs) by mouth daily as directed.  Adjust dose based on INR. 125 tablet 0   ? pramipexole (MIRAPEX) 0.25 MG tablet Take 1 tablet (0.25 mg total) by mouth at bedtime. 30 tablet 2     No current facility-administered medications for this visit.       Allergies   Allergen Reactions   ? Penicillins Swelling         Lab Results    Chemistry CBC BNP   Lab Results   Component Value Date    CREATININE 0.75 09/05/2018    BUN 7 (L) 09/05/2018     09/05/2018    K 4.6 09/05/2018     09/05/2018    CO2 24 09/05/2018     Creatinine (mg/dL)   Date Value   09/05/2018 0.75   08/24/2018 0.75   08/18/2018 0.72   08/15/2018 0.80    Lab Results   Component Value Date    WBC 4.8 08/18/2018    HGB 12.0 09/05/2018    HCT  33.2 (L) 08/18/2018    MCV 87 08/18/2018     08/18/2018    Lab Results   Component Value Date     (H) 09/05/2018     BNP (pg/mL)   Date Value   09/05/2018 736 (H)   08/18/2018 931 (H)   08/15/2018 544 (H)            Rula Jorgensen, [facility]   Heart Failure Clinic

## 2021-06-26 NOTE — PROGRESS NOTES
Progress Notes by Sonali Hidalgo MD at 10/17/2018  8:41 AM     Author: Sonali Hidalgo MD Service: Cardiology Author Type: Physician    Filed: 10/17/2018  9:10 AM Date of Service: 10/17/2018  8:41 AM Status: Signed    : Sonali Hidalgo MD (Physician)           Click to link to [facility] Heart Care     [facility] HEART CARE NOTE        Assessment/Recommendations   Assessment:  1. Persistent atrial fibrillation/flutter: Heart rate has now remained elevated and limited by hypotension.  Continue on diltiazem and metoprolol at 180 and 50 mg twice a day.  Cardioversion with NIVIA was discussed with her yesterday however she is still reticent.  Will load with p.o. amiodarone today to attempt rhythm and rate control.  Currently on Coumadin for anticoagulation.  2. Acute on chronic diastolic heartfailure: Secondary to recent surgery and atrial fibrillation with rapid ventricular response.  He has been doing well on torsemide.  Continue on torsemide at 20 mg once a day.  3. Acute cholecystitis: S/p cholecystectomy.  4. Hypertension: Pressures currently been stable.  5. DMII            Primary Cardiologist: Dr. Gisel Choe MD       History of Present Illness    Short of breath and palpitations with exertion.  No chest pain.     Physical Examination Review of Systems   Vitals:    10/17/18 0715   BP: 124/76   Pulse: (!) 113   Resp: 18   Temp: 97.8  F (36.6  C)   SpO2: 96%     Body mass index is 29.3 kg/(m^2).  Wt Readings from Last 3 Encounters:   10/17/18 165 lb 6.4 oz (75 kg)   09/20/18 172 lb 4 oz (78.1 kg)   09/18/18 174 lb 4.8 oz (79.1 kg)     General Appearance:   alert, no apparent distress   HEENT:  no scleral icterus; the mucous membranes are pink and moist                               Neck: jugular venous pressure normal   Chest: the spine is straight and the chest is symmetric   Lungs:   respirations unlabored; the lungs are clear to auscultation   Cardiovascular:   irregular rhythm  with normal first and second heart sounds and no murmurs or gallops   Abdomen:  no organomegaly, masses, bruits, or tenderness; bowel sounds are present   Extremities: no cyanosis, clubbing, or edema   Skin: no xanthelasma, dry    A 12 point comprehensive review of systems was negative except as noted in the current history.       Medical History  Surgical History Family History Social History   Past Medical History:   Diagnosis Date   ? Acute diastolic heart failure (H) 11/2015   ? Atrial fibrillation (H)    ? Cerebral vascular accident (H)    ? Coronary artery disease 2006    100% RCA with collateral filling per Dr. Elizabeth's angio report   ? Diabetes mellitus type 2 in obese (H)    ? Fibrocystic breast    ? GERD (gastroesophageal reflux disease)    ? History of anesthesia complications     slow to wake   ? Hypertension    ? Peripheral vascular disease (H)    ? PONV (postoperative nausea and vomiting)    ? Stroke (H)    ? Urinary incontinence     Past Surgical History:   Procedure Laterality Date   ? CARDIAC CATHETERIZATION     ? CORONARY STENT PLACEMENT  1995    stent   ? ERCP N/A 10/5/2018    Procedure: ENDOSCOPIC RETROGRADE CHOLANGIOPANCREATOGRAPHY, SPHINCTEROTOMY;  Surgeon: Anson Stokes MD;  Location: [facility];  Service:    ? EXPLORATORY LAPAROTOMY N/A 6/29/2018    Procedure: LAPAROTOMY, CLOSURE OF PERITONEAL RENT;  Surgeon: Jones Harding DO;  Location: [facility] OR;  Service:    ? LAPAROSCOPIC CHOLECYSTECTOMY N/A 10/7/2018    Procedure: CHOLECYSTECTOMY, LAPAROSCOPIC;  Surgeon: Felicia So MD;  Location: [facility] OR;  Service:    ? PICC  6/29/2018        ? VENTRAL HERNIA REPAIR N/A 11/12/2015    Procedure: STRANGULATED VENTRAL HERNIA REPAIR ;  Surgeon: Ernesto Bailey MD;  Location: [facility] OR;  Service:     Family History   Problem Relation Age of Onset   ? Cancer Paternal Grandmother    ? Cancer Paternal Uncle    ? No Medical Problems Mother    ? No Medical  Problems Father    ? Heart attack Sister    ? Chronic Kidney Disease Sister    ? No Medical Problems Daughter    ? No Medical Problems Maternal Grandmother    ? No Medical Problems Maternal Grandfather    ? No Medical Problems Paternal Grandfather    ? No Medical Problems Maternal Aunt    ? No Medical Problems Paternal Aunt    ? Diabetes Brother    ? BRCA 1/2 Neg Hx    ? Breast cancer Neg Hx    ? Colon cancer Neg Hx    ? Endometrial cancer Neg Hx    ? Ovarian cancer Neg Hx     Social History     Social History   ? Marital status: Legally      Spouse name: N/A   ? Number of children: N/A   ? Years of education: N/A     Occupational History   ? Not on file.     Social History Main Topics   ? Smoking status: Current Every Day Smoker     Packs/day: 0.25   ? Smokeless tobacco: Former User      Comment: e-cig a few times/day   ? Alcohol use No   ? Drug use: No   ? Sexual activity: Not on file     Other Topics Concern   ? Not on file     Social History Narrative          Medications  Allergies   Scheduled Meds:  ? acetaminophen  650 mg Oral BID   ? amiodarone  400 mg Oral BID   ? aspirin  81 mg Oral DAILY   ? diltiazem  180 mg Oral DAILY   ? magnesium oxide  400 mg Oral TID   ? metoprolol tartrate  50 mg Oral BID   ? omeprazole  20 mg Oral BID AC   ? potassium chloride  20 mEq Oral DAILY   ? rOPINIRole  1 mg Oral QHS   ? rosuvastatin  40 mg Oral QHS   ? senna-docusate  1 tablet Oral BID   ? sertraline  100 mg Oral DAILY   ? torsemide  20 mg Oral DAILY   ? traZODone  50 mg Oral QHS   ? warfarin - daily dose required   Other Med Consult or Protocol     Continuous Infusions:  PRN Meds:.bisacodyl, LORazepam, magnesium hydroxide, naloxone **OR** naloxone, nitroglycerin, oxyCODONE Allergies   Allergen Reactions   ? Penicillins Swelling         Lab Results    Chemistry/lipid CBC Cardiac Enzymes/BNP/TSH/INR   Lab Results   Component Value Date    CHOL 165 09/20/2017    HDL 56 09/20/2017    LDLCALC 85 09/20/2017    TRIG  116 06/30/2018    CREATININE 0.97 10/15/2018    BUN 24 (H) 10/15/2018    K 4.6 10/17/2018     10/15/2018    CL 98 10/15/2018    CO2 28 10/15/2018    Lab Results   Component Value Date    WBC 9.2 10/14/2018    HGB 13.7 10/14/2018    HCT 43.7 10/14/2018    MCV 86 10/14/2018     10/14/2018    Lab Results   Component Value Date    TROPONINI 0.01 10/03/2018    BNP 1140 (H) 10/14/2018    TSH 1.08 07/07/2016    INR 2.65 (H) 10/17/2018

## 2021-06-26 NOTE — PROGRESS NOTES
Progress Notes by Michela Cardenas MD at 10/22/2018 12:28 PM     Author: Michela Cardenas MD Service: Nephrology Author Type: Physician    Filed: 10/22/2018 12:42 PM Date of Service: 10/22/2018 12:28 PM Status: Signed    : Michela Cardenas MD (Physician)              RENAL (KS) progress note  CC: F/U ESRD  S: Still with some abd pain, a little lethargic, denies shortness of breath.  Nursing reports ongoing confusion.  ASSESSMENT/PLAN:     1. Chronic hypercalcemia.  longstanding.  , ionized calcium 1.48 on 10/18/2018.  Normal baseline renal function.  expect this is likely primary hyperparathryroid but workup in progress. No e/o paraprotein.  No e/o pulm sarcoid on recent imaging.  Would be unusual to have occult malignancy as hypercalcemia present for years.   Will need to further eval for parathyroid adenoma vs hyperplasia (if workup otherwise unremarkable) but recently had IV contrast.   Monitor daily.   2. Acute kidney injury.  expect due to recent diuresis, hypotension.  Currently stable..  Baseline creatinine 0.89 earlier this month. Cont to renally dose meds, avoid nephrotoxins, optimize hemodynamics.  Would be cautious with diuresis with ongoing hypotension today.  3. Chronic atrial fibrillation.  Status post cardioversion, continue management as per cardiology, continue amiodarone, metoprolol and warfarin.  4. Acute on chronic diastolic dysfunction.  Left ventricular ejection fraction 55% (see NIVIA echo above).  echo pending today  5. Hypertension.  bp low today, see above. May need to cut back on lasix.  Hold dosing for sbp <100  6. Acalculous cholecystitis.  Status post laparoscopic cholecystectomy on 10/7/2018.  Procedure note abdominal discomfort, nausea and poor oral intake. Continue management as per hospitalist service.  7. Diabetes mellitus type 2.  Most recent hemoglobin A1c 5.7% on 7/31/2018.  Continue management as per hospitalist service.  8. Metabolic acidosis: resolved,  "d/c bicarb gtt.    Michela Cardenas  [facility]  [phone] (off)      No interval changes to past medical history, social history or family history to report.    BP 98/56  Pulse 60  Temp 98  F (36.7  C)  Resp (!) 31  Ht 5' 3\" (1.6 m)  Wt 170 lb 14.4 oz (77.5 kg)  LMP 01/25/1999  SpO2 93%  BMI 30.27 kg/m2    I/O last 3 completed shifts:  In: 959 [I.V.:959]  Out: 200 [Urine:200]    Physical Exam:   GENERAL: in bed, nad  EYES: pupils equal, sclerae not icteric.  ENT: Hearing normal, oral mucosa moist.  RESP: Clear to auscultation bilaterally with no respiratory distress, normal effort.  CV: RRR, no murmurs. trace leg edema.    GI: distended, mild diffuse ttp  SKIN: No rash, warm/ dry  NEURO: Moves all extremities,  PSYCH: slow to respond to questions and confused about recent details of events    Results from last 7 days  Lab Units 10/22/18  0453 10/21/18  1926 10/21/18  0640   LN-SODIUM mmol/L 133* 134* 132*   LN-POTASSIUM mmol/L 4.1 5.1* 4.0   LN-CHLORIDE mmol/L 98 100 101   LN-CO2 mmol/L 23 13* 20*   LN-BLOOD UREA NITROGEN mg/dL 49* 44* 39*   LN-CREATININE mg/dL 1.62* 1.60* 1.15*   LN-CALCIUM mg/dL 11.7* 12.6* 12.0*   LN-ALBUMIN g/dL 2.7* 3.0* 2.8*   LN-PROTEIN TOTAL g/dL 6.9 7.8 7.1   LN-BILIRUBIN TOTAL mg/dL 1.8* 1.7* 1.2*   LN-ALKALINE PHOSPHATASE U/L 340* 404* 368*   LN-ALT (SGPT) U/L 778* 416* 364*   LN-AST (SGOT) U/L 2443* 706* 615*       Results from last 7 days  Lab Units 10/18/18  1535   LN-WHITE BLOOD CELL COUNT thou/uL 11.6*   LN-HEMOGLOBIN g/dL 13.2   LN-HEMATOCRIT % 42.9   LN-PLATELET COUNT thou/uL 213         Scheduled Meds:  ? magnesium oxide  400 mg Oral TID   ? omeprazole  20 mg Oral BID AC   ? rOPINIRole  1 mg Oral QHS   ? sertraline  100 mg Oral DAILY   ? sodium chloride 0.9%  500 mL Intravenous Once   ? sodium chloride  10-30 mL Intravenous Q8H FIXED TIMES   ? warfarin - daily dose required   Other Med Consult or Protocol   ? warfarin - NO DOSE TODAY   " Other No Dose Today     Continuous Infusions:  ? sodium bicarbonate IV infusion in D5W 75 mL/hr at 10/21/18 2057     PRN Meds:.bisacodyl, magnesium hydroxide, naloxone **OR** naloxone, nitroglycerin, ondansetron, sodium chloride bacteriostatic, sodium chloride, sodium chloride, sodium chloride, traMADol, traZODone      Labs personally reviewed today during this evaluation

## 2021-06-26 NOTE — PROGRESS NOTES
Progress Notes by Jose Raul Crocker DO at 10/21/2018  3:10 PM     Author: Jose Raul Crocker DO Service: Nephrology Author Type: Physician    Filed: 10/21/2018  3:13 PM Date of Service: 10/21/2018  3:10 PM Status: Signed    : Jose Raul Crocker DO (Physician)              RENAL ([facility]) progress note  CC: F/U ESRD  S: Since last visit, Patient seen in her room today. Still some abdominal tenderness on exam. No shortness of breath, fever, chills.     ASSESSMENT/PLAN:     1. Chronic hypercalcemia.  Asymptomatic.  , ionized calcium 1.48 on 10/18/2018.  Normal baseline renal function.  PTH driven hypercalcemia.  Most likely primary hyperparathyroidism.  Will need to get nuclear medicine parathyroid scan but recently had IV contrast.  Outpatient eval. Monitor daily.   2. Acute kidney injury.  Prerenal failure secondary to diuresis of diastolic heart failure.  Creatinine is stable..  Baseline creatinine 0.89 earlier this month.  3. Chronic atrial fibrillation.  Status post cardioversion, continue management as per cardiology, continue amiodarone, metoprolol and warfarin.  4. Acute on chronic diastolic dysfunction.  Left ventricular ejection fraction 55% (see NIVIA echo above).  Continue torsemide 20 mg daily, monitor renal function.  5. Hypertension.  Satisfactory control at present.  Continue same medications.  6. Acalculous cholecystitis.  Status post laparoscopic cholecystectomy on 10/7/2018.  Procedure note abdominal discomfort, nausea and poor oral intake. Continue management as per hospitalist service.  7. Diabetes mellitus type 2.  Most recent hemoglobin A1c 5.7% on 7/31/2018.  Continue management as per hospitalist service.  8. Subcutaneous hematomas: No current progression on CT. Per IM and GI.     Jose Raul Crocker  [facility]  [phone] (off)  [phone] (Pager)    No interval changes to past medical history, social history or family history to report.    /57  "(Patient Position: Semi-nicole)  Pulse 60  Temp 97.4  F (36.3  C) (Oral)   Resp 18  Ht 5' 3\" (1.6 m)  Wt 170 lb 4.8 oz (77.2 kg)  LMP 01/25/1999  SpO2 93%  BMI 30.17 kg/m2    I/O last 3 completed shifts:  In: 960 [P.O.:960]  Out: 650 [Urine:650]    Physical Exam:   GENERAL: calm and comfortable, alert  EYES: pupils equal, sclerae not icteric.  ENT: Hearing normal, oral mucosa moist.  RESP: Clear to auscultation bilaterally with no respiratory distress, normal effort.  CV: RRR, no murmurs. trace leg edema.    GI: Active BS, Mild tenderness.   SKIN: No rash, warm/ dry  NEURO: Moves all extremities,  PSYCH: Alert and oriented x 3      Results from last 7 days  Lab Units 10/21/18  0640 10/20/18  0553 10/19/18  0743   LN-SODIUM mmol/L 132* 135*  135* 136   LN-POTASSIUM mmol/L 4.0 4.2  4.2 4.9   LN-CHLORIDE mmol/L 101 103  103 103   LN-CO2 mmol/L 20* 22  22 22   LN-BLOOD UREA NITROGEN mg/dL 39* 38*  38* 40*   LN-CREATININE mg/dL 1.15* 1.17*  1.17* 1.14*   LN-CALCIUM mg/dL 12.0* 11.7*  11.7* 11.8*   LN-ALBUMIN g/dL 2.8* 2.8*  2.8* 2.8*   LN-PROTEIN TOTAL g/dL 7.1 7.2 7.2   LN-BILIRUBIN TOTAL mg/dL 1.2* 1.2* 1.0   LN-ALKALINE PHOSPHATASE U/L 368* 342* 298*   LN-ALT (SGPT) U/L 364* 334* 317*   LN-AST (SGOT) U/L 615* 614* 749*       Results from last 7 days  Lab Units 10/18/18  1535   LN-WHITE BLOOD CELL COUNT thou/uL 11.6*   LN-HEMOGLOBIN g/dL 13.2   LN-HEMATOCRIT % 42.9   LN-PLATELET COUNT thou/uL 213         Scheduled Meds:  ? aspirin  81 mg Oral DAILY   ? magnesium oxide  400 mg Oral TID   ? metoprolol tartrate  50 mg Oral BID   ? omeprazole  20 mg Oral BID AC   ? rOPINIRole  1 mg Oral QHS   ? sertraline  100 mg Oral DAILY   ? sodium chloride 0.9%  500 mL Intravenous Once   ? sotalol  80 mg Oral Q12H   ? torsemide  20 mg Oral DAILY   ? traZODone  50 mg Oral QHS   ? warfarin - daily dose required   Other Med Consult or Protocol   ? warfarin - NO DOSE TODAY   Other No Dose Today     Continuous " Infusions:  PRN Meds:.bisacodyl, LORazepam, magnesium hydroxide, naloxone **OR** naloxone, nitroglycerin, ondansetron, oxyCODONE, traMADol      Labs personally reviewed today during this evaluation

## 2021-06-26 NOTE — PROGRESS NOTES
Progress Notes by Tavares Sweeney at 10/25/2018  9:22 AM     Author: Tavares Sweeney Service: Pharmacy Author Type: Pharmacy Student    Filed: 10/25/2018  9:38 AM Date of Service: 10/25/2018  9:22 AM Status: Attested    : Tavares Sweeney    Related Notes: Original Note by Tavares Sweeney filed at 10/25/2018  9:28 AM    Cosigner: Ernestine Zapata RPh at 10/25/2018 10:51 AM    Attestation signed by Ernestine Zapata RPh at 10/25/2018 10:51 AM     I have reviewed the warfarin dosing and note documented by the pharmacy student and agree with his assessment and plan.                  Pharmacy Consult: Warfarin Management    Pharmacy consulted to dose warfarin for Gardenia Muller, a 68 y.o. female who had abdominal pain upon presentation to  on 10/3. Patient had an ERCP on 10/5 with no sludge or stones found. Patient had HIDA on 10/6, cystic duct consistent with obstruction. On 10/7 a laproscopic cholecystectomy was done. Patient remains in hospital for persistent atrial fibrillation, with rapid heart rate limited by blood pressure. 10/18 cardioversion done, elevated AST and ALT.     Ordering provider: Dr. Michael Abreu, Hospitalist    Reason for warfarin therapy: Atrial Fibrillation  Goal INR Range: 2-3    Subjective  Medication lists (home and hospital) were reviewed.    New medications that may increase bleeding risk/INR:      On amiodarone from 10/17 to 10/21    Restarted home medications that may increase bleeding risk/INR:Ropinirole, sertraline, omeprazole    Home medication NOT restarted that may increase bleeding risk/INR: aspirin, torsemide, rosuvastatin    Home Warfarin Dosin mg on , Tuesday and Thursday; 2 mg on all other days of the week    Other Anticoagulants: No  Patient being bridged: No    Patient Active Problem List   Diagnosis   ? Spinal stenosis   ? PAD (peripheral artery disease) (H)   ? Dermatosis Papulosa Nigra   ? Depression   ? Hypercalcemia   ?  Right Renal Artery Stenosis   ? Osteoarthritis   ? Abnormality Of Walk   ? 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 Advanced directives, counseling/discussion   ? SBO (small bowel obstruction) (H)   ? Cystitis   ? Hypomagnesemia   ? Healthcare maintenance   ? Essential hypertension   ? Dysarthria   ? Cerebral infarction (H)   ? Anemia   ? Cerebrovascular disease   ? Benign adenomatous polyp of large intestine   ? RLS (restless legs syndrome)   ? Incisional hernia, without obstruction or gangrene   ? Abdominal pain, generalized   ? Diverticular disease of large intestine   ? Dysphagia   ? Coronary artery disease involving native coronary artery of native heart without angina pectoris   ? Dyslipidemia   ? Ventral hernia without obstruction or gangrene   ? Paroxysmal A-fib (H)   ? Anticoagulation management encounter   ? S/P repair of recurrent ventral hernia   ? SOB (shortness of breath)   ? Lower extremity edema   ? Anxiety   ? Hypokalemia   ? (HFpEF) heart failure with preserved ejection fraction (H)   ? Tachycardia   ? Cholecystitis   ? Anticoagulant long-term use   ? Biliary obstruction   ? Abdominal pain, right upper quadrant   ? Persistent atrial fibrillation (H)   ? Abdominal pain, left upper quadrant   ? Sinus bradycardia   ? MAY (acute kidney injury) (H)   ? Transaminitis    Past Medical History:   Diagnosis Date   ? Acute diastolic heart failure (H) 11/2015   ? Atrial fibrillation (H)    ? Cerebral vascular accident (H)    ? Coronary artery disease 2006    100% RCA with collateral filling per Dr. Elizabeth's angio report   ? Diabetes mellitus type 2 in obese (H)    ? Fibrocystic breast    ? GERD (gastroesophageal reflux disease)    ? History of anesthesia complications     slow to wake   ? Hypertension    ? Peripheral vascular disease (H)    ? PONV (postoperative nausea and vomiting)    ? Stroke (H)    ? Urinary incontinence         Social History   Substance Use Topics   ? Smoking status:  Current Every Day Smoker     Packs/day: 0.25   ? Smokeless tobacco: Former User      Comment: e-cig a few times/day   ? Alcohol use No       Objective   Labs:  Last 3 days:    Recent Labs      10/22/18   1824  10/23/18   0533  10/24/18   0508  10/25/18   0627   CREATININE  1.37*  1.19*  1.19*  0.91  0.91  0.92  0.92   HGB  11.4*  10.6*  11.1*   --    HCT  36.0  34.1*  35.5   --    PLT  104*  91*  95*   --    BILITOT  2.3*  1.9*  2.3*  2.4*   AST  2138*  1404*  763*  443*   ALT  783*  646*  525*  400*   ALKPHOS  355*  333*  371*  382*     Last 7 days:   Recent labs: (last 7 days)      10/21/18   0640  10/21/18   1620  10/22/18   0453  10/22/18   1824  10/23/18   0533  10/24/18   0508  10/25/18   0627   INR  6.16*  4.45*  3.31*  2.51*  2.10*  1.77*  2.02*       Warfarin Dosing History:    Date INR Warfarin Dose Comment   10/5/18 2.84 hold 5 mg of vitamin K   10/6/18 1.42 hold    10/7/18 1.44 hold    10/8/18 N/A 2 mg Patient may discharge tomorrow.   10/9/18 1.43 4 mg    10/10/18 1.29 4 mg    10/11/18 1.31 4 mg    10/12/18 1.44 4 mg    10/13/18 1.64 4 mg    10/14/18 1.95 4 mg    10/15/18 2.34 2 mg    10/16/18 2.39 4 mg    10/17/18 2.65 2 mg    10/18/18 3.88 hold    10/19/18 3.76 hold    10/20/18 5.08 hold    10/21/18 6.16 hold Phytonadione 2.5 mg po x 1, Amiodarone DC'd   10/22/18 3.31 hold    10/23/18 2.10 4 mg    10/24/18 1.77 2 mg    10/15/18 2.02 2 mg      Assessment  The patient is continuing warfarin for the indication of Atrial Fibrillation with a goal INR of 2-3. INR today is therapeutic. Warfarin dosing was decreased 10/17 due to amiodarone initiation and INR increase, however INR still became supratherapeutic. Warfarin was held 10/18, 10/19, 10/20, 10/21 and 10/22.  Vit K po given 10/21. Conservative warfarin dosing due to recent amiodarone use.    Plan  1. Resume Warfarin today. 2 mg x1  2. Check INR daily or as appropriate.  3. Continue to follow the patient's INR, PLT, and HGB as available.  4. Monitor  for potential drug/disease interactions.  5. Amiodarone started on 10/17, and then stopped on 10/21. Due to the drug-drug interaction between warfarin and amiodarone, warfarin dosing will likely vary for at least a week after Amiodarone DC'd. The drug-drug interaction can be delayed, so close monitoring will be needed on discharge.     Thank you for the consult,  Tavares Sweeney, Pharmacy Student, 10/25/2018 9:22 AM

## 2021-06-26 NOTE — PROGRESS NOTES
Progress Notes by Michela Cardenas MD at 10/25/2018 11:44 AM     Author: Michela Cardenas MD Service: Nephrology Author Type: Physician    Filed: 10/25/2018 11:49 AM Date of Service: 10/25/2018 11:44 AM Status: Signed    : Michela Cardenas MD (Physician)              [facility] ([facility]) progress note  CC: F/U ESRD  S: more lethargic today,denies shortness of breath.  Denies abd pain.  Ros limited by lethargy. May be going to tcu today.  ASSESSMENT/PLAN:     1. Chronic hypercalcemia.  longstanding.  , ionized calcium 1.48 on 10/18/2018.  Normal baseline renal function.  . No e/o paraprotein on upep but polyclonal increase in IG on spep but  immunofixation is negative for paraprotein so no e/o myeloma.  No e/o pulm sarcoid on recent imaging.  Would be unusual to have occult malignancy as hypercalcemia present as far back as 2015.   Expect chronic hypercalcemia due to primary hyperparathryoid vs familial hypocalciuric hypercalcemia.  Will need to further eval for parathyroid adenoma vs hyperplasia, would rec endocrine consult on d/c for further eval and parathyroid imaging.     Urine calcium:creat clearance ratio quite low and chronic aysmptomatic hypercalcemia does suggest possible familial hypercalcemia hypocalciuria  2. Acute kidney injury.  expect due to recent diuresis, hypotension. Improving ..  Baseline creatinine 0.89 earlier this month. Cont to renally dose meds, avoid nephrotoxins, optimize hemodynamics.  Now back to baseliine  3. Chronic atrial fibrillation.  Status post cardioversion, continue management as per cardiology, continue amiodarone, metoprolol and warfarin.  4. Acute on chronic diastolic dysfunction.  Left ventricular ejection fraction 55%   Will need close monitoring of volume status after d/c, will need to be cautious with resumption of lasix in setting of current hypotension  5. Hypertension. bp low today, as above, would consider decresase dose of lasix.  6. Acalculous  "cholecystitis.  Status post laparoscopic cholecystectomy on 10/7/2018.  Continue management as per hospitalist service.  7. Diabetes mellitus type 2.  Most recent hemoglobin A1c 5.7% on 7/31/2018.  Continue management as per hospitalist service.  D/w Dr Pina, as above, would rec endocrine referral on d/c for hypercalcemia, possible primary hyperparathyroid and would repeat bmp within a week of d/c    Michela Cardenas  [facility]  [phone]     No interval changes to past medical history, social history or family history to report.    /63  Pulse 64  Temp 97.4  F (36.3  C) (Oral)   Resp 16  Ht 5' 3\" (1.6 m)  Wt 171 lb 3.2 oz (77.7 kg)  LMP 01/25/1999  SpO2 94%  BMI 30.33 kg/m2    I/O last 3 completed shifts:  In: 480 [P.O.:450; I.V.:30]  Out: 450 [Urine:450]    Physical Exam:   GENERAL: in bed  nad  EYES: pupils equal, sclerae not icteric.  ENT: Hearing normal, oral mucosa moist.  RESP: Clear to auscultation bilaterally with no respiratory distress, normal effort.  CV: RRR, no murmurs. trace leg edema.    GI: distended, mild diffuse ttp  SKIN: No rash, warm/ dry  NEURO: Moves all extremities, lethargic and slow to respond  PSYCH: flat affect    Results from last 7 days  Lab Units 10/25/18  0627 10/24/18  0508 10/23/18  0533   LN-SODIUM mmol/L 136  136 139  139 138  138   LN-POTASSIUM mmol/L 3.8  3.8 3.7  3.7 3.6  3.6   LN-CHLORIDE mmol/L 101  101 101  101 99  99   LN-CO2 mmol/L 25  25 28  28 29  29   LN-BLOOD UREA NITROGEN mg/dL 33*  33* 40*  40* 46*  46*   LN-CREATININE mg/dL 0.92  0.92 0.91  0.91 1.19*  1.19*   LN-CALCIUM mg/dL 12.3*  12.3* 12.2*  12.2* 11.7*  11.7*   LN-ALBUMIN g/dL 2.7*  2.7* 2.7*  2.7* 2.6*  2.6*   LN-PROTEIN TOTAL g/dL 7.0 6.9 6.7   LN-BILIRUBIN TOTAL mg/dL 2.4* 2.3* 1.9*   LN-ALKALINE PHOSPHATASE U/L 382* 371* 333*   LN-ALT (SGPT) U/L 400* 525* 646*   LN-AST (SGOT) U/L 443* 763* 1404*       Results from last 7 days  Lab Units " 10/24/18  0508 10/23/18  0533 10/22/18  1824   LN-WHITE BLOOD CELL COUNT thou/uL 8.9 9.6 11.7*   LN-HEMOGLOBIN g/dL 11.1* 10.6* 11.4*   LN-HEMATOCRIT % 35.5 34.1* 36.0   LN-PLATELET COUNT thou/uL 95* 91* 104*         Scheduled Meds:  ? furosemide  40 mg Oral DAILY   ? magnesium oxide  400 mg Oral TID   ? metoprolol tartrate  25 mg Oral BID   ? omeprazole  20 mg Oral BID AC   ? rOPINIRole  1 mg Oral QHS   ? sertraline  100 mg Oral DAILY   ? sodium chloride  10-30 mL Intravenous Q8H FIXED TIMES   ? warfarin - daily dose required   Other Med Consult or Protocol   ? warfarin  2 mg Oral Once Warfarin     Continuous Infusions:    PRN Meds:.bisacodyl, magnesium hydroxide, naloxone **OR** naloxone, nitroglycerin, ondansetron, sodium chloride bacteriostatic, sodium chloride, sodium chloride, sodium chloride, traMADol, traZODone      Labs personally reviewed today during this evaluation

## 2021-06-26 NOTE — PROGRESS NOTES
Progress Notes by Jose Raul Crocker DO at 10/20/2018 11:41 AM     Author: Jose Raul Crocker DO Service: Nephrology Author Type: Physician    Filed: 10/20/2018 11:44 AM Date of Service: 10/20/2018 11:41 AM Status: Signed    : Jose Raul Crocker DO (Physician)              RENAL ([facility]) progress note  CC: F/U ESRD  S: Since last visit, Patient is seen in her room. She denies any acute issues. No shortness of breath, fever, chills. NO acute events.     ASSESSMENT/PLAN:     1. Chronic hypercalcemia.  Asymptomatic.  , ionized calcium 1.48 on 10/18/2018.  Normal baseline renal function.  PTH driven hypercalcemia.  Most likely primary hyperparathyroidism.  Will need to get nuclear medicine parathyroid scan but recently had IV contrast.  Outpatient eval.   2. Acute kidney injury.  Prerenal failure secondary to diuresis of diastolic heart failure.  Creatinine is stable..  Baseline creatinine 0.89 earlier this month.  3. Chronic atrial fibrillation.  Status post cardioversion, continue management as per cardiology, continue amiodarone, metoprolol and warfarin.  4. Acute on chronic diastolic dysfunction.  Left ventricular ejection fraction 55% (see NIVIA echo above).  Continue torsemide 20 mg daily, monitor renal function.  5. Hypertension.  Satisfactory control at present.  Continue same medications.  6. Acalculous cholecystitis.  Status post laparoscopic cholecystectomy on 10/7/2018.  Procedure note abdominal discomfort, nausea and poor oral intake.  Continue management as per hospitalist service.  7. Diabetes mellitus type 2.  Most recent hemoglobin A1c 5.7% on 7/31/2018.  Continue management as per hospitalist service.    Jose Raul Crocker  [facility]  [phone] (off)  [phone] (Pager)      No interval changes to past medical history, social history or family history to report.    /70 (Patient Position: Lying)  Pulse 62  Temp 97.8  F (36.6  C) (Oral)   Resp 20  Ht  "5' 3\" (1.6 m)  Wt 166 lb 9.6 oz (75.6 kg)  LMP 01/25/1999  SpO2 96%  BMI 29.51 kg/m2    I/O last 3 completed shifts:  In: 1456 [P.O.:810; I.V.:646]  Out: 650 [Urine:650]    Physical Exam:   GENERAL: calm and comfortable, alert  EYES: pupils equal, sclerae not icteric.  ENT: Hearing normal, oral mucosa moist.  RESP: Clear to auscultation bilaterally with no respiratory distress, normal effort.  CV: RRR, no murmurs. trace leg edema.    GI: Active BS, Soft,   SKIN: No rash, warm/ dry  NEURO: Moves all extremities,  PSYCH: Alert and oriented x 3,       Results from last 7 days  Lab Units 10/20/18  0553 10/19/18  0743 10/18/18  1536   LN-SODIUM mmol/L 135*  135* 136 131*   LN-POTASSIUM mmol/L 4.2  4.2 4.9 5.3*   LN-CHLORIDE mmol/L 103  103 103 99   LN-CO2 mmol/L 22  22 22 21*   LN-BLOOD UREA NITROGEN mg/dL 38*  38* 40* 41*   LN-CREATININE mg/dL 1.17*  1.17* 1.14* 1.42*   LN-CALCIUM mg/dL 11.7*  11.7* 11.8* 12.3*   LN-ALBUMIN g/dL 2.8*  2.8* 2.8* 3.0*   LN-PROTEIN TOTAL g/dL 7.2 7.2 8.1*   LN-BILIRUBIN TOTAL mg/dL 1.2* 1.0 0.9   LN-ALKALINE PHOSPHATASE U/L 342* 298* 303*   LN-ALT (SGPT) U/L 334* 317* 270*   LN-AST (SGOT) U/L 614* 749* 773*       Results from last 7 days  Lab Units 10/18/18  1535 10/14/18  0810   LN-WHITE BLOOD CELL COUNT thou/uL 11.6* 9.2   LN-HEMOGLOBIN g/dL 13.2 13.7   LN-HEMATOCRIT % 42.9 43.7   LN-PLATELET COUNT thou/uL 213 193         Scheduled Meds:  ? amiodarone  200 mg Oral TID   ? aspirin  81 mg Oral DAILY   ? magnesium oxide  400 mg Oral TID   ? metoprolol tartrate  50 mg Oral BID   ? omeprazole  20 mg Oral BID AC   ? rOPINIRole  1 mg Oral QHS   ? sertraline  100 mg Oral DAILY   ? sodium chloride 0.9%  500 mL Intravenous Once   ? torsemide  20 mg Oral DAILY   ? traZODone  50 mg Oral QHS   ? warfarin - daily dose required   Other Med Consult or Protocol     Continuous Infusions:  PRN Meds:.bisacodyl, LORazepam, magnesium hydroxide, naloxone **OR** naloxone, nitroglycerin, ondansetron, " oxyCODONE      Labs personally reviewed today during this evaluation

## 2021-06-26 NOTE — PROGRESS NOTES
Progress Notes by Sonali Hidalgo MD at 10/18/2018  3:22 PM     Author: Snoali Hidalgo MD Service: Cardiology Author Type: Physician    Filed: 10/18/2018  3:32 PM Date of Service: 10/18/2018  3:22 PM Status: Signed    : Sonali Hidalgo MD (Physician)           Click to link to [facility] Heart Care     [facility] HEART CARE NOTE          Assessment/Recommendations   Assessment:  1. Persistent atrial fibrillation/flutter: Heart rate has now remained elevated and limited by hypotension.  Continue on diltiazem and metoprolol at 180 and 50 mg twice a day.  Cardioversion with NIVIA was discussed with her and she is now agreeable.  Plan on proceeding with this today.  She remains in atrial flutter with elevated ventricular response.  2. Acute on chronic diastolic heartfailure: Secondary to recent surgery and atrial fibrillation with rapid ventricular response.  He has been doing well on torsemide.  Continue on torsemide at 20 mg once a day.  3. Acute cholecystitis: S/p cholecystectomy.  4. Hypertension: Pressures currently been stable.  5. DMII              Primary Cardiologist: Dr. Gisel Choe MD       History of Present Illness    Shortness of breath and palpitations with exertion.       Physical Examination Review of Systems   Vitals:    10/18/18 1518   BP: 106/69   Pulse: 78   Resp: 28   Temp: 97.7  F (36.5  C)   SpO2: 99%     Body mass index is 27.35 kg/(m^2).  Wt Readings from Last 3 Encounters:   10/18/18 154 lb 6.4 oz (70 kg)   09/20/18 172 lb 4 oz (78.1 kg)   09/18/18 174 lb 4.8 oz (79.1 kg)     General Appearance:   alert, no apparent distress   HEENT:  no scleral icterus; the mucous membranes are pink and moist                               Neck: jugular venous pressure normal   Chest: the spine is straight and the chest is symmetric   Lungs:   respirations unlabored; the lungs are clear to auscultation   Cardiovascular:   regular rhythm with normal first and second heart sounds  and no murmurs or gallops   Abdomen:  no organomegaly, masses, bruits, or tenderness; bowel sounds are present   Extremities: no edema   Skin: no xanthelasma, dry    A 12 point comprehensive review of systems was negative except as noted in the current history.       Medical History  Surgical History Family History Social History   Past Medical History:   Diagnosis Date   ? Acute diastolic heart failure (H) 11/2015   ? Atrial fibrillation (H)    ? Cerebral vascular accident (H)    ? Coronary artery disease 2006    100% RCA with collateral filling per Dr. Elizabeth's angio report   ? Diabetes mellitus type 2 in obese (H)    ? Fibrocystic breast    ? GERD (gastroesophageal reflux disease)    ? History of anesthesia complications     slow to wake   ? Hypertension    ? Peripheral vascular disease (H)    ? PONV (postoperative nausea and vomiting)    ? Stroke (H)    ? Urinary incontinence     Past Surgical History:   Procedure Laterality Date   ? CARDIAC CATHETERIZATION     ? CORONARY STENT PLACEMENT  1995    stent   ? ERCP N/A 10/5/2018    Procedure: ENDOSCOPIC RETROGRADE CHOLANGIOPANCREATOGRAPHY, SPHINCTEROTOMY;  Surgeon: Anson Stokes MD;  Location: [facility];  Service:    ? EXPLORATORY LAPAROTOMY N/A 6/29/2018    Procedure: LAPAROTOMY, CLOSURE OF PERITONEAL RENT;  Surgeon: Jones Harding DO;  Location: [facility] OR;  Service:    ? LAPAROSCOPIC CHOLECYSTECTOMY N/A 10/7/2018    Procedure: CHOLECYSTECTOMY, LAPAROSCOPIC;  Surgeon: Felicia So MD;  Location: [facility];  Service:    ? PICC  6/29/2018        ? VENTRAL HERNIA REPAIR N/A 11/12/2015    Procedure: STRANGULATED VENTRAL HERNIA REPAIR ;  Surgeon: Ernesto Bailey MD;  Location: [facility];  Service:     Family History   Problem Relation Age of Onset   ? Cancer Paternal Grandmother    ? Cancer Paternal Uncle    ? No Medical Problems Mother    ? No Medical Problems Father    ? Heart attack Sister    ? Chronic Kidney Disease  Sister    ? No Medical Problems Daughter    ? No Medical Problems Maternal Grandmother    ? No Medical Problems Maternal Grandfather    ? No Medical Problems Paternal Grandfather    ? No Medical Problems Maternal Aunt    ? No Medical Problems Paternal Aunt    ? Diabetes Brother    ? BRCA 1/2 Neg Hx    ? Breast cancer Neg Hx    ? Colon cancer Neg Hx    ? Endometrial cancer Neg Hx    ? Ovarian cancer Neg Hx     Social History     Social History   ? Marital status: Legally      Spouse name: N/A   ? Number of children: N/A   ? Years of education: N/A     Occupational History   ? Not on file.     Social History Main Topics   ? Smoking status: Current Every Day Smoker     Packs/day: 0.25   ? Smokeless tobacco: Former User      Comment: e-cig a few times/day   ? Alcohol use No   ? Drug use: No   ? Sexual activity: Not on file     Other Topics Concern   ? Not on file     Social History Narrative          Medications  Allergies   Scheduled Meds:  ? acetaminophen  650 mg Oral BID   ? amiodarone  200 mg Oral TID   ? aspirin  81 mg Oral DAILY   ? magnesium oxide  400 mg Oral TID   ? metoprolol tartrate  50 mg Oral BID   ? omeprazole  20 mg Oral BID AC   ? polyethylene glycol  17 g Oral DAILY   ? potassium chloride  20 mEq Oral DAILY   ? rOPINIRole  1 mg Oral QHS   ? rosuvastatin  40 mg Oral QHS   ? senna-docusate  1 tablet Oral BID   ? sertraline  100 mg Oral DAILY   ? sodium chloride 0.9%  500 mL Intravenous Once   ? torsemide  20 mg Oral DAILY   ? traZODone  50 mg Oral QHS   ? warfarin - daily dose required   Other Med Consult or Protocol   ? warfarin - NO DOSE TODAY   Other No Dose Today     Continuous Infusions:  PRN Meds:.bisacodyl, LORazepam, magnesium hydroxide, naloxone **OR** naloxone, nitroglycerin, oxyCODONE Allergies   Allergen Reactions   ? Penicillins Swelling         Lab Results    Chemistry/lipid CBC Cardiac Enzymes/BNP/TSH/INR   Lab Results   Component Value Date    CHOL 165 09/20/2017    HDL 56  09/20/2017    LDLCALC 85 09/20/2017    TRIG 116 06/30/2018    CREATININE 0.97 10/15/2018    BUN 24 (H) 10/15/2018    K 4.6 10/17/2018     10/15/2018    CL 98 10/15/2018    CO2 28 10/15/2018    Lab Results   Component Value Date    WBC 9.2 10/14/2018    HGB 13.7 10/14/2018    HCT 43.7 10/14/2018    MCV 86 10/14/2018     10/14/2018    Lab Results   Component Value Date    TROPONINI 0.01 10/03/2018    BNP 1140 (H) 10/14/2018    TSH 1.08 07/07/2016    INR 3.88 (H) 10/18/2018

## 2021-06-26 NOTE — PROGRESS NOTES
Progress Notes by Tavares Sweeney at 10/12/2018  8:21 AM     Author: Tavares Sweeney Service: Pharmacy Author Type: Pharmacy Student    Filed: 10/12/2018  9:36 AM Date of Service: 10/12/2018  8:21 AM Status: Attested    : Tavares Sweeney    Related Notes: Original Note by Tavares Sweeney filed at 10/12/2018  9:31 AM    Cosigner: Gisel Kumar, Gilles at 10/12/2018  9:38 AM    Attestation signed by Gisel Kumar PharmD at 10/12/2018  9:38 AM    I have reviewed the warfarin dosing and note documented by the pharmacy student and agree with their assessment.  - Gisel Kumar PharmD, BCPS 10/12/2018 9:38 AM                    Pharmacy Consult: Warfarin Management    Pharmacy consulted to dose warfarin for Gardenia Muller, a 68 y.o. female    Ordering provider: Dr. Michael Abreu, Hospitalist    Reason for warfarin therapy: Atrial Fibrillation  Goal INR Range: 2-3    Subjective  Medication lists (home and hospital) were reviewed.    New medications that may increase bleeding risk/INR:  none    Restarted home medications that may increase bleeding risk/INR: Torsemide, Ropinirole, sertraline, omeprazole, rosuvastatin     Home Warfarin Dosin mg on , Tuesday and Thursday; 2 mg on all other days of the week    Other Anticoagulants: No  Patient being bridged: No    Patient Active Problem List   Diagnosis   ? Spinal stenosis   ? PAD (peripheral artery disease) (H)   ? Dermatosis Papulosa Nigra   ? Depression   ? Hypercalcemia   ? Right Renal Artery Stenosis   ? Osteoarthritis   ? Abnormality Of Walk   ? 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 Advanced directives, counseling/discussion   ? SBO (small bowel obstruction) (H)   ? Cystitis   ? Hypomagnesemia   ? Healthcare maintenance   ? Essential hypertension   ? Dysarthria   ? Cerebral infarction (H)   ? Anemia   ? Cerebrovascular disease   ? Benign adenomatous polyp of large intestine   ? RLS (restless legs syndrome)   ?  Incisional hernia, without obstruction or gangrene   ? Abdominal pain, generalized   ? Diverticular disease of large intestine   ? Dysphagia   ? Coronary artery disease involving native coronary artery of native heart without angina pectoris   ? Dyslipidemia   ? Ventral hernia without obstruction or gangrene   ? Paroxysmal atrial fibrillation (H)   ? Anticoagulation management encounter   ? S/P repair of recurrent ventral hernia   ? SOB (shortness of breath)   ? Lower extremity edema   ? Anxiety   ? Hypokalemia   ? (HFpEF) heart failure with preserved ejection fraction (H)   ? Tachycardia   ? Cholecystitis   ? Anticoagulant long-term use   ? Biliary obstruction   ? Abdominal pain, right upper quadrant    Past Medical History:   Diagnosis Date   ? Acute diastolic heart failure (H) 11/2015   ? Atrial fibrillation (H)    ? Cerebral vascular accident (H)    ? Coronary artery disease 2006    100% RCA with collateral filling per Dr. Elizabeth's angio report   ? Diabetes mellitus type 2 in obese (H)    ? Fibrocystic breast    ? GERD (gastroesophageal reflux disease)    ? History of anesthesia complications     slow to wake   ? Hypertension    ? Peripheral vascular disease (H)    ? PONV (postoperative nausea and vomiting)    ? Stroke (H)    ? Urinary incontinence         Social History   Substance Use Topics   ? Smoking status: Current Every Day Smoker     Packs/day: 0.25   ? Smokeless tobacco: Former User      Comment: e-cig a few times/day   ? Alcohol use No       Objective   Labs:  Last 3 days:    No results for input(s): CREATININE, HGB, HCT, PLT, BILITOT, AST, ALT, ALKPHOS, FACXCHRM in the last 72 hours.  Last 7 days:   Recent labs: (last 7 days)      10/05/18   0846  10/06/18   0941  10/07/18   0732  10/09/18   0614  10/10/18   0550  10/11/18   0705  10/12/18   0524   INR  2.84*  1.42*  1.44*  1.43*  1.29*  1.31*  1.44*       Warfarin Dosing History:    Date INR Warfarin Dose Comment   10/5/18 2.84 hold 5 mg of vitamin  K   10/6/18 1.42 hold    10/7/18 1.44 hold    10/8/18 N/A 2 mg Patient may discharge tomorrow.   10/9/18 1.43 4 mg    10/10 1.29 4 mg    10/11 1.31 4 mg    10/12 1.44 4 mg      Assessment  The patient is continuing warfarin for the indication of Atrial Fibrillation with a goal INR of 2-3. INR today is subtherapeutic which is expected as warfarin was held from 10/3/18 - 10/7/18 for surgery. INR is trending up, will continue yesterdays dose.     Plan  1. Administer warfarin 4 mg PO today.  2. Check INR daily or as appropriate.  3. Continue to follow the patient's INR, PLT, and HGB as available.  4. Monitor for potential drug/disease interactions.  5. Recommend to resume PTA warfarin dosing if patient being discharged today.    Thank you for the consult,  Tavares Sweeney, Pharmacy Student, [facility] 10/12/2018 8:21 AM

## 2021-06-26 NOTE — PROGRESS NOTES
Progress Notes by Michela Cardenas MD at 10/24/2018  9:14 AM     Author: Michela Cardenas MD Service: Nephrology Author Type: Physician    Filed: 10/24/2018  9:18 AM Date of Service: 10/24/2018  9:14 AM Status: Signed    : Michela Cardenas MD (Physician)              RENAL ([facility]) progress note  CC: F/U ESRD  S: more alert today,denies shortness of breath. Says she is trying to eat but not much appetite.  Denies abd pain.    ASSESSMENT/PLAN:     1. Chronic hypercalcemia.  longstanding.  , ionized calcium 1.48 on 10/18/2018.  Normal baseline renal function.  Expect this is likely primary hyperparathryroid but workup in progress. No e/o paraprotein on upep but polyclonal increase in IG on spep, awaiting result of immunofixation for further eval .  No e/o pulm sarcoid on recent imaging.  Would be unusual to have occult malignancy as hypercalcemia present as far back as 2015.   Will need to further eval for parathyroid adenoma vs hyperplasia (if workup otherwise unremarkable) but recently had IV contrast.   Monitor daily.    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  await results of immunofixation  2. Acute kidney injury.  expect due to recent diuresis, hypotension. Improving ..  Baseline creatinine 0.89 earlier this month. Cont to renally dose meds, avoid nephrotoxins, optimize hemodynamics.  agree with avoidance of further diuretics for now.  3. Chronic atrial fibrillation.  Status post cardioversion, continue management as per cardiology, continue amiodarone, metoprolol and warfarin.  4. Acute on chronic diastolic dysfunction.  Left ventricular ejection fraction 55%   Weight down 3 lb today, not currently on diuretics, will cont to hold for now.  5. Hypertension. ok  control.   6. Acalculous cholecystitis.  Status post laparoscopic cholecystectomy on 10/7/2018.  Continue management as per hospitalist service.  7. Diabetes  "mellitus type 2.  Most recent hemoglobin A1c 5.7% on 7/31/2018.  Continue management as per hospitalist service.  8. Metabolic acidosis: resolved, now off bicarb gtt.    Michela Cardenas  [facility]  [phone] (off)      No interval changes to past medical history, social history or family history to report.    /56 (Patient Position: Semi-nicole)  Pulse 61  Temp 97.6  F (36.4  C) (Oral)   Resp 16  Ht 5' 3\" (1.6 m)  Wt 170 lb 3.2 oz (77.2 kg)  LMP 01/25/1999  SpO2 91%  BMI 30.15 kg/m2    I/O last 3 completed shifts:  In: 1020 [P.O.:920; IV Piggyback:100]  Out: 575 [Urine:575]    Physical Exam:   GENERAL: in bed  nad  EYES: pupils equal, sclerae not icteric.  ENT: Hearing normal, oral mucosa moist.  RESP: Clear to auscultation bilaterally with no respiratory distress, normal effort.  CV: RRR, no murmurs. trace leg edema.    GI: distended, mild diffuse ttp  SKIN: No rash, warm/ dry  NEURO: Moves all extremities, alert today  PSYCH: approp affect    Results from last 7 days  Lab Units 10/24/18  0508 10/23/18  0533 10/22/18  1824   LN-SODIUM mmol/L 139  139 138  138 136   LN-POTASSIUM mmol/L 3.7  3.7 3.6  3.6 3.7   LN-CHLORIDE mmol/L 101  101 99  99 98   LN-CO2 mmol/L 28  28 29  29 26   LN-BLOOD UREA NITROGEN mg/dL 40*  40* 46*  46* 47*   LN-CREATININE mg/dL 0.91  0.91 1.19*  1.19* 1.37*   LN-CALCIUM mg/dL 12.2*  12.2* 11.7*  11.7* 11.9*   LN-ALBUMIN g/dL 2.7*  2.7* 2.6*  2.6* 2.9*   LN-PROTEIN TOTAL g/dL 6.9 6.7 7.2   LN-BILIRUBIN TOTAL mg/dL 2.3* 1.9* 2.3*   LN-ALKALINE PHOSPHATASE U/L 371* 333* 355*   LN-ALT (SGPT) U/L 525* 646* 783*   LN-AST (SGOT) U/L 763* 1404* 2138*       Results from last 7 days  Lab Units 10/24/18  0508 10/23/18  0533 10/22/18  1824   LN-WHITE BLOOD CELL COUNT thou/uL 8.9 9.6 11.7*   LN-HEMOGLOBIN g/dL 11.1* 10.6* 11.4*   LN-HEMATOCRIT % 35.5 34.1* 36.0   LN-PLATELET COUNT thou/uL 95* 91* 104*         Scheduled Meds:  ? magnesium " oxide  400 mg Oral TID   ? metoprolol tartrate  12.5 mg Oral BID   ? omeprazole  20 mg Oral BID AC   ? rOPINIRole  1 mg Oral QHS   ? sertraline  100 mg Oral DAILY   ? sodium chloride 0.9%  500 mL Intravenous Once   ? sodium chloride  10-30 mL Intravenous Q8H FIXED TIMES   ? warfarin - daily dose required   Other Med Consult or Protocol     Continuous Infusions:    PRN Meds:.bisacodyl, magnesium hydroxide, naloxone **OR** naloxone, nitroglycerin, ondansetron, sodium chloride bacteriostatic, sodium chloride, sodium chloride, sodium chloride, traMADol, traZODone      Labs personally reviewed today during this evaluation

## 2021-06-26 NOTE — PROGRESS NOTES
Progress Notes by Tavares Sweeney at 10/19/2018  9:03 AM     Author: Tavares Sweeney Service: Pharmacy Author Type: Pharmacy Student    Filed: 10/19/2018  9:50 AM Date of Service: 10/19/2018  9:03 AM Status: Attested    : Tavares Sweeney Cosigner: Sheri Hanna PharmANAHI at 10/19/2018 10:56 AM    Attestation signed by Sheri Hanna PharmANAHI at 10/19/2018 10:56 AM    I agree with Tavares's assessment and plan for warfarin dosing.   Essence Hanna, Gilles 10/19/2018 10:55 AM                    Pharmacy Consult: Warfarin Management    Pharmacy consulted to dose warfarin for Gardenia Muller, a 68 y.o. female who had abdominal pain upon presentation to [facility] on 10/3. Patient had an ERCP on 10/5 with no sludge or stones found. Patient had HIDA on 10/6, cystic duct consistent with obstruction. On 10/7 a laproscopic cholecystectomy was done. Patient remains in hospital for persistent atrial fibrillation, with rapid heart rate limited by blood pressure. 10/18 cardioversion done, elevated AST and ALT.    Ordering provider: Dr. Michael Abreu, Hospitalist    Reason for warfarin therapy: Atrial Fibrillation  Goal INR Range: 2-3    Subjective  Medication lists (home and hospital) were reviewed.    New medications that may increase bleeding risk/INR:  Amiodarone     Restarted home medications that may increase bleeding risk/INR: Torsemide, Ropinirole, sertraline, omeprazole, rosuvastatin, aspirin    Home Warfarin Dosin mg on , Tuesday and Thursday; 2 mg on all other days of the week    Other Anticoagulants: No  Patient being bridged: No    Patient Active Problem List   Diagnosis   ? Spinal stenosis   ? PAD (peripheral artery disease) (H)   ? Dermatosis Papulosa Nigra   ? Depression   ? Hypercalcemia   ? Right Renal Artery Stenosis   ? Osteoarthritis   ? Abnormality Of Walk   ? 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 Advanced directives, counseling/discussion   ? SBO  (small bowel obstruction) (H)   ? Cystitis   ? Hypomagnesemia   ? Healthcare maintenance   ? Essential hypertension   ? Dysarthria   ? Cerebral infarction (H)   ? Anemia   ? Cerebrovascular disease   ? Benign adenomatous polyp of large intestine   ? RLS (restless legs syndrome)   ? Incisional hernia, without obstruction or gangrene   ? Abdominal pain, generalized   ? Diverticular disease of large intestine   ? Dysphagia   ? Coronary artery disease involving native coronary artery of native heart without angina pectoris   ? Dyslipidemia   ? Ventral hernia without obstruction or gangrene   ? Paroxysmal atrial fibrillation (H)   ? Anticoagulation management encounter   ? S/P repair of recurrent ventral hernia   ? SOB (shortness of breath)   ? Lower extremity edema   ? Anxiety   ? Hypokalemia   ? (HFpEF) heart failure with preserved ejection fraction (H)   ? Tachycardia   ? Cholecystitis   ? Anticoagulant long-term use   ? Biliary obstruction   ? Abdominal pain, right upper quadrant   ? Persistent atrial fibrillation (H)   ? Abdominal pain, left upper quadrant    Past Medical History:   Diagnosis Date   ? Acute diastolic heart failure (H) 11/2015   ? Atrial fibrillation (H)    ? Cerebral vascular accident (H)    ? Coronary artery disease 2006    100% RCA with collateral filling per Dr. Elizabeth's angio report   ? Diabetes mellitus type 2 in obese (H)    ? Fibrocystic breast    ? GERD (gastroesophageal reflux disease)    ? History of anesthesia complications     slow to wake   ? Hypertension    ? Peripheral vascular disease (H)    ? PONV (postoperative nausea and vomiting)    ? Stroke (H)    ? Urinary incontinence         Social History   Substance Use Topics   ? Smoking status: Current Every Day Smoker     Packs/day: 0.25   ? Smokeless tobacco: Former User      Comment: e-cig a few times/day   ? Alcohol use No       Objective   Labs:  Last 3 days:    Recent Labs      10/18/18   1535  10/18/18   1536  10/19/18   0758    CREATININE   --   1.42*  1.14*   HGB  13.2   --    --    HCT  42.9   --    --    PLT  213   --    --    BILITOT   --   0.9  1.0   AST   --   773*  749*   ALT   --   270*  317*   ALKPHOS   --   303*  298*     Last 7 days:   Recent labs: (last 7 days)      10/13/18   0554  10/14/18   0520  10/15/18   0546  10/16/18   0630  10/17/18   0551  10/18/18   0600  10/19/18   0743   INR  1.64*  1.95*  2.34*  2.39*  2.65*  3.88*  3.76*       Warfarin Dosing History:    Date INR Warfarin Dose Comment   10/5/18 2.84 hold 5 mg of vitamin K   10/6/18 1.42 hold    10/7/18 1.44 hold    10/8/18 N/A 2 mg Patient may discharge tomorrow.   10/9/18 1.43 4 mg    10/10/18 1.29 4 mg    10/11/18 1.31 4 mg    10/12/18 1.44 4 mg    10/13/18 1.64 4 mg    10/14/18 1.95 4 mg    10/15/18 2.34 2 mg    10/16/18 2.39 4 mg    10/17/18 2.65 2 mg    10/18/18 3.88 hold    10/19/18 3.76 hold      Assessment  The patient is continuing warfarin for the indication of Atrial Fibrillation with a goal INR of 2-3. INR today is supratherapeutic. Warfarin dosing was decreased 10/17 due to amiodarone initiation and INR increase, however INR still became supratherapeutic. Warfarin was held yesterday but continues to be supratherapeutic. Warfarin to be held today.    Plan  1. Hold warfarin today due to INR increase, believed to be due in part to amiodarone initiation.  2. Check INR daily or as appropriate.  3. Continue to follow the patient's INR, PLT, and HGB as available.  4. Monitor for potential drug/disease interactions.  5. Amiodarone started on 10/17, due to the drug-drug interaction between warfarin and amiodarone, warfarin dosing will likely need to be decreased. The drug-drug interaction can be delayed, so close monitoring will be needed on discharge.     Thank you for the consult,  Tavares Sweeney, Pharmacy Student, [facility] 10/19/2018 9:03 AM

## 2021-06-26 NOTE — PROGRESS NOTES
Progress Notes by Sonali Hidalgo MD at 10/19/2018  9:26 AM     Author: Sonali Hidalgo MD Service: Cardiology Author Type: Physician    Filed: 10/19/2018  9:35 AM Date of Service: 10/19/2018  9:26 AM Status: Signed    : Sonali Hidalgo MD (Physician)           Click to link to [facility] HEART [facility] NOTE        Assessment/Recommendations   Assessment:  1. Persistent atrial fibrillation/flutter: Status post NIVIA cardioversion on 10/18/2018.  Feeling much better with improved energy level.  Continue on amiodarone 200 mg 3 times a day until Sunday and then may change to 200 mg twice a day.  Follow-up in atrial fibrillation clinic 1-2 weeks post discharge.  On Coumadin for anticoagulation.  2. Acute on chronic diastolic heartfailure: Now with worsening renal failure likely secondary to overdiuresis.  May hold torsemide today.  3. Acute cholecystitis: S/p cholecystectomy.  Abdominal discomfort and being evaluated today further.  4. Hypertension: Pressures currently been stable.  5. DMII  No further cardiac recommendations.  Please call if any further questions or concerns.  Primary Cardiologist: Dr. Gisel Choe MD       History of Present Illness    Feeling much better today.  She feels that she has better energy and is more awake.  She felt better as soon as she converted.  She is complaining of some abdominal discomfort and is discussing this with the hospitalist.       Physical Examination Review of Systems   Vitals:    10/19/18 0728   BP: 114/59   Pulse: 69   Resp: 18   Temp: 97.7  F (36.5  C)   SpO2: 93%     Body mass index is 29.6 kg/(m^2).  Wt Readings from Last 3 Encounters:   10/19/18 167 lb 1.6 oz (75.8 kg)   09/20/18 172 lb 4 oz (78.1 kg)   09/18/18 174 lb 4.8 oz (79.1 kg)     General Appearance:   alert, no apparent distress   HEENT:  no scleral icterus; the mucous membranes are pink and moist                               Neck: jugular venous pressure  normal   Chest: the spine is straight and the chest is symmetric   Lungs:   respirations unlabored; the lungs are clear to auscultation   Cardiovascular:   regular rhythm with normal first and second heart sounds and no murmurs or gallops   Abdomen:  no organomegaly, masses, bruits, or tenderness; bowel sounds are present   Extremities: no cyanosis, clubbing, or edema   Skin: no xanthelasma, dry    A 12 point comprehensive review of systems was negative except as noted in the current history.       Medical History  Surgical History Family History Social History   Past Medical History:   Diagnosis Date   ? Acute diastolic heart failure (H) 11/2015   ? Atrial fibrillation (H)    ? Cerebral vascular accident (H)    ? Coronary artery disease 2006    100% RCA with collateral filling per Dr. Elizabeth's angio report   ? Diabetes mellitus type 2 in obese (H)    ? Fibrocystic breast    ? GERD (gastroesophageal reflux disease)    ? History of anesthesia complications     slow to wake   ? Hypertension    ? Peripheral vascular disease (H)    ? PONV (postoperative nausea and vomiting)    ? Stroke (H)    ? Urinary incontinence     Past Surgical History:   Procedure Laterality Date   ? CARDIAC CATHETERIZATION     ? CARDIOVERSION  10/18/2018   ? CORONARY STENT PLACEMENT  1995    stent   ? ERCP N/A 10/5/2018    Procedure: ENDOSCOPIC RETROGRADE CHOLANGIOPANCREATOGRAPHY, SPHINCTEROTOMY;  Surgeon: Anson Stokes MD;  Location: [facility] OR;  Service:    ? EXPLORATORY LAPAROTOMY N/A 6/29/2018    Procedure: LAPAROTOMY, CLOSURE OF PERITONEAL RENT;  Surgeon: Jones Harding DO;  Location: [facility] OR;  Service:    ? LAPAROSCOPIC CHOLECYSTECTOMY N/A 10/7/2018    Procedure: CHOLECYSTECTOMY, LAPAROSCOPIC;  Surgeon: Felicia So MD;  Location: [facility] OR;  Service:    ? PICC  6/29/2018        ? VENTRAL HERNIA REPAIR N/A 11/12/2015    Procedure: STRANGULATED VENTRAL HERNIA REPAIR ;  Surgeon: Ernesto Bailey MD;   Location: [facility] OR;  Service:     Family History   Problem Relation Age of Onset   ? Cancer Paternal Grandmother    ? Cancer Paternal Uncle    ? No Medical Problems Mother    ? No Medical Problems Father    ? Heart attack Sister    ? Chronic Kidney Disease Sister    ? No Medical Problems Daughter    ? No Medical Problems Maternal Grandmother    ? No Medical Problems Maternal Grandfather    ? No Medical Problems Paternal Grandfather    ? No Medical Problems Maternal Aunt    ? No Medical Problems Paternal Aunt    ? Diabetes Brother    ? BRCA 1/2 Neg Hx    ? Breast cancer Neg Hx    ? Colon cancer Neg Hx    ? Endometrial cancer Neg Hx    ? Ovarian cancer Neg Hx     Social History     Social History   ? Marital status: Legally      Spouse name: N/A   ? Number of children: N/A   ? Years of education: N/A     Occupational History   ? Not on file.     Social History Main Topics   ? Smoking status: Current Every Day Smoker     Packs/day: 0.25   ? Smokeless tobacco: Former User      Comment: e-cig a few times/day   ? Alcohol use No   ? Drug use: No   ? Sexual activity: Not on file     Other Topics Concern   ? Not on file     Social History Narrative          Medications  Allergies   Scheduled Meds:  ? acetaminophen  650 mg Oral BID   ? amiodarone  200 mg Oral TID   ? aspirin  81 mg Oral DAILY   ? magnesium oxide  400 mg Oral TID   ? metoprolol tartrate  50 mg Oral BID   ? omeprazole  20 mg Oral BID AC   ? polyethylene glycol  17 g Oral DAILY   ? rOPINIRole  1 mg Oral QHS   ? rosuvastatin  40 mg Oral QHS   ? senna-docusate  1 tablet Oral BID   ? sertraline  100 mg Oral DAILY   ? sodium chloride 0.9%  500 mL Intravenous Once   ? torsemide  20 mg Oral DAILY   ? traZODone  50 mg Oral QHS   ? warfarin - daily dose required   Other Med Consult or Protocol     Continuous Infusions:  ? nacl 0.9% 100 mL/hr (10/18/18 1822)     PRN Meds:.bisacodyl, LORazepam, magnesium hydroxide, naloxone **OR** naloxone,  nitroglycerin, ondansetron, oxyCODONE Allergies   Allergen Reactions   ? Penicillins Swelling         Lab Results    Chemistry/lipid CBC Cardiac Enzymes/BNP/TSH/INR   Lab Results   Component Value Date    CHOL 165 09/20/2017    HDL 56 09/20/2017    LDLCALC 85 09/20/2017    TRIG 116 06/30/2018    CREATININE 1.14 (H) 10/19/2018    BUN 40 (H) 10/19/2018    K 4.9 10/19/2018     10/19/2018     10/19/2018    CO2 22 10/19/2018    Lab Results   Component Value Date    WBC 11.6 (H) 10/18/2018    HGB 13.2 10/18/2018    HCT 42.9 10/18/2018    MCV 87 10/18/2018     10/18/2018    Lab Results   Component Value Date    TROPONINI 0.01 10/03/2018    BNP 1140 (H) 10/14/2018    TSH 7.57 (H) 10/18/2018    INR 3.76 (H) 10/19/2018

## 2021-06-26 NOTE — PROGRESS NOTES
Progress Notes by Michela Cardenas MD at 10/23/2018 11:49 AM     Author: Michela Cardenas MD Service: Nephrology Author Type: Physician    Filed: 10/23/2018 12:01 PM Date of Service: 10/23/2018 11:49 AM Status: Signed    : Michela Cardenas MD (Physician)              RENAL ([facility]) progress note  CC: F/U ESRD  S: remains lethargic today but up in chair, denies shortness of breath. Says she is trying to eat but not much appetite.  ASSESSMENT/PLAN:     1. Chronic hypercalcemia.  longstanding.  , ionized calcium 1.48 on 10/18/2018.  Normal baseline renal function.  Expect this is likely primary hyperparathryroid but workup in progress. No e/o paraprotein on upep.polyclonal increase in IG on spep, will send immunofixation for further eval .  No e/o pulm sarcoid on recent imaging.  Would be unusual to have occult malignancy as hypercalcemia present as far back as 2015.   Will need to further eval for parathyroid adenoma vs hyperplasia (if workup otherwise unremarkable) but recently had IV contrast.   Monitor daily.   Will check urine calcium:creat clearance ratio for further eval  2. Acute kidney injury.  expect due to recent diuresis, hypotension.  Currently stable..  Baseline creatinine 0.89 earlier this month. Cont to renally dose meds, avoid nephrotoxins, optimize hemodynamics.  agree with avoidance of further diuretics for now.  3. Chronic atrial fibrillation.  Status post cardioversion, continue management as per cardiology, continue amiodarone, metoprolol and warfarin.  4. Acute on chronic diastolic dysfunction.  Left ventricular ejection fraction 55% (see NIVIA echo above).  echo pending today  5. Hypertension.  bp remains on low side, see above.  Need to avoid hypotension with ongoing nila  6. Acalculous cholecystitis.  Status post laparoscopic cholecystectomy on 10/7/2018.  Continue management as per hospitalist service.  7. Diabetes mellitus type 2.  Most recent hemoglobin A1c 5.7% on  "7/31/2018.  Continue management as per hospitalist service.  8. Metabolic acidosis: resolved, now off bicarb gtt.    Michela Cardenas  [facility]  [phone] (off)      No interval changes to past medical history, social history or family history to report.    /61  Pulse 63  Temp 97.7  F (36.5  C) (Oral)   Resp (!) 30  Ht 5' 3\" (1.6 m)  Wt 173 lb 12.8 oz (78.8 kg)  LMP 01/25/1999  SpO2 95%  BMI 30.79 kg/m2    I/O last 3 completed shifts:  In: 1526 [P.O.:980; I.V.:516; Other:30]  Out: 280 [Urine:250; Emesis/NG output:30]    Physical Exam:   GENERAL: in chair nad  EYES: pupils equal, sclerae not icteric.  ENT: Hearing normal, oral mucosa moist.  RESP: Clear to auscultation bilaterally with no respiratory distress, normal effort.  CV: RRR, no murmurs. trace leg edema.    GI: distended, mild diffuse ttp  SKIN: No rash, warm/ dry  NEURO: Moves all extremities, slow to respond to questions and weak  PSYCH: slow to respond to questions and confused about recent details of events    Results from last 7 days  Lab Units 10/23/18  0533 10/22/18  1824 10/22/18  0453   LN-SODIUM mmol/L 138  138 136 133*   LN-POTASSIUM mmol/L 3.6  3.6 3.7 4.1   LN-CHLORIDE mmol/L 99  99 98 98   LN-CO2 mmol/L 29  29 26 23   LN-BLOOD UREA NITROGEN mg/dL 46*  46* 47* 49*   LN-CREATININE mg/dL 1.19*  1.19* 1.37* 1.62*   LN-CALCIUM mg/dL 11.7*  11.7* 11.9* 11.7*   LN-ALBUMIN g/dL 2.6*  2.6* 2.9* 2.7*   LN-PROTEIN TOTAL g/dL 6.7 7.2 6.9   LN-BILIRUBIN TOTAL mg/dL 1.9* 2.3* 1.8*   LN-ALKALINE PHOSPHATASE U/L 333* 355* 340*   LN-ALT (SGPT) U/L 646* 783* 778*   LN-AST (SGOT) U/L 1404* 2138* 2443*       Results from last 7 days  Lab Units 10/23/18  0533 10/22/18  1824 10/18/18  1535   LN-WHITE BLOOD CELL COUNT thou/uL 9.6 11.7* 11.6*   LN-HEMOGLOBIN g/dL 10.6* 11.4* 13.2   LN-HEMATOCRIT % 34.1* 36.0 42.9   LN-PLATELET COUNT thou/uL 91* 104* 213         Scheduled Meds:  ? magnesium oxide  400 mg Oral TID "   ? omeprazole  20 mg Oral BID AC   ? rOPINIRole  1 mg Oral QHS   ? sertraline  100 mg Oral DAILY   ? sodium chloride 0.9%  500 mL Intravenous Once   ? sodium chloride  10-30 mL Intravenous Q8H FIXED TIMES   ? warfarin - daily dose required   Other Med Consult or Protocol   ? warfarin  4 mg Oral Once Warfarin     Continuous Infusions:    PRN Meds:.bisacodyl, magnesium hydroxide, naloxone **OR** naloxone, nitroglycerin, ondansetron, sodium chloride bacteriostatic, sodium chloride, sodium chloride, sodium chloride, traMADol, traZODone      Labs personally reviewed today during this evaluation

## 2021-06-26 NOTE — PROGRESS NOTES
Progress Notes by Monik Cruz MD at 11/12/2018 11:59 PM     Author: Monik Cruz MD Service: -- Author Type: Physician    Filed: 11/15/2018  6:39 PM Encounter Date: 11/12/2018 Status: Signed    : Monik Cruz MD (Physician)       [facility]      Facility:    [facility] [[id number]]    Code Status: FULL CODE   [facility] 10/3  through 10/25/18      Chief Complaint/Reason for Visit:  Chief Complaint   Patient presents with   ? Review Of Multiple Medical Conditions     acalculous cholecystitis       HPI:   Gardenia is a 68 y.o. female with MI, atrial flutter, ventral hernia with incisional hernia repair, on anticoagulation.  More  She had a week of abdominal pain, more centered in the epigastrium and right upper quadrant and elsewhere.  She threw up several times in the morning of admission.  She had no fever, no chills.   He was tachycardic, she had 1 L of IV fluid, and IV Lopressor to slow her heart rate.    CT of the chest: No PE, but gallbladder wall thickening.  CT of the abdomen: Gallbladder wall thickening right, consistent with cholecystitis.  Ultrasound of the gallbladder: Distended gallbladder, edema of the gallbladder wall, no gallstones are seen.  She had a laparoscopic cholecystectomy on 10/7/18 with Dr Felicia So.    She had elevated transaminases, with an AST = 2443, ALT = 778.  Was thought to be from hepatic congestion according to the GI consultant, in the setting of CHF.  Her LFTs were improving at the time of discharge.  Rosuvastatin was being held due to liver duress.    She had persistent atrial fibrillation with rapid ventricular rate, and had cardioversion on 10/18  She had an acute bout of diastolic heart failure    She had brief acute kidney injury thought to be from diuresis for her heart failure.  Ejection fraction 55%    She has had hypercalcemia since 2012, thought to be hydrochlorothiazide which was discontinued.   However her albumin remained elevated patient PTH was 190, vitamin D equal 48, ionized calcium 1.48; to be primary hyperparathyroidism.    She had  Bradycardia, and her  diltiazem was discontinued.    She was tolerating an oral diet at the time of discharge, and transferred to [facility] TCU for ongoing therapy.      Past Medical History:  Past Medical History:   Diagnosis Date   ? Acute diastolic heart failure (H) 11/2015   ? Atrial fibrillation (H)    ? Cerebral vascular accident (H)    ? Coronary artery disease 2006    100% RCA with collateral filling per Dr. Elizabeth's angio report   ? Diabetes mellitus type 2 in obese (H)    ? Fibrocystic breast    ? GERD (gastroesophageal reflux disease)    ? History of anesthesia complications     slow to wake   ? Hypertension    ? Peripheral vascular disease (H)    ? PONV (postoperative nausea and vomiting)    ? Stroke (H)    ? Urinary incontinence            Surgical History:  Past Surgical History:   Procedure Laterality Date   ? CARDIAC CATHETERIZATION     ? CARDIOVERSION  10/18/2018   ? CORONARY STENT PLACEMENT  1995    stent   ? ERCP N/A 10/5/2018    Procedure: ENDOSCOPIC RETROGRADE CHOLANGIOPANCREATOGRAPHY, SPHINCTEROTOMY;  Surgeon: Anson Stokes MD;  Location: [facility];  Service:    ? EXPLORATORY LAPAROTOMY N/A 6/29/2018    Procedure: LAPAROTOMY, CLOSURE OF PERITONEAL RENT;  Surgeon: Jones Harding DO;  Location: [facility] OR;  Service:    ? LAPAROSCOPIC CHOLECYSTECTOMY N/A 10/7/2018    Procedure: CHOLECYSTECTOMY, LAPAROSCOPIC;  Surgeon: Felicia So MD;  Location: [facility];  Service:    ? PICC  6/29/2018        ? PICC  10/21/2018        ? VENTRAL HERNIA REPAIR N/A 11/12/2015    Procedure: STRANGULATED VENTRAL HERNIA REPAIR ;  Surgeon: Ernesto Bailey MD;  Location: [facility] OR;  Service:        Family History:   Family History   Problem Relation Age of Onset   ? Cancer Paternal Grandmother    ? Cancer Paternal Uncle     ? No Medical Problems Mother    ? No Medical Problems Father    ? Heart attack Sister    ? Chronic Kidney Disease Sister    ? No Medical Problems Daughter    ? No Medical Problems Maternal Grandmother    ? No Medical Problems Maternal Grandfather    ? No Medical Problems Paternal Grandfather    ? No Medical Problems Maternal Aunt    ? No Medical Problems Paternal Aunt    ? Diabetes Brother    ? BRCA 1/2 Neg Hx    ? Breast cancer Neg Hx    ? Colon cancer Neg Hx    ? Endometrial cancer Neg Hx    ? Ovarian cancer Neg Hx        Social History:    Social History     Socioeconomic History   ? Marital status: Legally      Spouse name: Not on file   ? Number of children: Not on file   ? Years of education: Not on file   ? Highest education level: Not on file   Social Needs   ? Financial resource strain: Not on file   ? Food insecurity - worry: Not on file   ? Food insecurity - inability: Not on file   ? Transportation needs - medical: Not on file   ? Transportation needs - non-medical: Not on file   Occupational History   ? Not on file   Tobacco Use   ? Smoking status: Current Every Day Smoker     Packs/day: 0.25   ? Smokeless tobacco: Former User   ? Tobacco comment: e-cig a few times/day   Substance and Sexual Activity   ? Alcohol use: No   ? Drug use: No   ? Sexual activity: Not on file   Other Topics Concern   ? Not on file   Social History Narrative   ? Not on file          Review of Systems   Having nausea, but eating smaller amounts  Ongoing pain around small bulge above umbilicus  Reticent to do much therapy      Blood pressure 126/82, pulse 69, temperature 96.7  F (35.9  C), resp. rate 20,  SpO2 93 %    SLUMS 16    Physical Exam  Constitutional:  Quiet, overweight Black-American female , no distress. In wheelchair   Cardiovascular: Irregular rhythm .. Near holosystolic murmur at the mitral/apex area   Pulmonary/Chest: Breath sounds clear. She has no rales.   Abdominal: Soft. Bowel sounds are normal. Mild  distention, mildly firm  Lap nimesh incisions dry, no drainage, no erythema   Musculoskeletal: 1+ pretibial edema.   Neurological: She is alert and oriented to person, place, and time. Symmetric extremity movement   Skin: Skin is warm and dry. No erythema.   Psychiatric: She has a normal mood and affect. Her behavior is normal.         Allergies   Allergen Reactions   ? Penicillins Swelling         Medication List:  Current Outpatient Medications   Medication Sig   ? aspirin 81 MG EC tablet Take 81 mg by mouth daily.    ? blood glucose meter (GLUCOMETER) Please Dispense kit per pharmacy discretion and patient's insurance Test blood sugar.   ? blood glucose test strips Use 1 each As Directed as needed. Dispense brand per patient's insurance at pharmacy discretion.   ? carboxymethylcellulose (REFRESH PLUS) 0.5 % Dpet ophthalmic dropperette Administer 2 drops to both eyes every hour as needed (dry eyes).    ? ferrous sulfate 325 (65 FE) MG tablet Take 1 tablet (325 mg total) by mouth 2 (two) times a day.   ? furosemide (LASIX) 40 MG tablet Take 1 tablet (40 mg total) by mouth daily.   ? lancets 33 gauge Misc Test twice daily Dispense brand per patient's insurance at pharmacy discretion.   ? loratadine (CLARITIN) 10 mg tablet Take 10 mg by mouth daily with lunch. Noon   ? magnesium oxide (MAG-OX) 400 mg tablet Take 1,200 mg by mouth daily.    ? metoprolol tartrate (LOPRESSOR) 25 MG tablet TAKE 1 TABLET BY MOUTH TWICE DAILY (8AM & 8PM)   ? multivitamin (TAB-A-JULIET) per tablet Take 1 tablet by mouth daily. (Patient taking differently: Take 1 tablet by mouth daily. )   ? nitroglycerin (NITROSTAT) 0.4 MG SL tablet Place 1 tablet (0.4 mg total) under the tongue every 5 (five) minutes as needed for chest pain (for up to 3 doses and call 911 if persists). (Patient taking differently: Place 0.4 mg under the tongue every 5 (five) minutes as needed for chest pain (for up to 3 doses and call 911 if persists). )   ? omeprazole  (PRILOSEC) 20 MG capsule TAKE 1 CAPSULE BY MOUTH TWICE DAILY (8AM & 8PM)   ? polyethylene glycol (GLYCOLAX) 17 gram/dose powder Take 17 g by mouth daily as needed.    ? potassium chloride (K-DUR,KLOR-CON) 20 MEQ tablet Take 1 tablet (20 mEq total) by mouth daily.   ? rOPINIRole (REQUIP) 1 MG tablet 1 tablet 30-60 minutes before bedtime (Patient taking differently: 1 tablet 30-60 minutes before bedtime)   ? senna-docusate (PERICOLACE) 8.6-50 mg tablet Take 1 tablet by mouth 2 (two) times a day.   ? sertraline (ZOLOFT) 100 MG tablet TAKE 1 TABLET BY MOUTH ONCE DAILY   ? VITAMIN D3 2,000 unit capsule TAKE 1 CAPSULE BY MOUTH ONCE DAILY AT 8AM (DO NOT BRAND CHANGE - PATIENT ONLY WANT CAPSULES)   ? warfarin (COUMADIN/JANTOVEN) 2 MG tablet Take 1 tablet (2 mg total) by mouth daily.       Labs:     Ref Range & Units 11/5/18       WBC 4.0 - 11.0 thou/uL 8.7   Hemoglobin 12.0 - 16.0 g/dL 11.0 (L)   RDW 11.0 - 14.5 % 20.2 (H)   Platelets 140 - 440 thou/uL 122 (L              Ref Range & Units 11/5/18    10/29/18  5:45 AM     Sodium 136 - 145 mmol/L 135 (L) 138   Potassium 3.5 - 5.0 mmol/L 4.1 4.5   Chloride 98 - 107 mmol/L 104 104   CO2 22 - 31 mmol/L 23 25   Anion Gap, Calculation 5 - 18 mmol/L 8 9   Glucose 70 - 125 mg/dL 125 80   Calcium 8.5 - 10.5 mg/dL 10.7 (H) 11.0 (H)   BUN 8 - 22 mg/dL 9 14   Creatinine 0.60 - 1.10 mg/dL 0.65 0.73   GFR MDRD Af Amer >60 mL/min/1.73m2 >60 >60       INR = 2.88 on 2 mg daily       Ref Range & Units 10/29/18  5:45 AM     Sodium 136 - 145 mmol/L 138   Potassium 3.5 - 5.0 mmol/L 4.5   Chloride 98 - 107 mmol/L 104   CO2 22 - 31 mmol/L 25   Anion Gap, Calculation 5 - 18 mmol/L 9   Glucose 70 - 125 mg/dL 80   BUN 8 - 22 mg/dL 14   Creatinine 0.60 - 1.10 mg/dL 0.73   GFR MDRD Non Af Amer >60 mL/min/1.73m2 >60   Bilirubin, Total 0.0 - 1.0 mg/dL 1.9 (H)   Calcium 8.5 - 10.5 mg/dL 11.0 (H)   Protein, Total 6.0 - 8.0 g/dL 6.6   Albumin 3.5 - 5.0 g/dL 2.4 (L)   Alkaline Phosphatase 45 - 120 U/L 275  (H)   AST 0 - 40 U/L 63 (H)   ALT 0 - 45 U/L 151 (H)           Ref Range & Units 10/25/18  6:27 AM   10/24/18  5:08 AM      Magnesium 1.8 - 2.6 mg/dL 1.8 1.9        Ref Range & Units 10/24/18  5:08 AM   10/23/18  5:33 AM     WBC 4.0 - 11.0 thou/uL 8.9 9.6   Hemoglobin 12.0 - 16.0 g/dL 11.1 (L) 10.6 (L)   RDW 11.0 - 14.5 % 18.3 (H) 18.0 (H)   Platelets 140 - 440 thou/uL 95 (L) 91 (L)      10/23/18  5:33 AM   10/23/18       BNP 0 - 114 pg/mL 2481 (H)         Assessment / Plan:    ICD-10-CM    1. Acalculous cholecystitis K81.9    2. S/P laparoscopic cholecystectomy Z90.49 GI functioning, healing well   3. (HFpEF) heart failure with preserved ejection fraction (H) I50.30  Lasix, metoprolol   4. Pesrsistent A-fib (H) I48.1 warfarin   5. Transaminitis R74.0 Order a  recheck   6. Essential hypertension I10 Metoprolol; good readings   7. Coronary artery disease involving native coronary artery of native heart without angina pectoris I25.10 ASA. No anginal sxs   8 Depression F32.9 sertraline   9. Lower extremity edema R60.0 Lasix, edema has decreased somewhat           Electronically signed by: Monik Cruz MD

## 2021-06-26 NOTE — PROGRESS NOTES
Progress Notes by Monik Cruz MD at 10/29/2018 11:59 PM     Author: Monik Cruz MD Service: -- Author Type: Physician    Filed: 11/18/2018  7:27 PM Encounter Date: 10/29/2018 Status: Addendum    : Monik Cruz MD (Physician)    Related Notes: Original Note by Monik Cruz MD (Physician) filed at 10/30/2018  9:34 PM       [facility]      Facility:    [facility] [[id number]]    Code Status: FULL CODE   [facility] 10/3  through 10/25/18      Chief Complaint/Reason for Visit:  Chief Complaint   Patient presents with   ? H & P     A calculus cholecystitis/abdominal pain       HPI:   Gardenia is a 68 y.o. female with MI, atrial flutter, ventral hernia with incisional hernia repair, on anticoagulation.  More  She had a week of abdominal pain, more centered in the epigastrium and right upper quadrant and elsewhere.  She threw up several times in the morning of admission.  She had no fever, no chills.   He was tachycardic, she had 1 L of IV fluid, and IV Lopressor to slow her heart rate.    CT of the chest: No PE, but gallbladder wall thickening.  CT of the abdomen: Gallbladder wall thickening right, consistent with cholecystitis.  Ultrasound of the gallbladder: Distended gallbladder, edema of the gallbladder wall, no gallstones are seen.  She had a laparoscopic cholecystectomy on 10/7/18 with Dr Felicia So.    She had elevated transaminases, with an AST = 2443, ALT = 778.  Was thought to be from hepatic congestion according to the GI consultant, in the setting of CHF.  Her LFTs were improving at the time of discharge.  Rosuvastatin was being held due to liver duress.    She had persistent atrial fibrillation with rapid ventricular rate, and had cardioversion on 10/18  She had an acute bout of diastolic heart failure    She had brief acute kidney injury thought to be from diuresis for her heart failure.  Ejection fraction 55%    She has had  hypercalcemia since 2012, thought to be hydrochlorothiazide which was discontinued.  However her albumin remained elevated patient PTH was 190, vitamin D equal 48, ionized calcium 1.48; to be primary hyperparathyroidism.    She had  Bradycardia, and her  diltiazem was discontinued.    She was tolerating an oral diet at the time of discharge, and transferred to [facility] for ongoing therapy.      Past Medical History:  Past Medical History:   Diagnosis Date   ? Acute diastolic heart failure (H) 11/2015   ? Atrial fibrillation (H)    ? Cerebral vascular accident (H)    ? Coronary artery disease 2006    100% RCA with collateral filling per Dr. Elizabeth's angio report   ? Diabetes mellitus type 2 in obese (H)    ? Fibrocystic breast    ? GERD (gastroesophageal reflux disease)    ? History of anesthesia complications     slow to wake   ? Hypertension    ? Peripheral vascular disease (H)    ? PONV (postoperative nausea and vomiting)    ? Stroke (H)    ? Urinary incontinence            Surgical History:  Past Surgical History:   Procedure Laterality Date   ? CARDIAC CATHETERIZATION     ? CARDIOVERSION  10/18/2018   ? CORONARY STENT PLACEMENT  1995    stent   ? ERCP N/A 10/5/2018    Procedure: ENDOSCOPIC RETROGRADE CHOLANGIOPANCREATOGRAPHY, SPHINCTEROTOMY;  Surgeon: Anson Stokes MD;  Location: [facility];  Service:    ? EXPLORATORY LAPAROTOMY N/A 6/29/2018    Procedure: LAPAROTOMY, CLOSURE OF PERITONEAL RENT;  Surgeon: Jones Harding DO;  Location: [facility] OR;  Service:    ? LAPAROSCOPIC CHOLECYSTECTOMY N/A 10/7/2018    Procedure: CHOLECYSTECTOMY, LAPAROSCOPIC;  Surgeon: Felicia So MD;  Location: [facility] OR;  Service:    ? PICC  6/29/2018        ? PICC  10/21/2018        ? VENTRAL HERNIA REPAIR N/A 11/12/2015    Procedure: STRANGULATED VENTRAL HERNIA REPAIR ;  Surgeon: Ernesto Bailey MD;  Location: [facility] OR;  Service:        Family History:   Family History   Problem  Relation Age of Onset   ? Cancer Paternal Grandmother    ? Cancer Paternal Uncle    ? No Medical Problems Mother    ? No Medical Problems Father    ? Heart attack Sister    ? Chronic Kidney Disease Sister    ? No Medical Problems Daughter    ? No Medical Problems Maternal Grandmother    ? No Medical Problems Maternal Grandfather    ? No Medical Problems Paternal Grandfather    ? No Medical Problems Maternal Aunt    ? No Medical Problems Paternal Aunt    ? Diabetes Brother    ? BRCA 1/2 Neg Hx    ? Breast cancer Neg Hx    ? Colon cancer Neg Hx    ? Endometrial cancer Neg Hx    ? Ovarian cancer Neg Hx        Social History:    Social History     Socioeconomic History   ? Marital status: Legally      Spouse name: Not on file   ? Number of children: Not on file   ? Years of education: Not on file   ? Highest education level: Not on file   Social Needs   ? Financial resource strain: Not on file   ? Food insecurity - worry: Not on file   ? Food insecurity - inability: Not on file   ? Transportation needs - medical: Not on file   ? Transportation needs - non-medical: Not on file   Occupational History   ? Not on file   Tobacco Use   ? Smoking status: Current Every Day Smoker     Packs/day: 0.25   ? Smokeless tobacco: Former User   ? Tobacco comment: e-cig a few times/day   Substance and Sexual Activity   ? Alcohol use: No   ? Drug use: No   ? Sexual activity: Not on file   Other Topics Concern   ? Not on file   Social History Narrative   ? Not on file          Review of Systems   He feels very tired, bowel and bladder okay, she is eating okay, except for fatigue her physical therapy is going okay.  The remainder of the comprehensive review of systems is negative    Vitals:    10/29/18 0800   BP: 132/75   Pulse: 71   Resp: 18   Temp: 97.6  F (36.4  C)   SpO2: 96%       Physical Exam   Constitutional: She is oriented to person, place, and time. No distress.   Sleepy, overweight Black-American female   HENT:   Nose:  Nose normal.   Mouth/Throat: Oropharynx is clear and moist.   Eyes: Conjunctivae and EOM are normal.   Cardiovascular: Regular rhythm and normal heart sounds.  Exam reveals no friction rub.    Near holosystolic murmur at the mitral/apex area   Pulmonary/Chest: Breath sounds normal. She has no wheezes. She has no rales.   Abdominal: Soft. Bowel sounds are normal. There is no tenderness. There is no rebound.   Mild distention  Lap nimesh incisions dry, no drainage, no erythema   Musculoskeletal:   1+ pretibial edema  Symmetric extremity moves     Lymphadenopathy:     She has no cervical adenopathy.   Neurological: She is alert and oriented to person, place, and time.   Skin: Skin is warm and dry. No erythema.   Psychiatric: She has a normal mood and affect. Her behavior is normal.       Medication List:  Current Outpatient Medications   Medication Sig   ? aspirin 81 MG EC tablet Take 81 mg by mouth daily.    ? blood glucose meter (GLUCOMETER) Please Dispense kit per pharmacy discretion and patient's insurance Test blood sugar.   ? blood glucose test strips Use 1 each As Directed as needed. Dispense brand per patient's insurance at pharmacy discretion.   ? carboxymethylcellulose (REFRESH PLUS) 0.5 % Dpet ophthalmic dropperette Administer 2 drops to both eyes every hour as needed (dry eyes).    ? ferrous sulfate 325 (65 FE) MG tablet Take 1 tablet (325 mg total) by mouth 2 (two) times a day.   ? furosemide (LASIX) 40 MG tablet Take 1 tablet (40 mg total) by mouth daily.   ? lancets 33 gauge Misc Test twice daily Dispense brand per patient's insurance at pharmacy discretion.   ? loratadine (CLARITIN) 10 mg tablet Take 10 mg by mouth daily with lunch. Noon   ? magnesium oxide (MAG-OX) 400 mg tablet Take 1,200 mg by mouth daily.    ? metoprolol tartrate (LOPRESSOR) 25 MG tablet TAKE 1 TABLET BY MOUTH TWICE DAILY (8AM & 8PM)   ? multivitamin (TAB-A-JULIET) per tablet Take 1 tablet by mouth daily. (Patient taking differently:  Take 1 tablet by mouth daily. )   ? nitroglycerin (NITROSTAT) 0.4 MG SL tablet Place 1 tablet (0.4 mg total) under the tongue every 5 (five) minutes as needed for chest pain (for up to 3 doses and call 911 if persists). (Patient taking differently: Place 0.4 mg under the tongue every 5 (five) minutes as needed for chest pain (for up to 3 doses and call 911 if persists). )   ? omeprazole (PRILOSEC) 20 MG capsule TAKE 1 CAPSULE BY MOUTH TWICE DAILY (8AM & 8PM)   ? polyethylene glycol (GLYCOLAX) 17 gram/dose powder Take 17 g by mouth daily as needed.    ? potassium chloride (K-DUR,KLOR-CON) 20 MEQ tablet Take 1 tablet (20 mEq total) by mouth daily.   ? rOPINIRole (REQUIP) 1 MG tablet 1 tablet 30-60 minutes before bedtime (Patient taking differently: 1 tablet 30-60 minutes before bedtime)   ? senna-docusate (PERICOLACE) 8.6-50 mg tablet Take 1 tablet by mouth 2 (two) times a day.   ? sertraline (ZOLOFT) 100 MG tablet TAKE 1 TABLET BY MOUTH ONCE DAILY   ? VITAMIN D3 2,000 unit capsule TAKE 1 CAPSULE BY MOUTH ONCE DAILY AT 8AM (DO NOT BRAND CHANGE - PATIENT ONLY WANT CAPSULES)   ? warfarin (COUMADIN/JANTOVEN) 2 MG tablet Take 1 tablet (2 mg total) by mouth daily.       Labs:  INR = 2.88 on 2 mg daily     Ref Range & Units 10/29/18  5:45 AM     Sodium 136 - 145 mmol/L 138   Potassium 3.5 - 5.0 mmol/L 4.5   Chloride 98 - 107 mmol/L 104   CO2 22 - 31 mmol/L 25   Anion Gap, Calculation 5 - 18 mmol/L 9   Glucose 70 - 125 mg/dL 80   BUN 8 - 22 mg/dL 14   Creatinine 0.60 - 1.10 mg/dL 0.73   GFR MDRD Non Af Amer >60 mL/min/1.73m2 >60   Bilirubin, Total 0.0 - 1.0 mg/dL 1.9 (H)   Calcium 8.5 - 10.5 mg/dL 11.0 (H)   Protein, Total 6.0 - 8.0 g/dL 6.6   Albumin 3.5 - 5.0 g/dL 2.4 (L)   Alkaline Phosphatase 45 - 120 U/L 275 (H)   AST 0 - 40 U/L 63 (H)   ALT 0 - 45 U/L 151 (H)           Ref Range & Units 10/25/18  6:27 AM   10/24/18  5:08 AM      Magnesium 1.8 - 2.6 mg/dL 1.8 1.9        Ref Range & Units 10/24/18  5:08 AM   10/23/18   5:33 AM     WBC 4.0 - 11.0 thou/uL 8.9 9.6   Hemoglobin 12.0 - 16.0 g/dL 11.1 (L) 10.6 (L)   RDW 11.0 - 14.5 % 18.3 (H) 18.0 (H)   Platelets 140 - 440 thou/uL 95 (L) 91 (L)      10/23/18  5:33 AM   10/23/18       BNP 0 - 114 pg/mL 2481 (H)         Assessment / Plan:    ICD-10-CM    1. Acalculous cholecystitis K81.9    2. S/P laparoscopic cholecystectomy Z90.49 GI functioning, healing well   3. (HFpEF) heart failure with preserved ejection fraction (H) I50.30  Lasix, metoprolol   4. Paroxysmal A-fib (H) I48.0 warfarin   5. Acute renal failure (ARF) (H) N17.9    6. Transaminitis R74.0 Can recheck   7. Essential hypertension I10 metoprolol   8. Coronary artery disease involving native coronary artery of native heart without angina pectoris I25.10 ASA   9 Depression F32.9 sertraline   10. Lower extremity edema R60.0 lasix           Electronically signed by: Monik Cruz MD

## 2021-06-26 NOTE — PROGRESS NOTES
Progress Notes by Tavares Sweeney at 10/24/2018  8:58 AM     Author: Tavares Sweeney Service: Pharmacy Author Type: Pharmacy Student    Filed: 10/24/2018  1:24 PM Date of Service: 10/24/2018  8:58 AM Status: Attested    : Tavares Sweeney Cosigner: Suzie Ortiz PharmD at 10/24/2018  1:26 PM    Attestation signed by Suzie Ortiz PharmD at 10/24/2018  1:26 PM    I have reviewed the warfarin dosing and note documented by the pharmacy student and agree with his assessment and plan.  - Suzie Ortiz PharmD  10/24/2018  1:26 PM                 Pharmacy Consult: Warfarin Management    Pharmacy consulted to dose warfarin for Gardenia Muller, a 68 y.o. female who had abdominal pain upon presentation to [facility] on 10/3. Patient had an ERCP on 10/5 with no sludge or stones found. Patient had HIDA on 10/6, cystic duct consistent with obstruction. On 10/7 a laproscopic cholecystectomy was done. Patient remains in hospital for persistent atrial fibrillation, with rapid heart rate limited by blood pressure. 10/18 cardioversion done, elevated AST and ALT.     Ordering provider: Dr. Michael Abreu, Hospitalist    Reason for warfarin therapy: Atrial Fibrillation  Goal INR Range: 2-3    Subjective  Medication lists (home and hospital) were reviewed.    New medications that may increase bleeding risk/INR:      On amiodarone from 10/17 to 10/21    Restarted home medications that may increase bleeding risk/INR:Ropinirole, sertraline, omeprazole    Home medication NOT restarted that may increase bleeding risk/INR: aspirin, torsemide, rosuvastatin    Home Warfarin Dosin mg on , Tuesday and Thursday; 2 mg on all other days of the week    Other Anticoagulants: No  Patient being bridged: No    Patient Active Problem List   Diagnosis   ? Spinal stenosis   ? PAD (peripheral artery disease) (H)   ? Dermatosis Papulosa Nigra   ? Depression   ? Hypercalcemia   ? Right Renal Artery Stenosis   ?  Osteoarthritis   ? Abnormality Of Walk   ? 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 Advanced directives, counseling/discussion   ? SBO (small bowel obstruction) (H)   ? Cystitis   ? Hypomagnesemia   ? Healthcare maintenance   ? Essential hypertension   ? Dysarthria   ? Cerebral infarction (H)   ? Anemia   ? Cerebrovascular disease   ? Benign adenomatous polyp of large intestine   ? RLS (restless legs syndrome)   ? Incisional hernia, without obstruction or gangrene   ? Abdominal pain, generalized   ? Diverticular disease of large intestine   ? Dysphagia   ? Coronary artery disease involving native coronary artery of native heart without angina pectoris   ? Dyslipidemia   ? Ventral hernia without obstruction or gangrene   ? Paroxysmal atrial fibrillation (H)   ? Anticoagulation management encounter   ? S/P repair of recurrent ventral hernia   ? SOB (shortness of breath)   ? Lower extremity edema   ? Anxiety   ? Hypokalemia   ? (HFpEF) heart failure with preserved ejection fraction (H)   ? Tachycardia   ? Cholecystitis   ? Anticoagulant long-term use   ? Biliary obstruction   ? Abdominal pain, right upper quadrant   ? Persistent atrial fibrillation (H)   ? Abdominal pain, left upper quadrant   ? Sinus bradycardia   ? MAY (acute kidney injury) (H)   ? Transaminitis    Past Medical History:   Diagnosis Date   ? Acute diastolic heart failure (H) 11/2015   ? Atrial fibrillation (H)    ? Cerebral vascular accident (H)    ? Coronary artery disease 2006    100% RCA with collateral filling per Dr. Elizabeth's angio report   ? Diabetes mellitus type 2 in obese (H)    ? Fibrocystic breast    ? GERD (gastroesophageal reflux disease)    ? History of anesthesia complications     slow to wake   ? Hypertension    ? Peripheral vascular disease (H)    ? PONV (postoperative nausea and vomiting)    ? Stroke (H)    ? Urinary incontinence         Social History   Substance Use Topics   ? Smoking status: Current Every Day  Smoker     Packs/day: 0.25   ? Smokeless tobacco: Former User      Comment: e-cig a few times/day   ? Alcohol use No       Objective   Labs:  Last 3 days:    Recent Labs      10/21/18   1926  10/22/18   0453  10/22/18   1824  10/23/18   0533  10/24/18   0508   CREATININE  1.60*  1.62*  1.37*  1.19*  1.19*  0.91  0.91   HGB   --    --   11.4*  10.6*  11.1*   HCT   --    --   36.0  34.1*  35.5   PLT   --    --   104*  91*  95*   BILITOT  1.7*  1.8*  2.3*  1.9*  2.3*   AST  706*  2443*  2138*  1404*  763*   ALT  416*  778*  783*  646*  525*   ALKPHOS  404*  340*  355*  333*  371*     Last 7 days:   Recent labs: (last 7 days)      10/20/18   0553  10/21/18   0640  10/21/18   1620  10/22/18   0453  10/22/18   1824  10/23/18   0533  10/24/18   0508   INR  5.08*  6.16*  4.45*  3.31*  2.51*  2.10*  1.77*       Warfarin Dosing History:    Date INR Warfarin Dose Comment   10/5/18 2.84 hold 5 mg of vitamin K   10/6/18 1.42 hold    10/7/18 1.44 hold    10/8/18 N/A 2 mg Patient may discharge tomorrow.   10/9/18 1.43 4 mg    10/10/18 1.29 4 mg    10/11/18 1.31 4 mg    10/12/18 1.44 4 mg    10/13/18 1.64 4 mg    10/14/18 1.95 4 mg    10/15/18 2.34 2 mg    10/16/18 2.39 4 mg    10/17/18 2.65 2 mg    10/18/18 3.88 hold    10/19/18 3.76 hold    10/20/18 5.08 hold    10/21/18 6.16 hold Phytonadione 2.5 mg po x 1, Amiodarone DC'd   10/22/18 3.31 hold    10/23/18 2.10 4 mg    10/24/18 1.77 2 mg            Assessment  The patient is continuing warfarin for the indication of Atrial Fibrillation with a goal INR of 2-3. INR today is subtherapeutic. Warfarin dosing was decreased 10/17 due to amiodarone initiation and INR increase, however INR still became supratherapeutic. Warfarin was held 10/18, 10/19, 10/20, 10/21 and 10/22.  Vit K po given 10/21.     Plan  1. Resume Warfarin today. 2 mg x1  2. Check INR daily or as appropriate.  3. Continue to follow the patient's INR, PLT, and HGB as available.  4. Monitor for potential drug/disease  interactions.  5. Amiodarone started on 10/17, and then stopped on 10/21. Due to the drug-drug interaction between warfarin and amiodarone, warfarin dosing will likely vary for at least a week after Amiodarone DC'd. The drug-drug interaction can be delayed, so close monitoring will be needed on discharge.     Thank you for the consult,  Tavares Sweeney, Pharmacy Student, 10/24/2018 8:58 AM

## 2021-06-26 NOTE — PROGRESS NOTES
Progress Notes by Monik Cruz MD at 12/3/2018 10:44 PM     Author: Monik Cruz MD Service: -- Author Type: Physician    Filed: 12/9/2018 12:58 PM Encounter Date: 12/3/2018 Status: Signed    : Monik Cruz MD (Physician)       [facility]      Facility:    [facility] [[id number]]    Code Status: FULL CODE   [facility] 10/3  through 10/25/18      Chief Complaint/Reason for Visit:  Chief Complaint   Patient presents with   ? H & P     admission to LTC       HPI:   Gardenia is a 68 y.o. female with MI, atrial flutter, ventral hernia with incisional hernia repair, on anticoagulation.  More  She had a week of abdominal pain, more centered in the epigastrium and right upper quadrant and elsewhere.  She threw up several times in the morning of admission.  She had no fever, no chills.   He was tachycardic, she had 1 L of IV fluid, and IV Lopressor to slow her heart rate.    CT of the chest: No PE, but gallbladder wall thickening.  CT of the abdomen: Gallbladder wall thickening right, consistent with cholecystitis.  Ultrasound of the gallbladder: Distended gallbladder, edema of the gallbladder wall, no gallstones are seen.  She had a laparoscopic cholecystectomy on 10/7/18 with Dr Felicia So.    She had elevated transaminases, with an AST = 2443, ALT = 778.  Was thought to be from hepatic congestion according to the GI consultant, in the setting of CHF.  Her LFTs were improving at the time of discharge.  Rosuvastatin was being held due to liver duress.    She had persistent atrial fibrillation with rapid ventricular rate, and had cardioversion on 10/18  She had an acute bout of diastolic heart failure    She had brief acute kidney injury thought to be from diuresis for her heart failure.  Ejection fraction 55%    She has had hypercalcemia since 2012, thought to be hydrochlorothiazide which was discontinued.  However her albumin remained elevated patient  PTH was 190, vitamin D equal 48, ionized calcium 1.48; to be primary hyperparathyroidism.    She had  Bradycardia, and her  diltiazem was discontinued.    She was tolerating an oral diet at the time of discharge, and transferred to [facility] for ongoing therapy.    She was very slow to participate, if showing some desire to be dependent, to have an shelter rather than live independently.  She was at her niece's house for a couple days over Thanksgiving and did well, in spite of that she does not want to return home to independent living.  However she has been out of apartment and has lost her elderly waiver.  She transferred to long-term care, has a couple more days of therapy left.    She is uncertain about whether to return to her home apartment, is talking to the  on long-term care about her options.  She just so not stay in long-term care at [facility], and thus has to decide between returning home with services until shelter can be arranged or staying put in long-term care until then.      Past Medical History:  Past Medical History:   Diagnosis Date   ? Acute diastolic heart failure (H) 11/2015   ? Atrial fibrillation (H)    ? Cerebral vascular accident (H)    ? Coronary artery disease 2006    100% RCA with collateral filling per Dr. Elizabeth's angio report   ? Diabetes mellitus type 2 in obese (H)    ? Fibrocystic breast    ? GERD (gastroesophageal reflux disease)    ? History of anesthesia complications     slow to wake   ? Hypertension    ? Peripheral vascular disease (H)    ? PONV (postoperative nausea and vomiting)    ? Stroke (H)    ? Urinary incontinence            Surgical History:  Past Surgical History:   Procedure Laterality Date   ? CARDIAC CATHETERIZATION     ? CARDIOVERSION  10/18/2018   ? CORONARY STENT PLACEMENT  1995    stent   ? ERCP N/A 10/5/2018    Procedure: ENDOSCOPIC RETROGRADE CHOLANGIOPANCREATOGRAPHY, SPHINCTEROTOMY;  Surgeon: Anson Stokes MD;  Location: [facility]  Main OR;  Service:    ? EXPLORATORY LAPAROTOMY N/A 6/29/2018    Procedure: LAPAROTOMY, CLOSURE OF PERITONEAL RENT;  Surgeon: Jones Harding DO;  Location: [facility] Main OR;  Service:    ? LAPAROSCOPIC CHOLECYSTECTOMY N/A 10/7/2018    Procedure: CHOLECYSTECTOMY, LAPAROSCOPIC;  Surgeon: Felicia So MD;  Location: [facility] Main OR;  Service:    ? PICC  6/29/2018        ? PICC  10/21/2018        ? VENTRAL HERNIA REPAIR N/A 11/12/2015    Procedure: STRANGULATED VENTRAL HERNIA REPAIR ;  Surgeon: Ernesto Bailey MD;  Location: [facility] Main OR;  Service:        Family History:   Family History   Problem Relation Age of Onset   ? Cancer Paternal Grandmother    ? Cancer Paternal Uncle    ? No Medical Problems Mother    ? No Medical Problems Father    ? Heart attack Sister    ? Chronic Kidney Disease Sister    ? No Medical Problems Daughter    ? No Medical Problems Maternal Grandmother    ? No Medical Problems Maternal Grandfather    ? No Medical Problems Paternal Grandfather    ? No Medical Problems Maternal Aunt    ? No Medical Problems Paternal Aunt    ? Diabetes Brother    ? BRCA 1/2 Neg Hx    ? Breast cancer Neg Hx    ? Colon cancer Neg Hx    ? Endometrial cancer Neg Hx    ? Ovarian cancer Neg Hx        Social History:    Social History     Socioeconomic History   ? Marital status: Legally      Spouse name: Not on file   ? Number of children: Not on file   ? Years of education: Not on file   ? Highest education level: Not on file   Social Needs   ? Financial resource strain: Not on file   ? Food insecurity - worry: Not on file   ? Food insecurity - inability: Not on file   ? Transportation needs - medical: Not on file   ? Transportation needs - non-medical: Not on file   Occupational History   ? Not on file   Tobacco Use   ? Smoking status: Current Every Day Smoker     Packs/day: 0.25   ? Smokeless tobacco: Former User   ? Tobacco comment: e-cig a few times/day   Substance and Sexual Activity   ?  Alcohol use: No   ? Drug use: No   ? Sexual activity: Not on file   Other Topics Concern   ? Not on file   Social History Narrative   ? Not on file          Review of Systems   Other than feeling a bit physically vulnerable about being alone at home, she physically feels well  The remainder of the comprehensive review of systems is negative    Blood pressure 117/56, pulse 81, temperature 98  F (36.7  C), resp. rate 18,SpO2 99 %,        Physical Exam   Constitutional: She is oriented to person, place, and time. No distress.   Alert overweight Black-American female   HENT:   Nose: Nose normal.   Mouth/Throat: Oropharynx is clear and moist.   Eyes: Conjunctivae and EOM are normal.   Cardiovascular: Regular rhythm and normal heart sounds. Exam reveals no friction rub.   Near holosystolic murmur at the mitral/apex area   Pulmonary/Chest: Breath sounds normal. She has no wheezes. She has no rales.   Abdominal: Soft. Bowel sounds are normal. She exhibits no distension. There is no tenderness. There is no rebound.   Surgical wound well-healed   Musculoskeletal:   1+ pretibial edema  Symmetric extremity moves     Lymphadenopathy:     She has no cervical adenopathy.   Neurological: She is alert and oriented to person, place, and time.   Skin: Skin is warm and dry. No erythema.   Psychiatric: She has a normal mood and affect. Her behavior is normal.       Medication List:  Current Outpatient Medications   Medication Sig   ? aspirin 81 MG EC tablet Take 81 mg by mouth daily.    ? blood glucose meter (GLUCOMETER) Please Dispense kit per pharmacy discretion and patient's insurance Test blood sugar.   ? blood glucose test strips Use 1 each As Directed as needed. Dispense brand per patient's insurance at pharmacy discretion.   ? carboxymethylcellulose (REFRESH PLUS) 0.5 % Dpet ophthalmic dropperette Administer 2 drops to both eyes every hour as needed (dry eyes).    ? ferrous sulfate 325 (65 FE) MG tablet Take 1 tablet (325 mg  total) by mouth 2 (two) times a day.   ? furosemide (LASIX) 40 MG tablet Take 1 tablet (40 mg total) by mouth daily.   ? lancets 33 gauge Misc Test twice daily Dispense brand per patient's insurance at pharmacy discretion.   ? loratadine (CLARITIN) 10 mg tablet Take 10 mg by mouth daily with lunch. Noon   ? magnesium oxide (MAG-OX) 400 mg tablet Take 1,200 mg by mouth daily.    ? metoprolol tartrate (LOPRESSOR) 25 MG tablet TAKE 1 TABLET BY MOUTH TWICE DAILY (8AM & 8PM)   ? multivitamin (TAB-A-JULIET) per tablet Take 1 tablet by mouth daily. (Patient taking differently: Take 1 tablet by mouth daily. )   ? nitroglycerin (NITROSTAT) 0.4 MG SL tablet Place 1 tablet (0.4 mg total) under the tongue every 5 (five) minutes as needed for chest pain (for up to 3 doses and call 911 if persists). (Patient taking differently: Place 0.4 mg under the tongue every 5 (five) minutes as needed for chest pain (for up to 3 doses and call 911 if persists). )   ? omeprazole (PRILOSEC) 20 MG capsule TAKE 1 CAPSULE BY MOUTH TWICE DAILY (8AM & 8PM)   ? polyethylene glycol (GLYCOLAX) 17 gram/dose powder Take 17 g by mouth daily as needed.    ? potassium chloride (K-DUR,KLOR-CON) 20 MEQ tablet Take 1 tablet (20 mEq total) by mouth daily.   ? rOPINIRole (REQUIP) 1 MG tablet 1 tablet 30-60 minutes before bedtime (Patient taking differently: 1 tablet 30-60 minutes before bedtime)   ? senna-docusate (PERICOLACE) 8.6-50 mg tablet Take 1 tablet by mouth 2 (two) times a day.   ? sertraline (ZOLOFT) 100 MG tablet TAKE 1 TABLET BY MOUTH ONCE DAILY   ? VITAMIN D3 2,000 unit capsule TAKE 1 CAPSULE BY MOUTH ONCE DAILY AT 8AM (DO NOT BRAND CHANGE - PATIENT ONLY WANT CAPSULES)   ? warfarin (COUMADIN/JANTOVEN) 2 MG tablet Take 1 tablet (2 mg total) by mouth daily. (Patient taking differently: Take 2 mg by mouth daily 12/3/18 INR 3.45 Hold x1 then 5mg daily Next INR 12/6.      )       Labs:  INR = 2.88 on 2 mg daily     Ref Range & Units 10/29/18  5:45 AM      Sodium 136 - 145 mmol/L 138   Potassium 3.5 - 5.0 mmol/L 4.5   Chloride 98 - 107 mmol/L 104   CO2 22 - 31 mmol/L 25   Anion Gap, Calculation 5 - 18 mmol/L 9   Glucose 70 - 125 mg/dL 80   BUN 8 - 22 mg/dL 14   Creatinine 0.60 - 1.10 mg/dL 0.73   GFR MDRD Non Af Amer >60 mL/min/1.73m2 >60   Bilirubin, Total 0.0 - 1.0 mg/dL 1.9 (H)   Calcium 8.5 - 10.5 mg/dL 11.0 (H)   Protein, Total 6.0 - 8.0 g/dL 6.6   Albumin 3.5 - 5.0 g/dL 2.4 (L)   Alkaline Phosphatase 45 - 120 U/L 275 (H)   AST 0 - 40 U/L 63 (H)   ALT 0 - 45 U/L 151 (H)           Ref Range & Units 10/25/18  6:27 AM   10/24/18  5:08 AM      Magnesium 1.8 - 2.6 mg/dL 1.8 1.9        Ref Range & Units 10/24/18  5:08 AM   10/23/18  5:33 AM     WBC 4.0 - 11.0 thou/uL 8.9 9.6   Hemoglobin 12.0 - 16.0 g/dL 11.1 (L) 10.6 (L)   RDW 11.0 - 14.5 % 18.3 (H) 18.0 (H)   Platelets 140 - 440 thou/uL 95 (L) 91 (L)      10/23/18  5:33 AM   10/23/18       BNP 0 - 114 pg/mL 2481 (H)         Assessment / Plan:    ICD-10-CM    1. Hx laparoscopic cholecystectomy: [facility] cholecystectomy Z90.49  is recovered nicely.encouraged her to discharge home, where she will be inclined to be more active, and can be more plan full about her next step to assisted living if desired.   2. (HFpEF) heart failure with preserved ejection fraction (H) I50.30  compensated, breathing easily, weight stable.  Lasix, metoprolol potassium supplementation   3. Paroxysmal A-fib (H) I48.0  Coumadin following INR   4. Essential hypertension I10  good control metoprolol   5. Other depression F32.89  clean   6. Coronary artery disease involving native coronary artery of native heart without angina pectoris I25.10  on aspirin.  Has not used any as needed nitroglycerin   7. Lower extremity edema R60.0              Electronically signed by: Monik Cruz MD

## 2021-06-26 NOTE — PROGRESS NOTES
Progress Notes by Rula Jorgensen CNP at 8/24/2018  9:50 AM     Author: Rula Jorgensen CNP Service: -- Author Type: Nurse Practitioner    Filed: 8/24/2018 10:54 AM Encounter Date: 8/24/2018 Status: Signed    : Rula Jorgensen CNP (Nurse Practitioner)           Click to link to [facility] HEART CARE NOTE      Assessment/Recommendations   Assessment:    1.  Heart failure with preserved ejection fraction: She is now taking Lasix 80 mg twice daily.  Lower extremity edema and shortness of breath have improved over the past week.  She is not back to baseline yet.  Her weight has decreased about 7 pounds since early August.  She struggles to follow a low-sodium diet. We reviewed heart failure diagnosis, medications, treatment plan, low sodium diet, weight monitoring, and symptom monitoring.  She met with a heart failure nurse clinician to further discuss.    2.  Persistent atrial fibrillation: Diltiazem was started on August 15.  Heart rates remain elevated at home between  bpm.  Heart rate today was 120 bpm.      Plan:  1.  BMP pending  2.  I discussed with Dr. Choe.  Increase diltiazem to 180 mg daily and check Holter monitor in 2 weeks.  3.  Low-sodium diet and daily weights  4.  Compression stockings were ordered by her primary care provider which she is hoping to receive next week    Gardenia Muller will follow up with her primary care provider next week.  She will follow-up in the heart failure clinic in 3 weeks and with Dr. Choe on November 30.     History of Present Illness    Ms. Gardenia Muller is a 68 y.o. female seen at [facility] heart failure clinic today for continued follow-up.  She has a history of hypertension, coronary artery disease, dyslipidemia, stroke, persistent atrial for ablation, and diabetes.  Echocardiogram on August 3, 2018 showed an ejection fraction of 55% and mild mitral regurgitation.    She was seen by  Dr. Choe on August 15.  She was having high heart rates with atrial fibrillation and was started on diltiazem.  Her Lasix was increased but Gardenia never made this change.  She was seen by her primary care provider on August 21 and Lasix was increased to 80 mg twice daily.    Today, her heart rates remain elevated.  Her home with heart rates have been 80-120s.  Heart rate in clinic today 120 bpm.  Her lower extremity edema has improved.  Her shortness of breath has also improved.  Her weight has decreased about 7 pounds since early August.  Home weight is now 187 pounds.  Blood pressures have been stable with systolics between 100s-130s. She denies lightheadedness and chest pain.      She struggles to follow a low-sodium diet.    ECHO 8/3/18:     When compared to the previous study dated 8/11/2011, no significant change.    Normal left ventricular size and systolic function.    Left ventricle ejection fraction is normal. The estimated left ventricular ejection fraction is 55%.    Normal right ventricular size and systolic function.    Mild mitral regurgitation     Physical Examination Review of Systems   Vitals:    08/24/18 0935   BP: 110/70   Pulse: (!) 120   Resp: 18     Body mass index is 34.57 kg/(m^2).  Wt Readings from Last 3 Encounters:   08/24/18 189 lb (85.7 kg)   08/21/18 189 lb 12 oz (86.1 kg)   08/18/18 188 lb (85.3 kg)       General Appearance:     Alert, cooperative and in no acute distress.   ENT/Mouth: membranes moist, no oral lesions or bleeding gums.      EYES:  no scleral icterus, normal conjunctivae   Chest/Lungs:   lungs are clear to auscultation, decreased, no crackles   Cardiovascular:    Irregularly irregular, heart rate 120 bpm. Normal first and second heart sounds, 2+ edema bilateral lower extremities    Abdomen:  Soft, nontender, nondistended, bowel sounds present   Extremities: no cyanosis or clubbing   Skin: warm, dry.    Neurologic: mood and affect are appropriate, alert and  oriented x3      General: Weight Gain, Weight Loss  Eyes: WNL  Ears/Nose/Throat: WNL  Lungs: Shortness of Breath  Heart: WNL  Stomach: WNL  Bladder: WNL  Muscle/Joints: WNL  Skin: Poor Wound Healing  Nervous System: WNL  Mental Health: Depression     Blood: Easy Bruising     Medical History  Surgical History Family History Social History   Past Medical History:   Diagnosis Date   ? Acute diastolic heart failure (H) 11/2015   ? Atrial fibrillation (H)    ? Cerebral vascular accident (H)    ? Coronary artery disease 2006    100% RCA with collateral filling per Dr. Elizabeth's angio report   ? Diabetes mellitus type 2 in obese (H)    ? Fibrocystic breast    ? GERD (gastroesophageal reflux disease)    ? History of anesthesia complications     slow to wake   ? Hypertension    ? Peripheral vascular disease (H)    ? PONV (postoperative nausea and vomiting)    ? Stroke (H)    ? Urinary incontinence     Past Surgical History:   Procedure Laterality Date   ? CARDIAC CATHETERIZATION     ? CORONARY STENT PLACEMENT  1995    stent   ? EXPLORATORY LAPAROTOMY N/A 6/29/2018    Procedure: LAPAROTOMY, CLOSURE OF PERITONEAL RENT;  Surgeon: Jones Harding DO;  Location: [facility] OR;  Service:    ? PICC  6/29/2018        ? VENTRAL HERNIA REPAIR N/A 11/12/2015    Procedure: STRANGULATED VENTRAL HERNIA REPAIR ;  Surgeon: Ernesto Bailey MD;  Location: [facility] OR;  Service:     Family History   Problem Relation Age of Onset   ? Cancer Paternal Grandmother    ? Cancer Paternal Uncle    ? No Medical Problems Mother    ? No Medical Problems Father    ? Heart attack Sister    ? Chronic Kidney Disease Sister    ? No Medical Problems Daughter    ? No Medical Problems Maternal Grandmother    ? No Medical Problems Maternal Grandfather    ? No Medical Problems Paternal Grandfather    ? No Medical Problems Maternal Aunt    ? No Medical Problems Paternal Aunt    ? Diabetes Brother    ? BRCA 1/2 Neg Hx    ? Breast cancer Neg Hx    ?  Colon cancer Neg Hx    ? Endometrial cancer Neg Hx    ? Ovarian cancer Neg Hx     Social History     Social History   ? Marital status: Legally      Spouse name: N/A   ? Number of children: N/A   ? Years of education: N/A     Occupational History   ? Not on file.     Social History Main Topics   ? Smoking status: Current Every Day Smoker     Packs/day: 0.25   ? Smokeless tobacco: Former User      Comment: e-cig a few times/day   ? Alcohol use No   ? Drug use: No   ? Sexual activity: Not on file     Other Topics Concern   ? Not on file     Social History Narrative          Medications  Allergies   Current Outpatient Prescriptions   Medication Sig Dispense Refill   ? ACCU-CHEK YAZMIN PLUS TEST STRP strips TEST TWICE DAILY AS DIRECTED. *6 TOTAL FILLS* (Patient taking differently: TEST QID DAILY AS DIRECTED. *6 TOTAL FILLS*) 180 strip 10   ? acetaminophen (TYLENOL) 650 MG CR tablet Take 650 mg by mouth 2 (two) times a day.      ? aspirin 81 MG EC tablet Take 81 mg by mouth daily.     ? blood glucose meter (GLUCOMETER) Please Dispense kit per pharmacy discretion and patient's insurance Test blood sugar. 1 each 0   ? cholecalciferol, vitamin D3, (VITAMIN D3) 2,000 unit capsule Take 2,000 Units by mouth daily with lunch.     ? diltiazem (CARDIZEM CD) 180 MG 24 hr capsule Take 1 capsule (180 mg total) by mouth daily. 30 capsule 11   ? ferrous sulfate 325 (65 FE) MG tablet Take 1 tablet (325 mg total) by mouth 2 (two) times a day. 60 tablet 3   ? furosemide (LASIX) 40 MG tablet Take 2 tablets (80 mg total) by mouth 2 (two) times a day at 9am and 6pm. Then dosing per your primary care doctor 120 tablet 0   ? lancets 33 gauge Misc Test twice daily Dispense brand per patient's insurance at pharmacy discretion. 100 each 11   ? loratadine (CLARITIN) 10 mg tablet Take 10 mg by mouth daily with lunch. Noon     ? LORazepam (ATIVAN) 0.5 MG tablet 1-2 tabs po q 6 hours prn 20 tablet 0   ? magnesium oxide (MAG-OX) 400 mg tablet  Take 1,200 mg by mouth daily.     ? metoprolol tartrate (LOPRESSOR) 25 MG tablet Take 25 mg by mouth 2 (two) times a day.     ? multivitamin (TAB-A-JULIET) per tablet Take 1 tablet by mouth daily. 100 tablet 11   ? nitroglycerin (NITROSTAT) 0.4 MG SL tablet Place 1 tablet (0.4 mg total) under the tongue every 5 (five) minutes as needed for chest pain (for up to 3 doses and call 911 if persists). 90 tablet 0   ? omeprazole (PRILOSEC) 20 MG capsule Take 20 mg by mouth 2 (two) times a day.      ? polyethylene glycol (GLYCOLAX) 17 gram/dose powder 17 g daily as needed.      ? polyvinyl alcohol (ARTIFICIAL TEARS, POLYVIN ALC,) 1.4 % ophthalmic solution Administer 2 drops to both eyes as needed for dry eyes. As directed.      ? pramipexole (MIRAPEX) 0.25 MG tablet Take 1 tablet (0.25 mg total) by mouth at bedtime. 30 tablet 2   ? rosuvastatin (CRESTOR) 40 MG tablet TAKE 1 TABLET BY MOUTH AT BEDTIME 30 tablet 11   ? sertraline (ZOLOFT) 100 MG tablet TAKE 1 TABLET BY MOUTH ONCE DAILY 31 tablet 10   ? traZODone (DESYREL) 50 MG tablet Take 1 tablet (50 mg total) by mouth at bedtime. 30 tablet 10   ? warfarin (COUMADIN) 2 MG tablet Take 2 to 4mg (1 or 2 tabs) by mouth daily as directed.  Adjust dose based on INR. 125 tablet 1     No current facility-administered medications for this visit.       Allergies   Allergen Reactions   ? Penicillins Swelling         Lab Results    Chemistry CBC BNP   Lab Results   Component Value Date    CREATININE 0.72 08/18/2018    BUN 9 08/18/2018     08/18/2018    K 3.6 08/18/2018     08/18/2018    CO2 29 08/18/2018     Creatinine (mg/dL)   Date Value   08/18/2018 0.72   08/15/2018 0.80   08/07/2018 0.72   07/31/2018 0.80    Lab Results   Component Value Date    WBC 4.8 08/18/2018    HGB 10.1 (L) 08/18/2018    HCT 33.2 (L) 08/18/2018    MCV 87 08/18/2018     08/18/2018    Lab Results   Component Value Date     (H) 08/18/2018     BNP (pg/mL)   Date Value   08/18/2018 931 (H)    08/15/2018 544 (H)   07/31/2018 772 (H)          40 minutes were spent with the patient with greater than 50% spent on education and counseling.      Rula Jorgensen, [facility]   Heart Failure Clinic

## 2021-06-26 NOTE — PROGRESS NOTES
Progress Notes by Monik Cruz MD at 11/5/2018 10:36 PM     Author: Monik Cruz MD Service: -- Author Type: Physician    Filed: 11/7/2018  9:27 PM Encounter Date: 11/5/2018 Status: Signed    : Monik Cruz MD (Physician)       [facility]      Facility:    [facility] [[id number]]    Code Status: FULL CODE   [facility] 10/3  through 10/25/18      Chief Complaint/Reason for Visit:  Chief Complaint   Patient presents with   ? Review Of Multiple Medical Conditions     Lap nimesh/ acalculous cholecystecomy       HPI:   Gardenia is a 68 y.o. female with MI, atrial flutter, ventral hernia with incisional hernia repair, on anticoagulation.  More  She had a week of abdominal pain, more centered in the epigastrium and right upper quadrant and elsewhere.  She threw up several times in the morning of admission.  She had no fever, no chills.   He was tachycardic, she had 1 L of IV fluid, and IV Lopressor to slow her heart rate.    CT of the chest: No PE, but gallbladder wall thickening.  CT of the abdomen: Gallbladder wall thickening right, consistent with cholecystitis.  Ultrasound of the gallbladder: Distended gallbladder, edema of the gallbladder wall, no gallstones are seen.  She had a laparoscopic cholecystectomy on 10/7/18 with Dr Felicia So.    She had elevated transaminases, with an AST = 2443, ALT = 778.  Was thought to be from hepatic congestion according to the GI consultant, in the setting of CHF.  Her LFTs were improving at the time of discharge.  Rosuvastatin was being held due to liver duress.    She had persistent atrial fibrillation with rapid ventricular rate, and had cardioversion on 10/18  She had an acute bout of diastolic heart failure    She had brief acute kidney injury thought to be from diuresis for her heart failure.  Ejection fraction 55%    She has had hypercalcemia since 2012, thought to be hydrochlorothiazide which was  discontinued.  However her albumin remained elevated patient PTH was 190, vitamin D equal 48, ionized calcium 1.48; to be primary hyperparathyroidism.    She had  Bradycardia, and her  diltiazem was discontinued.    She was tolerating an oral diet at the time of discharge, and transferred to [facility] for ongoing therapy.      Past Medical History:  Past Medical History:   Diagnosis Date   ? Acute diastolic heart failure (H) 11/2015   ? Atrial fibrillation (H)    ? Cerebral vascular accident (H)    ? Coronary artery disease 2006    100% RCA with collateral filling per Dr. Elizabeth's angio report   ? Diabetes mellitus type 2 in obese (H)    ? Fibrocystic breast    ? GERD (gastroesophageal reflux disease)    ? History of anesthesia complications     slow to wake   ? Hypertension    ? Peripheral vascular disease (H)    ? PONV (postoperative nausea and vomiting)    ? Stroke (H)    ? Urinary incontinence            Surgical History:  Past Surgical History:   Procedure Laterality Date   ? CARDIAC CATHETERIZATION     ? CARDIOVERSION  10/18/2018   ? CORONARY STENT PLACEMENT  1995    stent   ? ERCP N/A 10/5/2018    Procedure: ENDOSCOPIC RETROGRADE CHOLANGIOPANCREATOGRAPHY, SPHINCTEROTOMY;  Surgeon: Anson Stokes MD;  Location: [facility];  Service:    ? EXPLORATORY LAPAROTOMY N/A 6/29/2018    Procedure: LAPAROTOMY, CLOSURE OF PERITONEAL RENT;  Surgeon: Jones Harding DO;  Location: [facility] OR;  Service:    ? LAPAROSCOPIC CHOLECYSTECTOMY N/A 10/7/2018    Procedure: CHOLECYSTECTOMY, LAPAROSCOPIC;  Surgeon: Felicia So MD;  Location: [facility];  Service:    ? PICC  6/29/2018        ? PICC  10/21/2018        ? VENTRAL HERNIA REPAIR N/A 11/12/2015    Procedure: STRANGULATED VENTRAL HERNIA REPAIR ;  Surgeon: Ernesto Bailey MD;  Location: [facility] OR;  Service:        Family History:   Family History   Problem Relation Age of Onset   ? Cancer Paternal Grandmother    ? Cancer  Paternal Uncle    ? No Medical Problems Mother    ? No Medical Problems Father    ? Heart attack Sister    ? Chronic Kidney Disease Sister    ? No Medical Problems Daughter    ? No Medical Problems Maternal Grandmother    ? No Medical Problems Maternal Grandfather    ? No Medical Problems Paternal Grandfather    ? No Medical Problems Maternal Aunt    ? No Medical Problems Paternal Aunt    ? Diabetes Brother    ? BRCA 1/2 Neg Hx    ? Breast cancer Neg Hx    ? Colon cancer Neg Hx    ? Endometrial cancer Neg Hx    ? Ovarian cancer Neg Hx        Social History:    Social History     Social History   ? Marital status: Legally      Spouse name: N/A   ? Number of children: N/A   ? Years of education: N/A     Social History Main Topics   ? Smoking status: Current Every Day Smoker     Packs/day: 0.25   ? Smokeless tobacco: Former User      Comment: e-cig a few times/day   ? Alcohol use No   ? Drug use: No   ? Sexual activity: Not on file     Other Topics Concern   ? Not on file     Social History Narrative          Review of Systems   Having nausea  Ongoing pain around small bulge above umbilicus      Blood pressure 129/82, pulse 78, temperature 96.9  F (36.1  C), resp. rate 20, SpO2 97 %, not currently breastfeeding.      SLUMS 16  Physical Exam     Constitutional:  Quiet, overweight Black-American female , no distress. In wheelchair working with therapist in her room  Cardiovascular: Regular rhythm and normal heart sounds.  Exam reveals no friction rub.    Near holosystolic murmur at the mitral/apex area   Pulmonary/Chest: Breath sounds normal. She has no wheezes. She has no rales.   Abdominal: Soft. Bowel sounds are normal. Mild distention, mildly firm  Lap nimesh incisions dry, no drainage, no erythema   Musculoskeletal: 1+ pretibial edema.   Neurological: She is alert and oriented to person, place, and time. Symmetric extremity movement   Skin: Skin is warm and dry. No erythema.   Psychiatric: She has a normal  mood and affect. Her behavior is normal.   Therapy report: Walking 75 feet with standby assist, needs help with lower body dressing.  Needs cues.      Allergies   Allergen Reactions   ? Penicillins Swelling         Medication List:  Current Outpatient Prescriptions   Medication Sig   ? aspirin 81 MG EC tablet Take 81 mg by mouth daily.    ? blood glucose meter (GLUCOMETER) Please Dispense kit per pharmacy discretion and patient's insurance Test blood sugar.   ? blood glucose test strips Use 1 each As Directed as needed. Dispense brand per patient's insurance at pharmacy discretion.   ? carboxymethylcellulose (REFRESH PLUS) 0.5 % Dpet ophthalmic dropperette Administer 2 drops to both eyes every hour as needed (dry eyes).    ? ferrous sulfate 325 (65 FE) MG tablet Take 1 tablet (325 mg total) by mouth 2 (two) times a day.   ? furosemide (LASIX) 40 MG tablet Take 1 tablet (40 mg total) by mouth daily.   ? lancets 33 gauge Misc Test twice daily Dispense brand per patient's insurance at pharmacy discretion.   ? loratadine (CLARITIN) 10 mg tablet Take 10 mg by mouth daily with lunch. Noon   ? magnesium oxide (MAG-OX) 400 mg tablet Take 1,200 mg by mouth daily.    ? metoprolol tartrate (LOPRESSOR) 25 MG tablet TAKE 1 TABLET BY MOUTH TWICE DAILY (8AM & 8PM)   ? multivitamin (TAB-A-JULIET) per tablet Take 1 tablet by mouth daily. (Patient taking differently: Take 1 tablet by mouth daily. )   ? nitroglycerin (NITROSTAT) 0.4 MG SL tablet Place 1 tablet (0.4 mg total) under the tongue every 5 (five) minutes as needed for chest pain (for up to 3 doses and call 911 if persists). (Patient taking differently: Place 0.4 mg under the tongue every 5 (five) minutes as needed for chest pain (for up to 3 doses and call 911 if persists). )   ? omeprazole (PRILOSEC) 20 MG capsule TAKE 1 CAPSULE BY MOUTH TWICE DAILY (8AM & 8PM)   ? polyethylene glycol (GLYCOLAX) 17 gram/dose powder Take 17 g by mouth daily as needed.    ? potassium chloride  (K-DUR,KLOR-CON) 20 MEQ tablet Take 1 tablet (20 mEq total) by mouth daily.   ? rOPINIRole (REQUIP) 1 MG tablet 1 tablet 30-60 minutes before bedtime (Patient taking differently: 1 tablet 30-60 minutes before bedtime)   ? senna-docusate (PERICOLACE) 8.6-50 mg tablet Take 1 tablet by mouth 2 (two) times a day.   ? sertraline (ZOLOFT) 100 MG tablet TAKE 1 TABLET BY MOUTH ONCE DAILY   ? VITAMIN D3 2,000 unit capsule TAKE 1 CAPSULE BY MOUTH ONCE DAILY AT 8AM (DO NOT BRAND CHANGE - PATIENT ONLY WANT CAPSULES)   ? warfarin (COUMADIN/JANTOVEN) 2 MG tablet Take 1 tablet (2 mg total) by mouth daily.       Labs:     Ref Range & Units 11/5/18       WBC 4.0 - 11.0 thou/uL 8.7   Hemoglobin 12.0 - 16.0 g/dL 11.0 (L)   RDW 11.0 - 14.5 % 20.2 (H)   Platelets 140 - 440 thou/uL 122 (L              Ref Range & Units 11/5/18    10/29/18  5:45 AM     Sodium 136 - 145 mmol/L 135 (L) 138   Potassium 3.5 - 5.0 mmol/L 4.1 4.5   Chloride 98 - 107 mmol/L 104 104   CO2 22 - 31 mmol/L 23 25   Anion Gap, Calculation 5 - 18 mmol/L 8 9   Glucose 70 - 125 mg/dL 125 80   Calcium 8.5 - 10.5 mg/dL 10.7 (H) 11.0 (H)   BUN 8 - 22 mg/dL 9 14   Creatinine 0.60 - 1.10 mg/dL 0.65 0.73   GFR MDRD Af Amer >60 mL/min/1.73m2 >60 >60       INR = 2.88 on 2 mg daily       Ref Range & Units 10/29/18  5:45 AM     Sodium 136 - 145 mmol/L 138   Potassium 3.5 - 5.0 mmol/L 4.5   Chloride 98 - 107 mmol/L 104   CO2 22 - 31 mmol/L 25   Anion Gap, Calculation 5 - 18 mmol/L 9   Glucose 70 - 125 mg/dL 80   BUN 8 - 22 mg/dL 14   Creatinine 0.60 - 1.10 mg/dL 0.73   GFR MDRD Non Af Amer >60 mL/min/1.73m2 >60   Bilirubin, Total 0.0 - 1.0 mg/dL 1.9 (H)   Calcium 8.5 - 10.5 mg/dL 11.0 (H)   Protein, Total 6.0 - 8.0 g/dL 6.6   Albumin 3.5 - 5.0 g/dL 2.4 (L)   Alkaline Phosphatase 45 - 120 U/L 275 (H)   AST 0 - 40 U/L 63 (H)   ALT 0 - 45 U/L 151 (H)           Ref Range & Units 10/25/18  6:27 AM   10/24/18  5:08 AM      Magnesium 1.8 - 2.6 mg/dL 1.8 1.9        Ref Range & Units  10/24/18  5:08 AM   10/23/18  5:33 AM     WBC 4.0 - 11.0 thou/uL 8.9 9.6   Hemoglobin 12.0 - 16.0 g/dL 11.1 (L) 10.6 (L)   RDW 11.0 - 14.5 % 18.3 (H) 18.0 (H)   Platelets 140 - 440 thou/uL 95 (L) 91 (L)      10/23/18  5:33 AM   10/23/18       BNP 0 - 114 pg/mL 2481 (H)         Assessment / Plan:    ICD-10-CM    1. Acalculous cholecystitis K81.9    2. S/P laparoscopic cholecystectomy Z90.49 GI functioning, healing well   3. (HFpEF) heart failure with preserved ejection fraction (H) I50.30  Lasix, metoprolol   4. Pesrsistent A-fib (H) I48.1 warfarin   5. Transaminitis R74.0 Can recheck   6. Essential hypertension I10 metoprolol   7. Coronary artery disease involving native coronary artery of native heart without angina pectoris I25.10 ASA   8 Depression F32.9 sertraline   9. Lower extremity edema R60.0 lasix           Electronically signed by: Monik Cruz MD

## 2021-06-26 NOTE — PROGRESS NOTES
Progress Notes by Sonali Hidalgo MD at 10/15/2018  9:50 AM     Author: Sonali Hidalgo MD Service: Cardiology Author Type: Physician    Filed: 10/15/2018 10:02 AM Date of Service: 10/15/2018  9:50 AM Status: Signed    : Sonali Hidalgo MD (Physician)           Click to link to [facility] Heart Care     [facility] HEART CARE NOTE          Assessment/Recommendations   Assessment:  1. Persistent atrial fibrillation/flutter: Heart rate is now better controlled on higher dose of metoprolol and diltiazem.  2. Acute on chronic diastolic heartfailure: Secondary to recent surgery and atrial fibrillation with rapid ventricular response.  Responding well to torsemide.    3. Acute cholecystitis: S/p cholecystectomy.  4. Hypertension: Hypotension currently and will need to be careful with further titration of meds.    5. DMII    Plan:    Continue diltiazem and metoprolol at present doses.  Heart rate is now better controlled.    On Coumadin for anticoagulation.    Continue torsemide 20 mg p.o. twice daily.  Good urine output.        Primary Cardiologist: Dr. Gisel Choe MD       History of Present Illness    Breathing has slightly improved per patient.  Complains of discomfort around the incision site.  No complaints of chest pain or palpitations.       Physical Examination Review of Systems   Vitals:    10/15/18 0819   BP: 90/71   Pulse: (!) 102   Resp:    Temp:    SpO2:      Body mass index is 29.14 kg/(m^2).  Wt Readings from Last 3 Encounters:   10/15/18 164 lb 8 oz (74.6 kg)   09/20/18 172 lb 4 oz (78.1 kg)   09/18/18 174 lb 4.8 oz (79.1 kg)     General Appearance:   alert, no apparent distress   HEENT:  no scleral icterus; the mucous membranes are pink and moist                               Neck: jugular venous pressure normal   Chest: the spine is straight and the chest is symmetric   Lungs:    Decreased at bases   Cardiovascular:   irregular rhythm with normal first and second heart  sounds and no murmurs or gallops   Abdomen:  bowel sounds are present   Extremities: no cyanosis, clubbing, or edema   Skin: no xanthelasma, dry    A 12 point comprehensive review of systems was negative except as noted in the current history.       Medical History  Surgical History Family History Social History   Past Medical History:   Diagnosis Date   ? Acute diastolic heart failure (H) 11/2015   ? Atrial fibrillation (H)    ? Cerebral vascular accident (H)    ? Coronary artery disease 2006    100% RCA with collateral filling per Dr. Elizabeth's angio report   ? Diabetes mellitus type 2 in obese (H)    ? Fibrocystic breast    ? GERD (gastroesophageal reflux disease)    ? History of anesthesia complications     slow to wake   ? Hypertension    ? Peripheral vascular disease (H)    ? PONV (postoperative nausea and vomiting)    ? Stroke (H)    ? Urinary incontinence     Past Surgical History:   Procedure Laterality Date   ? CARDIAC CATHETERIZATION     ? CORONARY STENT PLACEMENT  1995    stent   ? ERCP N/A 10/5/2018    Procedure: ENDOSCOPIC RETROGRADE CHOLANGIOPANCREATOGRAPHY, SPHINCTEROTOMY;  Surgeon: Anson Stokes MD;  Location: [facility] OR;  Service:    ? EXPLORATORY LAPAROTOMY N/A 6/29/2018    Procedure: LAPAROTOMY, CLOSURE OF PERITONEAL RENT;  Surgeon: Jones Harding DO;  Location: [facility] OR;  Service:    ? LAPAROSCOPIC CHOLECYSTECTOMY N/A 10/7/2018    Procedure: CHOLECYSTECTOMY, LAPAROSCOPIC;  Surgeon: Feilcia So MD;  Location: [facility] OR;  Service:    ? PICC  6/29/2018        ? VENTRAL HERNIA REPAIR N/A 11/12/2015    Procedure: STRANGULATED VENTRAL HERNIA REPAIR ;  Surgeon: Ernesto Bailey MD;  Location: [facility] OR;  Service:     Family History   Problem Relation Age of Onset   ? Cancer Paternal Grandmother    ? Cancer Paternal Uncle    ? No Medical Problems Mother    ? No Medical Problems Father    ? Heart attack Sister    ? Chronic Kidney Disease Sister    ? No  Medical Problems Daughter    ? No Medical Problems Maternal Grandmother    ? No Medical Problems Maternal Grandfather    ? No Medical Problems Paternal Grandfather    ? No Medical Problems Maternal Aunt    ? No Medical Problems Paternal Aunt    ? Diabetes Brother    ? BRCA 1/2 Neg Hx    ? Breast cancer Neg Hx    ? Colon cancer Neg Hx    ? Endometrial cancer Neg Hx    ? Ovarian cancer Neg Hx     Social History     Social History   ? Marital status: Legally      Spouse name: N/A   ? Number of children: N/A   ? Years of education: N/A     Occupational History   ? Not on file.     Social History Main Topics   ? Smoking status: Current Every Day Smoker     Packs/day: 0.25   ? Smokeless tobacco: Former User      Comment: e-cig a few times/day   ? Alcohol use No   ? Drug use: No   ? Sexual activity: Not on file     Other Topics Concern   ? Not on file     Social History Narrative          Medications  Allergies   Scheduled Meds:  ? acetaminophen  650 mg Oral BID   ? aspirin  81 mg Oral DAILY   ? diltiazem  180 mg Oral DAILY   ? magnesium oxide  400 mg Oral TID   ? metoprolol tartrate  75 mg Oral BID   ? omeprazole  20 mg Oral BID AC   ? potassium chloride  20 mEq Oral DAILY   ? rOPINIRole  1 mg Oral QHS   ? rosuvastatin  40 mg Oral QHS   ? senna-docusate  1 tablet Oral BID   ? sertraline  100 mg Oral DAILY   ? torsemide  20 mg Oral BID - diuretic   ? traZODone  50 mg Oral QHS   ? warfarin - daily dose required   Other Med Consult or Protocol     Continuous Infusions:  PRN Meds:.bisacodyl, LORazepam, magnesium hydroxide, naloxone **OR** naloxone, nitroglycerin, ondansetron **OR** ondansetron, oxyCODONE Allergies   Allergen Reactions   ? Penicillins Swelling         Lab Results    Chemistry/lipid CBC Cardiac Enzymes/BNP/TSH/INR   Lab Results   Component Value Date    CHOL 165 09/20/2017    HDL 56 09/20/2017    LDLCALC 85 09/20/2017    TRIG 116 06/30/2018    CREATININE 0.97 10/15/2018    BUN 24 (H) 10/15/2018    K  4.3 10/15/2018    K 4.3 10/15/2018     10/15/2018    CL 98 10/15/2018    CO2 28 10/15/2018    Lab Results   Component Value Date    WBC 9.2 10/14/2018    HGB 13.7 10/14/2018    HCT 43.7 10/14/2018    MCV 86 10/14/2018     10/14/2018    Lab Results   Component Value Date    TROPONINI 0.01 10/03/2018    BNP 1140 (H) 10/14/2018    TSH 1.08 07/07/2016    INR 2.34 (H) 10/15/2018

## 2021-06-26 NOTE — PROGRESS NOTES
Progress Notes by Tavares Sweeney at 10/17/2018  7:32 AM     Author: Tavares Sweeney Service: Pharmacy Author Type: Pharmacy Student    Filed: 10/17/2018 11:53 AM Date of Service: 10/17/2018  7:32 AM Status: Attested    : Tavares Sweeney Cosigner: Sheri Hanna PharmD at 10/17/2018 11:55 AM    Attestation signed by Sheri Hanna PharmD at 10/17/2018 11:55 AM    I agree with Tavares's assessment and plan for warfarin dosing.  Will monitor closely due to the initiation of amiodarone (delayed, significant drug-drug interaction with warfarin).   Essence Hanna, Gilles 10/17/2018 11:55 AM                    Pharmacy Consult: Warfarin Management    Pharmacy consulted to dose warfarin for Gardenia Muller, a 68 y.o. female    Ordering provider: Dr. Michael Abreu, Hospitalist    Reason for warfarin therapy: Atrial Fibrillation  Goal INR Range: 2-3    Subjective  Medication lists (home and hospital) were reviewed.    New medications that may increase bleeding risk/INR:  Amiodarone     Restarted home medications that may increase bleeding risk/INR: Torsemide, Ropinirole, sertraline, omeprazole, rosuvastatin, aspirin    Home Warfarin Dosin mg on , Tuesday and Thursday; 2 mg on all other days of the week    Other Anticoagulants: No  Patient being bridged: No    Patient Active Problem List   Diagnosis   ? Spinal stenosis   ? PAD (peripheral artery disease) (H)   ? Dermatosis Papulosa Nigra   ? Depression   ? Hypercalcemia   ? Right Renal Artery Stenosis   ? Osteoarthritis   ? Abnormality Of Walk   ? 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 Advanced directives, counseling/discussion   ? SBO (small bowel obstruction) (H)   ? Cystitis   ? Hypomagnesemia   ? Healthcare maintenance   ? Essential hypertension   ? Dysarthria   ? Cerebral infarction (H)   ? Anemia   ? Cerebrovascular disease   ? Benign adenomatous polyp of large intestine   ? RLS (restless legs syndrome)   ? Incisional  hernia, without obstruction or gangrene   ? Abdominal pain, generalized   ? Diverticular disease of large intestine   ? Dysphagia   ? Coronary artery disease involving native coronary artery of native heart without angina pectoris   ? Dyslipidemia   ? Ventral hernia without obstruction or gangrene   ? Paroxysmal atrial fibrillation (H)   ? Anticoagulation management encounter   ? S/P repair of recurrent ventral hernia   ? SOB (shortness of breath)   ? Lower extremity edema   ? Anxiety   ? Hypokalemia   ? (HFpEF) heart failure with preserved ejection fraction (H)   ? Tachycardia   ? Cholecystitis   ? Anticoagulant long-term use   ? Biliary obstruction   ? Abdominal pain, right upper quadrant   ? Persistent atrial fibrillation (H)   ? Abdominal pain, left upper quadrant    Past Medical History:   Diagnosis Date   ? Acute diastolic heart failure (H) 11/2015   ? Atrial fibrillation (H)    ? Cerebral vascular accident (H)    ? Coronary artery disease 2006    100% RCA with collateral filling per Dr. Elizabeth's angio report   ? Diabetes mellitus type 2 in obese (H)    ? Fibrocystic breast    ? GERD (gastroesophageal reflux disease)    ? History of anesthesia complications     slow to wake   ? Hypertension    ? Peripheral vascular disease (H)    ? PONV (postoperative nausea and vomiting)    ? Stroke (H)    ? Urinary incontinence         Social History   Substance Use Topics   ? Smoking status: Current Every Day Smoker     Packs/day: 0.25   ? Smokeless tobacco: Former User      Comment: e-cig a few times/day   ? Alcohol use No       Objective   Labs:  Last 3 days:    Recent Labs      10/14/18   0810  10/15/18   0546  10/16/18   0630   CREATININE   --   0.97   --    HGB  13.7   --    --    HCT  43.7   --    --    PLT  193   --    --    BILITOT   --   0.7  0.7   AST   --   59*  57*   ALT   --   39  39   ALKPHOS   --   174*  190*     Last 7 days:   Recent labs: (last 7 days)      10/11/18   0705  10/12/18   0524  10/13/18    0554  10/14/18   0520  10/15/18   0546  10/16/18   0630  10/17/18   0551   INR  1.31*  1.44*  1.64*  1.95*  2.34*  2.39*  2.65*       Warfarin Dosing History:    Date INR Warfarin Dose Comment   10/5/18 2.84 hold 5 mg of vitamin K   10/6/18 1.42 hold    10/7/18 1.44 hold    10/8/18 N/A 2 mg Patient may discharge tomorrow.   10/9/18 1.43 4 mg    10/10/18 1.29 4 mg    10/11/18 1.31 4 mg    10/12/18 1.44 4 mg    10/13/18 1.64 4 mg    10/14/18 1.95 4 mg    10/15/18 2.34 2 mg    10/16/18 2.39 4 mg    10/17/18 2.65 2 mg      Assessment  The patient is continuing warfarin for the indication of Atrial Fibrillation with a goal INR of 2-3. INR today is therapeutic.   Warfarin dosing to be decreased with initiation of amiodarone.    Plan  1. Administer warfarin 2 mg PO today.  2. Check INR daily or as appropriate.  3. Continue to follow the patient's INR, PLT, and HGB as available.  4. Monitor for potential drug/disease interactions.  5. Amiodarone started on 10/17, due to the drug-drug interaction between warfarin and amiodarone, warfarin dosing will likely need to be decreased. The drug-drug interaction can be delayed, so close monitoring will be needed on discharge.     Thank you for the consult,  Tavares Sweeney, Pharmacy Student, [facility] 10/17/2018 7:32 AM

## 2021-06-28 NOTE — PROGRESS NOTES
Progress Notes by Jerson Hernandez MD at 2/7/2020 10:40 AM     Author: Jerson Hernandez MD Service: -- Author Type: Physician    Filed: 2/7/2020  6:01 PM Encounter Date: 2/7/2020 Status: Signed    : Jerson Hernandez MD (Physician)       Assessment/ Plan  I spent a total of over 40 minutes, greater than 50% of this counseling regarding the following issues.  Problem List Items Addressed This Visit        High    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) - Primary (Chronic)     Last A1c was done in early December and was normal  No medications, reviewed 2 times daily blood sugars which are normal.  I have asked the patient stop checking blood sugars frequently which she wishes to continue to do this.  Ophthalmology referral made, no other changes.         Relevant Orders    Ambulatory referral to Ophthalmology    Cerebrovascular disease (Chronic)     Secondary prevention  Warfarin given atrial fibrillation, recurrent strokes plus aspirin, atorvastatin 40, blood pressure control.  Patient is using electronic cigarette, encouraged her to stop nicotine altogether.         Persistent atrial fibrillation (Chronic)     Rate controlled at 66, long-term anticoagulation, on warfarin         Healthcare maintenance     Mammogram         Relevant Orders    Mammo Screening Bilateral    Essential hypertension     On diuretic and low-dose metoprolol for rate control.  Blood pressure is good         Hyperparathyroidism (H)     The overview for recommendations from [facility] endocrinology.  My concern was that hypercalcemia may have been symptomatic and causing abdominal symptoms with weight loss.  In retrospect, it seems this is unlikely  Doing well, no change, calcium was not rechecked in believe that this would be done by endocrine            Medium    Depression     Recurrent problem.  In retrospect patient thinks that severe depression is what caused her feeling/weight  loss.  Currently using sertraline 100 mg.  Stopped mirtazapine on her own because of side effect being weight gain.  Continues trazodone for sleep 50 mg.  PHQ 9= 11    5 to 9: mild   10 to 14: moderate   15 to 19: moderately severe   ?20: severe     Basically talked about recurrence prevention as patient is quite happy with her mood at this time.  Only has seen a therapist once and in the remote past.  Encouraged that she consider this.  Patient's assistant, living situation representative Ching is going to look into options for someone to come in the home for counseling.  I offered a referral for this.              Unprioritized    Class 1 obesity with serious comorbidity and body mass index (BMI) of 33.0 to 33.9 in adult, unspecified obesity type      Other Visit Diagnoses     Visit for screening mammogram        Relevant Orders    Mammo Screening Bilateral        Subjective  CC:  Chief Complaint   Patient presents with   ? Follow-up     HPI:  69-year-old with history of  Type 2 diabetes, cerebrovascular disease with multiple strokes, atrial fibrillation, renal artery stenosis, hypertension, anxiety, diastolic heart failure, hyperparathyroidism She was last seen for annual wellness visit 12/6/2019..  Transfer text most recent labs done 12/6/2019, BUN was 23, creatinine 0.81 calcium was 10.9 with albumin of 3.8.  Vitamin D level was 52 glycosylated hemoglobin 5.5.    Medications include Tylenol arthritis aspirin, atorvastatin 40, vitamin D, multivitamin, metoprolol one half twice daily, mirtazapine 15 nightly, nitroglycerin as needed, omeprazole 20 mg twice daily, Zoloft 100, furosemide, Aldactone Aldactone 25, warfarin, MiraLAX, senna.    Wt Readings from Last 3 Encounters:   02/07/20 180 lb 8 oz (81.9 kg)   12/06/19 156 lb (70.8 kg)   08/20/19 130 lb (59 kg)       PFSH:  Patient Active Problem List   Diagnosis   ? Spinal stenosis   ? PAD (peripheral artery disease) (H)   ? Dermatosis Papulosa Nigra   ?  Depression   ? Hypercalcemia   ? Right Renal Artery Stenosis   ? Osteoarthritis   ? Abnormality Of Walk   ? 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 Advanced directives, counseling/discussion   ? Cystitis   ? Healthcare maintenance   ? Essential hypertension   ? Cerebral infarction (H)   ? Anemia   ? Cerebrovascular disease   ? RLS (restless legs syndrome)   ? Incisional hernia, without obstruction or gangrene   ? Diverticular disease of large intestine   ? Coronary artery disease involving native coronary artery of native heart without angina pectoris   ? Dyslipidemia   ? Ventral hernia without obstruction or gangrene   ? Anxiety   ? (HFpEF) heart failure with preserved ejection fraction (H)   ? Anticoagulant long-term use   ? Persistent atrial fibrillation   ? Transaminitis   ? Physical deconditioning   ? Lipoma of back   ? Acalculous cholecystitis   ? Onychogryphosis   ? Hyperparathyroidism (H)   ? Microalbuminuria   ? Intestinal angina (H)   ? Chronic abdominal pain   ? Weight loss, non-intentional   ? Warfarin anticoagulation   ? Severe protein-calorie malnutrition (H)   ? Hypertrophy of nail   ? Dysuria   ? Class 1 obesity with serious comorbidity and body mass index (BMI) of 33.0 to 33.9 in adult, unspecified obesity type     Current medications reviewed as follows:  Current Outpatient Medications on File Prior to Visit   Medication Sig   ? ARTHRITIS PAIN RELIEF, ACETAM, 650 mg CR tablet Take 650 mg by mouth 2 (two) times a day.          ? ARTHRITIS PAIN RELIEF, ACETAM, 650 mg CR tablet TAKE 1 TABLET BY MOUTH TWICE DAILY (8AM & 8PM) DO NOT EXCEED 3000MG IN 24 HOURS.  *1 TOTAL FILL*   ? aspirin 81 MG EC tablet Take 81 mg by mouth daily.    ? aspirin 81 MG EC tablet TAKE 1 TABLET BY MOUTH ONCE DAILY. *1 TOTAL FILL*   ? atorvastatin (LIPITOR) 40 MG tablet TAKE 1 TABLET BY MOUTH AT BEDTIME *1 TOTAL FILL*   ? blood glucose meter (GLUCOMETER) Please Dispense kit per pharmacy discretion and  patient's insurance Test blood sugar.   ? blood glucose test strips Use 1 each As Directed as needed. Dispense brand per patient's insurance at pharmacy discretion.   ? carboxymethylcellulose (REFRESH PLUS) 0.5 % Dpet ophthalmic dropperette Administer 2 drops to both eyes every hour as needed (dry eyes).   ? cholecalciferol, vitamin D3, 2,000 unit Tab TAKE 1 TABLET BY MOUTH ONCE DAILY. *1 TOTAL FILL*   ? DAILY VITAMIN FORMULA per tablet TAKE 1 TABLET BY MOUTH ONCE DAILY. *1 TOTAL FILL*   ? food supplemt, lactose-reduced 0.06 gram- 1.5 kcal/mL Liqd Take 1 Can by mouth 2 (two) times a day.   ? furosemide (LASIX) 20 MG tablet Take 1 tablet (20 mg total) by mouth daily.   ? lancets 33 gauge Misc Test twice daily Dispense brand per patient's insurance at pharmacy discretion.   ? loratadine (CLARITIN) 10 mg tablet TAKE 1 TABLET BY MOUTH ONCE DAILY. *1 TOTAL FILL*   ? magnesium gluconate (MAGONATE) 27 mg magnesium (500 mg) Tab tablet Take 1 tablet (27 mg total) by mouth 2 (two) times a day.   ? metoprolol tartrate (LOPRESSOR) 25 MG tablet TAKE 1/2 TABLET BY MOUTH TWICE DAILY.   ? mirtazapine (REMERON) 15 MG tablet Take 1 tablet (15 mg total) by mouth at bedtime.   ? nitroglycerin (NITROSTAT) 0.4 MG SL tablet Place 1 tablet (0.4 mg total) under the tongue every 5 (five) minutes as needed for chest pain (for up to 3 doses and call 911 if persists). (Patient taking differently: Place 0.4 mg under the tongue every 5 (five) minutes as needed for chest pain (for up to 3 doses and call 911 if persists). )   ? omeprazole (PRILOSEC) 20 MG capsule TAKE 1 CAPSULE BY MOUTH TWICE DAILY. *1 TOTAL FILL*   ? potassium chloride (K-DUR,KLOR-CON) 20 MEQ tablet TAKE 1 TABLET BY MOUTH TWICE DAILY. *1 TOTAL FILL*   ? sertraline (ZOLOFT) 100 MG tablet TAKE 1 TABLET BY MOUTH ONCE DAILY. *1 TOTAL FILL*   ? spironolactone (ALDACTONE) 25 MG tablet TAKE 1 TABLET BY MOUTH ONCE DAILY. *1 TOTAL FILL*   ? traZODone (DESYREL) 50 MG tablet TAKE 1 TABLET BY  MOUTH AT BEDTIME. *1 TOTAL FILL*   ? warfarin (COUMADIN/JANTOVEN) 2 MG tablet Take 6 mg (3 tabs) every Wed; 4 mg (2 tabs) AOD. Adjust dose per INR results.   ? polyethylene glycol (GLYCOLAX) 17 gram/dose powder Take 17 g by mouth daily as needed.    ? senna-docusate (PERICOLACE) 8.6-50 mg tablet Take 1 tablet by mouth 2 (two) times a day.     No current facility-administered medications on file prior to visit.      Social History     Tobacco Use   Smoking Status Former Smoker   ? Packs/day: 0.25   Smokeless Tobacco Former User   Tobacco Comment    e-cig a few times/day     Social History     Social History Narrative    Single/, lives alone; daughter in [place];    Gets help from neighbors and extended family    Former smoker.no alcohol problems     Patient Care Team:  Jerson Hernandez MD as PCP - General (Family Medicine)  Jones Harding DO as Physician (General Surgery)  [facility]  Coni Thorpe as   Jerson Hernandez MD as Assigned PCP  ROS  As above      Objective  Physical Exam  Vitals:    02/07/20 1055   BP: 110/58   Patient Site: Right Arm   Patient Position: Sitting   Cuff Size: Adult Large   Pulse: 66   Resp: 20   Weight: 180 lb 8 oz (81.9 kg)     Body mass index is 33.01 kg/m .  Gardenia looks good, good spirits,  Alert, oriented  Chest clear to auscultation, cardiac exam with no murmurs, gallops, rubs, still irregular.  Trace lower extremity edema  Diagnostics:   None      Please note: Voice recognition software was used in this dictation.  It may therefore contain typographical errors.

## 2021-06-29 NOTE — PROGRESS NOTES
"Progress Notes by Migue Joseph MD at 8/18/2020  1:51 PM     Author: Migue Joseph MD Service: Nephrology Author Type: Physician    Filed: 8/18/2020  2:02 PM Date of Service: 8/18/2020  1:51 PM Status: Signed    : Migue Joseph MD (Physician)              RENAL ([facility]) progress note  CC: F/U MAY  S: s/p drain placed in IR, POD#1. Tolerating clears. She denies fever, chest pain, dyspnea, nausea, and edema.       Assessment and plan:  1.  Acute kidney injury -severe acute tubular necrosis.  MUCH IMPROVED. Cr upon admission was at her baseline of 0.95 (8/11)-->2.49 (8/12)-->3.61 (8/13)-->4.49 (8/14)-->4.52 (8/15)-->4.10 (8/16)-->3.03>>>1.33mg/dl today.     Continue supportive care.       2. Diverticulitis with abscess: Presently on IV Cipro and flagyl. WBCs trending down and pain improving. Drain placed today.      3. Anemia: No acute bleed presently.      4. HFrEF: s/p angiogram and stent plcmt 7/27/20. Stable. If becomes shortness of breath or hypoxic okay to add loop diuretic. Will hold off for now with excellent UOP      5. DMII: Per IM     6. HTN: Some hypotension yesterday which has improved. Currently, BP acceptable. Off all antihypertensives presently. Avoid hypotension, as able, in MAY.     7. NAGMA - due to MAY and exacerbated by NS.  Resolved s/p bicarbonate drip.    8. K/PHos/Ca - HypoK with renal function recovery. replace Phos 30mmol IV once today and trend. Continue protocol.Replace Ca today.     Migue Joseph MD  [facility]  Pager: [phone]   Office: [phone]         /53 (Patient Position: Semi-nicole)   Pulse (!) 109   Temp 97.9  F (36.6  C) (Oral)   Resp 18   Ht 5' 2\" (1.575 m)   Wt 196 lb 4.8 oz (89 kg)   LMP 01/25/1999   SpO2 97%   BMI 35.90 kg/m      I/O last 3 completed shifts:  In: -   Out: 1170 [Urine:1150; Drains:20]    Physical Exam:   GENERAL: NAD in bed  EYES: pupils equal, sclerae injected  ENT: Hearing normal, oral mucosa moist.  RESP: No " respiratory distress, normal effort.  CV: No leg edema.    GI: soft, Mild tenderness over incision site  : dias with >1L clear yellow urine in bag   SKIN: No rash, warm/ dry  PSYCH: Appropriate mood and affect    Results from last 7 days   Lab Units 08/18/20  0659 08/17/20  0520 08/16/20 0602   LN-SODIUM mmol/L 139 139 136   LN-POTASSIUM mmol/L 3.3* 3.3* 3.6   LN-CHLORIDE mmol/L 106 107 104   LN-CO2 mmol/L 23 22 22   LN-BLOOD UREA NITROGEN mg/dL 18 31* 34*   LN-CREATININE mg/dL 1.33* 3.03* 4.10*   LN-CALCIUM mg/dL 7.2* 6.9* 7.1*   LN-ALBUMIN g/dL  --  1.8*  --      Results from last 7 days   Lab Units 08/18/20  0659 08/17/20  0520 08/16/20  0602   LN-WHITE BLOOD CELL COUNT thou/uL 13.8* 12.8* 13.8*   LN-HEMOGLOBIN g/dL 7.7* 7.9* 8.4*   LN-HEMATOCRIT % 24.5* 24.8* 27.0*   LN-PLATELET COUNT thou/uL 135* 122* 124*         Scheduled Meds:  ? aspirin  81 mg Oral DAILY   ? atorvastatin  80 mg Oral QHS   ? ciprofloxacin  400 mg Intravenous Q24H   ? insulin aspart (NovoLOG) injection   Subcutaneous TID with meals   ? magnesium sulfate IVPB  4 g Intravenous Once   ? metroNIDAZOLE  500 mg Intravenous Q12H   ? omeprazole  20 mg Oral BID AC   ? polyethylene glycol  17 g Oral DAILY   ? sertraline  100 mg Oral DAILY   ? sodium chloride bacteriostatic  1-5 mL Intradermal Once   ? sodium chloride  10 mL Intravenous Q8H FIXED TIMES   ? ticagrelor  90 mg Oral BID     Continuous Infusions:    PRN Meds:.dextrose 50 % (D50W), glucagon (human recombinant), hydrOXYzine pamoate, metoprolol tartrate, naloxone **OR** naloxone, ondansetron **OR** ondansetron, oxyCODONE, sodium chloride 0.9%, sodium chloride, sodium chloride, sodium chloride, traZODone      Labs personally reviewed today during this evaluation at 3:28 PM

## 2021-06-29 NOTE — PROGRESS NOTES
Progress Notes by Amalia Warren CNP at 8/31/2020 10:52 AM     Author: Amalia Warren CNP Service: Nephrology Author Type: Nurse Practitioner    Filed: 8/31/2020 11:05 AM Date of Service: 8/31/2020 10:52 AM Status: Signed    : Amalia Warren CNP (Nurse Practitioner)              [facility]NAL ([facility]) progress note  CC: F/U MAY  S: Intubated and sedated, but makes attempts to answer questions and follow commands. Denies pain and shortness of breath. Very weak, but wiggles toes and squeezes my hands on command. Fio2 remains at 30. Remains ons levophed support, though dose is down slightly from yesterday. Continue to have good UOP and cr is improving.        A/P:   1. MAY: Improving. Creatinine baseline of 0.8-1 with last normal on 8/24 with trend up to 2.88 on 8/27. CT with contrast on 8/20. Patients UA on 8/26 with hyaline cats, squam epi, trace LE, 50 protein, trace ketones. Noted to have some hypotension on 8/24 could have been the initial start of MAY leading to elevated dig level. Hypotension on 8/26 with bradycardia now with risk for ATN. Also noted to have elevated vancomycin level up to 43.1(8/23) certainly could be contributing. On ACE-I with last dose on 8/25.  Likey hypotension and vancomycin with initial cause of MAY in the setting of diuresis and ACE-I and followed by elevated digoxin level with hemodynamic changes all in the setting of sepsis.    -Intake output, daily weights.   -Hold ACE-I, aldactone, lasix and diuril.   -Consented to HD, though no indication at this time.   - Continues to have large UOP secondary to post ATN diuresis. Continue to hold diuretics. Will monitor for now as she did get a dose of diuril yesterday which could have contributed to outpt. Consider fluid bolus if UOP remains elevated and/or pressor needs increase.        2. Digoxin toxicity: 6.9 on 8/26. transferred to ICU. MAY followed by toxicity. Hyperkalemia(now improved). Digifab per ICU  3. Acute respiratory failure: ongoing  "opacites - HCAP. Vascular congetion improved form previous. Remains intubated   4. HFrEF: CXR from 8/26 with vascular congestion improved. Now autodiuresing.    5: Acute diverticulitis s/p perforation and abscess s/p drain: per surgery(no surgery planned currently) and IR who may remove or reposition drain today  6. Hyperkalemia: improved with treatment of digoxin toxicity. Now Hypo. Replace PRN.   7. Metabolic acidosis: Resolved with bicarb and now with renal function recovery.  8. Anemia, thrombocytopenia: check smear. hgb stable. Transfusion per primary.     Amalia Warren, CNP  [facility]  [phone] (off)  [phone] (Pager)      No interval changes to past medical history, social history or family history to report.    BP (!) 86/30   Pulse 75   Temp 99.8  F (37.7  C) (Oral)   Resp (!) 36   Ht 5' 2\" (1.575 m)   Wt 207 lb 4.8 oz (94 kg)   LMP 01/25/1999   SpO2 95%   BMI 37.92 kg/m      I/O last 3 completed shifts:  In: 2633.4 [I.V.:2001.4; NG/GT:432; IV Piggyback:200]  Out: 5805 [Urine:5805]    Physical Exam:   GENERAL: frail, elderly, chronically ill.   EYES: No scleral icterus, conjunctiva clear  ENT: ET tube in place with vent support.   RESP: anterior clear no respiratory distress on vent support. Adequate oxygenation.  CV: RRR, no murmurs. Trace peripheral edema.    GI: Active BS, Soft, obese  Musculoskeletal: Diminished muscle bulk/ tone; No gross joint abnormalities  SKIN: No rash, warm/ dry  PSYCH:  ERIKA, sedated      Results from last 7 days   Lab Units 08/31/20  0501 08/30/20  1733 08/30/20  0517 08/29/20  1618  08/28/20  0431  08/27/20  0405   LN-SODIUM mmol/L 139  --  138 139   < > 138   < > 140   LN-POTASSIUM mmol/L 3.2*  3.2* 3.7 3.6  3.6 3.7  3.7   < > 3.5  3.5   < > 4.6   LN-CHLORIDE mmol/L 101  --  105 107   < > 106   < > 108*   LN-CO2 mmol/L 26  --  22 19*   < > 20*   < > 17*   LN-BLOOD UREA NITROGEN mg/dL 47*  --  53* 54*   < > 48*   < > 39* "   LN-CREATININE mg/dL 1.41*  --  2.48* 2.89*   < > 3.13*   < > 2.86*   LN-CALCIUM mg/dL 8.5  --  8.9 8.8   < > 8.4*   < > 9.0   LN-ALBUMIN g/dL  --   --  1.4*  --   --  1.7*  --  1.9*   LN-PROTEIN TOTAL g/dL  --   --  6.0  --   --  6.3  --  6.6   LN-BILIRUBIN TOTAL mg/dL  --   --  2.0*  --   --  1.9*  --  2.0*   LN-ALKALINE PHOSPHATASE U/L  --   --  152*  --   --  147*  --  147*   LN-ALT (SGPT) U/L  --   --  27  --   --  119*  --  166*   LN-AST (SGOT) U/L  --   --  247*  --   --  1,413*  --  2,613*    < > = values in this interval not displayed.     Results from last 7 days   Lab Units 08/28/20  0923 08/28/20  0431 08/27/20  0406   LN-WHITE BLOOD CELL COUNT thou/uL 14.1* 14.6* 18.3*   LN-HEMOGLOBIN g/dL 9.0* 8.9* 9.1*   LN-HEMATOCRIT % 27.1* 27.0* 28.3*   LN-PLATELET COUNT thou/uL 116* 115* 111*         Scheduled Meds:  ? amino acids-protein hydrolys  1 packet Enteral Tube BID   ? aspirin  81 mg Enteral Tube DAILY   ? chlorhexidine  15 mL Topical Q12H   ? insulin aspart (NovoLOG) injection   Subcutaneous Q4H FIXED TIMES   ? ipratropium-albuteroL  3 mL Nebulization Q6H - RT   ? magnesium sulfate IVPB  2 g Intravenous Once   ? meropenem  1 g Intravenous Q12H   ? micafungin (MYCAMINE) IV  100 mg Intravenous Q24H   ? omeprazole  20 mg Oral QAM AC    Or   ? omeprazole  20 mg Enteral Tube QAM AC    Or   ? pantoprazole  40 mg Intravenous QAM AC   ? potassium chloride liquid/packet  20 mEq Enteral Tube Q4H   ? [Held by provider] sertraline  100 mg Oral DAILY   ? sodium chloride bacteriostatic  1-5 mL Intradermal Once   ? sodium chloride  10 mL Intravenous Q8H FIXED TIMES   ? sodium chloride  10-30 mL Intravenous Q8H FIXED TIMES   ? [Held by provider] ticagrelor  90 mg Enteral Tube BID     Continuous Infusions:  ? fentaNYL citrate (PF) 75 mcg/hr (08/31/20 0600)   ? [Held by provider] furosemide Stopped (08/29/20 2040)   ? norepinephrine 0.06 mcg/kg/min (08/31/20 0949)   ? propofol Stopped (08/31/20 0752)   ? vasopressin 2.4  Units/hr (08/31/20 0600)     PRN Meds:.alteplase, atropine, bacitracin, bisacodyL, carboxymethylcellulose, codeine-guaiFENesin, dextrose 50 % (D50W), glucagon (human recombinant), hydrOXYzine pamoate, lidocaine (PF), lipase-protease-amylase **AND** sodium bicarbonate, [Held by provider] metoprolol tartrate, naloxone **OR** naloxone, oxyCODONE, polyethylene glycol, sodium chloride 0.9%, sodium chloride bacteriostatic, sodium chloride, sodium chloride, sodium chloride, traZODone      Labs personally reviewed today during this evaluation at 8:20 AM

## 2021-06-29 NOTE — PROGRESS NOTES
Progress Notes by Amalia Warren CNP at 8/17/2020  2:48 PM     Author: Amalia Warren CNP Service: Nephrology Author Type: Nurse Practitioner    Filed: 8/17/2020  3:08 PM Date of Service: 8/17/2020  2:48 PM Status: Addendum    : Amalia Warren CNP (Nurse Practitioner)    Related Notes: Original Note by Amalia Warren CNP (Nurse Practitioner) filed at 8/17/2020  3:02 PM              RENAL ([facility]) progress note  CC: F/U MAY  S: Since last visit had drain placed in IR. Presently resting in bed and feeling okay. Mild shortness of breath. No dizziness. No n/v/abd pain other than tenderness around incision site. Is concerned with belongings she left at her care center, SW notified.    Assessment and plan:    1.  Acute kidney injury -severe acute tubular necrosis.  Cr upon admission was at her baseline of 0.95 (8/11)-->2.49 (8/12)-->3.61 (8/13)-->4.49 (8/14)-->4.52 (8/15)-->4.10 (8/16)-->3.03. Now with excellent UOP and downtrend of cr which is consistent with renal function recovery.    No uremic symptoms at present.     No dialysis indication    Continue supportive care.       2. Diverticulitis with abscess: Presently on IV Cipro and flagyl. WBCs trending down and pain improving. Drain placed today.      3. Anemia: No acute bleed presently.      4. HFrEF: s/p angiogram and stent [facility] 7/27/20. Stable. If becomes shortness of breath or hypoxic okay to add loop diuretic. Will hold off for now with excellent UOP      5. DMII: Per IM     6. HTN: Some hypotension earlier today, presently BP acceptable. Off all antihypertensives presently. Avoid hypotension, as able, in MAY.     7. NAGMA - due to MAY and exacerbated by NS.  Resolved s/p bicarbonate drip.    8. K/PHos/Ca - HypoK with renal function recovery. Replace x 1 today. Hypophos, replace per protocol. Ca acceptable when corrected for hypoalbuminemia.    - Check Mg now, replace PRN if low.    - Check iPTH, vitamin d, iCa in AM        Amalai THERON MoeCNP  Kidney  "[facility]  [phone] (off)           /60 (Patient Position: Lying)   Pulse 87   Temp 98.5  F (36.9  C) (Oral)   Resp 18   Ht 5' 2\" (1.575 m)   Wt 196 lb 4.8 oz (89 kg)   LMP 01/25/1999   SpO2 99%   BMI 35.90 kg/m      I/O last 3 completed shifts:  In: 365 [P.O.:240; IV Piggyback:125]  Out: 1100 [Urine:1100]    Physical Exam:   GENERAL: calm and comfortable, alert  EYES: pupils equal, sclerae injected  ENT: Hearing normal, oral mucosa moist.  RESP: Crackles in bases. No respiratory distress, normal effort.  CV: Irr. No leg edema.    GI: Active BS, Soft, NT/ND, Mild tenderness over incision site  : dias with >1L clear yellow urine in bag   SKIN: No rash, warm/ dry  NEURO: Moves all extremities, no tremor. Sensation grossly intact to LT  PSYCH: Appropriate mood and affect    Results from last 7 days   Lab Units 08/17/20  0520 08/16/20  0602 08/15/20  0634  08/11/20  1026   LN-SODIUM mmol/L 139 136 135*   < > 135*   LN-POTASSIUM mmol/L 3.3* 3.6 4.1   < > 4.6   LN-CHLORIDE mmol/L 107 104 110*   < > 103   LN-CO2 mmol/L 22 22 16*   < > 22   LN-BLOOD UREA NITROGEN mg/dL 31* 34* 33*   < > 22   LN-CREATININE mg/dL 3.03* 4.10* 4.52*   < > 0.95   LN-CALCIUM mg/dL 6.9* 7.1* 7.1*   < > 10.3   LN-ALBUMIN g/dL 1.8*  --   --   --  3.0*   LN-PROTEIN TOTAL g/dL  --   --   --   --  8.2*   LN-BILIRUBIN TOTAL mg/dL  --   --   --   --  1.3*   LN-ALKALINE PHOSPHATASE U/L  --   --   --   --  135*   LN-ALT (SGPT) U/L  --   --   --   --  33   LN-AST (SGOT) U/L  --   --   --   --  37    < > = values in this interval not displayed.     Results from last 7 days   Lab Units 08/17/20  0520 08/16/20  0602 08/15/20  0634   LN-WHITE BLOOD CELL COUNT thou/uL 12.8* 13.8* 13.1*   LN-HEMOGLOBIN g/dL 7.9* 8.4* 8.7*   LN-HEMATOCRIT % 24.8* 27.0* 28.5*   LN-PLATELET COUNT thou/uL 122* 124* 131*         Scheduled Meds:  ? aspirin  81 mg Oral DAILY   ? atorvastatin  80 mg Oral QHS   ? ciprofloxacin  400 mg Intravenous " Q24H   ? insulin aspart (NovoLOG) injection   Subcutaneous TID with meals   ? metroNIDAZOLE  500 mg Intravenous Q12H   ? omeprazole  20 mg Oral BID AC   ? polyethylene glycol  17 g Oral DAILY   ? sertraline  100 mg Oral DAILY   ? sodium chloride bacteriostatic  1-5 mL Intradermal Once   ? sodium chloride  10 mL Intravenous Q8H FIXED TIMES   ? ticagrelor  90 mg Oral BID     Continuous Infusions:    PRN Meds:.dextrose 50 % (D50W), glucagon (human recombinant), hydrOXYzine pamoate, metoprolol tartrate, naloxone **OR** naloxone, ondansetron **OR** ondansetron, oxyCODONE, sodium chloride 0.9%, sodium chloride, sodium chloride, sodium chloride, traZODone      Labs personally reviewed today during this evaluation at 3:28 PM

## 2021-06-29 NOTE — PROGRESS NOTES
Progress Notes by Ivelisse Hernandez MD at 8/28/2020  9:52 AM     Author: Ivelisse Hernandez MD Service: Cardiology Author Type: Physician    Filed: 8/28/2020 11:43 AM Date of Service: 8/28/2020  9:52 AM Status: Signed    : Ivelisse Hernandez MD (Physician)         HEART CARE NOTE          Assessment/Recommendations   1. Junctional bradycardia 2/2 digoxin toxicity  Assessment / Plan    Now is sinus rhythm    Continue dopamine gtt will waiting for resolution of dig toxicity; ideal HR ~ 80s bpm but low threshold for TV pacing     2. HFrEF/Biventricular dysfunction c/b ADHF  Assessment / Plan    Diuresis currently on hold per nephrology recommendations    GDMT on hold 2/2 hemodynamic instability     3. Chronic afib/flutter  Assessment / Plan    C/b CVA    Course complicated by digoxin toxicity and junctional bradycardia. Currently in sinus on dopamine      4. Valvular heart disease   Assessment / Plan    Moderate MR (c/b severely decreased posterior leaflet mobility)    Severe TR -- will need valve clinic evaluation once compensated      5. DM2  Assessment / Plan    Management per primary team     6. CAD  Assessment / Plan    Known  to RCA although mild RV dysfunction out of proportion to degree of pressure overload    Continue ASA unless otherwise contraindicated     Bblocker and ACE-I on hold given details above     7. Renal dysfunction  Assessment / Plan    Nephrology following for progressive MAY on CKD; patient currently oliguric; crt appears to be approaching plateau    History of Present Illness/Subjective      Ms. Gardenia Muller is a 70 y.o. female with a PMHx significant for 69 y/o female with PMH of CAD, CHF, Afib, chronic abdominal pain with intestinal angina, Type 2 DM, depression. Patient initialy admitted for acute diverticulitus with perforation and developed abcess and required drain placement on 8/17. Patient was found to have digoxin toxicity on 8/26 and  transferred to the ICU for hemodynamic instability and bradycardia.     Mrs. Muller was observed off BIPAP. Denies any acute complaints except for dry mouth     ECG: Personally reviewed. normal sinus rhythm, occasional PVC noted, unifocal, 1st degree AV block.     ECHO (personnaly Reviewed):     When compared to the previous study dated 7/23/2020, no significant change. Limited study done and tricuspid regurgitation not well assessed on this study.    Left ventricle ejection fraction is normal. The calculated left ventricular ejection fraction is 56%.    Normal left ventricular size and systolic function.    Left atrial volume is moderately increased.    Aortic Valve: The aortic valve is sclerotic without reduced excursion. Mild aortic regurgitation.    Mitral Valve: There is severe valve leaflet thickening. Mild mitral regurgitation.             Physical Examination Review of Systems   Vitals:    08/28/20 0830   BP: 112/51   Pulse: 85   Resp: (!) 33   Temp:    SpO2: 93%     Body mass index is 39.12 kg/m .  Wt Readings from Last 3 Encounters:   08/28/20 213 lb 14.4 oz (97 kg)   08/11/20 179 lb 4.8 oz (81.3 kg)   08/12/20 179 lb (81.2 kg)     General Appearance:   no distress, normal body habitus   ENT/Mouth: membranes moist, no oral lesions or bleeding gums.      EYES:  no scleral icterus, normal conjunctivae   Neck: no carotid bruits or thyromegaly   Chest/Lungs:   lungs are clear to auscultation, no rales or wheezing, equal chest wall expansion    Cardiovascular:   Regular. Normal first and second heart sounds with no rubs, or gallops; the carotid, radial and posterior tibial pulses are intact, no JVD, trace edema bilaterally    Abdomen:  no organomegaly, masses, bruits, or tenderness; bowel sounds are present   Extremities: no cyanosis or clubbing   Skin: no xanthelasma, warm.    Neurologic: normal gait, normal  bilateral, no tremors     Psychiatric: alert and oriented x3, calm     A complete 10 systems ROS  was reviewed  And is negative except what is listed in the HPI.          Medical History  Surgical History Family History Social History   Past Medical History:   Diagnosis Date   ? Acute diastolic heart failure (H) 11/2015   ? Acute on chronic diastolic heart failure (H) 6/15/2019   ? Advanced directives, counseling/discussion 8/5/2015    Patient completed 2011.  Discussed today, 5/24/17, and wants to change healthcare agent from niece to daughter.  Discussed that she will need to complete new form to indicate this.   ? MAY (acute kidney injury) (H) 10/22/2018   ? Atrial fibrillation (H)    ? Benign adenomatous polyp of large intestine 3/30/2017    Colonoscopy March 2017.  Recommend 5 year follow-up.  Single tubular adenoma   ? Biliary obstruction 10/3/2018    Added automatically from request for surgery [id number]   ? Cerebral vascular accident (H)    ? Coronary artery disease 2006    100% RCA with collateral filling per Dr. Elizabeth's angio report   ? Diabetes mellitus type 2 in obese (H)    ? Fibrocystic breast    ? GERD (gastroesophageal reflux disease)    ? History of anesthesia complications     slow to wake   ? Hypertension    ? Obstructive sleep apnea syndrome    ? Peripheral vascular disease (H)    ? Pneumonia 11/11/2018   ? PONV (postoperative nausea and vomiting)    ? S/P cholecystectomy 6/22/2018   ? SBO (small bowel obstruction) (H) 11/12/2015   ? Stroke (H)    ? Transaminitis 10/22/2018   ? Upper respiratory infection 12/20/2018   ? Urinary incontinence     Past Surgical History:   Procedure Laterality Date   ? CARDIAC CATHETERIZATION     ? CARDIOVERSION  10/18/2018   ? CORONARY STENT PLACEMENT  1995    stent   ? CV CORONARY ANGIOGRAM N/A 7/27/2020    Procedure: Coronary Angiogram;  Surgeon: Luis Monge MD;  Location: [facility] Cath Lab;  Service: Cardiology   ? CV RIGHT HEART CATH SHUNT RUN  7/27/2020    Procedure: Right Heart Catheterization Shunt Run;  Surgeon: Luis Monge MD;   Location: [facility] Cath Lab;  Service: Cardiology   ? ERCP N/A 10/5/2018    Procedure: ENDOSCOPIC RETROGRADE CHOLANGIOPANCREATOGRAPHY, SPHINCTEROTOMY;  Surgeon: Anson Stokes MD;  Location: [facility] Main OR;  Service:    ? EXPLORATORY LAPAROTOMY N/A 6/29/2018    Procedure: LAPAROTOMY, CLOSURE OF PERITONEAL RENT;  Surgeon: Jones Harding DO;  Location: [facility] Main OR;  Service:    ? LAPAROSCOPIC CHOLECYSTECTOMY N/A 10/7/2018    Procedure: CHOLECYSTECTOMY, LAPAROSCOPIC;  Surgeon: Felicia So MD;  Location: [facility] Main OR;  Service:    ? MIDLINE INSERTION - DOUBLE LUMEN  8/13/2020        ? MIDLINE INSERTION - DOUBLE LUMEN  8/19/2020        ? PICC  6/29/2018        ? PICC  10/21/2018        ? PICC INSERTION - DOUBLE LUMEN  8/23/2020        ? VENTRAL HERNIA REPAIR N/A 11/12/2015    Procedure: STRANGULATED VENTRAL HERNIA REPAIR ;  Surgeon: Ernesto Bailey MD;  Location: [facility] OR;  Service:     no family history of premature coronary artery disease Social History     Socioeconomic History   ? Marital status: Legally      Spouse name: Not on file   ? Number of children: 1   ? Years of education: Not on file   ? Highest education level: Not on file   Occupational History   ? Not on file   Social Needs   ? Financial resource strain: Not on file   ? Food insecurity     Worry: Not on file     Inability: Not on file   ? Transportation needs     Medical: Not on file     Non-medical: Not on file   Tobacco Use   ? Smoking status: Former Smoker     Packs/day: 0.25   ? Smokeless tobacco: Former User   ? Tobacco comment: e-cig a few times/day   Substance and Sexual Activity   ? Alcohol use: No   ? Drug use: No   ? Sexual activity: Not on file   Lifestyle   ? Physical activity     Days per week: Not on file     Minutes per session: Not on file   ? Stress: Not on file   Relationships   ? Social connections     Talks on phone: Not on file     Gets together: Not on file     Attends [employer]  service: Not on file     Active member of club or organization: Not on file     Attends meetings of clubs or organizations: Not on file     Relationship status: Not on file   ? Intimate partner violence     Fear of current or ex partner: Not on file     Emotionally abused: Not on file     Physically abused: Not on file     Forced sexual activity: Not on file   Other Topics Concern   ? Not on file   Social History Narrative    Single/, lives alone; daughter in [place];    Gets help from neighbors and extended family    Former smoker.no alcohol problems           Lab Results    Chemistry/lipid CBC Cardiac Enzymes/BNP/TSH/INR   Lab Results   Component Value Date    CHOL 86 03/26/2019    HDL 35 (L) 03/26/2019    LDLCALC 32 03/26/2019    TRIG 93 03/26/2019    CREATININE 3.13 (H) 08/28/2020    BUN 48 (H) 08/28/2020    K 3.5 08/28/2020    K 3.5 08/28/2020     08/28/2020     08/28/2020    CO2 20 (L) 08/28/2020    Lab Results   Component Value Date    WBC 14.6 (H) 08/28/2020    HGB 8.9 (L) 08/28/2020    HCT 27.0 (L) 08/28/2020    MCV 91 08/28/2020     (L) 08/28/2020    Lab Results   Component Value Date    CKTOTAL 608 (HH) 08/27/2020    TROPONINI 0.40 (HH) 08/27/2020     (H) 08/27/2020    TSH 8.31 (H) 10/22/2018    INR 5.21 (H) 08/28/2020     Lab Results   Component Value Date    CKTOTAL 608 (HH) 08/27/2020    TROPONINI 0.40 (HH) 08/27/2020          Weight:    Wt Readings from Last 3 Encounters:   08/28/20 213 lb 14.4 oz (97 kg)   08/11/20 179 lb 4.8 oz (81.3 kg)   08/12/20 179 lb (81.2 kg)       Allergies  Allergies   Allergen Reactions   ? Penicillins Swelling         Surgical History  Past Surgical History:   Procedure Laterality Date   ? CARDIAC CATHETERIZATION     ? CARDIOVERSION  10/18/2018   ? CORONARY STENT PLACEMENT  1995    stent   ? CV CORONARY ANGIOGRAM N/A 7/27/2020    Procedure: Coronary Angiogram;  Surgeon: Luis Monge MD;  Location: [facility] Cath Lab;  Service:  Cardiology   ? CV RIGHT HEART CATH SHUNT RUN  7/27/2020    Procedure: Right Heart Catheterization Shunt Run;  Surgeon: Luis Monge MD;  Location: [facility] Cath Lab;  Service: Cardiology   ? ERCP N/A 10/5/2018    Procedure: ENDOSCOPIC RETROGRADE CHOLANGIOPANCREATOGRAPHY, SPHINCTEROTOMY;  Surgeon: Anson Stokes MD;  Location: [facility] Main OR;  Service:    ? EXPLORATORY LAPAROTOMY N/A 6/29/2018    Procedure: LAPAROTOMY, CLOSURE OF PERITONEAL RENT;  Surgeon: Jones Harding DO;  Location: [facility] Main OR;  Service:    ? LAPAROSCOPIC CHOLECYSTECTOMY N/A 10/7/2018    Procedure: CHOLECYSTECTOMY, LAPAROSCOPIC;  Surgeon: Felicia So MD;  Location: [facility] OR;  Service:    ? MIDLINE INSERTION - DOUBLE LUMEN  8/13/2020        ? MIDLINE INSERTION - DOUBLE LUMEN  8/19/2020        ? PICC  6/29/2018        ? PICC  10/21/2018        ? PICC INSERTION - DOUBLE LUMEN  8/23/2020        ? VENTRAL HERNIA REPAIR N/A 11/12/2015    Procedure: STRANGULATED VENTRAL HERNIA REPAIR ;  Surgeon: Ernesto Bailey MD;  Location: [facility] Main OR;  Service:        Social History  Tobacco:   Social History     Tobacco Use   Smoking Status Former Smoker   ? Packs/day: 0.25   Smokeless Tobacco Former User   Tobacco Comment    e-cig a few times/day    Alcohol:   Social History     Substance and Sexual Activity   Alcohol Use No    Illicit Drugs:   Social History     Substance and Sexual Activity   Drug Use No       Family History  Family History   Problem Relation Age of Onset   ? Cancer Paternal Grandmother    ? Cancer Paternal Uncle    ? No Medical Problems Mother    ? No Medical Problems Father    ? Heart attack Sister    ? Chronic Kidney Disease Sister    ? No Medical Problems Daughter    ? No Medical Problems Maternal Grandmother    ? No Medical Problems Maternal Grandfather    ? No Medical Problems Paternal Grandfather    ? No Medical Problems Maternal Aunt    ? No Medical Problems Paternal Aunt    ? Diabetes  Brother    ? BRCA 1/2 Neg Hx    ? Breast cancer Neg Hx    ? Colon cancer Neg Hx    ? Endometrial cancer Neg Hx    ? Ovarian cancer Neg Hx           Carlos Hernandez on 8/28/2020      cc: Jerson Hernandez MD,

## 2021-06-29 NOTE — PROGRESS NOTES
"Progress Notes by Victor Manuel Castle MD at 8/15/2020  1:31 PM     Author: Victor Manuel Castle MD Service: Nephrology Author Type: Physician    Filed: 8/15/2020  1:37 PM Date of Service: 8/15/2020  1:31 PM Status: Signed    : Victor Manuel Castle MD (Physician)              RENAL ([facility]) progress note  CC: F/U MAY  S: Since last visit, patient tells me that she is much better today, she denies any chest pain, no shortness of breath, no nausea, no vomiting, no diarrhea, tells me that her appetite is improving.  She offers no other complaints.      She has a Bardales catheter in place that shows about 125 mL of dark urine.    Assessment and plan:    1.  Acute kidney injury -severe acute tubular necrosis.  Cr upon admission was at her baseline of 0.95 (8/11)-->2.49 (8/12)-->3.61 (8/13)-->4.49 (8/14)-->4.52 (8/15)    No uremic symptoms at present.     Remains anuric    No emergent dialysis indication    Creatinine seems to be plateauing    Continue to closely monitor    Discussed with patient today that she is still might need dialysis.     Continue to closely monitor renal function.       2. Diverticulitis with abscess: Presently on IV Cipro and flagyl. WBCs trending down and pain improving. Drain placement once INR down - likely Monday.      3. Anemia: No acute bleed presently.      4. HFrEF: s/p angiogram and stent [facility] 7/27/20.               - Halt IVF and likely to need urgent dialysis if hypoxic or sob     5. DMII: Per IM     6. HTN: BP acceptable presently. Off all antihypertensives presently. Avoid hypotension, as able, in MAY.     7. NAGMA - due to MAY and exacerbated by NS.  Continue bicarbonate drip.    Discussed in detail with Dr. Ellison.    Victor Manuel Castle MD  [facility]  [phone] (off)       No interval changes to past medical history, social history or family history to report.    /68 (Patient Position: Sitting)   Pulse 85   Temp 98.1  F (36.7  C) (Oral)   Resp 16   Ht 5' 2\" " (1.575 m)   Wt 190 lb 1.6 oz (86.2 kg)   LMP 01/25/1999   SpO2 96%   BMI 34.77 kg/m      I/O last 3 completed shifts:  In: 60 [P.O.:60]  Out: 125 [Urine:125]    Physical Exam:   GENERAL: calm and comfortable, alert  EYES: pupils equal, sclerae not icteric.  ENT: Hearing normal, oral mucosa moist.  RESP: Clear to auscultation bilaterally with no respiratory distress, normal effort.  CV: RRR, no murmurs. 1+ leg edema.    GI: Active BS, Soft, NT/ND, Mild LLQ tenderness  : No CVA tenderness   SKIN: No rash, warm/ dry  NEURO: Moves all extremities, no tremor. Sensation grossly intact to LT  PSYCH: Appropriate mood and affect    Results from last 7 days   Lab Units 08/15/20  0634 08/14/20  0436 08/13/20  0941 08/12/20  1321 08/11/20  1026   LN-SODIUM mmol/L 135* 135*  --  135* 135*   LN-POTASSIUM mmol/L 4.1 4.4  --  4.5 4.6   LN-CHLORIDE mmol/L 110* 110*  --  107 103   LN-CO2 mmol/L 16* 15*  --  17* 22   LN-BLOOD UREA NITROGEN mg/dL 33* 31*  --  27 22   LN-CREATININE mg/dL 4.52* 4.49* 3.61* 2.49* 0.95   LN-CALCIUM mg/dL 7.1* 7.4*  --  8.8 10.3   LN-ALBUMIN g/dL  --   --   --   --  3.0*   LN-PROTEIN TOTAL g/dL  --   --   --   --  8.2*   LN-BILIRUBIN TOTAL mg/dL  --   --   --   --  1.3*   LN-ALKALINE PHOSPHATASE U/L  --   --   --   --  135*   LN-ALT (SGPT) U/L  --   --   --   --  33   LN-AST (SGOT) U/L  --   --   --   --  37     Results from last 7 days   Lab Units 08/15/20  0634 08/14/20  0800 08/13/20  0724   LN-WHITE BLOOD CELL COUNT thou/uL 13.1* 13.4* 12.8*   LN-HEMOGLOBIN g/dL 8.7* 9.1* 9.8*   LN-HEMATOCRIT % 28.5* 30.4* 33.3*   LN-PLATELET COUNT thou/uL 131* 136* 135*         Scheduled Meds:  ? aspirin  81 mg Oral DAILY   ? atorvastatin  80 mg Oral QHS   ? ciprofloxacin  400 mg Intravenous Q24H   ? famotidine (PEPCID) injection  20 mg Intravenous Q24H   ? metroNIDAZOLE  500 mg Intravenous Q12H   ? polyethylene glycol  17 g Oral DAILY   ? sertraline  100 mg Oral DAILY   ? sodium chloride bacteriostatic  1-5 mL  Intradermal Once   ? sodium chloride  10 mL Intravenous Q8H FIXED TIMES   ? ticagrelor  90 mg Oral BID     Continuous Infusions:  ? sodium bicarbonate IV infusion in D5W 75 mL/hr at 08/14/20 1808     PRN Meds:.HYDROmorphone, metoprolol tartrate, naloxone **OR** naloxone, ondansetron **OR** ondansetron, sodium chloride 0.9%, sodium chloride, sodium chloride, sodium chloride      Labs personally reviewed today during this evaluation at 3:28 PM

## 2021-06-29 NOTE — PROGRESS NOTES
Progress Notes by Tori Capellan Chi, PA-C at 9/1/2020  7:28 AM     Author: Tori Capellan Chi, PA-C Service: Interventional Radiology Author Type: Physician Assistant    Filed: 9/1/2020  7:32 AM Date of Service: 9/1/2020  7:28 AM Status: Signed    : Tori Capellan Chi, PA-C (Physician Assistant)         Interventional Radiology  9/1/2020    IR abscessogram procedure requested by Jose R Stewart MD .    Please see full note from Tori Capellan PA-C on 8/31/2020 for patient's IR pre-procedure note.    NPO status reviewed     Clinical notes reviewed    Pertinent medications reviewed     LABS:  POC INR (no units)   Date Value   07/27/2020 1.60 (!)     INR (no units)   Date Value   09/01/2020 2.52 (H)       Lab Results   Component Value Date    WBC 14.1 (H) 08/28/2020    HGB 9.0 (L) 08/28/2020     (L) 08/28/2020       Creatinine (mg/dL)   Date Value   09/01/2020 0.81       Potassium (mmol/L)   Date Value   09/01/2020 3.4 (L)   09/01/2020 3.4 (L)     Chart reviewed today and patient appropriate to proceed with above procedure after obtaining consent from Aria Muller (daughter) is power of  at .    Tori Capellan  Interventional Radiology  [phone]

## 2021-06-29 NOTE — PROGRESS NOTES
Progress Notes by Gaye Ty NP at 2020  2:15 PM     Author: Gaye Ty NP Service: Interventional Radiology Author Type: Nurse Practitioner    Filed: 2020  2:22 PM Date of Service: 2020  2:15 PM Status: Signed    : Gaye Ty NP (Nurse Practitioner)         Interventional Radiology - Pre Procedure Note  2020     CHART CHECK NOTE ONLY, PATIENT NOT SEEN    Requested Procedure:  Abscessogram   Requesting Provider:  Gaye Ty CNP    HPI:  Gardenia Muller is a 70 y.o. old female with perforated diverticulitis s/p CT drain placement 20 with follow up CT 20 showing near complete resolution of the fluid collection that is in need of follow up abscessogram.      General surgery Dr. Harding following.      Flushin cc NS two times a day   Outputs (in mL):    Date  Drain   2020  5 (so far)   2020 37.5   2020 0   2020 1   2020 25       IMAGING:    CT Abdomen Pelvis 20:    IMPRESSION:   1.  Interval percutaneous drain placement into the diverticular abscess with near complete collapse of the fluid cavity. Drain abuts the adjacent sigmoid colon. No evidence of post drain placement complication.  2.  No new intra-abdominal abscess or other findings to correlate with ongoing leukocytosis.  3.  Stable appearance of the pancreas with previously seen main pancreatic ductal dilatation not well visualized on this study.     NPO Status:  NPO at midnight   Anticoagulation/Antiplatelet/Bleeding tendencies:  Aspirin, Brilinta   Antibiotics:  Merrem, Vancomycin scheduled     PAST MEDICAL HISTORY:  Reviewed    PAST SURGICAL HISTORY:  Reviewed    ALLERGIES  Penicillins    MEDICATIONS: See MAR    LABS:  POC INR (no units)   Date Value   2020 1.60 (!)     INR (no units)   Date Value   2020 1.59 (H)     Hemoglobin (g/dL)   Date Value   2020 7.9 (L)     Platelets (thou/uL)   Date Value   2020 109 (L)         EXAM:  BP 91/42 (Patient  "Position: Lying)   Pulse 69   Temp 99.4  F (37.4  C) (Axillary)   Resp 17   Ht 5' 2\" (1.575 m)   Wt 203 lb 11.2 oz (92.4 kg)   LMP 01/25/1999   SpO2 94%   BMI 37.26 kg/m         ASSESSMENT:    70 y.o. old female with perforated diverticulitis s/p CT drain placement 8/17/20 with follow up CT 8/20/20 showing near complete resolution of the fluid collection that is in need of follow up abscessogram.        PLAN:    NPO at midnight.   Abscessogram with possible drain reposition, exchange or removal with sedation as needed tomorrow 8/25/20 - exact timing TBD,  IR staff will call when ready.    Patient to be seen by our IR provider pre procedure for physical exam, sedation assessment and consent.        Gaye Ty [facility]  Interventional Radiology  [phone]         "

## 2021-06-29 NOTE — PROGRESS NOTES
"Progress Notes by Tori Capellan Chi, PA-C at 2020  8:08 AM     Author: Tori Capellan Chi, PA-C Service: Interventional Radiology Author Type: Physician Assistant    Filed: 2020  8:51 AM Date of Service: 2020  8:08 AM Status: Addendum    : Tori Capellan Chi, PA-C (Physician Assistant)    Related Notes: Original Note by Tori Capellan Chi, PA-C (Physician Assistant) filed at 2020  8:17 AM         Interventional Radiology - CHART CHECK Note  2020    S:  CHART CHECK  Culture:  2+ Candida dubliniensis and candida albicans    O:  BP (!) 86/30   Pulse 66   Temp (P) 99.8  F (37.7  C)   Resp 16   Ht 5' 2\" (1.575 m)   Wt 207 lb 4.8 oz (94 kg)   LMP 1999   SpO2 97%   BMI 37.92 kg/m      IMAGIN2020:  IR abscessogram:  PROCEDURE:   Contrast injection through the existing drain demonstrates no residual abscess pocket. 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     IMPRESSION:   Abscessogram reveals no residual abscess pocket. Fistula to the colon. Switched drain to gravity drainage bag from SHAMIKA suction bulb. No flushes are needed. Follow-up abscessogram in one week.    20: CT Abd/pelvis  IMPRESSION:   1.  Interval percutaneous drain placement into the diverticular abscess with near complete collapse of the fluid cavity. Drain abuts the adjacent sigmoid colon. No evidence of post drain placement complication.  2.  No new intra-abdominal abscess or other findings to correlate with ongoing leukocytosis.  3.  Stable appearance of the pancreas with previously seen main pancreatic ductal dilatation not well visualized on this study.    LABS:  Lab Results   Component Value Date    WBC 14.1 (H) 2020    HGB 9.0 (L) 2020    HCT 27.1 (L) 2020    MCV 91 2020     (L) 2020         A:  69 yo female with perforated diverticulitis with abscess s/p CT guided 10F left pelvic drain placement 20. CT 20 demonstrating near resolution of fluid " collection however abscessogram on 8/25 with small fistula to adjacent colon.  Output has been 0 for the past 6 days.      -spoke with RN staff, pt is vented, has tube feeds running.    -Aria Muller (daughter) is power of  at  on her way here from [place].    -spoke with IDALIA Henriquez to review timing for case.       P:    IR abscessogram with possible removal/reposition/exchange with IV fentanyl as needed on 9/1/2020    Additional history, physical examination, procedure, risk/benefits, and sedation will be reviewed with pt/family by performing interventional radiology physician.      Tori Capellan  Interventional Radiology  08/31/20   [phone]

## 2021-06-29 NOTE — PROGRESS NOTES
Progress Notes by Karen Hilliard MD at 8/27/2020  8:20 AM     Author: Karen Hilliard MD Service: Family Medicine Author Type: Resident    Filed: 8/27/2020  3:32 PM Date of Service: 8/27/2020  8:20 AM Status: Attested    : Karen Hilliard MD (Resident) Cosigner: Tyrone Robles MD at 8/27/2020  5:30 PM    Attestation signed by Tyrone Robles MD at 8/27/2020  5:30 PM    I have seen and examined the patient.  I have reviewed and agree with Dr. Hilliard's findings, assessment and plan.    Discussed with nephrology.  No plan for diuresis today.  She is on minimal FiO2 settings on BiPAP and right now she actually has been placed on nasal cannula.    We will see how she does overnight and may be a dose of diuretics tomorrow.    Continue dopamine to maintain a heart rate around 80.    Remains critically ill at high risk of decompensation.  40 minutes critical care time excluding teaching.                PULMONARY / CRITICAL CARE PROGRESS NOTE    Date / Time of Admission:  8/11/2020 10:16 AM  Brief Summary   70 year  old female with history of HTN, T2DM, chronic atrial fibrillation on warfarin, diastolic CHF, CAD s/p stenting (7/27/2020), PAD, calculous cholecystitis s/p cholecystectomy, presented to [facility] on 8/11/2020 with abdominal pain and vomiting and was admitted for management of acute diverticulitis with perforation.     8/14: Cr 4.49, K 3.3, WBC 13.4; CTA w/ increased size of abscess from 2x2cm to 4x3cm, though pt reports clinical improvement of her symptoms. General surgery consulted and deem pt too high risk for OR.   8/17: left pelvic diverticular abscess drained by IR. Cultures grow Candida.  8/20: Cr 0.95 (baseline); repeat CTAP showing near complete collapse of fluid cavity.   8/21: WBC ~ 19.3; + vancomycin    8/22: WBC ~ 17  8/23: vancomycin 43.1; stop vanco  8/24: Cr 1.08, K 3.6 . WBC ~ 16. CXR w/ possible hospital-acquired pneumonia. + IV fluconazole for possible  candidemia. WBC improving  8/25: WBC ~ 15.6 Pt up 25 lbs since admission. Develops shortness of breath. + 40 mg IV furosemide x2,  8/26: Cr 1.93 -->  2.65. K 5.3 --> 5.8 --> 5.4. MAY + episodes of bradycardia. + IV daptomycin hs. Vanco level 25.6  8/27: Cr 2.86 --> 2.92. WBC ~ 18Digoxin level comes back 6.9. Started on Digifab and transferred to ICU. Atropine x1 in ICU for bradycardia to 20-40s. Started on dopamine gtt.    Principal Problem:    Diverticulitis  Active Problems:    Bradyarrhythmia    Acute kidney injury (H)    Diverticulitis of large intestine with perforation and abscess without bleeding    Atrial fibrillation with rapid ventricular response (H)    Obstructive sleep apnea syndrome    Poisoning by digoxin, accidental or unintentional, initial encounter    Acute respiratory failure with hypoxia and hypercapnia (H)    Acute metabolic encephalopathy    Shock circulatory (H)    Metabolic acidosis        Advance Directives:  DNI/DNR  Assessment and Plan   Gardenia Muller is a 70 y.o. female with CAD s/p stenting 1995, chronic Afib on digoxin and warfarin, diastolic CHF, HTN, PAD, chronic abdominal pain from intestinal angina, non-insulin dependent T2DM,  who was admitted with an acute diverticulitis with perforation now s/p drain placement 8/17. Transferred to the ICU 8/26 for digoxin toxicity causing hemodynamic instability and bradyarrythmias.    NEURO/PSYCH/MSK  Depression: on sertraline daily and trazodone hs PTA  Acute toxic-metabolic encephalopathy    Presented with metabolic acidosis in the setting of MAY.  Mild CO2 retention for level of metabolic acidosis.   - Sedation: None indicated  - Pain control:  Prn (on oxycodone 5 mg q4h prn prior to ICU transfer)  - bed rest  - neuro checks per ICU protocol     PULM  FiO2 (%):  [30 %-40 %] 30 %   Acute mixed hypoxic and hypercapnic respiratory failure   Ongoing bilateral opacities on CXR, presumed HCAP. Could also be pulmonary congestion given  bradyarrhythmias and transient hypotension.Was on BiPAP for increasing O2 needs; now on NC.  - Judicious use of IVF  - HOB > 30 degrees     CV  Junctional bradycardia 2/2 digoxin toxicity: complicated by MAY, hyperkalemia, hypotension, now on dobutamine infusion. Repeat EKG significant for NSR with 1st degree AV block. Digoxin level 6.9 8/26, 6 today. Unknown level at time of admission. Likely acute toxicity given degree of hyperkalemia and in the setting of MAY, chronic, or acute on chronic. Symptomatic with bradyarrhythmias, hypotension, progressive renal failure. Spironolactone 8/20 --> 8/26. Appreciate recommendations from Cardiology.   - continue dopamine gtt -- goal hr 80s bpm, but low threshold for TV pacing per cardiology  - s/p 1 dose Digfab  - MAY mgmt as below  - Serum K  - monitor renal function  - Serial ECGs  - Repeat ECG with any change in pt's clinical condition or significant alteration in the rhythm or waveform on the cardiac monitor  - 0.5 mg IV atropine for bradycardia unresponsive to crystalloids    Chronic A-Fib/A-flutter: Complicated by CVA.  RVR 8/19. Currently sinus rhythm on dopamine gtt.   - holding digoxin due to toxicity  - holding metoprolol due to bradycardia  - holding warfarin due to supratherapeutic INR 5.06    Valvular heart disease: Moderate MR w/ severely decreased posterior leaflet mobility and severe TR.  - Valve evaluation in cardiology clinic once stable    HFrEF: CXR 8/26 with improved vascular congestion but pt now requiring increasing O2. Weight is up by 25 lbs since admission.   - Holding lasix due to MAY    CAD s/p angio and PCI to LAD 7/27/2020.  - holding beta-blocker and Ace-I given above  - holding ticagrelor  - holding statin   - ok to continue ASA unless contraindicated per cardiology       RENAL/FLUID/LYTES  MAY with metabolic acidosis and hyperkalemia:  Baseline Cr 0.8-1, 2.88 on 8/27.  CT with contrast on 8/20 and 8/25. pt w/ multiple risk factors for  hypokalemia -- loop diuretic use to treat HF, vomiting, diarrhea. It is not clear if digoxin toxicity is a consequence of renal failure or its precipitant. MAY could have been precipitated by high vancomycin levels (43.1 on 8/23) or by digoxin itself. Very minimal urine output with bradycardia and hypotension, concerning for ATN. Digoxin stopped. 40 mg IV furosemide x2 given 8/26.  Nephrology consulted for recommendations.   - I & O + daily weights  - holding diuretics, lisinopril  - on bicarb gtt  - monitor for volume overload  - Bardales  - avoid nephrotoxins and IV contrast  - monitor Mg, K, Ca and replete per protocols to minimize patient's susceptibility to toxic effects of digoxin  - follow BUN, Cr, urine output        GI  Acute perforated diverticulitis with pericolonic abscess s/p LLQ CT-guided drain placement 8/17. Cultures growing Candida albicans and Ashley dubliniensis.  CTA 8/20 with near complete resolution of abscess. 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 Surgery following peripherally; pt is high risk for surgery. Pt now on meropenem, daptomycin (switched from vanco 8/26), and fluconazole.  and trending up.   - NPO  - follow lactate   - GI PPx: PPI 2x/day    Transaminitis  Acute liver failure due to decreased SVR: Could be hepatic ischemia in the setting of heart failure, hypotension and bradycardia. Had similar events on prior admissions. AST 2613. MELD 38 pts (65-66% est 90-day mortality)  - fluid resuscitation as below    Hypoalbuminemia.  Serum protein level normal.    - H2/PPI ppx (ventilated in ICU or pts on stress dose steroids)  - Nutrition  - Last BM        HEME  Warfarin-induced coagulopathy  Supratherapeutic INR: Likely from liver failure.   - warfarin mgmt per Pharmacy; dose held 8/26  - DVT ppx w/ SCDs, warfarin    Persistent uptrending leukocytosis: 11.4 on admission.   - abx as below  Acute blood loss anemia: Hgb 6.1 on 8/20. S/p 1  unit pRBC  - q4h Hgb checks  - transfuse if Hgb < 7 and symptomatic       ID  Hospital-acquired PNA  - on meropenem and daptomycin  - COVID negative x2    Fever: New fever 100.6 8/27. Concerning for infection given rising WBC. On abx for PNA.    SIRS, possible sepsis  ID consulted. Unclear source of infection. BCx from admission negative. Appreciate cares and recs.  - follow BCx  - Abx:    fluconazole 8/21 -->    daptomycin 8/26 -->   Meropenem x 1 dose 8/27  Cipro 8/18 - 8/21  Flagyl 8/12 - 8/21  Vancomycin 8/22  - 8/23    ENDO/SKIN  T2DM: Was on SSI but BG consistently low. Holding SSI for now.   - FSBG Q6H.  Hypoglycemia protocol prn      Lines:   PIV  RUE PICC line, placed 8/23  L radial arterial line, placed 8/26      ICU DAILY CHECKLIST                         Can patient transfer out of MICU? no    FAST HUG  Feeding: No. Diet: NPO    Bardales:Yes  Analgesia/Sedation: Not Indicated  Thromboembolic ppx: yes, with SCDs and Coumadin  HOB>30: Yes  Stress Ulcer ppx: PPI  Glycemic Control: Not indicated   Any glucose > 180? no     INTUBATED  Can patient have daily waking? not applicable  SBT: not applicable  Restraints? no    PHYSICAL THERAPY AND MOBILITY  OK for PT and mobility trial? no  Activity: Bed Rest          Patient was staffed with Dr. Robles.        Karen Hilliard MD 8/27/2020, 2:11 PM    PGY1  Pager # [phone]      Critical Care Time > 45 Minutes  Total time spent with patient > 15 Minutes    Subjective:   Gardenia Muller is a 70 y.o. female with AGib on warfarin, digoxin, and metoprolol admitted 8/14 with ruptured diverticular abscess found to have digoxin toxicity with bradycardia and hyperkalemia requiring management in the ICU.    Events in last 24 hours:  Lethargic, asking for water. Voice very soft.   Otherwise, 10-point ROS is negative or not able to be obtained from pt due to lethargic state.    Principal Problem:    Diverticulitis  Active Problems:    Bradyarrhythmia    Acute kidney injury  "(H)    Diverticulitis of large intestine with perforation and abscess without bleeding    Atrial fibrillation with rapid ventricular response (H)    Obstructive sleep apnea syndrome    Poisoning by digoxin, accidental or unintentional, initial encounter    Acute respiratory failure with hypoxia and hypercapnia (H)    Acute metabolic encephalopathy    Shock circulatory (H)    Metabolic acidosis      Allergies   Penicillins     MEDS  Scheduled:   ? aspirin  81 mg Oral DAILY   ? daptomycin (CUBICIN) IV  6 mg/kg Intravenous Q48H   ? fluconazole (DIFLUCAN) IV  200 mg Intravenous Q24H   ? [Held by provider] furosemide  40 mg Intravenous DAILY   ? [Held by provider] lisinopriL  5 mg Oral DAILY   ? magnesium chloride  128 mg Oral BID   ? magnesium gluconate  27 mg Oral BID   ? meropenem  1 g Intravenous Q12H   ? [Held by provider] metoprolol succinate  12.5 mg Oral DAILY   ? omeprazole  20 mg Oral BID AC   ? [Held by provider] sertraline  100 mg Oral DAILY   ? sodium chloride bacteriostatic  1-5 mL Intradermal Once   ? sodium chloride  10 mL Intravenous Q8H FIXED TIMES   ? sodium chloride  10-30 mL Intravenous Q8H FIXED TIMES   ? [Held by provider] spironolactone  25 mg Oral DAILY   ? ticagrelor  90 mg Oral BID   ? warfarin - daily dose required   Other Med Consult or Protocol     Continuous Infusions:   ? DOPamine 6 mcg/kg/min (08/27/20 0746)   ? sodium bicarbonate IV infusion in D5W 50 mL/hr at 08/27/20 0746     PRN: albuterol, alteplase, atropine, bacitracin, carboxymethylcellulose, codeine-guaiFENesin, dextrose 50 % (D50W), glucagon (human recombinant), hydrOXYzine pamoate, lidocaine (PF), metoprolol tartrate, naloxone **OR** naloxone, oxyCODONE, sodium chloride 0.9%, sodium chloride bacteriostatic, sodium chloride, sodium chloride, sodium chloride, traZODone    Objective:     VITALS    /58   Pulse 80   Temp 98.5  F (36.9  C) (Axillary)   Resp (!) 30   Ht 5' 2\" (1.575 m)   Wt 210 lb 9.6 oz (95.5 kg)   LMP " 01/25/1999   SpO2 98%   BMI 38.52 kg/m      EXAM    GEN: Frail elderly woman. Ill-appearing.   HEENT: No scleral icterus.   NECK: Supple. No cervical JAN.   PULM: On NC. No use of accessory muscles.  Clear to ausculation bilaterally.   CVS: Irregular rhythm, intermittently bradycardic. No murmurs.    ABDOMEN: Normoactive bowel sounds.  Non-tender to palpation.  Non-distended.  Drain in place draining minimal green fluid.   EXTREMITES:  2+ pitting edema bilaterally on lower extremities. SCDs in place.    NEURO:  Drowsy but arousable.     I&O    Intake/Output Summary (Last 24 hours) at 8/27/2020 0825  Last data filed at 8/27/2020 0600  Gross per 24 hour   Intake 866 ml   Output 0 ml   Net 866 ml       LABS  .  Results for SUSAN MAYFIELD (MRN [id number]) as of 8/27/2020 13:49   8/27/2020 04:05   Sodium 140   Potassium 4.6   Chloride 108 (H)   CO2 17 (L)   Anion Gap, Calculation 15   BUN 39 (H)   Creatinine 2.86 (H)   GFR MDRD Af Amer 20 (L)   GFR MDRD Non Af Amer 16 (L)   Calcium 9.0   AST 2,613 (H)    (H)   ALBUMIN 1.9 (L)   Protein, Total 6.6   Alkaline Phosphatase 147 (H)   Bilirubin, Total 2.0 (H)   Bilirubin, Direct 1.5 (H)   CK, Total 608 (HH)    (H)     Results for SUSAN MAYFIELD (MRN [id number]) as of 8/27/2020 13:49   8/27/2020 00:08 8/27/2020 04:05 8/27/2020 12:14   Lactic Acid 4.3 (HH) 3.6 (H) 2.4 (H)     Results for SUSAN MAYFIELD (MRN [id number]) as of 8/27/2020 13:49   8/27/2020 04:05   CK, Total 608 (HH)     IMAGING  EXAM: XR CHEST 1 VIEW PORTABLE  DATE/TIME: 8/27/2020 5:53 AM  IMPRESSION:   Slight interval improvement in reticular interstitial infiltrates in alveolar infiltrates since prior study. There still remains extensive infiltrates throughout both lungs. Stable cardiac enlargement. Mild pulmonary vascular congestion   improved. Atherosclerotic vascular calcification of the aorta. Right PICC with tip in good position low SVC. Degenerative changes in the spine and  shoulders    Outside reports reviewed: No new outside reports today    Additional comments:  I reviewed the patient's new clinical lab test results.         PCP: Jerson Hernandez MD

## 2021-06-29 NOTE — PROGRESS NOTES
Progress Notes by Ivelisse Hernandez MD at 8/29/2020 10:28 AM     Author: Ivelisse Hernandez MD Service: Cardiology Author Type: Physician    Filed: 8/29/2020  1:12 PM Date of Service: 8/29/2020 10:28 AM Status: Signed    : Ivelisse Hernandez MD (Physician)         HEART CARE NOTE          Assessment/Recommendations     1. Junctional bradycardia 2/2 digoxin toxicity  Assessment / Plan    Currently in atrial fibrillation    Dopamine weaned and now maintained on norepi for septic shock - ID following     2. HFrEF/Biventricular dysfunction c/b ADHF  Assessment / Plan    Diuresis per nephrology recommendations; making some urine and crt appears to have reached plateau    GDMT on hold 2/2 hemodynamic instability     3. Chronic afib/flutter  Assessment / Plan    C/b CVA    Course complicated by digoxin toxicity and junctional bradycardia.     4. Valvular heart disease   Assessment / Plan    Moderate MR (c/b severely decreased posterior leaflet mobility)    Severe TR -- will need valve clinic evaluation once compensated and discharged     5. DM2  Assessment / Plan    Management per primary team     6. CAD  Assessment / Plan    Known  to RCA although mild RV dysfunction out of proportion to degree of pressure overload    Continue ASA unless otherwise contraindicated     Bblocker and ACE-I on hold given details above     7. Renal dysfunction  Assessment / Plan    Nephrology following for progressive MAY on CKD; patient currently oliguric; crt appears to be approaching plateau; started on diuresis    Cardiology Team will sign-off for now. Please do not hesitate to consult us again if new questions or concerns arise.    History of Present Illness/Subjective      Ms. Gardenia Muller is a 70 y.o. female with a PMHx significant for 71 y/o female with PMH of CAD, CHF, Afib, chronic abdominal pain with intestinal angina, Type 2 DM, depression. Patient initialy admitted for acute diverticulitus  with perforation and developed abcess and required drain placement on 8/17. Patient was found to have digoxin toxicity on 8/26 and transferred to the ICU for hemodynamic instability and bradycardia.        ECG: Personally reviewed. normal sinus rhythm, occasional PVC noted, unifocal, 1st degree AV block.     ECHO (personnaly Reviewed):     When compared to the previous study dated 7/23/2020, no significant change. Limited study done and tricuspid regurgitation not well assessed on this study.    Left ventricle ejection fraction is normal. The calculated left ventricular ejection fraction is 56%.    Normal left ventricular size and systolic function.    Left atrial volume is moderately increased.    Aortic Valve: The aortic valve is sclerotic without reduced excursion. Mild aortic regurgitation.    Mitral Valve: There is severe valve leaflet thickening. Mild mitral regurgitation.             Physical Examination Review of Systems   Vitals:    08/29/20 1000   BP:    Pulse: 85   Resp: 20   Temp:    SpO2: 99%     Body mass index is 39.23 kg/m .  Wt Readings from Last 3 Encounters:   08/29/20 214 lb 8 oz (97.3 kg)   08/11/20 179 lb 4.8 oz (81.3 kg)   08/12/20 179 lb (81.2 kg)     General Appearance:   Intubated/sedated   ENT/Mouth: membranes moist, no oral lesions or bleeding gums.      EYES:  no scleral icterus, normal conjunctivae   Neck: no carotid bruits or thyromegaly   Chest/Lungs:   lungs are clear to auscultation, no rales or wheezing, equal chest wall expansion    Cardiovascular:   Irregularly irregular. Normal first and second heart sounds with no rubs, or gallops; the carotid, radial and posterior tibial pulses are intact, no edema bilaterally    Abdomen:  no organomegaly, masses, bruits, or tenderness; bowel sounds are present   Extremities: no cyanosis or clubbing   Skin: no xanthelasma, warm.    Neurologic: Intubated/sedated     Psychiatric: Intubated/sedated    A complete 10 systems ROS was reviewed  And  is negative except what is listed in the HPI.          Medical History  Surgical History Family History Social History   Past Medical History:   Diagnosis Date   ? Acute diastolic heart failure (H) 11/2015   ? Acute on chronic diastolic heart failure (H) 6/15/2019   ? Advanced directives, counseling/discussion 8/5/2015    Patient completed 2011.  Discussed today, 5/24/17, and wants to change healthcare agent from niece to daughter.  Discussed that she will need to complete new form to indicate this.   ? MAY (acute kidney injury) (H) 10/22/2018   ? Atrial fibrillation (H)    ? Benign adenomatous polyp of large intestine 3/30/2017    Colonoscopy March 2017.  Recommend 5 year follow-up.  Single tubular adenoma   ? Biliary obstruction 10/3/2018    Added automatically from request for surgery [id number]   ? Cerebral vascular accident (H)    ? Coronary artery disease 2006    100% RCA with collateral filling per Dr. Elizabeth's angio report   ? Diabetes mellitus type 2 in obese (H)    ? Fibrocystic breast    ? GERD (gastroesophageal reflux disease)    ? History of anesthesia complications     slow to wake   ? Hypertension    ? Obstructive sleep apnea syndrome    ? Peripheral vascular disease (H)    ? Pneumonia 11/11/2018   ? PONV (postoperative nausea and vomiting)    ? S/P cholecystectomy 6/22/2018   ? SBO (small bowel obstruction) (H) 11/12/2015   ? Stroke (H)    ? Transaminitis 10/22/2018   ? Upper respiratory infection 12/20/2018   ? Urinary incontinence     Past Surgical History:   Procedure Laterality Date   ? CARDIAC CATHETERIZATION     ? CARDIOVERSION  10/18/2018   ? CORONARY STENT PLACEMENT  1995    stent   ? CV CORONARY ANGIOGRAM N/A 7/27/2020    Procedure: Coronary Angiogram;  Surgeon: Luis Monge MD;  Location: [facility] Cath Lab;  Service: Cardiology   ? CV RIGHT HEART CATH SHUNT RUN  7/27/2020    Procedure: Right Heart Catheterization Shunt Run;  Surgeon: Luis Monge MD;  Location: [facility] Cath  Lab;  Service: Cardiology   ? ERCP N/A 10/5/2018    Procedure: ENDOSCOPIC RETROGRADE CHOLANGIOPANCREATOGRAPHY, SPHINCTEROTOMY;  Surgeon: Anson Stokes MD;  Location: [facility] Main OR;  Service:    ? EXPLORATORY LAPAROTOMY N/A 6/29/2018    Procedure: LAPAROTOMY, CLOSURE OF PERITONEAL RENT;  Surgeon: Jones Harding DO;  Location: [facility] Main OR;  Service:    ? LAPAROSCOPIC CHOLECYSTECTOMY N/A 10/7/2018    Procedure: CHOLECYSTECTOMY, LAPAROSCOPIC;  Surgeon: Felicia So MD;  Location: [facility] Main OR;  Service:    ? MIDLINE INSERTION - DOUBLE LUMEN  8/13/2020        ? MIDLINE INSERTION - DOUBLE LUMEN  8/19/2020        ? PICC  6/29/2018        ? PICC  10/21/2018        ? PICC INSERTION - DOUBLE LUMEN  8/23/2020        ? VENTRAL HERNIA REPAIR N/A 11/12/2015    Procedure: STRANGULATED VENTRAL HERNIA REPAIR ;  Surgeon: Ernesto Bialey MD;  Location: [facility] OR;  Service:     no family history of premature coronary artery disease Social History     Socioeconomic History   ? Marital status: Legally      Spouse name: Not on file   ? Number of children: 1   ? Years of education: Not on file   ? Highest education level: Not on file   Occupational History   ? Not on file   Social Needs   ? Financial resource strain: Not on file   ? Food insecurity     Worry: Not on file     Inability: Not on file   ? Transportation needs     Medical: Not on file     Non-medical: Not on file   Tobacco Use   ? Smoking status: Former Smoker     Packs/day: 0.25   ? Smokeless tobacco: Former User   ? Tobacco comment: e-cig a few times/day   Substance and Sexual Activity   ? Alcohol use: No   ? Drug use: No   ? Sexual activity: Not on file   Lifestyle   ? Physical activity     Days per week: Not on file     Minutes per session: Not on file   ? Stress: Not on file   Relationships   ? Social connections     Talks on phone: Not on file     Gets together: Not on file     Attends [employer]ism service: Not on file     Active  member of club or organization: Not on file     Attends meetings of clubs or organizations: Not on file     Relationship status: Not on file   ? Intimate partner violence     Fear of current or ex partner: Not on file     Emotionally abused: Not on file     Physically abused: Not on file     Forced sexual activity: Not on file   Other Topics Concern   ? Not on file   Social History Narrative    Single/, lives alone; daughter in [place];    Gets help from neighbors and extended family    Former smoker.no alcohol problems           Lab Results    Chemistry/lipid CBC Cardiac Enzymes/BNP/TSH/INR   Lab Results   Component Value Date    CHOL 86 03/26/2019    HDL 35 (L) 03/26/2019    LDLCALC 32 03/26/2019    TRIG 93 03/26/2019    CREATININE 3.08 (H) 08/29/2020    BUN 52 (H) 08/29/2020    K 3.6 08/29/2020    K 3.6 08/29/2020     08/29/2020     08/29/2020    CO2 22 08/29/2020    Lab Results   Component Value Date    WBC 14.1 (H) 08/28/2020    HGB 9.0 (L) 08/28/2020    HCT 27.1 (L) 08/28/2020    MCV 91 08/28/2020     (L) 08/28/2020    Lab Results   Component Value Date    CKTOTAL 238 (H) 08/29/2020    TROPONINI 0.40 (HH) 08/27/2020     (H) 08/27/2020    TSH 8.31 (H) 10/22/2018    INR 4.81 (H) 08/29/2020     Lab Results   Component Value Date    CKTOTAL 238 (H) 08/29/2020    TROPONINI 0.40 (HH) 08/27/2020          Weight:    Wt Readings from Last 3 Encounters:   08/29/20 214 lb 8 oz (97.3 kg)   08/11/20 179 lb 4.8 oz (81.3 kg)   08/12/20 179 lb (81.2 kg)       Allergies  Allergies   Allergen Reactions   ? Penicillins Swelling         Surgical History  Past Surgical History:   Procedure Laterality Date   ? CARDIAC CATHETERIZATION     ? CARDIOVERSION  10/18/2018   ? CORONARY STENT PLACEMENT  1995    stent   ? CV CORONARY ANGIOGRAM N/A 7/27/2020    Procedure: Coronary Angiogram;  Surgeon: Luis Monge MD;  Location: [facility] Cath Lab;  Service: Cardiology   ? CV RIGHT HEART CATH SHUNT  RUN  7/27/2020    Procedure: Right Heart Catheterization Shunt Run;  Surgeon: Luis Monge MD;  Location: [facility] Cath Lab;  Service: Cardiology   ? ERCP N/A 10/5/2018    Procedure: ENDOSCOPIC RETROGRADE CHOLANGIOPANCREATOGRAPHY, SPHINCTEROTOMY;  Surgeon: Anson Stokes MD;  Location: [facility] Main OR;  Service:    ? EXPLORATORY LAPAROTOMY N/A 6/29/2018    Procedure: LAPAROTOMY, CLOSURE OF PERITONEAL RENT;  Surgeon: Jones Harding DO;  Location: [facility] Main OR;  Service:    ? LAPAROSCOPIC CHOLECYSTECTOMY N/A 10/7/2018    Procedure: CHOLECYSTECTOMY, LAPAROSCOPIC;  Surgeon: Felicia So MD;  Location: [facility] Main OR;  Service:    ? MIDLINE INSERTION - DOUBLE LUMEN  8/13/2020        ? MIDLINE INSERTION - DOUBLE LUMEN  8/19/2020        ? PICC  6/29/2018        ? PICC  10/21/2018        ? PICC INSERTION - DOUBLE LUMEN  8/23/2020        ? VENTRAL HERNIA REPAIR N/A 11/12/2015    Procedure: STRANGULATED VENTRAL HERNIA REPAIR ;  Surgeon: Ernesto Bailey MD;  Location: [facility] Main OR;  Service:        Social History  Tobacco:   Social History     Tobacco Use   Smoking Status Former Smoker   ? Packs/day: 0.25   Smokeless Tobacco Former User   Tobacco Comment    e-cig a few times/day    Alcohol:   Social History     Substance and Sexual Activity   Alcohol Use No    Illicit Drugs:   Social History     Substance and Sexual Activity   Drug Use No       Family History  Family History   Problem Relation Age of Onset   ? Cancer Paternal Grandmother    ? Cancer Paternal Uncle    ? No Medical Problems Mother    ? No Medical Problems Father    ? Heart attack Sister    ? Chronic Kidney Disease Sister    ? No Medical Problems Daughter    ? No Medical Problems Maternal Grandmother    ? No Medical Problems Maternal Grandfather    ? No Medical Problems Paternal Grandfather    ? No Medical Problems Maternal Aunt    ? No Medical Problems Paternal Aunt    ? Diabetes Brother    ? BRCA 1/2 Neg Hx    ? Breast  cancer Neg Hx    ? Colon cancer Neg Hx    ? Endometrial cancer Neg Hx    ? Ovarian cancer Neg Hx           Carlos Hernandez on 8/29/2020      cc: Jerson Hernandez MD,

## 2021-06-29 NOTE — PROGRESS NOTES
Progress Notes by Hiren Salomon MD at 8/14/2020  1:15 PM     Author: Hiren Salomon MD Service: Surgery Author Type: Physician    Filed: 8/14/2020  1:17 PM Date of Service: 8/14/2020  1:15 PM Status: Signed    : Hiren Salomon MD (Physician)       I have examined this patient and the medical care has been evaluated and discussed with the note from Alvin Barnhart. The documentation has been reviewed. I agree with the medical care provided and confirm the findings.    CT reviewed. Slightly larger fluid collection.  Clinically the same. Tolerating po    Discussed with radiology  Will order CT guided drain may not be able to place but would help        Hiren Salomon MD    [email or web address]  General Surgery [phone]  Vascular Surgery [phone]

## 2021-06-29 NOTE — PROGRESS NOTES
"Progress Notes by Rosetta Caicedo PA-C at 8/20/2020 10:30 AM     Author: Rosetta Caicedo PA-C Service: Interventional Radiology Author Type: Physician Assistant    Filed: 8/20/2020  3:54 PM Date of Service: 8/20/2020 10:30 AM Status: Signed    : Rosetta Caicedo PA-C (Physician Assistant)         Interventional Radiology - CHART CHECK Note  8/20/2020    S:  CHART CHECK - worsening leukocytosis. Hypotensive overnight 48/31. Per RN - pain continues to be the same. Afebrile.    Culture:    Culture  1+ YeastAbnormal                 Gram Stain Result   4+ Polymorphonuclear leukocytes       2+ Gram positive cocci in chains              O:  BP 91/43 (Patient Position: Semi-nicole)   Pulse 78   Temp 98.3  F (36.8  C) (Oral)   Resp 18   Ht 5' 2\" (1.575 m)   Wt 197 lb 9.6 oz (89.6 kg)   LMP 01/25/1999   SpO2 95%   BMI 36.14 kg/m        IMAGING:  CT drain placement 8/17/20:  IMPRESSION:   1. Stable diverticular abscess.  2. Successful CT-guided drain placement into left pelvic diverticular abscess.    LABS:  Lab Results   Component Value Date    WBC 17.1 (H) 08/20/2020    HGB 6.6 (LL) 08/20/2020    HCT 20.9 (L) 08/20/2020    MCV 91 08/20/2020     (L) 08/20/2020     Drain Outputs (in mL):  8/17 20   8/18 35   8/19 50   8/20 15           A:  69 yo female with perforated diverticulitis with abscess s/p CT guided 10F left pelvic drain placement 8/17/20.    P:    - agree with CT abd/pelvis for further evaluation secondary to increased leukocytosis and continued abd pain.   - IR will continue to follow loosely. Please contact IR with drain related questions or concerns.  - At present time until CT completed continue with present drain cares  - Drain discharge instructions entered into D/C navigator.  - If CT does not demonstrate need for further acute intervention, would recommend follow up CT and abscessogram approximately 7-10 days from drain placement date. Perhaps sooner if " there are further changes in drain function or in patient's clinical course.       Rosetta Caicedo  Interventional Radiology  [phone]

## 2021-06-29 NOTE — PROGRESS NOTES
"Progress Notes by Victor Manuel Castle MD at 8/16/2020  2:36 PM     Author: Victor Manuel Castle MD Service: Nephrology Author Type: Physician    Filed: 8/16/2020  2:38 PM Date of Service: 8/16/2020  2:36 PM Status: Signed    : Victor Manuel Castle MD (Physician)              RENAL ([facility]) progress note  CC: F/U MAY  S: Patient tells me that she is doing well, denies any chest pain, no shortness of breath, no nausea, no vomiting, no headache, no dizziness, no near fainting sensation.  She offers no new complaints.  No uremic symptoms are present    She has a Bardales catheter in place that shows about 475 mL of urine    Assessment and plan:    1.  Acute kidney injury -severe acute tubular necrosis.  Cr upon admission was at her baseline of 0.95 (8/11)-->2.49 (8/12)-->3.61 (8/13)-->4.49 (8/14)-->4.52 (8/15)-->4.10 (8/16)    No uremic symptoms at present.     Urine output is beginning to improve    No emergent dialysis indication    Serum creatinine beginning to improve     Continue supportive care.       2. Diverticulitis with abscess: Presently on IV Cipro and flagyl. WBCs trending down and pain improving. Drain placement once INR down - likely Monday.      3. Anemia: No acute bleed presently.      4. HFrEF: s/p angiogram and stent plcmt 7/27/20.               - Halt IVF and likely to need urgent dialysis if hypoxic or sob     5. DMII: Per IM     6. HTN: BP acceptable presently. Off all antihypertensives presently. Avoid hypotension, as able, in MAY.     7. NAGMA - due to MAY and exacerbated by NS.  Continue bicarbonate drip.    Discussed in detail with Dr. Ellison.    Victor Manuel Castle MD  [facility]  [phone] (off)           /60 (Patient Position: Sitting)   Pulse 94   Temp 99  F (37.2  C) (Oral)   Resp 16   Ht 5' 2\" (1.575 m)   Wt 190 lb 1.6 oz (86.2 kg)   LMP 01/25/1999   SpO2 97%   BMI 34.77 kg/m      I/O last 3 completed shifts:  In: 940 [P.O.:340; I.V.:600]  Out: 475 " [Urine:475]    Physical Exam:   GENERAL: calm and comfortable, alert  EYES: pupils equal, sclerae not icteric.  ENT: Hearing normal, oral mucosa moist.  RESP: Clear to auscultation bilaterally with no respiratory distress, normal effort.  CV: RRR, no murmurs. 1+ leg edema.    GI: Active BS, Soft, NT/ND, Mild LLQ tenderness  : No CVA tenderness   SKIN: No rash, warm/ dry  NEURO: Moves all extremities, no tremor. Sensation grossly intact to LT  PSYCH: Appropriate mood and affect    Results from last 7 days   Lab Units 08/16/20  0602 08/15/20  0634 08/14/20  0436  08/11/20  1026   LN-SODIUM mmol/L 136 135* 135*   < > 135*   LN-POTASSIUM mmol/L 3.6 4.1 4.4   < > 4.6   LN-CHLORIDE mmol/L 104 110* 110*   < > 103   LN-CO2 mmol/L 22 16* 15*   < > 22   LN-BLOOD UREA NITROGEN mg/dL 34* 33* 31*   < > 22   LN-CREATININE mg/dL 4.10* 4.52* 4.49*   < > 0.95   LN-CALCIUM mg/dL 7.1* 7.1* 7.4*   < > 10.3   LN-ALBUMIN g/dL  --   --   --   --  3.0*   LN-PROTEIN TOTAL g/dL  --   --   --   --  8.2*   LN-BILIRUBIN TOTAL mg/dL  --   --   --   --  1.3*   LN-ALKALINE PHOSPHATASE U/L  --   --   --   --  135*   LN-ALT (SGPT) U/L  --   --   --   --  33   LN-AST (SGOT) U/L  --   --   --   --  37    < > = values in this interval not displayed.     Results from last 7 days   Lab Units 08/16/20  0602 08/15/20  0634 08/14/20  0800   LN-WHITE BLOOD CELL COUNT thou/uL 13.8* 13.1* 13.4*   LN-HEMOGLOBIN g/dL 8.4* 8.7* 9.1*   LN-HEMATOCRIT % 27.0* 28.5* 30.4*   LN-PLATELET COUNT thou/uL 124* 131* 136*         Scheduled Meds:  ? aspirin  81 mg Oral DAILY   ? atorvastatin  80 mg Oral QHS   ? ciprofloxacin  400 mg Intravenous Q24H   ? famotidine (PEPCID) injection  20 mg Intravenous Q24H   ? insulin aspart (NovoLOG) injection   Subcutaneous TID with meals   ? metroNIDAZOLE  500 mg Intravenous Q12H   ? polyethylene glycol  17 g Oral DAILY   ? sertraline  100 mg Oral DAILY   ? sodium chloride bacteriostatic  1-5 mL Intradermal Once   ? sodium chloride  10  mL Intravenous Q8H FIXED TIMES     Continuous Infusions:    PRN Meds:.dextrose 50 % (D50W), glucagon (human recombinant), HYDROmorphone, hydrOXYzine pamoate, metoprolol tartrate, naloxone **OR** naloxone, ondansetron **OR** ondansetron, sodium chloride 0.9%, sodium chloride, sodium chloride, sodium chloride, traZODone      Labs personally reviewed today during this evaluation at 3:28 PM

## 2021-06-29 NOTE — PROGRESS NOTES
Progress Notes by Mando Skinner MD at 8/14/2020  3:28 PM     Author: Mando Skinner MD Service: Nephrology Author Type: Physician    Filed: 8/14/2020  3:50 PM Date of Service: 8/14/2020  3:28 PM Status: Signed    : Mando Skinner MD (Physician)              [facility] ([facility]) progress note  CC: F/U MAY  S: Since last visit,  Patient frustrated with her illness. Denies dyspnea, orthopnea. No pain at present. Denies nausea/vomiting. Discussed progressive MAY and possible need for dialysis this weekend if worsening - she would be agreeable.     A/P:   1. MAY: Cr upon admission was at her baseline of 0.95 (8/11), 8/12 cr was 2.49 and 3.61 today. Nausea and emesis several days prior to admission and with hyaline casts on UA 8/11 suggestive of volume depletion. Was administered necessary CT dye in this setting. Also with hypotension/sepsis 8/12. No other nephrotoxic medications noted while IP, was likely getting home meds of lisinopril, lasix and spironolactone PTA. Present MAY likely ATN from contrast dye while volume depletion as well as hemodynamic insult with hypotension/sepsis.               - Agree with holding lisinopril, lasix, spironolactone              - Change IVF to bicarb gtt at 75 cc/hr given worsening acidosis.                - Daily BMP              - Strict I&Os, daily weights, continue dias.               -  May need dialysis over weekend if acidosis worsens/ fluid overload or hyperkalemia. Looks comfortable at present but anuric. Trend UO/ labs and symptoms closely. INR currently 4.97 so defer on line placement.     2. Diverticulitis with abscess: Presently on IV Cipro and flagyl. WBCs trending down and pain improving. Drain placement once INR down - likely Monday.      3. Anemia: No acute bleed presently.      4. HFrEF: s/p angiogram and stent plcmt 7/27/20.               - Halt IVF and likely to need urgent dialysis if hypoxic or sob     5. DMII: Per IM     6. HTN: BP acceptable  "presently. Off all antihypertensives presently. Avoid hypotension, as able, in MAY.     7. NAGMA - due to MAY and exacerbated by NS. Change to bicarb gtt at 75 cc/hr.     Mando Skinner MD  [facility]  [phone] (off)       No interval changes to past medical history, social history or family history to report.    /52 (Patient Position: Semi-nicloe)   Pulse 94   Temp 98.9  F (37.2  C) (Oral)   Resp 16   Ht 5' 2\" (1.575 m)   Wt 190 lb 1.6 oz (86.2 kg)   LMP 01/25/1999   SpO2 95%   BMI 34.77 kg/m      I/O last 3 completed shifts:  In: 2154.6 [P.O.:340; I.V.:1814.6]  Out: 80 [Urine:80]    Physical Exam:   GENERAL: calm and comfortable, alert  EYES: pupils equal, sclerae not icteric.  ENT: Hearing normal, oral mucosa moist.  RESP: Clear to auscultation bilaterally with no respiratory distress, normal effort.  CV: RRR, no murmurs. 1+ leg edema.    GI: Active BS, Soft, NT/ND, Mild LLQ tenderness  : No CVA tenderness   SKIN: No rash, warm/ dry  NEURO: Moves all extremities, no tremor. Sensation grossly intact to LT  PSYCH: Appropriate mood and affect    Results from last 7 days   Lab Units 08/14/20  0436 08/13/20  0941 08/12/20  1321 08/11/20  1026   LN-SODIUM mmol/L 135*  --  135* 135*   LN-POTASSIUM mmol/L 4.4  --  4.5 4.6   LN-CHLORIDE mmol/L 110*  --  107 103   LN-CO2 mmol/L 15*  --  17* 22   LN-BLOOD UREA NITROGEN mg/dL 31*  --  27 22   LN-CREATININE mg/dL 4.49* 3.61* 2.49* 0.95   LN-CALCIUM mg/dL 7.4*  --  8.8 10.3   LN-ALBUMIN g/dL  --   --   --  3.0*   LN-PROTEIN TOTAL g/dL  --   --   --  8.2*   LN-BILIRUBIN TOTAL mg/dL  --   --   --  1.3*   LN-ALKALINE PHOSPHATASE U/L  --   --   --  135*   LN-ALT (SGPT) U/L  --   --   --  33   LN-AST (SGOT) U/L  --   --   --  37     Results from last 7 days   Lab Units 08/14/20  0800 08/13/20  0724 08/12/20  1321   LN-WHITE BLOOD CELL COUNT thou/uL 13.4* 12.8* 13.0*   LN-HEMOGLOBIN g/dL 9.1* 9.8* 11.1*   LN-HEMATOCRIT % 30.4* 33.3* 37.3 "   LN-PLATELET COUNT thou/uL 136* 135* 159         Scheduled Meds:  ? aspirin  81 mg Oral DAILY   ? atorvastatin  80 mg Oral QHS   ? ciprofloxacin  400 mg Intravenous Q24H   ? famotidine (PEPCID) injection  20 mg Intravenous Q24H   ? metroNIDAZOLE  500 mg Intravenous Q12H   ? phytonadione (VITAMIN K1) oral  5 mg Oral Once   ? polyethylene glycol  17 g Oral DAILY   ? sertraline  100 mg Oral DAILY   ? sodium chloride bacteriostatic  1-5 mL Intradermal Once   ? sodium chloride  10 mL Intravenous Q8H FIXED TIMES   ? ticagrelor  90 mg Oral BID     Continuous Infusions:  ? sodium chloride 0.9% 75 mL/hr (08/14/20 0853)     PRN Meds:.HYDROmorphone, metoprolol tartrate, naloxone **OR** naloxone, ondansetron **OR** ondansetron, sodium chloride 0.9%, sodium chloride, sodium chloride, sodium chloride      Labs personally reviewed today during this evaluation at 3:28 PM

## 2021-06-29 NOTE — PROGRESS NOTES
"Progress Notes by Tori Capellan Chi, PA-C at 2020  9:43 AM     Author: Tori Caepllan Chi, PA-C Service: Interventional Radiology Author Type: Physician Assistant    Filed: 9/3/2020  8:40 AM Date of Service: 2020  9:43 AM Status: Addendum    : Tori Capellan Chi, PA-C (Physician Assistant)    Related Notes: Original Note by Tori Capellan Chi, PA-C (Physician Assistant) filed at 2020  3:37 PM         Interventional Radiology - Progress Note  Inpatient - [facility]: Interventional Radiology   [phone]  2020    S:  Pt is intubated, sedated.     Culture:  2+ Candida dubliniensis and candida albicans  Antibiotic: IV meropenem and micafungin  Flushing: DO NOT flush drain    O:  BP 98/47   Pulse 72   Temp 98.1  F (36.7  C)   Resp 16   Ht 5' 2\" (1.575 m)   Wt 200 lb 14.4 oz (91.1 kg)   LMP 1999   SpO2 100%   BMI 36.75 kg/m    General:  intubated  Abdomen:  Soft, non-distended,  Right abdominal drain in place with suture seen, dressing CDI, and small amount of sanguinous fluid in tubing only    IMAGIN2020:  IR abscessogram:  FINDINGS:    1. Initial abscessogram demonstrates an indwelling 10 [place] APDL drain within a contracted left pelvic abscess cavity. Almost immediate opacification of the adjacent distal sigmoid colon. Short, widely apparent fistulous tract to the sigmoid colon.     2. Completion fluoroscopic image demonstrates a new 10 [place] APDL drain, placed approximately 2 cm lateral to the site of the fistula.     IMPRESSION:   1.  Contracted abscess cavity. 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 Drain exchanged for a tailored 10 [place] drain placed lateral to the fistula site.     PLAN: Gravity drainage. Abscessogram in one week.  2020:  IR abscessogram:  PROCEDURE:   Contrast injection through the existing drain demonstrates no residual abscess pocket. 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     IMPRESSION:   Abscessogram reveals no residual " abscess pocket. Fistula to the colon. Switched drain to gravity drainage bag from SHAMIKA suction bulb. No flushes are needed. Follow-up abscessogram in one week.     8/20/20: CT Abd/pelvis  IMPRESSION:   1.  Interval percutaneous drain placement into the diverticular abscess with near complete collapse of the fluid cavity. Drain abuts the adjacent sigmoid colon. No evidence of post drain placement complication.  2.  No new intra-abdominal abscess or other findings to correlate with ongoing leukocytosis.  3.  Stable appearance of the pancreas with previously seen main pancreatic ductal dilatation not well visualized on this study.    LABS:  Lab Results   Component Value Date    WBC 13.1 (H) 09/02/2020    HGB 9.3 (L) 09/02/2020    HCT 29.7 (L) 09/02/2020    MCV 95 09/02/2020    PLT 81 (L) 09/02/2020     Drain Outputs (in mL):        A:  71 yo female with perforated diverticulitis with abscess s/p CT guided 10F left pelvic drain placement 8/17/20. CT 8/20/20 demonstrating near resolution of fluid collection however abscessogram on 8/25 with small fistula to adjacent colon.  Output has been 0 for the past seek and abscessogram on 9/1/2020  with small fistula to adjacent colon.     Seen by surgery on 9/1/2020, no surgical intervention planned.      P:    -Do not flush drain  -record output from drain.  -recommend Abscessogram in 1 week if output remains 0 ml for at least 48 hours.  Can consider doing it earlier if changes in pt's clinical status or output.     15 minutes were spent with patient during today's visit with greater than 50% of the time spent face to face with the patient, in reviewing medical record and images and in counseling and coordinating patient's care.    [facility]  Interventional Radiology  [phone]    E/M codes for reference only:  81782

## 2021-06-29 NOTE — PROGRESS NOTES
Progress Notes by Jose Raul Crocker DO at 8/28/2020  8:20 AM     Author: Jose Raul Crocker DO Service: Nephrology Author Type: Physician    Filed: 8/28/2020 11:47 AM Date of Service: 8/28/2020  8:20 AM Status: Signed    : Jose Ralu Crocker DO (Physician)              RENAL ([facility]) progress note  CC: F/U MAY  S: Since last visit, patient remain on dopamine infusion. ON 3L NC. Creatinine trending up. Oliguric. Patient reports breathing stable. Patient is difficult to understand at times. Discussed need for diuresis and if worsening renal function would need to consider HD. Patient consents to HD.     A/P:   Principal Problem:    Diverticulitis  Active Problems:    Bradyarrhythmia    Acute kidney injury (H)    Diverticulitis of large intestine with perforation and abscess without bleeding    Atrial fibrillation with rapid ventricular response (H)    Obstructive sleep apnea syndrome    Poisoning by digoxin, accidental or unintentional, initial encounter    Acute respiratory failure with hypoxia and hypercapnia (H)    Acute metabolic encephalopathy    Shock circulatory (H)    Metabolic acidosis    1. MAY: creatinine baseline of 0.8-1 with last normal on 8/24 with trend up to 2.88 on 8/27. CT with contrast on 8/20. Patients UA on 8/26 with hyaline cats, squam epi, trace LE, 50 protein, trace ketones. Noted to have some hypotension on 8/24 could have been the initial start of MAY leading to elevated dig level. Hypotension on 8/26 with bradycardia now with risk for ATN. Also noted to have elevated vancomycin level up to 43.1(8/23) certainly could be contributing. On ACE-I with last dose on 8/25. .  Likey hypotension and vancomycin with initial cause of MAY in the setting of diuresis and ACE-I and followed by elevated digoxin level with hemodynamic changes all in the setting of sepsis.    -Intake output, daily weights.   -Hold ACE-I, aldactone,   -Lasix 80 mg IV this am. Monitor response and adjust dose as needed.  "  -Jeffy.   -Consented to HD.         2. Digoxin toxicity: 6.9 on 8/26. transferred to ICU. MAY followed by toxicity. Hyperkalemia(now improved). Digifab per ICU. Dopamin ggt for support.   3. Acute respiratory failure: ongoing opacites - HCAP. Vascular congetion improved form previous. Moniotor with IVF. Resume lasix today.   4. HFrEF: CXR from 8/26 with vascular congestion improved. Lasix as noted above.   5: Acute diverticulitis s/p perforation and abscess s/p drain: per surgery(no surgery planned currently) and ICU.   6. Hyperkalemia: improved with treatment of digoxin toxicity.   7. Metabolic acidosis: Continue bicarb currently, monitor volume status.   8. Anemia, thrombocytopenia: check smear. Transfusion per primary.       Jose Raul Crocker  [facility]  [phone] (off)  [phone] (Pager)      No interval changes to past medical history, social history or family history to report.    BP (!) 114/32   Pulse 88   Temp 98.2  F (36.8  C) (Axillary)   Resp 24   Ht 5' 2\" (1.575 m)   Wt 213 lb 14.4 oz (97 kg)   LMP 01/25/1999   SpO2 94%   BMI 39.12 kg/m      I/O last 3 completed shifts:  In: 2153.1 [I.V.:1796.1; IV Piggyback:357]  Out: 77 [Urine:77]    Physical Exam:   GENERAL: frail, elderly, chronically ill.   EYES: No scleral icterus, conjunctiva clear  ENT: Hearing normal,   RESP: anterior clear no respiratory distress, increase effot.   CV: RRR, no murmurs. Trace leg edema.    GI: Active BS, Soft,   Musculoskeletal: Normal muscle bulk/ tone; No gross joint abnormalities  SKIN: No rash, warm/ dry  PSYCH:  Appropriate mood and affect  Lymph: No cervical adenopathy    Results from last 7 days   Lab Units 08/28/20  0431 08/27/20  2351 08/27/20 2011 08/27/20  1207 08/27/20  0753 08/27/20  0405  08/26/20  1805   LN-SODIUM mmol/L 138  --   --   --  139 139 140   < > 138   LN-POTASSIUM mmol/L 3.5  3.5 3.5 3.7   < > 3.8 4.2 4.6   < > 5.8*   LN-CHLORIDE mmol/L 106  --   --   --  " 107 108* 108*   < > 109*   LN-CO2 mmol/L 20*  --   --   --  20* 20* 17*   < > 10*   LN-BLOOD UREA NITROGEN mg/dL 48*  --   --   --  43* 40* 39*   < > 33*   LN-CREATININE mg/dL 3.13*  --   --   --  2.92* 2.88* 2.86*   < > 2.60*   LN-CALCIUM mg/dL 8.4*  --   --   --  8.7 8.6 9.0   < > 9.7   LN-ALBUMIN g/dL 1.7*  --   --   --   --   --  1.9*  --  1.9*   LN-PROTEIN TOTAL g/dL 6.3  --   --   --   --   --  6.6  --  6.6   LN-BILIRUBIN TOTAL mg/dL 1.9*  --   --   --   --   --  2.0*  --  1.9*   LN-ALKALINE PHOSPHATASE U/L 147*  --   --   --   --   --  147*  --  143*   LN-ALT (SGPT) U/L 119*  --   --   --   --   --  166*  --  116*   LN-AST (SGOT) U/L 1,413*  --   --   --   --   --  2,613*  --  1,543*    < > = values in this interval not displayed.     Results from last 7 days   Lab Units 08/28/20  0431 08/27/20  0406 08/25/20  0526   LN-WHITE BLOOD CELL COUNT thou/uL 14.6* 18.3* 15.6*   LN-HEMOGLOBIN g/dL 8.9* 9.1* 7.6*   LN-HEMATOCRIT % 27.0* 28.3* 23.7*   LN-PLATELET COUNT thou/uL 115* 111* 106*         Scheduled Meds:  ? aspirin  81 mg Oral DAILY   ? daptomycin (CUBICIN) IV  6 mg/kg Intravenous Q48H   ? fluconazole (DIFLUCAN) IV  200 mg Intravenous Q24H   ? [Held by provider] furosemide  40 mg Intravenous DAILY   ? [Held by provider] lisinopriL  5 mg Oral DAILY   ? magnesium chloride  128 mg Oral BID   ? magnesium gluconate  27 mg Oral BID   ? meropenem  1 g Intravenous Q12H   ? [Held by provider] metoprolol succinate  12.5 mg Oral DAILY   ? norepinephrine       ? [Held by provider] omeprazole  20 mg Oral BID AC   ? pantoprazole  40 mg Intravenous Q12H   ? [Held by provider] sertraline  100 mg Oral DAILY   ? sodium chloride bacteriostatic  1-5 mL Intradermal Once   ? sodium chloride  10 mL Intravenous Q8H FIXED TIMES   ? sodium chloride  10-30 mL Intravenous Q8H FIXED TIMES   ? [Held by provider] spironolactone  25 mg Oral DAILY   ? ticagrelor  90 mg Oral BID     Continuous Infusions:  ? DOPamine 10 mcg/kg/min (08/28/20  0801)   ? norepinephrine     ? sodium bicarbonate IV infusion in D5W 50 mL/hr at 08/28/20 0403     PRN Meds:.albuterol, alteplase, atropine, bacitracin, carboxymethylcellulose, codeine-guaiFENesin, dextrose 50 % (D50W), glucagon (human recombinant), hydrOXYzine pamoate, lidocaine (PF), [Held by provider] metoprolol tartrate, naloxone **OR** naloxone, oxyCODONE, sodium chloride 0.9%, sodium chloride bacteriostatic, sodium chloride, sodium chloride, sodium chloride, traZODone      Labs personally reviewed today during this evaluation at 8:20 AM

## 2021-06-29 NOTE — PROGRESS NOTES
Progress Notes by Jose Raul Crocker DO at 8/30/2020  9:54 AM     Author: Jose Raul Crocker DO Service: Nephrology Author Type: Physician    Filed: 8/30/2020 10:44 AM Date of Service: 8/30/2020  9:54 AM Status: Signed    : Jose Raul Crocker DO (Physician)              RENAL ([facility]) progress note  CC: F/U MAY  S: Intubated and sedated. Fio2 at 30. Remains ons levophed support. Increase urine output yesterday and now off lasix ggt with ongoing good diuresis.       A/P:   Principal Problem:    Diverticulitis  Active Problems:    Bradyarrhythmia    Acute kidney injury (H)    Diverticulitis of large intestine with perforation and abscess without bleeding    Atrial fibrillation with rapid ventricular response (H)    Obstructive sleep apnea syndrome    Poisoning by digoxin, accidental or unintentional, initial encounter    Acute respiratory failure with hypoxia and hypercapnia (H)    Acute metabolic encephalopathy    Shock circulatory (H)    Metabolic acidosis    1. MAY: Stable. creatinine baseline of 0.8-1 with last normal on 8/24 with trend up to 2.88 on 8/27. CT with contrast on 8/20. Patients UA on 8/26 with hyaline cats, squam epi, trace LE, 50 protein, trace ketones. Noted to have some hypotension on 8/24 could have been the initial start of MAY leading to elevated dig level. Hypotension on 8/26 with bradycardia now with risk for ATN. Also noted to have elevated vancomycin level up to 43.1(8/23) certainly could be contributing. On ACE-I with last dose on 8/25. .  Likey hypotension and vancomycin with initial cause of MAY in the setting of diuresis and ACE-I and followed by elevated digoxin level with hemodynamic changes all in the setting of sepsis.    -Intake output, daily weights.   -Hold ACE-I, aldactone,   -Consented to HD.   -Hold lasix and diuril.         2. Digoxin toxicity: 6.9 on 8/26. transferred to ICU. MAY followed by toxicity. Hyperkalemia(now improved). Digifab per ICU  3. Acute respiratory  "failure: ongoing opacites - HCAP. Vascular congetion improved form previous..   4. HFrEF: CXR from 8/26 with vascular congestion improved. Lasix as noted above.   5: Acute diverticulitis s/p perforation and abscess s/p drain: per surgery(no surgery planned currently) and ICU.   6. Hyperkalemia: improved with treatment of digoxin toxicity.   7. Metabolic acidosis: Continue bicarb currently, monitor volume status.   8. Anemia, thrombocytopenia: check smear. Transfusion per primary.     Jose Raul Crocker  [facility]  [phone] (off)  [phone] (Pager)      No interval changes to past medical history, social history or family history to report.    BP (!) 86/30   Pulse 67   Temp 98.9  F (37.2  C) (Axillary)   Resp 17   Ht 5' 2\" (1.575 m)   Wt 212 lb (96.2 kg)   LMP 01/25/1999   SpO2 97%   BMI 38.78 kg/m      I/O last 3 completed shifts:  In: 2382.2 [I.V.:1849.2; NG/GT:180; IV Piggyback:353]  Out: 3610 [Urine:3610]    Physical Exam:   GENERAL: frail, elderly, chronically ill.   EYES: No scleral icterus, conjunctiva clear  RESP: anterior clear no respiratory distress,   CV: RRR, no murmurs. Trace leg edema.    GI: Active BS, Soft,   Musculoskeletal: Normal muscle bulk/ tone; No gross joint abnormalities  SKIN: No rash, warm/ dry  PSYCH:  Appropriate mood and affect  Lymph: No cervical adenopathy    Results from last 7 days   Lab Units 08/30/20  0517 08/29/20  1618 08/29/20  0500  08/28/20  0431  08/27/20  0405   LN-SODIUM mmol/L 138 139 137  --  138   < > 140   LN-POTASSIUM mmol/L 3.6  3.6 3.7  3.7 3.6  3.6   < > 3.5  3.5   < > 4.6   LN-CHLORIDE mmol/L 105 107 106  --  106   < > 108*   LN-CO2 mmol/L 22 19* 22  --  20*   < > 17*   LN-BLOOD UREA NITROGEN mg/dL 53* 54* 52*  --  48*   < > 39*   LN-CREATININE mg/dL 2.48* 2.89* 3.08*  --  3.13*   < > 2.86*   LN-CALCIUM mg/dL 8.9 8.8 8.6  --  8.4*   < > 9.0   LN-ALBUMIN g/dL 1.4*  --   --   --  1.7*  --  1.9*   LN-PROTEIN TOTAL g/dL " 6.0  --   --   --  6.3  --  6.6   LN-BILIRUBIN TOTAL mg/dL 2.0*  --   --   --  1.9*  --  2.0*   LN-ALKALINE PHOSPHATASE U/L 152*  --   --   --  147*  --  147*   LN-ALT (SGPT) U/L 27  --   --   --  119*  --  166*   LN-AST (SGOT) U/L 247*  --   --   --  1,413*  --  2,613*    < > = values in this interval not displayed.     Results from last 7 days   Lab Units 08/28/20  0923 08/28/20  0431 08/27/20  0406   LN-WHITE BLOOD CELL COUNT thou/uL 14.1* 14.6* 18.3*   LN-HEMOGLOBIN g/dL 9.0* 8.9* 9.1*   LN-HEMATOCRIT % 27.1* 27.0* 28.3*   LN-PLATELET COUNT thou/uL 116* 115* 111*         Scheduled Meds:  ? amino acids-protein hydrolys  1 packet Enteral Tube BID   ? aspirin  81 mg Enteral Tube DAILY   ? chlorhexidine  15 mL Topical Q12H   ? chlorothiazide (DIURIL) IVPB  500 mg Intravenous Q12H   ? insulin aspart (NovoLOG) injection   Subcutaneous Q4H FIXED TIMES   ? meropenem  1 g Intravenous Q12H   ? micafungin (MYCAMINE) IV  100 mg Intravenous Q24H   ? omeprazole  20 mg Oral QAM AC    Or   ? omeprazole  20 mg Enteral Tube QAM AC    Or   ? pantoprazole  40 mg Intravenous QAM AC   ? [Held by provider] sertraline  100 mg Oral DAILY   ? sodium chloride bacteriostatic  1-5 mL Intradermal Once   ? sodium chloride  10 mL Intravenous Q8H FIXED TIMES   ? sodium chloride  10-30 mL Intravenous Q8H FIXED TIMES   ? [Held by provider] ticagrelor  90 mg Enteral Tube BID     Continuous Infusions:  ? fentaNYL citrate (PF) 75 mcg/hr (08/29/20 1918)   ? [Held by provider] furosemide Stopped (08/29/20 2040)   ? norepinephrine 0.09 mcg/kg/min (08/30/20 0835)   ? propofol 10 mcg/kg/min (08/29/20 2250)   ? vasopressin 2.4 Units/hr (08/30/20 0503)     PRN Meds:.albuterol, alteplase, atropine, bacitracin, bisacodyL, carboxymethylcellulose, codeine-guaiFENesin, dextrose 50 % (D50W), glucagon (human recombinant), hydrOXYzine pamoate, lidocaine (PF), lipase-protease-amylase **AND** sodium bicarbonate, [Held by provider] metoprolol tartrate, naloxone  **OR** naloxone, oxyCODONE, polyethylene glycol, sodium chloride 0.9%, sodium chloride bacteriostatic, sodium chloride, sodium chloride, sodium chloride, traZODone      Labs personally reviewed today during this evaluation at 8:20 AM

## 2021-06-29 NOTE — PROGRESS NOTES
Progress Notes by Jose Raul Crocker DO at 8/29/2020  9:49 AM     Author: Jose Raul Crocker DO Service: Nephrology Author Type: Physician    Filed: 8/29/2020 11:45 AM Date of Service: 8/29/2020  9:49 AM Status: Signed    : Jose Raul Crocker DO (Physician)              [facility] ([facility]) progress note  CC: F/U MAY  S: Intubated and sedated. Fio2 at 40. On levophed now for pressor support. Increase urine output this am with diuril and lasix GGT.       A/P:   Principal Problem:    Diverticulitis  Active Problems:    Bradyarrhythmia    Acute kidney injury (H)    Diverticulitis of large intestine with perforation and abscess without bleeding    Atrial fibrillation with rapid ventricular response (H)    Obstructive sleep apnea syndrome    Poisoning by digoxin, accidental or unintentional, initial encounter    Acute respiratory failure with hypoxia and hypercapnia (H)    Acute metabolic encephalopathy    Shock circulatory (H)    Metabolic acidosis    1. MAY: Stable. creatinine baseline of 0.8-1 with last normal on 8/24 with trend up to 2.88 on 8/27. CT with contrast on 8/20. Patients UA on 8/26 with hyaline cats, squam epi, trace LE, 50 protein, trace ketones. Noted to have some hypotension on 8/24 could have been the initial start of MAY leading to elevated dig level. Hypotension on 8/26 with bradycardia now with risk for ATN. Also noted to have elevated vancomycin level up to 43.1(8/23) certainly could be contributing. On ACE-I with last dose on 8/25. .  Likey hypotension and vancomycin with initial cause of MAY in the setting of diuresis and ACE-I and followed by elevated digoxin level with hemodynamic changes all in the setting of sepsis.    -Intake output, daily weights.   -Hold ACE-I, aldactone,   -Consented to HD.   -lasix ggt 20 mg/hr currently increase if urine output drops off, diuril q12 hours.           2. Digoxin toxicity: 6.9 on 8/26. transferred to ICU. MAY followed by toxicity. Hyperkalemia(now  "improved). Digifab per ICU  3. Acute respiratory failure: ongoing opacites - HCAP. Vascular congetion improved form previous..   4. HFrEF: CXR from 8/26 with vascular congestion improved. Lasix as noted above.   5: Acute diverticulitis s/p perforation and abscess s/p drain: per surgery(no surgery planned currently) and ICU.   6. Hyperkalemia: improved with treatment of digoxin toxicity.   7. Metabolic acidosis: Continue bicarb currently, monitor volume status.   8. Anemia, thrombocytopenia: check smear. Transfusion per primary.     Jose Raul Crocker  [facility]  [phone] (off)  [phone] (Pager)      No interval changes to past medical history, social history or family history to report.    BP (!) 86/30   Pulse 78   Temp 99.4  F (37.4  C) (Axillary)   Resp 19   Ht 5' 2\" (1.575 m)   Wt 214 lb 8 oz (97.3 kg)   LMP 01/25/1999   SpO2 99%   BMI 39.23 kg/m      I/O last 3 completed shifts:  In: 1348.9 [I.V.:1148.9; IV Piggyback:200]  Out: 310 [Urine:310]    Physical Exam:   GENERAL: frail, elderly, chronically ill.   EYES: No scleral icterus, conjunctiva clear  RESP: anterior clear no respiratory distress,   CV: RRR, no murmurs. Trace leg edema.    GI: Active BS, Soft,   Musculoskeletal: Normal muscle bulk/ tone; No gross joint abnormalities  SKIN: No rash, warm/ dry  PSYCH:  Appropriate mood and affect  Lymph: No cervical adenopathy    Results from last 7 days   Lab Units 08/29/20  0500 08/28/20  1715 08/28/20  0431  08/27/20  1207  08/27/20  0405  08/26/20  1805   LN-SODIUM mmol/L 137  --  138  --  139   < > 140   < > 138   LN-POTASSIUM mmol/L 3.6  3.6 3.7 3.5  3.5   < > 3.8   < > 4.6   < > 5.8*   LN-CHLORIDE mmol/L 106  --  106  --  107   < > 108*   < > 109*   LN-CO2 mmol/L 22  --  20*  --  20*   < > 17*   < > 10*   LN-BLOOD UREA NITROGEN mg/dL 52*  --  48*  --  43*   < > 39*   < > 33*   LN-CREATININE mg/dL 3.08*  --  3.13*  --  2.92*   < > 2.86*   < > 2.60*   LN-CALCIUM " mg/dL 8.6  --  8.4*  --  8.7   < > 9.0   < > 9.7   LN-ALBUMIN g/dL  --   --  1.7*  --   --   --  1.9*  --  1.9*   LN-PROTEIN TOTAL g/dL  --   --  6.3  --   --   --  6.6  --  6.6   LN-BILIRUBIN TOTAL mg/dL  --   --  1.9*  --   --   --  2.0*  --  1.9*   LN-ALKALINE PHOSPHATASE U/L  --   --  147*  --   --   --  147*  --  143*   LN-ALT (SGPT) U/L  --   --  119*  --   --   --  166*  --  116*   LN-AST (SGOT) U/L  --   --  1,413*  --   --   --  2,613*  --  1,543*    < > = values in this interval not displayed.     Results from last 7 days   Lab Units 08/28/20  0923 08/28/20  0431 08/27/20  0406   LN-WHITE BLOOD CELL COUNT thou/uL 14.1* 14.6* 18.3*   LN-HEMOGLOBIN g/dL 9.0* 8.9* 9.1*   LN-HEMATOCRIT % 27.1* 27.0* 28.3*   LN-PLATELET COUNT thou/uL 116* 115* 111*         Scheduled Meds:  ? aspirin  81 mg Enteral Tube DAILY   ? chlorhexidine  15 mL Topical Q12H   ? insulin aspart (NovoLOG) injection   Subcutaneous Q4H FIXED TIMES   ? meropenem  1 g Intravenous Q12H   ? micafungin (MYCAMINE) IV  100 mg Intravenous Q24H   ? [Held by provider] omeprazole  20 mg Oral BID AC   ? omeprazole  20 mg Oral QAM AC    Or   ? omeprazole  20 mg Enteral Tube QAM AC    Or   ? pantoprazole  40 mg Intravenous QAM AC   ? [Held by provider] sertraline  100 mg Oral DAILY   ? sodium chloride bacteriostatic  1-5 mL Intradermal Once   ? sodium chloride  10 mL Intravenous Q8H FIXED TIMES   ? sodium chloride  10-30 mL Intravenous Q8H FIXED TIMES   ? [Held by provider] ticagrelor  90 mg Enteral Tube BID     Continuous Infusions:  ? fentaNYL citrate (PF) 50 mcg/hr (08/29/20 0722)   ? furosemide 20 mg/hr (08/29/20 0722)   ? norepinephrine 0.09 mcg/kg/min (08/29/20 0933)   ? propofol 15 mcg/kg/min (08/29/20 0800)     PRN Meds:.albuterol, alteplase, atropine, bacitracin, carboxymethylcellulose, codeine-guaiFENesin, dextrose 50 % (D50W), glucagon (human recombinant), hydrOXYzine pamoate, lidocaine (PF), [Held by provider] metoprolol tartrate, naloxone **OR**  naloxone, oxyCODONE, sodium chloride 0.9%, sodium chloride bacteriostatic, sodium chloride, sodium chloride, sodium chloride, traZODone      Labs personally reviewed today during this evaluation at 8:20 AM

## 2021-06-29 NOTE — PROGRESS NOTES
Progress Notes by Hiren Salomon MD at 8/12/2020  3:33 PM     Author: Hiren Salomon MD Service: Surgery Author Type: Physician    Filed: 8/12/2020  3:33 PM Date of Service: 8/12/2020  3:33 PM Status: Signed    : Hiren Salomon MD (Physician)       I have examined this patient and the medical care has been evaluated and discussed with the note from Nela Camacho. The documentation has been reviewed. I agree with the medical care provided and confirm the findings.     If WBC increasing tomorrow would repeat CT for possible drain.        Hiren Salomon MD    [email or web address]  General Surgery [phone]  Vascular Surgery [phone]

## 2021-06-29 NOTE — PROGRESS NOTES
Progress Notes by Jose Raul Crocker DO at 9/1/2020 12:06 PM     Author: Jose Raul Crocker DO Service: Nephrology Author Type: Physician    Filed: 9/1/2020  1:07 PM Date of Service: 9/1/2020 12:06 PM Status: Addendum    : Jose Raul Crocker DO (Physician)    Related Notes: Original Note by Jose Raul Crocker DO (Physician) filed at 9/1/2020  1:05 PM              [facility] ([facility]) progress note  CC: F/U MAY  S: Intubated and sedated. Fio2 remains at 30. Off levophed support. Continue to have good UOP and cr is improving.  Weight trending down. Unable to obtain ROS. Underwent IR absessogram today.       A/P:   1. MAY: Improving. Creatinine baseline of 0.8-1 with last normal on 8/24 with trend up to 2.88 on 8/27. CT with contrast on 8/20. Patients UA on 8/26 with hyaline cats, squam epi, trace LE, 50 protein, trace ketones. Noted to have some hypotension on 8/24 could have been the initial start of MAY leading to elevated dig level. Hypotension on 8/26 with bradycardia now with risk for ATN. Also noted to have elevated vancomycin level up to 43.1(8/23) certainly could be contributing. On ACE-I with last dose on 8/25.  Likey hypotension and vancomycin with initial cause of MAY in the setting of diuresis and ACE-I and followed by elevated digoxin level with hemodynamic changes all in the setting of sepsis.    -Intake output, daily weights.   -Hold ACE-I, aldactone, lasix and diuril.   -Consented to HD, though no indication at this time.   - Continues to have large UOP secondary to post ATN diuresis. Continue to hold diuretics. Consider fluid bolus if hypotension.     2. Digoxin toxicity: 6.9 on 8/26. transferred to ICU. MAY followed by toxicity. Hyperkalemia(now improved). Digifab per ICU, improved.   3. Acute respiratory failure: ongoing opacites - HCAP. Vascular congetion improved form previous. Remains intubated   4. HFrEF: CXR from 8/26 with vascular congestion improved. Now autodiuresing.    5: Acute  "diverticulitis s/p perforation and abscess s/p drain: per surgery(no surgery planned currently) and IR abscessogram today.   6. Hyperkalemia: improved with treatment of digoxin toxicity. Now Hypo. Replace PRN.   7. Metabolic acidosis: Resolved with bicarb and now with renal function recovery.  8. Anemia, thrombocytopenia:  hgb stable. Transfusion per primary.     Jose Raul Crocker,  [facility]  [phone] (off)    No interval changes to past medical history, social history or family history to report.    /52   Pulse 72   Temp 98.4  F (36.9  C) (Oral)   Resp (!) 35   Ht 5' 2\" (1.575 m)   Wt 198 lb 11.2 oz (90.1 kg)   LMP 01/25/1999   SpO2 100%   BMI 36.34 kg/m      I/O last 3 completed shifts:  In: 1726.1 [I.V.:831.1; NG/GT:535; IV Piggyback:360]  Out: 3775 [Urine:3775]    Physical Exam:   GENERAL: frail, elderly, chronically ill.   EYES: No scleral icterus, conjunctiva clear  ENT: ET tube in place with vent support.   RESP: anterior clear no respiratory distress on vent support. Adequate oxygenation.  CV: RRR, no murmurs. Trace peripheral edema.    GI: Active BS, Soft, obese  Musculoskeletal: Diminished muscle bulk/ tone; No gross joint abnormalities  SKIN: No rash, warm/ dry  PSYCH:  ERIKA, sedated      Results from last 7 days   Lab Units 09/01/20  0357 08/31/20  1833 08/31/20  0501  08/30/20  0517  08/28/20  0431  08/27/20  0405   LN-SODIUM mmol/L 140  --  139  --  138   < > 138   < > 140   LN-POTASSIUM mmol/L 3.4*  3.4* 3.2* 3.2*  3.2*   < > 3.6  3.6   < > 3.5  3.5   < > 4.6   LN-CHLORIDE mmol/L 100  --  101  --  105   < > 106   < > 108*   LN-CO2 mmol/L 29  --  26  --  22   < > 20*   < > 17*   LN-BLOOD UREA NITROGEN mg/dL 42*  --  47*  --  53*   < > 48*   < > 39*   LN-CREATININE mg/dL 0.81  --  1.41*  --  2.48*   < > 3.13*   < > 2.86*   LN-CALCIUM mg/dL 8.6  --  8.5  --  8.9   < > 8.4*   < > 9.0   LN-ALBUMIN g/dL  --   --   --   --  1.4*  --  1.7*  --  1.9* "   LN-PROTEIN TOTAL g/dL  --   --   --   --  6.0  --  6.3  --  6.6   LN-BILIRUBIN TOTAL mg/dL  --   --   --   --  2.0*  --  1.9*  --  2.0*   LN-ALKALINE PHOSPHATASE U/L  --   --   --   --  152*  --  147*  --  147*   LN-ALT (SGPT) U/L  --   --   --   --  27  --  119*  --  166*   LN-AST (SGOT) U/L  --   --   --   --  247*  --  1,413*  --  2,613*    < > = values in this interval not displayed.     Results from last 7 days   Lab Units 09/01/20  1023 08/28/20  0923 08/28/20  0431   LN-WHITE BLOOD CELL COUNT thou/uL 12.4* 14.1* 14.6*   LN-HEMOGLOBIN g/dL 8.2* 9.0* 8.9*   LN-HEMATOCRIT % 24.8* 27.1* 27.0*   LN-PLATELET COUNT thou/uL 95* 116* 115*         Scheduled Meds:  ? aspirin  81 mg Enteral Tube DAILY   ? chlorhexidine  15 mL Topical Q12H   ? insulin aspart (NovoLOG) injection   Subcutaneous Q4H FIXED TIMES   ? ipratropium-albuteroL  3 mL Nebulization Q6H - RT   ? meropenem  1 g Intravenous Q12H   ? micafungin (MYCAMINE) IV  100 mg Intravenous Q24H   ? nystatin  5 mL Swish & Swallow QID   ? omeprazole  20 mg Oral QAM AC    Or   ? omeprazole  20 mg Enteral Tube QAM AC    Or   ? pantoprazole  40 mg Intravenous QAM AC   ? [Held by provider] sertraline  100 mg Oral DAILY   ? sodium chloride bacteriostatic  1-5 mL Intradermal Once   ? sodium chloride  10 mL Intravenous Q8H FIXED TIMES   ? sodium chloride  10-30 mL Intravenous Q8H FIXED TIMES   ? ticagrelor  90 mg Enteral Tube BID     Continuous Infusions:  ? fentaNYL citrate (PF) 50 mcg/hr (09/01/20 0439)   ? [Held by provider] furosemide Stopped (08/29/20 2040)   ? norepinephrine 0.02 mcg/kg/min (09/01/20 0104)   ? propofol 10 mcg/kg/min (09/01/20 0535)   ? vasopressin 2.4 Units/hr (09/01/20 0158)     PRN Meds:.alteplase, atropine, bacitracin, bisacodyL, carboxymethylcellulose, codeine-guaiFENesin, dextrose 50 % (D50W), glucagon (human recombinant), hydrOXYzine pamoate, lidocaine (PF), lipase-protease-amylase **AND** sodium bicarbonate, [Held by provider] metoprolol  tartrate, naloxone **OR** naloxone, oxyCODONE, polyethylene glycol, sodium chloride 0.9%, sodium chloride bacteriostatic, sodium chloride, sodium chloride, sodium chloride, traZODone      Labs personally reviewed today during this evaluation at 8:20 AM

## 2021-06-29 NOTE — PROGRESS NOTES
"Progress Notes by Rosetta Caicedo PA-C at 8/21/2020  1:55 PM     Author: Rosetta Caicedo PA-C Service: Interventional Radiology Author Type: Physician Assistant    Filed: 8/21/2020  1:59 PM Date of Service: 8/21/2020  1:55 PM Status: Signed    : Rosetta Caicedo PA-C (Physician Assistant)         Interventional Radiology - CHART CHECK Note  8/21/2020    S:  CHART CHECK    Culture:    Culture  1+ YeastAbnormal                  Gram Stain Result   4+ Polymorphonuclear leukocytes        2+ Gram positive cocci in chains                O:  /42 (Patient Position: Lying)   Pulse 67   Temp 97.7  F (36.5  C) (Oral)   Resp 18   Ht 5' 2\" (1.575 m)   Wt 195 lb 11.2 oz (88.8 kg)   LMP 01/25/1999   SpO2 96%   BMI 35.79 kg/m        IMAGING:  CT Abd/pelvis 8/20/20:  FINDINGS:   LOWER CHEST: Dependent atelectasis and interstitial edema within the lung bases without consolidation. Cardiomegaly and mitral annular calcifications.     HEPATOBILIARY: Gallbladder surgically absent. Mild ectasia of the extrahepatic biliary tree.     PANCREAS: Unchanged appearance.     SPLEEN: Normal.     ADRENAL GLANDS: Normal.     KIDNEYS/BLADDER: Normal.     BOWEL: Interval placement of a left anterior abdominal approach percutaneous drain into the perisigmoid abscess. The drain is well positioned with near complete collapse of the previously seen abscess collection. There is persistent mild adjacent   stranding. The bowel is nonobstructed. No new intra-abdominal abscess identified. No pneumoperitoneum.     LYMPH NODES: Normal.     VASCULATURE: Atherosclerotic calcification throughout the arterial tree. No evidence of vascular complication following drain placement.     PELVIC ORGANS: Fibroid uterus. No ascites. Bardales catheter is within a decompressed urinary bladder. Subcutaneous scattered calcifications in the subcutaneous flank regions compatible with prior injection sites.     MUSCULOSKELETAL: No " new or acute osseous findings. Degenerative changes at the hips and lumbar spine are stable.     IMPRESSION:   1.  Interval percutaneous drain placement into the diverticular abscess with near complete collapse of the fluid cavity. Drain abuts the adjacent sigmoid colon. No evidence of post drain placement complication.  2.  No new intra-abdominal abscess or other findings to correlate with ongoing leukocytosis.  3.  Stable appearance of the pancreas with previously seen main pancreatic ductal dilatation not well visualized on this study.    LABS:  Lab Results   Component Value Date    WBC 19.3 (H) 08/21/2020    HGB 8.6 (L) 08/21/2020    HCT 26.1 (L) 08/21/2020    MCV 92 08/21/2020     (L) 08/21/2020         Drain Outputs (in mL):  8/17 20   8/18 35   8/19 50   8/20 15               A:  69 yo female with perforated diverticulitis with abscess s/p CT guided 10F left pelvic drain placement 8/17/20. CT 8/20/20 demonstrating near resolution of fluid collection.     P:    - CT demonstrating near complete collapse of the fluid cavity. Drain abuts the adjacent sigmoid colon. No evidence of post drain placement complication.  - Continue present drain cares.  - Drain discharge instructions previously entered into D/C navigator.  - Recommend follow up CT and abscessogram approximately 7-10 days from drain placement date. Perhaps sooner if there are further changes in drain function or in patient's clinical course.   - IR will continue to follow loosely. Please contact IR with drain related questions or concerns.    Rosetta Caicedo  Interventional Radiology  [phone]

## 2021-06-29 NOTE — PROGRESS NOTES
Progress Notes by Victor Manuel Castle MD at 9/2/2020  1:43 PM     Author: Victor Manuel Castle MD Service: Nephrology Author Type: Physician    Filed: 9/3/2020 12:05 PM Date of Service: 9/2/2020  1:43 PM Status: Signed    : Victor Manuel Castle MD (Physician)              [facility] ([facility]) progress note  CC: F/U MAY  S: Patient remains in ICU, she is a still intubated, ventilated, weaning off sedation at the time of visit, she is unable to participate in a review of systems.  Daughter is sitting at the bedside.  Nursing care ongoing at the time of visit.  Discussing with RN in charge of patient during the visit.   She had a good urine output yesterday with a total of 1500 mL today, however urine output is drifting down today.     Patient is unable to participate in a review of systems.    A/P:     1. MAY: Improving. Creatinine baseline of 0.8-1 with last normal on 8/24 with trend up to 2.88 on 8/27. CT with contrast on 8/20. Patients UA on 8/26 with hyaline cats, squam epi, trace LE, 50 protein, trace ketones. Noted to have some hypotension on 8/24 could have been the initial start of MAY leading to elevated dig level. Hypotension on 8/26 with bradycardia now with risk for ATN. Also noted to have elevated vancomycin level up to 43.1(8/23) certainly could be contributing. On ACE-I with last dose on 8/25.  Likey hypotension and vancomycin with initial cause of MAY in the setting of diuresis and ACE-I and followed by elevated digoxin level with hemodynamic changes all in the setting of sepsis.      Creatinine is down to baseline/normal range.    Good urine output on 9/1 however urine output is dropping during the day today    Patient looks dry on examination, will give normal saline 500 mL bolus and monitor.    Patient is a status post episode of post ATN diuresis..     2. Digoxin toxicity: 6.9 on 8/26. transferred to ICU. MAY followed by toxicity. Hyperkalemia(now improved). Digifab per ICU, improved.     3. Acute respiratory  "failure: ongoing opacites - HCAP. Vascular congetion improved form previous. Remains intubated     4. HFrEF: CXR from 8/26 with vascular congestion improved. Now autodiuresing.      5: Acute diverticulitis s/p perforation and abscess s/p drain: per surgery(no surgery planned currently) and IR abscessogram today.     6. Hyperkalemia: improved with treatment of digoxin toxicity. Now Hypo. Replace PRN.     7. Metabolic acidosis: Resolved with bicarb and now with renal function recovery.    8. Anemia, thrombocytopenia:  hgb stable. Transfusion per primary.     Victor Manuel Castle MD  [facility]  [phone] (off)        /49   Pulse 69   Temp 98.9  F (37.2  C) (Axillary)   Resp 17   Ht 5' 2\" (1.575 m)   Wt 200 lb 14.4 oz (91.1 kg)   LMP 01/25/1999   SpO2 97%   BMI 36.75 kg/m      I/O last 3 completed shifts:  In: 1355.7 [I.V.:655.7; NG/GT:250; IV Piggyback:450]  Out: 1560 [Urine:1560]    Physical Exam:   GENERAL: frail, elderly, chronically ill.   EYES: No eyelid edema  ENT: ET tube in place with vent support.   RESP:  Patient intubated, on ventilatory support, adequate oxygenation, no respiratory distress..  CV: Normal sinus rhythm on cardiac monitor, trace of peripheral edema.   GI: No overt ascites  Musculoskeletal: Diminished muscle bulk/ tone; No gross joint abnormalities  SKIN: No rash, warm/ dry  PSYCH:  ERIKA, sedated      Results from last 7 days   Lab Units 09/02/20  0618 09/02/20  0135 09/01/20  1339 09/01/20  0357  08/31/20  0501  08/30/20  0517  08/28/20  0431  08/27/20  0405   LN-SODIUM mmol/L 139  --   --  140  --  139  --  138   < > 138   < > 140   LN-POTASSIUM mmol/L 4.3 3.8 3.5 3.4*  3.4*   < > 3.2*  3.2*   < > 3.6  3.6   < > 3.5  3.5   < > 4.6   LN-CHLORIDE mmol/L 103  --   --  100  --  101  --  105   < > 106   < > 108*   LN-CO2 mmol/L 25  --   --  29  --  26  --  22   < > 20*   < > 17*   LN-BLOOD UREA NITROGEN mg/dL 33*  --   --  42*  --  47*  --  53*   < > 48*   " < > 39*   LN-CREATININE mg/dL 0.60  --   --  0.81  --  1.41*  --  2.48*   < > 3.13*   < > 2.86*   LN-CALCIUM mg/dL 9.1  --   --  8.6  --  8.5  --  8.9   < > 8.4*   < > 9.0   LN-ALBUMIN g/dL  --   --   --   --   --   --   --  1.4*  --  1.7*  --  1.9*   LN-PROTEIN TOTAL g/dL  --   --   --   --   --   --   --  6.0  --  6.3  --  6.6   LN-BILIRUBIN TOTAL mg/dL  --   --   --   --   --   --   --  2.0*  --  1.9*  --  2.0*   LN-ALKALINE PHOSPHATASE U/L  --   --   --   --   --   --   --  152*  --  147*  --  147*   LN-ALT (SGPT) U/L  --   --   --   --   --   --   --  27  --  119*  --  166*   LN-AST (SGOT) U/L  --   --   --   --   --   --   --  247*  --  1,413*  --  2,613*    < > = values in this interval not displayed.     Results from last 7 days   Lab Units 09/02/20  0618 09/01/20  1023 08/28/20  0923   LN-WHITE BLOOD CELL COUNT thou/uL 13.1* 12.4* 14.1*   LN-HEMOGLOBIN g/dL 9.3* 8.2* 9.0*   LN-HEMATOCRIT % 29.7* 24.8* 27.1*   LN-PLATELET COUNT thou/uL 81* 95* 116*         Scheduled Meds:  ? aspirin  81 mg Enteral Tube DAILY   ? chlorhexidine  15 mL Topical Q12H   ? insulin aspart (NovoLOG) injection   Subcutaneous Q4H FIXED TIMES   ? ipratropium-albuteroL  3 mL Nebulization Q6H - RT   ? meropenem  1 g Intravenous Q8H   ? metoclopramide  5 mg Enteral Tube QID AC & Bedtime   ? micafungin (MYCAMINE) IV  100 mg Intravenous Q24H   ? nystatin  5 mL Swish & Swallow QID   ? omeprazole  20 mg Oral QAM AC    Or   ? omeprazole  20 mg Enteral Tube QAM AC    Or   ? pantoprazole  40 mg Intravenous QAM AC   ? [Held by provider] sertraline  100 mg Oral DAILY   ? sodium chloride bacteriostatic  1-5 mL Intradermal Once   ? sodium chloride  10 mL Intravenous Q8H FIXED TIMES   ? sodium chloride  10-30 mL Intravenous Q8H FIXED TIMES   ? ticagrelor  90 mg Enteral Tube BID     Continuous Infusions:  ? fentaNYL citrate (PF) Stopped (09/02/20 1030)   ? [Held by provider] furosemide Stopped (08/29/20 2040)   ? norepinephrine 0.01 mcg/kg/min  (09/02/20 1205)   ? propofol Stopped (09/02/20 1030)   ? vasopressin 2.4 Units/hr (09/02/20 0800)     PRN Meds:.alteplase, atropine, bacitracin, bisacodyL, carboxymethylcellulose, codeine-guaiFENesin, dextrose 50 % (D50W), glucagon (human recombinant), hydrOXYzine pamoate, lidocaine (PF), lipase-protease-amylase **AND** sodium bicarbonate, [Held by provider] metoprolol tartrate, naloxone **OR** naloxone, oxyCODONE, polyethylene glycol, sodium chloride 0.9%, sodium chloride bacteriostatic, sodium chloride, sodium chloride, sodium chloride, traZODone

## 2021-06-29 NOTE — PROGRESS NOTES
"Progress Notes by Amalia Warren CNP at 8/19/2020  2:56 PM     Author: Amalia Warren CNP Service: Nephrology Author Type: Nurse Practitioner    Filed: 8/19/2020  3:01 PM Date of Service: 8/19/2020  2:56 PM Status: Signed    : Amalia Warren CNP (Nurse Practitioner)              [facility] ([facility]) progress note  Nephrology will sign off. Please call with any questions or concerns.     CC: F/U MAY  S: Since last visit has had tachycardia with hypotension, reports she has been asymptomatic. Continues to have great UOP. No shortness of breath or dizziness. Notes burning with dias and believes she is getting a UTI.       Assessment and plan:  1.  Acute kidney injury -Resolved. severe acute tubular necrosis.  MUCH IMPROVED. Cr upon admission was at her baseline of 0.95 (8/11)-->2.49 (8/12)-->3.61 (8/13)-->4.49 (8/14)-->4.52 (8/15)-->4.10 (8/16)-->3.03-->1.33-->0.82mg/dl today.     Continue supportive care.       Okay to remove dias from renal perspective. Will culture urine     2. Diverticulitis with abscess: Presently on IV Cipro and flagyl. WBCs trending down and pain improving. Drain placed today.      3. Anemia: No acute bleed presently.      4. HFrEF: s/p angiogram and stent [facility] 7/27/20. Stable. If becomes shortness of breath or hypoxic okay to add loop diuretic. Will hold off for now with excellent UOP    - Would use albumin for fluid boluses if needed     5. DMII: Per IM     6. HTN: Some hypotension and tachycardia today. Cardiology consulted per IM.     - Avoid hypotension, as able, in MAY.     7. NAGMA - due to MAY and exacerbated by NS.  Resolved s/p bicarbonate drip.    8. K/PHos/Ca - HypoK with renal function recovery.  Continue replacement as needed.     Amalia Warren CNP  [facility]  [phone] (office)  [phone] ([facility] [place])          /56   Pulse (!) 125   Temp 98.7  F (37.1  C) (Oral)   Resp 18   Ht 5' 2\" (1.575 m)   Wt 197 lb 9.6 oz (89.6 kg)   LMP 01/25/1999   " SpO2 94%   BMI 36.14 kg/m      I/O last 3 completed shifts:  In: 415 [Other:15; IV Piggyback:400]  Out: 2035 [Urine:2000; Drains:35]    Physical Exam:   GENERAL: NAD, up in chair.   EYES: pupils equal, sclerae injected  ENT: Hearing normal, oral mucosa moist.  RESP: No respiratory distress, normal effort.  CV: Trace peripheral edema.    GI: soft, Mild tenderness over incision site  SKIN: No rash, warm/ dry  PSYCH: Appropriate mood and affect, interactive    Results from last 7 days   Lab Units 08/19/20 0720 08/18/20  0659 08/17/20  0520   LN-SODIUM mmol/L 135* 139 139   LN-POTASSIUM mmol/L 3.3* 3.3* 3.3*   LN-CHLORIDE mmol/L 103 106 107   LN-CO2 mmol/L 21* 23 22   LN-BLOOD UREA NITROGEN mg/dL 10 18 31*   LN-CREATININE mg/dL 0.82 1.33* 3.03*   LN-CALCIUM mg/dL 7.2* 7.2* 6.9*   LN-ALBUMIN g/dL  --   --  1.8*     Results from last 7 days   Lab Units 08/19/20 0720 08/18/20  0659 08/17/20  0520   LN-WHITE BLOOD CELL COUNT thou/uL 14.5* 13.8* 12.8*   LN-HEMOGLOBIN g/dL 8.2* 7.7* 7.9*   LN-HEMATOCRIT % 25.5* 24.5* 24.8*   LN-PLATELET COUNT thou/uL 136* 135* 122*         Scheduled Meds:  ? aspirin  81 mg Oral DAILY   ? atorvastatin  80 mg Oral QHS   ? ciprofloxacin  400 mg Intravenous Q12H   ? insulin aspart (NovoLOG) injection   Subcutaneous TID with meals   ? magnesium chloride  128 mg Oral BID   ? magnesium sulfate IVPB  2 g Intravenous Once   ? metroNIDAZOLE  500 mg Intravenous Q12H   ? omeprazole  20 mg Oral BID AC   ? polyethylene glycol  17 g Oral DAILY   ? sertraline  100 mg Oral DAILY   ? sodium chloride bacteriostatic  1-5 mL Intradermal Once   ? sodium chloride  10 mL Intravenous Q8H FIXED TIMES   ? ticagrelor  90 mg Oral BID     Continuous Infusions:    PRN Meds:.dextrose 50 % (D50W), glucagon (human recombinant), hydrOXYzine pamoate, metoprolol tartrate, naloxone **OR** naloxone, ondansetron **OR** ondansetron, oxyCODONE, sodium chloride 0.9%, sodium chloride, sodium chloride, sodium chloride,  traZODone      Labs personally reviewed today during this evaluation at 3:28 PM

## 2021-06-29 NOTE — PROGRESS NOTES
Progress Notes by Vijyaa Briceño CNP at 9/8/2020  2:38 PM     Author: Keyanna, Vijaya J, CNP Service: Interventional Radiology Author Type: Nurse Practitioner    Filed: 9/8/2020  3:15 PM Date of Service: 9/8/2020  2:38 PM Status: Signed    : Vijaya Briceño CNP (Nurse Practitioner)         Interventional Radiology - Pre-Procedure Note:  9/8/2020    Procedure Requested: Abscessogram  Requested by: SONIA Briceño CNP    Brief HPI: Gardenia Muller is a 70 y.o. old female with history of DMII, HFpEF, Afib (on Warfarin) who was admitted 8/17/20 with perforated diverticulitis with abscess s/p CT guided 10F left pelvic drain placement 8/17/20 and abscess. Course complicated by acute kidney injury and respiratory failure now intubated, not felt to be a good surgical candidate. Most recently s/p abscessogram 9/1 which showed persistent fistula to colon and drain exchanged. Patient due for follow up abscessogram. Drain outputs have been 0 mL per day.     Drain flushing: None  Culture:  2+ Candida dubliniensis and candida albicans    IMAGING:  LOCATION: [facility]  DATE: 9/1/2020  PROCEDURE: ABSCESS TUBE CHANGE  INTERVENTIONAL RADIOLOGIST: Ruben Coelho MD     INDICATION: Left pelvic abscess. Indwelling 10 [place]se percutaneous drain. 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 No output for the past 6 days.     MODERATE SEDATION: None minutes.     CONTRAST:Omnipaque 350: 40 mL.  ANTIBIOTICS: None.  ADDITIONAL MEDICATIONS: None.     FLUOROSCOPY TIME: 2.4.  RADIATION DOSE: Air Kerma: 129 mGy     COMPLICATIONS: No immediate complications.     FINDINGS: An abscessogram was performed by contrast injection through the indwelling drain. Based on the images it was decided to exchange this drain.     The indwelling 10 [place]orese APDL drain and site were prepped and draped in sterile fashion. 1% lidocaine was infiltrated around the drain site. The retention mechanism was cut and the drain removed over an 035 Amplatz superstiff guidewire. A  "new 10 French   APDL drain was tailored by cutting off the loop. Over the guidewire, the tailored 10 French APDL drain was advanced with the tip placed lateral to the site of the fistulous communication with the colon. Adequate position confirmed with contrast   injection. Drain was connected to a gravity bag.     FINDINGS:    1. Initial abscessogram demonstrates an indwelling 10 French APDL drain within a contracted left pelvic abscess cavity. Almost immediate opacification of the adjacent distal sigmoid colon. Short, widely apparent fistulous tract to the sigmoid colon.     2. Completion fluoroscopic image demonstrates a new 10 French APDL drain, placed approximately 2 cm lateral to the site of the fistula.     IMPRESSION:   1.  Contracted abscess cavity. 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 Drain exchanged for a tailored 10 French drain placed lateral to the fistula site.     PLAN: Gravity drainage. Abscessogram in one week.     NPO: NG tube feeds - NPO at MN 9/9  ANTICOAGULANTS: Aspirin, Warfarin  ANTIBIOTICS: Meropenem, Micafungin     ALLERGIES  Penicillins    EXAM:  /56   Pulse 76   Temp 98.4  F (36.9  C)   Resp 17   Ht 5' 2\" (1.575 m)   Wt 180 lb 8.9 oz (81.9 kg)   LMP 01/25/1999   SpO2 100%   BMI 33.02 kg/m    To be done by AMANDA PAGAN    Pre-Sedation Assessment:  To be done by AMANDA PAGAN    ASSESSMENT/PLAN:   70 year old female with perforated diverticulitis with abscess s/p CT guided 10F left pelvic drain placement 8/17/20; known fistula to colon. No outputs.     NPO at MN 9/9  Abscessogram with possible drain reposition, exchange or removal with sedation as needed 9/9    Procedure, risk/benefits, and sedation to be reviewed with pt/family by AMANDA PAGAN.     Chart review, note scribed by:  Vijaya Briceño CNP  Interventional Radiology    PE/Consent by:  AMANDA PAGAN 9/9       "

## 2021-06-29 NOTE — PROGRESS NOTES
Progress Notes by Victor Manuel Castle MD at 9/3/2020  2:48 PM     Author: Victor Manuel Castle MD Service: Nephrology Author Type: Physician    Filed: 9/3/2020  2:53 PM Date of Service: 9/3/2020  2:48 PM Status: Signed    : Victor Manuel Castle MD (Physician)              [facility] ([facility]) progress note  CC: F/U MAY  S: Patient remains in ICU, she is a still intubated, ventilated, on sedation at the time of visit, she is unable to participate in a review of systems.       Patient is unable to participate in a review of systems.    A/P:     1. MAY: Improving. Creatinine baseline of 0.8-1 with last normal on 8/24 with trend up to 2.88 on 8/27. CT with contrast on 8/20. Patients UA on 8/26 with hyaline cats, squam epi, trace LE, 50 protein, trace ketones. Noted to have some hypotension on 8/24 could have been the initial start of MAY leading to elevated dig level. Hypotension on 8/26 with bradycardia now with risk for ATN. Also noted to have elevated vancomycin level up to 43.1(8/23) certainly could be contributing. On ACE-I with last dose on 8/25.  Likey hypotension and vancomycin with initial cause of MAY in the setting of diuresis and ACE-I and followed by elevated digoxin level with hemodynamic changes all in the setting of sepsis.      Creatinine is down to baseline/normal range.    Severe edema present, initiating diuretics again on 9/3, furosemide 40 mg IV TID     2. Digoxin toxicity: 6.9 on 8/26. transferred to ICU. MAY followed by toxicity. Hyperkalemia(now improved). Digifab per ICU, improved.     3. Acute respiratory failure: ongoing opacites - HCAP. Continue management as per ICU team.    Fi)2 100% at the time of visit    CXR shows persistent pulmonary infiltrates, some fluid likely present but HCAP mostly.     As per intensivist service.     4. HFrEF: Needs additional diuresis as tolerated, hopefully oxygenation will improve and will facilitate vent weaning.      5: Acute diverticulitis s/p perforation and abscess  "s/p drain: per surgery(no surgery planned currently) and IR abscessogram today.     6. Hyperkalemia: improved with treatment of digoxin toxicity. Now Hypo. Replace PRN.     7. Metabolic acidosis: Resolved with bicarb and now with renal function recovery.    8. Anemia, thrombocytopenia:  hgb stable. Transfusion per primary.     Victor Manuel Castle MD  [facility]  [phone] (off)        /54   Pulse 77   Temp 99  F (37.2  C) (Oral)   Resp 16   Ht 5' 2\" (1.575 m)   Wt 197 lb 1.6 oz (89.4 kg)   LMP 01/25/1999   SpO2 100%   BMI 36.05 kg/m      I/O last 3 completed shifts:  In: 1685.9 [I.V.:1409.2; NG/GT:30; IV Piggyback:246.7]  Out: 1145 [Urine:1145]    Vent Mode: VCV  FiO2 (%):  [30 %-100 %] 100 %  S RR:  [16] 16  S VT:  [450 mL] 450 mL  PEEP/CPAP (cm H2O):  [5 cm H2O] 5 cm H2O  Minute Ventilation (L/min):  [7.6 L/min-10.6 L/min] 7.6 L/min  PIP:  [33 cm H2O-42 cm H2O] 35 cm H2O  MAP (cm H2O):  [9-10] 9    Physical Exam:   GENERAL: frail, elderly, chronically ill.   EYES: No eyelid edema  ENT: ET tube in place with vent support.   RESP:  Patient intubated, on ventilatory support, adequate oxygenation, no respiratory distress..  CV: Normal sinus rhythm on cardiac monitor, trace of peripheral edema.   GI: No overt ascites  Musculoskeletal: Diminished muscle bulk/ tone; No gross joint abnormalities  SKIN: No rash, warm/ dry  PSYCH:  ERIKA, sedated      Results from last 7 days   Lab Units 09/03/20  0439 09/02/20  0618 09/02/20  0135  09/01/20  0357  08/30/20  0517  08/28/20  0431   LN-SODIUM mmol/L 142 139  --   --  140   < > 138   < > 138   LN-POTASSIUM mmol/L 3.9 4.3 3.8   < > 3.4*  3.4*   < > 3.6  3.6   < > 3.5  3.5   LN-CHLORIDE mmol/L 107 103  --   --  100   < > 105   < > 106   LN-CO2 mmol/L 27 25  --   --  29   < > 22   < > 20*   LN-BLOOD UREA NITROGEN mg/dL 26 33*  --   --  42*   < > 53*   < > 48*   LN-CREATININE mg/dL 0.53* 0.60  --   --  0.81   < > 2.48*   < > 3.13* "   LN-CALCIUM mg/dL 9.3 9.1  --   --  8.6   < > 8.9   < > 8.4*   LN-ALBUMIN g/dL 1.4*  --   --   --   --   --  1.4*  --  1.7*   LN-PROTEIN TOTAL g/dL  --   --   --   --   --   --  6.0  --  6.3   LN-BILIRUBIN TOTAL mg/dL  --   --   --   --   --   --  2.0*  --  1.9*   LN-ALKALINE PHOSPHATASE U/L  --   --   --   --   --   --  152*  --  147*   LN-ALT (SGPT) U/L  --   --   --   --   --   --  27  --  119*   LN-AST (SGOT) U/L  --   --   --   --   --   --  247*  --  1,413*    < > = values in this interval not displayed.     Results from last 7 days   Lab Units 09/03/20  0439 09/02/20  0618 09/01/20  1023   LN-WHITE BLOOD CELL COUNT thou/uL 13.3* 13.1* 12.4*   LN-HEMOGLOBIN g/dL 8.1* 9.3* 8.2*   LN-HEMATOCRIT % 25.4* 29.7* 24.8*   LN-PLATELET COUNT thou/uL 103* 81* 95*         Scheduled Meds:  ? aspirin  81 mg Enteral Tube DAILY   ? chlorhexidine  15 mL Topical Q12H   ? furosemide  40 mg Intravenous BID - diuretic   ? insulin aspart (NovoLOG) injection   Subcutaneous Q4H FIXED TIMES   ? ipratropium-albuteroL  3 mL Nebulization Q6H - RT   ? meropenem  1 g Intravenous Q8H   ? micafungin (MYCAMINE) IV  100 mg Intravenous Q24H   ? nystatin  5 mL Swish & Swallow QID   ? pantoprazole  40 mg Intravenous BID    Or   ? omeprazole  20 mg Oral BID    Or   ? omeprazole  20 mg Enteral Tube BID   ? [Held by provider] sertraline  100 mg Oral DAILY   ? sodium chloride bacteriostatic  1-5 mL Intradermal Once   ? sodium chloride  10 mL Intravenous Q8H FIXED TIMES   ? sodium chloride  10-30 mL Intravenous Q8H FIXED TIMES   ? ticagrelor  90 mg Enteral Tube BID   ? warfarin - daily dose required   Other Med Consult or Protocol   ? warfarin ANTICOAGULANT  1.5 mg Oral Daily 1700     Continuous Infusions:  ? fentaNYL citrate (PF) 50 mcg/hr (09/03/20 1321)   ? norepinephrine 0.02 mcg/kg/min (09/03/20 1319)   ? propofol Stopped (09/02/20 1030)   ? sodium chloride 0.9% Stopped (09/03/20 1320)   ? vasopressin Stopped (09/02/20 1510)     PRN  Meds:.alteplase, atropine, bacitracin, bisacodyL, carboxymethylcellulose, codeine-guaiFENesin, dextrose 50 % (D50W), glucagon (human recombinant), hydrOXYzine pamoate, lidocaine (PF), lipase-protease-amylase **AND** sodium bicarbonate, metoclopramide, [Held by provider] metoprolol tartrate, naloxone **OR** naloxone, oxyCODONE, polyethylene glycol, sodium chloride 0.9%, sodium chloride bacteriostatic, sodium chloride, sodium chloride, sodium chloride, traZODone

## 2021-07-01 NOTE — CONSULTS
Consults by Madhu Keith MD at 10/14/2018 10:01 AM     Author: Madhu Keith MD Service: Cardiology Author Type: Physician    Filed: 10/14/2018 10:43 AM Date of Service: 10/14/2018 10:01 AM Status: Addendum    : Madhu Keith MD (Physician)    Related Notes: Original Note by Madhu Keith MD (Physician) filed at 10/14/2018 10:42 AM     Consult Orders    1. Inpatient consult to Cardiology Reason for Consult? uncontrolled afib, sob; Did you contact the consulting MD? No; Consult priority: Today (routine); Communication for MD: No phone communication necessary for now [[id number]] ordered by Luis Foy MBBS at 10/14/18 0822              `        [email or web address]  [phone]         Impression and Plan     Assessment:  1. Persistent atrial fibrillation/flutter - rate better controlled overnight.  Heart rate is better controlled with increased metoprolol (just increased).  2. Acute on chronic diastolic heartfailure - due to recent illness/surgery and afib. Still mildly decompensated.  3. Acute cholecystitis, now s/p cholecystectomy.  4. Hypertension - controlled if not borderline low.  5. DMII    [id number] Scoring:  History of CHF: 1  History of Diabetes: 1  History of Hypertension: 1  History of Stroke, TIA, or Thromboembolism: 2  History of Vascular Disease: 0  [id number] Age: 1  [id number] Gender: 1  [id number] Score: 7     A result of 7 equals a stroke risk of 11.2% per year and 15.7% risk of stroke/TIA/systemic embolism.      Plan:    Continue diltiazem and metoprolol at present doses.    Continue warfarin for stroke prevention    Continue torsemide as ordered.    Continue to monitor telemetry.     Will follow.       Primary Cardiologist: Dr. Gisel Choe MD    History of Present Illness      Ms. Gardenia Muller is a 68 y.o. female with a history of atrial flutter, hypertension, hyperlipidemia, DMII, chronic diastolic heart failure who was admitted on 10/3 for  abdominal pain due to acute cholecystitis. She underwent ERCP on 10/5 and laparoscopic cholecystectomy on 10/7/18. She has been in mostly rate controlled atrial flutter with variable AV conduction since admission. Her heart rate is now mildly elevated. Her heart rate is treated with metoprolol tartrate 75 mg two times a day and diltiazem 180 mg daily.     Most of this admission she was on 180 mg diltiazem and 25 mg metoprolol BID. The metoprolol was increased yesterday to 75 mg two times a day. Since that change her heart rate has been better controlled. She is symptomatic when her heart rates are elevated with shortness of breath.    Other than noted above, Ms. Muller denies any chest pain/pressure/tightness, shortness of breath at rest or with exertion, light headedness/dizziness, pre-syncope, syncope, lower extremity swelling, palpitations, paroxysmal nocturnal dyspnea (PND), or orthopnea.      Review of Systems:  Further review of systems is otherwise negative/noncontributory (based on review of medical record (admission H&P) and 13 point review of systems reviewed. Pertinent positives noted).    Cardiac Diagnostics     ECG: Personally reviewed and interpreted: atrial fibrillation with RVR.    Telemetry (personally reviewed): atrial fibrillation with borderline controlled HR.    Most recent:  Echocardiogram (results reviewed):   Echo results:   Results for orders placed during the hospital encounter of 08/03/18   Echo Complete [ECH10] 08/03/2018    Narrative   When compared to the previous study dated 8/11/2011, no significant   change.    Normal left ventricular size and systolic function.    Left ventricle ejection fraction is normal. The estimated left   ventricular ejection fraction is 55%.    Normal right ventricular size and systolic function.    Mild mitral regurgitation        Medical History  Surgical History   Past Medical History:   Diagnosis Date   ? Acute diastolic heart failure (H) 11/2015   ?  Atrial fibrillation (H)    ? Cerebral vascular accident (H)    ? Coronary artery disease 2006    100% RCA with collateral filling per Dr. Elizabeth's angio report   ? Diabetes mellitus type 2 in obese (H)    ? Fibrocystic breast    ? GERD (gastroesophageal reflux disease)    ? History of anesthesia complications     slow to wake   ? Hypertension    ? Peripheral vascular disease (H)    ? PONV (postoperative nausea and vomiting)    ? Stroke (H)    ? Urinary incontinence       Past Surgical History:   Procedure Laterality Date   ? CARDIAC CATHETERIZATION     ? CORONARY STENT PLACEMENT  1995    stent   ? ERCP N/A 10/5/2018    Procedure: ENDOSCOPIC RETROGRADE CHOLANGIOPANCREATOGRAPHY, SPHINCTEROTOMY;  Surgeon: Anson Stokes MD;  Location: [facility] OR;  Service:    ? EXPLORATORY LAPAROTOMY N/A 6/29/2018    Procedure: LAPAROTOMY, CLOSURE OF PERITONEAL RENT;  Surgeon: Jones Harding DO;  Location: [facility] OR;  Service:    ? LAPAROSCOPIC CHOLECYSTECTOMY N/A 10/7/2018    Procedure: CHOLECYSTECTOMY, LAPAROSCOPIC;  Surgeon: Felicia So MD;  Location: [facility] OR;  Service:    ? PICC  6/29/2018        ? VENTRAL HERNIA REPAIR N/A 11/12/2015    Procedure: STRANGULATED VENTRAL HERNIA REPAIR ;  Surgeon: Ernesto Bailey MD;  Location: [facility] OR;  Service:           Family History/Social History/Risk Factors     Family History   Problem Relation Age of Onset   ? Cancer Paternal Grandmother    ? Cancer Paternal Uncle    ? No Medical Problems Mother    ? No Medical Problems Father    ? Heart attack Sister    ? Chronic Kidney Disease Sister    ? No Medical Problems Daughter    ? No Medical Problems Maternal Grandmother    ? No Medical Problems Maternal Grandfather    ? No Medical Problems Paternal Grandfather    ? No Medical Problems Maternal Aunt    ? No Medical Problems Paternal Aunt    ? Diabetes Brother    ? BRCA 1/2 Neg Hx    ? Breast cancer Neg Hx    ? Colon cancer Neg Hx    ? Endometrial  "cancer Neg Hx    ? Ovarian cancer Neg Hx        Social History     Social History   ? Marital status: Legally      Spouse name: N/A   ? Number of children: N/A   ? Years of education: N/A     Occupational History   ? Not on file.     Social History Main Topics   ? Smoking status: Current Every Day Smoker     Packs/day: 0.25   ? Smokeless tobacco: Former User      Comment: e-cig a few times/day   ? Alcohol use No   ? Drug use: No   ? Sexual activity: Not on file     Other Topics Concern   ? Not on file     Social History Narrative         Physical Examination       BP 95/58 (Patient Position: Lying)  Pulse 77  Temp 97.5  F (36.4  C) (Oral)   Resp 19  Ht 5' 3\" (1.6 m)  Wt 165 lb (74.8 kg)  LMP 01/25/1999  SpO2 94%  BMI 29.23 kg/m2      165 lb (74.8 kg)        Intake/Output Summary (Last 24 hours) at 10/14/18 1001  Last data filed at 10/14/18 0850   Gross per 24 hour   Intake              480 ml   Output              600 ml   Net             -120 ml       General Appearance:  Alert, cooperative, no distress, appears older than stated age   Head:  Normocephalic, without obvious abnormality, atraumatic   Eyes:  PERRL, conjunctiva/corneas clear, EOM's intact   Ears:  Normal external ear canals bilaterally   Nose: Nares normal, septum midline, no drainage   Throat: Lips, mucosa, and tongue normal; teeth and gums normal   Neck: Supple, symmetrical, trachea midline, no adenopathy, no carotid bruit or JVD   Back:   Symmetric, no abnormal curvature, ROM normal   Lungs:   Mild bibasilar crackles.   Chest Wall:  No tenderness or deformity   Heart:  Regular rate and rhythm, S1, S2 normal,no murmur, rub or gallop   Abdomen:   Soft, non-tender, bowel sounds active all four quadrants,  no masses, no organomegaly   Extremities: Extremities normal, atraumatic, no cyanosis or edema   Skin: Skin color, texture, turgor normal, no rashes or lesions   Psychiatric: Normal affect, pleasant and cooperative   Neurologic: Alert " and oriented X 3, Moves all 4 extremities            Imaging     Chest x-ray:   FINDINGS: Mildly coarsened interstitial lung markings. No consolidation. No pleural effusions. No pneumothorax. Unchanged cardiac size. Aortic atherosclerosis. Surgical clips in the right upper quadrant. Vascular stent projects over the L2 vertebral   body. Mild gas and fluid in the stomach.    Lab Results   Lab Results   Component Value Date     10/14/2018    K 3.5 10/14/2018    K 3.6 10/14/2018    CL 98 10/14/2018    CO2 30 10/14/2018    BUN 23 (H) 10/14/2018    CREATININE 0.94 10/14/2018    CALCIUM 11.4 (H) 10/14/2018     Lab Results   Component Value Date    WBC 9.2 10/14/2018    HGB 13.7 10/14/2018    HCT 43.7 10/14/2018    MCV 86 10/14/2018     10/14/2018     Lab Results   Component Value Date    CHOL 165 09/20/2017    TRIG 116 06/30/2018    HDL 56 09/20/2017    LDLCALC 85 09/20/2017    LDLDIRECT 94 04/15/2015     Lab Results   Component Value Date    INR 1.95 (H) 10/14/2018     Lab Results   Component Value Date    BNP 1140 (H) 10/14/2018     Lab Results   Component Value Date    TROPONINI 0.01 10/03/2018    TROPONINI 0.02 06/24/2018    TROPONINI <0.01 09/20/2017     Lab Results   Component Value Date    TSH 1.08 07/07/2016           Current Inpatient Scheduled Medications   Scheduled Meds:  ? acetaminophen  650 mg Oral BID   ? aspirin  81 mg Oral DAILY   ? diltiazem  180 mg Oral DAILY   ? magnesium oxide  400 mg Oral TID   ? metoprolol tartrate  75 mg Oral BID   ? omeprazole  20 mg Oral BID AC   ? potassium chloride  20 mEq Oral DAILY   ? potassium chloride ER  30 mEq Oral Q4H   ? rOPINIRole  1 mg Oral QHS   ? rosuvastatin  40 mg Oral QHS   ? senna-docusate  1 tablet Oral BID   ? sertraline  100 mg Oral DAILY   ? torsemide  20 mg Oral BID - diuretic   ? traZODone  50 mg Oral QHS   ? warfarin - daily dose required   Other Med Consult or Protocol     Continuous Infusions:         Medications Prior to Admission   Prior  to Admission medications    Medication Sig Start Date End Date Taking? Authorizing Provider   acetaminophen (TYLENOL) 650 MG CR tablet Take 650 mg by mouth 2 (two) times a day.    Yes PROVIDER, HISTORICAL   aspirin 81 MG EC tablet Take 81 mg by mouth daily.    Yes PROVIDER, HISTORICAL   carboxymethylcellulose (REFRESH PLUS) 0.5 % Dpet ophthalmic dropperette Administer 2 drops to both eyes every hour as needed (dry eyes).    Yes PROVIDER, HISTORICAL   diltiazem (CARDIZEM CD) 180 MG 24 hr capsule Take 1 capsule (180 mg total) by mouth daily. 8/24/18  Yes Rula Jorgensen CNP   ferrous sulfate 325 (65 FE) MG tablet Take 1 tablet (325 mg total) by mouth 2 (two) times a day. 7/31/18  Yes Jerson Hernandez MD   loratadine (CLARITIN) 10 mg tablet Take 10 mg by mouth daily with lunch. Noon   Yes PROVIDER, HISTORICAL   magnesium oxide (MAG-OX) 400 mg tablet Take 1,200 mg by mouth daily.    Yes PROVIDER, HISTORICAL   metoprolol tartrate (LOPRESSOR) 25 MG tablet TAKE 1 TABLET BY MOUTH TWICE DAILY (8AM & 8PM) 8/30/18  Yes Jerson Hernandez MD   multivitamin (TAB-A-JULIET) per tablet Take 1 tablet by mouth daily.  Patient taking differently: Take 1 tablet by mouth daily.  2/7/18  Yes Jerson Hernandez MD   nitroglycerin (NITROSTAT) 0.4 MG SL tablet Place 1 tablet (0.4 mg total) under the tongue every 5 (five) minutes as needed for chest pain (for up to 3 doses and call 911 if persists).  Patient taking differently: Place 0.4 mg under the tongue every 5 (five) minutes as needed for chest pain (for up to 3 doses and call 911 if persists).  11/17/16  Yes Jerson Hernandez MD   omeprazole (PRILOSEC) 20 MG capsule TAKE 1 CAPSULE BY MOUTH TWICE DAILY (8AM & 8PM) 9/6/18  Yes Jerson Hernandez MD   polyethylene glycol (GLYCOLAX) 17 gram/dose powder Take 17 g by mouth daily as needed.  7/3/18  Yes PROVIDER, HISTORICAL   potassium chloride (K-DUR,KLOR-CON) 20 MEQ tablet Take 1 tablet (20 mEq total) by mouth daily.  8/24/18  Yes Rula Jorgensen CNP   rOPINIRole (REQUIP) 1 MG tablet 1 tablet 30-60 minutes before bedtime  Patient taking differently: 1 tablet 30-60 minutes before bedtime 9/5/18  Yes Jerson Hernandez MD   rosuvastatin (CRESTOR) 40 MG tablet TAKE 1 TABLET BY MOUTH AT BEDTIME 6/15/18  Yes Jerson Hernandez MD   sertraline (ZOLOFT) 100 MG tablet TAKE 1 TABLET BY MOUTH ONCE DAILY 4/19/18  Yes Jerson Hernandez MD   torsemide (DEMADEX) 20 MG tablet Take 1 tablet (20 mg total) by mouth 2 (two) times a day at 9am and 6pm.  Patient taking differently: Take 20 mg by mouth daily.  9/5/18  Yes Jerson Hernandez MD   VITAMIN D3 2,000 unit capsule TAKE 1 CAPSULE BY MOUTH ONCE DAILY AT 8AM (DO NOT BRAND CHANGE - PATIENT ONLY WANT CAPSULES) 9/6/18  Yes Jerson Hernandez MD   warfarin (COUMADIN/JANTOVEN) 2 MG tablet Take by mouth See Admin Instructions. Take 4 mg on Sun/Tues/Thurs and 2 mg all other days of the week. Adjust dose based on INR results as directed.    Yes PROVIDER, HISTORICAL   blood glucose meter (GLUCOMETER) Please Dispense kit per pharmacy discretion and patient's insurance Test blood sugar. 2/16/17   Juliet Hazel MD   blood glucose test strips Use 1 each As Directed as needed. Dispense brand per patient's insurance at pharmacy discretion. 9/5/18   Jerson Hernandez MD   lancets 33 gauge Misc Test twice daily Dispense brand per patient's insurance at pharmacy discretion. 2/16/17   Juliet Hazel MD   LORazepam (ATIVAN) 0.5 MG tablet 1 tabs po q 6 hours prn 10/12/18   Manav Martínez MD   metoprolol tartrate (LOPRESSOR) 50 MG tablet Take 1 tablet (50 mg total) by mouth 2 (two) times a day. 10/12/18   Manav Martínez MD   oxyCODONE (ROXICODONE) 5 MG immediate release tablet Take 1 tablet (5 mg total) by mouth every 8 (eight) hours as needed for pain. 10/12/18   Manav Martínez MD   senna-docusate (PERICOLACE) 8.6-50 mg tablet Take 1 tablet by mouth 2 (two) times a day. 10/10/18    Uzair Burdick MD   traZODone (DESYREL) 50 MG tablet Take 1 tablet (50 mg total) by mouth at bedtime. 10/12/18   MD Madhu Alcantara MD  Non-invasive Cardiology  [facility]

## 2021-07-01 NOTE — CONSULTS
Consults by Ivelisse Hernandez MD at 8/27/2020 10:54 AM     Author: Ivelisse Hernandez MD Service: Cardiology Author Type: Physician    Filed: 8/27/2020  1:50 PM Date of Service: 8/27/2020 10:54 AM Status: Signed    : Ivelisse Hernandez MD (Physician)         HEART CARE NOTE        Thank you, Dr. Cesar, for asking the [facility] team to see Gardenia Muller to evaluate arrhyhtmia in the setting digoxin toxicity .      Assessment/Recommendations     1. Junctional bradycardia 2/2 digoxin toxicity  Assessment / Plan    Patient noted to be in junctional bradycardia overnight and dig levels of 6.9    Discussed with cardiology overnight and instructed to dose digfab    Course c/b MAY/hyperkalemia and hypotension and currently maintained on dobutamine gtt  ---> repeat ECG significant for NSR with 1 st degree AV-Block    Continue dopamine gtt; ideal HR ~ 80s bpm (as she is now), but low threshold for TV pacing    2. HFrEF/Biventricular dysfunction c/b ADHF  Assessment / Plan    Visible orthopnea on observation in the setting of progressive renal dysfunction any oliguria ---> nephrology following; diuresis on hold for the time being     GDMT on hold hemodynamic instability     3. Chronic afib/flutter  Assessment / Plan    C/b CVA    Course complicated by digoxin toxicity and junctional bradycardia. Currently in sinus on dopamine     4. Valvular heart disease   Assessment / Plan    Moderate MR (c/b severely decreased posterior leaflet mobility)    Severe TR -- will need valve clinic evaluation once compensated      5. DM2  Assessment / Plan    Management per primary team     6. CAD  Assessment / Plan    Known  to RCA although mild RV dysfunction out of proportion to degree of pressure overload    Continue ASA unless otherwise contraindicated     Bblocker and ACE-I on hold given details above    7. Renal dysfunction  Assessment / Plan    Nephrology following for progressive  MAY on CKD; patient currently in respiratory distress and oliguric; crt rising      History of Present Illness/Subjective    Ms. Gardenia Muller is a 70 y.o. female with a PMHx significant for 69 y/o female with PMH of CAD, CHF, Afib, chronic abdominal pain with intestinal angina, Type 2 DM, depression. Patient initialy admitted for acute diverticulitus with perforation and developed abcess and required drain placement on 8/17. Patient was found to have digoxin toxicity on 8/26 and transferred to the ICU for hemodynamic changes and bradycardia.    Mrs. Muller was observed on BIPAP and was able to relay her the discomfort of the mask.    ECG: Personally reviewed. normal sinus rhythm, occasional PVC noted, unifocal, 1st degree AV block.    ECHO (personnaly Reviewed):     When compared to the previous study dated 7/23/2020, no significant change. Limited study done and tricuspid regurgitation not well assessed on this study.    Left ventricle ejection fraction is normal. The calculated left ventricular ejection fraction is 56%.    Normal left ventricular size and systolic function.    Left atrial volume is moderately increased.    Aortic Valve: The aortic valve is sclerotic without reduced excursion. Mild aortic regurgitation.    Mitral Valve: There is severe valve leaflet thickening. Mild mitral regurgitation.             Physical Examination Review of Systems   Vitals:    08/27/20 1030   BP:    Pulse: 79   Resp: (!) 31   Temp:    SpO2: 97%     Body mass index is 38.52 kg/m .  Wt Readings from Last 3 Encounters:   08/27/20 210 lb 9.6 oz (95.5 kg)   08/11/20 179 lb 4.8 oz (81.3 kg)   08/12/20 179 lb (81.2 kg)     General Appearance:   mild distress, normal body habitus   ENT/Mouth: membranes moist, no oral lesions or bleeding gums.      EYES:  no scleral icterus, normal conjunctivae   Neck: no carotid bruits or thyromegaly   Chest/Lungs:   lungs are clear to auscultation, no rales or wheezing, equal chest wall  expansion    Cardiovascular:   Regular. Normal first and second heart sounds with no rubs, or gallops; the carotid, radial and posterior tibial pulses are intact, trace edema bilaterally    Abdomen:  no organomegaly, masses, bruits, or tenderness; bowel sounds are present   Extremities: no cyanosis or clubbing   Skin: no xanthelasma, warm.    Neurologic: In bed; alert     Psychiatric: alert     A complete 10 systems ROS was reviewed  And is negative except what is listed in the HPI.          Medical History  Surgical History Family History Social History   Past Medical History:   Diagnosis Date   ? Acute diastolic heart failure (H) 11/2015   ? Acute on chronic diastolic heart failure (H) 6/15/2019   ? Advanced directives, counseling/discussion 8/5/2015    Patient completed 2011.  Discussed today, 5/24/17, and wants to change healthcare agent from niece to daughter.  Discussed that she will need to complete new form to indicate this.   ? MAY (acute kidney injury) (H) 10/22/2018   ? Atrial fibrillation (H)    ? Benign adenomatous polyp of large intestine 3/30/2017    Colonoscopy March 2017.  Recommend 5 year follow-up.  Single tubular adenoma   ? Biliary obstruction 10/3/2018    Added automatically from request for surgery [id number]   ? Cerebral vascular accident (H)    ? Coronary artery disease 2006    100% RCA with collateral filling per Dr. Elizabeth's angio report   ? Diabetes mellitus type 2 in obese (H)    ? Fibrocystic breast    ? GERD (gastroesophageal reflux disease)    ? History of anesthesia complications     slow to wake   ? Hypertension    ? Obstructive sleep apnea syndrome    ? Peripheral vascular disease (H)    ? Pneumonia 11/11/2018   ? PONV (postoperative nausea and vomiting)    ? S/P cholecystectomy 6/22/2018   ? SBO (small bowel obstruction) (H) 11/12/2015   ? Stroke (H)    ? Transaminitis 10/22/2018   ? Upper respiratory infection 12/20/2018   ? Urinary incontinence     Past Surgical History:    Procedure Laterality Date   ? CARDIAC CATHETERIZATION     ? CARDIOVERSION  10/18/2018   ? CORONARY STENT PLACEMENT  1995    stent   ? CV CORONARY ANGIOGRAM N/A 7/27/2020    Procedure: Coronary Angiogram;  Surgeon: Luis Monge MD;  Location: [facility] Cath Lab;  Service: Cardiology   ? CV RIGHT HEART CATH SHUNT RUN  7/27/2020    Procedure: Right Heart Catheterization Shunt Run;  Surgeon: Luis Monge MD;  Location: [facility] Cath Lab;  Service: Cardiology   ? ERCP N/A 10/5/2018    Procedure: ENDOSCOPIC RETROGRADE CHOLANGIOPANCREATOGRAPHY, SPHINCTEROTOMY;  Surgeon: Anson Stokes MD;  Location: [facility] Main OR;  Service:    ? EXPLORATORY LAPAROTOMY N/A 6/29/2018    Procedure: LAPAROTOMY, CLOSURE OF PERITONEAL RENT;  Surgeon: Jones Harding DO;  Location: [facility] Main OR;  Service:    ? LAPAROSCOPIC CHOLECYSTECTOMY N/A 10/7/2018    Procedure: CHOLECYSTECTOMY, LAPAROSCOPIC;  Surgeon: Felicia So MD;  Location: [facility] OR;  Service:    ? MIDLINE INSERTION - DOUBLE LUMEN  8/13/2020        ? MIDLINE INSERTION - DOUBLE LUMEN  8/19/2020        ? PICC  6/29/2018        ? PICC  10/21/2018        ? PICC INSERTION - DOUBLE LUMEN  8/23/2020        ? VENTRAL HERNIA REPAIR N/A 11/12/2015    Procedure: STRANGULATED VENTRAL HERNIA REPAIR ;  Surgeon: Ernesto Bailey MD;  Location: [facility] Main OR;  Service:     no family history of premature coronary artery disease Social History     Socioeconomic History   ? Marital status: Legally      Spouse name: Not on file   ? Number of children: 1   ? Years of education: Not on file   ? Highest education level: Not on file   Occupational History   ? Not on file   Social Needs   ? Financial resource strain: Not on file   ? Food insecurity     Worry: Not on file     Inability: Not on file   ? Transportation needs     Medical: Not on file     Non-medical: Not on file   Tobacco Use   ? Smoking status: Former Smoker     Packs/day: 0.25   ?  Smokeless tobacco: Former User   ? Tobacco comment: e-cig a few times/day   Substance and Sexual Activity   ? Alcohol use: No   ? Drug use: No   ? Sexual activity: Not on file   Lifestyle   ? Physical activity     Days per week: Not on file     Minutes per session: Not on file   ? Stress: Not on file   Relationships   ? Social connections     Talks on phone: Not on file     Gets together: Not on file     Attends Oriental orthodox service: Not on file     Active member of club or organization: Not on file     Attends meetings of clubs or organizations: Not on file     Relationship status: Not on file   ? Intimate partner violence     Fear of current or ex partner: Not on file     Emotionally abused: Not on file     Physically abused: Not on file     Forced sexual activity: Not on file   Other Topics Concern   ? Not on file   Social History Narrative    Single/, lives alone; daughter in [place];    Gets help from neighbors and extended family    Former smoker.no alcohol problems           Lab Results    Chemistry/lipid CBC Cardiac Enzymes/BNP/TSH/INR   Lab Results   Component Value Date    CHOL 86 03/26/2019    HDL 35 (L) 03/26/2019    LDLCALC 32 03/26/2019    TRIG 93 03/26/2019    CREATININE 2.88 (H) 08/27/2020    BUN 40 (H) 08/27/2020    K 4.2 08/27/2020     08/27/2020     (H) 08/27/2020    CO2 20 (L) 08/27/2020    Lab Results   Component Value Date    WBC 18.3 (H) 08/27/2020    HGB 9.1 (L) 08/27/2020    HCT 28.3 (L) 08/27/2020    MCV 93 08/27/2020     (L) 08/27/2020    Lab Results   Component Value Date    CKTOTAL 608 (HH) 08/27/2020    TROPONINI 0.07 08/11/2020     (H) 08/27/2020    TSH 8.31 (H) 10/22/2018    INR 5.06 (H) 08/27/2020     Lab Results   Component Value Date    CKTOTAL 608 (HH) 08/27/2020    TROPONINI 0.07 08/11/2020          Weight:    Wt Readings from Last 3 Encounters:   08/27/20 210 lb 9.6 oz (95.5 kg)   08/11/20 179 lb 4.8 oz (81.3 kg)   08/12/20 179 lb (81.2 kg)        Allergies  Allergies   Allergen Reactions   ? Penicillins Swelling         Surgical History  Past Surgical History:   Procedure Laterality Date   ? CARDIAC CATHETERIZATION     ? CARDIOVERSION  10/18/2018   ? CORONARY STENT PLACEMENT  1995    stent   ? CV CORONARY ANGIOGRAM N/A 7/27/2020    Procedure: Coronary Angiogram;  Surgeon: Luis Monge MD;  Location: [facility] Cath Lab;  Service: Cardiology   ? CV RIGHT HEART CATH SHUNT RUN  7/27/2020    Procedure: Right Heart Catheterization Shunt Run;  Surgeon: Luis Monge MD;  Location: [facility] Cath Lab;  Service: Cardiology   ? ERCP N/A 10/5/2018    Procedure: ENDOSCOPIC RETROGRADE CHOLANGIOPANCREATOGRAPHY, SPHINCTEROTOMY;  Surgeon: Anson Stokes MD;  Location: [facility] Main OR;  Service:    ? EXPLORATORY LAPAROTOMY N/A 6/29/2018    Procedure: LAPAROTOMY, CLOSURE OF PERITONEAL RENT;  Surgeon: Jones Harding DO;  Location: [facility] OR;  Service:    ? LAPAROSCOPIC CHOLECYSTECTOMY N/A 10/7/2018    Procedure: CHOLECYSTECTOMY, LAPAROSCOPIC;  Surgeon: Felicia So MD;  Location: [facility] OR;  Service:    ? MIDLINE INSERTION - DOUBLE LUMEN  8/13/2020        ? MIDLINE INSERTION - DOUBLE LUMEN  8/19/2020        ? PICC  6/29/2018        ? PICC  10/21/2018        ? PICC INSERTION - DOUBLE LUMEN  8/23/2020        ? VENTRAL HERNIA REPAIR N/A 11/12/2015    Procedure: STRANGULATED VENTRAL HERNIA REPAIR ;  Surgeon: Ernesto Bailey MD;  Location: [facility] OR;  Service:        Social History  Tobacco:   Social History     Tobacco Use   Smoking Status Former Smoker   ? Packs/day: 0.25   Smokeless Tobacco Former User   Tobacco Comment    e-cig a few times/day    Alcohol:   Social History     Substance and Sexual Activity   Alcohol Use No    Illicit Drugs:   Social History     Substance and Sexual Activity   Drug Use No       Family History  Family History   Problem Relation Age of Onset   ? Cancer Paternal Grandmother    ? Cancer  Paternal Uncle    ? No Medical Problems Mother    ? No Medical Problems Father    ? Heart attack Sister    ? Chronic Kidney Disease Sister    ? No Medical Problems Daughter    ? No Medical Problems Maternal Grandmother    ? No Medical Problems Maternal Grandfather    ? No Medical Problems Paternal Grandfather    ? No Medical Problems Maternal Aunt    ? No Medical Problems Paternal Aunt    ? Diabetes Brother    ? BRCA 1/2 Neg Hx    ? Breast cancer Neg Hx    ? Colon cancer Neg Hx    ? Endometrial cancer Neg Hx    ? Ovarian cancer Neg Hx           Carlos Hernandez on 8/27/2020      cc: Jerson Hernandez MD,

## 2021-07-01 NOTE — CONSULTS
Consults by Hiren Salomon MD at 8/11/2020  5:37 PM     Author: Hiren Salomon MD Service: Surgery Author Type: Physician    Filed: 8/11/2020  5:37 PM Date of Service: 8/11/2020  5:37 PM Status: Signed    : Hiren Salomon MD (Physician)       I have examined this patient and the medical care has been evaluated and discussed with the note from Nela Camacho. The documentation has been reviewed. I agree with the medical care provided and confirm the findings.     Stable  Following        Hiren Salomon MD    [email or web address]  General Surgery [phone]  Vascular Surgery [phone]

## 2021-07-01 NOTE — CONSULTS
Consults by Zafar Torres MD at 8/19/2020  5:37 PM     Author: Zafar Torres MD Service: Cardiology Author Type: Physician    Filed: 8/19/2020  7:00 PM Date of Service: 8/19/2020  5:37 PM Status: Signed    : Zafar Torres MD (Physician)     Consult Orders    1. Inpatient consult to Cardiology Reason for Consult? Atrial fibrillation with hypertension; Did you contact the consulting MD? Yes; Consult priority: Today (routine); Communication for MD: No phone communication necessary for now [[id number]] ordered by Michael Abreu MD at 08/19/20 1312    2. Inpatient consult to Cardiology Reason for Consult? sob , recent cardiac cath, how long should we hold her cardiac meds; Consult priority: Today (routine); Communication for MD: No phone communication necessary for now [[id number]] ordered by Michael Pink MD at 08/11/20 1532    3. Inpatient consult to Cardiology Reason for Consult? sob , recent cardiac cath; Consult priority: Today (routine); Communication for MD: No phone communication necessary for now [[id number]] ordered by Michael Pink MD at 08/11/20 1505              [facility] team is asked to see Gardenia Muller in consultation to evaluate Atrial fibrillation with hypertension.      Assessment:     1. Atrial fibrillation with rapid ventricular response.  Control improving with resumption of digoxin.  Would resume digoxin 0.25 mg daily, along with low-dose metoprolol as blood pressure allows  2. Coronary artery disease status post recent LAD stent placement, continuing Brilinta and low-dose aspirin.  No anginal symptoms.  3. History of ischemic cardiomyopathy with most recent left ventricular ejection fraction 56% by echo suggesting resolution with coronary intervention  4. Probable obstructive sleep apnea.  Would benefit from outpatient evaluation  5. Tobacco abuse, ongoing.  Smoking cessation strongly encouraged     Plan:     1. Resume digoxin 0.25  mg daily.  Complete IV load this evening  2. Resume metoprolol succinate 12.5 mg daily as blood pressure allows, greater than 110 systolic  3. Suggest outpatient sleep apnea evaluation.  Patient reports not being interested in pursuing this further.     Current History:     Gardenia Muller is a 70-year-old woman with a history of coronary artery disease status post coronary intervention last month complicated by LAD dissection, with multiple stents placed.  She also has a history of ischemic cardiomyopathy, chronic atrial fibrillation, moderate mitral insufficiency with severe tricuspid insufficiency, diabetes mellitus type 2..  She has a known chronic total occlusion of the right coronary artery.  She also has a history of alcohol abuse, and tobacco abuse.    She was admitted last week for abdominal pain, vomiting found to have acute diverticulitis with perforation and underwent CT-guided drainage of diverticular abscess.  Brilinta was held for a couple of days before resumption 8/17.  Prior to admission atrial fibrillation was controlled with digoxin 0.25 mg daily along with metoprolol succinate 12.5 mg daily.  Digoxin has been held since admission, restarted today.      She reports chronic sleepiness, frequent nocturnal awakenings, and snoring.  Her snoring can awaken her from her own sleep.  She does have sleepiness through much of the daytime.  She is never been evaluated for sleep apnea.  She has not experienced chest pain.  She does feel her heart racing, though this has improved somewhat today.        Past Medical History:     Past Medical History:   Diagnosis Date   ? Acute diastolic heart failure (H) 11/2015   ? Acute on chronic diastolic heart failure (H) 6/15/2019   ? Advanced directives, counseling/discussion 8/5/2015    Patient completed 2011.  Discussed today, 5/24/17, and wants to change healthcare agent from niece to daughter.  Discussed that she will need to complete new form to indicate this.   ?  MAY (acute kidney injury) (H) 10/22/2018   ? Atrial fibrillation (H)    ? Benign adenomatous polyp of large intestine 3/30/2017    Colonoscopy March 2017.  Recommend 5 year follow-up.  Single tubular adenoma   ? Biliary obstruction 10/3/2018    Added automatically from request for surgery [id number]   ? Cerebral vascular accident (H)    ? Coronary artery disease 2006    100% RCA with collateral filling per Dr. Elizabeth's angio report   ? Diabetes mellitus type 2 in obese (H)    ? Fibrocystic breast    ? GERD (gastroesophageal reflux disease)    ? History of anesthesia complications     slow to wake   ? Hypertension    ? Peripheral vascular disease (H)    ? Pneumonia 11/11/2018   ? PONV (postoperative nausea and vomiting)    ? S/P cholecystectomy 6/22/2018   ? SBO (small bowel obstruction) (H) 11/12/2015   ? Stroke (H)    ? Transaminitis 10/22/2018   ? Upper respiratory infection 12/20/2018   ? Urinary incontinence      Sleep history: Poorly restorative with daytime sleepiness, paroxysmal nocturnal dyspnea, snoring that awakens her from her sleep.  Past Surgical History:     Past Surgical History:   Procedure Laterality Date   ? CARDIAC CATHETERIZATION     ? CARDIOVERSION  10/18/2018   ? CORONARY STENT PLACEMENT  1995    stent   ? CV CORONARY ANGIOGRAM N/A 7/27/2020    Procedure: Coronary Angiogram;  Surgeon: Luis Monge MD;  Location: [facility];  Service: Cardiology   ? CV RIGHT HEART CATH SHUNT RUN  7/27/2020    Procedure: Right Heart Catheterization Shunt Run;  Surgeon: Luis Monge MD;  Location: [facility];  Service: Cardiology   ? ERCP N/A 10/5/2018    Procedure: ENDOSCOPIC RETROGRADE CHOLANGIOPANCREATOGRAPHY, SPHINCTEROTOMY;  Surgeon: Anson Stokes MD;  Location: [facility] OR;  Service:    ? EXPLORATORY LAPAROTOMY N/A 6/29/2018    Procedure: LAPAROTOMY, CLOSURE OF PERITONEAL RENT;  Surgeon: Jones Harding DO;  Location: [facility] OR;  Service:    ? LAPAROSCOPIC  CHOLECYSTECTOMY N/A 10/7/2018    Procedure: CHOLECYSTECTOMY, LAPAROSCOPIC;  Surgeon: Felicia So MD;  Location: [facility] OR;  Service:    ? MIDLINE INSERTION - DOUBLE LUMEN  8/13/2020        ? PICC  6/29/2018        ? PICC  10/21/2018        ? VENTRAL HERNIA REPAIR N/A 11/12/2015    Procedure: STRANGULATED VENTRAL HERNIA REPAIR ;  Surgeon: Ernesto Bailey MD;  Location: [facility] OR;  Service:        Family History:     Family History   Problem Relation Age of Onset   ? Cancer Paternal Grandmother    ? Cancer Paternal Uncle    ? No Medical Problems Mother    ? No Medical Problems Father    ? Heart attack Sister    ? Chronic Kidney Disease Sister    ? No Medical Problems Daughter    ? No Medical Problems Maternal Grandmother    ? No Medical Problems Maternal Grandfather    ? No Medical Problems Paternal Grandfather    ? No Medical Problems Maternal Aunt    ? No Medical Problems Paternal Aunt    ? Diabetes Brother    ? BRCA 1/2 Neg Hx    ? Breast cancer Neg Hx    ? Colon cancer Neg Hx    ? Endometrial cancer Neg Hx    ? Ovarian cancer Neg Hx      Family history reviewed and is not pertinent to the chief complaint or presenting problem    Social History:    reports that she has quit smoking. She smoked 0.25 packs per day. She has quit using smokeless tobacco. She reports that she does not drink alcohol or use drugs.      Meds:   Scheduled Meds:  ? aspirin  81 mg Oral DAILY   ? atorvastatin  80 mg Oral QHS   ? ciprofloxacin  400 mg Intravenous Q12H   ? insulin aspart (NovoLOG) injection   Subcutaneous TID with meals   ? magnesium chloride  128 mg Oral BID   ? magnesium sulfate IVPB  2 g Intravenous Once   ? metroNIDAZOLE  500 mg Intravenous Q12H   ? omeprazole  20 mg Oral BID AC   ? polyethylene glycol  17 g Oral DAILY   ? sertraline  100 mg Oral DAILY   ? sodium chloride bacteriostatic  1-5 mL Intradermal Once   ? sodium chloride  10 mL Intravenous Q8H FIXED TIMES   ? ticagrelor  90 mg Oral BID  "    Continuous Infusions:  PRN Meds:.dextrose 50 % (D50W), glucagon (human recombinant), hydrOXYzine pamoate, metoprolol tartrate, naloxone **OR** naloxone, ondansetron **OR** ondansetron, oxyCODONE, sodium chloride 0.9%, sodium chloride, sodium chloride, sodium chloride, traZODone    Allergies:   Penicillins    Review of Systems:   A 12 point comprehensive review of systems was negative except as noted in the current history.    Objective:      Physical Exam  Wt Readings from Last 3 Encounters:   08/19/20 197 lb 9.6 oz (89.6 kg)   08/11/20 179 lb 4.8 oz (81.3 kg)   08/12/20 179 lb (81.2 kg)     Body mass index is 36.14 kg/m .  BP 92/41 (Patient Position: Lying)   Pulse (!) 107   Temp 98.8  F (37.1  C) (Oral)   Resp 18   Ht 5' 2\" (1.575 m)   Wt 197 lb 9.6 oz (89.6 kg)   LMP 01/25/1999   SpO2 94%   BMI 36.14 kg/m      General Appearance:  No apparent distress.  Tired appearing  HEENT: No scleral icterus; the mucous membranes were pink and moist.  Conjunctiva bilaterally injected  Neck:  No cervical bruits, jugular venous distention, or thyromegaly.  Chest:The spine was straight. The chest was symmetric.  Lungs:  Respirations unlabored; the lungs are clear to auscultation.  Cardiovascular:    Normal point of maximal impulse. Auscultation reveals irregularly irregular first and second heart sounds with 2/6 blowing systolic murmur left lower sternal border.. Carotid, radial, and dorsalis pedal pulses are intact and symmetric.  Abdomen:  No organomegaly, masses, bruits, or tenderness. Bowels sounds are present  Extremities: Thick.  No cyanosis, clubbing, or edema.  Skin:  No xanthelasma.  Neurologic: Mood and affect are appropriate. Speech is fluent.      EKG (personally reviewed): 8/11/2020: Atrial fibrillation at 67 bpm.  PVC.  Inferior lateral ST and T wave changes consistent with ischemia.    Telemetry shows atrial fibrillation, 80 to 130 bpm gradually increasing through the course of today.      Cardiac " diagnostics    Limited echocardiogram 7/27/2020:    When compared to the previous study dated 7/23/2020, no significant change. Limited study done and tricuspid regurgitation not well assessed on this study.    Left ventricle ejection fraction is normal. The calculated left ventricular ejection fraction is 56%.    Normal left ventricular size and systolic function.    Left atrial volume is moderately increased.    Aortic Valve: The aortic valve is sclerotic without reduced excursion. Mild aortic regurgitation.    Mitral Valve: There is severe valve leaflet thickening. Mild mitral regurgitation.    Coronary intervention 7/27/2020:  - severe native CAD w/ RCA  and mLAD that supplies L-R collaterals; LAD s/p PCI w/ roto/DESx2 and another DESx1 LMT-ostial LAD due to extension of coronary dissection   - normal R- and L-sided pressures, normal pulmonary pressures, normal CO/CI by Jeniffer  - no step up on shunt run, QP:Qs: .88  - ASA 81mg daily indefinitely, ticagrelor 180mg once, followed by 90mg twice daily for at least 12 months. If has to be on warfarin, would do triple therapy for at least 3 mos, then can stop ASA. After 1 year, would switch ticagrelor for clopidogrel and continue clopidogrel/warfarin indefinitely    Lab Review   Results from last 7 days   Lab Units 08/19/20  0720 08/18/20  0659 08/17/20  0520   LN-SODIUM mmol/L 135* 139 139   LN-POTASSIUM mmol/L 3.3* 3.3* 3.3*   LN-CHLORIDE mmol/L 103 106 107   LN-CO2 mmol/L 21* 23 22   LN-BLOOD UREA NITROGEN mg/dL 10 18 31*   LN-CREATININE mg/dL 0.82 1.33* 3.03*   LN-CALCIUM mg/dL 7.2* 7.2* 6.9*   LN-ALBUMIN g/dL  --   --  1.8*     Results from last 7 days   Lab Units 08/19/20  0720   LN-WHITE BLOOD CELL COUNT thou/uL 14.5*   LN-HEMOGLOBIN g/dL 8.2*   LN-HEMATOCRIT % 25.5*   LN-PLATELET COUNT thou/uL 136*     Lab Results   Component Value Date    LDLCALC 32 03/26/2019           Zafar Torres MD, FACC  8/19/2020    Note created using Dragon voice recognition  software.  Sound alike errors may have escaped editing.

## 2021-07-01 NOTE — CONSULTS
Consults by Jose Raul Crocker DO at 8/27/2020  8:23 AM     Author: Jose Raul Crocker DO Service: Nephrology Author Type: Physician    Filed: 8/27/2020  9:44 AM Date of Service: 8/27/2020  8:23 AM Status: Signed    : Jose Raul Crocker DO (Physician)     Consult Orders    1. Inpatient consult to Nephrology Reason for Consult? Reconsult.  Acute kidney injury, anuria, digoxing toxicity.; Which Nephrology Group will follow this patient? [facility]; Did you contact the consulting MD? No; Consult priority: To... [[id number]] ordered by Sharron Parikh MD at 08/27/20 0508                   RENAL CONSULTATION:     Date of Consultation:  8/27/2020  Requesting Physician: Dr Parikh  Reason for Consult:  MAY    Assessment/ Recommendations:  1. MAY: creatinine baseline of 0.8-1 with last normal on 8/24 with trend up to 2.88 on 8/27. CT with contrast on 8/20. Patients UA on 8/26 with hyaline cats, squam epi, trace LE, 50 protein, trace ketones. Noted to have some hypotension on 8/24 could have been the initial start of MAY leading to elevated dig level. Hypotension on 8/26 with bradycardia now with risk for ATN. Also noted to have elevated vancomycin level up to 43.1(8/23) certainly could be contributing. On ACE-I with last dose on 8/25.  No urine output recorded currently.  Likey hypotension and vancomycin with initialy contibutins to MAY in the setting of diuresis and ACE-I and followed by elevated digoxin level with hemodynamic changes.   -PCR  -Intake output, daily weights.   -Hold ACE-I, aldactone, lasix.   -OK with bicarb IVF currently.   -Bardales.       2. Digoxin toxicity: 6.9 on 8/26. transferred to ICU. MAY followed by toxicity. Hyperkalemia(now improved). Digifab per ICU. Dopamin ggt for support.   3. Acute respiratory failure: ongoing opacites - HCAP. Vascular congetion improved form previous. Moniotor with IVF.  4. HFpEF: CXR from 8/26 with vascular congestion improved. Holding lasix today.    5: Acute diverticulitis s/p perforation and abscess s/p drain: per surgery(no surgery planned currently) and ICU.   6. Hyperkalemia: improved with treatment of digoxin toxicity.       Jose Raul Crocker  [facility]  [phone] (off)       History of present illness:  Ms Muller is a 71 y/o female with PMH of CAD, CHF, Afib, chronic abdominal pain with intestinal angina, Type 2 DM, depression. Patient initialy admitted for acute diverticulitus with perforation and developed abcess and required drain placement on 8/17. Patient was found to have digoxin toxicity on 8/26 and transferred to the ICU for hemodynamic changes and bradycardia. Patient seen in the ICU. Patient with baseline creatinine of 0.8-1 with most recent normal on 8/24. Patient with CT abdomen with contrast on 8/20. Patient seen on BIPAp but unable to provide any additional hx.     Past Medical History:   Diagnosis Date   ? Acute diastolic heart failure (H) 11/2015   ? Acute on chronic diastolic heart failure (H) 6/15/2019   ? Advanced directives, counseling/discussion 8/5/2015    Patient completed 2011.  Discussed today, 5/24/17, and wants to change healthcare agent from niece to daughter.  Discussed that she will need to complete new form to indicate this.   ? MAY (acute kidney injury) (H) 10/22/2018   ? Atrial fibrillation (H)    ? Benign adenomatous polyp of large intestine 3/30/2017    Colonoscopy March 2017.  Recommend 5 year follow-up.  Single tubular adenoma   ? Biliary obstruction 10/3/2018    Added automatically from request for surgery [id number]   ? Cerebral vascular accident (H)    ? Coronary artery disease 2006    100% RCA with collateral filling per Dr. Elizabeth's angio report   ? Diabetes mellitus type 2 in obese (H)    ? Fibrocystic breast    ? GERD (gastroesophageal reflux disease)    ? History of anesthesia complications     slow to wake   ? Hypertension    ? Obstructive sleep apnea syndrome    ? Peripheral  vascular disease (H)    ? Pneumonia 11/11/2018   ? PONV (postoperative nausea and vomiting)    ? S/P cholecystectomy 6/22/2018   ? SBO (small bowel obstruction) (H) 11/12/2015   ? Stroke (H)    ? Transaminitis 10/22/2018   ? Upper respiratory infection 12/20/2018   ? Urinary incontinence        Scheduled Meds:  ? aspirin  81 mg Oral DAILY   ? daptomycin (CUBICIN) IV  6 mg/kg Intravenous Q48H   ? fluconazole (DIFLUCAN) IV  200 mg Intravenous Q24H   ? [Held by provider] furosemide  40 mg Intravenous DAILY   ? [Held by provider] lisinopriL  5 mg Oral DAILY   ? magnesium chloride  128 mg Oral BID   ? magnesium gluconate  27 mg Oral BID   ? meropenem  1 g Intravenous Q12H   ? [Held by provider] metoprolol succinate  12.5 mg Oral DAILY   ? omeprazole  20 mg Oral BID AC   ? [Held by provider] sertraline  100 mg Oral DAILY   ? sodium chloride bacteriostatic  1-5 mL Intradermal Once   ? sodium chloride  10 mL Intravenous Q8H FIXED TIMES   ? sodium chloride  10-30 mL Intravenous Q8H FIXED TIMES   ? [Held by provider] spironolactone  25 mg Oral DAILY   ? ticagrelor  90 mg Oral BID   ? warfarin - daily dose required   Other Med Consult or Protocol     Continuous Infusions:  ? DOPamine 6 mcg/kg/min (08/27/20 0746)   ? sodium bicarbonate IV infusion in D5W 50 mL/hr at 08/27/20 0746     PRN Meds:.albuterol, alteplase, atropine, bacitracin, carboxymethylcellulose, codeine-guaiFENesin, dextrose 50 % (D50W), glucagon (human recombinant), hydrOXYzine pamoate, lidocaine (PF), metoprolol tartrate, naloxone **OR** naloxone, oxyCODONE, sodium chloride 0.9%, sodium chloride bacteriostatic, sodium chloride, sodium chloride, sodium chloride, traZODone    Allergies   Allergen Reactions   ? Penicillins Swelling       Social History     Socioeconomic History   ? Marital status: Legally      Spouse name: None   ? Number of children: 1   ? Years of education: None   ? Highest education level: None   Occupational History   ? None   Social  Needs   ? Financial resource strain: None   ? Food insecurity     Worry: None     Inability: None   ? Transportation needs     Medical: None     Non-medical: None   Tobacco Use   ? Smoking status: Former Smoker     Packs/day: 0.25   ? Smokeless tobacco: Former User   ? Tobacco comment: e-cig a few times/day   Substance and Sexual Activity   ? Alcohol use: No   ? Drug use: No   ? Sexual activity: None   Lifestyle   ? Physical activity     Days per week: None     Minutes per session: None   ? Stress: None   Relationships   ? Social connections     Talks on phone: None     Gets together: None     Attends Mandaeism service: None     Active member of club or organization: None     Attends meetings of clubs or organizations: None     Relationship status: None   ? Intimate partner violence     Fear of current or ex partner: None     Emotionally abused: None     Physically abused: None     Forced sexual activity: None   Other Topics Concern   ? None   Social History Narrative    Single/, lives alone; daughter in [place];    Gets help from neighbors and extended family    Former smoker.no alcohol problems       Family History   Problem Relation Age of Onset   ? Cancer Paternal Grandmother    ? Cancer Paternal Uncle    ? No Medical Problems Mother    ? No Medical Problems Father    ? Heart attack Sister    ? Chronic Kidney Disease Sister    ? No Medical Problems Daughter    ? No Medical Problems Maternal Grandmother    ? No Medical Problems Maternal Grandfather    ? No Medical Problems Paternal Grandfather    ? No Medical Problems Maternal Aunt    ? No Medical Problems Paternal Aunt    ? Diabetes Brother    ? BRCA 1/2 Neg Hx    ? Breast cancer Neg Hx    ? Colon cancer Neg Hx    ? Endometrial cancer Neg Hx    ? Ovarian cancer Neg Hx        Review of Systems:  The remainder of 10 point review of systems is negative except as noted in HPI above.     /58   Pulse 80   Temp 98.5  F (36.9  C) (Axillary)   Resp 28   " Ht 5' 2\" (1.575 m)   Wt 210 lb 9.6 oz (95.5 kg)   LMP 01/25/1999   SpO2 98%   BMI 38.52 kg/m      Intake/Output Summary (Last 24 hours) at 8/27/2020 0823  Last data filed at 8/27/2020 0600  Gross per 24 hour   Intake 866 ml   Output 0 ml   Net 866 ml     Physical Exam:   GENERAL: frail, elderly, chronically ill.   EYES: No scleral icterus, conjunctiva clear  ENT: Hearing normal,   RESP: anterior clear no respiratory distress, normal effort.  CV: RRR, no murmurs. No leg edema.    GI: Active BS, Soft,   Musculoskeletal: Normal muscle bulk/ tone; No gross joint abnormalities  SKIN: No rash, warm/ dry  PSYCH:  Appropriate mood and affect  Lymph: No cervical adenopathy    LABS:  Results from last 7 days   Lab Units 08/27/20  0753 08/27/20  0405 08/26/20  2346 08/26/20  2221 08/26/20  1805  08/24/20  1638   LN-SODIUM mmol/L 139 140  --  138 138   < >  --    LN-POTASSIUM mmol/L 4.2 4.6 5.4* 5.4* 5.8*   < >  --    LN-CHLORIDE mmol/L 108* 108*  --  109* 109*   < >  --    LN-CO2 mmol/L 20* 17*  --  15* 10*   < >  --    LN-BLOOD UREA NITROGEN mg/dL 40* 39*  --  35* 33*   < >  --    LN-CREATININE mg/dL 2.88* 2.86*  --  2.65* 2.60*   < > 1.08   LN-CALCIUM mg/dL 8.6 9.0  --  9.1 9.7   < >  --    LN-ALBUMIN g/dL  --  1.9*  --   --  1.9*  --  1.8*   LN-PROTEIN TOTAL g/dL  --  6.6  --   --  6.6  --  5.6*   LN-BILIRUBIN TOTAL mg/dL  --  2.0*  --   --  1.9*  --  1.2*   LN-ALKALINE PHOSPHATASE U/L  --  147*  --   --  143*  --  109   LN-ALT (SGPT) U/L  --  166*  --   --  116*  --  32   LN-AST (SGOT) U/L  --  2,613*  --   --  1,543*  --  25    < > = values in this interval not displayed.     Results from last 7 days   Lab Units 08/27/20  0406 08/25/20  0526 08/24/20  0555   LN-WHITE BLOOD CELL COUNT thou/uL 18.3* 15.6* 15.9*   LN-HEMOGLOBIN g/dL 9.1* 7.6* 7.9*   LN-HEMATOCRIT % 28.3* 23.7* 24.5*   LN-PLATELET COUNT thou/uL 111* 106* 109*                          "

## 2021-07-01 NOTE — CONSULTS
Consults by Amalia Warren CNP at 8/13/2020  1:52 PM     Author: Amalia Warren CNP Service: Nephrology Author Type: Nurse Practitioner    Filed: 8/13/2020  3:13 PM Date of Service: 8/13/2020  1:52 PM Status: Signed    : Amalia Warren CNP (Nurse Practitioner)     Consult Orders    1. Inpatient consult to Nephrology Reason for Consult? May; Which Nephrology Group will follow this patient? [facility]; Consult priority: Today (routine); Communication for MD: No phone communication necessary for now [[id number]] ordered by Michael Pink MD at 08/13/20 1129                   RENAL CONSULTATION:    Date of Consultation:  8/13/2020    Requesting Physician: Dr. Pink    Reason for Consult:  MAY    History of present illness:  Gardenia Muller is a 70 y.o. female with a past medical history significant for MI, chronic afib on anticoagulation, CHF, CAD, DMII, HTN, transaminates, alcohol abuse who presented to the ED on 8/11 due to nausea, emesis and abdominal pain. CT revealed acute diverticulitis with contained perforation. On IV cipro and flagyl. No plans for surgery at this time. Presented with a normal cr of 0.95 on 8/11. Today her cr is up to 3.61 in the setting of nausea and emesis several days prior to admission, hypotension/sepsis and  IV contrast on 8/11. No other nephrotoxic medications noted while IP, but does have lisinopril, furosemide and spironolactone on home medications list. It is unclear is she was getting these, but likely. Hx of frequent prerenal AKIs with component of CRS.          Upon evaluation the patient was laying in bed. Reports nausea, emesis and abd pain for > 1 week. States she has not been able to eat or drink during that time. Is unsure if she was taking her medications, but believes so. Discussed renal failure and she became tearful. Has a strong family hx of CKD/ESRD/dialysis and has developed an aversion. No shortness of breath, dizziness, blurred vision, cp.  No recent NSAID use, to her knowledge.      ROS: Comprehensive review of systems was negative other than what is mentioned in the HPI.    Past Medical History:   Diagnosis Date   ? Acute diastolic heart failure (H) 11/2015   ? Acute on chronic diastolic heart failure (H) 6/15/2019   ? Advanced directives, counseling/discussion 8/5/2015    Patient completed 2011.  Discussed today, 5/24/17, and wants to change healthcare agent from niece to daughter.  Discussed that she will need to complete new form to indicate this.   ? MAY (acute kidney injury) (H) 10/22/2018   ? Atrial fibrillation (H)    ? Benign adenomatous polyp of large intestine 3/30/2017    Colonoscopy March 2017.  Recommend 5 year follow-up.  Single tubular adenoma   ? Biliary obstruction 10/3/2018    Added automatically from request for surgery [id number]   ? Cerebral vascular accident (H)    ? Coronary artery disease 2006    100% RCA with collateral filling per Dr. Elizabeth's angio report   ? Diabetes mellitus type 2 in obese (H)    ? Fibrocystic breast    ? GERD (gastroesophageal reflux disease)    ? History of anesthesia complications     slow to wake   ? Hypertension    ? Peripheral vascular disease (H)    ? Pneumonia 11/11/2018   ? PONV (postoperative nausea and vomiting)    ? S/P cholecystectomy 6/22/2018   ? SBO (small bowel obstruction) (H) 11/12/2015   ? Stroke (H)    ? Transaminitis 10/22/2018   ? Upper respiratory infection 12/20/2018   ? Urinary incontinence        Medications: Scheduled Meds:  ? aspirin  81 mg Oral DAILY   ? ciprofloxacin  400 mg Intravenous Q12H   ? metroNIDAZOLE  500 mg Intravenous Q12H   ? polyethylene glycol  17 g Oral DAILY   ? sodium chloride bacteriostatic  1-5 mL Intradermal Once   ? sodium chloride  10 mL Intravenous Q8H FIXED TIMES   ? ticagrelor  90 mg Oral BID     Continuous Infusions:  ? sodium chloride 0.9% 125 mL/hr (08/13/20 0803)     PRN Meds:.metoprolol tartrate, morphine  injection, naloxone **OR** naloxone,  ondansetron **OR** ondansetron, sodium chloride 0.9%, sodium chloride, sodium chloride, sodium chloride    Allergies   Allergen Reactions   ? Penicillins Swelling       Social History     Socioeconomic History   ? Marital status: Legally      Spouse name: Not on file   ? Number of children: 1   ? Years of education: Not on file   ? Highest education level: Not on file   Occupational History   ? Not on file   Social Needs   ? Financial resource strain: Not on file   ? Food insecurity     Worry: Not on file     Inability: Not on file   ? Transportation needs     Medical: Not on file     Non-medical: Not on file   Tobacco Use   ? Smoking status: Former Smoker     Packs/day: 0.25   ? Smokeless tobacco: Former User   ? Tobacco comment: e-cig a few times/day   Substance and Sexual Activity   ? Alcohol use: No   ? Drug use: No   ? Sexual activity: Not on file   Lifestyle   ? Physical activity     Days per week: Not on file     Minutes per session: Not on file   ? Stress: Not on file   Relationships   ? Social connections     Talks on phone: Not on file     Gets together: Not on file     Attends Gnosticism service: Not on file     Active member of club or organization: Not on file     Attends meetings of clubs or organizations: Not on file     Relationship status: Not on file   ? Intimate partner violence     Fear of current or ex partner: Not on file     Emotionally abused: Not on file     Physically abused: Not on file     Forced sexual activity: Not on file   Other Topics Concern   ? Not on file   Social History Narrative    Single/, lives alone; daughter in [place];    Gets help from neighbors and extended family    Former smoker.no alcohol problems       Family History   Problem Relation Age of Onset   ? Cancer Paternal Grandmother    ? Cancer Paternal Uncle    ? No Medical Problems Mother    ? No Medical Problems Father    ? Heart attack Sister    ? Chronic Kidney Disease Sister    ? No Medical  "Problems Daughter    ? No Medical Problems Maternal Grandmother    ? No Medical Problems Maternal Grandfather    ? No Medical Problems Paternal Grandfather    ? No Medical Problems Maternal Aunt    ? No Medical Problems Paternal Aunt    ? Diabetes Brother    ? BRCA 1/2 Neg Hx    ? Breast cancer Neg Hx    ? Colon cancer Neg Hx    ? Endometrial cancer Neg Hx    ? Ovarian cancer Neg Hx          BP 99/53 (Patient Position: Semi-nicole)   Pulse 72   Temp 98.8  F (37.1  C) (Oral)   Resp 16   Ht 5' 2\" (1.575 m)   Wt 196 lb 1.6 oz (89 kg)   LMP 01/25/1999   SpO2 95%   BMI 35.87 kg/m      Intake/Output Summary (Last 24 hours) at 8/13/2020 1352  Last data filed at 8/13/2020 0621  Gross per 24 hour   Intake 1461.67 ml   Output 5 ml   Net 1456.67 ml     Physical Exam:   GENERAL: calm and comfortable, alert but slow recall of words.   HEENT: pupils equal, sclerae not icteric.  RESP: Clear to auscultation bilaterally with no respiratory distress, normal effort laying flat in bed.  CV: RRR. No peripheral edema.    GI: Active BS, Soft, tender. Obese  Musculoskeletal: diminished muscle bulk/ tone; No gross joint abnormalities  SKIN: No rash, warm/ dry  PSYCH: Alert, tearful when discussing possibility of dialysis   NEURO: Slow recall of words, forgetful        LABS:  Results from last 7 days   Lab Units 08/13/20  0941 08/12/20  1321 08/11/20  1026 08/07/20  0600   LN-SODIUM mmol/L  --  135* 135* 137   LN-POTASSIUM mmol/L  --  4.5 4.6 4.2   LN-CHLORIDE mmol/L  --  107 103 108*   LN-CO2 mmol/L  --  17* 22 21*   LN-BLOOD UREA NITROGEN mg/dL  --  27 22 17   LN-CREATININE mg/dL 3.61* 2.49* 0.95 0.67   LN-CALCIUM mg/dL  --  8.8 10.3 10.6*   LN-ALBUMIN g/dL  --   --  3.0* 2.7*   LN-PROTEIN TOTAL g/dL  --   --  8.2* 7.3   LN-BILIRUBIN TOTAL mg/dL  --   --  1.3* 1.2*   LN-ALKALINE PHOSPHATASE U/L  --   --  135* 130*   LN-ALT (SGPT) U/L  --   --  33 53*   LN-AST (SGOT) U/L  --   --  37 26     Results from last 7 days   Lab Units " 08/13/20  0724 08/12/20  1321 08/11/20  1026   LN-WHITE BLOOD CELL COUNT thou/uL 12.8* 13.0* 11.4*   LN-HEMOGLOBIN g/dL 9.8* 11.1* 12.6   LN-HEMATOCRIT % 33.3* 37.3 42.4   LN-PLATELET COUNT thou/uL 135* 159 193           Assessment/ Recommendations:  1. MAY: Cr upon admission was at her baseline of 0.95 (8/11), 8/12 cr was 2.49 and 3.61 today. Nausea and emesis several days prior to admission and with hyaline casts on UA 8/11 suggestive of volume depletion. Was administered necessary CT dye in this setting. Also with hypotension/sepsis 8/12. No other nephrotoxic medications noted while IP, was likely getting home meds of lisinopril, lasix and spironolactone PTA. Present MAY likely ATN from contrast dye while volume deplete as well as hemodynamic insult with hypotension/sepsis. No emergent need for dialysis; however, is oliguric which is very concerning for the need to initiate dialysis in the next 24-48h if this does not improve. Presently reluctant to dialysis, but will think about it overnight.    - Agree with holding lisinopril, lasix, spironolactone   - Continue hydration/volume expansion with NS, would halt if she becomes hypoxic or shortness of breath.    - UA, UPCR   - Daily BMP   - Strict I&Os, daily weights, continue dias.    - d/c morphine due to concerns with toxicity with reduced renal clearance, will order dilaudid. Defer further pain mgmt to IM.       2. Diverticulitis with abscess: Presently on IV Cipro and flagyl. WBCs trending down and pain improving. Surgery following, holding off on surgical intervention at this time.     3. Anemia: No acute bleed presently.     4. HFrEF: s/p angiogram and stent plcmt 7/27/20.    - Halt ns if hypoxic or sob    5. DMII: Per IM    6. HTN: BP acceptable presently. Off all antihypertensives presently. Avoid hypotension, as able, in MAY.       D/w Dr. Brody Warren, CNP  [facility]  [phone] (office)  [phone] ([facility])

## 2021-07-01 NOTE — PROCEDURES
"Procedures by Jimbo Malik RN at 10/21/2018 10:40 PM     Author: Jimbo Malik RN Service: -- Author Type: Registered Nurse    Filed: 10/21/2018 10:42 PM Date of Service: 10/21/2018 10:40 PM Status: Signed    : Jimbo Malik RN (Registered Nurse)     Procedure Orders    1. Insert PICC [[id number]] ordered by Olga Osman MD at 10/21/18 1737           Procedures    1. PICC [[id number] (Custom)]             PICC Line Insertion Procedure Note  Pt. Name: Gardenia Muller  MRN:        [id number]    Procedure: Insertion of a  Triple Lumen  5 fr  Bard SOLO (valved) Power PICC, Lot number [id number]    Indications: Vascular access    Contraindications : NOne    Procedure Details   Patient identified with 2 identifiers and \"Time Out\" conducted.  .     Central line insertion bundle followed: hand hygeine performed prior to procedure, site cleansed with cholraprep, hat, mask, sterile gloves,sterile gown worn, patient draped with maximum barrier head to toe drape, sterile field maintained.    The vein was assessed and found to be compressible and of adequate size. 2.5 ml 1% Lidocaine administered sq to the insertion site. A 5 Fr PICC was inserted into the brachial vein of the left arm with ultrasound guidance. 1 attempt(s) required to access vein.   Catheter threaded without difficulty. Good blood return noted.    Modified Seldinger Technique used for insertion.    The 8 sharps that are included in the PICC insertion kit were accounted for and disposed of in the sharps container prior to breakdown of the sterile field.    Catheter secured with Statlock, biopatch and Tegaderm dressing applied.    Findings:  Total catheter length  49 cm, with 2 cm exposed. Mid upper arm circumference is 39 cm. Catheter was flushed with 60 NS. Patient  tolerated procedure well.    Tip placement verified by 3CG technology.    CLABSI prevention brochure left at bedside.    Patient's primary RN notified PICC is ready for " use.    Comments:            Jimbo Valles, MSN, RN, [facility]  [phone]

## 2021-07-03 NOTE — ANESTHESIA PREPROCEDURE EVALUATION
Anesthesia Preprocedure Evaluation by Eliezer Carter MD at 10/5/2018 12:13 PM     Author: Eliezer Carter MD Service: -- Author Type: Physician    Filed: 10/5/2018 12:17 PM Date of Service: 10/5/2018 12:13 PM Status: Signed    : Eliezer Carter MD (Physician)       Anesthesia Evaluation      Patient summary reviewed   History of anesthetic complications     Airway   Mallampati: II   Pulmonary - normal exam   (+) shortness of breath,                          Cardiovascular   (+) hypertension, CAD, dysrhythmias (Atrial flutter), CHF, ,     ECG reviewed  Rhythm: regular  Rate: normal,      ROS comment: Ef 55%     Neuro/Psych    (+) CVA ,     Endo/Other    (+) diabetes mellitus well controlled,      GI/Hepatic/Renal    (+) GERD,   chronic renal disease,      Other findings: Hb 12.1, K 3.6, Cr 1.39  PONV      Dental                             Anesthesia Plan  Planned anesthetic: general endotracheal    ASA 4   Induction: intravenous   Anesthetic plan and risks discussed with: patient    Post-op plan: routine recovery

## 2021-07-03 NOTE — ADDENDUM NOTE
Addendum Note by Jacquelyn Barker CMA at 3/26/2019 10:30 AM     Author: Jacquelyn Barker CMA Service: -- Author Type: Certified Medical Assistant    Filed: 3/26/2019 11:31 AM Encounter Date: 3/26/2019 Status: Signed    : Jacquelyn Barker CMA (Certified Medical Assistant)    Addended by: JACQUELYN BARKER on: 3/26/2019 11:31 AM        Modules accepted: Orders

## 2021-07-03 NOTE — ADDENDUM NOTE
Addendum Note by Danna Lewis RN at 9/4/2019 12:26 PM     Author: Danna Lewis RN Service: -- Author Type: Registered Nurse    Filed: 9/4/2019 12:26 PM Encounter Date: 9/4/2019 Status: Signed    : Danna Lewis RN (Registered Nurse)    Addended by: DANNA LEWIS on: 9/4/2019 12:26 PM        Modules accepted: Orders

## 2021-07-03 NOTE — ADDENDUM NOTE
Addendum Note by Flower Perera RN at 9/11/2019 12:23 PM     Author: Flower Perera RN Service: -- Author Type: Registered Nurse    Filed: 9/11/2019 12:23 PM Encounter Date: 9/11/2019 Status: Signed    : Flower Perera RN (Registered Nurse)    Addended by: FLOWER PERERA on: 9/11/2019 12:23 PM        Modules accepted: Orders

## 2021-07-03 NOTE — ADDENDUM NOTE
Addendum Note by Alfredo Maddox MD at 3/26/2019 10:30 AM     Author: Alfredo Maddox MD Service: -- Author Type: Physician    Filed: 4/10/2019  1:21 PM Encounter Date: 3/26/2019 Status: Signed    : Alfredo Maddox MD (Physician)    Addended by: ALFREDO MADDOX on: 4/10/2019 01:21 PM        Modules accepted: Orders

## 2021-07-14 PROBLEM — I50.32 CHRONIC DIASTOLIC CONGESTIVE HEART FAILURE (H): Chronic | Status: RESOLVED | Noted: 2019-06-15 | Resolved: 2019-07-30

## 2021-07-21 ENCOUNTER — RECORDS - HEALTHEAST (OUTPATIENT)
Dept: ADMINISTRATIVE | Facility: CLINIC | Age: 71
End: 2021-07-21

## 2021-10-03 NOTE — PROGRESS NOTES
Alert and oriented, congenial.  Denies no dyspnea at rest, states dyspnea with exertion, denies nausea and pain. Lungs are diminished, productive cough thin white sputum, vapotherm 40/90. SCD's in place.   Sat in chair for several hours today prior to pro BP did came up slightly after bolus, second bolus given but unsure how much was infused as midline was leaking, however BP did come up to 108/56.  HR remains elevated, IV Digoxin given,  but again unsure if patient received full dose due to leaking midline. HR now in 105-115. Cardiology to see. Will continue to monitor.

## 2022-01-01 NOTE — PLAN OF CARE
Problem: Daily Care  Goal: Daily care needs are met  8/15/2020 1851 by Lavern Eden RN  Outcome: Progressing  Patient tolerated clear liquid diet, no complaints of nausea or pain.     Kimo

## 2022-06-14 NOTE — ANESTHESIA PREPROCEDURE EVALUATION
Anesthesia Evaluation      Patient summary reviewed   No history of anesthetic complications     Airway   Mallampati: II   Pulmonary - negative ROS   (+) decreased breath sounds,                          Cardiovascular   Exercise tolerance: > or = 4 METS  (+) hypertension, CAD, dysrhythmias (afib), , hypercholesterolemia,     ECG reviewed (NSR)  Rhythm: regular  Rate: normal,      ROS comment: 4/18 Stress test  The pharmacologic nuclear stress test is abnormal.  Stress nuclear study suggests a small area of nontransmural infarction involving the inferoapical wall, although specificity reduced due to splanchnic attenuation. No significant ischemia identified.  Transient increased stress to rest cavity ratio of 1.35  The left ventricular ejection fraction is 73% without evidence of a regional wall motion abnormality..  When compared to the images of 9/27/2017, small area of nontransmural infarction is suggested. Transient increased stress to rest cavity ratio is now noted.    nuclear stress test on 4/9/18 which did show apical ischemia.  However, Dr. Flanagan address this, stating that this was likely due to known chronic occlusion of her right coronary artery and that patient should be okay for surgery if she is symptom-free.     Neuro/Psych    (+) CVA , depression,     Endo/Other    (+) diabetes mellitus type 2 well controlled, obesity,      Comments: Anemia    GI/Hepatic/Renal    (+) GERD well controlled,   chronic renal disease (renal artery stenosis),     Comments: diverticulitis     Other findings:  Spinal stenosis    PAD (peripheral artery disease)    Dermatosis Papulosa Nigra    Depression    Hypercalcemia    Right Renal Artery Stenosis    Osteoarthritis    Abnormality Of Walk    Type 2 diabetes mellitus with circulatory disorder    Advanced directives, counseling/discussion    SBO (small bowel obstruction)    PAF (paroxysmal atrial  fibrillation)    Cystitis    Hypomagnesemia    Hypertension    Dysarthria    Cerebral infarction    Anemia    Cerebrovascular disease    Benign adenomatous polyp of large intestine    RLS (restless legs syndrome)    Incisional hernia    Abdominal pain    Diverticular disease of large intestine    Dysphagia    Coronary artery disease involving native coronary artery of native heart without angina pectoris    Dyslipidemia    Ventral hernia          Dental    (+) poor dentition and upper dentures                       Anesthesia Plan  Planned anesthetic: general endotracheal  Pre op nausea - treated with reglan, zofran  12 lead ecg ordered given PMHx to r/o ischemia  GAETT  RSI  Possible noe - we will have access to both arms, so this can be placed post induction  Intra op maintain SBP>120 (history of TIAs and CVA)  Consider TAP block if abdominal incision required (pt consented pre op)  Scopolamine  Soft bite block    Decadron 4mg/zofran as antoemitic  ASA 3   Induction: intravenous   Anesthetic plan and risks discussed with: patient  Anesthesia plan special considerations: rapid sequence induction, arterial catheterization,   Post-op plan: routine recovery           Sarna cream to affected area 3 times a day. follow-up with dermatology ASAP: call to arrange an appointment.  Return immediately to the ER for re-evaluation if your symptoms recur or worsening.

## 2023-05-09 NOTE — PROGRESS NOTES
[facility]    Facility:   [facility] [[id number]]   Code Status: POLST AVAILABLE      CHIEF COMPLAINT/REASON FOR VISIT:  Chief Complaint   Patient presents with     Review Of Multiple Medical Conditions     physical deconditioning, s/p choleycystectomy     Problem Visit     Pneumonia       HISTORY:      HPI: Gardenia is a 68 y.o. female with MI, atrial flutter, ventral hernia with incisional hernia repair, on anticoagulation.   She had a week of abdominal pain, more centered in the epigastrium and right upper quadrant and elsewhere. She threw up several times in the morning of admission. She had no fever, no chills.   He was tachycardic, she had 1 L of IV fluid, and IV Lopressor to slow her heart rate.    CT of the abdomen: Gallbladder wall thickening right, consistent with cholecystitis.  Ultrasound of the gallbladder: Distended gallbladder, edema of the gallbladder wall, no gallstones are seen.  She had a laparoscopic cholecystectomy on 10/7/18 with Dr Felicia So.    She was tolerating an oral diet at the time of discharge, and [facility].    Today Ms. Muller is being evaluated for a routine review of multiple medical problems while in the TCU, in addition to an ongoing cough. Gardenia states she has been doing ok, she does continue to have a cough and did have a chest x-ray revealing a new, developing infiltrate of her left lower lobe. She states that she has not had fevers but has occasionally had some chills. Gardenia states her abdominal pain as subsided. She has not had any problems with abdominal pain or any other abdominal symptoms at this time. She denies any other concerns including fevers, headaches, vision changes, chest pain/pressure, difficulty breathing, SOB, nausea, vomiting, diarrhea, dysuria, increasing weakness, increasing pain.     Past Medical History:   Diagnosis Date     Acute diastolic heart failure (H) 11/2015      Atrial fibrillation (H)      Cerebral vascular accident (H)      Coronary artery disease 2006    100% RCA with collateral filling per Dr. Elizabeth's angio report     Diabetes mellitus type 2 in obese (H)      Fibrocystic breast      GERD (gastroesophageal reflux disease)      History of anesthesia complications     slow to wake     Hypertension      Peripheral vascular disease (H)      PONV (postoperative nausea and vomiting)      Stroke (H)      Urinary incontinence              Family History   Problem Relation Age of Onset     Cancer Paternal Grandmother      Cancer Paternal Uncle      No Medical Problems Mother      No Medical Problems Father      Heart attack Sister      Chronic Kidney Disease Sister      No Medical Problems Daughter      No Medical Problems Maternal Grandmother      No Medical Problems Maternal Grandfather      No Medical Problems Paternal Grandfather      No Medical Problems Maternal Aunt      No Medical Problems Paternal Aunt      Diabetes Brother      BRCA 1/2 Neg Hx      Breast cancer Neg Hx      Colon cancer Neg Hx      Endometrial cancer Neg Hx      Ovarian cancer Neg Hx      Social History     Socioeconomic History     Marital status: Legally      Spouse name: Not on file     Number of children: Not on file     Years of education: Not on file     Highest education level: Not on file   Social Needs     Financial resource strain: Not on file     Food insecurity - worry: Not on file     Food insecurity - inability: Not on file     Transportation needs - medical: Not on file     Transportation needs - non-medical: Not on file   Occupational History     Not on file   Tobacco Use     Smoking status: Current Every Day Smoker     Packs/day: 0.25     Smokeless tobacco: Former User     Tobacco comment: e-cig a few times/day   Substance and Sexual Activity     Alcohol use: No     Drug use: No     Sexual activity: Not on file   Other Topics Concern     Not on file   Social History  Narrative     Not on file         Review of Systems   Per HPI    .  Vitals:    11/08/18 1713   BP: 143/64   Pulse: 75   Resp: 18   Temp: 99.5  F (37.5  C)   SpO2: 91%   Weight: 168 lb 9.6 oz (76.5 kg)       Physical Exam  General appearance: alert, appears stated age and cooperative  HEENT: Head is normocephalic with normal hair distribution. No evidence of trauma. Ears: Without lesions or deformity. No acute purulent discharge. Eyes: Conjunctivae pink with no scleral icterus or erythema. Nose: Normal mucosa and septum. Oropharnyx: mmm, no lesions present.  Lungs: crackles at bases, left lower lobe diminished, respirations without effort  Heart: regular rate and rhythm, S1, S2 normal, 2/6 systolic murmur appreciated  Abdomen: soft, non-tender; bowel sounds normal; no masses,  no organomegaly  Extremities: extremities normal, atraumatic, no cyanosis, trace pedal edema  Pulses: 2+ and symmetric  Skin: Skin color, texture, turgor normal. No rashes or lesions, four 1 cm incisions d/t laparoscopy-clean, dry, intact  Neurologic: Grossly normal   Psych: interacts well with caregivers, exhibits logical thought processes and connections, pleasant      LABS:   None today.     ASSESSMENT:      ICD-10-CM    1. Physical deconditioning R53.81    2. S/P cholecystectomy Z90.49    3. Pneumonia of lower lobe due to infectious organism, unspecified laterality (H) J18.1        PLAN:    Physical Deconditioning due to recent Cholecystectomy  -Continue PT/OT and other therapies as per care plan.  -Encouraged good nutrition and movement habits.   -Discussed care plan and expected course of stay.   -Continue to follow-up per routine schedule or sooner if needed.     S/p Choleycystectomy  -Abdominal pain resolved.   -Continue to monitor.     Pneumonia  -Azithromycin 500 mg by mouth on day one and 250 mg by mouth on days two through 5.   -Encouraged strict handwashing, covering cough, and keeping room neat and clean.   -Encouraged steam baths  if patient can tolerate to help loosen phlegm.  -Encouraged use of hot tea with honey to help loosen phlegm and clear cough.  -Albuterol nebulizers every 4 hours as needed.   -Mucinex 400 mg by mouth every 6 hours as needed for cough.   -Close monitoring and follow up warranted.     Otherwise continue current care plan for all other chronic medical conditions, as they are stable. Encouraged patient to engage in healthy lifestyle behaviors such as engaging in social activities, exercising (PT/OT), eating well, and following care plan. Follow up for routine check-up, or sooner if needed. Will continue to monitor patient and work with nursing staff collaboratively to work toward positive patient outcomes.    Total unit time of 35 minutes spent with patient and nursing staff with greater than 50% of this time spent on review of previous records, counseling, education, and discussion of the above care plan with nursing staff and patient.     Electronically signed by: Arleth Barton CNP   4 = No assist / stand by assistance

## 2024-01-01 NOTE — PLAN OF CARE
"Problem: Discharge Barriers  Goal: Patient s discharge needs will be met  Outcome: Progressing  Care Plan-Care Management    Care Management Goals of the Day:   Progression of Care     Care Progression to be reviewed with MD and RN CM: Updates to be provided to Charge RN, CMSW, HUC,therapy and other disciplines as indicated.    Barriers to Discharge and plan:  Medical Management, Possible Abscess drain to be placed in IR on 8/17, Nephrology Clearance-monitor creatinine (improving), Surgical clearance, IV antibiotics, IV Fluids    Family Care Conference: Not at this time    Discharge Disposition:   TCU    Expected Discharge Date:  8/18/2020 or 8/19/2020 pending     Transportation:  [facility]     Care Management/Coordination Narrative:   Patient admitted from [facility]; has 18 day bed hold, plan to return once medically stable. Prior to last hospitalization patient was living at the [facility] in [facility] #[phone]. Received assistance with medication and ADLs. Uses a rolling walker and a scooter. Daughter Aria is main contact for discharge planning, she lives in [place]. Nephew Bridger lives in [facility]. Per previous care manager note Danna at the [facility] stated per their DON patient can pay her rent when she returns and \"not to worry.\" Care manager to follow.  1:37 PM Met with patient as they are requesting to go home at discharge with possible home care services. Explained that at this time therapy is recommending patient return to TCU at discharge. Will watch to see how patient progresses and reach out to assisted living on 8/17 to see if she would potentially be able to return with increased services. Will keep patient  Updated.     Filomena SUBRAMANIAN  08/16/20     Abbreviation  Code:  [facility] ([facility]), [facility]), Patient (pt), Transitional Care Unit (TCU), Skilled Nursing Facility (SNF), Long " Term Care (LTC), Assisted Living ([facility] or AL), Care Management (CM), Physical Therapy (PT), Occupational Therapy (OT), Speech Therapy (ST), Respiratory Therapy (RT).            Butch Alves

## 2024-01-01 NOTE — PROGRESS NOTES
"General Surgery Progress Note    1 Day Post-Op    CHOLECYSTECTOMY, LAPAROSCOPIC    Subjective:   Pt is feeling well better. The pain she was admitted with is gone and her incisional pain is controlled. She is tolerating oral intake and has an appetite. She is passing gas.     Vitals:    10/08/18 0356 10/08/18 0631 10/08/18 0713 10/08/18 1117   BP: 120/70  117/79 97/56   Patient Position: Lying  Lying Lying   Pulse: 72  100 71   Resp: 18 18 18   Temp: 97.6  F (36.4  C)  97.8  F (36.6  C) 97.9  F (36.6  C)   TempSrc: Oral  Oral Oral   SpO2: 99%  95% 92%   Weight:  172 lb 6.4 oz (78.2 kg)     Height:           Physical Exam:  General: NAD  Ab:       Soft, not distended, expected ttp near incision sites; The wound/ dressings are clean, dry, and intact      Results from last 7 days  Lab Units 10/07/18  0947   LN-WHITE BLOOD CELL COUNT thou/uL 6.3   LN-HEMOGLOBIN g/dL 12.0   LN-HEMATOCRIT % 39.2   LN-PLATELET COUNT thou/uL 87*         Results from last 7 days  Lab Units 10/08/18  1015   LN-SODIUM mmol/L 138   LN-POTASSIUM mmol/L 4.9   LN-CHLORIDE mmol/L 102   LN-CO2 mmol/L 27   LN-BLOOD UREA NITROGEN mg/dL 16   LN-CREATININE mg/dL 1.19*   LN-CALCIUM mg/dL 10.9*   LN-ALBUMIN g/dL 3.0*   LN-PROTEIN TOTAL g/dL 7.5   LN-BILIRUBIN TOTAL mg/dL 1.3*   LN-ALKALINE PHOSPHATASE U/L 267*   LN-ALT (SGPT) U/L 93*   LN-AST (SGOT) U/L 118*       Assessment: Epigastric and upper abdominal pain with acalculous cholecystitis s/p laparoscopic cholecystectomy. Patient is doing well after surgery. RN reported that her urine was \"brown\" today. Patient had mild hyperbilirubinemia that has improved since surgery, but her kidney function tests are suspicious for some minor dehydration. She was being diuresed prior to surgery.    Plan:   - encourage oral fluid intake; IVF not advised, she should be able to catch up with good oral intake  - ADAT  - home tomorrow as long as medically appropriate    Lynn Herbert, CNP  [phone]  [facility] " Surgery     0096

## 2024-02-16 NOTE — PROGRESS NOTES
"  Endocrinology Clinic New Consult      Gardenia Muller MRN:[id number] YOB: 1950  Primary care provider: No primary care provider on file.   Fellow: Guru Hilda MD  Attending Physician: MD Jessica    Reason for Endocrine consult:  \"Hypercalcemia referred by PCP Jerson Hernandez MD\"         Assessment and Plan:     Gardenia Muller is a 69 year old female with PMHx of DM/ CHF/ hypercalcemia for 3 years/atrial fibrillation on coumadin/abdominal surgeries in past/ventral hernia is here for hypercalcemia follow-up.      1. Hypercalcemia:    It appears she has high normal calcium from dec 2009 labs, in 2012 and on 10/18/2018 - has calcium 12.3 with inappropriately elevated . This lab is concerning for primary hyperparathyroidism. She had random urine creatinine ratio but she also takes hydrochlorothiazide and lasix that can interfere with this study. Though, familial hypocalciuric hypercalcemia is less likely cause, we will rule that out. She had DEXA 1/2018 didn't show osteoporosis. Otherwise, she has no renal disease/indication and age > 50 years.  She meets one criteria for now for surgery but she is not interested in having surgery considering her prolonged recovery with abdominal surgeries and complications.    Plan:    -Maintain moderate vitamin D intake (400 to 800 international units daily) and maintain a moderate calcium intake (1000mg/day), otherwise their deficiency will stimulate PTH production. Encourage diet rich foods in calcium and vitamin D.  - Check Calcium, phosphorus, albumin, creatinine, PTH and 24 hour urine for creatinine and calcium level.  - Sestamibi scan for parathyroid gland assessment.  - Stop taking vitamin D 2000 units, encouraged dietary measures. Based on lab results, will decide to start or not.     Lab instruction is provided to patient as mentioned below:    -- Decide a day to collect 24 hour urine test. Once a day is chosen, stop taking water pill - " lasix and hydrochlorothiazide for 3 days prior to the chosen day.  - On the chosen day - Discard the first urine sample of the morning, then start collecting urine in the urine container from next time onwards until next day morning for total of 24 hours.  - Give the urine container to nearby [facility] and have blood drawn that same time.  - Resume taking your water pill as you take normally - lasix and hydrochlorothiazide after the urine and lab test is done    Return to clinic in 1-2 months    Patient is seen and staffed with MD Guru Hilda Lopez MD  PGY 4 - Endocrinology Fellow  Pager: [phone]    Physician Attestation   I, Erin Gonzalez, saw this patient with the fellow and agree with the fellow's findings and plan of care as documented in the resident s note.      I personally reviewed vital signs, medications and labs.    Key findings:   - hypercalemia: long standing but worsening. Likely primary hyperparathyroidism, Now serum calcium > 11.5. Surgery is indicated. Start by collecting 24 hr urine calcium and sestamibi scan. Will discuss results in clinic prior to further recs since patient feels overwhelmed and unable to make a decision today.       Erin Gonzalez  Date of Service (when I saw the patient): Aug 16, 2019      HPI:    Gardenia Muller is a 69 year old female with PMHx of DM/ CHF/ hypercalcemia for 3 years/atrial fibrillation on coumadin is here for hypercalcemia follow-up.    Hypercalcemia ROS:    Symptom review  Weakness: Generalized weakness, no proximal muscle weakness, take shower, comb hair, get up from chair  Thirst: all the time  Polyuria: urinate all the time but takes lasix and HCTZ  Bone pain: denies specific bony pain, years ago had back pain, not now.  Concentration: didn't change over last 5 years  BM: Regular BM, soft - diarrhea, no constipation  GI symptoms?: Nausea 1 month ago, not now but don't know why it got better. Vomiting - 4 months ago, had  no cholecystectomy 2 months ago but still has nausea  Hydration status: Thirsty drinks diet pop 32 ounces, 20 ounce drink tea, Glucerna twice a day, water 20 ounces a day.    Calcium supplements/calcium carbonate for GI symptoms?: Denies tums  Takes Vitamin D 3 - 2000units for atleast 2 years, occ sun exposure  Doesn't OTC calcium medications.  Doesn't drink Milk except occ, doesn't eat broccoli, some cheese, some fish. Takes hydrochlorothiazide for 2 years atleast.    Prior hx of hypercalcemia?: She knows about it 3 years, not seen endocrinology before  Prior treatment?: nothing she can remember, hospitalized for UTI, told about calcium, got IVF    Hx renal stones?: Denies h/o, no kidney procedures, however has urine analysis show calcium crystals.  Hx malignancy?: Denies cancer h/o  Family hx?: Cancer in grand mom  bone cancer, Uncle had throat cancer, denies sarcoidosis, niece had kidney stones. Brother and sister has DM    H/o falls but no recent fractures, lost 1 cm height, left finger fracture from fall last year.    Outside Labs:  8/6/19 - Ionized calcium 1.39, Calcium 12.1 no albumin, GFR > 60  7/30/19 - Calcium 12.0,  Albumin 4.0   7/1/19-calcium 10.4, albumin 2.7  6/27/19 - Calcium 12.0, Albumin 3.4,   (10-86), vit D 73.7(30 - 80 ng/mL)  1/11/2019 - Calcium 10.4, albumin 3.4,    10/18/2018 - Calcium 12.3, ionized calcium 7.33, albumin 3.0, , TSH 7.57, Free T4 1.1, Oct 19 2018 - 1,25 dihydroxy vitamin D 39.3 (19.9 - 79.3 pg/mL)  3/4/2016 - Calcium 10.6, Albumin 3.8  4/15/2015 - Calcium 10.6 (8.5 - 10.5 mg/dL),  Albumin 3.8  6/13/2012 - Calcium 10.9 (8.5 - 10.5 mg/dL),  no albumin  12/16/2009 - Calcium 10.4 (8.5 - 10.5 mg/dL), no albumin    10/22/18  Protein Urine Random 62 mg/dL   ELP Comment Unremarkable protein electrophoresis.        SPEP: 10/23/2018    A polyclonal increase in immunoglobulins suggests chronic inflammation. A small monoclonal component may be masked, so immunofixation  "is suggested if clinically indicated.    The albumin fraction is decreased, and this may represent any combination of protein-calorie malnutrition, bulk protein loss, or accelerated protein breakdown.    Component Name Value Ref Range   Immunofixation Electrophoresis, Serum Unremarkable immunofixation electrophoresis.         Calcium/Creatinine Ratio, UrineResulted: 10/23/2018 4:03 PM  [facility]  Component Name Value Ref Range   Calcium, Urine <2.0 mg/dL   Creatinine, Urine 64.7 mg/dL   Calcium/Creat.Ratio     Comment: \"Unable to calculate: Calcium and/or Creatinine value below detectable level\"     Specimen Collected on   Urine 10/23/2018 3:00 PM         Endocrine review of system:    -Denies any known thyroid issues  -Denies menstrual irregularities - Menopause 50years, occ hot flashes  - weight changes - Loss 179 pounds 4 months ago, zto872.4 lbs, thinks water loss, prior edema in legs  -Occasional muscle cramps, denies constipation    ROS:  All 12 systems were reviewed and negative except as mentioned in HPI    Relevant recent PMH:    Reviewed her PCP notes from 7/30/2019 visit as below:  \"Patient is here for follow-up from 2 hospitalizations at [facility]. First 6/14/2019 through 6/17/2019, the second 6/27 through 7/3/2019. The latter hospitalization was for chronic abdominal pain, felt to be multifactorial, consideration given to bowel ischemia, chronic congestive heart failure, diastolic, extensive vascular disease, social isolation with tenuous independent living status, chronic atrial fibrillation on warfarin, severe protein calorie malnutrition.  Patient also has type 2 diabetes, hypercalcemia, anxiety, transaminitis.  Review of her labs on day of admission showed calcium to be elevated at 10.9, potassium 4.7, sodium 136, BUN 11, creatinine 0.73. Platelets were slightly low at 109,000,  CT of abdomen and pelvis show mild to moderate atherosclerosis of superior mesenteric artery, no evidence " "of severe SMA narrowing. Severe narrowing of the origin of the left main renal artery due to plaque, widely patent right renal artery stent was noted. She had H. pylori antigen done in which was in process    In the end, no reversible process was found for weight loss and failure to thrive and abdominal pain. Visceral angiogram had been considered but after the review of the CT scan this was felt to be not indicated.  Nutrition saw patient and recommended boost glucose control with breakfast and dinner plus thiamine    , No changes in medication except timing of furosemide 40 daily  Patient was to continue taking warfarin.    Reviewed previous hospitalization, this was from the clinic for failure to thrive. Felt to be due to diastolic heart failure exacerbation\"      Past Medical/Surgical History:  Past Medical History:   Diagnosis Date     (HFpEF) heart failure with preserved ejection fraction (H) 9/18/2018 2/8/19, instructed patient to take furosemide when morning weight 167 or above  Last Assessment & Plan:  Formatting of this note might be different from the original. Diastolic Congestive Heart Failure is not well compensated,-patient is probably somewhat over diuresed-lightheaded with standing, weight down, Wt Readings from Last 3 Encounters:  07/30/19 132 lb (59.9 kg)  07/17/19 140 lb 9.6 oz (63     Acalculous cholecystitis 10/3/2018     Chronic abdominal pain 6/27/2019    Probably multifactorial.  Consider bowel ischemia but no tight lesion seen on mesenteric system via CTA, July 2019 so formal angiogram not done     Coronary artery disease involving native coronary artery of native heart without angina pectoris 2/26/2018     Depression 8/16/2019    Created by Conversion   Last Assessment & Plan:  Gurgling following hospitalization, sertraline 100, trazodone.  Encouraged patient to get back in  where she has a social outlet and is challenged.  Reluctant to do so but agreeable.     Hypercalcemia " 8/16/2019    Created by [employer] Annotation: Jun 5 2013  7:58Jerson Turk: most likely due  to hydrochlorothiazide Normal parathyroid hormone 9/2008  Last Assessment & Plan:  Recently normal parathyroid hormone, possibly related to secondary hyperparathyroidism/chronic kidney disease, check ionized calcium and PTH     Hyperparathyroidism (H) 1/15/2019    Primary-based on the fact that it is occurring with normal renal function Pt meets no criteria for consideration of parathyroidectomy which are 1) symptoms caused by hypercalcemia 2) low bone mass 3) nephrolithiasis 4) age younger than 50 5) on feasibility of long-term follow-up  Plan fallow calcium and creat     Incisional hernia, without obstruction or gangrene 9/20/2017     PAD (peripheral artery disease) (H) 8/16/2019    Created by [employer]   Last Assessment & Plan:  No current symptoms See cerebrovascular disease for discussion of secondary prevention Unable to walk for fitness     Persistent atrial fibrillation (H) 8/16/2019    Last Assessment & Plan:  Rate controlled, anticoagulation on warfarin.  Check INR     Severe protein-calorie malnutrition (H) 7/3/2019    Malnutrition: Patient meets diagnostic criteria for severe protein calorie malnutrition in the context of chronic illness as evidenced by  unintentional weight loss and poor nutrient intake     Spinal stenosis 8/16/2019    Created by [employer] Annotation: Aug 20 2010 10:36Jerson Turk: see MRI 5/12/10.   Severe l4-5 stenosis and severe L5-S1  l foraminal stenosis- due, in paryt,  to lipomatosis     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) 8/16/2019    Created by [employer]   Last Assessment & Plan:  Formatting of this note might be different from the original. Complications of Diabetes: nephropathy, cardiovascular disease, cerebrovascular disease and peripheral vascular disease Assessment of blood sugar control: Based on blood sugars, looks  "excellent, no need for A1c Lab Results  Component Value Date   HGBA1C 5.8 01/11/2019   Diabetes medicatio     Ventral hernia without obstruction or gangrene 3/15/2018    Last Assessment & Plan:  Recent repair complicated by small bowel obstruction     Weight loss, non-intentional 7/1/2019     Past Surgical History:   Procedure Laterality Date     CHOLECYSTECTOMY      2019       Allergies:  Allergies   Allergen Reactions     Penicillins Swelling       PTA Meds:  Prior to Admission medications    Not on File        Current Medications:   Current Outpatient Medications   Medication     aspirin 81 MG EC tablet     atorvastatin (LIPITOR) 80 MG tablet     ferrous sulfate (FEROSUL) 325 (65 Fe) MG tablet     furosemide (LASIX) 20 MG tablet     guaiFENesin 400 MG TABS     hydrochlorothiazide (HYDRODIURIL) 25 MG tablet     lisinopril (PRINIVIL/ZESTRIL) 10 MG tablet     loratadine (CLARITIN) 10 MG tablet     MAGNESIUM PO     metoprolol tartrate (LOPRESSOR) 25 MG tablet     Multiple Vitamin (MULTI-VITAMINS) TABS     nitroGLYcerin (NITROSTAT) 0.4 MG sublingual tablet     omeprazole (PRILOSEC) 20 MG DR capsule     oxyCODONE IR (ROXICODONE) 10 MG tablet     potassium chloride ER (K-DUR/KLOR-CON M) 20 MEQ CR tablet     sertraline (ZOLOFT) 100 MG tablet     spironolactone (ALDACTONE) 25 MG tablet     traZODone (DESYREL) 50 MG tablet     vitamin D3 (CHOLECALCIFEROL) 2000 units (50 mcg) tablet     warfarin (COUMADIN) 4 MG tablet     No current facility-administered medications for this visit.        Family History:  No family history on file.    Social History:  Social History     Tobacco Use     Smoking status: Never Smoker     Smokeless tobacco: Current User   Substance Use Topics     Alcohol use: Yes     Frequency: Monthly or less         Physical Examination:  Vital signs:      BP: 126/68 Pulse: 61           Height: 156 cm (5' 1.42\") Weight: 59.6 kg (131 lb 6.4 oz)  Estimated body mass index is 24.49 kg/m  as calculated from the " "following:    Height as of this encounter: 1.56 m (5' 1.42\").    Weight as of this encounter: 59.6 kg (131 lb 6.4 oz).      Previous Weights:   Wt Readings from Last 2 Encounters:   08/16/19 59.6 kg (131 lb 6.4 oz)                    BMI:  Body mass index is 24.49 kg/m .  Gen: No acute distress, comfortable appearing, conversant  HEENT: No noted icterus, no palor, mucosa moist    Eyes - EOMI, no erythema, no discharge, no lid lag, no stare  Neck/thyroid: No visible enlargement,  not tender to touch, on palpation  no thyroid swelling  CV: S1S2 present, No thrill or heave  Pulm: Bilateral air entry is present, No wheeze or rhonchi.  ABDOMEN: Positive bowel sounds, soft, non-tender, no skin purple stretch marks, surgical scar marks present.  Ext: No LE edema  Skin: No  hyperpigmentation in elbows, mucosa, palate, palmar creases  Neuro: awake, alert, moves arms and legs, muscle bulk appears thin, DTRs 2/4, no tremor of the outstretched hand  Psych: Mood and affect congruent, rarely irritable      CBC,CMP,INR,LIPIDS:    No results found for this or any previous visit.    7/2/2019 CT soft tissue neck with contrast  Result Impression   CONCLUSION:   1.  Beam hardening and streak artifacts related to dense intravenous contrast degrade evaluation of the thoracic inlet, particularly on the right. There is a subcentimeter nodular structure just posterior to the right thyroid lobe is favored to represent   a small lymph node, but is smaller than the finding on recent ultrasound. The findings described on the previous ultrasound may not be well visualized on the current CT due to artifact. Parathyroid scintigraphy could be considered as an additional means   to assess for parathyroid adenoma.  2.  Otherwise, no neck mass or pathologic cervical lymph nodes.  3.  Calcified atherosclerotic plaque at the right greater than left carotid bifurcations. Suggestion of a high-grade stenosis measuring greater than 70% by NASCET criteria " involving the proximal right ICA segment. This could be confirmed with carotid   ultrasound.     Result Impression   CONCLUSION:   1.  Beam hardening and streak artifacts related to dense intravenous contrast degrade evaluation of the thoracic inlet, particularly on the right. There is a subcentimeter nodular structure just posterior to the right thyroid lobe is favored to represent   a small lymph node, but is smaller than the finding on recent ultrasound. The findings described on the previous ultrasound may not be well visualized on the current CT due to artifact. Parathyroid scintigraphy could be considered as an additional means   to assess for parathyroid adenoma.  2.  Otherwise, no neck mass or pathologic cervical lymph nodes.  3.  Calcified atherosclerotic plaque at the right greater than left carotid bifurcations. Suggestion of a high-grade stenosis measuring greater than 70% by NASCET criteria involving the proximal right ICA segment. This could be confirmed with carotid   ultrasound.     Result Narrative   EXAM: US PARATHYROID  LOCATION: [facility]  DATE/TIME: 7/1/2019 10:21 AM    INDICATION: elevated Calcium  COMPARISON: None.    TECHNIQUE: Routine.   Other Result Information   Interface, Rad Results In - 07/01/2019 10:45 AM CDT  EXAM: US PARATHYROID  LOCATION: [facility]  DATE/TIME: 7/1/2019 10:21 AM    INDICATION: elevated Calcium  COMPARISON: None.    TECHNIQUE: Routine.    IMPRESSION:   FINDINGS AND CONCLUSION: There is a 1.8 x 0.8 x 0.8 cm hypoechoic focus in the right neck just caudal to the right thyroid lobe. This has a slightly hyperechoic central region and internal vascularity. This is favored to represent a lymph node though a   parathyroid adenoma is possible. Additional imaging may be of benefit.    NOTE: ABNORMAL REPORT    THE DICTATION ABOVE DESCRIBES AN ABNORMALITY FOR WHICH FOLLOW-UP IS NEEDED.       RE: Gardenia Muller  YOB: 1950        Dear Jerson  [place]o [place],    Patient Profile:  67 y.o. female, postmenopausal, is here for the first bone density test.   History of fractures - None. Family history of osteoporosis - None.    Family history of hip fracture: None. Smoking history - Past. Osteoporosis   treatment past -  No. Osteoporosis treatment current - No.  Chronic   medical problems - Diabetes Mellitus and Height loss. High risk   medications -  Blood thinner (Coumadin or Heparin);  Yes, Currently.    Assessment:    1. The spine bone density is best assessed using L1-L2 with T-score of   2.6.  2. Femoral bone densities show normal bone density with a left total hip   T- score of -0.3.  And right total hip T-score of 0.1.  3.  Bone density also was checked in the wrist as an alternate site since   the spine measurement was influenced by multilevel degenerative change.    In the left 33% radius, normal bone density is seen with T score -0.3.    67 y.o. female with NORMAL BONE DENSITY and LOW predicted fracture risk   for age based on measured bone density.      Recommendations:  Ensure adequate calcium, vitamin D intake, and regular weightbearing   exercise with a recheck in approximately 5 years to monitor for ongoing   stability.

## 2024-03-26 NOTE — TELEPHONE ENCOUNTER
Yes - agree , change ot 1/2 tab- I called in new rx   No antibiotics or any antibiotics < 2 hrs prior to birth

## 2024-08-19 NOTE — TELEPHONE ENCOUNTER
ANTICOAGULATION  MANAGEMENT- Home Care/Care Facility Result    Assessment     Today's INR result of 2.4 is Therapeutic (goal INR of 2.0-3.0)        Warfarin taken as previously instructed    No new diet changes affecting INR    Potential interaction between Torsemide and warfarin which may affect subsequent INRs. Will be starting Torsemide as soon as it comes in from pharmacy    Continues to tolerate warfarin with no reported s/s of bleeding or thromboembolism     Previous INR was Supratherapeutic    Plan:     Spoke with home care nurse Rozina discussed INR result and instructed:     Warfarin Dosing Instructions: Continue current warfarin dose 4 mg daily on Sun; and 2 mg daily rest of week  (0 % change)    Next INR to be drawn: one week    Education provided: importance of therapeutic range and potential interaction between warfarin and Torsemide    Rozina verbalizes understanding and agrees to warfarin dosing plan.   ? Refill sent to pharmacy    Bia Crane RN    Subjective/Objective:      Gardenia Muller, a 68 y.o. female is established on warfarin.     Home care/care facility RN's report of Gardenia INR, recent warfarin dosing, diet changes, medication changes, and symptoms is documented below.    Additional findings: medication changes: Torsemide    Anticoagulation Episode Summary     Current INR goal:   2.0-3.0   TTR:   67.9 % (2.1 y)   Next INR check:   4/3/2019   INR from last check:   2.40 (3/27/2019)   Weekly max warfarin dose:      Target end date:   Indefinite   INR check location:      Preferred lab:      Send INR reminders to:   ANTICOAGULATION POOL A (WBY,WBE,MID,RSC)    Indications    Cerebrovascular disease [I67.9]           Comments:            Anticoagulation Care Providers     Provider Role Specialty Phone number    Jerson Hernandez MD Referring Family Medicine [phone]                        
INR result is 2.4  INR   Date Value Ref Range Status   03/20/2019 3.50 (!) 0.9 - 1.1 Final       Will the patient be seen, or did they already see, MD or CNP today? No    Most Recent Warfarin dose day/week  Sunday Monday Tuesday Wednesday Thursday Friday Saturday      2 mg 2 mg 2 mg 2 mg     Sunday Monday Tuesday Wednesday Thursday Friday Saturday   4 mg 2 mg 2 mg           Has the patient missed any doses of Coumadin, Warfarin, Jantoven in the past 7 days? No    Has the patients medications changed since the last visit? No    Has the patient experienced any bleeding recently? No    Has the patient experienced any injuries or illness recently? No    Has the patient experienced any 'new' shortness of breath, severe headaches, or changes in vision recently? No    Has the patient had any changes in their diet, or alcohol consumption? No    Is the patient here today to prepare for any type of upcoming surgery, procedure, or for a cardioversion procedure? No    What phone number can we reach the patient at today? [phone] Diana  
show

## 2025-02-04 NOTE — PROGRESS NOTES
Assessment/ Plan  Problem List Items Addressed This Visit        High    Essential hypertension     Blood pressure elevated in clinic today but generally blood pressures have been good at home.  Last 2 elevated here,  Continue metoprolol 25 twice daily and furosemide now on a sliding scale.  We will see back in 2 weeks         Relevant Medications    furosemide (LASIX) 40 MG tablet       Unprioritized    Persistent atrial fibrillation (H)     Strategy rate control and anticoagulation.  Pulse 72, INR ordered         Relevant Medications    furosemide (LASIX) 40 MG tablet    Other Relevant Orders    INR (Completed)    Anemia due to blood loss, acute     Neglected to order hemoglobin today but will plan on this in 2 weeks.  Patient is taking iron         Lower extremity edema     Slightly improved and weight at home is down though it is up here  Add compression stockings, prescription written today after measurements made           Relevant Orders    Basic Metabolic Panel (Completed)    Magnesium (Completed)    Acute diastolic congestive heart failure (H) - Primary     Diastolic Congestive Heart Failure iswell compensated  Wt Readings from Last 3 Encounters:   08/07/18 188 lb 12 oz (85.6 kg)   08/03/18 185 lb (83.9 kg)   07/31/18 185 lb 8 oz (84.1 kg)     BP Readings from Last 3 Encounters:   08/07/18 154/52   07/31/18 158/64   07/17/18 133/72       Meds:  Antihypertensives: Metoprolol 25 twice daily, blood pressure little high  Diuretic?  Furosemide/40 twice daily  Nitrites/other? No    Changes/Recommendations: Wrote a sliding scale for furosemide, this is included on overview  Check BMP and magnesium today  Follow-up: 2 weeks               Relevant Medications    furosemide (LASIX) 40 MG tablet        Subjective  CC:  Chief Complaint   Patient presents with     Follow-up     1 week      HPI:  Abdominal Pain  Surgery 6/22 for some elective hernia repair, subsequent laparotomy for small bowel obstruction 6/30.  Ongoing  pain right upper abdomen in incision.  Seeing surgeon next week.  Patient basically looking for permission to not need to go to day program.  Has missed for about 2 months now.  Denies nausea, vomiting, fever, chills.  Pain is gradually improving.    Follow-up CHF  /Diastolic  Narrative/ current symptoms patient was seen 7/31/18 at which time despite the fact that her weight had gone down, she was edematous and short of breath.  She was noted to have PND orthopnea.  Chest x-ray suggested mild congestive heart failure, BNP was elevated.  She went on for a echocardiogram which did not show any significant wall motion abnormality and had preserved ejection fraction.    When I saw her in this visit, furosemide was increased from 20 mg 40 mg.  She called me 3 or 4 days later without having lost any weight.  Dose was then increased to 80 mg.  She reports that since this was increased, her weight dropped 6 pounds.  She reports ongoing lower extremity edema and wonders what to do about this.  Her breathing is gotten significantly better.  Note current mediations/adherance    ____________________    Wt Readings from Last 3 Encounters:   08/07/18 188 lb 12 oz (85.6 kg)   08/03/18 185 lb (83.9 kg)   07/31/18 185 lb 8 oz (84.1 kg)     Lab Results   Component Value Date     (H) 07/31/2018         Chance of concurrent problems out of control (anemia, thyroid, lung disease? ?)  Yes, patient did have anemia and I had intended to check her hemoglobin today but neglected to do this.  She has been taking iron.    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)     Advanced directives, counseling/discussion     SBO (small bowel obstruction)      Paroxysmal atrial fibrillation (H)     Cystitis     Hypomagnesemia     Essential hypertension     Dysarthria     Cerebral infarction (H)     Anemia     Cerebrovascular disease     Benign adenomatous polyp of large intestine     RLS (restless legs syndrome)     Incisional hernia, without obstruction or gangrene     Abdominal pain     Diverticular disease of large intestine     Dysphagia     Coronary artery disease involving native coronary artery of native heart without angina pectoris     Dyslipidemia     Ventral hernia without obstruction or gangrene     Persistent atrial fibrillation (H)     Type 2 diabetes mellitus with complication, without long-term current use of insulin (H)     Anemia due to blood loss, acute     Anticoagulation management encounter     S/P repair of recurrent ventral hernia     SOB (shortness of breath)     Lower extremity edema     Acute diastolic congestive heart failure (H)     Current medications reviewed as follows:  Current Outpatient Prescriptions on File Prior to Visit   Medication Sig     ACCU-CHEK YAZMIN PLUS TEST STRP strips TEST TWICE DAILY AS DIRECTED. *6 TOTAL FILLS* (Patient taking differently: TEST QID DAILY AS DIRECTED. *6 TOTAL FILLS*)     acetaminophen (TYLENOL) 650 MG CR tablet Take 650 mg by mouth 2 (two) times a day.      aspirin 81 MG EC tablet Take 81 mg by mouth daily.     blood glucose meter (GLUCOMETER) Please Dispense kit per pharmacy discretion and patient's insurance Test blood sugar.     cholecalciferol, vitamin D3, (VITAMIN D3) 2,000 unit capsule Take 2,000 Units by mouth daily with lunch.     ferrous sulfate 325 (65 FE) MG tablet Take 1 tablet (325 mg total) by mouth 2 (two) times a day.     lancets 33 gauge Misc Test twice daily Dispense brand per patient's insurance at pharmacy discretion.     lidocaine (LMX5) 5 % Crea Apply 1 application topically every 12 (twelve) hours as needed (applies to back).     loratadine (CLARITIN) 10 mg tablet Take 10 mg by mouth  daily with lunch. Noon     LORazepam (ATIVAN) 0.5 MG tablet Take 1 tablet (0.5 mg total) by mouth every 6 (six) hours as needed for anxiety. 1  tabs po q 6 hours prn     metoprolol tartrate (LOPRESSOR) 25 MG tablet Take 1 tablet (25 mg total) by mouth 2 (two) times a day.     multivitamin (TAB-A-JULIET) per tablet Take 1 tablet by mouth daily.     nitroglycerin (NITROSTAT) 0.4 MG SL tablet Place 1 tablet (0.4 mg total) under the tongue every 5 (five) minutes as needed for chest pain (for up to 3 doses and call 911 if persists).     omeprazole (PRILOSEC) 20 MG capsule Take 20 mg by mouth 2 (two) times a day.      oxyCODONE-acetaminophen (PERCOCET) 5-325 mg per tablet Take 1 tablet by mouth every 4 (four) hours as needed for pain. (Patient taking differently: Take 1 tablet by mouth every 6 (six) hours as needed for pain. )     polyethylene glycol (GLYCOLAX) 17 gram/dose powder      polyvinyl alcohol (ARTIFICIAL TEARS, POLYVIN ALC,) 1.4 % ophthalmic solution Administer 2 drops to both eyes 2 (two) times a day. As directed.      pramipexole (MIRAPEX) 0.125 MG tablet TAKE 1 TABLET BY MOUTH AT BEDTIME     rosuvastatin (CRESTOR) 40 MG tablet TAKE 1 TABLET BY MOUTH AT BEDTIME     sertraline (ZOLOFT) 100 MG tablet TAKE 1 TABLET BY MOUTH ONCE DAILY     traZODone (DESYREL) 50 MG tablet Take 1 tablet (50 mg total) by mouth at bedtime.     warfarin (COUMADIN) 2 MG tablet Take 2 to 4mg (1 or 2 tabs) by mouth daily as directed.  Adjust dose based on INR.     [DISCONTINUED] furosemide (LASIX) 20 MG tablet Take 1 tablet (20 mg total) by mouth daily.     No current facility-administered medications on file prior to visit.      History   Smoking Status     Current Every Day Smoker     Packs/day: 0.25   Smokeless Tobacco     Former User     Comment: e-cig a few times/day     Social History     Social History Narrative     Patient Care Team:  Jerson Hernandez MD as PCP - General (Family Medicine)  Jones Harding DO as Physician  (General Surgery)  ROS  Full 10 system review including constitutional, respiratory, cardiac, gi, urinary, rheumatologic, neurologic, reproductive, dermatologic psychiatric is  performed (via questionnaire) and is negative except chills, sleep difficulties, difficulty swallowing, shortness of breath, wheezing, nausea, weakness, leg swelling        Objective  Physical Exam  Vitals:    08/07/18 1102   BP: 154/52   Patient Site: Right Arm   Patient Position: Sitting   Cuff Size: Adult Large   Pulse: 72   Resp: 16   Temp: 97  F (36.1  C)   TempSrc: Oral   Weight: 188 lb 12 oz (85.6 kg)     Body mass index is 34.52 kg/(m^2).  Gen- alert, oriented  No acute distress  HEENT- normal cephalic, atraumatic.   Chest- Normal inspiration and expiration.    Clear to ascultation.    No chest wall deformity or scar.  No reproducible chest wall tenderness  CV- Heart regular rate and rhythm  normal tones, no murmurs   No gallops or rubs.  Upper abdomen is not tender on palpation  Ext-no cyanosis  2+ lower extremity edema  Skin- warm and dry,   no visualized rash    Diagnostics:   Results for orders placed or performed in visit on 08/07/18   Basic Metabolic Panel   Result Value Ref Range    Sodium 141 136 - 145 mmol/L    Potassium 3.7 3.5 - 5.0 mmol/L    Chloride 102 98 - 107 mmol/L    CO2 31 22 - 31 mmol/L    Anion Gap, Calculation 8 5 - 18 mmol/L    Glucose 99 70 - 125 mg/dL    Calcium 10.0 8.5 - 10.5 mg/dL    BUN 9 8 - 22 mg/dL    Creatinine 0.72 0.60 - 1.10 mg/dL    GFR MDRD Af Amer >60 >60 mL/min/1.73m2    GFR MDRD Non Af Amer >60 >60 mL/min/1.73m2   Magnesium   Result Value Ref Range    Magnesium 1.7 (L) 1.8 - 2.6 mg/dL   INR   Result Value Ref Range    INR 2.10 (H) 0.90 - 1.10           Please note: Voice recognition software was used in this dictation.  It may therefore contain typographical errors.         Take over the counter pain medication

## 2025-04-28 NOTE — TELEPHONE ENCOUNTER
ANTICOAGULATION  MANAGEMENT- Home Care/Care Facility Result    Assessment     Today's INR result of 2.44 is Therapeutic (goal INR of 2.0-3.0)        Warfarin taken as previously instructed    No new diet changes affecting INR    Patient finished 1 week of Bactrim over the weekend, it does not appear to have significantly affected her INR    Continues to tolerate warfarin with no reported s/s of bleeding or thromboembolism     Previous INR was Therapeutic    Plan:     Spoke with Driss at assisted living discussed INR result and instructed:     Warfarin Dosing Instructions: Continue current warfarin dose    6 mg every Mon, Wed, Fri; 4 mg all other days       Next INR to be drawn: 2 weeks, MON 12/30    Education provided: target INR goal and significance of current INR result    Driss verbalizes understanding and agrees to warfarin dosing plan.     Orders/encounter faxed.   ?   Janny Cox RN per ACM protocol for Jerson Hernandez MD     Subjective/Objective:      Gardenia Muller, a 69 y.o. female is established on warfarin.     Home care/care facility RN's report of [place] INR, recent warfarin dosing, diet changes, medication changes, and symptoms is documented below.    Additional findings: medication changes: confirmed that bactrim finished    Anticoagulation Episode Summary     Current INR goal:   2.0-3.0   TTR:   42.5 % (11.4 mo)   Next INR check:   12/30/2019   INR from last check:   2.44 (12/16/2019)   Weekly max warfarin dose:      Target end date:   Indefinite   INR check location:      Preferred lab:      Send INR reminders to:   [facility]    Indications    Cerebrovascular disease [I67.9]           Comments:            Anticoagulation Care Providers     Provider Role Specialty Phone number    Jerson Hernandez MD Referring Family Medicine [phone]                        
INR result is   2.4    Will the patient be seen, or did they already see, MD or CNP today? No    Most Recent Warfarin dose day/week  Sunday Monday Tuesday Wednesday Thursday Friday Saturday    6 4 6 4 6 4     Sunday Monday Tuesday Wednesday Thursday Friday Saturday   4             Has the patient missed any doses of Coumadin, Warfarin, Jantoven in the past 7 days? No    Has the patients medications changed since the last visit? No    Has the patient experienced any bleeding recently? No    Has the patient experienced any injuries or illness recently? No    Has the patient experienced any 'new' shortness of breath, severe headaches, or changes in vision recently? No    Has the patient had any changes in their diet, or alcohol consumption? No    Is the patient here today to prepare for any type of upcoming surgery, procedure, or for a cardioversion procedure? No    What phone number can we reach the patient at today? [phone].  
None

## 2025-05-15 NOTE — PLAN OF CARE
Problem: Pain  Goal: Patient's pain/discomfort is manageable  Outcome: Progressing     Problem: Safety  Goal: Patient will be injury free during hospitalization  Outcome: Progressing     Problem: Daily Care  Goal: Daily care needs are met  Outcome: Progressing     Problem: Potential for Compromised Skin Integrity  Goal: Skin integrity is maintained or improved  Outcome: Progressing     Problem: Risk of Injury Due to Unsafe Behavior  Goal: Patient will remain safe while in restraint; physical/psychological needs will be met  Outcome: Progressing  Goal: Alternatives to restraint will be continually assessed with use of least restrictive device and discontinuation as soon as possible  Outcome: Progressing  Goal: Patient/Family will be able to communicate reason for restraint and steps for restraint application and removal  Outcome: Progressing     Problem: Breathing  Goal: Patient will maintain patent airway  Outcome: Progressing     Problem: Mechanical Ventilation  Goal: Patient will maintain patent airway  Outcome: Progressing  Goal: ET tube will be managed safely  Outcome: Progressing      Patient is advised to take Zofran every 6-8 hourly for nausea or vomiting.  Tylenol for pain or fever.  If there is increasing pain or fever go to the nearest emergency department for further evaluation with lab workup and possible CT imaging.  Ciprofloxacin, Flagyl as prescribed.  Doxycycline for pain/diarrhea